# Patient Record
Sex: FEMALE | Race: WHITE | NOT HISPANIC OR LATINO | Employment: OTHER | URBAN - METROPOLITAN AREA
[De-identification: names, ages, dates, MRNs, and addresses within clinical notes are randomized per-mention and may not be internally consistent; named-entity substitution may affect disease eponyms.]

---

## 2017-01-06 ENCOUNTER — OFFICE VISIT (OUTPATIENT)
Dept: INTERNAL MEDICINE | Facility: CLINIC | Age: 82
End: 2017-01-06

## 2017-01-06 VITALS
WEIGHT: 158.06 LBS | TEMPERATURE: 98.4 F | BODY MASS INDEX: 28.91 KG/M2 | SYSTOLIC BLOOD PRESSURE: 120 MMHG | DIASTOLIC BLOOD PRESSURE: 80 MMHG | RESPIRATION RATE: 20 BRPM | HEART RATE: 96 BPM

## 2017-01-06 DIAGNOSIS — J43.9 PULMONARY EMPHYSEMA, UNSPECIFIED EMPHYSEMA TYPE (HCC): ICD-10-CM

## 2017-01-06 DIAGNOSIS — N39.0 URINARY TRACT INFECTION, SITE UNSPECIFIED: ICD-10-CM

## 2017-01-06 DIAGNOSIS — R50.9 FEVER, UNSPECIFIED FEVER CAUSE: Primary | ICD-10-CM

## 2017-01-06 DIAGNOSIS — R10.9 FLANK PAIN: ICD-10-CM

## 2017-01-06 LAB
BILIRUB BLD-MCNC: NEGATIVE MG/DL
CLARITY, POC: ABNORMAL
COLOR UR: YELLOW
EXPIRATION DATE: ABNORMAL
EXPIRATION DATE: NORMAL
FLUAV AG NPH QL: NEGATIVE
FLUBV AG NPH QL: NEGATIVE
GLUCOSE UR STRIP-MCNC: NEGATIVE MG/DL
INTERNAL CONTROL: NORMAL
KETONES UR QL: NEGATIVE
LEUKOCYTE EST, POC: ABNORMAL
Lab: ABNORMAL
Lab: NORMAL
NITRITE UR-MCNC: NEGATIVE MG/ML
PH UR: 7 [PH] (ref 5–8)
PROT UR STRIP-MCNC: NEGATIVE MG/DL
RBC # UR STRIP: ABNORMAL /UL
SP GR UR: 1.01 (ref 1–1.03)
UROBILINOGEN UR QL: NORMAL

## 2017-01-06 PROCEDURE — 87086 URINE CULTURE/COLONY COUNT: CPT | Performed by: INTERNAL MEDICINE

## 2017-01-06 PROCEDURE — 81003 URINALYSIS AUTO W/O SCOPE: CPT | Performed by: INTERNAL MEDICINE

## 2017-01-06 PROCEDURE — 87804 INFLUENZA ASSAY W/OPTIC: CPT | Performed by: INTERNAL MEDICINE

## 2017-01-06 PROCEDURE — 99214 OFFICE O/P EST MOD 30 MIN: CPT | Performed by: INTERNAL MEDICINE

## 2017-01-06 RX ORDER — SULFAMETHOXAZOLE AND TRIMETHOPRIM 800; 160 MG/1; MG/1
1 TABLET ORAL 2 TIMES DAILY
Qty: 20 TABLET | Refills: 0 | Status: SHIPPED | OUTPATIENT
Start: 2017-01-06 | End: 2017-01-16

## 2017-01-06 RX ORDER — ALBUTEROL SULFATE 90 UG/1
2 AEROSOL, METERED RESPIRATORY (INHALATION) EVERY 4 HOURS PRN
Qty: 1 INHALER | Refills: 11 | Status: SHIPPED | OUTPATIENT
Start: 2017-01-06 | End: 2017-06-08 | Stop reason: SDUPTHER

## 2017-01-06 NOTE — PROGRESS NOTES
Subjective       Pati Carter is a 86 y.o. female.     Chief Complaint   Patient presents with   • Fever     chills   • Flank Pain   • Shortness of Breath         Fever    This is a new problem. The current episode started today. Her temperature was unmeasured prior to arrival. Associated symptoms include diarrhea (after took Levaquin), headaches (x 3-4 days), muscle aches and wheezing. Pertinent negatives include no abdominal pain, chest pain, congestion, coughing, ear pain, nausea, rash, sore throat, urinary pain or vomiting. She has tried nothing (took 1 Levaquin) for the symptoms.   Risk factors: no contaminated food, no contaminated water, no recent travel and no sick contacts    Flank Pain   This is a new problem. Episode onset: 3-4 days ago. The problem occurs intermittently. The problem is unchanged (left side only). The quality of the pain is described as aching. The pain does not radiate. The pain is mild. Exacerbated by: nothing. Associated symptoms include bowel incontinence (not new), a fever, headaches (x 3-4 days) and weight loss (down 5 pounds). Pertinent negatives include no abdominal pain, bladder incontinence, chest pain, dysuria or pelvic pain. She has tried nothing for the symptoms.   Shortness of Breath   This is a chronic problem. Episode frequency: worse in the morning. The problem has been unchanged. Associated symptoms include a fever, headaches (x 3-4 days) and wheezing. Pertinent negatives include no abdominal pain, chest pain, ear pain, hemoptysis, rash, rhinorrhea, sore throat, sputum production, swollen glands or vomiting. Treatments tried: On Breo Ellipta daily. The treatment provided significant relief. Her past medical history is significant for COPD.      She wants an inhaler.    The following portions of the patient's history were reviewed and updated as appropriate: allergies, current medications, past family history, past medical history, past social history, past surgical  history and problem list.      Review of Systems   Constitutional: Positive for appetite change, fatigue, fever and weight loss (down 5 pounds). Negative for chills.   HENT: Negative for congestion, ear pain, postnasal drip, rhinorrhea, sore throat and voice change.    Eyes: Negative for pain, discharge, redness and itching.   Respiratory: Positive for shortness of breath and wheezing. Negative for cough, hemoptysis and sputum production.    Cardiovascular: Negative for chest pain.   Gastrointestinal: Positive for bowel incontinence (not new) and diarrhea (after took Levaquin). Negative for abdominal pain, nausea and vomiting.   Genitourinary: Positive for flank pain. Negative for bladder incontinence, dysuria, hematuria, pelvic pain and urgency.   Musculoskeletal: Positive for arthralgias and myalgias.   Skin: Negative for rash.   Neurological: Positive for headaches (x 3-4 days).   Hematological: Negative for adenopathy.         Blood pressure 120/80, pulse 96, temperature 98.4 °F (36.9 °C), temperature source Temporal Artery , resp. rate 20, weight 158 lb 1 oz (71.7 kg).      Objective     Physical Exam   Constitutional:   Obese.   HENT:   Head: Normocephalic and atraumatic.   Right Ear: Tympanic membrane, external ear and ear canal normal.   Left Ear: Tympanic membrane, external ear and ear canal normal.   Mouth/Throat: Oropharynx is clear and moist and mucous membranes are normal. No oral lesions.   Tonsils normal.  No sinus tenderness to palpation.   Eyes: Conjunctivae are normal.   Neck: Normal range of motion. Neck supple.   Cardiovascular: Normal rate and regular rhythm.    No murmur heard.  Pulmonary/Chest: Effort normal. No respiratory distress. She has no decreased breath sounds. She has wheezes (scattered bilaterally). She has no rhonchi. She has no rales.   Lymphadenopathy:     She has no cervical adenopathy.   Skin: No rash noted.   Psychiatric: She has a normal mood and affect.   Nursing note and  vitals reviewed.      Results for orders placed or performed in visit on 01/06/17   POC Urinalysis Dipstick, Automated   Result Value Ref Range    Color Yellow Yellow, Straw, Dark Yellow, Selena    Clarity, UA Cloudy (A) Clear    Glucose, UA Negative Negative, 1000 mg/dL (3+) mg/dL    Bilirubin Negative Negative    Ketones, UA Negative Negative    Specific Gravity  1.010 1.005 - 1.030    Blood, UA 50 Evens/ul (A) Negative    pH, Urine 7.0 5.0 - 8.0    Protein, POC Negative Negative mg/dL    Urobilinogen, UA Normal Normal    Leukocytes 500 Nikolas/ul (A) Negative    Nitrite, UA Negative Negative    Lot Number [id number]     Expiration Date 9-17    POC Influenza A / B   Result Value Ref Range    Rapid Influenza A Ag NEGATIVE     Rapid Influenza B Ag NEGATIVE     Internal Control Passed Passed    Lot Number [id number]     Expiration Date 5-5-19          Assessment/Plan   Pati was seen today for fever, flank pain and shortness of breath.    Diagnoses and all orders for this visit:    Fever, unspecified fever cause  -     POC Urinalysis Dipstick, Automated  -     POC Influenza A / B    Flank pain    Pulmonary emphysema, unspecified emphysema type  -     albuterol (PROVENTIL HFA;VENTOLIN HFA) 108 (90 BASE) MCG/ACT inhaler; Inhale 2 puffs Every 4 (Four) Hours As Needed for wheezing or shortness of air.    Urinary tract infection, site unspecified  -     Urine Culture  -     sulfamethoxazole-trimethoprim (BACTRIM DS) 800-160 MG per tablet; Take 1 tablet by mouth 2 (Two) Times a Day for 10 days.      Return if symptoms worsen or fail to improve.

## 2017-01-06 NOTE — MR AVS SNAPSHOT
Pati Carter   1/6/2017 10:15 AM   Office Visit    Provider:  Latoya Bolaños MD   Department:  [facility]   Dept Phone:  [phone]                Your Full Care Plan              Today's Medication Changes          These changes are accurate as of: 1/6/17 11:19 AM.  If you have any questions, ask your nurse or doctor.               New Medication(s)Ordered:     albuterol 108 (90 BASE) MCG/ACT inhaler   Commonly known as:  PROVENTIL HFA;VENTOLIN HFA   Inhale 2 puffs Every 4 (Four) Hours As Needed for wheezing or shortness of air.   Started by:  Latoya Bolaños MD       sulfamethoxazole-trimethoprim 800-160 MG per tablet   Commonly known as:  BACTRIM DS   Take 1 tablet by mouth 2 (Two) Times a Day for 10 days.   Started by:  Latoya Bolaños MD            Where to Get Your Medications      These medications were sent to [address] [place] [address] [phone] [facility] [phone] [place] [address]     Phone:  [phone]     albuterol 108 (90 BASE) MCG/ACT inhaler    sulfamethoxazole-trimethoprim 800-160 MG per tablet                  Your Updated Medication List          This list is accurate as of: 1/6/17 11:19 AM.  Always use your most recent med list.                albuterol 108 (90 BASE) MCG/ACT inhaler   Commonly known as:  PROVENTIL HFA;VENTOLIN HFA   Inhale 2 puffs Every 4 (Four) Hours As Needed for wheezing or shortness of air.       BREO ELLIPTA 200-25 MCG/INH aerosol powder    Generic drug:  Fluticasone Furoate-Vilanterol       CENTRUM SILVER ADULT 50+ PO       COLACE 100 MG capsule   Generic drug:  docusate sodium       folic acid 1 MG tablet   Commonly known as:  FOLVITE       methotrexate 2.5 MG tablet       simvastatin 20 MG tablet   Commonly known as:  ZOCOR   TAKE 1 TABLET EVERY DAY AT BEDTIME       sulfamethoxazole-trimethoprim 800-160 MG per tablet   Commonly known as:   BACTRIM DS   Take 1 tablet by mouth 2 (Two) Times a Day for 10 days.       triamterene-hydrochlorothiazide 37.5-25 MG per capsule   Commonly known as:  DYAZIDE   TAKE 1 CAPSULE EVERY DAY       Vitamin D3 3000 UNITS tablet               We Performed the Following     POC Influenza A / B     POC Urinalysis Dipstick, Automated     Urine Culture       You Were Diagnosed With        Codes Comments    Fever, unspecified fever cause    -  Primary ICD-10-CM: R50.9  ICD-9-CM: 780.60     Flank pain     ICD-10-CM: R10.9  ICD-9-CM: 789.09     Pulmonary emphysema, unspecified emphysema type     ICD-10-CM: J43.9  ICD-9-CM: 492.8     Urinary tract infection, site unspecified     ICD-10-CM: N39.0       Instructions     None    Patient Instructions History      [email or web address] Signup     [facility] [employer] allows you to send messages to your doctor, view your test results, renew your prescriptions, schedule appointments, and more. To sign up, go to SaveUp and click on the Sign Up Now link in the New User? box. Enter your [employer]de Activation Code exactly as it appears below along with the last four digits of your Social Security Number and your Date of Birth () to complete the sign-up process. If you do not sign up before the expiration date, you must request a new code.    Glyde Activation Code: [email or web address]  Expires: 2017 11:19 AM    If you have questions, you can email [email or web address] or call [phone] to talk to our Glyde staff. Remember, Glyde is NOT to be used for urgent needs. For medical emergencies, dial 911.               Other Info from Your Visit           Your Appointments     2017 11:00 AM EDT   Follow Up with Latoya Bolaños MD   [facility] (--)    [address] [place] [id number] [phone]           Arrive 15 minutes prior to appointment.              Allergies     Ciprofloxacin  Other  (See Comments)    Other reaction(s): shaking  HCI TABS/ SHAKING    Levofloxacin Intolerance Diarrhea      Reason for Visit     Fever chills    Flank Pain     Shortness of Breath           Vital Signs     Blood Pressure Pulse Temperature Respirations Weight Body Mass Index    120/80 (BP Location: Right arm) 96 98.4 °F (36.9 °C) (Temporal Artery ) 20 158 lb 1 oz (71.7 kg) 28.91 kg/m2    Smoking Status                   Former Smoker           Problems and Diagnoses Noted     Chronic airway obstruction    Fever    -  Primary    Flank pain        Urinary tract infection          Results     POC Urinalysis Dipstick, Automated      Component Value Standard Range & Units    Color Yellow Yellow, Straw, Dark Yellow, Selena    Clarity, UA Cloudy Clear    Glucose, UA Negative Negative, 1000 mg/dL (3+) mg/dL    Bilirubin Negative Negative    Ketones, UA Negative Negative    Specific Gravity  1.010 1.005 - 1.030    Blood, UA 50 Evens/ul Negative    pH, Urine 7.0 5.0 - 8.0    Protein, POC Negative Negative mg/dL    Urobilinogen, UA Normal Normal    Leukocytes 500 Nikolas/ul Negative    Nitrite, UA Negative Negative    Lot Number [id number]     Expiration Date 9-17

## 2017-01-08 LAB — BACTERIA SPEC AEROBE CULT: NORMAL

## 2017-02-03 ENCOUNTER — TRANSCRIBE ORDERS (OUTPATIENT)
Dept: LAB | Facility: HOSPITAL | Age: 82
End: 2017-02-03

## 2017-02-03 ENCOUNTER — APPOINTMENT (OUTPATIENT)
Dept: LAB | Facility: HOSPITAL | Age: 82
End: 2017-02-03

## 2017-02-03 DIAGNOSIS — Z79.899 POLYPHARMACY: ICD-10-CM

## 2017-02-03 DIAGNOSIS — IMO0001 REASON FOR CONSULTATION: Primary | ICD-10-CM

## 2017-02-03 DIAGNOSIS — M05.89 OTHER RHEUMATOID ARTHRITIS WITH RHEUMATOID FACTOR OF MULTIPLE SITES (HCC): ICD-10-CM

## 2017-02-03 LAB
ALBUMIN SERPL-MCNC: 4.1 G/DL (ref 3.2–4.8)
ALBUMIN/GLOB SERPL: 1.1 G/DL (ref 1.5–2.5)
ALP SERPL-CCNC: 67 U/L (ref 25–100)
ALT SERPL W P-5'-P-CCNC: 21 U/L (ref 7–40)
ANION GAP SERPL CALCULATED.3IONS-SCNC: 6 MMOL/L (ref 3–11)
AST SERPL-CCNC: 33 U/L (ref 0–33)
BASOPHILS # BLD AUTO: 0.04 10*3/MM3 (ref 0–0.2)
BASOPHILS NFR BLD AUTO: 0.5 % (ref 0–1)
BILIRUB SERPL-MCNC: 0.3 MG/DL (ref 0.3–1.2)
BUN BLD-MCNC: 33 MG/DL (ref 9–23)
BUN/CREAT SERPL: 27.5 (ref 7–25)
CALCIUM SPEC-SCNC: 10.2 MG/DL (ref 8.7–10.4)
CHLORIDE SERPL-SCNC: 103 MMOL/L (ref 99–109)
CO2 SERPL-SCNC: 35 MMOL/L (ref 20–31)
CREAT BLD-MCNC: 1.2 MG/DL (ref 0.6–1.3)
CRP SERPL-MCNC: 0.3 MG/DL (ref 0–10)
DEPRECATED RDW RBC AUTO: 52.8 FL (ref 37–54)
EOSINOPHIL # BLD AUTO: 0.24 10*3/MM3 (ref 0.1–0.3)
EOSINOPHIL NFR BLD AUTO: 3 % (ref 0–3)
ERYTHROCYTE [DISTWIDTH] IN BLOOD BY AUTOMATED COUNT: 14.3 % (ref 11.3–14.5)
GFR SERPL CREATININE-BSD FRML MDRD: 43 ML/MIN/1.73
GLOBULIN UR ELPH-MCNC: 3.8 GM/DL
GLUCOSE BLD-MCNC: 108 MG/DL (ref 70–100)
HCT VFR BLD AUTO: 35.8 % (ref 34.5–44)
HGB BLD-MCNC: 11.4 G/DL (ref 11.5–15.5)
IMM GRANULOCYTES # BLD: 0.02 10*3/MM3 (ref 0–0.03)
IMM GRANULOCYTES NFR BLD: 0.3 % (ref 0–0.6)
LYMPHOCYTES # BLD AUTO: 2.18 10*3/MM3 (ref 0.6–4.8)
LYMPHOCYTES NFR BLD AUTO: 27.3 % (ref 24–44)
MCH RBC QN AUTO: 32.9 PG (ref 27–31)
MCHC RBC AUTO-ENTMCNC: 31.8 G/DL (ref 32–36)
MCV RBC AUTO: 103.2 FL (ref 80–99)
MONOCYTES # BLD AUTO: 0.55 10*3/MM3 (ref 0–1)
MONOCYTES NFR BLD AUTO: 6.9 % (ref 0–12)
NEUTROPHILS # BLD AUTO: 4.96 10*3/MM3 (ref 1.5–8.3)
NEUTROPHILS NFR BLD AUTO: 62 % (ref 41–71)
PLATELET # BLD AUTO: 253 10*3/MM3 (ref 150–450)
PMV BLD AUTO: 10 FL (ref 6–12)
POTASSIUM BLD-SCNC: 4.1 MMOL/L (ref 3.5–5.5)
PROT SERPL-MCNC: 7.9 G/DL (ref 5.7–8.2)
RBC # BLD AUTO: 3.47 10*6/MM3 (ref 3.89–5.14)
SODIUM BLD-SCNC: 144 MMOL/L (ref 132–146)
WBC NRBC COR # BLD: 7.99 10*3/MM3 (ref 3.5–10.8)

## 2017-02-03 PROCEDURE — 86140 C-REACTIVE PROTEIN: CPT | Performed by: INTERNAL MEDICINE

## 2017-02-03 PROCEDURE — 85025 COMPLETE CBC W/AUTO DIFF WBC: CPT | Performed by: INTERNAL MEDICINE

## 2017-02-03 PROCEDURE — 36415 COLL VENOUS BLD VENIPUNCTURE: CPT

## 2017-02-03 PROCEDURE — 80053 COMPREHEN METABOLIC PANEL: CPT | Performed by: INTERNAL MEDICINE

## 2017-02-24 ENCOUNTER — OFFICE VISIT (OUTPATIENT)
Dept: INTERNAL MEDICINE | Facility: CLINIC | Age: 82
End: 2017-02-24

## 2017-02-24 VITALS
HEART RATE: 100 BPM | WEIGHT: 154.5 LBS | BODY MASS INDEX: 28.26 KG/M2 | DIASTOLIC BLOOD PRESSURE: 84 MMHG | RESPIRATION RATE: 20 BRPM | TEMPERATURE: 97.5 F | SYSTOLIC BLOOD PRESSURE: 140 MMHG

## 2017-02-24 DIAGNOSIS — N39.0 URINARY TRACT INFECTION, SITE UNSPECIFIED: Primary | ICD-10-CM

## 2017-02-24 DIAGNOSIS — B37.2 CANDIDAL DERMATITIS: ICD-10-CM

## 2017-02-24 LAB
BILIRUB BLD-MCNC: NEGATIVE MG/DL
CLARITY, POC: ABNORMAL
COLOR UR: YELLOW
EXPIRATION DATE: ABNORMAL
GLUCOSE UR STRIP-MCNC: NEGATIVE MG/DL
KETONES UR QL: NEGATIVE
LEUKOCYTE EST, POC: ABNORMAL
Lab: ABNORMAL
NITRITE UR-MCNC: NEGATIVE MG/ML
PH UR: 7 [PH] (ref 5–8)
PROT UR STRIP-MCNC: NEGATIVE MG/DL
RBC # UR STRIP: NEGATIVE /UL
SP GR UR: 1 (ref 1–1.03)
UROBILINOGEN UR QL: NORMAL

## 2017-02-24 PROCEDURE — 87186 SC STD MICRODIL/AGAR DIL: CPT | Performed by: INTERNAL MEDICINE

## 2017-02-24 PROCEDURE — 81003 URINALYSIS AUTO W/O SCOPE: CPT | Performed by: INTERNAL MEDICINE

## 2017-02-24 PROCEDURE — 99214 OFFICE O/P EST MOD 30 MIN: CPT | Performed by: INTERNAL MEDICINE

## 2017-02-24 PROCEDURE — 87086 URINE CULTURE/COLONY COUNT: CPT | Performed by: INTERNAL MEDICINE

## 2017-02-24 PROCEDURE — 87077 CULTURE AEROBIC IDENTIFY: CPT | Performed by: INTERNAL MEDICINE

## 2017-02-24 RX ORDER — NITROFURANTOIN 25; 75 MG/1; MG/1
100 CAPSULE ORAL 2 TIMES DAILY
Qty: 14 CAPSULE | Refills: 0 | Status: SHIPPED | OUTPATIENT
Start: 2017-02-24 | End: 2017-03-03

## 2017-02-24 RX ORDER — NYSTATIN 100000 U/G
OINTMENT TOPICAL 3 TIMES DAILY
Qty: 45 G | Refills: 0 | Status: SHIPPED | OUTPATIENT
Start: 2017-02-24 | End: 2017-03-03

## 2017-02-24 RX ORDER — MELOXICAM 15 MG/1
TABLET ORAL AS NEEDED
COMMUNITY
Start: 2017-01-18 | End: 2017-10-17

## 2017-02-24 NOTE — PROGRESS NOTES
Subjective       Pati Carter is a 86 y.o. female.     Chief Complaint   Patient presents with   • Urinary Tract Infection   • Headache         Urinary Tract Infection    This is a new problem. Episode onset: 1 week ago. The problem occurs every urination. The problem has been gradually worsening. Quality: None. There has been no fever. She is not sexually active. There is no history of pyelonephritis. Associated symptoms include chills, flank pain (left), frequency and urgency. Pertinent negatives include no discharge, hematuria, nausea or vomiting. She has tried nothing for the symptoms. Her past medical history is significant for recurrent UTIs. There is no history of kidney stones.      The patient states she used Vagisil on her genital rash.  It made it worse.    The following portions of the patient's history were reviewed and updated as appropriate: allergies, current medications, past family history, past medical history, past social history, past surgical history and problem list.      Review of Systems   Constitutional: Positive for appetite change (decreased) and chills. Negative for fever.   Respiratory: Negative for shortness of breath and wheezing.    Cardiovascular: Negative for chest pain.   Gastrointestinal: Negative for abdominal pain, diarrhea, nausea and vomiting.   Genitourinary: Positive for flank pain (left), frequency and urgency. Negative for dysuria, hematuria, pelvic pain, vaginal bleeding and vaginal discharge.        Nocturia.   Skin: Positive for rash (genital area).   Neurological: Positive for headaches.   Hematological: Negative for adenopathy.         Blood pressure 140/84, pulse 100, temperature 97.5 °F (36.4 °C), temperature source Temporal Artery , resp. rate 20, weight 154 lb 8 oz (70.1 kg).      Objective     Physical Exam   Constitutional:   Overweight.   Cardiovascular: Normal rate, regular rhythm and normal heart sounds.    No murmur heard.  Pulmonary/Chest: Effort  normal and breath sounds normal.   Abdominal: Soft. Bowel sounds are normal. She exhibits no distension and no mass. There is no hepatosplenomegaly. There is no tenderness. There is no CVA tenderness.   Skin: Rash (erythematous macular rash in genital area.) noted.   Psychiatric: She has a normal mood and affect.   Nursing note and vitals reviewed.      Results for orders placed or performed in visit on 02/24/17   POC Urinalysis Dipstick, Automated   Result Value Ref Range    Color Yellow Yellow, Straw, Dark Yellow, Selena    Clarity, UA Cloudy (A) Clear    Glucose, UA Negative Negative, 1000 mg/dL (3+) mg/dL    Bilirubin Negative Negative    Ketones, UA Negative Negative    Specific Gravity  1.005 1.005 - 1.030    Blood, UA Negative Negative    pH, Urine 7.0 5.0 - 8.0    Protein, POC Negative Negative mg/dL    Urobilinogen, UA Normal Normal    Leukocytes 500 Nikolas/ul (A) Negative    Nitrite, UA Negative Negative    Lot Number [id number]     Expiration Date 9-17        Assessment/Plan   Pati was seen today for urinary tract infection and headache.    Diagnoses and all orders for this visit:    Urinary tract infection, site unspecified  -     POC Urinalysis Dipstick, Automated  -     Urine Culture  -     nitrofurantoin, macrocrystal-monohydrate, (MACROBID) 100 MG capsule; Take 1 capsule by mouth 2 (Two) Times a Day for 7 days.    Candidal dermatitis  -     nystatin (MYCOSTATIN) 416918 UNIT/GM ointment; Apply  topically 3 (Three) Times a Day for 7 days.        Plenty of fluids.     Return for Next scheduled follow up.

## 2017-02-26 LAB — BACTERIA SPEC AEROBE CULT: ABNORMAL

## 2017-03-07 ENCOUNTER — TELEPHONE (OUTPATIENT)
Dept: INTERNAL MEDICINE | Facility: CLINIC | Age: 82
End: 2017-03-07

## 2017-03-07 RX ORDER — FLUCONAZOLE 150 MG/1
TABLET ORAL
Qty: 1 TABLET | Refills: 1 | Status: SHIPPED | OUTPATIENT
Start: 2017-03-07 | End: 2017-03-10

## 2017-03-07 NOTE — TELEPHONE ENCOUNTER
Is she saying that the UTI is better, but the yeast infection is not? We could try fluconazole 150mg PO x 1.

## 2017-03-07 NOTE — TELEPHONE ENCOUNTER
Spoke with Danette and she states she is still having discomfort from a yeast infection   Advised her if no better after this rx of fluconazole to have her follow up with Dr Bolaños.   Verb understanding given.   Rx sent to pharmacy.

## 2017-03-07 NOTE — TELEPHONE ENCOUNTER
----- Message from Kate Gallo sent at 3/7/2017 11:26 AM EST -----  DAUGHTER-VIJI [phone]    PT WAS SEEN ON 2/24 FOR UTI AND ALSO WAS GIVEN YEAST INFECTION TOPICAL CREAM.  ALSO DID MONISTAT 1.  NOT SURE UTI IS BETTER BUT DAUGHTER KNOWS YEAST INFECTION IS NO BETTER.      [facility]

## 2017-03-09 ENCOUNTER — TELEPHONE (OUTPATIENT)
Dept: INTERNAL MEDICINE | Facility: CLINIC | Age: 82
End: 2017-03-09

## 2017-03-09 NOTE — TELEPHONE ENCOUNTER
----- Message from Chiquita Farley sent at 3/9/2017 12:35 PM EST -----  Danette Suresh, pts daughter called and stated pt still has a UTI and yeast infection and has finished out both of the rxs that was called in for this. She wanted to know what she should do for the pt? She also stated pt has lost some controlled over her bladder. She can be reached at [phone]      Pharmacy- [facility]

## 2017-03-09 NOTE — TELEPHONE ENCOUNTER
Danette states the burning and urination has come back .  She wanted to schedule a follow up for UTI .  Appt scheduled for tomorrow.

## 2017-03-10 ENCOUNTER — OFFICE VISIT (OUTPATIENT)
Dept: INTERNAL MEDICINE | Facility: CLINIC | Age: 82
End: 2017-03-10

## 2017-03-10 VITALS
TEMPERATURE: 98.2 F | RESPIRATION RATE: 20 BRPM | WEIGHT: 154.25 LBS | BODY MASS INDEX: 28.21 KG/M2 | SYSTOLIC BLOOD PRESSURE: 124 MMHG | HEART RATE: 76 BPM | DIASTOLIC BLOOD PRESSURE: 84 MMHG

## 2017-03-10 DIAGNOSIS — N39.0 URINARY TRACT INFECTION, SITE UNSPECIFIED: ICD-10-CM

## 2017-03-10 DIAGNOSIS — M79.604 PAIN OF RIGHT LOWER EXTREMITY: ICD-10-CM

## 2017-03-10 DIAGNOSIS — N39.0 URINARY TRACT INFECTION, SITE UNSPECIFIED: Primary | ICD-10-CM

## 2017-03-10 LAB
BILIRUB BLD-MCNC: NEGATIVE MG/DL
CLARITY, POC: ABNORMAL
COLOR UR: YELLOW
EXPIRATION DATE: ABNORMAL
GLUCOSE UR STRIP-MCNC: NEGATIVE MG/DL
KETONES UR QL: NEGATIVE
LEUKOCYTE EST, POC: ABNORMAL
Lab: ABNORMAL
NITRITE UR-MCNC: NEGATIVE MG/ML
PH UR: 7 [PH] (ref 5–8)
PROT UR STRIP-MCNC: NEGATIVE MG/DL
RBC # UR STRIP: NEGATIVE /UL
SP GR UR: 1.01 (ref 1–1.03)
UROBILINOGEN UR QL: NORMAL

## 2017-03-10 PROCEDURE — 99214 OFFICE O/P EST MOD 30 MIN: CPT | Performed by: INTERNAL MEDICINE

## 2017-03-10 PROCEDURE — 87086 URINE CULTURE/COLONY COUNT: CPT | Performed by: INTERNAL MEDICINE

## 2017-03-10 PROCEDURE — 81003 URINALYSIS AUTO W/O SCOPE: CPT | Performed by: INTERNAL MEDICINE

## 2017-03-10 RX ORDER — NITROFURANTOIN 25; 75 MG/1; MG/1
100 CAPSULE ORAL 2 TIMES DAILY
Qty: 20 CAPSULE | Refills: 0 | Status: SHIPPED | OUTPATIENT
Start: 2017-03-10 | End: 2017-06-08

## 2017-03-10 RX ORDER — FLUCONAZOLE 150 MG/1
150 TABLET ORAL ONCE
Qty: 2 TABLET | Refills: 1 | Status: SHIPPED | OUTPATIENT
Start: 2017-03-10 | End: 2017-03-10

## 2017-03-10 RX ORDER — NITROFURANTOIN MACROCRYSTALS 100 MG/1
100 CAPSULE ORAL 2 TIMES DAILY
Qty: 20 CAPSULE | Refills: 0 | Status: SHIPPED | OUTPATIENT
Start: 2017-03-10 | End: 2017-03-10

## 2017-03-10 NOTE — PROGRESS NOTES
Subjective       Pati Carter is a 86 y.o. female.     Chief Complaint   Patient presents with   • Urinary Tract Infection   • Urinary Incontinence   • Leg Pain     c/o hot feeling        History obtained from the patient.      Urinary Tract Infection    This is a recurrent problem. The current episode started yesterday. The problem occurs intermittently. The problem has been gradually improving. The patient is experiencing no pain. There has been no fever. She is not sexually active. There is no history of pyelonephritis. Associated symptoms include chills, frequency and urgency. Pertinent negatives include no discharge, flank pain, hematuria, nausea or vomiting. Treatments tried: Pyridium. The treatment provided mild relief. Her past medical history is significant for recurrent UTIs.   Leg Pain    Incident onset:  3 months ago. The injury mechanism was a fall. The pain is present in the right leg. The quality of the pain is described as burning. The pain is mild. The pain has been intermittent since onset. Pertinent negatives include no inability to bear weight, loss of motion, loss of sensation, muscle weakness, numbness or tingling. She has tried ice for the symptoms. The treatment provided moderate relief.        The following portions of the patient's history were reviewed and updated as appropriate: allergies, current medications, past family history, past medical history, past social history, past surgical history and problem list.      Review of Systems   Constitutional: Positive for chills. Negative for fever.   Gastrointestinal: Negative for abdominal pain, diarrhea, nausea and vomiting.   Genitourinary: Positive for frequency and urgency. Negative for dysuria, flank pain, hematuria, pelvic pain, vaginal bleeding and vaginal discharge.        Complains of incontinence     Musculoskeletal: Positive for back pain.   Skin: Negative for rash.   Neurological: Negative for tingling and numbness.    Hematological: Negative for adenopathy.         Blood pressure 124/84, pulse 76, temperature 98.2 °F (36.8 °C), temperature source Temporal Artery , resp. rate 20, weight 154 lb 4 oz (70 kg).      Objective     Physical Exam   Constitutional: She appears well-developed and well-nourished.   Cardiovascular: Normal rate, regular rhythm and normal heart sounds.    No murmur heard.  Pulmonary/Chest: Effort normal and breath sounds normal.   Abdominal: Soft. She exhibits no distension and no mass. There is no hepatosplenomegaly. There is no tenderness. There is no CVA tenderness.   Musculoskeletal:   There is a RLE muscle knot, tender to palpation, on the medial anterior shin.  No erythema, edema, or warmth.   Skin: No rash noted.   Nursing note and vitals reviewed.      Results for orders placed or performed in visit on 03/10/17   POC Urinalysis Dipstick, Automated   Result Value Ref Range    Color Yellow Yellow, Straw, Dark Yellow, Selena    Clarity, UA Cloudy (A) Clear    Glucose, UA Negative Negative, 1000 mg/dL (3+) mg/dL    Bilirubin Negative Negative    Ketones, UA Negative Negative    Specific Gravity  1.010 1.005 - 1.030    Blood, UA Negative Negative    pH, Urine 7.0 5.0 - 8.0    Protein, POC Negative Negative mg/dL    Urobilinogen, UA Normal Normal    Leukocytes 25 Nikolas/ul (A) Negative    Nitrite, UA Negative Negative    Lot Number [id number]     Expiration Date 2-28-18          Assessment/Plan   Pati was seen today for urinary tract infection, urinary incontinence and leg pain.    Diagnoses and all orders for this visit:    Urinary tract infection, site unspecified  -     POC Urinalysis Dipstick, Automated  -     Urine Culture  -     fluconazole (DIFLUCAN) 150 MG tablet; Take 1 tablet by mouth 1 (One) Time for 1 dose. May repeat after 4 days  -     Discontinue: nitrofurantoin (MACRODANTIN) 100 MG capsule; Take 1 capsule by mouth 2 (Two) Times a Day for 10 days.    Pain of right lower extremity          Recommend continue Pyridium and plenty of fluids for the UTI.    Recommend heat or ice, and elevation, for the leg pain.  In addition, may take Meloxicam as needed.    Return if symptoms worsen or fail to improve.

## 2017-03-10 NOTE — PATIENT INSTRUCTIONS
Recommend continue Pyridium and plenty of fluids for the UTI.    Recommend heat or ice, and elevation, for the leg pain.  In addition, may take Meloxicam as needed.

## 2017-03-12 LAB — BACTERIA SPEC AEROBE CULT: NORMAL

## 2017-04-11 ENCOUNTER — OFFICE VISIT (OUTPATIENT)
Dept: INTERNAL MEDICINE | Facility: CLINIC | Age: 82
End: 2017-04-11

## 2017-04-11 VITALS
BODY MASS INDEX: 28.24 KG/M2 | WEIGHT: 154.38 LBS | OXYGEN SATURATION: 99 % | DIASTOLIC BLOOD PRESSURE: 72 MMHG | TEMPERATURE: 97.6 F | SYSTOLIC BLOOD PRESSURE: 124 MMHG | RESPIRATION RATE: 20 BRPM | HEART RATE: 86 BPM

## 2017-04-11 DIAGNOSIS — I10 ESSENTIAL HYPERTENSION: ICD-10-CM

## 2017-04-11 DIAGNOSIS — J43.9 PULMONARY EMPHYSEMA, UNSPECIFIED EMPHYSEMA TYPE (HCC): ICD-10-CM

## 2017-04-11 DIAGNOSIS — K63.5 BENIGN COLONIC POLYP: ICD-10-CM

## 2017-04-11 DIAGNOSIS — E78.5 HYPERLIPIDEMIA, UNSPECIFIED HYPERLIPIDEMIA TYPE: Primary | ICD-10-CM

## 2017-04-11 DIAGNOSIS — M06.9 RHEUMATOID ARTHRITIS, INVOLVING UNSPECIFIED SITE, UNSPECIFIED RHEUMATOID FACTOR PRESENCE: ICD-10-CM

## 2017-04-11 DIAGNOSIS — K21.9 GASTROESOPHAGEAL REFLUX DISEASE WITHOUT ESOPHAGITIS: ICD-10-CM

## 2017-04-11 LAB
ALBUMIN SERPL-MCNC: 4.3 G/DL (ref 3.2–4.8)
ALBUMIN/GLOB SERPL: 1.3 G/DL (ref 1.5–2.5)
ALP SERPL-CCNC: 55 U/L (ref 25–100)
ALT SERPL W P-5'-P-CCNC: 15 U/L (ref 7–40)
ANION GAP SERPL CALCULATED.3IONS-SCNC: 4 MMOL/L (ref 3–11)
ARTICHOKE IGE QN: 104 MG/DL (ref 0–130)
AST SERPL-CCNC: 28 U/L (ref 0–33)
BASOPHILS # BLD AUTO: 0.03 10*3/MM3 (ref 0–0.2)
BASOPHILS NFR BLD AUTO: 0.5 % (ref 0–1)
BILIRUB BLD-MCNC: NEGATIVE MG/DL
BILIRUB SERPL-MCNC: 0.6 MG/DL (ref 0.3–1.2)
BUN BLD-MCNC: 31 MG/DL (ref 9–23)
BUN/CREAT SERPL: 28.2 (ref 7–25)
CALCIUM SPEC-SCNC: 10.2 MG/DL (ref 8.7–10.4)
CHLORIDE SERPL-SCNC: 104 MMOL/L (ref 99–109)
CHOLEST SERPL-MCNC: 174 MG/DL (ref 0–200)
CLARITY, POC: ABNORMAL
CO2 SERPL-SCNC: 34 MMOL/L (ref 20–31)
COLOR UR: YELLOW
CREAT BLD-MCNC: 1.1 MG/DL (ref 0.6–1.3)
DEPRECATED RDW RBC AUTO: 57.9 FL (ref 37–54)
EOSINOPHIL # BLD AUTO: 0.14 10*3/MM3 (ref 0.1–0.3)
EOSINOPHIL NFR BLD AUTO: 2.1 % (ref 0–3)
ERYTHROCYTE [DISTWIDTH] IN BLOOD BY AUTOMATED COUNT: 15.4 % (ref 11.3–14.5)
EXPIRATION DATE: ABNORMAL
GFR SERPL CREATININE-BSD FRML MDRD: 47 ML/MIN/1.73
GLOBULIN UR ELPH-MCNC: 3.4 GM/DL
GLUCOSE BLD-MCNC: 88 MG/DL (ref 70–100)
GLUCOSE UR STRIP-MCNC: NEGATIVE MG/DL
HCT VFR BLD AUTO: 36.8 % (ref 34.5–44)
HDLC SERPL-MCNC: 57 MG/DL (ref 40–60)
HGB BLD-MCNC: 11.7 G/DL (ref 11.5–15.5)
IMM GRANULOCYTES # BLD: 0.02 10*3/MM3 (ref 0–0.03)
IMM GRANULOCYTES NFR BLD: 0.3 % (ref 0–0.6)
KETONES UR QL: NEGATIVE
LEUKOCYTE EST, POC: ABNORMAL
LYMPHOCYTES # BLD AUTO: 1.73 10*3/MM3 (ref 0.6–4.8)
LYMPHOCYTES NFR BLD AUTO: 26.5 % (ref 24–44)
Lab: ABNORMAL
MCH RBC QN AUTO: 33.3 PG (ref 27–31)
MCHC RBC AUTO-ENTMCNC: 31.8 G/DL (ref 32–36)
MCV RBC AUTO: 104.8 FL (ref 80–99)
MONOCYTES # BLD AUTO: 0.58 10*3/MM3 (ref 0–1)
MONOCYTES NFR BLD AUTO: 8.9 % (ref 0–12)
NEUTROPHILS # BLD AUTO: 4.04 10*3/MM3 (ref 1.5–8.3)
NEUTROPHILS NFR BLD AUTO: 61.7 % (ref 41–71)
NITRITE UR-MCNC: NEGATIVE MG/ML
PH UR: 7 [PH] (ref 5–8)
PLATELET # BLD AUTO: 265 10*3/MM3 (ref 150–450)
PMV BLD AUTO: 10.2 FL (ref 6–12)
POTASSIUM BLD-SCNC: 3.8 MMOL/L (ref 3.5–5.5)
PROT SERPL-MCNC: 7.7 G/DL (ref 5.7–8.2)
PROT UR STRIP-MCNC: NEGATIVE MG/DL
RBC # BLD AUTO: 3.51 10*6/MM3 (ref 3.89–5.14)
RBC # UR STRIP: ABNORMAL /UL
SODIUM BLD-SCNC: 142 MMOL/L (ref 132–146)
SP GR UR: 1 (ref 1–1.03)
TRIGL SERPL-MCNC: 80 MG/DL (ref 0–150)
TSH SERPL DL<=0.05 MIU/L-ACNC: 2.65 MIU/ML (ref 0.35–5.35)
UROBILINOGEN UR QL: NORMAL
WBC NRBC COR # BLD: 6.54 10*3/MM3 (ref 3.5–10.8)

## 2017-04-11 PROCEDURE — 80053 COMPREHEN METABOLIC PANEL: CPT | Performed by: INTERNAL MEDICINE

## 2017-04-11 PROCEDURE — 36415 COLL VENOUS BLD VENIPUNCTURE: CPT | Performed by: INTERNAL MEDICINE

## 2017-04-11 PROCEDURE — 85025 COMPLETE CBC W/AUTO DIFF WBC: CPT | Performed by: INTERNAL MEDICINE

## 2017-04-11 PROCEDURE — 84443 ASSAY THYROID STIM HORMONE: CPT | Performed by: INTERNAL MEDICINE

## 2017-04-11 PROCEDURE — 80061 LIPID PANEL: CPT | Performed by: INTERNAL MEDICINE

## 2017-04-11 PROCEDURE — 81003 URINALYSIS AUTO W/O SCOPE: CPT | Performed by: INTERNAL MEDICINE

## 2017-04-11 PROCEDURE — 99214 OFFICE O/P EST MOD 30 MIN: CPT | Performed by: INTERNAL MEDICINE

## 2017-04-11 NOTE — PROGRESS NOTES
Subjective       Pati Carter is a 86 y.o. female.     Chief Complaint   Patient presents with   • Hyperlipidemia     follow up        History obtained from the patient.      History of Present Illness     HPI: Here for a 6 month follow up.      Sees Dr. Kaplan for RA, stable on MTX and Folic Acid.     She sees Pulmonary for COPD, stable on Breo-Ellipta.    Primary Care Cardiac Diagnostic Constellation: The patient is here today for a follow-up visit.      Her Hypertension is stable.   The patient is adherent with her medication regimen. She denies medication side effects.   Medication(s): Triamterene HCTZ.   Her Hyperlipidemia has been stable. Her LDL goal is 130 mg/dL and last LDL was 100 mg/dL.   Medication(s): Simvastatin.      Interval Events:  None.      Symptoms: Has bilateral leg pain (PAD), which is stable. Denies chest pain, denies dyspnea, denies lower extremity edema, denies exercise intolerance, denies fatigue, denies visual impairment, denies muscle pain, and denies muscle weakness.   Associated symptoms:  No recent weight changes,  no palpitations, no syncope, no headache, no orthopnea, no PND, no polydipsia, no polyuria, no focal neurologic deficits, and no memory loss.     Lifestyle and Disease Management: Diet: She consumes a diverse and healthy diet. Weight Issues: She does not have any weight concerns. Exercise: She does not exercise regularly. Smoking: She does not use tobacco.      Colonic Polyp (Brief): The patient is being seen for a routine clinic follow-up of Colon Polyp(s).   Current diagnosis was determined by Colonoscopy and last 1999 normal per patient.   Symptoms: no hematochezia, no melena, no diarrhea, no constipation, no decreased stool caliber, no change in bowel habits and no abdominal pain. Associated symptoms: no rectal prolapse.   The patient is not currently being treated for this problem.      Gastroesophageal Reflux Disease (Brief): The patient is being seen for a routine  clinic follow-up of Gastroesophageal Reflux Disease.   Symptoms: no heartburn, no abdominal pain, no acid regurgitation, no nausea, no vomiting, no sore throat, no dysphagia, no odynophagia, no hematemesis and no melena.   Associated symptoms: no anorexia, no early satiety, no bloating, no belching, no weight loss, no hoarseness, no cough and no wheezing.   The patient is not currently being treated for this problem.           Current Outpatient Prescriptions on File Prior to Visit   Medication Sig Dispense Refill   • albuterol (PROVENTIL HFA;VENTOLIN HFA) 108 (90 BASE) MCG/ACT inhaler Inhale 2 puffs Every 4 (Four) Hours As Needed for wheezing or shortness of air. 1 inhaler 11   • Cholecalciferol (VITAMIN D3) 3000 UNITS tablet Take 1 tablet by mouth Daily.     • docusate sodium (COLACE) 100 MG capsule Take  by mouth.     • Fluticasone Furoate-Vilanterol (BREO ELLIPTA) 200-25 MCG/INH aerosol powder  Inhale.     • folic acid (FOLVITE) 1 MG tablet Take  by mouth Daily.     • meloxicam (MOBIC) 15 MG tablet As Needed.     • methotrexate 2.5 MG tablet Take  by mouth.     • Multiple Vitamins-Minerals (CENTRUM SILVER ADULT 50+ PO) Take  by mouth Daily.     • nitrofurantoin, macrocrystal-monohydrate, (MACROBID) 100 MG capsule Take 1 capsule by mouth 2 (Two) Times a Day. 20 capsule 0   • simvastatin (ZOCOR) 20 MG tablet TAKE 1 TABLET EVERY DAY AT BEDTIME 90 tablet 1   • triamterene-hydrochlorothiazide (DYAZIDE) 37.5-25 MG per capsule TAKE 1 CAPSULE EVERY DAY 90 capsule 3     No current facility-administered medications on file prior to visit.          The following portions of the patient's history were reviewed and updated as appropriate: allergies, current medications, past family history, past medical history, past social history, past surgical history and problem list.    Review of Systems   Constitutional: Negative for fatigue and unexpected weight change.   Eyes: Negative for visual disturbance.   Respiratory: Negative  for cough, shortness of breath and wheezing.    Cardiovascular: Negative for chest pain, palpitations and leg swelling.        Has stable COLIN and claudication, but no orthopnea.   Gastrointestinal: Negative for abdominal pain, blood in stool, constipation, diarrhea, nausea and vomiting.        Denies heartburn, melena, dysphagia, odynophagia, belching, and bloating.   Endocrine: Negative for polydipsia and polyuria.   Musculoskeletal: Positive for arthralgias and back pain. Negative for joint swelling and myalgias.   Neurological: Negative for dizziness, syncope, light-headedness and headaches.        No memory issues.   Psychiatric/Behavioral: Negative for decreased concentration.         Objective       Blood pressure 124/72, pulse 86, temperature 97.6 °F (36.4 °C), temperature source Temporal Artery , resp. rate 20, weight 154 lb 6 oz (70 kg), SpO2 99 %.      Physical Exam   Constitutional: She appears well-developed and well-nourished.   Neck: Normal range of motion. Neck supple. Carotid bruit is not present. No thyromegaly present.   Cardiovascular: Normal rate, regular rhythm, normal heart sounds and intact distal pulses.  Exam reveals no gallop and no friction rub.    No murmur heard.  No peripheral edema.   Pulmonary/Chest: Effort normal and breath sounds normal.   Abdominal: Soft. Bowel sounds are normal. She exhibits no distension, no abdominal bruit and no mass. There is no hepatosplenomegaly. There is no tenderness.   Psychiatric: She has a normal mood and affect.   Nursing note and vitals reviewed.      POC Urinalysis Dipstick, Automated   Result Value Ref Range    Color Yellow Yellow, Straw, Dark Yellow, Selena    Clarity, UA Cloudy (A) Clear    Glucose, UA Negative Negative, 1000 mg/dL (3+) mg/dL    Bilirubin Negative Negative    Ketones, UA Negative Negative    Specific Gravity  1.005 1.005 - 1.030    Blood, UA 50 Evens/ul (A) Negative    pH, Urine 7.0 5.0 - 8.0    Protein, POC Negative Negative mg/dL     Urobilinogen, UA Normal Normal    Leukocytes 500 Nikolas/ul (A) Negative    Nitrite, UA Negative Negative    Lot Number [id number]     Expiration Date 2-28-18          Assessment / Plan:    Pati was seen today for hyperlipidemia.    Diagnoses and all orders for this visit:    Hyperlipidemia, unspecified hyperlipidemia type  -     Comprehensive Metabolic Panel  -     Lipid Panel  -     TSH    Essential hypertension  -     CBC & Differential  -     POC Urinalysis Dipstick, Automated    Benign colonic polyp    Gastroesophageal reflux disease without esophagitis    Rheumatoid arthritis, involving unspecified site, unspecified rheumatoid factor presence    Pulmonary emphysema, unspecified emphysema type      The patient was instructed in the side effects of the medication.  Risks of the potential for tolerance, dependence, and addiction were discussed.  The patient was instructed to take the lowest dosage of the medication, at the lowest frequency, and for the shortest period of time possible.  The patient was instructed not to receive controlled substances or narcotics from other doctors, and not to giveaway or sell the medication.    The patient was instructed to abstain from illicit drug use.      Narcotics/controlled substance agreement, Jovanni report, and Urine Drug Screen were updated today if needed.    The patient declined scheduling a Medicare Wellness Exam today.    The patient declines scheduling a Colonoscopy and Mammogram today.    The patient declines a Zostavax and Tdap.    Return in about 6 months (around 10/11/2017) for Recheck-Hyperlipidemia fasting.

## 2017-04-13 ENCOUNTER — APPOINTMENT (OUTPATIENT)
Dept: LAB | Facility: HOSPITAL | Age: 82
End: 2017-04-13

## 2017-04-13 ENCOUNTER — TRANSCRIBE ORDERS (OUTPATIENT)
Dept: LAB | Facility: HOSPITAL | Age: 82
End: 2017-04-13

## 2017-04-13 DIAGNOSIS — M05.89 OTHER RHEUMATOID ARTHRITIS WITH RHEUMATOID FACTOR OF MULTIPLE SITES (HCC): Primary | ICD-10-CM

## 2017-04-13 DIAGNOSIS — IMO0001 REASON FOR CONSULTATION: ICD-10-CM

## 2017-04-13 DIAGNOSIS — Z79.899 DRUG THERAPY: ICD-10-CM

## 2017-04-13 LAB
ALBUMIN SERPL-MCNC: 4.2 G/DL (ref 3.2–4.8)
ALBUMIN/GLOB SERPL: 1.2 G/DL (ref 1.5–2.5)
ALP SERPL-CCNC: 58 U/L (ref 25–100)
ALT SERPL W P-5'-P-CCNC: 16 U/L (ref 7–40)
ANION GAP SERPL CALCULATED.3IONS-SCNC: 1 MMOL/L (ref 3–11)
AST SERPL-CCNC: 29 U/L (ref 0–33)
BASOPHILS # BLD AUTO: 0.01 10*3/MM3 (ref 0–0.2)
BASOPHILS NFR BLD AUTO: 0.2 % (ref 0–1)
BILIRUB SERPL-MCNC: 0.3 MG/DL (ref 0.3–1.2)
BUN BLD-MCNC: 31 MG/DL (ref 9–23)
BUN/CREAT SERPL: 31 (ref 7–25)
CALCIUM SPEC-SCNC: 9.8 MG/DL (ref 8.7–10.4)
CHLORIDE SERPL-SCNC: 105 MMOL/L (ref 99–109)
CO2 SERPL-SCNC: 37 MMOL/L (ref 20–31)
CREAT BLD-MCNC: 1 MG/DL (ref 0.6–1.3)
CRP SERPL-MCNC: 0.1 MG/DL (ref 0–1)
DEPRECATED RDW RBC AUTO: 59.7 FL (ref 37–54)
EOSINOPHIL # BLD AUTO: 0.18 10*3/MM3 (ref 0.1–0.3)
EOSINOPHIL NFR BLD AUTO: 3 % (ref 0–3)
ERYTHROCYTE [DISTWIDTH] IN BLOOD BY AUTOMATED COUNT: 15.7 % (ref 11.3–14.5)
GFR SERPL CREATININE-BSD FRML MDRD: 53 ML/MIN/1.73
GLOBULIN UR ELPH-MCNC: 3.4 GM/DL
GLUCOSE BLD-MCNC: 85 MG/DL (ref 70–100)
HCT VFR BLD AUTO: 37.3 % (ref 34.5–44)
HGB BLD-MCNC: 11.8 G/DL (ref 11.5–15.5)
IMM GRANULOCYTES # BLD: 0.02 10*3/MM3 (ref 0–0.03)
IMM GRANULOCYTES NFR BLD: 0.3 % (ref 0–0.6)
LYMPHOCYTES # BLD AUTO: 1.75 10*3/MM3 (ref 0.6–4.8)
LYMPHOCYTES NFR BLD AUTO: 28.9 % (ref 24–44)
MCH RBC QN AUTO: 33.4 PG (ref 27–31)
MCHC RBC AUTO-ENTMCNC: 31.6 G/DL (ref 32–36)
MCV RBC AUTO: 105.7 FL (ref 80–99)
MONOCYTES # BLD AUTO: 0.78 10*3/MM3 (ref 0–1)
MONOCYTES NFR BLD AUTO: 12.9 % (ref 0–12)
NEUTROPHILS # BLD AUTO: 3.32 10*3/MM3 (ref 1.5–8.3)
NEUTROPHILS NFR BLD AUTO: 54.7 % (ref 41–71)
PLATELET # BLD AUTO: 242 10*3/MM3 (ref 150–450)
PMV BLD AUTO: 10.1 FL (ref 6–12)
POTASSIUM BLD-SCNC: 3.7 MMOL/L (ref 3.5–5.5)
PROT SERPL-MCNC: 7.6 G/DL (ref 5.7–8.2)
RBC # BLD AUTO: 3.53 10*6/MM3 (ref 3.89–5.14)
SODIUM BLD-SCNC: 143 MMOL/L (ref 132–146)
WBC NRBC COR # BLD: 6.06 10*3/MM3 (ref 3.5–10.8)

## 2017-04-13 PROCEDURE — 85025 COMPLETE CBC W/AUTO DIFF WBC: CPT | Performed by: INTERNAL MEDICINE

## 2017-04-13 PROCEDURE — 36415 COLL VENOUS BLD VENIPUNCTURE: CPT | Performed by: INTERNAL MEDICINE

## 2017-04-13 PROCEDURE — 86140 C-REACTIVE PROTEIN: CPT | Performed by: INTERNAL MEDICINE

## 2017-04-13 PROCEDURE — 80053 COMPREHEN METABOLIC PANEL: CPT | Performed by: INTERNAL MEDICINE

## 2017-05-04 RX ORDER — TRIAMTERENE AND HYDROCHLOROTHIAZIDE 37.5; 25 MG/1; MG/1
CAPSULE ORAL
Qty: 90 CAPSULE | Refills: 3 | Status: SHIPPED | OUTPATIENT
Start: 2017-05-04 | End: 2018-02-15 | Stop reason: SDUPTHER

## 2017-06-08 ENCOUNTER — OFFICE VISIT (OUTPATIENT)
Dept: INTERNAL MEDICINE | Facility: CLINIC | Age: 82
End: 2017-06-08

## 2017-06-08 VITALS
TEMPERATURE: 98.4 F | RESPIRATION RATE: 21 BRPM | WEIGHT: 154 LBS | HEART RATE: 104 BPM | BODY MASS INDEX: 28.17 KG/M2 | SYSTOLIC BLOOD PRESSURE: 154 MMHG | DIASTOLIC BLOOD PRESSURE: 70 MMHG

## 2017-06-08 DIAGNOSIS — N39.0 URINARY TRACT INFECTION, SITE UNSPECIFIED: ICD-10-CM

## 2017-06-08 DIAGNOSIS — J43.9 PULMONARY EMPHYSEMA, UNSPECIFIED EMPHYSEMA TYPE (HCC): ICD-10-CM

## 2017-06-08 DIAGNOSIS — R11.0 NAUSEA: Primary | ICD-10-CM

## 2017-06-08 DIAGNOSIS — K52.9 GASTROENTERITIS: ICD-10-CM

## 2017-06-08 LAB
BILIRUB BLD-MCNC: NEGATIVE MG/DL
CLARITY, POC: ABNORMAL
COLOR UR: YELLOW
EXPIRATION DATE: ABNORMAL
GLUCOSE UR STRIP-MCNC: NEGATIVE MG/DL
KETONES UR QL: NEGATIVE
LEUKOCYTE EST, POC: ABNORMAL
Lab: ABNORMAL
NITRITE UR-MCNC: POSITIVE MG/ML
PH UR: 7 [PH] (ref 5–8)
PROT UR STRIP-MCNC: NEGATIVE MG/DL
RBC # UR STRIP: ABNORMAL /UL
SP GR UR: 1.01 (ref 1–1.03)
UROBILINOGEN UR QL: NORMAL

## 2017-06-08 PROCEDURE — 81003 URINALYSIS AUTO W/O SCOPE: CPT | Performed by: INTERNAL MEDICINE

## 2017-06-08 PROCEDURE — 87086 URINE CULTURE/COLONY COUNT: CPT | Performed by: INTERNAL MEDICINE

## 2017-06-08 PROCEDURE — 99214 OFFICE O/P EST MOD 30 MIN: CPT | Performed by: INTERNAL MEDICINE

## 2017-06-08 PROCEDURE — 87077 CULTURE AEROBIC IDENTIFY: CPT | Performed by: INTERNAL MEDICINE

## 2017-06-08 PROCEDURE — 87186 SC STD MICRODIL/AGAR DIL: CPT | Performed by: INTERNAL MEDICINE

## 2017-06-08 RX ORDER — CEFDINIR 300 MG/1
300 CAPSULE ORAL 2 TIMES DAILY
Qty: 20 CAPSULE | Refills: 0 | Status: SHIPPED | OUTPATIENT
Start: 2017-06-08 | End: 2017-06-18

## 2017-06-08 RX ORDER — ALBUTEROL SULFATE 90 UG/1
2 AEROSOL, METERED RESPIRATORY (INHALATION) EVERY 4 HOURS PRN
Qty: 1 INHALER | Refills: 11 | Status: SHIPPED | OUTPATIENT
Start: 2017-06-08 | End: 2019-10-23 | Stop reason: SDUPTHER

## 2017-06-08 NOTE — PROGRESS NOTES
Subjective       Pati Carter is a 86 y.o. female.     Chief Complaint   Patient presents with   • Vomiting       History obtained from the patient's daughter and the patient.      Vomiting    This is a new problem. The current episode started yesterday. The problem occurs 2 to 4 times per day. The problem has been gradually improving. The emesis has an appearance of stomach contents. There has been no fever. Associated symptoms include chills, diarrhea, dizziness and headaches. Pertinent negatives include no abdominal pain, arthralgias, chest pain, coughing, fever, myalgias, URI or weight loss. Risk factors: None. Treatments tried: immodium. The treatment provided moderate relief.        The following portions of the patient's history were reviewed and updated as appropriate: allergies, current medications, past family history, past medical history, past social history, past surgical history and problem list.      Review of Systems   Constitutional: Positive for chills. Negative for fever and weight loss.   HENT: Negative for congestion, ear pain, rhinorrhea and sore throat.    Respiratory: Negative for cough and shortness of breath.    Cardiovascular: Negative for chest pain.   Gastrointestinal: Positive for blood in stool, diarrhea, nausea (improviing) and vomiting. Negative for abdominal pain and constipation.        Had heartburn, belching, and bloating yesterday.  No melena, dysphagia, or odynophagia.   Genitourinary: Negative for dysuria, frequency, hematuria and urgency.   Musculoskeletal: Negative for arthralgias, joint swelling and myalgias.   Skin: Negative for rash.   Neurological: Positive for dizziness, light-headedness and headaches.   Hematological: Negative for adenopathy.         Blood pressure 154/70, pulse 104, temperature 98.4 °F (36.9 °C), temperature source Temporal Artery , resp. rate 21, weight 154 lb (69.9 kg).      Objective     Physical Exam   Constitutional: She appears  well-developed and well-nourished.   HENT:   Mouth/Throat: Oropharynx is clear and moist.   Cardiovascular: Normal rate, regular rhythm and normal heart sounds.    No murmur heard.  Pulmonary/Chest: Effort normal and breath sounds normal.   Abdominal: Soft. Bowel sounds are normal. She exhibits no distension and no mass. There is no hepatosplenomegaly. There is tenderness (mild LLQ). There is no rebound, no guarding and no CVA tenderness.   Lymphadenopathy:     She has no cervical adenopathy.   Skin: No rash noted.   Psychiatric: She has a normal mood and affect.   Nursing note and vitals reviewed.      Results for orders placed or performed in visit on 06/08/17   POC Urinalysis Dipstick, Automated   Result Value Ref Range    Color Yellow Yellow, Straw, Dark Yellow, Selena    Clarity, UA Hazy (A) Clear    Glucose, UA Negative Negative, 1000 mg/dL (3+) mg/dL    Bilirubin Negative Negative    Ketones, UA Negative Negative    Specific Gravity  1.015 1.005 - 1.030    Blood, UA 50 Evens/ul (A) Negative    pH, Urine 7.0 5.0 - 8.0    Protein, POC Negative Negative mg/dL    Urobilinogen, UA Normal Normal    Leukocytes 500 Nikolas/ul (A) Negative    Nitrite, UA Positive (A) Negative    Lot Number [id number]     Expiration Date 2-28-18          Assessment/Plan   Pati was seen today for vomiting.    Diagnoses and all orders for this visit:    Nausea  -     POC Urinalysis Dipstick, Automated    Gastroenteritis    Urinary tract infection, site unspecified  -     Urine Culture  -     cefdinir (OMNICEF) 300 MG capsule; Take 1 capsule by mouth 2 (Two) Times a Day for 10 days.    Pulmonary emphysema, unspecified emphysema type  -     albuterol (PROVENTIL HFA;VENTOLIN HFA) 108 (90 BASE) MCG/ACT inhaler; Inhale 2 puffs Every 4 (Four) Hours As Needed for Wheezing or Shortness of Air.      Return if symptoms worsen or fail to improve.

## 2017-06-10 LAB — BACTERIA SPEC AEROBE CULT: ABNORMAL

## 2017-06-21 ENCOUNTER — TELEPHONE (OUTPATIENT)
Dept: INTERNAL MEDICINE | Facility: CLINIC | Age: 82
End: 2017-06-21

## 2017-06-21 NOTE — TELEPHONE ENCOUNTER
----- Message from Chiquita Farley sent at 6/21/2017  9:59 AM EDT -----  pts daughter Danette Suresh called and stated that pt is still having diarrhea and wanted to know what to do? She wanted to know if she should give pt a probiotic? She can be reached at [phone]      Pharmacy- [address]

## 2017-06-21 NOTE — TELEPHONE ENCOUNTER
S/W PT DGT.VIJI, GAVE RECOMMENDATIONS AND INST PER DR ABURTO. BELOW.  VERB GOOD UNDERSTANDING AND AGREEMENT.  STATES THEY WILL TRY RECOMMENDATIONS AND THAT THEY WILL CALL IF WORSENS OR DOESN'T IMPROVE.  VERB APPREC.

## 2017-06-21 NOTE — TELEPHONE ENCOUNTER
You should take an over the counter probiotic supplement.  Please get a probiotic that has BILLIONS of cultures (not millions).   She can use OTC Imodium.    If not resolving, signs of dehydration, abdominal pain, fevers, pain with urination or worsens--> needs to be seen.   Gianfranco Orona MD  1:20 PM  06/21/17

## 2017-07-03 ENCOUNTER — TRANSCRIBE ORDERS (OUTPATIENT)
Dept: LAB | Facility: HOSPITAL | Age: 82
End: 2017-07-03

## 2017-07-03 ENCOUNTER — LAB (OUTPATIENT)
Dept: LAB | Facility: HOSPITAL | Age: 82
End: 2017-07-03

## 2017-07-03 DIAGNOSIS — Z79.899 POLYPHARMACY: ICD-10-CM

## 2017-07-03 DIAGNOSIS — IMO0001 REASON FOR CONSULTATION: ICD-10-CM

## 2017-07-03 DIAGNOSIS — M05.89 OTHER RHEUMATOID ARTHRITIS WITH RHEUMATOID FACTOR OF MULTIPLE SITES (HCC): ICD-10-CM

## 2017-07-03 DIAGNOSIS — IMO0001 REASON FOR CONSULTATION: Primary | ICD-10-CM

## 2017-07-03 LAB
ALBUMIN SERPL-MCNC: 4.2 G/DL (ref 3.2–4.8)
ALBUMIN/GLOB SERPL: 1.4 G/DL (ref 1.5–2.5)
ALP SERPL-CCNC: 63 U/L (ref 25–100)
ALT SERPL W P-5'-P-CCNC: 16 U/L (ref 7–40)
ANION GAP SERPL CALCULATED.3IONS-SCNC: 9 MMOL/L (ref 3–11)
AST SERPL-CCNC: 30 U/L (ref 0–33)
BASOPHILS # BLD AUTO: 0.05 10*3/MM3 (ref 0–0.2)
BASOPHILS NFR BLD AUTO: 0.7 % (ref 0–1)
BILIRUB SERPL-MCNC: 0.4 MG/DL (ref 0.3–1.2)
BUN BLD-MCNC: 28 MG/DL (ref 9–23)
BUN/CREAT SERPL: 25.5 (ref 7–25)
CALCIUM SPEC-SCNC: 10.4 MG/DL (ref 8.7–10.4)
CHLORIDE SERPL-SCNC: 101 MMOL/L (ref 99–109)
CO2 SERPL-SCNC: 31 MMOL/L (ref 20–31)
CREAT BLD-MCNC: 1.1 MG/DL (ref 0.6–1.3)
CRP SERPL-MCNC: 0.15 MG/DL (ref 0–1)
DEPRECATED RDW RBC AUTO: 52.9 FL (ref 37–54)
EOSINOPHIL # BLD AUTO: 0.11 10*3/MM3 (ref 0–0.3)
EOSINOPHIL NFR BLD AUTO: 1.6 % (ref 0–3)
ERYTHROCYTE [DISTWIDTH] IN BLOOD BY AUTOMATED COUNT: 14.1 % (ref 11.3–14.5)
GFR SERPL CREATININE-BSD FRML MDRD: 47 ML/MIN/1.73
GLOBULIN UR ELPH-MCNC: 3.1 GM/DL
GLUCOSE BLD-MCNC: 89 MG/DL (ref 70–100)
HCT VFR BLD AUTO: 36.3 % (ref 34.5–44)
HGB BLD-MCNC: 11.4 G/DL (ref 11.5–15.5)
IMM GRANULOCYTES # BLD: 0.03 10*3/MM3 (ref 0–0.03)
IMM GRANULOCYTES NFR BLD: 0.4 % (ref 0–0.6)
LYMPHOCYTES # BLD AUTO: 1.85 10*3/MM3 (ref 0.6–4.8)
LYMPHOCYTES NFR BLD AUTO: 26.5 % (ref 24–44)
MCH RBC QN AUTO: 32.9 PG (ref 27–31)
MCHC RBC AUTO-ENTMCNC: 31.4 G/DL (ref 32–36)
MCV RBC AUTO: 104.9 FL (ref 80–99)
MONOCYTES # BLD AUTO: 0.49 10*3/MM3 (ref 0–1)
MONOCYTES NFR BLD AUTO: 7 % (ref 0–12)
NEUTROPHILS # BLD AUTO: 4.45 10*3/MM3 (ref 1.5–8.3)
NEUTROPHILS NFR BLD AUTO: 63.8 % (ref 41–71)
PLATELET # BLD AUTO: 338 10*3/MM3 (ref 150–450)
PMV BLD AUTO: 9.9 FL (ref 6–12)
POTASSIUM BLD-SCNC: 3.9 MMOL/L (ref 3.5–5.5)
PROT SERPL-MCNC: 7.3 G/DL (ref 5.7–8.2)
RBC # BLD AUTO: 3.46 10*6/MM3 (ref 3.89–5.14)
SODIUM BLD-SCNC: 141 MMOL/L (ref 132–146)
WBC NRBC COR # BLD: 6.98 10*3/MM3 (ref 3.5–10.8)

## 2017-07-03 PROCEDURE — 80053 COMPREHEN METABOLIC PANEL: CPT | Performed by: INTERNAL MEDICINE

## 2017-07-03 PROCEDURE — 86140 C-REACTIVE PROTEIN: CPT | Performed by: INTERNAL MEDICINE

## 2017-07-03 PROCEDURE — 36415 COLL VENOUS BLD VENIPUNCTURE: CPT

## 2017-07-03 PROCEDURE — 85025 COMPLETE CBC W/AUTO DIFF WBC: CPT | Performed by: INTERNAL MEDICINE

## 2017-07-17 ENCOUNTER — OFFICE VISIT (OUTPATIENT)
Dept: INTERNAL MEDICINE | Facility: CLINIC | Age: 82
End: 2017-07-17

## 2017-07-17 VITALS
WEIGHT: 154 LBS | SYSTOLIC BLOOD PRESSURE: 134 MMHG | BODY MASS INDEX: 28.17 KG/M2 | DIASTOLIC BLOOD PRESSURE: 80 MMHG | RESPIRATION RATE: 20 BRPM | HEART RATE: 84 BPM | TEMPERATURE: 98.6 F

## 2017-07-17 DIAGNOSIS — R82.998 LEUKOCYTES IN URINE: ICD-10-CM

## 2017-07-17 DIAGNOSIS — N76.0 ACUTE VAGINITIS: Primary | ICD-10-CM

## 2017-07-17 LAB
BILIRUB BLD-MCNC: NEGATIVE MG/DL
CLARITY, POC: CLEAR
COLOR UR: YELLOW
EXPIRATION DATE: ABNORMAL
GLUCOSE UR STRIP-MCNC: NEGATIVE MG/DL
KETONES UR QL: NEGATIVE
LEUKOCYTE EST, POC: ABNORMAL
Lab: ABNORMAL
NITRITE UR-MCNC: NEGATIVE MG/ML
PH UR: 5 [PH] (ref 5–8)
PROT UR STRIP-MCNC: NEGATIVE MG/DL
RBC # UR STRIP: NEGATIVE /UL
SP GR UR: 1.01 (ref 1–1.03)
UROBILINOGEN UR QL: NORMAL

## 2017-07-17 PROCEDURE — 87186 SC STD MICRODIL/AGAR DIL: CPT | Performed by: INTERNAL MEDICINE

## 2017-07-17 PROCEDURE — 87086 URINE CULTURE/COLONY COUNT: CPT | Performed by: INTERNAL MEDICINE

## 2017-07-17 PROCEDURE — 81003 URINALYSIS AUTO W/O SCOPE: CPT | Performed by: INTERNAL MEDICINE

## 2017-07-17 PROCEDURE — 99214 OFFICE O/P EST MOD 30 MIN: CPT | Performed by: INTERNAL MEDICINE

## 2017-07-17 PROCEDURE — 87077 CULTURE AEROBIC IDENTIFY: CPT | Performed by: INTERNAL MEDICINE

## 2017-07-17 RX ORDER — FLUCONAZOLE 150 MG/1
150 TABLET ORAL ONCE
Qty: 1 TABLET | Refills: 0 | Status: SHIPPED | OUTPATIENT
Start: 2017-07-17 | End: 2017-07-17

## 2017-07-17 RX ORDER — NYSTATIN 100000 U/G
OINTMENT TOPICAL 3 TIMES DAILY
Qty: 100 G | Refills: 0 | Status: SHIPPED | OUTPATIENT
Start: 2017-07-17 | End: 2017-07-27

## 2017-07-17 NOTE — PROGRESS NOTES
Subjective       Pati Carter is a 86 y.o. female.     Chief Complaint   Patient presents with   • Vaginitis       History obtained from the patient.      Vaginitis   This is a new problem. Associated symptoms include arthralgias, headaches, a rash (vaginal area, painful and itchy) and urinary symptoms. Pertinent negatives include no abdominal pain, chest pain, chills, coughing, fever, joint swelling, myalgias, nausea, swollen glands or vomiting. Treatments tried: Vagisil. The treatment provided mild relief.        The following portions of the patient's history were reviewed and updated as appropriate: allergies, current medications, past family history, past medical history, past social history, past surgical history and problem list.      Review of Systems   Constitutional: Negative for chills and fever.   Respiratory: Negative for cough and shortness of breath.    Cardiovascular: Negative for chest pain.   Gastrointestinal: Negative for abdominal pain, diarrhea, nausea and vomiting.   Genitourinary: Positive for frequency (not new). Negative for dysuria, flank pain, hematuria, pelvic pain, urgency, vaginal bleeding and vaginal discharge.   Musculoskeletal: Positive for arthralgias. Negative for joint swelling and myalgias.   Skin: Positive for rash (vaginal area, painful and itchy).   Neurological: Positive for headaches.   Hematological: Negative for adenopathy.         Blood pressure 134/80, pulse 84, temperature 98.6 °F (37 °C), temperature source Temporal Artery , resp. rate 20, weight 154 lb (69.9 kg).      Objective     Physical Exam   Constitutional:   Overweight.   Cardiovascular: Normal rate, regular rhythm and normal heart sounds.    No murmur heard.  Pulmonary/Chest: Effort normal and breath sounds normal.   Abdominal: Soft. Bowel sounds are normal. She exhibits no distension and no mass. There is no hepatosplenomegaly. There is no tenderness. There is no CVA tenderness.   Genitourinary:    Genitourinary Comments: There is an erythematous, macular, confluent rash on the bilateral vulva and inner thighs.  There is mild edema, but no warmth.   Neurological: She is alert.   Skin: No rash noted.   Psychiatric: She has a normal mood and affect.   Nursing note and vitals reviewed.    Results for orders placed or performed in visit on 07/17/17   POC Urinalysis Dipstick, Automated   Result Value Ref Range    Color Yellow Yellow, Straw, Dark Yellow, Selena    Clarity, UA Clear Clear    Glucose, UA Negative Negative, 1000 mg/dL (3+) mg/dL    Bilirubin Negative Negative    Ketones, UA Negative Negative    Specific Gravity  1.015 1.005 - 1.030    Blood, UA Negative Negative    pH, Urine 5.0 5.0 - 8.0    Protein, POC Negative Negative mg/dL    Urobilinogen, UA Normal Normal    Leukocytes 500 Nikolas/ul (A) Negative    Nitrite, UA Negative Negative    Lot Number [id number]     Expiration Date 04/30/2018          Assessment/Plan   Pati was seen today for vaginitis.    Diagnoses and all orders for this visit:    Acute vaginitis  -     POC Urinalysis Dipstick, Automated  -     fluconazole (DIFLUCAN) 150 MG tablet; Take 1 tablet by mouth 1 (One) Time for 1 dose.  -     nystatin (MYCOSTATIN) 991286 UNIT/GM ointment; Apply  topically 3 (Three) Times a Day for 10 days.    Leukocytes in urine  -     Urine Culture       Return for Next scheduled follow up.

## 2017-07-18 ENCOUNTER — TELEPHONE (OUTPATIENT)
Dept: INTERNAL MEDICINE | Facility: CLINIC | Age: 82
End: 2017-07-18

## 2017-07-18 RX ORDER — NITROFURANTOIN 25; 75 MG/1; MG/1
100 CAPSULE ORAL 2 TIMES DAILY
Qty: 20 CAPSULE | Refills: 0 | Status: SHIPPED | OUTPATIENT
Start: 2017-07-18 | End: 2017-07-28

## 2017-07-18 NOTE — TELEPHONE ENCOUNTER
I sent in Abx to patient's local pharmacy at [facility]. I notified patient and she verbalized understanding and appreciation.

## 2017-07-18 NOTE — TELEPHONE ENCOUNTER
----- Message from Latoya Bolaños MD sent at 7/18/2017 11:48 AM EDT -----  Call patient please.  Her urine culture is growing a bacteria, identification and sensitivity pending.  I would like to start her on an antibiotic.  Please call in Nitrofurantoin 100 mg, 1 po BID x 10 days.

## 2017-07-19 LAB — BACTERIA SPEC AEROBE CULT: ABNORMAL

## 2017-09-05 ENCOUNTER — APPOINTMENT (OUTPATIENT)
Dept: LAB | Facility: HOSPITAL | Age: 82
End: 2017-09-05

## 2017-09-05 ENCOUNTER — TRANSCRIBE ORDERS (OUTPATIENT)
Dept: LAB | Facility: HOSPITAL | Age: 82
End: 2017-09-05

## 2017-09-05 DIAGNOSIS — M05.89 OTHER RHEUMATOID ARTHRITIS WITH RHEUMATOID FACTOR OF MULTIPLE SITES (HCC): Primary | ICD-10-CM

## 2017-09-05 DIAGNOSIS — Z79.899 POLYPHARMACY: ICD-10-CM

## 2017-09-05 LAB
ALBUMIN SERPL-MCNC: 4.1 G/DL (ref 3.2–4.8)
ALBUMIN/GLOB SERPL: 1.3 G/DL (ref 1.5–2.5)
ALP SERPL-CCNC: 65 U/L (ref 25–100)
ALT SERPL W P-5'-P-CCNC: 14 U/L (ref 7–40)
ANION GAP SERPL CALCULATED.3IONS-SCNC: 2 MMOL/L (ref 3–11)
AST SERPL-CCNC: 23 U/L (ref 0–33)
BASOPHILS # BLD AUTO: 0.04 10*3/MM3 (ref 0–0.2)
BASOPHILS NFR BLD AUTO: 0.5 % (ref 0–1)
BILIRUB SERPL-MCNC: 0.3 MG/DL (ref 0.3–1.2)
BUN BLD-MCNC: 29 MG/DL (ref 9–23)
BUN/CREAT SERPL: 26.4 (ref 7–25)
CALCIUM SPEC-SCNC: 9.7 MG/DL (ref 8.7–10.4)
CHLORIDE SERPL-SCNC: 107 MMOL/L (ref 99–109)
CO2 SERPL-SCNC: 31 MMOL/L (ref 20–31)
CREAT BLD-MCNC: 1.1 MG/DL (ref 0.6–1.3)
CRP SERPL-MCNC: 0.25 MG/DL (ref 0–1)
DEPRECATED RDW RBC AUTO: 53.9 FL (ref 37–54)
EOSINOPHIL # BLD AUTO: 0.14 10*3/MM3 (ref 0–0.3)
EOSINOPHIL NFR BLD AUTO: 1.9 % (ref 0–3)
ERYTHROCYTE [DISTWIDTH] IN BLOOD BY AUTOMATED COUNT: 14.6 % (ref 11.3–14.5)
GFR SERPL CREATININE-BSD FRML MDRD: 47 ML/MIN/1.73
GLOBULIN UR ELPH-MCNC: 3.1 GM/DL
GLUCOSE BLD-MCNC: 110 MG/DL (ref 70–100)
HCT VFR BLD AUTO: 35.3 % (ref 34.5–44)
HGB BLD-MCNC: 11.2 G/DL (ref 11.5–15.5)
IMM GRANULOCYTES # BLD: 0.01 10*3/MM3 (ref 0–0.03)
IMM GRANULOCYTES NFR BLD: 0.1 % (ref 0–0.6)
LYMPHOCYTES # BLD AUTO: 1.46 10*3/MM3 (ref 0.6–4.8)
LYMPHOCYTES NFR BLD AUTO: 19.4 % (ref 24–44)
MCH RBC QN AUTO: 32.7 PG (ref 27–31)
MCHC RBC AUTO-ENTMCNC: 31.7 G/DL (ref 32–36)
MCV RBC AUTO: 102.9 FL (ref 80–99)
MONOCYTES # BLD AUTO: 0.65 10*3/MM3 (ref 0–1)
MONOCYTES NFR BLD AUTO: 8.7 % (ref 0–12)
NEUTROPHILS # BLD AUTO: 5.21 10*3/MM3 (ref 1.5–8.3)
NEUTROPHILS NFR BLD AUTO: 69.4 % (ref 41–71)
PLATELET # BLD AUTO: 273 10*3/MM3 (ref 150–450)
PMV BLD AUTO: 9.8 FL (ref 6–12)
POTASSIUM BLD-SCNC: 3.5 MMOL/L (ref 3.5–5.5)
PROT SERPL-MCNC: 7.2 G/DL (ref 5.7–8.2)
RBC # BLD AUTO: 3.43 10*6/MM3 (ref 3.89–5.14)
SODIUM BLD-SCNC: 140 MMOL/L (ref 132–146)
WBC NRBC COR # BLD: 7.51 10*3/MM3 (ref 3.5–10.8)

## 2017-09-05 PROCEDURE — 85025 COMPLETE CBC W/AUTO DIFF WBC: CPT | Performed by: INTERNAL MEDICINE

## 2017-09-05 PROCEDURE — 80053 COMPREHEN METABOLIC PANEL: CPT | Performed by: INTERNAL MEDICINE

## 2017-09-05 PROCEDURE — 36415 COLL VENOUS BLD VENIPUNCTURE: CPT | Performed by: INTERNAL MEDICINE

## 2017-09-05 PROCEDURE — 86140 C-REACTIVE PROTEIN: CPT | Performed by: INTERNAL MEDICINE

## 2017-10-02 ENCOUNTER — TELEPHONE (OUTPATIENT)
Dept: INTERNAL MEDICINE | Facility: CLINIC | Age: 82
End: 2017-10-02

## 2017-10-02 ENCOUNTER — LAB (OUTPATIENT)
Dept: INTERNAL MEDICINE | Facility: CLINIC | Age: 82
End: 2017-10-02

## 2017-10-02 DIAGNOSIS — N39.0 URINARY TRACT INFECTION WITHOUT HEMATURIA, SITE UNSPECIFIED: Primary | ICD-10-CM

## 2017-10-02 DIAGNOSIS — N39.0 URINARY TRACT INFECTION WITHOUT HEMATURIA, SITE UNSPECIFIED: ICD-10-CM

## 2017-10-02 LAB
BILIRUB BLD-MCNC: NEGATIVE MG/DL
CLARITY, POC: ABNORMAL
COLOR UR: YELLOW
EXPIRATION DATE: ABNORMAL
GLUCOSE UR STRIP-MCNC: NEGATIVE MG/DL
KETONES UR QL: NEGATIVE
LEUKOCYTE EST, POC: ABNORMAL
Lab: ABNORMAL
NITRITE UR-MCNC: NEGATIVE MG/ML
PH UR: 6 [PH] (ref 5–8)
PROT UR STRIP-MCNC: NEGATIVE MG/DL
RBC # UR STRIP: ABNORMAL /UL
SP GR UR: 1.01 (ref 1–1.03)
UROBILINOGEN UR QL: NORMAL

## 2017-10-02 PROCEDURE — 87086 URINE CULTURE/COLONY COUNT: CPT | Performed by: INTERNAL MEDICINE

## 2017-10-02 PROCEDURE — 87077 CULTURE AEROBIC IDENTIFY: CPT | Performed by: INTERNAL MEDICINE

## 2017-10-02 PROCEDURE — 87186 SC STD MICRODIL/AGAR DIL: CPT | Performed by: INTERNAL MEDICINE

## 2017-10-02 PROCEDURE — 81003 URINALYSIS AUTO W/O SCOPE: CPT | Performed by: INTERNAL MEDICINE

## 2017-10-02 RX ORDER — NITROFURANTOIN 25; 75 MG/1; MG/1
100 CAPSULE ORAL 2 TIMES DAILY
Qty: 14 CAPSULE | Refills: 0 | Status: SHIPPED | OUTPATIENT
Start: 2017-10-02 | End: 2017-10-10 | Stop reason: SDUPTHER

## 2017-10-02 NOTE — TELEPHONE ENCOUNTER
----- Message from Chiquita Melendez sent at 10/2/2017  8:43 AM EDT -----  PTS DAUGHTER VIJI GALLOWAY  CALLED AND STATED SHE BELIEVES PT MIGHT HAVE A UTI. SHE WANTED TO KNOW IF SHE COULD  A URINE KIT TO HAVE PT LEAVE A SAMPLE THAT SHE WILL RETURN? SHE CAN BE REACHED [phone]

## 2017-10-02 NOTE — TELEPHONE ENCOUNTER
Patients daughter brought in a urine specimen and it did have show patient has a UTI ( 500 leukocytes and 50 blood) it has been sent for a culture .  Patients daughter said they use the Pharmacy at [facility].

## 2017-10-03 ENCOUNTER — FLU SHOT (OUTPATIENT)
Dept: INTERNAL MEDICINE | Facility: CLINIC | Age: 82
End: 2017-10-03

## 2017-10-03 PROCEDURE — G0008 ADMIN INFLUENZA VIRUS VAC: HCPCS | Performed by: INTERNAL MEDICINE

## 2017-10-03 PROCEDURE — 90662 IIV NO PRSV INCREASED AG IM: CPT | Performed by: INTERNAL MEDICINE

## 2017-10-04 LAB
BACTERIA SPEC AEROBE CULT: ABNORMAL
BACTERIA SPEC AEROBE CULT: ABNORMAL

## 2017-10-10 ENCOUNTER — TELEPHONE (OUTPATIENT)
Dept: INTERNAL MEDICINE | Facility: CLINIC | Age: 82
End: 2017-10-10

## 2017-10-10 DIAGNOSIS — N39.0 URINARY TRACT INFECTION WITHOUT HEMATURIA, SITE UNSPECIFIED: ICD-10-CM

## 2017-10-10 RX ORDER — NITROFURANTOIN 25; 75 MG/1; MG/1
100 CAPSULE ORAL 2 TIMES DAILY
Qty: 14 CAPSULE | Refills: 0 | Status: SHIPPED | OUTPATIENT
Start: 2017-10-10 | End: 2017-10-17

## 2017-10-10 NOTE — TELEPHONE ENCOUNTER
----- Message from Nelli Carroll sent at 10/10/2017  1:56 PM EDT -----  Contact: SELF  BERT NARANJO SAID SHE GOT A RX FOR A BLADDER INFECTION. SHE HAS FINISHED THE MEDICATION BUT SAYS SHE STILL HAS THE BLADDER INFECTION. SHE SAYS SHE STILL HAS THE CHILLS AND FREQUENT TRIPS TO THE BATHROOM. SHE WANTS TO KNOW IF SHE CAN GET MORE MEDICATION CALLED IN FOR THIS. SHE HAS AN APPT COMING UP ON 10/17/17. SHE USES THE [facility]. SHE CAN BE REACHED [phone]

## 2017-10-17 ENCOUNTER — OFFICE VISIT (OUTPATIENT)
Dept: INTERNAL MEDICINE | Facility: CLINIC | Age: 82
End: 2017-10-17

## 2017-10-17 VITALS
BODY MASS INDEX: 28.6 KG/M2 | SYSTOLIC BLOOD PRESSURE: 140 MMHG | WEIGHT: 156.38 LBS | DIASTOLIC BLOOD PRESSURE: 88 MMHG | TEMPERATURE: 97.1 F | RESPIRATION RATE: 20 BRPM | HEART RATE: 84 BPM

## 2017-10-17 DIAGNOSIS — E78.5 HYPERLIPIDEMIA, UNSPECIFIED HYPERLIPIDEMIA TYPE: Primary | ICD-10-CM

## 2017-10-17 DIAGNOSIS — M06.9 RHEUMATOID ARTHRITIS, INVOLVING UNSPECIFIED SITE, UNSPECIFIED RHEUMATOID FACTOR PRESENCE: ICD-10-CM

## 2017-10-17 DIAGNOSIS — J43.9 PULMONARY EMPHYSEMA, UNSPECIFIED EMPHYSEMA TYPE (HCC): ICD-10-CM

## 2017-10-17 DIAGNOSIS — K63.5 BENIGN COLONIC POLYP: ICD-10-CM

## 2017-10-17 DIAGNOSIS — I10 ESSENTIAL HYPERTENSION: ICD-10-CM

## 2017-10-17 DIAGNOSIS — K21.9 GASTROESOPHAGEAL REFLUX DISEASE WITHOUT ESOPHAGITIS: ICD-10-CM

## 2017-10-17 LAB
ARTICHOKE IGE QN: 121 MG/DL (ref 0–130)
CHOLEST SERPL-MCNC: 176 MG/DL (ref 0–200)
HDLC SERPL-MCNC: 58 MG/DL (ref 40–60)
TRIGL SERPL-MCNC: 88 MG/DL (ref 0–150)
TSH SERPL DL<=0.05 MIU/L-ACNC: 2.44 MIU/ML (ref 0.35–5.35)

## 2017-10-17 PROCEDURE — 80061 LIPID PANEL: CPT | Performed by: INTERNAL MEDICINE

## 2017-10-17 PROCEDURE — 84443 ASSAY THYROID STIM HORMONE: CPT | Performed by: INTERNAL MEDICINE

## 2017-10-17 PROCEDURE — 99214 OFFICE O/P EST MOD 30 MIN: CPT | Performed by: INTERNAL MEDICINE

## 2017-10-17 PROCEDURE — 36415 COLL VENOUS BLD VENIPUNCTURE: CPT | Performed by: INTERNAL MEDICINE

## 2017-10-17 RX ORDER — NYSTATIN 100000 U/G
1 OINTMENT TOPICAL DAILY
COMMUNITY
Start: 2017-10-10 | End: 2020-04-08 | Stop reason: HOSPADM

## 2017-10-17 NOTE — PROGRESS NOTES
Subjective       Pati Carter is a 87 y.o. female.     Chief Complaint   Patient presents with   • Hyperlipidemia     6 month follow up  fasting    • Urinary Frequency     incontinence       History obtained from the patient.      HPI: Here for a 6 month follow up.       Sees Dr. Kaplan for RA, stable on MTX and Folic Acid. She is having back pain, radiating to her legs.  She had an appointment for a MRI, but canceled it because her kids did not want her to have another operation.      She has seen Pulmonary for COPD, stable on Breo-Ellipta, but no recent appointment.  .     Primary Care Cardiac Diagnostic Constellation: The patient is here today for a follow-up visit.       Her Hypertension is stable.   Medication(s): Triamterene HCTZ.   Her Hyperlipidemia has been stable. Her LDL goal is 130 mg/dL and last LDL was 104 mg/dL.   Medication(s): Simvastatin.   The patient is adherent with her medication regimen. She denies medication side effects.       Interval Events:  She had a CMP, CBC, and CRP done on 9/15/17 per Dr. Kaplan.  She does not check her blood pressure at home.      Symptoms: Has bilateral leg pain (PAD), which is stable and some  lower extremity edema.  Reports worsening visual due to macular degeneration.  Denies chest pain, denies dyspnea, denies exercise intolerance, denies fatigue, denies muscle pain, and denies muscle weakness.   Associated symptoms:  2 pound  weight loss,  no palpitations, no syncope, no headache, no orthopnea, no PND, no polydipsia, no polyuria, no focal neurologic deficits, and no memory loss.      Lifestyle and Disease Management: Diet: She consumes a diverse and healthy diet. Weight Issues: She does not have any weight concerns. Exercise: She does not exercise regularly. Smoking: She does not use tobacco.       Colonic Polyp (Brief): The patient is being seen for a routine clinic follow-up of Colon Polyp(s).   Current diagnosis was determined by Colonoscopy and last 1999  normal per patient.   Symptoms: no hematochezia, no melena, no diarrhea, no constipation, no decreased stool caliber, no change in bowel habits and no abdominal pain. Associated symptoms: no rectal prolapse.   The patient is not currently being treated for this problem.       Gastroesophageal Reflux Disease (Brief): The patient is being seen for a routine clinic follow-up of Gastroesophageal Reflux Disease.   Symptoms: no heartburn, no abdominal pain, no acid regurgitation, no nausea, no vomiting, no sore throat, no dysphagia, no odynophagia, no hematemesis and no melena.   Associated symptoms: no anorexia, no early satiety, no bloating, no belching, no weight loss, no hoarseness, no cough and no wheezing.   Medication:  The patient is not currently being treated for this problem.             Urinary Tract Infection    Chronicity: follow up, diagnosed 10/2/17. The problem has been resolved. The patient is experiencing no pain. Associated symptoms include frequency. Pertinent negatives include no chills, discharge, flank pain, hematuria, nausea, urgency or vomiting. Treatments tried: On Macrobid. The treatment provided moderate relief. Her past medical history is significant for recurrent UTIs.          Current Outpatient Prescriptions on File Prior to Visit   Medication Sig Dispense Refill   • albuterol (PROVENTIL HFA;VENTOLIN HFA) 108 (90 BASE) MCG/ACT inhaler Inhale 2 puffs Every 4 (Four) Hours As Needed for Wheezing or Shortness of Air. 1 inhaler 11   • Cholecalciferol (VITAMIN D3) 3000 UNITS tablet Take 1 tablet by mouth Daily.     • docusate sodium (COLACE) 100 MG capsule Take  by mouth.     • Fluticasone Furoate-Vilanterol (BREO ELLIPTA) 200-25 MCG/INH aerosol powder  Inhale.     • Multiple Vitamins-Minerals (CENTRUM SILVER ADULT 50+ PO) Take  by mouth Daily.     • nitrofurantoin, macrocrystal-monohydrate, (MACROBID) 100 MG capsule Take 1 capsule by mouth 2 (Two) Times a Day for 7 days. 14 capsule 0   •  simvastatin (ZOCOR) 20 MG tablet TAKE 1 TABLET EVERY DAY AT BEDTIME 90 tablet 1   • triamterene-hydrochlorothiazide (DYAZIDE) 37.5-25 MG per capsule TAKE 1 CAPSULE EVERY DAY 90 capsule 3   • methotrexate 2.5 MG tablet Take  by mouth 1 (One) Time Per Week.     • [DISCONTINUED] folic acid (FOLVITE) 1 MG tablet Take  by mouth Daily.     • [DISCONTINUED] meloxicam (MOBIC) 15 MG tablet As Needed.       No current facility-administered medications on file prior to visit.          The following portions of the patient's history were reviewed and updated as appropriate: allergies, current medications, past family history, past medical history, past social history, past surgical history and problem list.    Review of Systems   Constitutional: Negative for chills, fatigue, fever and unexpected weight change.   HENT: Negative for sore throat and voice change.    Eyes: Negative for visual disturbance.   Respiratory: Negative for cough, shortness of breath and wheezing.    Cardiovascular: Negative for palpitations and leg swelling.        No COLIN, orthopnea, or claudication.   Gastrointestinal: Negative for abdominal pain, blood in stool, constipation, diarrhea, nausea and vomiting.        Denies melena,     Endocrine: Negative for polydipsia and polyuria.   Genitourinary: Positive for frequency. Negative for dysuria, flank pain, hematuria, urgency and vaginal discharge.   Musculoskeletal: Positive for back pain and joint swelling (wrist). Negative for arthralgias and myalgias.   Neurological: Negative for dizziness, syncope, light-headedness and headaches.        No memory issues.   Psychiatric/Behavioral: Negative for decreased concentration.         Objective       Blood pressure 140/88, pulse 84, temperature 97.1 °F (36.2 °C), temperature source Temporal Artery , resp. rate 20, weight 156 lb 6 oz (70.9 kg).      Physical Exam   Constitutional:   Overweight.   Neck: Normal range of motion. Neck supple. Carotid bruit is not  present. No thyromegaly present.   Cardiovascular: Normal rate, regular rhythm, normal heart sounds and intact distal pulses.  Exam reveals no gallop and no friction rub.    No murmur heard.  No peripheral edema.   Pulmonary/Chest: Effort normal and breath sounds normal.   Abdominal: Soft. Bowel sounds are normal. She exhibits no distension, no abdominal bruit and no mass. There is no hepatosplenomegaly. There is no tenderness. There is no CVA tenderness.   Psychiatric: She has a normal mood and affect.   Nursing note and vitals reviewed.      Assessment / Plan:    Pati was seen today for hyperlipidemia and urinary frequency.    Diagnoses and all orders for this visit:    Hyperlipidemia, unspecified hyperlipidemia type  -     Lipid Panel  -     TSH    Essential hypertension    Gastroesophageal reflux disease without esophagitis    Benign colonic polyp    Pulmonary emphysema, unspecified emphysema type    Rheumatoid arthritis, involving unspecified site, unspecified rheumatoid factor presence      The patient declined scheduling a Medicare Wellness Exam today.      Declines Colonoscopy, Mammogram, Tdap, and Zostavax.      Return in about 6 months (around 4/17/2018) for Recheck- HTN fasting.

## 2017-10-26 ENCOUNTER — TRANSCRIBE ORDERS (OUTPATIENT)
Dept: LAB | Facility: HOSPITAL | Age: 82
End: 2017-10-26

## 2017-10-26 ENCOUNTER — APPOINTMENT (OUTPATIENT)
Dept: LAB | Facility: HOSPITAL | Age: 82
End: 2017-10-26

## 2017-10-26 DIAGNOSIS — Z79.1 ENCOUNTER FOR LONG-TERM (CURRENT) USE OF NON-STEROIDAL ANTI-INFLAMMATORIES: Primary | ICD-10-CM

## 2017-10-26 DIAGNOSIS — Z79.899 NEED FOR PROPHYLACTIC CHEMOTHERAPY: ICD-10-CM

## 2017-10-26 LAB
ALBUMIN SERPL-MCNC: 4.2 G/DL (ref 3.2–4.8)
ALBUMIN/GLOB SERPL: 1.4 G/DL (ref 1.5–2.5)
ALP SERPL-CCNC: 69 U/L (ref 25–100)
ALT SERPL W P-5'-P-CCNC: 20 U/L (ref 7–40)
ANION GAP SERPL CALCULATED.3IONS-SCNC: 13 MMOL/L (ref 3–11)
AST SERPL-CCNC: 31 U/L (ref 0–33)
BASOPHILS # BLD AUTO: 0.03 10*3/MM3 (ref 0–0.2)
BASOPHILS NFR BLD AUTO: 0.5 % (ref 0–1)
BILIRUB SERPL-MCNC: 0.4 MG/DL (ref 0.3–1.2)
BUN BLD-MCNC: 28 MG/DL (ref 9–23)
BUN/CREAT SERPL: 25.5 (ref 7–25)
CALCIUM SPEC-SCNC: 9.5 MG/DL (ref 8.7–10.4)
CHLORIDE SERPL-SCNC: 98 MMOL/L (ref 99–109)
CO2 SERPL-SCNC: 31 MMOL/L (ref 20–31)
CREAT BLD-MCNC: 1.1 MG/DL (ref 0.6–1.3)
CRP SERPL-MCNC: 0.05 MG/DL (ref 0–1)
DEPRECATED RDW RBC AUTO: 52.8 FL (ref 37–54)
EOSINOPHIL # BLD AUTO: 0.17 10*3/MM3 (ref 0–0.3)
EOSINOPHIL NFR BLD AUTO: 2.9 % (ref 0–3)
ERYTHROCYTE [DISTWIDTH] IN BLOOD BY AUTOMATED COUNT: 14.2 % (ref 11.3–14.5)
GFR SERPL CREATININE-BSD FRML MDRD: 47 ML/MIN/1.73
GLOBULIN UR ELPH-MCNC: 2.9 GM/DL
GLUCOSE BLD-MCNC: 98 MG/DL (ref 70–100)
HCT VFR BLD AUTO: 36.8 % (ref 34.5–44)
HGB BLD-MCNC: 11.7 G/DL (ref 11.5–15.5)
IMM GRANULOCYTES # BLD: 0.01 10*3/MM3 (ref 0–0.03)
IMM GRANULOCYTES NFR BLD: 0.2 % (ref 0–0.6)
LYMPHOCYTES # BLD AUTO: 1.49 10*3/MM3 (ref 0.6–4.8)
LYMPHOCYTES NFR BLD AUTO: 25.5 % (ref 24–44)
MCH RBC QN AUTO: 32.8 PG (ref 27–31)
MCHC RBC AUTO-ENTMCNC: 31.8 G/DL (ref 32–36)
MCV RBC AUTO: 103.1 FL (ref 80–99)
MONOCYTES # BLD AUTO: 0.59 10*3/MM3 (ref 0–1)
MONOCYTES NFR BLD AUTO: 10.1 % (ref 0–12)
NEUTROPHILS # BLD AUTO: 3.56 10*3/MM3 (ref 1.5–8.3)
NEUTROPHILS NFR BLD AUTO: 60.8 % (ref 41–71)
PLATELET # BLD AUTO: 260 10*3/MM3 (ref 150–450)
PMV BLD AUTO: 9.9 FL (ref 6–12)
POTASSIUM BLD-SCNC: 3.6 MMOL/L (ref 3.5–5.5)
PROT SERPL-MCNC: 7.1 G/DL (ref 5.7–8.2)
RBC # BLD AUTO: 3.57 10*6/MM3 (ref 3.89–5.14)
SODIUM BLD-SCNC: 142 MMOL/L (ref 132–146)
WBC NRBC COR # BLD: 5.85 10*3/MM3 (ref 3.5–10.8)

## 2017-10-26 PROCEDURE — 86140 C-REACTIVE PROTEIN: CPT | Performed by: INTERNAL MEDICINE

## 2017-10-26 PROCEDURE — 85025 COMPLETE CBC W/AUTO DIFF WBC: CPT | Performed by: INTERNAL MEDICINE

## 2017-10-26 PROCEDURE — 80053 COMPREHEN METABOLIC PANEL: CPT | Performed by: INTERNAL MEDICINE

## 2017-10-26 PROCEDURE — 36415 COLL VENOUS BLD VENIPUNCTURE: CPT | Performed by: INTERNAL MEDICINE

## 2017-12-31 PROCEDURE — 87086 URINE CULTURE/COLONY COUNT: CPT | Performed by: NURSE PRACTITIONER

## 2018-01-02 ENCOUNTER — TELEPHONE (OUTPATIENT)
Dept: URGENT CARE | Facility: CLINIC | Age: 83
End: 2018-01-02

## 2018-01-03 ENCOUNTER — TELEPHONE (OUTPATIENT)
Dept: URGENT CARE | Facility: CLINIC | Age: 83
End: 2018-01-03

## 2018-01-05 ENCOUNTER — TELEPHONE (OUTPATIENT)
Dept: URGENT CARE | Facility: CLINIC | Age: 83
End: 2018-01-05

## 2018-01-05 NOTE — TELEPHONE ENCOUNTER
Spoke with pt's daughter and gave her UC results, she reports that pt is feeling better.  Discussed completing her antibiotics, she verbalized understanding.

## 2018-02-07 ENCOUNTER — TRANSCRIBE ORDERS (OUTPATIENT)
Dept: LAB | Facility: HOSPITAL | Age: 83
End: 2018-02-07

## 2018-02-07 ENCOUNTER — LAB (OUTPATIENT)
Dept: LAB | Facility: HOSPITAL | Age: 83
End: 2018-02-07

## 2018-02-07 DIAGNOSIS — Z79.899 NEED FOR PROPHYLACTIC CHEMOTHERAPY: ICD-10-CM

## 2018-02-07 DIAGNOSIS — M05.89 OTHER RHEUMATOID ARTHRITIS WITH RHEUMATOID FACTOR OF MULTIPLE SITES (CODE): ICD-10-CM

## 2018-02-07 DIAGNOSIS — M05.89 OTHER RHEUMATOID ARTHRITIS WITH RHEUMATOID FACTOR OF MULTIPLE SITES (CODE): Primary | ICD-10-CM

## 2018-02-07 LAB
ALBUMIN SERPL-MCNC: 4.1 G/DL (ref 3.2–4.8)
ALBUMIN/GLOB SERPL: 1.4 G/DL (ref 1.5–2.5)
ALP SERPL-CCNC: 72 U/L (ref 25–100)
ALT SERPL W P-5'-P-CCNC: 16 U/L (ref 7–40)
ANION GAP SERPL CALCULATED.3IONS-SCNC: 8 MMOL/L (ref 3–11)
AST SERPL-CCNC: 20 U/L (ref 0–33)
BASOPHILS # BLD AUTO: 0.03 10*3/MM3 (ref 0–0.2)
BASOPHILS NFR BLD AUTO: 0.4 % (ref 0–1)
BILIRUB SERPL-MCNC: 0.2 MG/DL (ref 0.3–1.2)
BUN BLD-MCNC: 31 MG/DL (ref 9–23)
BUN/CREAT SERPL: 28.2 (ref 7–25)
CALCIUM SPEC-SCNC: 9.2 MG/DL (ref 8.7–10.4)
CHLORIDE SERPL-SCNC: 102 MMOL/L (ref 99–109)
CO2 SERPL-SCNC: 31 MMOL/L (ref 20–31)
CREAT BLD-MCNC: 1.1 MG/DL (ref 0.6–1.3)
CRP SERPL-MCNC: 0.48 MG/DL (ref 0–1)
DEPRECATED RDW RBC AUTO: 54.6 FL (ref 37–54)
EOSINOPHIL # BLD AUTO: 0.16 10*3/MM3 (ref 0–0.3)
EOSINOPHIL NFR BLD AUTO: 2.3 % (ref 0–3)
ERYTHROCYTE [DISTWIDTH] IN BLOOD BY AUTOMATED COUNT: 14.7 % (ref 11.3–14.5)
GFR SERPL CREATININE-BSD FRML MDRD: 47 ML/MIN/1.73
GLOBULIN UR ELPH-MCNC: 2.9 GM/DL
GLUCOSE BLD-MCNC: 128 MG/DL (ref 70–100)
HCT VFR BLD AUTO: 36.3 % (ref 34.5–44)
HGB BLD-MCNC: 11.5 G/DL (ref 11.5–15.5)
IMM GRANULOCYTES # BLD: 0.01 10*3/MM3 (ref 0–0.03)
IMM GRANULOCYTES NFR BLD: 0.1 % (ref 0–0.6)
LYMPHOCYTES # BLD AUTO: 1.85 10*3/MM3 (ref 0.6–4.8)
LYMPHOCYTES NFR BLD AUTO: 27 % (ref 24–44)
MCH RBC QN AUTO: 33 PG (ref 27–31)
MCHC RBC AUTO-ENTMCNC: 31.7 G/DL (ref 32–36)
MCV RBC AUTO: 104.3 FL (ref 80–99)
MONOCYTES # BLD AUTO: 0.56 10*3/MM3 (ref 0–1)
MONOCYTES NFR BLD AUTO: 8.2 % (ref 0–12)
NEUTROPHILS # BLD AUTO: 4.25 10*3/MM3 (ref 1.5–8.3)
NEUTROPHILS NFR BLD AUTO: 62 % (ref 41–71)
PLATELET # BLD AUTO: 294 10*3/MM3 (ref 150–450)
PMV BLD AUTO: 10.5 FL (ref 6–12)
POTASSIUM BLD-SCNC: 3.5 MMOL/L (ref 3.5–5.5)
PROT SERPL-MCNC: 7 G/DL (ref 5.7–8.2)
RBC # BLD AUTO: 3.48 10*6/MM3 (ref 3.89–5.14)
SODIUM BLD-SCNC: 141 MMOL/L (ref 132–146)
WBC NRBC COR # BLD: 6.86 10*3/MM3 (ref 3.5–10.8)

## 2018-02-07 PROCEDURE — 36415 COLL VENOUS BLD VENIPUNCTURE: CPT | Performed by: INTERNAL MEDICINE

## 2018-02-07 PROCEDURE — 85025 COMPLETE CBC W/AUTO DIFF WBC: CPT

## 2018-02-07 PROCEDURE — 86140 C-REACTIVE PROTEIN: CPT | Performed by: INTERNAL MEDICINE

## 2018-02-07 PROCEDURE — 80053 COMPREHEN METABOLIC PANEL: CPT

## 2018-02-16 RX ORDER — TRIAMTERENE AND HYDROCHLOROTHIAZIDE 37.5; 25 MG/1; MG/1
CAPSULE ORAL
Qty: 90 CAPSULE | Refills: 3 | Status: SHIPPED | OUTPATIENT
Start: 2018-02-16 | End: 2019-02-20 | Stop reason: SDUPTHER

## 2018-03-07 ENCOUNTER — LAB (OUTPATIENT)
Dept: INTERNAL MEDICINE | Facility: CLINIC | Age: 83
End: 2018-03-07

## 2018-03-07 ENCOUNTER — TELEPHONE (OUTPATIENT)
Dept: INTERNAL MEDICINE | Facility: CLINIC | Age: 83
End: 2018-03-07

## 2018-03-07 DIAGNOSIS — R30.0 BURNING WITH URINATION: Primary | ICD-10-CM

## 2018-03-07 DIAGNOSIS — N39.0 URINARY TRACT INFECTION WITH HEMATURIA, SITE UNSPECIFIED: Primary | ICD-10-CM

## 2018-03-07 DIAGNOSIS — R31.9 URINARY TRACT INFECTION WITH HEMATURIA, SITE UNSPECIFIED: Primary | ICD-10-CM

## 2018-03-07 LAB
BILIRUB BLD-MCNC: NEGATIVE MG/DL
CLARITY, POC: ABNORMAL
COLOR UR: YELLOW
EXPIRATION DATE: ABNORMAL
GLUCOSE UR STRIP-MCNC: NEGATIVE MG/DL
KETONES UR QL: NEGATIVE
LEUKOCYTE EST, POC: ABNORMAL
Lab: ABNORMAL
NITRITE UR-MCNC: POSITIVE MG/ML
PH UR: 8 [PH] (ref 5–8)
PROT UR STRIP-MCNC: NEGATIVE MG/DL
RBC # UR STRIP: ABNORMAL /UL
SP GR UR: 1.01 (ref 1–1.03)
UROBILINOGEN UR QL: NORMAL

## 2018-03-07 PROCEDURE — 87086 URINE CULTURE/COLONY COUNT: CPT | Performed by: NURSE PRACTITIONER

## 2018-03-07 PROCEDURE — 87186 SC STD MICRODIL/AGAR DIL: CPT | Performed by: NURSE PRACTITIONER

## 2018-03-07 PROCEDURE — 87077 CULTURE AEROBIC IDENTIFY: CPT | Performed by: NURSE PRACTITIONER

## 2018-03-07 PROCEDURE — 81003 URINALYSIS AUTO W/O SCOPE: CPT | Performed by: NURSE PRACTITIONER

## 2018-03-07 RX ORDER — NITROFURANTOIN 25; 75 MG/1; MG/1
100 CAPSULE ORAL EVERY 12 HOURS SCHEDULED
Qty: 14 CAPSULE | Refills: 0 | Status: SHIPPED | OUTPATIENT
Start: 2018-03-07 | End: 2018-03-15

## 2018-03-07 NOTE — TELEPHONE ENCOUNTER
Spoke with ronnie- daughter    She is going to collect the specimen at home and bring in sample.  She states she has a sterile cup at home.

## 2018-03-07 NOTE — TELEPHONE ENCOUNTER
----- Message from Christi Guzman sent at 3/7/2018 10:45 AM EST -----  PATIENTS DAUGHTER, VIJI, CALLED AND STATED HER MOM HAS A BLADDER INFECTION AND SHE GETS THEM FREQUENTLY. SHE IS WANTING A RX CALLED IN.     PH[facility]ACY: [facility] [address] [place] [address] [address] [place] [address] [phone] [facility] [phone] FX    Please call patient back at: [phone]    Thank you.

## 2018-03-09 LAB
BACTERIA SPEC AEROBE CULT: ABNORMAL
BACTERIA SPEC AEROBE CULT: ABNORMAL

## 2018-03-09 RX ORDER — SULFAMETHOXAZOLE AND TRIMETHOPRIM 800; 160 MG/1; MG/1
1 TABLET ORAL 2 TIMES DAILY
Qty: 6 TABLET | Refills: 0 | Status: SHIPPED | OUTPATIENT
Start: 2018-03-09 | End: 2018-03-15

## 2018-03-15 ENCOUNTER — OFFICE VISIT (OUTPATIENT)
Dept: INTERNAL MEDICINE | Facility: CLINIC | Age: 83
End: 2018-03-15

## 2018-03-15 VITALS
BODY MASS INDEX: 25.96 KG/M2 | SYSTOLIC BLOOD PRESSURE: 162 MMHG | RESPIRATION RATE: 20 BRPM | WEIGHT: 156 LBS | DIASTOLIC BLOOD PRESSURE: 84 MMHG | TEMPERATURE: 97.7 F | HEART RATE: 84 BPM

## 2018-03-15 DIAGNOSIS — N39.0 RECURRENT UTI: ICD-10-CM

## 2018-03-15 DIAGNOSIS — N39.0 URINARY TRACT INFECTION WITHOUT HEMATURIA, SITE UNSPECIFIED: Primary | ICD-10-CM

## 2018-03-15 LAB
BILIRUB BLD-MCNC: NEGATIVE MG/DL
CLARITY, POC: ABNORMAL
COLOR UR: YELLOW
EXPIRATION DATE: ABNORMAL
GLUCOSE UR STRIP-MCNC: NEGATIVE MG/DL
KETONES UR QL: NEGATIVE
LEUKOCYTE EST, POC: ABNORMAL
Lab: ABNORMAL
NITRITE UR-MCNC: NEGATIVE MG/ML
PH UR: 6.5 [PH] (ref 5–8)
PROT UR STRIP-MCNC: NEGATIVE MG/DL
RBC # UR STRIP: ABNORMAL /UL
SP GR UR: 1.01 (ref 1–1.03)
UROBILINOGEN UR QL: NORMAL

## 2018-03-15 PROCEDURE — 87086 URINE CULTURE/COLONY COUNT: CPT | Performed by: INTERNAL MEDICINE

## 2018-03-15 PROCEDURE — 81003 URINALYSIS AUTO W/O SCOPE: CPT | Performed by: INTERNAL MEDICINE

## 2018-03-15 PROCEDURE — 99214 OFFICE O/P EST MOD 30 MIN: CPT | Performed by: INTERNAL MEDICINE

## 2018-03-15 RX ORDER — FOLIC ACID 1 MG/1
1 TABLET ORAL DAILY
COMMUNITY
Start: 2018-03-01 | End: 2020-05-06

## 2018-03-15 RX ORDER — CEFDINIR 300 MG/1
300 CAPSULE ORAL 2 TIMES DAILY
Qty: 14 CAPSULE | Refills: 0 | Status: SHIPPED | OUTPATIENT
Start: 2018-03-15 | End: 2018-03-22

## 2018-03-15 NOTE — PROGRESS NOTES
Subjective       Pati Carter is a 87 y.o. female.     Chief Complaint   Patient presents with   • PAIN IN LEFT FLANK     STATES SHE FEELS SHE HAS ANOTHER KIDNEY INFECTION       History obtained from the patient's daughter and the patient.    The patient left a urinalysis on 3/7/18.  She was not seen.  She was put on Macrobid.  That was changed to Bactrim DS x 3 days after the culture results came back.      Urinary Tract Infection    This is a new problem. Episode onset: 3/7/18. The problem has been unchanged. There has been no fever. She is not sexually active. There is no history of pyelonephritis. Associated symptoms include chills, flank pain (leftshe states this started when she was put on the Bactrim) and urgency. Pertinent negatives include no discharge, frequency, hematuria, nausea or vomiting. She has tried antibiotics for the symptoms. The treatment provided mild relief. Her past medical history is significant for recurrent UTIs. There is no history of kidney stones.        The following portions of the patient's history were reviewed and updated as appropriate: allergies, current medications, past family history, past medical history, past social history, past surgical history and problem list.      Review of Systems   Constitutional: Positive for chills. Negative for fever.   Respiratory: Positive for shortness of breath (mild). Negative for wheezing.    Cardiovascular: Negative for chest pain.   Gastrointestinal: Negative for nausea and vomiting.   Genitourinary: Positive for flank pain (leftshe states this started when she was put on the Bactrim), pelvic pain (left) and urgency. Negative for dysuria, frequency, hematuria, vaginal bleeding and vaginal discharge.        Complains of nocturia (every 2 hours).   Neurological: Positive for headaches.           Objective     Blood pressure 162/84, pulse 84, temperature 97.7 °F (36.5 °C), temperature source Temporal Artery , resp. rate 20, weight 70.8 kg  (156 lb).    Physical Exam   Constitutional: She appears well-developed and well-nourished.   Cardiovascular: Normal rate, regular rhythm and normal heart sounds.    No murmur heard.  Pulmonary/Chest: Effort normal and breath sounds normal.   Abdominal: Soft. Bowel sounds are normal. She exhibits no mass. There is no hepatosplenomegaly. There is no tenderness. There is no CVA tenderness.   Neurological: She is alert.   Skin: No rash noted.   Psychiatric: She has a normal mood and affect.   Nursing note and vitals reviewed.    Results for orders placed or performed in visit on 03/15/18   POC Urinalysis Dipstick, Automated   Result Value Ref Range    Color Yellow Yellow, Straw, Dark Yellow, Selena    Clarity, UA Hazy (A) Clear    Glucose, UA Negative Negative, 1000 mg/dL (3+) mg/dL    Bilirubin Negative Negative    Ketones, UA Negative Negative    Specific Gravity  1.010 1.005 - 1.030    Blood, UA 50 Evens/ul (A) Negative    pH, Urine 6.5 5.0 - 8.0    Protein, POC Negative Negative mg/dL    Urobilinogen, UA Normal Normal    Leukocytes 75 Nikolas/ul (A) Negative    Nitrite, UA Negative Negative    Lot Number [id number]     Expiration Date 11-30-18          Assessment/Plan   Pati was seen today for pain in left flank.    Diagnoses and all orders for this visit:    Urinary tract infection without hematuria, site unspecified  -     Urine Culture - Urine, Urine, Clean Catch  -     POC Urinalysis Dipstick, Automated  -     cefdinir (OMNICEF) 300 MG capsule; Take 1 capsule by mouth 2 (Two) Times a Day for 7 days.    Recurrent UTI  -     Ambulatory Referral to Urology        Return if symptoms worsen or fail to improve.

## 2018-03-17 LAB
BACTERIA SPEC AEROBE CULT: NORMAL
BACTERIA SPEC AEROBE CULT: NORMAL

## 2018-03-26 ENCOUNTER — TELEPHONE (OUTPATIENT)
Dept: INTERNAL MEDICINE | Facility: CLINIC | Age: 83
End: 2018-03-26

## 2018-03-26 NOTE — TELEPHONE ENCOUNTER
Spoke with Danette   She went [place] [facility] [place].   Danette thinks they gave her tamiflu.   She just got a call now that she was positive

## 2018-03-26 NOTE — TELEPHONE ENCOUNTER
----- Message from Nelli Carroll sent at 3/26/2018  5:26 PM EDT -----  Contact: OLLIE - DAUGHTER  OLLIE NARANJO CALLING FOR HER MOTHER BERT NARANJO WHO HAS CHILLS AND MUSCLE ACHES, OLLIE IS JUST GETTING OVER THE FLU AND BELIEVES HER MOTHER MAY HAVE IT NOW. SHE WANTS TO KNOW IF YOU WOULD BE ABLE TO FIT HER IN. OLLIE CAN BE REACHED [phone]

## 2018-03-27 ENCOUNTER — TELEPHONE (OUTPATIENT)
Dept: INTERNAL MEDICINE | Facility: CLINIC | Age: 83
End: 2018-03-27

## 2018-03-27 NOTE — TELEPHONE ENCOUNTER
----- Message from Nette Troy V sent at 3/27/2018  9:50 AM EDT -----  PT DAUGHTER, VIJI GALLOWAY, CALLED AND ASKING FOR HOME HEALTH FOR MOM AS SHE HAS THE FLU AND SHE CAN NOT COME IN AND HELP AS HER  WAS JUST ADMITTED TO [facility] AND SHE DOESN'T WANT TO RISK BRINGING IT TO HIM.    SHE CAN BE REACHED [phone]

## 2018-03-27 NOTE — TELEPHONE ENCOUNTER
Danette states  Her mom was diagnosed with the flu yesterday and her Dad recently was admitted to the hospital for 2 days  .  They are trying to find someone to come in and help her mom twice a day and wondered if home health had this service.    Explained usually Medicare does not pay for this type of service but gave her the phone number for [facility] and [facility] that she could call .   She states she will give them a call.

## 2018-04-09 ENCOUNTER — TELEPHONE (OUTPATIENT)
Dept: INTERNAL MEDICINE | Facility: CLINIC | Age: 83
End: 2018-04-09

## 2018-04-09 NOTE — TELEPHONE ENCOUNTER
"----- Message from Britni Funk sent at 4/3/2018  9:24 AM EDT -----  Concerning urology referral, after scheduling appt, pts daughter said \"PT DOES NOT WANT APPT AT THIS TIME DUE TO NOT FEELING WELL. THEY WILL CALL BACK WHEN THEY ARE READY TO SCHEDULE\"  Is this referral okay to cancel? Or do you want me to wait for pt to call back?   "

## 2018-04-13 ENCOUNTER — TELEPHONE (OUTPATIENT)
Dept: INTERNAL MEDICINE | Facility: CLINIC | Age: 83
End: 2018-04-13

## 2018-04-13 NOTE — TELEPHONE ENCOUNTER
----- Message from Nette Troy V sent at 4/13/2018  2:57 PM EDT -----  DAUGHTER, VIJI GALLOWAY, CALLED AND IS ASKING THAT YOU CALL HER SO SHE CAN DISCUSS SOMETHING WITH YOU REGARDING MOM.    SHE CAN BE REACHED [phone]

## 2018-04-13 NOTE — TELEPHONE ENCOUNTER
Spoke with Danette     She states her mom tripped and hit her hand this week on a door frame and was c/o of hand pain.    She was seen at [facility] - Dr AMY Walker     He did blood work and told her they were elevated and to follow up with her RA or PCP -   Could be indication of Gout     I have requested the lab results as she has an appt with Dr Bolaños on 4/17/2018

## 2018-04-17 ENCOUNTER — TELEPHONE (OUTPATIENT)
Dept: INTERNAL MEDICINE | Facility: CLINIC | Age: 83
End: 2018-04-17

## 2018-04-17 ENCOUNTER — OFFICE VISIT (OUTPATIENT)
Dept: INTERNAL MEDICINE | Facility: CLINIC | Age: 83
End: 2018-04-17

## 2018-04-17 VITALS
HEART RATE: 72 BPM | TEMPERATURE: 97 F | WEIGHT: 155 LBS | RESPIRATION RATE: 20 BRPM | DIASTOLIC BLOOD PRESSURE: 82 MMHG | BODY MASS INDEX: 25.79 KG/M2 | SYSTOLIC BLOOD PRESSURE: 136 MMHG

## 2018-04-17 DIAGNOSIS — E78.5 HYPERLIPIDEMIA, UNSPECIFIED HYPERLIPIDEMIA TYPE: ICD-10-CM

## 2018-04-17 DIAGNOSIS — K63.5 BENIGN COLONIC POLYP: ICD-10-CM

## 2018-04-17 DIAGNOSIS — J43.9 PULMONARY EMPHYSEMA, UNSPECIFIED EMPHYSEMA TYPE (HCC): ICD-10-CM

## 2018-04-17 DIAGNOSIS — I10 ESSENTIAL HYPERTENSION: Primary | ICD-10-CM

## 2018-04-17 DIAGNOSIS — K21.9 GASTROESOPHAGEAL REFLUX DISEASE WITHOUT ESOPHAGITIS: ICD-10-CM

## 2018-04-17 LAB
ALBUMIN SERPL-MCNC: 4.2 G/DL (ref 3.2–4.8)
ALBUMIN/GLOB SERPL: 1.4 G/DL (ref 1.5–2.5)
ALP SERPL-CCNC: 80 U/L (ref 25–100)
ALT SERPL W P-5'-P-CCNC: 27 U/L (ref 7–40)
ANION GAP SERPL CALCULATED.3IONS-SCNC: 6 MMOL/L (ref 3–11)
ARTICHOKE IGE QN: 117 MG/DL (ref 0–130)
AST SERPL-CCNC: 32 U/L (ref 0–33)
BILIRUB SERPL-MCNC: 0.6 MG/DL (ref 0.3–1.2)
BUN BLD-MCNC: 22 MG/DL (ref 9–23)
BUN/CREAT SERPL: 20 (ref 7–25)
CALCIUM SPEC-SCNC: 9.4 MG/DL (ref 8.7–10.4)
CHLORIDE SERPL-SCNC: 101 MMOL/L (ref 99–109)
CHOLEST SERPL-MCNC: 178 MG/DL (ref 0–200)
CO2 SERPL-SCNC: 35 MMOL/L (ref 20–31)
CREAT BLD-MCNC: 1.1 MG/DL (ref 0.6–1.3)
GFR SERPL CREATININE-BSD FRML MDRD: 47 ML/MIN/1.73
GLOBULIN UR ELPH-MCNC: 3.1 GM/DL
GLUCOSE BLD-MCNC: 85 MG/DL (ref 70–100)
HDLC SERPL-MCNC: 49 MG/DL (ref 40–60)
POTASSIUM BLD-SCNC: 3.6 MMOL/L (ref 3.5–5.5)
PROT SERPL-MCNC: 7.3 G/DL (ref 5.7–8.2)
SODIUM BLD-SCNC: 142 MMOL/L (ref 132–146)
TRIGL SERPL-MCNC: 123 MG/DL (ref 0–150)

## 2018-04-17 PROCEDURE — 99214 OFFICE O/P EST MOD 30 MIN: CPT | Performed by: INTERNAL MEDICINE

## 2018-04-17 PROCEDURE — 80053 COMPREHEN METABOLIC PANEL: CPT | Performed by: INTERNAL MEDICINE

## 2018-04-17 PROCEDURE — 80061 LIPID PANEL: CPT | Performed by: INTERNAL MEDICINE

## 2018-04-17 PROCEDURE — 36415 COLL VENOUS BLD VENIPUNCTURE: CPT | Performed by: INTERNAL MEDICINE

## 2018-04-17 NOTE — TELEPHONE ENCOUNTER
----- Message from Christi Guzman sent at 4/17/2018 11:57 AM EDT -----  PATIENT'S DAUGHTER, VIJI, CALLED AND STATED THAT WE SHOULD HAVE LABS FROM [facility], Dr. NG, AND SHE WANTS TO REVIEW THOSE WITH YOU. PATIENT'S DAUGHTER HAS CONCERNS ABOUT MOM HAVING GOUT.     PLEASE CALL VIJI [phone]    THANK YOU

## 2018-04-17 NOTE — PATIENT INSTRUCTIONS
The patient declined scheduling a Medicare Wellness Exam today.     Declines Colonoscopy, Mammogram, Tdap, and Zostavax.

## 2018-04-17 NOTE — PROGRESS NOTES
"Subjective       Pati Carter is a 87 y.o. female.     Chief Complaint   Patient presents with   • Hypertension     6 month follow up  fasting        History obtained from the patient.      History of Present Illness     HPI: Here for a 6 month follow up.       Sees Dr. Kaplan for RA, stable on MTX and Folic Acid.       She has seen Pulmonary for COPD, stable on Breo-Ellipta, but no recent appointment.      Primary Care Cardiac Diagnostic Constellation: The patient is here today for a follow-up visit.       Her Hypertension is stable.   Medication(s): Triamterene HCTZ.   Her Hyperlipidemia has been stable. Her LDL goal is 130 mg/dL and last LDL was 121 mg/dL.   Medication(s): Simvastatin.   The patient is adherent with her medication regimen. She denies medication side effects.       Interval Events:   CBC done 2/7/18 . She does not check her blood pressure at home.      Symptoms: Has bilateral leg pain (PAD), which is stable,  and some lower extremity edema.  Has stable vision issues due to macular degeneration.  Denies chest pain,  dyspnea,  fatigue,  muscle pain, and muscle weakness.   Associated symptoms:  2 pound weight loss, and mild memory / concentration issues,  but no palpitations, no syncope, no headache, no orthopnea, no PND, no polydipsia, no polyuria, and no focal neurologic deficits.      Lifestyle and Disease Management: Diet: She consumes a diverse and healthy diet. Weight Issues: She does not have any weight concerns. Exercise: She does not exercise regularly. She \"walks some\".  Smoking: She does not use tobacco.       Colonic Polyp Follow-up: The patient is being seen for a routine clinic follow-up of Colon Polyp(s).   Current diagnosis was determined by Colonoscopy and last 1999 normal per patient.   Symptoms: no hematochezia, no melena, no diarrhea, no constipation, no decreased stool caliber, no change in bowel habits and no abdominal pain. Associated symptoms: no rectal prolapse. "   Medication:  None.      Gastroesophageal Reflux Disease Follow-up: The patient is being seen for a routine clinic follow-up of Gastroesophageal Reflux Disease.   Symptoms: no heartburn, no abdominal pain, no acid regurgitation, no nausea, no vomiting, no sore throat, no dysphagia, no odynophagia, no hematemesis and no melena.   Associated symptoms: no anorexia, no early satiety, no bloating, no belching, no weight loss, no hoarseness, no cough and no wheezing.   Medication:  None.           Current Outpatient Prescriptions on File Prior to Visit   Medication Sig Dispense Refill   • albuterol (PROVENTIL HFA;VENTOLIN HFA) 108 (90 BASE) MCG/ACT inhaler Inhale 2 puffs Every 4 (Four) Hours As Needed for Wheezing or Shortness of Air. 1 inhaler 11   • docusate sodium (COLACE) 100 MG capsule Take  by mouth.     • Fluticasone Furoate-Vilanterol (BREO ELLIPTA) 200-25 MCG/INH aerosol powder  Inhale.     • folic acid (FOLVITE) 1 MG tablet Take 1 tablet by mouth Daily.     • methotrexate 2.5 MG tablet Take 15 mg by mouth 1 (One) Time Per Week.     • Multiple Vitamins-Minerals (CENTRUM SILVER ADULT 50+ PO) Take  by mouth Daily.     • nystatin (MYCOSTATIN) 027943 UNIT/GM ointment Daily.     • simvastatin (ZOCOR) 20 MG tablet TAKE 1 TABLET EVERY DAY AT BEDTIME 90 tablet 1   • triamterene-hydrochlorothiazide (DYAZIDE) 37.5-25 MG per capsule TAKE 1 CAPSULE EVERY DAY 90 capsule 3   • VOLTAREN 1 % gel gel Daily.     • Cholecalciferol (VITAMIN D3) 3000 UNITS tablet Take 1 tablet by mouth Daily.       No current facility-administered medications on file prior to visit.        Current outpatient and discharge medications have been reconciled for the patient.  Latoya Bolaños MD        The following portions of the patient's history were reviewed and updated as appropriate: allergies, current medications, past family history, past medical history, past social history, past surgical history and problem list.    Review of Systems    Constitutional: Negative for fatigue and unexpected weight change.   Eyes: Positive for visual disturbance.   Respiratory: Negative for cough, shortness of breath and wheezing.    Cardiovascular: Positive for leg swelling. Negative for chest pain and palpitations.        No COLIN or orthopnea.  Claudication stable.   Gastrointestinal: Negative for abdominal pain, blood in stool, constipation, diarrhea, nausea and vomiting.        Denies melena.   Endocrine: Negative for polydipsia and polyuria.   Musculoskeletal: Positive for arthralgias. Negative for myalgias.   Neurological: Positive for dizziness (occasional). Negative for syncope, light-headedness and headaches.        Stable memory issues.   Psychiatric/Behavioral: Positive for decreased concentration.         Objective       Blood pressure 136/82, pulse 72, temperature 97 °F (36.1 °C), temperature source Temporal Artery , resp. rate 20, weight 70.3 kg (155 lb).      Physical Exam   Constitutional: She appears well-developed and well-nourished.   Neck: Normal range of motion. Neck supple. Carotid bruit is not present. No thyromegaly present.   Cardiovascular: Normal rate, regular rhythm, normal heart sounds and intact distal pulses.  Exam reveals no gallop and no friction rub.    No murmur heard.  No peripheral edema.   Pulmonary/Chest: Effort normal and breath sounds normal.   Abdominal: Soft. Bowel sounds are normal. She exhibits no distension, no abdominal bruit and no mass. There is no hepatosplenomegaly. There is no tenderness.   Psychiatric: She has a normal mood and affect.   Nursing note and vitals reviewed.      Assessment / Plan:      Pati was seen today for hypertension.    Diagnoses and all orders for this visit:    Essential hypertension    Hyperlipidemia, unspecified hyperlipidemia type  -     Lipid Panel  -     Comprehensive Metabolic Panel    Pulmonary emphysema, unspecified emphysema type    Benign colonic polyp    Gastroesophageal reflux  disease without esophagitis      The patient declined scheduling a Medicare Wellness Exam today.     Declines Colonoscopy, Mammogram, Tdap, and Zostavax.        Return in about 6 months (around 10/17/2018) for Recheck-HTN, fasting.

## 2018-04-18 NOTE — TELEPHONE ENCOUNTER
Danette states her mom states her Lt wrist is still sore and hurts to put weight on it , she has pain during the night and take aleve and will help the discomfort.   Notified Danette of the lab results.   When she went to Dr Kenyon her hand was warm to touch and that's what made him think she had gout.  She just has pain in the hand now.     Dr Kenyon told her to follow up if no improvement .

## 2018-04-18 NOTE — TELEPHONE ENCOUNTER
Danette notified  Also recommended a wrist splint to limit moblility.   She states she has one and will have her wear it.  She will call back if her pain persists   Verb understanding given

## 2018-04-18 NOTE — TELEPHONE ENCOUNTER
Sometimes hairline fractures take a few weeks to show up on an x-ray.  If her pain persists, would recommend re-xray.

## 2018-04-18 NOTE — TELEPHONE ENCOUNTER
Labs are scanned in chart  From [facility]  For date of 4/4/2018      Danette is wondering if the labs show she had gout. She states her mom hit her hand on a door frame and they went to see Meetyl annie Hubbard  and thought It could be gout   She had xray of her left hand and it was negative

## 2018-05-02 ENCOUNTER — OFFICE VISIT (OUTPATIENT)
Dept: INTERNAL MEDICINE | Facility: CLINIC | Age: 83
End: 2018-05-02

## 2018-05-02 VITALS
TEMPERATURE: 98.4 F | BODY MASS INDEX: 25.63 KG/M2 | SYSTOLIC BLOOD PRESSURE: 140 MMHG | DIASTOLIC BLOOD PRESSURE: 70 MMHG | RESPIRATION RATE: 24 BRPM | WEIGHT: 154 LBS | HEART RATE: 100 BPM

## 2018-05-02 DIAGNOSIS — M54.2 NECK PAIN: Primary | ICD-10-CM

## 2018-05-02 PROCEDURE — 99214 OFFICE O/P EST MOD 30 MIN: CPT | Performed by: INTERNAL MEDICINE

## 2018-05-02 RX ORDER — MELOXICAM 15 MG/1
7.5 TABLET ORAL DAILY PRN
Start: 2018-05-02 | End: 2020-04-02

## 2018-05-02 RX ORDER — HYDROCODONE BITARTRATE AND ACETAMINOPHEN 5; 325 MG/1; MG/1
1 TABLET ORAL NIGHTLY PRN
Qty: 3 TABLET | Refills: 0 | Status: SHIPPED | OUTPATIENT
Start: 2018-05-02 | End: 2018-10-17

## 2018-05-02 RX ORDER — CYCLOBENZAPRINE HCL 5 MG
5 TABLET ORAL 3 TIMES DAILY PRN
Qty: 30 TABLET | Refills: 0 | Status: SHIPPED | OUTPATIENT
Start: 2018-05-02 | End: 2020-01-20

## 2018-05-02 NOTE — PATIENT INSTRUCTIONS
Start Meloxicam daily x 10-14 days.  Recommend heat.  No NSAIDS while taking Meloxicam.  Can add Tylenol, except with the Norco.    Do not take the Norco and Flexeril at the same time.

## 2018-05-02 NOTE — PROGRESS NOTES
Subjective       Pati Carter is a 87 y.o. female.     Chief Complaint   Patient presents with   • Neck Pain     stiffness x 2 day         History obtained from the patient.      Neck Pain    This is a new problem. Episode onset: 2-3 days ago. The problem occurs constantly. The problem has been gradually worsening. The pain is associated with nothing. The pain is present in the occipital region, left side and right side. Quality: sharp. The pain is severe. The symptoms are aggravated by twisting and position. The pain is same all the time. Stiffness is present all day. Associated symptoms include headaches and a visual change (stable, has macular degeneration). Pertinent negatives include no chest pain, fever, numbness, pain with swallowing, photophobia, tingling or trouble swallowing. She has tried NSAIDs for the symptoms. The treatment provided mild relief.        The following portions of the patient's history were reviewed and updated as appropriate: allergies, current medications, past family history, past medical history, past social history, past surgical history and problem list.      Review of Systems   Constitutional: Negative for fever.   HENT: Positive for ear pain (bilateral) and voice change (chronic hoarseness). Negative for congestion, postnasal drip, rhinorrhea, sinus pain, sinus pressure and trouble swallowing.    Eyes: Positive for visual disturbance. Negative for photophobia, pain, discharge, redness and itching.   Respiratory: Negative for cough, shortness of breath and wheezing.    Cardiovascular: Negative for chest pain.   Gastrointestinal: Negative for abdominal pain, diarrhea, nausea and vomiting.   Musculoskeletal: Positive for neck pain and neck stiffness.        Pain in posterior left shoulder area.     Skin: Negative for rash.   Neurological: Positive for dizziness and headaches. Negative for tingling and numbness.        Off balance a little.   Hematological: Negative for  adenopathy.           Objective     Blood pressure 140/70, pulse 100, temperature 98.4 °F (36.9 °C), temperature source Temporal Artery , resp. rate 24, weight 69.9 kg (154 lb).    Physical Exam   Constitutional: She appears well-developed and well-nourished.   HENT:   Head: Normocephalic and atraumatic.   Right Ear: Tympanic membrane, external ear and ear canal normal.   Left Ear: Tympanic membrane, external ear and ear canal normal.   Mouth/Throat: Oropharynx is clear and moist and mucous membranes are normal. No oral lesions.   Tonsils normal.  No sinus tenderness to palpation.   Eyes: Conjunctivae are normal.   Neck: Normal range of motion.   No cervical spine tenderness to palpation.  There is tightness and tenderness to palpation over the bilateral paracervical spinal muscles.  There is decreased ROM of the neck.  There is pain with flexion, extension, and lateral bending of the neck.   Cardiovascular: Normal rate and regular rhythm.    No murmur heard.  Pulmonary/Chest: Effort normal and breath sounds normal.   Lymphadenopathy:     She has no cervical adenopathy.   Neurological: She is alert. She has normal strength and normal reflexes.   Upper extremity only examined.   Skin: No rash noted.   Psychiatric: She has a normal mood and affect.   Nursing note and vitals reviewed.        Assessment/Plan   Pati was seen today for neck pain.    Diagnoses and all orders for this visit:    Neck pain  -     HYDROcodone-acetaminophen (NORCO) 5-325 MG per tablet; Take 1 tablet by mouth At Night As Needed for Moderate Pain .  -     meloxicam (MOBIC) 15 MG tablet; Take 0.5 tablets by mouth Daily As Needed for Moderate Pain .  -     cyclobenzaprine (FLEXERIL) 5 MG tablet; Take 1 tablet by mouth 3 (Three) Times a Day As Needed for Muscle Spasms.      Patient Instructions:      Start Meloxicam daily x 10-14 days.  Recommend heat.  No NSAIDS while taking Meloxicam.  Can add Tylenol, except with the Norco.    Do not take the  Norco and Flexeril at the same time.          Return if symptoms worsen or fail to improve.

## 2018-06-05 ENCOUNTER — TELEPHONE (OUTPATIENT)
Dept: INTERNAL MEDICINE | Facility: CLINIC | Age: 83
End: 2018-06-05

## 2018-06-05 NOTE — TELEPHONE ENCOUNTER
----- Message from Britni Funk sent at 6/5/2018  8:08 AM EDT -----  Concerning urology referral, pt called back and said she did not want this referral at all, is this okay to cancel?

## 2018-07-03 ENCOUNTER — TELEPHONE (OUTPATIENT)
Dept: INTERNAL MEDICINE | Facility: CLINIC | Age: 83
End: 2018-07-03

## 2018-07-03 DIAGNOSIS — R30.0 DYSURIA: Primary | ICD-10-CM

## 2018-07-03 RX ORDER — NITROFURANTOIN 25; 75 MG/1; MG/1
100 CAPSULE ORAL EVERY 12 HOURS SCHEDULED
Qty: 14 CAPSULE | Refills: 0 | Status: SHIPPED | OUTPATIENT
Start: 2018-07-03 | End: 2018-10-15

## 2018-07-03 NOTE — TELEPHONE ENCOUNTER
----- Message from Nelli Carroll sent at 7/3/2018  2:55 PM EDT -----  Contact: VIJI - MOM  VIJI GALLOWAY CALLING FOR HER MOTHER BRET DELACRUZ, SHE BELIEVES SHE HAS A UTI, VIJI WANTS TO KNOW IF SHE CAN GET AN ORDER FOR A UA. SHE SAID SHE HAS STERILE CUPS FOR UA'S AT HOME AS SHE GETS FREQUENT UTI'S. VIJI WOULD LIKE HER TO GET SOMETHING PRESCRIBED FOR THIS TODAY SINCE THE OFFICE WILL BE CLOSED TOMORROW. VIJI CAN BE REACHED [phone]

## 2018-07-06 ENCOUNTER — OFFICE VISIT (OUTPATIENT)
Dept: INTERNAL MEDICINE | Facility: CLINIC | Age: 83
End: 2018-07-06

## 2018-07-06 VITALS
SYSTOLIC BLOOD PRESSURE: 130 MMHG | DIASTOLIC BLOOD PRESSURE: 86 MMHG | WEIGHT: 152.2 LBS | HEIGHT: 65 IN | OXYGEN SATURATION: 98 % | BODY MASS INDEX: 25.36 KG/M2 | HEART RATE: 135 BPM

## 2018-07-06 DIAGNOSIS — N39.0 URINARY TRACT INFECTION WITHOUT HEMATURIA, SITE UNSPECIFIED: ICD-10-CM

## 2018-07-06 DIAGNOSIS — R82.998 LEUKOCYTES IN URINE: ICD-10-CM

## 2018-07-06 DIAGNOSIS — R30.0 DYSURIA: Primary | ICD-10-CM

## 2018-07-06 PROCEDURE — 87086 URINE CULTURE/COLONY COUNT: CPT | Performed by: INTERNAL MEDICINE

## 2018-07-06 PROCEDURE — 81003 URINALYSIS AUTO W/O SCOPE: CPT | Performed by: INTERNAL MEDICINE

## 2018-07-06 PROCEDURE — 99214 OFFICE O/P EST MOD 30 MIN: CPT | Performed by: INTERNAL MEDICINE

## 2018-07-06 NOTE — PROGRESS NOTES
"Subjective       Pati Carter is a 87 y.o. female.     Chief Complaint   Patient presents with   • Difficulty Urinating     5 days        History obtained from the patient.      Urinary Tract Infection    This is a new problem. Episode onset: 4 days ago. The patient is experiencing no pain. There has been no fever. She is not sexually active. There is no history of pyelonephritis. Associated symptoms include flank pain, frequency and urgency. Pertinent negatives include no chills, discharge, hematuria, nausea or vomiting. She has tried antibiotics (called in over the phone) for the symptoms. The treatment provided no relief. Her past medical history is significant for recurrent UTIs. There is no history of kidney stones.        The following portions of the patient's history were reviewed and updated as appropriate: allergies, current medications, past family history, past medical history, past social history, past surgical history and problem list.      Review of Systems   Constitutional: Negative for chills and fever.   Respiratory: Negative for shortness of breath.    Cardiovascular: Negative for chest pain.   Gastrointestinal: Negative for abdominal pain, diarrhea, nausea and vomiting.   Genitourinary: Positive for flank pain, frequency and urgency. Negative for dysuria, hematuria, pelvic pain, vaginal bleeding and vaginal discharge.   Musculoskeletal: Positive for back pain (chronic).   Skin: Negative for rash.   Hematological: Negative for adenopathy.           Objective     Blood pressure 130/86, pulse (!) 135, height 165.1 cm (65\"), weight 69 kg (152 lb 3.2 oz), SpO2 98 %.    Physical Exam   Constitutional: She appears well-developed and well-nourished.   Cardiovascular: Normal rate, regular rhythm and normal heart sounds.    No murmur heard.  Pulmonary/Chest: Effort normal and breath sounds normal.   Abdominal: Soft. Bowel sounds are normal. She exhibits no distension and no mass. There is no " hepatosplenomegaly. There is no tenderness. There is no CVA tenderness.   Neurological: She is alert.   Psychiatric: She has a normal mood and affect.   Nursing note and vitals reviewed.     Results for orders placed or performed in visit on 07/06/18   Urine Culture - Urine, Urine, Clean Catch   Result Value Ref Range    Urine Culture No growth    POCT urinalysis dipstick, automated   Result Value Ref Range    Color Yellow Yellow, Straw, Dark Yellow, Selena    Clarity, UA Hazy (A) Clear    Specific Gravity  1.005 1.005 - 1.030    pH, Urine 7.0 5.0 - 8.0    Leukocytes 500 Nikolas/ul (A) Negative    Nitrite, UA Negative Negative    Protein, POC Negative Negative mg/dL    Glucose, UA Negative Negative, 1000 mg/dL (3+) mg/dL    Ketones, UA Negative Negative    Urobilinogen, UA Normal Normal    Bilirubin Negative Negative    Blood, UA Negative Negative    Lot Number [id number]     Expiration Date 2-28-19          Assessment/Plan   Pati was seen today for difficulty urinating.    Diagnoses and all orders for this visit:    Dysuria  -     POCT urinalysis dipstick, automated    Leukocytes in urine  -     Urine Culture - Urine, Urine, Clean Catch    Urinary tract infection without hematuria, site unspecified      Continue Nitrofurantoin and plenty of fluids.      Return if symptoms worsen or fail to improve.

## 2018-07-08 LAB — BACTERIA SPEC AEROBE CULT: NORMAL

## 2018-07-19 ENCOUNTER — TRANSCRIBE ORDERS (OUTPATIENT)
Dept: LAB | Facility: HOSPITAL | Age: 83
End: 2018-07-19

## 2018-07-19 ENCOUNTER — LAB (OUTPATIENT)
Dept: LAB | Facility: HOSPITAL | Age: 83
End: 2018-07-19

## 2018-07-19 DIAGNOSIS — Z79.899 POLYPHARMACY: ICD-10-CM

## 2018-07-19 DIAGNOSIS — M05.89 OTHER RHEUMATOID ARTHRITIS WITH RHEUMATOID FACTOR OF MULTIPLE SITES (CODE): ICD-10-CM

## 2018-07-19 DIAGNOSIS — M05.89 OTHER RHEUMATOID ARTHRITIS WITH RHEUMATOID FACTOR OF MULTIPLE SITES (CODE): Primary | ICD-10-CM

## 2018-07-19 LAB
ALBUMIN SERPL-MCNC: 4.14 G/DL (ref 3.2–4.8)
ALBUMIN/GLOB SERPL: 1.4 G/DL (ref 1.5–2.5)
ALP SERPL-CCNC: 65 U/L (ref 25–100)
ALT SERPL W P-5'-P-CCNC: 36 U/L (ref 7–40)
ANION GAP SERPL CALCULATED.3IONS-SCNC: 8 MMOL/L (ref 3–11)
AST SERPL-CCNC: 49 U/L (ref 0–33)
BASOPHILS # BLD AUTO: 0.02 10*3/MM3 (ref 0–0.2)
BASOPHILS NFR BLD AUTO: 0.3 % (ref 0–1)
BILIRUB SERPL-MCNC: 0.9 MG/DL (ref 0.3–1.2)
BUN BLD-MCNC: 26 MG/DL (ref 9–23)
BUN/CREAT SERPL: 24.5 (ref 7–25)
CALCIUM SPEC-SCNC: 9.3 MG/DL (ref 8.7–10.4)
CHLORIDE SERPL-SCNC: 101 MMOL/L (ref 99–109)
CO2 SERPL-SCNC: 33 MMOL/L (ref 20–31)
CREAT BLD-MCNC: 1.06 MG/DL (ref 0.6–1.3)
CRP SERPL-MCNC: 1.22 MG/DL (ref 0–1)
DEPRECATED RDW RBC AUTO: 57.5 FL (ref 37–54)
EOSINOPHIL # BLD AUTO: 0.06 10*3/MM3 (ref 0–0.3)
EOSINOPHIL NFR BLD AUTO: 0.9 % (ref 0–3)
ERYTHROCYTE [DISTWIDTH] IN BLOOD BY AUTOMATED COUNT: 15.4 % (ref 11.3–14.5)
GFR SERPL CREATININE-BSD FRML MDRD: 49 ML/MIN/1.73
GLOBULIN UR ELPH-MCNC: 2.9 GM/DL
GLUCOSE BLD-MCNC: 88 MG/DL (ref 70–100)
HCT VFR BLD AUTO: 35.6 % (ref 34.5–44)
HGB BLD-MCNC: 11.4 G/DL (ref 11.5–15.5)
IMM GRANULOCYTES # BLD: 0 10*3/MM3 (ref 0–0.03)
IMM GRANULOCYTES NFR BLD: 0 % (ref 0–0.6)
LYMPHOCYTES # BLD AUTO: 1.44 10*3/MM3 (ref 0.6–4.8)
LYMPHOCYTES NFR BLD AUTO: 22.1 % (ref 24–44)
MCH RBC QN AUTO: 33.1 PG (ref 27–31)
MCHC RBC AUTO-ENTMCNC: 32 G/DL (ref 32–36)
MCV RBC AUTO: 103.5 FL (ref 80–99)
MONOCYTES # BLD AUTO: 0.21 10*3/MM3 (ref 0–1)
MONOCYTES NFR BLD AUTO: 3.2 % (ref 0–12)
NEUTROPHILS # BLD AUTO: 4.79 10*3/MM3 (ref 1.5–8.3)
NEUTROPHILS NFR BLD AUTO: 73.5 % (ref 41–71)
PLATELET # BLD AUTO: 269 10*3/MM3 (ref 150–450)
PMV BLD AUTO: 10.6 FL (ref 6–12)
POTASSIUM BLD-SCNC: 3.4 MMOL/L (ref 3.5–5.5)
PROT SERPL-MCNC: 7 G/DL (ref 5.7–8.2)
RBC # BLD AUTO: 3.44 10*6/MM3 (ref 3.89–5.14)
SODIUM BLD-SCNC: 142 MMOL/L (ref 132–146)
WBC NRBC COR # BLD: 6.52 10*3/MM3 (ref 3.5–10.8)

## 2018-07-19 PROCEDURE — 80053 COMPREHEN METABOLIC PANEL: CPT

## 2018-07-19 PROCEDURE — 85025 COMPLETE CBC W/AUTO DIFF WBC: CPT

## 2018-07-19 PROCEDURE — 86140 C-REACTIVE PROTEIN: CPT | Performed by: INTERNAL MEDICINE

## 2018-07-19 PROCEDURE — 36415 COLL VENOUS BLD VENIPUNCTURE: CPT | Performed by: INTERNAL MEDICINE

## 2018-09-13 ENCOUNTER — TRANSCRIBE ORDERS (OUTPATIENT)
Dept: LAB | Facility: HOSPITAL | Age: 83
End: 2018-09-13

## 2018-09-13 ENCOUNTER — LAB (OUTPATIENT)
Dept: LAB | Facility: HOSPITAL | Age: 83
End: 2018-09-13

## 2018-09-13 DIAGNOSIS — Z79.899 NEED FOR PROPHYLACTIC CHEMOTHERAPY: ICD-10-CM

## 2018-09-13 DIAGNOSIS — M05.89 OTHER RHEUMATOID ARTHRITIS WITH RHEUMATOID FACTOR OF MULTIPLE SITES (CODE): Primary | ICD-10-CM

## 2018-09-13 DIAGNOSIS — M05.89 OTHER RHEUMATOID ARTHRITIS WITH RHEUMATOID FACTOR OF MULTIPLE SITES (CODE): ICD-10-CM

## 2018-09-13 LAB
ALBUMIN SERPL-MCNC: 4.2 G/DL (ref 3.2–4.8)
ALBUMIN/GLOB SERPL: 1.7 G/DL (ref 1.5–2.5)
ALP SERPL-CCNC: 58 U/L (ref 25–100)
ALT SERPL W P-5'-P-CCNC: 20 U/L (ref 7–40)
ANION GAP SERPL CALCULATED.3IONS-SCNC: 6 MMOL/L (ref 3–11)
AST SERPL-CCNC: 27 U/L (ref 0–33)
BASOPHILS # BLD AUTO: 0.04 10*3/MM3 (ref 0–0.2)
BASOPHILS NFR BLD AUTO: 0.6 % (ref 0–1)
BILIRUB SERPL-MCNC: 0.5 MG/DL (ref 0.3–1.2)
BUN BLD-MCNC: 31 MG/DL (ref 9–23)
BUN/CREAT SERPL: 28.7 (ref 7–25)
CALCIUM SPEC-SCNC: 9.5 MG/DL (ref 8.7–10.4)
CHLORIDE SERPL-SCNC: 101 MMOL/L (ref 99–109)
CO2 SERPL-SCNC: 32 MMOL/L (ref 20–31)
CREAT BLD-MCNC: 1.08 MG/DL (ref 0.6–1.3)
CRP SERPL-MCNC: 0.06 MG/DL (ref 0–1)
DEPRECATED RDW RBC AUTO: 54.2 FL (ref 37–54)
EOSINOPHIL # BLD AUTO: 0.12 10*3/MM3 (ref 0–0.3)
EOSINOPHIL NFR BLD AUTO: 1.9 % (ref 0–3)
ERYTHROCYTE [DISTWIDTH] IN BLOOD BY AUTOMATED COUNT: 14.4 % (ref 11.3–14.5)
ERYTHROCYTE [SEDIMENTATION RATE] IN BLOOD: 22 MM/HR (ref 0–30)
GFR SERPL CREATININE-BSD FRML MDRD: 48 ML/MIN/1.73
GLOBULIN UR ELPH-MCNC: 2.5 GM/DL
GLUCOSE BLD-MCNC: 104 MG/DL (ref 70–100)
HCT VFR BLD AUTO: 36.8 % (ref 34.5–44)
HGB BLD-MCNC: 11.5 G/DL (ref 11.5–15.5)
IMM GRANULOCYTES # BLD: 0.01 10*3/MM3 (ref 0–0.03)
IMM GRANULOCYTES NFR BLD: 0.2 % (ref 0–0.6)
LYMPHOCYTES # BLD AUTO: 1.58 10*3/MM3 (ref 0.6–4.8)
LYMPHOCYTES NFR BLD AUTO: 25.4 % (ref 24–44)
MCH RBC QN AUTO: 33 PG (ref 27–31)
MCHC RBC AUTO-ENTMCNC: 31.3 G/DL (ref 32–36)
MCV RBC AUTO: 105.4 FL (ref 80–99)
MONOCYTES # BLD AUTO: 0.61 10*3/MM3 (ref 0–1)
MONOCYTES NFR BLD AUTO: 9.8 % (ref 0–12)
NEUTROPHILS # BLD AUTO: 3.86 10*3/MM3 (ref 1.5–8.3)
NEUTROPHILS NFR BLD AUTO: 62.1 % (ref 41–71)
PLATELET # BLD AUTO: 277 10*3/MM3 (ref 150–450)
PMV BLD AUTO: 10.3 FL (ref 6–12)
POTASSIUM BLD-SCNC: 3.5 MMOL/L (ref 3.5–5.5)
PROT SERPL-MCNC: 6.7 G/DL (ref 5.7–8.2)
RBC # BLD AUTO: 3.49 10*6/MM3 (ref 3.89–5.14)
SODIUM BLD-SCNC: 139 MMOL/L (ref 132–146)
WBC NRBC COR # BLD: 6.22 10*3/MM3 (ref 3.5–10.8)

## 2018-09-13 PROCEDURE — 36415 COLL VENOUS BLD VENIPUNCTURE: CPT | Performed by: INTERNAL MEDICINE

## 2018-09-13 PROCEDURE — 80053 COMPREHEN METABOLIC PANEL: CPT

## 2018-09-13 PROCEDURE — 85025 COMPLETE CBC W/AUTO DIFF WBC: CPT

## 2018-09-13 PROCEDURE — 85652 RBC SED RATE AUTOMATED: CPT

## 2018-09-13 PROCEDURE — 86140 C-REACTIVE PROTEIN: CPT | Performed by: INTERNAL MEDICINE

## 2018-10-11 ENCOUNTER — TELEPHONE (OUTPATIENT)
Dept: INTERNAL MEDICINE | Facility: CLINIC | Age: 83
End: 2018-10-11

## 2018-10-11 ENCOUNTER — LAB (OUTPATIENT)
Dept: INTERNAL MEDICINE | Facility: CLINIC | Age: 83
End: 2018-10-11

## 2018-10-11 DIAGNOSIS — R35.0 URINARY FREQUENCY: Primary | ICD-10-CM

## 2018-10-11 DIAGNOSIS — N39.0 URINARY TRACT INFECTION WITHOUT HEMATURIA, SITE UNSPECIFIED: ICD-10-CM

## 2018-10-11 DIAGNOSIS — R35.0 URINARY FREQUENCY: ICD-10-CM

## 2018-10-11 LAB
BILIRUB BLD-MCNC: NEGATIVE MG/DL
CLARITY, POC: CLEAR
COLOR UR: YELLOW
EXPIRATION DATE: ABNORMAL
GLUCOSE UR STRIP-MCNC: NEGATIVE MG/DL
KETONES UR QL: NEGATIVE
LEUKOCYTE EST, POC: ABNORMAL
Lab: ABNORMAL
NITRITE UR-MCNC: NEGATIVE MG/ML
PH UR: 5 [PH] (ref 5–8)
PROT UR STRIP-MCNC: NEGATIVE MG/DL
RBC # UR STRIP: ABNORMAL /UL
SP GR UR: 1 (ref 1–1.03)
UROBILINOGEN UR QL: NORMAL

## 2018-10-11 PROCEDURE — 87077 CULTURE AEROBIC IDENTIFY: CPT | Performed by: INTERNAL MEDICINE

## 2018-10-11 PROCEDURE — 87186 SC STD MICRODIL/AGAR DIL: CPT | Performed by: INTERNAL MEDICINE

## 2018-10-11 PROCEDURE — 81003 URINALYSIS AUTO W/O SCOPE: CPT | Performed by: INTERNAL MEDICINE

## 2018-10-11 PROCEDURE — 87086 URINE CULTURE/COLONY COUNT: CPT | Performed by: INTERNAL MEDICINE

## 2018-10-11 RX ORDER — SULFAMETHOXAZOLE AND TRIMETHOPRIM 800; 160 MG/1; MG/1
1 TABLET ORAL 2 TIMES DAILY
Qty: 20 TABLET | Refills: 0 | Status: SHIPPED | OUTPATIENT
Start: 2018-10-11 | End: 2018-10-15 | Stop reason: ALTCHOICE

## 2018-10-11 NOTE — TELEPHONE ENCOUNTER
Call please.  Any other symptoms?  If significant symptoms, she needs to be seen.    I have entered an order for a u/a and culture, but if u/a looks bad she will need to be seen.    Please make sure the sample will be in an appropriate specimen cup.

## 2018-10-11 NOTE — TELEPHONE ENCOUNTER
Spoke with daughter in lab.  Symptoms are urinary frequency and urgency only x 1 day.  Urinalysis positive for 500 leuks and 50 blood.  Culture sent.  Bactrim e-rx'd.

## 2018-10-11 NOTE — TELEPHONE ENCOUNTER
Trying to get urine specimen pt unable to do more than dribble can you just call in medicine for uti?

## 2018-10-11 NOTE — TELEPHONE ENCOUNTER
----- Message from Kate Head sent at 10/11/2018  8:41 AM EDT -----  DAUGHTER-VIJI GALLOWAYPKVGIWW-[phone]    PT IS PEEING ALL THE TIME AND DAUGHTER IS THERE GETTING A SAMPLE TO BRING IN.  SHE ASKED THAT WE BE PREPARED WITH AN ORDER AND BE READY TO WRITE RX AFTER TEST IS RUN    JEANINE COX

## 2018-10-13 LAB — BACTERIA SPEC AEROBE CULT: ABNORMAL

## 2018-10-15 ENCOUNTER — TELEPHONE (OUTPATIENT)
Dept: INTERNAL MEDICINE | Facility: CLINIC | Age: 83
End: 2018-10-15

## 2018-10-15 DIAGNOSIS — N39.0 URINARY TRACT INFECTION WITHOUT HEMATURIA, SITE UNSPECIFIED: Primary | ICD-10-CM

## 2018-10-15 RX ORDER — NITROFURANTOIN 25; 75 MG/1; MG/1
100 CAPSULE ORAL 2 TIMES DAILY
Qty: 14 CAPSULE | Refills: 0 | Status: SHIPPED | OUTPATIENT
Start: 2018-10-15 | End: 2018-10-22

## 2018-10-15 NOTE — TELEPHONE ENCOUNTER
----- Message from Julissa Gamble sent at 10/15/2018  8:33 AM EDT -----  Pharmacy called and states patient complained that the Bactrim is making her nauseous and requested the pharmacy call us to have it switched to something else.  Call [email or web address] [email or web address] phone [phone].

## 2018-10-15 NOTE — TELEPHONE ENCOUNTER
----- Message from Milly Washington sent at 10/15/2018  8:44 AM EDT -----  PATIENTS DAUGHTER, VIJI CALLED ASKING FOR A RETURN CALL. VIJI STATES THAT THE MEDICINE PRESCRIBED TO PATIENT FOR UTI IS CAUSING HER TO HAVE AN UPSET STOMACH.     VIJI CAN BE REACHED AT: [phone]    THANK YOU.

## 2018-10-15 NOTE — TELEPHONE ENCOUNTER
Danette states the Bactrim caused stomach cramps   .   Allergy list updated.    Danette states she picked up the new Rx Dr Bolaoñs sent in to replace the Bactrim

## 2018-10-17 ENCOUNTER — OFFICE VISIT (OUTPATIENT)
Dept: INTERNAL MEDICINE | Facility: CLINIC | Age: 83
End: 2018-10-17

## 2018-10-17 VITALS
BODY MASS INDEX: 24.65 KG/M2 | SYSTOLIC BLOOD PRESSURE: 144 MMHG | TEMPERATURE: 97.1 F | DIASTOLIC BLOOD PRESSURE: 80 MMHG | RESPIRATION RATE: 18 BRPM | HEART RATE: 88 BPM | WEIGHT: 148.13 LBS

## 2018-10-17 DIAGNOSIS — Z23 NEED FOR INFLUENZA VACCINATION: Primary | ICD-10-CM

## 2018-10-17 DIAGNOSIS — E78.49 OTHER HYPERLIPIDEMIA: ICD-10-CM

## 2018-10-17 DIAGNOSIS — I10 ESSENTIAL HYPERTENSION: ICD-10-CM

## 2018-10-17 LAB
ARTICHOKE IGE QN: 126 MG/DL (ref 0–130)
CHOLEST SERPL-MCNC: 181 MG/DL (ref 0–200)
HDLC SERPL-MCNC: 52 MG/DL (ref 40–60)
TRIGL SERPL-MCNC: 79 MG/DL (ref 0–150)
TSH SERPL DL<=0.05 MIU/L-ACNC: 2.19 MIU/ML (ref 0.35–5.35)

## 2018-10-17 PROCEDURE — G0008 ADMIN INFLUENZA VIRUS VAC: HCPCS | Performed by: INTERNAL MEDICINE

## 2018-10-17 PROCEDURE — 36415 COLL VENOUS BLD VENIPUNCTURE: CPT | Performed by: INTERNAL MEDICINE

## 2018-10-17 PROCEDURE — 84443 ASSAY THYROID STIM HORMONE: CPT | Performed by: INTERNAL MEDICINE

## 2018-10-17 PROCEDURE — 80061 LIPID PANEL: CPT | Performed by: INTERNAL MEDICINE

## 2018-10-17 PROCEDURE — 90662 IIV NO PRSV INCREASED AG IM: CPT | Performed by: INTERNAL MEDICINE

## 2018-10-17 PROCEDURE — 99214 OFFICE O/P EST MOD 30 MIN: CPT | Performed by: INTERNAL MEDICINE

## 2018-10-17 NOTE — PROGRESS NOTES
Subjective       Pati Carter is a 88 y.o. female.     Chief Complaint   Patient presents with   • Hypertension     follow up  fasting        History obtained from the patient.      History of Present Illness     HPI: Here for a 6 month follow up.       The patient has been seeing Dr. Kaplan for RA, stable on MTX and Folic Acid.  She is set up to see Dr. Mathis in January 2019, since Dr. Kaplan is retiring.      She has seen Pulmonary for COPD, stable on Breo-Ellipta, but no recent appointment.     The patient's  daughter dropped a urine sample off on 10/11/18.  Culture was consistent with a UTI.  She was initially put on Bactrim DS, but developed abdominal pain and nausea.  She was switched to Macrobid, and states her symptoms are improving.     Primary [facility]: The patient is here today for a follow-up visit.        Her Hypertension is stable.   Medication(s): Triamterene HCTZ.   Her Hyperlipidemia has been stable. Her LDL goal is 130 mg/dL and last LDL was 117 mg/dL.   Medication(s): Simvastatin.   The patient is adherent with her medication regimen. She denies medication side effects.       Interval Events:  The patient had a normal CMP, CBC, ESR, and CRP on 9/13/18.   She does not check her blood pressure at home.      Symptoms: Has bilateral leg pain (PAD), which is stable, and stable lower extremity edema.  Denies chest pain, COLIN, resting dyspnea, palpitations, syncope,   orthopnea, and PND.   Associated symptoms:  7 pound unintentional weight loss (has had recent nausea, but also states her appetitie is decreased).  Has stable vision issues due to macular degeneration and mild memory / concentration issues.  No headache, polydipsia, polyuria,  fatigue,  muscle pain, muscle weakness, or no focal neurologic deficits.      Lifestyle and Disease Management: Diet: She consumes a diverse and healthy diet. Weight Issues: She does not have any weight concerns. Exercise: She does not  "exercise regularly. She \"walks some\".  Smoking: She does not use tobacco.       Colonic Polyp Follow-up: The patient is being seen for a routine clinic follow-up of Colon Polyp(s), which is stable.   Interval Events:  Current diagnosis was determined by Colonoscopy and last 1999 normal per patient.   Symptoms: no hematochezia, no melena, no diarrhea, no constipation, no decreased stool caliber, no change in bowel habits and no abdominal pain. Associated symptoms: no rectal prolapse.   Medication:  None.      Gastroesophageal Reflux Disease Follow-up: The patient is being seen for a routine clinic follow-up of Gastroesophageal Reflux Disease, which is stable.   Symptoms: no heartburn, no abdominal pain, no acid regurgitation, no nausea, no vomiting, no sore throat, no dysphagia, no odynophagia, no hematemesis and no melena.   Associated symptoms: no anorexia, no early satiety, no bloating, no belching, no weight loss, no hoarseness, no cough, and no wheezing.   Medication:  None.           Current Outpatient Prescriptions on File Prior to Visit   Medication Sig Dispense Refill   • albuterol (PROVENTIL HFA;VENTOLIN HFA) 108 (90 BASE) MCG/ACT inhaler Inhale 2 puffs Every 4 (Four) Hours As Needed for Wheezing or Shortness of Air. 1 inhaler 11   • docusate sodium (COLACE) 100 MG capsule Take  by mouth.     • Fluticasone Furoate-Vilanterol (BREO ELLIPTA) 200-25 MCG/INH aerosol powder  Inhale.     • folic acid (FOLVITE) 1 MG tablet Take 1 tablet by mouth Daily.     • meloxicam (MOBIC) 15 MG tablet Take 0.5 tablets by mouth Daily As Needed for Moderate Pain .     • methotrexate 2.5 MG tablet Take 15 mg by mouth 1 (One) Time Per Week.     • Multiple Vitamins-Minerals (CENTRUM SILVER ADULT 50+ PO) Take  by mouth Daily.     • nitrofurantoin, macrocrystal-monohydrate, (MACROBID) 100 MG capsule Take 1 capsule by mouth 2 (Two) Times a Day for 7 days. 14 capsule 0   • nystatin (MYCOSTATIN) 171496 UNIT/GM ointment Daily.     • " simvastatin (ZOCOR) 20 MG tablet TAKE 1 TABLET EVERY DAY AT BEDTIME 90 tablet 1   • triamterene-hydrochlorothiazide (DYAZIDE) 37.5-25 MG per capsule TAKE 1 CAPSULE EVERY DAY 90 capsule 3   • cyclobenzaprine (FLEXERIL) 5 MG tablet Take 1 tablet by mouth 3 (Three) Times a Day As Needed for Muscle Spasms. 30 tablet 0   • VOLTAREN 1 % gel gel Daily.     • [DISCONTINUED] Cholecalciferol (VITAMIN D3) 3000 UNITS tablet Take 1 tablet by mouth Daily.     • [DISCONTINUED] HYDROcodone-acetaminophen (NORCO) 5-325 MG per tablet Take 1 tablet by mouth At Night As Needed for Moderate Pain . (Patient taking differently: Take 1 tablet by mouth At Night As Needed for Moderate Pain . Take 1/2 tablet three times weekly) 3 tablet 0     No current facility-administered medications on file prior to visit.        Current outpatient and discharge medications have been reconciled for the patient.  Reviewed by: Latoya Bolaños MD        The following portions of the patient's history were reviewed and updated as appropriate: allergies, current medications, past family history, past medical history, past social history, past surgical history and problem list.    Review of Systems   Constitutional: Positive for appetite change. Negative for chills, fatigue, fever and unexpected weight change.   Eyes: Positive for visual disturbance.   Respiratory: Negative for cough, shortness of breath and wheezing.    Cardiovascular: Positive for leg swelling. Negative for chest pain and palpitations.        No COLIN, orthopnea, or claudication.   Gastrointestinal: Negative for abdominal pain, blood in stool, constipation, diarrhea, nausea and vomiting.        Denies melena.   Endocrine: Negative for polydipsia and polyuria.   Genitourinary: Negative for dysuria, hematuria, pelvic pain, urgency, vaginal bleeding and vaginal discharge.        Still getting up every couple of hours over night to urinate, but no daytime frequency.   Musculoskeletal: Positive for  arthralgias and back pain. Negative for myalgias.   Neurological: Negative for dizziness, syncope, light-headedness and headaches.        Stable mild memory issues.   Psychiatric/Behavioral: Positive for decreased concentration.         Objective       Blood pressure 144/80, pulse 88, temperature 97.1 °F (36.2 °C), temperature source Temporal Artery , resp. rate 18, weight 67.2 kg (148 lb 2 oz).      Physical Exam   Constitutional: She appears well-developed and well-nourished.   Neck: Normal range of motion. Neck supple. Carotid bruit is not present. No thyromegaly present.   Cardiovascular: Normal rate, regular rhythm, normal heart sounds and intact distal pulses.  Exam reveals no gallop and no friction rub.    No murmur heard.  No peripheral edema.   Pulmonary/Chest: Effort normal and breath sounds normal.   Abdominal: Soft. Bowel sounds are normal. She exhibits no distension, no abdominal bruit and no mass. There is no hepatosplenomegaly. There is no tenderness.   Psychiatric: She has a normal mood and affect.   Nursing note and vitals reviewed.      Assessment / Plan:  Pati was seen today for hypertension.    Diagnoses and all orders for this visit:    Need for influenza vaccination  -     Fluzone High Dose =>65Years    Essential hypertension    Other hyperlipidemia  -     Lipid Panel  -     TSH         Recommend smaller more frequent meals, and add Ensure supplement.    The patient declines Colonoscopy, Mammogram, Tdap, and Shingrix.    The patient declined scheduling a Medicare Wellness Exam today      Return in about 6 weeks (around 11/28/2018) for Recheck Hyperlipidemia, fasting.

## 2018-12-05 ENCOUNTER — TRANSCRIBE ORDERS (OUTPATIENT)
Dept: LAB | Facility: HOSPITAL | Age: 83
End: 2018-12-05

## 2018-12-05 ENCOUNTER — APPOINTMENT (OUTPATIENT)
Dept: LAB | Facility: HOSPITAL | Age: 83
End: 2018-12-05

## 2018-12-05 DIAGNOSIS — M05.89 OTHER RHEUMATOID ARTHRITIS WITH RHEUMATOID FACTOR OF MULTIPLE SITES (HCC): Primary | ICD-10-CM

## 2018-12-05 DIAGNOSIS — Z79.899 POLYPHARMACY: ICD-10-CM

## 2018-12-05 LAB
ALBUMIN SERPL-MCNC: 4.12 G/DL (ref 3.2–4.8)
ALBUMIN/GLOB SERPL: 1.6 G/DL (ref 1.5–2.5)
ALP SERPL-CCNC: 62 U/L (ref 25–100)
ALT SERPL W P-5'-P-CCNC: 15 U/L (ref 7–40)
ANION GAP SERPL CALCULATED.3IONS-SCNC: 9 MMOL/L (ref 3–11)
AST SERPL-CCNC: 26 U/L (ref 0–33)
BASOPHILS # BLD AUTO: 0.02 10*3/MM3 (ref 0–0.2)
BASOPHILS NFR BLD AUTO: 0.3 % (ref 0–1)
BILIRUB SERPL-MCNC: 0.4 MG/DL (ref 0.3–1.2)
BUN BLD-MCNC: 27 MG/DL (ref 9–23)
BUN/CREAT SERPL: 22 (ref 7–25)
CALCIUM SPEC-SCNC: 9.9 MG/DL (ref 8.7–10.4)
CHLORIDE SERPL-SCNC: 101 MMOL/L (ref 99–109)
CO2 SERPL-SCNC: 30 MMOL/L (ref 20–31)
CREAT BLD-MCNC: 1.23 MG/DL (ref 0.6–1.3)
CRP SERPL-MCNC: 0.91 MG/DL (ref 0–1)
DEPRECATED RDW RBC AUTO: 53.3 FL (ref 37–54)
EOSINOPHIL # BLD AUTO: 0.05 10*3/MM3 (ref 0–0.3)
EOSINOPHIL NFR BLD AUTO: 0.7 % (ref 0–3)
ERYTHROCYTE [DISTWIDTH] IN BLOOD BY AUTOMATED COUNT: 14.8 % (ref 11.3–14.5)
ERYTHROCYTE [SEDIMENTATION RATE] IN BLOOD: 45 MM/HR (ref 0–30)
GFR SERPL CREATININE-BSD FRML MDRD: 41 ML/MIN/1.73
GLOBULIN UR ELPH-MCNC: 2.6 GM/DL
GLUCOSE BLD-MCNC: 131 MG/DL (ref 70–100)
HCT VFR BLD AUTO: 35 % (ref 34.5–44)
HGB BLD-MCNC: 11.3 G/DL (ref 11.5–15.5)
IMM GRANULOCYTES # BLD: 0.02 10*3/MM3 (ref 0–0.03)
IMM GRANULOCYTES NFR BLD: 0.3 % (ref 0–0.6)
LYMPHOCYTES # BLD AUTO: 1.27 10*3/MM3 (ref 0.6–4.8)
LYMPHOCYTES NFR BLD AUTO: 19 % (ref 24–44)
MCH RBC QN AUTO: 32.7 PG (ref 27–31)
MCHC RBC AUTO-ENTMCNC: 32.3 G/DL (ref 32–36)
MCV RBC AUTO: 101.2 FL (ref 80–99)
MONOCYTES # BLD AUTO: 0.48 10*3/MM3 (ref 0–1)
MONOCYTES NFR BLD AUTO: 7.2 % (ref 0–12)
NEUTROPHILS # BLD AUTO: 4.86 10*3/MM3 (ref 1.5–8.3)
NEUTROPHILS NFR BLD AUTO: 72.5 % (ref 41–71)
PLATELET # BLD AUTO: 303 10*3/MM3 (ref 150–450)
PMV BLD AUTO: 9.7 FL (ref 6–12)
POTASSIUM BLD-SCNC: 3.6 MMOL/L (ref 3.5–5.5)
PROT SERPL-MCNC: 6.7 G/DL (ref 5.7–8.2)
RBC # BLD AUTO: 3.46 10*6/MM3 (ref 3.89–5.14)
SODIUM BLD-SCNC: 140 MMOL/L (ref 132–146)
WBC NRBC COR # BLD: 6.7 10*3/MM3 (ref 3.5–10.8)

## 2018-12-05 PROCEDURE — 86140 C-REACTIVE PROTEIN: CPT | Performed by: INTERNAL MEDICINE

## 2018-12-05 PROCEDURE — 80053 COMPREHEN METABOLIC PANEL: CPT | Performed by: INTERNAL MEDICINE

## 2018-12-05 PROCEDURE — 85025 COMPLETE CBC W/AUTO DIFF WBC: CPT | Performed by: INTERNAL MEDICINE

## 2018-12-05 PROCEDURE — 36415 COLL VENOUS BLD VENIPUNCTURE: CPT | Performed by: INTERNAL MEDICINE

## 2018-12-12 ENCOUNTER — OFFICE VISIT (OUTPATIENT)
Dept: INTERNAL MEDICINE | Facility: CLINIC | Age: 83
End: 2018-12-12

## 2018-12-12 VITALS
HEART RATE: 72 BPM | BODY MASS INDEX: 24.63 KG/M2 | SYSTOLIC BLOOD PRESSURE: 126 MMHG | RESPIRATION RATE: 20 BRPM | DIASTOLIC BLOOD PRESSURE: 84 MMHG | TEMPERATURE: 97.7 F | WEIGHT: 148 LBS

## 2018-12-12 DIAGNOSIS — B02.9 HERPES ZOSTER WITHOUT COMPLICATION: ICD-10-CM

## 2018-12-12 DIAGNOSIS — R35.0 URINARY FREQUENCY: Primary | ICD-10-CM

## 2018-12-12 DIAGNOSIS — R82.998 LEUKOCYTES IN URINE: ICD-10-CM

## 2018-12-12 LAB
BILIRUB BLD-MCNC: NEGATIVE MG/DL
CLARITY, POC: ABNORMAL
COLOR UR: YELLOW
EXPIRATION DATE: ABNORMAL
GLUCOSE UR STRIP-MCNC: NEGATIVE MG/DL
KETONES UR QL: NEGATIVE
LEUKOCYTE EST, POC: ABNORMAL
Lab: ABNORMAL
NITRITE UR-MCNC: NEGATIVE MG/ML
PH UR: 6 [PH] (ref 5–8)
PROT UR STRIP-MCNC: NEGATIVE MG/DL
RBC # UR STRIP: NEGATIVE /UL
SP GR UR: 1 (ref 1–1.03)
UROBILINOGEN UR QL: NORMAL

## 2018-12-12 PROCEDURE — 99214 OFFICE O/P EST MOD 30 MIN: CPT | Performed by: INTERNAL MEDICINE

## 2018-12-12 PROCEDURE — 81003 URINALYSIS AUTO W/O SCOPE: CPT | Performed by: INTERNAL MEDICINE

## 2018-12-12 PROCEDURE — 87086 URINE CULTURE/COLONY COUNT: CPT | Performed by: INTERNAL MEDICINE

## 2018-12-12 RX ORDER — VALACYCLOVIR HYDROCHLORIDE 1 G/1
1000 TABLET, FILM COATED ORAL 3 TIMES DAILY
Qty: 21 TABLET | Refills: 0 | Status: SHIPPED | OUTPATIENT
Start: 2018-12-12 | End: 2018-12-19

## 2018-12-12 NOTE — PROGRESS NOTES
Subjective       Pati Carter is a 88 y.o. female.     Chief Complaint   Patient presents with   • Urinary Frequency     worse at night    • Flank Pain     Lt side    • Rash     Lt flank area        History obtained from the patient.      Urinary Frequency    This is a new problem. Episode onset: 1 week ago. Episode frequency: every 2 hours at night, but no daytime frequency. The problem has been unchanged. There has been no fever. She is not sexually active. There is no history of pyelonephritis. Associated symptoms include chills, flank pain (left, mild to severe) and frequency. Pertinent negatives include no discharge, hematuria, nausea, urgency or vomiting. She has tried NSAIDs for the symptoms. The treatment provided moderate relief. Her past medical history is significant for recurrent UTIs. There is no history of kidney stones.   Rash   This is a new problem. Episode onset: 2-3 days ago. The problem has been gradually worsening since onset. Location: left flank and LLQ abdomen. The rash is characterized by blistering, redness and itchiness. She was exposed to nothing. Pertinent negatives include no congestion, cough, diarrhea, fatigue, fever, rhinorrhea, shortness of breath, sore throat or vomiting. Past treatments include nothing. There is no history of allergies or eczema.        The following portions of the patient's history were reviewed and updated as appropriate: allergies, current medications, past family history, past medical history, past social history, past surgical history and problem list.      Review of Systems   Constitutional: Positive for chills. Negative for fatigue and fever.   HENT: Negative for congestion, rhinorrhea and sore throat.    Respiratory: Negative for cough, shortness of breath and wheezing.    Cardiovascular: Negative for chest pain.   Gastrointestinal: Negative for abdominal pain, diarrhea, nausea and vomiting.   Genitourinary: Positive for flank pain (left, mild to  severe) and frequency. Negative for dysuria, hematuria, urgency and vaginal discharge.   Skin: Positive for rash.   Neurological: Positive for headaches.   Hematological: Negative for adenopathy.           Objective     Blood pressure 126/84, pulse 72, temperature 97.7 °F (36.5 °C), temperature source Temporal, resp. rate 20, weight 67.1 kg (148 lb).    Physical Exam   Constitutional: She appears well-developed and well-nourished.   Cardiovascular: Normal rate, regular rhythm and normal heart sounds.   No murmur heard.  Pulmonary/Chest: Effort normal and breath sounds normal.   Abdominal: Soft. Bowel sounds are normal. She exhibits no distension and no mass. There is no hepatosplenomegaly. There is no tenderness. There is no CVA tenderness.   Skin: Rash (erythematous, papular/vesicular rash over left lower quadrant abdomen and left flank) noted.   Psychiatric: She has a normal mood and affect.   Nursing note and vitals reviewed.      Results for orders placed or performed in visit on 12/12/18   POC Urinalysis Dipstick, Automated   Result Value Ref Range    Color Yellow Yellow, Straw, Dark Yellow, Selena    Clarity, UA Hazy (A) Clear    Specific Gravity  1.005 1.005 - 1.030    pH, Urine 6.0 5.0 - 8.0    Leukocytes 500 Nikolas/ul (A) Negative    Nitrite, UA Negative Negative    Protein, POC Negative Negative mg/dL    Glucose, UA Negative Negative, 1000 mg/dL (3+) mg/dL    Ketones, UA Negative Negative    Urobilinogen, UA Normal Normal    Bilirubin Negative Negative    Blood, UA Negative Negative    Lot Number [id number]     Expiration Date 9-30-19        Assessment/Plan   Pati was seen today for urinary frequency, flank pain and rash.    Diagnoses and all orders for this visit:    Urinary frequency  -     POC Urinalysis Dipstick, Automated    Leukocytes in urine  -     Urine Culture - Urine, Urine, Clean Catch    Herpes zoster without complication  -     valACYclovir (VALTREX) 1000 MG tablet; Take 1 tablet by mouth 3  (Three) Times a Day for 7 days.          Patient has been erroneously marked as having Ischemic Vascular Disease (IVD). Based on the available clinical information, she does not have IVD and should be excluded from any quality measure requirements related to that condition for the remainder of the reporting period.        Return if symptoms worsen or fail to improve.

## 2018-12-14 LAB — BACTERIA SPEC AEROBE CULT: NORMAL

## 2019-02-08 ENCOUNTER — TELEPHONE (OUTPATIENT)
Dept: INTERNAL MEDICINE | Facility: CLINIC | Age: 84
End: 2019-02-08

## 2019-02-08 ENCOUNTER — OFFICE VISIT (OUTPATIENT)
Dept: INTERNAL MEDICINE | Facility: CLINIC | Age: 84
End: 2019-02-08

## 2019-02-08 VITALS
RESPIRATION RATE: 20 BRPM | BODY MASS INDEX: 24.79 KG/M2 | SYSTOLIC BLOOD PRESSURE: 140 MMHG | TEMPERATURE: 98.1 F | HEART RATE: 96 BPM | WEIGHT: 149 LBS | DIASTOLIC BLOOD PRESSURE: 90 MMHG

## 2019-02-08 DIAGNOSIS — R82.998 LEUKOCYTES IN URINE: ICD-10-CM

## 2019-02-08 DIAGNOSIS — K21.9 GASTROESOPHAGEAL REFLUX DISEASE, ESOPHAGITIS PRESENCE NOT SPECIFIED: ICD-10-CM

## 2019-02-08 DIAGNOSIS — R10.12 LEFT UPPER QUADRANT PAIN: Primary | ICD-10-CM

## 2019-02-08 DIAGNOSIS — R07.81 RIB PAIN ON LEFT SIDE: ICD-10-CM

## 2019-02-08 PROCEDURE — 99214 OFFICE O/P EST MOD 30 MIN: CPT | Performed by: INTERNAL MEDICINE

## 2019-02-08 PROCEDURE — 81003 URINALYSIS AUTO W/O SCOPE: CPT | Performed by: INTERNAL MEDICINE

## 2019-02-08 RX ORDER — FAMOTIDINE 20 MG/1
20 TABLET, FILM COATED ORAL 2 TIMES DAILY
Qty: 60 TABLET | Refills: 2 | Status: SHIPPED | OUTPATIENT
Start: 2019-02-08 | End: 2020-04-02

## 2019-02-08 NOTE — PROGRESS NOTES
Subjective       Pati Carter is a 88 y.o. female.     Chief Complaint   Patient presents with   • Abdominal Pain     Lt side        History obtained from the patient.      Abdominal Pain   This is a chronic problem. The current episode started more than 1 year ago. The problem occurs intermittently. Progression since onset: worse this past week. The pain is located in the LUQ. The pain is moderate. The quality of the pain is aching and dull. The abdominal pain does not radiate. Associated symptoms include arthralgias (chronic), belching and headaches. Pertinent negatives include no constipation, diarrhea, dysuria, fever, frequency, hematochezia, hematuria, melena, myalgias, nausea, vomiting or weight loss. The pain is aggravated by palpation, eating, belching and deep breathing. The pain is relieved by nothing. She has tried nothing (However, she is on Meloxicam daily for her arthritis.) for the symptoms. Her past medical history is significant for abdominal surgery (MIGUELITO/BSO, Appendectomy, and Cholecystectomy) and GERD (no meds). There is no history of pancreatitis or PUD. Had Shingles in same area 12/12/18, treated with Valtrex        The following portions of the patient's history were reviewed and updated as appropriate: allergies, current medications, past family history, past medical history, past social history, past surgical history and problem list.      Review of Systems   Constitutional: Positive for chills. Negative for appetite change, fever, unexpected weight change and weight loss.   HENT: Negative for sore throat, trouble swallowing and voice change.    Respiratory: Positive for cough (occasional). Negative for shortness of breath and wheezing.    Cardiovascular: Negative for chest pain.   Gastrointestinal: Positive for abdominal distention (bloating) and abdominal pain. Negative for blood in stool, constipation, diarrhea, hematochezia, melena, nausea and vomiting.        Denies heartburn.      Genitourinary: Negative for dysuria, frequency, hematuria, pelvic pain, urgency, vaginal bleeding and vaginal discharge.   Musculoskeletal: Positive for arthralgias (chronic). Negative for joint swelling and myalgias.   Skin: Positive for rash (on arms).   Neurological: Positive for headaches.   Hematological: Negative for adenopathy.           Objective     Blood pressure 140/90, pulse 96, temperature 98.1 °F (36.7 °C), temperature source Temporal, resp. rate 20, weight 67.6 kg (149 lb).    Physical Exam   Constitutional: She appears well-developed and well-nourished.   Cardiovascular: Normal rate, regular rhythm and normal heart sounds.   No murmur heard.  Pulmonary/Chest: Effort normal and breath sounds normal. She exhibits tenderness (left lower anterior rib area). She exhibits no mass and no edema (no warmth, erythema, or bruising).   Abdominal: Soft. Bowel sounds are normal. She exhibits no distension and no mass. There is no hepatosplenomegaly. There is no tenderness. There is no CVA tenderness.   Lymphadenopathy:        Right: No inguinal adenopathy present.        Left: No inguinal adenopathy present.   Neurological: She is alert.   Skin: No rash noted.   Psychiatric: She has a normal mood and affect.   Nursing note and vitals reviewed.      Results for orders placed or performed in visit on 02/08/19   POC Urinalysis Dipstick, Automated   Result Value Ref Range    Color Yellow Yellow, Straw, Dark Yellow, Selena    Clarity, UA Hazy (A) Clear    Specific Gravity  1.010 1.005 - 1.030    pH, Urine 7.0 5.0 - 8.0    Leukocytes 500 Nikolas/ul (A) Negative    Nitrite, UA Negative Negative    Protein, POC Negative Negative mg/dL    Glucose, UA Negative Negative, 1000 mg/dL (3+) mg/dL    Ketones, UA Negative Negative    Urobilinogen, UA Normal Normal    Bilirubin Negative Negative    Blood, UA Negative Negative    Lot Number [id number]     Expiration Date 9-30-19        Assessment/Plan   Pati was seen today for  abdominal pain.    Diagnoses and all orders for this visit:    Left upper quadrant pain  -     POC Urinalysis Dipstick, Automated  -     Urine Culture - Urine, Urine, Clean Catch; Future    Leukocytes in urine  -     Cancel: Urine Culture - Urine, Urine, Clean Catch- was not sent  -     Urine Culture - Urine, Urine, Clean Catch; Future- patient will be called to come back in    Gastroesophageal reflux disease, esophagitis presence not specified  -     famotidine (PEPCID) 20 MG tablet; Take 1 tablet by mouth 2 (Two) Times a Day.    Rib pain on left side  -     XR Ribs Left With PA Chest; Future    The patient agrees to come back for her rib x-ray, next week if possible. Continue Meloxicam.        Return for Next scheduled follow up.

## 2019-02-08 NOTE — TELEPHONE ENCOUNTER
2-8-19  S/w patient informed her that we needed her to come back to the office to leave another urine specimen.  I ask if she would come when coming back to do her x-ray she stated yes that would be fine she will come back and leave a urine specimen then .

## 2019-02-12 ENCOUNTER — HOSPITAL ENCOUNTER (OUTPATIENT)
Dept: GENERAL RADIOLOGY | Facility: HOSPITAL | Age: 84
Discharge: HOME | End: 2019-02-12
Admitting: INTERNAL MEDICINE

## 2019-02-12 ENCOUNTER — LAB (OUTPATIENT)
Dept: INTERNAL MEDICINE | Facility: CLINIC | Age: 84
End: 2019-02-12

## 2019-02-12 DIAGNOSIS — R82.998 LEUKOCYTES IN URINE: ICD-10-CM

## 2019-02-12 DIAGNOSIS — R10.12 LEFT UPPER QUADRANT PAIN: ICD-10-CM

## 2019-02-12 DIAGNOSIS — R07.81 RIB PAIN ON LEFT SIDE: ICD-10-CM

## 2019-02-12 PROCEDURE — 87086 URINE CULTURE/COLONY COUNT: CPT | Performed by: INTERNAL MEDICINE

## 2019-02-12 PROCEDURE — 71101 X-RAY EXAM UNILAT RIBS/CHEST: CPT

## 2019-02-14 LAB — BACTERIA SPEC AEROBE CULT: NORMAL

## 2019-02-15 ENCOUNTER — TELEPHONE (OUTPATIENT)
Dept: INTERNAL MEDICINE | Facility: CLINIC | Age: 84
End: 2019-02-15

## 2019-02-15 NOTE — TELEPHONE ENCOUNTER
----- Message from Kathryn Reyes sent at 2/15/2019  2:41 PM EST -----  PATIENT STATES SHE WOULD LIKE HER LAB AND XRAY RESULTS. SHE CAN BE REACHED [phone].

## 2019-02-21 RX ORDER — TRIAMTERENE AND HYDROCHLOROTHIAZIDE 37.5; 25 MG/1; MG/1
CAPSULE ORAL
Qty: 90 CAPSULE | Refills: 3 | Status: SHIPPED | OUTPATIENT
Start: 2019-02-21 | End: 2019-12-12 | Stop reason: SDUPTHER

## 2019-03-20 ENCOUNTER — TRANSCRIBE ORDERS (OUTPATIENT)
Dept: LAB | Facility: HOSPITAL | Age: 84
End: 2019-03-20

## 2019-03-20 ENCOUNTER — LAB (OUTPATIENT)
Dept: LAB | Facility: HOSPITAL | Age: 84
End: 2019-03-20

## 2019-03-20 DIAGNOSIS — Z79.899 DRUG THERAPY: ICD-10-CM

## 2019-03-20 DIAGNOSIS — M05.89 OTHER RHEUMATOID ARTHRITIS WITH RHEUMATOID FACTOR OF MULTIPLE SITES (HCC): Primary | ICD-10-CM

## 2019-03-20 DIAGNOSIS — M05.89 OTHER RHEUMATOID ARTHRITIS WITH RHEUMATOID FACTOR OF MULTIPLE SITES (HCC): ICD-10-CM

## 2019-03-20 LAB
ALBUMIN SERPL-MCNC: 4.33 G/DL (ref 3.2–4.8)
ALBUMIN/GLOB SERPL: 1.7 G/DL (ref 1.5–2.5)
ALP SERPL-CCNC: 72 U/L (ref 25–100)
ALT SERPL W P-5'-P-CCNC: 20 U/L (ref 7–40)
ANION GAP SERPL CALCULATED.3IONS-SCNC: 11 MMOL/L (ref 3–11)
AST SERPL-CCNC: 26 U/L (ref 0–33)
BASOPHILS # BLD AUTO: 0.03 10*3/MM3 (ref 0–0.2)
BASOPHILS NFR BLD AUTO: 0.4 % (ref 0–1)
BILIRUB SERPL-MCNC: 0.3 MG/DL (ref 0.3–1.2)
BUN BLD-MCNC: 28 MG/DL (ref 9–23)
BUN/CREAT SERPL: 27.5 (ref 7–25)
CALCIUM SPEC-SCNC: 9.6 MG/DL (ref 8.7–10.4)
CHLORIDE SERPL-SCNC: 99 MMOL/L (ref 99–109)
CO2 SERPL-SCNC: 30 MMOL/L (ref 20–31)
CREAT BLD-MCNC: 1.02 MG/DL (ref 0.6–1.3)
CRP SERPL-MCNC: 0.19 MG/DL (ref 0–1)
DEPRECATED RDW RBC AUTO: 55.4 FL (ref 37–54)
EOSINOPHIL # BLD AUTO: 0.11 10*3/MM3 (ref 0–0.3)
EOSINOPHIL NFR BLD AUTO: 1.6 % (ref 0–3)
ERYTHROCYTE [DISTWIDTH] IN BLOOD BY AUTOMATED COUNT: 14.4 % (ref 11.3–14.5)
ERYTHROCYTE [SEDIMENTATION RATE] IN BLOOD: 27 MM/HR (ref 0–30)
GFR SERPL CREATININE-BSD FRML MDRD: 51 ML/MIN/1.73
GLOBULIN UR ELPH-MCNC: 2.6 GM/DL
GLUCOSE BLD-MCNC: 97 MG/DL (ref 70–100)
HCT VFR BLD AUTO: 36.7 % (ref 34.5–44)
HGB BLD-MCNC: 11.7 G/DL (ref 11.5–15.5)
IMM GRANULOCYTES # BLD AUTO: 0.02 10*3/MM3 (ref 0–0.05)
IMM GRANULOCYTES NFR BLD AUTO: 0.3 % (ref 0–0.6)
LYMPHOCYTES # BLD AUTO: 1.94 10*3/MM3 (ref 0.6–4.8)
LYMPHOCYTES NFR BLD AUTO: 28 % (ref 24–44)
MCH RBC QN AUTO: 34.2 PG (ref 27–31)
MCHC RBC AUTO-ENTMCNC: 31.9 G/DL (ref 32–36)
MCV RBC AUTO: 107.3 FL (ref 80–99)
MONOCYTES # BLD AUTO: 0.61 10*3/MM3 (ref 0–1)
MONOCYTES NFR BLD AUTO: 8.8 % (ref 0–12)
NEUTROPHILS # BLD AUTO: 4.25 10*3/MM3 (ref 1.5–8.3)
NEUTROPHILS NFR BLD AUTO: 61.2 % (ref 41–71)
PLATELET # BLD AUTO: 300 10*3/MM3 (ref 150–450)
PMV BLD AUTO: 10.3 FL (ref 6–12)
POTASSIUM BLD-SCNC: 3.8 MMOL/L (ref 3.5–5.5)
PROT SERPL-MCNC: 6.9 G/DL (ref 5.7–8.2)
RBC # BLD AUTO: 3.42 10*6/MM3 (ref 3.89–5.14)
SODIUM BLD-SCNC: 140 MMOL/L (ref 132–146)
WBC NRBC COR # BLD: 6.94 10*3/MM3 (ref 3.5–10.8)

## 2019-03-20 PROCEDURE — 86140 C-REACTIVE PROTEIN: CPT | Performed by: INTERNAL MEDICINE

## 2019-03-20 PROCEDURE — 80053 COMPREHEN METABOLIC PANEL: CPT

## 2019-03-20 PROCEDURE — 36415 COLL VENOUS BLD VENIPUNCTURE: CPT | Performed by: INTERNAL MEDICINE

## 2019-03-20 PROCEDURE — 85025 COMPLETE CBC W/AUTO DIFF WBC: CPT

## 2019-04-19 ENCOUNTER — OFFICE VISIT (OUTPATIENT)
Dept: INTERNAL MEDICINE | Facility: CLINIC | Age: 84
End: 2019-04-19

## 2019-04-19 VITALS
HEART RATE: 78 BPM | TEMPERATURE: 97 F | BODY MASS INDEX: 24.69 KG/M2 | DIASTOLIC BLOOD PRESSURE: 82 MMHG | SYSTOLIC BLOOD PRESSURE: 130 MMHG | RESPIRATION RATE: 18 BRPM | WEIGHT: 148.38 LBS

## 2019-04-19 DIAGNOSIS — I10 ESSENTIAL HYPERTENSION: Primary | ICD-10-CM

## 2019-04-19 DIAGNOSIS — E78.49 OTHER HYPERLIPIDEMIA: ICD-10-CM

## 2019-04-19 DIAGNOSIS — M06.9 RHEUMATOID ARTHRITIS, INVOLVING UNSPECIFIED SITE, UNSPECIFIED RHEUMATOID FACTOR PRESENCE: ICD-10-CM

## 2019-04-19 DIAGNOSIS — J43.9 PULMONARY EMPHYSEMA, UNSPECIFIED EMPHYSEMA TYPE (HCC): ICD-10-CM

## 2019-04-19 DIAGNOSIS — K21.9 GASTROESOPHAGEAL REFLUX DISEASE, ESOPHAGITIS PRESENCE NOT SPECIFIED: ICD-10-CM

## 2019-04-19 DIAGNOSIS — K63.5 BENIGN COLONIC POLYP: ICD-10-CM

## 2019-04-19 LAB
CHOLEST SERPL-MCNC: 179 MG/DL (ref 0–200)
HDLC SERPL-MCNC: 59 MG/DL (ref 40–60)
LDLC SERPL CALC-MCNC: 102 MG/DL (ref 0–100)
LDLC/HDLC SERPL: 1.74 {RATIO}
TRIGL SERPL-MCNC: 88 MG/DL (ref 0–150)
TSH SERPL DL<=0.05 MIU/L-ACNC: 3.9 MIU/ML (ref 0.27–4.2)
VLDLC SERPL-MCNC: 17.6 MG/DL (ref 5–40)

## 2019-04-19 PROCEDURE — 99214 OFFICE O/P EST MOD 30 MIN: CPT | Performed by: INTERNAL MEDICINE

## 2019-04-19 PROCEDURE — 36415 COLL VENOUS BLD VENIPUNCTURE: CPT | Performed by: INTERNAL MEDICINE

## 2019-04-19 PROCEDURE — 84443 ASSAY THYROID STIM HORMONE: CPT | Performed by: INTERNAL MEDICINE

## 2019-04-19 PROCEDURE — 80061 LIPID PANEL: CPT | Performed by: INTERNAL MEDICINE

## 2019-04-19 NOTE — PATIENT INSTRUCTIONS
I recommend Shingrix  (new Shingles vaccine), Td/Tdap vaccine (Tetanus booster), and Hepatitis A vaccination at the pharmacy.

## 2019-04-19 NOTE — PROGRESS NOTES
Subjective       Pati Carter is a 88 y.o. female.     Chief Complaint   Patient presents with   • Hypertension     6 month follow up   fasting        History obtained from the patient.      History of Present Illness     HPI: Here for a 6 month follow up.       The patient is now seeing Dr. Marsha Mathis for RA, stable on MTX and Folic Acid.        She has seen Pulmonary for COPD, stable on Breo-Ellipta, but no recent appointment.         Primary Care Cardiac [facility]: The patient is here today for a follow-up visit.        Her Hypertension is stable.   Medication(s): Triamterene HCTZ.   Her Hyperlipidemia has been stable.   Her LDL goal is 130 mg/dL and last LDL was 126 mg/dL.   Medication(s): Simvastatin.   The patient is adherent with her medication regimen. She denies medication side effects.       Interval Events:  The patient had a normal CMP, CBC, ESR, and CRP on 3/20/19.   She does not check her blood pressure at home.      Symptoms: Has bilateral leg pain (PAD), which is stable, and stable lower extremity edema.  Denies chest pain, COLIN, resting dyspnea, palpitations, syncope, orthopnea, PND lightheadedness, and dizziness.   Associated symptoms: No significant weight change since last visit.  Stable arthralgias.  Has stable vision issues due to macular degeneration and mild memory / concentration issues.  No headache, polydipsia, polyuria, fatigue, myalgias, or focal neurologic deficits.      Lifestyle and Disease Management: Diet: She consumes a diverse and healthy diet. Weight Issues: She does not have any weight concerns. Exercise: She does not exercise regularly.   Smoking: She does not use tobacco.       Colonic Polyp Follow-up: The patient is being seen for a routine clinic follow-up of Colon Polyp(s), which is stable.   Interval Events:  Current diagnosis was determined by Colonoscopy and last 1999 normal per patient.   Symptoms: no abdominal pain, diarrhea, constipation,  hematochezia, melena, decreased stool caliber, or change in bowel habits.   Associated symptoms: no rectal prolapse.   Medication:  None.      Gastroesophageal Reflux Disease Follow-up: The patient is being seen for a routine clinic follow-up of Gastroesophageal Reflux Disease, which is stable.   Symptoms: no abdominal pain, heartburn, acid regurgitation, nausea,  vomiting,  dysphagia, odynophagia, hematemesis, hematochezia, melena early satiety, belching, or bloating.   Associated symptoms: no chronic sore throat, hoarseness, cough, orwheezing.   Medication:  None.        Current Outpatient Medications on File Prior to Visit   Medication Sig Dispense Refill   • albuterol (PROVENTIL HFA;VENTOLIN HFA) 108 (90 BASE) MCG/ACT inhaler Inhale 2 puffs Every 4 (Four) Hours As Needed for Wheezing or Shortness of Air. 1 inhaler 11   • cyclobenzaprine (FLEXERIL) 5 MG tablet Take 1 tablet by mouth 3 (Three) Times a Day As Needed for Muscle Spasms. 30 tablet 0   • docusate sodium (COLACE) 100 MG capsule Take  by mouth.     • famotidine (PEPCID) 20 MG tablet Take 1 tablet by mouth 2 (Two) Times a Day. 60 tablet 2   • Fluticasone Furoate-Vilanterol (BREO ELLIPTA) 200-25 MCG/INH aerosol powder  Inhale.     • folic acid (FOLVITE) 1 MG tablet Take 1 tablet by mouth Daily.     • meloxicam (MOBIC) 15 MG tablet Take 0.5 tablets by mouth Daily As Needed for Moderate Pain .     • methotrexate 2.5 MG tablet Take 15 mg by mouth 1 (One) Time Per Week.     • Multiple Vitamins-Minerals (CENTRUM SILVER ADULT 50+ PO) Take  by mouth Daily.     • nystatin (MYCOSTATIN) 047370 UNIT/GM ointment Daily.     • simvastatin (ZOCOR) 20 MG tablet TAKE 1 TABLET EVERY DAY AT BEDTIME 90 tablet 1   • triamterene-hydrochlorothiazide (DYAZIDE) 37.5-25 MG per capsule TAKE 1 CAPSULE EVERY DAY 90 capsule 3   • VOLTAREN 1 % gel gel Daily.       No current facility-administered medications on file prior to visit.        Current outpatient and discharge medications  have been reconciled for the patient.  Reviewed by: Latoya Bolaños MD        The following portions of the patient's history were reviewed and updated as appropriate: allergies, current medications, past family history, past medical history, past social history, past surgical history and problem list.    Review of Systems   Constitutional: Negative for fatigue and unexpected weight change.   Eyes: Positive for visual disturbance.   Respiratory: Negative for cough, shortness of breath and wheezing.    Cardiovascular: Positive for leg swelling. Negative for chest pain and palpitations.        No CLOIN, orthopnea, or claudication.   Gastrointestinal: Negative for abdominal pain, blood in stool, constipation, diarrhea, nausea and vomiting.        Denies melena.   Endocrine: Negative for polydipsia and polyuria.   Musculoskeletal: Positive for arthralgias. Negative for myalgias.   Neurological: Negative for dizziness, syncope, light-headedness and headaches.        No memory issues.   Psychiatric/Behavioral: Negative for decreased concentration.         Objective       Blood pressure 130/82, pulse 78, temperature 97 °F (36.1 °C), resp. rate 18, weight 67.3 kg (148 lb 6 oz).      Physical Exam   Constitutional: She appears well-developed and well-nourished.   Neck: Normal range of motion. Neck supple. Carotid bruit is not present. No thyromegaly present.   Cardiovascular: Normal rate, regular rhythm, normal heart sounds and intact distal pulses. Exam reveals no gallop and no friction rub.   No murmur heard.  There is 1+-2+ bilateral lower extremity edema.   Pulmonary/Chest: Effort normal and breath sounds normal.   Abdominal: Soft. Bowel sounds are normal. She exhibits no distension, no abdominal bruit and no mass. There is no hepatosplenomegaly. There is no tenderness.   Psychiatric: She has a normal mood and affect.   Nursing note and vitals reviewed.      Assessment / Plan:  Pati was seen today for  hypertension.    Diagnoses and all orders for this visit:    Essential hypertension  -     Lipid Panel  -     TSH   Continue Triamterene/HCTZ.    Other hyperlipidemia  -     Lipid Panel  -     TSH   Continue Simvastatin.    Pulmonary emphysema, unspecified emphysema type (CMS/HCC)   Continue  Breo-Ellipta   Follow-up with Pulmonary prn.    Gastroesophageal reflux disease, esophagitis presence not specified   Stable on no medication    Benign colonic polyp   Declines Colonoscopy    Rheumatoid arthritis, involving unspecified site, unspecified rheumatoid factor presence (CMS/HCC)  -     TSH   Continue current medication.   Follow up with Rheumatology.     Recommend Shingrix  (new Shingles vaccine), Td/Tdap vaccine (Tetanus booster), and Hepatitis A vaccination at the pharmacy.    Declines  Mammogram.    Return in about 6 months (around 10/19/2019) for Recheck HTN, fasting.

## 2019-04-26 ENCOUNTER — TELEPHONE (OUTPATIENT)
Dept: INTERNAL MEDICINE | Facility: CLINIC | Age: 84
End: 2019-04-26

## 2019-04-26 ENCOUNTER — LAB (OUTPATIENT)
Dept: INTERNAL MEDICINE | Facility: CLINIC | Age: 84
End: 2019-04-26

## 2019-04-26 DIAGNOSIS — R35.0 URINARY FREQUENCY: ICD-10-CM

## 2019-04-26 DIAGNOSIS — R35.0 URINARY FREQUENCY: Primary | ICD-10-CM

## 2019-04-26 LAB
BILIRUB BLD-MCNC: NEGATIVE MG/DL
CLARITY, POC: ABNORMAL
COLOR UR: YELLOW
EXPIRATION DATE: ABNORMAL
GLUCOSE UR STRIP-MCNC: NEGATIVE MG/DL
KETONES UR QL: NEGATIVE
LEUKOCYTE EST, POC: NEGATIVE
Lab: ABNORMAL
NITRITE UR-MCNC: NEGATIVE MG/ML
PH UR: 7 [PH] (ref 5–8)
PROT UR STRIP-MCNC: NEGATIVE MG/DL
RBC # UR STRIP: NEGATIVE /UL
SP GR UR: 1.01 (ref 1–1.03)
UROBILINOGEN UR QL: NORMAL

## 2019-04-26 PROCEDURE — 87086 URINE CULTURE/COLONY COUNT: CPT | Performed by: INTERNAL MEDICINE

## 2019-04-26 PROCEDURE — 81003 URINALYSIS AUTO W/O SCOPE: CPT | Performed by: INTERNAL MEDICINE

## 2019-04-26 RX ORDER — NITROFURANTOIN 25; 75 MG/1; MG/1
100 CAPSULE ORAL 2 TIMES DAILY
Qty: 14 CAPSULE | Refills: 0 | Status: SHIPPED | OUTPATIENT
Start: 2019-04-26 | End: 2019-05-03

## 2019-04-26 NOTE — TELEPHONE ENCOUNTER
Patient came in for the urine dip results were negative .  I sent urine out for culture . S/w you regarding this and s/w with son-in-law you stated sending in an antibiotic for patient for urine frequency and sending urine out for culture when results come back if culture negative will stop the antibiotics .  He gave verbal understanding.

## 2019-04-26 NOTE — TELEPHONE ENCOUNTER
----- Message from Kathryn Reyes sent at 4/26/2019  8:07 AM EDT -----  Patient's daughter Danette states patient has a uti and has been up urinating all night. She states her  will be dropping off a urine specimen today and can you call in a script to [facility]. She can be reached at [phone]

## 2019-04-27 LAB — BACTERIA SPEC AEROBE CULT: NORMAL

## 2019-05-14 ENCOUNTER — TELEPHONE (OUTPATIENT)
Dept: INTERNAL MEDICINE | Facility: CLINIC | Age: 84
End: 2019-05-14

## 2019-05-14 DIAGNOSIS — N39.0 URINARY TRACT INFECTION WITHOUT HEMATURIA, SITE UNSPECIFIED: Primary | ICD-10-CM

## 2019-05-14 NOTE — TELEPHONE ENCOUNTER
Noted.  She does not need an appointment, but would recommend she provide a sample here, instead of doing it at home and bringing up by, to reduce the chance of contamination again.    Order entered for urinalysis and urine culture.

## 2019-05-14 NOTE — TELEPHONE ENCOUNTER
Spoke with patient. She states she relies on her daughters to bring her and both of them are working today. I advised an option would be for them to bring her anytime between 8-4 M-F and she wouldn't need an appt, just to leave the sample for the lab. She states she will talk to them and see what they can do. She thanked our office and we ended the call.

## 2019-05-14 NOTE — TELEPHONE ENCOUNTER
----- Message from Nelli Carroll sent at 5/14/2019  8:28 AM EDT -----  Contact: SELF  [place] calling, she said she was in 2 weeks ago for a bladder infection. She finished her medication but the infection did not clear up and wants to know if she can get more antibiotics called in. She uses the [facility] on [address]. Pati can be reached at [phone]

## 2019-05-14 NOTE — TELEPHONE ENCOUNTER
Previous urine culture was contaminated.  Is it possible for her to come by and leave a specimen today, so we know what bacteria we are treating?

## 2019-05-17 ENCOUNTER — LAB (OUTPATIENT)
Dept: INTERNAL MEDICINE | Facility: CLINIC | Age: 84
End: 2019-05-17

## 2019-05-17 DIAGNOSIS — N39.0 URINARY TRACT INFECTION WITHOUT HEMATURIA, SITE UNSPECIFIED: ICD-10-CM

## 2019-05-17 LAB
BILIRUB BLD-MCNC: NEGATIVE MG/DL
CLARITY, POC: ABNORMAL
COLOR UR: YELLOW
EXPIRATION DATE: ABNORMAL
GLUCOSE UR STRIP-MCNC: NEGATIVE MG/DL
KETONES UR QL: NEGATIVE
LEUKOCYTE EST, POC: ABNORMAL
Lab: ABNORMAL
NITRITE UR-MCNC: NEGATIVE MG/ML
PH UR: 6 [PH] (ref 5–8)
PROT UR STRIP-MCNC: NEGATIVE MG/DL
RBC # UR STRIP: NEGATIVE /UL
SP GR UR: 1.01 (ref 1–1.03)
UROBILINOGEN UR QL: NORMAL

## 2019-05-17 PROCEDURE — 87186 SC STD MICRODIL/AGAR DIL: CPT | Performed by: INTERNAL MEDICINE

## 2019-05-17 PROCEDURE — 87088 URINE BACTERIA CULTURE: CPT | Performed by: INTERNAL MEDICINE

## 2019-05-17 PROCEDURE — 81003 URINALYSIS AUTO W/O SCOPE: CPT | Performed by: INTERNAL MEDICINE

## 2019-05-17 PROCEDURE — 87086 URINE CULTURE/COLONY COUNT: CPT | Performed by: INTERNAL MEDICINE

## 2019-05-17 RX ORDER — CEFDINIR 300 MG/1
300 CAPSULE ORAL 2 TIMES DAILY
Qty: 20 CAPSULE | Refills: 0 | Status: SHIPPED | OUTPATIENT
Start: 2019-05-17 | End: 2019-07-11

## 2019-05-19 LAB — BACTERIA SPEC AEROBE CULT: ABNORMAL

## 2019-06-17 ENCOUNTER — TRANSCRIBE ORDERS (OUTPATIENT)
Dept: LAB | Facility: HOSPITAL | Age: 84
End: 2019-06-17

## 2019-06-17 ENCOUNTER — LAB (OUTPATIENT)
Dept: LAB | Facility: HOSPITAL | Age: 84
End: 2019-06-17

## 2019-06-17 DIAGNOSIS — M54.30 SCIATICA, UNSPECIFIED LATERALITY: ICD-10-CM

## 2019-06-17 DIAGNOSIS — M05.89 OTHER RHEUMATOID ARTHRITIS WITH RHEUMATOID FACTOR OF MULTIPLE SITES (HCC): Primary | ICD-10-CM

## 2019-06-17 DIAGNOSIS — M05.89 OTHER RHEUMATOID ARTHRITIS WITH RHEUMATOID FACTOR OF MULTIPLE SITES (HCC): ICD-10-CM

## 2019-06-17 DIAGNOSIS — Z79.899 POLYPHARMACY: ICD-10-CM

## 2019-06-17 LAB
ALBUMIN SERPL-MCNC: 4.2 G/DL (ref 3.5–5.2)
ALBUMIN/GLOB SERPL: 1.2 G/DL
ALP SERPL-CCNC: 58 U/L (ref 39–117)
ALT SERPL W P-5'-P-CCNC: 12 U/L (ref 1–33)
ANION GAP SERPL CALCULATED.3IONS-SCNC: 11.9 MMOL/L
AST SERPL-CCNC: 20 U/L (ref 1–32)
BASOPHILS # BLD AUTO: 0.05 10*3/MM3 (ref 0–0.2)
BASOPHILS NFR BLD AUTO: 0.6 % (ref 0–1.5)
BILIRUB SERPL-MCNC: 0.4 MG/DL (ref 0.2–1.2)
BUN BLD-MCNC: 25 MG/DL (ref 8–23)
BUN/CREAT SERPL: 23.1 (ref 7–25)
CALCIUM SPEC-SCNC: 9.9 MG/DL (ref 8.6–10.5)
CHLORIDE SERPL-SCNC: 98 MMOL/L (ref 98–107)
CO2 SERPL-SCNC: 30.1 MMOL/L (ref 22–29)
CREAT BLD-MCNC: 1.08 MG/DL (ref 0.57–1)
CRP SERPL-MCNC: 1.06 MG/DL (ref 0–0.5)
DEPRECATED RDW RBC AUTO: 53.1 FL (ref 37–54)
EOSINOPHIL # BLD AUTO: 0.13 10*3/MM3 (ref 0–0.4)
EOSINOPHIL NFR BLD AUTO: 1.6 % (ref 0.3–6.2)
ERYTHROCYTE [DISTWIDTH] IN BLOOD BY AUTOMATED COUNT: 13.7 % (ref 12.3–15.4)
GFR SERPL CREATININE-BSD FRML MDRD: 48 ML/MIN/1.73
GLOBULIN UR ELPH-MCNC: 3.5 GM/DL
GLUCOSE BLD-MCNC: 90 MG/DL (ref 65–99)
HCT VFR BLD AUTO: 36.2 % (ref 34–46.6)
HGB BLD-MCNC: 11.4 G/DL (ref 12–15.9)
IMM GRANULOCYTES # BLD AUTO: 0.02 10*3/MM3 (ref 0–0.05)
IMM GRANULOCYTES NFR BLD AUTO: 0.2 % (ref 0–0.5)
LYMPHOCYTES # BLD AUTO: 1.57 10*3/MM3 (ref 0.7–3.1)
LYMPHOCYTES NFR BLD AUTO: 19 % (ref 19.6–45.3)
MCH RBC QN AUTO: 33.8 PG (ref 26.6–33)
MCHC RBC AUTO-ENTMCNC: 31.5 G/DL (ref 31.5–35.7)
MCV RBC AUTO: 107.4 FL (ref 79–97)
MONOCYTES # BLD AUTO: 0.53 10*3/MM3 (ref 0.1–0.9)
MONOCYTES NFR BLD AUTO: 6.4 % (ref 5–12)
NEUTROPHILS # BLD AUTO: 5.97 10*3/MM3 (ref 1.7–7)
NEUTROPHILS NFR BLD AUTO: 72.2 % (ref 42.7–76)
NRBC BLD AUTO-RTO: 0 /100 WBC (ref 0–0.2)
PLATELET # BLD AUTO: 302 10*3/MM3 (ref 140–450)
PMV BLD AUTO: 10.2 FL (ref 6–12)
POTASSIUM BLD-SCNC: 3.7 MMOL/L (ref 3.5–5.2)
PROT SERPL-MCNC: 7.7 G/DL (ref 6–8.5)
RBC # BLD AUTO: 3.37 10*6/MM3 (ref 3.77–5.28)
SODIUM BLD-SCNC: 140 MMOL/L (ref 136–145)
WBC NRBC COR # BLD: 8.27 10*3/MM3 (ref 3.4–10.8)

## 2019-06-17 PROCEDURE — 85025 COMPLETE CBC W/AUTO DIFF WBC: CPT

## 2019-06-17 PROCEDURE — 80053 COMPREHEN METABOLIC PANEL: CPT

## 2019-06-17 PROCEDURE — 36415 COLL VENOUS BLD VENIPUNCTURE: CPT | Performed by: INTERNAL MEDICINE

## 2019-06-17 PROCEDURE — 85652 RBC SED RATE AUTOMATED: CPT

## 2019-06-17 PROCEDURE — 86140 C-REACTIVE PROTEIN: CPT | Performed by: INTERNAL MEDICINE

## 2019-06-18 LAB — ERYTHROCYTE [SEDIMENTATION RATE] IN BLOOD: 42 MM/HR (ref 0–30)

## 2019-07-11 ENCOUNTER — OFFICE VISIT (OUTPATIENT)
Dept: INTERNAL MEDICINE | Facility: CLINIC | Age: 84
End: 2019-07-11

## 2019-07-11 VITALS
BODY MASS INDEX: 24.5 KG/M2 | RESPIRATION RATE: 20 BRPM | TEMPERATURE: 98.5 F | SYSTOLIC BLOOD PRESSURE: 140 MMHG | HEART RATE: 76 BPM | DIASTOLIC BLOOD PRESSURE: 80 MMHG | WEIGHT: 147.25 LBS

## 2019-07-11 DIAGNOSIS — R82.998 LEUKOCYTES IN URINE: ICD-10-CM

## 2019-07-11 DIAGNOSIS — R35.0 URINARY FREQUENCY: Primary | ICD-10-CM

## 2019-07-11 LAB
BILIRUB BLD-MCNC: NEGATIVE MG/DL
CLARITY, POC: ABNORMAL
COLOR UR: YELLOW
EXPIRATION DATE: ABNORMAL
GLUCOSE UR STRIP-MCNC: NEGATIVE MG/DL
KETONES UR QL: NEGATIVE
LEUKOCYTE EST, POC: ABNORMAL
Lab: ABNORMAL
NITRITE UR-MCNC: NEGATIVE MG/ML
PH UR: 5 [PH] (ref 5–8)
PROT UR STRIP-MCNC: NEGATIVE MG/DL
RBC # UR STRIP: NEGATIVE /UL
SP GR UR: 1.01 (ref 1–1.03)
UROBILINOGEN UR QL: NORMAL

## 2019-07-11 PROCEDURE — 87086 URINE CULTURE/COLONY COUNT: CPT | Performed by: NURSE PRACTITIONER

## 2019-07-11 PROCEDURE — 81003 URINALYSIS AUTO W/O SCOPE: CPT | Performed by: NURSE PRACTITIONER

## 2019-07-11 PROCEDURE — 99213 OFFICE O/P EST LOW 20 MIN: CPT | Performed by: NURSE PRACTITIONER

## 2019-07-11 RX ORDER — NITROFURANTOIN 25; 75 MG/1; MG/1
100 CAPSULE ORAL 2 TIMES DAILY
Qty: 14 CAPSULE | Refills: 0 | OUTPATIENT
Start: 2019-07-11 | End: 2019-09-10

## 2019-07-11 NOTE — PROGRESS NOTES
Subjective:    Pati Carter is a 88 y.o. female.     Chief Complaint   Patient presents with   • Difficulty Urinating       History of Present Illness   Patient complains of new problem: lower abdominal pain that began last night with associated urinary frequency. She reports she has frequent UTIs. No fever, nausea, chills, back/flank pain or vomiting. She brings empty prescription bottle from 10/18/2018 for Macrobid and states this medicine works well for her and this is what she would like to have for treatment. She states recurrent UTI worsened by not drinking adequate water. Patient is immunosuppressed with methotrexate for RA.     Current Outpatient Medications:   •  albuterol (PROVENTIL HFA;VENTOLIN HFA) 108 (90 BASE) MCG/ACT inhaler, Inhale 2 puffs Every 4 (Four) Hours As Needed for Wheezing or Shortness of Air., Disp: 1 inhaler, Rfl: 11  •  cyclobenzaprine (FLEXERIL) 5 MG tablet, Take 1 tablet by mouth 3 (Three) Times a Day As Needed for Muscle Spasms., Disp: 30 tablet, Rfl: 0  •  docusate sodium (COLACE) 100 MG capsule, Take  by mouth., Disp: , Rfl:   •  famotidine (PEPCID) 20 MG tablet, Take 1 tablet by mouth 2 (Two) Times a Day., Disp: 60 tablet, Rfl: 2  •  Fluticasone Furoate-Vilanterol (BREO ELLIPTA) 200-25 MCG/INH aerosol powder , Inhale., Disp: , Rfl:   •  folic acid (FOLVITE) 1 MG tablet, Take 1 tablet by mouth Daily., Disp: , Rfl:   •  meloxicam (MOBIC) 15 MG tablet, Take 0.5 tablets by mouth Daily As Needed for Moderate Pain ., Disp: , Rfl:   •  methotrexate 2.5 MG tablet, Take 15 mg by mouth 1 (One) Time Per Week., Disp: , Rfl:   •  Multiple Vitamins-Minerals (CENTRUM SILVER ADULT 50+ PO), Take  by mouth Daily., Disp: , Rfl:   •  simvastatin (ZOCOR) 20 MG tablet, TAKE 1 TABLET EVERY DAY AT BEDTIME, Disp: 90 tablet, Rfl: 1  •  triamterene-hydrochlorothiazide (DYAZIDE) 37.5-25 MG per capsule, TAKE 1 CAPSULE EVERY DAY, Disp: 90 capsule, Rfl: 3  •  VOLTAREN 1 % gel gel, Daily., Disp: , Rfl:   •   nitrofurantoin, macrocrystal-monohydrate, (MACROBID) 100 MG capsule, Take 1 capsule by mouth 2 (Two) Times a Day., Disp: 14 capsule, Rfl: 0  •  nystatin (MYCOSTATIN) 202351 UNIT/GM ointment, Daily., Disp: , Rfl:      The following portions of the patient's history were reviewed and updated as appropriate: allergies, current medications, past family history, past medical history, past social history, past surgical history and problem list.    Review of Systems   Constitutional: Negative for chills and fever.   Respiratory: Negative for cough and shortness of breath.    Cardiovascular: Negative for chest pain.   Gastrointestinal: Positive for abdominal pain. Negative for blood in stool, constipation, diarrhea, nausea and vomiting.        Denies melena.   Genitourinary: Positive for frequency. Negative for dysuria, flank pain, hematuria, pelvic pain, urgency, vaginal bleeding and vaginal discharge.        Denies vaginal itching.   Skin: Negative for rash.   Hematological: Negative for adenopathy.       Objective:    /80 (BP Location: Right arm, Patient Position: Sitting)   Pulse 76   Temp 98.5 °F (36.9 °C) (Temporal)   Resp 20   Wt 66.8 kg (147 lb 4 oz)   BMI 24.50 kg/m²     Physical Exam   Constitutional: She is oriented to person, place, and time. She appears well-developed and well-nourished. She is cooperative.  Non-toxic appearance. She does not have a sickly appearance. She does not appear ill. No distress.   HENT:   Head: Normocephalic and atraumatic.   Right Ear: External ear normal.   Left Ear: External ear normal.   Nose: Nose normal.   Mouth/Throat: Uvula is midline and oropharynx is clear and moist.   Eyes: Conjunctivae and lids are normal.   Cardiovascular: Normal rate, regular rhythm and normal heart sounds.   No murmur heard.  Pulmonary/Chest: Effort normal and breath sounds normal.   Abdominal: Soft. Bowel sounds are normal. She exhibits no distension and no mass. There is no  hepatosplenomegaly. There is no tenderness. There is no rigidity, no rebound, no guarding and no CVA tenderness.   Lymphadenopathy:        Right: No inguinal adenopathy present.        Left: No inguinal adenopathy present.   Neurological: She is alert and oriented to person, place, and time.   Skin: Skin is warm, dry and intact. No rash noted.   Psychiatric: She has a normal mood and affect.   Nursing note and vitals reviewed.      Assessment/Plan:    Pati was seen today for difficulty urinating.    Diagnoses and all orders for this visit:    Urinary frequency  -     POCT urinalysis dipstick, automated    Leukocytes in urine  -     Urine Culture - Urine, Urine, Clean Catch  -     nitrofurantoin, macrocrystal-monohydrate, (MACROBID) 100 MG capsule; Take 1 capsule by mouth 2 (Two) Times a Day.    Discussed urine test results and pending culture.    Return if symptoms worsen or fail to improve.

## 2019-07-12 LAB — BACTERIA SPEC AEROBE CULT: NO GROWTH

## 2019-09-10 PROCEDURE — 87086 URINE CULTURE/COLONY COUNT: CPT | Performed by: FAMILY MEDICINE

## 2019-09-13 ENCOUNTER — TELEPHONE (OUTPATIENT)
Dept: URGENT CARE | Facility: CLINIC | Age: 84
End: 2019-09-13

## 2019-09-19 ENCOUNTER — LAB (OUTPATIENT)
Dept: LAB | Facility: HOSPITAL | Age: 84
End: 2019-09-19

## 2019-09-19 ENCOUNTER — TRANSCRIBE ORDERS (OUTPATIENT)
Dept: LAB | Facility: HOSPITAL | Age: 84
End: 2019-09-19

## 2019-09-19 DIAGNOSIS — M05.89 OTHER RHEUMATOID ARTHRITIS WITH RHEUMATOID FACTOR OF MULTIPLE SITES (HCC): Primary | ICD-10-CM

## 2019-09-19 DIAGNOSIS — Z79.899 NEED FOR PROPHYLACTIC CHEMOTHERAPY: ICD-10-CM

## 2019-09-19 DIAGNOSIS — M05.89 OTHER RHEUMATOID ARTHRITIS WITH RHEUMATOID FACTOR OF MULTIPLE SITES (HCC): ICD-10-CM

## 2019-09-19 PROCEDURE — 36415 COLL VENOUS BLD VENIPUNCTURE: CPT | Performed by: INTERNAL MEDICINE

## 2019-09-19 PROCEDURE — 80053 COMPREHEN METABOLIC PANEL: CPT

## 2019-09-19 PROCEDURE — 85652 RBC SED RATE AUTOMATED: CPT

## 2019-09-19 PROCEDURE — 85025 COMPLETE CBC W/AUTO DIFF WBC: CPT

## 2019-09-19 PROCEDURE — 86140 C-REACTIVE PROTEIN: CPT | Performed by: INTERNAL MEDICINE

## 2019-09-20 LAB
ALBUMIN SERPL-MCNC: 4.5 G/DL (ref 3.5–5.2)
ALBUMIN/GLOB SERPL: 1.6 G/DL
ALP SERPL-CCNC: 61 U/L (ref 39–117)
ALT SERPL W P-5'-P-CCNC: 8 U/L (ref 1–33)
ANION GAP SERPL CALCULATED.3IONS-SCNC: 12.8 MMOL/L (ref 5–15)
AST SERPL-CCNC: 19 U/L (ref 1–32)
BASOPHILS # BLD AUTO: 0.05 10*3/MM3 (ref 0–0.2)
BASOPHILS NFR BLD AUTO: 0.7 % (ref 0–1.5)
BILIRUB SERPL-MCNC: 0.2 MG/DL (ref 0.2–1.2)
BUN BLD-MCNC: 23 MG/DL (ref 8–23)
BUN/CREAT SERPL: 21.7 (ref 7–25)
CALCIUM SPEC-SCNC: 9.6 MG/DL (ref 8.6–10.5)
CHLORIDE SERPL-SCNC: 98 MMOL/L (ref 98–107)
CO2 SERPL-SCNC: 31.2 MMOL/L (ref 22–29)
CREAT BLD-MCNC: 1.06 MG/DL (ref 0.57–1)
CRP SERPL-MCNC: 0.48 MG/DL (ref 0–0.5)
DEPRECATED RDW RBC AUTO: 47.1 FL (ref 37–54)
EOSINOPHIL # BLD AUTO: 0.14 10*3/MM3 (ref 0–0.4)
EOSINOPHIL NFR BLD AUTO: 1.9 % (ref 0.3–6.2)
ERYTHROCYTE [DISTWIDTH] IN BLOOD BY AUTOMATED COUNT: 13.1 % (ref 12.3–15.4)
ERYTHROCYTE [SEDIMENTATION RATE] IN BLOOD: 37 MM/HR (ref 0–30)
GFR SERPL CREATININE-BSD FRML MDRD: 49 ML/MIN/1.73
GLOBULIN UR ELPH-MCNC: 2.9 GM/DL
GLUCOSE BLD-MCNC: 100 MG/DL (ref 65–99)
HCT VFR BLD AUTO: 35.3 % (ref 34–46.6)
HGB BLD-MCNC: 11.3 G/DL (ref 12–15.9)
IMM GRANULOCYTES # BLD AUTO: 0.02 10*3/MM3 (ref 0–0.05)
IMM GRANULOCYTES NFR BLD AUTO: 0.3 % (ref 0–0.5)
LYMPHOCYTES # BLD AUTO: 1.51 10*3/MM3 (ref 0.7–3.1)
LYMPHOCYTES NFR BLD AUTO: 20.9 % (ref 19.6–45.3)
MCH RBC QN AUTO: 32.7 PG (ref 26.6–33)
MCHC RBC AUTO-ENTMCNC: 32 G/DL (ref 31.5–35.7)
MCV RBC AUTO: 102 FL (ref 79–97)
MONOCYTES # BLD AUTO: 0.65 10*3/MM3 (ref 0.1–0.9)
MONOCYTES NFR BLD AUTO: 9 % (ref 5–12)
NEUTROPHILS # BLD AUTO: 4.85 10*3/MM3 (ref 1.7–7)
NEUTROPHILS NFR BLD AUTO: 67.2 % (ref 42.7–76)
NRBC BLD AUTO-RTO: 0 /100 WBC (ref 0–0.2)
PLATELET # BLD AUTO: 300 10*3/MM3 (ref 140–450)
PMV BLD AUTO: 10 FL (ref 6–12)
POTASSIUM BLD-SCNC: 3.8 MMOL/L (ref 3.5–5.2)
PROT SERPL-MCNC: 7.4 G/DL (ref 6–8.5)
RBC # BLD AUTO: 3.46 10*6/MM3 (ref 3.77–5.28)
SODIUM BLD-SCNC: 142 MMOL/L (ref 136–145)
WBC NRBC COR # BLD: 7.22 10*3/MM3 (ref 3.4–10.8)

## 2019-10-23 ENCOUNTER — OFFICE VISIT (OUTPATIENT)
Dept: INTERNAL MEDICINE | Facility: CLINIC | Age: 84
End: 2019-10-23

## 2019-10-23 VITALS
SYSTOLIC BLOOD PRESSURE: 146 MMHG | DIASTOLIC BLOOD PRESSURE: 86 MMHG | TEMPERATURE: 97.8 F | WEIGHT: 149.25 LBS | RESPIRATION RATE: 18 BRPM | HEART RATE: 88 BPM | BODY MASS INDEX: 24.84 KG/M2

## 2019-10-23 DIAGNOSIS — K63.5 BENIGN COLONIC POLYP: ICD-10-CM

## 2019-10-23 DIAGNOSIS — Z23 NEED FOR IMMUNIZATION AGAINST INFLUENZA: ICD-10-CM

## 2019-10-23 DIAGNOSIS — M06.9 RHEUMATOID ARTHRITIS, INVOLVING UNSPECIFIED SITE, UNSPECIFIED RHEUMATOID FACTOR PRESENCE: ICD-10-CM

## 2019-10-23 DIAGNOSIS — K21.9 GASTROESOPHAGEAL REFLUX DISEASE, ESOPHAGITIS PRESENCE NOT SPECIFIED: ICD-10-CM

## 2019-10-23 DIAGNOSIS — E78.49 OTHER HYPERLIPIDEMIA: ICD-10-CM

## 2019-10-23 DIAGNOSIS — I10 ESSENTIAL HYPERTENSION: Primary | ICD-10-CM

## 2019-10-23 DIAGNOSIS — J43.9 PULMONARY EMPHYSEMA, UNSPECIFIED EMPHYSEMA TYPE (HCC): ICD-10-CM

## 2019-10-23 LAB
CHOLEST SERPL-MCNC: 182 MG/DL (ref 0–200)
HDLC SERPL-MCNC: 57 MG/DL (ref 40–60)
LDLC SERPL CALC-MCNC: 108 MG/DL (ref 0–100)
LDLC/HDLC SERPL: 1.9 {RATIO}
TRIGL SERPL-MCNC: 84 MG/DL (ref 0–150)
TSH SERPL DL<=0.05 MIU/L-ACNC: 2.62 UIU/ML (ref 0.27–4.2)
VLDLC SERPL-MCNC: 16.8 MG/DL (ref 5–40)

## 2019-10-23 PROCEDURE — G0008 ADMIN INFLUENZA VIRUS VAC: HCPCS | Performed by: INTERNAL MEDICINE

## 2019-10-23 PROCEDURE — 84443 ASSAY THYROID STIM HORMONE: CPT | Performed by: INTERNAL MEDICINE

## 2019-10-23 PROCEDURE — 80061 LIPID PANEL: CPT | Performed by: INTERNAL MEDICINE

## 2019-10-23 PROCEDURE — 36415 COLL VENOUS BLD VENIPUNCTURE: CPT | Performed by: INTERNAL MEDICINE

## 2019-10-23 PROCEDURE — 90653 IIV ADJUVANT VACCINE IM: CPT | Performed by: INTERNAL MEDICINE

## 2019-10-23 PROCEDURE — 99214 OFFICE O/P EST MOD 30 MIN: CPT | Performed by: INTERNAL MEDICINE

## 2019-10-23 RX ORDER — ALBUTEROL SULFATE 90 UG/1
2 AEROSOL, METERED RESPIRATORY (INHALATION) EVERY 4 HOURS PRN
Qty: 3 INHALER | Refills: 4 | Status: SHIPPED | OUTPATIENT
Start: 2019-10-23

## 2019-10-23 NOTE — PATIENT INSTRUCTIONS
I recommend Shingrix (new Shingles vaccine), Td/Tdap vaccine (Tetanus booster), and Hepatitis A vaccination at the pharmacy.      Heart-Healthy Eating Plan  Many factors influence your heart (coronary) health, including eating and exercise habits. Coronary risk increases with abnormal blood fat (lipid) levels. Heart-healthy meal planning includes limiting unhealthy fats, increasing healthy fats, and making other diet and lifestyle changes.  What is my plan?  Your health care provider may recommend that you:  · Limit your fat intake to _________% or less of your total calories each day.  · Limit your saturated fat intake to _________% or less of your total calories each day.  · Limit the amount of cholesterol in your diet to less than _________ mg per day.  What are tips for following this plan?  Cooking  Cook foods using methods other than frying. Baking, boiling, grilling, and broiling are all good options. Other ways to reduce fat include:  · Removing the skin from poultry.  · Removing all visible fats from meats.  · Steaming vegetables in water or broth.  Meal planning    · At meals, imagine dividing your plate into fourths:  ? Fill one-half of your plate with vegetables and green salads.  ? Fill one-fourth of your plate with whole grains.  ? Fill one-fourth of your plate with lean protein foods.  · Eat 4-5 servings of vegetables per day. One serving equals 1 cup raw or cooked vegetable, or 2 cups raw leafy greens.  · Eat 4-5 servings of fruit per day. One serving equals 1 medium whole fruit, ¼ cup dried fruit, ½ cup fresh, frozen, or canned fruit, or ½ cup 100% fruit juice.  · Eat more foods that contain soluble fiber. Examples include apples, broccoli, carrots, beans, peas, and barley. Aim to get 25-30 g of fiber per day.  · Increase your consumption of legumes, nuts, and seeds to 4-5 servings per week. One serving of dried beans or legumes equals ½ cup cooked, 1 serving of nuts is ¼ cup, and 1 serving of seeds  equals 1 tablespoon.  Fats  · Choose healthy fats more often. Choose monounsaturated and polyunsaturated fats, such as olive and canola oils, flaxseeds, walnuts, almonds, and seeds.  · Eat more omega-3 fats. Choose salmon, mackerel, sardines, tuna, flaxseed oil, and ground flaxseeds. Aim to eat fish at least 2 times each week.  · Check food labels carefully to identify foods with trans fats or high amounts of saturated fat.  · Limit saturated fats. These are found in animal products, such as meats, butter, and cream. Plant sources of saturated fats include palm oil, palm kernel oil, and coconut oil.  · Avoid foods with partially hydrogenated oils in them. These contain trans fats. Examples are stick margarine, some tub margarines, cookies, crackers, and other baked goods.  · Avoid fried foods.  General information  · Eat more home-cooked food and less restaurant, buffet, and fast food.  · Limit or avoid alcohol.  · Limit foods that are high in starch and sugar.  · Lose weight if you are overweight. Losing just 5-10% of your body weight can help your overall health and prevent diseases such as diabetes and heart disease.  · Monitor your salt (sodium) intake, especially if you have high blood pressure. Talk with your health care provider about your sodium intake.  · Try to incorporate more vegetarian meals weekly.  What foods can I eat?  Fruits  All fresh, canned (in natural juice), or frozen fruits.  Vegetables  Fresh or frozen vegetables (raw, steamed, roasted, or grilled). Green salads.  Grains  Most grains. Choose whole wheat and whole grains most of the time. Rice and pasta, including brown rice and pastas made with whole wheat.  Meats and other proteins  Lean, well-trimmed beef, veal, pork, and lamb. Chicken and turkey without skin. All fish and shellfish. Wild duck, rabbit, pheasant, and venison. Egg whites or low-cholesterol egg substitutes. Dried beans, peas, lentils, and tofu. Seeds and most  nuts.  Dairy  Low-fat or nonfat cheeses, including ricotta and mozzarella. Skim or 1% milk (liquid, powdered, or evaporated). Buttermilk made with low-fat milk. Nonfat or low-fat yogurt.  Fats and oils  Non-hydrogenated (trans-free) margarines. Vegetable oils, including soybean, sesame, sunflower, olive, peanut, safflower, corn, canola, and cottonseed. Salad dressings or mayonnaise made with a vegetable oil.  Beverages  Water (mineral or sparkling). Coffee and tea. Diet carbonated beverages.  Sweets and desserts  Sherbet, gelatin, and fruit ice. Small amounts of dark chocolate.  Limit all sweets and desserts.  Seasonings and condiments  All seasonings and condiments.  The items listed above may not be a complete list of foods and beverages you can eat. Contact a dietitian for more options.  What foods are not recommended?  Fruits  Canned fruit in heavy syrup. Fruit in cream or butter sauce. Fried fruit. Limit coconut.  Vegetables  Vegetables cooked in cheese, cream, or butter sauce. Fried vegetables.  Grains  Breads made with saturated or trans fats, oils, or whole milk. Croissants. Sweet rolls. Donuts. High-fat crackers, such as cheese crackers.  Meats and other proteins  Fatty meats, such as hot dogs, ribs, sausage, schafer, rib-eye roast or steak. High-fat deli meats, such as salami and bologna. Caviar. Domestic duck and goose. Organ meats, such as liver.  Dairy  Cream, sour cream, cream cheese, and creamed cottage cheese. Whole milk cheeses. Whole or 2% milk (liquid, evaporated, or condensed). Whole buttermilk. Cream sauce or high-fat cheese sauce. Whole-milk yogurt.  Fats and oils  Meat fat, or shortening. Cocoa butter, hydrogenated oils, palm oil, coconut oil, palm kernel oil. Solid fats and shortenings, including schafer fat, salt pork, lard, and butter. Nondairy cream substitutes. Salad dressings with cheese or sour cream.  Beverages  Regular sodas and any drinks with added sugar.  Sweets and desserts  Frosting.  Pudding. Cookies. Cakes. Pies. Milk chocolate or white chocolate. Buttered syrups. Full-fat ice cream or ice cream drinks.  The items listed above may not be a complete list of foods and beverages to avoid. Contact a dietitian for more information.  Summary  · Heart-healthy meal planning includes limiting unhealthy fats, increasing healthy fats, and making other diet and lifestyle changes.  · Lose weight if you are overweight. Losing just 5-10% of your body weight can help your overall health and prevent diseases such as diabetes and heart disease.  · Focus on eating a balance of foods, including fruits and vegetables, low-fat or nonfat dairy, lean protein, nuts and legumes, whole grains, and heart-healthy oils and fats.  This information is not intended to replace advice given to you by your health care provider. Make sure you discuss any questions you have with your health care provider.  Document Released: 09/26/2009 Document Revised: 01/25/2019 Document Reviewed: 01/25/2019  [employer] Interactive Patient Education © 2019 [employer]      Exercising to Stay Healthy  To become healthy and stay healthy, it is recommended that you do moderate-intensity and vigorous-intensity exercise. You can tell that you are exercising at a moderate intensity if your heart starts beating faster and you start breathing faster but can still hold a conversation. You can tell that you are exercising at a vigorous intensity if you are breathing much harder and faster and cannot hold a conversation while exercising.  Exercising regularly is important. It has many health benefits, such as:  · Improving overall fitness, flexibility, and endurance.  · Increasing bone density.  · Helping with weight control.  · Decreasing body fat.  · Increasing muscle strength.  · Reducing stress and tension.  · Improving overall health.  How often should I exercise?  Choose an activity that you enjoy, and set realistic goals. Your health care provider  can help you make an activity plan that works for you.  Exercise regularly as told by your health care provider. This may include:  · Doing strength training two times a week, such as:  ? Lifting weights.  ? Using resistance bands.  ? Push-ups.  ? Sit-ups.  ? Yoga.  · Doing a certain intensity of exercise for a given amount of time. Choose from these options:  ? A total of 150 minutes of moderate-intensity exercise every week.  ? A total of 75 minutes of vigorous-intensity exercise every week.  ? A mix of moderate-intensity and vigorous-intensity exercise every week.  Children, pregnant women, people who have not exercised regularly, people who are overweight, and older adults may need to talk with a health care provider about what activities are safe to do. If you have a medical condition, be sure to talk with your health care provider before you start a new exercise program.  What are some exercise ideas?  Moderate-intensity exercise ideas include:  · Walking 1 mile (1.6 km) in about 15 minutes.  · Biking.  · Hiking.  · Golfing.  · Dancing.  · Water aerobics.  Vigorous-intensity exercise ideas include:  · Walking 4.5 miles (7.2 km) or more in about 1 hour.  · Jogging or running 5 miles (8 km) in about 1 hour.  · Biking 10 miles (16.1 km) or more in about 1 hour.  · Lap swimming.  · Roller-skating or in-line skating.  · Cross-country skiing.  · Vigorous competitive sports, such as football, basketball, and soccer.  · Jumping rope.  · Aerobic dancing.  What are some everyday activities that can help me to get exercise?  · Yard work, such as:  ? Pushing a .  ? Raking and bagging leaves.  · Washing your car.  · Pushing a stroller.  · Shoveling snow.  · Gardening.  · Washing windows or floors.  How can I be more active in my day-to-day activities?  · Use stairs instead of an elevator.  · Take a walk during your lunch break.  · If you drive, park your car farther away from your work or school.  · If you take  public transportation, get off one stop early and walk the rest of the way.  · Stand up or walk around during all of your indoor phone calls.  · Get up, stretch, and walk around every 30 minutes throughout the day.  · Enjoy exercise with a friend. Support to continue exercising will help you keep a regular routine of activity.  What guidelines can I follow while exercising?  · Before you start a new exercise program, talk with your health care provider.  · Do not exercise so much that you hurt yourself, feel dizzy, or get very short of breath.  · Wear comfortable clothes and wear shoes with good support.  · Drink plenty of water while you exercise to prevent dehydration or heat stroke.  · Work out until your breathing and your heartbeat get faster.  Where to find more information  · [place] Department of Health and Human Services: www.hhs.gov  · Centers for Disease Control and Prevention (CDC): www.cdc.gov  Summary  · Exercising regularly is important. It will improve your overall fitness, flexibility, and endurance.  · Regular exercise also will improve your overall health. It can help you control your weight, reduce stress, and improve your bone density.  · Do not exercise so much that you hurt yourself, feel dizzy, or get very short of breath.  · Before you start a new exercise program, talk with your health care provider.  This information is not intended to replace advice given to you by your health care provider. Make sure you discuss any questions you have with your health care provider.  Document Released: 01/20/2012 Document Revised: 11/08/2018 Document Reviewed: 11/08/2018  [employer] Interactive Patient Education © 2019 [employer]

## 2019-10-23 NOTE — PROGRESS NOTES
"Subjective       Pati Carter is a 89 y.o. female.     Chief Complaint   Patient presents with   • Hypertension     6 month follow up  non fasting    • Hyperlipidemia       History obtained from the patient.      History of Present Illness     HPI: Here for a 6 month follow up.       The patient is now seeing Dr. Marsha Mathis for RA, stable on MTX and Folic Acid.  She also on Meloxicam and uses Voltaren gel as needed.  She has a follow-up appointment on 12/19.      She has seen Pulmonary for COPD, overall stable on Breo-Ellipta, but no recent appointment.  She has had some recent COLIN and wheezing with the weather change, but no cough or hemoptysis.        Primary Care Cardiac Diagnostic [facility]: The patient is here today for a follow-up visit.  She is nonfasting ( 2 biscuits, one piece of bread, and black tea).        Her Hypertension has been stable.   Medication(s): Triamterene HCTZ.   Her Hyperlipidemia has been stable.   Her LDL goal is  <130 mg/dL and last LDL was 102 mg/dL.   Medication(s): None.  Self discontinued Simvastatin.  The patient is not adherent with her medication regimen. She denies medication side effects.       Interval Events: The patient states she stopped her Simvastatin, she is not sure when, because it \"did not make me feel good\".  The patient had labs on 9/19/2019.  CMP was normal with the exception of a mildly elevated, stable Creatinine (1.06).  CBC was significant for stable anemia (Hemoglobin 11.3 / Hematocrit 35.3).  ESR slightly elevated (37).  CRP was normal.  She does not check her blood pressure at home.      Symptoms: Has bilateral leg pain (PAD), which is stable, and stable lower extremity edema.  Has had some recent COLIN with weather changes.  Denies chest pain, resting dyspnea, palpitations, syncope, orthopnea, PND,  lightheadedness, and dizziness.   Associated Symptoms: No significant weight change since last visit.  Stable arthralgias.  Has stable vision issues " due to macular degeneration and mild memory / concentration issues.  No headache, polydipsia, polyuria, fatigue, myalgias, or focal neurologic deficits.      Lifestyle and Disease Management: Diet: She consumes a diverse and healthy diet. Weight Issues: She does not have any weight concerns. Exercise: She does not exercise regularly.   Smoking: She does not use tobacco.      Gastroesophageal Reflux Disease Follow-up: The patient is being seen for a routine clinic follow-up of Gastroesophageal Reflux Disease, which is stable.   Interval Events: None.  Symptoms: no abdominal pain, heartburn, acid regurgitation, nausea, vomiting, dysphagia, odynophagia, hematemesis, hematochezia, melena,  early satiety, belching, or bloating.   Associated Symptoms: no chronic sore throat, hoarseness, cough, orwheezing.   Medication:  Pepcid    Colonic Polyp Follow-up: The patient is being seen for a routine clinic follow-up of Colon Polyp(s), which is stable.   Interval Events:  Current diagnosis was determined by Colonoscopy and last 1999,  normal per patient.  She has declined further colonoscopy  Symptoms: no abdominal pain, diarrhea, constipation, hematochezia, melena, decreased stool caliber, or change in bowel habits.   Associated Symptoms: no rectal prolapse.   Medication:  None.          Current Outpatient Medications on File Prior to Visit   Medication Sig Dispense Refill   • cyclobenzaprine (FLEXERIL) 5 MG tablet Take 1 tablet by mouth 3 (Three) Times a Day As Needed for Muscle Spasms. 30 tablet 0   • docusate sodium (COLACE) 100 MG capsule Take  by mouth.     • famotidine (PEPCID) 20 MG tablet Take 1 tablet by mouth 2 (Two) Times a Day. 60 tablet 2   • Fluticasone Furoate-Vilanterol (BREO ELLIPTA) 200-25 MCG/INH aerosol powder  Inhale.     • folic acid (FOLVITE) 1 MG tablet Take 1 tablet by mouth Daily.     • meloxicam (MOBIC) 15 MG tablet Take 0.5 tablets by mouth Daily As Needed for Moderate Pain .     • methotrexate 2.5  MG tablet Take 15 mg by mouth 1 (One) Time Per Week.     • Multiple Vitamins-Minerals (CENTRUM SILVER ADULT 50+ PO) Take  by mouth Daily.     • nystatin (MYCOSTATIN) 765496 UNIT/GM ointment Daily.     • simvastatin (ZOCOR) 20 MG tablet TAKE 1 TABLET EVERY DAY AT BEDTIME 90 tablet 1   • triamterene-hydrochlorothiazide (DYAZIDE) 37.5-25 MG per capsule TAKE 1 CAPSULE EVERY DAY 90 capsule 3   • VOLTAREN 1 % gel gel Daily.     • nitrofurantoin, macrocrystal-monohydrate, (MACROBID) 100 MG capsule Take 1 capsule by mouth 2 (Two) Times a Day. 10 capsule 0     No current facility-administered medications on file prior to visit.        Current outpatient and discharge medications have been reconciled for the patient.  Reviewed by: Latoya Bolaños MD        The following portions of the patient's history were reviewed and updated as appropriate: allergies, current medications, past family history, past medical history, past social history, past surgical history and problem list.    Review of Systems   Constitutional: Negative for fatigue and unexpected weight change.   Eyes: Positive for visual disturbance.   Respiratory: Positive for wheezing. Negative for cough and shortness of breath.    Cardiovascular: Positive for leg swelling. Negative for chest pain and palpitations.        Recent COLIN, but no orthopnea or claudication.   Gastrointestinal: Negative for abdominal pain, blood in stool, constipation, diarrhea, nausea and vomiting.        Denies melena.   Endocrine: Negative for polydipsia and polyuria.   Musculoskeletal: Positive for arthralgias. Negative for myalgias.   Neurological: Negative for dizziness, syncope, light-headedness and headaches.        Stable memory issues.   Psychiatric/Behavioral: Positive for decreased concentration.         Objective       Blood pressure 146/86, pulse 88, temperature 97.8 °F (36.6 °C), temperature source Temporal, resp. rate 18, weight 67.7 kg (149 lb 4 oz).      Physical Exam    Constitutional: She appears well-developed and well-nourished.   Neck: Normal range of motion. Neck supple. Carotid bruit is not present. No thyromegaly present.   Cardiovascular: Normal rate, regular rhythm, normal heart sounds and intact distal pulses. Exam reveals no gallop and no friction rub.   No murmur heard.  No peripheral edema.   Pulmonary/Chest: Effort normal and breath sounds normal.   Abdominal: Soft. Bowel sounds are normal. She exhibits no distension, no abdominal bruit and no mass. There is no hepatosplenomegaly. There is no tenderness.   Psychiatric: She has a normal mood and affect.   Nursing note and vitals reviewed.      Assessment / Plan:  Pati was seen today for hypertension and hyperlipidemia.    Diagnoses and all orders for this visit:    Essential hypertension  -     Lipid Panel  -     TSH   Continue current medication(s) as noted in the history of present illness.    Other hyperlipidemia  -     Lipid Panel  -     TSH   Inform the patient we may need to restart Simvastatin pending today's lab results.    Pulmonary emphysema, unspecified emphysema type (CMS/HCC)  -     albuterol sulfate  (90 Base) MCG/ACT inhaler; Inhale 2 puffs Every 4 (Four) Hours As Needed for Wheezing or Shortness of Air.   Continue current medication(s) as noted in the history of present illness.    Rheumatoid arthritis, involving unspecified site, unspecified rheumatoid factor presence (CMS/HCC)   Continue current medication(s) as noted in the history of present illness.   Follow-up per Rheumatology.    Gastroesophageal reflux disease, esophagitis presence not specified   Continue current medication(s) as noted in the history of present illness.    Benign colonic polyp   Refuses colonoscopy.    Need for immunization against influenza  -     Fluad Tri 65yr+      Recommended Shingrix (new Shingles vaccine), Td/Tdap vaccine (Tetanus booster), and Hepatitis A vaccination at the pharmacy.  Patient  declines.    The patient declined scheduling a Medicare Wellness Exam today.          Return in about 6 months (around 4/23/2020) for Recheck HTN, fasting.

## 2019-11-14 ENCOUNTER — LAB (OUTPATIENT)
Dept: LAB | Facility: HOSPITAL | Age: 84
End: 2019-11-14

## 2019-11-14 ENCOUNTER — TRANSCRIBE ORDERS (OUTPATIENT)
Dept: LAB | Facility: HOSPITAL | Age: 84
End: 2019-11-14

## 2019-11-14 DIAGNOSIS — Z79.899 ENCOUNTER FOR LONG-TERM (CURRENT) USE OF OTHER MEDICATIONS: ICD-10-CM

## 2019-11-14 DIAGNOSIS — M05.89 OTHER RHEUMATOID ARTHRITIS WITH RHEUMATOID FACTOR OF MULTIPLE SITES (HCC): ICD-10-CM

## 2019-11-14 DIAGNOSIS — M05.89 OTHER RHEUMATOID ARTHRITIS WITH RHEUMATOID FACTOR OF MULTIPLE SITES (HCC): Primary | ICD-10-CM

## 2019-11-14 PROCEDURE — 36415 COLL VENOUS BLD VENIPUNCTURE: CPT | Performed by: INTERNAL MEDICINE

## 2019-11-14 PROCEDURE — 85025 COMPLETE CBC W/AUTO DIFF WBC: CPT

## 2019-11-14 PROCEDURE — 85652 RBC SED RATE AUTOMATED: CPT

## 2019-11-14 PROCEDURE — 80053 COMPREHEN METABOLIC PANEL: CPT

## 2019-11-14 PROCEDURE — 86140 C-REACTIVE PROTEIN: CPT | Performed by: INTERNAL MEDICINE

## 2019-11-15 LAB
ALBUMIN SERPL-MCNC: 4.1 G/DL (ref 3.5–5.2)
ALBUMIN/GLOB SERPL: 1.1 G/DL
ALP SERPL-CCNC: 61 U/L (ref 39–117)
ALT SERPL W P-5'-P-CCNC: 12 U/L (ref 1–33)
ANION GAP SERPL CALCULATED.3IONS-SCNC: 12.5 MMOL/L (ref 5–15)
AST SERPL-CCNC: 23 U/L (ref 1–32)
BASOPHILS # BLD AUTO: 0.04 10*3/MM3 (ref 0–0.2)
BASOPHILS NFR BLD AUTO: 0.6 % (ref 0–1.5)
BILIRUB SERPL-MCNC: 0.3 MG/DL (ref 0.2–1.2)
BUN BLD-MCNC: 27 MG/DL (ref 8–23)
BUN/CREAT SERPL: 26 (ref 7–25)
CALCIUM SPEC-SCNC: 9.6 MG/DL (ref 8.6–10.5)
CHLORIDE SERPL-SCNC: 102 MMOL/L (ref 98–107)
CO2 SERPL-SCNC: 31.5 MMOL/L (ref 22–29)
CREAT BLD-MCNC: 1.04 MG/DL (ref 0.57–1)
CRP SERPL-MCNC: 0.09 MG/DL (ref 0–0.5)
DEPRECATED RDW RBC AUTO: 48.3 FL (ref 37–54)
EOSINOPHIL # BLD AUTO: 0.15 10*3/MM3 (ref 0–0.4)
EOSINOPHIL NFR BLD AUTO: 2.4 % (ref 0.3–6.2)
ERYTHROCYTE [DISTWIDTH] IN BLOOD BY AUTOMATED COUNT: 13.2 % (ref 12.3–15.4)
ERYTHROCYTE [SEDIMENTATION RATE] IN BLOOD: 22 MM/HR (ref 0–30)
GFR SERPL CREATININE-BSD FRML MDRD: 50 ML/MIN/1.73
GLOBULIN UR ELPH-MCNC: 3.6 GM/DL
GLUCOSE BLD-MCNC: 100 MG/DL (ref 65–99)
HCT VFR BLD AUTO: 34.7 % (ref 34–46.6)
HGB BLD-MCNC: 11.4 G/DL (ref 12–15.9)
IMM GRANULOCYTES # BLD AUTO: 0.02 10*3/MM3 (ref 0–0.05)
IMM GRANULOCYTES NFR BLD AUTO: 0.3 % (ref 0–0.5)
LYMPHOCYTES # BLD AUTO: 1.68 10*3/MM3 (ref 0.7–3.1)
LYMPHOCYTES NFR BLD AUTO: 27.2 % (ref 19.6–45.3)
MCH RBC QN AUTO: 33.8 PG (ref 26.6–33)
MCHC RBC AUTO-ENTMCNC: 32.9 G/DL (ref 31.5–35.7)
MCV RBC AUTO: 103 FL (ref 79–97)
MONOCYTES # BLD AUTO: 0.61 10*3/MM3 (ref 0.1–0.9)
MONOCYTES NFR BLD AUTO: 9.9 % (ref 5–12)
NEUTROPHILS # BLD AUTO: 3.67 10*3/MM3 (ref 1.7–7)
NEUTROPHILS NFR BLD AUTO: 59.6 % (ref 42.7–76)
NRBC BLD AUTO-RTO: 0 /100 WBC (ref 0–0.2)
PLATELET # BLD AUTO: 270 10*3/MM3 (ref 140–450)
PMV BLD AUTO: 10.3 FL (ref 6–12)
POTASSIUM BLD-SCNC: 4.2 MMOL/L (ref 3.5–5.2)
PROT SERPL-MCNC: 7.7 G/DL (ref 6–8.5)
RBC # BLD AUTO: 3.37 10*6/MM3 (ref 3.77–5.28)
SODIUM BLD-SCNC: 146 MMOL/L (ref 136–145)
WBC NRBC COR # BLD: 6.17 10*3/MM3 (ref 3.4–10.8)

## 2019-12-05 PROCEDURE — 87186 SC STD MICRODIL/AGAR DIL: CPT | Performed by: FAMILY MEDICINE

## 2019-12-05 PROCEDURE — 87077 CULTURE AEROBIC IDENTIFY: CPT | Performed by: FAMILY MEDICINE

## 2019-12-05 PROCEDURE — 87086 URINE CULTURE/COLONY COUNT: CPT | Performed by: FAMILY MEDICINE

## 2019-12-08 ENCOUNTER — HOSPITAL ENCOUNTER (EMERGENCY)
Facility: HOSPITAL | Age: 84
Discharge: HOME | End: 2019-12-08
Attending: EMERGENCY MEDICINE | Admitting: EMERGENCY MEDICINE

## 2019-12-08 ENCOUNTER — TELEPHONE (OUTPATIENT)
Dept: URGENT CARE | Facility: CLINIC | Age: 84
End: 2019-12-08

## 2019-12-08 VITALS
HEIGHT: 64 IN | HEART RATE: 100 BPM | SYSTOLIC BLOOD PRESSURE: 147 MMHG | TEMPERATURE: 98.5 F | BODY MASS INDEX: 26.46 KG/M2 | DIASTOLIC BLOOD PRESSURE: 81 MMHG | OXYGEN SATURATION: 97 % | RESPIRATION RATE: 16 BRPM | WEIGHT: 155 LBS

## 2019-12-08 DIAGNOSIS — N30.00 ACUTE CYSTITIS WITHOUT HEMATURIA: ICD-10-CM

## 2019-12-08 DIAGNOSIS — N30.00 ACUTE CYSTITIS WITHOUT HEMATURIA: Primary | ICD-10-CM

## 2019-12-08 DIAGNOSIS — N39.0 ACUTE UTI: Primary | ICD-10-CM

## 2019-12-08 LAB
ALBUMIN SERPL-MCNC: 4 G/DL (ref 3.5–5.2)
ALBUMIN/GLOB SERPL: 1.2 G/DL
ALP SERPL-CCNC: 60 U/L (ref 39–117)
ALT SERPL W P-5'-P-CCNC: 19 U/L (ref 1–33)
ANION GAP SERPL CALCULATED.3IONS-SCNC: 11 MMOL/L (ref 5–15)
AST SERPL-CCNC: 34 U/L (ref 1–32)
BASOPHILS # BLD AUTO: 0.03 10*3/MM3 (ref 0–0.2)
BASOPHILS NFR BLD AUTO: 0.4 % (ref 0–1.5)
BILIRUB SERPL-MCNC: 0.4 MG/DL (ref 0.2–1.2)
BUN BLD-MCNC: 26 MG/DL (ref 8–23)
BUN/CREAT SERPL: 24.3 (ref 7–25)
CALCIUM SPEC-SCNC: 9.7 MG/DL (ref 8.6–10.5)
CHLORIDE SERPL-SCNC: 97 MMOL/L (ref 98–107)
CO2 SERPL-SCNC: 29 MMOL/L (ref 22–29)
CREAT BLD-MCNC: 1.07 MG/DL (ref 0.57–1)
DEPRECATED RDW RBC AUTO: 54.6 FL (ref 37–54)
EOSINOPHIL # BLD AUTO: 0.04 10*3/MM3 (ref 0–0.4)
EOSINOPHIL NFR BLD AUTO: 0.6 % (ref 0.3–6.2)
ERYTHROCYTE [DISTWIDTH] IN BLOOD BY AUTOMATED COUNT: 14.2 % (ref 12.3–15.4)
GFR SERPL CREATININE-BSD FRML MDRD: 48 ML/MIN/1.73
GLOBULIN UR ELPH-MCNC: 3.3 GM/DL
GLUCOSE BLD-MCNC: 96 MG/DL (ref 65–99)
HCT VFR BLD AUTO: 32.9 % (ref 34–46.6)
HGB BLD-MCNC: 10.7 G/DL (ref 12–15.9)
IMM GRANULOCYTES # BLD AUTO: 0.03 10*3/MM3 (ref 0–0.05)
IMM GRANULOCYTES NFR BLD AUTO: 0.4 % (ref 0–0.5)
LYMPHOCYTES # BLD AUTO: 0.88 10*3/MM3 (ref 0.7–3.1)
LYMPHOCYTES NFR BLD AUTO: 12.3 % (ref 19.6–45.3)
MCH RBC QN AUTO: 34.5 PG (ref 26.6–33)
MCHC RBC AUTO-ENTMCNC: 32.5 G/DL (ref 31.5–35.7)
MCV RBC AUTO: 106.1 FL (ref 79–97)
MONOCYTES # BLD AUTO: 0.28 10*3/MM3 (ref 0.1–0.9)
MONOCYTES NFR BLD AUTO: 3.9 % (ref 5–12)
NEUTROPHILS # BLD AUTO: 5.88 10*3/MM3 (ref 1.7–7)
NEUTROPHILS NFR BLD AUTO: 82.4 % (ref 42.7–76)
NRBC BLD AUTO-RTO: 0 /100 WBC (ref 0–0.2)
PLATELET # BLD AUTO: 261 10*3/MM3 (ref 140–450)
PMV BLD AUTO: 9.7 FL (ref 6–12)
POTASSIUM BLD-SCNC: 3.8 MMOL/L (ref 3.5–5.2)
PROT SERPL-MCNC: 7.3 G/DL (ref 6–8.5)
RBC # BLD AUTO: 3.1 10*6/MM3 (ref 3.77–5.28)
SODIUM BLD-SCNC: 137 MMOL/L (ref 136–145)
WBC NRBC COR # BLD: 7.14 10*3/MM3 (ref 3.4–10.8)

## 2019-12-08 PROCEDURE — 96365 THER/PROPH/DIAG IV INF INIT: CPT

## 2019-12-08 PROCEDURE — 85025 COMPLETE CBC W/AUTO DIFF WBC: CPT | Performed by: PHYSICIAN ASSISTANT

## 2019-12-08 PROCEDURE — 99284 EMERGENCY DEPT VISIT MOD MDM: CPT

## 2019-12-08 PROCEDURE — 80053 COMPREHEN METABOLIC PANEL: CPT | Performed by: PHYSICIAN ASSISTANT

## 2019-12-08 PROCEDURE — 25010000002 CEFTRIAXONE: Performed by: PHYSICIAN ASSISTANT

## 2019-12-08 PROCEDURE — 99283 EMERGENCY DEPT VISIT LOW MDM: CPT

## 2019-12-08 RX ORDER — CEFDINIR 300 MG/1
300 CAPSULE ORAL 2 TIMES DAILY
Qty: 20 CAPSULE | Refills: 0 | Status: SHIPPED | OUTPATIENT
Start: 2019-12-08 | End: 2019-12-08 | Stop reason: SDUPTHER

## 2019-12-08 RX ORDER — CEFDINIR 300 MG/1
300 CAPSULE ORAL 2 TIMES DAILY
Qty: 20 CAPSULE | Refills: 0 | Status: SHIPPED | OUTPATIENT
Start: 2019-12-08 | End: 2019-12-18

## 2019-12-08 RX ADMIN — CEFTRIAXONE 1 G: 1 INJECTION, POWDER, FOR SOLUTION INTRAMUSCULAR; INTRAVENOUS at 13:20

## 2019-12-08 NOTE — TELEPHONE ENCOUNTER
"Spoke with pt about her urine culture. Her meds are being changed to omnicef. Pt stated she isn't feeling nay better from frequency. She was advised to FU with her PCP tomorrow. Pt stated she cant make it there tomorrow because she has 2 other Dr\"s appt and her dtrs need to take off work to take her. She VU and will start her new abx and will go to the ER if her symptoms worsen. Reviewed by SERA Doe PA-C.  "

## 2019-12-08 NOTE — ED PROVIDER NOTES
Subjective   Pati Carter is a 89 year old female presenting to the ED today due to an UTI treatment issue. Her daughter reports 3 days ago she presented to the [facility] and was diagnosed with a UTI and prescribed an antibiotic. Today she was called by the [facility] and her daughter states she was told she was resistant to the antibiotic prescribed and she was allergic to the other recommended medications so they recommended she come to the ED. She complains of increased urinary frequency during the night, however she denies vomiting, fever, or dysuria. Currently she states she feels no worse than she did 3 days ago. She also complaints of left sided tooth pain but her daughter states she has an appointment with her dentist this week. Additionally she states she has an appointment with her optometrist and arthritis doctor this week as well. There are no other acute complaints at this time.           History provided by:  Patient and relative  Other   Quality:  Medication problem  Severity:  Moderate  Onset quality:  Sudden  Timing:  Constant  Progression:  Unchanged  Chronicity:  New  Associated symptoms: no fever and no vomiting        Review of Systems   Constitutional: Negative for fever.   HENT:        Positive for left sided tooth pain.   Gastrointestinal: Negative for vomiting.   Genitourinary: Positive for frequency (increase). Negative for dysuria.   All other systems reviewed and are negative.      Past Medical History:   Diagnosis Date   • Anemia     Description: A.  Dx 2006- borderline intermittent.   • Back pain    • Benign colonic polyp     Description: A.  Dx 1999.   • Compression fracture of lumbar vertebra (CMS/[facility])    • Constipation    • COPD (chronic obstructive pulmonary disease) (CMS/[facility])     Description: A.  Rule out chronic persistent asthma, COPD, or obliterative bronchiolitis.- Butch   • Degeneration of intervertebral disc of lumbar region     Description: A.  Diagnosed in April 2013 with  advanced multilevel with severe spinal stenosis, followed by Dr. Pillai for pain management.   • Gastroesophageal reflux disease    • Hearing loss    • History of colonoscopy 01/01/1999    NORMAL PER PATIENT    • History of mammogram 01/01/2011    NORMAL PER PT    • History of Papanicolaou smear of cervix 01/01/2010    NORMAL PER PT    • History of varicella    • Hyperlipidemia     Description: A.  Dx 2006.   • Hypertension     Description: A. Dx 2001.   • Macular degeneration    • Ovarian mass     Dx 8/15- benign left cystic adnexal mass   • Rheumatoid arthritis (CMS/HCC)     Description: A.  Diagnosed in 2000 and and followed by Dr. Constantino (now Dr. Kaplan). B.  On methotrexate therapy since 2000. C.  Off low-dose prednisone therapy.       Allergies   Allergen Reactions   • Bactrim [Sulfamethoxazole-Trimethoprim] Other (See Comments)     Stomach cramps    • Ciprofloxacin Other (See Comments)     Other reaction(s): shaking  HCI TABS/ SHAKING   • Levofloxacin Diarrhea       Past Surgical History:   Procedure Laterality Date   • APPENDECTOMY     • CARPAL TUNNEL RELEASE Left 01/01/2003    HISTORY OF NEUROPLASTY DECOMPRESSION MEDIAN NERVE AT CARPAL TUNNEL LEFT   • CATARACT EXTRACTION Bilateral 01/01/2009   • CHOLECYSTECTOMY  01/01/1962   • KNEE ARTHROSCOPY Left 01/01/2001    MENISCAL REPAIR   • KYPHOPLASTY  06/18/2015    T11 AND L1 (JOSE A)   • PELVIC LAPAROSCOPY  01/01/1996    REMOVAL OF BENIGN UTERINE AND RIGHT OVARIAN TUMORS   • SALPINGO OOPHORECTOMY Left 08/26/2015    REMOVAL OF LEFT OVARY AND TUBE (benign cystic mass)       Family History   Problem Relation Age of Onset   • Hypertension Mother    • Diabetes Mother        Social History     Socioeconomic History   • Marital status:      Spouse name: Not on file   • Number of children: Not on file   • Years of education: Not on file   • Highest education level: Not on file   Tobacco Use   • Smoking status: Former Smoker   • Smokeless tobacco: Never Used    Substance and Sexual Activity   • Alcohol use: No   • Drug use: No   • Sexual activity: Defer         Objective   Physical Exam   Constitutional: She is oriented to person, place, and time. She appears well-developed and well-nourished. No distress.   HENT:   Head: Normocephalic and atraumatic.   Eyes: Conjunctivae are normal. No scleral icterus.   Neck: Normal range of motion. Neck supple.   Cardiovascular: Normal rate, regular rhythm and normal heart sounds.   No murmur heard.  Pulmonary/Chest: Effort normal and breath sounds normal. No respiratory distress.   Abdominal: Soft. There is no tenderness.   Musculoskeletal: Normal range of motion.   Neurological: She is alert and oriented to person, place, and time.   Skin: Skin is warm and dry.   Psychiatric: She has a normal mood and affect. Her behavior is normal.   Nursing note and vitals reviewed.      Procedures         ED Course  ED Course as of Dec 09 1320   Sun Dec 08, 2019   1422 Discussed the findings with the patient.  Reviewed the urine culture and third-generation cephalosporin should be sensitive.  We will send her home with a prescription for Omnicef.  Patient is not vomiting and she would like to be discharged home.    [ARIANE]      ED Course User Index  [ARIANE] Leonardo Orozco PA     No results found for this or any previous visit (from the past 24 hour(s)).  Note: In addition to lab results from this visit, the labs listed above may include labs taken at another facility or during a different encounter within the last 24 hours. Please correlate lab times with ED admission and discharge times for further clarification of the services performed during this visit.    No orders to display     Vitals:    12/08/19 1330 12/08/19 1400 12/08/19 1401 12/08/19 1430   BP: 158/88 147/79  147/81   BP Location:       Patient Position:       Pulse:       Resp:       Temp:       TempSrc:       SpO2: 97%  97% 97%   Weight:       Height:         Medications    cefTRIAXone (ROCEPHIN) 1 g/100 mL 0.9% NS (MBP) (0 g Intravenous Stopped 12/8/19 1359)     ECG/EMG Results (last 24 hours)     ** No results found for the last 24 hours. **        No orders to display                       MDM  Number of Diagnoses or Management Options  Acute UTI: new and requires workup     Amount and/or Complexity of Data Reviewed  Clinical lab tests: reviewed and ordered  Tests in the medicine section of CPT®: ordered and reviewed  Discuss the patient with other providers: yes    Patient Progress  Patient progress: stable      Final diagnoses:   Acute UTI       Documentation assistance provided by meche Meyers.  Information recorded by the meche was done at my direction and has been verified and validated by me.     Monica Meyers  12/08/19 1337       Leonardo Orozco PA  12/09/19 1328

## 2019-12-12 RX ORDER — TRIAMTERENE AND HYDROCHLOROTHIAZIDE 37.5; 25 MG/1; MG/1
CAPSULE ORAL
Qty: 90 CAPSULE | Refills: 0 | Status: SHIPPED | OUTPATIENT
Start: 2019-12-12 | End: 2020-01-28 | Stop reason: HOSPADM

## 2020-01-20 ENCOUNTER — OFFICE VISIT (OUTPATIENT)
Dept: INTERNAL MEDICINE | Facility: CLINIC | Age: 85
End: 2020-01-20

## 2020-01-20 VITALS
RESPIRATION RATE: 20 BRPM | HEART RATE: 78 BPM | WEIGHT: 148 LBS | BODY MASS INDEX: 24.63 KG/M2 | SYSTOLIC BLOOD PRESSURE: 140 MMHG | DIASTOLIC BLOOD PRESSURE: 80 MMHG | TEMPERATURE: 97.1 F

## 2020-01-20 DIAGNOSIS — J20.9 ACUTE BRONCHITIS, UNSPECIFIED ORGANISM: Primary | ICD-10-CM

## 2020-01-20 DIAGNOSIS — N39.0 ACUTE UTI: ICD-10-CM

## 2020-01-20 PROBLEM — Z79.631 LONG TERM METHOTREXATE USER: Status: ACTIVE | Noted: 2020-01-20

## 2020-01-20 PROBLEM — Z87.39 H/O CALCIUM PYROPHOSPHATE DEPOSITION DISEASE (CPPD): Status: ACTIVE | Noted: 2020-01-20

## 2020-01-20 LAB
BILIRUB BLD-MCNC: NEGATIVE MG/DL
CLARITY, POC: CLEAR
COLOR UR: YELLOW
EXPIRATION DATE: ABNORMAL
GLUCOSE UR STRIP-MCNC: NEGATIVE MG/DL
KETONES UR QL: ABNORMAL
LEUKOCYTE EST, POC: NEGATIVE
Lab: ABNORMAL
NITRITE UR-MCNC: NEGATIVE MG/ML
PH UR: 6.5 [PH] (ref 5–8)
PROT UR STRIP-MCNC: NEGATIVE MG/DL
RBC # UR STRIP: NEGATIVE /UL
SP GR UR: 1.01 (ref 1–1.03)
UROBILINOGEN UR QL: NORMAL

## 2020-01-20 PROCEDURE — 87086 URINE CULTURE/COLONY COUNT: CPT | Performed by: INTERNAL MEDICINE

## 2020-01-20 PROCEDURE — 81003 URINALYSIS AUTO W/O SCOPE: CPT | Performed by: INTERNAL MEDICINE

## 2020-01-20 PROCEDURE — 99213 OFFICE O/P EST LOW 20 MIN: CPT | Performed by: INTERNAL MEDICINE

## 2020-01-20 NOTE — PROGRESS NOTES
Subjective       Pati Carter is a 89 y.o. female.     Chief Complaint   Patient presents with   • Bronchitis     follow up from [facility] 1/10/2020   • Urinary Tract Infection     follow up from [facility]  12/5/2019       History obtained from the patient and chart review.    The patient is here for [facility]/Ed  follow-ups.  Records have been received and reviewed.      The patient was seen at [facility] on 12/5/2019.  She was diagnosed with a UTI, and put on Macrobid.  Urine culture grew greater than 100,000 Providencia rettgeri, which was resistant to Macrobid.  She was sent to the [facility] ED on 12/8/2019.  CMP was normal with the exception of a mildly elevated Creatinine (1.07).  CBC was significant for stable Anemia (hemoglobin 10.7/hematocrit 32.9).  White blood cell count was 7.14.  She was given IV Ceftriaxone and sent home on a Cefdinir 10-day course.  She states her symptoms have resolved.    The patient was seen at [facility] on 1/10/2020, and was diagnosed with Acute Bronchitis.  No testing or x-rays were done.  She was discharged on Cefdinir and Tessalon Perle.  She states her symptoms are slowly resolving.      Bronchitis   Chronicity: Follow up from [facility] on 1/10/20. Episode onset: 1/5/20. The problem has been gradually improving. Associated symptoms include arthralgias, chills, congestion, coughing (dry, productive of white/green sputum), fatigue, headaches, myalgias, a rash (chronic on back from MTX) and a sore throat. Pertinent negatives include no abdominal pain, chest pain, fever, nausea, swollen glands or vomiting. Nothing aggravates the symptoms. Treatments tried: Cefdinir. Off tessalon. The treatment provided significant relief.   Urinary Tract Infection    Chronicity: follow up from [facility] on 12/5/19 and [facility] ED 12/8/20. The problem has been resolved. The patient is experiencing no pain. There has been no fever. Associated symptoms include chills. Pertinent negatives include no discharge, flank pain,  frequency, hematuria, nausea, urgency or vomiting. Treatments tried: s/p Macrobid x  Her past medical history is significant for recurrent UTIs.        Current Outpatient Medications on File Prior to Visit   Medication Sig Dispense Refill   • albuterol sulfate  (90 Base) MCG/ACT inhaler Inhale 2 puffs Every 4 (Four) Hours As Needed for Wheezing or Shortness of Air. 3 inhaler 4   • cefdinir (OMNICEF) 300 MG capsule Take 1 capsule by mouth 2 (Two) Times a Day for 10 days. 20 capsule 0   • docusate sodium (COLACE) 100 MG capsule Take  by mouth.     • famotidine (PEPCID) 20 MG tablet Take 1 tablet by mouth 2 (Two) Times a Day. 60 tablet 2   • Fluticasone Furoate-Vilanterol (BREO ELLIPTA) 200-25 MCG/INH aerosol powder  Inhale.     • folic acid (FOLVITE) 1 MG tablet Take 1 tablet by mouth Daily.     • methotrexate 2.5 MG tablet Take 15 mg by mouth 1 (One) Time Per Week.     • Multiple Vitamins-Minerals (CENTRUM SILVER ADULT 50+ PO) Take  by mouth Daily.     • triamterene-hydrochlorothiazide (DYAZIDE) 37.5-25 MG per capsule TAKE 1 CAPSULE EVERY DAY 90 capsule 0   • VOLTAREN 1 % gel gel Daily.     • benzonatate (TESSALON) 100 MG capsule Take 1 capsule by mouth 3 (Three) Times a Day As Needed for Cough for up to 10 days. 20 capsule 0   • meloxicam (MOBIC) 15 MG tablet Take 0.5 tablets by mouth Daily As Needed for Moderate Pain .     • nystatin (MYCOSTATIN) 690017 UNIT/GM ointment Daily.     • simvastatin (ZOCOR) 20 MG tablet TAKE 1 TABLET EVERY DAY AT BEDTIME 90 tablet 1   • [DISCONTINUED] cyclobenzaprine (FLEXERIL) 5 MG tablet Take 1 tablet by mouth 3 (Three) Times a Day As Needed for Muscle Spasms. 30 tablet 0     No current facility-administered medications on file prior to visit.        Current outpatient and discharge medications have been reconciled for the patient.  Reviewed by: Latoya Bolaños MD        The following portions of the patient's history were reviewed and updated as appropriate: allergies, current  medications, past family history, past medical history, past social history, past surgical history and problem list.    Review of Systems   Constitutional: Positive for chills and fatigue. Negative for fever.   HENT: Positive for congestion, ear pain (intermittent), postnasal drip, rhinorrhea (white), sinus pressure, sinus pain, sneezing and sore throat. Negative for ear discharge and voice change.    Eyes: Negative for pain, discharge, redness and itching.   Respiratory: Positive for cough (dry, productive of white/green sputum) and shortness of breath (mild). Negative for wheezing.    Cardiovascular: Negative for chest pain.   Gastrointestinal: Negative for abdominal pain, nausea and vomiting.   Genitourinary: Negative for dysuria, flank pain, frequency, hematuria, pelvic pain, urgency and vaginal discharge.   Musculoskeletal: Positive for arthralgias and myalgias.   Skin: Positive for rash (chronic on back from MTX).   Neurological: Positive for headaches.   Hematological: Negative for adenopathy.         Objective       Blood pressure 140/80, pulse 78, temperature 97.1 °F (36.2 °C), temperature source Temporal, resp. rate 20, weight 67.1 kg (148 lb).      Physical Exam   Constitutional: She appears well-developed and well-nourished.   HENT:   Head: Normocephalic and atraumatic.   Right Ear: Tympanic membrane, external ear and ear canal normal.   Left Ear: Tympanic membrane, external ear and ear canal normal.   Mouth/Throat: Oropharynx is clear and moist and mucous membranes are normal. No oral lesions.   Tonsils normal.  No sinus tenderness to palpation.   Eyes: Conjunctivae are normal.   Neck: Normal range of motion. Neck supple.   Cardiovascular: Normal rate and regular rhythm.   No murmur heard.  Pulmonary/Chest: Effort normal and breath sounds normal.   Abdominal: Soft. Bowel sounds are normal. She exhibits no distension and no mass. There is no hepatosplenomegaly. There is no tenderness. There is no CVA  tenderness.   Lymphadenopathy:     She has no cervical adenopathy.   Neurological: She is alert.   Skin: No rash noted.   Psychiatric: She has a normal mood and affect.   Nursing note and vitals reviewed.    Results for orders placed or performed in visit on 01/20/20   POC Urinalysis Dipstick, Automated   Result Value Ref Range    Color Yellow Yellow, Straw, Dark Yellow, Selena    Clarity, UA Clear Clear    Specific Gravity  1.010 1.005 - 1.030    pH, Urine 6.5 5.0 - 8.0    Leukocytes Negative Negative    Nitrite, UA Negative Negative    Protein, POC Negative Negative mg/dL    Glucose, UA Negative Negative, 1000 mg/dL (3+) mg/dL    Ketones, UA 15 mg/dL (A) Negative    Urobilinogen, UA Normal Normal    Bilirubin Negative Negative    Blood, UA Negative Negative    Lot Number [id number]     Expiration Date 9-30-20        Assessment / Plan:  Pati was seen today for bronchitis and urinary tract infection.    Diagnoses and all orders for this visit:    Acute bronchitis, unspecified organism   The patient agrees to complete current course of Cefdinir    Acute UTI  -     POC Urinalysis Dipstick, Automated  -     Urine Culture - Urine, Urine, Clean Catch         Return for Next scheduled follow up.

## 2020-01-21 LAB — BACTERIA SPEC AEROBE CULT: NO GROWTH

## 2020-01-25 ENCOUNTER — APPOINTMENT (OUTPATIENT)
Dept: CT IMAGING | Facility: HOSPITAL | Age: 85
End: 2020-01-25

## 2020-01-25 ENCOUNTER — APPOINTMENT (OUTPATIENT)
Dept: GENERAL RADIOLOGY | Facility: HOSPITAL | Age: 85
End: 2020-01-25

## 2020-01-25 ENCOUNTER — ANESTHESIA (OUTPATIENT)
Dept: PERIOP | Facility: HOSPITAL | Age: 85
End: 2020-01-25

## 2020-01-25 ENCOUNTER — ANESTHESIA EVENT (OUTPATIENT)
Dept: PERIOP | Facility: HOSPITAL | Age: 85
End: 2020-01-25

## 2020-01-25 ENCOUNTER — HOSPITAL ENCOUNTER (INPATIENT)
Facility: HOSPITAL | Age: 85
LOS: 3 days | Discharge: NURSING, REHAB OR LONG-TERM CARE | End: 2020-01-28
Attending: EMERGENCY MEDICINE | Admitting: INTERNAL MEDICINE

## 2020-01-25 DIAGNOSIS — Z74.09 IMPAIRED FUNCTIONAL MOBILITY, BALANCE, GAIT, AND ENDURANCE: ICD-10-CM

## 2020-01-25 DIAGNOSIS — M25.551 RIGHT HIP PAIN: ICD-10-CM
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
## 2020-01-25 DIAGNOSIS — S72.001A CLOSED FRACTURE OF NECK OF RIGHT FEMUR, INITIAL ENCOUNTER (HCC): Primary | ICD-10-CM

## 2020-01-25 DIAGNOSIS — Z74.09 IMPAIRED MOBILITY AND ADLS: ICD-10-CM

## 2020-01-25 DIAGNOSIS — Z78.9 IMPAIRED MOBILITY AND ADLS: ICD-10-CM

## 2020-01-25 PROBLEM — S72.91XA FEMUR FRACTURE, RIGHT: Status: ACTIVE | Noted: 2020-01-25

## 2020-01-25 LAB
ALBUMIN SERPL-MCNC: 4.2 G/DL (ref 3.5–5.2)
ALBUMIN/GLOB SERPL: 1.4 G/DL
ALP SERPL-CCNC: 65 U/L (ref 39–117)
ALT SERPL W P-5'-P-CCNC: 19 U/L (ref 1–33)
ANION GAP SERPL CALCULATED.3IONS-SCNC: 12 MMOL/L (ref 5–15)
AST SERPL-CCNC: 34 U/L (ref 1–32)
BASOPHILS # BLD AUTO: 0.03 10*3/MM3 (ref 0–0.2)
BASOPHILS NFR BLD AUTO: 0.3 % (ref 0–1.5)
BILIRUB SERPL-MCNC: 0.4 MG/DL (ref 0.2–1.2)
BUN BLD-MCNC: 25 MG/DL (ref 8–23)
BUN/CREAT SERPL: 22.3 (ref 7–25)
CALCIUM SPEC-SCNC: 9.3 MG/DL (ref 8.6–10.5)
CHLORIDE SERPL-SCNC: 100 MMOL/L (ref 98–107)
CO2 SERPL-SCNC: 31 MMOL/L (ref 22–29)
CREAT BLD-MCNC: 1.12 MG/DL (ref 0.57–1)
DEPRECATED RDW RBC AUTO: 51.8 FL (ref 37–54)
EOSINOPHIL # BLD AUTO: 0.07 10*3/MM3 (ref 0–0.4)
EOSINOPHIL NFR BLD AUTO: 0.7 % (ref 0.3–6.2)
ERYTHROCYTE [DISTWIDTH] IN BLOOD BY AUTOMATED COUNT: 13.7 % (ref 12.3–15.4)
GFR SERPL CREATININE-BSD FRML MDRD: 46 ML/MIN/1.73
GLOBULIN UR ELPH-MCNC: 3.1 GM/DL
GLUCOSE BLD-MCNC: 109 MG/DL (ref 65–99)
HCT VFR BLD AUTO: 33.6 % (ref 34–46.6)
HGB BLD-MCNC: 11.1 G/DL (ref 12–15.9)
HOLD SPECIMEN: NORMAL
HOLD SPECIMEN: NORMAL
IMM GRANULOCYTES # BLD AUTO: 0.06 10*3/MM3 (ref 0–0.05)
IMM GRANULOCYTES NFR BLD AUTO: 0.6 % (ref 0–0.5)
LYMPHOCYTES # BLD AUTO: 0.97 10*3/MM3 (ref 0.7–3.1)
LYMPHOCYTES NFR BLD AUTO: 10.1 % (ref 19.6–45.3)
MCH RBC QN AUTO: 34.8 PG (ref 26.6–33)
MCHC RBC AUTO-ENTMCNC: 33 G/DL (ref 31.5–35.7)
MCV RBC AUTO: 105.3 FL (ref 79–97)
MONOCYTES # BLD AUTO: 0.48 10*3/MM3 (ref 0.1–0.9)
MONOCYTES NFR BLD AUTO: 5 % (ref 5–12)
NEUTROPHILS # BLD AUTO: 7.97 10*3/MM3 (ref 1.7–7)
NEUTROPHILS NFR BLD AUTO: 83.3 % (ref 42.7–76)
NRBC BLD AUTO-RTO: 0 /100 WBC (ref 0–0.2)
PLATELET # BLD AUTO: 236 10*3/MM3 (ref 140–450)
PMV BLD AUTO: 9.8 FL (ref 6–12)
POTASSIUM BLD-SCNC: 3.5 MMOL/L (ref 3.5–5.2)
PROT SERPL-MCNC: 7.3 G/DL (ref 6–8.5)
RBC # BLD AUTO: 3.19 10*6/MM3 (ref 3.77–5.28)
SODIUM BLD-SCNC: 143 MMOL/L (ref 136–145)
WBC NRBC COR # BLD: 9.58 10*3/MM3 (ref 3.4–10.8)
WHOLE BLOOD HOLD SPECIMEN: NORMAL
WHOLE BLOOD HOLD SPECIMEN: NORMAL

## 2020-01-25 PROCEDURE — 25010000002 DEXAMETHASONE PER 1 MG: Performed by: ANESTHESIOLOGY

## 2020-01-25 PROCEDURE — 0QH634Z INSERTION OF INTERNAL FIXATION DEVICE INTO RIGHT UPPER FEMUR, PERCUTANEOUS APPROACH: ICD-10-PCS | Performed by: ORTHOPAEDIC SURGERY

## 2020-01-25 PROCEDURE — 73560 X-RAY EXAM OF KNEE 1 OR 2: CPT

## 2020-01-25 PROCEDURE — 73502 X-RAY EXAM HIP UNI 2-3 VIEWS: CPT

## 2020-01-25 PROCEDURE — C1769 GUIDE WIRE: HCPCS | Performed by: ORTHOPAEDIC SURGERY

## 2020-01-25 PROCEDURE — 99222 1ST HOSP IP/OBS MODERATE 55: CPT | Performed by: INTERNAL MEDICINE

## 2020-01-25 PROCEDURE — 76000 FLUOROSCOPY <1 HR PHYS/QHP: CPT

## 2020-01-25 PROCEDURE — 25010000002 SUCCINYLCHOLINE PER 20 MG: Performed by: ANESTHESIOLOGY

## 2020-01-25 PROCEDURE — C1713 ANCHOR/SCREW BN/BN,TIS/BN: HCPCS | Performed by: ORTHOPAEDIC SURGERY

## 2020-01-25 PROCEDURE — 25010000003 LIDOCAINE 1 % SOLUTION: Performed by: ANESTHESIOLOGY

## 2020-01-25 PROCEDURE — 99284 EMERGENCY DEPT VISIT MOD MDM: CPT

## 2020-01-25 PROCEDURE — 25010000003 CEFAZOLIN IN DEXTROSE 2-4 GM/100ML-% SOLUTION: Performed by: ANESTHESIOLOGY

## 2020-01-25 PROCEDURE — 72192 CT PELVIS W/O DYE: CPT

## 2020-01-25 PROCEDURE — 25010000002 FENTANYL CITRATE (PF) 100 MCG/2ML SOLUTION: Performed by: ANESTHESIOLOGY

## 2020-01-25 PROCEDURE — 25010000002 PHENYLEPHRINE PER 1 ML: Performed by: ANESTHESIOLOGY

## 2020-01-25 PROCEDURE — 25010000002 PROPOFOL 10 MG/ML EMULSION: Performed by: ANESTHESIOLOGY

## 2020-01-25 PROCEDURE — 85025 COMPLETE CBC W/AUTO DIFF WBC: CPT | Performed by: EMERGENCY MEDICINE

## 2020-01-25 PROCEDURE — 99222 1ST HOSP IP/OBS MODERATE 55: CPT | Performed by: ORTHOPAEDIC SURGERY

## 2020-01-25 PROCEDURE — 27235 TREAT THIGH FRACTURE: CPT | Performed by: ORTHOPAEDIC SURGERY

## 2020-01-25 PROCEDURE — 80053 COMPREHEN METABOLIC PANEL: CPT | Performed by: EMERGENCY MEDICINE

## 2020-01-25 DEVICE — SCRW CANN THRD 7.3X16X390MM: Type: IMPLANTABLE DEVICE | Status: FUNCTIONAL

## 2020-01-25 DEVICE — IMPLANTABLE DEVICE: Type: IMPLANTABLE DEVICE | Status: FUNCTIONAL

## 2020-01-25 RX ORDER — FENTANYL CITRATE 50 UG/ML
INJECTION, SOLUTION INTRAMUSCULAR; INTRAVENOUS AS NEEDED
Status: DISCONTINUED | OUTPATIENT
Start: 2020-01-25 | End: 2020-01-25 | Stop reason: SURG

## 2020-01-25 RX ORDER — MAGNESIUM HYDROXIDE 1200 MG/15ML
LIQUID ORAL AS NEEDED
Status: DISCONTINUED | OUTPATIENT
Start: 2020-01-25 | End: 2020-01-25 | Stop reason: HOSPADM

## 2020-01-25 RX ORDER — LIDOCAINE HYDROCHLORIDE 10 MG/ML
INJECTION, SOLUTION INFILTRATION; PERINEURAL AS NEEDED
Status: DISCONTINUED | OUTPATIENT
Start: 2020-01-25 | End: 2020-01-25 | Stop reason: SURG

## 2020-01-25 RX ORDER — ATRACURIUM BESYLATE 10 MG/ML
INJECTION, SOLUTION INTRAVENOUS AS NEEDED
Status: DISCONTINUED | OUTPATIENT
Start: 2020-01-25 | End: 2020-01-25 | Stop reason: SURG

## 2020-01-25 RX ORDER — CEFAZOLIN SODIUM 2 G/100ML
2 INJECTION, SOLUTION INTRAVENOUS EVERY 8 HOURS
Status: COMPLETED | OUTPATIENT
Start: 2020-01-26 | End: 2020-01-26

## 2020-01-25 RX ORDER — SUCCINYLCHOLINE CHLORIDE 20 MG/ML
INJECTION INTRAMUSCULAR; INTRAVENOUS AS NEEDED
Status: DISCONTINUED | OUTPATIENT
Start: 2020-01-25 | End: 2020-01-25 | Stop reason: SURG

## 2020-01-25 RX ORDER — ONDANSETRON 2 MG/ML
4 INJECTION INTRAMUSCULAR; INTRAVENOUS ONCE AS NEEDED
Status: DISCONTINUED | OUTPATIENT
Start: 2020-01-25 | End: 2020-01-25 | Stop reason: HOSPADM

## 2020-01-25 RX ORDER — FENTANYL CITRATE 50 UG/ML
25 INJECTION, SOLUTION INTRAMUSCULAR; INTRAVENOUS ONCE
Status: DISCONTINUED | OUTPATIENT
Start: 2020-01-25 | End: 2020-01-28 | Stop reason: HOSPADM

## 2020-01-25 RX ORDER — DEXAMETHASONE SODIUM PHOSPHATE 4 MG/ML
INJECTION, SOLUTION INTRA-ARTICULAR; INTRALESIONAL; INTRAMUSCULAR; INTRAVENOUS; SOFT TISSUE AS NEEDED
Status: DISCONTINUED | OUTPATIENT
Start: 2020-01-25 | End: 2020-01-25 | Stop reason: SURG

## 2020-01-25 RX ORDER — OXYCODONE HYDROCHLORIDE 5 MG/1
5 TABLET ORAL EVERY 4 HOURS PRN
Status: DISCONTINUED | OUTPATIENT
Start: 2020-01-25 | End: 2020-01-28 | Stop reason: HOSPADM

## 2020-01-25 RX ORDER — CEFAZOLIN SODIUM 2 G/100ML
INJECTION, SOLUTION INTRAVENOUS AS NEEDED
Status: DISCONTINUED | OUTPATIENT
Start: 2020-01-25 | End: 2020-01-25 | Stop reason: SURG

## 2020-01-25 RX ORDER — SODIUM CHLORIDE 0.9 % (FLUSH) 0.9 %
10 SYRINGE (ML) INJECTION EVERY 12 HOURS SCHEDULED
Status: DISCONTINUED | OUTPATIENT
Start: 2020-01-25 | End: 2020-01-25 | Stop reason: HOSPADM

## 2020-01-25 RX ORDER — ASPIRIN 81 MG/1
81 TABLET ORAL EVERY 12 HOURS SCHEDULED
Status: DISCONTINUED | OUTPATIENT
Start: 2020-01-26 | End: 2020-01-28 | Stop reason: HOSPADM

## 2020-01-25 RX ORDER — ACETAMINOPHEN 325 MG/1
650 TABLET ORAL EVERY 4 HOURS PRN
Status: DISCONTINUED | OUTPATIENT
Start: 2020-01-25 | End: 2020-01-28 | Stop reason: HOSPADM

## 2020-01-25 RX ORDER — LIDOCAINE HYDROCHLORIDE 10 MG/ML
0.5 INJECTION, SOLUTION EPIDURAL; INFILTRATION; INTRACAUDAL; PERINEURAL ONCE AS NEEDED
Status: DISCONTINUED | OUTPATIENT
Start: 2020-01-25 | End: 2020-01-25 | Stop reason: HOSPADM

## 2020-01-25 RX ORDER — SODIUM CHLORIDE 0.9 % (FLUSH) 0.9 %
3 SYRINGE (ML) INJECTION EVERY 12 HOURS SCHEDULED
Status: DISCONTINUED | OUTPATIENT
Start: 2020-01-26 | End: 2020-01-28 | Stop reason: HOSPADM

## 2020-01-25 RX ORDER — SODIUM CHLORIDE, SODIUM LACTATE, POTASSIUM CHLORIDE, CALCIUM CHLORIDE 600; 310; 30; 20 MG/100ML; MG/100ML; MG/100ML; MG/100ML
9 INJECTION, SOLUTION INTRAVENOUS CONTINUOUS
Status: DISCONTINUED | OUTPATIENT
Start: 2020-01-25 | End: 2020-01-28 | Stop reason: HOSPADM

## 2020-01-25 RX ORDER — FAMOTIDINE 10 MG/ML
20 INJECTION, SOLUTION INTRAVENOUS ONCE
Status: COMPLETED | OUTPATIENT
Start: 2020-01-25 | End: 2020-01-25

## 2020-01-25 RX ORDER — MORPHINE SULFATE 2 MG/ML
2 INJECTION, SOLUTION INTRAMUSCULAR; INTRAVENOUS
Status: DISCONTINUED | OUTPATIENT
Start: 2020-01-25 | End: 2020-01-28 | Stop reason: HOSPADM

## 2020-01-25 RX ORDER — FENTANYL CITRATE 50 UG/ML
50 INJECTION, SOLUTION INTRAMUSCULAR; INTRAVENOUS
Status: DISCONTINUED | OUTPATIENT
Start: 2020-01-25 | End: 2020-01-25 | Stop reason: HOSPADM

## 2020-01-25 RX ORDER — FAMOTIDINE 20 MG/1
20 TABLET, FILM COATED ORAL ONCE
Status: DISCONTINUED | OUTPATIENT
Start: 2020-01-25 | End: 2020-01-25 | Stop reason: HOSPADM

## 2020-01-25 RX ORDER — FOLIC ACID 1 MG/1
1000 TABLET ORAL DAILY
Status: DISCONTINUED | OUTPATIENT
Start: 2020-01-26 | End: 2020-01-28 | Stop reason: HOSPADM

## 2020-01-25 RX ORDER — HYDROMORPHONE HYDROCHLORIDE 1 MG/ML
0.5 INJECTION, SOLUTION INTRAMUSCULAR; INTRAVENOUS; SUBCUTANEOUS
Status: DISCONTINUED | OUTPATIENT
Start: 2020-01-25 | End: 2020-01-25 | Stop reason: HOSPADM

## 2020-01-25 RX ORDER — PROPOFOL 10 MG/ML
VIAL (ML) INTRAVENOUS AS NEEDED
Status: DISCONTINUED | OUTPATIENT
Start: 2020-01-25 | End: 2020-01-25 | Stop reason: SURG

## 2020-01-25 RX ORDER — BUDESONIDE AND FORMOTEROL FUMARATE DIHYDRATE 80; 4.5 UG/1; UG/1
2 AEROSOL RESPIRATORY (INHALATION)
Status: DISCONTINUED | OUTPATIENT
Start: 2020-01-25 | End: 2020-01-28 | Stop reason: HOSPADM

## 2020-01-25 RX ORDER — SODIUM CHLORIDE 0.9 % (FLUSH) 0.9 %
10 SYRINGE (ML) INJECTION AS NEEDED
Status: DISCONTINUED | OUTPATIENT
Start: 2020-01-25 | End: 2020-01-28 | Stop reason: HOSPADM

## 2020-01-25 RX ORDER — SODIUM CHLORIDE 0.9 % (FLUSH) 0.9 %
10 SYRINGE (ML) INJECTION AS NEEDED
Status: DISCONTINUED | OUTPATIENT
Start: 2020-01-25 | End: 2020-01-25 | Stop reason: HOSPADM

## 2020-01-25 RX ORDER — DOCUSATE SODIUM 100 MG/1
100 CAPSULE, LIQUID FILLED ORAL 2 TIMES DAILY
Status: DISCONTINUED | OUTPATIENT
Start: 2020-01-25 | End: 2020-01-28 | Stop reason: HOSPADM

## 2020-01-25 RX ORDER — FAMOTIDINE 20 MG/1
20 TABLET, FILM COATED ORAL 2 TIMES DAILY
Status: DISCONTINUED | OUTPATIENT
Start: 2020-01-25 | End: 2020-01-28 | Stop reason: HOSPADM

## 2020-01-25 RX ORDER — OXYCODONE HYDROCHLORIDE 5 MG/1
10 TABLET ORAL EVERY 4 HOURS PRN
Status: DISCONTINUED | OUTPATIENT
Start: 2020-01-25 | End: 2020-01-28 | Stop reason: HOSPADM

## 2020-01-25 RX ADMIN — CEFAZOLIN SODIUM 2 G: 2 INJECTION, SOLUTION INTRAVENOUS at 22:02

## 2020-01-25 RX ADMIN — FENTANYL CITRATE 100 MCG: 50 INJECTION, SOLUTION INTRAMUSCULAR; INTRAVENOUS at 22:10

## 2020-01-25 RX ADMIN — FAMOTIDINE 20 MG: 10 INJECTION, SOLUTION INTRAVENOUS at 22:01

## 2020-01-25 RX ADMIN — SUCCINYLCHOLINE CHLORIDE 120 MG: 20 INJECTION, SOLUTION INTRAMUSCULAR; INTRAVENOUS at 22:10

## 2020-01-25 RX ADMIN — SODIUM CHLORIDE, POTASSIUM CHLORIDE, SODIUM LACTATE AND CALCIUM CHLORIDE: 600; 310; 30; 20 INJECTION, SOLUTION INTRAVENOUS at 22:02

## 2020-01-25 RX ADMIN — SODIUM CHLORIDE 500 ML: 9 INJECTION, SOLUTION INTRAVENOUS at 20:56

## 2020-01-25 RX ADMIN — DEXAMETHASONE SODIUM PHOSPHATE 4 MG: 4 INJECTION, SOLUTION INTRAMUSCULAR; INTRAVENOUS at 22:10

## 2020-01-25 RX ADMIN — PROPOFOL 150 MG: 10 INJECTION, EMULSION INTRAVENOUS at 22:10

## 2020-01-25 RX ADMIN — LIDOCAINE HYDROCHLORIDE 30 MG: 10 INJECTION, SOLUTION INFILTRATION; PERINEURAL at 22:10

## 2020-01-25 RX ADMIN — ATRACURIUM BESYLATE 5 MG: 10 INJECTION, SOLUTION INTRAVENOUS at 22:10

## 2020-01-25 RX ADMIN — PHENYLEPHRINE HYDROCHLORIDE 100 MCG: 10 INJECTION INTRAVENOUS at 22:33

## 2020-01-25 RX ADMIN — SODIUM CHLORIDE, POTASSIUM CHLORIDE, SODIUM LACTATE AND CALCIUM CHLORIDE 9 ML/HR: 600; 310; 30; 20 INJECTION, SOLUTION INTRAVENOUS at 22:00

## 2020-01-26 LAB
ANION GAP SERPL CALCULATED.3IONS-SCNC: 11 MMOL/L (ref 5–15)
BUN BLD-MCNC: 25 MG/DL (ref 8–23)
BUN/CREAT SERPL: 23.4 (ref 7–25)
CALCIUM SPEC-SCNC: 8.6 MG/DL (ref 8.6–10.5)
CHLORIDE SERPL-SCNC: 101 MMOL/L (ref 98–107)
CO2 SERPL-SCNC: 27 MMOL/L (ref 22–29)
CREAT BLD-MCNC: 1.07 MG/DL (ref 0.57–1)
GFR SERPL CREATININE-BSD FRML MDRD: 48 ML/MIN/1.73
GLUCOSE BLD-MCNC: 140 MG/DL (ref 65–99)
HCT VFR BLD AUTO: 31.3 % (ref 34–46.6)
HGB BLD-MCNC: 10.2 G/DL (ref 12–15.9)
POTASSIUM BLD-SCNC: 3.6 MMOL/L (ref 3.5–5.2)
SODIUM BLD-SCNC: 139 MMOL/L (ref 136–145)

## 2020-01-26 PROCEDURE — 94640 AIRWAY INHALATION TREATMENT: CPT

## 2020-01-26 PROCEDURE — 97161 PT EVAL LOW COMPLEX 20 MIN: CPT

## 2020-01-26 PROCEDURE — 99024 POSTOP FOLLOW-UP VISIT: CPT | Performed by: ORTHOPAEDIC SURGERY

## 2020-01-26 PROCEDURE — 93010 ELECTROCARDIOGRAM REPORT: CPT | Performed by: INTERNAL MEDICINE

## 2020-01-26 PROCEDURE — 94799 UNLISTED PULMONARY SVC/PX: CPT

## 2020-01-26 PROCEDURE — 97165 OT EVAL LOW COMPLEX 30 MIN: CPT

## 2020-01-26 PROCEDURE — 80048 BASIC METABOLIC PNL TOTAL CA: CPT | Performed by: ORTHOPAEDIC SURGERY

## 2020-01-26 PROCEDURE — 99232 SBSQ HOSP IP/OBS MODERATE 35: CPT | Performed by: INTERNAL MEDICINE

## 2020-01-26 PROCEDURE — 93005 ELECTROCARDIOGRAM TRACING: CPT | Performed by: INTERNAL MEDICINE

## 2020-01-26 PROCEDURE — 97116 GAIT TRAINING THERAPY: CPT

## 2020-01-26 PROCEDURE — 85014 HEMATOCRIT: CPT | Performed by: ORTHOPAEDIC SURGERY

## 2020-01-26 PROCEDURE — 85018 HEMOGLOBIN: CPT | Performed by: ORTHOPAEDIC SURGERY

## 2020-01-26 PROCEDURE — 97535 SELF CARE MNGMENT TRAINING: CPT

## 2020-01-26 PROCEDURE — 25010000003 CEFAZOLIN IN DEXTROSE 2-4 GM/100ML-% SOLUTION: Performed by: ORTHOPAEDIC SURGERY

## 2020-01-26 RX ADMIN — FAMOTIDINE 20 MG: 20 TABLET ORAL at 08:29

## 2020-01-26 RX ADMIN — SODIUM CHLORIDE, PRESERVATIVE FREE 3 ML: 5 INJECTION INTRAVENOUS at 08:29

## 2020-01-26 RX ADMIN — ACETAMINOPHEN 650 MG: 325 TABLET ORAL at 00:05

## 2020-01-26 RX ADMIN — ACETAMINOPHEN 650 MG: 325 TABLET ORAL at 20:34

## 2020-01-26 RX ADMIN — BUDESONIDE AND FORMOTEROL FUMARATE DIHYDRATE 2 PUFF: 80; 4.5 AEROSOL RESPIRATORY (INHALATION) at 20:21

## 2020-01-26 RX ADMIN — SODIUM CHLORIDE, PRESERVATIVE FREE 3 ML: 5 INJECTION INTRAVENOUS at 20:35

## 2020-01-26 RX ADMIN — CEFAZOLIN SODIUM 2 G: 2 INJECTION, SOLUTION INTRAVENOUS at 14:53

## 2020-01-26 RX ADMIN — DOCUSATE SODIUM 100 MG: 100 CAPSULE, LIQUID FILLED ORAL at 20:34

## 2020-01-26 RX ADMIN — ASPIRIN 81 MG: 81 TABLET, COATED ORAL at 20:34

## 2020-01-26 RX ADMIN — CEFAZOLIN SODIUM 2 G: 2 INJECTION, SOLUTION INTRAVENOUS at 05:06

## 2020-01-26 RX ADMIN — BUDESONIDE AND FORMOTEROL FUMARATE DIHYDRATE 2 PUFF: 80; 4.5 AEROSOL RESPIRATORY (INHALATION) at 09:09

## 2020-01-26 RX ADMIN — FAMOTIDINE 20 MG: 20 TABLET ORAL at 20:34

## 2020-01-26 RX ADMIN — ASPIRIN 81 MG: 81 TABLET, COATED ORAL at 08:29

## 2020-01-26 RX ADMIN — ACETAMINOPHEN 650 MG: 325 TABLET ORAL at 05:41

## 2020-01-26 RX ADMIN — FOLIC ACID 1000 MCG: 1 TABLET ORAL at 08:29

## 2020-01-26 NOTE — ANESTHESIA PROCEDURE NOTES
Airway  Urgency: elective    Date/Time: 1/25/2020 10:10 PM  Airway not difficult    General Information and Staff    Patient location during procedure: OR  Anesthesiologist: Luis Daniel Camargo MD    Indications and Patient Condition  Indications for airway management: airway protection    Preoxygenated: yes  MILS not maintained throughout  Mask difficulty assessment: 1 - vent by mask    Final Airway Details  Final airway type: endotracheal airway      Successful airway: ETT  Cuffed: yes   Successful intubation technique: direct laryngoscopy  Facilitating devices/methods: cricoid pressure  Endotracheal tube insertion site: oral  Blade: Maura  Blade size: 3  ETT size (mm): 7.0  Cormack-Lehane Classification: grade IIa - partial view of glottis  Placement verified by: chest auscultation and capnometry   Measured from: lips  ETT/EBT  to lips (cm): 20  Number of attempts at approach: 1    Additional Comments  Negative epigastric sounds, Breath sound equal bilaterally with symmetric chest rise and fall

## 2020-01-26 NOTE — ANESTHESIA PREPROCEDURE EVALUATION
Anesthesia Evaluation     Patient summary reviewed and Nursing notes reviewed                Airway   Mallampati: II  TM distance: >3 FB  Neck ROM: full  No difficulty expected  Dental - normal exam     Pulmonary - normal exam   (+) a smoker Former, COPD,   Cardiovascular - normal exam    (+) hypertension, hyperlipidemia,       Neuro/Psych- negative ROS  GI/Hepatic/Renal/Endo    (+)  GERD,  renal disease CRI,     Musculoskeletal     Abdominal  - normal exam    Bowel sounds: normal.   Substance History - negative use     OB/GYN negative ob/gyn ROS         Other   arthritis,        Other Comment: Anemia HCT 33                  Anesthesia Plan    ASA 3     general     intravenous induction     Anesthetic plan, all risks, benefits, and alternatives have been provided, discussed and informed consent has been obtained with: patient.    Plan discussed with CRNA.

## 2020-01-26 NOTE — ANESTHESIA POSTPROCEDURE EVALUATION
Patient: Pati Carter    Procedure Summary     Date:  01/25/20 Room / Location:   [facility] /  [facility]    Anesthesia Start:  2202 Anesthesia Stop:  2315    Procedure:  HIP CANNULATED SCREW PLACEMENT (Right Hip) Diagnosis:      Surgeon:  Karlos Blount MD Provider:  Luis Daniel Camargo MD    Anesthesia Type:  general ASA Status:  3          Anesthesia Type: general    Vitals  Vitals Value Taken Time   BP     Temp     Pulse     Resp     SpO2 100 % 1/25/2020 11:14 PM   Vitals shown include unvalidated device data.        Post Anesthesia Care and Evaluation    Patient location during evaluation: PACU  Patient participation: complete - patient participated  Level of consciousness: awake and alert  Pain score: 0  Pain management: adequate  Airway patency: patent  Anesthetic complications: No anesthetic complications  PONV Status: none  Cardiovascular status: hemodynamically stable and acceptable  Respiratory status: nonlabored ventilation, acceptable and nasal cannula  Hydration status: acceptable

## 2020-01-27 LAB
ANION GAP SERPL CALCULATED.3IONS-SCNC: 10 MMOL/L (ref 5–15)
BUN BLD-MCNC: 24 MG/DL (ref 8–23)
BUN/CREAT SERPL: 20.7 (ref 7–25)
CALCIUM SPEC-SCNC: 8.6 MG/DL (ref 8.6–10.5)
CHLORIDE SERPL-SCNC: 103 MMOL/L (ref 98–107)
CO2 SERPL-SCNC: 29 MMOL/L (ref 22–29)
CREAT BLD-MCNC: 1.16 MG/DL (ref 0.57–1)
DEPRECATED RDW RBC AUTO: 53.2 FL (ref 37–54)
ERYTHROCYTE [DISTWIDTH] IN BLOOD BY AUTOMATED COUNT: 13.8 % (ref 12.3–15.4)
GFR SERPL CREATININE-BSD FRML MDRD: 44 ML/MIN/1.73
GLUCOSE BLD-MCNC: 99 MG/DL (ref 65–99)
HCT VFR BLD AUTO: 29.2 % (ref 34–46.6)
HGB BLD-MCNC: 9.4 G/DL (ref 12–15.9)
MCH RBC QN AUTO: 34.9 PG (ref 26.6–33)
MCHC RBC AUTO-ENTMCNC: 32.2 G/DL (ref 31.5–35.7)
MCV RBC AUTO: 108.6 FL (ref 79–97)
PLATELET # BLD AUTO: 197 10*3/MM3 (ref 140–450)
PMV BLD AUTO: 10.3 FL (ref 6–12)
POTASSIUM BLD-SCNC: 3.2 MMOL/L (ref 3.5–5.2)
RBC # BLD AUTO: 2.69 10*6/MM3 (ref 3.77–5.28)
SODIUM BLD-SCNC: 142 MMOL/L (ref 136–145)
WBC NRBC COR # BLD: 8.27 10*3/MM3 (ref 3.4–10.8)

## 2020-01-27 PROCEDURE — 97116 GAIT TRAINING THERAPY: CPT | Performed by: PHYSICAL THERAPIST

## 2020-01-27 PROCEDURE — 85027 COMPLETE CBC AUTOMATED: CPT | Performed by: INTERNAL MEDICINE

## 2020-01-27 PROCEDURE — 94799 UNLISTED PULMONARY SVC/PX: CPT

## 2020-01-27 PROCEDURE — 97110 THERAPEUTIC EXERCISES: CPT | Performed by: PHYSICAL THERAPIST

## 2020-01-27 PROCEDURE — 99232 SBSQ HOSP IP/OBS MODERATE 35: CPT | Performed by: INTERNAL MEDICINE

## 2020-01-27 PROCEDURE — 80048 BASIC METABOLIC PNL TOTAL CA: CPT | Performed by: INTERNAL MEDICINE

## 2020-01-27 PROCEDURE — 99024 POSTOP FOLLOW-UP VISIT: CPT | Performed by: ORTHOPAEDIC SURGERY

## 2020-01-27 RX ADMIN — ASPIRIN 81 MG: 81 TABLET, COATED ORAL at 21:31

## 2020-01-27 RX ADMIN — DOCUSATE SODIUM 100 MG: 100 CAPSULE, LIQUID FILLED ORAL at 21:31

## 2020-01-27 RX ADMIN — ACETAMINOPHEN 650 MG: 325 TABLET ORAL at 02:52

## 2020-01-27 RX ADMIN — POLYETHYLENE GLYCOL 3350 17 G: 17 POWDER, FOR SOLUTION ORAL at 08:32

## 2020-01-27 RX ADMIN — DOCUSATE SODIUM 100 MG: 100 CAPSULE, LIQUID FILLED ORAL at 08:32

## 2020-01-27 RX ADMIN — ASPIRIN 81 MG: 81 TABLET, COATED ORAL at 08:32

## 2020-01-27 RX ADMIN — SODIUM CHLORIDE, PRESERVATIVE FREE 3 ML: 5 INJECTION INTRAVENOUS at 21:31

## 2020-01-27 RX ADMIN — FAMOTIDINE 20 MG: 20 TABLET ORAL at 08:32

## 2020-01-27 RX ADMIN — FAMOTIDINE 20 MG: 20 TABLET ORAL at 21:31

## 2020-01-27 RX ADMIN — BUDESONIDE AND FORMOTEROL FUMARATE DIHYDRATE 2 PUFF: 80; 4.5 AEROSOL RESPIRATORY (INHALATION) at 07:15

## 2020-01-27 RX ADMIN — BUDESONIDE AND FORMOTEROL FUMARATE DIHYDRATE 2 PUFF: 80; 4.5 AEROSOL RESPIRATORY (INHALATION) at 20:33

## 2020-01-27 RX ADMIN — FOLIC ACID 1000 MCG: 1 TABLET ORAL at 08:32

## 2020-01-28 VITALS
SYSTOLIC BLOOD PRESSURE: 142 MMHG | BODY MASS INDEX: 24.65 KG/M2 | TEMPERATURE: 97.8 F | HEIGHT: 65 IN | WEIGHT: 147.93 LBS | OXYGEN SATURATION: 92 % | DIASTOLIC BLOOD PRESSURE: 84 MMHG | HEART RATE: 92 BPM | RESPIRATION RATE: 16 BRPM

## 2020-01-28 PROCEDURE — 99239 HOSP IP/OBS DSCHRG MGMT >30: CPT | Performed by: INTERNAL MEDICINE

## 2020-01-28 PROCEDURE — 99024 POSTOP FOLLOW-UP VISIT: CPT | Performed by: ORTHOPAEDIC SURGERY

## 2020-01-28 PROCEDURE — 94799 UNLISTED PULMONARY SVC/PX: CPT

## 2020-01-28 RX ORDER — ASPIRIN 81 MG/1
81 TABLET ORAL EVERY 12 HOURS SCHEDULED
Qty: 56 TABLET | Refills: 0 | Status: SHIPPED | OUTPATIENT
Start: 2020-01-28 | End: 2020-02-25

## 2020-01-28 RX ADMIN — POLYETHYLENE GLYCOL 3350 17 G: 17 POWDER, FOR SOLUTION ORAL at 08:32

## 2020-01-28 RX ADMIN — FAMOTIDINE 20 MG: 20 TABLET ORAL at 08:32

## 2020-01-28 RX ADMIN — FOLIC ACID 1000 MCG: 1 TABLET ORAL at 08:32

## 2020-01-28 RX ADMIN — DOCUSATE SODIUM 100 MG: 100 CAPSULE, LIQUID FILLED ORAL at 08:32

## 2020-01-28 RX ADMIN — ASPIRIN 81 MG: 81 TABLET, COATED ORAL at 08:32

## 2020-01-28 RX ADMIN — SODIUM CHLORIDE, PRESERVATIVE FREE 3 ML: 5 INJECTION INTRAVENOUS at 08:37

## 2020-01-28 RX ADMIN — BUDESONIDE AND FORMOTEROL FUMARATE DIHYDRATE 2 PUFF: 80; 4.5 AEROSOL RESPIRATORY (INHALATION) at 08:48

## 2020-01-29 ENCOUNTER — EPISODE CHANGES (OUTPATIENT)
Dept: CASE MANAGEMENT | Facility: OTHER | Age: 85
End: 2020-01-29

## 2020-02-05 ENCOUNTER — TELEPHONE (OUTPATIENT)
Dept: ORTHOPEDIC SURGERY | Facility: CLINIC | Age: 85
End: 2020-02-05

## 2020-02-05 NOTE — TELEPHONE ENCOUNTER
THIS PATIENT RESCHEDULED THE 2 WEEK POST OP THAT FOR THE 2/11/2020 DUE TO NOT HAVING TRANSPORTATION AT THE FACILITY THAT SHE IS IN. IT IS RESCHEDULED FOR 2/18/2020 FOR 1:00P. JUST WANTED TO LET YOU KNOW. THANKS.

## 2020-02-18 ENCOUNTER — OFFICE VISIT (OUTPATIENT)
Dept: ORTHOPEDIC SURGERY | Facility: CLINIC | Age: 85
End: 2020-02-18

## 2020-02-18 DIAGNOSIS — Z98.890 POST-OPERATIVE STATE: Primary | ICD-10-CM

## 2020-02-18 PROCEDURE — 99024 POSTOP FOLLOW-UP VISIT: CPT | Performed by: PHYSICIAN ASSISTANT

## 2020-02-18 RX ORDER — ACETAMINOPHEN 325 MG/1
650 TABLET ORAL AS NEEDED
COMMUNITY
End: 2020-04-08 | Stop reason: HOSPADM

## 2020-02-18 NOTE — PROGRESS NOTES
[facility] [place] [facility]    Subjective     Patient: Pati Carter  : 1930    Primary Care Provider: Latoya Bolaños MD    Requesting Provider: As above    Post-op of the Right Hip (3 weeks post Hip Cannulated Screw Placement Right - 2020)      History    History of Present Illness: Patient presents today 3 weeks status post percutaneous pinning for hip fracture on 2020 with Dr. Blount.  Patient is at the [facility] and is toe-touch weightbearing as instructed.  She reports improving pain.  She reports pain is 6/10 with groin and anterior thigh pain.  She is having significant swelling in her lower legs and is wearing SAMANTHA hose.    Current Outpatient Medications on File Prior to Visit   Medication Sig Dispense Refill   • acetaminophen (TYLENOL) 325 MG tablet Take 650 mg by mouth As Needed for Mild Pain .     • albuterol sulfate  (90 Base) MCG/ACT inhaler Inhale 2 puffs Every 4 (Four) Hours As Needed for Wheezing or Shortness of Air. 3 inhaler 4   • aspirin 81 MG EC tablet Take 1 tablet by mouth Every 12 (Twelve) Hours for 28 days. 56 tablet 0   • docusate sodium (COLACE) 100 MG capsule Take  by mouth.     • famotidine (PEPCID) 20 MG tablet Take 1 tablet by mouth 2 (Two) Times a Day. 60 tablet 2   • Fluticasone Furoate-Vilanterol (BREO ELLIPTA) 200-25 MCG/INH aerosol powder  Inhale.     • folic acid (FOLVITE) 1 MG tablet Take 1 tablet by mouth Daily.     • meloxicam (MOBIC) 15 MG tablet Take 0.5 tablets by mouth Daily As Needed for Moderate Pain .     • methotrexate 2.5 MG tablet Take 15 mg by mouth 1 (One) Time Per Week.     • Multiple Vitamins-Minerals (CENTRUM SILVER ADULT 50+ PO) Take  by mouth Daily.     • nystatin (MYCOSTATIN) 308019 UNIT/GM ointment Daily.     • simvastatin (ZOCOR) 20 MG tablet TAKE 1 TABLET EVERY DAY AT BEDTIME 90 tablet 1   • VOLTAREN 1 % gel gel Daily.       No current facility-administered medications on file prior to visit.        Allergies   Allergen Reactions   • Bactrim [Sulfamethoxazole-Trimethoprim] Other (See Comments)     Stomach cramps    • Ciprofloxacin Other (See Comments)     Other reaction(s): shaking  HCI TABS/ SHAKING   • Levofloxacin Diarrhea      Past Medical History:   Diagnosis Date   • Anemia     Description: A.  Dx 2006- borderline intermittent.   • Back pain    • Benign colonic polyp     Description: A.  Dx 1999.   • Benign colonic polyp 9/28/2016    Description: A.  Dx 1999.   • Compression fracture of lumbar vertebra (CMS/[facility])    • Constipation    • COPD (chronic obstructive pulmonary disease) (CMS/[facility])     Description: A.  Rule out chronic persistent asthma, COPD, or obliterative bronchiolitis.- Rae   • Degeneration of intervertebral disc of lumbar region     Description: A.  Diagnosed in April 2013 with advanced multilevel with severe spinal stenosis, followed by Dr. Pillai for pain management.   • Gastroesophageal reflux disease    • Hearing loss    • History of colonoscopy 01/01/1999    NORMAL PER PATIENT    • History of mammogram 01/01/2011    NORMAL PER PT    • History of Papanicolaou smear of cervix 01/01/2010    NORMAL PER PT    • History of varicella    • Hyperlipidemia     Description: A.  Dx 2006.   • Hypertension     Description: A. Dx 2001.   • Macular degeneration    • Ovarian mass     Dx 8/15- benign left cystic adnexal mass   • Rheumatoid arthritis (CMS/[facility])     Description: A.  Diagnosed in 2000 and and followed by Dr. Constantino (now Dr. Kaplan). B.  On methotrexate therapy since 2000. C.  Off low-dose prednisone therapy.     Past Surgical History:   Procedure Laterality Date   • APPENDECTOMY     • CARPAL TUNNEL RELEASE Left 01/01/2003    HISTORY OF NEUROPLASTY DECOMPRESSION MEDIAN NERVE AT CARPAL TUNNEL LEFT   • CATARACT EXTRACTION Bilateral 01/01/2009   • CHOLECYSTECTOMY  01/01/1962   • HIP CANNULATED SCREW PLACEMENT Right 1/25/2020    Procedure: HIP CANNULATED SCREW PLACEMENT RIGHT;  Surgeon:  Karlos Blount MD;  Location: [facility];  Service: Orthopedics   • KNEE ARTHROSCOPY Left 01/01/2001    MENISCAL REPAIR   • KYPHOPLASTY  06/18/2015    T11 AND L1 (JOSE A)   • PELVIC LAPAROSCOPY  01/01/1996    REMOVAL OF BENIGN UTERINE AND RIGHT OVARIAN TUMORS   • SALPINGO OOPHORECTOMY Left 08/26/2015    REMOVAL OF LEFT OVARY AND TUBE (benign cystic mass)     Family History   Problem Relation Age of Onset   • Hypertension Mother    • Diabetes Mother       Social History     Socioeconomic History   • Marital status:      Spouse name: Not on file   • Number of children: Not on file   • Years of education: Not on file   • Highest education level: Not on file   Tobacco Use   • Smoking status: Former Smoker   • Smokeless tobacco: Never Used   Substance and Sexual Activity   • Alcohol use: No   • Drug use: No   • Sexual activity: Defer        Review of Systems    The following portions of the patient's history were reviewed and updated as appropriate: allergies, current medications, past family history, past medical history, past social history, past surgical history and problem list.      Objective      Physical Exam  There were no vitals taken for this visit.    There is no height or weight on file to calculate BMI.    Ortho Exam  Right hip exam:  Incision is healing well  Mild pain with hip motion  Intact hip flexors, adductor's, abductor's  Neurovascular intact distally  Patient in wheelchair    Medical Decision Making    Data Review:   ordered and reviewed x-rays today    Assessment:  1. Post-operative state        Plan:  Doing well status post percutaneous pinning for right hip fracture with Dr. Blount on 1/25/2020.  X-rays today show interval percutaneous screw fixation of the femoral neck fracture.  Alignment remains unchanged compared to fluoroscopic images.  No evidence of avascular necrosis.  Baseline hip arthritis remains unchanged.  Sutures were removed today Steri-Strips applied over the  incision.  Plan is the patient continue toe-touch weightbearing for 3 weeks.  I explained that the swelling in the legs is normal and she should continue with the SAMANTHA hose.  They asked about diuretics and explained that diuretics will not improve the swelling.  She will continue elevating as much as possible as well.  She will return to see Dr. Blount in 3 weeks with repeat x-rays or sooner if needed.    History, diagnosis and treatment plan discussed with Dr. Blount.          Lubna Raymond PA-C  02/19/20  10:01 AM

## 2020-03-10 ENCOUNTER — OFFICE VISIT (OUTPATIENT)
Dept: ORTHOPEDIC SURGERY | Facility: CLINIC | Age: 85
End: 2020-03-10

## 2020-03-10 DIAGNOSIS — Z98.890 POST-OPERATIVE STATE: Primary | ICD-10-CM

## 2020-03-10 PROCEDURE — 99024 POSTOP FOLLOW-UP VISIT: CPT | Performed by: PHYSICIAN ASSISTANT

## 2020-03-10 NOTE — PROGRESS NOTES
[facility] [place] [facility] Note    Subjective     Patient: Pati Carter  : 1930    Primary Care Provider: Latoya Bolaños MD    Requesting Provider: As above    Post-op Follow-up (3 week follow up - 6 weeks post Hip Cannulated Screw Placement Right - 2020)      History    History of Present Illness: Patient returns today 6 weeks s/p Right percutaeous hip pinning with Dr. Blount 2020.  She has only been toe touch WB for transfers.  Otherwise, she is staying in a wheelchair.  She reports no pain in the hip.  She is doing PT at the [facility].    Current Outpatient Medications on File Prior to Visit   Medication Sig Dispense Refill   • acetaminophen (TYLENOL) 325 MG tablet Take 650 mg by mouth As Needed for Mild Pain .     • albuterol sulfate  (90 Base) MCG/ACT inhaler Inhale 2 puffs Every 4 (Four) Hours As Needed for Wheezing or Shortness of Air. 3 inhaler 4   • docusate sodium (COLACE) 100 MG capsule Take  by mouth.     • famotidine (PEPCID) 20 MG tablet Take 1 tablet by mouth 2 (Two) Times a Day. 60 tablet 2   • Fluticasone Furoate-Vilanterol (BREO ELLIPTA) 200-25 MCG/INH aerosol powder  Inhale.     • folic acid (FOLVITE) 1 MG tablet Take 1 tablet by mouth Daily.     • meloxicam (MOBIC) 15 MG tablet Take 0.5 tablets by mouth Daily As Needed for Moderate Pain .     • methotrexate 2.5 MG tablet Take 15 mg by mouth 1 (One) Time Per Week.     • Multiple Vitamins-Minerals (CENTRUM SILVER ADULT 50+ PO) Take  by mouth Daily.     • nystatin (MYCOSTATIN) 864360 UNIT/GM ointment Daily.     • simvastatin (ZOCOR) 20 MG tablet TAKE 1 TABLET EVERY DAY AT BEDTIME 90 tablet 1   • VOLTAREN 1 % gel gel Daily.       No current facility-administered medications on file prior to visit.       Allergies   Allergen Reactions   • Bactrim [Sulfamethoxazole-Trimethoprim] Other (See Comments)     Stomach cramps    • Ciprofloxacin Other (See Comments)     Other reaction(s): shaking  HCI TABS/ SHAKING   •  Levofloxacin Diarrhea      Past Medical History:   Diagnosis Date   • Anemia     Description: A.  Dx 2006- borderline intermittent.   • Back pain    • Benign colonic polyp     Description: A.  Dx 1999.   • Benign colonic polyp 9/28/2016    Description: A.  Dx 1999.   • Compression fracture of lumbar vertebra (CMS/[facility])    • Constipation    • COPD (chronic obstructive pulmonary disease) (CMS/[facility])     Description: A.  Rule out chronic persistent asthma, COPD, or obliterative bronchiolitis.- Rae   • Degeneration of intervertebral disc of lumbar region     Description: A.  Diagnosed in April 2013 with advanced multilevel with severe spinal stenosis, followed by Dr. Pillai for pain management.   • Gastroesophageal reflux disease    • Hearing loss    • History of colonoscopy 01/01/1999    NORMAL PER PATIENT    • History of mammogram 01/01/2011    NORMAL PER PT    • History of Papanicolaou smear of cervix 01/01/2010    NORMAL PER PT    • History of varicella    • Hyperlipidemia     Description: A.  Dx 2006.   • Hypertension     Description: A. Dx 2001.   • Macular degeneration    • Ovarian mass     Dx 8/15- benign left cystic adnexal mass   • Rheumatoid arthritis (CMS/[facility])     Description: A.  Diagnosed in 2000 and and followed by Dr. Constantino (now Dr. Kaplan). B.  On methotrexate therapy since 2000. C.  Off low-dose prednisone therapy.     Past Surgical History:   Procedure Laterality Date   • APPENDECTOMY     • CARPAL TUNNEL RELEASE Left 01/01/2003    HISTORY OF NEUROPLASTY DECOMPRESSION MEDIAN NERVE AT CARPAL TUNNEL LEFT   • CATARACT EXTRACTION Bilateral 01/01/2009   • CHOLECYSTECTOMY  01/01/1962   • HIP CANNULATED SCREW PLACEMENT Right 1/25/2020    Procedure: HIP CANNULATED SCREW PLACEMENT RIGHT;  Surgeon: Karlos Blount MD;  Location: [facility];  Service: Orthopedics   • KNEE ARTHROSCOPY Left 01/01/2001    MENISCAL REPAIR   • KYPHOPLASTY  06/18/2015    T11 AND L1 (JOSE A)   • PELVIC LAPAROSCOPY  01/01/1996     REMOVAL OF BENIGN UTERINE AND RIGHT OVARIAN TUMORS   • SALPINGO OOPHORECTOMY Left 08/26/2015    REMOVAL OF LEFT OVARY AND TUBE (benign cystic mass)     Family History   Problem Relation Age of Onset   • Hypertension Mother    • Diabetes Mother       Social History     Socioeconomic History   • Marital status:      Spouse name: Not on file   • Number of children: Not on file   • Years of education: Not on file   • Highest education level: Not on file   Tobacco Use   • Smoking status: Former Smoker   • Smokeless tobacco: Never Used   Substance and Sexual Activity   • Alcohol use: No   • Drug use: No   • Sexual activity: Defer        Review of Systems    The following portions of the patient's history were reviewed and updated as appropriate: allergies, current medications, past family history, past medical history, past social history, past surgical history and problem list.      Objective      Physical Exam  There were no vitals taken for this visit.    There is no height or weight on file to calculate BMI.    Ortho Exam  Right hip exam:  Incision well healed  No pain with hip motion  Hip flexors, adductors, abductors intact  NVI  In wheelchair    Medical Decision Making    Data Review:   ordered and reviewed x-rays today    Assessment:  1. Post-operative state        Plan:  Doing well 6 weeks s/p right perc hip pinning 1/25/2020.  Xrays today show hardware in place without evidence hardware complication.  No change in fracture alignment compared to prior radiographs.  Interval controlled collapse remains unchanged compared to prior radiographs.  No evidence of avascular necrosis.  Plan today is that the patient can begin progressive WB on the RLE with a walker.  She will begin PT working on strength WB, as well as gait training.  She will return to see Dr. Blount in 6 weeks or sooner if needed.    History, diagnosis and treatment plan discussed with Dr. Blount.          Lubna Raymond,  JIM  03/11/20  13:12

## 2020-03-23 ENCOUNTER — TELEPHONE (OUTPATIENT)
Dept: INTERNAL MEDICINE | Facility: CLINIC | Age: 85
End: 2020-03-23

## 2020-03-23 NOTE — TELEPHONE ENCOUNTER
Tried faxing med list to fax number given by Lilian.  Left message for her to call back with correct fax # or the phone # to [facility] that Pati is going to   Verb understanding given

## 2020-03-23 NOTE — TELEPHONE ENCOUNTER
If the patient is doing okay after discharge, does not need to follow-up, as we are in the middle of a pandemic.  However, if she is having problems or issues, I would be glad to see her.

## 2020-03-23 NOTE — TELEPHONE ENCOUNTER
Patients daughter Lilian is calling stating that the patient had a fall January 24th and broke her hip. Patient has been living in a rehab facility to treat her injury but is being released to [facility]. Patients daughter is calling requesting a medication list sent over to [facility] for them to have one file. Please Advise.    Fax number for [facility]  [phone]

## 2020-03-24 NOTE — TELEPHONE ENCOUNTER
Spoke with Lilian   She gave the phone # for [facility] [address]   Fax #   [phone]  Phone # [phone]  Med list faxed

## 2020-03-26 ENCOUNTER — TELEPHONE (OUTPATIENT)
Dept: INTERNAL MEDICINE | Facility: CLINIC | Age: 85
End: 2020-03-26

## 2020-03-26 NOTE — TELEPHONE ENCOUNTER
S/W Padmini at [facility], confirmed that Dr Bolaños is pt's PCP.  Verb understanding and apprec.

## 2020-03-26 NOTE — TELEPHONE ENCOUNTER
GUMARO FROM [facility] IS CALLING TO CONFIRM THAT DR. DEXTER IS HER PRIMARY     PLEASE ADVISE AND GIVE CALL [phone]

## 2020-03-28 ENCOUNTER — HOSPITAL ENCOUNTER (EMERGENCY)
Facility: HOSPITAL | Age: 85
Discharge: HOME | End: 2020-03-28
Attending: EMERGENCY MEDICINE | Admitting: EMERGENCY MEDICINE

## 2020-03-28 ENCOUNTER — APPOINTMENT (OUTPATIENT)
Dept: GENERAL RADIOLOGY | Facility: HOSPITAL | Age: 85
End: 2020-03-28

## 2020-03-28 VITALS
RESPIRATION RATE: 16 BRPM | BODY MASS INDEX: 24.99 KG/M2 | DIASTOLIC BLOOD PRESSURE: 84 MMHG | OXYGEN SATURATION: 98 % | WEIGHT: 150 LBS | HEART RATE: 108 BPM | TEMPERATURE: 98.1 F | SYSTOLIC BLOOD PRESSURE: 158 MMHG | HEIGHT: 65 IN
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
## 2020-03-28 DIAGNOSIS — S79.911A HIP INJURY, RIGHT, INITIAL ENCOUNTER: ICD-10-CM

## 2020-03-28 DIAGNOSIS — S80.02XA CONTUSION OF LEFT KNEE, INITIAL ENCOUNTER: ICD-10-CM

## 2020-03-28 DIAGNOSIS — S90.32XA CONTUSION OF LEFT FOOT, INITIAL ENCOUNTER: ICD-10-CM

## 2020-03-28 LAB
ALBUMIN SERPL-MCNC: 3.8 G/DL (ref 3.5–5.2)
ALBUMIN/GLOB SERPL: 1.3 G/DL
ALP SERPL-CCNC: 69 U/L (ref 39–117)
ALT SERPL W P-5'-P-CCNC: 8 U/L (ref 1–33)
ANION GAP SERPL CALCULATED.3IONS-SCNC: 10 MMOL/L (ref 5–15)
AST SERPL-CCNC: 19 U/L (ref 1–32)
BASOPHILS # BLD AUTO: 0.04 10*3/MM3 (ref 0–0.2)
BASOPHILS NFR BLD AUTO: 0.4 % (ref 0–1.5)
BILIRUB SERPL-MCNC: 0.5 MG/DL (ref 0.2–1.2)
BILIRUB UR QL STRIP: NEGATIVE
BUN BLD-MCNC: 16 MG/DL (ref 8–23)
BUN/CREAT SERPL: 16 (ref 7–25)
CALCIUM SPEC-SCNC: 9 MG/DL (ref 8.6–10.5)
CHLORIDE SERPL-SCNC: 103 MMOL/L (ref 98–107)
CLARITY UR: CLEAR
CO2 SERPL-SCNC: 29 MMOL/L (ref 22–29)
COLOR UR: YELLOW
CREAT BLD-MCNC: 1 MG/DL (ref 0.57–1)
DEPRECATED RDW RBC AUTO: 64.7 FL (ref 37–54)
EOSINOPHIL # BLD AUTO: 0.1 10*3/MM3 (ref 0–0.4)
EOSINOPHIL NFR BLD AUTO: 1.1 % (ref 0.3–6.2)
ERYTHROCYTE [DISTWIDTH] IN BLOOD BY AUTOMATED COUNT: 16 % (ref 12.3–15.4)
GFR SERPL CREATININE-BSD FRML MDRD: 52 ML/MIN/1.73
GLOBULIN UR ELPH-MCNC: 2.9 GM/DL
GLUCOSE BLD-MCNC: 96 MG/DL (ref 65–99)
GLUCOSE UR STRIP-MCNC: NEGATIVE MG/DL
HCT VFR BLD AUTO: 31.6 % (ref 34–46.6)
HGB BLD-MCNC: 10 G/DL (ref 12–15.9)
HGB UR QL STRIP.AUTO: NEGATIVE
IMM GRANULOCYTES # BLD AUTO: 0.04 10*3/MM3 (ref 0–0.05)
IMM GRANULOCYTES NFR BLD AUTO: 0.4 % (ref 0–0.5)
KETONES UR QL STRIP: NEGATIVE
LEUKOCYTE ESTERASE UR QL STRIP.AUTO: NEGATIVE
LYMPHOCYTES # BLD AUTO: 0.86 10*3/MM3 (ref 0.7–3.1)
LYMPHOCYTES NFR BLD AUTO: 9.2 % (ref 19.6–45.3)
MCH RBC QN AUTO: 34.5 PG (ref 26.6–33)
MCHC RBC AUTO-ENTMCNC: 31.6 G/DL (ref 31.5–35.7)
MCV RBC AUTO: 109 FL (ref 79–97)
MONOCYTES # BLD AUTO: 0.74 10*3/MM3 (ref 0.1–0.9)
MONOCYTES NFR BLD AUTO: 7.9 % (ref 5–12)
NEUTROPHILS # BLD AUTO: 7.55 10*3/MM3 (ref 1.7–7)
NEUTROPHILS NFR BLD AUTO: 81 % (ref 42.7–76)
NITRITE UR QL STRIP: NEGATIVE
NRBC BLD AUTO-RTO: 0 /100 WBC (ref 0–0.2)
PH UR STRIP.AUTO: 8 [PH] (ref 5–8)
PLATELET # BLD AUTO: 264 10*3/MM3 (ref 140–450)
PMV BLD AUTO: 9.8 FL (ref 6–12)
POTASSIUM BLD-SCNC: 3.7 MMOL/L (ref 3.5–5.2)
PROT SERPL-MCNC: 6.7 G/DL (ref 6–8.5)
PROT UR QL STRIP: NEGATIVE
RBC # BLD AUTO: 2.9 10*6/MM3 (ref 3.77–5.28)
SODIUM BLD-SCNC: 142 MMOL/L (ref 136–145)
SP GR UR STRIP: 1.01 (ref 1–1.03)
UROBILINOGEN UR QL STRIP: NORMAL
WBC NRBC COR # BLD: 9.33 10*3/MM3 (ref 3.4–10.8)

## 2020-03-28 PROCEDURE — P9612 CATHETERIZE FOR URINE SPEC: HCPCS

## 2020-03-28 PROCEDURE — 73560 X-RAY EXAM OF KNEE 1 OR 2: CPT

## 2020-03-28 PROCEDURE — 99284 EMERGENCY DEPT VISIT MOD MDM: CPT

## 2020-03-28 PROCEDURE — 80053 COMPREHEN METABOLIC PANEL: CPT | Performed by: EMERGENCY MEDICINE

## 2020-03-28 PROCEDURE — 85025 COMPLETE CBC W/AUTO DIFF WBC: CPT | Performed by: EMERGENCY MEDICINE

## 2020-03-28 PROCEDURE — 81003 URINALYSIS AUTO W/O SCOPE: CPT | Performed by: EMERGENCY MEDICINE

## 2020-03-28 PROCEDURE — 73630 X-RAY EXAM OF FOOT: CPT

## 2020-03-28 PROCEDURE — 73521 X-RAY EXAM HIPS BI 2 VIEWS: CPT

## 2020-03-28 PROCEDURE — 93005 ELECTROCARDIOGRAM TRACING: CPT | Performed by: EMERGENCY MEDICINE

## 2020-03-30 ENCOUNTER — EPISODE CHANGES (OUTPATIENT)
Dept: CASE MANAGEMENT | Facility: OTHER | Age: 85
End: 2020-03-30

## 2020-03-31 ENCOUNTER — EPISODE CHANGES (OUTPATIENT)
Dept: CASE MANAGEMENT | Facility: OTHER | Age: 85
End: 2020-03-31

## 2020-03-31 ENCOUNTER — PATIENT OUTREACH (OUTPATIENT)
Dept: CASE MANAGEMENT | Facility: OTHER | Age: 85
End: 2020-03-31

## 2020-03-31 NOTE — OUTREACH NOTE
Care Coordination Note    Called [facility] re PT/OT, spoke w/ John, was directed to [facility] b/c PT/OT order was transferred.    Sarahy Michel RN  Ambulatory     3/31/2020, 16:13

## 2020-03-31 NOTE — OUTREACH NOTE
"Care Coordination Note    Called [facility] [phone], spoke with Padmini, requested prioritization of PT/OT order sent last week.  Padmini stated the order was still at \"pending\" b/c [facility] wasn't aware patient was released from ED on 3/28.  Padmini stated order will be prioritized now.    Sarahy Michel RN  Ambulatory     3/31/2020, 16:14      "

## 2020-03-31 NOTE — OUTREACH NOTE
Care Plan Note      Responses   Suggested Appointments  Other (See Comment) [[facility] PT OT to start this week]   Annual Wellness Visit:   Patient Refuses at This Time   Care Gap Comments  None   Specific Disease Process Teaching  -- [Fall prevention]   Other Patient Education/Resources   24/7 [facility] Nurse Call Line, Advanced Care Planning, Home Healthcare, Personal Caregiver   24/7 Nurse Call Line Education Method  Verbal   ACP Education Method  Send Materials   Home Healthcare Education Method  Verbal   Personal Caregiver Education Method  Verbal   Does patient have depression diagnosis?  No   Advanced Directives:  Send Materials   Ed Visits past 12 months:  3 or more   Hospitalizations past 12 months  None        The main concerns and/or symptoms the patient's daughter would like to address are: Complaint/confusion re patient treatment in ED on 3/28/20, future fall prevention, pending PT/OT order    RN-ACM follow-up contact completed - see assessment & care plan flow sheets for additional details.   Talked with patient's daughter Danette meño BERNAL.  Discussed 3/28/20 ED visit regarding DX Fall & related injuries.  Danette states patient moved to [facility] on 3/26/20 but family was not allowed to help her settle in. Fall occurred 3/28 while pt was attempting to draw her own bath. States pt is now being frequently reminded to be compliant with ED MD recommendations to use her call button for help & to not attempt to stand w/o assist. Danette states pt verbs understanding re this & denies falls since 3/28.  Danette states fall-related injury symptoms appear stable, though left ankle & knee do appear swollen, like ankle was sprained. States pt is wearing SAMANTHA hose and they plan to carefully apply cold applications for brief periods to reduce edema & pain.  RN-ACM reiterated ED MD advice to monitor for symptoms that are worsening/failure to improve and possible need for repeat xray. Danette verb understanding  "re this.  She confirms patient LOC is generally the same as prior to the fall, but ED visit was very stressful for pt and she was \"combative\" while there.    States that, due to fall, family now trying to hire a  to visit patient BID, for help with AM/PM ADLs/ transfers.  Confirms [facility] assists with medications. RN-ACM advised Danette to call PCP office re tele/e visit options, should f/up be needed.  She states the family may elect to utilize the [facility] in-house physician in future. States family drives patient to appts, but says [facility] may provide transport in future.     Education/instruction provided by Care Coordinator:    Reviewed (see assessment & care plan flow sheets for additional details): RN-ACM role & contact info given; tele/e- appt options, Medicare AWV; ACP status (states family can't find pt's living will, so Danette requests info be sent),  24-hr nurse triage #, MyChart (declined), safety/fall precautions - Danette verb understanding.  No health maintenance gaps.   RN-ACM gave Danette the  patient relations ph # in response to concerns voiced re patient's care in ED.     After the call RN-ACM activated a pending PT/OT order w/ [facility] (see care coordination note).  Patient lives in assisted living, has adequate support system & is working well towards Care Plan goals.      Follow Up Outreach Due: as needed    Sarahy Michel RN  Ambulatory     3/31/2020, 16:13    "

## 2020-03-31 NOTE — OUTREACH NOTE
Patient Outreach Note    Called patient's daughter Danette back to inform her PT/OT order activated & that [facility] will call pt or Danette to schedule initial assessment. Danette was given [facility] ph# & contact name.  She verb appreciation    Sarahy Michel RN  Ambulatory     3/31/2020, 16:17

## 2020-03-31 NOTE — OUTREACH NOTE
Care Coordination Assessment    Documented/Reviewed By:  Sarahy Michel RN Date/time:  3/31/2020  4:06 PM   Assessment completed with:  children  Enrolled in care management program:  No  Living arrangement:  alone (Comment: [facility])  Support system:  family, therapist, home care staff (Comment: assisted living staff)  Type of residence:  assisted living  Home care services:  Yes (Comment: [facility] arranging PT/OT)  Equipment used at home:  cane, walker  Communication device:  No  Bed or wheelchair confined:  Yes (Comment: Primarily uses WC, but has walker too)  Inadequate nutrition:  No  Medication adherence problem:  No (Comment: [facility] admins medications)  Experiencing side effects from current medications:  No  History of fall(s) in last 6 months:  Yes  Difficulty keeping appointments:  Yes (Comment: Daughters drive patient, but Danette states [facility] will begin to provide transport)  Family aware of the patient's advance care planning wishes:  No (Comment: Requests ACP info)  Chronic pain:  Yes  Location of chronic pain:  RA effects joints  Chronic pain timing:  intermittent  Limitation of routine activities due to chronic pain:  moderate

## 2020-04-02 ENCOUNTER — EPISODE CHANGES (OUTPATIENT)
Dept: CASE MANAGEMENT | Facility: OTHER | Age: 85
End: 2020-04-02

## 2020-04-02 ENCOUNTER — APPOINTMENT (OUTPATIENT)
Dept: CT IMAGING | Facility: HOSPITAL | Age: 85
End: 2020-04-02

## 2020-04-02 ENCOUNTER — APPOINTMENT (OUTPATIENT)
Dept: GENERAL RADIOLOGY | Facility: HOSPITAL | Age: 85
End: 2020-04-02

## 2020-04-02 ENCOUNTER — HOSPITAL ENCOUNTER (INPATIENT)
Facility: HOSPITAL | Age: 85
LOS: 6 days | Discharge: HOME HEALTH | End: 2020-04-08
Attending: EMERGENCY MEDICINE | Admitting: INTERNAL MEDICINE

## 2020-04-02 ENCOUNTER — TELEPHONE (OUTPATIENT)
Dept: INTERNAL MEDICINE | Facility: CLINIC | Age: 85
End: 2020-04-02

## 2020-04-02 DIAGNOSIS — Z79.631 LONG TERM METHOTREXATE USER: ICD-10-CM

## 2020-04-02 DIAGNOSIS — R29.6 FALLS FREQUENTLY: ICD-10-CM

## 2020-04-02 DIAGNOSIS — S80.12XA CONTUSION OF LEFT LOWER EXTREMITY, INITIAL ENCOUNTER: ICD-10-CM

## 2020-04-02 DIAGNOSIS — R42 DIZZINESS: ICD-10-CM

## 2020-04-02 DIAGNOSIS — D84.9 IMMUNOSUPPRESSED STATUS (HCC): ICD-10-CM

## 2020-04-02 DIAGNOSIS — D72.829 LEUKOCYTOSIS, UNSPECIFIED TYPE: ICD-10-CM

## 2020-04-02 DIAGNOSIS — I95.9 HYPOTENSION, UNSPECIFIED HYPOTENSION TYPE: ICD-10-CM

## 2020-04-02 DIAGNOSIS — Z78.9 IMPAIRED MOBILITY AND ADLS: ICD-10-CM

## 2020-04-02 DIAGNOSIS — D64.9 ANEMIA, UNSPECIFIED TYPE: ICD-10-CM

## 2020-04-02 DIAGNOSIS — Z74.09 IMPAIRED MOBILITY AND ADLS: ICD-10-CM

## 2020-04-02 DIAGNOSIS — A41.9 SEVERE SEPSIS (HCC): Primary | ICD-10-CM

## 2020-04-02 DIAGNOSIS — R65.20 SEVERE SEPSIS (HCC): Primary | ICD-10-CM

## 2020-04-02 DIAGNOSIS — S30.0XXA TRAUMATIC HEMATOMA OF BUTTOCK, INITIAL ENCOUNTER: ICD-10-CM

## 2020-04-02 DIAGNOSIS — R55 SYNCOPE, UNSPECIFIED SYNCOPE TYPE: ICD-10-CM

## 2020-04-02 DIAGNOSIS — J18.9 PNEUMONIA OF BOTH LUNGS DUE TO INFECTIOUS ORGANISM, UNSPECIFIED PART OF LUNG: ICD-10-CM

## 2020-04-02 DIAGNOSIS — J96.01 ACUTE RESPIRATORY FAILURE WITH HYPOXIA (HCC): ICD-10-CM
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
## 2020-04-02 PROBLEM — Z20.822 SUSPECTED COVID-19 VIRUS INFECTION: Status: ACTIVE | Noted: 2020-04-02
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
## 2020-04-02 LAB
ABO GROUP BLD: NORMAL
ALBUMIN SERPL-MCNC: 3.4 G/DL (ref 3.5–5.2)
ALBUMIN/GLOB SERPL: 1.4 G/DL
ALP SERPL-CCNC: 53 U/L (ref 39–117)
ALT SERPL W P-5'-P-CCNC: 10 U/L (ref 1–33)
ANION GAP SERPL CALCULATED.3IONS-SCNC: 10 MMOL/L (ref 5–15)
AST SERPL-CCNC: 21 U/L (ref 1–32)
B PARAPERT DNA SPEC QL NAA+PROBE: NOT DETECTED
B PERT DNA SPEC QL NAA+PROBE: NOT DETECTED
BASE EXCESS BLDA CALC-SCNC: 0 MMOL/L (ref -5–5)
BASOPHILS # BLD AUTO: 0.05 10*3/MM3 (ref 0–0.2)
BASOPHILS NFR BLD AUTO: 0.3 % (ref 0–1.5)
BILIRUB SERPL-MCNC: 0.4 MG/DL (ref 0.2–1.2)
BILIRUB UR QL STRIP: NEGATIVE
BLD GP AB SCN SERPL QL: NEGATIVE
BUN BLD-MCNC: 19 MG/DL (ref 8–23)
BUN/CREAT SERPL: 20.9 (ref 7–25)
C PNEUM DNA NPH QL NAA+NON-PROBE: NOT DETECTED
CA-I BLDA-SCNC: 1.21 MMOL/L (ref 1.2–1.32)
CALCIUM SPEC-SCNC: 8.3 MG/DL (ref 8.6–10.5)
CHLORIDE SERPL-SCNC: 105 MMOL/L (ref 98–107)
CLARITY UR: CLEAR
CO2 BLDA-SCNC: 28 MMOL/L (ref 24–29)
CO2 SERPL-SCNC: 27 MMOL/L (ref 22–29)
COLOR UR: YELLOW
CREAT BLD-MCNC: 0.91 MG/DL (ref 0.57–1)
D-LACTATE SERPL-SCNC: 1.9 MMOL/L (ref 0.5–2)
DEPRECATED RDW RBC AUTO: 60.8 FL (ref 37–54)
DEPRECATED RDW RBC AUTO: 71.8 FL (ref 37–54)
DEVELOPER EXPIRATION DATE: NORMAL
DEVELOPER LOT NUMBER: 0
EOSINOPHIL # BLD AUTO: 0.05 10*3/MM3 (ref 0–0.4)
EOSINOPHIL NFR BLD AUTO: 0.3 % (ref 0.3–6.2)
ERYTHROCYTE [DISTWIDTH] IN BLOOD BY AUTOMATED COUNT: 15.2 % (ref 12.3–15.4)
ERYTHROCYTE [DISTWIDTH] IN BLOOD BY AUTOMATED COUNT: 19.1 % (ref 12.3–15.4)
EXPIRATION DATE: NORMAL
FECAL OCCULT BLOOD SCREEN, POC: NEGATIVE
FLUAV H1 2009 PAND RNA NPH QL NAA+PROBE: NOT DETECTED
FLUAV H1 HA GENE NPH QL NAA+PROBE: NOT DETECTED
FLUAV H3 RNA NPH QL NAA+PROBE: NOT DETECTED
FLUAV SUBTYP SPEC NAA+PROBE: NOT DETECTED
FLUBV RNA ISLT QL NAA+PROBE: NOT DETECTED
FOLATE SERPL-MCNC: >20 NG/ML (ref 4.78–24.2)
GFR SERPL CREATININE-BSD FRML MDRD: 58 ML/MIN/1.73
GLOBULIN UR ELPH-MCNC: 2.4 GM/DL
GLUCOSE BLD-MCNC: 171 MG/DL (ref 65–99)
GLUCOSE UR STRIP-MCNC: NEGATIVE MG/DL
HADV DNA SPEC NAA+PROBE: NOT DETECTED
HCO3 BLDA-SCNC: 26.3 MMOL/L (ref 22–26)
HCOV 229E RNA SPEC QL NAA+PROBE: NOT DETECTED
HCOV HKU1 RNA SPEC QL NAA+PROBE: NOT DETECTED
HCOV NL63 RNA SPEC QL NAA+PROBE: NOT DETECTED
HCOV OC43 RNA SPEC QL NAA+PROBE: NOT DETECTED
HCT VFR BLD AUTO: 22.3 % (ref 34–46.6)
HCT VFR BLD AUTO: 22.3 % (ref 34–46.6)
HCT VFR BLD AUTO: 23.5 % (ref 34–46.6)
HCT VFR BLDA CALC: 24 % (ref 38–51)
HGB BLD-MCNC: 7 G/DL (ref 12–15.9)
HGB BLD-MCNC: 7 G/DL (ref 12–15.9)
HGB BLD-MCNC: 7.2 G/DL (ref 12–15.9)
HGB BLDA-MCNC: 8.2 G/DL (ref 12–17)
HGB UR QL STRIP.AUTO: NEGATIVE
HMPV RNA NPH QL NAA+NON-PROBE: NOT DETECTED
HPIV1 RNA SPEC QL NAA+PROBE: NOT DETECTED
HPIV2 RNA SPEC QL NAA+PROBE: NOT DETECTED
HPIV3 RNA NPH QL NAA+PROBE: NOT DETECTED
HPIV4 P GENE NPH QL NAA+PROBE: NOT DETECTED
IMM GRANULOCYTES # BLD AUTO: 0.14 10*3/MM3 (ref 0–0.05)
IMM GRANULOCYTES NFR BLD AUTO: 0.9 % (ref 0–0.5)
INR PPP: 1.17 (ref 0.85–1.16)
KETONES UR QL STRIP: NEGATIVE
LEUKOCYTE ESTERASE UR QL STRIP.AUTO: NEGATIVE
LYMPHOCYTES # BLD AUTO: 1.04 10*3/MM3 (ref 0.7–3.1)
LYMPHOCYTES NFR BLD AUTO: 6.7 % (ref 19.6–45.3)
Lab: NORMAL
M PNEUMO IGG SER IA-ACNC: NOT DETECTED
MAGNESIUM SERPL-MCNC: 1.7 MG/DL (ref 1.6–2.4)
MCH RBC QN AUTO: 32.9 PG (ref 26.6–33)
MCH RBC QN AUTO: 33.6 PG (ref 26.6–33)
MCHC RBC AUTO-ENTMCNC: 30.6 G/DL (ref 31.5–35.7)
MCHC RBC AUTO-ENTMCNC: 31.4 G/DL (ref 31.5–35.7)
MCV RBC AUTO: 104.7 FL (ref 79–97)
MCV RBC AUTO: 109.8 FL (ref 79–97)
MONOCYTES # BLD AUTO: 0.98 10*3/MM3 (ref 0.1–0.9)
MONOCYTES NFR BLD AUTO: 6.3 % (ref 5–12)
MRSA DNA SPEC QL NAA+PROBE: NEGATIVE
NEGATIVE CONTROL: NEGATIVE
NEUTROPHILS # BLD AUTO: 13.37 10*3/MM3 (ref 1.7–7)
NEUTROPHILS NFR BLD AUTO: 85.5 % (ref 42.7–76)
NITRITE UR QL STRIP: NEGATIVE
NRBC BLD AUTO-RTO: 0 /100 WBC (ref 0–0.2)
NT-PROBNP SERPL-MCNC: 1458 PG/ML (ref 5–1800)
PCO2 BLDA: 50.4 MM HG (ref 35–45)
PH BLDA: 7.33 PH UNITS (ref 7.35–7.6)
PH UR STRIP.AUTO: <=5 [PH] (ref 5–8)
PLATELET # BLD AUTO: 259 10*3/MM3 (ref 140–450)
PLATELET # BLD AUTO: 313 10*3/MM3 (ref 140–450)
PMV BLD AUTO: 10.1 FL (ref 6–12)
PMV BLD AUTO: 9.9 FL (ref 6–12)
PO2 BLDA: 18 MMHG (ref 80–105)
POSITIVE CONTROL: POSITIVE
POTASSIUM BLD-SCNC: 4.1 MMOL/L (ref 3.5–5.2)
POTASSIUM BLDA-SCNC: 4.2 MMOL/L (ref 3.5–4.9)
PROCALCITONIN SERPL-MCNC: 0.11 NG/ML (ref 0.1–0.25)
PROT SERPL-MCNC: 5.8 G/DL (ref 6–8.5)
PROT UR QL STRIP: ABNORMAL
PROTHROMBIN TIME: 14.6 SECONDS (ref 11.5–14)
RBC # BLD AUTO: 2.13 10*6/MM3 (ref 3.77–5.28)
RBC # BLD AUTO: 2.14 10*6/MM3 (ref 3.77–5.28)
RH BLD: NEGATIVE
RHINOVIRUS RNA SPEC NAA+PROBE: NOT DETECTED
RSV RNA NPH QL NAA+NON-PROBE: NOT DETECTED
S PYO AG THROAT QL: NEGATIVE
SAO2 % BLDA: 24 % (ref 95–98)
SODIUM BLD-SCNC: 142 MMOL/L (ref 136–145)
SODIUM BLDA-SCNC: 142 MMOL/L (ref 138–146)
SP GR UR STRIP: 1.01 (ref 1–1.03)
T&S EXPIRATION DATE: NORMAL
T4 FREE SERPL-MCNC: 1.19 NG/DL (ref 0.93–1.7)
TROPONIN T SERPL-MCNC: <0.01 NG/ML (ref 0–0.03)
TSH SERPL DL<=0.05 MIU/L-ACNC: 1.81 UIU/ML (ref 0.27–4.2)
UROBILINOGEN UR QL STRIP: ABNORMAL
VIT B12 BLD-MCNC: 335 PG/ML (ref 211–946)
WBC NRBC COR # BLD: 12.78 10*3/MM3 (ref 3.4–10.8)
WBC NRBC COR # BLD: 15.63 10*3/MM3 (ref 3.4–10.8)

## 2020-04-02 PROCEDURE — 86850 RBC ANTIBODY SCREEN: CPT | Performed by: EMERGENCY MEDICINE

## 2020-04-02 PROCEDURE — 81003 URINALYSIS AUTO W/O SCOPE: CPT | Performed by: EMERGENCY MEDICINE

## 2020-04-02 PROCEDURE — P9016 RBC LEUKOCYTES REDUCED: HCPCS

## 2020-04-02 PROCEDURE — 82270 OCCULT BLOOD FECES: CPT | Performed by: EMERGENCY MEDICINE

## 2020-04-02 PROCEDURE — 84443 ASSAY THYROID STIM HORMONE: CPT | Performed by: EMERGENCY MEDICINE

## 2020-04-02 PROCEDURE — 25010000002 ONDANSETRON PER 1 MG: Performed by: INTERNAL MEDICINE

## 2020-04-02 PROCEDURE — 63710000001 MELATONIN 5 MG TABLET: Performed by: INTERNAL MEDICINE

## 2020-04-02 PROCEDURE — 84132 ASSAY OF SERUM POTASSIUM: CPT

## 2020-04-02 PROCEDURE — 80053 COMPREHEN METABOLIC PANEL: CPT | Performed by: EMERGENCY MEDICINE

## 2020-04-02 PROCEDURE — 70450 CT HEAD/BRAIN W/O DYE: CPT

## 2020-04-02 PROCEDURE — U0001 2019-NCOV DIAGNOSTIC P: HCPCS | Performed by: INTERNAL MEDICINE

## 2020-04-02 PROCEDURE — 86900 BLOOD TYPING SEROLOGIC ABO: CPT

## 2020-04-02 PROCEDURE — 82803 BLOOD GASES ANY COMBINATION: CPT

## 2020-04-02 PROCEDURE — 83735 ASSAY OF MAGNESIUM: CPT | Performed by: EMERGENCY MEDICINE

## 2020-04-02 PROCEDURE — 87081 CULTURE SCREEN ONLY: CPT | Performed by: INTERNAL MEDICINE

## 2020-04-02 PROCEDURE — 75635 CT ANGIO ABDOMINAL ARTERIES: CPT

## 2020-04-02 PROCEDURE — 85027 COMPLETE CBC AUTOMATED: CPT | Performed by: INTERNAL MEDICINE

## 2020-04-02 PROCEDURE — A9270 NON-COVERED ITEM OR SERVICE: HCPCS | Performed by: INTERNAL MEDICINE

## 2020-04-02 PROCEDURE — 99223 1ST HOSP IP/OBS HIGH 75: CPT | Performed by: INTERNAL MEDICINE

## 2020-04-02 PROCEDURE — 86923 COMPATIBILITY TEST ELECTRIC: CPT

## 2020-04-02 PROCEDURE — 85014 HEMATOCRIT: CPT | Performed by: INTERNAL MEDICINE

## 2020-04-02 PROCEDURE — 36430 TRANSFUSION BLD/BLD COMPNT: CPT

## 2020-04-02 PROCEDURE — 85014 HEMATOCRIT: CPT

## 2020-04-02 PROCEDURE — 84439 ASSAY OF FREE THYROXINE: CPT | Performed by: EMERGENCY MEDICINE

## 2020-04-02 PROCEDURE — 82607 VITAMIN B-12: CPT | Performed by: INTERNAL MEDICINE

## 2020-04-02 PROCEDURE — 63710000001 FAMOTIDINE 20 MG TABLET: Performed by: INTERNAL MEDICINE

## 2020-04-02 PROCEDURE — 84145 PROCALCITONIN (PCT): CPT | Performed by: EMERGENCY MEDICINE

## 2020-04-02 PROCEDURE — 86900 BLOOD TYPING SEROLOGIC ABO: CPT | Performed by: EMERGENCY MEDICINE

## 2020-04-02 PROCEDURE — 73552 X-RAY EXAM OF FEMUR 2/>: CPT

## 2020-04-02 PROCEDURE — 63710000001 BUDESONIDE-FORMOTEROL 160-4.5 MCG/ACT AEROSOL 6 G INHALER: Performed by: INTERNAL MEDICINE

## 2020-04-02 PROCEDURE — 87641 MR-STAPH DNA AMP PROBE: CPT | Performed by: INTERNAL MEDICINE

## 2020-04-02 PROCEDURE — 0100U HC BIOFIRE FILMARRAY RESP PANEL 2: CPT | Performed by: INTERNAL MEDICINE

## 2020-04-02 PROCEDURE — 93010 ELECTROCARDIOGRAM REPORT: CPT | Performed by: INTERNAL MEDICINE

## 2020-04-02 PROCEDURE — 99285 EMERGENCY DEPT VISIT HI MDM: CPT

## 2020-04-02 PROCEDURE — 86901 BLOOD TYPING SEROLOGIC RH(D): CPT

## 2020-04-02 PROCEDURE — 84295 ASSAY OF SERUM SODIUM: CPT

## 2020-04-02 PROCEDURE — 82947 ASSAY GLUCOSE BLOOD QUANT: CPT

## 2020-04-02 PROCEDURE — 71275 CT ANGIOGRAPHY CHEST: CPT

## 2020-04-02 PROCEDURE — 86901 BLOOD TYPING SEROLOGIC RH(D): CPT | Performed by: EMERGENCY MEDICINE

## 2020-04-02 PROCEDURE — 71045 X-RAY EXAM CHEST 1 VIEW: CPT

## 2020-04-02 PROCEDURE — 25010000002 PIPERACILLIN SOD-TAZOBACTAM PER 1 G: Performed by: EMERGENCY MEDICINE

## 2020-04-02 PROCEDURE — 85025 COMPLETE CBC W/AUTO DIFF WBC: CPT | Performed by: EMERGENCY MEDICINE

## 2020-04-02 PROCEDURE — 82330 ASSAY OF CALCIUM: CPT

## 2020-04-02 PROCEDURE — 85610 PROTHROMBIN TIME: CPT | Performed by: EMERGENCY MEDICINE

## 2020-04-02 PROCEDURE — 93005 ELECTROCARDIOGRAM TRACING: CPT | Performed by: INTERNAL MEDICINE

## 2020-04-02 PROCEDURE — 82746 ASSAY OF FOLIC ACID SERUM: CPT | Performed by: INTERNAL MEDICINE

## 2020-04-02 PROCEDURE — 87040 BLOOD CULTURE FOR BACTERIA: CPT | Performed by: EMERGENCY MEDICINE

## 2020-04-02 PROCEDURE — 94799 UNLISTED PULMONARY SVC/PX: CPT

## 2020-04-02 PROCEDURE — 63710000001 ATORVASTATIN 10 MG TABLET: Performed by: INTERNAL MEDICINE

## 2020-04-02 PROCEDURE — 84484 ASSAY OF TROPONIN QUANT: CPT | Performed by: EMERGENCY MEDICINE

## 2020-04-02 PROCEDURE — 93005 ELECTROCARDIOGRAM TRACING: CPT | Performed by: EMERGENCY MEDICINE

## 2020-04-02 PROCEDURE — 72192 CT PELVIS W/O DYE: CPT

## 2020-04-02 PROCEDURE — 83880 ASSAY OF NATRIURETIC PEPTIDE: CPT | Performed by: EMERGENCY MEDICINE

## 2020-04-02 PROCEDURE — 85018 HEMOGLOBIN: CPT | Performed by: INTERNAL MEDICINE

## 2020-04-02 PROCEDURE — 94640 AIRWAY INHALATION TREATMENT: CPT

## 2020-04-02 PROCEDURE — 63710000001 HYDROXYCHLOROQUINE 200 MG TABLET: Performed by: INTERNAL MEDICINE

## 2020-04-02 PROCEDURE — 63710000001 ACETAMINOPHEN 325 MG TABLET: Performed by: INTERNAL MEDICINE

## 2020-04-02 PROCEDURE — 83605 ASSAY OF LACTIC ACID: CPT | Performed by: EMERGENCY MEDICINE

## 2020-04-02 PROCEDURE — 25010000002 VANCOMYCIN 10 G RECONSTITUTED SOLUTION: Performed by: EMERGENCY MEDICINE

## 2020-04-02 PROCEDURE — 25010000002 PIPERACILLIN SOD-TAZOBACTAM PER 1 G: Performed by: INTERNAL MEDICINE

## 2020-04-02 PROCEDURE — 86920 COMPATIBILITY TEST SPIN: CPT

## 2020-04-02 PROCEDURE — 87880 STREP A ASSAY W/OPTIC: CPT | Performed by: INTERNAL MEDICINE

## 2020-04-02 PROCEDURE — 0 IOPAMIDOL PER 1 ML: Performed by: EMERGENCY MEDICINE

## 2020-04-02 RX ORDER — BUDESONIDE AND FORMOTEROL FUMARATE DIHYDRATE 160; 4.5 UG/1; UG/1
2 AEROSOL RESPIRATORY (INHALATION)
Status: DISCONTINUED | OUTPATIENT
Start: 2020-04-02 | End: 2020-04-08 | Stop reason: HOSPADM

## 2020-04-02 RX ORDER — SODIUM CHLORIDE 0.9 % (FLUSH) 0.9 %
10 SYRINGE (ML) INJECTION AS NEEDED
Status: DISCONTINUED | OUTPATIENT
Start: 2020-04-02 | End: 2020-04-08 | Stop reason: HOSPADM

## 2020-04-02 RX ORDER — TRAMADOL HYDROCHLORIDE 50 MG/1
50 TABLET ORAL EVERY 6 HOURS PRN
COMMUNITY
End: 2020-04-08 | Stop reason: HOSPADM

## 2020-04-02 RX ORDER — ACETAMINOPHEN 160 MG/5ML
650 SOLUTION ORAL EVERY 4 HOURS PRN
Status: DISCONTINUED | OUTPATIENT
Start: 2020-04-02 | End: 2020-04-08 | Stop reason: HOSPADM

## 2020-04-02 RX ORDER — ACETAMINOPHEN 325 MG/1
650 TABLET ORAL ONCE
Status: COMPLETED | OUTPATIENT
Start: 2020-04-02 | End: 2020-04-02

## 2020-04-02 RX ORDER — ATORVASTATIN CALCIUM 10 MG/1
10 TABLET, FILM COATED ORAL DAILY
Status: DISCONTINUED | OUTPATIENT
Start: 2020-04-02 | End: 2020-04-08 | Stop reason: HOSPADM

## 2020-04-02 RX ORDER — HYDROXYCHLOROQUINE SULFATE 200 MG/1
200 TABLET, FILM COATED ORAL EVERY 12 HOURS SCHEDULED
Status: DISCONTINUED | OUTPATIENT
Start: 2020-04-03 | End: 2020-04-03

## 2020-04-02 RX ORDER — ONDANSETRON 2 MG/ML
4 INJECTION INTRAMUSCULAR; INTRAVENOUS ONCE
Status: COMPLETED | OUTPATIENT
Start: 2020-04-02 | End: 2020-04-02

## 2020-04-02 RX ORDER — SIMVASTATIN 20 MG
20 TABLET ORAL NIGHTLY
COMMUNITY
End: 2020-05-06

## 2020-04-02 RX ORDER — MELOXICAM 7.5 MG/1
7.5 TABLET ORAL DAILY
COMMUNITY
End: 2020-04-08 | Stop reason: HOSPADM

## 2020-04-02 RX ORDER — DOCUSATE SODIUM 100 MG/1
100 CAPSULE, LIQUID FILLED ORAL DAILY
Status: DISCONTINUED | OUTPATIENT
Start: 2020-04-02 | End: 2020-04-08 | Stop reason: HOSPADM

## 2020-04-02 RX ORDER — HYDROXYCHLOROQUINE SULFATE 200 MG/1
400 TABLET, FILM COATED ORAL EVERY 12 HOURS SCHEDULED
Status: COMPLETED | OUTPATIENT
Start: 2020-04-02 | End: 2020-04-03

## 2020-04-02 RX ORDER — ASPIRIN 81 MG/1
81 TABLET ORAL DAILY
COMMUNITY
End: 2020-05-06

## 2020-04-02 RX ORDER — ACETAMINOPHEN 325 MG/1
650 TABLET ORAL EVERY 4 HOURS PRN
Status: DISCONTINUED | OUTPATIENT
Start: 2020-04-02 | End: 2020-04-08 | Stop reason: HOSPADM

## 2020-04-02 RX ORDER — NYSTATIN 100000 U/G
1 OINTMENT TOPICAL DAILY
Status: DISCONTINUED | OUTPATIENT
Start: 2020-04-02 | End: 2020-04-08 | Stop reason: HOSPADM

## 2020-04-02 RX ORDER — ONDANSETRON 2 MG/ML
4 INJECTION INTRAMUSCULAR; INTRAVENOUS EVERY 6 HOURS PRN
Status: DISCONTINUED | OUTPATIENT
Start: 2020-04-02 | End: 2020-04-08 | Stop reason: HOSPADM

## 2020-04-02 RX ORDER — FAMOTIDINE 20 MG/1
20 TABLET, FILM COATED ORAL 2 TIMES DAILY PRN
Status: DISCONTINUED | OUTPATIENT
Start: 2020-04-02 | End: 2020-04-04

## 2020-04-02 RX ORDER — ACETAMINOPHEN 650 MG/1
650 SUPPOSITORY RECTAL EVERY 4 HOURS PRN
Status: DISCONTINUED | OUTPATIENT
Start: 2020-04-02 | End: 2020-04-08 | Stop reason: HOSPADM

## 2020-04-02 RX ORDER — TRAMADOL HYDROCHLORIDE 50 MG/1
50 TABLET ORAL EVERY 8 HOURS PRN
Status: CANCELLED | OUTPATIENT
Start: 2020-04-02

## 2020-04-02 RX ORDER — ONDANSETRON 4 MG/1
4 TABLET, FILM COATED ORAL EVERY 6 HOURS PRN
Status: DISCONTINUED | OUTPATIENT
Start: 2020-04-02 | End: 2020-04-08 | Stop reason: HOSPADM

## 2020-04-02 RX ORDER — MULTIPLE VITAMINS W/ MINERALS TAB 9MG-400MCG
1 TAB ORAL DAILY
Status: DISCONTINUED | OUTPATIENT
Start: 2020-04-02 | End: 2020-04-08 | Stop reason: HOSPADM

## 2020-04-02 RX ORDER — FAMOTIDINE 20 MG/1
20 TABLET, FILM COATED ORAL 2 TIMES DAILY
COMMUNITY
End: 2020-05-06

## 2020-04-02 RX ORDER — FAMOTIDINE 20 MG/1
20 TABLET, FILM COATED ORAL 2 TIMES DAILY
Status: DISCONTINUED | OUTPATIENT
Start: 2020-04-02 | End: 2020-04-04

## 2020-04-02 RX ORDER — SODIUM CHLORIDE 0.9 % (FLUSH) 0.9 %
10 SYRINGE (ML) INJECTION EVERY 12 HOURS SCHEDULED
Status: DISCONTINUED | OUTPATIENT
Start: 2020-04-02 | End: 2020-04-08 | Stop reason: HOSPADM

## 2020-04-02 RX ORDER — DOCUSATE SODIUM 100 MG/1
100 CAPSULE, LIQUID FILLED ORAL 2 TIMES DAILY PRN
Status: DISCONTINUED | OUTPATIENT
Start: 2020-04-02 | End: 2020-04-08 | Stop reason: HOSPADM

## 2020-04-02 RX ORDER — ZINC SULFATE 50(220)MG
220 CAPSULE ORAL DAILY
Status: DISCONTINUED | OUTPATIENT
Start: 2020-04-03 | End: 2020-04-04

## 2020-04-02 RX ORDER — FOLIC ACID 1 MG/1
1000 TABLET ORAL DAILY
Status: DISCONTINUED | OUTPATIENT
Start: 2020-04-02 | End: 2020-04-08 | Stop reason: HOSPADM

## 2020-04-02 RX ORDER — ALBUTEROL SULFATE 90 UG/1
2 AEROSOL, METERED RESPIRATORY (INHALATION) EVERY 4 HOURS PRN
Status: DISCONTINUED | OUTPATIENT
Start: 2020-04-02 | End: 2020-04-04

## 2020-04-02 RX ORDER — CHOLECALCIFEROL (VITAMIN D3) 125 MCG
5 CAPSULE ORAL NIGHTLY PRN
Status: DISCONTINUED | OUTPATIENT
Start: 2020-04-02 | End: 2020-04-08 | Stop reason: HOSPADM

## 2020-04-02 RX ORDER — SODIUM CHLORIDE 9 MG/ML
125 INJECTION, SOLUTION INTRAVENOUS CONTINUOUS
Status: DISCONTINUED | OUTPATIENT
Start: 2020-04-02 | End: 2020-04-02

## 2020-04-02 RX ADMIN — SODIUM CHLORIDE 1000 ML: 9 INJECTION, SOLUTION INTRAVENOUS at 09:44

## 2020-04-02 RX ADMIN — BUDESONIDE AND FORMOTEROL FUMARATE DIHYDRATE 2 PUFF: 160; 4.5 AEROSOL RESPIRATORY (INHALATION) at 19:25

## 2020-04-02 RX ADMIN — HYDROXYCHLOROQUINE SULFATE 400 MG: 200 TABLET, FILM COATED ORAL at 17:05

## 2020-04-02 RX ADMIN — VANCOMYCIN HYDROCHLORIDE 1250 MG: 10 INJECTION, POWDER, LYOPHILIZED, FOR SOLUTION INTRAVENOUS at 12:42

## 2020-04-02 RX ADMIN — SODIUM CHLORIDE 250 ML: 9 INJECTION, SOLUTION INTRAVENOUS at 15:45

## 2020-04-02 RX ADMIN — SODIUM CHLORIDE, PRESERVATIVE FREE 10 ML: 5 INJECTION INTRAVENOUS at 21:49

## 2020-04-02 RX ADMIN — FAMOTIDINE 20 MG: 20 TABLET ORAL at 21:48

## 2020-04-02 RX ADMIN — SODIUM CHLORIDE 1100 ML: 9 INJECTION, SOLUTION INTRAVENOUS at 11:43

## 2020-04-02 RX ADMIN — TAZOBACTAM SODIUM AND PIPERACILLIN SODIUM 3.38 G: 375; 3 INJECTION, SOLUTION INTRAVENOUS at 11:43

## 2020-04-02 RX ADMIN — ACETAMINOPHEN 650 MG: 325 TABLET, FILM COATED ORAL at 16:48

## 2020-04-02 RX ADMIN — ATORVASTATIN CALCIUM 10 MG: 10 TABLET, FILM COATED ORAL at 21:48

## 2020-04-02 RX ADMIN — MELATONIN TAB 5 MG 5 MG: 5 TAB at 21:48

## 2020-04-02 RX ADMIN — ONDANSETRON 4 MG: 2 INJECTION INTRAMUSCULAR; INTRAVENOUS at 13:49

## 2020-04-02 RX ADMIN — SODIUM CHLORIDE 125 ML/HR: 9 INJECTION, SOLUTION INTRAVENOUS at 15:52

## 2020-04-02 RX ADMIN — PIPERACILLIN AND TAZOBACTAM 3.38 G: 3; .375 INJECTION, POWDER, FOR SOLUTION INTRAVENOUS at 21:48

## 2020-04-02 RX ADMIN — IOPAMIDOL 157 ML: 755 INJECTION, SOLUTION INTRAVENOUS at 11:00

## 2020-04-02 NOTE — H&P
[facility] Medicine Services  HISTORY AND PHYSICAL    Patient Name: Pati Carter  : 1930  MRN: [id number]  Primary Care Physician: Latoya Bolaños MD  Date of admission: 2020      Subjective    Subjective     Chief Complaint:  Fall at skilled facility    HPI:  Pati Carter is a 89 y.o. female with past medical history as per below with multiple recent falls and recent hip fracture who presents the hospital from her skilled nursing facility after fall that occurred this morning.  Patient is complaining of moderate left hip and left knee pain that is worsened with movement and improved at rest.  She denies any head pain but reportedly did hit her head during her fall.  She reported headache earlier but is denying this during my conversation.  She was found in the emergency department to be tachycardic, hypotensive, hypoxic at 76% requiring supplemental oxygen with tachypnea, leukocytosis.  She had CT scans to further evaluate and notably was found to have multifocal pneumonia on CT scan of the chest as well as left hip and left knee hematoma.  CT scan of the head was negative for bleeding.  Radiologist feels CT scan of the chest is intermediate for novel coronavirus.  Patient is getting admitted for further medical management and work-up.    Review of Systems   Constitutional: Positive for fatigue. Negative for chills and fever.   HENT: Negative for sinus pressure and sinus pain.    Eyes: Negative.    Respiratory: Positive for shortness of breath. Negative for cough.    Cardiovascular: Negative for chest pain and palpitations.   Gastrointestinal: Negative for abdominal pain, diarrhea, nausea and vomiting.   Endocrine: Negative.    Genitourinary: Negative for dysuria and hematuria.   Musculoskeletal: Negative for neck pain and neck stiffness.        Left hip and knee pain   Skin: Negative for rash and wound.   Allergic/Immunologic: Negative.    Neurological: Positive for  light-headedness. Negative for headaches.   Hematological: Negative for adenopathy. Does not bruise/bleed easily.   Psychiatric/Behavioral: Negative for confusion. The patient is not nervous/anxious.           Personal History     Past Medical History:   Diagnosis Date   • Anemia     Description: A.  Dx 2006- borderline intermittent.   • Back pain    • Benign colonic polyp     Description: A.  Dx 1999.   • Benign colonic polyp 9/28/2016    Description: A.  Dx 1999.   • Compression fracture of lumbar vertebra (CMS/HCC)    • Constipation    • COPD (chronic obstructive pulmonary disease) (CMS/HCC)     Description: A.  Rule out chronic persistent asthma, COPD, or obliterative bronchiolitis.- Rae   • Degeneration of intervertebral disc of lumbar region     Description: A.  Diagnosed in April 2013 with advanced multilevel with severe spinal stenosis, followed by Dr. Pillai for pain management.   • Gastroesophageal reflux disease    • Hearing loss    • History of colonoscopy 01/01/1999    NORMAL PER PATIENT    • History of mammogram 01/01/2011    NORMAL PER PT    • History of Papanicolaou smear of cervix 01/01/2010    NORMAL PER PT    • History of varicella    • Hyperlipidemia     Description: A.  Dx 2006.   • Hypertension     Description: A. Dx 2001.   • Macular degeneration    • Ovarian mass     Dx 8/15- benign left cystic adnexal mass   • Rheumatoid arthritis (CMS/[facility])     Description: A.  Diagnosed in 2000 and and followed by Dr. Constantino (now Dr. Kaplan). B.  On methotrexate therapy since 2000. C.  Off low-dose prednisone therapy.       Past Surgical History:   Procedure Laterality Date   • APPENDECTOMY     • CARPAL TUNNEL RELEASE Left 01/01/2003    HISTORY OF NEUROPLASTY DECOMPRESSION MEDIAN NERVE AT CARPAL TUNNEL LEFT   • CATARACT EXTRACTION Bilateral 01/01/2009   • CHOLECYSTECTOMY  01/01/1962   • HIP CANNULATED SCREW PLACEMENT Right 1/25/2020    Procedure: HIP CANNULATED SCREW PLACEMENT RIGHT;  Surgeon:  Karlos Blount MD;  Location: [facility];  Service: Orthopedics   • KNEE ARTHROSCOPY Left 01/01/2001    MENISCAL REPAIR   • KYPHOPLASTY  06/18/2015    T11 AND L1 (JOSE A)   • PELVIC LAPAROSCOPY  01/01/1996    REMOVAL OF BENIGN UTERINE AND RIGHT OVARIAN TUMORS   • SALPINGO OOPHORECTOMY Left 08/26/2015    REMOVAL OF LEFT OVARY AND TUBE (benign cystic mass)       Family History: family history includes Diabetes in her mother; Hypertension in her mother.      Social History:  reports that she has quit smoking. She has never used smokeless tobacco. She reports that she does not drink alcohol or use drugs.  Social History     Social History Narrative    Patient moved to [facility] assisted living 3/26/20. On 3/28/20 patient visited ED for fall-related injuries.  In response, family looking into hiring a , to visit patient BID for ADLs/ transfers. Family drives patient to appts, but says [facility] may provide in future.       Medications:  Available home medication information reviewed.      Allergies   Allergen Reactions   • Bactrim [Sulfamethoxazole-Trimethoprim] Other (See Comments)     Stomach cramps    • Ciprofloxacin Other (See Comments)     Other reaction(s): shaking  HCI TABS/ SHAKING   • Levofloxacin Diarrhea       Objective   Objective     Vital Signs:   Temp:  [96.7 °F (35.9 °C)-97.8 °F (36.6 °C)] 97.8 °F (36.6 °C)  Heart Rate:  [] 102  Resp:  [14-27] 18  BP: ()/(44-95) 104/55        Physical Exam   Constitutional: Awake, alert  Eyes: PERRLA, sclerae anicteric, no conjunctival injection  HENT: NCAT, mucous membranes moist  Neck: Supple, no thyromegaly, no lymphadenopathy, trachea midline  Respiratory: Diffuse rhonchi bilaterally, borderline tachypnea on my examination, currently on supplemental oxygen   Cardiovascular: Regular, borderline tachycardia, palpable radial pulses bilaterally   Gastrointestinal: Positive bowel sounds, soft, nontender,  nondistended  Musculoskeletal: Large left hip posterior hematoma, left knee hematoma/contusion with bruising, trace bilateral lower extremity edema, elderly and debilitated in appearance, BMI is 25  Psychiatric: Appropriate affect, cooperative  Neurologic: No slurred speech or facial droop, oriented, follows commands  Skin: Left hip and left knee bruising as per above, no rashes or jaundice      Results Reviewed:  I have personally reviewed current lab and radiology data.    Results from last 7 days   Lab Units 04/02/20  1007 04/02/20  0941   WBC 10*3/mm3  --  15.63*   HEMOGLOBIN g/dL  --  7.2*   HEMOGLOBIN, POC g/dL 8.2*  --    HEMATOCRIT %  --  23.5*   HEMATOCRIT POC % 24*  --    PLATELETS 10*3/mm3  --  313   INR   --  1.17*     Results from last 7 days   Lab Units 04/02/20  0941   SODIUM mmol/L 142   POTASSIUM mmol/L 4.1   CHLORIDE mmol/L 105   CO2 mmol/L 27.0   BUN mg/dL 19   CREATININE mg/dL 0.91   GLUCOSE mg/dL 171*   CALCIUM mg/dL 8.3*   ALT (SGPT) U/L 10   AST (SGOT) U/L 21   TROPONIN T ng/mL <0.010   PROBNP pg/mL 1,458.0   LACTATE mmol/L 1.9   PROCALCITONIN ng/mL 0.11     Estimated Creatinine Clearance: 45 mL/min (by C-G formula based on SCr of 0.91 mg/dL).  Brief Urine Lab Results  (Last result in the past 365 days)      Color   Clarity   Blood   Leuk Est   Nitrite   Protein   CREAT   Urine HCG        04/02/20 0950 Yellow Clear Negative Negative Negative Trace             Imaging Results (Last 24 Hours)     Procedure Component Value Units Date/Time    XR Chest 1 View [[id number]] Collected:  04/02/20 1104     Updated:  04/02/20 1219    Narrative:       EXAMINATION: XR CHEST 1 VW-04/02/2020:      INDICATION: Fall.      COMPARISON: Chest x-ray 01/10/2020.     FINDINGS: Cardiac size similar to prior with mild increased central  vascular congestion versus peribronchial inflammatory findings with  progressive opacifications at the right lung base and left lung apex,  and suprahilar region concerning for airspace  disease. No pneumothorax  or pleural effusion. Degenerative changes of the spine.       Impression:       Increased opacifications left lung apex and right lung base  from 01/10/2020 comparison examination consistent with airspace disease  such as bronchopneumonia without pleural effusion.     D:  04/02/2020  E:  04/02/2020     This report was finalized on 4/2/2020 12:16 PM by Dr. Teja Scott.       XR Femur 2 View Left [[id number]] Collected:  04/02/20 1103     Updated:  04/02/20 1219    Narrative:       EXAMINATION: XR FEMUR 2 VW, LEFT-04/02/2020:      INDICATION: Fall.      COMPARISON: 03/28/2020.     FINDINGS: AP and lateral views of the left femur reveal advanced  degenerative changes of the hip joint and greater trochanteric region  without acute fracture as there is no acute cortical irregularity or  cortical disruption. Distal femur unremarkable for acute fracture with  vascular calcifications in the soft tissues.           Impression:       Degenerative changes.  The left hip remains well located  without acute fracture of the left hip or left femur.     D:  04/02/2020  E:  04/02/2020     This report was finalized on 4/2/2020 12:16 PM by Dr. Teja Scott.       CT Pelvis Without Contrast [[id number]] Collected:  04/02/20 1114     Updated:  04/02/20 1219    Narrative:       EXAMINATION: CT PELVIS WO CONTRAST-, CT ANGIOGRAM CHEST-, CT ANGIO  ABDOMINAL AORTA AND BILATERAL ILIOFEMORAL RUNOFF-04/02/2020:      INDICATION: Left hip pain.      TECHNIQUE: CT angiogram of the chest, abdomen and aortobiiliac system  with iliofemoral runoff of the bilateral lower extremities. 2-D and 3-D  reconstructions performed. CT bony pelvis performed in addition.     The radiation dose reduction device was turned on for each scan per the  ALARA (As Low as Reasonably Achievable) protocol.     COMPARISON: Chest x-ray earlier same day.     FINDINGS:      CT ANGIOGRAM CHEST, ABDOMEN AND PELVIS WITH RUNOFF:     Nonvascular evaluation  with thyroid homogeneous in attenuation. No bulky  mediastinal adenopathy. Central airways are patent. Esophagus in normal  course with small hiatal hernia. Extended lung windows demonstrate  patchy groundglass attenuation in the left lung apex are fairly well  demarcated areas of groundglass attenuation and acute airspace disease  with atypical etiology considered given right middle lobe and lingular  segment involvement as well as minimal opacifications in similar  orientation of groundglass attenuation in the middle segment right lower  lobe. No pleural effusion or pneumothorax. Degenerative changes of the  spine without displaced rib fracture. No chest wall hematoma. Liver,  pancreas, spleen, adrenals and kidneys unremarkable for acute findings  with parapelvic cysts noted on the left kidney and left renal cortical  cyst without hydronephrosis or hydroureter. No bulky retroperitoneal  adenopathy. GI tract evaluation without mechanical obstructive process  or focal thickening. Sigmoid diverticulosis without evidence for acute  diverticulitis. No free fluid or intra-abdominal free air. Pelvic  viscera are grossly unremarkable without bulky pelvic adenopathy or free  fluid. Soft tissue body wall findings demonstrates a large moderately  hyperattenuated blood products in attenuation focus left hip soft  tissues adjacent to the gluteus musculature measuring up to 12 x 5 cm  consistent with hematoma and adjacent edema.     VASCULAR: Cardiac size within normal limits. Patent central pulmonary  arterial vasculature. Atherosclerotic nonaneurysmal thoracic aorta with  mild atherosclerotic involvement and mild luminal stenosis from  calcified and noncalcified hard and soft plaque formation descending  thoracic aorta. Atherosclerotic patent nonaneurysmal abdominal aorta  with celiac and SMA origins patent as well as HERNAN patent. Renal arteries  patent with at least moderate-to-severe right and moderate left  atherosclerotic  involvement of calcific burden at the ostia. Common and  external iliacs demonstrate mild atherosclerotic involvement with  calcific burden without focal severe stenosis or occlusion. Common  femoral, superficial femoral and popliteal arteries demonstrate up to  moderate atherosclerotic involvement without hemodynamically significant  stenosis, left slightly greater than right. Trifurcations bilaterally  demonstrate advanced atherosclerotic involvement with limited  opacification of the bilateral runoff vessels including limited  opacifications of the anterior and perineal arteries right along with  progressive loss of opacification right posterior tibial artery. Left  trifurcation has some involvement of atherosclerotic calcific burden  with limited opacification in the peroneal and anterior tibial arteries  with limited but present opacification of the left posterior tibial  artery to the level of the ankle with plantar branches severely  diminished, however, patency is observed minimally bilaterally.     CT BONY PELVIS: SI joints symmetric and without widening. No displaced  pelvic ring fracture. Right hip hardware remains in place without acute  fracture. Left hip without acute fracture demonstrating degenerative  changes of the greater trochanteric region and DJD of the left hip joint  without acute cortical irregularity evident.       Impression:       1. Bilateral pulmonary opacifications of groundglass attenuation, fairly  well demarcated areas of geographic involvement left lung apex, mid  lungs and within the right lower lobe medial segment concerning for  atypical etiology such as viral etiology of airspace disease with at  least intermediate correlation to COVID-19.     2. Large left hip body wall hematoma with surrounding soft tissue edema,  however, no acute fracture of the left hip or pelvis is identified.     3. Advanced atherosclerotic involvement of peripheral vascular disease  extending into the  bilateral lower extremities, right slightly greater  than left, however despite advanced involvement, there is at least trace  flow into the posterior tibial arteries, left greater than right, with  severely diminutive caliber of the right posterior tibial artery from  atherosclerotic involvement and calcifications progressed in the distal  superficial femoral, popliteal and trifurcation regions of the bilateral  lower extremities.     3.  No acute vascular findings within the chest, abdomen or pelvis  identified with atherosclerotic nonaneurysmal thoracoabdominal aorta.     D:  04/02/2020  E:  04/02/2020     This report was finalized on 4/2/2020 12:16 PM by Dr. Teja Scott.       CT Angiogram Chest [[id number]] Collected:  04/02/20 1114     Updated:  04/02/20 1219    Narrative:       EXAMINATION: CT PELVIS WO CONTRAST-, CT ANGIOGRAM CHEST-, CT ANGIO  ABDOMINAL AORTA AND BILATERAL ILIOFEMORAL RUNOFF-04/02/2020:      INDICATION: Left hip pain.      TECHNIQUE: CT angiogram of the chest, abdomen and aortobiiliac system  with iliofemoral runoff of the bilateral lower extremities. 2-D and 3-D  reconstructions performed. CT bony pelvis performed in addition.     The radiation dose reduction device was turned on for each scan per the  ALARA (As Low as Reasonably Achievable) protocol.     COMPARISON: Chest x-ray earlier same day.     FINDINGS:      CT ANGIOGRAM CHEST, ABDOMEN AND PELVIS WITH RUNOFF:     Nonvascular evaluation with thyroid homogeneous in attenuation. No bulky  mediastinal adenopathy. Central airways are patent. Esophagus in normal  course with small hiatal hernia. Extended lung windows demonstrate  patchy groundglass attenuation in the left lung apex are fairly well  demarcated areas of groundglass attenuation and acute airspace disease  with atypical etiology considered given right middle lobe and lingular  segment involvement as well as minimal opacifications in similar  orientation of groundglass  attenuation in the middle segment right lower  lobe. No pleural effusion or pneumothorax. Degenerative changes of the  spine without displaced rib fracture. No chest wall hematoma. Liver,  pancreas, spleen, adrenals and kidneys unremarkable for acute findings  with parapelvic cysts noted on the left kidney and left renal cortical  cyst without hydronephrosis or hydroureter. No bulky retroperitoneal  adenopathy. GI tract evaluation without mechanical obstructive process  or focal thickening. Sigmoid diverticulosis without evidence for acute  diverticulitis. No free fluid or intra-abdominal free air. Pelvic  viscera are grossly unremarkable without bulky pelvic adenopathy or free  fluid. Soft tissue body wall findings demonstrates a large moderately  hyperattenuated blood products in attenuation focus left hip soft  tissues adjacent to the gluteus musculature measuring up to 12 x 5 cm  consistent with hematoma and adjacent edema.     VASCULAR: Cardiac size within normal limits. Patent central pulmonary  arterial vasculature. Atherosclerotic nonaneurysmal thoracic aorta with  mild atherosclerotic involvement and mild luminal stenosis from  calcified and noncalcified hard and soft plaque formation descending  thoracic aorta. Atherosclerotic patent nonaneurysmal abdominal aorta  with celiac and SMA origins patent as well as HERNAN patent. Renal arteries  patent with at least moderate-to-severe right and moderate left  atherosclerotic involvement of calcific burden at the ostia. Common and  external iliacs demonstrate mild atherosclerotic involvement with  calcific burden without focal severe stenosis or occlusion. Common  femoral, superficial femoral and popliteal arteries demonstrate up to  moderate atherosclerotic involvement without hemodynamically significant  stenosis, left slightly greater than right. Trifurcations bilaterally  demonstrate advanced atherosclerotic involvement with limited  opacification of the  bilateral runoff vessels including limited  opacifications of the anterior and perineal arteries right along with  progressive loss of opacification right posterior tibial artery. Left  trifurcation has some involvement of atherosclerotic calcific burden  with limited opacification in the peroneal and anterior tibial arteries  with limited but present opacification of the left posterior tibial  artery to the level of the ankle with plantar branches severely  diminished, however, patency is observed minimally bilaterally.     CT BONY PELVIS: SI joints symmetric and without widening. No displaced  pelvic ring fracture. Right hip hardware remains in place without acute  fracture. Left hip without acute fracture demonstrating degenerative  changes of the greater trochanteric region and DJD of the left hip joint  without acute cortical irregularity evident.       Impression:       1. Bilateral pulmonary opacifications of groundglass attenuation, fairly  well demarcated areas of geographic involvement left lung apex, mid  lungs and within the right lower lobe medial segment concerning for  atypical etiology such as viral etiology of airspace disease with at  least intermediate correlation to COVID-19.     2. Large left hip body wall hematoma with surrounding soft tissue edema,  however, no acute fracture of the left hip or pelvis is identified.     3. Advanced atherosclerotic involvement of peripheral vascular disease  extending into the bilateral lower extremities, right slightly greater  than left, however despite advanced involvement, there is at least trace  flow into the posterior tibial arteries, left greater than right, with  severely diminutive caliber of the right posterior tibial artery from  atherosclerotic involvement and calcifications progressed in the distal  superficial femoral, popliteal and trifurcation regions of the bilateral  lower extremities.     3.  No acute vascular findings within the chest,  abdomen or pelvis  identified with atherosclerotic nonaneurysmal thoracoabdominal aorta.     D:  04/02/2020  E:  04/02/2020     This report was finalized on 4/2/2020 12:16 PM by Dr. Teja Scott.       CT Head Without Contrast [[id number]] Collected:  04/02/20 1111     Updated:  04/02/20 1219    Narrative:       EXAMINATION: CT HEAD WO CONTRAST-04/02/2020:      INDICATION: Fall, hit head.      TECHNIQUE: CT head without intravenous contrast.     The radiation dose reduction device was turned on for each scan per the  ALARA (As Low as Reasonably Achievable) protocol.     COMPARISON: NONE.     FINDINGS: The midline structures are symmetric without evidence of mass,  mass effect or midline shift. Ventricles and sulci demonstrate mild  prominence of the sulci and generalized atrophy without focal atrophic  findings. Moderate to severe low-attenuation regions in the  periventricular and deep white matter suggesting moderate-to-severe  advanced chronic small vessel ischemic disease. No intra-axial  hemorrhage or extra-axial fluid collection. Globes and orbits are  unremarkable. The visualized paranasal sinuses and mastoid air cells are  grossly clear and well pneumatized. Calvarium intact.       Impression:       No acute intracranial abnormality specifically, no acute  intracranial hemorrhage or extra-axial fluid collection with calvarium  intact. Moderate-to-severely advanced chronic small vessel ischemic  disease findings in the periventricular and deep white matter.     D:  04/02/2020  E:  04/02/2020     This report was finalized on 4/2/2020 12:16 PM by Dr. Teja Scott.       CT Angio Abdominal Aorta Bilateral Iliofem Runoff [[id number]] Collected:  04/02/20 1114     Updated:  04/02/20 1219    Narrative:       EXAMINATION: CT PELVIS WO CONTRAST-, CT ANGIOGRAM CHEST-, CT ANGIO  ABDOMINAL AORTA AND BILATERAL ILIOFEMORAL RUNOFF-04/02/2020:      INDICATION: Left hip pain.      TECHNIQUE: CT angiogram of the chest, abdomen  and aortobiiliac system  with iliofemoral runoff of the bilateral lower extremities. 2-D and 3-D  reconstructions performed. CT bony pelvis performed in addition.     The radiation dose reduction device was turned on for each scan per the  ALARA (As Low as Reasonably Achievable) protocol.     COMPARISON: Chest x-ray earlier same day.     FINDINGS:      CT ANGIOGRAM CHEST, ABDOMEN AND PELVIS WITH RUNOFF:     Nonvascular evaluation with thyroid homogeneous in attenuation. No bulky  mediastinal adenopathy. Central airways are patent. Esophagus in normal  course with small hiatal hernia. Extended lung windows demonstrate  patchy groundglass attenuation in the left lung apex are fairly well  demarcated areas of groundglass attenuation and acute airspace disease  with atypical etiology considered given right middle lobe and lingular  segment involvement as well as minimal opacifications in similar  orientation of groundglass attenuation in the middle segment right lower  lobe. No pleural effusion or pneumothorax. Degenerative changes of the  spine without displaced rib fracture. No chest wall hematoma. Liver,  pancreas, spleen, adrenals and kidneys unremarkable for acute findings  with parapelvic cysts noted on the left kidney and left renal cortical  cyst without hydronephrosis or hydroureter. No bulky retroperitoneal  adenopathy. GI tract evaluation without mechanical obstructive process  or focal thickening. Sigmoid diverticulosis without evidence for acute  diverticulitis. No free fluid or intra-abdominal free air. Pelvic  viscera are grossly unremarkable without bulky pelvic adenopathy or free  fluid. Soft tissue body wall findings demonstrates a large moderately  hyperattenuated blood products in attenuation focus left hip soft  tissues adjacent to the gluteus musculature measuring up to 12 x 5 cm  consistent with hematoma and adjacent edema.     VASCULAR: Cardiac size within normal limits. Patent central  pulmonary  arterial vasculature. Atherosclerotic nonaneurysmal thoracic aorta with  mild atherosclerotic involvement and mild luminal stenosis from  calcified and noncalcified hard and soft plaque formation descending  thoracic aorta. Atherosclerotic patent nonaneurysmal abdominal aorta  with celiac and SMA origins patent as well as HERNAN patent. Renal arteries  patent with at least moderate-to-severe right and moderate left  atherosclerotic involvement of calcific burden at the ostia. Common and  external iliacs demonstrate mild atherosclerotic involvement with  calcific burden without focal severe stenosis or occlusion. Common  femoral, superficial femoral and popliteal arteries demonstrate up to  moderate atherosclerotic involvement without hemodynamically significant  stenosis, left slightly greater than right. Trifurcations bilaterally  demonstrate advanced atherosclerotic involvement with limited  opacification of the bilateral runoff vessels including limited  opacifications of the anterior and perineal arteries right along with  progressive loss of opacification right posterior tibial artery. Left  trifurcation has some involvement of atherosclerotic calcific burden  with limited opacification in the peroneal and anterior tibial arteries  with limited but present opacification of the left posterior tibial  artery to the level of the ankle with plantar branches severely  diminished, however, patency is observed minimally bilaterally.     CT BONY PELVIS: SI joints symmetric and without widening. No displaced  pelvic ring fracture. Right hip hardware remains in place without acute  fracture. Left hip without acute fracture demonstrating degenerative  changes of the greater trochanteric region and DJD of the left hip joint  without acute cortical irregularity evident.       Impression:       1. Bilateral pulmonary opacifications of groundglass attenuation, fairly  well demarcated areas of geographic involvement  left lung apex, mid  lungs and within the right lower lobe medial segment concerning for  atypical etiology such as viral etiology of airspace disease with at  least intermediate correlation to COVID-19.     2. Large left hip body wall hematoma with surrounding soft tissue edema,  however, no acute fracture of the left hip or pelvis is identified.     3. Advanced atherosclerotic involvement of peripheral vascular disease  extending into the bilateral lower extremities, right slightly greater  than left, however despite advanced involvement, there is at least trace  flow into the posterior tibial arteries, left greater than right, with  severely diminutive caliber of the right posterior tibial artery from  atherosclerotic involvement and calcifications progressed in the distal  superficial femoral, popliteal and trifurcation regions of the bilateral  lower extremities.     3.  No acute vascular findings within the chest, abdomen or pelvis  identified with atherosclerotic nonaneurysmal thoracoabdominal aorta.     D:  04/02/2020  E:  04/02/2020     This report was finalized on 4/2/2020 12:16 PM by Dr. Teja Scott.                Assessment/Plan   Assessment & Plan     Active Hospital Problems    Diagnosis POA   • **Multifocal pneumonia [J18.9] Yes   • Falls frequently [R29.6] Not Applicable   • Fall at home, initial encounter [W19.XXXA, Y92.009] Not Applicable   • Sepsis (CMS/HCC) [A41.9] Yes   • Immunosuppressed status on methotrexate [D89.9] Unknown   • Acute respiratory failure with hypoxia (CMS/HCC) [J96.01] Yes   • Anemia [D64.9] Yes     Description: A.  Dx 2006- borderline intermittent.     • COPD (chronic obstructive pulmonary disease) (CMS/HCC) [J44.9] Yes     Description: A.  Rule out chronic persistent asthma, COPD, or obliterative bronchiolitis.Benitez Rae     • Hyperlipidemia [E78.5] Yes     Description: A.  Dx 2006.     • Hypertension [I10] Yes     Description: A. Dx 2001.     • Rheumatoid arthritis  (CMS/[facility]) [M06.9] Yes     Description: A.  Diagnosed in 2000 and and followed by Dr. Constantino (now Dr. Kaplan)- now Dr. Del HO  On methotrexate therapy since 2000.  C.  Off low-dose prednisone therapy.         89-year-old female presents from her nursing home with fall and head and knee and left hip contusion with left hip and left knee hematomas and acute blood loss anemia.  She was found to have leukocytosis, hypotension, tachycardia, tachypnea, and hypoxia consistent with sepsis and CT scan showed multifocal pneumonia felt to be intermediate risk of novel coronavirus per radiologist.    Sepsis due to multifocal pneumonia in immunosuppressed patient:  Fluid resuscitated emergency room with 2 L bolus  Tachycardia and hypotension are improving.  Broad-spectrum antibiotics initially received vancomycin and Zosyn.  Continue Zosyn for now.  MRSA PCR study  Viral PCR studies  Approved for state testing novel coronavirus test, [id number]  Careful monitoring as immunosuppressed status on methotrexate    Acute hypoxic respiratory failure due to sepsis and pneumonia:  Plan for supplemental oxygen  Respiratory treatments  Pulse oximetry  Wean oxygen as tolerated    Acute blood loss anemia due to left hip hematoma and left knee hematoma:  Supportive care and symptomatic management for hematoma.  Hold aspirin and NSAIDs to minimize bleeding.  Transfuse 1 unit due to symptomatic anemia  Hemoglobin previously 10 approximately 4-5 days ago before fall and hematoma now 7.2.  Denies other source of bleeding, denies blood in stools  Monitor for further bleeding and trend blood counts.  Check B12 and folate with macrocytosis.    COPD:  Continue inhalers, inhaled corticosteroid, long-acting beta agonist  No clear wheezes on examination hold off on systemic steroids unless worsens or more significant wheeze particularly while testing novel coronavirus.    Fall at home, multiple recent falls, debility:  PT OT, currently in  skilled facility.  Fall precautions.    History of essential hypertension now with hypotension:   Monitor, avoid any blood pressure medications for now.    Hyperlipidemia: Stable.  Statin.    DVT prophylaxis: Lovenox and heparin contraindicated due to bleeding, SCDs contraindicated due to peripheral vascular disease    CODE STATUS:    Code Status and Medical Interventions:   Ordered at: 04/02/20 165     Limited Support to NOT Include:    Intubation    NIPPV (Non-Invasive Positive Pressure Support)    Cardioversion/Defibrillation     Code Status:    No CPR     Medical Interventions (Level of Support Prior to Arrest):    Limited       Admission Status:  I believe this patient meets INPATIENT status due to sepsis and pneumonia sepsis and pneumonia.  I feel patient’s risk for adverse outcomes and need for care warrant INPATIENT evaluation and I predict the patient’s care encounter to likely last beyond 2 midnights.    Addendum:  Further discussed code status with Ms. Carter, she now states she wishes to be DNR DNI after thinking further about her situation.  She states she is okay with dying if it is her time and does not want to be intubated if needed.        Electronically signed by Matty Morris MD, 04/02/20, 12:58 PM.

## 2020-04-02 NOTE — PROGRESS NOTES
Discharge Planning Assessment  [facility]     Patient Name: Pati Carter  MRN: [id number]  Today's Date: 4/2/2020    Admit Date: 4/2/2020    Discharge Needs Assessment     Row Name 04/02/20 1214       Living Environment    Lives With facility resident    Name(s) of Who Lives With Patient Resides in personal care @ [facility]/[place]    Current Living Arrangements independent/assisted living facility    Duration at Residence 3/23/20    Primary Care Provided by self;other (see comments)    Provides Primary Care For no one, unable/limited ability to care for self    Caregiving Concerns Facility resident    Family Caregiver if Needed child(julia), adult    Family Caregiver Names Daughters involved in care:  Danette Suresh @ [phone] and Lilian Iniguez @ [phone]    Quality of Family Relationships helpful;involved;supportive    Able to Return to Prior Arrangements yes    Living Arrangement Comments Resides in personal care at [facility]       Resource/Environmental Concerns    Resource/Environmental Concerns none       Transition Planning    Patient/Family Anticipates Transition to home with help/services        Discharge Plan     Row Name 04/02/20 6150       Plan    Plan Personal Care    Provided Post Acute Provider List? N/A    Plan Comments Planning hospital admission today, patient resides in personal care @ [facility] in [place], (#[phone]), updated facility regarding today's admission.  Spoke with [facility], (#[phone]), report following for home PT/OT-will resume care with orders at discharge.    Final Discharge Disposition Code 06 - home with home health care        Destination      Coordination has not been started for this encounter.      Durable Medical Equipment      Coordination has not been started for this encounter.      Dialysis/Infusion      Coordination has not been started for this encounter.      Home Medical Care      Coordination has not been started for  this encounter.      Therapy      Coordination has not been started for this encounter.      Community Resources      Coordination has not been started for this encounter.          Demographic Summary     Row Name 04/02/20 1214       General Information    Arrived From  emergency department;home    Referral Source  emergency department    Reason for Consult  discharge planning    Preferred Language  English     Used During This Interaction  no        Functional Status    No documentation.       Psychosocial    No documentation.       Abuse/Neglect    No documentation.       Legal    No documentation.       Substance Abuse    No documentation.       Patient Forms    No documentation.           MICHELLE Bartlett

## 2020-04-02 NOTE — TELEPHONE ENCOUNTER
Holli with [facility] called to get verbal orders to continue physical therapy with home health.

## 2020-04-02 NOTE — ED PROVIDER NOTES
Subjective   89-year-old female presents for evaluation following a fall this morning.  Of note, the patient had a recent hip fracture on her right side.  She resides in a nursing home.  Over the past few weeks she has had frequent falls.  This morning, she was reportedly standing in her bathroom when she got dizzy and fell to the ground.  She is complaining of mild headache from where she struck her head as well as pain to her left hip and knee.  No neck pain.  The patient was noted to be hypotensive with a systolic blood pressure in the 70s on EMS arrival.      History provided by:  Patient and EMS personnel  Fall   Mechanism of injury: fall    Injury location:  Head/neck and leg  Leg injury location:  L knee  Incident location:  Home  Time since incident: This morning.  Arrived directly from scene: yes    Fall:     Fall occurred:  Standing    Impact surface:  Hard floor    Point of impact:  Head  Suspicion of alcohol use: no    Suspicion of drug use: no    Prior to arrival data:     Patient ambulatory at scene: no      Blood loss:  None    Responsiveness at scene:  Alert    Orientation at scene:  Person, place, situation and time    Loss of consciousness: no      Amnesic to event: no        Review of Systems   Musculoskeletal:        +Left knee pain  +Left hip pain   Neurological: Positive for dizziness, weakness and light-headedness.   All other systems reviewed and are negative.      Past Medical History:   Diagnosis Date   • Anemia     Description: AJoshua  Dx 2006- borderline intermittent.   • Back pain    • Benign colonic polyp     Description: YUVAL Agrawal 1999.   • Benign colonic polyp 9/28/2016    Description: YUVAL Agrawal 1999.   • Compression fracture of lumbar vertebra (CMS/HCC)    • Constipation    • COPD (chronic obstructive pulmonary disease) (CMS/HCC)     Description: A.  Rule out chronic persistent asthma, COPD, or obliterative bronchiolitis.- Butch   • Degeneration of intervertebral disc of lumbar region      Description: A.  Diagnosed in April 2013 with advanced multilevel with severe spinal stenosis, followed by Dr. Pillai for pain management.   • Gastroesophageal reflux disease    • Hearing loss    • History of colonoscopy 01/01/1999    NORMAL PER PATIENT    • History of mammogram 01/01/2011    NORMAL PER PT    • History of Papanicolaou smear of cervix 01/01/2010    NORMAL PER PT    • History of varicella    • Hyperlipidemia     Description: A.  Dx 2006.   • Hypertension     Description: A. Dx 2001.   • Macular degeneration    • Ovarian mass     Dx 8/15- benign left cystic adnexal mass   • Rheumatoid arthritis (CMS/HCC)     Description: A.  Diagnosed in 2000 and and followed by Dr. Constantino (now Dr. Kaplan). B.  On methotrexate therapy since 2000. C.  Off low-dose prednisone therapy.       Allergies   Allergen Reactions   • Bactrim [Sulfamethoxazole-Trimethoprim] Other (See Comments)     Stomach cramps    • Ciprofloxacin Other (See Comments)     Other reaction(s): shaking  HCI TABS/ SHAKING   • Levofloxacin Diarrhea       Past Surgical History:   Procedure Laterality Date   • APPENDECTOMY     • CARPAL TUNNEL RELEASE Left 01/01/2003    HISTORY OF NEUROPLASTY DECOMPRESSION MEDIAN NERVE AT CARPAL TUNNEL LEFT   • CATARACT EXTRACTION Bilateral 01/01/2009   • CHOLECYSTECTOMY  01/01/1962   • HIP CANNULATED SCREW PLACEMENT Right 1/25/2020    Procedure: HIP CANNULATED SCREW PLACEMENT RIGHT;  Surgeon: Karlos Blount MD;  Location: [facility];  Service: Orthopedics   • KNEE ARTHROSCOPY Left 01/01/2001    MENISCAL REPAIR   • KYPHOPLASTY  06/18/2015    T11 AND L1 (JOSE A)   • PELVIC LAPAROSCOPY  01/01/1996    REMOVAL OF BENIGN UTERINE AND RIGHT OVARIAN TUMORS   • SALPINGO OOPHORECTOMY Left 08/26/2015    REMOVAL OF LEFT OVARY AND TUBE (benign cystic mass)       Family History   Problem Relation Age of Onset   • Hypertension Mother    • Diabetes Mother        Social History     Socioeconomic History   • Marital status:       Spouse name: Not on file   • Number of children: Not on file   • Years of education: Not on file   • Highest education level: Not on file   Tobacco Use   • Smoking status: Former Smoker   • Smokeless tobacco: Never Used   Substance and Sexual Activity   • Alcohol use: No   • Drug use: No   • Sexual activity: Defer   Social History Narrative    Patient moved to [facility] assisted living 3/26/20. On 3/28/20 patient visited ED for fall-related injuries.  In response, family looking into hiring a , to visit patient BID for ADLs/ transfers. Family drives patient to app, but says Jm Encinas may provide in future.         Objective   Physical Exam   Constitutional: She is oriented to person, place, and time. She appears well-developed and well-nourished. No distress.   Nontoxic-appearing elderly female   HENT:   Head: Normocephalic and atraumatic.   Mouth/Throat: Oropharynx is clear and moist.   Eyes: Pupils are equal, round, and reactive to light. EOM are normal.   Neck:   No midline cervical spine tenderness to palpation, no step-off or deformity present   Cardiovascular: Regular rhythm and normal heart sounds. Exam reveals no gallop and no friction rub.   No murmur heard.  Tachycardic   Pulmonary/Chest: Effort normal and breath sounds normal. No respiratory distress. She has no wheezes. She has no rales.   Abdominal: Soft. Bowel sounds are normal. She exhibits no distension and no mass. There is no tenderness. There is no rebound and no guarding.   Genitourinary:   Genitourinary Comments: No gross blood noted on digital rectal exam   Musculoskeletal: Normal range of motion.   Range of motion of both hips within normal limits and painless   Neurological: She is alert and oriented to person, place, and time.   Clear and fluent speech, no dysmetria, 5 out of 5 strength in all fours, neurovascularly intact distally in all fours with distal pulses normal sensation, no droop, no drift   Skin:  She is not diaphoretic.   Extensive bruising noted to anterior aspect of left knee and upper/lower leg    Large palpable hematoma noted to left buttock   Psychiatric: She has a normal mood and affect. Judgment and thought content normal.   Nursing note and vitals reviewed.      Critical Care  Performed by: Jarrell Ramires MD  Authorized by: Jarrell Ramires MD     Critical care provider statement:     Critical care time (minutes):  35    Critical care time was exclusive of:  Separately billable procedures and treating other patients    Critical care was necessary to treat or prevent imminent or life-threatening deterioration of the following conditions:  Sepsis    Critical care was time spent personally by me on the following activities:  Blood draw for specimens, discussions with consultants, evaluation of patient's response to treatment, examination of patient, obtaining history from patient or surrogate, ordering and performing treatments and interventions, ordering and review of laboratory studies, ordering and review of radiographic studies, review of old charts, re-evaluation of patient's condition, development of treatment plan with patient or surrogate and pulse oximetry             ED Course  ED Course as of Apr 02 1402   Thu Apr 02, 2020   0943 89-year-old female presents for evaluation following a fall at her nursing home this morning.  Of note, the patient suffered a recent hip fracture and has had frequent falls over the past few weeks.  This morning, she got dizzy and fell to the ground.  She was noted to be hypotensive by EMS and was brought to our facility for further evaluation and treatment.  On arrival to the ED, the patient is nontoxic-appearing.  She has no focal neurological deficits but extensive bruising noted to the anterior aspect of her left leg.  NEXUS negative.  Patient tachycardic and hypotensive.  IV fluids administered.    [DD]   9644 EKG revealed normal sinus rhythm with a  heart rate of 100, no ST segments suggestive of or concerning for ischemia.  Normal NC and QT intervals,    [DD]   0944 No EKG evidence of Brugada syndrome, and no EKG evidence of hypertrophic cardiomyopathy.    [DD]   0944 Just after my initial evaluation, was called to the patient's bedside by nursing after the patient had a second syncopal episode at which time her heart rate and blood pressure both dropped transiently.    [DD]   1011 Dr. Ramires is re-evaluating the patient.    [KR]   1014 Labs remarkable for hemoglobin of 7.2.  No gross blood noted on digital rectal exam.  Type and screen obtained.  The patient is responding well to IV fluids and her blood pressure has normalized.    [DD]   1051 Fecal occult blood test negative.    [DD]   1121 Plain films of femur negative for fracture.  Chest x-ray suggestive of pneumonia.  HCAP coverage initiated.  Blood cultures obtained.    [DD]   1132 Advanced imaging revealed left gluteal hematoma and is consistent with pneumonia but the patient has no obvious fracture or evidence of extravasation.  We will see admission to the hospital for further evaluation and treatment at this time.    [DD]   1132 Dr. Ramires has paged admissions.    [KR]   1142 Dr. Ramires is on the phone with Dr. Mendoza.    [KR]   1402 COVID 19 testing deferred to inpatient team per Dr. Mendoza's request.    [DD]      ED Course User Index  [DD] Jarrell Ramires MD  [KR] Leroy Mckay     Recent Results (from the past 24 hour(s))   Comprehensive Metabolic Panel    Collection Time: 04/02/20  9:41 AM   Result Value Ref Range    Glucose 171 (H) 65 - 99 mg/dL    BUN 19 8 - 23 mg/dL    Creatinine 0.91 0.57 - 1.00 mg/dL    Sodium 142 136 - 145 mmol/L    Potassium 4.1 3.5 - 5.2 mmol/L    Chloride 105 98 - 107 mmol/L    CO2 27.0 22.0 - 29.0 mmol/L    Calcium 8.3 (L) 8.6 - 10.5 mg/dL    Total Protein 5.8 (L) 6.0 - 8.5 g/dL    Albumin 3.40 (L) 3.50 - 5.20 g/dL    ALT (SGPT) 10 1 - 33 U/L    AST (SGOT) 21 1 - 32  U/L    Alkaline Phosphatase 53 39 - 117 U/L    Total Bilirubin 0.4 0.2 - 1.2 mg/dL    eGFR Non African Amer 58 (L) >60 mL/min/1.73    Globulin 2.4 gm/dL    A/G Ratio 1.4 g/dL    BUN/Creatinine Ratio 20.9 7.0 - 25.0    Anion Gap 10.0 5.0 - 15.0 mmol/L   Protime-INR    Collection Time: 04/02/20  9:41 AM   Result Value Ref Range    Protime 14.6 (H) 11.5 - 14.0 Seconds    INR 1.17 (H) 0.85 - 1.16   Troponin    Collection Time: 04/02/20  9:41 AM   Result Value Ref Range    Troponin T <0.010 0.000 - 0.030 ng/mL   T4, Free    Collection Time: 04/02/20  9:41 AM   Result Value Ref Range    Free T4 1.19 0.93 - 1.70 ng/dL   TSH    Collection Time: 04/02/20  9:41 AM   Result Value Ref Range    TSH 1.810 0.270 - 4.200 uIU/mL   Magnesium    Collection Time: 04/02/20  9:41 AM   Result Value Ref Range    Magnesium 1.7 1.6 - 2.4 mg/dL   CBC Auto Differential    Collection Time: 04/02/20  9:41 AM   Result Value Ref Range    WBC 15.63 (H) 3.40 - 10.80 10*3/mm3    RBC 2.14 (L) 3.77 - 5.28 10*6/mm3    Hemoglobin 7.2 (L) 12.0 - 15.9 g/dL    Hematocrit 23.5 (L) 34.0 - 46.6 %    .8 (H) 79.0 - 97.0 fL    MCH 33.6 (H) 26.6 - 33.0 pg    MCHC 30.6 (L) 31.5 - 35.7 g/dL    RDW 15.2 12.3 - 15.4 %    RDW-SD 60.8 (H) 37.0 - 54.0 fl    MPV 9.9 6.0 - 12.0 fL    Platelets 313 140 - 450 10*3/mm3    Neutrophil % 85.5 (H) 42.7 - 76.0 %    Lymphocyte % 6.7 (L) 19.6 - 45.3 %    Monocyte % 6.3 5.0 - 12.0 %    Eosinophil % 0.3 0.3 - 6.2 %    Basophil % 0.3 0.0 - 1.5 %    Immature Grans % 0.9 (H) 0.0 - 0.5 %    Neutrophils, Absolute 13.37 (H) 1.70 - 7.00 10*3/mm3    Lymphocytes, Absolute 1.04 0.70 - 3.10 10*3/mm3    Monocytes, Absolute 0.98 (H) 0.10 - 0.90 10*3/mm3    Eosinophils, Absolute 0.05 0.00 - 0.40 10*3/mm3    Basophils, Absolute 0.05 0.00 - 0.20 10*3/mm3    Immature Grans, Absolute 0.14 (H) 0.00 - 0.05 10*3/mm3    nRBC 0.0 0.0 - 0.2 /100 WBC   Lactic Acid, Plasma    Collection Time: 04/02/20  9:41 AM   Result Value Ref Range    Lactate 1.9 0.5  - 2.0 mmol/L   Procalcitonin    Collection Time: 04/02/20  9:41 AM   Result Value Ref Range    Procalcitonin 0.11 0.10 - 0.25 ng/mL   BNP    Collection Time: 04/02/20  9:41 AM   Result Value Ref Range    proBNP 1,458.0 5.0-1,800.0 pg/mL   Urinalysis With Microscopic If Indicated (No Culture) - Urine, Catheter    Collection Time: 04/02/20  9:50 AM   Result Value Ref Range    Color, UA Yellow Yellow, Straw    Appearance, UA Clear Clear    pH, UA <=5.0 5.0 - 8.0    Specific Gravity, UA 1.015 1.001 - 1.030    Glucose, UA Negative Negative    Ketones, UA Negative Negative    Bilirubin, UA Negative Negative    Blood, UA Negative Negative    Protein, UA Trace (A) Negative    Leuk Esterase, UA Negative Negative    Nitrite, UA Negative Negative    Urobilinogen, UA 0.2 E.U./dL 0.2 - 1.0 E.U./dL   Type & Screen    Collection Time: 04/02/20 10:00 AM   Result Value Ref Range    ABO Type O     RH type Negative     Antibody Screen Negative     T&S Expiration Date 4/5/2020 11:59:59 PM    POC Surgery Labs    Collection Time: 04/02/20 10:07 AM   Result Value Ref Range    Ionized Calcium 1.21 1.20 - 1.32 mmol/L    POC Potassium 4.2 3.5 - 4.9 mmol/L    Sodium 142 138 - 146 mmol/L    Total CO2 28 24 - 29 mmol/L    Hemoglobin 8.2 (L) 12.0 - 17.0 g/dL    Hematocrit 24 (L) 38 - 51 %    pCO2, Arterial 50.4 (H) 35 - 45 mm Hg    pO2, Arterial 18 (L) 80 - 105 mmHg    Base Excess 0.0000 -5 - 5 mmol/L    O2 Saturation, Arterial 24 (L) 95 - 98 %    pH, Arterial 7.33 (L) 7.35 - 7.6 pH units    HCO3, Arterial 26.3 (H) 22 - 26 mmol/L   POC Occult Blood Stool    Collection Time: 04/02/20 10:40 AM   Result Value Ref Range    Fecal Occult Blood Negative Negative    Lot Number [id number]     Expiration Date 8/22     DEVELOPER LOT NUMBER 0     DEVELOPER EXPIRATION DATE 6/22     Positive Control Positive Positive    Negative Control Negative Negative     Note: In addition to lab results from this visit, the labs listed above may include labs taken at another  facility or during a different encounter within the last 24 hours. Please correlate lab times with ED admission and discharge times for further clarification of the services performed during this visit.    CT Pelvis Without Contrast   Preliminary Result   1. Bilateral pulmonary opacifications of groundglass attenuation fairly   well demarcated areas of geographic involvement left lung apex midlungs   and within the right lower lobe medial segment concerning for atypical   etiology such as viral etiology of airspace disease with at least   intermediate correlation to covid-19.    2. Large left hip body wall hematoma with surrounding soft tissue edema   however no acute fracture of the left hip or pelvis is identified.   3. Advanced atherosclerotic involvement of peripheral vascular disease   extending into the bilateral lower extremities right slightly greater   than left however despite advanced involvement there is at least trace   flow into the posterior tibial arteries left greater than right with   severely diminutive caliber of the right posterior tibial artery from   atherosclerotic involvement and calcifications progressed in the distal   superficial femoral popliteal and trifurcation regions of the bilateral   lower extremities.   3. No acute vascular findings within the chest abdomen or pelvis   identified with atherosclerotic nonaneurysmal thoracoabdominal aorta.              CT Head Without Contrast   Preliminary Result   No acute intracranial abnormality specifically, no acute   intracranial hemorrhage or extra-axial fluid collection with calvarium   intact. Moderate-to-severely advanced chronic small vessel ischemic   disease findings in the periventricular and deep white matter.       D:  04/02/2020   E:  04/02/2020              CT Angiogram Chest   Preliminary Result   1. Bilateral pulmonary opacifications of groundglass attenuation fairly   well demarcated areas of geographic involvement left lung  apex midlungs   and within the right lower lobe medial segment concerning for atypical   etiology such as viral etiology of airspace disease with at least   intermediate correlation to covid-19.    2. Large left hip body wall hematoma with surrounding soft tissue edema   however no acute fracture of the left hip or pelvis is identified.   3. Advanced atherosclerotic involvement of peripheral vascular disease   extending into the bilateral lower extremities right slightly greater   than left however despite advanced involvement there is at least trace   flow into the posterior tibial arteries left greater than right with   severely diminutive caliber of the right posterior tibial artery from   atherosclerotic involvement and calcifications progressed in the distal   superficial femoral popliteal and trifurcation regions of the bilateral   lower extremities.   3. No acute vascular findings within the chest abdomen or pelvis   identified with atherosclerotic nonaneurysmal thoracoabdominal aorta.              CT Angio Abdominal Aorta Bilateral Iliofem Runoff   Preliminary Result   1. Bilateral pulmonary opacifications of groundglass attenuation fairly   well demarcated areas of geographic involvement left lung apex midlungs   and within the right lower lobe medial segment concerning for atypical   etiology such as viral etiology of airspace disease with at least   intermediate correlation to covid-19.    2. Large left hip body wall hematoma with surrounding soft tissue edema   however no acute fracture of the left hip or pelvis is identified.   3. Advanced atherosclerotic involvement of peripheral vascular disease   extending into the bilateral lower extremities right slightly greater   than left however despite advanced involvement there is at least trace   flow into the posterior tibial arteries left greater than right with   severely diminutive caliber of the right posterior tibial artery from   atherosclerotic  "involvement and calcifications progressed in the distal   superficial femoral popliteal and trifurcation regions of the bilateral   lower extremities.   3. No acute vascular findings within the chest abdomen or pelvis   identified with atherosclerotic nonaneurysmal thoracoabdominal aorta.              XR Chest 1 View   Preliminary Result   Increased opacifications left lung apex and right lung base   from 01/10/2020 comparison examination consistent with airspace disease   such as bronchopneumonia without pleural effusion.       D:  04/02/2020   E:  04/02/2020                  XR Femur 2 View Left   Preliminary Result   Degenerative changes.  The left hip remains well located   without acute fracture of the left hip or left femur.       D:  04/02/2020   E:  04/02/2020                Vitals:    04/02/20 0926 04/02/20 1015 04/02/20 1117 04/02/20 1130   BP: 93/53 121/95 139/74 123/82   BP Location: Left arm      Patient Position: Sitting      Pulse: 112  103 110   Resp: 27  16 14   Temp: 96.7 °F (35.9 °C)      TempSrc: Oral      SpO2: 96% 100% (!) 76% 100%   Weight: 68 kg (150 lb)      Height: 165.1 cm (65\")        Medications   sodium chloride 0.9 % flush 10 mL (has no administration in time range)   sodium chloride 0.9 % bolus 1,100 mL (has no administration in time range)   vancomycin 1250 mg/250 mL 0.9% NS IVPB ([facility]) (has no administration in time range)   piperacillin-tazobactam (ZOSYN) 3.375 g in iso-osmotic dextrose 50 ml (premix) (has no administration in time range)   sodium chloride 0.9 % bolus 1,000 mL (1,000 mL Intravenous New Bag 4/2/20 0944)   iopamidol (ISOVUE-370) 76 % injection 150 mL (157 mL Intravenous Given 4/2/20 1100)     ECG/EMG Results (last 24 hours)     ** No results found for the last 24 hours. **        ECG 12 Lead                                                Recent Results (from the past 24 hour(s))   Comprehensive Metabolic Panel    Collection Time: 04/02/20  9:41 AM   Result Value Ref " Range    Glucose 171 (H) 65 - 99 mg/dL    BUN 19 8 - 23 mg/dL    Creatinine 0.91 0.57 - 1.00 mg/dL    Sodium 142 136 - 145 mmol/L    Potassium 4.1 3.5 - 5.2 mmol/L    Chloride 105 98 - 107 mmol/L    CO2 27.0 22.0 - 29.0 mmol/L    Calcium 8.3 (L) 8.6 - 10.5 mg/dL    Total Protein 5.8 (L) 6.0 - 8.5 g/dL    Albumin 3.40 (L) 3.50 - 5.20 g/dL    ALT (SGPT) 10 1 - 33 U/L    AST (SGOT) 21 1 - 32 U/L    Alkaline Phosphatase 53 39 - 117 U/L    Total Bilirubin 0.4 0.2 - 1.2 mg/dL    eGFR Non African Amer 58 (L) >60 mL/min/1.73    Globulin 2.4 gm/dL    A/G Ratio 1.4 g/dL    BUN/Creatinine Ratio 20.9 7.0 - 25.0    Anion Gap 10.0 5.0 - 15.0 mmol/L   Protime-INR    Collection Time: 04/02/20  9:41 AM   Result Value Ref Range    Protime 14.6 (H) 11.5 - 14.0 Seconds    INR 1.17 (H) 0.85 - 1.16   Troponin    Collection Time: 04/02/20  9:41 AM   Result Value Ref Range    Troponin T <0.010 0.000 - 0.030 ng/mL   T4, Free    Collection Time: 04/02/20  9:41 AM   Result Value Ref Range    Free T4 1.19 0.93 - 1.70 ng/dL   TSH    Collection Time: 04/02/20  9:41 AM   Result Value Ref Range    TSH 1.810 0.270 - 4.200 uIU/mL   Magnesium    Collection Time: 04/02/20  9:41 AM   Result Value Ref Range    Magnesium 1.7 1.6 - 2.4 mg/dL   CBC Auto Differential    Collection Time: 04/02/20  9:41 AM   Result Value Ref Range    WBC 15.63 (H) 3.40 - 10.80 10*3/mm3    RBC 2.14 (L) 3.77 - 5.28 10*6/mm3    Hemoglobin 7.2 (L) 12.0 - 15.9 g/dL    Hematocrit 23.5 (L) 34.0 - 46.6 %    .8 (H) 79.0 - 97.0 fL    MCH 33.6 (H) 26.6 - 33.0 pg    MCHC 30.6 (L) 31.5 - 35.7 g/dL    RDW 15.2 12.3 - 15.4 %    RDW-SD 60.8 (H) 37.0 - 54.0 fl    MPV 9.9 6.0 - 12.0 fL    Platelets 313 140 - 450 10*3/mm3    Neutrophil % 85.5 (H) 42.7 - 76.0 %    Lymphocyte % 6.7 (L) 19.6 - 45.3 %    Monocyte % 6.3 5.0 - 12.0 %    Eosinophil % 0.3 0.3 - 6.2 %    Basophil % 0.3 0.0 - 1.5 %    Immature Grans % 0.9 (H) 0.0 - 0.5 %    Neutrophils, Absolute 13.37 (H) 1.70 - 7.00 10*3/mm3     Lymphocytes, Absolute 1.04 0.70 - 3.10 10*3/mm3    Monocytes, Absolute 0.98 (H) 0.10 - 0.90 10*3/mm3    Eosinophils, Absolute 0.05 0.00 - 0.40 10*3/mm3    Basophils, Absolute 0.05 0.00 - 0.20 10*3/mm3    Immature Grans, Absolute 0.14 (H) 0.00 - 0.05 10*3/mm3    nRBC 0.0 0.0 - 0.2 /100 WBC   Lactic Acid, Plasma    Collection Time: 04/02/20  9:41 AM   Result Value Ref Range    Lactate 1.9 0.5 - 2.0 mmol/L   Procalcitonin    Collection Time: 04/02/20  9:41 AM   Result Value Ref Range    Procalcitonin 0.11 0.10 - 0.25 ng/mL   BNP    Collection Time: 04/02/20  9:41 AM   Result Value Ref Range    proBNP 1,458.0 5.0-1,800.0 pg/mL   Urinalysis With Microscopic If Indicated (No Culture) - Urine, Catheter    Collection Time: 04/02/20  9:50 AM   Result Value Ref Range    Color, UA Yellow Yellow, Straw    Appearance, UA Clear Clear    pH, UA <=5.0 5.0 - 8.0    Specific Gravity, UA 1.015 1.001 - 1.030    Glucose, UA Negative Negative    Ketones, UA Negative Negative    Bilirubin, UA Negative Negative    Blood, UA Negative Negative    Protein, UA Trace (A) Negative    Leuk Esterase, UA Negative Negative    Nitrite, UA Negative Negative    Urobilinogen, UA 0.2 E.U./dL 0.2 - 1.0 E.U./dL   Type & Screen    Collection Time: 04/02/20 10:00 AM   Result Value Ref Range    ABO Type O     RH type Negative     Antibody Screen Negative     T&S Expiration Date 4/5/2020 11:59:59 PM    POC Surgery Labs    Collection Time: 04/02/20 10:07 AM   Result Value Ref Range    Ionized Calcium 1.21 1.20 - 1.32 mmol/L    POC Potassium 4.2 3.5 - 4.9 mmol/L    Sodium 142 138 - 146 mmol/L    Total CO2 28 24 - 29 mmol/L    Hemoglobin 8.2 (L) 12.0 - 17.0 g/dL    Hematocrit 24 (L) 38 - 51 %    pCO2, Arterial 50.4 (H) 35 - 45 mm Hg    pO2, Arterial 18 (L) 80 - 105 mmHg    Base Excess 0.0000 -5 - 5 mmol/L    O2 Saturation, Arterial 24 (L) 95 - 98 %    pH, Arterial 7.33 (L) 7.35 - 7.6 pH units    HCO3, Arterial 26.3 (H) 22 - 26 mmol/L   POC Occult Blood Stool     Collection Time: 04/02/20 10:40 AM   Result Value Ref Range    Fecal Occult Blood Negative Negative    Lot Number [id number]     Expiration Date 8/22     DEVELOPER LOT NUMBER 0     DEVELOPER EXPIRATION DATE 6/22     Positive Control Positive Positive    Negative Control Negative Negative   Rapid Strep A Screen - Swab, Throat    Collection Time: 04/02/20  1:22 PM   Result Value Ref Range    Strep A Ag Negative Negative     Note: In addition to lab results from this visit, the labs listed above may include labs taken at another facility or during a different encounter within the last 24 hours. Please correlate lab times with ED admission and discharge times for further clarification of the services performed during this visit.    CT Pelvis Without Contrast   Final Result   1. Bilateral pulmonary opacifications of groundglass attenuation, fairly   well demarcated areas of geographic involvement left lung apex, mid   lungs and within the right lower lobe medial segment concerning for   atypical etiology such as viral etiology of airspace disease with at   least intermediate correlation to COVID-19.       2. Large left hip body wall hematoma with surrounding soft tissue edema,   however, no acute fracture of the left hip or pelvis is identified.       3. Advanced atherosclerotic involvement of peripheral vascular disease   extending into the bilateral lower extremities, right slightly greater   than left, however despite advanced involvement, there is at least trace   flow into the posterior tibial arteries, left greater than right, with   severely diminutive caliber of the right posterior tibial artery from   atherosclerotic involvement and calcifications progressed in the distal   superficial femoral, popliteal and trifurcation regions of the bilateral   lower extremities.       3.  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        D:  04/02/2020   E:  04/02/2020       This report was finalized on 4/2/2020 12:16 PM by Dr. Teja Scott.          CT Head Without Contrast   Final Result   No acute intracranial abnormality specifically, no acute   intracranial hemorrhage or extra-axial fluid collection with calvarium   intact. Moderate-to-severely advanced chronic small vessel ischemic   disease findings in the periventricular and deep white matter.       D:  04/02/2020   E:  04/02/2020       This report was finalized on 4/2/2020 12:16 PM by Dr. Teja Scott.          CT Angiogram Chest   Final Result   1. Bilateral pulmonary opacifications of groundglass attenuation, fairly   well demarcated areas of geographic involvement left lung apex, mid   lungs and within the right lower lobe medial segment concerning for   atypical etiology such as viral etiology of airspace disease with at   least intermediate correlation to COVID-19.       2. Large left hip body wall hematoma with surrounding soft tissue edema,   however, no acute fracture of the left hip or pelvis is identified.       3. Advanced atherosclerotic involvement of peripheral vascular disease   extending into the bilateral lower extremities, right slightly greater   than left, however despite advanced involvement, there is at least trace   flow into the posterior tibial arteries, left greater than right, with   severely diminutive caliber of the right posterior tibial artery from   atherosclerotic involvement and calcifications progressed in the distal   superficial femoral, popliteal and trifurcation regions of the bilateral   lower extremities.       3.  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       D:  04/02/2020   E:  04/02/2020       This report was finalized on 4/2/2020 12:16 PM by Dr. Teja Scott.          CT Angio Abdominal Aorta Bilateral Iliofem Runoff   Final Result   1. Bilateral  pulmonary opacifications of groundglass attenuation, fairly   well demarcated areas of geographic involvement left lung apex, mid   lungs and within the right lower lobe medial segment concerning for   atypical etiology such as viral etiology of airspace disease with at   least intermediate correlation to COVID-19.       2. Large left hip body wall hematoma with surrounding soft tissue edema,   however, no acute fracture of the left hip or pelvis is identified.       3. Advanced atherosclerotic involvement of peripheral vascular disease   extending into the bilateral lower extremities, right slightly greater   than left, however despite advanced involvement, there is at least trace   flow into the posterior tibial arteries, left greater than right, with   severely diminutive caliber of the right posterior tibial artery from   atherosclerotic involvement and calcifications progressed in the distal   superficial femoral, popliteal and trifurcation regions of the bilateral   lower extremities.       3.  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       D:  04/02/2020   E:  04/02/2020       This report was finalized on 4/2/2020 12:16 PM by Dr. Teja Scott.          XR Chest 1 View   Final Result   Increased opacifications left lung apex and right lung base   from 01/10/2020 comparison examination consistent with airspace disease   such as bronchopneumonia without pleural effusion.       D:  04/02/2020   E:  04/02/2020       This report was finalized on 4/2/2020 12:16 PM by Dr. Teja Scott.          XR Femur 2 View Left   Final Result   Degenerative changes.  The left hip remains well located   without acute fracture of the left hip or left femur.       D:  04/02/2020   E:  04/02/2020       This report was finalized on 4/2/2020 12:16 PM by Dr. Teja Scott.            Vitals:    04/02/20 1130 04/02/20 1200 04/02/20 1230 04/02/20  1330   BP: 123/82 104/75 106/59 109/66   BP Location:       Patient Position:       Pulse: 110 105 103 107   Resp: 14 14 14    Temp:       TempSrc:       SpO2: 100% 100% 96% 100%   Weight:       Height:         Medications   sodium chloride 0.9 % flush 10 mL (has no administration in time range)   sodium chloride 0.9 % bolus 1,000 mL (0 mL Intravenous Stopped 4/2/20 1235)   iopamidol (ISOVUE-370) 76 % injection 150 mL (157 mL Intravenous Given 4/2/20 1100)   sodium chloride 0.9 % bolus 1,100 mL (0 mL Intravenous Stopped 4/2/20 1300)   vancomycin 1250 mg/250 mL 0.9% NS IVPB ([facility]) (1,250 mg Intravenous New Bag 4/2/20 1242)   piperacillin-tazobactam (ZOSYN) 3.375 g in iso-osmotic dextrose 50 ml (premix) (0 g Intravenous Stopped 4/2/20 1220)   ondansetron (ZOFRAN) injection 4 mg (4 mg Intravenous Given 4/2/20 1349)     ECG/EMG Results (last 24 hours)     Procedure Component Value Units Date/Time    ECG 12 Lead [[id number]] Collected:  04/02/20 0941     Updated:  04/02/20 1012    ECG 12 Lead [[id number]] Collected:  04/02/20 1314     Updated:  04/02/20 1314        ECG 12 Lead         ECG 12 Lead                 MDM      Final diagnoses:   Severe sepsis (CMS/HCC)   Pneumonia of both lungs due to infectious organism, unspecified part of lung   Leukocytosis, unspecified type   Anemia, unspecified type   Hypotension, unspecified hypotension type   Dizziness   Syncope, unspecified syncope type   Traumatic hematoma of buttock, initial encounter   Contusion of left lower extremity, initial encounter       Documentation assistance provided by meche Mckay.  Information recorded by the scribjuan was done at my direction and has been verified and validated by me.     Leroy Mckay  04/02/20 0928       Leroy Mckay  04/02/20 0934       Leroy Mckay  04/02/20 1140       Jarrell Ramires MD  04/02/20 [id number]

## 2020-04-02 NOTE — PLAN OF CARE
Patient has been hypotensive and tachycardic since arriving from ED. 250 ml NS bolus given upon arrival. She has remained around 100% on 2L NC. Denies cough or SOA. She has remained afebrile. 1 unit PRBC ordered and patient was pre-medicated with Tylenol. Plaquenil therapy initiated per order. Will continue to monitor.

## 2020-04-03 LAB
ABO GROUP BLD: NORMAL
ANION GAP SERPL CALCULATED.3IONS-SCNC: 10 MMOL/L (ref 5–15)
BH BB BLOOD EXPIRATION DATE: NORMAL
BH BB BLOOD TYPE BARCODE: 9500
BH BB DISPENSE STATUS: NORMAL
BH BB PRODUCT CODE: NORMAL
BH BB UNIT NUMBER: NORMAL
BUN BLD-MCNC: 23 MG/DL (ref 8–23)
BUN/CREAT SERPL: 22.3 (ref 7–25)
CALCIUM SPEC-SCNC: 7.8 MG/DL (ref 8.6–10.5)
CHLORIDE SERPL-SCNC: 107 MMOL/L (ref 98–107)
CO2 SERPL-SCNC: 24 MMOL/L (ref 22–29)
CREAT BLD-MCNC: 1.03 MG/DL (ref 0.57–1)
CROSSMATCH INTERPRETATION: NORMAL
DEPRECATED RDW RBC AUTO: 77.3 FL (ref 37–54)
ERYTHROCYTE [DISTWIDTH] IN BLOOD BY AUTOMATED COUNT: 20.5 % (ref 12.3–15.4)
GFR SERPL CREATININE-BSD FRML MDRD: 50 ML/MIN/1.73
GLUCOSE BLD-MCNC: 91 MG/DL (ref 65–99)
HCT VFR BLD AUTO: 24.5 % (ref 34–46.6)
HGB BLD-MCNC: 7.7 G/DL (ref 12–15.9)
MCH RBC QN AUTO: 32.9 PG (ref 26.6–33)
MCHC RBC AUTO-ENTMCNC: 31.4 G/DL (ref 31.5–35.7)
MCV RBC AUTO: 104.7 FL (ref 79–97)
PLATELET # BLD AUTO: 216 10*3/MM3 (ref 140–450)
PMV BLD AUTO: 10.2 FL (ref 6–12)
POTASSIUM BLD-SCNC: 4.2 MMOL/L (ref 3.5–5.2)
PROCALCITONIN SERPL-MCNC: 0.19 NG/ML (ref 0.1–0.25)
RBC # BLD AUTO: 2.34 10*6/MM3 (ref 3.77–5.28)
REF LAB TEST METHOD: NORMAL
RH BLD: NEGATIVE
SARS-COV-2 RNA RESP QL NAA+PROBE: NOT DETECTED
SODIUM BLD-SCNC: 141 MMOL/L (ref 136–145)
UNIT  ABO: NORMAL
UNIT  RH: NORMAL
WBC NRBC COR # BLD: 10.5 10*3/MM3 (ref 3.4–10.8)

## 2020-04-03 PROCEDURE — 63710000001 DOCUSATE SODIUM 100 MG CAPSULE: Performed by: INTERNAL MEDICINE

## 2020-04-03 PROCEDURE — A9270 NON-COVERED ITEM OR SERVICE: HCPCS | Performed by: INTERNAL MEDICINE

## 2020-04-03 PROCEDURE — 25010000002 CYANOCOBALAMIN PER 1000 MCG: Performed by: INTERNAL MEDICINE

## 2020-04-03 PROCEDURE — 25010000002 PIPERACILLIN SOD-TAZOBACTAM PER 1 G: Performed by: INTERNAL MEDICINE

## 2020-04-03 PROCEDURE — 85027 COMPLETE CBC AUTOMATED: CPT | Performed by: INTERNAL MEDICINE

## 2020-04-03 PROCEDURE — 36430 TRANSFUSION BLD/BLD COMPNT: CPT

## 2020-04-03 PROCEDURE — 84145 PROCALCITONIN (PCT): CPT

## 2020-04-03 PROCEDURE — 63710000001 FOLIC ACID 1 MG TABLET: Performed by: INTERNAL MEDICINE

## 2020-04-03 PROCEDURE — P9016 RBC LEUKOCYTES REDUCED: HCPCS

## 2020-04-03 PROCEDURE — 63710000001 FAMOTIDINE 20 MG TABLET: Performed by: INTERNAL MEDICINE

## 2020-04-03 PROCEDURE — 99233 SBSQ HOSP IP/OBS HIGH 50: CPT | Performed by: INTERNAL MEDICINE

## 2020-04-03 PROCEDURE — 80048 BASIC METABOLIC PNL TOTAL CA: CPT | Performed by: INTERNAL MEDICINE

## 2020-04-03 PROCEDURE — 63710000001 HYDROXYCHLOROQUINE 200 MG TABLET: Performed by: INTERNAL MEDICINE

## 2020-04-03 PROCEDURE — 63710000001 MULTIVITAMIN WITH MINERALS TABLET: Performed by: INTERNAL MEDICINE

## 2020-04-03 PROCEDURE — 63710000001 ZINC SULFATE 220 (50 ZN) MG CAPSULE: Performed by: INTERNAL MEDICINE

## 2020-04-03 PROCEDURE — 94799 UNLISTED PULMONARY SVC/PX: CPT

## 2020-04-03 PROCEDURE — 86900 BLOOD TYPING SEROLOGIC ABO: CPT

## 2020-04-03 RX ORDER — LANOLIN ALCOHOL/MO/W.PET/CERES
1000 CREAM (GRAM) TOPICAL DAILY
Status: DISCONTINUED | OUTPATIENT
Start: 2020-04-03 | End: 2020-04-08 | Stop reason: HOSPADM

## 2020-04-03 RX ORDER — SACCHAROMYCES BOULARDII 250 MG
250 CAPSULE ORAL DAILY
Status: DISCONTINUED | OUTPATIENT
Start: 2020-04-03 | End: 2020-04-08 | Stop reason: HOSPADM

## 2020-04-03 RX ORDER — LIDOCAINE 50 MG/G
2 PATCH TOPICAL
Status: DISCONTINUED | OUTPATIENT
Start: 2020-04-03 | End: 2020-04-08 | Stop reason: HOSPADM

## 2020-04-03 RX ORDER — CYANOCOBALAMIN 1000 UG/ML
1000 INJECTION, SOLUTION INTRAMUSCULAR; SUBCUTANEOUS ONCE
Status: COMPLETED | OUTPATIENT
Start: 2020-04-03 | End: 2020-04-03

## 2020-04-03 RX ADMIN — BUDESONIDE AND FORMOTEROL FUMARATE DIHYDRATE 2 PUFF: 160; 4.5 AEROSOL RESPIRATORY (INHALATION) at 07:48

## 2020-04-03 RX ADMIN — ATORVASTATIN CALCIUM 10 MG: 10 TABLET, FILM COATED ORAL at 19:55

## 2020-04-03 RX ADMIN — Medication 220 MG: at 08:17

## 2020-04-03 RX ADMIN — HYDROXYCHLOROQUINE SULFATE 400 MG: 200 TABLET, FILM COATED ORAL at 08:17

## 2020-04-03 RX ADMIN — PIPERACILLIN AND TAZOBACTAM 3.38 G: 3; .375 INJECTION, POWDER, FOR SOLUTION INTRAVENOUS at 12:55

## 2020-04-03 RX ADMIN — FAMOTIDINE 20 MG: 20 TABLET ORAL at 19:55

## 2020-04-03 RX ADMIN — PIPERACILLIN AND TAZOBACTAM 3.38 G: 3; .375 INJECTION, POWDER, FOR SOLUTION INTRAVENOUS at 05:49

## 2020-04-03 RX ADMIN — LIDOCAINE 2 PATCH: 50 PATCH CUTANEOUS at 12:56

## 2020-04-03 RX ADMIN — FAMOTIDINE 20 MG: 20 TABLET ORAL at 08:16

## 2020-04-03 RX ADMIN — PIPERACILLIN AND TAZOBACTAM 3.38 G: 3; .375 INJECTION, POWDER, FOR SOLUTION INTRAVENOUS at 19:57

## 2020-04-03 RX ADMIN — SODIUM CHLORIDE, PRESERVATIVE FREE 10 ML: 5 INJECTION INTRAVENOUS at 19:58

## 2020-04-03 RX ADMIN — FOLIC ACID 1000 MCG: 1 TABLET ORAL at 08:17

## 2020-04-03 RX ADMIN — CYANOCOBALAMIN 1000 MCG: 1000 INJECTION, SOLUTION INTRAMUSCULAR; SUBCUTANEOUS at 09:46

## 2020-04-03 RX ADMIN — DOCUSATE SODIUM 100 MG: 100 CAPSULE, LIQUID FILLED ORAL at 08:17

## 2020-04-03 RX ADMIN — MULTIPLE VITAMINS W/ MINERALS TAB 1 TABLET: TAB at 08:17

## 2020-04-03 RX ADMIN — Medication 250 MG: at 12:55

## 2020-04-03 NOTE — TELEPHONE ENCOUNTER
The patient is currently admitted at [facility].  Would wait on PT orders for now.

## 2020-04-03 NOTE — PLAN OF CARE
VSS, BP's improved, SR on monitor, remains on RA. Patient does have c/o pain in left knee, Lidocaine patch applied. She has not voided spontaneously, I/O cathed at 1530 with 300 ml urine out. Previously she was cathed at 0700 on night shift with 500 ml out. Left leg/hip hematoma present. Covid negative. Will continue to monitor.

## 2020-04-03 NOTE — CONSULTS
Infectious Disease Consult  Pati Carter  7/26/1930  [id number]    Date of Consult: 4/3/2020  Admission Date: 4/2/2020    Requesting Provider: Matty Morris,*  Evaluating Physician: Scott Mathis MD    Chief Complaint: fall    Reason for Consultation: pneumonia    History of present illness:   Patient is a 89 y.o.  Yr old female origninally from the [place] but has lived in the [place] for many years. She has a  history of DJD, COPD, GERD, RA on methotrexate. Recent hip fracture. She resides in SNF. Presented to [facility] ED on 3/28 with fall, discharged back to SNF. Presented again to [facility] ED on 4/2 with another fall, complaining of knee and hip pain. In ED was hypoxemic, tachycardic, hypotensive, and by report was quite ill-appearing.  Initially the plan was to admitted to ICU however she has a firm DNR and thus the decision was made to admit to the floor. CT scan with multifocal pneumonia, intermediate risk for COVID-19, so isolated and testing sent to state lab. Started on zosyn and plaquenil and zinc. Also got vancomycin in ED. No hip or leg fracture. WBC 15.7, neutrophilic predominance, on admission. Negative procal.     This visit was conducted via in-hospital telemedicine, video conferencing software.  Patient agreed to telemedicine visit.  Patient was in her room and I was standing outside the room.  Nurse wearing proper PPE was in the room operating ant iPad.     4/3/20: currently comfortable on room air. Pulse 82. BP improved after transfusion and fluids. Afebrile since admission.  She says her respiratory status is back to baseline.  She denies having any cough or shortness of breath, chest pain at this time.  She denies having any sick contacts.  She says she has not left her skilled nursing facility recently opened to go to the emergency department a few days before.  No recent visitors.  No fevers or chills prior to admission. Her only complaint at this time is ongoing hip and knee pain. No  drug or tobacco use.    Review of Systems  Constitutional-- No Fever, chills or sweats.  Appetite ok  Heent-- No new vision, hearing or throat complaints.  No epistaxis or oral sores.  Denies odynophagia or dysphagia.  No headache, photophobia or neck stiffness.  CV-- No chest pain, palpitation or syncope  Resp-- No SOB/cough/Hemoptysis  GI- No nausea, vomiting, or diarrhea.   -- No dysuria, hematuria, or flank pain.  Denies hesitancy, urgency or flank pain.  Heme- No active bruising or bleeding  MS-- left hip and knee pain.   Neuro-- No acute focal weakness or numbness in the arms or legs.  Skin-- No rashes, lesions, ulcers. No skin breakdown      Past Medical History:   Diagnosis Date   • Anemia     Description: A.  Dx 2006- borderline intermittent.   • Back pain    • Benign colonic polyp     Description: A.  Dx 1999.   • Benign colonic polyp 9/28/2016    Description: A.  Dx 1999.   • Compression fracture of lumbar vertebra (CMS/[facility])    • Constipation    • COPD (chronic obstructive pulmonary disease) (CMS/[facility])     Description: A.  Rule out chronic persistent asthma, COPD, or obliterative bronchiolitis.- Rae   • Degeneration of intervertebral disc of lumbar region     Description: A.  Diagnosed in April 2013 with advanced multilevel with severe spinal stenosis, followed by Dr. Pillai for pain management.   • Gastroesophageal reflux disease    • Hearing loss    • History of colonoscopy 01/01/1999    NORMAL PER PATIENT    • History of mammogram 01/01/2011    NORMAL PER PT    • History of Papanicolaou smear of cervix 01/01/2010    NORMAL PER PT    • History of varicella    • Hyperlipidemia     Description: A.  Dx 2006.   • Hypertension     Description: A. Dx 2001.   • Macular degeneration    • Ovarian mass     Dx 8/15- benign left cystic adnexal mass   • Rheumatoid arthritis (CMS/[facility])     Description: A.  Diagnosed in 2000 and and followed by Dr. Constantino (now Dr. Kaplan). B.  On methotrexate therapy since 2000.  C.  Off low-dose prednisone therapy.       Past Surgical History:   Procedure Laterality Date   • APPENDECTOMY     • CARPAL TUNNEL RELEASE Left 01/01/2003    HISTORY OF NEUROPLASTY DECOMPRESSION MEDIAN NERVE AT CARPAL TUNNEL LEFT   • CATARACT EXTRACTION Bilateral 01/01/2009   • CHOLECYSTECTOMY  01/01/1962   • HIP CANNULATED SCREW PLACEMENT Right 1/25/2020    Procedure: HIP CANNULATED SCREW PLACEMENT RIGHT;  Surgeon: Karlos Blount MD;  Location: [facility];  Service: Orthopedics   • KNEE ARTHROSCOPY Left 01/01/2001    MENISCAL REPAIR   • KYPHOPLASTY  06/18/2015    T11 AND L1 (JOSE A)   • PELVIC LAPAROSCOPY  01/01/1996    REMOVAL OF BENIGN UTERINE AND RIGHT OVARIAN TUMORS   • SALPINGO OOPHORECTOMY Left 08/26/2015    REMOVAL OF LEFT OVARY AND TUBE (benign cystic mass)       Social History     Socioeconomic History   • Marital status:      Spouse name: Not on file   • Number of children: Not on file   • Years of education: Not on file   • Highest education level: Not on file   Tobacco Use   • Smoking status: Former Smoker   • Smokeless tobacco: Never Used   Substance and Sexual Activity   • Alcohol use: No   • Drug use: No   • Sexual activity: Defer   Social History Narrative    Patient moved to [facility] assisted living 3/26/20. On 3/28/20 patient visited ED for fall-related injuries.  In response, family looking into hiring a , to visit patient BID for ADLs/ transfers. Family drives patient to app, but says Jm Encinas may provide in future.       family history includes Diabetes in her mother; Hypertension in her mother.    Allergies   Allergen Reactions   • Bactrim [Sulfamethoxazole-Trimethoprim] Other (See Comments)     Stomach cramps    • Ciprofloxacin Other (See Comments)     Other reaction(s): shaking  HCI TABS/ SHAKING   • Levofloxacin Diarrhea       Antibiotics:  Anti-Infectives (From admission, onward)    Ordered     Dose/Rate Route Frequency Start Stop    04/02/20  1606  hydroxychloroquine (PLAQUENIL) tablet 200 mg     Ordering Provider:  Matty Morris MD    200 mg Oral Every 12 Hours Scheduled 04/03/20 2100 04/07/20 2059 04/02/20 1528  piperacillin-tazobactam (ZOSYN) 3.375 g/100 mL 0.9% NS IVPB (mbp)  Review   Ordering Provider:  Matty Morris MD    3.375 g  over 4 Hours Intravenous Every 8 Hours 04/02/20 2000 04/10/20 1959 04/02/20 1606  hydroxychloroquine (PLAQUENIL) tablet 400 mg     Ordering Provider:  Matty Morris MD    400 mg Oral Every 12 Hours Scheduled 04/02/20 1800 04/03/20 0817    04/02/20 1121  vancomycin 1250 mg/250 mL 0.9% NS IVPB ([facility])     Ordering Provider:  Jarrell Ramires MD    20 mg/kg × 68 kg Intravenous Once 04/02/20 1200 04/02/20 1441    04/02/20 1121  piperacillin-tazobactam (ZOSYN) 3.375 g in iso-osmotic dextrose 50 ml (premix)     Ordering Provider:  Jarrell Ramires MD    3.375 g Intravenous Once 04/02/20 1200 04/02/20 1220          Other Medications:  Current Facility-Administered Medications   Medication Dose Route Frequency Provider Last Rate Last Dose   • acetaminophen (TYLENOL) tablet 650 mg  650 mg Oral Q4H PRN Matty Morris MD        Or   • acetaminophen (TYLENOL) 160 MG/5ML solution 650 mg  650 mg Oral Q4H PRN Matty Morris MD        Or   • acetaminophen (TYLENOL) suppository 650 mg  650 mg Rectal Q4H PRN Matty Morris MD       • albuterol sulfate HFA (PROVENTIL HFA;VENTOLIN HFA;PROAIR HFA) inhaler 2 puff  2 puff Inhalation Q4H PRN Matty Morris MD       • atorvastatin (LIPITOR) tablet 10 mg  10 mg Oral Daily Matty Morris MD   10 mg at 04/02/20 2148   • budesonide-formoterol (SYMBICORT) 160-4.5 MCG/ACT inhaler 2 puff  2 puff Inhalation BID - RT Matty Morris MD   2 puff at 04/03/20 0748   • docusate sodium (COLACE) capsule 100 mg  100 mg Oral Daily Matty Morris MD   100 mg at 04/03/20 0817   • docusate sodium  "(COLACE) capsule 100 mg  100 mg Oral BID PRN Matty Morris MD       • famotidine (PEPCID) tablet 20 mg  20 mg Oral BID Matty Morris MD   20 mg at 04/03/20 0816   • famotidine (PEPCID) tablet 20 mg  20 mg Oral BID PRN Matty Morris MD       • folic acid (FOLVITE) tablet 1,000 mcg  1,000 mcg Oral Daily Matty Morris MD   1,000 mcg at 04/03/20 0817   • hydroxychloroquine (PLAQUENIL) tablet 200 mg  200 mg Oral Q12H Matty Morris MD       • melatonin tablet 5 mg  5 mg Oral Nightly PRN Matty Morris MD   5 mg at 04/02/20 2148   • multivitamin with minerals 1 tablet  1 tablet Oral Daily Matty Morris MD   1 tablet at 04/03/20 0817   • nystatin (MYCOSTATIN) ointment 1 application  1 application Topical Daily Matty Morris MD       • ondansetron (ZOFRAN) tablet 4 mg  4 mg Oral Q6H PRN Matty Morris MD        Or   • ondansetron (ZOFRAN) injection 4 mg  4 mg Intravenous Q6H PRN Matty Morris MD       • Pharmacy Consult   Does not apply Continuous PRN Matty Morris MD       • piperacillin-tazobactam (ZOSYN) 3.375 g/100 mL 0.9% NS IVPB (mbp)  3.375 g Intravenous Q8H Matty Morris MD   Stopped at 04/03/20 0951   • sodium chloride 0.9 % flush 10 mL  10 mL Intravenous PRN Jarrell Ramires MD       • sodium chloride 0.9 % flush 10 mL  10 mL Intravenous Q12H Matty Morris MD   10 mL at 04/02/20 2149   • sodium chloride 0.9 % flush 10 mL  10 mL Intravenous PRN Matty Morris MD       • vitamin B-12 (CYANOCOBALAMIN) tablet 1,000 mcg  1,000 mcg Oral Daily Matty Morris MD       • zinc sulfate (ZINCATE) capsule 220 mg  220 mg Oral Daily Matty Morris MD   220 mg at 04/03/20 0817       Physical Exam:   Vital Signs   /48   Pulse 96   Temp 98 °F (36.7 °C) (Oral)   Resp 18   Ht 165.1 cm (65\")   Wt 68 kg (150 lb)   SpO2 96%   BMI 24.96 kg/m² "     GENERAL: Awake and alert, in no acute distress.   EYES: PERRL. EOMI.  HEENT: Normocephalic, atraumatic. Mucous membranes moist. No lesions on lips  HEART: RRR on monitor  LUNGS: no respiratory distress.  No cough appreciated  ABDOMEN: Soft, nontender.  : + purewick catheter.  MSK: left hip and knee bruises  SKIN: no rash appreciated  NEURO: Oriented to PPT.    PSYCHIATRIC: Normal insight and judgement. Cooperative.    Laboratory Data    Results from last 7 days   Lab Units 04/03/20  0504 04/02/20  2219 04/02/20  1007 04/02/20  0941   WBC 10*3/mm3 10.50 12.78*  --  15.63*   HEMOGLOBIN g/dL 7.7* 7.0*  7.0*  --  7.2*   HEMOGLOBIN, POC g/dL  --   --  8.2*  --    HEMATOCRIT % 24.5* 22.3*  22.3*  --  23.5*   HEMATOCRIT POC %  --   --  24*  --    PLATELETS 10*3/mm3 216 259  --  313     Results from last 7 days   Lab Units 04/03/20  0504   SODIUM mmol/L 141   POTASSIUM mmol/L 4.2   CHLORIDE mmol/L 107   CO2 mmol/L 24.0   BUN mg/dL 23   CREATININE mg/dL 1.03*   GLUCOSE mg/dL 91   CALCIUM mg/dL 7.8*     Estimated Creatinine Clearance: 39.7 mL/min (A) (by C-G formula based on SCr of 1.03 mg/dL (H)).  Results from last 7 days   Lab Units 04/02/20  0941   ALK PHOS U/L 53   BILIRUBIN mg/dL 0.4   ALT (SGPT) U/L 10   AST (SGOT) U/L 21               Microbiology:  MRSA screening negaitve  Rapid strep negative  RVP negative    Blood Culture   Date Value Ref Range Status   04/02/2020 No growth at 24 hours  Preliminary   04/02/2020 No growth at 24 hours  Preliminary        Throat Culture, Beta Strep   Date Value Ref Range Status   04/02/2020 No Beta Hemolytic Streptococcus Isolated  Preliminary        Radiology:  Xr Femur 2 View Left    Result Date: 4/2/2020  Degenerative changes.  The left hip remains well located without acute fracture of the left hip or left femur.  D:  04/02/2020 E:  04/02/2020  This report was finalized on 4/2/2020 12:16 PM by Dr. Teja Scott.      Xr Knee 1 Or 2 View Left    Result Date:  3/28/2020  Extensive subcutaneous edema anterior to the patella and proximal tibia. No other evidence of potential acute trauma. Relatively advanced knee joint DJD.    This report was finalized on 3/28/2020 2:56 PM by Dr. Isael Lewis MD.      Xr Foot 3+ View Left    Result Date: 3/28/2020  Advanced midfoot DJD. No visible fracture. Consider follow-up imaging if the patient's symptoms persist or worsen.    This report was finalized on 3/28/2020 9:31 AM by Dr. Isael Lewis MD.      Ct Head Without Contrast    Result Date: 4/2/2020  No acute intracranial abnormality specifically, no acute intracranial hemorrhage or extra-axial fluid collection with calvarium intact. Moderate-to-severely advanced chronic small vessel ischemic disease findings in the periventricular and deep white matter.  D:  04/02/2020 E:  04/02/2020  This report was finalized on 4/2/2020 12:16 PM by Dr. Teja Scott.      Ct Pelvis Without Contrast    Result Date: 4/2/2020  1. Bilateral pulmonary opacifications of groundglass attenuation, fairly well demarcated areas of geographic involvement left lung apex, mid lungs and within the right lower lobe medial segment concerning for atypical etiology such as viral etiology of airspace disease with at least intermediate correlation to COVID-19.  2. Large left hip body wall hematoma with surrounding soft tissue edema, however, no acute fracture of the left hip or pelvis is identified.  3. Advanced atherosclerotic involvement of peripheral vascular disease extending into the bilateral lower extremities, right slightly greater than left, however despite advanced involvement, there is at least trace flow into the posterior tibial arteries, left greater than right, with severely diminutive caliber of the right posterior tibial artery from atherosclerotic involvement and calcifications progressed in the distal superficial femoral, popliteal and trifurcation regions of the bilateral lower extremities.  3.  No acute  vascular findings within the chest, abdomen or pelvis identified with atherosclerotic nonaneurysmal thoracoabdominal aorta.  D:  04/02/2020 E:  04/02/2020  This report was finalized on 4/2/2020 12:16 PM by Dr. Teja Scott.      Ct Angio Abdominal Aorta Bilateral Iliofem Runoff    Result Date: 4/2/2020  1. Bilateral pulmonary opacifications of groundglass attenuation, fairly well demarcated areas of geographic involvement left lung apex, mid lungs and within the right lower lobe medial segment concerning for atypical etiology such as viral etiology of airspace disease with at least intermediate correlation to COVID-19.  2. Large left hip body wall hematoma with surrounding soft tissue edema, however, no acute fracture of the left hip or pelvis is identified.  3. Advanced atherosclerotic involvement of peripheral vascular disease extending into the bilateral lower extremities, right slightly greater than left, however despite advanced involvement, there is at least trace flow into the posterior tibial arteries, left greater than right, with severely diminutive caliber of the right posterior tibial artery from atherosclerotic involvement and calcifications progressed in the distal superficial femoral, popliteal and trifurcation regions of the bilateral lower extremities.  3.  No acute vascular findings within the chest, abdomen or pelvis identified with atherosclerotic nonaneurysmal thoracoabdominal aorta.  D:  04/02/2020 E:  04/02/2020  This report was finalized on 4/2/2020 12:16 PM by Dr. Teja Scott.      Xr Chest 1 View    Result Date: 4/2/2020  Increased opacifications left lung apex and right lung base from 01/10/2020 comparison examination consistent with airspace disease such as bronchopneumonia without pleural effusion.  D:  04/02/2020 E:  04/02/2020  This report was finalized on 4/2/2020 12:16 PM by Dr. Teja Scott.      Ct Angiogram Chest    Result Date: 4/2/2020  1. Bilateral pulmonary opacifications of  groundglass attenuation, fairly well demarcated areas of geographic involvement left lung apex, mid lungs and within the right lower lobe medial segment concerning for atypical etiology such as viral etiology of airspace disease with at least intermediate correlation to COVID-19.  2. Large left hip body wall hematoma with surrounding soft tissue edema, however, no acute fracture of the left hip or pelvis is identified.  3. Advanced atherosclerotic involvement of peripheral vascular disease extending into the bilateral lower extremities, right slightly greater than left, however despite advanced involvement, there is at least trace flow into the posterior tibial arteries, left greater than right, with severely diminutive caliber of the right posterior tibial artery from atherosclerotic involvement and calcifications progressed in the distal superficial femoral, popliteal and trifurcation regions of the bilateral lower extremities.  3.  No acute vascular findings within the chest, abdomen or pelvis identified with atherosclerotic nonaneurysmal thoracoabdominal aorta.  D:  04/02/2020 E:  04/02/2020  This report was finalized on 4/2/2020 12:16 PM by Dr. Teja Scott.      Xr Hips Bilateral With Or Without Pelvis 2 View    Result Date: 3/28/2020  Generalized osteopenia. Previous right hip pinning. No evidence of acute right or left hip fracture is seen..    This report was finalized on 3/28/2020 9:33 AM by Dr. Isael Lewis MD.       I personally reviewed the above CT chest    Admission EKG with QTc 486      Impression:   1. Fall at SNF, left hip hematoma: XR negative for fracture  2. Hypotension, tachycardia: improved with volume repletion. More likely dehydration rather than sepsis. Afebrile. Negative procalcitonin. WBC rapidly improved  3. Multifocal pneumonia: noted on admission chest CT. Improved. Respiratory symptoms back to baseline. MRSA screening. Urine strep and legionella antigens negative. RVP negative. No  productive cough for culture  4. Multiple recent falls  5. Neutrophilic leukocytosis: rapidly improved  6. COPD: back to baseline  7. RA on methotrexate  8. Immunocompromised host  9. GERD  10. DJD    I discussed her complex case with Dr. Morris. She was quite ill on admission but fortunately has rapidly improved. Respiratory symptoms are back to baseline. Given her rapid improvement my suspicion for COVID-19 is low although certainly reasonable to rule her out, due to findings on chest CT. Her major symptoms at this point R hip and knee pain related to fall.    PLAN:   - follow CBC, CMP    - f/u pending COVID-19 PCR sent to state lab. Hopefully back later today  - continue empiric plaquenil for now, although need to be careful with borderline QTc  - if COVID-19 PCR is negative can move to droplet isolation alone and discontinue plaquenil    - agree with stopping vancomycin  - continue IV zosyn for at least one more day.   - If she continues to improve tomorrow it would be reasonable to de-escalate to PO Augmentin 875-125 mg to complete a 7 day total course of CAP coverage   - probiotic    Complex MDM. Immunocompromised host. Call if any acute new concerns over the weekend.     Scott Mathis MD  4/3/2020

## 2020-04-03 NOTE — PROGRESS NOTES
[facility] Medicine Services  PROGRESS NOTE    Patient Name: Pati Carter  : 1930  MRN: [id number]    Date of Admission: 2020  Primary Care Physician: Latoya Bolaños MD    Subjective   Subjective     CC:  Follow-up fall at skilled facility and pneumonia    HPI:  States she feels better today.  Feels stronger and more alert.  Denies any fevers or chills.  Notes occasional cough.  Hopeful she does not have the novel coronavirus.  Again reiterated her desire to be DNR/DNI should something happen to her.  Very complementary of her two nurses.  Denies any new complaints.    Review of Systems  No current fevers or chills  No current dysuria or hematuria  No current nausea, vomiting, or diarrhea  No current chest pain or palpitations      Objective   Objective     Vital Signs:   Temp:  [96.5 °F (35.8 °C)-98 °F (36.7 °C)] 98 °F (36.7 °C)  Heart Rate:  [] 97  Resp:  [14-27] 18  BP: ()/(35-95) 113/63        Physical Exam:  Constitutional:Awake, alert  HENT: NCAT, mucous membranes moist, neck supple  Respiratory: Occasional rhonchi bilaterally, respiratory effort normal, nonlabored breathing   Cardiovascular: RRR, normal radial pulses  Gastrointestinal: Positive bowel sounds, soft, nontender, nondistended  Musculoskeletal: Large left hip posterior hematoma, left knee hematoma/contusion with bruising, trace bilateral lower extremity edema, elderly and debilitated in appearance, BMI is 25  Psychiatric: Appropriate affect, cooperative  Neurologic: No slurred speech or facial droop, oriented, follows commands  Skin:  Stable left hip and left knee bruising as per above, no rashes or jaundice      Results Reviewed:  Results from last 7 days   Lab Units 20  0504 20  2219 20  1007 20  0941   WBC 10*3/mm3 10.50 12.78*  --  15.63*   HEMOGLOBIN g/dL 7.7* 7.0*  7.0*  --  7.2*   HEMOGLOBIN, POC g/dL  --   --  8.2*  --    HEMATOCRIT % 24.5* 22.3*  22.3*  --   23.5*   HEMATOCRIT POC %  --   --  24*  --    PLATELETS 10*3/mm3 216 259  --  313   INR   --   --   --  1.17*   PROCALCITONIN ng/mL 0.19  --   --  0.11     Results from last 7 days   Lab Units 04/03/20  0504 04/02/20  0941 03/28/20  0821   SODIUM mmol/L 141 142 142   POTASSIUM mmol/L 4.2 4.1 3.7   CHLORIDE mmol/L 107 105 103   CO2 mmol/L 24.0 27.0 29.0   BUN mg/dL 23 19 16   CREATININE mg/dL 1.03* 0.91 1.00   GLUCOSE mg/dL 91 171* 96   CALCIUM mg/dL 7.8* 8.3* 9.0   ALT (SGPT) U/L  --  10 8   AST (SGOT) U/L  --  21 19   TROPONIN T ng/mL  --  <0.010  --    PROBNP pg/mL  --  1,458.0  --      Estimated Creatinine Clearance: 39.7 mL/min (A) (by C-G formula based on SCr of 1.03 mg/dL (H)).    Microbiology Results Abnormal     Procedure Component Value - Date/Time    MRSA Screen, PCR (Inpatient) - Swab, Nares [[id number]]  (Normal) Collected:  04/02/20 1555    Lab Status:  Final result Specimen:  Swab from Nares Updated:  04/02/20 1739     MRSA PCR Negative    Narrative:       MRSA Negative    Respiratory Panel, PCR - Swab, Nasopharynx [[id number]]  (Normal) Collected:  04/02/20 1322    Lab Status:  Final result Specimen:  Swab from Nasopharynx Updated:  04/02/20 1441     ADENOVIRUS, PCR Not Detected     Coronavirus 229E Not Detected     Coronavirus HKU1 Not Detected     Coronavirus NL63 Not Detected     Coronavirus OC43 Not Detected     Human Metapneumovirus Not Detected     Human Rhinovirus/Enterovirus Not Detected     Influenza B PCR Not Detected     Parainfluenza Virus 1 Not Detected     Parainfluenza Virus 2 Not Detected     Parainfluenza Virus 3 Not Detected     Parainfluenza Virus 4 Not Detected     Bordetella pertussis pcr Not Detected     Influenza A H1 2009 PCR Not Detected     Chlamydophila pneumoniae PCR Not Detected     Mycoplasma pneumo by PCR Not Detected     Influenza A PCR Not Detected     Influenza A H3 Not Detected     Influenza A H1 Not Detected     RSV, PCR Not Detected     Bordetella parapertussis PCR  Not Detected    Narrative:       The coronavirus on the RVP is NOT COVID-19 and is NOT indicative of infection with COVID-19.     Rapid Strep A Screen - Swab, Throat [[id number]]  (Normal) Collected:  04/02/20 1322    Lab Status:  Final result Specimen:  Swab from Throat Updated:  04/02/20 1340     Strep A Ag Negative    Narrative:       Test performed by Direct Antigen Testing.          Imaging Results (Last 24 Hours)     Procedure Component Value Units Date/Time    XR Chest 1 View [[id number]] Collected:  04/02/20 1104     Updated:  04/02/20 1219    Narrative:       EXAMINATION: XR CHEST 1 VW-04/02/2020:      INDICATION: Fall.      COMPARISON: Chest x-ray 01/10/2020.     FINDINGS: Cardiac size similar to prior with mild increased central  vascular congestion versus peribronchial inflammatory findings with  progressive opacifications at the right lung base and left lung apex,  and suprahilar region concerning for airspace disease. No pneumothorax  or pleural effusion. Degenerative changes of the spine.       Impression:       Increased opacifications left lung apex and right lung base  from 01/10/2020 comparison examination consistent with airspace disease  such as bronchopneumonia without pleural effusion.     D:  04/02/2020  E:  04/02/2020     This report was finalized on 4/2/2020 12:16 PM by Dr. Teja Scott.       XR Femur 2 View Left [[id number]] Collected:  04/02/20 1103     Updated:  04/02/20 1219    Narrative:       EXAMINATION: XR FEMUR 2 VW, LEFT-04/02/2020:      INDICATION: Fall.      COMPARISON: 03/28/2020.     FINDINGS: AP and lateral views of the left femur reveal advanced  degenerative changes of the hip joint and greater trochanteric region  without acute fracture as there is no acute cortical irregularity or  cortical disruption. Distal femur unremarkable for acute fracture with  vascular calcifications in the soft tissues.           Impression:       Degenerative changes.  The left hip remains well  located  without acute fracture of the left hip or left femur.     D:  04/02/2020  E:  04/02/2020     This report was finalized on 4/2/2020 12:16 PM by Dr. Teja Scott.       CT Pelvis Without Contrast [[id number]] Collected:  04/02/20 1114     Updated:  04/02/20 1219    Narrative:       EXAMINATION: CT PELVIS WO CONTRAST-, CT ANGIOGRAM CHEST-, CT ANGIO  ABDOMINAL AORTA AND BILATERAL ILIOFEMORAL RUNOFF-04/02/2020:      INDICATION: Left hip pain.      TECHNIQUE: CT angiogram of the chest, abdomen and aortobiiliac system  with iliofemoral runoff of the bilateral lower extremities. 2-D and 3-D  reconstructions performed. CT bony pelvis performed in addition.     The radiation dose reduction device was turned on for each scan per the  ALARA (As Low as Reasonably Achievable) protocol.     COMPARISON: Chest x-ray earlier same day.     FINDINGS:      CT ANGIOGRAM CHEST, ABDOMEN AND PELVIS WITH RUNOFF:     Nonvascular evaluation with thyroid homogeneous in attenuation. No bulky  mediastinal adenopathy. Central airways are patent. Esophagus in normal  course with small hiatal hernia. Extended lung windows demonstrate  patchy groundglass attenuation in the left lung apex are fairly well  demarcated areas of groundglass attenuation and acute airspace disease  with atypical etiology considered given right middle lobe and lingular  segment involvement as well as minimal opacifications in similar  orientation of groundglass attenuation in the middle segment right lower  lobe. No pleural effusion or pneumothorax. Degenerative changes of the  spine without displaced rib fracture. No chest wall hematoma. Liver,  pancreas, spleen, adrenals and kidneys unremarkable for acute findings  with parapelvic cysts noted on the left kidney and left renal cortical  cyst without hydronephrosis or hydroureter. No bulky retroperitoneal  adenopathy. GI tract evaluation without mechanical obstructive process  or focal thickening. Sigmoid  diverticulosis without evidence for acute  diverticulitis. No free fluid or intra-abdominal free air. Pelvic  viscera are grossly unremarkable without bulky pelvic adenopathy or free  fluid. Soft tissue body wall findings demonstrates a large moderately  hyperattenuated blood products in attenuation focus left hip soft  tissues adjacent to the gluteus musculature measuring up to 12 x 5 cm  consistent with hematoma and adjacent edema.     VASCULAR: Cardiac size within normal limits. Patent central pulmonary  arterial vasculature. Atherosclerotic nonaneurysmal thoracic aorta with  mild atherosclerotic involvement and mild luminal stenosis from  calcified and noncalcified hard and soft plaque formation descending  thoracic aorta. Atherosclerotic patent nonaneurysmal abdominal aorta  with celiac and SMA origins patent as well as HERNAN patent. Renal arteries  patent with at least moderate-to-severe right and moderate left  atherosclerotic involvement of calcific burden at the ostia. Common and  external iliacs demonstrate mild atherosclerotic involvement with  calcific burden without focal severe stenosis or occlusion. Common  femoral, superficial femoral and popliteal arteries demonstrate up to  moderate atherosclerotic involvement without hemodynamically significant  stenosis, left slightly greater than right. Trifurcations bilaterally  demonstrate advanced atherosclerotic involvement with limited  opacification of the bilateral runoff vessels including limited  opacifications of the anterior and perineal arteries right along with  progressive loss of opacification right posterior tibial artery. Left  trifurcation has some involvement of atherosclerotic calcific burden  with limited opacification in the peroneal and anterior tibial arteries  with limited but present opacification of the left posterior tibial  artery to the level of the ankle with plantar branches severely  diminished, however, patency is observed  minimally bilaterally.     CT BONY PELVIS: SI joints symmetric and without widening. No displaced  pelvic ring fracture. Right hip hardware remains in place without acute  fracture. Left hip without acute fracture demonstrating degenerative  changes of the greater trochanteric region and DJD of the left hip joint  without acute cortical irregularity evident.       Impression:       1. Bilateral pulmonary opacifications of groundglass attenuation, fairly  well demarcated areas of geographic involvement left lung apex, mid  lungs and within the right lower lobe medial segment concerning for  atypical etiology such as viral etiology of airspace disease with at  least intermediate correlation to COVID-19.     2. Large left hip body wall hematoma with surrounding soft tissue edema,  however, no acute fracture of the left hip or pelvis is identified.     3. Advanced atherosclerotic involvement of peripheral vascular disease  extending into the bilateral lower extremities, right slightly greater  than left, however despite advanced involvement, there is at least trace  flow into the posterior tibial arteries, left greater than right, with  severely diminutive caliber of the right posterior tibial artery from  atherosclerotic involvement and calcifications progressed in the distal  superficial femoral, popliteal and trifurcation regions of the bilateral  lower extremities.     3.  No acute vascular findings within the chest, abdomen or pelvis  identified with atherosclerotic nonaneurysmal thoracoabdominal aorta.     D:  04/02/2020  E:  04/02/2020     This report was finalized on 4/2/2020 12:16 PM by Dr. Teja Scott.       CT Angiogram Chest [[id number]] Collected:  04/02/20 1114     Updated:  04/02/20 1219    Narrative:       EXAMINATION: CT PELVIS WO CONTRAST-, CT ANGIOGRAM CHEST-, CT ANGIO  ABDOMINAL AORTA AND BILATERAL ILIOFEMORAL RUNOFF-04/02/2020:      INDICATION: Left hip pain.      TECHNIQUE: CT angiogram of the  chest, abdomen and aortobiiliac system  with iliofemoral runoff of the bilateral lower extremities. 2-D and 3-D  reconstructions performed. CT bony pelvis performed in addition.     The radiation dose reduction device was turned on for each scan per the  ALARA (As Low as Reasonably Achievable) protocol.     COMPARISON: Chest x-ray earlier same day.     FINDINGS:      CT ANGIOGRAM CHEST, ABDOMEN AND PELVIS WITH RUNOFF:     Nonvascular evaluation with thyroid homogeneous in attenuation. No bulky  mediastinal adenopathy. Central airways are patent. Esophagus in normal  course with small hiatal hernia. Extended lung windows demonstrate  patchy groundglass attenuation in the left lung apex are fairly well  demarcated areas of groundglass attenuation and acute airspace disease  with atypical etiology considered given right middle lobe and lingular  segment involvement as well as minimal opacifications in similar  orientation of groundglass attenuation in the middle segment right lower  lobe. No pleural effusion or pneumothorax. Degenerative changes of the  spine without displaced rib fracture. No chest wall hematoma. Liver,  pancreas, spleen, adrenals and kidneys unremarkable for acute findings  with parapelvic cysts noted on the left kidney and left renal cortical  cyst without hydronephrosis or hydroureter. No bulky retroperitoneal  adenopathy. GI tract evaluation without mechanical obstructive process  or focal thickening. Sigmoid diverticulosis without evidence for acute  diverticulitis. No free fluid or intra-abdominal free air. Pelvic  viscera are grossly unremarkable without bulky pelvic adenopathy or free  fluid. Soft tissue body wall findings demonstrates a large moderately  hyperattenuated blood products in attenuation focus left hip soft  tissues adjacent to the gluteus musculature measuring up to 12 x 5 cm  consistent with hematoma and adjacent edema.     VASCULAR: Cardiac size within normal limits. Patent  central pulmonary  arterial vasculature. Atherosclerotic nonaneurysmal thoracic aorta with  mild atherosclerotic involvement and mild luminal stenosis from  calcified and noncalcified hard and soft plaque formation descending  thoracic aorta. Atherosclerotic patent nonaneurysmal abdominal aorta  with celiac and SMA origins patent as well as HERNAN patent. Renal arteries  patent with at least moderate-to-severe right and moderate left  atherosclerotic involvement of calcific burden at the ostia. Common and  external iliacs demonstrate mild atherosclerotic involvement with  calcific burden without focal severe stenosis or occlusion. Common  femoral, superficial femoral and popliteal arteries demonstrate up to  moderate atherosclerotic involvement without hemodynamically significant  stenosis, left slightly greater than right. Trifurcations bilaterally  demonstrate advanced atherosclerotic involvement with limited  opacification of the bilateral runoff vessels including limited  opacifications of the anterior and perineal arteries right along with  progressive loss of opacification right posterior tibial artery. Left  trifurcation has some involvement of atherosclerotic calcific burden  with limited opacification in the peroneal and anterior tibial arteries  with limited but present opacification of the left posterior tibial  artery to the level of the ankle with plantar branches severely  diminished, however, patency is observed minimally bilaterally.     CT BONY PELVIS: SI joints symmetric and without widening. No displaced  pelvic ring fracture. Right hip hardware remains in place without acute  fracture. Left hip without acute fracture demonstrating degenerative  changes of the greater trochanteric region and DJD of the left hip joint  without acute cortical irregularity evident.       Impression:       1. Bilateral pulmonary opacifications of groundglass attenuation, fairly  well demarcated areas of geographic  involvement left lung apex, mid  lungs and within the right lower lobe medial segment concerning for  atypical etiology such as viral etiology of airspace disease with at  least intermediate correlation to COVID-19.     2. Large left hip body wall hematoma with surrounding soft tissue edema,  however, no acute fracture of the left hip or pelvis is identified.     3. Advanced atherosclerotic involvement of peripheral vascular disease  extending into the bilateral lower extremities, right slightly greater  than left, however despite advanced involvement, there is at least trace  flow into the posterior tibial arteries, left greater than right, with  severely diminutive caliber of the right posterior tibial artery from  atherosclerotic involvement and calcifications progressed in the distal  superficial femoral, popliteal and trifurcation regions of the bilateral  lower extremities.     3.  No acute vascular findings within the chest, abdomen or pelvis  identified with atherosclerotic nonaneurysmal thoracoabdominal aorta.     D:  04/02/2020  E:  04/02/2020     This report was finalized on 4/2/2020 12:16 PM by Dr. Teja Scott.       CT Head Without Contrast [[id number]] Collected:  04/02/20 1111     Updated:  04/02/20 1219    Narrative:       EXAMINATION: CT HEAD WO CONTRAST-04/02/2020:      INDICATION: Fall, hit head.      TECHNIQUE: CT head without intravenous contrast.     The radiation dose reduction device was turned on for each scan per the  ALARA (As Low as Reasonably Achievable) protocol.     COMPARISON: NONE.     FINDINGS: The midline structures are symmetric without evidence of mass,  mass effect or midline shift. Ventricles and sulci demonstrate mild  prominence of the sulci and generalized atrophy without focal atrophic  findings. Moderate to severe low-attenuation regions in the  periventricular and deep white matter suggesting moderate-to-severe  advanced chronic small vessel ischemic disease. No  intra-axial  hemorrhage or extra-axial fluid collection. Globes and orbits are  unremarkable. The visualized paranasal sinuses and mastoid air cells are  grossly clear and well pneumatized. Calvarium intact.       Impression:       No acute intracranial abnormality specifically, no acute  intracranial hemorrhage or extra-axial fluid collection with calvarium  intact. Moderate-to-severely advanced chronic small vessel ischemic  disease findings in the periventricular and deep white matter.     D:  04/02/2020  E:  04/02/2020     This report was finalized on 4/2/2020 12:16 PM by Dr. Teja Scott.       CT Angio Abdominal Aorta Bilateral Iliofem Runoff [[id number]] Collected:  04/02/20 1114     Updated:  04/02/20 1219    Narrative:       EXAMINATION: CT PELVIS WO CONTRAST-, CT ANGIOGRAM CHEST-, CT ANGIO  ABDOMINAL AORTA AND BILATERAL ILIOFEMORAL RUNOFF-04/02/2020:      INDICATION: Left hip pain.      TECHNIQUE: CT angiogram of the chest, abdomen and aortobiiliac system  with iliofemoral runoff of the bilateral lower extremities. 2-D and 3-D  reconstructions performed. CT bony pelvis performed in addition.     The radiation dose reduction device was turned on for each scan per the  ALARA (As Low as Reasonably Achievable) protocol.     COMPARISON: Chest x-ray earlier same day.     FINDINGS:      CT ANGIOGRAM CHEST, ABDOMEN AND PELVIS WITH RUNOFF:     Nonvascular evaluation with thyroid homogeneous in attenuation. No bulky  mediastinal adenopathy. Central airways are patent. Esophagus in normal  course with small hiatal hernia. Extended lung windows demonstrate  patchy groundglass attenuation in the left lung apex are fairly well  demarcated areas of groundglass attenuation and acute airspace disease  with atypical etiology considered given right middle lobe and lingular  segment involvement as well as minimal opacifications in similar  orientation of groundglass attenuation in the middle segment right lower  lobe. No  pleural effusion or pneumothorax. Degenerative changes of the  spine without displaced rib fracture. No chest wall hematoma. Liver,  pancreas, spleen, adrenals and kidneys unremarkable for acute findings  with parapelvic cysts noted on the left kidney and left renal cortical  cyst without hydronephrosis or hydroureter. No bulky retroperitoneal  adenopathy. GI tract evaluation without mechanical obstructive process  or focal thickening. Sigmoid diverticulosis without evidence for acute  diverticulitis. No free fluid or intra-abdominal free air. Pelvic  viscera are grossly unremarkable without bulky pelvic adenopathy or free  fluid. Soft tissue body wall findings demonstrates a large moderately  hyperattenuated blood products in attenuation focus left hip soft  tissues adjacent to the gluteus musculature measuring up to 12 x 5 cm  consistent with hematoma and adjacent edema.     VASCULAR: Cardiac size within normal limits. Patent central pulmonary  arterial vasculature. Atherosclerotic nonaneurysmal thoracic aorta with  mild atherosclerotic involvement and mild luminal stenosis from  calcified and noncalcified hard and soft plaque formation descending  thoracic aorta. Atherosclerotic patent nonaneurysmal abdominal aorta  with celiac and SMA origins patent as well as HERNAN patent. Renal arteries  patent with at least moderate-to-severe right and moderate left  atherosclerotic involvement of calcific burden at the ostia. Common and  external iliacs demonstrate mild atherosclerotic involvement with  calcific burden without focal severe stenosis or occlusion. Common  femoral, superficial femoral and popliteal arteries demonstrate up to  moderate atherosclerotic involvement without hemodynamically significant  stenosis, left slightly greater than right. Trifurcations bilaterally  demonstrate advanced atherosclerotic involvement with limited  opacification of the bilateral runoff vessels including limited  opacifications of  the anterior and perineal arteries right along with  progressive loss of opacification right posterior tibial artery. Left  trifurcation has some involvement of atherosclerotic calcific burden  with limited opacification in the peroneal and anterior tibial arteries  with limited but present opacification of the left posterior tibial  artery to the level of the ankle with plantar branches severely  diminished, however, patency is observed minimally bilaterally.     CT BONY PELVIS: SI joints symmetric and without widening. No displaced  pelvic ring fracture. Right hip hardware remains in place without acute  fracture. Left hip without acute fracture demonstrating degenerative  changes of the greater trochanteric region and DJD of the left hip joint  without acute cortical irregularity evident.       Impression:       1. Bilateral pulmonary opacifications of groundglass attenuation, fairly  well demarcated areas of geographic involvement left lung apex, mid  lungs and within the right lower lobe medial segment concerning for  atypical etiology such as viral etiology of airspace disease with at  least intermediate correlation to COVID-19.     2. Large left hip body wall hematoma with surrounding soft tissue edema,  however, no acute fracture of the left hip or pelvis is identified.     3. Advanced atherosclerotic involvement of peripheral vascular disease  extending into the bilateral lower extremities, right slightly greater  than left, however despite advanced involvement, there is at least trace  flow into the posterior tibial arteries, left greater than right, with  severely diminutive caliber of the right posterior tibial artery from  atherosclerotic involvement and calcifications progressed in the distal  superficial femoral, popliteal and trifurcation regions of the bilateral  lower extremities.     3.  No acute vascular findings within the chest, abdomen or pelvis  identified with atherosclerotic nonaneurysmal  thoracoabdominal aorta.     D:  04/02/2020  E:  04/02/2020     This report was finalized on 4/2/2020 12:16 PM by Dr. Teja Scott.                  I have reviewed the medications:  Scheduled Meds:  atorvastatin 10 mg Oral Daily   budesonide-formoterol 2 puff Inhalation BID - RT   docusate sodium 100 mg Oral Daily   famotidine 20 mg Oral BID   folic acid 1,000 mcg Oral Daily   hydroxychloroquine 200 mg Oral Q12H   multivitamin with minerals 1 tablet Oral Daily   nystatin 1 application Topical Daily   piperacillin-tazobactam 3.375 g Intravenous Q8H   sodium chloride 10 mL Intravenous Q12H   zinc sulfate 220 mg Oral Daily     Continuous Infusions:  Pharmacy Consult      PRN Meds:.•  acetaminophen **OR** acetaminophen **OR** acetaminophen  •  albuterol sulfate HFA  •  docusate sodium  •  famotidine  •  melatonin  •  ondansetron **OR** ondansetron  •  Pharmacy Consult  •  [COMPLETED] Insert peripheral IV **AND** sodium chloride  •  sodium chloride    Assessment/Plan   Assessment & Plan     Active Hospital Problems    Diagnosis  POA   • **Multifocal pneumonia [J18.9]  Yes   • Falls frequently [R29.6]  Not Applicable   • Fall at home, initial encounter [W19.XXXA, Y92.009]  Not Applicable   • Sepsis (CMS/HCC) [A41.9]  Yes   • Immunosuppressed status on methotrexate [D89.9]  Yes   • Acute respiratory failure with hypoxia (CMS/HCC) [J96.01]  Yes   • Suspected Covid-19 Virus Infection [R68.89]  Yes   • Anemia [D64.9]  Yes   • COPD (chronic obstructive pulmonary disease) (CMS/HCC) [J44.9]  Yes   • Hyperlipidemia [E78.5]  Yes   • Hypertension [I10]  Yes   • Rheumatoid arthritis (CMS/HCC) [M06.9]  Yes      Resolved Hospital Problems   No resolved problems to display.        Brief Hospital Course to date:  Pati Carter is a 89 y.o. female from her nursing home with fall and head and knee and left hip contusion with left hip and left knee hematomas and acute blood loss anemia.  She was found to have leukocytosis, hypotension,  tachycardia, tachypnea, and hypoxia consistent with sepsis and CT scan showed multifocal pneumonia felt to be intermediate risk of novel coronavirus per radiologist.  Patient had persistent bleeding into her knee and hip hematoma and required blood transfusion on 4/2 and second unit on 4/3.  She had persistent issues with hypotension that was improved with intermittent boluses and her blood transfusions.    Discussion/plan: Minimize IV fluid as much as possible.  Awaiting novel coronavirus test.  Continue Plaquenil and zinc, risks versus benefits and all known side effects of Plaquenil discussed and patient agrees with empiric treatment for now.  Vancomycin discontinued as MRSA negative.  Continue Zosyn for broad-spectrum antibiotic coverage.  Plan to discuss further with infectious disease later today.  Continue to avoid aspirin and other NSAIDs.  Transfuse further as needed.  Initiate B12 supplementation, folate acid level is good.  Repeat laboratory studies tomorrow.  Patient is very high risk.  CODE STATUS has been adjusted since admission as per her current wishes which are DNI DNR.  Addendum: I have called and updated her daughter Danette today over the phone regarding patient's improvement but guarded prognosis.     Sepsis due to multifocal pneumonia in immunosuppressed patient:  Fluid resuscitated emergency room with 2 L bolus  Tachycardia and hypotension are improving.  Broad-spectrum antibiotics initially received vancomycin and Zosyn.  Continue Zosyn for now.  MRSA PCR study returned negative, vancomycin discontinued  Viral respiratory PCR studies negative  Approved for state testing novel coronavirus test, [id number]  Careful monitoring as immunosuppressed status on methotrexate     Acute hypoxic respiratory failure due to sepsis and pneumonia:  Plan for supplemental oxygen  Respiratory treatments  Pulse oximetry  Wean oxygen as tolerated     Acute blood loss anemia due to left hip hematoma and left knee  hematoma:  Supportive care and symptomatic management for hematoma.  Hold aspirin and NSAIDs to minimize bleeding.  Transfuse 2 units due to symptomatic anemia  Hemoglobin previously 10 approximately 4-5 days ago before fall and hematoma upon admission 7.2.  Denies other source of bleeding, denies blood in stools  Monitor for further bleeding and trend blood counts.  B12 borderline low, replaced one IM dose and then orally     COPD:  Continue inhalers, inhaled corticosteroid, long-acting beta agonist  No clear wheezes on examination hold off on systemic steroids unless worsens or more significant wheeze particularly while testing novel coronavirus.     Fall at home, multiple recent falls, debility:  PT OT while hospitalized, currently has been in skilled facility.  Fall precautions.     History of essential hypertension now with hypotension:   Monitor, avoid any blood pressure medications for now.     Hyperlipidemia: Stable.  Statin.     DVT prophylaxis: Lovenox and heparin contraindicated due to bleeding, SCDs contraindicated due to peripheral vascular disease    Discharge planning: Pending further improvement, and novel coronavirus test    CODE STATUS:   Code Status and Medical Interventions:   Ordered at: 04/02/20 8269     Limited Support to NOT Include:    Intubation    NIPPV (Non-Invasive Positive Pressure Support)    Cardioversion/Defibrillation     Code Status:    No CPR     Medical Interventions (Level of Support Prior to Arrest):    Limited         Electronically signed by Matty Morris MD, 04/03/20, 08:33.

## 2020-04-03 NOTE — PLAN OF CARE
Problem: Patient Care Overview  Goal: Plan of Care Review  Outcome: Ongoing (interventions implemented as appropriate)  Flowsheets  Taken 4/3/2020 0631  Progress: declining  Outcome Summary: 2 units PRBCs infused, IV ABX continued, NSR, 1LNC. Firm large hematoma on L hip and buttocks. Will continue to monitor.  Taken 4/2/2020 2002  Plan of Care Reviewed With: patient

## 2020-04-03 NOTE — PROGRESS NOTES
Continued Stay Note  [facility]     Patient Name: Pati Carter  MRN: [id number]  Today's Date: 4/3/2020    Admit Date: 4/2/2020    Discharge Plan     Row Name 04/03/20 1339       Plan    Plan  Personal Care Bed at [facility]    Patient/Family in Agreement with Plan  yes    Plan Comments  Discussed patient in am rounds.  Patient remains in isolation.  Covid PENDING.  Patient's plan is back to personal care bed at [facility] with [facility] for PT/OT.  Spoke with Romelia at [facility] and they are not able to accept patient over the weekend.  CM will continue to follow.  Emeli Hidalgo RN [phone]     Final Discharge Disposition Code  06 - home with home health care        Discharge Codes    No documentation.       Expected Discharge Date and Time     Expected Discharge Date Expected Discharge Time    Apr 7, 2020             Emeli Hidalgo RN

## 2020-04-04 PROBLEM — E53.8 B12 DEFICIENCY: Status: ACTIVE | Noted: 2020-04-04

## 2020-04-04 PROBLEM — D62 ACUTE BLOOD LOSS ANEMIA: Status: ACTIVE | Noted: 2020-04-04

## 2020-04-04 PROBLEM — R33.8 ACUTE URINARY RETENTION: Status: ACTIVE | Noted: 2020-04-04

## 2020-04-04 PROBLEM — S70.02XA TRAUMATIC HEMATOMA OF LEFT HIP: Status: ACTIVE | Noted: 2020-04-04

## 2020-04-04 PROBLEM — Z20.822 SUSPECTED COVID-19 VIRUS INFECTION: Status: RESOLVED | Noted: 2020-04-02 | Resolved: 2020-04-04

## 2020-04-04 PROBLEM — F05 DELIRIUM DUE TO ANOTHER MEDICAL CONDITION: Status: ACTIVE | Noted: 2020-04-04

## 2020-04-04 LAB
ANION GAP SERPL CALCULATED.3IONS-SCNC: 11 MMOL/L (ref 5–15)
BACTERIA SPEC AEROBE CULT: NORMAL
BACTERIA UR QL AUTO: ABNORMAL /HPF
BH BB BLOOD EXPIRATION DATE: NORMAL
BH BB BLOOD TYPE BARCODE: 9500
BH BB DISPENSE STATUS: NORMAL
BH BB PRODUCT CODE: NORMAL
BH BB UNIT NUMBER: NORMAL
BILIRUB UR QL STRIP: NEGATIVE
BUN BLD-MCNC: 20 MG/DL (ref 8–23)
BUN/CREAT SERPL: 19.2 (ref 7–25)
CALCIUM SPEC-SCNC: 8.2 MG/DL (ref 8.6–10.5)
CHLORIDE SERPL-SCNC: 105 MMOL/L (ref 98–107)
CLARITY UR: CLEAR
CO2 SERPL-SCNC: 24 MMOL/L (ref 22–29)
COLOR UR: YELLOW
CREAT BLD-MCNC: 1.04 MG/DL (ref 0.57–1)
CROSSMATCH INTERPRETATION: NORMAL
DEPRECATED RDW RBC AUTO: 72.9 FL (ref 37–54)
ERYTHROCYTE [DISTWIDTH] IN BLOOD BY AUTOMATED COUNT: 19.6 % (ref 12.3–15.4)
GFR SERPL CREATININE-BSD FRML MDRD: 50 ML/MIN/1.73
GLUCOSE BLD-MCNC: 105 MG/DL (ref 65–99)
GLUCOSE UR STRIP-MCNC: NEGATIVE MG/DL
HCT VFR BLD AUTO: 22.3 % (ref 34–46.6)
HGB BLD-MCNC: 7 G/DL (ref 12–15.9)
HGB UR QL STRIP.AUTO: NEGATIVE
HYALINE CASTS UR QL AUTO: ABNORMAL /LPF
KETONES UR QL STRIP: NEGATIVE
LEUKOCYTE ESTERASE UR QL STRIP.AUTO: ABNORMAL
MCH RBC QN AUTO: 32.1 PG (ref 26.6–33)
MCHC RBC AUTO-ENTMCNC: 31.4 G/DL (ref 31.5–35.7)
MCV RBC AUTO: 102.3 FL (ref 79–97)
NITRITE UR QL STRIP: NEGATIVE
PH UR STRIP.AUTO: <=5 [PH] (ref 5–8)
PLATELET # BLD AUTO: 244 10*3/MM3 (ref 140–450)
PMV BLD AUTO: 10 FL (ref 6–12)
POTASSIUM BLD-SCNC: 4.1 MMOL/L (ref 3.5–5.2)
PROT UR QL STRIP: ABNORMAL
RBC # BLD AUTO: 2.18 10*6/MM3 (ref 3.77–5.28)
RBC # UR: ABNORMAL /HPF
REF LAB TEST METHOD: ABNORMAL
SODIUM BLD-SCNC: 140 MMOL/L (ref 136–145)
SP GR UR STRIP: 1.02 (ref 1–1.03)
SQUAMOUS #/AREA URNS HPF: ABNORMAL /HPF
UNIT  ABO: NORMAL
UNIT  RH: NORMAL
UROBILINOGEN UR QL STRIP: ABNORMAL
WBC NRBC COR # BLD: 10.88 10*3/MM3 (ref 3.4–10.8)
WBC UR QL AUTO: ABNORMAL /HPF

## 2020-04-04 PROCEDURE — 25010000002 PIPERACILLIN-TAZOBACTAM: Performed by: INTERNAL MEDICINE

## 2020-04-04 PROCEDURE — 97162 PT EVAL MOD COMPLEX 30 MIN: CPT

## 2020-04-04 PROCEDURE — 94799 UNLISTED PULMONARY SVC/PX: CPT

## 2020-04-04 PROCEDURE — 99233 SBSQ HOSP IP/OBS HIGH 50: CPT | Performed by: INTERNAL MEDICINE

## 2020-04-04 PROCEDURE — 80048 BASIC METABOLIC PNL TOTAL CA: CPT | Performed by: INTERNAL MEDICINE

## 2020-04-04 PROCEDURE — 86900 BLOOD TYPING SEROLOGIC ABO: CPT

## 2020-04-04 PROCEDURE — P9016 RBC LEUKOCYTES REDUCED: HCPCS

## 2020-04-04 PROCEDURE — 85027 COMPLETE CBC AUTOMATED: CPT | Performed by: INTERNAL MEDICINE

## 2020-04-04 PROCEDURE — 36430 TRANSFUSION BLD/BLD COMPNT: CPT

## 2020-04-04 PROCEDURE — 25010000002 PIPERACILLIN SOD-TAZOBACTAM PER 1 G: Performed by: INTERNAL MEDICINE

## 2020-04-04 PROCEDURE — 81001 URINALYSIS AUTO W/SCOPE: CPT | Performed by: PHYSICIAN ASSISTANT

## 2020-04-04 RX ORDER — OLANZAPINE 5 MG/1
2.5 TABLET ORAL EVERY 8 HOURS PRN
Status: DISCONTINUED | OUTPATIENT
Start: 2020-04-04 | End: 2020-04-05

## 2020-04-04 RX ORDER — CHOLECALCIFEROL (VITAMIN D3) 125 MCG
2.5 CAPSULE ORAL NIGHTLY
Status: DISCONTINUED | OUTPATIENT
Start: 2020-04-04 | End: 2020-04-05

## 2020-04-04 RX ORDER — ACETAMINOPHEN 325 MG/1
650 TABLET ORAL EVERY 8 HOURS
Status: DISCONTINUED | OUTPATIENT
Start: 2020-04-04 | End: 2020-04-05

## 2020-04-04 RX ORDER — FAMOTIDINE 20 MG/1
20 TABLET, FILM COATED ORAL DAILY
Status: DISCONTINUED | OUTPATIENT
Start: 2020-04-05 | End: 2020-04-08 | Stop reason: HOSPADM

## 2020-04-04 RX ORDER — OLANZAPINE 10 MG/1
2.5 INJECTION, POWDER, LYOPHILIZED, FOR SOLUTION INTRAMUSCULAR EVERY 8 HOURS PRN
Status: DISCONTINUED | OUTPATIENT
Start: 2020-04-04 | End: 2020-04-08 | Stop reason: HOSPADM

## 2020-04-04 RX ORDER — OLANZAPINE 10 MG/1
2.5 INJECTION, POWDER, LYOPHILIZED, FOR SOLUTION INTRAMUSCULAR ONCE
Status: DISCONTINUED | OUTPATIENT
Start: 2020-04-04 | End: 2020-04-04

## 2020-04-04 RX ORDER — OLANZAPINE 10 MG/1
2.5 INJECTION, POWDER, LYOPHILIZED, FOR SOLUTION INTRAMUSCULAR ONCE
Status: DISCONTINUED | OUTPATIENT
Start: 2020-04-05 | End: 2020-04-08 | Stop reason: HOSPADM

## 2020-04-04 RX ORDER — TAMSULOSIN HYDROCHLORIDE 0.4 MG/1
0.4 CAPSULE ORAL DAILY
Status: DISCONTINUED | OUTPATIENT
Start: 2020-04-04 | End: 2020-04-08 | Stop reason: HOSPADM

## 2020-04-04 RX ORDER — ALBUTEROL SULFATE 2.5 MG/3ML
2.5 SOLUTION RESPIRATORY (INHALATION) EVERY 4 HOURS PRN
Status: DISCONTINUED | OUTPATIENT
Start: 2020-04-04 | End: 2020-04-08 | Stop reason: HOSPADM

## 2020-04-04 RX ORDER — OLANZAPINE 5 MG/1
2.5 TABLET ORAL ONCE
Status: DISCONTINUED | OUTPATIENT
Start: 2020-04-04 | End: 2020-04-04

## 2020-04-04 RX ORDER — OLANZAPINE 10 MG/1
2.5 INJECTION, POWDER, LYOPHILIZED, FOR SOLUTION INTRAMUSCULAR ONCE
Status: COMPLETED | OUTPATIENT
Start: 2020-04-04 | End: 2020-04-04

## 2020-04-04 RX ADMIN — OLANZAPINE 2.5 MG: 10 INJECTION, POWDER, FOR SOLUTION INTRAMUSCULAR at 15:43

## 2020-04-04 RX ADMIN — FAMOTIDINE 20 MG: 20 TABLET ORAL at 09:33

## 2020-04-04 RX ADMIN — PIPERACILLIN AND TAZOBACTAM 3.38 G: 3; .375 INJECTION, POWDER, FOR SOLUTION INTRAVENOUS at 12:53

## 2020-04-04 RX ADMIN — BUDESONIDE AND FORMOTEROL FUMARATE DIHYDRATE 2 PUFF: 160; 4.5 AEROSOL RESPIRATORY (INHALATION) at 19:52

## 2020-04-04 RX ADMIN — LIDOCAINE 2 PATCH: 50 PATCH CUTANEOUS at 09:33

## 2020-04-04 RX ADMIN — TAMSULOSIN HYDROCHLORIDE 0.4 MG: 0.4 CAPSULE ORAL at 12:40

## 2020-04-04 RX ADMIN — PIPERACILLIN AND TAZOBACTAM 3.38 G: 3; .375 INJECTION, POWDER, FOR SOLUTION INTRAVENOUS at 03:03

## 2020-04-04 RX ADMIN — MULTIPLE VITAMINS W/ MINERALS TAB 1 TABLET: TAB at 09:33

## 2020-04-04 RX ADMIN — DOCUSATE SODIUM 100 MG: 100 CAPSULE, LIQUID FILLED ORAL at 09:33

## 2020-04-04 RX ADMIN — PIPERACILLIN AND TAZOBACTAM 3.38 G: 3; .375 INJECTION, POWDER, FOR SOLUTION INTRAVENOUS at 21:55

## 2020-04-04 RX ADMIN — ATORVASTATIN CALCIUM 10 MG: 10 TABLET, FILM COATED ORAL at 09:35

## 2020-04-04 RX ADMIN — ACETAMINOPHEN 650 MG: 325 TABLET, FILM COATED ORAL at 17:10

## 2020-04-04 RX ADMIN — FOLIC ACID 1000 MCG: 1 TABLET ORAL at 09:33

## 2020-04-04 RX ADMIN — SODIUM CHLORIDE, PRESERVATIVE FREE 10 ML: 5 INJECTION INTRAVENOUS at 20:42

## 2020-04-04 RX ADMIN — Medication 250 MG: at 09:33

## 2020-04-04 RX ADMIN — SODIUM CHLORIDE, PRESERVATIVE FREE 10 ML: 5 INJECTION INTRAVENOUS at 09:35

## 2020-04-04 RX ADMIN — ACETAMINOPHEN 650 MG: 325 TABLET, FILM COATED ORAL at 23:36

## 2020-04-04 RX ADMIN — MELATONIN TAB 5 MG 2.5 MG: 5 TAB at 20:38

## 2020-04-04 RX ADMIN — ACETAMINOPHEN 650 MG: 325 TABLET, FILM COATED ORAL at 06:08

## 2020-04-04 RX ADMIN — BUDESONIDE AND FORMOTEROL FUMARATE DIHYDRATE 2 PUFF: 160; 4.5 AEROSOL RESPIRATORY (INHALATION) at 09:50

## 2020-04-04 RX ADMIN — CYANOCOBALAMIN TAB 1000 MCG 1000 MCG: 1000 TAB at 09:33

## 2020-04-04 RX ADMIN — OLANZAPINE 2.5 MG: 5 TABLET, FILM COATED ORAL at 22:05

## 2020-04-04 RX ADMIN — NYSTATIN 1 APPLICATION: 100000 OINTMENT TOPICAL at 09:36

## 2020-04-04 NOTE — PLAN OF CARE
Problem: Patient Care Overview  Goal: Plan of Care Review  Flowsheets (Taken 4/4/2020 1020)  Progress: no change  Plan of Care Reviewed With: patient  Outcome Summary: PT evaluation completed. Pt presents with decreased endurance, strength, and balance. Pain primarily limiting this session. Pt completed transfer from chair to bed req modAx1 with UE support and VC/TCs throughout. Pt able to answer orientation questions correctly but speaking nonsensical at times. Recommend SNF upon DC.

## 2020-04-04 NOTE — PLAN OF CARE
Problem: Patient Care Overview  Goal: Plan of Care Review  Outcome: Ongoing (interventions implemented as appropriate)  Note:   Patient oriented X 4 at beginning of shift. Patient would wake up throughout the night expressing intermittent confusion about her location and why she wasn't home. Patient was cooperative throughout the night and ambulated to the bedside commode several  times throughout the night. Patient remained sinus tachycardia and stable throughout the night. Will continue to monitor and provide care.

## 2020-04-04 NOTE — THERAPY EVALUATION
Patient Name: Pati Carter  : 1930    MRN: [id number]                              Today's Date: 2020       Admit Date: 2020    Visit Dx:     ICD-10-CM ICD-9-CM   1. Severe sepsis (CMS/HCC) A41.9 038.9    R65.20 995.92   2. Pneumonia of both lungs due to infectious organism, unspecified part of lung J18.9 483.8   3. Leukocytosis, unspecified type D72.829 288.60   4. Anemia, unspecified type D64.9 285.9   5. Hypotension, unspecified hypotension type I95.9 458.9   6. Dizziness R42 780.4   7. Syncope, unspecified syncope type R55 780.2   8. Traumatic hematoma of buttock, initial encounter S30.0XXA 922.32   9. Contusion of left lower extremity, initial encounter S80.12XA 924.5     Patient Active Problem List   Diagnosis   • Macular degeneration   • Rheumatoid arthritis (CMS/[facility])   • Hearing loss   • Hyperlipidemia   • Gastroesophageal reflux disease   • Hypertension   • Constipation   • Anemia   • Compression fracture of lumbar vertebra (CMS/HCC)   • Degeneration of intervertebral disc of lumbar region   • COPD (chronic obstructive pulmonary disease) (CMS/[facility])   • H/O calcium pyrophosphate deposition disease (CPPD)   • Long term methotrexate user   • Femur fracture, right (CMS/HCC)   • Falls frequently   • Fall at home, initial encounter   • Sepsis (CMS/[facility])   • Multifocal pneumonia   • Immunosuppressed status on methotrexate   • Acute respiratory failure with hypoxia (CMS/[facility])   • borderline B12 deficiency   • Delirium due to another medical condition   • Acute urinary retention   • Acute blood loss anemia, due to bleeding into hematomas from fall   • Traumatic hematoma of left hip     Past Medical History:   Diagnosis Date   • Anemia     Description: A.  Dx - borderline intermittent.   • Back pain    • Benign colonic polyp     Description: A.  Dx .   • Benign colonic polyp 2016    Description: A.  Dx .   • Compression fracture of lumbar vertebra (CMS/HCC)    • Constipation    •  COPD (chronic obstructive pulmonary disease) (CMS/HCC)     Description: A.  Rule out chronic persistent asthma, COPD, or obliterative bronchiolitis.- Rae   • Degeneration of intervertebral disc of lumbar region     Description: A.  Diagnosed in April 2013 with advanced multilevel with severe spinal stenosis, followed by Dr. Pillai for pain management.   • Gastroesophageal reflux disease    • Hearing loss    • History of colonoscopy 01/01/1999    NORMAL PER PATIENT    • History of mammogram 01/01/2011    NORMAL PER PT    • History of Papanicolaou smear of cervix 01/01/2010    NORMAL PER PT    • History of varicella    • Hyperlipidemia     Description: A.  Dx 2006.   • Hypertension     Description: A. Dx 2001.   • Macular degeneration    • Ovarian mass     Dx 8/15- benign left cystic adnexal mass   • Rheumatoid arthritis (CMS/[facility])     Description: A.  Diagnosed in 2000 and and followed by Dr. Constantino (now Dr. Kaplan). B.  On methotrexate therapy since 2000. C.  Off low-dose prednisone therapy.     Past Surgical History:   Procedure Laterality Date   • APPENDECTOMY     • CARPAL TUNNEL RELEASE Left 01/01/2003    HISTORY OF NEUROPLASTY DECOMPRESSION MEDIAN NERVE AT CARPAL TUNNEL LEFT   • CATARACT EXTRACTION Bilateral 01/01/2009   • CHOLECYSTECTOMY  01/01/1962   • HIP CANNULATED SCREW PLACEMENT Right 1/25/2020    Procedure: HIP CANNULATED SCREW PLACEMENT RIGHT;  Surgeon: Karlos Blount MD;  [facility];  Service: Orthopedics   • KNEE ARTHROSCOPY Left 01/01/2001    MENISCAL REPAIR   • KYPHOPLASTY  06/18/2015    T11 AND L1 (JOSE A)   • PELVIC LAPAROSCOPY  01/01/1996    REMOVAL OF BENIGN UTERINE AND RIGHT OVARIAN TUMORS   • SALPINGO OOPHORECTOMY Left 08/26/2015    REMOVAL OF LEFT OVARY AND TUBE (benign cystic mass)     General Information     Row Name 04/04/20 1014          PT Evaluation Time/Intention    Document Type  evaluation  -ARIANE     Mode of Treatment  physical therapy  -ARIANE     Row Name 04/04/20 1014           General Information    Patient Profile Reviewed?  yes  -ARIANE     Prior Level of Function  independent:;bed mobility;all household mobility;ADL's Per pt report, unsure if valid. Pt said she used cane.   -ARIANE     Existing Precautions/Restrictions  fall;other (see comments) Recent R hip fx, WBAT. Back and L LE pain from recent fall.  Poor eyesight.   -ARIANE     Barriers to Rehab  previous functional deficit  -ARIANE     Row Name 04/04/20 1014          Relationship/Environment    Lives With  facility resident  -ARIANE     Row Name 04/04/20 1014          Resource/Environmental Concerns    Current Living Arrangements  independent/assisted living facility  -ARIANE     Row Name 04/04/20 1014          Home Main Entrance    Number of Stairs, Main Entrance  none  -ARIANE     Row Name 04/04/20 1014          Stairs Within Home, Primary    Number of Stairs, Within Home, Primary  none  -ARIANE     Row Name 04/04/20 1014          Cognitive Assessment/Intervention- PT/OT    Orientation Status (Cognition)  oriented x 4  -ARIANE     Cognitive Assessment/Intervention Comment  Pt able to answer orientation questions without cues, however, situational confusion present throughout session.   -ARIANE     Row Name 04/04/20 1014          Safety Issues, Functional Mobility    Safety Issues Affecting Function (Mobility)  safety precautions follow-through/compliance;awareness of need for assistance;sequencing abilities;safety precaution awareness;insight into deficits/self awareness  -ARIANE     Impairments Affecting Function (Mobility)  balance;cognition;coordination;endurance/activity tolerance;strength;pain;shortness of breath  -ARIANE       User Key  (r) = Recorded By, (t) = Taken By, (c) = Cosigned By    Initials Name Provider Type    Sophia Boss, PT Physical Therapist        Mobility     Row Name 04/04/20 1016          Bed Mobility Assessment/Treatment    Bed Mobility Assessment/Treatment  sit-supine;scooting/bridging  -ARIANE     Scooting/Bridging [place] (Bed  Mobility)  moderate assist (50% patient effort);verbal cues  -ARIANE     Sit-Supine [place] (Bed Mobility)  moderate assist (50% patient effort);1 person assist;verbal cues  -ARIANE     Assistive Device (Bed Mobility)  bed rails;head of bed elevated  -ARIANE     Comment (Bed Mobility)  Pt req assistance at LEs when completing sit to supine, VC for sequencing throughout.   -ARIANE     Row Name 04/04/20 1016          Transfer Assessment/Treatment    Comment (Transfers)  Pt complaining of back and L LE pain, requested to transfer from chair to bed. VC/TC throughout. Pt states her vision is not good.   -ARIANE     Row Name 04/04/20 1016          Bed-Chair Transfer    Bed-Chair [place] (Transfers)  moderate assist (50% patient effort);verbal cues;1 person assist;nonverbal cues (demo/gesture) Chair to bed.   -ARIANE     Assistive Device (Bed-Chair Transfers)  other (see comments) UE support  -ARIANE     Row Name 04/04/20 1016          Sit-Stand Transfer    Sit-Stand [place] (Transfers)  minimum assist (75% patient effort);1 person assist;verbal cues  -ARIANE     Row Name 04/04/20 1016          Gait/Stairs Assessment/Training    [place] Level (Gait)  unable to assess  -ARIANE     Comment (Gait/Stairs)  Due to pt pain unable to assess further ambulation. SPT from chair to bed only.   -ARIANE       User Key  (r) = Recorded By, (t) = Taken By, (c) = Cosigned By    Initials Name Provider Type    Sophia Boss, PT Physical Therapist        Obj/Interventions     Row Name 04/04/20 1019          General ROM    GENERAL ROM COMMENTS  LEs WFL  -ARIANE     Row Name 04/04/20 1019          MMT (Manual Muscle Testing)    General MMT Comments  LEs grossly 3+/5.   -ARIANE     Row Name 04/04/20 1019          Static Sitting Balance    Level of [place] (Unsupported Sitting, Static Balance)  contact guard assist  -ARIANE     Sitting Position (Unsupported Sitting, Static Balance)  sitting on edge of bed  -ARIANE     Time Able to Maintain Position (Unsupported Sitting,  Static Balance)  1 to 2 minutes  -ARIANE     Row Name 04/04/20 1019          Static Standing Balance    Level of [place] (Supported Standing, Static Balance)  minimal assist, 75% patient effort  -ARIANE     Time Able to Maintain Position (Supported Standing, Static Balance)  15 to 30 seconds  -ARIANE     Row Name 04/04/20 1019          Sensory Assessment/Intervention    Sensory General Assessment  no sensation deficits identified  -ARIANE       User Key  (r) = Recorded By, (t) = Taken By, (c) = Cosigned By    Initials Name Provider Type    Sophia Boss, PT Physical Therapist        Goals/Plan     Row Name 04/04/20 1023          Bed Mobility Goal 1 (PT)    Activity/Assistive Device (Bed Mobility Goal 1, PT)  sit to supine/supine to sit  -ARIANE     [place] Level/Cues Needed (Bed Mobility Goal 1, PT)  conditional independence  -ARIANE     Time Frame (Bed Mobility Goal 1, PT)  2 weeks  -ARIANE     Progress/Outcomes (Bed Mobility Goal 1, PT)  goal ongoing  -[facility] Name 04/04/20 1023          Transfer Goal 1 (PT)    Activity/Assistive Device (Transfer Goal 1, PT)  sit-to-stand/stand-to-sit  -ARIANE     [place] Level/Cues Needed (Transfer Goal 1, PT)  conditional independence  -ARIANE     Time Frame (Transfer Goal 1, PT)  2 weeks  -ARIANE     Progress/Outcome (Transfer Goal 1, PT)  goal ongoing  -[facility] Name 04/04/20 1023          Gait Training Goal 1 (PT)    Activity/Assistive Device (Gait Training Goal 1, PT)  gait (walking locomotion);walker, rolling  -ARIANE     [place] Level (Gait Training Goal 1, PT)  minimum assist (75% or more patient effort)  -ARIANE     Distance (Gait Goal 1, PT)  75  -ARIANE     Time Frame (Gait Training Goal 1, PT)  2 weeks  -ARIANE     Progress/Outcome (Gait Training Goal 1, PT)  goal ongoing  -ARIANE       User Key  (r) = Recorded By, (t) = Taken By, (c) = Cosigned By    Initials Name Provider Type    Sophia Boss, PT Physical Therapist        Clinical Impression     Row Name 04/04/20 1020          Pain  Assessment    Additional Documentation  Pain Scale: Numbers Pre/Post-Treatment (Group)  -ARIANE     Row Name 04/04/20 1020          Pain Scale: Numbers Pre/Post-Treatment    Pain Scale: Numbers, Pretreatment  8/10  -ARIANE     Pain Scale: Numbers, Post-Treatment  8/10  -ARIANE     Pain Location - Side  Left  -ARIANE     Pain Location - Orientation  lower  -ARIANE     Pain Location  extremity And back  -ARIANE     Pre/Post Treatment Pain Comment  Nsg aware of pt pain.   -ARIANE     Pain Intervention(s)  Repositioned;Ambulation/increased activity  -ARIANE     Row Name 04/04/20 1020          Plan of Care Review    Plan of Care Reviewed With  patient  -ARIANE     Progress  no change  -ARIANE     Outcome Summary  PT evaluation completed. Pt presents with decreased endurance, strength, and balance. Pain primarily limiting this session. Pt completed transfer from chair to bed req modAx1 with UE support and VC/TCs throughout. Pt able to answer orientation questions correctly but speaking nonsensical at times. Recommend SNF upon DC.   -     Row Name 04/04/20 1020          Physical Therapy Clinical Impression    Patient/Family Goals Statement (PT Clinical Impression)  not stated  -ARIANE     Criteria for Skilled Interventions Met (PT Clinical Impression)  treatment indicated  -ARIANE     Rehab Potential (PT Clinical Summary)  fair, will monitor progress closely  -ARIANE     Row Name 04/04/20 1020          Vital Signs    Pre Systolic BP Rehab  141  -ARIANE     Pre Treatment Diastolic BP  64  -ARIANE     Pretreatment Heart Rate (beats/min)  86  -ARIANE     Pre SpO2 (%)  100  -ARIANE     O2 Delivery Pre Treatment  room air  -ARIANE     O2 Delivery Intra Treatment  room air  -ARIANE     Post SpO2 (%)  98  -ARIANE     O2 Delivery Post Treatment  room air  -ARIANE     Pre Patient Position  Sitting  -ARIANE     Intra Patient Position  Standing  -ARIANE     Post Patient Position  Supine  -ARIANE     Row Name 04/04/20 1020          Positioning and Restraints    Pre-Treatment Position  sitting in chair/recliner  -ARIANE     Post  Treatment Position  bed  -ARIANE     In Bed  notified nsg;supine;call light within reach;encouraged to call for assist;exit alarm on;side rails up x3;heels elevated Pt refused waffle boots but allowed pillow under calves.   -ARIANE       User Key  (r) = Recorded By, (t) = Taken By, (c) = Cosigned By    Initials Name Provider Type    Sophia Boss, SANAZ Physical Therapist        Outcome Measures     Row Name 04/04/20 1024          How much help from another person do you currently need...    Turning from your back to your side while in flat bed without using bedrails?  3  -ARIANE     Moving from lying on back to sitting on the side of a flat bed without bedrails?  2  -ARIANE     Moving to and from a bed to a chair (including a wheelchair)?  2  -ARIANE     Standing up from a chair using your arms (e.g., wheelchair, bedside chair)?  3  -ARIANE     Climbing 3-5 steps with a railing?  1  -ARIANE     To walk in hospital room?  2  -ARIANE     AM-PAC 6 Clicks Score (PT)  13  -ARIANE     Row Name 04/04/20 1024          Functional Assessment    Outcome Measure Options  AM-PAC 6 Clicks Basic Mobility (PT)  -ARIANE       User Key  (r) = Recorded By, (t) = Taken By, (c) = Cosigned By    Initials Name Provider Type    Sophia Boss, SANAZ Physical Therapist          PT Recommendation and Plan  Planned Therapy Interventions (PT Eval): balance training, bed mobility training, gait training, home exercise program, transfer training, strengthening, patient/family education, postural re-education  Outcome Summary/Treatment Plan (PT)  Anticipated Discharge Disposition (PT): skilled nursing facility  Plan of Care Reviewed With: patient  Progress: no change  Outcome Summary: PT evaluation completed. Pt presents with decreased endurance, strength, and balance. Pain primarily limiting this session. Pt completed transfer from chair to bed req modAx1 with UE support and VC/TCs throughout. Pt able to answer orientation questions correctly but speaking nonsensical at times.  Recommend SNF upon DC.      Time Calculation:   PT Charges     Row Name 04/04/20 1026             Time Calculation    Start Time  1002  -ARIANE      PT Received On  04/04/20  -ARIANE      PT Goal Re-Cert Due Date  04/14/20  -ARIANE        User Key  (r) = Recorded By, (t) = Taken By, (c) = Cosigned By    Initials Name Provider Type    Sophia Boss, PT Physical Therapist        Therapy Charges for Today     Code Description Service Date Service Provider Modifiers Qty    69776699272 HC PT EVAL MOD COMPLEXITY 4 4/4/2020 Sophia Adame, PT GP 1          PT G-Codes  Outcome Measure Options: AM-PAC 6 Clicks Basic Mobility (PT)  AM-PAC 6 Clicks Score (PT): 13    Sophia Adame, SANAZ  4/4/2020

## 2020-04-04 NOTE — PLAN OF CARE
Problem: Patient Care Overview  Goal: Plan of Care Review  Outcome: Ongoing (interventions implemented as appropriate)  Flowsheets (Taken 4/4/2020 1922)  Progress: no change  Plan of Care Reviewed With: daughter; patient  Outcome Summary: Pt hallucinated throughout day, daughter called for reorientation, pt given IM zyprexa. Currently infusing 1 unit PRBC.

## 2020-04-04 NOTE — PROGRESS NOTES
Infectious Disease Consult  Pati Carter  7/26/1930  [id number]    Date of Consult: 4/3/2020  Admission Date: 4/2/2020    Requesting Provider: Matty Morris,*  Evaluating Physician: Ferny Alonzo MD    Chief Complaint: fall    Reason for Consultation: pneumonia    History of present illness:   Patient is a 89 y.o.  Yr old female origninally from the [place] but has lived in the [place] for many years. She has a  history of DJD, COPD, GERD, RA on methotrexate. Recent hip fracture. She resides in SNF. Presented to [facility] ED on 3/28 with fall, discharged back to SNF. Presented again to [facility] ED on 4/2 with another fall, complaining of knee and hip pain. In ED was hypoxemic, tachycardic, hypotensive, and by report was quite ill-appearing.  Initially the plan was to admitted to ICU however she has a firm DNR and thus the decision was made to admit to the floor. CT scan with multifocal pneumonia, intermediate risk for COVID-19, so isolated and testing sent to state lab. Started on zosyn and plaquenil and zinc. Also got vancomycin in ED. No hip or leg fracture. WBC 15.7, neutrophilic predominance, on admission. Negative procal.     This visit was conducted via in-hospital telemedicine, video conferencing software.  Patient agreed to telemedicine visit.  Patient was in her room and I was standing outside the room.  Nurse wearing proper PPE was in the room operating ant iPad.     4/3/20: currently comfortable on room air. Pulse 82. BP improved after transfusion and fluids. Afebrile since admission.  She says her respiratory status is back to baseline.  She denies having any cough or shortness of breath, chest pain at this time.  She denies having any sick contacts.  She says she has not left her skilled nursing facility recently opened to go to the emergency department a few days before.  No recent visitors.  No fevers or chills prior to admission. Her only complaint at this time is ongoing hip and knee pain.  No drug or tobacco use.    4/4/20  Ruled out for COVID; transferred to ; feels achy, has leg/hip pain; currently using bedside commode;    Review of Systems  No fevers, dysuria, n/v/d      Past Medical History:   Diagnosis Date   • Anemia     Description: A.  Dx 2006- borderline intermittent.   • Back pain    • Benign colonic polyp     Description: A.  Dx 1999.   • Benign colonic polyp 9/28/2016    Description: A.  Dx 1999.   • Compression fracture of lumbar vertebra (CMS/HCC)    • Constipation    • COPD (chronic obstructive pulmonary disease) (CMS/HCC)     Description: A.  Rule out chronic persistent asthma, COPD, or obliterative bronchiolitis.- Rae   • Degeneration of intervertebral disc of lumbar region     Description: A.  Diagnosed in April 2013 with advanced multilevel with severe spinal stenosis, followed by Dr. Pillai for pain management.   • Gastroesophageal reflux disease    • Hearing loss    • History of colonoscopy 01/01/1999    NORMAL PER PATIENT    • History of mammogram 01/01/2011    NORMAL PER PT    • History of Papanicolaou smear of cervix 01/01/2010    NORMAL PER PT    • History of varicella    • Hyperlipidemia     Description: A.  Dx 2006.   • Hypertension     Description: A. Dx 2001.   • Macular degeneration    • Ovarian mass     Dx 8/15- benign left cystic adnexal mass   • Rheumatoid arthritis (CMS/HCC)     Description: A.  Diagnosed in 2000 and and followed by Dr. Constantino (now Dr. Kaplan). B.  On methotrexate therapy since 2000. C.  Off low-dose prednisone therapy.       Past Surgical History:   Procedure Laterality Date   • APPENDECTOMY     • CARPAL TUNNEL RELEASE Left 01/01/2003    HISTORY OF NEUROPLASTY DECOMPRESSION MEDIAN NERVE AT CARPAL TUNNEL LEFT   • CATARACT EXTRACTION Bilateral 01/01/2009   • CHOLECYSTECTOMY  01/01/1962   • HIP CANNULATED SCREW PLACEMENT Right 1/25/2020    Procedure: HIP CANNULATED SCREW PLACEMENT RIGHT;  Surgeon: Karlos Blount MD;  Location: [facility] OR;   Service: Orthopedics   • KNEE ARTHROSCOPY Left 01/01/2001    MENISCAL REPAIR   • KYPHOPLASTY  06/18/2015    T11 AND L1 (JOSE A)   • PELVIC LAPAROSCOPY  01/01/1996    REMOVAL OF BENIGN UTERINE AND RIGHT OVARIAN TUMORS   • SALPINGO OOPHORECTOMY Left 08/26/2015    REMOVAL OF LEFT OVARY AND TUBE (benign cystic mass)       Social History     Socioeconomic History   • Marital status:      Spouse name: Not on file   • Number of children: Not on file   • Years of education: Not on file   • Highest education level: Not on file   Tobacco Use   • Smoking status: Former Smoker   • Smokeless tobacco: Never Used   Substance and Sexual Activity   • Alcohol use: No   • Drug use: No   • Sexual activity: Defer   Social History Narrative    Patient moved to [facility] assisted living 3/26/20. On 3/28/20 patient visited ED for fall-related injuries.  In response, family looking into hiring a , to visit patient BID for ADLs/ transfers. [facility] drives [facility] appts, [facility]       family history includes Diabetes in her mother; Hypertension in her mother.    Allergies   Allergen Reactions   • Bactrim [Sulfamethoxazole-Trimethoprim] Other (See Comments)     Stomach cramps    • Ciprofloxacin Other (See Comments)     Other reaction(s): shaking  HCI TABS/ SHAKING   • Levofloxacin Diarrhea       Antibiotics:  Anti-Infectives (From admission, onward)    Ordered     Dose/Rate Route Frequency Start Stop    04/02/20 1528  piperacillin-tazobactam (ZOSYN) 3.375 g/100 mL 0.9% NS IVPB (mbp)  Review   Ordering Provider:  Matty Morris MD    3.375 g  over 4 Hours Intravenous Every 8 Hours 04/02/20 2000 04/10/20 1959    04/02/20 1606  hydroxychloroquine (PLAQUENIL) tablet 400 mg     Ordering Provider:  Matty Morris MD    400 mg Oral Every 12 Hours Scheduled 04/02/20 1800 04/03/20 0817    04/02/20 1121  vancomycin 1250 mg/250 mL 0.9% NS IVPB ([facility])     Ordering  Provider:  Jarrell Ramires MD    20 mg/kg × 68 kg Intravenous Once 04/02/20 1200 04/02/20 1441    04/02/20 1121  piperacillin-tazobactam (ZOSYN) 3.375 g in iso-osmotic dextrose 50 ml (premix)     Ordering Provider:  Jarrell Ramires MD    3.375 g Intravenous Once 04/02/20 1200 04/02/20 1220          Other Medications:  Current Facility-Administered Medications   Medication Dose Route Frequency Provider Last Rate Last Dose   • acetaminophen (TYLENOL) tablet 650 mg  650 mg Oral Q4H PRN Matty Morris MD   650 mg at 04/04/20 0608    Or   • acetaminophen (TYLENOL) 160 MG/5ML solution 650 mg  650 mg Oral Q4H PRN Matty Morris MD        Or   • acetaminophen (TYLENOL) suppository 650 mg  650 mg Rectal Q4H PRN Matty Morris MD       • acetaminophen (TYLENOL) tablet 650 mg  650 mg Oral Q8H Matty Morris MD       • albuterol sulfate HFA (PROVENTIL HFA;VENTOLIN HFA;PROAIR HFA) inhaler 2 puff  2 puff Inhalation Q4H PRN Matty Morris MD       • atorvastatin (LIPITOR) tablet 10 mg  10 mg Oral Daily Matty Morris MD   10 mg at 04/04/20 0935   • budesonide-formoterol (SYMBICORT) 160-4.5 MCG/ACT inhaler 2 puff  2 puff Inhalation BID - RT Matty Morris MD   2 puff at 04/04/20 0950   • docusate sodium (COLACE) capsule 100 mg  100 mg Oral Daily Matty Morris MD   100 mg at 04/04/20 0933   • docusate sodium (COLACE) capsule 100 mg  100 mg Oral BID PRN Matty Morris MD       • famotidine (PEPCID) tablet 20 mg  20 mg Oral BID Matty Morris MD   20 mg at 04/04/20 0933   • famotidine (PEPCID) tablet 20 mg  20 mg Oral BID PRN Matty Morris MD       • folic acid (FOLVITE) tablet 1,000 mcg  1,000 mcg Oral Daily Matty Morris MD   1,000 mcg at 04/04/20 0933   • lidocaine (LIDODERM) 5 % 2 patch  2 patch Transdermal Q24H Matty Morris MD   2 patch at 04/04/20 0933   • melatonin tablet 2.5 mg  2.5  "mg Oral Nightly Matty Morris MD       • melatonin tablet 5 mg  5 mg Oral Nightly PRN Matty Morris MD   5 mg at 04/02/20 2148   • multivitamin with minerals 1 tablet  1 tablet Oral Daily Matty Morris MD   1 tablet at 04/04/20 0933   • nystatin (MYCOSTATIN) ointment 1 application  1 application Topical Daily Matty Morris MD   1 application at 04/04/20 0936   • ondansetron (ZOFRAN) tablet 4 mg  4 mg Oral Q6H PRN Matty Morris MD        Or   • ondansetron (ZOFRAN) injection 4 mg  4 mg Intravenous Q6H PRN Matty Morris MD       • Pharmacy Consult   Does not apply Continuous PRN Matty Morris MD       • piperacillin-tazobactam (ZOSYN) 3.375 g/100 mL 0.9% NS IVPB (mbp)  3.375 g Intravenous Q8H Matty Morris MD 0 mL/hr at 04/03/20 0951 3.375 g at 04/04/20 0303   • saccharomyces boulardii (FLORASTOR) capsule 250 mg  250 mg Oral Daily Scott Mathis MD   250 mg at 04/04/20 0933   • sodium chloride 0.9 % flush 10 mL  10 mL Intravenous PRN Jarrell Ramires MD       • sodium chloride 0.9 % flush 10 mL  10 mL Intravenous Q12H Matty Morris MD   10 mL at 04/04/20 0935   • sodium chloride 0.9 % flush 10 mL  10 mL Intravenous PRN Matty Morris MD       • vitamin B-12 (CYANOCOBALAMIN) tablet 1,000 mcg  1,000 mcg Oral Daily Matty Morris MD   1,000 mcg at 04/04/20 0933       Physical Exam:   Vital Signs   /64 (BP Location: Left arm, Patient Position: Lying)   Pulse 98   Temp 98.2 °F (36.8 °C) (Oral)   Resp 16   Ht 165.1 cm (65\")   Wt 68 kg (150 lb)   SpO2 99%   BMI 24.96 kg/m²     GENERAL: Awake and alert, in no acute distress.   EYES: PERRL. EOMI.  HEENT: Normocephalic, atraumatic. No ext oral lesions  .      NEURO: no focal deficit      Laboratory Data    Results from last 7 days   Lab Units 04/04/20  0700 04/03/20  0504 04/02/20  2219   WBC 10*3/mm3 10.88* 10.50 12.78*   HEMOGLOBIN g/dL " 7.0* 7.7* 7.0*  7.0*   HEMATOCRIT % 22.3* 24.5* 22.3*  22.3*   PLATELETS 10*3/mm3 244 216 259     Results from last 7 days   Lab Units 04/04/20  0700   SODIUM mmol/L 140   POTASSIUM mmol/L 4.1   CHLORIDE mmol/L 105   CO2 mmol/L 24.0   BUN mg/dL 20   CREATININE mg/dL 1.04*   GLUCOSE mg/dL 105*   CALCIUM mg/dL 8.2*     Estimated Creatinine Clearance: 39.4 mL/min (A) (by C-G formula based on SCr of 1.04 mg/dL (H)).  Results from last 7 days   Lab Units 04/02/20  0941   ALK PHOS U/L 53   BILIRUBIN mg/dL 0.4   ALT (SGPT) U/L 10   AST (SGOT) U/L 21               Microbiology:  MRSA screening negaitve  Rapid strep negative  RVP negative    Blood Culture   Date Value Ref Range Status   04/02/2020 No growth at 24 hours  Preliminary   04/02/2020 No growth at 24 hours  Preliminary        Throat Culture, Beta Strep   Date Value Ref Range Status   04/02/2020 No Beta Hemolytic Streptococcus Isolated  Preliminary        Radiology:  Xr Femur 2 View Left    Result Date: 4/2/2020  Degenerative changes.  The left hip remains well located without acute fracture of the left hip or left femur.  D:  04/02/2020 E:  04/02/2020  This report was finalized on 4/2/2020 12:16 PM by Dr. Teja Scott.      Xr Knee 1 Or 2 View Left    Result Date: 3/28/2020  Extensive subcutaneous edema anterior to the patella and proximal tibia. No other evidence of potential acute trauma. Relatively advanced knee joint DJD.    This report was finalized on 3/28/2020 2:56 PM by Dr. Isael Lewis MD.      Xr Foot 3+ View Left    Result Date: 3/28/2020  Advanced midfoot DJD. No visible fracture. Consider follow-up imaging if the patient's symptoms persist or worsen.    This report was finalized on 3/28/2020 9:31 AM by Dr. Isael Lewis MD.      Ct Head Without Contrast    Result Date: 4/2/2020  No acute intracranial abnormality specifically, no acute intracranial hemorrhage or extra-axial fluid collection with calvarium intact. Moderate-to-severely advanced chronic small  vessel ischemic disease findings in the periventricular and deep white matter.  D:  04/02/2020 E:  04/02/2020  This report was finalized on 4/2/2020 12:16 PM by Dr. Teja Scott.      Ct Pelvis Without Contrast    Result Date: 4/2/2020  1. Bilateral pulmonary opacifications of groundglass attenuation, fairly well demarcated areas of geographic involvement left lung apex, mid lungs and within the right lower lobe medial segment concerning for atypical etiology such as viral etiology of airspace disease with at least intermediate correlation to COVID-19.  2. Large left hip body wall hematoma with surrounding soft tissue edema, however, no acute fracture of the left hip or pelvis is identified.  3. Advanced atherosclerotic involvement of peripheral vascular disease extending into the bilateral lower extremities, right slightly greater than left, however despite advanced involvement, there is at least trace flow into the posterior tibial arteries, left greater than right, with severely diminutive caliber of the right posterior tibial artery from atherosclerotic involvement and calcifications progressed in the distal superficial femoral, popliteal and trifurcation regions of the bilateral lower extremities.  3.  No acute vascular findings within the chest, abdomen or pelvis identified with atherosclerotic nonaneurysmal thoracoabdominal aorta.  D:  04/02/2020 E:  04/02/2020  This report was finalized on 4/2/2020 12:16 PM by Dr. Teja Scott.      Ct Angio Abdominal Aorta Bilateral Iliofem Runoff    Result Date: 4/2/2020  1. Bilateral pulmonary opacifications of groundglass attenuation, fairly well demarcated areas of geographic involvement left lung apex, mid lungs and within the right lower lobe medial segment concerning for atypical etiology such as viral etiology of airspace disease with at least intermediate correlation to COVID-19.  2. Large left hip body wall hematoma with surrounding soft tissue edema, however, no  acute fracture of the left hip or pelvis is identified.  3. Advanced atherosclerotic involvement of peripheral vascular disease extending into the bilateral lower extremities, right slightly greater than left, however despite advanced involvement, there is at least trace flow into the posterior tibial arteries, left greater than right, with severely diminutive caliber of the right posterior tibial artery from atherosclerotic involvement and calcifications progressed in the distal superficial femoral, popliteal and trifurcation regions of the bilateral lower extremities.  3.  No acute vascular findings within the chest, abdomen or pelvis identified with atherosclerotic nonaneurysmal thoracoabdominal aorta.  D:  04/02/2020 E:  04/02/2020  This report was finalized on 4/2/2020 12:16 PM by Dr. Teja Scott.      Xr Chest 1 View    Result Date: 4/2/2020  Increased opacifications left lung apex and right lung base from 01/10/2020 comparison examination consistent with airspace disease such as bronchopneumonia without pleural effusion.  D:  04/02/2020 E:  04/02/2020  This report was finalized on 4/2/2020 12:16 PM by Dr. Teja Scott.      Ct Angiogram Chest    Result Date: 4/2/2020  1. Bilateral pulmonary opacifications of groundglass attenuation, fairly well demarcated areas of geographic involvement left lung apex, mid lungs and within the right lower lobe medial segment concerning for atypical etiology such as viral etiology of airspace disease with at least intermediate correlation to COVID-19.  2. Large left hip body wall hematoma with surrounding soft tissue edema, however, no acute fracture of the left hip or pelvis is identified.  3. Advanced atherosclerotic involvement of peripheral vascular disease extending into the bilateral lower extremities, right slightly greater than left, however despite advanced involvement, there is at least trace flow into the posterior tibial arteries, left greater than right, with  severely diminutive caliber of the right posterior tibial artery from atherosclerotic involvement and calcifications progressed in the distal superficial femoral, popliteal and trifurcation regions of the bilateral lower extremities.  3.  No acute vascular findings within the chest, abdomen or pelvis identified with atherosclerotic nonaneurysmal thoracoabdominal aorta.  D:  04/02/2020 E:  04/02/2020  This report was finalized on 4/2/2020 12:16 PM by Dr. Teja Scott.      Xr Hips Bilateral With Or Without Pelvis 2 View    Result Date: 3/28/2020  Generalized osteopenia. Previous right hip pinning. No evidence of acute right or left hip fracture is seen..    This report was finalized on 3/28/2020 9:33 AM by Dr. Isael Lewis MD.       I personally reviewed the above CT chest    Admission EKG with QTc 486      Impression:   1. Fall at [facility], left hip hematoma: XR negative for fracture  2. Hypotension, tachycardia: improved with volume repletion. More likely dehydration rather than sepsis. Afebrile. Negative procalcitonin. WBC rapidly improved  3. Multifocal pneumonia: noted on admission chest CT. Improved. Respiratory symptoms back to baseline. MRSA screening. Urine strep and legionella antigens negative. RVP negative. No productive cough for culture  4. Multiple recent falls  5. Neutrophilic leukocytosis: rapidly improved  6. COPD: back to baseline  7. RA on methotrexate  8. Immunocompromised host  9. GERD  10. DJD      PLAN:   - follow CBC, CMP    - f/u pending COVID-19 PCR -ve  -d/c plaquenil        - continue IV zosyn for at least one more day; can change to augmentin x 7 days at anytime        Ferny Alonzo MD  4/4/2020

## 2020-04-04 NOTE — PROGRESS NOTES
[facility] Medicine Services  PROGRESS NOTE    Patient Name: Pati Carter  : 1930  MRN: [id number]    Date of Admission: 2020  Primary Care Physician: Latoya Bolaños MD    Subjective   Subjective     CC:  Follow-up fall at skilled facility and pneumonia    HPI:  Patient had more difficulty sleeping last night.  She says she is a little achy all over.  She notes some pain in her left leg and hip.  I have requested nurse give her Tylenol this morning to help.  She had intermittent confusion this morning and was intermittently confused regarding her location and time but she is oriented to person.  She was asking about her daughter and I told her that her daughter is getting updated daily.      Review of Systems  No current fevers or chills  No current dysuria or hematuria  No current nausea, vomiting, or diarrhea  No current chest pain or palpitations      Objective   Objective     Vital Signs:   Temp:  [97.6 °F (36.4 °C)-98.3 °F (36.8 °C)] 97.7 °F (36.5 °C)  Heart Rate:  [] 106  Resp:  [16-18] 18  BP: ()/(45-67) 149/67        Physical Exam:  Constitutional:Awake, alert  HENT: NCAT, mucous membranes moist, neck supple  Respiratory: More clear at the apex, occasional rhonchi bilaterally, respiratory effort normal, nonlabored breathing   Cardiovascular: RRR, normal radial pulses  Gastrointestinal: Positive bowel sounds, soft, nontender, nondistended  Musculoskeletal: Large left hip posterior hematoma, left knee hematoma/contusion with bruising relatively stable, trace bilateral lower extremity edema, elderly and debilitated in appearance, BMI is 25  Psychiatric: Calm, cooperative  Neurologic:  A little more confused, oriented to self and intermittently place, no slurred speech or facial droop, follows commands  Skin:  Stable left hip and left knee bruising as per above, no rashes or jaundice      Results Reviewed:  Results from last 7 days   Lab Units  04/03/20  0504 04/02/20  2219 04/02/20  1007 04/02/20  0941   WBC 10*3/mm3 10.50 12.78*  --  15.63*   HEMOGLOBIN g/dL 7.7* 7.0*  7.0*  --  7.2*   HEMOGLOBIN, POC g/dL  --   --  8.2*  --    HEMATOCRIT % 24.5* 22.3*  22.3*  --  23.5*   HEMATOCRIT POC %  --   --  24*  --    PLATELETS 10*3/mm3 216 259  --  313   INR   --   --   --  1.17*   PROCALCITONIN ng/mL 0.19  --   --  0.11     Results from last 7 days   Lab Units 04/03/20  0504 04/02/20  0941 03/28/20  0821   SODIUM mmol/L 141 142 142   POTASSIUM mmol/L 4.2 4.1 3.7   CHLORIDE mmol/L 107 105 103   CO2 mmol/L 24.0 27.0 29.0   BUN mg/dL 23 19 16   CREATININE mg/dL 1.03* 0.91 1.00   GLUCOSE mg/dL 91 171* 96   CALCIUM mg/dL 7.8* 8.3* 9.0   ALT (SGPT) U/L  --  10 8   AST (SGOT) U/L  --  21 19   TROPONIN T ng/mL  --  <0.010  --    PROBNP pg/mL  --  1,458.0  --      Estimated Creatinine Clearance: 39.7 mL/min (A) (by C-G formula based on SCr of 1.03 mg/dL (H)).    Microbiology Results Abnormal     Procedure Component Value - Date/Time    CORONAVIRUS-19(COVID-19),RT-PCR,[facility], NP Swab in Transport Media - Swab, Nasopharynx [[id number]] Collected:  04/02/20 1554    Lab Status:  Final result Specimen:  Swab from Nasopharynx Updated:  04/03/20 1709     Reference Lab Report --     Comment: See scanned report          COVID19 Not Detected    Beta Strep Culture, Throat - Swab, Throat [[id number]]  (Normal) Collected:  04/02/20 1322    Lab Status:  Preliminary result Specimen:  Swab from Throat Updated:  04/03/20 1031     Throat Culture, Beta Strep No Beta Hemolytic Streptococcus Isolated    Narrative:       Group A Strep incidence is low in adults. Positive culture for Beta hemolytic Streptococcus species can reflect colonization and not true infection. Please correlate clinically.    Blood Culture - Blood, Arm, Right [[id number]] Collected:  04/02/20 1006    Lab Status:  Preliminary result Specimen:  Blood from Arm, Right Updated:  04/03/20 1030     Blood Culture No growth  at 24 hours    Blood Culture - Blood, Arm, Left [[id number]] Collected:  04/02/20 1006    Lab Status:  Preliminary result Specimen:  Blood from Arm, Left Updated:  04/03/20 1030     Blood Culture No growth at 24 hours    MRSA Screen, PCR (Inpatient) - Swab, Nares [[id number]]  (Normal) Collected:  04/02/20 1555    Lab Status:  Final result Specimen:  Swab from Nares Updated:  04/02/20 1739     MRSA PCR Negative    Narrative:       MRSA Negative    Respiratory Panel, PCR - Swab, Nasopharynx [[id number]]  (Normal) Collected:  04/02/20 1322    Lab Status:  Final result Specimen:  Swab from Nasopharynx Updated:  04/02/20 1441     ADENOVIRUS, PCR Not Detected     Coronavirus 229E Not Detected     Coronavirus HKU1 Not Detected     Coronavirus NL63 Not Detected     Coronavirus OC43 Not Detected     Human Metapneumovirus Not Detected     Human Rhinovirus/Enterovirus Not Detected     Influenza B PCR Not Detected     Parainfluenza Virus 1 Not Detected     Parainfluenza Virus 2 Not Detected     Parainfluenza Virus 3 Not Detected     Parainfluenza Virus 4 Not Detected     Bordetella pertussis pcr Not Detected     Influenza A H1 2009 PCR Not Detected     Chlamydophila pneumoniae PCR Not Detected     Mycoplasma pneumo by PCR Not Detected     Influenza A PCR Not Detected     Influenza A H3 Not Detected     Influenza A H1 Not Detected     RSV, PCR Not Detected     Bordetella parapertussis PCR Not Detected    Narrative:       The coronavirus on the RVP is NOT COVID-19 and is NOT indicative of infection with COVID-19.     Rapid Strep A Screen - Swab, Throat [[id number]]  (Normal) Collected:  04/02/20 1322    Lab Status:  Final result Specimen:  Swab from Throat Updated:  04/02/20 1340     Strep A Ag Negative    Narrative:       Test performed by [facility].          Imaging Results (Last 24 Hours)     ** No results found for the last 24 hours. **               I have reviewed the medications:  Scheduled Meds:    acetaminophen  650 mg Oral Q8H   atorvastatin 10 mg Oral Daily   budesonide-formoterol 2 puff Inhalation BID - RT   docusate sodium 100 mg Oral Daily   famotidine 20 mg Oral BID   folic acid 1,000 mcg Oral Daily   lidocaine 2 patch Transdermal Q24H   melatonin 2.5 mg Oral Nightly   multivitamin with minerals 1 tablet Oral Daily   nystatin 1 application Topical Daily   piperacillin-tazobactam 3.375 g Intravenous Q8H   saccharomyces boulardii 250 mg Oral Daily   sodium chloride 10 mL Intravenous Q12H   vitamin B-12 1,000 mcg Oral Daily     Continuous Infusions:    Pharmacy Consult      PRN Meds:.•  acetaminophen **OR** acetaminophen **OR** acetaminophen  •  albuterol sulfate HFA  •  docusate sodium  •  famotidine  •  melatonin  •  ondansetron **OR** ondansetron  •  Pharmacy Consult  •  [COMPLETED] Insert peripheral IV **AND** sodium chloride  •  sodium chloride    Assessment/Plan   Assessment & Plan     Active Hospital Problems    Diagnosis  POA   • **Multifocal pneumonia [J18.9]  Yes   • borderline B12 deficiency [E53.8]  Yes   • Delirium due to another medical condition [F05]  Clinically Undetermined   • Acute urinary retention [R33.8]  Yes   • Acute blood loss anemia, due to bleeding into hematomas from fall [D62]  Yes   • Traumatic hematoma of left hip [S70.02XA]  Yes   • Falls frequently [R29.6]  Not Applicable   • Fall at home, initial encounter [W19.XXXA, Y92.009]  Not Applicable   • Sepsis (CMS/HCC) [A41.9]  Yes   • Immunosuppressed status on methotrexate [D89.9]  Yes   • Acute respiratory failure with hypoxia (CMS/HCC) [J96.01]  Yes   • Anemia [D64.9]  Yes   • COPD (chronic obstructive pulmonary disease) (CMS/HCC) [J44.9]  Yes   • Hyperlipidemia [E78.5]  Yes   • Hypertension [I10]  Yes   • Rheumatoid arthritis (CMS/HCC) [M06.9]  Yes      Resolved Hospital Problems    Diagnosis Date Resolved POA   • Suspected Covid-19 Virus Infection [R68.89] 04/04/2020 Yes        Brief Hospital Course to date:  Pati Carter is a 89 y.o.  female from her nursing home with fall and head and knee and left hip contusion with left hip and left knee hematomas and acute blood loss anemia.  She was found to have leukocytosis, hypotension, tachycardia, tachypnea, and hypoxia consistent with sepsis and CT scan showed multifocal pneumonia felt to be intermediate risk of novel coronavirus per radiologist.  Patient had persistent bleeding into her knee and hip hematoma and required blood transfusion on 4/2 and second unit on 4/3.  She had persistent issues with hypotension that was improved with intermittent boluses and her blood transfusions.    Discussion/plan: Novel coronavirus test now negative.  Discontinue airborne precautions.  Discontinue Plaquenil and zinc.  Continue Zosyn for now.  Discussed with infectious disease at length and they are considering de-escalation of antibiotics further pending clinical course.  Plan to follow-up on further recommendations today.  Straight catheterizations as needed for urinary retention.  B12 supplementation initiated, continue with borderline low B12 level.  Continue off aspirin and NSAIDs.  Schedule Tylenol for pain control.  Scheduled melatonin for insomnia and delirium.  Plan to try and avoid mood stabilizers if needed.  No benzodiazepine medications.  Monitor blood counts and transfuse further if needed.  Prognosis remains guarded.  Plan to update her daughter later today.  Repeat laboratory studies tomorrow.     Sepsis due to multifocal pneumonia in immunosuppressed patient:  Initially fluid resuscitated emergency room with 2 L bolus  Tachycardia and hypotension are improving.  Required intermittent IV fluid and boluses with careful attention to volume status  Broad-spectrum antibiotics initially received vancomycin and Zosyn.  Continue Zosyn for now.  MRSA PCR study returned negative, vancomycin discontinued  Viral respiratory PCR studies negative  Approved for state testing novel coronavirus test, [id number]  no virus detected  Careful monitoring as immunosuppressed status on methotrexate     Acute hypoxic respiratory failure due to sepsis and pneumonia:  Weaning off supplemental oxygen  Respiratory treatments  Pulse oximetry     Acute blood loss anemia due to left hip hematoma and left knee hematoma:  Supportive care and symptomatic management for hematoma.  Hold aspirin and NSAIDs to minimize bleeding.  Transfused 2 units due to symptomatic anemia  Hemoglobin previously 10 approximately 4-5 days prior to admission before fall and hematoma upon arrival was 7.2.  Denies other source of bleeding, denies blood in stools  Monitor for further bleeding and trend blood counts.  B12 borderline low, replaced one IM dose and then orally     COPD:  Continue inhalers, inhaled corticosteroid, long-acting beta agonist  No clear wheezes on examination hold off on systemic steroids unless worsens or more significant wheeze particularly while testing novel coronavirus.     Fall at home, multiple recent falls, debility:  PT OT while hospitalized, currently has been in skilled facility.  Fall precautions.     History of essential hypertension now with hypotension:   Monitor, avoid any blood pressure medications for now.     Hyperlipidemia: Stable.  Statin.     DVT prophylaxis: Lovenox and heparin contraindicated due to bleeding, SCDs contraindicated due to peripheral vascular disease noted on imaging    Discharge planning: Needs further improvement likely will need rehabilitation    CODE STATUS:   Code Status and Medical Interventions:   Ordered at: 04/02/20 1269     Limited Support to NOT Include:    Intubation    NIPPV (Non-Invasive Positive Pressure Support)    Cardioversion/Defibrillation     Code Status:    No CPR     Medical Interventions (Level of Support Prior to Arrest):    Limited         Electronically signed by Matty Morris MD, 04/04/20, 06:23.

## 2020-04-05 LAB
ANION GAP SERPL CALCULATED.3IONS-SCNC: 13 MMOL/L (ref 5–15)
BH BB BLOOD EXPIRATION DATE: NORMAL
BH BB BLOOD TYPE BARCODE: 9500
BH BB DISPENSE STATUS: NORMAL
BH BB PRODUCT CODE: NORMAL
BH BB UNIT NUMBER: NORMAL
BUN BLD-MCNC: 15 MG/DL (ref 8–23)
BUN/CREAT SERPL: 16.7 (ref 7–25)
CALCIUM SPEC-SCNC: 8.9 MG/DL (ref 8.6–10.5)
CHLORIDE SERPL-SCNC: 104 MMOL/L (ref 98–107)
CO2 SERPL-SCNC: 25 MMOL/L (ref 22–29)
CREAT BLD-MCNC: 0.9 MG/DL (ref 0.57–1)
CROSSMATCH INTERPRETATION: NORMAL
DEPRECATED RDW RBC AUTO: 76.8 FL (ref 37–54)
ERYTHROCYTE [DISTWIDTH] IN BLOOD BY AUTOMATED COUNT: 22.3 % (ref 12.3–15.4)
GFR SERPL CREATININE-BSD FRML MDRD: 59 ML/MIN/1.73
GLUCOSE BLD-MCNC: 107 MG/DL (ref 65–99)
HCT VFR BLD AUTO: 26.8 % (ref 34–46.6)
HCT VFR BLD AUTO: 27.2 % (ref 34–46.6)
HGB BLD-MCNC: 8.6 G/DL (ref 12–15.9)
HGB BLD-MCNC: 8.6 G/DL (ref 12–15.9)
MCH RBC QN AUTO: 30.9 PG (ref 26.6–33)
MCHC RBC AUTO-ENTMCNC: 32.1 G/DL (ref 31.5–35.7)
MCV RBC AUTO: 96.4 FL (ref 79–97)
PLATELET # BLD AUTO: 312 10*3/MM3 (ref 140–450)
PMV BLD AUTO: 9.7 FL (ref 6–12)
POTASSIUM BLD-SCNC: 3.6 MMOL/L (ref 3.5–5.2)
RBC # BLD AUTO: 2.78 10*6/MM3 (ref 3.77–5.28)
SODIUM BLD-SCNC: 142 MMOL/L (ref 136–145)
UNIT  ABO: NORMAL
UNIT  RH: NORMAL
WBC NRBC COR # BLD: 10.02 10*3/MM3 (ref 3.4–10.8)

## 2020-04-05 PROCEDURE — 85027 COMPLETE CBC AUTOMATED: CPT | Performed by: INTERNAL MEDICINE

## 2020-04-05 PROCEDURE — 97166 OT EVAL MOD COMPLEX 45 MIN: CPT

## 2020-04-05 PROCEDURE — 25010000002 PIPERACILLIN SOD-TAZOBACTAM PER 1 G: Performed by: INTERNAL MEDICINE

## 2020-04-05 PROCEDURE — 85018 HEMOGLOBIN: CPT | Performed by: INTERNAL MEDICINE

## 2020-04-05 PROCEDURE — 94799 UNLISTED PULMONARY SVC/PX: CPT

## 2020-04-05 PROCEDURE — 80048 BASIC METABOLIC PNL TOTAL CA: CPT | Performed by: INTERNAL MEDICINE

## 2020-04-05 PROCEDURE — 99233 SBSQ HOSP IP/OBS HIGH 50: CPT | Performed by: INTERNAL MEDICINE

## 2020-04-05 PROCEDURE — 85014 HEMATOCRIT: CPT | Performed by: INTERNAL MEDICINE

## 2020-04-05 RX ORDER — ACETAMINOPHEN 500 MG
1000 TABLET ORAL EVERY 8 HOURS
Status: DISCONTINUED | OUTPATIENT
Start: 2020-04-05 | End: 2020-04-08 | Stop reason: HOSPADM

## 2020-04-05 RX ORDER — QUETIAPINE FUMARATE 25 MG/1
12.5 TABLET, FILM COATED ORAL EVERY 8 HOURS PRN
Status: DISCONTINUED | OUTPATIENT
Start: 2020-04-05 | End: 2020-04-05

## 2020-04-05 RX ORDER — QUETIAPINE FUMARATE 25 MG/1
25 TABLET, FILM COATED ORAL EVERY 6 HOURS PRN
Status: DISCONTINUED | OUTPATIENT
Start: 2020-04-05 | End: 2020-04-08 | Stop reason: HOSPADM

## 2020-04-05 RX ORDER — HALOPERIDOL 5 MG/ML
1 INJECTION INTRAMUSCULAR ONCE
Status: DISCONTINUED | OUTPATIENT
Start: 2020-04-05 | End: 2020-04-08 | Stop reason: HOSPADM

## 2020-04-05 RX ORDER — CHOLECALCIFEROL (VITAMIN D3) 125 MCG
5 CAPSULE ORAL NIGHTLY
Status: DISCONTINUED | OUTPATIENT
Start: 2020-04-05 | End: 2020-04-08 | Stop reason: HOSPADM

## 2020-04-05 RX ORDER — QUETIAPINE FUMARATE 25 MG/1
12.5 TABLET, FILM COATED ORAL ONCE
Status: COMPLETED | OUTPATIENT
Start: 2020-04-05 | End: 2020-04-05

## 2020-04-05 RX ADMIN — QUETIAPINE FUMARATE 12.5 MG: 25 TABLET ORAL at 09:18

## 2020-04-05 RX ADMIN — BUDESONIDE AND FORMOTEROL FUMARATE DIHYDRATE 2 PUFF: 160; 4.5 AEROSOL RESPIRATORY (INHALATION) at 19:40

## 2020-04-05 RX ADMIN — MULTIPLE VITAMINS W/ MINERALS TAB 1 TABLET: TAB at 07:48

## 2020-04-05 RX ADMIN — LIDOCAINE 2 PATCH: 50 PATCH CUTANEOUS at 07:50

## 2020-04-05 RX ADMIN — QUETIAPINE FUMARATE 12.5 MG: 25 TABLET ORAL at 00:28

## 2020-04-05 RX ADMIN — FOLIC ACID 1000 MCG: 1 TABLET ORAL at 07:49

## 2020-04-05 RX ADMIN — FAMOTIDINE 20 MG: 20 TABLET ORAL at 07:49

## 2020-04-05 RX ADMIN — PIPERACILLIN AND TAZOBACTAM 3.38 G: 3; .375 INJECTION, POWDER, FOR SOLUTION INTRAVENOUS at 04:31

## 2020-04-05 RX ADMIN — MELATONIN TAB 5 MG 5 MG: 5 TAB at 20:44

## 2020-04-05 RX ADMIN — PIPERACILLIN AND TAZOBACTAM 3.38 G: 3; .375 INJECTION, POWDER, FOR SOLUTION INTRAVENOUS at 11:24

## 2020-04-05 RX ADMIN — ACETAMINOPHEN 650 MG: 325 TABLET, FILM COATED ORAL at 07:48

## 2020-04-05 RX ADMIN — PIPERACILLIN AND TAZOBACTAM 3.38 G: 3; .375 INJECTION, POWDER, FOR SOLUTION INTRAVENOUS at 20:44

## 2020-04-05 RX ADMIN — SODIUM CHLORIDE, PRESERVATIVE FREE 10 ML: 5 INJECTION INTRAVENOUS at 20:44

## 2020-04-05 RX ADMIN — Medication 250 MG: at 07:49

## 2020-04-05 RX ADMIN — DOCUSATE SODIUM 100 MG: 100 CAPSULE, LIQUID FILLED ORAL at 07:49

## 2020-04-05 RX ADMIN — CYANOCOBALAMIN TAB 1000 MCG 1000 MCG: 1000 TAB at 07:49

## 2020-04-05 RX ADMIN — ACETAMINOPHEN 1000 MG: 500 TABLET, FILM COATED ORAL at 16:08

## 2020-04-05 RX ADMIN — ATORVASTATIN CALCIUM 10 MG: 10 TABLET, FILM COATED ORAL at 07:49

## 2020-04-05 RX ADMIN — NYSTATIN 1 APPLICATION: 100000 OINTMENT TOPICAL at 07:50

## 2020-04-05 RX ADMIN — QUETIAPINE FUMARATE 25 MG: 25 TABLET ORAL at 16:43

## 2020-04-05 RX ADMIN — TAMSULOSIN HYDROCHLORIDE 0.4 MG: 0.4 CAPSULE ORAL at 07:48

## 2020-04-05 NOTE — PROGRESS NOTES
Infectious Disease progress note  7/26/1930  [id number]    Date of Consult: 4/3/2020  Admission Date: 4/2/2020    Evaluating Physician: JANIYA Bennett     Chief Complaint: fall    History of present illness:   Patient is a 89 y.o.  Yr old female origninally from the [place] but has lived in the [place] for many years. She has a  history of DJD, COPD, GERD, RA on methotrexate. Recent hip fracture. She resides in SNF. Presented to [facility] ED on 3/28 with fall, discharged back to SNF. Presented again to [facility] ED on 4/2 with another fall, complaining of knee and hip pain. In ED was hypoxemic, tachycardic, hypotensive, and by report was quite ill-appearing.  Initially the plan was to admitted to ICU however she has a firm DNR and thus the decision was made to admit to the floor. CT scan with multifocal pneumonia, intermediate risk for COVID-19, so isolated and testing sent to state lab. Started on zosyn and plaquenil and zinc. Also got vancomycin in ED. No hip or leg fracture. WBC 15.7, neutrophilic predominance, on admission. Negative procal.     This visit was conducted via in-hospital telemedicine, video conferencing software.  Patient agreed to telemedicine visit.  Patient was in her room and I was standing outside the room.  Nurse wearing proper PPE was in the room operating ant iPad.     4/3/20: currently comfortable on room air. Pulse 82. BP improved after transfusion and fluids. Afebrile since admission.  She says her respiratory status is back to baseline.  She denies having any cough or shortness of breath, chest pain at this time.  She denies having any sick contacts.  She says she has not left her skilled nursing facility recently opened to go to the emergency department a few days before.  No recent visitors.  No fevers or chills prior to admission. Her only complaint at this time is ongoing hip and knee pain. No drug or tobacco use.    4/4/20  Ruled out for COVID; transferred to ; feels achy, has  "leg/hip pain; currently using bedside commode;    4/5/2020: Patient seen in [place] [id number].  Patient has been afebrile and tachycardic overnight.  Maintaining oxygen saturations on room air.  Patient has continued \"achy\" feeling.  Also has continued leg/hip pain.  Patient remains very confused and per nursing staff has been having hallucinations overnight.    Review of Systems  No fevers, dysuria, n/v/d      Past Medical History:   Diagnosis Date   • Anemia     Description: A.  Dx 2006- borderline intermittent.   • Back pain    • Benign colonic polyp     Description: AJoshua  Dx 1999.   • Benign colonic polyp 9/28/2016    Description: AJoshua  Dx 1999.   • Compression fracture of lumbar vertebra (CMS/HCC)    • Constipation    • COPD (chronic obstructive pulmonary disease) (CMS/HCC)     Description: A.  Rule out chronic persistent asthma, COPD, or obliterative bronchiolitis.- Rae   • Degeneration of intervertebral disc of lumbar region     Description: A.  Diagnosed in April 2013 with advanced multilevel with severe spinal stenosis, followed by Dr. Pillai for pain management.   • Gastroesophageal reflux disease    • Hearing loss    • History of colonoscopy 01/01/1999    NORMAL PER PATIENT    • History of mammogram 01/01/2011    NORMAL PER PT    • History of Papanicolaou smear of cervix 01/01/2010    NORMAL PER PT    • History of varicella    • Hyperlipidemia     Description: A.  Dx 2006.   • Hypertension     Description: A. Dx 2001.   • Macular degeneration    • Ovarian mass     Dx 8/15- benign left cystic adnexal mass   • Rheumatoid arthritis (CMS/HCC)     Description: A.  Diagnosed in 2000 and and followed by Dr. Constantino (now Dr. Kaplan). B.  On methotrexate therapy since 2000. C.  Off low-dose prednisone therapy.       Past Surgical History:   Procedure Laterality Date   • APPENDECTOMY     • CARPAL TUNNEL RELEASE Left 01/01/2003    HISTORY OF NEUROPLASTY DECOMPRESSION MEDIAN NERVE AT CARPAL TUNNEL LEFT   • CATARACT " EXTRACTION Bilateral 01/01/2009   • CHOLECYSTECTOMY  01/01/1962   • HIP CANNULATED SCREW PLACEMENT Right 1/25/2020    Procedure: HIP CANNULATED SCREW PLACEMENT RIGHT;  Surgeon: Karlos Blount MD;  Location: [facility];  Service: Orthopedics   • KNEE ARTHROSCOPY Left 01/01/2001    MENISCAL REPAIR   • KYPHOPLASTY  06/18/2015    T11 AND L1 (JOSE A)   • PELVIC LAPAROSCOPY  01/01/1996    REMOVAL OF BENIGN UTERINE AND RIGHT OVARIAN TUMORS   • SALPINGO OOPHORECTOMY Left 08/26/2015    REMOVAL OF LEFT OVARY AND TUBE (benign cystic mass)       Social History     Socioeconomic History   • Marital status:      Spouse name: Not on file   • Number of children: Not on file   • Years of education: Not on file   • Highest education level: Not on file   Tobacco Use   • Smoking status: Former Smoker   • Smokeless tobacco: Never Used   Substance and Sexual Activity   • Alcohol use: No   • Drug use: No   • Sexual activity: Defer   Social History Narrative    Patient moved to [facility] assisted living 3/26/20. On 3/28/20 patient visited ED for fall-related injuries.  In response, family looking into hiring a , to visit patient BID for ADLs/ transfers. Family drives patient to [facility], but says [facility] may provide in future.       family history includes Diabetes in her mother; Hypertension in her mother.    Allergies   Allergen Reactions   • Bactrim [Sulfamethoxazole-Trimethoprim] Other (See Comments)     Stomach cramps    • Ciprofloxacin Other (See Comments)     Other reaction(s): shaking  HCI TABS/ SHAKING   • Levofloxacin Diarrhea       Antibiotics:  Anti-Infectives (From admission, onward)    Ordered     Dose/Rate Route Frequency Start Stop    04/02/20 1528  piperacillin-tazobactam (ZOSYN) 3.375 g/100 mL 0.9% NS IVPB (mbp)  Review   Ordering Provider:  Matty Morris MD    3.375 g  over 4 Hours Intravenous Every 8 Hours 04/02/20 2000 04/10/20 1959    04/02/20 1606  hydroxychloroquine  (PLAQUENIL) tablet 400 mg     Ordering Provider:  Matty Morris MD    400 mg Oral Every 12 Hours Scheduled 04/02/20 1800 04/03/20 0817    04/02/20 1121  vancomycin 1250 mg/250 mL 0.9% NS IVPB ([facility])     Ordering Provider:  Jarrell Ramires MD    20 mg/kg × 68 kg Intravenous Once 04/02/20 1200 04/02/20 1441    04/02/20 1121  piperacillin-tazobactam (ZOSYN) 3.375 g in iso-osmotic dextrose 50 ml (premix)     Ordering Provider:  Jarrell Ramires MD    3.375 g Intravenous Once 04/02/20 1200 04/02/20 1220          Other Medications:  Current Facility-Administered Medications   Medication Dose Route Frequency Provider Last Rate Last Dose   • acetaminophen (TYLENOL) tablet 650 mg  650 mg Oral Q4H PRN Matty Morris MD   650 mg at 04/04/20 0608    Or   • acetaminophen (TYLENOL) 160 MG/5ML solution 650 mg  650 mg Oral Q4H PRN Matty Morris MD        Or   • acetaminophen (TYLENOL) suppository 650 mg  650 mg Rectal Q4H PRN Matty Morris MD       • acetaminophen (TYLENOL) tablet 1,000 mg  1,000 mg Oral Q8H Matty Morris MD       • albuterol (PROVENTIL) nebulizer solution 0.083% 2.5 mg/3mL  2.5 mg Nebulization Q4H PRN Zain Walker, [facility]       • atorvastatin (LIPITOR) tablet 10 mg  10 mg Oral Daily Matty Morris MD   10 mg at 04/05/20 0749   • budesonide-formoterol (SYMBICORT) 160-4.5 MCG/ACT inhaler 2 puff  2 puff Inhalation BID - RT Matty Morris MD   2 puff at 04/04/20 1952   • docusate sodium (COLACE) capsule 100 mg  100 mg Oral Daily Matty Morris MD   100 mg at 04/05/20 0749   • docusate sodium (COLACE) capsule 100 mg  100 mg Oral BID PRN Matty Morris MD       • famotidine (PEPCID) tablet 20 mg  20 mg Oral Daily Zain Walker [facility]   20 mg at 04/05/20 0749   • folic acid (FOLVITE) tablet 1,000 mcg  1,000 mcg Oral Daily Matty Morris MD   1,000 mcg at 04/05/20 0749   • haloperidol lactate (HALDOL) injection 1 mg   1 mg Intramuscular Once Nella Riley, APRN       • lidocaine (LIDODERM) 5 % 2 patch  2 patch Transdermal Q24H Matty Morris MD   2 patch at 04/05/20 0750   • melatonin tablet 5 mg  5 mg Oral Nightly PRN Matty Morris MD   5 mg at 04/02/20 2148   • melatonin tablet 5 mg  5 mg Oral Nightly Matty Morris MD       • multivitamin with minerals 1 tablet  1 tablet Oral Daily Matty Morris MD   1 tablet at 04/05/20 0748   • nystatin (MYCOSTATIN) ointment 1 application  1 application Topical Daily Matty Morris MD   1 application at 04/05/20 0750   • OLANZapine (zyPREXA) injection 2.5 mg  2.5 mg Intramuscular Q8H PRN Matty Morris MD       • OLANZapine (zyPREXA) injection 2.5 mg  2.5 mg Intramuscular Once Patsy Steel MD       • ondansetron (ZOFRAN) tablet 4 mg  4 mg Oral Q6H PRN Matty Morris MD        Or   • ondansetron (ZOFRAN) injection 4 mg  4 mg Intravenous Q6H PRN Matty Morris MD       • piperacillin-tazobactam (ZOSYN) 3.375 g/100 mL 0.9% NS IVPB (mbp)  3.375 g Intravenous Q8H Matty Morris MD 0 mL/hr at 04/03/20 0951 3.375 g at 04/05/20 0431   • QUEtiapine (SEROquel) tablet 12.5 mg  12.5 mg Oral Q8H PRN Matty Morris MD   12.5 mg at 04/05/20 0918   • saccharomyces boulardii (FLORASTOR) capsule 250 mg  250 mg Oral Daily Scott Mathis MD   250 mg at 04/05/20 0749   • sodium chloride 0.9 % flush 10 mL  10 mL Intravenous PRN Jarrell Ramires MD       • sodium chloride 0.9 % flush 10 mL  10 mL Intravenous Q12H Matty Morris MD   10 mL at 04/04/20 2042   • sodium chloride 0.9 % flush 10 mL  10 mL Intravenous PRN Matty Morris MD       • tamsulosin (FLOMAX) 24 hr capsule 0.4 mg  0.4 mg Oral Daily Matty Morris MD   0.4 mg at 04/05/20 0748   • vitamin B-12 (CYANOCOBALAMIN) tablet 1,000 mcg  1,000 mcg Oral Daily Matty Morris MD   1,000 mcg at 04/05/20  "0749       Physical Exam:   Vital Signs   /76 (BP Location: Right arm, Patient Position: Lying)   Pulse 116   Temp 97.4 °F (36.3 °C) (Oral)   Resp 18   Ht 165.1 cm (65\")   Wt 68 kg (150 lb)   SpO2 98%   BMI 24.96 kg/m²     GENERAL: Awake and alert, in no acute distress.   EYES: PERRL.  Nonicteric  HEENT: Normocephalic, atraumatic. No ext oral lesions  Respiratory: Bilateral rhonchi noted, nonlabored breathing  Cardiovascular: RRR with no murmurs, rubs or gallops noted  Gastrointestinal: Positive bowel sounds x4 quads, soft, nontender nondistended  Musculoskeletal: Left hip posterior hematoma, left knee contusion noted bilateral lower extremity edema approximately 1+  Skin: Hematoma and contusion as listed above, no rashes or lesions or jaundice noted  Neurological: Oriented to person only  Psychiatric: Mildly cooperative with PE    Laboratory Data    Results from last 7 days   Lab Units 04/05/20  0541 04/05/20  0042 04/04/20  0700 04/03/20  0504   WBC 10*3/mm3 10.02  --  10.88* 10.50   HEMOGLOBIN g/dL 8.6* 8.6* 7.0* 7.7*   HEMATOCRIT % 26.8* 27.2* 22.3* 24.5*   PLATELETS 10*3/mm3 312  --  244 216     Results from last 7 days   Lab Units 04/05/20  0541   SODIUM mmol/L 142   POTASSIUM mmol/L 3.6   CHLORIDE mmol/L 104   CO2 mmol/L 25.0   BUN mg/dL 15   CREATININE mg/dL 0.90   GLUCOSE mg/dL 107*   CALCIUM mg/dL 8.9     Estimated Creatinine Clearance: 45.5 mL/min (by C-G formula based on SCr of 0.9 mg/dL).  Results from last 7 days   Lab Units 04/02/20  0941   ALK PHOS U/L 53   BILIRUBIN mg/dL 0.4   ALT (SGPT) U/L 10   AST (SGOT) U/L 21               Microbiology:  Microbiology Results (last 10 days)     Procedure Component Value - Date/Time    MRSA Screen, PCR (Inpatient) - Swab, Nares [[id number]]  (Normal) Collected:  04/02/20 7662    Lab Status:  Final result Specimen:  Swab from Nares Updated:  04/02/20 2566     MRSA PCR Negative    Narrative:       MRSA Negative    CORONAVIRUS-19(COVID-19),RT-PCR,STATE " LAB, NP Swab in Transport Media - Swab, Nasopharynx [[id number]] Collected:  04/02/20 1554    Lab Status:  Final result Specimen:  Swab from Nasopharynx Updated:  04/03/20 1709     Reference Lab Report --     Comment: See scanned report          COVID19 Not Detected    Rapid Strep A Screen - Swab, Throat [[id number]]  (Normal) Collected:  04/02/20 1322    Lab Status:  Final result Specimen:  Swab from Throat Updated:  04/02/20 1340     Strep A Ag Negative    Narrative:       Test performed by Direct Antigen Testing.    Respiratory Panel, PCR - Swab, Nasopharynx [[id number]]  (Normal) Collected:  04/02/20 1322    Lab Status:  Final result Specimen:  Swab from Nasopharynx Updated:  04/02/20 1441     ADENOVIRUS, PCR Not Detected     Coronavirus 229E Not Detected     Coronavirus HKU1 Not Detected     Coronavirus NL63 Not Detected     Coronavirus OC43 Not Detected     Human Metapneumovirus Not Detected     Human Rhinovirus/Enterovirus Not Detected     Influenza B PCR Not Detected     Parainfluenza Virus 1 Not Detected     Parainfluenza Virus 2 Not Detected     Parainfluenza Virus 3 Not Detected     Parainfluenza Virus 4 Not Detected     Bordetella pertussis pcr Not Detected     Influenza A H1 2009 PCR Not Detected     Chlamydophila pneumoniae PCR Not Detected     Mycoplasma pneumo by PCR Not Detected     Influenza A PCR Not Detected     Influenza A H3 Not Detected     Influenza A H1 Not Detected     RSV, PCR Not Detected     Bordetella parapertussis PCR Not Detected    Narrative:       The coronavirus on the RVP is NOT COVID-19 and is NOT indicative of infection with COVID-19.     Beta Strep Culture, Throat - Swab, Throat [[id number]]  (Normal) Collected:  04/02/20 1322    Lab Status:  Final result Specimen:  Swab from Throat Updated:  04/04/20 1218     Throat Culture, Beta Strep No Beta Hemolytic Streptococcus Isolated    Narrative:       Group A Strep incidence is low in adults. Positive culture for Beta hemolytic  Streptococcus species can reflect colonization and not true infection. Please correlate clinically.    Blood Culture - Blood, Arm, Right [[id number]] Collected:  04/02/20 1006    Lab Status:  Preliminary result Specimen:  Blood from Arm, Right Updated:  04/04/20 1030     Blood Culture No growth at 2 days    Blood Culture - Blood, Arm, Left [[id number]] Collected:  04/02/20 1006    Lab Status:  Preliminary result Specimen:  Blood from Arm, Left Updated:  04/04/20 1030     Blood Culture No growth at 2 days          Radiology:  Xr Femur 2 View Left    Result Date: 4/2/2020  Degenerative changes.  The left hip remains well located without acute fracture of the left hip or left femur.  D:  04/02/2020 E:  04/02/2020  This report was finalized on 4/2/2020 12:16 PM by Dr. Teja Scott.      Ct Head Without Contrast    Result Date: 4/2/2020  No acute intracranial abnormality specifically, no acute intracranial hemorrhage or extra-axial fluid collection with calvarium intact. Moderate-to-severely advanced chronic small vessel ischemic disease findings in the periventricular and deep white matter.  D:  04/02/2020 E:  04/02/2020  This report was finalized on 4/2/2020 12:16 PM by Dr. Teja Scott.      Ct Pelvis Without Contrast    Result Date: 4/2/2020  1. Bilateral pulmonary opacifications of groundglass attenuation, fairly well demarcated areas of geographic involvement left lung apex, mid lungs and within the right lower lobe medial segment concerning for atypical etiology such as viral etiology of airspace disease with at least intermediate correlation to COVID-19.  2. Large left hip body wall hematoma with surrounding soft tissue edema, however, no acute fracture of the left hip or pelvis is identified.  3. Advanced atherosclerotic involvement of peripheral vascular disease extending into the bilateral lower extremities, right slightly greater than left, however despite advanced involvement, there is at least trace flow into  the posterior tibial arteries, left greater than right, with severely diminutive caliber of the right posterior tibial artery from atherosclerotic involvement and calcifications progressed in the distal superficial femoral, popliteal and trifurcation regions of the bilateral lower extremities.  3.  No acute vascular findings within the chest, abdomen or pelvis identified with atherosclerotic nonaneurysmal thoracoabdominal aorta.  D:  04/02/2020 E:  04/02/2020  This report was finalized on 4/2/2020 12:16 PM by Dr. Teja Scott.      Ct Angio Abdominal Aorta Bilateral Iliofem Runoff    Result Date: 4/2/2020  1. Bilateral pulmonary opacifications of groundglass attenuation, fairly well demarcated areas of geographic involvement left lung apex, mid lungs and within the right lower lobe medial segment concerning for atypical etiology such as viral etiology of airspace disease with at least intermediate correlation to COVID-19.  2. Large left hip body wall hematoma with surrounding soft tissue edema, however, no acute fracture of the left hip or pelvis is identified.  3. Advanced atherosclerotic involvement of peripheral vascular disease extending into the bilateral lower extremities, right slightly greater than left, however despite advanced involvement, there is at least trace flow into the posterior tibial arteries, left greater than right, with severely diminutive caliber of the right posterior tibial artery from atherosclerotic involvement and calcifications progressed in the distal superficial femoral, popliteal and trifurcation regions of the bilateral lower extremities.  3.  No acute vascular findings within the chest, abdomen or pelvis identified with atherosclerotic nonaneurysmal thoracoabdominal aorta.  D:  04/02/2020 E:  04/02/2020  This report was finalized on 4/2/2020 12:16 PM by Dr. Teja Scott.      Xr Chest 1 View    Result Date: 4/2/2020  Increased opacifications left lung apex and right lung base from  01/10/2020 comparison examination consistent with airspace disease such as bronchopneumonia without pleural effusion.  D:  04/02/2020 E:  04/02/2020  This report was finalized on 4/2/2020 12:16 PM by Dr. Teja Scott.      Ct Angiogram Chest    Result Date: 4/2/2020  1. Bilateral pulmonary opacifications of groundglass attenuation, fairly well demarcated areas of geographic involvement left lung apex, mid lungs and within the right lower lobe medial segment concerning for atypical etiology such as viral etiology of airspace disease with at least intermediate correlation to COVID-19.  2. Large left hip body wall hematoma with surrounding soft tissue edema, however, no acute fracture of the left hip or pelvis is identified.  3. Advanced atherosclerotic involvement of peripheral vascular disease extending into the bilateral lower extremities, right slightly greater than left, however despite advanced involvement, there is at least trace flow into the posterior tibial arteries, left greater than right, with severely diminutive caliber of the right posterior tibial artery from atherosclerotic involvement and calcifications progressed in the distal superficial femoral, popliteal and trifurcation regions of the bilateral lower extremities.  3.  No acute vascular findings within the chest, abdomen or pelvis identified with atherosclerotic nonaneurysmal thoracoabdominal aorta.  D:  04/02/2020 E:  04/02/2020  This report was finalized on 4/2/2020 12:16 PM by Dr. Teja Scott.       I personally reviewed the above CT chest    Admission EKG with QTc 486      Impression:   1. Fall at SNF, left hip hematoma: XR negative for fracture  2. Hypotension, tachycardia: improved with volume repletion. More likely dehydration rather than sepsis. Afebrile. Negative procalcitonin. WBC rapidly improved  3. Multifocal pneumonia: noted on admission chest CT. Improved. Respiratory symptoms back to baseline. MRSA screening. Urine strep and  legionella antigens negative. RVP negative. No productive cough for culture  4. Multiple recent falls  5. Neutrophilic leukocytosis: rapidly improved  6. COPD: back to baseline  7. RA on methotrexate  8. Immunocompromised host  9. GERD  10. DJD      PLAN:   - follow CBC, CMP    - COVID-19 PCR -ve  -d/c plaquenil  -- Continue IV Zosyn today and with possible change to Augmentin p.o. x7 days.    I have seen and examined patient     Given worsening mental status cont iv abx  Consider palliation upon worsening    Maikel Albrecht, APRN  4/5/2020

## 2020-04-05 NOTE — THERAPY EVALUATION
Acute Care - Occupational Therapy Initial Evaluation  [facility]     Patient Name: Pati Carter  : 1930  MRN: [id number]  Today's Date: 2020             Admit Date: 2020       ICD-10-CM ICD-9-CM   1. Severe sepsis (CMS/HCC) A41.9 038.9    R65.20 995.92   2. Pneumonia of both lungs due to infectious organism, unspecified part of lung J18.9 483.8   3. Leukocytosis, unspecified type D72.829 288.60   4. Anemia, unspecified type D64.9 285.9   5. Hypotension, unspecified hypotension type I95.9 458.9   6. Dizziness R42 780.4   7. Syncope, unspecified syncope type R55 780.2   8. Traumatic hematoma of buttock, initial encounter S30.0XXA 922.32   9. Contusion of left lower extremity, initial encounter S80.12XA 924.5   10. Impaired mobility and ADLs Z74.09 799.89     Patient Active Problem List   Diagnosis   • Macular degeneration   • Rheumatoid arthritis (CMS/HCC)   • Hearing loss   • Hyperlipidemia   • Gastroesophageal reflux disease   • Hypertension   • Constipation   • Anemia   • Compression fracture of lumbar vertebra (CMS/HCC)   • Degeneration of intervertebral disc of lumbar region   • COPD (chronic obstructive pulmonary disease) (CMS/HCC)   • H/O calcium pyrophosphate deposition disease (CPPD)   • Long term methotrexate user   • Femur fracture, right (CMS/HCC)   • Falls frequently   • Fall at home, initial encounter   • Sepsis (CMS/[facility])   • Multifocal pneumonia   • Immunosuppressed status on methotrexate   • Acute respiratory failure with hypoxia (CMS/[facility])   • borderline B12 deficiency   • Delirium due to another medical condition   • Acute urinary retention   • Acute blood loss anemia, due to bleeding into hematomas from fall   • Traumatic hematoma of left hip     Past Medical History:   Diagnosis Date   • Anemia     Description: AJoshua Agrawal - borderline intermittent.   • Back pain    • Benign colonic polyp     Description: YUVAL Agrawal .   • Benign colonic polyp 2016    Description: YUVAL Agrawal  1999.   • Compression fracture of lumbar vertebra (CMS/HCC)    • Constipation    • COPD (chronic obstructive pulmonary disease) (CMS/HCC)     Description: A.  Rule out chronic persistent asthma, COPD, or obliterative bronchiolitis.- Rae   • Degeneration of intervertebral disc of lumbar region     Description: A.  Diagnosed in April 2013 with advanced multilevel with severe spinal stenosis, followed by Dr. Pillai for pain management.   • Gastroesophageal reflux disease    • Hearing loss    • History of colonoscopy 01/01/1999    NORMAL PER PATIENT    • History of mammogram 01/01/2011    NORMAL PER PT    • History of Papanicolaou smear of cervix 01/01/2010    NORMAL PER PT    • History of varicella    • Hyperlipidemia     Description: A.  Dx 2006.   • Hypertension     Description: A. Dx 2001.   • Macular degeneration    • Ovarian mass     Dx 8/15- benign left cystic adnexal mass   • Rheumatoid arthritis (CMS/[facility])     Description: A.  Diagnosed in 2000 and and followed by Dr. Constantino (now Dr. Kaplan). B.  On methotrexate therapy since 2000. C.  Off low-dose prednisone therapy.     Past Surgical History:   Procedure Laterality Date   • APPENDECTOMY     • CARPAL TUNNEL RELEASE Left 01/01/2003    HISTORY OF NEUROPLASTY DECOMPRESSION MEDIAN NERVE AT CARPAL TUNNEL LEFT   • CATARACT EXTRACTION Bilateral 01/01/2009   • CHOLECYSTECTOMY  01/01/1962   • HIP CANNULATED SCREW PLACEMENT Right 1/25/2020    Procedure: HIP CANNULATED SCREW PLACEMENT RIGHT;  Surgeon: Karlos Blount MD;  Location: [facility];  Service: Orthopedics   • KNEE ARTHROSCOPY Left 01/01/2001    MENISCAL REPAIR   • KYPHOPLASTY  06/18/2015    T11 AND L1 (JOSE A)   • PELVIC LAPAROSCOPY  01/01/1996    REMOVAL OF BENIGN UTERINE AND RIGHT OVARIAN TUMORS   • SALPINGO OOPHORECTOMY Left 08/26/2015    REMOVAL OF LEFT OVARY AND TUBE (benign cystic mass)          OT ASSESSMENT FLOWSHEET (last 12 hours)      Occupational Therapy Evaluation     Row Name 04/05/20  4686                   OT Evaluation Time/Intention    Document Type  evaluation  -AN        Mode of Treatment  occupational therapy  -AN        Patient Effort  fair  -AN        Comment  Pt having visual hallucinations, difficult to stay on task, difficulty following commands  -AN           General Information    Patient Profile Reviewed?  yes  -AN        Patient Observations  alert  -AN        General Observations of Patient  pt supine in bed rolling up bed sheet in hands and pointing to objects in the air  -AN        Prior Level of Function  -- pt unable to provide information, lives in [place]  -AN        Existing Precautions/Restrictions  (S) fall;other (see comments);hip;right recent R hip sx, fell on L, bruising, pain  -AN        Risks Reviewed  patient:;LOB;dizziness;increased discomfort;change in vital signs  -AN           Relationship/Environment    Primary Source of Support/Comfort  child(julia)  -AN        Lives With  facility resident  -AN           Resource/Environmental Concerns    Current Living Arrangements  independent/assisted living facility [facility]  -AN           Cognitive Assessment/Interventions    Additional Documentation  Cognitive Assessment/Intervention (Group)  -AN           Cognitive Assessment/Intervention- PT/OT    Affect/Mental Status (Cognitive)  confused;anxious  -AN        Behavioral Issues (Cognitive)  difficulty managing stress  -AN        Orientation Status (Cognition)  oriented to;person  -AN        Follows Commands (Cognition)  follows one step commands;0-24% accuracy  -AN        Cognitive Assessment/Intervention Comment  Pt having hallucinations; pt only able to participate in putting lotion on hands, would not wash face and would not txfr to bed pan for urination, continued to change mind  -AN           Bed Mobility Assessment/Treatment    Bed Mobility Assessment/Treatment  rolling right  -AN        Rolling Right [place] (Bed Mobility)  moderate assist (50% patient  effort)  -AN           Transfer Assessment/Treatment    Comment (Transfers)  Pt assist x 2, RN requested pt be in bed for eval at this time for safety  -AN           ADL Assessment/Intervention    BADL Assessment/Intervention  grooming;lower body dressing  -AN           Lower Body Dressing Assessment/Training    Comment (Lower Body Dressing)  expected max assist due to cognitive deficits  -AN           Grooming Assessment/Training    [place] Level (Grooming)  wash face, hands;dependent (less than 25% patient effort)  -AN        Comment (Grooming)  pt agreed to have lotion put in hand, pt applied to hands and rubbed into hands and wrist with supv  -AN           BADL Safety/Performance    Impairments, BADL Safety/Performance  cognition;balance;strength  -AN           General ROM    GENERAL ROM COMMENTS  not able to formally assess , however, pt demonstrated functional reach in bed  -AN           MMT (Manual Muscle Testing)    General MMT Comments  unable to assess  -AN           Sensory Assessment/Intervention    Additional Documentation  Vision Assessment/Intervention (Group)  -AN           Vision Assessment/Intervention    Visual Impairment/Limitations  unable/difficult to assess  -AN           Positioning and Restraints    Pre-Treatment Position  in bed  -AN        Post Treatment Position  bed  -AN        In Bed  supine;call light within reach;encouraged to call for assist;exit alarm on;notified nsg  -AN           Pain Scale: Numbers Pre/Post-Treatment    Pain Scale: Numbers, Pretreatment  0/10 - no pain  -AN        Pain Scale: Numbers, Post-Treatment  0/10 - no pain  -AN           Wound 04/05/20 0610 Bilateral perirectal MASD (Moisture associated skin damage)    Wound - Properties Group Date first assessed: 04/05/20  -TH Time first assessed: 0610  -TH Side: Bilateral  -TH Location: perirectal  -TH Primary Wound Type: MASD  -TH       Plan of Care Review    Plan of Care Reviewed With  patient  -AN            Clinical Impression (OT)    OT Diagnosis  impaired ADL I  -AN        Patient/Family Goals Statement (OT Eval)  agreeable to OT  -AN        Criteria for Skilled Therapeutic Interventions Met (OT Eval)  yes;treatment indicated  -AN        Rehab Potential (OT Eval)  good, to achieve stated therapy goals  -AN        Therapy Frequency (OT Eval)  daily  -AN        Anticipated Discharge Disposition (OT)  skilled nursing facility  -AN           Vital Signs    Pre Systolic BP Rehab  148  -AN        Pre Treatment Diastolic BP  76  -AN           OT Goals    Transfer Goal Selection (OT)  transfer, OT goal 1  -AN        Toileting Goal Selection (OT)  toileting, OT goal 1  -AN        Additional Documentation  Grooming Goal Selection (OT) (Row)  -AN           Transfer Goal 1 (OT)    Activity/Assistive Device (Transfer Goal 1, OT)  sit-to-stand/stand-to-sit  -AN        [place] Level/Cues Needed (Transfer Goal 1, OT)  moderate assist (50-74% patient effort)  -AN        Time Frame (Transfer Goal 1, OT)  10 days  -AN        Progress/Outcome (Transfer Goal 1, OT)  goal ongoing  -AN           Toileting Goal 1 (OT)    Activity/Device (Toileting Goal 1, OT)  perform perineal hygiene  -AN        [place] Level/Cues Needed (Toileting Goal 1, OT)  moderate assist (50-74% patient effort)  -AN        Time Frame (Toileting Goal 1, OT)  10 days  -AN        Progress/Outcome (Toileting Goal 1, OT)  goal ongoing  -AN           OT Cognitive Goals    Directions Goal Selection (OT)  direction following, OT goal 1  -AN        Additional Documentation  Directions Goal Selection (OT) (Row)  -AN           Direction Following Goal 1 (OT)    Activity (Direction Following Goal 1, OT)  follow one step directions  -AN        [place]/Cues/Accuracy (Direction Following Goal 1, OT)  with minimum;verbal cues/redirection;with 75% accuracy  -AN        Time Frame (Direction Following Goal 1, OT)  10 days  -AN        Progress/Outcome (Direction Following  Goal 1, OT)  goal ongoing  -AN          User Key  (r) = Recorded By, (t) = Taken By, (c) = Cosigned By    Initials Name Effective Dates    Kelsi Clement OT 06/22/15 -      Danielle Carlisle RN 06/16/16 -                OT Recommendation and Plan  Outcome Summary/Treatment Plan (OT)  Anticipated Discharge Disposition (OT): skilled nursing facility  Therapy Frequency (OT Eval): daily  Plan of Care Review  Plan of Care Reviewed With: patient  Plan of Care Reviewed With: patient  Outcome Summary: Pt is confused, suffering from hallucinations and having difficulty following directions for simple tasks. Pt dependent to wash face/comb hair, but able to rub lotion into her hand with minimal verbal cues. Pt unable to perform txfrs this date due to cognition and safety. Continue OT and recommend SNF at this time.    Outcome Measures     Row Name 04/05/20 1405             How much help from another is currently needed...    Putting on and taking off regular lower body clothing?  1  -AN      Bathing (including washing, rinsing, and drying)  1  -AN      Toileting (which includes using toilet bed pan or urinal)  1  -AN      Putting on and taking off regular upper body clothing  1  -AN      Taking care of personal grooming (such as brushing teeth)  1  -AN      Eating meals  1  -AN      AM-PAC 6 Clicks Score (OT)  6  -AN         Functional Assessment    Outcome Measure Options  AM-PAC 6 Clicks Daily Activity (OT)  -AN        User Key  (r) = Recorded By, (t) = Taken By, (c) = Cosigned By    Initials Name Provider Type    AN Kelsi Desai, OT Occupational Therapist          Time Calculation:   Time Calculation- OT     Row Name 04/05/20 4433             Time Calculation- OT    OT Start Time  1405  -AN      Total Timed Code Minutes- OT  0 minute(s)  -AN      OT Received On  04/05/20  -AN      OT Goal Re-Cert Due Date  04/15/20  -AN        User Key  (r) = Recorded By, (t) = Taken By, (c) = Cosigned By    Initials Name Provider  Type    AN Kelsi Desai OT Occupational Therapist        Therapy Charges for Today     Code Description Service Date Service Provider Modifiers Qty    20501075284 HC OT EVAL MOD COMPLEXITY 4 4/5/2020 Kelsi Desai OT GO 1               Kelsi Desai OT  4/5/2020

## 2020-04-05 NOTE — PROGRESS NOTES
[facility] Medicine Services  PROGRESS NOTE    Patient Name: Pati Carter  : 1930  MRN: [id number]    Date of Admission: 2020  Primary Care Physician: Latoya Bolaños MD    Subjective   Subjective     CC:  Follow-up fall at skilled facility and pneumonia    HPI:  Nursing report that patient rested much better with Seroquel last night.  They thought this may be working better than the Zyprexa.  Patient still having a little bit of confusion this morning but is more calm.  She still has not been sleeping well per nursing report.  She is eating small amounts.  Patient only notes hip and knee pain.  She has no other new complaints but is a poor historian.    Review of Systems  No current fevers or chills  No current dysuria or hematuria  No current nausea, vomiting, or diarrhea  No current chest pain or palpitations      Objective   Objective     Vital Signs:   Temp:  [97.1 °F (36.2 °C)-98.9 °F (37.2 °C)] 97.4 °F (36.3 °C)  Heart Rate:  [] 116  Resp:  [18-20] 18  BP: (128-157)/(65-98) 148/76        Physical Exam:  Constitutional:Awake, alert  HENT: NCAT, mucous membranes moist, neck supple  Respiratory: More clear at the apex, occasional rhonchi bilaterally, respiratory effort normal, nonlabored breathing   Cardiovascular: RRR, normal radial pulses  Gastrointestinal: Positive bowel sounds, soft, nontender, nondistended  Musculoskeletal: Large left hip posterior hematoma, left knee hematoma/contusion with bruising relatively stable, trace bilateral lower extremity edema, elderly and debilitated in appearance, BMI is 25  Psychiatric: Calm, cooperative  Neurologic:  A little more confused, oriented to self and place, no slurred speech or facial droop, follows commands  Skin:  Stable left hip and left knee bruising as per above, no rashes or jaundice      Results Reviewed:  Results from last 7 days   Lab Units 20  0541 20  0042 20  0700 20  7358   04/02/20  0941   WBC 10*3/mm3 10.02  --  10.88* 10.50   < > 15.63*   HEMOGLOBIN g/dL 8.6* 8.6* 7.0* 7.7*   < > 7.2*   HEMOGLOBIN, POC   --   --   --   --    < >  --    HEMATOCRIT % 26.8* 27.2* 22.3* 24.5*   < > 23.5*   HEMATOCRIT POC   --   --   --   --    < >  --    PLATELETS 10*3/mm3 312  --  244 216   < > 313   INR   --   --   --   --   --  1.17*   PROCALCITONIN ng/mL  --   --   --  0.19  --  0.11    < > = values in this interval not displayed.     Results from last 7 days   Lab Units 04/05/20  0541 04/04/20  0700 04/03/20  0504 04/02/20  0941   SODIUM mmol/L 142 140 141 142   POTASSIUM mmol/L 3.6 4.1 4.2 4.1   CHLORIDE mmol/L 104 105 107 105   CO2 mmol/L 25.0 24.0 24.0 27.0   BUN mg/dL 15 20 23 19   CREATININE mg/dL 0.90 1.04* 1.03* 0.91   GLUCOSE mg/dL 107* 105* 91 171*   CALCIUM mg/dL 8.9 8.2* 7.8* 8.3*   ALT (SGPT) U/L  --   --   --  10   AST (SGOT) U/L  --   --   --  21   TROPONIN T ng/mL  --   --   --  <0.010   PROBNP pg/mL  --   --   --  1,458.0     Estimated Creatinine Clearance: 45.5 mL/min (by C-G formula based on SCr of 0.9 mg/dL).    Microbiology Results Abnormal     Procedure Component Value - Date/Time    Beta Strep Culture, Throat - Swab, Throat [[id number]]  (Normal) Collected:  04/02/20 1322    Lab Status:  Final result Specimen:  Swab from Throat Updated:  04/04/20 1218     Throat Culture, Beta Strep No Beta Hemolytic Streptococcus Isolated    Narrative:       Group A Strep incidence is low in adults. Positive culture for Beta hemolytic Streptococcus species can reflect colonization and not true infection. Please correlate clinically.    Blood Culture - Blood, Arm, Right [[id number]] Collected:  04/02/20 1006    Lab Status:  Preliminary result Specimen:  Blood from Arm, Right Updated:  04/04/20 1030     Blood Culture No growth at 2 days    Blood Culture - Blood, Arm, Left [[id number]] Collected:  04/02/20 1006    Lab Status:  Preliminary result Specimen:  Blood from Arm, Left Updated:  04/04/20  1030     Blood Culture No growth at 2 days    CORONAVIRUS-19(COVID-19),RT-PCR,[facility], NP Swab in Transport Media - Swab, Nasopharynx [[id number]] Collected:  04/02/20 1554    Lab Status:  Final result Specimen:  Swab from Nasopharynx Updated:  04/03/20 1709     Reference Lab Report --     Comment: See scanned report          COVID19 Not Detected    MRSA Screen, PCR (Inpatient) - Swab, Nares [[id number]]  (Normal) Collected:  04/02/20 1555    Lab Status:  Final result Specimen:  Swab from Nares Updated:  04/02/20 1739     MRSA PCR Negative    Narrative:       MRSA Negative    Respiratory Panel, PCR - Swab, Nasopharynx [[id number]]  (Normal) Collected:  04/02/20 1322    Lab Status:  Final result Specimen:  Swab from Nasopharynx Updated:  04/02/20 1441     ADENOVIRUS, PCR Not Detected     Coronavirus 229E Not Detected     Coronavirus HKU1 Not Detected     Coronavirus NL63 Not Detected     Coronavirus OC43 Not Detected     Human Metapneumovirus Not Detected     Human Rhinovirus/Enterovirus Not Detected     Influenza B PCR Not Detected     Parainfluenza Virus 1 Not Detected     Parainfluenza Virus 2 Not Detected     Parainfluenza Virus 3 Not Detected     Parainfluenza Virus 4 Not Detected     Bordetella pertussis pcr Not Detected     Influenza A H1 2009 PCR Not Detected     Chlamydophila pneumoniae PCR Not Detected     Mycoplasma pneumo by PCR Not Detected     Influenza A PCR Not Detected     Influenza A H3 Not Detected     Influenza A H1 Not Detected     RSV, PCR Not Detected     Bordetella parapertussis PCR Not Detected    Narrative:       The coronavirus on the RVP is NOT COVID-19 and is NOT indicative of infection with COVID-19.     Rapid Strep A Screen - Swab, Throat [[id number]]  (Normal) Collected:  04/02/20 1322    Lab Status:  Final result Specimen:  Swab from Throat Updated:  04/02/20 1340     Strep A Ag Negative    Narrative:       Test performed by Direct Antigen Testing.          Imaging Results (Last 24  Hours)     ** No results found for the last 24 hours. **               I have reviewed the medications:  Scheduled Meds:    acetaminophen 650 mg Oral Q8H   atorvastatin 10 mg Oral Daily   budesonide-formoterol 2 puff Inhalation BID - RT   docusate sodium 100 mg Oral Daily   famotidine 20 mg Oral Daily   folic acid 1,000 mcg Oral Daily   haloperidol lactate 1 mg Intramuscular Once   lidocaine 2 patch Transdermal Q24H   melatonin 2.5 mg Oral Nightly   multivitamin with minerals 1 tablet Oral Daily   nystatin 1 application Topical Daily   OLANZapine 2.5 mg Intramuscular Once   piperacillin-tazobactam 3.375 g Intravenous Q8H   saccharomyces boulardii 250 mg Oral Daily   sodium chloride 10 mL Intravenous Q12H   tamsulosin 0.4 mg Oral Daily   vitamin B-12 1,000 mcg Oral Daily     Continuous Infusions:     PRN Meds:.•  acetaminophen **OR** acetaminophen **OR** acetaminophen  •  Albuterol Sulfate NEB Orderable  •  docusate sodium  •  melatonin  •  OLANZapine **OR** OLANZapine  •  ondansetron **OR** ondansetron  •  [COMPLETED] Insert peripheral IV **AND** sodium chloride  •  sodium chloride    Assessment/Plan   Assessment & Plan     Active Hospital Problems    Diagnosis  POA   • **Multifocal pneumonia [J18.9]  Yes   • borderline B12 deficiency [E53.8]  Yes   • Delirium due to another medical condition [F05]  Clinically Undetermined   • Acute urinary retention [R33.8]  Yes   • Acute blood loss anemia, due to bleeding into hematomas from fall [D62]  Yes   • Traumatic hematoma of left hip [S70.02XA]  Yes   • Falls frequently [R29.6]  Not Applicable   • Fall at home, initial encounter [W19.XXXA, Y92.009]  Not Applicable   • Sepsis (CMS/HCC) [A41.9]  Yes   • Immunosuppressed status on methotrexate [D89.9]  Yes   • Acute respiratory failure with hypoxia (CMS/HCC) [J96.01]  Yes   • Anemia [D64.9]  Yes   • COPD (chronic obstructive pulmonary disease) (CMS/HCC) [J44.9]  Yes   • Hyperlipidemia [E78.5]  Yes   • Hypertension [I10]  Yes    • Rheumatoid arthritis (CMS/HCC) [M06.9]  Yes      Resolved Hospital Problems    Diagnosis Date Resolved POA   • Suspected Covid-19 Virus Infection [R68.89] 04/04/2020 Yes        Brief Hospital Course to date:  Pati Carter is a 89 y.o. female from her nursing home with fall and head and knee and left hip contusion with left hip and left knee hematomas and acute blood loss anemia.  She was found to have leukocytosis, hypotension, tachycardia, tachypnea, and hypoxia consistent with sepsis and CT scan showed multifocal pneumonia felt to be intermediate risk of novel coronavirus per radiologist.  Patient had persistent bleeding into her knee and hip hematoma and required blood transfusion on 4/2 and second unit on 4/3 and a third unit on 4/4.  She had persistent issues with hypotension that was improved with intermittent boluses and her blood transfusions.    Discussion/plan: Novel coronavirus test with no virus detected.  Continuing Zosyn and likely will switch to Augmentin today after discussing with infectious disease.  Increase melatonin dose to promote sleep.  Change PRN Zyprexa to as needed Seroquel as she seemed to respond better to this.  Avoid benzodiazepine medications.  Monitor for further urine retention.  Flomax started to help with this.  Increase Tylenol dose for pain control.  Anemia now seems to be stabilized after multiple blood transfusions.  Avoid aspirin and NSAIDs with hematoma.  May be able to restart aspirin in 1 to 2 days.  Hematoma seems relatively stable.  Prognosis remains guarded.  Daughter updated today and counseled regarding guarded prognosis.  Repeat laboratory studies tomorrow.     Sepsis due to multifocal pneumonia in immunosuppressed patient:  Initially fluid resuscitated emergency room with 2 L bolus  Tachycardia and hypotension are improving.    Required intermittent IV fluid and boluses with careful attention to volume status  Broad-spectrum antibiotics initially received  vancomycin and Zosyn.  De-escalate it to Zosyn monotherapy.  MRSA PCR study returned negative, vancomycin discontinued  Viral respiratory PCR studies negative  Approved for state testing novel coronavirus test, [id number] no virus detected  Careful monitoring as immunosuppressed status on methotrexate     Acute hypoxic respiratory failure due to sepsis and pneumonia:  Weaning off supplemental oxygen  Respiratory treatments  Pulse oximetry     Acute blood loss anemia due to left hip hematoma and left knee hematoma:  Supportive care and symptomatic management for hematoma.  Hold aspirin and NSAIDs to minimize bleeding.  Transfused 2 units due to symptomatic anemia  Hemoglobin previously 10 approximately 4-5 days prior to admission before fall and hematoma upon arrival was 7.2.  Denies other source of bleeding, denies blood in stools  Monitor for further bleeding and trend blood counts.  B12 borderline low, replaced one IM dose and then orally     COPD:  Continue inhalers, inhaled corticosteroid, long-acting beta agonist  No clear wheezes on examination hold off on systemic steroids unless worsens or more significant wheeze particularly while testing novel coronavirus.     Fall at home, multiple recent falls, debility:  PT OT while hospitalized, currently has been in skilled facility.  Fall precautions.     History of essential hypertension now with hypotension:   Monitor, avoid any blood pressure medications for now.     Hyperlipidemia: Stable.  Statin.     DVT prophylaxis: Lovenox and heparin contraindicated due to bleeding, SCDs contraindicated due to peripheral vascular disease noted on imaging    Discharge planning: Needs further improvement likely will need rehabilitation    CODE STATUS:   Code Status and Medical Interventions:   Ordered at: 04/02/20 8253     Limited Support to NOT Include:    Intubation    NIPPV (Non-Invasive Positive Pressure Support)    Cardioversion/Defibrillation     Code Status:    No CPR      Medical Interventions (Level of Support Prior to Arrest):    Limited         Electronically signed by Matty Morris MD, 04/05/20, 08:47.

## 2020-04-05 NOTE — PLAN OF CARE
Problem: Patient Care Overview  Goal: Plan of Care Review  Flowsheets (Taken 4/5/2020 4179)  Progress: no change  Plan of Care Reviewed With: patient  Outcome Summary: Patient confused throughout the night. Melatonin given as oreded. prn zyprexa PO given at 2200 for agitation. at 0000 patient noted to be screaming when entering room patient stated she was floating in water on cardboard. And she did not know how to get down without stepping on that little girl. attempted to reorient without success patient then became more aggitated and started talking about the alligator in her room. Dr. Steel called. Seroquel 12.5 mg PO ordered and given. patient continued to hallucinated. patient convenced she was either in a basement or in her neighbors house depending on when staff talked to her. repeated reorientation attempted. patient consisntly getting out of bed to urinate every 15-20 minuted. bladder scan showed 0mL in bladder. urination pattern continued and RN in and out catheterized with 350mL clear yellow urine removed. Aggitation increased after in and out catheter aty 0200. patient c/o right hip pain. patient transfered to chair to assist with pain releif. APRN called about continued aggitation and hallucination. Haldol IM ordered per Dr. Christianson. Patient noted to calm after moving to chair. IM Haldol not given at this time. PRBC transfusion completed this shift. Patient calm this am sitting in chair however patient is continuing to hallucinate. Asking if the man in the corner can help her get home. No man noted in room at time of statement. Continues to be tachycardic on the monitor with rate in low 100's during rest and 120-130's with activity. remains or room air.

## 2020-04-05 NOTE — PLAN OF CARE
Problem: Patient Care Overview  Goal: Plan of Care Review  Outcome: Ongoing (interventions implemented as appropriate)  Flowsheets (Taken 4/5/2020 1405)  Outcome Summary: Pt is confused, suffering from hallucinations and having difficulty following directions for simple tasks. Pt dependent to wash face/comb hair, but able to rub lotion into her hand with minimal verbal cues. Pt unable to perform txfrs this date due to cognition and safety. Continue OT and recommend SNF at this time.

## 2020-04-05 NOTE — PLAN OF CARE
Pt very restless today. Visual hallucinations, picking at things in air, see's people in room, talking to people who are not present. Oriented to self only. Up to chair, transferred back to bed for safety. Incontinent of urine today. Skin precautions in place, waffle mattress, boots, turned h8doeic, zguard to buttocks. Pulled out one IV today, second wrapped for protection. Seroquel orders placed today and increased this afternoon, not much response to either dose. ST's in 120's, RA. H&H stable this AM. Bruising/hematoma to L side remains unchanged. Talked with daughters several times today. Orders to transfer off 6B, awaiting bed assignment. Bed alarm and fall precautions in place. Will monitor.

## 2020-04-06 LAB
ANION GAP SERPL CALCULATED.3IONS-SCNC: 9 MMOL/L (ref 5–15)
BUN BLD-MCNC: 14 MG/DL (ref 8–23)
BUN/CREAT SERPL: 14.9 (ref 7–25)
CALCIUM SPEC-SCNC: 8.6 MG/DL (ref 8.6–10.5)
CHLORIDE SERPL-SCNC: 107 MMOL/L (ref 98–107)
CO2 SERPL-SCNC: 26 MMOL/L (ref 22–29)
CREAT BLD-MCNC: 0.94 MG/DL (ref 0.57–1)
DEPRECATED RDW RBC AUTO: 75.8 FL (ref 37–54)
ERYTHROCYTE [DISTWIDTH] IN BLOOD BY AUTOMATED COUNT: 21.4 % (ref 12.3–15.4)
GFR SERPL CREATININE-BSD FRML MDRD: 56 ML/MIN/1.73
GLUCOSE BLD-MCNC: 83 MG/DL (ref 65–99)
HCT VFR BLD AUTO: 23.5 % (ref 34–46.6)
HGB BLD-MCNC: 7.4 G/DL (ref 12–15.9)
MCH RBC QN AUTO: 30.8 PG (ref 26.6–33)
MCHC RBC AUTO-ENTMCNC: 31.5 G/DL (ref 31.5–35.7)
MCV RBC AUTO: 97.9 FL (ref 79–97)
PLATELET # BLD AUTO: 310 10*3/MM3 (ref 140–450)
PMV BLD AUTO: 9.5 FL (ref 6–12)
POTASSIUM BLD-SCNC: 3.8 MMOL/L (ref 3.5–5.2)
RBC # BLD AUTO: 2.4 10*6/MM3 (ref 3.77–5.28)
SODIUM BLD-SCNC: 142 MMOL/L (ref 136–145)
WBC NRBC COR # BLD: 8.34 10*3/MM3 (ref 3.4–10.8)

## 2020-04-06 PROCEDURE — 97530 THERAPEUTIC ACTIVITIES: CPT

## 2020-04-06 PROCEDURE — 85027 COMPLETE CBC AUTOMATED: CPT | Performed by: INTERNAL MEDICINE

## 2020-04-06 PROCEDURE — 80048 BASIC METABOLIC PNL TOTAL CA: CPT | Performed by: INTERNAL MEDICINE

## 2020-04-06 PROCEDURE — 25010000002 PIPERACILLIN SOD-TAZOBACTAM PER 1 G

## 2020-04-06 PROCEDURE — 97535 SELF CARE MNGMENT TRAINING: CPT

## 2020-04-06 PROCEDURE — 94799 UNLISTED PULMONARY SVC/PX: CPT

## 2020-04-06 PROCEDURE — 99233 SBSQ HOSP IP/OBS HIGH 50: CPT | Performed by: INTERNAL MEDICINE

## 2020-04-06 RX ORDER — AMOXICILLIN AND CLAVULANATE POTASSIUM 500; 125 MG/1; MG/1
1 TABLET, FILM COATED ORAL 2 TIMES DAILY
Status: DISCONTINUED | OUTPATIENT
Start: 2020-04-06 | End: 2020-04-08 | Stop reason: HOSPADM

## 2020-04-06 RX ADMIN — ACETAMINOPHEN 1000 MG: 500 TABLET, FILM COATED ORAL at 00:50

## 2020-04-06 RX ADMIN — SODIUM CHLORIDE, PRESERVATIVE FREE 10 ML: 5 INJECTION INTRAVENOUS at 09:06

## 2020-04-06 RX ADMIN — BUDESONIDE AND FORMOTEROL FUMARATE DIHYDRATE 2 PUFF: 160; 4.5 AEROSOL RESPIRATORY (INHALATION) at 08:25

## 2020-04-06 RX ADMIN — FOLIC ACID 1000 MCG: 1 TABLET ORAL at 09:06

## 2020-04-06 RX ADMIN — TAZOBACTAM SODIUM AND PIPERACILLIN SODIUM 3.38 G: 375; 3 INJECTION, SOLUTION INTRAVENOUS at 06:26

## 2020-04-06 RX ADMIN — ACETAMINOPHEN 1000 MG: 500 TABLET, FILM COATED ORAL at 09:07

## 2020-04-06 RX ADMIN — MELATONIN TAB 5 MG 5 MG: 5 TAB at 20:47

## 2020-04-06 RX ADMIN — LIDOCAINE 2 PATCH: 50 PATCH CUTANEOUS at 09:07

## 2020-04-06 RX ADMIN — AMOXICILLIN AND CLAVULANATE POTASSIUM 500 MG: 500; 125 TABLET, FILM COATED ORAL at 11:58

## 2020-04-06 RX ADMIN — CYANOCOBALAMIN TAB 1000 MCG 1000 MCG: 1000 TAB at 09:07

## 2020-04-06 RX ADMIN — Medication 250 MG: at 09:06

## 2020-04-06 RX ADMIN — ACETAMINOPHEN 1000 MG: 500 TABLET, FILM COATED ORAL at 15:23

## 2020-04-06 RX ADMIN — AMOXICILLIN AND CLAVULANATE POTASSIUM 500 MG: 500; 125 TABLET, FILM COATED ORAL at 20:47

## 2020-04-06 RX ADMIN — ATORVASTATIN CALCIUM 10 MG: 10 TABLET, FILM COATED ORAL at 09:06

## 2020-04-06 RX ADMIN — QUETIAPINE FUMARATE 25 MG: 25 TABLET ORAL at 00:49

## 2020-04-06 RX ADMIN — TAMSULOSIN HYDROCHLORIDE 0.4 MG: 0.4 CAPSULE ORAL at 09:06

## 2020-04-06 RX ADMIN — MULTIPLE VITAMINS W/ MINERALS TAB 1 TABLET: TAB at 09:06

## 2020-04-06 RX ADMIN — DOCUSATE SODIUM 100 MG: 100 CAPSULE, LIQUID FILLED ORAL at 09:06

## 2020-04-06 RX ADMIN — MELATONIN TAB 5 MG 5 MG: 5 TAB at 00:49

## 2020-04-06 RX ADMIN — FAMOTIDINE 20 MG: 20 TABLET ORAL at 09:06

## 2020-04-06 RX ADMIN — NYSTATIN 1 APPLICATION: 100000 OINTMENT TOPICAL at 09:06

## 2020-04-06 NOTE — PLAN OF CARE
Patient restless and agitated early in the shift with confusion noted.  Patient has rested comfortably for the remainder of the shift, PRN medications controlling agitation.  Vital signs have been stable with no acute changes noted; will continue to monitor.

## 2020-04-06 NOTE — PLAN OF CARE
Problem: Patient Care Overview  Goal: Plan of Care Review  Flowsheets  Taken 4/6/2020 1142  Progress: improving  Plan of Care Reviewed With: patient  Taken 4/6/2020 1139  Outcome Summary: Pt with improved mental status, following commands. Pt transitions to eob with min to mod assist, bed to chair with r wx and min assist. Pt requires brief for mobility due to urinary incontinence

## 2020-04-06 NOTE — THERAPY TREATMENT NOTE
Acute Care - Occupational Therapy Treatment Note  [facility]     Patient Name: Pati Carter  : 1930  MRN: [id number]  Today's Date: 2020             Admit Date: 2020       ICD-10-CM ICD-9-CM   1. Severe sepsis (CMS/HCC) A41.9 038.9    R65.20 995.92   2. Pneumonia of both lungs due to infectious organism, unspecified part of lung J18.9 483.8   3. Leukocytosis, unspecified type D72.829 288.60   4. Anemia, unspecified type D64.9 285.9   5. Hypotension, unspecified hypotension type I95.9 458.9   6. Dizziness R42 780.4   7. Syncope, unspecified syncope type R55 780.2   8. Traumatic hematoma of buttock, initial encounter S30.0XXA 922.32   9. Contusion of left lower extremity, initial encounter S80.12XA 924.5   10. Impaired mobility and ADLs Z74.09 799.89     Patient Active Problem List   Diagnosis   • Macular degeneration   • Rheumatoid arthritis (CMS/HCC)   • Hearing loss   • Hyperlipidemia   • Gastroesophageal reflux disease   • Hypertension   • Constipation   • Anemia   • Compression fracture of lumbar vertebra (CMS/HCC)   • Degeneration of intervertebral disc of lumbar region   • COPD (chronic obstructive pulmonary disease) (CMS/HCC)   • H/O calcium pyrophosphate deposition disease (CPPD)   • Long term methotrexate user   • Femur fracture, right (CMS/HCC)   • Falls frequently   • Fall at home, initial encounter   • Sepsis (CMS/[facility])   • Multifocal pneumonia   • Immunosuppressed status on methotrexate   • Acute respiratory failure with hypoxia (CMS/[facility])   • borderline B12 deficiency   • Delirium due to another medical condition   • Acute urinary retention   • Acute blood loss anemia, due to bleeding into hematomas from fall   • Traumatic hematoma of left hip     Past Medical History:   Diagnosis Date   • Anemia     Description: YUVAL Agrawal - borderline intermittent.   • Back pain    • Benign colonic polyp     Description: YUVAL Agrawal .   • Benign colonic polyp 2016    Description: YUVAL Agrawal .    • Compression fracture of lumbar vertebra (CMS/HCC)    • Constipation    • COPD (chronic obstructive pulmonary disease) (CMS/HCC)     Description: A.  Rule out chronic persistent asthma, COPD, or obliterative bronchiolitis.- Rae   • Degeneration of intervertebral disc of lumbar region     Description: A.  Diagnosed in April 2013 with advanced multilevel with severe spinal stenosis, followed by Dr. Pillai for pain management.   • Gastroesophageal reflux disease    • Hearing loss    • History of colonoscopy 01/01/1999    NORMAL PER PATIENT    • History of mammogram 01/01/2011    NORMAL PER PT    • History of Papanicolaou smear of cervix 01/01/2010    NORMAL PER PT    • History of varicella    • Hyperlipidemia     Description: A.  Dx 2006.   • Hypertension     Description: A. Dx 2001.   • Macular degeneration    • Ovarian mass     Dx 8/15- benign left cystic adnexal mass   • Rheumatoid arthritis (CMS/HCC)     Description: A.  Diagnosed in 2000 and and followed by Dr. Constantino (now Dr. Kaplan). B.  On methotrexate therapy since 2000. C.  Off low-dose prednisone therapy.     Past Surgical History:   Procedure Laterality Date   • APPENDECTOMY     • CARPAL TUNNEL RELEASE Left 01/01/2003    HISTORY OF NEUROPLASTY DECOMPRESSION MEDIAN NERVE AT CARPAL TUNNEL LEFT   • CATARACT EXTRACTION Bilateral 01/01/2009   • CHOLECYSTECTOMY  01/01/1962   • HIP CANNULATED SCREW PLACEMENT Right 1/25/2020    Procedure: HIP CANNULATED SCREW PLACEMENT RIGHT;  Surgeon: Karlos Blount MD;  Location: [facility];  Service: Orthopedics   • KNEE ARTHROSCOPY Left 01/01/2001    MENISCAL REPAIR   • KYPHOPLASTY  06/18/2015    T11 AND L1 (JOSE A)   • PELVIC LAPAROSCOPY  01/01/1996    REMOVAL OF BENIGN UTERINE AND RIGHT OVARIAN TUMORS   • SALPINGO OOPHORECTOMY Left 08/26/2015    REMOVAL OF LEFT OVARY AND TUBE (benign cystic mass)       Therapy Treatment    Rehabilitation Treatment Summary     Row Name 04/06/20 1512             Treatment  Time/Intention    Discipline  occupational therapist  -SG      Document Type  therapy note (daily note)  -SG      Mode of Treatment  occupational therapy  -SG      Therapy Frequency (PT Clinical Impression)  daily  -SG      Patient Effort  good  -SG      Existing Precautions/Restrictions  fall;other (see comments) recent R hip surgery  -SG      Recorded by [SG] Gabby Powers OTR/L 04/06/20 1606      Row Name 04/06/20 1512             Vital Signs    Pre Systolic BP Rehab  -- VSS, RN cleared for tx  -SG      O2 Delivery Pre Treatment  room air  -SG      Recorded by [SG] Gabby Powers OTR/L 04/06/20 1606      Row Name 04/06/20 1512             Cognitive Assessment/Intervention- PT/OT    Orientation Status (Cognition)  oriented x 3  -SG      Follows Commands (Cognition)  follows one step commands;75-90% accuracy  -SG      Recorded by [SG] Gabby Powers OTR/L 04/06/20 1606      Row Name 04/06/20 1512             ADL Assessment/Intervention    61523 - OT Self Care/Mgmt Minutes  18  -SG      BADL Assessment/Intervention  grooming  -SG      Additional Documentation  Comment, IADL Assessment/Training (Row)  -SG      Recorded by [SG] Gabby Powers OTR/L 04/06/20 1606      Row Name 04/06/20 1512             Grooming Assessment/Training    [place] Level (Grooming)  hair care, combing/brushing;oral care regimen;wash face, hands;set up;supervision  -SG      Grooming Position  unsupported sitting  -SG      Comment (Grooming)  Sitting edge of chair, pt completed grooming tasks w/ setup/spv and vc's for attn to ask, required extra time to complete  -SG      Recorded by [SG] Gabby Powers OTR/L 04/06/20 1606      Row Name 04/06/20 1512             BADL Safety/Performance    Impairments, BADL Safety/Performance  cognition;balance;strength;endurance/activity tolerance  -SG      Skilled BADL Treatment/Intervention  BADL process/adaptation training  -SG      Progress in BADL Status  improvement noted  -SG      Recorded by [SG]  Gabby Powers OTR/L 04/06/20 1606      Row Name 04/06/20 1512             Motor Skills Assessment/Interventions    Additional Documentation  Balance (Group)  -SG      Recorded by [SG] Gabby Powers OTR/L 04/06/20 1606      Row Name 04/06/20 1512             Balance    Balance  static sitting balance;dynamic sitting balance  -SG      Recorded by [SG] Gabby Powers OTR/L 04/06/20 1606      Row Name 04/06/20 1512             Static Sitting Balance    Level of [place] (Unsupported Sitting, Static Balance)  supervision  -SG      Sitting Position (Unsupported Sitting, Static Balance)  sitting in chair  -SG      Time Able to Maintain Position (Unsupported Sitting, Static Balance)  more than 5 minutes  -SG      Recorded by [SG] Gabby Powers OTR/L 04/06/20 1606      Row Name 04/06/20 1512             Dynamic Sitting Balance    Level of [place], Reaches Outside Midline (Sitting, Dynamic Balance)  standby assist  -SG      Sitting Position, Reaches Outside Midline (Sitting, Dynamic Balance)  sitting in chair  -SG      Recorded by [SG] Gabby Powers OTR/L 04/06/20 1606      Row Name 04/06/20 1512             Positioning and Restraints    Pre-Treatment Position  sitting in chair/recliner  -SG      Post Treatment Position  chair  -SG      In Chair  notified nsg;reclined;sitting;call light within reach;encouraged to call for assist;exit alarm on  -SG      Recorded by [SG] Gabby Powers OTR/L 04/06/20 1606      Row Name 04/06/20 1512             Pain Assessment    Additional Documentation  Pain Scale: Numbers Pre/Post-Treatment (Group)  -SG      Recorded by [SG] Gabby Powers OTR/L 04/06/20 1606      Row Name 04/06/20 1512             Pain Scale: Numbers Pre/Post-Treatment    Pain Scale: Numbers, Pretreatment  0/10 - no pain  -SG      Pain Scale: Numbers, Post-Treatment  0/10 - no pain  -SG      Recorded by [SG] Gabby Powers OTR/L 04/06/20 1606      Row Name                Row Name 04/06/20 1512             Plan  of Care Review    Plan of Care Reviewed With  patient  -SG      Progress  improving  -SG      Recorded by [SG] Gabby Powers OTR/L 04/06/20 1606      Row Name 04/06/20 1512             Outcome Summary/Treatment Plan (OT)    Anticipated Discharge Disposition (OT)  skilled nursing facility  -SG      Recorded by [SG] Gabby Powers OTR/L 04/06/20 1606        User Key  (r) = Recorded By, (t) = Taken By, (c) = Cosigned By    Initials Name Effective Dates Discipline    SG Gabby Powers OTR/L 01/20/20 -  OT            OT Recommendation and Plan  Outcome Summary/Treatment Plan (OT)  Anticipated Discharge Disposition (OT): skilled nursing facility  Plan of Care Review  Plan of Care Reviewed With: patient  Plan of Care Reviewed With: patient  Outcome Summary: Pt performed grooming sitting edge of chair. She requierd setup and spv assist to complete, as well as verbal cueing for attn to task. Pt oriented x3 on this date, demo'ing improvements in congitive status as she was able to follow commands and eager to complete ADLs. Pt will benefit from continuing with IP OT to promote further ind w/ ADL/IADLs..  Outcome Measures     Row Name 04/06/20 1512          How much help from another is currently needed...    Putting on and taking off regular lower body clothing?  2  -SG     Bathing (including washing, rinsing, and drying)  2  -SG     Toileting (which includes using toilet bed pan or urinal)  2  -SG     Putting on and taking off regular upper body clothing  2  -SG     Taking care of personal grooming (such as brushing teeth)  3  -SG     Eating meals  3  -SG     AM-PAC 6 Clicks Score (OT)  14  -SG        Functional Assessment    Outcome Measure Options  AM-PAC 6 Clicks Daily Activity (OT)  -SG       User Key  (r) = Recorded By, (t) = Taken By, (c) = Cosigned By    Initials Name Provider Type    Gabby Paiz OTR/L Occupational Therapist           Time Calculation:   Time Calculation- OT     Row Name 04/06/20 1512              Time Calculation- OT    OT Start Time  1512  -      OT Received On  04/06/20  -      OT Goal Re-Cert Due Date  04/15/20  -         Timed Charges    77861 - OT Self Care/Mgmt Minutes  18  -        User Key  (r) = Recorded By, (t) = Taken By, (c) = Cosigned By    Initials Name Provider Type     Gabby Powers, OTR/L Occupational Therapist        Therapy Charges for Today     Code Description Service Date Service Provider Modifiers Qty    [id number]  OT SELF CARE/MGMT/TRAIN EA 15 MIN 4/6/2020 Gabby Powers, OTR/L GO 1               Gabby Powers OTR/EDUIN  4/6/2020

## 2020-04-06 NOTE — PROGRESS NOTES
Continued Stay Note  [facility]     Patient Name: Pati Carter  MRN: [id number]  Today's Date: 4/6/2020    Admit Date: 4/2/2020    Discharge Plan     Row Name 04/06/20 1524       Plan    Plan  TBD    Patient/Family in Agreement with Plan  yes    Plan Comments  Spoke with patient briefly, she was eating lunch but denied dc needs.  Later I spoke with 2 of patient's daughters Lilian and Danette for nearly 1 hour.  Danette wants patient to go home and she would stay with her mom and care for her until she has to go back to work.  Lilian stated she and her brother want patient to be in a facility.  Lilian works and can not take care of her mom.  Per Lilian the three of them have POA, will need to contact [facility] for a copy.  Lengthy discussion regarding patient's right to choose if deemed mentally compentent as well as further discharge options.  Will discuss dp with patient and follow up with family tomorrow.      Final Discharge Disposition Code  30 - still a patient        Discharge Codes    No documentation.       Expected Discharge Date and Time     Expected Discharge Date Expected Discharge Time    Apr 7, 2020             Ashley Mullen RN

## 2020-04-06 NOTE — PLAN OF CARE
Pt complained of left knee and hip pain, rectal pain intermittently today. Lidocaine patches applied and Tylenol given, pt reported relief from those. Pt was oriented x4, calm, cooperative, and eager to learn today. She enjoyed working with therapy and sitting in the chair. Will continue to monitor.

## 2020-04-06 NOTE — PLAN OF CARE
Problem: Patient Care Overview  Goal: Plan of Care Review  Flowsheets (Taken 4/6/2020 1512)  Outcome Summary: Pt performed grooming sitting edge of chair. She requierd setup and spv assist to complete, as well as verbal cueing for attn to task. Pt oriented x3 on this date, demo'ing improvements in congitive status as she was able to follow commands and eager to complete ADLs. Pt will benefit from continuing with IP OT to promote further ind w/ ADL/IADLs.

## 2020-04-06 NOTE — PROGRESS NOTES
"Infectious Disease progress note  7/26/1930  [id number]    Date of Consult: 4/3/2020  Admission Date: 4/2/2020    Evaluating Physician: Scott Mathis MD     Chief Complaint: fall    History of present illness:   Patient is a 89 y.o.  Yr old female origninally from the [place] but has lived in the [place] for many years. She has a  history of DJD, COPD, GERD, RA on methotrexate. Recent hip fracture. She resides in SNF. Presented to [facility] ED on 3/28 with fall, discharged back to SNF. Presented again to [facility] ED on 4/2 with another fall, complaining of knee and hip pain. In ED was hypoxemic, tachycardic, hypotensive, and by report was quite ill-appearing.  Initially the plan was to admitted to ICU however she has a firm DNR and thus the decision was made to admit to the floor. CT scan with multifocal pneumonia, intermediate risk for COVID-19, so isolated and testing sent to state lab. Started on zosyn and plaquenil and zinc. Also got vancomycin in ED. No hip or leg fracture. WBC 15.7, neutrophilic predominance, on admission. Negative procal.     4/3/20: currently comfortable on room air. Pulse 82. BP improved after transfusion and fluids. Afebrile since admission.  She says her respiratory status is back to baseline.  She denies having any cough or shortness of breath, chest pain at this time.  She denies having any sick contacts.  She says she has not left her skilled nursing facility recently opened to go to the emergency department a few days before.  No recent visitors.  No fevers or chills prior to admission. Her only complaint at this time is ongoing hip and knee pain. No drug or tobacco use.    4/4/20  Ruled out for COVID; transferred to ; feels achy, has leg/hip pain; currently using bedside commode;    4/5/2020: Patient seen in [place] [id number].  Patient has been afebrile and tachycardic overnight.  Maintaining oxygen saturations on room air.  Patient has continued \"achy\" feeling.  Also has continued leg/hip pain. "  Patient remains very confused and per nursing staff has been having hallucinations overnight.    4/6/20: moved out of airborne isolation after COVID negative. Feeling better. More alert. Oriented to person, place, and date. Intermittent respiratory symptoms but currently asymptomatic on room air. Appetite ok. Baseline tremor.    Review of Systems  No fevers, dysuria, n/v/d. Otherwise negative.       Allergies   Allergen Reactions   • Bactrim [Sulfamethoxazole-Trimethoprim] Other (See Comments)     Stomach cramps    • Ciprofloxacin Other (See Comments)     Other reaction(s): shaking  HCI TABS/ SHAKING   • Levofloxacin Diarrhea       Antibiotics:  Anti-Infectives (From admission, onward)    Ordered     Dose/Rate Route Frequency Start Stop    04/06/20 0602  piperacillin-tazobactam (ZOSYN) 3.375 g in iso-osmotic dextrose 50 ml (premix)  Review   Ordering Provider:  Galo Avila IV, RPH    3.375 g  over 4 Hours Intravenous Every 8 Hours Scheduled 04/06/20 0600 04/10/20 2159    04/02/20 1606  hydroxychloroquine (PLAQUENIL) tablet 400 mg     Ordering Provider:  Matty Morris MD    400 mg Oral Every 12 Hours Scheduled 04/02/20 1800 04/03/20 0817    04/02/20 1121  vancomycin 1250 mg/250 mL 0.9% NS IVPB ([facility])     Ordering Provider:  Jarrell Ramires MD    20 mg/kg × 68 kg Intravenous Once 04/02/20 1200 04/02/20 1441    04/02/20 1121  piperacillin-tazobactam (ZOSYN) 3.375 g in iso-osmotic dextrose 50 ml (premix)     Ordering Provider:  Jarrell Ramires MD    3.375 g Intravenous Once 04/02/20 1200 04/02/20 1220          Other Medications:  Current Facility-Administered Medications   Medication Dose Route Frequency Provider Last Rate Last Dose   • acetaminophen (TYLENOL) tablet 650 mg  650 mg Oral Q4H PRN Matty Morris MD   650 mg at 04/04/20 0608    Or   • acetaminophen (TYLENOL) 160 MG/5ML solution 650 mg  650 mg Oral Q4H PRN Matty Morris MD        Or   • acetaminophen (TYLENOL)  suppository 650 mg  650 mg Rectal Q4H PRN Matty Morris MD       • acetaminophen (TYLENOL) tablet 1,000 mg  1,000 mg Oral Q8H Matty Morris MD   1,000 mg at 04/06/20 0907   • albuterol (PROVENTIL) nebulizer solution 0.083% 2.5 mg/3mL  2.5 mg Nebulization Q4H PRN Zain Walker, [facility]       • atorvastatin (LIPITOR) tablet 10 mg  10 mg Oral Daily Matty Morris MD   10 mg at 04/06/20 0906   • budesonide-formoterol (SYMBICORT) 160-4.5 MCG/ACT inhaler 2 puff  2 puff Inhalation BID - RT Matty Morris MD   2 puff at 04/06/20 0825   • docusate sodium (COLACE) capsule 100 mg  100 mg Oral Daily Matty Morris MD   100 mg at 04/06/20 0906   • docusate sodium (COLACE) capsule 100 mg  100 mg Oral BID PRN Matty Morris MD       • famotidine (PEPCID) tablet 20 mg  20 mg Oral Daily Zain Walker, [facility]   20 mg at 04/06/20 0906   • folic acid (FOLVITE) tablet 1,000 mcg  1,000 mcg Oral Daily Matty Morris MD   1,000 mcg at 04/06/20 0906   • haloperidol lactate (HALDOL) injection 1 mg  1 mg Intramuscular Once Nella Riley, APRN       • lidocaine (LIDODERM) 5 % 2 patch  2 patch Transdermal Q24H Matty Morris MD   2 patch at 04/06/20 0907   • melatonin tablet 5 mg  5 mg Oral Nightly PRN Matty Morris MD   5 mg at 04/06/20 0049   • melatonin tablet 5 mg  5 mg Oral Nightly Matty Morris MD   5 mg at 04/05/20 2044   • multivitamin with minerals 1 tablet  1 tablet Oral Daily Matty Morris MD   1 tablet at 04/06/20 0906   • nystatin (MYCOSTATIN) ointment 1 application  1 application Topical Daily Matty Morris MD   1 application at 04/06/20 0906   • OLANZapine (zyPREXA) injection 2.5 mg  2.5 mg Intramuscular Q8H PRN Matty Morris MD       • OLANZapine (zyPREXA) injection 2.5 mg  2.5 mg Intramuscular Once Patsy Steel MD       • ondansetron (ZOFRAN) tablet 4 mg  4 mg Oral Q6H PRN Matty Morris  "MD Karlos        Or   • ondansetron (ZOFRAN) injection 4 mg  4 mg Intravenous Q6H PRN Matty Morris MD       • piperacillin-tazobactam (ZOSYN) 3.375 g in iso-osmotic dextrose 50 ml (premix)  3.375 g Intravenous Q8H AvilaGalo IV, RPH   3.375 g at 04/06/20 0626   • QUEtiapine (SEROquel) tablet 25 mg  25 mg Oral Q6H PRN Matty Morris MD   25 mg at 04/06/20 0049   • saccharomyces boulardii (FLORASTOR) capsule 250 mg  250 mg Oral Daily Scott Mathis MD   250 mg at 04/06/20 0906   • sodium chloride 0.9 % flush 10 mL  10 mL Intravenous PRN Jarrell Ramires MD       • sodium chloride 0.9 % flush 10 mL  10 mL Intravenous Q12H Matty Morris MD   10 mL at 04/06/20 0906   • sodium chloride 0.9 % flush 10 mL  10 mL Intravenous PRN Matty Morris MD       • tamsulosin (FLOMAX) 24 hr capsule 0.4 mg  0.4 mg Oral Daily Matty Morris MD   0.4 mg at 04/06/20 0906   • vitamin B-12 (CYANOCOBALAMIN) tablet 1,000 mcg  1,000 mcg Oral Daily Matty Morris MD   1,000 mcg at 04/06/20 0907       Physical Exam:   Vital Signs   /80 (BP Location: Left arm, Patient Position: Lying)   Pulse 101   Temp 98.1 °F (36.7 °C) (Oral)   Resp 16   Ht 165.1 cm (65\")   Wt 68 kg (150 lb)   SpO2 97%   BMI 24.96 kg/m²     GENERAL: Awake and alert, in no acute distress.   EYES: PERRL.  Nonicteric  HEENT: Normocephalic, atraumatic. No ext oral lesions  Respiratory: diminished at bases, nonlabored breathing. No cough  Cardiovascular: RRR with no murmurs   Gastrointestinal: Positive bowel sounds, soft, nontender nondistended  Musculoskeletal: Left hip posterior hematoma, left knee contusion noted bilateral lower extremity edema approximately 1+  Skin: Hematoma and contusion as listed above, no rashes or lesions or jaundice noted  Neurological: AOx3, moves all extremities   Psychiatric: cooperative and appropriate    Laboratory Data    Results from last 7 days   Lab " Units 04/06/20  0610 04/05/20  0541 04/05/20  0042 04/04/20  0700   WBC 10*3/mm3 8.34 10.02  --  10.88*   HEMOGLOBIN g/dL 7.4* 8.6* 8.6* 7.0*   HEMATOCRIT % 23.5* 26.8* 27.2* 22.3*   PLATELETS 10*3/mm3 310 312  --  244     Results from last 7 days   Lab Units 04/06/20  0610   SODIUM mmol/L 142   POTASSIUM mmol/L 3.8   CHLORIDE mmol/L 107   CO2 mmol/L 26.0   BUN mg/dL 14   CREATININE mg/dL 0.94   GLUCOSE mg/dL 83   CALCIUM mg/dL 8.6     Estimated Creatinine Clearance: 43.6 mL/min (by C-G formula based on SCr of 0.94 mg/dL).  Results from last 7 days   Lab Units 04/02/20  0941   ALK PHOS U/L 53   BILIRUBIN mg/dL 0.4   ALT (SGPT) U/L 10   AST (SGOT) U/L 21               Microbiology:  Microbiology Results (last 10 days)     Procedure Component Value - Date/Time    MRSA Screen, PCR (Inpatient) - Swab, Nares [[id number]]  (Normal) Collected:  04/02/20 1555    Lab Status:  Final result Specimen:  Swab from Nares Updated:  04/02/20 1739     MRSA PCR Negative    Narrative:       MRSA Negative    CORONAVIRUS-19(COVID-19),RT-PCR,[facility], NP Swab in Transport Media - Swab, Nasopharynx [[id number]] Collected:  04/02/20 1554    Lab Status:  Final result Specimen:  Swab from Nasopharynx Updated:  04/03/20 1709     Reference Lab Report --     Comment: See scanned report          COVID19 Not Detected    Rapid Strep A Screen - Swab, Throat [[id number]]  (Normal) Collected:  04/02/20 1322    Lab Status:  Final result Specimen:  Swab from Throat Updated:  04/02/20 1340     Strep A Ag Negative    Narrative:       Test performed by Direct Antigen Testing.    Respiratory Panel, PCR - Swab, Nasopharynx [[id number]]  (Normal) Collected:  04/02/20 1322    Lab Status:  Final result Specimen:  Swab from Nasopharynx Updated:  04/02/20 1441     ADENOVIRUS, PCR Not Detected     Coronavirus 229E Not Detected     Coronavirus HKU1 Not Detected     Coronavirus NL63 Not Detected     Coronavirus OC43 Not Detected     Human Metapneumovirus Not Detected      Human Rhinovirus/Enterovirus Not Detected     Influenza B PCR Not Detected     Parainfluenza Virus 1 Not Detected     Parainfluenza Virus 2 Not Detected     Parainfluenza Virus 3 Not Detected     Parainfluenza Virus 4 Not Detected     Bordetella pertussis pcr Not Detected     Influenza A H1 2009 PCR Not Detected     Chlamydophila pneumoniae PCR Not Detected     Mycoplasma pneumo by PCR Not Detected     Influenza A PCR Not Detected     Influenza A H3 Not Detected     Influenza A H1 Not Detected     RSV, PCR Not Detected     Bordetella parapertussis PCR Not Detected    Narrative:       The coronavirus on the RVP is NOT COVID-19 and is NOT indicative of infection with COVID-19.     Beta Strep Culture, Throat - Swab, Throat [[id number]]  (Normal) Collected:  04/02/20 1322    Lab Status:  Final result Specimen:  Swab from Throat Updated:  04/04/20 1218     Throat Culture, Beta Strep No Beta Hemolytic Streptococcus Isolated    Narrative:       Group A Strep incidence is low in adults. Positive culture for Beta hemolytic Streptococcus species can reflect colonization and not true infection. Please correlate clinically.    Blood Culture - Blood, Arm, Right [[id number]] Collected:  04/02/20 1006    Lab Status:  Preliminary result Specimen:  Blood from Arm, Right Updated:  04/05/20 1030     Blood Culture No growth at 3 days    Blood Culture - Blood, Arm, Left [[id number]] Collected:  04/02/20 1006    Lab Status:  Preliminary result Specimen:  Blood from Arm, Left Updated:  04/05/20 1030     Blood Culture No growth at 3 days          Radiology:  Xr Femur 2 View Left    Result Date: 4/2/2020  Degenerative changes.  The left hip remains well located without acute fracture of the left hip or left femur.  D:  04/02/2020 E:  04/02/2020  This report was finalized on 4/2/2020 12:16 PM by Dr. Teja Scott.      Ct Head Without Contrast    Result Date: 4/2/2020  No acute intracranial abnormality specifically, no acute intracranial  hemorrhage or extra-axial fluid collection with calvarium intact. Moderate-to-severely advanced chronic small vessel ischemic disease findings in the periventricular and deep white matter.  D:  04/02/2020 E:  04/02/2020  This report was finalized on 4/2/2020 12:16 PM by Dr. Teja Scott.      Ct Pelvis Without Contrast    Result Date: 4/2/2020  1. Bilateral pulmonary opacifications of groundglass attenuation, fairly well demarcated areas of geographic involvement left lung apex, mid lungs and within the right lower lobe medial segment concerning for atypical etiology such as viral etiology of airspace disease with at least intermediate correlation to COVID-19.  2. Large left hip body wall hematoma with surrounding soft tissue edema, however, no acute fracture of the left hip or pelvis is identified.  3. Advanced atherosclerotic involvement of peripheral vascular disease extending into the bilateral lower extremities, right slightly greater than left, however despite advanced involvement, there is at least trace flow into the posterior tibial arteries, left greater than right, with severely diminutive caliber of the right posterior tibial artery from atherosclerotic involvement and calcifications progressed in the distal superficial femoral, popliteal and trifurcation regions of the bilateral lower extremities.  3.  No acute vascular findings within the chest, abdomen or pelvis identified with atherosclerotic nonaneurysmal thoracoabdominal aorta.  D:  04/02/2020 E:  04/02/2020  This report was finalized on 4/2/2020 12:16 PM by Dr. Teja Scott.      Ct Angio Abdominal Aorta Bilateral Iliofem Runoff    Result Date: 4/2/2020  1. Bilateral pulmonary opacifications of groundglass attenuation, fairly well demarcated areas of geographic involvement left lung apex, mid lungs and within the right lower lobe medial segment concerning for atypical etiology such as viral etiology of airspace disease with at least intermediate  correlation to COVID-19.  2. Large left hip body wall hematoma with surrounding soft tissue edema, however, no acute fracture of the left hip or pelvis is identified.  3. Advanced atherosclerotic involvement of peripheral vascular disease extending into the bilateral lower extremities, right slightly greater than left, however despite advanced involvement, there is at least trace flow into the posterior tibial arteries, left greater than right, with severely diminutive caliber of the right posterior tibial artery from atherosclerotic involvement and calcifications progressed in the distal superficial femoral, popliteal and trifurcation regions of the bilateral lower extremities.  3.  No acute vascular findings within the chest, abdomen or pelvis identified with atherosclerotic nonaneurysmal thoracoabdominal aorta.  D:  04/02/2020 E:  04/02/2020  This report was finalized on 4/2/2020 12:16 PM by Dr. Teja Scott.      Xr Chest 1 View    Result Date: 4/2/2020  Increased opacifications left lung apex and right lung base from 01/10/2020 comparison examination consistent with airspace disease such as bronchopneumonia without pleural effusion.  D:  04/02/2020 E:  04/02/2020  This report was finalized on 4/2/2020 12:16 PM by Dr. Teja Scott.      Ct Angiogram Chest    Result Date: 4/2/2020  1. Bilateral pulmonary opacifications of groundglass attenuation, fairly well demarcated areas of geographic involvement left lung apex, mid lungs and within the right lower lobe medial segment concerning for atypical etiology such as viral etiology of airspace disease with at least intermediate correlation to COVID-19.  2. Large left hip body wall hematoma with surrounding soft tissue edema, however, no acute fracture of the left hip or pelvis is identified.  3. Advanced atherosclerotic involvement of peripheral vascular disease extending into the bilateral lower extremities, right slightly greater than left, however despite advanced  involvement, there is at least trace flow into the posterior tibial arteries, left greater than right, with severely diminutive caliber of the right posterior tibial artery from atherosclerotic involvement and calcifications progressed in the distal superficial femoral, popliteal and trifurcation regions of the bilateral lower extremities.  3.  No acute vascular findings within the chest, abdomen or pelvis identified with atherosclerotic nonaneurysmal thoracoabdominal aorta.  D:  04/02/2020 E:  04/02/2020  This report was finalized on 4/2/2020 12:16 PM by Dr. Teja Scott.       I personally reviewed the above CT chest    Admission EKG with QTc 486      Impression:   1. Fall at SNF, left hip hematoma: XR negative for fracture  2. Hypotension, tachycardia: improved with volume repletion. More likely dehydration rather than sepsis. Afebrile. Negative procalcitonin. WBC rapidly improved  3. Multifocal pneumonia, CAP: noted on admission chest CT. Improved. Respiratory symptoms back to baseline. MRSA screening. Urine strep and legionella antigens negative. RVP and state COVID-19 negative. No productive cough for culture  4. Multiple recent falls  5. Neutrophilic leukocytosis: rapidly improved  6. COPD: back to baseline  7. RA on methotrexate  8. Immunocompromised host  9. GERD  10. DJD  11. CKD stage 3 Estimated Creatinine Clearance: 43.6 mL/min (by C-G formula based on SCr of 0.94 mg/dL).      PLAN:   - follow CBC, CMP     - d/c zosyn  - start PO augmentin 500-125 mg BID to complete 7 day course through 4/8    I will follow. Call with questions.     Scott Mathis MD  4/6/2020

## 2020-04-06 NOTE — THERAPY TREATMENT NOTE
Patient Name: Pati Carter  : 1930    MRN: [id number]                              Today's Date: 2020       Admit Date: 2020    Visit Dx:     ICD-10-CM ICD-9-CM   1. Severe sepsis (CMS/[facility]) A41.9 038.9    R65.20 995.92   2. Pneumonia of both lungs due to infectious organism, unspecified part of lung J18.9 483.8   3. Leukocytosis, unspecified type D72.829 288.60   4. Anemia, unspecified type D64.9 285.9   5. Hypotension, unspecified hypotension type I95.9 458.9   6. Dizziness R42 780.4   7. Syncope, unspecified syncope type R55 780.2   8. Traumatic hematoma of buttock, initial encounter S30.0XXA 922.32   9. Contusion of left lower extremity, initial encounter S80.12XA 924.5   10. Impaired mobility and ADLs Z74.09 799.89     Patient Active Problem List   Diagnosis   • Macular degeneration   • Rheumatoid arthritis (CMS/[facility])   • Hearing loss   • Hyperlipidemia   • Gastroesophageal reflux disease   • Hypertension   • Constipation   • Anemia   • Compression fracture of lumbar vertebra (CMS/HCC)   • Degeneration of intervertebral disc of lumbar region   • COPD (chronic obstructive pulmonary disease) (CMS/[facility])   • H/O calcium pyrophosphate deposition disease (CPPD)   • Long term methotrexate user   • Femur fracture, right (CMS/[facility])   • Falls frequently   • Fall at home, initial encounter   • Sepsis (CMS/[facility])   • Multifocal pneumonia   • Immunosuppressed status on methotrexate   • Acute respiratory failure with hypoxia (CMS/[facility])   • borderline B12 deficiency   • Delirium due to another medical condition   • Acute urinary retention   • Acute blood loss anemia, due to bleeding into hematomas from fall   • Traumatic hematoma of left hip     Past Medical History:   Diagnosis Date   • Anemia     Description: YUVAL Agrawal - borderline intermittent.   • Back pain    • Benign colonic polyp     Description: YUVAL Agrawal .   • Benign colonic polyp 2016    Description: YUVAL Agrawal .   • Compression fracture of  lumbar vertebra (CMS/HCC)    • Constipation    • COPD (chronic obstructive pulmonary disease) (CMS/HCC)     Description: A.  Rule out chronic persistent asthma, COPD, or obliterative bronchiolitis.- Butch   • Degeneration of intervertebral disc of lumbar region     Description: A.  Diagnosed in April 2013 with advanced multilevel with severe spinal stenosis, followed by Dr. Pillai for pain management.   • Gastroesophageal reflux disease    • Hearing loss    • History of colonoscopy 01/01/1999    NORMAL PER PATIENT    • History of mammogram 01/01/2011    NORMAL PER PT    • History of Papanicolaou smear of cervix 01/01/2010    NORMAL PER PT    • History of varicella    • Hyperlipidemia     Description: A.  Dx 2006.   • Hypertension     Description: A. Dx 2001.   • Macular degeneration    • Ovarian mass     Dx 8/15- benign left cystic adnexal mass   • Rheumatoid arthritis (CMS/[facility])     Description: A.  Diagnosed in 2000 and and followed by Dr. Constantino (now Dr. Kaplan). B.  On methotrexate therapy since 2000. C.  Off low-dose prednisone therapy.     Past Surgical History:   Procedure Laterality Date   • APPENDECTOMY     • CARPAL TUNNEL RELEASE Left 01/01/2003    HISTORY OF NEUROPLASTY DECOMPRESSION MEDIAN NERVE AT CARPAL TUNNEL LEFT   • CATARACT EXTRACTION Bilateral 01/01/2009   • CHOLECYSTECTOMY  01/01/1962   • HIP CANNULATED SCREW PLACEMENT Right 1/25/2020    Procedure: HIP CANNULATED SCREW PLACEMENT RIGHT;  Surgeon: Karlos Blount MD;  Location: [facility];  Service: Orthopedics   • KNEE ARTHROSCOPY Left 01/01/2001    MENISCAL REPAIR   • KYPHOPLASTY  06/18/2015    T11 AND L1 (JOSE A)   • PELVIC LAPAROSCOPY  01/01/1996    REMOVAL OF BENIGN UTERINE AND RIGHT OVARIAN TUMORS   • SALPINGO OOPHORECTOMY Left 08/26/2015    REMOVAL OF LEFT OVARY AND TUBE (benign cystic mass)     General Information     Row Name 04/06/20 1133          PT Evaluation Time/Intention    Document Type  therapy note (daily note)  -JIMMY      Mode of Treatment  physical therapy  -KM     Row Name 04/06/20 1133          General Information    Patient Profile Reviewed?  yes  -KM     Existing Precautions/Restrictions  fall;other (see comments) recent R hip surgery,   -KM     Barriers to Rehab  previous functional deficit  -KM     Row Name 04/06/20 1133          Cognitive Assessment/Intervention- PT/OT    Orientation Status (Cognition)  oriented x 3  -KM     Row Name 04/06/20 1133          Safety Issues, Functional Mobility    Safety Issues Affecting Function (Mobility)  safety precaution awareness;safety precautions follow-through/compliance;other (see comments) urinary incontinence, needs brief for mobility  -KM       User Key  (r) = Recorded By, (t) = Taken By, (c) = Cosigned By    Initials Name Provider Type    KM Destiny Patricio, PT Physical Therapist        Mobility     Row Name 04/06/20 1135          Bed Mobility Assessment/Treatment    Bed Mobility Assessment/Treatment  supine-sit  -KM     Rolling Right [place] (Bed Mobility)  minimum assist (75% patient effort)  -KM     Scooting/Bridging [place] (Bed Mobility)  moderate assist (50% patient effort)  -KM     Supine-Sit [place] (Bed Mobility)  moderate assist (50% patient effort)  -KM     Assistive Device (Bed Mobility)  bed rails;head of bed elevated  -KM     Row Name 04/06/20 1135          Bed-Chair Transfer    Bed-Chair [place] (Transfers)  minimum assist (75% patient effort);2 person assist  -KM     Assistive Device (Bed-Chair Transfers)  walker, front-wheeled  -KM     Row Name 04/06/20 1135          Sit-Stand Transfer    Sit-Stand [place] (Transfers)  minimum assist (75% patient effort);1 person assist;verbal cues  -KM     Assistive Device (Sit-Stand Transfers)  walker, front-wheeled  -KM     Row Name 04/06/20 1135          Gait/Stairs Assessment/Training    [place] Level (Gait)  minimum assist (75% patient effort)  -KM     Assistive Device (Gait)  walker,  front-wheeled  -KM     Distance in Feet (Gait)  3  -KM     Pattern (Gait)  step-to  -KM     Deviations/Abnormal Patterns (Gait)  antalgic;gait speed decreased  -KM     Bilateral Gait Deviations  forward flexed posture  -KM     Comment (Gait/Stairs)  bed to chair ambul with r wx, limited by pain   -KM       User Key  (r) = Recorded By, (t) = Taken By, (c) = Cosigned By    Initials Name Provider Type    Destiny Rowell, PT Physical Therapist        Obj/Interventions     Row Name 04/06/20 1138          Therapeutic Exercise    Lower Extremity (Therapeutic Exercise)  LAQ (long arc quad), bilateral;marching while seated;gastroc stretch, bilateral  -KM     Lower Extremity Range of Motion (Therapeutic Exercise)  ankle dorsiflexion/plantar flexion, bilateral  -KM     Exercise Type (Therapeutic Exercise)  AAROM (active assistive range of motion)  -KM     Position (Therapeutic Exercise)  seated  -KM     Sets/Reps (Therapeutic Exercise)  10x  -KM     Row Name 04/06/20 1138          Static Sitting Balance    Level of [place] (Unsupported Sitting, Static Balance)  supervision  -KM     Sitting Position (Unsupported Sitting, Static Balance)  sitting on edge of bed  -KM     Time Able to Maintain Position (Unsupported Sitting, Static Balance)  more than 5 minutes  -KM     Row Name 04/06/20 1138          Static Standing Balance    Level of [place] (Supported Standing, Static Balance)  contact guard assist  -KM     Assistive Device Utilized (Supported Standing, Static Balance)  walker, rolling  -KM     Row Name 04/06/20 1138          Dynamic Standing Balance    Level of [place], Reaches Outside Midline (Standing, Dynamic Balance)  minimal assist, 75% patient effort  -KM     Assistive Device Utilized (Supported Standing, Dynamic Balance)  walker, rolling  -KM       User Key  (r) = Recorded By, (t) = Taken By, (c) = Cosigned By    Initials Name Provider Type    Destiny Rowell, PT Physical Therapist         Goals/Plan    No documentation.       Clinical Impression     Row Name 04/06/20 1139          Pain Scale: Numbers Pre/Post-Treatment    Pain Scale: Numbers, Pretreatment  3/10  -KM     Pain Scale: Numbers, Post-Treatment  3/10  -KM     Pain Location - Side  Left  -KM     Pain Location - Orientation  lower  -KM     Pain Location  extremity  -KM     Row Name 04/06/20 1139          Plan of Care Review    Plan of Care Reviewed With  patient  -KM     Progress  improving  -KM     Outcome Summary  Pt with improved mental status, following commands. Pt transitions to eob with min to mod assist, bed to chair with r wx and min assist. Pt requires brief for mobility due to urinary incontinence  -KM     Row Name 04/06/20 1139          Positioning and Restraints    Pre-Treatment Position  in bed  -KM     Post Treatment Position  chair  -KM     In Chair  reclined;call light within reach;encouraged to call for assist;exit alarm on  -KM       User Key  (r) = Recorded By, (t) = Taken By, (c) = Cosigned By    Initials Name Provider Type    Destiny Rowell, PT Physical Therapist        Outcome Measures     Row Name 04/06/20 1141          How much help from another person do you currently need...    Turning from your back to your side while in flat bed without using bedrails?  3  -KM     Moving from lying on back to sitting on the side of a flat bed without bedrails?  2  -KM     Moving to and from a bed to a chair (including a wheelchair)?  3  -KM     Standing up from a chair using your arms (e.g., wheelchair, bedside chair)?  3  -KM     Climbing 3-5 steps with a railing?  2  -KM     To walk in hospital room?  2  -KM     AM-PAC 6 Clicks Score (PT)  15  -KM     Row Name 04/06/20 1141          Functional Assessment    Outcome Measure Options  AM-PAC 6 Clicks Basic Mobility (PT)  -KM       User Key  (r) = Recorded By, (t) = Taken By, (c) = Cosigned By    Initials Name Provider Type    Destiny Rowell, PT Physical  Therapist          PT Recommendation and Plan     Outcome Summary/Treatment Plan (PT)  Anticipated Discharge Disposition (PT): skilled nursing facility  Plan of Care Reviewed With: patient  Progress: improving  Outcome Summary: Pt with improved mental status, following commands. Pt transitions to eob with min to mod assist, bed to chair with r wx and min assist. Pt requires brief for mobility due to urinary incontinence     Time Calculation:   PT Charges     Row Name 04/06/20 1142             Time Calculation    Start Time  0955  -KM      PT Received On  04/06/20  -KM      PT Goal Re-Cert Due Date  04/14/20  -KM         Time Calculation- PT    Total Timed Code Minutes- PT  26 minute(s)  -KM         Timed Charges    98865 - PT Therapeutic Activity Minutes  26  -KM        User Key  (r) = Recorded By, (t) = Taken By, (c) = Cosigned By    Initials Name Provider Type    Destiny Rowell, PT Physical Therapist        Therapy Charges for Today     Code Description Service Date Service Provider Modifiers Qty    79079791109 HC PT THERAPEUTIC ACT EA 15 MIN 4/6/2020 Destiny Patricio, PT GP 2    65061287122 HC PT THER SUPP EA 15 MIN 4/6/2020 Destiny Patricio, PT GP 2          PT G-Codes  Outcome Measure Options: AM-PAC 6 Clicks Basic Mobility (PT)  AM-PAC 6 Clicks Score (PT): 15  AM-PAC 6 Clicks Score (OT): 6    Destiny Patricio PT  4/6/2020

## 2020-04-06 NOTE — PROGRESS NOTES
[facility] Medicine Services  PROGRESS NOTE    Patient Name: Pati Carter  : 1930  MRN: [id number]    Date of Admission: 2020  Primary Care Physician: Latoya Bolaños MD    Subjective   Subjective     CC:  Follow-up for pneumonia     HPI:  No acute events overnight. Dyspnea improved, has been having a little cough, denies fever/chills.    Review of Systems  CV- No chest pain, palpitations  GI- No N/V/D, abd pain     Objective   Objective     Vital Signs:   Temp:  [97.5 °F (36.4 °C)-98.1 °F (36.7 °C)] 98.1 °F (36.7 °C)  Heart Rate:  [] 101  Resp:  [16-18] 16  BP: (143-149)/(69-82) 144/80        Physical Exam:  Constitutional: No acute distress, awake, alert  HENT: NCAT, mucous membranes moist  Respiratory: minimal end-expiratory wheezing bilaterally, respiratory effort normal on room air  Cardiovascular: RRR, no murmurs, no lower extremity edema   Gastrointestinal: Positive bowel sounds, soft, nontender, nondistended  Psychiatric: Appropriate affect, cooperative  Neurologic: Oriented x 3, Cranial Nerves grossly intact to confrontation, speech clear  Skin: has large hematoma on left hip and inferior to left knee     Results Reviewed:  Results from last 7 days   Lab Units 20  0610 20  0541 20  0042 20  0700 20  0504  20  0941   WBC 10*3/mm3 8.34 10.02  --  10.88* 10.50   < > 15.63*   HEMOGLOBIN g/dL 7.4* 8.6* 8.6* 7.0* 7.7*   < > 7.2*   HEMOGLOBIN, POC   --   --   --   --   --    < >  --    HEMATOCRIT % 23.5* 26.8* 27.2* 22.3* 24.5*   < > 23.5*   HEMATOCRIT POC   --   --   --   --   --    < >  --    PLATELETS 10*3/mm3 310 312  --  244 216   < > 313   INR   --   --   --   --   --   --  1.17*   PROCALCITONIN ng/mL  --   --   --   --  0.19  --  0.11    < > = values in this interval not displayed.     Results from last 7 days   Lab Units 20  0610 20  0541 20  0700  20  0941   SODIUM mmol/L 142 142 140   < > 142    POTASSIUM mmol/L 3.8 3.6 4.1   < > 4.1   CHLORIDE mmol/L 107 104 105   < > 105   CO2 mmol/L 26.0 25.0 24.0   < > 27.0   BUN mg/dL 14 15 20   < > 19   CREATININE mg/dL 0.94 0.90 1.04*   < > 0.91   GLUCOSE mg/dL 83 107* 105*   < > 171*   CALCIUM mg/dL 8.6 8.9 8.2*   < > 8.3*   ALT (SGPT) U/L  --   --   --   --  10   AST (SGOT) U/L  --   --   --   --  21   TROPONIN T ng/mL  --   --   --   --  <0.010   PROBNP pg/mL  --   --   --   --  1,458.0    < > = values in this interval not displayed.     Estimated Creatinine Clearance: 43.6 mL/min (by C-G formula based on SCr of 0.94 mg/dL).    Microbiology Results Abnormal     Procedure Component Value - Date/Time    Blood Culture - Blood, Arm, Right [[id number]] Collected:  04/02/20 1006    Lab Status:  Preliminary result Specimen:  Blood from Arm, Right Updated:  04/06/20 1030     Blood Culture No growth at 4 days    Blood Culture - Blood, Arm, Left [[id number]] Collected:  04/02/20 1006    Lab Status:  Preliminary result Specimen:  Blood from Arm, Left Updated:  04/06/20 1030     Blood Culture No growth at 4 days    Beta Strep Culture, Throat - Swab, Throat [[id number]]  (Normal) Collected:  04/02/20 1322    Lab Status:  Final result Specimen:  Swab from Throat Updated:  04/04/20 1218     Throat Culture, Beta Strep No Beta Hemolytic Streptococcus Isolated    Narrative:       Group A Strep incidence is low in adults. Positive culture for Beta hemolytic Streptococcus species can reflect colonization and not true infection. Please correlate clinically.    CORONAVIRUS-19(COVID-19),RT-PCR,[facility], NP Swab in Transport Media - Swab, Nasopharynx [[id number]] Collected:  04/02/20 3113    Lab Status:  Final result Specimen:  Swab from Nasopharynx Updated:  04/03/20 1709     Reference Lab Report --     Comment: See scanned report          COVID19 Not Detected    MRSA Screen, PCR (Inpatient) - Swab, Nares [[id number]]  (Normal) Collected:  04/02/20 1556    Lab Status:  Final result  Specimen:  Swab from Nares Updated:  04/02/20 1739     MRSA PCR Negative    Narrative:       MRSA Negative    Respiratory Panel, PCR - Swab, Nasopharynx [[id number]]  (Normal) Collected:  04/02/20 1322    Lab Status:  Final result Specimen:  Swab from Nasopharynx Updated:  04/02/20 1441     ADENOVIRUS, PCR Not Detected     Coronavirus 229E Not Detected     Coronavirus HKU1 Not Detected     Coronavirus NL63 Not Detected     Coronavirus OC43 Not Detected     Human Metapneumovirus Not Detected     Human Rhinovirus/Enterovirus Not Detected     Influenza B PCR Not Detected     Parainfluenza Virus 1 Not Detected     Parainfluenza Virus 2 Not Detected     Parainfluenza Virus 3 Not Detected     Parainfluenza Virus 4 Not Detected     Bordetella pertussis pcr Not Detected     Influenza A H1 2009 PCR Not Detected     Chlamydophila pneumoniae PCR Not Detected     Mycoplasma pneumo by PCR Not Detected     Influenza A PCR Not Detected     Influenza A H3 Not Detected     Influenza A H1 Not Detected     RSV, PCR Not Detected     Bordetella parapertussis PCR Not Detected    Narrative:       The coronavirus on the RVP is NOT COVID-19 and is NOT indicative of infection with COVID-19.     Rapid Strep A Screen - Swab, Throat [[id number]]  (Normal) Collected:  04/02/20 1322    Lab Status:  Final result Specimen:  Swab from Throat Updated:  04/02/20 1340     Strep A Ag Negative    Narrative:       Test performed by Direct Antigen Testing.          Imaging Results (Last 24 Hours)     ** No results found for the last 24 hours. **               I have reviewed the medications:  Scheduled Meds:  acetaminophen 1,000 mg Oral Q8H   amoxicillin-clavulanate 1 tablet Oral BID   atorvastatin 10 mg Oral Daily   budesonide-formoterol 2 puff Inhalation BID - RT   docusate sodium 100 mg Oral Daily   famotidine 20 mg Oral Daily   folic acid 1,000 mcg Oral Daily   haloperidol lactate 1 mg Intramuscular Once   lidocaine 2 patch Transdermal Q24H   melatonin 5  mg Oral Nightly   multivitamin with minerals 1 tablet Oral Daily   nystatin 1 application Topical Daily   OLANZapine 2.5 mg Intramuscular Once   saccharomyces boulardii 250 mg Oral Daily   sodium chloride 10 mL Intravenous Q12H   tamsulosin 0.4 mg Oral Daily   vitamin B-12 1,000 mcg Oral Daily     Continuous Infusions:   PRN Meds:.•  acetaminophen **OR** acetaminophen **OR** acetaminophen  •  Albuterol Sulfate NEB Orderable  •  docusate sodium  •  melatonin  •  OLANZapine **OR** [DISCONTINUED] OLANZapine  •  ondansetron **OR** ondansetron  •  QUEtiapine  •  [COMPLETED] Insert peripheral IV **AND** sodium chloride  •  sodium chloride    Assessment/Plan   Assessment & Plan     Active Hospital Problems    Diagnosis  POA   • **Multifocal pneumonia [J18.9]  Yes   • borderline B12 deficiency [E53.8]  Yes   • Delirium due to another medical condition [F05]  Clinically Undetermined   • Acute urinary retention [R33.8]  Yes   • Acute blood loss anemia, due to bleeding into hematomas from fall [D62]  Yes   • Traumatic hematoma of left hip [S70.02XA]  Yes   • Falls frequently [R29.6]  Not Applicable   • Fall at home, initial encounter [W19.XXXA, Y92.009]  Not Applicable   • Sepsis (CMS/HCC) [A41.9]  Yes   • Immunosuppressed status on methotrexate [D89.9]  Yes   • Acute respiratory failure with hypoxia (CMS/HCC) [J96.01]  Yes   • Anemia [D64.9]  Yes   • COPD (chronic obstructive pulmonary disease) (CMS/HCC) [J44.9]  Yes   • Hyperlipidemia [E78.5]  Yes   • Hypertension [I10]  Yes   • Rheumatoid arthritis (CMS/HCC) [M06.9]  Yes      Resolved Hospital Problems    Diagnosis Date Resolved POA   • Suspected Covid-19 Virus Infection [R68.89] 04/04/2020 Yes        Brief Hospital Course to date:  Pati Carter is a 89 y.o. female With a past medical history of hypertension, hyperlipidemia, COPD, macular degeneration, hearing loss, GERD, and RA on methotrexate, lives in a skilled nursing facility who was admitted on April 2 for  multifocal pneumonia.    All problems new to me today    Sepsis secondary to multifocal pneumonia  Acute respiratory failure with hypoxia secondary to pneumonia  COPD  -CTA chest from April 2 reviewed, bilateral groundglass attenuation, intermediate correlation with COVID-19 infection  -Respiratory PCR panel was done and negative, COVID-19 not detected, MRSA screen PCR negative, rapid a strep negative, beta strep culture negative, blood cultures no growth at 3 days  -Infectious disease following-d/c zosyn (start date April 2) and start augmentin x 7 days     Acute blood loss anemia secondary to left hip hematoma and left knee hematoma  -H&H decreased today, CTM CBC qAM    Recurrent falls at home  -Will need to go back to skilled nursing facility upon discharge  -PT eval ordered     Hyperlipidemia-continue atorvastatin    DVT Prophylaxis:  SCDs    Disposition: I expect the patient to be discharged pending placement.    CODE STATUS:   Code Status and Medical Interventions:   Ordered at: 04/02/20 1657     Limited Support to NOT Include:    Intubation    NIPPV (Non-Invasive Positive Pressure Support)    Cardioversion/Defibrillation     Code Status:    No CPR     Medical Interventions (Level of Support Prior to Arrest):    Limited         Electronically signed by Mimi Pope MD, 04/06/20, 12:33.

## 2020-04-07 ENCOUNTER — TELEPHONE (OUTPATIENT)
Dept: INTERNAL MEDICINE | Facility: CLINIC | Age: 85
End: 2020-04-07

## 2020-04-07 LAB
BACTERIA SPEC AEROBE CULT: NORMAL
BACTERIA SPEC AEROBE CULT: NORMAL
BASOPHILS # BLD AUTO: 0.09 10*3/MM3 (ref 0–0.2)
BASOPHILS NFR BLD AUTO: 0.8 % (ref 0–1.5)
DEPRECATED RDW RBC AUTO: 74.8 FL (ref 37–54)
EOSINOPHIL # BLD AUTO: 0.74 10*3/MM3 (ref 0–0.4)
EOSINOPHIL NFR BLD AUTO: 6.8 % (ref 0.3–6.2)
ERYTHROCYTE [DISTWIDTH] IN BLOOD BY AUTOMATED COUNT: 20.8 % (ref 12.3–15.4)
HCT VFR BLD AUTO: 30.7 % (ref 34–46.6)
HGB BLD-MCNC: 9.5 G/DL (ref 12–15.9)
HYPOCHROMIA BLD QL: NORMAL
IMM GRANULOCYTES # BLD AUTO: 0.05 10*3/MM3 (ref 0–0.05)
IMM GRANULOCYTES NFR BLD AUTO: 0.5 % (ref 0–0.5)
LYMPHOCYTES # BLD AUTO: 1.44 10*3/MM3 (ref 0.7–3.1)
LYMPHOCYTES NFR BLD AUTO: 13.2 % (ref 19.6–45.3)
MACROCYTES BLD QL SMEAR: NORMAL
MCH RBC QN AUTO: 31 PG (ref 26.6–33)
MCHC RBC AUTO-ENTMCNC: 30.9 G/DL (ref 31.5–35.7)
MCV RBC AUTO: 100.3 FL (ref 79–97)
MONOCYTES # BLD AUTO: 1.4 10*3/MM3 (ref 0.1–0.9)
MONOCYTES NFR BLD AUTO: 12.8 % (ref 5–12)
NEUTROPHILS # BLD AUTO: 7.2 10*3/MM3 (ref 1.7–7)
NEUTROPHILS NFR BLD AUTO: 65.9 % (ref 42.7–76)
NRBC BLD AUTO-RTO: 0 /100 WBC (ref 0–0.2)
PLAT MORPH BLD: NORMAL
PLATELET # BLD AUTO: 399 10*3/MM3 (ref 140–450)
PMV BLD AUTO: 9.2 FL (ref 6–12)
RBC # BLD AUTO: 3.06 10*6/MM3 (ref 3.77–5.28)
WBC MORPH BLD: NORMAL
WBC NRBC COR # BLD: 10.92 10*3/MM3 (ref 3.4–10.8)

## 2020-04-07 PROCEDURE — 85007 BL SMEAR W/DIFF WBC COUNT: CPT | Performed by: INTERNAL MEDICINE

## 2020-04-07 PROCEDURE — 94799 UNLISTED PULMONARY SVC/PX: CPT

## 2020-04-07 PROCEDURE — 97535 SELF CARE MNGMENT TRAINING: CPT

## 2020-04-07 PROCEDURE — 99232 SBSQ HOSP IP/OBS MODERATE 35: CPT | Performed by: INTERNAL MEDICINE

## 2020-04-07 PROCEDURE — 97116 GAIT TRAINING THERAPY: CPT | Performed by: PHYSICAL THERAPIST

## 2020-04-07 PROCEDURE — 97110 THERAPEUTIC EXERCISES: CPT | Performed by: PHYSICAL THERAPIST

## 2020-04-07 PROCEDURE — 85025 COMPLETE CBC W/AUTO DIFF WBC: CPT | Performed by: INTERNAL MEDICINE

## 2020-04-07 RX ORDER — DOXYCYCLINE 100 MG/1
100 CAPSULE ORAL EVERY 12 HOURS SCHEDULED
Status: DISCONTINUED | OUTPATIENT
Start: 2020-04-07 | End: 2020-04-07

## 2020-04-07 RX ADMIN — NYSTATIN 1 APPLICATION: 100000 OINTMENT TOPICAL at 08:16

## 2020-04-07 RX ADMIN — ACETAMINOPHEN 1000 MG: 500 TABLET, FILM COATED ORAL at 08:17

## 2020-04-07 RX ADMIN — SODIUM CHLORIDE, PRESERVATIVE FREE 10 ML: 5 INJECTION INTRAVENOUS at 08:18

## 2020-04-07 RX ADMIN — BUDESONIDE AND FORMOTEROL FUMARATE DIHYDRATE 2 PUFF: 160; 4.5 AEROSOL RESPIRATORY (INHALATION) at 20:02

## 2020-04-07 RX ADMIN — ATORVASTATIN CALCIUM 10 MG: 10 TABLET, FILM COATED ORAL at 08:18

## 2020-04-07 RX ADMIN — FAMOTIDINE 20 MG: 20 TABLET ORAL at 08:17

## 2020-04-07 RX ADMIN — FOLIC ACID 1000 MCG: 1 TABLET ORAL at 08:17

## 2020-04-07 RX ADMIN — DOCUSATE SODIUM 100 MG: 100 CAPSULE, LIQUID FILLED ORAL at 08:17

## 2020-04-07 RX ADMIN — AMOXICILLIN AND CLAVULANATE POTASSIUM 500 MG: 500; 125 TABLET, FILM COATED ORAL at 08:17

## 2020-04-07 RX ADMIN — MULTIPLE VITAMINS W/ MINERALS TAB 1 TABLET: TAB at 08:17

## 2020-04-07 RX ADMIN — CYANOCOBALAMIN TAB 1000 MCG 1000 MCG: 1000 TAB at 08:17

## 2020-04-07 RX ADMIN — BUDESONIDE AND FORMOTEROL FUMARATE DIHYDRATE 2 PUFF: 160; 4.5 AEROSOL RESPIRATORY (INHALATION) at 08:59

## 2020-04-07 RX ADMIN — TAMSULOSIN HYDROCHLORIDE 0.4 MG: 0.4 CAPSULE ORAL at 08:18

## 2020-04-07 RX ADMIN — Medication 250 MG: at 08:17

## 2020-04-07 RX ADMIN — AMOXICILLIN AND CLAVULANATE POTASSIUM 500 MG: 500; 125 TABLET, FILM COATED ORAL at 20:38

## 2020-04-07 NOTE — THERAPY TREATMENT NOTE
Acute Care - Occupational Therapy Treatment Note  [facility]     Patient Name: Pati Carter  : 1930  MRN: [id number]  Today's Date: 2020             Admit Date: 2020       ICD-10-CM ICD-9-CM   1. Severe sepsis (CMS/HCC) A41.9 038.9    R65.20 995.92   2. Pneumonia of both lungs due to infectious organism, unspecified part of lung J18.9 483.8   3. Leukocytosis, unspecified type D72.829 288.60   4. Anemia, unspecified type D64.9 285.9   5. Hypotension, unspecified hypotension type I95.9 458.9   6. Dizziness R42 780.4   7. Syncope, unspecified syncope type R55 780.2   8. Traumatic hematoma of buttock, initial encounter S30.0XXA 922.32   9. Contusion of left lower extremity, initial encounter S80.12XA 924.5   10. Impaired mobility and ADLs Z74.09 799.89   11. Falls frequently R29.6 V15.88     Patient Active Problem List   Diagnosis   • Macular degeneration   • Rheumatoid arthritis (CMS/HCC)   • Hearing loss   • Hyperlipidemia   • Gastroesophageal reflux disease   • Hypertension   • Constipation   • Anemia   • Compression fracture of lumbar vertebra (CMS/HCC)   • Degeneration of intervertebral disc of lumbar region   • COPD (chronic obstructive pulmonary disease) (CMS/HCC)   • H/O calcium pyrophosphate deposition disease (CPPD)   • Long term methotrexate user   • Femur fracture, right (CMS/HCC)   • Falls frequently   • Fall at home, initial encounter   • Sepsis (CMS/HCC)   • Multifocal pneumonia   • Immunosuppressed status on methotrexate   • Acute respiratory failure with hypoxia (CMS/HCC)   • borderline B12 deficiency   • Delirium due to another medical condition   • Acute urinary retention   • Acute blood loss anemia, due to bleeding into hematomas from fall   • Traumatic hematoma of left hip     Past Medical History:   Diagnosis Date   • Anemia     Description: A.  Dx - borderline intermittent.   • Back pain    • Benign colonic polyp     Description: A.  Dx .   • Benign colonic polyp  9/28/2016    Description: A.  Dx 1999.   • Compression fracture of lumbar vertebra (CMS/[facility])    • Constipation    • COPD (chronic obstructive pulmonary disease) (CMS/[facility])     Description: A.  Rule out chronic persistent asthma, COPD, or obliterative bronchiolitis.- Rae   • Degeneration of intervertebral disc of lumbar region     Description: A.  Diagnosed in April 2013 with advanced multilevel with severe spinal stenosis, followed by Dr. Pillai for pain management.   • Gastroesophageal reflux disease    • Hearing loss    • History of colonoscopy 01/01/1999    NORMAL PER PATIENT    • History of mammogram 01/01/2011    NORMAL PER PT    • History of Papanicolaou smear of cervix 01/01/2010    NORMAL PER PT    • History of varicella    • Hyperlipidemia     Description: A.  Dx 2006.   • Hypertension     Description: A. Dx 2001.   • Macular degeneration    • Ovarian mass     Dx 8/15- benign left cystic adnexal mass   • Rheumatoid arthritis (CMS/[facility])     Description: A.  Diagnosed in 2000 and and followed by Dr. Constantino (now Dr. Kaplan). B.  On methotrexate therapy since 2000. C.  Off low-dose prednisone therapy.     Past Surgical History:   Procedure Laterality Date   • APPENDECTOMY     • CARPAL TUNNEL RELEASE Left 01/01/2003    HISTORY OF NEUROPLASTY DECOMPRESSION MEDIAN NERVE AT CARPAL TUNNEL LEFT   • CATARACT EXTRACTION Bilateral 01/01/2009   • CHOLECYSTECTOMY  01/01/1962   • HIP CANNULATED SCREW PLACEMENT Right 1/25/2020    Procedure: HIP CANNULATED SCREW PLACEMENT RIGHT;  Surgeon: Karlos Blount MD;  Location: [facility];  Service: Orthopedics   • KNEE ARTHROSCOPY Left 01/01/2001    MENISCAL REPAIR   • KYPHOPLASTY  06/18/2015    T11 AND L1 (JOSE A)   • PELVIC LAPAROSCOPY  01/01/1996    REMOVAL OF BENIGN UTERINE AND RIGHT OVARIAN TUMORS   • SALPINGO OOPHORECTOMY Left 08/26/2015    REMOVAL OF LEFT OVARY AND TUBE (benign cystic mass)       Therapy Treatment    Rehabilitation Treatment Summary     Row Name  04/07/20 1453             Treatment Time/Intention    Discipline  occupational therapist  -TB      Document Type  therapy note (daily note)  -TB      Subjective Information  complains of incontinence  -TB      Mode of Treatment  individual therapy  -TB      Patient Effort  good  -TB      Comment  Perseverating on incontinence; brief donned for therapy session  -TB      Existing Precautions/Restrictions  fall;other (see comments) Recent R hip surgery  -TB      Treatment Considerations/Comments  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  -TB      Recorded by [TB] Anh Brennan OT 04/07/20 1527      Row Name 04/07/20 1453             Vital Signs    Pre Systolic BP Rehab  -- RN cleared OT  -TB      Pre SpO2 (%)  96  -TB      O2 Delivery Pre Treatment  room air  -TB      Post SpO2 (%)  97  -TB      O2 Delivery Post Treatment  room air  -TB      Pre Patient Position  Supine  -TB      Intra Patient Position  Standing  -TB      Post Patient Position  Sitting  -TB      Recorded by [TB] Anh Brennan OT 04/07/20 1527      Row Name 04/07/20 1453             Cognitive Assessment/Intervention- PT/OT    Affect/Mental Status (Cognitive)  confused  -TB      Orientation Status (Cognition)  oriented x 3;verbal cues/prompts needed for orientation  -TB      Follows Commands (Cognition)  follows one step commands;75-90% accuracy;verbal cues/prompting required  -TB      Cognitive Assessment/Intervention Comment  alert, attentive, following commands  -TB      Recorded by [TB] Anh Brennan OT 04/07/20 1527      Row Name 04/07/20 1453             Safety Issues, Functional Mobility    Safety Issues Affecting Function (Mobility)  awareness of need for assistance;insight into deficits/self awareness;judgment;problem solving;safety precaution awareness;safety precautions follow-through/compliance  -TB      Impairments Affecting Function (Mobility)  cognition;balance;endurance/activity  tolerance;pain;strength;range of motion (ROM)  -TB      Recorded by [TB] Anh Brennan, OT 04/07/20 1527      Row Name 04/07/20 1453             Bed Mobility Assessment/Treatment    Bed Mobility Assessment/Treatment  rolling left;rolling right;supine-sit;scooting/bridging  -TB      Rolling Left [place] (Bed Mobility)  moderate assist (50% patient effort);verbal cues  -TB      Rolling Right [place] (Bed Mobility)  minimum assist (75% patient effort);verbal cues  -TB      Scooting/Bridging [place] (Bed Mobility)  moderate assist (50% patient effort);verbal cues  -TB      Supine-Sit [place] (Bed Mobility)  moderate assist (50% patient effort);verbal cues  -TB      Assistive Device (Bed Mobility)  bed rails  -TB      Comment (Bed Mobility)  rolling for hygiene and to don brief. Cues and assist for sequencing to sit EOB  -TB      Recorded by [TB] Anh Brennan, OT 04/07/20 1527      Row Name 04/07/20 1453             Functional Mobility    Functional Mobility- Ind. Level  minimum assist (75% patient effort);verbal cues required  -TB      Functional Mobility- Device  rolling walker  -TB      Functional Mobility-Distance (Feet)  3  -TB      Functional Mobility- Safety Issues  step length decreased;weight-shifting ability decreased;balance decreased during turns  -TB      Functional Mobility- Comment  EOB to [facility]  -TB      Recorded by [TB] Anh Brennan, OT 04/07/20 1527      Row Name 04/07/20 1453             Transfer Assessment/Treatment    Transfer Assessment/Treatment  sit-stand transfer;bed-chair transfer  -TB      Comment (Transfers)  cues for hand placement, sequencing and safety  -TB      Recorded by [TB] Ahn Brennan, OT 04/07/20 1527      Row Name 04/07/20 1453             Bed-Chair Transfer    Bed-Chair [place] (Transfers)  minimum assist (75% patient effort);verbal cues  -TB      Assistive Device (Bed-Chair Transfers)  walker, front-wheeled  -TB       Recorded by [TB] Anh Brennan, OT 04/07/20 1527      Row Name 04/07/20 1453             Sit-Stand Transfer    Sit-Stand [place] (Transfers)  minimum assist (75% patient effort);verbal cues  -TB      Assistive Device (Sit-Stand Transfers)  walker, front-wheeled  -TB      Recorded by [TB] Anh Brennan, OT 04/07/20 1527      Row Name 04/07/20 1453             ADL Assessment/Intervention    78181 - OT Self Care/Mgmt Minutes  23  -TB      BADL Assessment/Intervention  upper body dressing;lower body dressing;grooming;toileting;feeding  -TB      Recorded by [TB] Anh Brennan, OT 04/07/20 1527      Row Name 04/07/20 1453             Upper Body Dressing Assessment/Training    Upper Body Dressing [place] Level  doff;don;pajama/robe;minimum assist (75% patient effort);verbal cues  -TB      Upper Body Dressing Position  supine  -TB      Comment (Upper Body Dressing)  assist to doff soiled gown and don clean; cues to attend to task/orient to gown and assist for lines  -TB      Recorded by [TB] Anh Brennan, OT 04/07/20 1527      Row Name 04/07/20 1453             Lower Body Dressing Assessment/Training    Lower Body Dressing [place] Level  don;socks;dependent (less than 25% patient effort)  -TB      Recorded by [TB] Anh Brennan, OT 04/07/20 1527      Row Name 04/07/20 1453             Grooming Assessment/Training    [place] Level (Grooming)  wash face, hands;set up;minimum assist (75% patient effort);hair care, combing/brushing  -TB      Grooming Position  supported sitting  -TB      Recorded by [TB] Anh Brennan, OT 04/07/20 1527      Row Name 04/07/20 1453             Self-Feeding Assessment/Training    Comment (Feeding)  Pt requesting s/th to drink - no pitcher or cups in room; notified RN in the event pt is fluid restricted  -TB      Recorded by [TB] Anh Brennan, OT 04/07/20 1527      Row Name 04/07/20 1453             Toileting  Assessment/Training    [place] Level (Toileting)  dependent (less than 25% patient effort)  -TB      Comment (Toileting)  purwick/brief  -TB      Recorded by [TB] Anh Brennan, OT 04/07/20 1527      Row Name 04/07/20 1453             BADL Safety/Performance    Impairments, BADL Safety/Performance  cognition;balance;endurance/activity tolerance;pain;range of motion;strength  -TB      Cognitive Impairments, BADL Safety/Performance  attention;awareness, need for assistance;insight into deficits/self awareness;judgment;problem solving/reasoning;safety precaution awareness;safety precaution follow-through  -TB      Recorded by [TB] Anh Brennan, OT 04/07/20 1527      Row Name 04/07/20 1453             Motor Skills Assessment/Interventions    Additional Documentation  Balance (Group)  -TB      Recorded by [TB] Anh Brennan, OT 04/07/20 1527      Row Name 04/07/20 1453             Balance    Balance  dynamic sitting balance;dynamic standing balance  -TB      Recorded by [TB] Anh Brennan, OT 04/07/20 1527      Row Name 04/07/20 1453             Static Sitting Balance    Level of [place] (Unsupported Sitting, Static Balance)  supervision  -TB      Recorded by [TB] Anh Brennan, OT 04/07/20 1527      Row Name 04/07/20 1453             Dynamic Sitting Balance    Level of [place], Reaches Outside Midline (Sitting, Dynamic Balance)  standby assist  -TB      Sitting Position, Reaches Outside Midline (Sitting, Dynamic Balance)  sitting in chair;sitting on edge of bed  -TB      Comment, Reaches Outside Midline (Sitting, Dynamic Balance)  ADL tasks  -TB      Recorded by [TB] Anh Brennan, OT 04/07/20 1527      Row Name 04/07/20 1453             Static Standing Balance    Level of [place] (Supported Standing, Static Balance)  contact guard assist  -TB      Recorded by [TB] Anh Brennan, OT 04/07/20 1527      Row Name 04/07/20 1453              Dynamic Standing Balance    Level of [place], Reaches Outside Midline (Standing, Dynamic Balance)  minimal assist, 75% patient effort  -TB      Time Able to Maintain Position, Reaches Outside Midline (Standing, Dynamic Balance)  15 to 30 seconds  -TB      Assistive Device Utilized (Supported Standing, Dynamic Balance)  walker, rolling  -TB      Comment, Reaches Outside Midline (Standing, Dynamic Balance)  Pt's anxiety re: incontinence limits participation in mobility; OT assisted pt to don brief  -TB      Recorded by [TB] Anh Brennan OT 04/07/20 1527      Row Name 04/07/20 1453             Positioning and Restraints    Pre-Treatment Position  in bed  -TB      Post Treatment Position  chair  -TB      In Chair  notified nsg;reclined;call light within reach;encouraged to call for assist;exit alarm on;legs elevated  -TB      Recorded by [TB] Anh Brennan, OT 04/07/20 1527      Row Name 04/07/20 1453             Pain Scale: Numbers Pre/Post-Treatment    Pain Scale: Numbers, Pretreatment  0/10 - no pain  -TB      Pain Scale: Numbers, Post-Treatment  2/10  -TB      Pain Location - Side  Left  -TB      Pain Location - Orientation  lower  -TB      Pain Location  extremity  -TB      Pre/Post Treatment Pain Comment  Mild increase in pain with activity/mobility  -TB      Pain Intervention(s)  Ambulation/increased activity;Repositioned  -TB      Recorded by [TB] Anh Brennan, OT 04/07/20 1527      Row Name                Wound 04/05/20 0610 Bilateral perirectal MASD (Moisture associated skin damage)    Wound - Properties Group Date first assessed: 04/05/20 [TH] Time first assessed: 0610 [TH] Side: Bilateral [TH] Location: perirectal [TH] Primary Wound Type: MASD [TH] Recorded by:  [TH] Danielle Carlisle RN 04/05/20 0610    Row Name 04/07/20 1453             Coping    Observed Emotional State  accepting;cooperative  -TB      Verbalized Emotional State  acceptance  -TB      Recorded by [TB]  Anh Brennan, OT 04/07/20 1527      Row Name 04/07/20 1453             Plan of Care Review    Plan of Care Reviewed With  patient  -TB      Recorded by [TB] Anh Brennan, OT 04/07/20 1527      Row Name 04/07/20 1453             Outcome Summary/Treatment Plan (OT)    Daily Summary of Progress (OT)  progress toward functional goals as expected  -TB      Barriers to Overall Progress (OT)  cognition/confusion  -TB      Plan for Continued Treatment (OT)  per POC  -TB      Anticipated Equipment Needs at Discharge (OT)  -- Defer to SNF  -TB      Anticipated Discharge Disposition (OT)  skilled nursing facility  -TB      Recorded by [TB] Anh Brennan, OT 04/07/20 1527        User Key  (r) = Recorded By, (t) = Taken By, (c) = Cosigned By    Initials Name Effective Dates Discipline    TB Anh Brennan, OT 06/08/18 -  OT     Danielle Carlisle RN 06/16/16 -  Nurse        Wound 04/05/20 0610 Bilateral perirectal MASD (Moisture associated skin damage) (Active)   Dressing Appearance open to air 4/7/2020  8:00 AM   Base pink;moist 4/6/2020  8:00 PM           OT Recommendation and Plan  Outcome Summary/Treatment Plan (OT)  Daily Summary of Progress (OT): progress toward functional goals as expected  Barriers to Overall Progress (OT): cognition/confusion  Plan for Continued Treatment (OT): per POC  Anticipated Equipment Needs at Discharge (OT): (Defer to SNF)  Anticipated Discharge Disposition (OT): skilled nursing facility  Daily Summary of Progress (OT): progress toward functional goals as expected  Plan of Care Review  Plan of Care Reviewed With: patient  Plan of Care Reviewed With: patient  Outcome Summary: Pt is alert and cooperative with therapy. Mod A bed mobility. Min A transfer with RW. Dependent for LB dressing/toileting tasks. Min A UB dressing. Pt confusion and anxiety about incontinence (brief donned) limits participation. OT will continue to follow IP. Recommend SNF at d/c for  best outcome.  Outcome Measures     Row Name 04/07/20 1453 04/06/20 1512 04/05/20 1405       How much help from another is currently needed...    Putting on and taking off regular lower body clothing?  2  -TB  2  -SG  1  -AN    Bathing (including washing, rinsing, and drying)  2  -TB  2  -SG  1  -AN    Toileting (which includes using toilet bed pan or urinal)  1  -TB  2  -SG  1  -AN    Putting on and taking off regular upper body clothing  3  -TB  2  -SG  1  -AN    Taking care of personal grooming (such as brushing teeth)  3  -TB  3  -SG  1  -AN    Eating meals  3  -TB  3  -SG  1  -AN    AM-PAC 6 Clicks Score (OT)  14  -TB  14  -SG  6  -AN       Functional Assessment    Outcome Measure Options  AM-PAC 6 Clicks Daily Activity (OT)  -TB  AM-PAC 6 Clicks Daily Activity (OT)  -SG  AM-PAC 6 Clicks Daily Activity (OT)  -AN      User Key  (r) = Recorded By, (t) = Taken By, (c) = Cosigned By    Initials Name Provider Type    TB Anh Brennan OT Occupational Therapist    Kelsi Clement, OT Occupational Therapist    Gabby Paiz, OTR/L Occupational Therapist           Time Calculation:   Time Calculation- OT     Row Name 04/07/20 1532 04/07/20 1453          Time Calculation- OT    OT Start Time  1453  -TB  --     OT Received On  04/07/20  -TB  --     OT Goal Re-Cert Due Date  04/15/20  -TB  --        Timed Charges    30958 - OT Self Care/Mgmt Minutes  --  23  -TB       User Key  (r) = Recorded By, (t) = Taken By, (c) = Cosigned By    Initials Name Provider Type    Anh Andrea OT Occupational Therapist        Therapy Charges for Today     Code Description Service Date Service Provider Modifiers Qty    [id number]  OT SELF CARE/MGMT/TRAIN EA 15 MIN 4/7/2020 Anh Brennan OT GO 2               Anh Brennan OT  4/7/2020

## 2020-04-07 NOTE — PLAN OF CARE
Pt frustrated this morning and wants to go home. Calm, cooperative, and oriented today. States her pain is better today. Still having blurry vision and tremors that are making it difficult to feed herself. Will continue to monitor.

## 2020-04-07 NOTE — PROGRESS NOTES
[facility] Medicine Services  PROGRESS NOTE    Patient Name: Pati Carter  : 1930  MRN: [id number]    Date of Admission: 2020  Primary Care Physician: Latoya Bolaños MD    Subjective   Subjective     CC:  Follow-up for pneumonia    HPI:  She says she's not feeling good today, breathing is fine, but she wants to get cleaned up and is tired of sitting in bed all day. Denies fever or chills.    Review of Systems  CV- No chest pain, palpitations  GI- No N/V/D, abd pain     Objective   Objective     Vital Signs:   Temp:  [98.5 °F (36.9 °C)-98.6 °F (37 °C)] 98.5 °F (36.9 °C)  Heart Rate:  [] 97  Resp:  [16-18] 16  BP: (107-137)/(64-84) 137/84        Physical Exam:  Constitutional: No acute distress, awake, alert  HENT: NCAT, mucous membranes moist  Respiratory: Clear to auscultation bilaterally, respiratory effort normal on room air   Cardiovascular: RRR, no murmurs  Gastrointestinal: Positive bowel sounds, soft, nontender, nondistended  Psychiatric: Appropriate affect, cooperative  Neurologic: Oriented x 3, Cranial Nerves grossly intact to confrontation, speech clear  Skin: No rashes on exposed skin     Results Reviewed:  Results from last 7 days   Lab Units 20  0829 20  0610 20  0541  20  0504  20  0941   WBC 10*3/mm3 10.92* 8.34 10.02   < > 10.50   < > 15.63*   HEMOGLOBIN g/dL 9.5* 7.4* 8.6*   < > 7.7*   < > 7.2*   HEMOGLOBIN, POC   --   --   --   --   --    < >  --    HEMATOCRIT % 30.7* 23.5* 26.8*   < > 24.5*   < > 23.5*   HEMATOCRIT POC   --   --   --   --   --    < >  --    PLATELETS 10*3/mm3 399 310 312   < > 216   < > 313   INR   --   --   --   --   --   --  1.17*   PROCALCITONIN ng/mL  --   --   --   --  0.19  --  0.11    < > = values in this interval not displayed.     Results from last 7 days   Lab Units 20  0610 20  0541 20  0700  20  0941   SODIUM mmol/L 142 142 140   < > 142   POTASSIUM mmol/L 3.8 3.6 4.1    < > 4.1   CHLORIDE mmol/L 107 104 105   < > 105   CO2 mmol/L 26.0 25.0 24.0   < > 27.0   BUN mg/dL 14 15 20   < > 19   CREATININE mg/dL 0.94 0.90 1.04*   < > 0.91   GLUCOSE mg/dL 83 107* 105*   < > 171*   CALCIUM mg/dL 8.6 8.9 8.2*   < > 8.3*   ALT (SGPT) U/L  --   --   --   --  10   AST (SGOT) U/L  --   --   --   --  21   TROPONIN T ng/mL  --   --   --   --  <0.010   PROBNP pg/mL  --   --   --   --  1,458.0    < > = values in this interval not displayed.     Estimated Creatinine Clearance: 43.6 mL/min (by C-G formula based on SCr of 0.94 mg/dL).    Microbiology Results Abnormal     Procedure Component Value - Date/Time    Blood Culture - Blood, Arm, Right [[id number]] Collected:  04/02/20 1006    Lab Status:  Preliminary result Specimen:  Blood from Arm, Right Updated:  04/06/20 1030     Blood Culture No growth at 4 days    Blood Culture - Blood, Arm, Left [[id number]] Collected:  04/02/20 1006    Lab Status:  Preliminary result Specimen:  Blood from Arm, Left Updated:  04/06/20 1030     Blood Culture No growth at 4 days    Beta Strep Culture, Throat - Swab, Throat [[id number]]  (Normal) Collected:  04/02/20 1322    Lab Status:  Final result Specimen:  Swab from Throat Updated:  04/04/20 1218     Throat Culture, Beta Strep No Beta Hemolytic Streptococcus Isolated    Narrative:       Group A Strep incidence is low in adults. Positive culture for Beta hemolytic Streptococcus species can reflect colonization and not true infection. Please correlate clinically.    CORONAVIRUS-19(COVID-19),RT-PCR,[facility], NP Swab in Transport Media - Swab, Nasopharynx [[id number]] Collected:  04/02/20 1550    Lab Status:  Final result Specimen:  Swab from Nasopharynx Updated:  04/03/20 1709     Reference Lab Report --     Comment: See scanned report          COVID19 Not Detected    MRSA Screen, PCR (Inpatient) - Swab, Nares [[id number]]  (Normal) Collected:  04/02/20 1555    Lab Status:  Final result Specimen:  Swab from Nares Updated:   04/02/20 1739     MRSA PCR Negative    Narrative:       MRSA Negative    Respiratory Panel, PCR - Swab, Nasopharynx [[id number]]  (Normal) Collected:  04/02/20 1322    Lab Status:  Final result Specimen:  Swab from Nasopharynx Updated:  04/02/20 1441     ADENOVIRUS, PCR Not Detected     Coronavirus 229E Not Detected     Coronavirus HKU1 Not Detected     Coronavirus NL63 Not Detected     Coronavirus OC43 Not Detected     Human Metapneumovirus Not Detected     Human Rhinovirus/Enterovirus Not Detected     Influenza B PCR Not Detected     Parainfluenza Virus 1 Not Detected     Parainfluenza Virus 2 Not Detected     Parainfluenza Virus 3 Not Detected     Parainfluenza Virus 4 Not Detected     Bordetella pertussis pcr Not Detected     Influenza A H1 2009 PCR Not Detected     Chlamydophila pneumoniae PCR Not Detected     Mycoplasma pneumo by PCR Not Detected     Influenza A PCR Not Detected     Influenza A H3 Not Detected     Influenza A H1 Not Detected     RSV, PCR Not Detected     Bordetella parapertussis PCR Not Detected    Narrative:       The coronavirus on the RVP is NOT COVID-19 and is NOT indicative of infection with COVID-19.     Rapid Strep A Screen - Swab, Throat [[id number]]  (Normal) Collected:  04/02/20 1322    Lab Status:  Final result Specimen:  Swab from Throat Updated:  04/02/20 1340     Strep A Ag Negative    Narrative:       Test performed by Direct Antigen Testing.          Imaging Results (Last 24 Hours)     ** No results found for the last 24 hours. **               I have reviewed the medications:  Scheduled Meds:  acetaminophen 1,000 mg Oral Q8H   amoxicillin-clavulanate 1 tablet Oral BID   atorvastatin 10 mg Oral Daily   budesonide-formoterol 2 puff Inhalation BID - RT   docusate sodium 100 mg Oral Daily   famotidine 20 mg Oral Daily   folic acid 1,000 mcg Oral Daily   haloperidol lactate 1 mg Intramuscular Once   lidocaine 2 patch Transdermal Q24H   melatonin 5 mg Oral Nightly   multivitamin with  minerals 1 tablet Oral Daily   nystatin 1 application Topical Daily   OLANZapine 2.5 mg Intramuscular Once   saccharomyces boulardii 250 mg Oral Daily   sodium chloride 10 mL Intravenous Q12H   tamsulosin 0.4 mg Oral Daily   vitamin B-12 1,000 mcg Oral Daily     Continuous Infusions:   PRN Meds:.•  acetaminophen **OR** acetaminophen **OR** acetaminophen  •  Albuterol Sulfate NEB Orderable  •  docusate sodium  •  melatonin  •  OLANZapine **OR** [DISCONTINUED] OLANZapine  •  ondansetron **OR** ondansetron  •  QUEtiapine  •  [COMPLETED] Insert peripheral IV **AND** sodium chloride  •  sodium chloride    Assessment/Plan   Assessment & Plan     Active Hospital Problems    Diagnosis  POA   • **Multifocal pneumonia [J18.9]  Yes   • borderline B12 deficiency [E53.8]  Yes   • Delirium due to another medical condition [F05]  Clinically Undetermined   • Acute urinary retention [R33.8]  Yes   • Acute blood loss anemia, due to bleeding into hematomas from fall [D62]  Yes   • Traumatic hematoma of left hip [S70.02XA]  Yes   • Falls frequently [R29.6]  Not Applicable   • Fall at home, initial encounter [W19.XXXA, Y92.009]  Not Applicable   • Sepsis (CMS/HCC) [A41.9]  Yes   • Immunosuppressed status on methotrexate [D89.9]  Yes   • Acute respiratory failure with hypoxia (CMS/HCC) [J96.01]  Yes   • Anemia [D64.9]  Yes   • COPD (chronic obstructive pulmonary disease) (CMS/HCC) [J44.9]  Yes   • Hyperlipidemia [E78.5]  Yes   • Hypertension [I10]  Yes   • Rheumatoid arthritis (CMS/HCC) [M06.9]  Yes      Resolved Hospital Problems    Diagnosis Date Resolved POA   • Suspected Covid-19 Virus Infection [R68.89] 04/04/2020 Yes        Brief Hospital Course to date:  Pati Carter is a 89 y.o. female With a past medical history of hypertension, hyperlipidemia, COPD, macular degeneration, hearing loss, GERD, and RA on methotrexate, lives in a skilled nursing facility who was admitted on April 2 for multifocal pneumonia.     Sepsis secondary  to multifocal pneumonia  Acute respiratory failure with hypoxia secondary to pneumonia  COPD  -CTA chest from April 2 reviewed, bilateral groundglass attenuation, intermediate correlation with COVID-19 infection  -Respiratory PCR panel was done and negative, COVID-19 not detected, MRSA screen PCR negative, rapid a strep negative, beta strep culture negative, blood cultures no growth at 4 days   -Infectious disease following-d/c'd zosyn yesterday, continue augmentin (start date 4/6) x 7 days      Acute blood loss anemia secondary to left hip hematoma and left knee hematoma  -H&H stable this morning   -CTM CBC qAM     Recurrent falls at home  -Will need to go back to skilled nursing facility upon discharge  -PT eval ordered      Hyperlipidemia-continue atorvastatin     Discharge planning: Mrs. Carter prefers to go home with her daughter, Danette. I spoke with Danette and let her know. Danette spoke with her siblings and they agreed with this plan.     DVT Prophylaxis:  SCDs     Disposition: I expect the patient to be discharged home tomorrow.    CODE STATUS:   Code Status and Medical Interventions:   Ordered at: 04/02/20 1657     Limited Support to NOT Include:    Intubation    NIPPV (Non-Invasive Positive Pressure Support)    Cardioversion/Defibrillation     Code Status:    No CPR     Medical Interventions (Level of Support Prior to Arrest):    Limited         Electronically signed by Mimi Pope MD, 04/07/20, 10:11.

## 2020-04-07 NOTE — TELEPHONE ENCOUNTER
S/W pt dgt., Danette, states she is terribly sorry but unable to talk right now.  States she is actively moving her mother out of a place right now and will call back tomorrow to discuss her needs.

## 2020-04-07 NOTE — PROGRESS NOTES
Continued Stay Note  [facility]     Patient Name: Pati Carter  MRN: [id number]  Today's Date: 4/7/2020    Admit Date: 4/2/2020    Discharge Plan     Row Name 04/07/20 1551       Plan    Plan  Back to [facility] vs home with HH    Plan Comments  Spoke with patient she would like to go home with her daughter Danette.  Patient agreeable for HH services.  Spoke with Danette, orders placed for DME.  Physical therapist updated me just now letting me know that the patient has requested to return to [facility] for therapy.  Will follow up on am and adjust dp as needed.     Final Discharge Disposition Code  30 - still a patient        Discharge Codes    No documentation.       Expected Discharge Date and Time     Expected Discharge Date Expected Discharge Time    Apr 10, 2020             Ashley Mullen RN

## 2020-04-07 NOTE — PLAN OF CARE
Problem: Patient Care Overview  Goal: Plan of Care Review  Flowsheets (Taken 4/7/2020 4128)  Outcome Summary: Pt is alert and cooperative with therapy. Mod A bed mobility. Min A transfer with RW. Dependent for LB dressing/toileting tasks. Min A UB dressing. Pt confusion and anxiety about incontinence (brief donned) limits participation. OT will continue to follow IP. Recommend SNF at d/c for best outcome.

## 2020-04-07 NOTE — THERAPY TREATMENT NOTE
Patient Name: Pati Carter  : 1930    MRN: [id number]                              Today's Date: 2020       Admit Date: 2020    Visit Dx:     ICD-10-CM ICD-9-CM   1. Severe sepsis (CMS/HCC) A41.9 038.9    R65.20 995.92   2. Pneumonia of both lungs due to infectious organism, unspecified part of lung J18.9 483.8   3. Leukocytosis, unspecified type D72.829 288.60   4. Anemia, unspecified type D64.9 285.9   5. Hypotension, unspecified hypotension type I95.9 458.9   6. Dizziness R42 780.4   7. Syncope, unspecified syncope type R55 780.2   8. Traumatic hematoma of buttock, initial encounter S30.0XXA 922.32   9. Contusion of left lower extremity, initial encounter S80.12XA 924.5   10. Impaired mobility and ADLs Z74.09 799.89   11. Falls frequently R29.6 V15.88     Patient Active Problem List   Diagnosis   • Macular degeneration   • Rheumatoid arthritis (CMS/HCC)   • Hearing loss   • Hyperlipidemia   • Gastroesophageal reflux disease   • Hypertension   • Constipation   • Anemia   • Compression fracture of lumbar vertebra (CMS/HCC)   • Degeneration of intervertebral disc of lumbar region   • COPD (chronic obstructive pulmonary disease) (CMS/HCC)   • H/O calcium pyrophosphate deposition disease (CPPD)   • Long term methotrexate user   • Femur fracture, right (CMS/HCC)   • Falls frequently   • Fall at home, initial encounter   • Sepsis (CMS/HCC)   • Multifocal pneumonia   • Immunosuppressed status on methotrexate   • Acute respiratory failure with hypoxia (CMS/HCC)   • borderline B12 deficiency   • Delirium due to another medical condition   • Acute urinary retention   • Acute blood loss anemia, due to bleeding into hematomas from fall   • Traumatic hematoma of left hip     Past Medical History:   Diagnosis Date   • Anemia     Description: A.  Dx - borderline intermittent.   • Back pain    • Benign colonic polyp     Description: AJoshua Agrawal .   • Benign colonic polyp 2016    Description: A.  Nghia  1999.   • Compression fracture of lumbar vertebra (CMS/HCC)    • Constipation    • COPD (chronic obstructive pulmonary disease) (CMS/HCC)     Description: A.  Rule out chronic persistent asthma, COPD, or obliterative bronchiolitis.- Rae   • Degeneration of intervertebral disc of lumbar region     Description: A.  Diagnosed in April 2013 with advanced multilevel with severe spinal stenosis, followed by Dr. Pillai for pain management.   • Gastroesophageal reflux disease    • Hearing loss    • History of colonoscopy 01/01/1999    NORMAL PER PATIENT    • History of mammogram 01/01/2011    NORMAL PER PT    • History of Papanicolaou smear of cervix 01/01/2010    NORMAL PER PT    • History of varicella    • Hyperlipidemia     Description: A.  Dx 2006.   • Hypertension     Description: A. Dx 2001.   • Macular degeneration    • Ovarian mass     Dx 8/15- benign left cystic adnexal mass   • Rheumatoid arthritis (CMS/[facility])     Description: A.  Diagnosed in 2000 and and followed by Dr. Constantino (now Dr. Kaplan). B.  On methotrexate therapy since 2000. C.  Off low-dose prednisone therapy.     Past Surgical History:   Procedure Laterality Date   • APPENDECTOMY     • CARPAL TUNNEL RELEASE Left 01/01/2003    HISTORY OF NEUROPLASTY DECOMPRESSION MEDIAN NERVE AT CARPAL TUNNEL LEFT   • CATARACT EXTRACTION Bilateral 01/01/2009   • CHOLECYSTECTOMY  01/01/1962   • HIP CANNULATED SCREW PLACEMENT Right 1/25/2020    Procedure: HIP CANNULATED SCREW PLACEMENT RIGHT;  Surgeon: Karlos Blount MD;  [facility];  Service: Orthopedics   • KNEE ARTHROSCOPY Left 01/01/2001    MENISCAL REPAIR   • KYPHOPLASTY  06/18/2015    T11 AND L1 (JOSE A)   • PELVIC LAPAROSCOPY  01/01/1996    REMOVAL OF BENIGN UTERINE AND RIGHT OVARIAN TUMORS   • SALPINGO OOPHORECTOMY Left 08/26/2015    REMOVAL OF LEFT OVARY AND TUBE (benign cystic mass)     General Information     Row Name 04/07/20 1511          PT Evaluation Time/Intention    Document Type   therapy note (daily note)  -LM     Mode of Treatment  individual therapy;physical therapy  -LM     Row Name 04/07/20 1511          General Information    Patient Profile Reviewed?  yes  -LM     Existing Precautions/Restrictions  fall;other (see comments) Brief on with OOB activity  -LM     Row Name 04/07/20 1511          Cognitive Assessment/Intervention- PT/OT    Orientation Status (Cognition)  oriented x 3  -LM     Row Name 04/07/20 1511          Safety Issues, Functional Mobility    Safety Issues Affecting Function (Mobility)  insight into deficits/self awareness;safety precaution awareness;safety precautions follow-through/compliance  -LM     Impairments Affecting Function (Mobility)  balance;endurance/activity tolerance;pain;strength;range of motion (ROM)  -LM     Comment, Safety Issues/Impairments (Mobility)  Reporting pain in the L hip  -LM       User Key  (r) = Recorded By, (t) = Taken By, (c) = Cosigned By    Initials Name Provider Type    LM Maria Luisa Aparicio, PT Physical Therapist        Mobility     Row Name 04/07/20 1511          Bed Mobility Assessment/Treatment    Comment (Bed Mobility)  Pt up in chair at initial and end of tx.  -LM     Row Name 04/07/20 1511          Transfer Assessment/Treatment    Comment (Transfers)  Vc's for hand placement.  -LM     Row Name 04/07/20 1511          Sit-Stand Transfer    Sit-Stand [place] (Transfers)  minimum assist (75% patient effort);1 person assist;verbal cues  -LM     Assistive Device (Sit-Stand Transfers)  walker, front-wheeled  -LM     Row Name 04/07/20 1511          Gait/Stairs Assessment/Training    Gait/Stairs Assessment/Training  gait/ambulation independence;gait/ambulation assistive device  -     [place] Level (Gait)  minimum assist (75% patient effort);1 person assist;verbal cues  -LM     Assistive Device (Gait)  walker, front-wheeled  -LM     Distance in Feet (Gait)  60 feet  -LM     Deviations/Abnormal Patterns (Gait)  antalgic;pancho  decreased;stride length decreased  -LM     Bilateral Gait Deviations  forward flexed posture;heel strike decreased  -LM     Comment (Gait/Stairs)  Pt ambulates with an antalgic gait due to L hip pain - although pt reports pain improved with gait.  Vc's for upright posture and to stay inside walker.  -LM       User Key  (r) = Recorded By, (t) = Taken By, (c) = Cosigned By    Initials Name Provider Type    Maria Luisa Martínez PT Physical Therapist        Obj/Interventions     Row Name 04/07/20 1511          Therapeutic Exercise    Lower Extremity (Therapeutic Exercise)  LAQ (long arc quad), bilateral  -LM     Lower Extremity Range of Motion (Therapeutic Exercise)  hip abduction/adduction, bilateral;ankle dorsiflexion/plantar flexion, bilateral;other (see comments) Ankle Circles - CW, CCW  -LM     Exercise Type (Therapeutic Exercise)  AROM (active range of motion)  -LM     Position (Therapeutic Exercise)  seated;other (see comments) Reclined in chair  -LM     Sets/Reps (Therapeutic Exercise)  x10 each  -LM     Expected Outcome (Therapeutic Exercise)  improve functional stability;improve performance, gait skills;improve performance, transfer skills  -LM     Comment (Therapeutic Exercise)  Attempted seated marches, but pt reports it causes too much pain in her L hip and low back so exercise ceased.  -LM       User Key  (r) = Recorded By, (t) = Taken By, (c) = Cosigned By    Initials Name Provider Type    Maria Luisa Martínez PT Physical Therapist        Goals/Plan    No documentation.       Clinical Impression     Row Name 04/07/20 1511          Pain Assessment    Additional Documentation  Pain Scale: Numbers Pre/Post-Treatment (Group)  -LM     [facility] Name 04/07/20 1511          Pain Scale: Numbers Pre/Post-Treatment    Pain Scale: Numbers, Pretreatment  8/10  -LM     Pain Scale: Numbers, Post-Treatment  6/10  -LM     Pain Location - Side  Left  -LM     Pain Location - Orientation  generalized  -LM     Pain Location  hip  -LM      Pain Intervention(s)  Repositioned;Ambulation/increased activity  -LM     Row Name 04/07/20 1511          Plan of Care Review    Plan of Care Reviewed With  patient  -LM     Progress  improving  -LM     Row Name 04/07/20 1511          Positioning and Restraints    Pre-Treatment Position  sitting in chair/recliner  -LM     Post Treatment Position  chair  -LM     In Chair  reclined;call light within reach;encouraged to call for assist;exit alarm on;waffle cushion;notified nsg  -LM       User Key  (r) = Recorded By, (t) = Taken By, (c) = Cosigned By    Initials Name Provider Type    Maria Luisa Martínez PT Physical Therapist        Outcome Measures     Row Name 04/07/20 1511          How much help from another person do you currently need...    Turning from your back to your side while in flat bed without using bedrails?  3  -LM     Moving from lying on back to sitting on the side of a flat bed without bedrails?  2  -LM     Moving to and from a bed to a chair (including a wheelchair)?  3  -LM     Standing up from a chair using your arms (e.g., wheelchair, bedside chair)?  3  -LM     Climbing 3-5 steps with a railing?  2  -LM     To walk in hospital room?  3  -LM     AM-PAC 6 Clicks Score (PT)  16  -LM     Row Name 04/07/20 1511          Functional Assessment    Outcome Measure Options  AM-PAC 6 Clicks Basic Mobility (PT)  -       User Key  (r) = Recorded By, (t) = Taken By, (c) = Cosigned By    Initials Name Provider Type    Maria Luisa Martínez PT Physical Therapist          PT Recommendation and Plan     Plan of Care Reviewed With: patient  Progress: improving  Outcome Summary: Pt progressing well towards skilled PT goals and gave good effort throughout tx.  Pt stood with MinAx1 and ambulated 60 feet using rw with MinAx1.  Pt ambulates with an antalgic gait due to pain in L hip.  Pt completed BLE ther ex, but only able to complete certain exercises due to pain.  Continue with skilled PT to improve mobility and safety.   Long discussion had with both pt and daughter (Danette - on the phone) re: discharge planning.  Case mgmt notified.  Recommend further therapy at SNF at d/c.     Time Calculation:   PT Charges     Row Name 04/07/20 1511             Time Calculation    Start Time  1511  -LM      PT Received On  04/07/20  -LM      PT Goal Re-Cert Due Date  04/14/20  -LM         Timed Charges    18044 - PT Therapeutic Exercise Minutes  10  -LM      91378 - Gait Training Minutes   22  -LM        User Key  (r) = Recorded By, (t) = Taken By, (c) = Cosigned By    Initials Name Provider Type    LM Maria Luisa Aparicio, PT Physical Therapist        Therapy Charges for Today     Code Description Service Date Service Provider Modifiers Qty    71311402922 HC GAIT TRAINING EA 15 MIN 4/7/2020 Maria Luisa Aparicio, PT GP 1    14326689120 HC PT THER PROC EA 15 MIN 4/7/2020 Maria Luisa Aparicio, PT GP 1          PT G-Codes  Outcome Measure Options: AM-PAC 6 Clicks Basic Mobility (PT)  AM-PAC 6 Clicks Score (PT): 16  AM-PAC 6 Clicks Score (OT): 14    Maria Luisa Aparicio PT  4/7/2020

## 2020-04-07 NOTE — PLAN OF CARE
Problem: Patient Care Overview  Goal: Plan of Care Review  Outcome: Ongoing (interventions implemented as appropriate)  Flowsheets (Taken 4/7/2020 1511)  Outcome Summary: Pt progressing well towards skilled PT goals and gave good effort throughout tx.  Pt stood with MinAx1 and ambulated 60 feet using rw with MinAx1.  Pt ambulates with an antalgic gait due to pain in L hip.  Pt completed BLE ther ex, but only able to complete certain exercises due to pain.  Continue with skilled PT to improve mobility and safety.  Long discussion had with both pt and daughter (Danette - on the phone) re: discharge planning.  Case mgmt notified.  Recommend further therapy at SNF at d/c.

## 2020-04-07 NOTE — PLAN OF CARE
Problem: Patient Care Overview  Goal: Plan of Care Review  Outcome: Ongoing (interventions implemented as appropriate)  Goal: Individualization and Mutuality  Outcome: Ongoing (interventions implemented as appropriate)  Goal: Discharge Needs Assessment  Outcome: Ongoing (interventions implemented as appropriate)  Goal: Interprofessional Rounds/Family Conf  Outcome: Ongoing (interventions implemented as appropriate)     Problem: Pain, Chronic (Adult)  Goal: Identify Related Risk Factors and Signs and Symptoms  Outcome: Ongoing (interventions implemented as appropriate)  Goal: Acceptable Pain/Comfort Level and Functional Ability  Outcome: Ongoing (interventions implemented as appropriate)     Problem: Skin Injury Risk (Adult)  Goal: Identify Related Risk Factors and Signs and Symptoms  Outcome: Ongoing (interventions implemented as appropriate)  Goal: Skin Health and Integrity  Outcome: Ongoing (interventions implemented as appropriate)     Problem: Pneumonia (Adult)  Goal: Signs and Symptoms of Listed Potential Problems Will be Absent, Minimized or Managed (Pneumonia)  Outcome: Ongoing (interventions implemented as appropriate)     Problem: Confusion, Acute (Adult)  Goal: Identify Related Risk Factors and Signs and Symptoms  Outcome: Ongoing (interventions implemented as appropriate)  Goal: Cognitive/Functional Impairments Minimized  Outcome: Ongoing (interventions implemented as appropriate)  Goal: Safety  Outcome: Ongoing (interventions implemented as appropriate)

## 2020-04-07 NOTE — PROGRESS NOTES
"Infectious Disease progress note  7/26/1930  [id number]    Date of Consult: 4/3/2020  Admission Date: 4/2/2020    Evaluating Physician: Scott Mathis MD     Chief Complaint: fall    History of present illness:   Patient is a 89 y.o.  Yr old female origninally from the [place] but has lived in the [place] for many years. She has a  history of DJD, COPD, GERD, RA on methotrexate. Recent hip fracture. She resides in SNF. Presented to [facility] ED on 3/28 with fall, discharged back to SNF. Presented again to [facility] ED on 4/2 with another fall, complaining of knee and hip pain. In ED was hypoxemic, tachycardic, hypotensive, and by report was quite ill-appearing.  Initially the plan was to admitted to ICU however she has a firm DNR and thus the decision was made to admit to the floor. CT scan with multifocal pneumonia, intermediate risk for COVID-19, so isolated and testing sent to state lab. Started on zosyn and plaquenil and zinc. Also got vancomycin in ED. No hip or leg fracture. WBC 15.7, neutrophilic predominance, on admission. Negative procal.     4/3/20: currently comfortable on room air. Pulse 82. BP improved after transfusion and fluids. Afebrile since admission.  She says her respiratory status is back to baseline.  She denies having any cough or shortness of breath, chest pain at this time.  She denies having any sick contacts.  She says she has not left her skilled nursing facility recently opened to go to the emergency department a few days before.  No recent visitors.  No fevers or chills prior to admission. Her only complaint at this time is ongoing hip and knee pain. No drug or tobacco use.    4/4/20  Ruled out for COVID; transferred to ; feels achy, has leg/hip pain; currently using bedside commode;    4/5/2020: Patient seen in [place] [id number].  Patient has been afebrile and tachycardic overnight.  Maintaining oxygen saturations on room air.  Patient has continued \"achy\" feeling.  Also has continued leg/hip pain. "  Patient remains very confused and per nursing staff has been having hallucinations overnight.    4/6/20: moved out of airborne isolation after COVID negative. Feeling better. More alert. Oriented to person, place, and date. Intermittent respiratory symptoms but currently asymptomatic on room air. Appetite ok. Baseline tremor.    4/7/20: resting blood on the floor.  No acute new complaints.  She had a bath this morning.  Knee pain and hip pain and swelling are all improving.  Tolerating Augmentin.  Respirations symptoms, cough or shortness of breath. Discharge home planned tomorrow.     Review of Systems  No fevers, dysuria, n/v/d. Otherwise negative other than as above       Allergies   Allergen Reactions   • Bactrim [Sulfamethoxazole-Trimethoprim] Other (See Comments)     Stomach cramps    • Ciprofloxacin Other (See Comments)     Other reaction(s): shaking  HCI TABS/ SHAKING   • Levofloxacin Diarrhea       Antibiotics:  Anti-Infectives (From admission, onward)    Ordered     Dose/Rate Route Frequency Start Stop    04/06/20 0958  amoxicillin-clavulanate (AUGMENTIN) 500-125 MG per tablet 500 mg  Review   Ordering Provider:  Scott Mathis MD    1 tablet Oral 2 Times Daily 04/06/20 1045 04/09/20 0859    04/02/20 1606  hydroxychloroquine (PLAQUENIL) tablet 400 mg     Ordering Provider:  Matty Morris MD    400 mg Oral Every 12 Hours Scheduled 04/02/20 1800 04/03/20 0817    04/02/20 1121  vancomycin 1250 mg/250 mL 0.9% NS IVPB ([facility])     Ordering Provider:  Jarrell Ramires MD    20 mg/kg × 68 kg Intravenous Once 04/02/20 1200 04/02/20 1441    04/02/20 1121  piperacillin-tazobactam (ZOSYN) 3.375 g in iso-osmotic dextrose 50 ml (premix)     Ordering Provider:  Jarrell Ramires MD    3.375 g Intravenous Once 04/02/20 1200 04/02/20 1220          Other Medications:  Current Facility-Administered Medications   Medication Dose Route Frequency Provider Last Rate Last Dose   • acetaminophen (TYLENOL)  tablet 650 mg  650 mg Oral Q4H PRN Matty Morris MD   650 mg at 04/04/20 0608    Or   • acetaminophen (TYLENOL) 160 MG/5ML solution 650 mg  650 mg Oral Q4H PRN Matty Morris MD        Or   • acetaminophen (TYLENOL) suppository 650 mg  650 mg Rectal Q4H PRN Matty Morris MD       • acetaminophen (TYLENOL) tablet 1,000 mg  1,000 mg Oral Q8H Matty Morris MD   1,000 mg at 04/07/20 0817   • albuterol (PROVENTIL) nebulizer solution 0.083% 2.5 mg/3mL  2.5 mg Nebulization Q4H PRN Zain Walker, [facility]       • amoxicillin-clavulanate (AUGMENTIN) 500-125 MG per tablet 500 mg  1 tablet Oral BID Scott Mathis MD   500 mg at 04/07/20 0817   • atorvastatin (LIPITOR) tablet 10 mg  10 mg Oral Daily Matty Morris MD   10 mg at 04/07/20 0818   • budesonide-formoterol (SYMBICORT) 160-4.5 MCG/ACT inhaler 2 puff  2 puff Inhalation BID - RT Matty Morris MD   2 puff at 04/07/20 0859   • docusate sodium (COLACE) capsule 100 mg  100 mg Oral Daily Matty Morris MD   100 mg at 04/07/20 0817   • docusate sodium (COLACE) capsule 100 mg  100 mg Oral BID PRN Matty Morris MD       • famotidine (PEPCID) tablet 20 mg  20 mg Oral Daily Zain Walker, [facility]   20 mg at 04/07/20 0817   • folic acid (FOLVITE) tablet 1,000 mcg  1,000 mcg Oral Daily Matty Morris MD   1,000 mcg at 04/07/20 0817   • haloperidol lactate (HALDOL) injection 1 mg  1 mg Intramuscular Once Nella Riley APRN       • lidocaine (LIDODERM) 5 % 2 patch  2 patch Transdermal Q24H Matty Morris MD   Stopped at 04/07/20 0817   • melatonin tablet 5 mg  5 mg Oral Nightly PRN Matty Morris MD   5 mg at 04/06/20 0049   • melatonin tablet 5 mg  5 mg Oral Nightly Matty Morris MD   5 mg at 04/06/20 2047   • multivitamin with minerals 1 tablet  1 tablet Oral Daily Matty Morris MD   1 tablet at 04/07/20 0817   • nystatin (MYCOSTATIN) ointment 1  "application  1 application Topical Daily Matty Morris MD   1 application at 04/07/20 0816   • OLANZapine (zyPREXA) injection 2.5 mg  2.5 mg Intramuscular Q8H PRN Matty Morris MD       • OLANZapine (zyPREXA) injection 2.5 mg  2.5 mg Intramuscular Once Patsy Steel MD       • ondansetron (ZOFRAN) tablet 4 mg  4 mg Oral Q6H PRN Matty Morris MD        Or   • ondansetron (ZOFRAN) injection 4 mg  4 mg Intravenous Q6H PRN Matty Morris MD       • QUEtiapine (SEROquel) tablet 25 mg  25 mg Oral Q6H PRN Matty Morris MD   25 mg at 04/06/20 0049   • saccharomyces boulardii (FLORASTOR) capsule 250 mg  250 mg Oral Daily Scott Mathis MD   250 mg at 04/07/20 0817   • sodium chloride 0.9 % flush 10 mL  10 mL Intravenous PRN Jarrell Ramires MD       • sodium chloride 0.9 % flush 10 mL  10 mL Intravenous Q12H Matty Morris MD   10 mL at 04/07/20 0818   • sodium chloride 0.9 % flush 10 mL  10 mL Intravenous PRN Matty Morris MD       • tamsulosin (FLOMAX) 24 hr capsule 0.4 mg  0.4 mg Oral Daily Matty Morris MD   0.4 mg at 04/07/20 0818   • vitamin B-12 (CYANOCOBALAMIN) tablet 1,000 mcg  1,000 mcg Oral Daily Matty Morris MD   1,000 mcg at 04/07/20 0817       Physical Exam:   Vital Signs   /84 (BP Location: Right arm, Patient Position: Lying)   Pulse 97   Temp 98.5 °F (36.9 °C) (Oral)   Resp 16   Ht 165.1 cm (65\")   Wt 68 kg (150 lb)   SpO2 98%   BMI 24.96 kg/m²     GENERAL: Awake and alert, in no acute distress.   EYES: PERRL.  Nonicteric  HEENT: Normocephalic, atraumatic. No ext oral lesions  Respiratory: clear bilaterally. nonlabored breathing. No cough  Cardiovascular: RRR with no murmurs   Gastrointestinal: Positive bowel sounds, soft, nontender nondistended  Musculoskeletal: Left hip posterior hematoma, left knee contusion improving with less swelling.   Skin: Hematoma and contusion as listed " above, no rashes or lesions or jaundice noted  Neurological: AOx3, moves all extremities   Psychiatric: cooperative and appropriate    Laboratory Data    Results from last 7 days   Lab Units 04/07/20  0829 04/06/20  0610 04/05/20  0541   WBC 10*3/mm3 10.92* 8.34 10.02   HEMOGLOBIN g/dL 9.5* 7.4* 8.6*   HEMATOCRIT % 30.7* 23.5* 26.8*   PLATELETS 10*3/mm3 399 310 312     Results from last 7 days   Lab Units 04/06/20  0610   SODIUM mmol/L 142   POTASSIUM mmol/L 3.8   CHLORIDE mmol/L 107   CO2 mmol/L 26.0   BUN mg/dL 14   CREATININE mg/dL 0.94   GLUCOSE mg/dL 83   CALCIUM mg/dL 8.6     Estimated Creatinine Clearance: 43.6 mL/min (by C-G formula based on SCr of 0.94 mg/dL).  Results from last 7 days   Lab Units 04/02/20  0941   ALK PHOS U/L 53   BILIRUBIN mg/dL 0.4   ALT (SGPT) U/L 10   AST (SGOT) U/L 21               Microbiology:  Microbiology Results (last 10 days)     Procedure Component Value - Date/Time    MRSA Screen, PCR (Inpatient) - Swab, Nares [[id number]]  (Normal) Collected:  04/02/20 1555    Lab Status:  Final result Specimen:  Swab from Nares Updated:  04/02/20 1739     MRSA PCR Negative    Narrative:       MRSA Negative    CORONAVIRUS-19(COVID-19),RT-PCR,[facility], NP Swab in Transport Media - Swab, Nasopharynx [[id number]] Collected:  04/02/20 1554    Lab Status:  Final result Specimen:  Swab from Nasopharynx Updated:  04/03/20 1709     Reference Lab Report --     Comment: See scanned report          COVID19 Not Detected    Rapid Strep A Screen - Swab, Throat [[id number]]  (Normal) Collected:  04/02/20 1322    Lab Status:  Final result Specimen:  Swab from Throat Updated:  04/02/20 1340     Strep A Ag Negative    Narrative:       Test performed by Direct Ant[facility] Testing.    Respiratory Panel, PCR - Swab, Nasopharynx [[id number]]  (Normal) Collected:  04/02/20 1322    Lab Status:  Final result Specimen:  Swab from Nasopharynx Updated:  04/02/20 1441     ADENOVIRUS, PCR Not Detected     Coronavirus 229E Not  Detected     Coronavirus HKU1 Not Detected     Coronavirus NL63 Not Detected     Coronavirus OC43 Not Detected     Human Metapneumovirus Not Detected     Human Rhinovirus/Enterovirus Not Detected     Influenza B PCR Not Detected     Parainfluenza Virus 1 Not Detected     Parainfluenza Virus 2 Not Detected     Parainfluenza Virus 3 Not Detected     Parainfluenza Virus 4 Not Detected     Bordetella pertussis pcr Not Detected     Influenza A H1 2009 PCR Not Detected     Chlamydophila pneumoniae PCR Not Detected     Mycoplasma pneumo by PCR Not Detected     Influenza A PCR Not Detected     Influenza A H3 Not Detected     Influenza A H1 Not Detected     RSV, PCR Not Detected     Bordetella parapertussis PCR Not Detected    Narrative:       The coronavirus on the RVP is NOT COVID-19 and is NOT indicative of infection with COVID-19.     Beta Strep Culture, Throat - Swab, Throat [[id number]]  (Normal) Collected:  04/02/20 1322    Lab Status:  Final result Specimen:  Swab from Throat Updated:  04/04/20 1218     Throat Culture, Beta Strep No Beta Hemolytic Streptococcus Isolated    Narrative:       Group A Strep incidence is low in adults. Positive culture for Beta hemolytic Streptococcus species can reflect colonization and not true infection. Please correlate clinically.    Blood Culture - Blood, Arm, Right [[id number]] Collected:  04/02/20 1006    Lab Status:  Final result Specimen:  Blood from Arm, Right Updated:  04/07/20 1031     Blood Culture No growth at 5 days    Blood Culture - Blood, Arm, Left [[id number]] Collected:  04/02/20 1006    Lab Status:  Final result Specimen:  Blood from Arm, Left Updated:  04/07/20 1031     Blood Culture No growth at 5 days          Radiology:  Xr Femur 2 View Left    Result Date: 4/2/2020  Degenerative changes.  The left hip remains well located without acute fracture of the left hip or left femur.  D:  04/02/2020 E:  04/02/2020  This report was finalized on 4/2/2020 12:16 PM by   Teja Scott.      Ct Head Without Contrast    Result Date: 4/2/2020  No acute intracranial abnormality specifically, no acute intracranial hemorrhage or extra-axial fluid collection with calvarium intact. Moderate-to-severely advanced chronic small vessel ischemic disease findings in the periventricular and deep white matter.  D:  04/02/2020 E:  04/02/2020  This report was finalized on 4/2/2020 12:16 PM by Dr. Teja Scott.      Ct Pelvis Without Contrast    Result Date: 4/2/2020  1. Bilateral pulmonary opacifications of groundglass attenuation, fairly well demarcated areas of geographic involvement left lung apex, mid lungs and within the right lower lobe medial segment concerning for atypical etiology such as viral etiology of airspace disease with at least intermediate correlation to COVID-19.  2. Large left hip body wall hematoma with surrounding soft tissue edema, however, no acute fracture of the left hip or pelvis is identified.  3. Advanced atherosclerotic involvement of peripheral vascular disease extending into the bilateral lower extremities, right slightly greater than left, however despite advanced involvement, there is at least trace flow into the posterior tibial arteries, left greater than right, with severely diminutive caliber of the right posterior tibial artery from atherosclerotic involvement and calcifications progressed in the distal superficial femoral, popliteal and trifurcation regions of the bilateral lower extremities.  3.  No acute vascular findings within the chest, abdomen or pelvis identified with atherosclerotic nonaneurysmal thoracoabdominal aorta.  D:  04/02/2020 E:  04/02/2020  This report was finalized on 4/2/2020 12:16 PM by Dr. Teja Scott.      Ct Angio Abdominal Aorta Bilateral Iliofem Runoff    Result Date: 4/2/2020  1. Bilateral pulmonary opacifications of groundglass attenuation, fairly well demarcated areas of geographic involvement left lung apex, mid lungs and within  the right lower lobe medial segment concerning for atypical etiology such as viral etiology of airspace disease with at least intermediate correlation to COVID-19.  2. Large left hip body wall hematoma with surrounding soft tissue edema, however, no acute fracture of the left hip or pelvis is identified.  3. Advanced atherosclerotic involvement of peripheral vascular disease extending into the bilateral lower extremities, right slightly greater than left, however despite advanced involvement, there is at least trace flow into the posterior tibial arteries, left greater than right, with severely diminutive caliber of the right posterior tibial artery from atherosclerotic involvement and calcifications progressed in the distal superficial femoral, popliteal and trifurcation regions of the bilateral lower extremities.  3.  No acute vascular findings within the chest, abdomen or pelvis identified with atherosclerotic nonaneurysmal thoracoabdominal aorta.  D:  04/02/2020 E:  04/02/2020  This report was finalized on 4/2/2020 12:16 PM by Dr. Teja Scott.      Xr Chest 1 View    Result Date: 4/2/2020  Increased opacifications left lung apex and right lung base from 01/10/2020 comparison examination consistent with airspace disease such as bronchopneumonia without pleural effusion.  D:  04/02/2020 E:  04/02/2020  This report was finalized on 4/2/2020 12:16 PM by Dr. Teja Scott.      Ct Angiogram Chest    Result Date: 4/2/2020  1. Bilateral pulmonary opacifications of groundglass attenuation, fairly well demarcated areas of geographic involvement left lung apex, mid lungs and within the right lower lobe medial segment concerning for atypical etiology such as viral etiology of airspace disease with at least intermediate correlation to COVID-19.  2. Large left hip body wall hematoma with surrounding soft tissue edema, however, no acute fracture of the left hip or pelvis is identified.  3. Advanced atherosclerotic involvement  of peripheral vascular disease extending into the bilateral lower extremities, right slightly greater than left, however despite advanced involvement, there is at least trace flow into the posterior tibial arteries, left greater than right, with severely diminutive caliber of the right posterior tibial artery from atherosclerotic involvement and calcifications progressed in the distal superficial femoral, popliteal and trifurcation regions of the bilateral lower extremities.  3.  No acute vascular findings within the chest, abdomen or pelvis identified with atherosclerotic nonaneurysmal thoracoabdominal aorta.  D:  04/02/2020 E:  04/02/2020  This report was finalized on 4/2/2020 12:16 PM by Dr. Teja Scott.       I personally reviewed the above CT chest    Admission EKG with QTc 486      Impression:   1. Fall at SNF, left hip hematoma: XR negative for fracture  2. Hypotension, tachycardia: improved with volume repletion. More likely dehydration rather than sepsis. Afebrile. Negative procalcitonin. WBC rapidly improved  3. Multifocal pneumonia, CAP: noted on admission chest CT. Improved. Respiratory symptoms back to baseline. MRSA screening. Urine strep and legionella antigens negative. RVP and state COVID-19 negative. No productive cough for culture. Tolerating Augmentin.   4. Multiple recent falls  5. Neutrophilic leukocytosis: rapidly improved  6. COPD: back to baseline  7. RA on methotrexate  8. Immunocompromised host  9. GERD  10. DJD  11. CKD stage 3 Estimated Creatinine Clearance: 43.6 mL/min (by C-G formula based on SCr of 0.94 mg/dL).      PLAN:   - PO augmentin 500-125 mg BID to complete 7 day course through 4/8  - ok to discharge when cleared by other teams.     I will follow. Call with questions.     Scott Mathis MD  4/7/2020

## 2020-04-07 NOTE — TELEPHONE ENCOUNTER
Pt's daughter asking for information on home health care aide, for mother when she get out of the hospital. Please advise    Daughter can be reached at [phone]

## 2020-04-08 ENCOUNTER — READMISSION MANAGEMENT (OUTPATIENT)
Dept: CALL CENTER | Facility: HOSPITAL | Age: 85
End: 2020-04-08

## 2020-04-08 VITALS
SYSTOLIC BLOOD PRESSURE: 157 MMHG | WEIGHT: 150 LBS | DIASTOLIC BLOOD PRESSURE: 78 MMHG | HEART RATE: 102 BPM | HEIGHT: 65 IN | OXYGEN SATURATION: 97 % | TEMPERATURE: 98.6 F | BODY MASS INDEX: 24.99 KG/M2 | RESPIRATION RATE: 18 BRPM

## 2020-04-08 PROBLEM — J96.01 ACUTE RESPIRATORY FAILURE WITH HYPOXIA (HCC): Status: RESOLVED | Noted: 2020-04-02 | Resolved: 2020-04-08
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
## 2020-04-08 PROBLEM — A41.9 SEPSIS (HCC): Status: RESOLVED | Noted: 2020-04-02 | Resolved: 2020-04-08

## 2020-04-08 PROBLEM — F05 DELIRIUM DUE TO ANOTHER MEDICAL CONDITION: Status: RESOLVED | Noted: 2020-04-04 | Resolved: 2020-04-08

## 2020-04-08 PROBLEM — D62 ACUTE BLOOD LOSS ANEMIA: Status: RESOLVED | Noted: 2020-04-04 | Resolved: 2020-04-08

## 2020-04-08 PROBLEM — R33.8 ACUTE URINARY RETENTION: Status: RESOLVED | Noted: 2020-04-04 | Resolved: 2020-04-08

## 2020-04-08 LAB
BASOPHILS # BLD AUTO: 0.09 10*3/MM3 (ref 0–0.2)
BASOPHILS NFR BLD AUTO: 0.9 % (ref 0–1.5)
BILIRUB UR QL STRIP: NEGATIVE
CLARITY UR: CLEAR
COLOR UR: YELLOW
DEPRECATED RDW RBC AUTO: 72.5 FL (ref 37–54)
EOSINOPHIL # BLD AUTO: 0.45 10*3/MM3 (ref 0–0.4)
EOSINOPHIL NFR BLD AUTO: 4.5 % (ref 0.3–6.2)
ERYTHROCYTE [DISTWIDTH] IN BLOOD BY AUTOMATED COUNT: 19.9 % (ref 12.3–15.4)
GLUCOSE UR STRIP-MCNC: NEGATIVE MG/DL
HCT VFR BLD AUTO: 28.6 % (ref 34–46.6)
HGB BLD-MCNC: 8.8 G/DL (ref 12–15.9)
HGB UR QL STRIP.AUTO: NEGATIVE
IMM GRANULOCYTES # BLD AUTO: 0.08 10*3/MM3 (ref 0–0.05)
IMM GRANULOCYTES NFR BLD AUTO: 0.8 % (ref 0–0.5)
KETONES UR QL STRIP: NEGATIVE
LEUKOCYTE ESTERASE UR QL STRIP.AUTO: NEGATIVE
LYMPHOCYTES # BLD AUTO: 1.44 10*3/MM3 (ref 0.7–3.1)
LYMPHOCYTES NFR BLD AUTO: 14.4 % (ref 19.6–45.3)
MCH RBC QN AUTO: 30.9 PG (ref 26.6–33)
MCHC RBC AUTO-ENTMCNC: 30.8 G/DL (ref 31.5–35.7)
MCV RBC AUTO: 100.4 FL (ref 79–97)
MONOCYTES # BLD AUTO: 1.32 10*3/MM3 (ref 0.1–0.9)
MONOCYTES NFR BLD AUTO: 13.2 % (ref 5–12)
NEUTROPHILS # BLD AUTO: 6.62 10*3/MM3 (ref 1.7–7)
NEUTROPHILS NFR BLD AUTO: 66.2 % (ref 42.7–76)
NITRITE UR QL STRIP: NEGATIVE
NRBC BLD AUTO-RTO: 0 /100 WBC (ref 0–0.2)
PH UR STRIP.AUTO: 7.5 [PH] (ref 5–8)
PLATELET # BLD AUTO: 390 10*3/MM3 (ref 140–450)
PMV BLD AUTO: 9.3 FL (ref 6–12)
PROT UR QL STRIP: ABNORMAL
RBC # BLD AUTO: 2.85 10*6/MM3 (ref 3.77–5.28)
SP GR UR STRIP: 1.02 (ref 1–1.03)
UROBILINOGEN UR QL STRIP: ABNORMAL
WBC NRBC COR # BLD: 10 10*3/MM3 (ref 3.4–10.8)

## 2020-04-08 PROCEDURE — 97535 SELF CARE MNGMENT TRAINING: CPT

## 2020-04-08 PROCEDURE — 94799 UNLISTED PULMONARY SVC/PX: CPT

## 2020-04-08 PROCEDURE — 85025 COMPLETE CBC W/AUTO DIFF WBC: CPT | Performed by: INTERNAL MEDICINE

## 2020-04-08 PROCEDURE — 81003 URINALYSIS AUTO W/O SCOPE: CPT | Performed by: PHYSICIAN ASSISTANT

## 2020-04-08 PROCEDURE — 99239 HOSP IP/OBS DSCHRG MGMT >30: CPT | Performed by: PHYSICIAN ASSISTANT

## 2020-04-08 RX ORDER — ACETAMINOPHEN 500 MG
1000 TABLET ORAL EVERY 8 HOURS
Start: 2020-04-08 | End: 2022-06-08

## 2020-04-08 RX ORDER — AMOXICILLIN AND CLAVULANATE POTASSIUM 500; 125 MG/1; MG/1
1 TABLET, FILM COATED ORAL 2 TIMES DAILY
Qty: 1 TABLET | Refills: 0 | Status: SHIPPED | OUTPATIENT
Start: 2020-04-08 | End: 2020-04-09

## 2020-04-08 RX ORDER — TAMSULOSIN HYDROCHLORIDE 0.4 MG/1
0.4 CAPSULE ORAL DAILY
Qty: 30 CAPSULE | Refills: 0 | Status: SHIPPED | OUTPATIENT
Start: 2020-04-09 | End: 2020-05-09

## 2020-04-08 RX ORDER — LIDOCAINE 50 MG/G
2 PATCH TOPICAL
Qty: 60 PATCH | Refills: 0 | Status: SHIPPED | OUTPATIENT
Start: 2020-04-09 | End: 2020-05-09

## 2020-04-08 RX ORDER — NYSTATIN 100000 U/G
1 OINTMENT TOPICAL DAILY
Qty: 15 G | Refills: 0 | Status: SHIPPED | OUTPATIENT
Start: 2020-04-09 | End: 2020-05-17

## 2020-04-08 RX ORDER — CHOLECALCIFEROL (VITAMIN D3) 125 MCG
5 CAPSULE ORAL NIGHTLY PRN
Qty: 30 EACH | Refills: 0 | Status: SHIPPED | OUTPATIENT
Start: 2020-04-08 | End: 2020-05-08

## 2020-04-08 RX ORDER — SACCHAROMYCES BOULARDII 250 MG
250 CAPSULE ORAL DAILY
Qty: 30 CAPSULE | Refills: 0 | Status: SHIPPED | OUTPATIENT
Start: 2020-04-09 | End: 2020-05-09

## 2020-04-08 RX ADMIN — MULTIPLE VITAMINS W/ MINERALS TAB 1 TABLET: TAB at 08:24

## 2020-04-08 RX ADMIN — Medication 250 MG: at 08:24

## 2020-04-08 RX ADMIN — ATORVASTATIN CALCIUM 10 MG: 10 TABLET, FILM COATED ORAL at 08:24

## 2020-04-08 RX ADMIN — BUDESONIDE AND FORMOTEROL FUMARATE DIHYDRATE 2 PUFF: 160; 4.5 AEROSOL RESPIRATORY (INHALATION) at 08:23

## 2020-04-08 RX ADMIN — AMOXICILLIN AND CLAVULANATE POTASSIUM 500 MG: 500; 125 TABLET, FILM COATED ORAL at 08:24

## 2020-04-08 RX ADMIN — CYANOCOBALAMIN TAB 1000 MCG 1000 MCG: 1000 TAB at 08:24

## 2020-04-08 RX ADMIN — NYSTATIN 1 APPLICATION: 100000 OINTMENT TOPICAL at 08:24

## 2020-04-08 RX ADMIN — DOCUSATE SODIUM 100 MG: 100 CAPSULE, LIQUID FILLED ORAL at 08:24

## 2020-04-08 RX ADMIN — TAMSULOSIN HYDROCHLORIDE 0.4 MG: 0.4 CAPSULE ORAL at 08:24

## 2020-04-08 RX ADMIN — FOLIC ACID 1000 MCG: 1 TABLET ORAL at 08:24

## 2020-04-08 RX ADMIN — FAMOTIDINE 20 MG: 20 TABLET ORAL at 08:24

## 2020-04-08 RX ADMIN — SODIUM CHLORIDE, PRESERVATIVE FREE 10 ML: 5 INJECTION INTRAVENOUS at 08:24

## 2020-04-08 NOTE — DISCHARGE PLACEMENT REQUEST
"Pati Carter (89 y.o. Female)     Date of Birth Social Security Number Address Home Phone MRN    07/26/1930  [facility] [id number] [phone] [id number]    [facility] Marital Status          None        Admission Date Admission Type Admitting Provider Attending Provider Department, Room/Bed    4/2/20 Emergency Logan Sawyer DO Shields, Daniel Alan, DO [facility] 5G, S556/1    Discharge Date Discharge Disposition Discharge Destination         Home or Self Care              Attending Provider:  Logan Sawyer DO    Allergies:  Bactrim [Sulfamethoxazole-trimethoprim], Ciprofloxacin, Levofloxacin    Isolation:  None   Infection:  None   Code Status:  No CPR    Ht:  165.1 cm (65\")   Wt:  68 kg (150 lb)    Admission Cmt:  None   Principal Problem:  Multifocal pneumonia [J18.9]                 Active Insurance as of 4/2/2020     Primary Coverage     Payor Plan Insurance Group Employer/Plan Group    MEDICARE MEDICARE A & B      Payor Plan Address Payor Plan Phone Number Payor Plan Fax Number Effective Dates    [address] [phone]  7/1/1995 - None Entered    [facility] [id number]       Subscriber Name Subscriber Birth Date Member ID       PATI CARTER 7/26/1930 [id number]           Secondary Coverage     Payor Plan Insurance Group Employer/Plan Group    [employer] SUP AAR HEALTH CARE OPTIONS      Payor Plan Address Payor Plan Phone Number Payor Plan Fax Number Effective Dates    [facility] [phone]  1/1/2016 - None Entered    [address]       [facility] [id number]       Subscriber Name Subscriber Birth Date Member ID       PATI CARTER 7/26/1930 [id number]                 Emergency Contacts      (Rel.) Home Phone Work Phone Mobile Phone    Danette Suresh (Daughter) -- -- [phone]    ANGELIQUEOLLIE JHA (Daughter) -- -- [phone]                 Discharge Summary      Tessy Barrow PA-C at 04/08/20 [facility]              [facility] " [facility]  DISCHARGE SUMMARY    Patient Name: Pati Carter  : 1930  MRN: [id number]    Date of Admission: 2020  9:21 AM  Date of Discharge:  2020  Primary Care Physician: Latoya Bolaños MD    Consults     Date and Time Order Name Status Description    4/3/2020 0030 Inpatient Infectious Diseases Consult Completed           Hospital Course     Presenting Problem:   Sepsis (CMS/HCC) [A41.9]    Active Hospital Problems    Diagnosis  POA   • **Multifocal pneumonia [J18.9]  Yes   • borderline B12 deficiency [E53.8]  Yes   • Traumatic hematoma of left hip [S70.02XA]  Yes   • Falls frequently [R29.6]  Not Applicable   • Immunosuppressed status on methotrexate [D89.9]  Yes   • Anemia [D64.9]  Yes   • COPD (chronic obstructive pulmonary disease) (CMS/HCC) [J44.9]  Yes   • Hyperlipidemia [E78.5]  Yes   • Hypertension [I10]  Yes   • Rheumatoid arthritis (CMS/[facility]) [M06.9]  Yes      Resolved Hospital Problems    Diagnosis Date Resolved POA   • Delirium due to another medical condition [F05] 2020 Yes   • Acute urinary retention [R33.8] 2020 Yes   • Acute blood loss anemia, due to bleeding into hematomas from fall [D62] 2020 Yes   • Fall at home, initial encounter [W19.XXXA, Y92.009] 2020 Not Applicable   • Sepsis (CMS/HCC) [A41.9] 2020 Yes   • Acute respiratory failure with hypoxia (CMS/[facility]) [J96.01] 2020 Yes   • Suspected Covid-19 Virus Infection [R68.89] 2020 Yes          Hospital Course:  Pati Carter is a 89 y.o. female of anemia, hypertension, hyperlipidemia, rheumatoid arthritis (on methotrexate), GERD, COPD, constipation, lumbar compression fracture, colonic polyp who was admitted from her skilled nursing facility on 2020 due to fall.  The patient had moderate left hip and left knee pain with hematoma.  On presentation, she was hypoxic at 76% requiring supplemental oxygen, tachypnea, leukocytosis.  CT angio scan of chest  aorta showed bilateral pulmonary opacifications of groudglass attenuation, fairly well demarcated areas of involvement left lung apex, mid lungs and within right lower lobe.  There was some concern for COVID-19 and she was tested, the test results was negative.  She also had a respiratory viral panel which was negative.  She was initially started on Zosyn and vancomycin and resuscitated with IV fluids.  MRSA PCR returned negative, vancomycin was discontinued.  She was initially treated with Plaquenil for presumed COVID-19, after her test result was negative, Plaquenil was discontinued.  Her urine strep and Legionella antigens were negative.  Her oxygen requirement decreased and she was transitioned to room air.  She was advised to continue inhalers, inhaled corticosteroid, long-acting beta agonist for her COPD.  She was followed by infectious disease doctors.  She was transitioned to oral Augmentin for a 7-day course.    She exhibited anemia from acute blood loss from her hematomas of the left hip and left knee.  She received 2 units of blood for symptomatic anemia.  Her hemoglobin and hematocrit responded appropriately.  Her B12 was borderline low and this was replaced intramuscularly and then transition to oral supplementation.    Her CT scan on admission of the abdomen revealed large left hip body wall hematoma with surrounding soft tissue edema, no fracture.  Bilateral lower extremities revealed advanced atherosclerotic involvement of peripheral vascular disease, right greater than left.  There was at least trace flow into the posterior tibial arteries, left greater than right, with severely diminutive caliber of the right posterior tibial artery from atherosclerotic involvement and calcifications progressed in the distal superficial femoral, popliteal and trifurcation regions of the bilateral lower extremities.    On the day of discharge the plan was for her to be going home with her daughter Danette who will  assist in her care.  She will also be going home with home health.  Her hemoglobin and hematocrit on the day of discharge were 8.8 and 28.6.  She is complaining of urinary frequency and urinalysis was done which was normal.    Discharge Follow Up Recommendations for outpatient labs/diagnostics:  Follow-up with primary care doctor within 1 week.    Day of Discharge     HPI:   Ms. Carter reports she is feeling better today.  She reports overall weakness that has improved since admission.  She denies any current cough, shortness of breath, fever, chills, abdominal pain, nausea, vomiting.      Review of Systems  Gen- No fevers, chills  CV- No chest pain, palpitations  Resp- No cough, dyspnea  GI- No N/V/D, abd pain    Vital Signs:   Temp:  [98.4 °F (36.9 °C)-98.6 °F (37 °C)] 98.6 °F (37 °C)  Heart Rate:  [] 102  Resp:  [18-20] 18  BP: (149-157)/(78-79) 157/78     Physical Exam:  Constitutional: No acute distress, awake, alert  HENT: NCAT, mucous membranes moist  Respiratory: Clear to auscultation bilaterally, respiratory effort normal   Cardiovascular: RRR, no murmurs, rubs, or gallops, palpable pedal pulses bilaterally  Gastrointestinal: Positive bowel sounds, soft, nontender, nondistended  Musculoskeletal: No bilateral ankle edema  Psychiatric: Appropriate affect, cooperative  Neurologic: Oriented x 3,  Cranial Nerves grossly intact to confrontation, speech clear  Skin: No rashes      Pertinent  and/or Most Recent Results     Results from last 7 days   Lab Units 04/08/20  0455 04/07/20  0829 04/06/20  0610 04/05/20  0541 04/05/20  0042 04/04/20  0700 04/03/20  0504 04/02/20  2219  04/02/20  0941   WBC 10*3/mm3 10.00 10.92* 8.34 10.02  --  10.88* 10.50 12.78*  --  15.63*   HEMOGLOBIN g/dL 8.8* 9.5* 7.4* 8.6* 8.6* 7.0* 7.7* 7.0*  7.0*  --  7.2*   HEMOGLOBIN, POC   --   --   --   --   --   --   --   --    < >  --    HEMATOCRIT % 28.6* 30.7* 23.5* 26.8* 27.2* 22.3* 24.5* 22.3*  22.3*  --  23.5*   HEMATOCRIT POC    --   --   --   --   --   --   --   --    < >  --    PLATELETS 10*3/mm3 390 399 310 312  --  244 216 259  --  313   SODIUM mmol/L  --   --  142 142  --  140 141  --   --  142   POTASSIUM mmol/L  --   --  3.8 3.6  --  4.1 4.2  --   --  4.1   CHLORIDE mmol/L  --   --  107 104  --  105 107  --   --  105   CO2 mmol/L  --   --  26.0 25.0  --  24.0 24.0  --   --  27.0   BUN mg/dL  --   --  14 15  --  20 23  --   --  19   CREATININE mg/dL  --   --  0.94 0.90  --  1.04* 1.03*  --   --  0.91   GLUCOSE mg/dL  --   --  83 107*  --  105* 91  --   --  171*   CALCIUM mg/dL  --   --  8.6 8.9  --  8.2* 7.8*  --   --  8.3*    < > = values in this interval not displayed.     Results from last 7 days   Lab Units 04/02/20  0941   BILIRUBIN mg/dL 0.4   ALK PHOS U/L 53   ALT (SGPT) U/L 10   AST (SGOT) U/L 21   PROTIME Seconds 14.6*   INR  1.17*           Invalid input(s): TG, LDLCALC, LDLREALC  Results from last 7 days   Lab Units 04/03/20  0504 04/02/20  0941   TSH uIU/mL  --  1.810   PROBNP pg/mL  --  1,458.0   TROPONIN T ng/mL  --  <0.010   PROCALCITONIN ng/mL 0.19 0.11   LACTATE mmol/L  --  1.9       Brief Urine Lab Results  (Last result in the past 365 days)      Color   Clarity   Blood   Leuk Est   Nitrite   Protein   CREAT   Urine HCG        04/08/20 1343 Yellow Clear Negative Negative Negative Trace               Microbiology Results Abnormal     Procedure Component Value - Date/Time    Blood Culture - Blood, Arm, Right [[id number]] Collected:  04/02/20 1006    Lab Status:  Final result Specimen:  Blood from Arm, Right Updated:  04/07/20 1031     Blood Culture No growth at 5 days    Blood Culture - Blood, Arm, Left [[id number]] Collected:  04/02/20 1006    Lab Status:  Final result Specimen:  Blood from Arm, Left Updated:  04/07/20 1031     Blood Culture No growth at 5 days    Beta Strep Culture, Throat - Swab, Throat [[id number]]  (Normal) Collected:  04/02/20 1322    Lab Status:  Final result Specimen:  Swab from Throat Updated:   04/04/20 1218     Throat Culture, Beta Strep No Beta Hemolytic Streptococcus Isolated    Narrative:       Group A Strep incidence is low in adults. Positive culture for Beta hemolytic Streptococcus species can reflect colonization and not true infection. Please correlate clinically.    CORONAVIRUS-19(COVID-19),RT-PCR,[facility], NP Swab in Transport Media - Swab, Nasopharynx [[id number]] Collected:  04/02/20 1554    Lab Status:  Final result Specimen:  Swab from Nasopharynx Updated:  04/03/20 1709     Reference Lab Report --     Comment: See scanned report          COVID19 Not Detected    MRSA Screen, PCR (Inpatient) - Swab, Nares [[id number]]  (Normal) Collected:  04/02/20 1555    Lab Status:  Final result Specimen:  Swab from Nares Updated:  04/02/20 1739     MRSA PCR Negative    Narrative:       MRSA Negative    Respiratory Panel, PCR - Swab, Nasopharynx [[id number]]  (Normal) Collected:  04/02/20 1322    Lab Status:  Final result Specimen:  Swab from Nasopharynx Updated:  04/02/20 1441     ADENOVIRUS, PCR Not Detected     Coronavirus 229E Not Detected     Coronavirus HKU1 Not Detected     Coronavirus NL63 Not Detected     Coronavirus OC43 Not Detected     Human Metapneumovirus Not Detected     Human Rhinovirus/Enterovirus Not Detected     Influenza B PCR Not Detected     Parainfluenza Virus 1 Not Detected     Parainfluenza Virus 2 Not Detected     Parainfluenza Virus 3 Not Detected     Parainfluenza Virus 4 Not Detected     Bordetella pertussis pcr Not Detected     Influenza A H1 2009 PCR Not Detected     Chlamydophila pneumoniae PCR Not Detected     Mycoplasma pneumo by PCR Not Detected     Influenza A PCR Not Detected     Influenza A H3 Not Detected     Influenza A H1 Not Detected     RSV, PCR Not Detected     Bordetella parapertussis PCR Not Detected    Narrative:       The coronavirus on the RVP is NOT COVID-19 and is NOT indicative of infection with COVID-19.     Rapid Strep A Screen - Swab, Throat [[id number]]   (Normal) Collected:  04/02/20 1322    Lab Status:  Final result Specimen:  Swab from Throat Updated:  04/02/20 1340     Strep A Ag Negative    Narrative:       Test performed by Direct Antigen Testing.          Imaging Results (All)     Procedure Component Value Units Date/Time    XR Chest 1 View [[id number]] Collected:  04/02/20 1104     Updated:  04/02/20 1219    Narrative:       EXAMINATION: XR CHEST 1 VW-04/02/2020:      INDICATION: Fall.      COMPARISON: Chest x-ray 01/10/2020.     FINDINGS: Cardiac size similar to prior with mild increased central  vascular congestion versus peribronchial inflammatory findings with  progressive opacifications at the right lung base and left lung apex,  and suprahilar region concerning for airspace disease. No pneumothorax  or pleural effusion. Degenerative changes of the spine.       Impression:       Increased opacifications left lung apex and right lung base  from 01/10/2020 comparison examination consistent with airspace disease  such as bronchopneumonia without pleural effusion.     D:  04/02/2020  E:  04/02/2020     This report was finalized on 4/2/2020 12:16 PM by Dr. Teja [place].       XR Femur 2 View Left [[id number]] Collected:  04/02/20 1103     Updated:  04/02/20 1219    Narrative:       EXAMINATION: XR FEMUR 2 VW, LEFT-04/02/2020:      INDICATION: Fall.      COMPARISON: 03/28/2020.     FINDINGS: AP and lateral views of the left femur reveal advanced  degenerative changes of the hip joint and greater trochanteric region  without acute fracture as there is no acute cortical irregularity or  cortical disruption. Distal femur unremarkable for acute fracture with  vascular calcifications in the soft tissues.           Impression:       Degenerative changes.  The left hip remains well located  without acute fracture of the left hip or left femur.     D:  04/02/2020  E:  04/02/2020     This report was finalized on 4/2/2020 12:16 PM by Dr. Teja Scott.       CT Pelvis  Without Contrast [[id number]] Collected:  04/02/20 1114     Updated:  04/02/20 1219    Narrative:       EXAMINATION: CT PELVIS WO CONTRAST-, CT ANGIOGRAM CHEST-, CT ANGIO  ABDOMINAL AORTA AND BILATERAL ILIOFEMORAL RUNOFF-04/02/2020:      INDICATION: Left hip pain.      TECHNIQUE: CT angiogram of the chest, abdomen and aortobiiliac system  with iliofemoral runoff of the bilateral lower extremities. 2-D and 3-D  reconstructions performed. CT bony pelvis performed in addition.     The radiation dose reduction device was turned on for each scan per the  ALARA (As Low as Reasonably Achievable) protocol.     COMPARISON: Chest x-ray earlier same day.     FINDINGS:      CT ANGIOGRAM CHEST, ABDOMEN AND PELVIS WITH RUNOFF:     Nonvascular evaluation with thyroid homogeneous in attenuation. No bulky  mediastinal adenopathy. Central airways are patent. Esophagus in normal  course with small hiatal hernia. Extended lung windows demonstrate  patchy groundglass attenuation in the left lung apex are fairly well  demarcated areas of groundglass attenuation and acute airspace disease  with atypical etiology considered given right middle lobe and lingular  segment involvement as well as minimal opacifications in similar  orientation of groundglass attenuation in the middle segment right lower  lobe. No pleural effusion or pneumothorax. Degenerative changes of the  spine without displaced rib fracture. No chest wall hematoma. Liver,  pancreas, spleen, adrenals and kidneys unremarkable for acute findings  with parapelvic cysts noted on the left kidney and left renal cortical  cyst without hydronephrosis or hydroureter. No bulky retroperitoneal  adenopathy. GI tract evaluation without mechanical obstructive process  or focal thickening. Sigmoid diverticulosis without evidence for acute  diverticulitis. No free fluid or intra-abdominal free air. Pelvic  viscera are grossly unremarkable without bulky pelvic adenopathy or free  fluid. Soft  tissue body wall findings demonstrates a large moderately  hyperattenuated blood products in attenuation focus left hip soft  tissues adjacent to the gluteus musculature measuring up to 12 x 5 cm  consistent with hematoma and adjacent edema.     VASCULAR: Cardiac size within normal limits. Patent central pulmonary  arterial vasculature. Atherosclerotic nonaneurysmal thoracic aorta with  mild atherosclerotic involvement and mild luminal stenosis from  calcified and noncalcified hard and soft plaque formation descending  thoracic aorta. Atherosclerotic patent nonaneurysmal abdominal aorta  with celiac and SMA origins patent as well as HERNAN patent. Renal arteries  patent with at least moderate-to-severe right and moderate left  atherosclerotic involvement of calcific burden at the ostia. Common and  external iliacs demonstrate mild atherosclerotic involvement with  calcific burden without focal severe stenosis or occlusion. Common  femoral, superficial femoral and popliteal arteries demonstrate up to  moderate atherosclerotic involvement without hemodynamically significant  stenosis, left slightly greater than right. Trifurcations bilaterally  demonstrate advanced atherosclerotic involvement with limited  opacification of the bilateral runoff vessels including limited  opacifications of the anterior and perineal arteries right along with  progressive loss of opacification right posterior tibial artery. Left  trifurcation has some involvement of atherosclerotic calcific burden  with limited opacification in the peroneal and anterior tibial arteries  with limited but present opacification of the left posterior tibial  artery to the level of the ankle with plantar branches severely  diminished, however, patency is observed minimally bilaterally.     CT BONY PELVIS: SI joints symmetric and without widening. No displaced  pelvic ring fracture. Right hip hardware remains in place without acute  fracture. Left hip without  acute fracture demonstrating degenerative  changes of the greater trochanteric region and DJD of the left hip joint  without acute cortical irregularity evident.       Impression:       1. Bilateral pulmonary opacifications of groundglass attenuation, fairly  well demarcated areas of geographic involvement left lung apex, mid  lungs and within the right lower lobe medial segment concerning for  atypical etiology such as viral etiology of airspace disease with at  least intermediate correlation to COVID-19.     2. Large left hip body wall hematoma with surrounding soft tissue edema,  however, no acute fracture of the left hip or pelvis is identified.     3. Advanced atherosclerotic involvement of peripheral vascular disease  extending into the bilateral lower extremities, right slightly greater  than left, however despite advanced involvement, there is at least trace  flow into the posterior tibial arteries, left greater than right, with  severely diminutive caliber of the right posterior tibial artery from  atherosclerotic involvement and calcifications progressed in the distal  superficial femoral, popliteal and trifurcation regions of the bilateral  lower extremities.     3.  No acute vascular findings within the chest, abdomen or pelvis  identified with atherosclerotic nonaneurysmal thoracoabdominal aorta.     D:  04/02/2020  E:  04/02/2020     This report was finalized on 4/2/2020 12:16 PM by Dr. Teja [place].       CT Angiogram Chest [[id number]] Collected:  04/02/20 1114     Updated:  04/02/20 1219    Narrative:       EXAMINATION: CT PELVIS WO CONTRAST-, CT ANGIOGRAM CHEST-, CT ANGIO  ABDOMINAL AORTA AND BILATERAL ILIOFEMORAL RUNOFF-04/02/2020:      INDICATION: Left hip pain.      TECHNIQUE: CT angiogram of the chest, abdomen and aortobiiliac system  with iliofemoral runoff of the bilateral lower extremities. 2-D and 3-D  reconstructions performed. CT bony pelvis performed in addition.     The radiation dose  reduction device was turned on for each scan per the  ALARA (As Low as Reasonably Achievable) protocol.     COMPARISON: Chest x-ray earlier same day.     FINDINGS:      CT ANGIOGRAM CHEST, ABDOMEN AND PELVIS WITH RUNOFF:     Nonvascular evaluation with thyroid homogeneous in attenuation. No bulky  mediastinal adenopathy. Central airways are patent. Esophagus in normal  course with small hiatal hernia. Extended lung windows demonstrate  patchy groundglass attenuation in the left lung apex are fairly well  demarcated areas of groundglass attenuation and acute airspace disease  with atypical etiology considered given right middle lobe and lingular  segment involvement as well as minimal opacifications in similar  orientation of groundglass attenuation in the middle segment right lower  lobe. No pleural effusion or pneumothorax. Degenerative changes of the  spine without displaced rib fracture. No chest wall hematoma. Liver,  pancreas, spleen, adrenals and kidneys unremarkable for acute findings  with parapelvic cysts noted on the left kidney and left renal cortical  cyst without hydronephrosis or hydroureter. No bulky retroperitoneal  adenopathy. GI tract evaluation without mechanical obstructive process  or focal thickening. Sigmoid diverticulosis without evidence for acute  diverticulitis. No free fluid or intra-abdominal free air. Pelvic  viscera are grossly unremarkable without bulky pelvic adenopathy or free  fluid. Soft tissue body wall findings demonstrates a large moderately  hyperattenuated blood products in attenuation focus left hip soft  tissues adjacent to the gluteus musculature measuring up to 12 x 5 cm  consistent with hematoma and adjacent edema.     VASCULAR: Cardiac size within normal limits. Patent central pulmonary  arterial vasculature. Atherosclerotic nonaneurysmal thoracic aorta with  mild atherosclerotic involvement and mild luminal stenosis from  calcified and noncalcified hard and soft plaque  formation descending  thoracic aorta. Atherosclerotic patent nonaneurysmal abdominal aorta  with celiac and SMA origins patent as well as HERNAN patent. Renal arteries  patent with at least moderate-to-severe right and moderate left  atherosclerotic involvement of calcific burden at the ostia. Common and  external iliacs demonstrate mild atherosclerotic involvement with  calcific burden without focal severe stenosis or occlusion. Common  femoral, superficial femoral and popliteal arteries demonstrate up to  moderate atherosclerotic involvement without hemodynamically significant  stenosis, left slightly greater than right. Trifurcations bilaterally  demonstrate advanced atherosclerotic involvement with limited  opacification of the bilateral runoff vessels including limited  opacifications of the anterior and perineal arteries right along with  progressive loss of opacification right posterior tibial artery. Left  trifurcation has some involvement of atherosclerotic calcific burden  with limited opacification in the peroneal and anterior tibial arteries  with limited but present opacification of the left posterior tibial  artery to the level of the ankle with plantar branches severely  diminished, however, patency is observed minimally bilaterally.     CT BONY PELVIS: SI joints symmetric and without widening. No displaced  pelvic ring fracture. Right hip hardware remains in place without acute  fracture. Left hip without acute fracture demonstrating degenerative  changes of the greater trochanteric region and DJD of the left hip joint  without acute cortical irregularity evident.       Impression:       1. Bilateral pulmonary opacifications of groundglass attenuation, fairly  well demarcated areas of geographic involvement left lung apex, mid  lungs and within the right lower lobe medial segment concerning for  atypical etiology such as viral etiology of airspace disease with at  least intermediate correlation to  COVID-19.     2. Large left hip body wall hematoma with surrounding soft tissue edema,  however, no acute fracture of the left hip or pelvis is identified.     3. Advanced atherosclerotic involvement of peripheral vascular disease  extending into the bilateral lower extremities, right slightly greater  than left, however despite advanced involvement, there is at least trace  flow into the posterior tibial arteries, left greater than right, with  severely diminutive caliber of the right posterior tibial artery from  atherosclerotic involvement and calcifications progressed in the distal  superficial femoral, popliteal and trifurcation regions of the bilateral  lower extremities.     3.  No acute vascular findings within the chest, abdomen or pelvis  identified with atherosclerotic nonaneurysmal thoracoabdominal aorta.     D:  04/02/2020  E:  04/02/2020     This report was finalized on 4/2/2020 12:16 PM by Dr. Teja Scott.       CT Head Without Contrast [[id number]] Collected:  04/02/20 1111     Updated:  04/02/20 1219    Narrative:       EXAMINATION: CT HEAD WO CONTRAST-04/02/2020:      INDICATION: Fall, hit head.      TECHNIQUE: CT head without intravenous contrast.     The radiation dose reduction device was turned on for each scan per the  ALARA (As Low as Reasonably Achievable) protocol.     COMPARISON: NONE.     FINDINGS: The midline structures are symmetric without evidence of mass,  mass effect or midline shift. Ventricles and sulci demonstrate mild  prominence of the sulci and generalized atrophy without focal atrophic  findings. Moderate to severe low-attenuation regions in the  periventricular and deep white matter suggesting moderate-to-severe  advanced chronic small vessel ischemic disease. No intra-axial  hemorrhage or extra-axial fluid collection. Globes and orbits are  unremarkable. The visualized paranasal sinuses and mastoid air cells are  grossly clear and well pneumatized. Calvarium intact.        Impression:       No acute intracranial abnormality specifically, no acute  intracranial hemorrhage or extra-axial fluid collection with calvarium  intact. Moderate-to-severely advanced chronic small vessel ischemic  disease findings in the periventricular and deep white matter.     D:  04/02/2020  E:  04/02/2020     This report was finalized on 4/2/2020 12:16 PM by Dr. Teja Scott.       CT Angio Abdominal Aorta Bilateral Iliofem Runoff [[id number]] Collected:  04/02/20 1114     Updated:  04/02/20 1219    Narrative:       EXAMINATION: CT PELVIS WO CONTRAST-, CT ANGIOGRAM CHEST-, CT ANGIO  ABDOMINAL AORTA AND BILATERAL ILIOFEMORAL RUNOFF-04/02/2020:      INDICATION: Left hip pain.      TECHNIQUE: CT angiogram of the chest, abdomen and aortobiiliac system  with iliofemoral runoff of the bilateral lower extremities. 2-D and 3-D  reconstructions performed. CT bony pelvis performed in addition.     The radiation dose reduction device was turned on for each scan per the  ALARA (As Low as Reasonably Achievable) protocol.     COMPARISON: Chest x-ray earlier same day.     FINDINGS:      CT ANGIOGRAM CHEST, ABDOMEN AND PELVIS WITH RUNOFF:     Nonvascular evaluation with thyroid homogeneous in attenuation. No bulky  mediastinal adenopathy. Central airways are patent. Esophagus in normal  course with small hiatal hernia. Extended lung windows demonstrate  patchy groundglass attenuation in the left lung apex are fairly well  demarcated areas of groundglass attenuation and acute airspace disease  with atypical etiology considered given right middle lobe and lingular  segment involvement as well as minimal opacifications in similar  orientation of groundglass attenuation in the middle segment right lower  lobe. No pleural effusion or pneumothorax. Degenerative changes of the  spine without displaced rib fracture. No chest wall hematoma. Liver,  pancreas, spleen, adrenals and kidneys unremarkable for acute findings  with  parapelvic cysts noted on the left kidney and left renal cortical  cyst without hydronephrosis or hydroureter. No bulky retroperitoneal  adenopathy. GI tract evaluation without mechanical obstructive process  or focal thickening. Sigmoid diverticulosis without evidence for acute  diverticulitis. No free fluid or intra-abdominal free air. Pelvic  viscera are grossly unremarkable without bulky pelvic adenopathy or free  fluid. Soft tissue body wall findings demonstrates a large moderately  hyperattenuated blood products in attenuation focus left hip soft  tissues adjacent to the gluteus musculature measuring up to 12 x 5 cm  consistent with hematoma and adjacent edema.     VASCULAR: Cardiac size within normal limits. Patent central pulmonary  arterial vasculature. Atherosclerotic nonaneurysmal thoracic aorta with  mild atherosclerotic involvement and mild luminal stenosis from  calcified and noncalcified hard and soft plaque formation descending  thoracic aorta. Atherosclerotic patent nonaneurysmal abdominal aorta  with celiac and SMA origins patent as well as HERNAN patent. Renal arteries  patent with at least moderate-to-severe right and moderate left  atherosclerotic involvement of calcific burden at the ostia. Common and  external iliacs demonstrate mild atherosclerotic involvement with  calcific burden without focal severe stenosis or occlusion. Common  femoral, superficial femoral and popliteal arteries demonstrate up to  moderate atherosclerotic involvement without hemodynamically significant  stenosis, left slightly greater than right. Trifurcations bilaterally  demonstrate advanced atherosclerotic involvement with limited  opacification of the bilateral runoff vessels including limited  opacifications of the anterior and perineal arteries right along with  progressive loss of opacification right posterior tibial artery. Left  trifurcation has some involvement of atherosclerotic calcific burden  with limited  opacification in the peroneal and anterior tibial arteries  with limited but present opacification of the left posterior tibial  artery to the level of the ankle with plantar branches severely  diminished, however, patency is observed minimally bilaterally.     CT BONY PELVIS: SI joints symmetric and without widening. No displaced  pelvic ring fracture. Right hip hardware remains in place without acute  fracture. Left hip without acute fracture demonstrating degenerative  changes of the greater trochanteric region and DJD of the left hip joint  without acute cortical irregularity evident.       Impression:       1. Bilateral pulmonary opacifications of groundglass attenuation, fairly  well demarcated areas of geographic involvement left lung apex, mid  lungs and within the right lower lobe medial segment concerning for  atypical etiology such as viral etiology of airspace disease with at  least intermediate correlation to COVID-19.     2. Large left hip body wall hematoma with surrounding soft tissue edema,  however, no acute fracture of the left hip or pelvis is identified.     3. Advanced atherosclerotic involvement of peripheral vascular disease  extending into the bilateral lower extremities, right slightly greater  than left, however despite advanced involvement, there is at least trace  flow into the posterior tibial arteries, left greater than right, with  severely diminutive caliber of the right posterior tibial artery from  atherosclerotic involvement and calcifications progressed in the distal  superficial femoral, popliteal and trifurcation regions of the bilateral  lower extremities.     3.  No acute vascular findings within the chest, abdomen or pelvis  identified with atherosclerotic nonaneurysmal thoracoabdominal aorta.     D:  04/02/2020  E:  04/02/2020     This report was finalized on 4/2/2020 12:16 PM by Dr. Teja Scott.             Results for orders placed in visit on 12/20/16   SCANNED VASCULAR  STUDIES       Results for orders placed in visit on 12/20/16   SCANNED VASCULAR STUDIES            Plan for Follow-up of Pending Labs/Results:     Discharge Details        Discharge Medications      New Medications      Instructions Start Date   amoxicillin-clavulanate 500-125 MG per tablet  Commonly known as:  AUGMENTIN   500 mg, Oral, 2 Times Daily      cyanocobalamin 1000 MCG tablet  Commonly known as:  VITAMIN B-12   1,000 mcg, Oral, Daily   Start Date:  April 9, 2020     lidocaine 5 %  Commonly known as:  LIDODERM   2 patches, Transdermal, Every 24 Hours Scheduled, Remove & Discard patch within 12 hours or as directed by MD   Start Date:  April 9, 2020     melatonin 5 MG tablet tablet   5 mg, Oral, Nightly PRN      polyethylene glycol pack packet  Commonly known as:  MIRALAX   17 g, Oral, Daily PRN      saccharomyces boulardii 250 MG capsule  Commonly known as:  FLORASTOR   250 mg, Oral, Daily   Start Date:  April 9, 2020     tamsulosin 0.4 MG capsule 24 hr capsule  Commonly known as:  FLOMAX   0.4 mg, Oral, Daily, For urinary retention   Start Date:  April 9, 2020        Changes to Medications      Instructions Start Date   acetaminophen 500 MG tablet  Commonly known as:  TYLENOL  What changed:    · medication strength  · how much to take  · when to take this  · reasons to take this   1,000 mg, Oral, Every 8 Hours      nystatin [id number] UNIT/GM ointment  Commonly known as:  MYCOSTATIN  What changed:  additional instructions   1 application, Topical, Daily, Apply to groin   Start Date:  April 9, 2020        Continue These Medications      Instructions Start Date   albuterol sulfate  (90 Base) MCG/ACT inhaler  Commonly known as:  PROVENTIL HFA;VENTOLIN HFA;PROAIR HFA   2 puffs, Inhalation, Every 4 Hours PRN      aspirin 81 MG EC tablet   81 mg, Oral, Daily      Breo Ellipta 100-25 MCG/INH inhaler  Generic drug:  Fluticasone Furoate-Vilanterol   2 puffs, Inhalation, Daily - RT      CENTRUM SILVER ADULT 50+  PO   1 tablet, Oral, Daily      Colace 100 MG capsule  Generic drug:  docusate sodium   100 mg, Oral, Daily      famotidine 20 MG tablet  Commonly known as:  PEPCID   20 mg, Oral, 2 Times Daily      folic acid 1 MG tablet  Commonly known as:  FOLVITE   1 tablet, Oral, Daily      simvastatin 20 MG tablet  Commonly known as:  ZOCOR   20 mg, Oral, Nightly         Stop These Medications    diclofenac 1 % gel gel  Commonly known as:  VOLTAREN     meloxicam 7.5 MG tablet  Commonly known as:  MOBIC     methotrexate 2.5 MG tablet     traMADol 50 MG tablet  Commonly known as:  ULTRAM            Allergies   Allergen Reactions   • Bactrim [Sulfamethoxazole-Trimethoprim] Other (See Comments)     Stomach cramps    • Ciprofloxacin Other (See Comments)     Other reaction(s): shaking  HCI TABS/ SHAKING   • Levofloxacin Diarrhea         Discharge Disposition:  Home or Self Care    Diet:  Hospital:  Diet Order   Procedures   • Diet Regular       Activity:  Activity Instructions     Activity as Tolerated      Up WIth Assist            Restrictions or Other Recommendations:  none        CODE STATUS:    Code Status and Medical Interventions:   Ordered at: 04/02/20 1517     Limited Support to NOT Include:    Intubation    NIPPV (Non-Invasive Positive Pressure Support)    Cardioversion/Defibrillation     Code Status:    No CPR     Medical Interventions (Level of Support Prior to Arrest):    Limited       Future Appointments   Date Time Provider Department Center   4/23/2020  1:50 PM Karlos Blount MD [facility] None   5/6/2020  1:30 PM Milly Bolaños MD [facility] None       Additional Instructions for the Follow-ups that You Need to Schedule     Ambulatory Referral to Home Health   As directed      Face to Face Visit Date:  4/8/2020    Follow-up provider for Plan of Care?:  I treated the patient in an acute care facility and will not continue treatment after discharge.    Follow-up provider:  MILLY BOLAÑOS [[id number]]     Reason/Clinical Findings:  Post hospital stay for pneumonia, acute respiratory failure with hypoxia, frequent falls    Describe mobility limitations that make leaving home difficult:  weakness, incontinence, rhematoid arthritis, traumatic hematoma of the left hip    Nursing/Therapeutic Services Requested:  Skilled Nursing (bathing assist) Physical Therapy Occupational Therapy    Skilled nursing orders:  Medication education COPD management Cardiopulmonary assessments    PT orders:  Therapeutic exercise Transfer training Strengthening Home safety assessment    Occupational orders:  Activities of daily living Strengthening Cognition Home safety assessment         Discharge Follow-up with PCP   As directed       Currently Documented PCP:    Latoya Bolaños MD    PCP Phone Number:    [phone]     Follow Up Details:  Follow up with PCP to assure improvement of all symptoms in 1 month.  Recommend follow up with doctor who prescribes Methotrexate.  Due to recent infection, this admission, we have held your methotrexate.  you should discuss when to restart this.               Time Spent on Discharge:  60 minutes    Electronically signed by Tessy Barrow PA-C, 04/08/20, 11:29 AM.        Electronically signed by Tessy Barrow PA-C at 04/08/20 6133

## 2020-04-08 NOTE — TELEPHONE ENCOUNTER
Selena states the hospital called and is discharging Pati today .    They scheduled her for a telephone visits 1 month from discharge  5/6/2020.  The nurse from [facility] did not think Dr Bolaños could do a video visit.

## 2020-04-08 NOTE — PLAN OF CARE
Problem: Patient Care Overview  Goal: Plan of Care Review  Flowsheets (Taken 4/8/2020 2455)  Outcome Summary: Pt alert and cooperative with therapy. Improved to Min A for supine to sit EOB. Pt actively engages in morning ADL tasks. Min UB dressing. Set-up simple grooming/self-feeding. Remains dependent for toileting needs. Up to chair with assist x1. OT will continue to follow IP. Recommend SNF at d/c for best outcome.

## 2020-04-08 NOTE — THERAPY TREATMENT NOTE
Acute Care - Occupational Therapy Treatment Note  [facility]     Patient Name: Pati Carter  : 1930  MRN: [id number]  Today's Date: 2020             Admit Date: 2020       ICD-10-CM ICD-9-CM   1. Severe sepsis (CMS/HCC) A41.9 038.9    R65.20 995.92   2. Pneumonia of both lungs due to infectious organism, unspecified part of lung J18.9 483.8   3. Leukocytosis, unspecified type D72.829 288.60   4. Anemia, unspecified type D64.9 285.9   5. Hypotension, unspecified hypotension type I95.9 458.9   6. Dizziness R42 780.4   7. Syncope, unspecified syncope type R55 780.2   8. Traumatic hematoma of buttock, initial encounter S30.0XXA 922.32   9. Contusion of left lower extremity, initial encounter S80.12XA 924.5   10. Impaired mobility and ADLs Z74.09 799.89   11. Falls frequently R29.6 V15.88     Patient Active Problem List   Diagnosis   • Macular degeneration   • Rheumatoid arthritis (CMS/HCC)   • Hearing loss   • Hyperlipidemia   • Gastroesophageal reflux disease   • Hypertension   • Constipation   • Anemia   • Compression fracture of lumbar vertebra (CMS/HCC)   • Degeneration of intervertebral disc of lumbar region   • COPD (chronic obstructive pulmonary disease) (CMS/HCC)   • H/O calcium pyrophosphate deposition disease (CPPD)   • Long term methotrexate user   • Femur fracture, right (CMS/HCC)   • Falls frequently   • Fall at home, initial encounter   • Sepsis (CMS/HCC)   • Multifocal pneumonia   • Immunosuppressed status on methotrexate   • Acute respiratory failure with hypoxia (CMS/HCC)   • borderline B12 deficiency   • Delirium due to another medical condition   • Acute urinary retention   • Acute blood loss anemia, due to bleeding into hematomas from fall   • Traumatic hematoma of left hip     Past Medical History:   Diagnosis Date   • Anemia     Description: A.  Dx - borderline intermittent.   • Back pain    • Benign colonic polyp     Description: A.  Dx .   • Benign colonic polyp  9/28/2016    Description: A.  Dx 1999.   • Compression fracture of lumbar vertebra (CMS/[facility])    • Constipation    • COPD (chronic obstructive pulmonary disease) (CMS/[facility])     Description: A.  Rule out chronic persistent asthma, COPD, or obliterative bronchiolitis.- Rae   • Degeneration of intervertebral disc of lumbar region     Description: A.  Diagnosed in April 2013 with advanced multilevel with severe spinal stenosis, followed by Dr. Pillai for pain management.   • Gastroesophageal reflux disease    • Hearing loss    • History of colonoscopy 01/01/1999    NORMAL PER PATIENT    • History of mammogram 01/01/2011    NORMAL PER PT    • History of Papanicolaou smear of cervix 01/01/2010    NORMAL PER PT    • History of varicella    • Hyperlipidemia     Description: A.  Dx 2006.   • Hypertension     Description: A. Dx 2001.   • Macular degeneration    • Ovarian mass     Dx 8/15- benign left cystic adnexal mass   • Rheumatoid arthritis (CMS/[facility])     Description: A.  Diagnosed in 2000 and and followed by Dr. Constantino (now Dr. Kaplan). B.  On methotrexate therapy since 2000. C.  Off low-dose prednisone therapy.     Past Surgical History:   Procedure Laterality Date   • APPENDECTOMY     • CARPAL TUNNEL RELEASE Left 01/01/2003    HISTORY OF NEUROPLASTY DECOMPRESSION MEDIAN NERVE AT CARPAL TUNNEL LEFT   • CATARACT EXTRACTION Bilateral 01/01/2009   • CHOLECYSTECTOMY  01/01/1962   • HIP CANNULATED SCREW PLACEMENT Right 1/25/2020    Procedure: HIP CANNULATED SCREW PLACEMENT RIGHT;  Surgeon: Karlos Blount MD;  Location: [facility];  Service: Orthopedics   • KNEE ARTHROSCOPY Left 01/01/2001    MENISCAL REPAIR   • KYPHOPLASTY  06/18/2015    T11 AND L1 (JOSE A)   • PELVIC LAPAROSCOPY  01/01/1996    REMOVAL OF BENIGN UTERINE AND RIGHT OVARIAN TUMORS   • SALPINGO OOPHORECTOMY Left 08/26/2015    REMOVAL OF LEFT OVARY AND TUBE (benign cystic mass)       Therapy Treatment    Rehabilitation Treatment Summary     Row Name  04/08/20 0755             Treatment Time/Intention    Discipline  occupational therapist  -TB      Document Type  therapy note (daily note)  -TB      Subjective Information  complains of;weakness;pain incontinence  -TB      Mode of Treatment  individual therapy  -TB      Patient Effort  good  -TB      Existing Precautions/Restrictions  fall  -TB      Treatment Considerations/Comments  Don brief for mobility  -TB      Recorded by [TB] Anh Brennan OT 04/08/20 0910      Row Name 04/08/20 0755             Vital Signs    Pre Systolic BP Rehab  -- RN cleared OT  -TB      O2 Delivery Post Treatment  room air  -TB      Pre Patient Position  Supine  -TB      Intra Patient Position  Standing  -TB      Post Patient Position  Sitting  -TB      Recorded by [TB] Anh Brennan, OT 04/08/20 0910      Row Name 04/08/20 0755             Cognitive Assessment/Intervention- PT/OT    Affect/Mental Status (Cognitive)  confused  -TB      Behavioral Issues (Cognitive)  difficulty managing stress  -TB      Orientation Status (Cognition)  oriented x 3  -TB      Follows Commands (Cognition)  follows one step commands;75-90% accuracy  -TB      Cognitive Assessment/Intervention Comment  alert, cooperative; follows commands  -TB      Recorded by [TB] Anh Brennan, OT 04/08/20 0910      Row Name 04/08/20 0755             Safety Issues, Functional Mobility    Safety Issues Affecting Function (Mobility)  insight into deficits/self awareness;safety precaution awareness;safety precautions follow-through/compliance  -TB      Impairments Affecting Function (Mobility)  balance;endurance/activity tolerance;pain;strength;range of motion (ROM)  -TB      Recorded by [TB] Anh Brennan OT 04/08/20 0910      Row Name 04/08/20 0755             Bed Mobility Assessment/Treatment    Rolling Left [place] (Bed Mobility)  moderate assist (50% patient effort);verbal cues  -TB      Rolling Right [place] (Bed Mobility)   minimum assist (75% patient effort);verbal cues  -TB      Scooting/Bridging [place] (Bed Mobility)  minimum assist (75% patient effort);verbal cues  -TB      Supine-Sit [place] (Bed Mobility)  minimum assist (75% patient effort);verbal cues  -TB      Assistive Device (Bed Mobility)  bed rails  -TB      Comment (Bed Mobility)  Rolling for hygiene/bathing following incontinent episode; cues and assist to come to sit EOB  -TB      Recorded by [TB] Anh Brennan OT 04/08/20 0910      Row Name 04/08/20 0755             Functional Mobility    Functional Mobility- Ind. Level  minimum assist (75% patient effort);verbal cues required  -TB      Functional Mobility- Device  rolling walker  -TB      Functional Mobility-Distance (Feet)  3  -TB      Functional Mobility- Safety Issues  step length decreased;weight-shifting ability decreased;balance decreased during turns  -TB      Functional Mobility- Comment  EOB to [facility]; defer distance to PT  -TB      Recorded by [TB] Anh Brennan OT 04/08/20 0910      Row Name 04/08/20 0755             Bed-Chair Transfer    Bed-Chair [place] (Transfers)  minimum assist (75% patient effort);verbal cues  -TB      Assistive Device (Bed-Chair Transfers)  walker, front-wheeled  -TB      Recorded by [TB] Anh Brennan OT 04/08/20 0910      Row Name 04/08/20 0755             Sit-Stand Transfer    Sit-Stand [place] (Transfers)  contact guard;verbal cues  -TB      Assistive Device (Sit-Stand Transfers)  walker, front-wheeled  -TB      Recorded by [TB] Anh Brennan OT 04/08/20 0910      Row Name 04/08/20 0755             ADL Assessment/Intervention    33396 - OT Self Care/Mgmt Minutes  25  -TB      BADL Assessment/Intervention  upper body dressing;lower body dressing;grooming;feeding;toileting  -TB      Recorded by [TB] Anh Brennan OT 04/08/20 0910      Row Name 04/08/20 0755             Upper Body Dressing Assessment/Training     "Upper Body Dressing [place] Level  doff;don;front opening garment;minimum assist (75% patient effort);verbal cues  -TB      Upper Body Dressing Position  edge of bed sitting  -TB      Comment (Upper Body Dressing)  assist to doff soiled gown and don clean; cues attend to task and  to orient to gown   -TB      Recorded by [TB] Anh Brennan, OT 04/08/20 0910      Row Name 04/08/20 0755             Lower Body Dressing Assessment/Training    Lower Body Dressing [place] Level  don;socks;dependent (less than 25% patient effort)  -TB      Recorded by [TB] Anh Brennan, OT 04/08/20 0910      Row Name 04/08/20 0755             Grooming Assessment/Training    [place] Level (Grooming)  set up;wash face, hands;minimum assist (75% patient effort);hair care, combing/brushing  -TB      Grooming Position  edge of bed sitting  -TB      Recorded by [TB] Anh Brennan, OT 04/08/20 0910      Row Name 04/08/20 0755             Self-Feeding Assessment/Training    [place] Level (Feeding)  set up;prepare tray/open items;supervision;scoop food and bring to mouth;liquids to mouth  -TB      Self-Feeding Assess/Train, Spillage Amount  minimal  -TB      Comment (Feeding)  Pt reports diplopia; \"I see two forks and then I spill my food\"; improved with closing 1 eye; RN notified  -TB      Recorded by [TB] Anh Brennan, OT 04/08/20 0910      Row Name 04/08/20 0755             Toileting Assessment/Training    [place] Level (Toileting)  dependent (less than 25% patient effort)  -TB      Comment (Toileting)  incontinent; purwick, brief  -TB      Recorded by [TB] Anh Brennan, OT 04/08/20 0910      Row Name 04/08/20 0755             BADL Safety/Performance    Impairments, BADL Safety/Performance  cognition;balance;endurance/activity tolerance;pain;range of motion;strength  -TB      Cognitive Impairments, BADL Safety/Performance  attention;insight into deficits/self " awareness;safety precaution awareness;safety precaution follow-through  -TB      Skilled BADL Treatment/Intervention  BADL process/adaptation training  -TB      Recorded by [TB] Anh Brennan, OT 04/08/20 0910      Row Name 04/08/20 0755             Motor Skills Assessment/Interventions    Additional Documentation  Balance (Group)  -TB      Recorded by [TB] Anh Brennan, OT 04/08/20 0910      Row Name 04/08/20 0755             Dynamic Sitting Balance    Level of [place], Reaches Outside Midline (Sitting, Dynamic Balance)  standby assist  -TB      Sitting Position, Reaches Outside Midline (Sitting, Dynamic Balance)  sitting in chair;sitting on edge of bed  -TB      Comment, Reaches Outside Midline (Sitting, Dynamic Balance)  ADL tasks  -TB      Recorded by [TB] Anh Brennan, OT 04/08/20 0910      Row Name 04/08/20 0755             Dynamic Standing Balance    Level of [place], Reaches Outside Midline (Standing, Dynamic Balance)  minimal assist, 75% patient effort  -TB      Assistive Device Utilized (Supported Standing, Dynamic Balance)  walker, rolling  -TB      Comment, Reaches Outside Midline (Standing, Dynamic Balance)  transfer training; STS reps x3 to support LB dressing/toileting ADL performance  -TB      Recorded by [TB] Anh Brennan, OT 04/08/20 0910      Row Name 04/08/20 0752             Positioning and Restraints    Pre-Treatment Position  in bed  -TB      Post Treatment Position  chair  -TB      In Chair  reclined;call light within reach;encouraged to call for assist;exit alarm on;with nsg RN present as OT exiting  -TB      Recorded by [TB] Anh Brennan, OT 04/08/20 0910      Row Name 04/08/20 0758             Pain Assessment    Additional Documentation  Pain Scale: Word Pre/Post-Treatment (Group)  -TB      Recorded by [TB] Anh Brennan OT 04/08/20 0910      Row Name 04/08/20 0755             Pain Scale: Numbers Pre/Post-Treatment     "Pain Location - Orientation  generalized  -TB      Pre/Post Treatment Pain Comment  \"I always have pain\"  -TB      Pain Intervention(s)  Ambulation/increased activity;Repositioned  -TB      Recorded by [TB] Anh Brennan, OT 04/08/20 0910      Row Name 04/08/20 0755             Pain Scale: Word Pre/Post-Treatment    Pain: Word Scale, Pretreatment  4 - moderate pain  -TB      Pain: Word Scale, Post-Treatment  4 - moderate pain  -TB      Pre/Post Treatment Pain Comment  Pt tolerates OOB activity/positioning  -TB      Recorded by [TB] Anh Brennan, OT 04/08/20 0910      Row Name 04/08/20 0755             Vision Assessment/Intervention    Visual Impairment/Limitations  diplopia  -TB      Recorded by [TB] Anh Brennan, OT 04/08/20 0910      Row Name                Wound 04/05/20 0610 Bilateral perirectal MASD (Moisture associated skin damage)    Wound - Properties Group Date first assessed: 04/05/20 [TH] Time first assessed: 0610 [TH] Side: Bilateral [TH] Location: perirectal [TH] Primary Wound Type: MASD [TH] Recorded by:  [TH] Danielle Carlisle RN 04/05/20 0610    Row Name 04/08/20 0755             Coping    Observed Emotional State  accepting;cooperative  -TB      Verbalized Emotional State  acceptance  -TB      Recorded by [TB] Anh Brennan, OT 04/08/20 0910      Row Name 04/08/20 0755             Plan of Care Review    Plan of Care Reviewed With  patient  -TB      Recorded by [TB] Anh Brennan, OT 04/08/20 0910      Row Name 04/08/20 0755             Outcome Summary/Treatment Plan (OT)    Daily Summary of Progress (OT)  progress toward functional goals as expected  -TB      Barriers to Overall Progress (OT)  cognition  -TB      Plan for Continued Treatment (OT)  per POC  -TB      Anticipated Discharge Disposition (OT)  skilled nursing facility  -TB      Recorded by [TB] Anh Brennan, OT 04/08/20 0910        User Key  (r) = Recorded By, (t) = Taken By, (c) " = Cosigned By    Initials Name Effective Dates Discipline    TB SadafrosendaAnhne, OT 06/08/18 -  OT    TH Danielle Carlisle RN 06/16/16 -  Nurse        Wound 04/05/20 0610 Bilateral perirectal MASD (Moisture associated skin damage) (Active)   Dressing Appearance open to air 4/7/2020  8:38 PM   Base pink;moist 4/7/2020  8:38 PM           OT Recommendation and Plan  Outcome Summary/Treatment Plan (OT)  Daily Summary of Progress (OT): progress toward functional goals as expected  Barriers to Overall Progress (OT): cognition  Plan for Continued Treatment (OT): per POC  Anticipated Equipment Needs at Discharge (OT): (Defer to SNF)  Anticipated Discharge Disposition (OT): skilled nursing facility  Daily Summary of Progress (OT): progress toward functional goals as expected  Plan of Care Review  Plan of Care Reviewed With: patient  Plan of Care Reviewed With: patient  Outcome Summary: Pt alert and cooperative with therapy. Improved to Min A for supine to sit EOB. Pt actively engages in morning ADL tasks. Min UB dressing. Set-up simple grooming/self-feeding. Remains dependent for toileting needs. Up to chair with assist x1. OT will continue to follow IP. Recommend SNF at d/c for best outcome.  Outcome Measures     Row Name 04/08/20 0755 04/07/20 1453 04/06/20 1512       How much help from another is currently needed...    Putting on and taking off regular lower body clothing?  2  -TB  2  -TB  2  -SG    Bathing (including washing, rinsing, and drying)  2  -TB  2  -TB  2  -SG    Toileting (which includes using toilet bed pan or urinal)  1  -TB  1  -TB  2  -SG    Putting on and taking off regular upper body clothing  3  -TB  3  -TB  2  -SG    Taking care of personal grooming (such as brushing teeth)  3  -TB  3  -TB  3  -SG    Eating meals  3  -TB  3  -TB  3  -SG    AM-PAC 6 Clicks Score (OT)  14  -TB  14  -TB  14  -SG       Functional Assessment    Outcome Measure Options  AM-PAC 6 Clicks Daily Activity (OT)  -TB   AM-PAC 6 Clicks Daily Activity (OT)  -TB  AM-PAC 6 Clicks Daily Activity (OT)  -SG    Row Name 04/05/20 1405             How much help from another is currently needed...    Putting on and taking off regular lower body clothing?  1  -AN      Bathing (including washing, rinsing, and drying)  1  -AN      Toileting (which includes using toilet bed pan or urinal)  1  -AN      Putting on and taking off regular upper body clothing  1  -AN      Taking care of personal grooming (such as brushing teeth)  1  -AN      Eating meals  1  -AN      AM-PAC 6 Clicks Score (OT)  6  -AN         Functional Assessment    Outcome Measure Options  AM-PAC 6 Clicks Daily Activity (OT)  -AN        User Key  (r) = Recorded By, (t) = Taken By, (c) = Cosigned By    Initials Name Provider Type    Anh Andrea OT Occupational Therapist    Kelsi Clement, OT Occupational Therapist    Gabby Paiz, OTR/L Occupational Therapist           Time Calculation:   Time Calculation- OT     Row Name 04/08/20 0913 04/08/20 0755          Time Calculation- OT    OT Start Time  0755  -TB  --     OT Received On  04/08/20  -TB  --     OT Goal Re-Cert Due Date  04/15/20  -TB  --        Timed Charges    53817 - OT Self Care/Mgmt Minutes  --  25  -TB       User Key  (r) = Recorded By, (t) = Taken By, (c) = Cosigned By    Initials Name Provider Type    Anh Andrea, OT Occupational Therapist        Therapy Charges for Today     Code Description Service Date Service Provider Modifiers Qty    70100235373 HC OT SELF CARE/MGMT/TRAIN EA 15 MIN 4/7/2020 Anh Brennan OT GO 2    15315171550 HC OT SELF CARE/MGMT/TRAIN EA 15 MIN 4/8/2020 Anh Brennan, OT GO 2               Anh Brennan OT  4/8/2020

## 2020-04-08 NOTE — DISCHARGE SUMMARY
[facility] Medicine Services  DISCHARGE SUMMARY    Patient Name: Pati Carter  : 1930  MRN: [id number]    Date of Admission: 2020  9:21 AM  Date of Discharge:  2020  Primary Care Physician: Latoya Bolaños MD    Consults     Date and Time Order Name Status Description    4/3/2020 0030 Inpatient Infectious Diseases Consult Completed           Hospital Course     Presenting Problem:   Sepsis (CMS/HCC) [A41.9]    Active Hospital Problems    Diagnosis  POA   • **Multifocal pneumonia [J18.9]  Yes   • borderline B12 deficiency [E53.8]  Yes   • Traumatic hematoma of left hip [S70.02XA]  Yes   • Falls frequently [R29.6]  Not Applicable   • Immunosuppressed status on methotrexate [D89.9]  Yes   • Anemia [D64.9]  Yes   • COPD (chronic obstructive pulmonary disease) (CMS/HCC) [J44.9]  Yes   • Hyperlipidemia [E78.5]  Yes   • Hypertension [I10]  Yes   • Rheumatoid arthritis (CMS/HCC) [M06.9]  Yes      Resolved Hospital Problems    Diagnosis Date Resolved POA   • Delirium due to another medical condition [F05] 2020 Yes   • Acute urinary retention [R33.8] 2020 Yes   • Acute blood loss anemia, due to bleeding into hematomas from fall [D62] 2020 Yes   • Fall at home, initial encounter [W19.XXXA, Y92.009] 2020 Not Applicable   • Sepsis (CMS/HCC) [A41.9] 2020 Yes   • Acute respiratory failure with hypoxia (CMS/[facility]) [J96.01] 2020 Yes   • Suspected Covid-19 Virus Infection [R68.89] 2020 Yes          Hospital Course:  Pati Caretr is a 89 y.o. female of anemia, hypertension, hyperlipidemia, rheumatoid arthritis (on methotrexate), GERD, COPD, constipation, lumbar compression fracture, colonic polyp who was admitted from her skilled nursing facility on 2020 due to fall.  The patient had moderate left hip and left knee pain with hematoma.  On presentation, she was hypoxic at 76% requiring supplemental oxygen, tachypnea, leukocytosis.  CT angio  scan of chest aorta showed bilateral pulmonary opacifications of groudglass attenuation, fairly well demarcated areas of involvement left lung apex, mid lungs and within right lower lobe.  There was some concern for COVID-19 and she was tested, the test results was negative.  She also had a respiratory viral panel which was negative.  She was initially started on Zosyn and vancomycin and resuscitated with IV fluids.  MRSA PCR returned negative, vancomycin was discontinued.  She was initially treated with Plaquenil for presumed COVID-19, after her test result was negative, Plaquenil was discontinued.  Her urine strep and Legionella antigens were negative.  Her oxygen requirement decreased and she was transitioned to room air.  She was advised to continue inhalers, inhaled corticosteroid, long-acting beta agonist for her COPD.  She was followed by infectious disease doctors.  She was transitioned to oral Augmentin for a 7-day course.    She exhibited anemia from acute blood loss from her hematomas of the left hip and left knee.  She received 2 units of blood for symptomatic anemia.  Her hemoglobin and hematocrit responded appropriately.  Her B12 was borderline low and this was replaced intramuscularly and then transition to oral supplementation.    Her CT scan on admission of the abdomen revealed large left hip body wall hematoma with surrounding soft tissue edema, no fracture.  Bilateral lower extremities revealed advanced atherosclerotic involvement of peripheral vascular disease, right greater than left.  There was at least trace flow into the posterior tibial arteries, left greater than right, with severely diminutive caliber of the right posterior tibial artery from atherosclerotic involvement and calcifications progressed in the distal superficial femoral, popliteal and trifurcation regions of the bilateral lower extremities.    On the day of discharge the plan was for her to be going home with her daughter  Danette who will assist in her care.  She will also be going home with home health.  Her hemoglobin and hematocrit on the day of discharge were 8.8 and 28.6.  She is complaining of urinary frequency and urinalysis was done which was normal.    Discharge Follow Up Recommendations for outpatient labs/diagnostics:  Follow-up with primary care doctor within 1 week.    Day of Discharge     HPI:   Ms. Carter reports she is feeling better today.  She reports overall weakness that has improved since admission.  She denies any current cough, shortness of breath, fever, chills, abdominal pain, nausea, vomiting.      Review of Systems  Gen- No fevers, chills  CV- No chest pain, palpitations  Resp- No cough, dyspnea  GI- No N/V/D, abd pain    Vital Signs:   Temp:  [98.4 °F (36.9 °C)-98.6 °F (37 °C)] 98.6 °F (37 °C)  Heart Rate:  [] 102  Resp:  [18-20] 18  BP: (149-157)/(78-79) 157/78     Physical Exam:  Constitutional: No acute distress, awake, alert  HENT: NCAT, mucous membranes moist  Respiratory: Clear to auscultation bilaterally, respiratory effort normal   Cardiovascular: RRR, no murmurs, rubs, or gallops, palpable pedal pulses bilaterally  Gastrointestinal: Positive bowel sounds, soft, nontender, nondistended  Musculoskeletal: No bilateral ankle edema  Psychiatric: Appropriate affect, cooperative  Neurologic: Oriented x 3,  Cranial Nerves grossly intact to confrontation, speech clear  Skin: No rashes      Pertinent  and/or Most Recent Results     Results from last 7 days   Lab Units 04/08/20  0455 04/07/20  0829 04/06/20  0610 04/05/20  0541 04/05/20  0042 04/04/20  0700 04/03/20  0504 04/02/20  2219  04/02/20  0941   WBC 10*3/mm3 10.00 10.92* 8.34 10.02  --  10.88* 10.50 12.78*  --  15.63*   HEMOGLOBIN g/dL 8.8* 9.5* 7.4* 8.6* 8.6* 7.0* 7.7* 7.0*  7.0*  --  7.2*   HEMOGLOBIN, POC   --   --   --   --   --   --   --   --    < >  --    HEMATOCRIT % 28.6* 30.7* 23.5* 26.8* 27.2* 22.3* 24.5* 22.3*  22.3*  --  23.5*    HEMATOCRIT POC   --   --   --   --   --   --   --   --    < >  --    PLATELETS 10*3/mm3 390 399 310 312  --  244 216 259  --  313   SODIUM mmol/L  --   --  142 142  --  140 141  --   --  142   POTASSIUM mmol/L  --   --  3.8 3.6  --  4.1 4.2  --   --  4.1   CHLORIDE mmol/L  --   --  107 104  --  105 107  --   --  105   CO2 mmol/L  --   --  26.0 25.0  --  24.0 24.0  --   --  27.0   BUN mg/dL  --   --  14 15  --  20 23  --   --  19   CREATININE mg/dL  --   --  0.94 0.90  --  1.04* 1.03*  --   --  0.91   GLUCOSE mg/dL  --   --  83 107*  --  105* 91  --   --  171*   CALCIUM mg/dL  --   --  8.6 8.9  --  8.2* 7.8*  --   --  8.3*    < > = values in this interval not displayed.     Results from last 7 days   Lab Units 04/02/20  0941   BILIRUBIN mg/dL 0.4   ALK PHOS U/L 53   ALT (SGPT) U/L 10   AST (SGOT) U/L 21   PROTIME Seconds 14.6*   INR  1.17*           Invalid input(s): TG, LDLCALC, LDLREALC  Results from last 7 days   Lab Units 04/03/20  0504 04/02/20  0941   TSH uIU/mL  --  1.810   PROBNP pg/mL  --  1,458.0   TROPONIN T ng/mL  --  <0.010   PROCALCITONIN ng/mL 0.19 0.11   LACTATE mmol/L  --  1.9       Brief Urine Lab Results  (Last result in the past 365 days)      Color   Clarity   Blood   Leuk Est   Nitrite   Protein   CREAT   Urine HCG        04/08/20 1343 Yellow Clear Negative Negative Negative Trace               Microbiology Results Abnormal     Procedure Component Value - Date/Time    Blood Culture - Blood, Arm, Right [[id number]] Collected:  04/02/20 1006    Lab Status:  Final result Specimen:  Blood from Arm, Right Updated:  04/07/20 1031     Blood Culture No growth at 5 days    Blood Culture - Blood, Arm, Left [[id number]] Collected:  04/02/20 1006    Lab Status:  Final result Specimen:  Blood from Arm, Left Updated:  04/07/20 1031     Blood Culture No growth at 5 days    Beta Strep Culture, Throat - Swab, Throat [[id number]]  (Normal) Collected:  04/02/20 1322    Lab Status:  Final result Specimen:  Swab from  Throat Updated:  04/04/20 1218     Throat Culture, Beta Strep No Beta Hemolytic Streptococcus Isolated    Narrative:       Group A Strep incidence is low in adults. Positive culture for Beta hemolytic Streptococcus species can reflect colonization and not true infection. Please correlate clinically.    CORONAVIRUS-19(COVID-19),RT-PCR,[facility], NP Swab in Transport Media - Swab, Nasopharynx [[id number]] Collected:  04/02/20 1554    Lab Status:  Final result Specimen:  Swab from Nasopharynx Updated:  04/03/20 1709     Reference Lab Report --     Comment: See scanned report          COVID19 Not Detected    MRSA Screen, PCR (Inpatient) - Swab, Nares [[id number]]  (Normal) Collected:  04/02/20 1555    Lab Status:  Final result Specimen:  Swab from Nares Updated:  04/02/20 1739     MRSA PCR Negative    Narrative:       MRSA Negative    Respiratory Panel, PCR - Swab, Nasopharynx [[id number]]  (Normal) Collected:  04/02/20 1322    Lab Status:  Final result Specimen:  Swab from Nasopharynx Updated:  04/02/20 1441     ADENOVIRUS, PCR Not Detected     Coronavirus 229E Not Detected     Coronavirus HKU1 Not Detected     Coronavirus NL63 Not Detected     Coronavirus OC43 Not Detected     Human Metapneumovirus Not Detected     Human Rhinovirus/Enterovirus Not Detected     Influenza B PCR Not Detected     Parainfluenza Virus 1 Not Detected     Parainfluenza Virus 2 Not Detected     Parainfluenza Virus 3 Not Detected     Parainfluenza Virus 4 Not Detected     Bordetella pertussis pcr Not Detected     Influenza A H1 2009 PCR Not Detected     Chlamydophila pneumoniae PCR Not Detected     Mycoplasma pneumo by PCR Not Detected     Influenza A PCR Not Detected     Influenza A H3 Not Detected     Influenza A H1 Not Detected     RSV, PCR Not Detected     Bordetella parapertussis PCR Not Detected    Narrative:       The coronavirus on the RVP is NOT COVID-19 and is NOT indicative of infection with COVID-19.     Rapid Strep A Screen - Swab,  Throat [[id number]]  (Normal) Collected:  04/02/20 1322    Lab Status:  Final result Specimen:  Swab from Throat Updated:  04/02/20 1340     Strep A Ag Negative    Narrative:       Test performed by Direct Antigen Testing.          Imaging Results (All)     Procedure Component Value Units Date/Time    XR Chest 1 View [[id number]] Collected:  04/02/20 1104     Updated:  04/02/20 1219    Narrative:       EXAMINATION: XR CHEST 1 VW-04/02/2020:      INDICATION: Fall.      COMPARISON: Chest x-ray 01/10/2020.     FINDINGS: Cardiac size similar to prior with mild increased central  vascular congestion versus peribronchial inflammatory findings with  progressive opacifications at the right lung base and left lung apex,  and suprahilar region concerning for airspace disease. No pneumothorax  or pleural effusion. Degenerative changes of the spine.       Impression:       Increased opacifications left lung apex and right lung base  from 01/10/2020 comparison examination consistent with airspace disease  such as bronchopneumonia without pleural effusion.     D:  04/02/2020  E:  04/02/2020     This report was finalized on 4/2/2020 12:16 PM by Dr. Teja Scott.       XR Femur 2 View Left [[id number]] Collected:  04/02/20 1103     Updated:  04/02/20 1219    Narrative:       EXAMINATION: XR FEMUR 2 VW, LEFT-04/02/2020:      INDICATION: Fall.      COMPARISON: 03/28/2020.     FINDINGS: AP and lateral views of the left femur reveal advanced  degenerative changes of the hip joint and greater trochanteric region  without acute fracture as there is no acute cortical irregularity or  cortical disruption. Distal femur unremarkable for acute fracture with  vascular calcifications in the soft tissues.           Impression:       Degenerative changes.  The left hip remains well located  without acute fracture of the left hip or left femur.     D:  04/02/2020  E:  04/02/2020     This report was finalized on 4/2/2020 12:16 PM by Dr. Lezama  Key.       CT Pelvis Without Contrast [[id number]] Collected:  04/02/20 1114     Updated:  04/02/20 1219    Narrative:       EXAMINATION: CT PELVIS WO CONTRAST-, CT ANGIOGRAM CHEST-, CT ANGIO  ABDOMINAL AORTA AND BILATERAL ILIOFEMORAL RUNOFF-04/02/2020:      INDICATION: Left hip pain.      TECHNIQUE: CT angiogram of the chest, abdomen and aortobiiliac system  with iliofemoral runoff of the bilateral lower extremities. 2-D and 3-D  reconstructions performed. CT bony pelvis performed in addition.     The radiation dose reduction device was turned on for each scan per the  ALARA (As Low as Reasonably Achievable) protocol.     COMPARISON: Chest x-ray earlier same day.     FINDINGS:      CT ANGIOGRAM CHEST, ABDOMEN AND PELVIS WITH RUNOFF:     Nonvascular evaluation with thyroid homogeneous in attenuation. No bulky  mediastinal adenopathy. Central airways are patent. Esophagus in normal  course with small hiatal hernia. Extended lung windows demonstrate  patchy groundglass attenuation in the left lung apex are fairly well  demarcated areas of groundglass attenuation and acute airspace disease  with atypical etiology considered given right middle lobe and lingular  segment involvement as well as minimal opacifications in similar  orientation of groundglass attenuation in the middle segment right lower  lobe. No pleural effusion or pneumothorax. Degenerative changes of the  spine without displaced rib fracture. No chest wall hematoma. Liver,  pancreas, spleen, adrenals and kidneys unremarkable for acute findings  with parapelvic cysts noted on the left kidney and left renal cortical  cyst without hydronephrosis or hydroureter. No bulky retroperitoneal  adenopathy. GI tract evaluation without mechanical obstructive process  or focal thickening. Sigmoid diverticulosis without evidence for acute  diverticulitis. No free fluid or intra-abdominal free air. Pelvic  viscera are grossly unremarkable without bulky pelvic adenopathy  or free  fluid. Soft tissue body wall findings demonstrates a large moderately  hyperattenuated blood products in attenuation focus left hip soft  tissues adjacent to the gluteus musculature measuring up to 12 x 5 cm  consistent with hematoma and adjacent edema.     VASCULAR: Cardiac size within normal limits. Patent central pulmonary  arterial vasculature. Atherosclerotic nonaneurysmal thoracic aorta with  mild atherosclerotic involvement and mild luminal stenosis from  calcified and noncalcified hard and soft plaque formation descending  thoracic aorta. Atherosclerotic patent nonaneurysmal abdominal aorta  with celiac and SMA origins patent as well as HERNAN patent. Renal arteries  patent with at least moderate-to-severe right and moderate left  atherosclerotic involvement of calcific burden at the ostia. Common and  external iliacs demonstrate mild atherosclerotic involvement with  calcific burden without focal severe stenosis or occlusion. Common  femoral, superficial femoral and popliteal arteries demonstrate up to  moderate atherosclerotic involvement without hemodynamically significant  stenosis, left slightly greater than right. Trifurcations bilaterally  demonstrate advanced atherosclerotic involvement with limited  opacification of the bilateral runoff vessels including limited  opacifications of the anterior and perineal arteries right along with  progressive loss of opacification right posterior tibial artery. Left  trifurcation has some involvement of atherosclerotic calcific burden  with limited opacification in the peroneal and anterior tibial arteries  with limited but present opacification of the left posterior tibial  artery to the level of the ankle with plantar branches severely  diminished, however, patency is observed minimally bilaterally.     CT BONY PELVIS: SI joints symmetric and without widening. No displaced  pelvic ring fracture. Right hip hardware remains in place without acute  fracture.  Left hip without acute fracture demonstrating degenerative  changes of the greater trochanteric region and DJD of the left hip joint  without acute cortical irregularity evident.       Impression:       1. Bilateral pulmonary opacifications of groundglass attenuation, fairly  well demarcated areas of geographic involvement left lung apex, mid  lungs and within the right lower lobe medial segment concerning for  atypical etiology such as viral etiology of airspace disease with at  least intermediate correlation to COVID-19.     2. Large left hip body wall hematoma with surrounding soft tissue edema,  however, no acute fracture of the left hip or pelvis is identified.     3. Advanced atherosclerotic involvement of peripheral vascular disease  extending into the bilateral lower extremities, right slightly greater  than left, however despite advanced involvement, there is at least trace  flow into the posterior tibial arteries, left greater than right, with  severely diminutive caliber of the right posterior tibial artery from  atherosclerotic involvement and calcifications progressed in the distal  superficial femoral, popliteal and trifurcation regions of the bilateral  lower extremities.     3.  No acute vascular findings within the chest, abdomen or pelvis  identified with atherosclerotic nonaneurysmal thoracoabdominal aorta.     D:  04/02/2020  E:  04/02/2020     This report was finalized on 4/2/2020 12:16 PM by Dr. Teja Scott.       CT Angiogram Chest [[id number]] Collected:  04/02/20 1114     Updated:  04/02/20 1219    Narrative:       EXAMINATION: CT PELVIS WO CONTRAST-, CT ANGIOGRAM CHEST-, CT ANGIO  ABDOMINAL AORTA AND BILATERAL ILIOFEMORAL RUNOFF-04/02/2020:      INDICATION: Left hip pain.      TECHNIQUE: CT angiogram of the chest, abdomen and aortobiiliac system  with iliofemoral runoff of the bilateral lower extremities. 2-D and 3-D  reconstructions performed. CT bony pelvis performed in addition.     The  radiation dose reduction device was turned on for each scan per the  ALARA (As Low as Reasonably Achievable) protocol.     COMPARISON: Chest x-ray earlier same day.     FINDINGS:      CT ANGIOGRAM CHEST, ABDOMEN AND PELVIS WITH RUNOFF:     Nonvascular evaluation with thyroid homogeneous in attenuation. No bulky  mediastinal adenopathy. Central airways are patent. Esophagus in normal  course with small hiatal hernia. Extended lung windows demonstrate  patchy groundglass attenuation in the left lung apex are fairly well  demarcated areas of groundglass attenuation and acute airspace disease  with atypical etiology considered given right middle lobe and lingular  segment involvement as well as minimal opacifications in similar  orientation of groundglass attenuation in the middle segment right lower  lobe. No pleural effusion or pneumothorax. Degenerative changes of the  spine without displaced rib fracture. No chest wall hematoma. Liver,  pancreas, spleen, adrenals and kidneys unremarkable for acute findings  with parapelvic cysts noted on the left kidney and left renal cortical  cyst without hydronephrosis or hydroureter. No bulky retroperitoneal  adenopathy. GI tract evaluation without mechanical obstructive process  or focal thickening. Sigmoid diverticulosis without evidence for acute  diverticulitis. No free fluid or intra-abdominal free air. Pelvic  viscera are grossly unremarkable without bulky pelvic adenopathy or free  fluid. Soft tissue body wall findings demonstrates a large moderately  hyperattenuated blood products in attenuation focus left hip soft  tissues adjacent to the gluteus musculature measuring up to 12 x 5 cm  consistent with hematoma and adjacent edema.     VASCULAR: Cardiac size within normal limits. Patent central pulmonary  arterial vasculature. Atherosclerotic nonaneurysmal thoracic aorta with  mild atherosclerotic involvement and mild luminal stenosis from  calcified and noncalcified hard  and soft plaque formation descending  thoracic aorta. Atherosclerotic patent nonaneurysmal abdominal aorta  with celiac and SMA origins patent as well as HERNAN patent. Renal arteries  patent with at least moderate-to-severe right and moderate left  atherosclerotic involvement of calcific burden at the ostia. Common and  external iliacs demonstrate mild atherosclerotic involvement with  calcific burden without focal severe stenosis or occlusion. Common  femoral, superficial femoral and popliteal arteries demonstrate up to  moderate atherosclerotic involvement without hemodynamically significant  stenosis, left slightly greater than right. Trifurcations bilaterally  demonstrate advanced atherosclerotic involvement with limited  opacification of the bilateral runoff vessels including limited  opacifications of the anterior and perineal arteries right along with  progressive loss of opacification right posterior tibial artery. Left  trifurcation has some involvement of atherosclerotic calcific burden  with limited opacification in the peroneal and anterior tibial arteries  with limited but present opacification of the left posterior tibial  artery to the level of the ankle with plantar branches severely  diminished, however, patency is observed minimally bilaterally.     CT BONY PELVIS: SI joints symmetric and without widening. No displaced  pelvic ring fracture. Right hip hardware remains in place without acute  fracture. Left hip without acute fracture demonstrating degenerative  changes of the greater trochanteric region and DJD of the left hip joint  without acute cortical irregularity evident.       Impression:       1. Bilateral pulmonary opacifications of groundglass attenuation, fairly  well demarcated areas of geographic involvement left lung apex, mid  lungs and within the right lower lobe medial segment concerning for  atypical etiology such as viral etiology of airspace disease with at  least intermediate  correlation to COVID-19.     2. Large left hip body wall hematoma with surrounding soft tissue edema,  however, no acute fracture of the left hip or pelvis is identified.     3. Advanced atherosclerotic involvement of peripheral vascular disease  extending into the bilateral lower extremities, right slightly greater  than left, however despite advanced involvement, there is at least trace  flow into the posterior tibial arteries, left greater than right, with  severely diminutive caliber of the right posterior tibial artery from  atherosclerotic involvement and calcifications progressed in the distal  superficial femoral, popliteal and trifurcation regions of the bilateral  lower extremities.     3.  No acute vascular findings within the chest, abdomen or pelvis  identified with atherosclerotic nonaneurysmal thoracoabdominal aorta.     D:  04/02/2020  E:  04/02/2020     This report was finalized on 4/2/2020 12:16 PM by Dr. Teja Scott.       CT Head Without Contrast [[id number]] Collected:  04/02/20 1111     Updated:  04/02/20 1219    Narrative:       EXAMINATION: CT HEAD WO CONTRAST-04/02/2020:      INDICATION: Fall, hit head.      TECHNIQUE: CT head without intravenous contrast.     The radiation dose reduction device was turned on for each scan per the  ALARA (As Low as Reasonably Achievable) protocol.     COMPARISON: NONE.     FINDINGS: The midline structures are symmetric without evidence of mass,  mass effect or midline shift. Ventricles and sulci demonstrate mild  prominence of the sulci and generalized atrophy without focal atrophic  findings. Moderate to severe low-attenuation regions in the  periventricular and deep white matter suggesting moderate-to-severe  advanced chronic small vessel ischemic disease. No intra-axial  hemorrhage or extra-axial fluid collection. Globes and orbits are  unremarkable. The visualized paranasal sinuses and mastoid air cells are  grossly clear and well pneumatized. Calvarium  intact.       Impression:       No acute intracranial abnormality specifically, no acute  intracranial hemorrhage or extra-axial fluid collection with calvarium  intact. Moderate-to-severely advanced chronic small vessel ischemic  disease findings in the periventricular and deep white matter.     D:  04/02/2020  E:  04/02/2020     This report was finalized on 4/2/2020 12:16 PM by Dr. Teja Scott.       CT Angio Abdominal Aorta Bilateral Iliofem Runoff [[id number]] Collected:  04/02/20 1114     Updated:  04/02/20 1219    Narrative:       EXAMINATION: CT PELVIS WO CONTRAST-, CT ANGIOGRAM CHEST-, CT ANGIO  ABDOMINAL AORTA AND BILATERAL ILIOFEMORAL RUNOFF-04/02/2020:      INDICATION: Left hip pain.      TECHNIQUE: CT angiogram of the chest, abdomen and aortobiiliac system  with iliofemoral runoff of the bilateral lower extremities. 2-D and 3-D  reconstructions performed. CT bony pelvis performed in addition.     The radiation dose reduction device was turned on for each scan per the  ALARA (As Low as Reasonably Achievable) protocol.     COMPARISON: Chest x-ray earlier same day.     FINDINGS:      CT ANGIOGRAM CHEST, ABDOMEN AND PELVIS WITH RUNOFF:     Nonvascular evaluation with thyroid homogeneous in attenuation. No bulky  mediastinal adenopathy. Central airways are patent. Esophagus in normal  course with small hiatal hernia. Extended lung windows demonstrate  patchy groundglass attenuation in the left lung apex are fairly well  demarcated areas of groundglass attenuation and acute airspace disease  with atypical etiology considered given right middle lobe and lingular  segment involvement as well as minimal opacifications in similar  orientation of groundglass attenuation in the middle segment right lower  lobe. No pleural effusion or pneumothorax. Degenerative changes of the  spine without displaced rib fracture. No chest wall hematoma. Liver,  pancreas, spleen, adrenals and kidneys unremarkable for acute  findings  with parapelvic cysts noted on the left kidney and left renal cortical  cyst without hydronephrosis or hydroureter. No bulky retroperitoneal  adenopathy. GI tract evaluation without mechanical obstructive process  or focal thickening. Sigmoid diverticulosis without evidence for acute  diverticulitis. No free fluid or intra-abdominal free air. Pelvic  viscera are grossly unremarkable without bulky pelvic adenopathy or free  fluid. Soft tissue body wall findings demonstrates a large moderately  hyperattenuated blood products in attenuation focus left hip soft  tissues adjacent to the gluteus musculature measuring up to 12 x 5 cm  consistent with hematoma and adjacent edema.     VASCULAR: Cardiac size within normal limits. Patent central pulmonary  arterial vasculature. Atherosclerotic nonaneurysmal thoracic aorta with  mild atherosclerotic involvement and mild luminal stenosis from  calcified and noncalcified hard and soft plaque formation descending  thoracic aorta. Atherosclerotic patent nonaneurysmal abdominal aorta  with celiac and SMA origins patent as well as HERNAN patent. Renal arteries  patent with at least moderate-to-severe right and moderate left  atherosclerotic involvement of calcific burden at the ostia. Common and  external iliacs demonstrate mild atherosclerotic involvement with  calcific burden without focal severe stenosis or occlusion. Common  femoral, superficial femoral and popliteal arteries demonstrate up to  moderate atherosclerotic involvement without hemodynamically significant  stenosis, left slightly greater than right. Trifurcations bilaterally  demonstrate advanced atherosclerotic involvement with limited  opacification of the bilateral runoff vessels including limited  opacifications of the anterior and perineal arteries right along with  progressive loss of opacification right posterior tibial artery. Left  trifurcation has some involvement of atherosclerotic calcific  burden  with limited opacification in the peroneal and anterior tibial arteries  with limited but present opacification of the left posterior tibial  artery to the level of the ankle with plantar branches severely  diminished, however, patency is observed minimally bilaterally.     CT BONY PELVIS: SI joints symmetric and without widening. No displaced  pelvic ring fracture. Right hip hardware remains in place without acute  fracture. Left hip without acute fracture demonstrating degenerative  changes of the greater trochanteric region and DJD of the left hip joint  without acute cortical irregularity evident.       Impression:       1. Bilateral pulmonary opacifications of groundglass attenuation, fairly  well demarcated areas of geographic involvement left lung apex, mid  lungs and within the right lower lobe medial segment concerning for  atypical etiology such as viral etiology of airspace disease with at  least intermediate correlation to COVID-19.     2. Large left hip body wall hematoma with surrounding soft tissue edema,  however, no acute fracture of the left hip or pelvis is identified.     3. Advanced atherosclerotic involvement of peripheral vascular disease  extending into the bilateral lower extremities, right slightly greater  than left, however despite advanced involvement, there is at least trace  flow into the posterior tibial arteries, left greater than right, with  severely diminutive caliber of the right posterior tibial artery from  atherosclerotic involvement and calcifications progressed in the distal  superficial femoral, popliteal and trifurcation regions of the bilateral  lower extremities.     3.  No acute vascular findings within the chest, abdomen or pelvis  identified with atherosclerotic nonaneurysmal thoracoabdominal aorta.     D:  04/02/2020  E:  04/02/2020     This report was finalized on 4/2/2020 12:16 PM by Dr. Teja Scott.             Results for orders placed in visit on  12/20/16   SCANNED VASCULAR STUDIES       Results for orders placed in visit on 12/20/16   SCANNED VASCULAR STUDIES            Plan for Follow-up of Pending Labs/Results:     Discharge Details        Discharge Medications      New Medications      Instructions Start Date   amoxicillin-clavulanate 500-125 MG per tablet  Commonly known as:  AUGMENTIN   500 mg, Oral, 2 Times Daily      cyanocobalamin 1000 MCG tablet  Commonly known as:  VITAMIN B-12   1,000 mcg, Oral, Daily   Start Date:  April 9, 2020     lidocaine 5 %  Commonly known as:  LIDODERM   2 patches, Transdermal, Every 24 Hours Scheduled, Remove & Discard patch within 12 hours or as directed by MD   Start Date:  April 9, 2020     melatonin 5 MG tablet tablet   5 mg, Oral, Nightly PRN      polyethylene glycol pack packet  Commonly known as:  MIRALAX   17 g, Oral, Daily PRN      saccharomyces boulardii 250 MG capsule  Commonly known as:  FLORASTOR   250 mg, Oral, Daily   Start Date:  April 9, 2020     tamsulosin 0.4 MG capsule 24 hr capsule  Commonly known as:  FLOMAX   0.4 mg, Oral, Daily, For urinary retention   Start Date:  April 9, 2020        Changes to Medications      Instructions Start Date   acetaminophen 500 MG tablet  Commonly known as:  TYLENOL  What changed:    · medication strength  · how much to take  · when to take this  · reasons to take this   1,000 mg, Oral, Every 8 Hours      nystatin 240284 UNIT/GM ointment  Commonly known as:  MYCOSTATIN  What changed:  additional instructions   1 application, Topical, Daily, Apply to groin   Start Date:  April 9, 2020        Continue These Medications      Instructions Start Date   albuterol sulfate  (90 Base) MCG/ACT inhaler  Commonly known as:  PROVENTIL HFA;VENTOLIN HFA;PROAIR HFA   2 puffs, Inhalation, Every 4 Hours PRN      aspirin 81 MG EC tablet   81 mg, Oral, Daily      Breo Ellipta 100-25 MCG/INH inhaler  Generic drug:  Fluticasone Furoate-Vilanterol   2 puffs, Inhalation, Daily - RT       CENTRUM SILVER ADULT 50+ PO   1 tablet, Oral, Daily      Colace 100 MG capsule  Generic drug:  docusate sodium   100 mg, Oral, Daily      famotidine 20 MG tablet  Commonly known as:  PEPCID   20 mg, Oral, 2 Times Daily      folic acid 1 MG tablet  Commonly known as:  FOLVITE   1 tablet, Oral, Daily      simvastatin 20 MG tablet  Commonly known as:  ZOCOR   20 mg, Oral, Nightly         Stop These Medications    diclofenac 1 % gel gel  Commonly known as:  VOLTAREN     meloxicam 7.5 MG tablet  Commonly known as:  MOBIC     methotrexate 2.5 MG tablet     traMADol 50 MG tablet  Commonly known as:  ULTRAM            Allergies   Allergen Reactions   • Bactrim [Sulfamethoxazole-Trimethoprim] Other (See Comments)     Stomach cramps    • Ciprofloxacin Other (See Comments)     Other reaction(s): shaking  HCI TABS/ SHAKING   • Levofloxacin Diarrhea         Discharge Disposition:  Home or Self Care    Diet:  Hospital:  Diet Order   Procedures   • Diet Regular       Activity:  Activity Instructions     Activity as Tolerated      Up WIth Assist            Restrictions or Other Recommendations:  none        CODE STATUS:    Code Status and Medical Interventions:   Ordered at: 04/02/20 1657     Limited Support to NOT Include:    Intubation    NIPPV (Non-Invasive Positive Pressure Support)    Cardioversion/Defibrillation     Code Status:    No CPR     Medical Interventions (Level of Support Prior to Arrest):    Limited       Future Appointments   Date Time Provider Department Center   4/23/2020  1:50 PM Karlos Blount MD [facility] None   5/6/2020  1:30 PM Latoya Bolaños MD [facility] None       Additional Instructions for the Follow-ups that You Need to Schedule     Ambulatory Referral to Home Health   As directed      Face to Face Visit Date:  4/8/2020    Follow-up provider for Plan of Care?:  I treated the patient in an acute care facility and will not continue treatment after discharge.    Follow-up provider:  GUERITA  MILLY SINGH [[id number]]    Reason/Clinical Findings:  Post hospital stay for pneumonia, acute respiratory failure with hypoxia, frequent falls    Describe mobility limitations that make leaving home difficult:  weakness, incontinence, rhematoid arthritis, traumatic hematoma of the left hip    Nursing/Therapeutic Services Requested:  Skilled Nursing (bathing assist) Physical Therapy Occupational Therapy    Skilled nursing orders:  Medication education COPD management Cardiopulmonary assessments    PT orders:  Therapeutic exercise Transfer training Strengthening Home safety assessment    Occupational orders:  Activities of daily living Strengthening Cognition Home safety assessment         Discharge Follow-up with PCP   As directed       Currently Documented PCP:    Milly Bolaños MD    PCP Phone Number:    [phone]     Follow Up Details:  Follow up with PCP to assure improvement of all symptoms in 1 month.  Recommend follow up with doctor who prescribes Methotrexate.  Due to recent infection, this admission, we have held your methotrexate.  you should discuss when to restart this.               Time Spent on Discharge:  60 minutes    Electronically signed by Tessy Barrow PA-C, 04/08/20, 11:29 AM.

## 2020-04-08 NOTE — TELEPHONE ENCOUNTER
We can do a video visit for a TCM.  However, she should be scheduled within 2 weeks of hospital discharge.  Can we reschedule this?

## 2020-04-08 NOTE — DISCHARGE PLACEMENT REQUEST
"Pati Carter (89 y.o. Female)     Date of Birth Social Security Number Address Home Phone MRN    07/26/1930  [facility] [address] [phone] [id number]    Jainism Marital Status          None        Admission Date Admission Type Admitting Provider Attending Provider Department, Room/Bed    4/2/20 Emergency Logan Sawyer DO Shields, Daniel Alan, DO [facility] 5G, S556/1    Discharge Date Discharge Disposition Discharge Destination                       Attending Provider:  Logan Sawyer DO    Allergies:  Bactrim [Sulfamethoxazole-trimethoprim], Ciprofloxacin, Levofloxacin    Isolation:  None   Infection:  None   Code Status:  No CPR    Ht:  165.1 cm (65\")   Wt:  68 kg (150 lb)    Admission Cmt:  None   Principal Problem:  Multifocal pneumonia [J18.9]                 Active Insurance as of 4/2/2020     Primary Coverage     Payor Plan Insurance Group Employer/Plan Group    MEDICARE MEDICARE A & B      Payor Plan Address Payor Plan Phone Number Payor Plan Fax Number Effective Dates    [address] [phone]  7/1/1995 - None Entered    [facility] [id number]       Subscriber Name Subscriber Birth Date Member ID       PATI CARTER 7/26/1930 [id number]           Secondary Coverage     Payor Plan Insurance Group Employer/Plan Group    AARP MC SUP AARP HEALTH CARE OPTIONS      Payor Plan Address Payor Plan Phone Number Payor Plan Fax Number Effective Dates    [facility] [phone]  1/1/2016 - None Entered    [address]       [facility] [address]       Subscriber Name Subscriber Birth Date Member ID       PATI CARTER 7/26/1930 [id number]                 Emergency Contacts      (Rel.) Home Phone Work Phone Mobile Phone    Danette Suresh (Daughter) -- -- [phone]    OLLIE CARTER (Daughter) -- -- [phone]        [facility] 5G  [address] [facility] [address]  Phone:  [phone]  Fax:  "  Date: 2020      Ambulatory Referral to Home Health     Patient:  Pati Carter MRN:  [id number]   [address] [place] [address] :  1930  SSN:    Phone: [phone] Sex:  F      INSURANCE PAYOR PLAN GROUP # SUBSCRIBER ID   Primary:  Secondary:    MEDICARE  [employer] MC SUP [id number]  [id number]      [id number]  [id number]      Referring Provider Information:  JOVANNY MORALES Phone: [phone] Fax:       Referral Information:   # Visits:  1 Referral Type: Home Health [42]   Urgency:  Routine Referral Reason: Specialty Services Required   Start Date: 2020 End Date:  To be determined by Insurer   Diagnosis: Severe sepsis (CMS/HCC) (A41.9,R65.20 [ICD-10-CM] 038.9,995.92 [ICD-9-CM])  Syncope, unspecified syncope type (R55 [ICD-10-CM] 780.2 [ICD-9-CM])  Traumatic hematoma of buttock, initial encounter (S30.0XXA [ICD-10-CM] 922.32 [ICD-9-CM])  Impaired mobility and ADLs (Z74.09 [ICD-10-CM] 799.89 [ICD-9-CM])  Falls frequently (R29.6 [ICD-10-CM] V15.88 [ICD-9-CM])  Immunosuppressed status (CMS/HCC) (D89.9 [ICD-10-CM] 279.9 [ICD-9-CM])  Acute respiratory failure with hypoxia (CMS/HCC) (J96.01 [ICD-10-CM] 518.81 [ICD-9-CM])  Long term methotrexate user (Z79.899 [ICD-10-CM] V58.69 [ICD-9-CM])      Refer to Dept:   Refer to Provider:   Refer to Facility:       Face to Face Visit Date: 2020  Follow-up provider for Plan of Care? I treated the patient in an acute care facility and will not continue treatment after discharge.  Follow-up provider: MILLY DEXTER [[id number]]  Reason/Clinical Findings: Post hospital stay for pneumonia, acute respiratory failure with hypoxia, frequent falls  Describe mobility limitations that make leaving home difficult: weakness, incontinence, rhematoid arthritis, traumatic hematoma of the left hip  Nursing/Therapeutic Services Requested: Skilled Nursing (bathing assist)  Nursing/Therapeutic Services Requested: Physical Therapy  Nursing/Therapeutic Services  Requested: Occupational Therapy  Skilled nursing orders: Medication education  Skilled nursing orders: COPD management  Skilled nursing orders: Cardiopulmonary assessments  PT orders: Therapeutic exercise  PT orders: Transfer training  PT orders: Strengthening  PT orders: Home safety assessment  Occupational orders: Activities of daily living  Occupational orders: Strengthening  Occupational orders: Cognition  Occupational orders: Home safety assessment     This document serves as a request of services and does not constitute Insurance authorization or approval of services.  To determine eligibility, please contact the members Insurance carrier to verify and review coverage.     If you have medical questions regarding this request for services. Please contact [address] at [phone] during normal business hours.       Verbal Order Mode: Verbal with readback   Authorizing Provider: Logan Sawyer DO  Authorizing Provider's NPI: [id number]     Order Entered By: Ashley Mullen RN 2020 10:52 AM                    History & Physical      Matty Morris MD at 20 1258              [facility] Medicine Services  HISTORY AND PHYSICAL    Patient Name: Pati Carter  : 1930  MRN: [id number]  Primary Care Physician: Latoya Bolaños MD  Date of admission: 2020      Subjective    Subjective     Chief Complaint:  Fall at skilled facility    HPI:  Pati Carter is a 89 y.o. female with past medical history as per below with multiple recent falls and recent hip fracture who presents the hospital from her skilled nursing facility after fall that occurred this morning.  Patient is complaining of moderate left hip and left knee pain that is worsened with movement and improved at rest.  She denies any head pain but reportedly did hit her head during her fall.  She reported headache earlier but is denying this during my conversation.  She was found in  the emergency department to be tachycardic, hypotensive, hypoxic at 76% requiring supplemental oxygen with tachypnea, leukocytosis.  She had CT scans to further evaluate and notably was found to have multifocal pneumonia on CT scan of the chest as well as left hip and left knee hematoma.  CT scan of the head was negative for bleeding.  Radiologist feels CT scan of the chest is intermediate for novel coronavirus.  Patient is getting admitted for further medical management and work-up.    Review of Systems   Constitutional: Positive for fatigue. Negative for chills and fever.   HENT: Negative for sinus pressure and sinus pain.    Eyes: Negative.    Respiratory: Positive for shortness of breath. Negative for cough.    Cardiovascular: Negative for chest pain and palpitations.   Gastrointestinal: Negative for abdominal pain, diarrhea, nausea and vomiting.   Endocrine: Negative.    Genitourinary: Negative for dysuria and hematuria.   Musculoskeletal: Negative for neck pain and neck stiffness.        Left hip and knee pain   Skin: Negative for rash and wound.   Allergic/Immunologic: Negative.    Neurological: Positive for light-headedness. Negative for headaches.   Hematological: Negative for adenopathy. Does not bruise/bleed easily.   Psychiatric/Behavioral: Negative for confusion. The patient is not nervous/anxious.           Personal History     Past Medical History:   Diagnosis Date   • Anemia     Description: A.  Dx 2006- borderline intermittent.   • Back pain    • Benign colonic polyp     Description: A.  Dx 1999.   • Benign colonic polyp 9/28/2016    Description: AJoshua  Dx 1999.   • Compression fracture of lumbar vertebra (CMS/HCC)    • Constipation    • COPD (chronic obstructive pulmonary disease) (CMS/HCC)     Description: A.  Rule out chronic persistent asthma, COPD, or obliterative bronchiolitis.- Butch   • Degeneration of intervertebral disc of lumbar region     Description: A.  Diagnosed in April 2013 with  advanced multilevel with severe spinal stenosis, followed by Dr. Pillai for pain management.   • Gastroesophageal reflux disease    • Hearing loss    • History of colonoscopy 01/01/1999    NORMAL PER PATIENT    • History of mammogram 01/01/2011    NORMAL PER PT    • History of Papanicolaou smear of cervix 01/01/2010    NORMAL PER PT    • History of varicella    • Hyperlipidemia     Description: A.  Dx 2006.   • Hypertension     Description: A. Dx 2001.   • Macular degeneration    • Ovarian mass     Dx 8/15- benign left cystic adnexal mass   • Rheumatoid arthritis (CMS/[facility])     Description: A.  Diagnosed in 2000 and and followed by Dr. Constantino (now Dr. Kaplan). B.  On methotrexate therapy since 2000. C.  Off low-dose prednisone therapy.       Past Surgical History:   Procedure Laterality Date   • APPENDECTOMY     • CARPAL TUNNEL RELEASE Left 01/01/2003    HISTORY OF NEUROPLASTY DECOMPRESSION MEDIAN NERVE AT CARPAL TUNNEL LEFT   • CATARACT EXTRACTION Bilateral 01/01/2009   • CHOLECYSTECTOMY  01/01/1962   • HIP CANNULATED SCREW PLACEMENT Right 1/25/2020    Procedure: HIP CANNULATED SCREW PLACEMENT RIGHT;  Surgeon: Karlos Blount MD;  Location: [facility];  Service: Orthopedics   • KNEE ARTHROSCOPY Left 01/01/2001    MENISCAL REPAIR   • KYPHOPLASTY  06/18/2015    T11 AND L1 (JOSE A)   • PELVIC LAPAROSCOPY  01/01/1996    REMOVAL OF BENIGN UTERINE AND RIGHT OVARIAN TUMORS   • SALPINGO OOPHORECTOMY Left 08/26/2015    REMOVAL OF LEFT OVARY AND TUBE (benign cystic mass)       Family History: family history includes Diabetes in her mother; Hypertension in her mother.      Social History:  reports that she has quit smoking. She has never used smokeless tobacco. She reports that she does not drink alcohol or use drugs.  Social History     Social History Narrative    Patient moved to [facility] 3/26/20. On 3/28/20 patient visited ED for fall-related injuries.  In response, family looking into hiring a  , to visit patient BID for ADLs/ transfers. Family drives patient to appts, but says Jm Encinas may provide in future.       Medications:  Available home medication information reviewed.      Allergies   Allergen Reactions   • Bactrim [Sulfamethoxazole-Trimethoprim] Other (See Comments)     Stomach cramps    • Ciprofloxacin Other (See Comments)     Other reaction(s): shaking  HCI TABS/ SHAKING   • Levofloxacin Diarrhea       Objective   Objective     Vital Signs:   Temp:  [96.7 °F (35.9 °C)-97.8 °F (36.6 °C)] 97.8 °F (36.6 °C)  Heart Rate:  [] 102  Resp:  [14-27] 18  BP: ()/(44-95) 104/55        Physical Exam   Constitutional: Awake, alert  Eyes: PERRLA, sclerae anicteric, no conjunctival injection  HENT: NCAT, mucous membranes moist  Neck: Supple, no thyromegaly, no lymphadenopathy, trachea midline  Respiratory: Diffuse rhonchi bilaterally, borderline tachypnea on my examination, currently on supplemental oxygen   Cardiovascular: Regular, borderline tachycardia, palpable radial pulses bilaterally   Gastrointestinal: Positive bowel sounds, soft, nontender, nondistended  Musculoskeletal: Large left hip posterior hematoma, left knee hematoma/contusion with bruising, trace bilateral lower extremity edema, elderly and debilitated in appearance, BMI is 25  Psychiatric: Appropriate affect, cooperative  Neurologic: No slurred speech or facial droop, oriented, follows commands  Skin: Left hip and left knee bruising as per above, no rashes or jaundice      Results Reviewed:  I have personally reviewed current lab and radiology data.    Results from last 7 days   Lab Units 04/02/20  1007 04/02/20  0941   WBC 10*3/mm3  --  15.63*   HEMOGLOBIN g/dL  --  7.2*   HEMOGLOBIN, POC g/dL 8.2*  --    HEMATOCRIT %  --  23.5*   HEMATOCRIT POC % 24*  --    PLATELETS 10*3/mm3  --  313   INR   --  1.17*     Results from last 7 days   Lab Units 04/02/20  0941   SODIUM mmol/L 142   POTASSIUM mmol/L 4.1    CHLORIDE mmol/L 105   CO2 mmol/L 27.0   BUN mg/dL 19   CREATININE mg/dL 0.91   GLUCOSE mg/dL 171*   CALCIUM mg/dL 8.3*   ALT (SGPT) U/L 10   AST (SGOT) U/L 21   TROPONIN T ng/mL <0.010   PROBNP pg/mL 1,458.0   LACTATE mmol/L 1.9   PROCALCITONIN ng/mL 0.11     Estimated Creatinine Clearance: 45 mL/min (by C-G formula based on SCr of 0.91 mg/dL).  Brief Urine Lab Results  (Last result in the past 365 days)      Color   Clarity   Blood   Leuk Est   Nitrite   Protein   CREAT   Urine HCG        04/02/20 0950 Yellow Clear Negative Negative Negative Trace             Imaging Results (Last 24 Hours)     Procedure Component Value Units Date/Time    XR Chest 1 View [[id number]] Collected:  04/02/20 1104     Updated:  04/02/20 1219    Narrative:       EXAMINATION: XR CHEST 1 VW-04/02/2020:      INDICATION: Fall.      COMPARISON: Chest x-ray 01/10/2020.     FINDINGS: Cardiac size similar to prior with mild increased central  vascular congestion versus peribronchial inflammatory findings with  progressive opacifications at the right lung base and left lung apex,  and suprahilar region concerning for airspace disease. No pneumothorax  or pleural effusion. Degenerative changes of the spine.       Impression:       Increased opacifications left lung apex and right lung base  from 01/10/2020 comparison examination consistent with airspace disease  such as bronchopneumonia without pleural effusion.     D:  04/02/2020  E:  04/02/2020     This report was finalized on 4/2/2020 12:16 PM by Dr. Teja Scott.       XR Femur 2 View Left [[id number]] Collected:  04/02/20 1103     Updated:  04/02/20 1219    Narrative:       EXAMINATION: XR FEMUR 2 VW, LEFT-04/02/2020:      INDICATION: Fall.      COMPARISON: 03/28/2020.     FINDINGS: AP and lateral views of the left femur reveal advanced  degenerative changes of the hip joint and greater trochanteric region  without acute fracture as there is no acute cortical irregularity or  cortical  disruption. Distal femur unremarkable for acute fracture with  vascular calcifications in the soft tissues.           Impression:       Degenerative changes.  The left hip remains well located  without acute fracture of the left hip or left femur.     D:  04/02/2020  E:  04/02/2020     This report was finalized on 4/2/2020 12:16 PM by Dr. Teja Scott.       CT Pelvis Without Contrast [[id number]] Collected:  04/02/20 1114     Updated:  04/02/20 1219    Narrative:       EXAMINATION: CT PELVIS WO CONTRAST-, CT ANGIOGRAM CHEST-, CT ANGIO  ABDOMINAL AORTA AND BILATERAL ILIOFEMORAL RUNOFF-04/02/2020:      INDICATION: Left hip pain.      TECHNIQUE: CT angiogram of the chest, abdomen and aortobiiliac system  with iliofemoral runoff of the bilateral lower extremities. 2-D and 3-D  reconstructions performed. CT bony pelvis performed in addition.     The radiation dose reduction device was turned on for each scan per the  ALARA (As Low as Reasonably Achievable) protocol.     COMPARISON: Chest x-ray earlier same day.     FINDINGS:      CT ANGIOGRAM CHEST, ABDOMEN AND PELVIS WITH RUNOFF:     Nonvascular evaluation with thyroid homogeneous in attenuation. No bulky  mediastinal adenopathy. Central airways are patent. Esophagus in normal  course with small hiatal hernia. Extended lung windows demonstrate  patchy groundglass attenuation in the left lung apex are fairly well  demarcated areas of groundglass attenuation and acute airspace disease  with atypical etiology considered given right middle lobe and lingular  segment involvement as well as minimal opacifications in similar  orientation of groundglass attenuation in the middle segment right lower  lobe. No pleural effusion or pneumothorax. Degenerative changes of the  spine without displaced rib fracture. No chest wall hematoma. Liver,  pancreas, spleen, adrenals and kidneys unremarkable for acute findings  with parapelvic cysts noted on the left kidney and left renal  cortical  cyst without hydronephrosis or hydroureter. No bulky retroperitoneal  adenopathy. GI tract evaluation without mechanical obstructive process  or focal thickening. Sigmoid diverticulosis without evidence for acute  diverticulitis. No free fluid or intra-abdominal free air. Pelvic  viscera are grossly unremarkable without bulky pelvic adenopathy or free  fluid. Soft tissue body wall findings demonstrates a large moderately  hyperattenuated blood products in attenuation focus left hip soft  tissues adjacent to the gluteus musculature measuring up to 12 x 5 cm  consistent with hematoma and adjacent edema.     VASCULAR: Cardiac size within normal limits. Patent central pulmonary  arterial vasculature. Atherosclerotic nonaneurysmal thoracic aorta with  mild atherosclerotic involvement and mild luminal stenosis from  calcified and noncalcified hard and soft plaque formation descending  thoracic aorta. Atherosclerotic patent nonaneurysmal abdominal aorta  with celiac and SMA origins patent as well as HERNAN patent. Renal arteries  patent with at least moderate-to-severe right and moderate left  atherosclerotic involvement of calcific burden at the ostia. Common and  external iliacs demonstrate mild atherosclerotic involvement with  calcific burden without focal severe stenosis or occlusion. Common  femoral, superficial femoral and popliteal arteries demonstrate up to  moderate atherosclerotic involvement without hemodynamically significant  stenosis, left slightly greater than right. Trifurcations bilaterally  demonstrate advanced atherosclerotic involvement with limited  opacification of the bilateral runoff vessels including limited  opacifications of the anterior and perineal arteries right along with  progressive loss of opacification right posterior tibial artery. Left  trifurcation has some involvement of atherosclerotic calcific burden  with limited opacification in the peroneal and anterior tibial  arteries  with limited but present opacification of the left posterior tibial  artery to the level of the ankle with plantar branches severely  diminished, however, patency is observed minimally bilaterally.     CT BONY PELVIS: SI joints symmetric and without widening. No displaced  pelvic ring fracture. Right hip hardware remains in place without acute  fracture. Left hip without acute fracture demonstrating degenerative  changes of the greater trochanteric region and DJD of the left hip joint  without acute cortical irregularity evident.       Impression:       1. Bilateral pulmonary opacifications of groundglass attenuation, fairly  well demarcated areas of geographic involvement left lung apex, mid  lungs and within the right lower lobe medial segment concerning for  atypical etiology such as viral etiology of airspace disease with at  least intermediate correlation to COVID-19.     2. Large left hip body wall hematoma with surrounding soft tissue edema,  however, no acute fracture of the left hip or pelvis is identified.     3. Advanced atherosclerotic involvement of peripheral vascular disease  extending into the bilateral lower extremities, right slightly greater  than left, however despite advanced involvement, there is at least trace  flow into the posterior tibial arteries, left greater than right, with  severely diminutive caliber of the right posterior tibial artery from  atherosclerotic involvement and calcifications progressed in the distal  superficial femoral, popliteal and trifurcation regions of the bilateral  lower extremities.     3.  No acute vascular findings within the chest, abdomen or pelvis  identified with atherosclerotic nonaneurysmal thoracoabdominal aorta.     D:  04/02/2020  E:  04/02/2020     This report was finalized on 4/2/2020 12:16 PM by Dr. Teja Scott.       CT Angiogram Chest [[id number]] Collected:  04/02/20 1114     Updated:  04/02/20 1219    Narrative:       EXAMINATION: CT  PELVIS WO CONTRAST-, CT ANGIOGRAM CHEST-, CT ANGIO  ABDOMINAL AORTA AND BILATERAL ILIOFEMORAL RUNOFF-04/02/2020:      INDICATION: Left hip pain.      TECHNIQUE: CT angiogram of the chest, abdomen and aortobiiliac system  with iliofemoral runoff of the bilateral lower extremities. 2-D and 3-D  reconstructions performed. CT bony pelvis performed in addition.     The radiation dose reduction device was turned on for each scan per the  ALARA (As Low as Reasonably Achievable) protocol.     COMPARISON: Chest x-ray earlier same day.     FINDINGS:      CT ANGIOGRAM CHEST, ABDOMEN AND PELVIS WITH RUNOFF:     Nonvascular evaluation with thyroid homogeneous in attenuation. No bulky  mediastinal adenopathy. Central airways are patent. Esophagus in normal  course with small hiatal hernia. Extended lung windows demonstrate  patchy groundglass attenuation in the left lung apex are fairly well  demarcated areas of groundglass attenuation and acute airspace disease  with atypical etiology considered given right middle lobe and lingular  segment involvement as well as minimal opacifications in similar  orientation of groundglass attenuation in the middle segment right lower  lobe. No pleural effusion or pneumothorax. Degenerative changes of the  spine without displaced rib fracture. No chest wall hematoma. Liver,  pancreas, spleen, adrenals and kidneys unremarkable for acute findings  with parapelvic cysts noted on the left kidney and left renal cortical  cyst without hydronephrosis or hydroureter. No bulky retroperitoneal  adenopathy. GI tract evaluation without mechanical obstructive process  or focal thickening. Sigmoid diverticulosis without evidence for acute  diverticulitis. No free fluid or intra-abdominal free air. Pelvic  viscera are grossly unremarkable without bulky pelvic adenopathy or free  fluid. Soft tissue body wall findings demonstrates a large moderately  hyperattenuated blood products in attenuation focus left hip  soft  tissues adjacent to the gluteus musculature measuring up to 12 x 5 cm  consistent with hematoma and adjacent edema.     VASCULAR: Cardiac size within normal limits. Patent central pulmonary  arterial vasculature. Atherosclerotic nonaneurysmal thoracic aorta with  mild atherosclerotic involvement and mild luminal stenosis from  calcified and noncalcified hard and soft plaque formation descending  thoracic aorta. Atherosclerotic patent nonaneurysmal abdominal aorta  with celiac and SMA origins patent as well as HERNAN patent. Renal arteries  patent with at least moderate-to-severe right and moderate left  atherosclerotic involvement of calcific burden at the ostia. Common and  external iliacs demonstrate mild atherosclerotic involvement with  calcific burden without focal severe stenosis or occlusion. Common  femoral, superficial femoral and popliteal arteries demonstrate up to  moderate atherosclerotic involvement without hemodynamically significant  stenosis, left slightly greater than right. Trifurcations bilaterally  demonstrate advanced atherosclerotic involvement with limited  opacification of the bilateral runoff vessels including limited  opacifications of the anterior and perineal arteries right along with  progressive loss of opacification right posterior tibial artery. Left  trifurcation has some involvement of atherosclerotic calcific burden  with limited opacification in the peroneal and anterior tibial arteries  with limited but present opacification of the left posterior tibial  artery to the level of the ankle with plantar branches severely  diminished, however, patency is observed minimally bilaterally.     CT BONY PELVIS: SI joints symmetric and without widening. No displaced  pelvic ring fracture. Right hip hardware remains in place without acute  fracture. Left hip without acute fracture demonstrating degenerative  changes of the greater trochanteric region and DJD of the left hip joint  without  acute cortical irregularity evident.       Impression:       1. Bilateral pulmonary opacifications of groundglass attenuation, fairly  well demarcated areas of geographic involvement left lung apex, mid  lungs and within the right lower lobe medial segment concerning for  atypical etiology such as viral etiology of airspace disease with at  least intermediate correlation to COVID-19.     2. Large left hip body wall hematoma with surrounding soft tissue edema,  however, no acute fracture of the left hip or pelvis is identified.     3. Advanced atherosclerotic involvement of peripheral vascular disease  extending into the bilateral lower extremities, right slightly greater  than left, however despite advanced involvement, there is at least trace  flow into the posterior tibial arteries, left greater than right, with  severely diminutive caliber of the right posterior tibial artery from  atherosclerotic involvement and calcifications progressed in the distal  superficial femoral, popliteal and trifurcation regions of the bilateral  lower extremities.     3.  No acute vascular findings within the chest, abdomen or pelvis  identified with atherosclerotic nonaneurysmal thoracoabdominal aorta.     D:  04/02/2020  E:  04/02/2020     This report was finalized on 4/2/2020 12:16 PM by Dr. Teja Scott.       CT Head Without Contrast [[id number]] Collected:  04/02/20 1111     Updated:  04/02/20 1219    Narrative:       EXAMINATION: CT HEAD WO CONTRAST-04/02/2020:      INDICATION: Fall, hit head.      TECHNIQUE: CT head without intravenous contrast.     The radiation dose reduction device was turned on for each scan per the  ALARA (As Low as Reasonably Achievable) protocol.     COMPARISON: NONE.     FINDINGS: The midline structures are symmetric without evidence of mass,  mass effect or midline shift. Ventricles and sulci demonstrate mild  prominence of the sulci and generalized atrophy without focal atrophic  findings. Moderate  to severe low-attenuation regions in the  periventricular and deep white matter suggesting moderate-to-severe  advanced chronic small vessel ischemic disease. No intra-axial  hemorrhage or extra-axial fluid collection. Globes and orbits are  unremarkable. The visualized paranasal sinuses and mastoid air cells are  grossly clear and well pneumatized. Calvarium intact.       Impression:       No acute intracranial abnormality specifically, no acute  intracranial hemorrhage or extra-axial fluid collection with calvarium  intact. Moderate-to-severely advanced chronic small vessel ischemic  disease findings in the periventricular and deep white matter.     D:  04/02/2020  E:  04/02/2020     This report was finalized on 4/2/2020 12:16 PM by Dr. Teja Scott.       CT Angio Abdominal Aorta Bilateral Iliofem Runoff [[id number]] Collected:  04/02/20 1114     Updated:  04/02/20 1219    Narrative:       EXAMINATION: CT PELVIS WO CONTRAST-, CT ANGIOGRAM CHEST-, CT ANGIO  ABDOMINAL AORTA AND BILATERAL ILIOFEMORAL RUNOFF-04/02/2020:      INDICATION: Left hip pain.      TECHNIQUE: CT angiogram of the chest, abdomen and aortobiiliac system  with iliofemoral runoff of the bilateral lower extremities. 2-D and 3-D  reconstructions performed. CT bony pelvis performed in addition.     The radiation dose reduction device was turned on for each scan per the  ALARA (As Low as Reasonably Achievable) protocol.     COMPARISON: Chest x-ray earlier same day.     FINDINGS:      CT ANGIOGRAM CHEST, ABDOMEN AND PELVIS WITH RUNOFF:     Nonvascular evaluation with thyroid homogeneous in attenuation. No bulky  mediastinal adenopathy. Central airways are patent. Esophagus in normal  course with small hiatal hernia. Extended lung windows demonstrate  patchy groundglass attenuation in the left lung apex are fairly well  demarcated areas of groundglass attenuation and acute airspace disease  with atypical etiology considered given right middle lobe and  lingular  segment involvement as well as minimal opacifications in similar  orientation of groundglass attenuation in the middle segment right lower  lobe. No pleural effusion or pneumothorax. Degenerative changes of the  spine without displaced rib fracture. No chest wall hematoma. Liver,  pancreas, spleen, adrenals and kidneys unremarkable for acute findings  with parapelvic cysts noted on the left kidney and left renal cortical  cyst without hydronephrosis or hydroureter. No bulky retroperitoneal  adenopathy. GI tract evaluation without mechanical obstructive process  or focal thickening. Sigmoid diverticulosis without evidence for acute  diverticulitis. No free fluid or intra-abdominal free air. Pelvic  viscera are grossly unremarkable without bulky pelvic adenopathy or free  fluid. Soft tissue body wall findings demonstrates a large moderately  hyperattenuated blood products in attenuation focus left hip soft  tissues adjacent to the gluteus musculature measuring up to 12 x 5 cm  consistent with hematoma and adjacent edema.     VASCULAR: Cardiac size within normal limits. Patent central pulmonary  arterial vasculature. Atherosclerotic nonaneurysmal thoracic aorta with  mild atherosclerotic involvement and mild luminal stenosis from  calcified and noncalcified hard and soft plaque formation descending  thoracic aorta. Atherosclerotic patent nonaneurysmal abdominal aorta  with celiac and SMA origins patent as well as HERNAN patent. Renal arteries  patent with at least moderate-to-severe right and moderate left  atherosclerotic involvement of calcific burden at the ostia. Common and  external iliacs demonstrate mild atherosclerotic involvement with  calcific burden without focal severe stenosis or occlusion. Common  femoral, superficial femoral and popliteal arteries demonstrate up to  moderate atherosclerotic involvement without hemodynamically significant  stenosis, left slightly greater than right. Trifurcations  bilaterally  demonstrate advanced atherosclerotic involvement with limited  opacification of the bilateral runoff vessels including limited  opacifications of the anterior and perineal arteries right along with  progressive loss of opacification right posterior tibial artery. Left  trifurcation has some involvement of atherosclerotic calcific burden  with limited opacification in the peroneal and anterior tibial arteries  with limited but present opacification of the left posterior tibial  artery to the level of the ankle with plantar branches severely  diminished, however, patency is observed minimally bilaterally.     CT BONY PELVIS: SI joints symmetric and without widening. No displaced  pelvic ring fracture. Right hip hardware remains in place without acute  fracture. Left hip without acute fracture demonstrating degenerative  changes of the greater trochanteric region and DJD of the left hip joint  without acute cortical irregularity evident.       Impression:       1. Bilateral pulmonary opacifications of groundglass attenuation, fairly  well demarcated areas of geographic involvement left lung apex, mid  lungs and within the right lower lobe medial segment concerning for  atypical etiology such as viral etiology of airspace disease with at  least intermediate correlation to COVID-19.     2. Large left hip body wall hematoma with surrounding soft tissue edema,  however, no acute fracture of the left hip or pelvis is identified.     3. Advanced atherosclerotic involvement of peripheral vascular disease  extending into the bilateral lower extremities, right slightly greater  than left, however despite advanced involvement, there is at least trace  flow into the posterior tibial arteries, left greater than right, with  severely diminutive caliber of the right posterior tibial artery from  atherosclerotic involvement and calcifications progressed in the distal  superficial femoral, popliteal and trifurcation  regions of the bilateral  lower extremities.     3.  No acute vascular findings within the chest, abdomen or pelvis  identified with atherosclerotic nonaneurysmal thoracoabdominal aorta.     D:  04/02/2020  E:  04/02/2020     This report was finalized on 4/2/2020 12:16 PM by Dr. Teja Scott.                Assessment/Plan   Assessment & Plan     Active Hospital Problems    Diagnosis POA   • **Multifocal pneumonia [J18.9] Yes   • Falls frequently [R29.6] Not Applicable   • Fall at home, initial encounter [W19.XXXA, Y92.009] Not Applicable   • Sepsis (CMS/HCC) [A41.9] Yes   • Immunosuppressed status on methotrexate [D89.9] Unknown   • Acute respiratory failure with hypoxia (CMS/HCC) [J96.01] Yes   • Anemia [D64.9] Yes     Description: A.  Dx 2006- borderline intermittent.     • COPD (chronic obstructive pulmonary disease) (CMS/HCC) [J44.9] Yes     Description: A.  Rule out chronic persistent asthma, COPD, or obliterative bronchiolitis.- Butch     • Hyperlipidemia [E78.5] Yes     Description: A.  Dx 2006.     • Hypertension [I10] Yes     Description: A. Dx 2001.     • Rheumatoid arthritis (CMS/HCC) [M06.9] Yes     Description: A.  Diagnosed in 2000 and and followed by Dr. Constantino (now Dr. Kaplan)- now Dr. Del HO  On methotrexate therapy since 2000.  C.  Off low-dose prednisone therapy.         89-year-old female presents from her nursing home with fall and head and knee and left hip contusion with left hip and left knee hematomas and acute blood loss anemia.  She was found to have leukocytosis, hypotension, tachycardia, tachypnea, and hypoxia consistent with sepsis and CT scan showed multifocal pneumonia felt to be intermediate risk of novel coronavirus per radiologist.    Sepsis due to multifocal pneumonia in immunosuppressed patient:  Fluid resuscitated emergency room with 2 L bolus  Tachycardia and hypotension are improving.  Broad-spectrum antibiotics initially received vancomycin and Zosyn.  Continue  Zosyn for now.  MRSA PCR study  Viral PCR studies  Approved for state testing novel coronavirus test, [id number]  Careful monitoring as immunosuppressed status on methotrexate    Acute hypoxic respiratory failure due to sepsis and pneumonia:  Plan for supplemental oxygen  Respiratory treatments  Pulse oximetry  Wean oxygen as tolerated    Acute blood loss anemia due to left hip hematoma and left knee hematoma:  Supportive care and symptomatic management for hematoma.  Hold aspirin and NSAIDs to minimize bleeding.  Transfuse 1 unit due to symptomatic anemia  Hemoglobin previously 10 approximately 4-5 days ago before fall and hematoma now 7.2.  Denies other source of bleeding, denies blood in stools  Monitor for further bleeding and trend blood counts.  Check B12 and folate with macrocytosis.    COPD:  Continue inhalers, inhaled corticosteroid, long-acting beta agonist  No clear wheezes on examination hold off on systemic steroids unless worsens or more significant wheeze particularly while testing novel coronavirus.    Fall at home, multiple recent falls, debility:  PT OT, currently in skilled facility.  Fall precautions.    History of essential hypertension now with hypotension:   Monitor, avoid any blood pressure medications for now.    Hyperlipidemia: Stable.  Statin.    DVT prophylaxis: Lovenox and heparin contraindicated due to bleeding, SCDs contraindicated due to peripheral vascular disease    CODE STATUS:    Code Status and Medical Interventions:   Ordered at: 04/02/20 9753     Limited Support to NOT Include:    Intubation    NIPPV (Non-Invasive Positive Pressure Support)    Cardioversion/Defibrillation     Code Status:    No CPR     Medical Interventions (Level of Support Prior to Arrest):    Limited       Admission Status:  I believe this patient meets INPATIENT status due to sepsis and pneumonia sepsis and pneumonia.  I feel patient’s risk for adverse outcomes and need for care warrant INPATIENT  evaluation and I predict the patient’s care encounter to likely last beyond 2 midnights.    Addendum:  Further discussed code status with Ms. Carter, she now states she wishes to be DNR DNI after thinking further about her situation.  She states she is okay with dying if it is her time and does not want to be intubated if needed.        Electronically signed by Matty Morris MD, 04/02/20, 12:58 PM.      Electronically signed by Matty Morris MD at 04/02/20 7694          Physician Progress Notes (most recent note)      Scott Mathis MD at 04/07/20 1240          Infectious Disease progress note  7/26/1930  [id number]    Date of Consult: 4/3/2020  Admission Date: 4/2/2020    Evaluating Physician: Scott Mathis MD     Chief Complaint: fall    History of present illness:   Patient is a 89 y.o.  Yr old female origninally from the [place] but has lived in the  for many years. She has a  history of DJD, COPD, GERD, RA on methotrexate. Recent hip fracture. She resides in SNF. Presented to [facility] ED on 3/28 with fall, discharged back to SNF. Presented again to [facility] ED on 4/2 with another fall, complaining of knee and hip pain. In ED was hypoxemic, tachycardic, hypotensive, and by report was quite ill-appearing.  Initially the plan was to admitted to ICU however she has a firm DNR and thus the decision was made to admit to the floor. CT scan with multifocal pneumonia, intermediate risk for COVID-19, so isolated and testing sent to state lab. Started on zosyn and plaquenil and zinc. Also got vancomycin in ED. No hip or leg fracture. WBC 15.7, neutrophilic predominance, on admission. Negative procal.     4/3/20: currently comfortable on room air. Pulse 82. BP improved after transfusion and fluids. Afebrile since admission.  She says her respiratory status is back to baseline.  She denies having any cough or shortness of breath, chest pain at this time.  She denies having any sick contacts.   "She says she has not left her skilled nursing facility recently opened to go to the emergency department a few days before.  No recent visitors.  No fevers or chills prior to admission. Her only complaint at this time is ongoing hip and knee pain. No drug or tobacco use.    4/4/20  Ruled out for COVID; transferred to ; feels achy, has leg/hip pain; currently using bedside commode;    4/5/2020: Patient seen in [place] [id number].  Patient has been afebrile and tachycardic overnight.  Maintaining oxygen saturations on room air.  Patient has continued \"achy\" feeling.  Also has continued leg/hip pain.  Patient remains very confused and per nursing staff has been having hallucinations overnight.    4/6/20: moved out of airborne isolation after COVID negative. Feeling better. More alert. Oriented to person, place, and date. Intermittent respiratory symptoms but currently asymptomatic on room air. Appetite ok. Baseline tremor.    4/7/20: resting blood on the floor.  No acute new complaints.  She had a bath this morning.  Knee pain and hip pain and swelling are all improving.  Tolerating Augmentin.  Respirations symptoms, cough or shortness of breath. Discharge home planned tomorrow.     Review of Systems  No fevers, dysuria, n/v/d. Otherwise negative other than as above       Allergies   Allergen Reactions   • Bactrim [Sulfamethoxazole-Trimethoprim] Other (See Comments)     Stomach cramps    • Ciprofloxacin Other (See Comments)     Other reaction(s): shaking  HCI TABS/ SHAKING   • Levofloxacin Diarrhea       Antibiotics:  Anti-Infectives (From admission, onward)    Ordered     Dose/Rate Route Frequency Start Stop    04/06/20 0958  amoxicillin-clavulanate (AUGMENTIN) 500-125 MG per tablet 500 mg  Review   Ordering Provider:  Scott Mathis MD    1 tablet Oral 2 Times Daily 04/06/20 1045 04/09/20 0859    04/02/20 1606  hydroxychloroquine (PLAQUENIL) tablet 400 mg     Ordering Provider:  Matty Morris MD    400 mg " Oral Every 12 Hours Scheduled 04/02/20 1800 04/03/20 0817    04/02/20 1121  vancomycin 1250 mg/250 mL 0.9% NS IVPB ([facility])     Ordering Provider:  Jarrell Ramires MD    20 mg/kg × 68 kg Intravenous Once 04/02/20 1200 04/02/20 1441    04/02/20 1121  piperacillin-tazobactam (ZOSYN) 3.375 g in iso-osmotic dextrose 50 ml (premix)     Ordering Provider:  Jarrell Ramires MD    3.375 g Intravenous Once 04/02/20 1200 04/02/20 1220          Other Medications:  Current Facility-Administered Medications   Medication Dose Route Frequency Provider Last Rate Last Dose   • acetaminophen (TYLENOL) tablet 650 mg  650 mg Oral Q4H PRN Matty Morris MD   650 mg at 04/04/20 0608    Or   • acetaminophen (TYLENOL) 160 MG/5ML solution 650 mg  650 mg Oral Q4H PRN Matty Morris MD        Or   • acetaminophen (TYLENOL) suppository 650 mg  650 mg Rectal Q4H PRN Matty Morris MD       • acetaminophen (TYLENOL) tablet 1,000 mg  1,000 mg Oral Q8H Matty Morris MD   1,000 mg at 04/07/20 0817   • albuterol (PROVENTIL) nebulizer solution 0.083% 2.5 mg/3mL  2.5 mg Nebulization Q4H PRN Zain Walker, [facility]       • amoxicillin-clavulanate (AUGMENTIN) 500-125 MG per tablet 500 mg  1 tablet Oral BID Scott Mathis MD   500 mg at 04/07/20 0817   • atorvastatin (LIPITOR) tablet 10 mg  10 mg Oral Daily Matty Morris MD   10 mg at 04/07/20 0818   • budesonide-formoterol (SYMBICORT) 160-4.5 MCG/ACT inhaler 2 puff  2 puff Inhalation BID - RT Matty Morris MD   2 puff at 04/07/20 0859   • docusate sodium (COLACE) capsule 100 mg  100 mg Oral Daily Matty Morris MD   100 mg at 04/07/20 0817   • docusate sodium (COLACE) capsule 100 mg  100 mg Oral BID PRN Matty Morris MD       • famotidine (PEPCID) tablet 20 mg  20 mg Oral Daily Zain Walker [facility]   20 mg at 04/07/20 0817   • folic acid (FOLVITE) tablet 1,000 mcg  1,000 mcg Oral Daily Matty Morris MD    1,000 mcg at 04/07/20 0817   • haloperidol lactate (HALDOL) injection 1 mg  1 mg Intramuscular Once Nella Riley APRN       • lidocaine (LIDODERM) 5 % 2 patch  2 patch Transdermal Q24H Matty Morris MD   Stopped at 04/07/20 0817   • melatonin tablet 5 mg  5 mg Oral Nightly PRN Matty Morris MD   5 mg at 04/06/20 0049   • melatonin tablet 5 mg  5 mg Oral Nightly Matty Morris MD   5 mg at 04/06/20 2047   • multivitamin with minerals 1 tablet  1 tablet Oral Daily Matty Morris MD   1 tablet at 04/07/20 0817   • nystatin (MYCOSTATIN) ointment 1 application  1 application Topical Daily Matty Morris MD   1 application at 04/07/20 0816   • OLANZapine (zyPREXA) injection 2.5 mg  2.5 mg Intramuscular Q8H PRN Matty Morris MD       • OLANZapine (zyPREXA) injection 2.5 mg  2.5 mg Intramuscular Once Patsy Steel MD       • ondansetron (ZOFRAN) tablet 4 mg  4 mg Oral Q6H PRN Matty Morris MD        Or   • ondansetron (ZOFRAN) injection 4 mg  4 mg Intravenous Q6H PRN Matty Morris MD       • QUEtiapine (SEROquel) tablet 25 mg  25 mg Oral Q6H PRN Matty Morris MD   25 mg at 04/06/20 0049   • saccharomyces boulardii (FLORASTOR) capsule 250 mg  250 mg Oral Daily Scott Mathis MD   250 mg at 04/07/20 0817   • sodium chloride 0.9 % flush 10 mL  10 mL Intravenous PRN Jarrell Ramires MD       • sodium chloride 0.9 % flush 10 mL  10 mL Intravenous Q12H Matty Morris MD   10 mL at 04/07/20 0818   • sodium chloride 0.9 % flush 10 mL  10 mL Intravenous PRN Matty Morris MD       • tamsulosin (FLOMAX) 24 hr capsule 0.4 mg  0.4 mg Oral Daily Matty Morris MD   0.4 mg at 04/07/20 0818   • vitamin B-12 (CYANOCOBALAMIN) tablet 1,000 mcg  1,000 mcg Oral Daily Matty Morris MD   1,000 mcg at 04/07/20 0817       Physical Exam:   Vital Signs   /84 (BP Location: Right arm, Patient  "Position: Lying)   Pulse 97   Temp 98.5 °F (36.9 °C) (Oral)   Resp 16   Ht 165.1 cm (65\")   Wt 68 kg (150 lb)   SpO2 98%   BMI 24.96 kg/m²      GENERAL: Awake and alert, in no acute distress.   EYES: PERRL.  Nonicteric  HEENT: Normocephalic, atraumatic. No ext oral lesions  Respiratory: clear bilaterally. nonlabored breathing. No cough  Cardiovascular: RRR with no murmurs   Gastrointestinal: Positive bowel sounds, soft, nontender nondistended  Musculoskeletal: Left hip posterior hematoma, left knee contusion improving with less swelling.   Skin: Hematoma and contusion as listed above, no rashes or lesions or jaundice noted  Neurological: AOx3, moves all extremities   Psychiatric: cooperative and appropriate    Laboratory Data    Results from last 7 days   Lab Units 04/07/20  0829 04/06/20  0610 04/05/20  0541   WBC 10*3/mm3 10.92* 8.34 10.02   HEMOGLOBIN g/dL 9.5* 7.4* 8.6*   HEMATOCRIT % 30.7* 23.5* 26.8*   PLATELETS 10*3/mm3 399 310 312     Results from last 7 days   Lab Units 04/06/20  0610   SODIUM mmol/L 142   POTASSIUM mmol/L 3.8   CHLORIDE mmol/L 107   CO2 mmol/L 26.0   BUN mg/dL 14   CREATININE mg/dL 0.94   GLUCOSE mg/dL 83   CALCIUM mg/dL 8.6     Estimated Creatinine Clearance: 43.6 mL/min (by C-G formula based on SCr of 0.94 mg/dL).  Results from last 7 days   Lab Units 04/02/20  0941   ALK PHOS U/L 53   BILIRUBIN mg/dL 0.4   ALT (SGPT) U/L 10   AST (SGOT) U/L 21               Microbiology:  Microbiology Results (last 10 days)     Procedure Component Value - Date/Time    MRSA Screen, PCR (Inpatient) - Swab, Nares [[id number]]  (Normal) Collected:  04/02/20 9561    Lab Status:  Final result Specimen:  Swab from Nares Updated:  04/02/20 9928     MRSA PCR Negative    Narrative:       MRSA Negative    CORONAVIRUS-19(COVID-19),RT-PCR,[facility], NP Swab in Transport Media - Swab, Nasopharynx [[id number]] Collected:  04/02/20 1550    Lab Status:  Final result Specimen:  Swab from Nasopharynx Updated:  " 04/03/20 1709     Reference Lab Report --     Comment: See scanned report          COVID19 Not Detected    Rapid Strep A Screen - Swab, Throat [[id number]]  (Normal) Collected:  04/02/20 1322    Lab Status:  Final result Specimen:  Swab from Throat Updated:  04/02/20 1340     Strep A Ag Negative    Narrative:       Test performed by Direct Antigen Testing.    Respiratory Panel, PCR - Swab, Nasopharynx [[id number]]  (Normal) Collected:  04/02/20 1322    Lab Status:  Final result Specimen:  Swab from Nasopharynx Updated:  04/02/20 1441     ADENOVIRUS, PCR Not Detected     Coronavirus 229E Not Detected     Coronavirus HKU1 Not Detected     Coronavirus NL63 Not Detected     Coronavirus OC43 Not Detected     Human Metapneumovirus Not Detected     Human Rhinovirus/Enterovirus Not Detected     Influenza B PCR Not Detected     Parainfluenza Virus 1 Not Detected     Parainfluenza Virus 2 Not Detected     Parainfluenza Virus 3 Not Detected     Parainfluenza Virus 4 Not Detected     Bordetella pertussis pcr Not Detected     Influenza A H1 2009 PCR Not Detected     Chlamydophila pneumoniae PCR Not Detected     Mycoplasma pneumo by PCR Not Detected     Influenza A PCR Not Detected     Influenza A H3 Not Detected     Influenza A H1 Not Detected     RSV, PCR Not Detected     Bordetella parapertussis PCR Not Detected    Narrative:       The coronavirus on the RVP is NOT COVID-19 and is NOT indicative of infection with COVID-19.     Beta Strep Culture, Throat - Swab, Throat [[id number]]  (Normal) Collected:  04/02/20 1322    Lab Status:  Final result Specimen:  Swab from Throat Updated:  04/04/20 1218     Throat Culture, Beta Strep No Beta Hemolytic Streptococcus Isolated    Narrative:       Group A Strep incidence is low in adults. Positive culture for Beta hemolytic Streptococcus species can reflect colonization and not true infection. Please correlate clinically.    Blood Culture - Blood, Arm, Right [[id number]] Collected:  04/02/20  1006    Lab Status:  Final result Specimen:  Blood from Arm, Right Updated:  04/07/20 1031     Blood Culture No growth at 5 days    Blood Culture - Blood, Arm, Left [[id number]] Collected:  04/02/20 1006    Lab Status:  Final result Specimen:  Blood from Arm, Left Updated:  04/07/20 1031     Blood Culture No growth at 5 days          Radiology:  Xr Femur 2 View Left    Result Date: 4/2/2020  Degenerative changes.  The left hip remains well located without acute fracture of the left hip or left femur.  D:  04/02/2020 E:  04/02/2020  This report was finalized on 4/2/2020 12:16 PM by Dr. Teja Scott.      Ct Head Without Contrast    Result Date: 4/2/2020  No acute intracranial abnormality specifically, no acute intracranial hemorrhage or extra-axial fluid collection with calvarium intact. Moderate-to-severely advanced chronic small vessel ischemic disease findings in the periventricular and deep white matter.  D:  04/02/2020 E:  04/02/2020  This report was finalized on 4/2/2020 12:16 PM by Dr. Teja Scott.      Ct Pelvis Without Contrast    Result Date: 4/2/2020  1. Bilateral pulmonary opacifications of groundglass attenuation, fairly well demarcated areas of geographic involvement left lung apex, mid lungs and within the right lower lobe medial segment concerning for atypical etiology such as viral etiology of airspace disease with at least intermediate correlation to COVID-19.  2. Large left hip body wall hematoma with surrounding soft tissue edema, however, no acute fracture of the left hip or pelvis is identified.  3. Advanced atherosclerotic involvement of peripheral vascular disease extending into the bilateral lower extremities, right slightly greater than left, however despite advanced involvement, there is at least trace flow into the posterior tibial arteries, left greater than right, with severely diminutive caliber of the right posterior tibial artery from atherosclerotic involvement and calcifications  progressed in the distal superficial femoral, popliteal and trifurcation regions of the bilateral lower extremities.  3.  No acute vascular findings within the chest, abdomen or pelvis identified with atherosclerotic nonaneurysmal thoracoabdominal aorta.  D:  04/02/2020 E:  04/02/2020  This report was finalized on 4/2/2020 12:16 PM by Dr. Teja Scott.      Ct Angio Abdominal Aorta Bilateral Iliofem Runoff    Result Date: 4/2/2020  1. Bilateral pulmonary opacifications of groundglass attenuation, fairly well demarcated areas of geographic involvement left lung apex, mid lungs and within the right lower lobe medial segment concerning for atypical etiology such as viral etiology of airspace disease with at least intermediate correlation to COVID-19.  2. Large left hip body wall hematoma with surrounding soft tissue edema, however, no acute fracture of the left hip or pelvis is identified.  3. Advanced atherosclerotic involvement of peripheral vascular disease extending into the bilateral lower extremities, right slightly greater than left, however despite advanced involvement, there is at least trace flow into the posterior tibial arteries, left greater than right, with severely diminutive caliber of the right posterior tibial artery from atherosclerotic involvement and calcifications progressed in the distal superficial femoral, popliteal and trifurcation regions of the bilateral lower extremities.  3.  No acute vascular findings within the chest, abdomen or pelvis identified with atherosclerotic nonaneurysmal thoracoabdominal aorta.  D:  04/02/2020 E:  04/02/2020  This report was finalized on 4/2/2020 12:16 PM by Dr. Teja Scott.      Xr Chest 1 View    Result Date: 4/2/2020  Increased opacifications left lung apex and right lung base from 01/10/2020 comparison examination consistent with airspace disease such as bronchopneumonia without pleural effusion.  D:  04/02/2020 E:  04/02/2020  This report was finalized on  4/2/2020 12:16 PM by Dr. Teja Scott.      Ct Angiogram Chest    Result Date: 4/2/2020  1. Bilateral pulmonary opacifications of groundglass attenuation, fairly well demarcated areas of geographic involvement left lung apex, mid lungs and within the right lower lobe medial segment concerning for atypical etiology such as viral etiology of airspace disease with at least intermediate correlation to COVID-19.  2. Large left hip body wall hematoma with surrounding soft tissue edema, however, no acute fracture of the left hip or pelvis is identified.  3. Advanced atherosclerotic involvement of peripheral vascular disease extending into the bilateral lower extremities, right slightly greater than left, however despite advanced involvement, there is at least trace flow into the posterior tibial arteries, left greater than right, with severely diminutive caliber of the right posterior tibial artery from atherosclerotic involvement and calcifications progressed in the distal superficial femoral, popliteal and trifurcation regions of the bilateral lower extremities.  3.  No acute vascular findings within the chest, abdomen or pelvis identified with atherosclerotic nonaneurysmal thoracoabdominal aorta.  D:  04/02/2020 E:  04/02/2020  This report was finalized on 4/2/2020 12:16 PM by Dr. Teja Scott.       I personally reviewed the above CT chest    Admission EKG with QTc 486      Impression:   1. Fall at SNF, left hip hematoma: XR negative for fracture  2. Hypotension, tachycardia: improved with volume repletion. More likely dehydration rather than sepsis. Afebrile. Negative procalcitonin. WBC rapidly improved  3. Multifocal pneumonia, CAP: noted on admission chest CT. Improved. Respiratory symptoms back to baseline. MRSA screening. Urine strep and legionella antigens negative. RVP and state COVID-19 negative. No productive cough for culture. Tolerating Augmentin.   4. Multiple recent falls  5. Neutrophilic leukocytosis:  rapidly improved  6. COPD: back to baseline  7. RA on methotrexate  8. Immunocompromised host  9. GERD  10. DJD  11. CKD stage 3 Estimated Creatinine Clearance: 43.6 mL/min (by C-G formula based on SCr of 0.94 mg/dL).      PLAN:   - PO augmentin 500-125 mg BID to complete 7 day course through 4/8  - ok to discharge when cleared by other teams.     I will follow. Call with questions.     Scott Mathis MD  4/7/2020      Electronically signed by Scott Mathis MD at 04/07/20 1244          Consult Notes (most recent note)      Scott Mathis MD at 04/03/20 1042      Consult Orders    1. Inpatient Infectious Diseases Consult [[id number]] ordered by Matty Morris MD at 04/02/20 1545                Infectious Disease Consult  Pati Carter  7/26/1930  [id number]    Date of Consult: 4/3/2020  Admission Date: 4/2/2020    Requesting Provider: Matty Morris,*  Evaluating Physician: Scott Mathis MD    Chief Complaint: fall    Reason for Consultation: pneumonia    History of present illness:   Patient is a 89 y.o.  Yr old female origninally from the [place] but has lived in the  for many years. She has a  history of DJD, COPD, GERD, RA on methotrexate. Recent hip fracture. She resides in SNF. Presented to [facility] ED on 3/28 with fall, discharged back to SNF. Presented again to [facility] ED on 4/2 with another fall, complaining of knee and hip pain. In ED was hypoxemic, tachycardic, hypotensive, and by report was quite ill-appearing.  Initially the plan was to admitted to ICU however she has a firm DNR and thus the decision was made to admit to the floor. CT scan with multifocal pneumonia, intermediate risk for COVID-19, so isolated and testing sent to state lab. Started on zosyn and plaquenil and zinc. Also got vancomycin in ED. No hip or leg fracture. WBC 15.7, neutrophilic predominance, on admission. Negative procal.     This visit was conducted via in-hospital telemedicine, video  conferencing software.  Patient agreed to telemedicine visit.  Patient was in her room and I was standing outside the room.  Nurse wearing proper PPE was in the room operating ant iPad.     4/3/20: currently comfortable on room air. Pulse 82. BP improved after transfusion and fluids. Afebrile since admission.  She says her respiratory status is back to baseline.  She denies having any cough or shortness of breath, chest pain at this time.  She denies having any sick contacts.  She says she has not left her skilled nursing facility recently opened to go to the emergency department a few days before.  No recent visitors.  No fevers or chills prior to admission. Her only complaint at this time is ongoing hip and knee pain. No drug or tobacco use.    Review of Systems  Constitutional-- No Fever, chills or sweats.  Appetite ok  Heent-- No new vision, hearing or throat complaints.  No epistaxis or oral sores.  Denies odynophagia or dysphagia.  No headache, photophobia or neck stiffness.  CV-- No chest pain, palpitation or syncope  Resp-- No SOB/cough/Hemoptysis  GI- No nausea, vomiting, or diarrhea.   -- No dysuria, hematuria, or flank pain.  Denies hesitancy, urgency or flank pain.  Heme- No active bruising or bleeding  MS-- left hip and knee pain.   Neuro-- No acute focal weakness or numbness in the arms or legs.  Skin-- No rashes, lesions, ulcers. No skin breakdown      Past Medical History:   Diagnosis Date   • Anemia     Description: AJoshua Agrawal 2006- borderline intermittent.   • Back pain    • Benign colonic polyp     Description: YUVAL Agrawal 1999.   • Benign colonic polyp 9/28/2016    Description: YUVAL Agrawal 1999.   • Compression fracture of lumbar vertebra (CMS/[facility])    • Constipation    • COPD (chronic obstructive pulmonary disease) (CMS/HCC)     Description: A.  Rule out chronic persistent asthma, COPD, or obliterative bronchiolitis.- Rae   • Degeneration of intervertebral disc of lumbar region     Description: YUVAL   Diagnosed in April 2013 with advanced multilevel with severe spinal stenosis, followed by Dr. Pillai for pain management.   • Gastroesophageal reflux disease    • Hearing loss    • History of colonoscopy 01/01/1999    NORMAL PER PATIENT    • History of mammogram 01/01/2011    NORMAL PER PT    • History of Papanicolaou smear of cervix 01/01/2010    NORMAL PER PT    • History of varicella    • Hyperlipidemia     Description: A.  Dx 2006.   • Hypertension     Description: A. Dx 2001.   • Macular degeneration    • Ovarian mass     Dx 8/15- benign left cystic adnexal mass   • Rheumatoid arthritis (CMS/HCC)     Description: A.  Diagnosed in 2000 and and followed by Dr. Constantino (now Dr. Kaplan). B.  On methotrexate therapy since 2000. C.  Off low-dose prednisone therapy.       Past Surgical History:   Procedure Laterality Date   • APPENDECTOMY     • CARPAL TUNNEL RELEASE Left 01/01/2003    HISTORY OF NEUROPLASTY DECOMPRESSION MEDIAN NERVE AT CARPAL TUNNEL LEFT   • CATARACT EXTRACTION Bilateral 01/01/2009   • CHOLECYSTECTOMY  01/01/1962   • HIP CANNULATED SCREW PLACEMENT Right 1/25/2020    Procedure: HIP CANNULATED SCREW PLACEMENT RIGHT;  Surgeon: Karlos Blount MD;  Location: [facility];  Service: Orthopedics   • KNEE ARTHROSCOPY Left 01/01/2001    MENISCAL REPAIR   • KYPHOPLASTY  06/18/2015    T11 AND L1 (JOSE A)   • PELVIC LAPAROSCOPY  01/01/1996    REMOVAL OF BENIGN UTERINE AND RIGHT OVARIAN TUMORS   • SALPINGO OOPHORECTOMY Left 08/26/2015    REMOVAL OF LEFT OVARY AND TUBE (benign cystic mass)       Social History     Socioeconomic History   • Marital status:      Spouse name: Not on file   • Number of children: Not on file   • Years of education: Not on file   • Highest education level: Not on file   Tobacco Use   • Smoking status: Former Smoker   • Smokeless tobacco: Never Used   Substance and Sexual Activity   • Alcohol use: No   • Drug use: No   • Sexual activity: Defer   Social History Narrative     Patient moved to [facility] assisted living 3/26/20. On 3/28/20 patient visited ED for fall-related injuries.  In response, family looking into hiring a , to visit patient BID for ADLs/ transfers. Family drives patient to appts, but says Jm Encinas may provide in future.       family history includes Diabetes in her mother; Hypertension in her mother.    Allergies   Allergen Reactions   • Bactrim [Sulfamethoxazole-Trimethoprim] Other (See Comments)     Stomach cramps    • Ciprofloxacin Other (See Comments)     Other reaction(s): shaking  HCI TABS/ SHAKING   • Levofloxacin Diarrhea       Antibiotics:  Anti-Infectives (From admission, onward)    Ordered     Dose/Rate Route Frequency Start Stop    04/02/20 1606  hydroxychloroquine (PLAQUENIL) tablet 200 mg     Ordering Provider:  Matty Morris MD    200 mg Oral Every 12 Hours Scheduled 04/03/20 2100 04/07/20 2059    04/02/20 1528  piperacillin-tazobactam (ZOSYN) 3.375 g/100 mL 0.9% NS IVPB (mbp)  Review   Ordering Provider:  Matty Morris MD    3.375 g  over 4 Hours Intravenous Every 8 Hours 04/02/20 2000 04/10/20 1959    04/02/20 1606  hydroxychloroquine (PLAQUENIL) tablet 400 mg     Ordering Provider:  Matty Morris MD    400 mg Oral Every 12 Hours Scheduled 04/02/20 1800 04/03/20 0817    04/02/20 1121  vancomycin 1250 mg/250 mL 0.9% NS IVPB ([facility])     Ordering Provider:  Jarrell Ramires MD    20 mg/kg × 68 kg Intravenous Once 04/02/20 1200 04/02/20 1441    04/02/20 1121  piperacillin-tazobactam (ZOSYN) 3.375 g in iso-osmotic dextrose 50 ml (premix)     Ordering Provider:  Jarrell Ramires MD    3.375 g Intravenous Once 04/02/20 1200 04/02/20 1220          Other Medications:  Current Facility-Administered Medications   Medication Dose Route Frequency Provider Last Rate Last Dose   • acetaminophen (TYLENOL) tablet 650 mg  650 mg Oral Q4H PRN Matty Morris MD        Or   • acetaminophen  (TYLENOL) 160 MG/5ML solution 650 mg  650 mg Oral Q4H PRN Matty Morris MD        Or   • acetaminophen (TYLENOL) suppository 650 mg  650 mg Rectal Q4H PRN Matty Morris MD       • albuterol sulfate HFA (PROVENTIL HFA;VENTOLIN HFA;PROAIR HFA) inhaler 2 puff  2 puff Inhalation Q4H PRN Matty Morris MD       • atorvastatin (LIPITOR) tablet 10 mg  10 mg Oral Daily Matty Morris MD   10 mg at 04/02/20 2148   • budesonide-formoterol (SYMBICORT) 160-4.5 MCG/ACT inhaler 2 puff  2 puff Inhalation BID - RT Matty Morris MD   2 puff at 04/03/20 0748   • docusate sodium (COLACE) capsule 100 mg  100 mg Oral Daily Matty Morris MD   100 mg at 04/03/20 0817   • docusate sodium (COLACE) capsule 100 mg  100 mg Oral BID PRN Matty Morris MD       • famotidine (PEPCID) tablet 20 mg  20 mg Oral BID Matty Morris MD   20 mg at 04/03/20 0816   • famotidine (PEPCID) tablet 20 mg  20 mg Oral BID PRN Matty Morris MD       • folic acid (FOLVITE) tablet 1,000 mcg  1,000 mcg Oral Daily Matty Morris MD   1,000 mcg at 04/03/20 0817   • hydroxychloroquine (PLAQUENIL) tablet 200 mg  200 mg Oral Q12H Matty Morris MD       • melatonin tablet 5 mg  5 mg Oral Nightly PRN Matty Morris MD   5 mg at 04/02/20 2148   • multivitamin with minerals 1 tablet  1 tablet Oral Daily Matty Morris MD   1 tablet at 04/03/20 0817   • nystatin (MYCOSTATIN) ointment 1 application  1 application Topical Daily Matty Morris MD       • ondansetron (ZOFRAN) tablet 4 mg  4 mg Oral Q6H PRN Matty Morris MD        Or   • ondansetron (ZOFRAN) injection 4 mg  4 mg Intravenous Q6H PRN Matty Morris MD       • Pharmacy Consult   Does not apply Continuous PRN Matty Morris MD       • piperacillin-tazobactam (ZOSYN) 3.375 g/100 mL 0.9% NS IVPB (mbp)  3.375 g Intravenous Q8H Matty Morris  "MD Karlos   Stopped at 04/03/20 0951   • sodium chloride 0.9 % flush 10 mL  10 mL Intravenous PRN Jarrell Ramires MD       • sodium chloride 0.9 % flush 10 mL  10 mL Intravenous Q12H Matty Morris MD   10 mL at 04/02/20 2149   • sodium chloride 0.9 % flush 10 mL  10 mL Intravenous PRN Matty Morris MD       • vitamin B-12 (CYANOCOBALAMIN) tablet 1,000 mcg  1,000 mcg Oral Daily Matty Morris MD       • zinc sulfate (ZINCATE) capsule 220 mg  220 mg Oral Daily Matty Morris MD   220 mg at 04/03/20 0817       Physical Exam:   Vital Signs   /48   Pulse 96   Temp 98 °F (36.7 °C) (Oral)   Resp 18   Ht 165.1 cm (65\")   Wt 68 kg (150 lb)   SpO2 96%   BMI 24.96 kg/m²      GENERAL: Awake and alert, in no acute distress.   EYES: PERRL. EOMI.  HEENT: Normocephalic, atraumatic. Mucous membranes moist. No lesions on lips  HEART: RRR on monitor  LUNGS: no respiratory distress.  No cough appreciated  ABDOMEN: Soft, nontender.  : + purewick catheter.  MSK: left hip and knee bruises  SKIN: no rash appreciated  NEURO: Oriented to PPT.    PSYCHIATRIC: Normal insight and judgement. Cooperative.    Laboratory Data    Results from last 7 days   Lab Units 04/03/20  0504 04/02/20  2219 04/02/20  1007 04/02/20  0941   WBC 10*3/mm3 10.50 12.78*  --  15.63*   HEMOGLOBIN g/dL 7.7* 7.0*  7.0*  --  7.2*   HEMOGLOBIN, POC g/dL  --   --  8.2*  --    HEMATOCRIT % 24.5* 22.3*  22.3*  --  23.5*   HEMATOCRIT POC %  --   --  24*  --    PLATELETS 10*3/mm3 216 259  --  313     Results from last 7 days   Lab Units 04/03/20  0504   SODIUM mmol/L 141   POTASSIUM mmol/L 4.2   CHLORIDE mmol/L 107   CO2 mmol/L 24.0   BUN mg/dL 23   CREATININE mg/dL 1.03*   GLUCOSE mg/dL 91   CALCIUM mg/dL 7.8*     Estimated Creatinine Clearance: 39.7 mL/min (A) (by C-G formula based on SCr of 1.03 mg/dL (H)).  Results from last 7 days   Lab Units 04/02/20  0941   ALK PHOS U/L 53   BILIRUBIN mg/dL 0.4   ALT " (SGPT) U/L 10   AST (SGOT) U/L 21               Microbiology:  MRSA screening negaitve  Rapid strep negative  RVP negative    Blood Culture   Date Value Ref Range Status   04/02/2020 No growth at 24 hours  Preliminary   04/02/2020 No growth at 24 hours  Preliminary        Throat Culture, Beta Strep   Date Value Ref Range Status   04/02/2020 No Beta Hemolytic Streptococcus Isolated  Preliminary        Radiology:  Xr Femur 2 View Left    Result Date: 4/2/2020  Degenerative changes.  The left hip remains well located without acute fracture of the left hip or left femur.  D:  04/02/2020 E:  04/02/2020  This report was finalized on 4/2/2020 12:16 PM by Dr. Teja Scott.      Xr Knee 1 Or 2 View Left    Result Date: 3/28/2020  Extensive subcutaneous edema anterior to the patella and proximal tibia. No other evidence of potential acute trauma. Relatively advanced knee joint DJD.    This report was finalized on 3/28/2020 2:56 PM by Dr. Isael Lewis MD.      Xr Foot 3+ View Left    Result Date: 3/28/2020  Advanced midfoot DJD. No visible fracture. Consider follow-up imaging if the patient's symptoms persist or worsen.    This report was finalized on 3/28/2020 9:31 AM by Dr. Isael Lewis MD.      Ct Head Without Contrast    Result Date: 4/2/2020  No acute intracranial abnormality specifically, no acute intracranial hemorrhage or extra-axial fluid collection with calvarium intact. Moderate-to-severely advanced chronic small vessel ischemic disease findings in the periventricular and deep white matter.  D:  04/02/2020 E:  04/02/2020  This report was finalized on 4/2/2020 12:16 PM by Dr. Teja Scott.      Ct Pelvis Without Contrast    Result Date: 4/2/2020  1. Bilateral pulmonary opacifications of groundglass attenuation, fairly well demarcated areas of geographic involvement left lung apex, mid lungs and within the right lower lobe medial segment concerning for atypical etiology such as viral etiology of airspace disease with at  least intermediate correlation to COVID-19.  2. Large left hip body wall hematoma with surrounding soft tissue edema, however, no acute fracture of the left hip or pelvis is identified.  3. Advanced atherosclerotic involvement of peripheral vascular disease extending into the bilateral lower extremities, right slightly greater than left, however despite advanced involvement, there is at least trace flow into the posterior tibial arteries, left greater than right, with severely diminutive caliber of the right posterior tibial artery from atherosclerotic involvement and calcifications progressed in the distal superficial femoral, popliteal and trifurcation regions of the bilateral lower extremities.  3.  No acute vascular findings within the chest, abdomen or pelvis identified with atherosclerotic nonaneurysmal thoracoabdominal aorta.  D:  04/02/2020 E:  04/02/2020  This report was finalized on 4/2/2020 12:16 PM by Dr. Teja Scott.      Ct Angio Abdominal Aorta Bilateral Iliofem Runoff    Result Date: 4/2/2020  1. Bilateral pulmonary opacifications of groundglass attenuation, fairly well demarcated areas of geographic involvement left lung apex, mid lungs and within the right lower lobe medial segment concerning for atypical etiology such as viral etiology of airspace disease with at least intermediate correlation to COVID-19.  2. Large left hip body wall hematoma with surrounding soft tissue edema, however, no acute fracture of the left hip or pelvis is identified.  3. Advanced atherosclerotic involvement of peripheral vascular disease extending into the bilateral lower extremities, right slightly greater than left, however despite advanced involvement, there is at least trace flow into the posterior tibial arteries, left greater than right, with severely diminutive caliber of the right posterior tibial artery from atherosclerotic involvement and calcifications progressed in the distal superficial femoral, popliteal  and trifurcation regions of the bilateral lower extremities.  3.  No acute vascular findings within the chest, abdomen or pelvis identified with atherosclerotic nonaneurysmal thoracoabdominal aorta.  D:  04/02/2020 E:  04/02/2020  This report was finalized on 4/2/2020 12:16 PM by Dr. Teja Scott.      Xr Chest 1 View    Result Date: 4/2/2020  Increased opacifications left lung apex and right lung base from 01/10/2020 comparison examination consistent with airspace disease such as bronchopneumonia without pleural effusion.  D:  04/02/2020 E:  04/02/2020  This report was finalized on 4/2/2020 12:16 PM by Dr. Teja Scott.      Ct Angiogram Chest    Result Date: 4/2/2020  1. Bilateral pulmonary opacifications of groundglass attenuation, fairly well demarcated areas of geographic involvement left lung apex, mid lungs and within the right lower lobe medial segment concerning for atypical etiology such as viral etiology of airspace disease with at least intermediate correlation to COVID-19.  2. Large left hip body wall hematoma with surrounding soft tissue edema, however, no acute fracture of the left hip or pelvis is identified.  3. Advanced atherosclerotic involvement of peripheral vascular disease extending into the bilateral lower extremities, right slightly greater than left, however despite advanced involvement, there is at least trace flow into the posterior tibial arteries, left greater than right, with severely diminutive caliber of the right posterior tibial artery from atherosclerotic involvement and calcifications progressed in the distal superficial femoral, popliteal and trifurcation regions of the bilateral lower extremities.  3.  No acute vascular findings within the chest, abdomen or pelvis identified with atherosclerotic nonaneurysmal thoracoabdominal aorta.  D:  04/02/2020 E:  04/02/2020  This report was finalized on 4/2/2020 12:16 PM by Dr. Teja Scott.      Xr Hips Bilateral With Or Without Pelvis 2  View    Result Date: 3/28/2020  Generalized osteopenia. Previous right hip pinning. No evidence of acute right or left hip fracture is seen..    This report was finalized on 3/28/2020 9:33 AM by Dr. Isael Lewis MD.       I personally reviewed the above CT chest    Admission EKG with QTc 486      Impression:   12. Fall at [facility], left hip hematoma: XR negative for fracture  13. Hypotension, tachycardia: improved with volume repletion. More likely dehydration rather than sepsis. Afebrile. Negative procalcitonin. WBC rapidly improved  14. Multifocal pneumonia: noted on admission chest CT. Improved. Respiratory symptoms back to baseline. MRSA screening. Urine strep and legionella antigens negative. RVP negative. No productive cough for culture  15. Multiple recent falls  16. Neutrophilic leukocytosis: rapidly improved  17. COPD: back to baseline  18. RA on methotrexate  19. Immunocompromised host  20. GERD  21. DJD    I discussed her complex case with Dr. Morris. She was quite ill on admission but fortunately has rapidly improved. Respiratory symptoms are back to baseline. Given her rapid improvement my suspicion for COVID-19 is low although certainly reasonable to rule her out, due to findings on chest CT. Her major symptoms at this point R hip and knee pain related to fall.    PLAN:   - follow CBC, CMP    - f/u pending COVID-19 PCR sent to state lab. Hopefully back later today  - continue empiric plaquenil for now, although need to be careful with borderline QTc  - if COVID-19 PCR is negative can move to droplet isolation alone and discontinue plaquenil    - agree with stopping vancomycin  - continue IV zosyn for at least one more day.   - If she continues to improve tomorrow it would be reasonable to de-escalate to PO Augmentin 875-125 mg to complete a 7 day total course of CAP coverage   - probiotic    Complex MDM. Immunocompromised host. Call if any acute new concerns over the weekend.     Scott Mathis,  MD  4/3/2020      Electronically signed by Scott Mathis MD at 20 1226          Physical Therapy Notes (most recent note)      Maria Luisa Aparicio, PT at 20 1511  Version 1 of 1         Patient Name: Pati Carter  : 1930    MRN: [id number]                              Today's Date: 2020       Admit Date: 2020    Visit Dx:     ICD-10-CM ICD-9-CM   1. Severe sepsis (CMS/[facility]) A41.9 038.9    R65.20 995.92   2. Pneumonia of both lungs due to infectious organism, unspecified part of lung J18.9 483.8   3. Leukocytosis, unspecified type D72.829 288.60   4. Anemia, unspecified type D64.9 285.9   5. Hypotension, unspecified hypotension type I95.9 458.9   6. Dizziness R42 780.4   7. Syncope, unspecified syncope type R55 780.2   8. Traumatic hematoma of buttock, initial encounter S30.0XXA 922.32   9. Contusion of left lower extremity, initial encounter S80.12XA 924.5   10. Impaired mobility and ADLs Z74.09 799.89   11. Falls frequently R29.6 V15.88     Patient Active Problem List   Diagnosis   • Macular degeneration   • Rheumatoid arthritis (CMS/[facility])   • Hearing loss   • Hyperlipidemia   • Gastroesophageal reflux disease   • Hypertension   • Constipation   • Anemia   • Compression fracture of lumbar vertebra (CMS/[facility])   • Degeneration of intervertebral disc of lumbar region   • COPD (chronic obstructive pulmonary disease) (CMS/[facility])   • H/O calcium pyrophosphate deposition disease (CPPD)   • Long term methotrexate user   • Femur fracture, right (CMS/[facility])   • Falls frequently   • Fall at home, initial encounter   • Sepsis (CMS/HCC)   • Multifocal pneumonia   • Immunosuppressed status on methotrexate   • Acute respiratory failure with hypoxia (CMS/[facility])   • borderline B12 deficiency   • Delirium due to another medical condition   • Acute urinary retention   • Acute blood loss anemia, due to bleeding into hematomas from fall   • Traumatic hematoma of left hip     Past Medical History:   Diagnosis  Date   • Anemia     Description: A.  Dx 2006- borderline intermittent.   • Back pain    • Benign colonic polyp     Description: A.  Dx 1999.   • Benign colonic polyp 9/28/2016    Description: A.  Dx 1999.   • Compression fracture of lumbar vertebra (CMS/HCC)    • Constipation    • COPD (chronic obstructive pulmonary disease) (CMS/HCC)     Description: A.  Rule out chronic persistent asthma, COPD, or obliterative bronchiolitis.- Rae   • Degeneration of intervertebral disc of lumbar region     Description: A.  Diagnosed in April 2013 with advanced multilevel with severe spinal stenosis, followed by Dr. Pillai for pain management.   • Gastroesophageal reflux disease    • Hearing loss    • History of colonoscopy 01/01/1999    NORMAL PER PATIENT    • History of mammogram 01/01/2011    NORMAL PER PT    • History of Papanicolaou smear of cervix 01/01/2010    NORMAL PER PT    • History of varicella    • Hyperlipidemia     Description: A.  Dx 2006.   • Hypertension     Description: A. Dx 2001.   • Macular degeneration    • Ovarian mass     Dx 8/15- benign left cystic adnexal mass   • Rheumatoid arthritis (CMS/HCC)     Description: A.  Diagnosed in 2000 and and followed by Dr. Constantino (now Dr. Kaplan). B.  On methotrexate therapy since 2000. C.  Off low-dose prednisone therapy.     Past Surgical History:   Procedure Laterality Date   • APPENDECTOMY     • CARPAL TUNNEL RELEASE Left 01/01/2003    HISTORY OF NEUROPLASTY DECOMPRESSION MEDIAN NERVE AT CARPAL TUNNEL LEFT   • CATARACT EXTRACTION Bilateral 01/01/2009   • CHOLECYSTECTOMY  01/01/1962   • HIP CANNULATED SCREW PLACEMENT Right 1/25/2020    Procedure: HIP CANNULATED SCREW PLACEMENT RIGHT;  Surgeon: Karlos Blount MD;  Location: [facility];  Service: Orthopedics   • KNEE ARTHROSCOPY Left 01/01/2001    MENISCAL REPAIR   • KYPHOPLASTY  06/18/2015    T11 AND L1 (JOSE A)   • PELVIC LAPAROSCOPY  01/01/1996    REMOVAL OF BENIGN UTERINE AND RIGHT OVARIAN TUMORS   •  SALPINGO OOPHORECTOMY Left 08/26/2015    REMOVAL OF LEFT OVARY AND TUBE (benign cystic mass)     General Information     Row Name 04/07/20 1511          PT Evaluation Time/Intention    Document Type  therapy note (daily note)  -LM     Mode of Treatment  individual therapy;physical therapy  -LM     Row Name 04/07/20 1511          General Information    Patient Profile Reviewed?  yes  -LM     Existing Precautions/Restrictions  fall;other (see comments) Brief on with OOB activity  -LM     Row Name 04/07/20 1511          Cognitive Assessment/Intervention- PT/OT    Orientation Status (Cognition)  oriented x 3  -LM     Row Name 04/07/20 1511          Safety Issues, Functional Mobility    Safety Issues Affecting Function (Mobility)  insight into deficits/self awareness;safety precaution awareness;safety precautions follow-through/compliance  -LM     Impairments Affecting Function (Mobility)  balance;endurance/activity tolerance;pain;strength;range of motion (ROM)  -LM     Comment, Safety Issues/Impairments (Mobility)  Reporting pain in the L hip  -LM       User Key  (r) = Recorded By, (t) = Taken By, (c) = Cosigned By    Initials Name Provider Type    LM Maria Luisa Aparicio, PT Physical Therapist        Mobility     Row Name 04/07/20 1511          Bed Mobility Assessment/Treatment    Comment (Bed Mobility)  Pt up in chair at initial and end of tx.  -LM     Row Name 04/07/20 1511          Transfer Assessment/Treatment    Comment (Transfers)  Vc's for hand placement.  -LM     Row Name 04/07/20 1511          Sit-Stand Transfer    Sit-Stand [place] (Transfers)  minimum assist (75% patient effort);1 person assist;verbal cues  -     Assistive Device (Sit-Stand Transfers)  walker, front-wheeled  -LM     Row Name 04/07/20 1511          Gait/Stairs Assessment/Training    Gait/Stairs Assessment/Training  gait/ambulation independence;gait/ambulation assistive device  -     [place] Level (Gait)  minimum assist (75% patient  effort);1 person assist;verbal cues  -LM     Assistive Device (Gait)  walker, front-wheeled  -LM     Distance in Feet (Gait)  60 feet  -LM     Deviations/Abnormal Patterns (Gait)  antalgic;pancho decreased;stride length decreased  -LM     Bilateral Gait Deviations  forward flexed posture;heel strike decreased  -LM     Comment (Gait/Stairs)  Pt ambulates with an antalgic gait due to L hip pain - although pt reports pain improved with gait.  Vc's for upright posture and to stay inside walker.  -LM       User Key  (r) = Recorded By, (t) = Taken By, (c) = Cosigned By    Initials Name Provider Type    Maria Luisa Martínez PT Physical Therapist        Obj/Interventions     Row Name 04/07/20 1511          Therapeutic Exercise    Lower Extremity (Therapeutic Exercise)  LAQ (long arc quad), bilateral  -LM     Lower Extremity Range of Motion (Therapeutic Exercise)  hip abduction/adduction, bilateral;ankle dorsiflexion/plantar flexion, bilateral;other (see comments) Ankle Circles - CW, CCW  -LM     Exercise Type (Therapeutic Exercise)  AROM (active range of motion)  -LM     Position (Therapeutic Exercise)  seated;other (see comments) Reclined in chair  -LM     Sets/Reps (Therapeutic Exercise)  x10 each  -LM     Expected Outcome (Therapeutic Exercise)  improve functional stability;improve performance, gait skills;improve performance, transfer skills  -LM     Comment (Therapeutic Exercise)  Attempted seated marches, but pt reports it causes too much pain in her L hip and low back so exercise ceased.  -LM       User Key  (r) = Recorded By, (t) = Taken By, (c) = Cosigned By    Initials Name Provider Type    Maria Luisa Martínez PT Physical Therapist        Goals/Plan    No documentation.       Clinical Impression     Row Name 04/07/20 1511          Pain Assessment    Additional Documentation  Pain Scale: Numbers Pre/Post-Treatment (Group)  -LM     Row Name 04/07/20 1511          Pain Scale: Numbers Pre/Post-Treatment    Pain Scale:  Numbers, Pretreatment  8/10  -LM     Pain Scale: Numbers, Post-Treatment  6/10  -LM     Pain Location - Side  Left  -LM     Pain Location - Orientation  generalized  -LM     Pain Location  hip  -LM     Pain Intervention(s)  Repositioned;Ambulation/increased activity  -LM     Row Name 04/07/20 1511          Plan of Care Review    Plan of Care Reviewed With  patient  -LM     Progress  improving  -LM     Row Name 04/07/20 1511          Positioning and Restraints    Pre-Treatment Position  sitting in chair/recliner  -LM     Post Treatment Position  chair  -LM     In Chair  reclined;call light within reach;encouraged to call for assist;exit alarm on;waffle cushion;notified nsg  -LM       User Key  (r) = Recorded By, (t) = Taken By, (c) = Cosigned By    Initials Name Provider Type    Maria Luisa Martínez PT Physical Therapist        Outcome Measures     Row Name 04/07/20 1511          How much help from another person do you currently need...    Turning from your back to your side while in flat bed without using bedrails?  3  -LM     Moving from lying on back to sitting on the side of a flat bed without bedrails?  2  -LM     Moving to and from a bed to a chair (including a wheelchair)?  3  -LM     Standing up from a chair using your arms (e.g., wheelchair, bedside chair)?  3  -LM     Climbing 3-5 steps with a railing?  2  -LM     To walk in hospital room?  3  -LM     AM-PAC 6 Clicks Score (PT)  16  -LM     Row Name 04/07/20 1511          Functional Assessment    Outcome Measure Options  AM-PAC 6 Clicks Basic Mobility (PT)  -LM       User Key  (r) = Recorded By, (t) = Taken By, (c) = Cosigned By    Initials Name Provider Type    Maria Luisa Martínez PT Physical Therapist          PT Recommendation and Plan     Plan of Care Reviewed With: patient  Progress: improving  Outcome Summary: Pt progressing well towards skilled PT goals and gave good effort throughout tx.  Pt stood with MinAx1 and ambulated 60 feet using rw with MinAx1.   Pt ambulates with an antalgic gait due to pain in L hip.  Pt completed BLE ther ex, but only able to complete certain exercises due to pain.  Continue with skilled PT to improve mobility and safety.  Long discussion had with both pt and daughter (Danette - on the phone) re: discharge planning.  Case mgmt notified.  Recommend further therapy at SNF at d/c.     Time Calculation:   PT Charges     Row Name 20 1511             Time Calculation    Start Time  1511  -LM      PT Received On  20  -LM      PT Goal Re-Cert Due Date  20  -LM         Timed Charges    18703 - PT Therapeutic Exercise Minutes  10  -LM      27447 - Gait Training Minutes   22  -LM        User Key  (r) = Recorded By, (t) = Taken By, (c) = Cosigned By    Initials Name Provider Type     Maria Luisa Aparicio, PT Physical Therapist        Therapy Charges for Today     Code Description Service Date Service Provider Modifiers Qty    67067408086 HC GAIT TRAINING EA 15 MIN 2020 Maria Luisa Aparicio, PT GP 1    33150060950 HC PT THER PROC EA 15 MIN 2020 Maria Luisa Aparicio, PT GP 1          PT G-Codes  Outcome Measure Options: AM-PAC 6 Clicks Basic Mobility (PT)  AM-PAC 6 Clicks Score (PT): 16  AM-PAC 6 Clicks Score (OT): 14    Maria Luisa Aparicio PT  2020         Electronically signed by Maria Luisa Aparicio PT at 20 1607          Occupational Therapy Notes (most recent note)      Anh Brennan, OT at 20 0913          Acute Care - Occupational Therapy Treatment Note  [facility]     Patient Name: Pati Carter  : 1930  MRN: [id number]  Today's Date: 2020             Admit Date: 2020       ICD-10-CM ICD-9-CM   1. Severe sepsis (CMS/HCC) A41.9 038.9    R65.20 995.92   2. Pneumonia of both lungs due to infectious organism, unspecified part of lung J18.9 483.8   3. Leukocytosis, unspecified type D72.829 288.60   4. Anemia, unspecified type D64.9 285.9   5. Hypotension, unspecified hypotension type I95.9 458.9   6. Dizziness  R42 780.4   7. Syncope, unspecified syncope type R55 780.2   8. Traumatic hematoma of buttock, initial encounter S30.0XXA 922.32   9. Contusion of left lower extremity, initial encounter S80.12XA 924.5   10. Impaired mobility and ADLs Z74.09 799.89   11. Falls frequently R29.6 V15.88     Patient Active Problem List   Diagnosis   • Macular degeneration   • Rheumatoid arthritis (CMS/[facility])   • Hearing loss   • Hyperlipidemia   • Gastroesophageal reflux disease   • Hypertension   • Constipation   • Anemia   • Compression fracture of lumbar vertebra (CMS/HCC)   • Degeneration of intervertebral disc of lumbar region   • COPD (chronic obstructive pulmonary disease) (CMS/[facility])   • H/O calcium pyrophosphate deposition disease (CPPD)   • Long term methotrexate user   • Femur fracture, right (CMS/[facility])   • Falls frequently   • Fall at home, initial encounter   • Sepsis (CMS/[facility])   • Multifocal pneumonia   • Immunosuppressed status on methotrexate   • Acute respiratory failure with hypoxia (CMS/[facility])   • borderline B12 deficiency   • Delirium due to another medical condition   • Acute urinary retention   • Acute blood loss anemia, due to bleeding into hematomas from fall   • Traumatic hematoma of left hip     Past Medical History:   Diagnosis Date   • Anemia     Description: A.  Dx 2006- borderline intermittent.   • Back pain    • Benign colonic polyp     Description: A.  Dx 1999.   • Benign colonic polyp 9/28/2016    Description: A.  Dx 1999.   • Compression fracture of lumbar vertebra (CMS/[facility])    • Constipation    • COPD (chronic obstructive pulmonary disease) (CMS/[facility])     Description: A.  Rule out chronic persistent asthma, COPD, or obliterative bronchiolitis.- Butch   • Degeneration of intervertebral disc of lumbar region     Description: A.  Diagnosed in April 2013 with advanced multilevel with severe spinal stenosis, followed by Dr. Pillai for pain management.   • Gastroesophageal reflux disease    • Hearing loss    •  History of colonoscopy 01/01/1999    NORMAL PER PATIENT    • History of mammogram 01/01/2011    NORMAL PER PT    • History of Papanicolaou smear of cervix 01/01/2010    NORMAL PER PT    • History of varicella    • Hyperlipidemia     Description: A.  Dx 2006.   • Hypertension     Description: A. Dx 2001.   • Macular degeneration    • Ovarian mass     Dx 8/15- benign left cystic adnexal mass   • Rheumatoid arthritis (CMS/HCC)     Description: A.  Diagnosed in 2000 and and followed by Dr. Constantino (now Dr. Kaplan). B.  On methotrexate therapy since 2000. C.  Off low-dose prednisone therapy.     Past Surgical History:   Procedure Laterality Date   • APPENDECTOMY     • CARPAL TUNNEL RELEASE Left 01/01/2003    HISTORY OF NEUROPLASTY DECOMPRESSION MEDIAN NERVE AT CARPAL TUNNEL LEFT   • CATARACT EXTRACTION Bilateral 01/01/2009   • CHOLECYSTECTOMY  01/01/1962   • HIP CANNULATED SCREW PLACEMENT Right 1/25/2020    Procedure: HIP CANNULATED SCREW PLACEMENT RIGHT;  Surgeon: Karlos Blount MD;  Location: [facility];  Service: Orthopedics   • KNEE ARTHROSCOPY Left 01/01/2001    MENISCAL REPAIR   • KYPHOPLASTY  06/18/2015    T11 AND L1 (JOSE A)   • PELVIC LAPAROSCOPY  01/01/1996    REMOVAL OF BENIGN UTERINE AND RIGHT OVARIAN TUMORS   • SALPINGO OOPHORECTOMY Left 08/26/2015    REMOVAL OF LEFT OVARY AND TUBE (benign cystic mass)       Therapy Treatment    Rehabilitation Treatment Summary     Row Name 04/08/20 0755             Treatment Time/Intention    Discipline  occupational therapist  -TB      Document Type  therapy note (daily note)  -TB      Subjective Information  complains of;weakness;pain incontinence  -TB      Mode of Treatment  individual therapy  -TB      Patient Effort  good  -TB      Existing Precautions/Restrictions  fall  -TB      Treatment Considerations/Comments  Don brief for mobility  -TB      Recorded by [TB] Anh Brennan OT 04/08/20 0910      Row Name 04/08/20 0755             Vital Signs    Pre  Systolic BP Rehab  -- RN cleared OT  -TB      O2 Delivery Post Treatment  room air  -TB      Pre Patient Position  Supine  -TB      Intra Patient Position  Standing  -TB      Post Patient Position  Sitting  -TB      Recorded by [TB] Anh Brennan, OT 04/08/20 0910      Row Name 04/08/20 0755             Cognitive Assessment/Intervention- PT/OT    Affect/Mental Status (Cognitive)  confused  -TB      Behavioral Issues (Cognitive)  difficulty managing stress  -TB      Orientation Status (Cognition)  oriented x 3  -TB      Follows Commands (Cognition)  follows one step commands;75-90% accuracy  -TB      Cognitive Assessment/Intervention Comment  alert, cooperative; follows commands  -TB      Recorded by [TB] Anh Brennan, OT 04/08/20 0910      Row Name 04/08/20 0755             Safety Issues, Functional Mobility    Safety Issues Affecting Function (Mobility)  insight into deficits/self awareness;safety precaution awareness;safety precautions follow-through/compliance  -TB      Impairments Affecting Function (Mobility)  balance;endurance/activity tolerance;pain;strength;range of motion (ROM)  -TB      Recorded by [TB] Anh Brennan, OT 04/08/20 0910      Row Name 04/08/20 0755             Bed Mobility Assessment/Treatment    Rolling Left [place] (Bed Mobility)  moderate assist (50% patient effort);verbal cues  -TB      Rolling Right [place] (Bed Mobility)  minimum assist (75% patient effort);verbal cues  -TB      Scooting/Bridging [place] (Bed Mobility)  minimum assist (75% patient effort);verbal cues  -TB      Supine-Sit [place] (Bed Mobility)  minimum assist (75% patient effort);verbal cues  -TB      Assistive Device (Bed Mobility)  bed rails  -TB      Comment (Bed Mobility)  Rolling for hygiene/bathing following incontinent episode; cues and assist to come to sit EOB  -TB      Recorded by [TB] Anh Brennan, OT 04/08/20 0910      Row Name 04/08/20 075              Functional Mobility    Functional Mobility- Ind. Level  minimum assist (75% patient effort);verbal cues required  -TB      Functional Mobility- Device  rolling walker  -TB      Functional Mobility-Distance (Feet)  3  -TB      Functional Mobility- Safety Issues  step length decreased;weight-shifting ability decreased;balance decreased during turns  -TB      Functional Mobility- Comment  EOB to [facility]; defer distance to PT  -TB      Recorded by [TB] Anh Brennan, OT 04/08/20 0910      Row Name 04/08/20 0755             Bed-Chair Transfer    Bed-Chair [place] (Transfers)  minimum assist (75% patient effort);verbal cues  -TB      Assistive Device (Bed-Chair Transfers)  walker, front-wheeled  -TB      Recorded by [TB] Anh Brennan, OT 04/08/20 0910      Row Name 04/08/20 0755             Sit-Stand Transfer    Sit-Stand [place] (Transfers)  contact guard;verbal cues  -TB      Assistive Device (Sit-Stand Transfers)  walker, front-wheeled  -TB      Recorded by [TB] Anh Brennan, OT 04/08/20 0910      Row Name 04/08/20 0755             ADL Assessment/Intervention    19100 - OT Self Care/Mgmt Minutes  25  -TB      BADL Assessment/Intervention  upper body dressing;lower body dressing;grooming;feeding;toileting  -TB      Recorded by [TB] Anh Brennan, OT 04/08/20 0910      Row Name 04/08/20 0755             Upper Body Dressing Assessment/Training    Upper Body Dressing [place] Level  doff;don;front opening garment;minimum assist (75% patient effort);verbal cues  -TB      Upper Body Dressing Position  edge of bed sitting  -TB      Comment (Upper Body Dressing)  assist to doff soiled gown and don clean; cues attend to task and  to orient to gown   -TB      Recorded by [TB] Anh Brennan, OT 04/08/20 0910      Row Name 04/08/20 0755             Lower Body Dressing Assessment/Training    Lower Body Dressing [place] Level  don;socks;dependent (less than 25% patient  "effort)  -TB      Recorded by [TB] Anh Brennan, OT 04/08/20 0910      Row Name 04/08/20 0755             Grooming Assessment/Training    [place] Level (Grooming)  set up;wash face, hands;minimum assist (75% patient effort);hair care, combing/brushing  -TB      Grooming Position  edge of bed sitting  -TB      Recorded by [TB] Anh Brennan, OT 04/08/20 0910      Row Name 04/08/20 0755             Self-Feeding Assessment/Training    [place] Level (Feeding)  set up;prepare tray/open items;supervision;scoop food and bring to mouth;liquids to mouth  -TB      Self-Feeding Assess/Train, Spillage Amount  minimal  -TB      Comment (Feeding)  Pt reports diplopia; \"I see two forks and then I spill my food\"; improved with closing 1 eye; RN notified  -TB      Recorded by [TB] Anh Brennan, OT 04/08/20 0910      Row Name 04/08/20 0755             Toileting Assessment/Training    [place] Level (Toileting)  dependent (less than 25% patient effort)  -TB      Comment (Toileting)  incontinent; purwick, brief  -TB      Recorded by [TB] Anh Brennan, OT 04/08/20 0910      Row Name 04/08/20 0755             BADL Safety/Performance    Impairments, BADL Safety/Performance  cognition;balance;endurance/activity tolerance;pain;range of motion;strength  -TB      Cognitive Impairments, BADL Safety/Performance  attention;insight into deficits/self awareness;safety precaution awareness;safety precaution follow-through  -TB      Skilled BADL Treatment/Intervention  BADL process/adaptation training  -TB      Recorded by [TB] Anh Brennan, OT 04/08/20 0910      Row Name 04/08/20 0755             Motor Skills Assessment/Interventions    Additional Documentation  Balance (Group)  -TB      Recorded by [TB] Anh Brennan, OT 04/08/20 0910      Row Name 04/08/20 0755             Dynamic Sitting Balance    Level of [place], Reaches Outside Midline (Sitting, Dynamic Balance)  " "standby assist  -TB      Sitting Position, Reaches Outside Midline (Sitting, Dynamic Balance)  sitting in chair;sitting on edge of bed  -TB      Comment, Reaches Outside Midline (Sitting, Dynamic Balance)  ADL tasks  -TB      Recorded by [TB] Anh Brennan, OT 04/08/20 0910      Row Name 04/08/20 0755             Dynamic Standing Balance    Level of [place], Reaches Outside Midline (Standing, Dynamic Balance)  minimal assist, 75% patient effort  -TB      Assistive Device Utilized (Supported Standing, Dynamic Balance)  walker, rolling  -TB      Comment, Reaches Outside Midline (Standing, Dynamic Balance)  transfer training; STS reps x3 to support LB dressing/toileting ADL performance  -TB      Recorded by [TB] Anh Brennan, OT 04/08/20 0910      Row Name 04/08/20 075             Positioning and Restraints    Pre-Treatment Position  in bed  -TB      Post Treatment Position  chair  -TB      In Chair  reclined;call light within reach;encouraged to call for assist;exit alarm on;with nsg RN present as OT exiting  -TB      Recorded by [TB] Anh Brennan, OT 04/08/20 0910      Row Name 04/08/20 0758             Pain Assessment    Additional Documentation  Pain Scale: Word Pre/Post-Treatment (Group)  -TB      Recorded by [TB] Anh Brennan, OT 04/08/20 0910      Row Name 04/08/20 0752             Pain Scale: Numbers Pre/Post-Treatment    Pain Location - Orientation  generalized  -TB      Pre/Post Treatment Pain Comment  \"I always have pain\"  -TB      Pain Intervention(s)  Ambulation/increased activity;Repositioned  -TB      Recorded by [TB] Anh Brennan, OT 04/08/20 0910      Row Name 04/08/20 0756             Pain Scale: Word Pre/Post-Treatment    Pain: Word Scale, Pretreatment  4 - moderate pain  -TB      Pain: Word Scale, Post-Treatment  4 - moderate pain  -TB      Pre/Post Treatment Pain Comment  Pt tolerates OOB activity/positioning  -TB      Recorded by [TB] Anu, " Anh Tiwari, OT 04/08/20 0910      Row Name 04/08/20 0755             Vision Assessment/Intervention    Visual Impairment/Limitations  diplopia  -TB      Recorded by [TB] Anh Brennan, OT 04/08/20 0910      Row Name                Wound 04/05/20 0610 Bilateral perirectal MASD (Moisture associated skin damage)    Wound - Properties Group Date first assessed: 04/05/20 [TH] Time first assessed: 0610 [TH] Side: Bilateral [TH] Location: perirectal [TH] Primary Wound Type: MASD [TH] Recorded by:  [TH] Danielle Carlisle RN 04/05/20 0610    Row Name 04/08/20 0755             Coping    Observed Emotional State  accepting;cooperative  -TB      Verbalized Emotional State  acceptance  -TB      Recorded by [TB] Anh Brennan, OT 04/08/20 0910      Row Name 04/08/20 0755             Plan of Care Review    Plan of Care Reviewed With  patient  -TB      Recorded by [TB] Anh Brennan, OT 04/08/20 0910      Row Name 04/08/20 0755             Outcome Summary/Treatment Plan (OT)    Daily Summary of Progress (OT)  progress toward functional goals as expected  -TB      Barriers to Overall Progress (OT)  cognition  -TB      Plan for Continued Treatment (OT)  per POC  -TB      Anticipated Discharge Disposition (OT)  skilled nursing facility  -TB      Recorded by [TB] Anh Brennan, OT 04/08/20 0910        User Key  (r) = Recorded By, (t) = Taken By, (c) = Cosigned By    Initials Name Effective Dates Discipline     Anh Brennan, OT 06/08/18 -  OT     Danielle Carlisle RN 06/16/16 -  Nurse        Wound 04/05/20 0610 Bilateral perirectal MASD (Moisture associated skin damage) (Active)   Dressing Appearance open to air 4/7/2020  8:38 PM   Base pink;moist 4/7/2020  8:38 PM           OT Recommendation and Plan  Outcome Summary/Treatment Plan (OT)  Daily Summary of Progress (OT): progress toward functional goals as expected  Barriers to Overall Progress (OT): cognition  Plan for Continued  Treatment (OT): per POC  Anticipated Equipment Needs at Discharge (OT): (Defer to SNF)  Anticipated Discharge Disposition (OT): skilled nursing facility  Daily Summary of Progress (OT): progress toward functional goals as expected  Plan of Care Review  Plan of Care Reviewed With: patient  Plan of Care Reviewed With: patient  Outcome Summary: Pt alert and cooperative with therapy. Improved to Min A for supine to sit EOB. Pt actively engages in morning ADL tasks. Min UB dressing. Set-up simple grooming/self-feeding. Remains dependent for toileting needs. Up to chair with assist x1. OT will continue to follow IP. Recommend SNF at d/c for best outcome.  Outcome Measures     Row Name 04/08/20 0755 04/07/20 1453 04/06/20 1512       How much help from another is currently needed...    Putting on and taking off regular lower body clothing?  2  -TB  2  -TB  2  -SG    Bathing (including washing, rinsing, and drying)  2  -TB  2  -TB  2  -SG    Toileting (which includes using toilet bed pan or urinal)  1  -TB  1  -TB  2  -SG    Putting on and taking off regular upper body clothing  3  -TB  3  -TB  2  -SG    Taking care of personal grooming (such as brushing teeth)  3  -TB  3  -TB  3  -SG    Eating meals  3  -TB  3  -TB  3  -SG    AM-PAC 6 Clicks Score (OT)  14  -TB  14  -TB  14  -SG       Functional Assessment    Outcome Measure Options  AM-PAC 6 Clicks Daily Activity (OT)  -TB  AM-PAC 6 Clicks Daily Activity (OT)  -TB  AM-PAC 6 Clicks Daily Activity (OT)  -SG    Row Name 04/05/20 1405             How much help from another is currently needed...    Putting on and taking off regular lower body clothing?  1  -AN      Bathing (including washing, rinsing, and drying)  1  -AN      Toileting (which includes using toilet bed pan or urinal)  1  -AN      Putting on and taking off regular upper body clothing  1  -AN      Taking care of personal grooming (such as brushing teeth)  1  -AN      Eating meals  1  -AN      AM-PAC 6 Clicks  Score (OT)  6  -AN         Functional Assessment    Outcome Measure Options  AM-PAC 6 Clicks Daily Activity (OT)  -AN        User Key  (r) = Recorded By, (t) = Taken By, (c) = Cosigned By    Initials Name Provider Type    TB Anh Brennan OT Occupational Therapist    Kelsi Clement, OT Occupational Therapist    Gabby Paiz, OTR/L Occupational Therapist           Time Calculation:   Time Calculation- OT     Row Name 04/08/20 0913 04/08/20 0755          Time Calculation- OT    OT Start Time  0755  -TB  --     OT Received On  04/08/20  -TB  --     OT Goal Re-Cert Due Date  04/15/20  -TB  --        Timed Charges    31741 - OT Self Care/Mgmt Minutes  --  25  -TB       User Key  (r) = Recorded By, (t) = Taken By, (c) = Cosigned By    Initials Name Provider Type    TB Anh Brennan, OT Occupational Therapist        Therapy Charges for Today     Code Description Service Date Service Provider Modifiers Qty    46464179446 HC OT SELF CARE/MGMT/TRAIN EA 15 MIN 4/7/2020 Anh Brennan OT GO 2    81271275854 HC OT SELF CARE/MGMT/TRAIN EA 15 MIN 4/8/2020 Anh Brennan OT GO 2               Anh Brennan OT  4/8/2020    Electronically signed by Anh Brennan OT at 04/08/20 0914

## 2020-04-08 NOTE — OUTREACH NOTE
Prep Survey      Responses   [facility] patient discharged from?  [place]   Is LACE score < 7 ?  No   Eligibility  [facility]   Date of Admission  04/02/20   Date of Discharge  04/08/20   Discharge Disposition  Home or Self Care   Discharge diagnosis  Multifocal pneumonia,    Traumatic hematoma of left hip    COVID-19 Test Status  Negative   Does the patient have one of the following disease processes/diagnoses(primary or secondary)?  COPD/Pneumonia   Does the patient have Home health ordered?  Yes   What is the Home health agency?   [facility]   Is there a DME ordered?  Yes   What DME was ordered?  [employer] - commode chair and walker   Prep survey completed?  Yes          Candelaria Eduardo RN

## 2020-04-08 NOTE — PROGRESS NOTES
Case Management Discharge Note      Final Note: Spoke with patient, she would like to go home with her daughter. Spoke with patient's daugher Danette who is agreeable to care for patient at home.  DME arranged with [facility]  skilled nursing PT/OT arranged with [facility].  Will fax dc info to [facility] and [facility] when available.  Danette to transport.     Provided Post Acute Provider List?: N/A    Destination      No service has been selected for the patient.      Durable Medical Equipment - Selection Complete      Service Provider Request Status Selected Services Address Phone Number Fax Number    [facility] Selected Durable Medical Equipment [address], [facility] [id number] [phone]      Dialysis/Infusion      No service has been selected for the patient.      Home Medical Care - Selection Complete      Service Provider Request Status Selected Services Address Phone Number Fax Number    [facility] Selected Home Health Services [address] [place] [address] [phone]      Therapy      No service has been selected for the patient.      Community Resources      No service has been selected for the patient.             Final Discharge Disposition Code: 06 - home with home health care

## 2020-04-08 NOTE — TELEPHONE ENCOUNTER
Spoke with Danette . She is taking [place] to her house when she is discharged today. She will probably have to live with her for 4 weeks before she can go back to the assisted living   She was concerned if she needs a doctor are their doctors that come to her home.  Notified her to the Video Visits that Dr Bolaños can do now .  Sent her a email to sign up for [facility].    She will get that set up   Once she is home can she do a hospital follow up thru video visit ?

## 2020-04-09 ENCOUNTER — NURSE TRIAGE (OUTPATIENT)
Dept: CALL CENTER | Facility: HOSPITAL | Age: 85
End: 2020-04-09

## 2020-04-09 ENCOUNTER — TRANSITIONAL CARE MANAGEMENT TELEPHONE ENCOUNTER (OUTPATIENT)
Dept: CALL CENTER | Facility: HOSPITAL | Age: 85
End: 2020-04-09

## 2020-04-09 NOTE — TELEPHONE ENCOUNTER
"Called  [facility] and spoke with a nurse, Leonardo, who will be calling the provider, and getting the correct amount of meds. Recall daughter and informed her of  such    Reason for Disposition  • Caller has URGENT medication question about med that PCP prescribed and triager unable to answer question    Additional Information  • Negative: Drug overdose and nurse unable to answer question  • Negative: Caller requesting information not related to medicine  • Negative: Caller requesting a prescription for Strep throat and has a positive culture result  • Negative: Rash while taking a medication or within 3 days of stopping it  • Negative: Immunization reaction suspected  • Negative: [1] Asthma and [2] having symptoms of asthma (cough, wheezing, etc)  • Negative: MORE THAN A DOUBLE DOSE of a prescription or over-the-counter (OTC) drug  • Negative: [1] DOUBLE DOSE (an extra dose or lesser amount) of over-the-counter (OTC) drug AND [2] any symptoms (e.g., dizziness, nausea, pain, sleepiness)  • Negative: [1] DOUBLE DOSE (an extra dose or lesser amount) of prescription drug AND [2] any symptoms (e.g., dizziness, nausea, pain, sleepiness)  • Negative: Took another person's prescription drug  • Negative: [1] DOUBLE DOSE (an extra dose or lesser amount) of prescription drug AND [2] NO symptoms (Exception: a double dose of antibiotics)  • Negative: Diabetes drug error or overdose (e.g., insulin or extra dose)  • Negative: [1] Request for URGENT new prescription or refill of \"essential\" medication (i.e., likelihood of harm to patient if not taken) AND [2] triager unable to fill per unit policy  • Negative: [1] Prescription not at pharmacy AND [2] was prescribed today by PCP  • Negative: Pharmacy calling with prescription questions and triager unable to answer question    Answer Assessment - Initial Assessment Questions  1. SYMPTOMS: \"Do you have any symptoms?\"      Mother has pnuemonia,   2. SEVERITY: If symptoms are present, ask \"Are " "they mild, moderate or severe?\"   moderate to severe    Protocols used: MEDICATION QUESTION CALL-ADULT-      "

## 2020-04-09 NOTE — OUTREACH NOTE
Call Center TCM Note      Responses   [facility] patient discharged from?  [place]   COVID-19 Test Status  Negative   Does the patient have one of the following disease processes/diagnoses(primary or secondary)?  COPD/Pneumonia   Was the primary reason for admission:  Pneumonia   TCM attempt successful?  Yes   Call start time  1357   Call end time  1402   Discharge diagnosis  Multifocal pneumonia,    Traumatic hematoma of left hip    Is patient permission given to speak with other caregiver?  Yes   List who call center can speak with  spoke with daughter Lilian   Meds reviewed with patient/caregiver?  Yes   Does the patient have all medications ordered at discharge?  Yes   Prescription comments  There was mix up with Augmentin rx but per daughter has been resolved.    Is the patient taking all medications as directed (includes completed medication regime)?  Yes   Does the patient have a primary care provider?   Yes   Does the patient have an appointment with their PCP or pulmonologist within 7 days of discharge?  No   Comments regarding PCP  Latoya Bolaños MD   What is preventing the patient from scheduling follow up appointments within 7 days of discharge?  Waiting on return call [PCP office will be setting up TCM Video visit per daughter]   Nursing Interventions  Educated patient on importance of making appointment   Has the patient kept scheduled appointments due by today?  Yes   What is the Home health agency?   [facility]   Has home health visited the patient within 72 hours of discharge?  Yes   What DME was ordered?  [employer] - commode chair and walker   Has all DME been delivered?  Yes   Did the patient receive a copy of their discharge instructions?  Yes   Nursing interventions  Reviewed instructions with patient   What is the patient's perception of their health status since discharge?  Improving   Nursing Interventions  Nurse provided patient education   Is the patient/caregiver able to teach back  the hierarchy of who to call/visit for symptoms/problems? PCP, Specialist, Home health nurse, Urgent Care, ED, 911  Yes   Is the patient/caregiver able to teach back signs and symptoms of worsening condition:  Fever/chills, Shortness of breath, Chest pain   Is the patient/caregiver able to teach back importance of completing antibiotic course of treatment?  Yes   TCM call completed?  Yes   Wrap up additional comments  Daughter states pt doing well, slept very well last night, eating and drinking. Happy to be home. No questions or concerns at this time.           Yoly Arcos MA    4/9/2020, 14:06

## 2020-04-10 ENCOUNTER — EPISODE CHANGES (OUTPATIENT)
Dept: CASE MANAGEMENT | Facility: OTHER | Age: 85
End: 2020-04-10

## 2020-04-10 ENCOUNTER — TELEPHONE (OUTPATIENT)
Dept: INTERNAL MEDICINE | Facility: CLINIC | Age: 85
End: 2020-04-10

## 2020-04-10 ENCOUNTER — READMISSION MANAGEMENT (OUTPATIENT)
Dept: CALL CENTER | Facility: HOSPITAL | Age: 85
End: 2020-04-10

## 2020-04-10 DIAGNOSIS — M54.2 NECK PAIN: ICD-10-CM

## 2020-04-10 DIAGNOSIS — M06.9 RHEUMATOID ARTHRITIS INVOLVING MULTIPLE SITES, UNSPECIFIED RHEUMATOID FACTOR PRESENCE: Primary | ICD-10-CM

## 2020-04-10 NOTE — TELEPHONE ENCOUNTER
Patients daughter is calling stating that she went to the hospital for a fall and was taken off some medications and she wanted to know if she can start the medications back. The medication is mobic/ meloxicam. Please advise and call back    Danette is on  verbal     [phone]

## 2020-04-10 NOTE — OUTREACH NOTE
COPD/PN Week 2 Survey      Responses   [facility] patient discharged from?  [place]   COVID-19 Test Status  Negative   Does the patient have one of the following disease processes/diagnoses(primary or secondary)?  COPD/Pneumonia   Was the primary reason for admission:  Pneumonia   Week 2 attempt successful?  Yes   Call start time  1730   Call end time  1733   Discharge diagnosis  Multifocal pneumonia,    Traumatic hematoma of left hip    Is patient permission given to speak with other caregiver?  Yes   Person spoke with today (if not patient) and relationship  Danette/ daughter   Meds reviewed with patient/caregiver?  Yes   Is the patient having any side effects they believe may be caused by any medication additions or changes?  No   Does the patient have all medications ordered at discharge?  Yes   Is the patient taking all medications as directed (includes completed medication regime)?  Yes   Does the patient have a primary care provider?   Yes   Does the patient have an appointment with their PCP or pulmonologist within 7 days of discharge?  Greater than 7 days   Comments regarding PCP  Latoya Bolaños MD has appt with Dr Casey on 4/22/2020   What is preventing the patient from scheduling follow up appointments within 7 days of discharge?  Earlier appointment not available   Nursing Interventions  Verified appointment date/time/provider   Has the patient kept scheduled appointments due by today?  Yes   What is the Home health agency?   [facility]   Has home health visited the patient within 72 hours of discharge?  Yes   What DME was ordered?  [employer] - commode chair and walker   Has all DME been delivered?  Yes   Psychosocial issues?  No   Comments  Patient is doing very well. No fever, SOB or cough.    Did the patient receive a copy of their discharge instructions?  Yes   Nursing interventions  Reviewed instructions with patient   What is the patient's perception of their health status since discharge?   Improving   Is the patient/caregiver able to teach back the hierarchy of who to call/visit for symptoms/problems? PCP, Specialist, Home health nurse, Urgent Care, ED, 911  Yes   Is the patient/caregiver able to teach back signs and symptoms of worsening condition:  Fever/chills, Shortness of breath, Chest pain   Is the patient/caregiver able to teach back importance of completing antibiotic course of treatment?  Yes   Week 2 call completed?  Yes          Gee Castillo RN

## 2020-04-10 NOTE — TELEPHONE ENCOUNTER
Okay to re-start Meloxicam.  If that does not control the pain, can add Tramadol and Voltaren gel.   Please update medication list.    The Methotrexate does suppress the immune system, and since the patient was recently hospitalized with sepsis, she should get approval from the rheumatologist to restart this medication.  If the rheumatologist needs a current medication list, okay to send.

## 2020-04-10 NOTE — TELEPHONE ENCOUNTER
Danette Suresh [phone]  Spoke to pt's daughter, advised of clinical message. Daughter is in agreement with plan. Danette states since the pt is home with her, she needs a new Rx sent the [facility] on [address] for the Meloxicam 7.5 mg, Diclofenac 1% gel, Tamadol 50 mg tablet.     [facility]: [facility], pt scheduled for a video visit on 04/22/2020 for TCM F/U    Prescriptions updated in chart, please confirm refill request?

## 2020-04-10 NOTE — TELEPHONE ENCOUNTER
Danette Kerwin [phone]  Spoke to pt's daughter confirmed they advised both of those medication suppressed pt's immune system. Danette confirmed that she contacted the pt's rheumatologist and they confirmed those 2 medication do not suppress pt's immune system. Rheumatologist advised contact PCP, since they don't have a completed list of pt's current prescription. Please confirm?

## 2020-04-10 NOTE — TELEPHONE ENCOUNTER
Per ED discharge summary:4/3/2020  Inpatient Infectious Diseases Consult   Presenting Problem:   Sepsis (CMS/[facility]) [A41.9]    Stop These Medications   diclofenac 1 % gel gel,meloxicam 7.5 MG tablet,  methotrexate 2.5 MG tablet,traMADol 50 MG tablet    Pt's TCM Video visit F/U on 05/06/2020. Please advise?

## 2020-04-11 ENCOUNTER — READMISSION MANAGEMENT (OUTPATIENT)
Dept: CALL CENTER | Facility: HOSPITAL | Age: 85
End: 2020-04-11

## 2020-04-11 RX ORDER — MELOXICAM 7.5 MG/1
7.5 TABLET ORAL DAILY
Qty: 30 TABLET | Refills: 2 | Status: SHIPPED | OUTPATIENT
Start: 2020-04-11 | End: 2020-04-14 | Stop reason: SDUPTHER

## 2020-04-11 RX ORDER — TRAMADOL HYDROCHLORIDE 50 MG/1
50 TABLET ORAL EVERY 6 HOURS PRN
Qty: 120 TABLET | Refills: 2 | Status: SHIPPED | OUTPATIENT
Start: 2020-04-11 | End: 2020-05-17

## 2020-04-11 NOTE — OUTREACH NOTE
COPD/PN Week 1 Survey      Responses   [facility] patient discharged from?  [place]   COVID-19 Test Status  Negative   Does the patient have one of the following disease processes/diagnoses(primary or secondary)?  COPD/Pneumonia   Is there a successful TCM telephone encounter documented?  No   Was the primary reason for admission:  Pneumonia   Week 1 attempt successful?  Yes   Call start time  1435   Call end time  1442   Is patient permission given to speak with other caregiver?  Yes   Person spoke with today (if not patient) and relationship  Danette/ daughter   Meds reviewed with patient/caregiver?  Yes   Is the patient having any side effects they believe may be caused by any medication additions or changes?  No   Does the patient have all medications ordered at discharge?  Yes   Prescription comments  Medications were restarted per PCP that    Is the patient taking all medications as directed (includes completed medication regime)?  Yes   Medication comments  Daughter reports pt needs a refill for inhaler.  Not completely out but will be soon.  She was advised to communicat via [employer] with provider.   Does the patient have a primary care provider?   Yes   What is the Home health agency?   [facility]   What DME was ordered?  [employer] - commode chair and walker   Psychosocial issues?  No   Comments  Daughter states when pt wakes up(sleeps well flat on her back)shoulder blades hurts and chest hurts, but patient improves after the first 2 hours of being upright.   Did the patient receive a copy of their discharge instructions?  Yes   What is the patient's perception of their health status since discharge?  Improving   Are the patient's immunizations up to date?   Yes   Is the patient/caregiver able to teach back the hierarchy of who to call/visit for symptoms/problems? PCP, Specialist, Home health nurse, Urgent Care, ED, 911  Yes   Is the patient/caregiver able to teach back importance of completing  antibiotic course of treatment?  Yes   Week 1 call completed?  Yes   Wrap up additional comments  Daughter provides care for patient.  She states she is doing very well, better than expected.  She is very appreciative of our f/u calls and care provided during this time.           Elin Venegas RN

## 2020-04-13 ENCOUNTER — TELEPHONE (OUTPATIENT)
Dept: INTERNAL MEDICINE | Facility: CLINIC | Age: 85
End: 2020-04-13

## 2020-04-13 NOTE — TELEPHONE ENCOUNTER
Danette [phone]  Spoke to pt's daughter confirmed we are out of the Breo Ellipta samples. Offered to use GoodRx.com to get a coupon, but even with the coupon the inhaler would still be $300 OOP. Pt's daughter is requesting if there is an alternative that the pt can try in the mean time until we get samples of Breo in the office.   Please advise?

## 2020-04-13 NOTE — TELEPHONE ENCOUNTER
Please have one of the in office providers check the sample closet to see if there is a suitable alternative.

## 2020-04-13 NOTE — TELEPHONE ENCOUNTER
PTS DAUGHTER CALLED REQUESTING SAMPLES OF THE BREO ELLIPTA INHALER; SHE SAID (DAUGHTER) SHE CAN COME BY AND PICK THOSE UP IF POSSIBLE    VIJI: [phone]    PT IS TOTALLY OUT AND WOULD LIKE TO PICK THESE UP THIS AFTERNOON

## 2020-04-13 NOTE — TELEPHONE ENCOUNTER
Unfortunately we do not have a similar alternative in the sample closet, and would like her to continue on similar ICS + LABA inhaler. Please print patient formulary, I will review and I will send in a cheaper alternative for her to use in the meantime.

## 2020-04-14 ENCOUNTER — PRIOR AUTHORIZATION (OUTPATIENT)
Dept: INTERNAL MEDICINE | Facility: CLINIC | Age: 85
End: 2020-04-14

## 2020-04-14 ENCOUNTER — READMISSION MANAGEMENT (OUTPATIENT)
Dept: CALL CENTER | Facility: HOSPITAL | Age: 85
End: 2020-04-14

## 2020-04-14 ENCOUNTER — TELEPHONE (OUTPATIENT)
Dept: INTERNAL MEDICINE | Facility: CLINIC | Age: 85
End: 2020-04-14

## 2020-04-14 DIAGNOSIS — M06.9 RHEUMATOID ARTHRITIS INVOLVING MULTIPLE SITES, UNSPECIFIED RHEUMATOID FACTOR PRESENCE: ICD-10-CM

## 2020-04-14 RX ORDER — MELOXICAM 7.5 MG/1
7.5 TABLET ORAL DAILY
Qty: 30 TABLET | Refills: 2 | Status: SHIPPED | OUTPATIENT
Start: 2020-04-14 | End: 2020-06-04 | Stop reason: SDUPTHER

## 2020-04-14 NOTE — TELEPHONE ENCOUNTER
[facility] [phone]  Spoke to pharmacist he confirmed pt has :  Medicare part D [employer] Plan  BIN: [id number]  PCN: [id number]  Group:  ID: [id number]  Good verbal understanding. Will contact insurance to confirm coverage.

## 2020-04-14 NOTE — TELEPHONE ENCOUNTER
Patients daughter Danette called and asked about the inhaler. I let her know that insurance denied it and we are working on a PA. Andie said she will call her back at [phone].

## 2020-04-14 NOTE — OUTREACH NOTE
COPD/PN Week 1 Survey      Responses   [facility] patient discharged from?  [place]   COVID-19 Test Status  Negative   Does the patient have one of the following disease processes/diagnoses(primary or secondary)?  COPD/Pneumonia   Is there a successful TCM telephone encounter documented?  No   Was the primary reason for admission:  Pneumonia   Week 1 attempt successful?  No   Unsuccessful attempts  Attempt 3          Yoly Haider RN

## 2020-04-14 NOTE — TELEPHONE ENCOUNTER
[employer] Tier Exception submitted vis covermymeds.com  Pati Carter (Key: [id number])   Fluticasone Furoate-Vilanterol (Breo Ellipta)  Sig: Inhale 2 puffs Daily.Next Steps    The plan will fax you a determination, typically within 1 to 5 business days.

## 2020-04-14 NOTE — TELEPHONE ENCOUNTER
Patients daughter Danette called and asked about the inhaler. I let her know that insurance denied it and we are working on a PA. Andie said she will call her back at [phone].

## 2020-04-14 NOTE — TELEPHONE ENCOUNTER
Pts daughter called and requested a refill for pts meloxicam (MOBIC) 15 MG tablet. Pt has half a tablet left.    Also, pts daughter is wanting to know the status of the inhaler. Please advise [phone]    Please send to [facility] on [address]

## 2020-04-14 NOTE — TELEPHONE ENCOUNTER
[facility] [phone]  Attempted to contact the Pharmacy to get pt's prescription drug plan information since we don't have that scanned into pt's chart. Pharmacy opens at 9:00 am, will return call then to confirm.

## 2020-04-14 NOTE — TELEPHONE ENCOUNTER
Saniya notified   She will check with her daughter to see if they have the incentive spiromotry and have the daughter send a picture thru my chart.    Shelli and Joie are working on a medication teir exception for the inhaler -see other message

## 2020-04-14 NOTE — TELEPHONE ENCOUNTER
Per pt's [employer] Medicare Drug Plan:    https://drug-list-search.apps.cf.[employer]/medicare/check-coverage    Breo Ellipta 100 mcg-25- Covered tier 3 qualifies for a tier exception, to lower cost coverage as a tier 1or 2.    Alternatives covered are:  fluticasone 55 mcg-salmeterol 14 mcg/actuation breath activated powder    fluticasone 113 mcg-salmeterol 14 mcg/actuation breath activated powder    fluticasone 232 mcg-salmeterol 14 mcg/actuation breath activated powder    A Tier exception has been enetered via Covermymeds.com to see if [employer] can lower the copayment cost for the pt. Outcome: The plan will fax you a determination, typically within 1 to 5 business days. See PA encounter.    Please advise?

## 2020-04-14 NOTE — TELEPHONE ENCOUNTER
Call please.    Inhaler is a good idea for the shortness of breath.  May also add Mucinex.  Does she have an incentive spirometer at home by any chance.    Can send a picture of her injury attached to a [employer] e-mail.

## 2020-04-15 NOTE — TELEPHONE ENCOUNTER
Please call daughter- I have sent in Advair as alternative. Please let me know if this is still too expensive and we will continue to work with patient on this.

## 2020-04-15 NOTE — TELEPHONE ENCOUNTER
[employer] [phone]  [place]  Spoke to Pharmacy representative to confirm status of expedited tier exception. Denied: reason brand name drug no longer qualifies due to the new medicare guidelines as of 01/01/2020. Medication can be submitted for an appeal, which can take up to 10 business days. Appeal should be submitted via phone @ [phone], select option 3. Good verbal understanding.     Danette [phone]  Spoke to pt's daughter advised I would contact insurance to confirm determination for tier exception. If medication denied will send information to PA to advise of next step.   I will return phone call around 1:00pm to confirm. The only inhaler the pt has is the albuterol. Pt's daughter is concerned since she was just recently discharged for pneumonia and needs her breo inhaler since she is completely out.   Please advise?

## 2020-04-15 NOTE — TELEPHONE ENCOUNTER
Danette [phone]  Spoke to pt's daughter, advised of clinical message. Daughter is in agreement with plan. Good verbal understanding. Closing call.     Kasi ASHRAF

## 2020-04-16 ENCOUNTER — TELEPHONE (OUTPATIENT)
Dept: ORTHOPEDIC SURGERY | Facility: CLINIC | Age: 85
End: 2020-04-16

## 2020-04-16 NOTE — TELEPHONE ENCOUNTER
I CALLED PATIENT TO MOVE APPT ON 4/23 TO EARLIER IN DAY, DAUGHTER(VIJI) INFORMED ME THAT PATIENT HAS FALLEN TWICE SINCE SURGERY AND NOW HAS 2 LARGE HEMATOMA'S AND HAS MOVED BACK INTO HER OWN HOME.  DAUGHTER STATES IT IS DIFFICULT FOR HER TO GET HER MOTHER IN THE VEHICLE TO COME TO THE APPOINTMENT AND WANTS TO KNOW IF CAN RESCHEDULE LATER OR HAVE A VIDEO/PHONE VISIT INSTEAD.  DAUGHTER CAN BE REACHED AT [phone].

## 2020-04-16 NOTE — TELEPHONE ENCOUNTER
Called pt's daughter Danette to let her know that [facility] stated if there were not any concerns regarding that hip we could push out that appt to mid/late May. Danette states she would like to do that so appt made for May 26th.    Raul

## 2020-04-17 ENCOUNTER — READMISSION MANAGEMENT (OUTPATIENT)
Dept: CALL CENTER | Facility: HOSPITAL | Age: 85
End: 2020-04-17

## 2020-04-17 NOTE — OUTREACH NOTE
COPD/PN Week 2 Survey      Responses   [facility] patient discharged from?  [place]   COVID-19 Test Status  Negative   Does the patient have one of the following disease processes/diagnoses(primary or secondary)?  COPD/Pneumonia   Was the primary reason for admission:  Pneumonia   Week 2 attempt successful?  Yes   Call start time  1602   Call end time  1611   Discharge diagnosis  Multifocal pneumonia,    Traumatic hematoma of left hip    Person spoke with today (if not patient) and relationship  Danette/ daughter   Meds reviewed with patient/caregiver?  Yes   Is the patient having any side effects they believe may be caused by any medication additions or changes?  No   Does the patient have all medications ordered at discharge?  Yes   Is the patient taking all medications as directed (includes completed medication regime)?  Yes   Does the patient have a primary care provider?   Yes   Does the patient have an appointment with their PCP or pulmonologist within 7 days of discharge?  Greater than 7 days   Comments regarding PCP  Latoya Bolaños MD has appt with Dr Casey on 4/22/2020   What is preventing the patient from scheduling follow up appointments within 7 days of discharge?  Earlier appointment not available   Nursing Interventions  Verified appointment date/time/provider   Has the patient kept scheduled appointments due by today?  Yes   What is the Home health agency?   [facility]   Has home health visited the patient within 72 hours of discharge?  Yes   What DME was ordered?  [employer] - commode chair and walker   Has all DME been delivered?  Yes   Psychosocial issues?  No   Did the patient receive a copy of their discharge instructions?  Yes   Nursing interventions  Reviewed instructions with patient   What is the patient's perception of their health status since discharge?  Improving   Nursing Interventions  Nurse provided patient education   Are the patient's immunizations up to date?   Yes   Is the  patient/caregiver able to teach back the hierarchy of who to call/visit for symptoms/problems? PCP, Specialist, Home health nurse, Urgent Care, ED, 911  Yes   Is the patient/caregiver able to teach back signs and symptoms of worsening condition:  Fever/chills, Shortness of breath, Chest pain   Is the patient/caregiver able to teach back importance of completing antibiotic course of treatment?  Yes   Week 2 call completed?  Yes          Abraham Zhang RN

## 2020-04-20 ENCOUNTER — READMISSION MANAGEMENT (OUTPATIENT)
Dept: CALL CENTER | Facility: HOSPITAL | Age: 85
End: 2020-04-20

## 2020-04-20 PROCEDURE — 87086 URINE CULTURE/COLONY COUNT: CPT | Performed by: INTERNAL MEDICINE

## 2020-04-20 PROCEDURE — 87077 CULTURE AEROBIC IDENTIFY: CPT | Performed by: INTERNAL MEDICINE

## 2020-04-20 PROCEDURE — 87186 SC STD MICRODIL/AGAR DIL: CPT | Performed by: INTERNAL MEDICINE

## 2020-04-20 RX ORDER — CEFDINIR 300 MG/1
300 CAPSULE ORAL 2 TIMES DAILY
Qty: 14 CAPSULE | Refills: 0 | Status: SHIPPED | OUTPATIENT
Start: 2020-04-20 | End: 2020-04-27 | Stop reason: ALTCHOICE

## 2020-04-20 NOTE — OUTREACH NOTE
COPD/PN Week 2 Survey      Responses   [facility] patient discharged from?  [place]   COVID-19 Test Status  Negative   Does the patient have one of the following disease processes/diagnoses(primary or secondary)?  COPD/Pneumonia   Was the primary reason for admission:  Pneumonia   Week 2 attempt successful?  Yes   Call start time  1035   Call end time  1047   Discharge diagnosis  Multifocal pneumonia,    Traumatic hematoma of left hip    Person spoke with today (if not patient) and relationship  Danette/ daughter   Is the patient taking all medications as directed (includes completed medication regime)?  No   What is preventing the patient from taking all medications as directed?  Side effects   Nursing Interventions  Nurse provided patient education   Medication comments  Dtr reports that they stopped the Flomax bc the pt was having urinary frequency during night and they read about the med on the internet so they decided to DC the flomax to let the pt get more rest during night. Advised them to speak with PCP before DC med. Suggested that they try giving the med earlier in day to see if that would help the sleeping issue.    Has the patient kept scheduled appointments due by today?  N/A   What is the Home health agency?   [facility]   Has home health visited the patient within 72 hours of discharge?  Yes   What DME was ordered?  [employer] - commode chair and walker   Psychosocial issues?  No   Comments  Appetite is increasing slowly. Pt is monitoring RA st been 97%, denies SOA per dtr.    What is the patient's perception of their health status since discharge?  Improving   Is the patient/caregiver able to teach back the hierarchy of who to call/visit for symptoms/problems? PCP, Specialist, Home health nurse, Urgent Care, ED, 911  Yes   Is the patient/caregiver able to teach back signs and symptoms of worsening condition:  Fever/chills, Shortness of breath, Chest pain   Is the patient/caregiver able to  teach back importance of completing antibiotic course of treatment?  Yes   Week 2 call completed?  Yes          Jessica Mccullough RN

## 2020-04-22 ENCOUNTER — TELEMEDICINE (OUTPATIENT)
Dept: INTERNAL MEDICINE | Facility: CLINIC | Age: 85
End: 2020-04-22

## 2020-04-22 ENCOUNTER — TELEPHONE (OUTPATIENT)
Dept: INTERNAL MEDICINE | Facility: CLINIC | Age: 85
End: 2020-04-22

## 2020-04-22 VITALS — DIASTOLIC BLOOD PRESSURE: 70 MMHG | SYSTOLIC BLOOD PRESSURE: 150 MMHG

## 2020-04-22 DIAGNOSIS — R26.81 GAIT INSTABILITY: ICD-10-CM

## 2020-04-22 DIAGNOSIS — T14.8XXA HEMATOMA: ICD-10-CM

## 2020-04-22 DIAGNOSIS — Z86.19 HISTORY OF SEPSIS: ICD-10-CM

## 2020-04-22 DIAGNOSIS — J18.9 PNEUMONIA OF BOTH LUNGS DUE TO INFECTIOUS ORGANISM, UNSPECIFIED PART OF LUNG: Primary | ICD-10-CM

## 2020-04-22 DIAGNOSIS — D50.0 IRON DEFICIENCY ANEMIA DUE TO CHRONIC BLOOD LOSS: ICD-10-CM

## 2020-04-22 PROCEDURE — 99495 TRANSJ CARE MGMT MOD F2F 14D: CPT | Performed by: INTERNAL MEDICINE

## 2020-04-22 NOTE — PROGRESS NOTES
Transitional Care Follow Up Visit    You have chosen to receive care through a telehealth visit.  Do you consent to use a video/audio connection for your medical care today? Yes  Connected to the patient via [email or web address].    Subjective     Pati Carter is a 89 y.o. female who presents for a transitional care management visit. Daughter also present during the visit.    Within 48 business hours after discharge our office contacted her via telephone to coordinate her care and needs.      I reviewed and discussed the details of that call along with the discharge summary, hospital problems, inpatient lab results, inpatient diagnostic studies, and consultation reports with Pati.     Current outpatient and discharge medications have been reconciled for the patient.  Reviewed by: Latoya Bolaños MD      Date of TCM Phone Call 4/8/2020   [facility]   Date of Admission 4/2/2020   Date of Discharge 4/8/2020   Discharge Disposition Home or Self Care     Risk for Readmission (LACE) Score: 15 (4/8/2020  6:00 AM)      History of Present Illness   Course During Hospital Stay:  The patient is here for a hospital follow up.  She was admitted to [facility] on 4/2/2020, after a fall at a Skilled Nursing Facility,  and discharged on 4/8/2020.  Records have been received and reviewed. Admitting diagnosis was Multifocal Pneumonia and Sepsis, COVID 19 negative. She was admitted and started on IV Zosyn and IV Vancomycin, as well as IVF's.  Vancomycin was discontinued when her MRSA swab came back negative.  She was treated with Placquenil, which was discontinued when her COVID swab came back negative.  IV Zithromycin was transitioned to oral Augmentin, which was her discharge antibiotic.  She is on Advair.      She also had a large traumatic left hip hematoma.  Due to this, she had significant blood loss and anemia, requiring 3 blood transfusions of 1 unit each time.  She was also given a B12  injection.    Admitting labs were significant for an elevated WBC count (15.63).  Hemoglobin and Hematocrit were low (7.2/23.5).  CMP was significant for an elevated non-fasting glucose (171) and a low Calcium (8.3).  B12, Folate, T4, TSH, Mg, and Lactic Acid were normal.  Troponin was negative. BNP was normal. FOB and u/a were negative. Respiratory Panel was negative.  Blood cultures were negative x 2 x 5 days.  Throat culture was negative. Urine Strep and Legionella were negative.  Discharge labs were significant for a low Hemoglobin and Hematocrit (8.8/28.6), with a normal WBC count (10.0).  On 4/6/2020, BMP was normal.    Imaging was significant for a CXR which showed increased opacity left lung apex and right lung base, without pleural effusion.  CT Angiogram Chest showed bilateral groundglass opacifications, no PE.  CT head showed advanced small vessel disease, no acute intracranial disease.  CT pelvis was negative for a fracture.  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  X-ray of the left femur showed degenerative changes, but no fracture.    Since discharge the patient has had slow improvement.  She has some chest tightness and shortness of breath in the morning.  Albuterol MDI helps , and the symptoms seem to improve throughout the day.  She denies fever, but is still somewhat weak.  Blood pressure at home has been 120-130 / 70-76.  Home Health is following her.    She is complaining of pain in the left hip where she has the large hematoma ([email or web address] e-mail picture reviewed).  Ice helps the pain more than heat.  Her daughter thinks the area has gotten larger, but the color has improved.    The patient has RA, but has been off her Methotrexate since discharge.  She is taking Tylenol, Meloxicam,  and Tramadol.    The patient's daughter dropped off a u/aon 4/20/2020, which was consistent with a UTI.  Cefdinir  was prescribed, and the patient started it today.  Culture grew a bacteria sensitive to the Cefdinir.  She is complaining of urinary frequency and nocturia.  She has been off Flomax, and just re-started that today as well.     Primary Care Provider: Mami.        The following portions of the patient's history were reviewed and updated as appropriate: allergies, current medications, past family history, past medical history, past social history, past surgical history and problem list.    Review of Systems   Constitutional: Negative for appetite change, chills, fatigue, fever and unexpected weight change.   HENT: Positive for congestion. Negative for postnasal drip and rhinorrhea.    Eyes: Negative for visual disturbance.   Respiratory: Positive for chest tightness and shortness of breath. Negative for cough and wheezing.    Cardiovascular: Negative for chest pain, palpitations and leg swelling.        No COLIN, orthopnea, or claudication.   Gastrointestinal: Negative for abdominal pain, blood in stool, constipation, diarrhea, nausea and vomiting.        Denies melena.   Endocrine: Negative for polydipsia and polyuria.   Genitourinary: Positive for frequency. Negative for dysuria, hematuria and urgency.   Musculoskeletal: Positive for arthralgias. Negative for myalgias.   Neurological: Positive for weakness. Negative for dizziness, syncope, light-headedness and headaches.        No memory issues.   Psychiatric/Behavioral: Negative for decreased concentration.       Objective   Physical Exam   Constitutional: She appears well-developed and well-nourished.   Pulmonary/Chest: Effort normal. No respiratory distress.   Neurological: She is alert.   Skin:   Per pictures, large hematoma left hip, bruising fading, white area persists.   Psychiatric: She has a normal mood and affect.   Vitals reviewed.    Advance Care Planning   ACP discussion was held with the patient during this visit. Patient has an advance directive (not in  EMR), copy requested.  The patient's daughter states it does need to be updated.  ACP information pamphlet mailed to the patient.  ACP information provided on the AVS.    Assessment/Plan   Pati was seen today for pneumonia, blood infection and anemia.    Diagnoses and all orders for this visit:    Pneumonia of both lungs due to infectious organism, unspecified part of lung   Continue current medication(s) as noted in the history of present illness.    History of sepsis   Monitor.    Iron deficiency anemia due to chronic blood loss  -     Comprehensive Metabolic Panel; Future  -     CBC & Differential; Future   Home Health to draw.    Hematoma   Continue ice, Tylenol, and NSAIDS.    Gait instability   Continue Home Health PT/OT.        I certify that the patient had a face-to-face encounter with me today, 4/22/2020.  The encounter meets Medicare face-to face encounter requirements.  The reason for the visit was to assess and evaluate the patient, who is deconditioned due to hospitalization and requires skilled services and has a history of falls, problems with poor balance, or gait instability.  she was evaluated for the need for skilled services including skilled nursing care, physical therapy and occupational therapy.  I certify that these services are medically necessary for the patient.  The patient is homebound, due to the fact that she requires the aid of supportive devices (wheelchair or walker), needs the assistnace of another person and has a condition that makes leaving the home medically contraindicated.  I will be treating the patient on an ongoing basis.      Transitional Care Management Certification  I certify that the following are true:  1. Communication was made within 2 business days of discharge.  2. Complexity of Medical Decision Making is moderate.  3. Face to face visit occurred within 14 days.    *Note: 44871 is for high complexity patients with a face to face visit within 7 days of  discharge.  99400 is for high complexity patients with a face to face on days 8-14 post discharge or medium complexity with face to face visit within 14 days post discharge.      Return in about 3 months (around 7/22/2020) for Recheck HTN in office, fasting.

## 2020-04-22 NOTE — PATIENT INSTRUCTIONS
Continue current medication.      Advance Care Planning and Advance Directives     You make decisions on a daily basis - decisions about where you want to live, your career, your home, your life. Perhaps one of the most important decisions you face is your choice for future medical care. Take time to talk with your family and your healthcare team and start planning today.  Advance Care Planning is a process that can help you:  · Understand possible future healthcare decisions in light of your own experiences  · Reflect on those decision in light of your goals and values  · Discuss your decisions with those closest to you and the healthcare professionals that care for you  · Make a plan by creating a document that reflects your wishes    Surrogate Decision Maker  In the event of a medical emergency, which has left you unable to communicate or to make your own decisions, you would need someone to make decisions for you.  It is important to discuss your preferences for medical treatment with this person while you are in good health.     Qualities of a surrogate decision maker:  • Willing to take on this role and responsibility  • Knows what you want for future medical care  • Willing to follow your wishes even if they don't agree with them  • Able to make difficult medical decisions under stressful circumstances    Advance Directives  These are legal documents you can create that will guide your healthcare team and decision maker(s) when needed. These documents can be stored in the electronic medical record.    · Living Will - a legal document to guide your care if you have a terminal condition or a serious illness and are unable to communicate. States vary by statute in document names/types, but most forms may include one or more of the following:        -  Directions regarding life-prolonging treatments        -  Directions regarding artificially provided nutrition/hydration        -  Choosing a healthcare decision  maker        -  Direction regarding organ/tissue donation    · Durable Power of  for Healthcare - this document names an -in-fact to make medical decisions for you, but it may also allow this person to make personal and financial decisions for you. Please seek the advice of an  if you need this type of document.    **Advance Directives are not required and no one may discriminate against you if you do not sign one.    Medical Orders  Many states allow specific forms/orders signed by your physician to record your wishes for medical treatment in your current state of health. This form, signed in personal communication with your physician, addresses resuscitation and other medical interventions that you may or may not want.      For more information or to schedule a time with a [facility] Advance Care Planning Facilitator contact: [email or web address] or call [phone] and someone will contact you directly.

## 2020-04-22 NOTE — TELEPHONE ENCOUNTER
Spoke with Rakel at [facility] and she took verbal order to draw CBC and CMP while at pt's house today. We thanked each other and ended the call.     Pamphlet placed in the mail to patient. Nothing further needed at this time.

## 2020-04-22 NOTE — TELEPHONE ENCOUNTER
Please call [facility] ([phone]).  They are going to the patient's home at 1:30 today.  I have entered orders for labs.  Can you see if they will draw them?    Also, please send the patient an Advanced Care pamphlet in the mail.

## 2020-04-24 ENCOUNTER — TELEPHONE (OUTPATIENT)
Dept: ORTHOPEDIC SURGERY | Facility: CLINIC | Age: 85
End: 2020-04-24

## 2020-04-27 ENCOUNTER — READMISSION MANAGEMENT (OUTPATIENT)
Dept: CALL CENTER | Facility: HOSPITAL | Age: 85
End: 2020-04-27

## 2020-04-27 ENCOUNTER — TELEPHONE (OUTPATIENT)
Dept: INTERNAL MEDICINE | Facility: CLINIC | Age: 85
End: 2020-04-27

## 2020-04-27 DIAGNOSIS — N39.0 ACUTE UTI: Primary | ICD-10-CM

## 2020-04-27 RX ORDER — NITROFURANTOIN 25; 75 MG/1; MG/1
100 CAPSULE ORAL 2 TIMES DAILY
Qty: 14 CAPSULE | Refills: 0 | Status: SHIPPED | OUTPATIENT
Start: 2020-04-27 | End: 2020-05-04

## 2020-04-27 NOTE — TELEPHONE ENCOUNTER
Patient's daughter Danette requested a call back. Patient was prescribed cefdinir (OMNICEF) 300 MG capsule on 4/20/20 and tamsulosin (FLOMAX) 0.4 MG capsule 24 hr capsule on 4/9/20. Patient is still urinating frequently, at least once an hour, and this is interfering with her sleep. Danette stated the patient was prescribed nitrofurantoin, macrocrystal-monohydrate, (MACROBID) 100 MG capsule in the past and would like to know if Dr. Bolaños would prescribe this again.    Please call and advise. Danette's call back [phone]    [address] [phone] [facility] [phone] FX

## 2020-04-27 NOTE — TELEPHONE ENCOUNTER
I have sent a prescription for Nitrofurantoin to the pharmacy.  Please make sure she discontinues the Cefdinir.

## 2020-04-27 NOTE — TELEPHONE ENCOUNTER
Danette [phone]. Advised of clinical plan. Verified pharmacy as [facility]. Good verbal understanding.

## 2020-04-27 NOTE — OUTREACH NOTE
COPD/PN Week 3 Survey      Responses   [facility] patient discharged from?  [place]   COVID-19 Test Status  Negative   Does the patient have one of the following disease processes/diagnoses(primary or secondary)?  COPD/Pneumonia   Was the primary reason for admission:  Pneumonia   Week 3 attempt successful?  Yes   Call start time  0807   Call end time  0817   Is patient permission given to speak with other caregiver?  Yes   Person spoke with today (if not patient) and relationship  Danette   Meds reviewed with patient/caregiver?  Yes   Is the patient having any side effects they believe may be caused by any medication additions or changes?  No   Does the patient have all medications ordered at discharge?  Yes   Is the patient taking all medications as directed (includes completed medication regime)?  Yes   Medication comments  Flomax has been dicontinued.   Does the patient have a primary care provider?   Yes   Does the patient have an appointment with their PCP or pulmonologist within 7 days of discharge?  Greater than 7 days   What is preventing the patient from scheduling follow up appointments within 7 days of discharge?  Earlier appointment not available   Nursing Interventions  Verified appointment date/time/provider   Has the patient kept scheduled appointments due by today?  Yes   What is the Home health agency?   [facility]   Has home health visited the patient within 72 hours of discharge?  Yes   Has all DME been delivered?  Yes   Psychosocial issues?  No   Did the patient receive a copy of their discharge instructions?  Yes   Nursing interventions  Reviewed instructions with patient   What is the patient's perception of their health status since discharge?  Improving   Nursing Interventions  Nurse provided patient education   Are the patient's immunizations up to date?   Yes   Is the patient/caregiver able to teach back the hierarchy of who to call/visit for symptoms/problems? PCP, Specialist,  Home health nurse, Urgent Care, ED, 911  Yes   Is the patient/caregiver able to teach back signs and symptoms of worsening condition:  Fever/chills, Shortness of breath, Chest pain   Is the patient/caregiver able to teach back importance of completing antibiotic course of treatment?  Yes   Week 3 call completed?  Yes   Wrap up additional comments  Daughter states is improving. States hematoma on hip dissipating with lessening pain. States pulse ox running 94-95%. States appetite good-walking well with walker. Daughter continues to care for her at home.          Shiloh Gilbert RN

## 2020-05-04 ENCOUNTER — READMISSION MANAGEMENT (OUTPATIENT)
Dept: CALL CENTER | Facility: HOSPITAL | Age: 85
End: 2020-05-04

## 2020-05-04 NOTE — OUTREACH NOTE
COPD/PN Week 4 Survey      Responses   [facility] patient discharged from?  [place]   COVID-19 Test Status  Negative   Does the patient have one of the following disease processes/diagnoses(primary or secondary)?  COPD/Pneumonia   Was the primary reason for admission:  Pneumonia   Week 4 attempt successful?  Yes   Call start time  1519   Call end time  1525   Discharge diagnosis  Multifocal pneumonia,    Traumatic hematoma of left hip    Is patient permission given to speak with other caregiver?  Yes   List who call center can speak with  daughters Danette and Lilian   Person spoke with today (if not patient) and relationship  Danette, daughter   Is the patient taking all medications as directed (includes completed medication regime)?  Yes   Medication comments  Daughter reports patient continues the second round of antibiotics for UTI.    Has the patient kept scheduled appointments due by today?  Yes   Is the patient still receiving Home Health Services?  Yes   Pulse Ox monitoring  Intermittent   Pulse Ox device source  Patient   Psychosocial issues?  No   What is the patient's perception of their health status since discharge?  Improving [Daughter reports that patient continues to have urinary frequency and incontinence. ]   Nursing Interventions  Nurse provided patient education, Advised patient to call provider   Is the patient/caregiver able to teach back the hierarchy of who to call/visit for symptoms/problems? PCP, Specialist, Home health nurse, Urgent Care, ED, 911  Yes   Additional teach back comments  Daughter states that she will get back in touch with PCP regarding patients continued urinary symptoms.    Is the patient/caregiver able to teach back importance of completing antibiotic course of treatment?  Yes   Week 4 call completed?  Yes   Would the patient like one additional call?  No   Graduated  Yes   Did the patient feel the follow up calls were helpful during their recovery period?  Yes   Wrap up  additional comments  Daughter denies any further questions or needs from follow up nurse today.           Yoly Haider RN

## 2020-05-05 ENCOUNTER — TELEPHONE (OUTPATIENT)
Dept: INTERNAL MEDICINE | Facility: CLINIC | Age: 85
End: 2020-05-05

## 2020-05-05 ENCOUNTER — EPISODE CHANGES (OUTPATIENT)
Dept: CASE MANAGEMENT | Facility: OTHER | Age: 85
End: 2020-05-05

## 2020-05-05 DIAGNOSIS — N39.0 RECURRENT UTI: ICD-10-CM

## 2020-05-05 DIAGNOSIS — R35.0 URINARY FREQUENCY: Primary | ICD-10-CM

## 2020-05-05 NOTE — TELEPHONE ENCOUNTER
[facility] [phone]. Office number given [phone]    Danette- daughter [phone]. Advised of clinical message. Good verbal understanding. She also asked if there are any medication on her list that can be taken off, to reduce the number she has to take.

## 2020-05-05 NOTE — TELEPHONE ENCOUNTER
BIBI WITH [facility] CALLING TO FOLLOW UP.     SHE STATED THAT THE PATIENT HAS NOT IMPROVED SINCE TAKING THE ANTIBIOTIC. TODAY IS THE LAST DAY OF THE ANTIBIOTIC. SHE STATED THAT THE PATIENT IS STILL HAVING TO GET UP EVERY HOUR TO URINATE. SHE STATED THE PATIENT WOULD LIKE TO SEE A UROLOGIST.     DR. HARSHAL JOSÉ AT    PHONE NUMBER: [phone]  FAX: [phone]    [facility] CALL BACK NUMBER IS:  [phone]

## 2020-05-06 RX ORDER — LANOLIN ALCOHOL/MO/W.PET/CERES
1000 CREAM (GRAM) TOPICAL DAILY
Qty: 30 TABLET | Refills: 10 | Status: CANCELLED | OUTPATIENT
Start: 2020-05-06

## 2020-05-06 RX ORDER — DOCUSATE SODIUM 100 MG/1
CAPSULE, LIQUID FILLED ORAL
Qty: 30 CAPSULE | Refills: 10 | Status: SHIPPED | OUTPATIENT
Start: 2020-05-06 | End: 2020-06-04 | Stop reason: SDUPTHER

## 2020-05-06 RX ORDER — FAMOTIDINE 20 MG/1
TABLET, FILM COATED ORAL
Qty: 60 TABLET | Refills: 10 | Status: SHIPPED | OUTPATIENT
Start: 2020-05-06 | End: 2020-06-04 | Stop reason: SDUPTHER

## 2020-05-06 RX ORDER — FAMOTIDINE 20 MG/1
20 TABLET, FILM COATED ORAL 2 TIMES DAILY
OUTPATIENT
Start: 2020-05-06

## 2020-05-06 RX ORDER — ASPIRIN 81 MG/1
TABLET ORAL
Qty: 30 TABLET | Refills: 10 | Status: SHIPPED | OUTPATIENT
Start: 2020-05-06 | End: 2020-06-04 | Stop reason: SDUPTHER

## 2020-05-06 RX ORDER — ASPIRIN 81 MG/1
81 TABLET ORAL DAILY
OUTPATIENT
Start: 2020-05-06

## 2020-05-06 RX ORDER — FOLIC ACID 1 MG/1
TABLET ORAL
Qty: 30 TABLET | Refills: 10 | Status: SHIPPED | OUTPATIENT
Start: 2020-05-06

## 2020-05-06 RX ORDER — LANOLIN ALCOHOL/MO/W.PET/CERES
CREAM (GRAM) TOPICAL
Qty: 30 TABLET | Refills: 10 | Status: SHIPPED | OUTPATIENT
Start: 2020-05-06

## 2020-05-06 RX ORDER — SIMVASTATIN 20 MG
TABLET ORAL
Qty: 30 TABLET | Refills: 10 | Status: SHIPPED | OUTPATIENT
Start: 2020-05-06 | End: 2020-05-17

## 2020-05-06 RX ORDER — MULTIVIT-MIN/FA/LYCOPEN/LUTEIN .4-300-25
TABLET ORAL DAILY
OUTPATIENT
Start: 2020-05-06

## 2020-05-06 NOTE — TELEPHONE ENCOUNTER
[facility] is calling requesting r  refills for the patient for    Simvastatin 20  Cyanocobalamin 1000   Folic Acid 1 Mg  Aspirin 81 mg  Famotidine 20mg  Centrum Silver Adult 50    Please advise confirmed pharmacy

## 2020-05-17 ENCOUNTER — TELEPHONE (OUTPATIENT)
Dept: INTERNAL MEDICINE | Facility: CLINIC | Age: 85
End: 2020-05-17

## 2020-05-17 ENCOUNTER — HOSPITAL ENCOUNTER (EMERGENCY)
Facility: HOSPITAL | Age: 85
Discharge: HOME | End: 2020-05-17
Attending: EMERGENCY MEDICINE | Admitting: EMERGENCY MEDICINE

## 2020-05-17 VITALS
RESPIRATION RATE: 20 BRPM | HEIGHT: 64 IN | WEIGHT: 150 LBS | SYSTOLIC BLOOD PRESSURE: 177 MMHG | BODY MASS INDEX: 25.61 KG/M2 | TEMPERATURE: 98 F | DIASTOLIC BLOOD PRESSURE: 101 MMHG | HEART RATE: 107 BPM | OXYGEN SATURATION: 95 %

## 2020-05-17 DIAGNOSIS — I10 HYPERTENSION, UNSPECIFIED TYPE: Primary | ICD-10-CM

## 2020-05-17 DIAGNOSIS — N30.00 ACUTE CYSTITIS WITHOUT HEMATURIA: ICD-10-CM

## 2020-05-17 LAB
ANION GAP SERPL CALCULATED.3IONS-SCNC: 14 MMOL/L (ref 5–15)
BACTERIA UR QL AUTO: ABNORMAL /HPF
BASOPHILS # BLD AUTO: 0.05 10*3/MM3 (ref 0–0.2)
BASOPHILS NFR BLD AUTO: 0.5 % (ref 0–1.5)
BILIRUB UR QL STRIP: NEGATIVE
BUN BLD-MCNC: 18 MG/DL (ref 8–23)
BUN/CREAT SERPL: 18 (ref 7–25)
CALCIUM SPEC-SCNC: 9.6 MG/DL (ref 8.6–10.5)
CHLORIDE SERPL-SCNC: 98 MMOL/L (ref 98–107)
CLARITY UR: ABNORMAL
CO2 SERPL-SCNC: 29 MMOL/L (ref 22–29)
COLOR UR: YELLOW
CREAT BLD-MCNC: 1 MG/DL (ref 0.57–1)
DEPRECATED RDW RBC AUTO: 53.7 FL (ref 37–54)
EOSINOPHIL # BLD AUTO: 0.22 10*3/MM3 (ref 0–0.4)
EOSINOPHIL NFR BLD AUTO: 2.1 % (ref 0.3–6.2)
ERYTHROCYTE [DISTWIDTH] IN BLOOD BY AUTOMATED COUNT: 14.4 % (ref 12.3–15.4)
GFR SERPL CREATININE-BSD FRML MDRD: 52 ML/MIN/1.73
GLUCOSE BLD-MCNC: 93 MG/DL (ref 65–99)
GLUCOSE UR STRIP-MCNC: NEGATIVE MG/DL
HCT VFR BLD AUTO: 37.5 % (ref 34–46.6)
HGB BLD-MCNC: 11.6 G/DL (ref 12–15.9)
HGB UR QL STRIP.AUTO: ABNORMAL
HYALINE CASTS UR QL AUTO: ABNORMAL /LPF
IMM GRANULOCYTES # BLD AUTO: 0.04 10*3/MM3 (ref 0–0.05)
IMM GRANULOCYTES NFR BLD AUTO: 0.4 % (ref 0–0.5)
KETONES UR QL STRIP: ABNORMAL
LEUKOCYTE ESTERASE UR QL STRIP.AUTO: ABNORMAL
LYMPHOCYTES # BLD AUTO: 1.13 10*3/MM3 (ref 0.7–3.1)
LYMPHOCYTES NFR BLD AUTO: 10.6 % (ref 19.6–45.3)
MCH RBC QN AUTO: 31.2 PG (ref 26.6–33)
MCHC RBC AUTO-ENTMCNC: 30.9 G/DL (ref 31.5–35.7)
MCV RBC AUTO: 100.8 FL (ref 79–97)
MONOCYTES # BLD AUTO: 0.82 10*3/MM3 (ref 0.1–0.9)
MONOCYTES NFR BLD AUTO: 7.7 % (ref 5–12)
NEUTROPHILS # BLD AUTO: 8.45 10*3/MM3 (ref 1.7–7)
NEUTROPHILS NFR BLD AUTO: 78.7 % (ref 42.7–76)
NITRITE UR QL STRIP: POSITIVE
NRBC BLD AUTO-RTO: 0 /100 WBC (ref 0–0.2)
PH UR STRIP.AUTO: 6.5 [PH] (ref 5–8)
PLATELET # BLD AUTO: 281 10*3/MM3 (ref 140–450)
PMV BLD AUTO: 9.5 FL (ref 6–12)
POTASSIUM BLD-SCNC: 3.7 MMOL/L (ref 3.5–5.2)
PROT UR QL STRIP: ABNORMAL
RBC # BLD AUTO: 3.72 10*6/MM3 (ref 3.77–5.28)
RBC # UR: ABNORMAL /HPF
REF LAB TEST METHOD: ABNORMAL
SODIUM BLD-SCNC: 141 MMOL/L (ref 136–145)
SP GR UR STRIP: 1.02 (ref 1–1.03)
SQUAMOUS #/AREA URNS HPF: ABNORMAL /HPF
TROPONIN T SERPL-MCNC: <0.01 NG/ML (ref 0–0.03)
UROBILINOGEN UR QL STRIP: ABNORMAL
WBC NRBC COR # BLD: 10.71 10*3/MM3 (ref 3.4–10.8)
WBC UR QL AUTO: ABNORMAL /HPF

## 2020-05-17 PROCEDURE — 81001 URINALYSIS AUTO W/SCOPE: CPT | Performed by: EMERGENCY MEDICINE

## 2020-05-17 PROCEDURE — P9612 CATHETERIZE FOR URINE SPEC: HCPCS

## 2020-05-17 PROCEDURE — 85025 COMPLETE CBC W/AUTO DIFF WBC: CPT | Performed by: EMERGENCY MEDICINE

## 2020-05-17 PROCEDURE — 99284 EMERGENCY DEPT VISIT MOD MDM: CPT

## 2020-05-17 PROCEDURE — 84484 ASSAY OF TROPONIN QUANT: CPT | Performed by: EMERGENCY MEDICINE

## 2020-05-17 PROCEDURE — 80048 BASIC METABOLIC PNL TOTAL CA: CPT | Performed by: EMERGENCY MEDICINE

## 2020-05-17 PROCEDURE — 93005 ELECTROCARDIOGRAM TRACING: CPT | Performed by: EMERGENCY MEDICINE

## 2020-05-17 RX ORDER — DILTIAZEM HYDROCHLORIDE 120 MG/1
120 CAPSULE, COATED, EXTENDED RELEASE ORAL
Status: DISCONTINUED | OUTPATIENT
Start: 2020-05-17 | End: 2020-05-17 | Stop reason: HOSPADM

## 2020-05-17 RX ORDER — DILTIAZEM HYDROCHLORIDE 120 MG/1
120 CAPSULE, COATED, EXTENDED RELEASE ORAL DAILY
Qty: 30 CAPSULE | Refills: 0 | Status: SHIPPED | OUTPATIENT
Start: 2020-05-17 | End: 2020-06-17 | Stop reason: SDUPTHER

## 2020-05-17 RX ORDER — METHENAMINE HIPPURATE 1000 MG/1
1 TABLET ORAL 2 TIMES DAILY WITH MEALS
Qty: 14 TABLET | Refills: 0 | Status: SHIPPED | OUTPATIENT
Start: 2020-05-17 | End: 2020-05-24

## 2020-05-17 RX ADMIN — DILTIAZEM HYDROCHLORIDE 120 MG: 120 CAPSULE, COATED, EXTENDED RELEASE ORAL at 12:42

## 2020-05-17 NOTE — ED PROVIDER NOTES
Subjective   This is a very pleasant 89-year-old female originally from [place] who comes the emergency room today for high blood pressure.  She is not a great historian but she tells me she has had issues with high blood pressure and her blood pressure was elevated today and her daughter called the on-call doctor who advised him to come to the ER.  I looked at that note there was some mention that the patient was on Dyazide for hypertension that the patient cannot recall this and despite my extensive review of the medical record I cannot find evidence that she was on chronic Dyazide therapy.  She cannot remember taking anything for high blood pressure but again she is not a great historian.    She is living with her daughter currently.  At her baseline she is frail and has difficulty moving room to room in the house with a walker.  She is had a rough year and has had a hip fracture recently and has had recurrent urinary tract infections and nocturia and urinary incontinence.  She has been seen by urology recently she tells me she is not on any medications currently for her urinary issues.  She believes she has learned the Keagle exercises in the past but does not use them regularly.    She has had no headache or visual field changes.  She is had no chest pain or shortness of breath.  She has generalized weakness she has been chronic and ongoing and progressive.  She is had no recent falls.  She is not in any pain currently.  She is had no peripheral edema.  Bowel movements have been fairly normal.  She is not had a recent change in medications she can think of.        All other systems reviewed and are negative except as noted above.          Review of Systems   All other systems reviewed and are negative.      Past Medical History:   Diagnosis Date   • Anemia     Description: A.  Dx 2006- borderline intermittent.   • Back pain    • Benign colonic polyp     Description: A.  Dx 1999.   • Benign colonic polyp  9/28/2016    Description: A.  Dx 1999.   • Compression fracture of lumbar vertebra (CMS/[facility])    • Constipation    • COPD (chronic obstructive pulmonary disease) (CMS/[facility])     Description: A.  Rule out chronic persistent asthma, COPD, or obliterative bronchiolitis.- Rae   • Degeneration of intervertebral disc of lumbar region     Description: A.  Diagnosed in April 2013 with advanced multilevel with severe spinal stenosis, followed by Dr. Pillai for pain management.   • Gastroesophageal reflux disease    • Hearing loss    • History of colonoscopy 01/01/1999    NORMAL PER PATIENT    • History of mammogram 01/01/2011    NORMAL PER PT    • History of Papanicolaou smear of cervix 01/01/2010    NORMAL PER PT    • History of varicella    • Hyperlipidemia     Description: A.  Dx 2006.   • Hypertension     Description: A. Dx 2001.   • Macular degeneration    • Ovarian mass     Dx 8/15- benign left cystic adnexal mass   • Rheumatoid arthritis (CMS/[facility])     Description: A.  Diagnosed in 2000 and and followed by Dr. Constantino (now Dr. Kaplan). B.  On methotrexate therapy since 2000. C.  Off low-dose prednisone therapy.       Allergies   Allergen Reactions   • Bactrim [Sulfamethoxazole-Trimethoprim] Other (See Comments)     Stomach cramps    • Ciprofloxacin Other (See Comments)     Other reaction(s): shaking  HCI TABS/ SHAKING   • Levofloxacin Diarrhea       Past Surgical History:   Procedure Laterality Date   • APPENDECTOMY     • CARPAL TUNNEL RELEASE Left 01/01/2003    HISTORY OF NEUROPLASTY DECOMPRESSION MEDIAN NERVE AT CARPAL TUNNEL LEFT   • CATARACT EXTRACTION Bilateral 01/01/2009   • CHOLECYSTECTOMY  01/01/1962   • HIP CANNULATED SCREW PLACEMENT Right 1/25/2020    Procedure: HIP CANNULATED SCREW PLACEMENT RIGHT;  Surgeon: Karlos Blount MD;  Location: [facility];  Service: Orthopedics   • KNEE ARTHROSCOPY Left 01/01/2001    MENISCAL REPAIR   • KYPHOPLASTY  06/18/2015    T11 AND L1 (JOSE A)   • PELVIC LAPAROSCOPY   01/01/1996    REMOVAL OF BENIGN UTERINE AND RIGHT OVARIAN TUMORS   • SALPINGO OOPHORECTOMY Left 08/26/2015    REMOVAL OF LEFT OVARY AND TUBE (benign cystic mass)       Family History   Problem Relation Age of Onset   • Hypertension Mother    • Diabetes Mother        Social History     Socioeconomic History   • Marital status:      Spouse name: Not on file   • Number of children: Not on file   • Years of education: Not on file   • Highest education level: Not on file   Tobacco Use   • Smoking status: Former Smoker   • Smokeless tobacco: Never Used   Substance and Sexual Activity   • Alcohol use: No   • Drug use: No   • Sexual activity: Defer   Social History Narrative    Patient moved to [facility] assisted living 3/26/20. On 3/28/20 patient visited ED for fall-related injuries.  In response, family looking into hiring a , to visit patient BID for ADLs/ transfers. Family drives patient to appts, but says Jm Encinas may provide in future.           Objective   Physical Exam   Constitutional: She is oriented to person, place, and time.   An elderly female in no acute distress she is alert but not a great historian though we do have a good discussion.   HENT:   Head: Normocephalic and atraumatic.   Right Ear: External ear normal.   Left Ear: External ear normal.   Nose: Nose normal.   Mouth/Throat: Oropharynx is clear and moist.   Eyes: Pupils are equal, round, and reactive to light. Conjunctivae and EOM are normal.   Neck: Normal range of motion. Neck supple. No tracheal deviation present. No thyromegaly present.   Cardiovascular: Normal rate, regular rhythm, normal heart sounds and intact distal pulses.   No murmur heard.  Pulmonary/Chest: Effort normal and breath sounds normal. No respiratory distress.   Abdominal: Soft. Bowel sounds are normal. She exhibits no distension. There is no tenderness.   Musculoskeletal: Normal range of motion.   Mild edema both ankles which is chronic    Neurological: She is alert and oriented to person, place, and time.   Face is symmetric, voice is strong, tongue is midline, vision, hearing, and speech are all preserved she has moderate generalized weakness.  She has a mild tremor.   Skin: Skin is warm and dry. Capillary refill takes less than 2 seconds.   Psychiatric: She has a normal mood and affect. Her behavior is normal. Judgment and thought content normal.   Nursing note and vitals reviewed.      Procedures           ED Course      Recent Results (from the past 24 hour(s))   Basic Metabolic Panel    Collection Time: 05/17/20 11:28 AM   Result Value Ref Range    Glucose 93 65 - 99 mg/dL    BUN 18 8 - 23 mg/dL    Creatinine 1.00 0.57 - 1.00 mg/dL    Sodium 141 136 - 145 mmol/L    Potassium 3.7 3.5 - 5.2 mmol/L    Chloride 98 98 - 107 mmol/L    CO2 29.0 22.0 - 29.0 mmol/L    Calcium 9.6 8.6 - 10.5 mg/dL    eGFR Non African Amer 52 (L) >60 mL/min/1.73    BUN/Creatinine Ratio 18.0 7.0 - 25.0    Anion Gap 14.0 5.0 - 15.0 mmol/L   CBC Auto Differential    Collection Time: 05/17/20 11:28 AM   Result Value Ref Range    WBC 10.71 3.40 - 10.80 10*3/mm3    RBC 3.72 (L) 3.77 - 5.28 10*6/mm3    Hemoglobin 11.6 (L) 12.0 - 15.9 g/dL    Hematocrit 37.5 34.0 - 46.6 %    .8 (H) 79.0 - 97.0 fL    MCH 31.2 26.6 - 33.0 pg    MCHC 30.9 (L) 31.5 - 35.7 g/dL    RDW 14.4 12.3 - 15.4 %    RDW-SD 53.7 37.0 - 54.0 fl    MPV 9.5 6.0 - 12.0 fL    Platelets 281 140 - 450 10*3/mm3    Neutrophil % 78.7 (H) 42.7 - 76.0 %    Lymphocyte % 10.6 (L) 19.6 - 45.3 %    Monocyte % 7.7 5.0 - 12.0 %    Eosinophil % 2.1 0.3 - 6.2 %    Basophil % 0.5 0.0 - 1.5 %    Immature Grans % 0.4 0.0 - 0.5 %    Neutrophils, Absolute 8.45 (H) 1.70 - 7.00 10*3/mm3    Lymphocytes, Absolute 1.13 0.70 - 3.10 10*3/mm3    Monocytes, Absolute 0.82 0.10 - 0.90 10*3/mm3    Eosinophils, Absolute 0.22 0.00 - 0.40 10*3/mm3    Basophils, Absolute 0.05 0.00 - 0.20 10*3/mm3    Immature Grans, Absolute 0.04 0.00 - 0.05  10*3/mm3    nRBC 0.0 0.0 - 0.2 /100 WBC   Troponin    Collection Time: 05/17/20 11:28 AM   Result Value Ref Range    Troponin T <0.010 0.000 - 0.030 ng/mL   Urinalysis With Microscopic If Indicated (No Culture) - Urine, Catheter    Collection Time: 05/17/20 12:08 PM   Result Value Ref Range    Color, UA Yellow Yellow, Straw    Appearance, UA Cloudy (A) Clear    pH, UA 6.5 5.0 - 8.0    Specific Gravity, UA 1.017 1.001 - 1.030    Glucose, UA Negative Negative    Ketones, UA Trace (A) Negative    Bilirubin, UA Negative Negative    Blood, UA Trace (A) Negative    Protein, UA 30 mg/dL (1+) (A) Negative    Leuk Esterase, UA Moderate (2+) (A) Negative    Nitrite, UA Positive (A) Negative    Urobilinogen, UA 1.0 E.U./dL 0.2 - 1.0 E.U./dL   Urinalysis, Microscopic Only - Urine, Catheter    Collection Time: 05/17/20 12:08 PM   Result Value Ref Range    RBC, UA 0-2 None Seen, 0-2 /HPF    WBC, UA 31-50 (A) None Seen, 0-2 /HPF    Bacteria, UA 4+ (A) None Seen, Trace /HPF    Squamous Epithelial Cells, UA None Seen None Seen, 0-2 /HPF    Hyaline Casts, UA 0-6 0 - 6 /LPF    Methodology Automated Microscopy      Note: In addition to lab results from this visit, the labs listed above may include labs taken at another facility or during a different encounter within the last 24 hours. Please correlate lab times with ED admission and discharge times for further clarification of the services performed during this visit.    No orders to display     Vitals:    05/17/20 1242 05/17/20 1300 05/17/20 1330 05/17/20 1400   BP: 167/88 168/91 163/94 163/84   BP Location:       Patient Position:       Pulse: 96 95 98    Resp:       Temp:       TempSrc:       SpO2:  94% 94% 93%   Weight:       Height:         Medications   dilTIAZem CD (CARDIZEM CD) 24 hr capsule 120 mg (120 mg Oral Given 5/17/20 1242)     ECG/EMG Results (last 24 hours)     Procedure Component Value Units Date/Time    ECG 12 Lead [[id number]] Collected:  05/17/20 1143     Updated:   05/17/20 1148    Narrative:       Test Reason : htn  Blood Pressure : **/** mmHG  Vent. Rate : 099 BPM     Atrial Rate : 099 BPM     P-R Int : 138 ms          QRS Dur : 074 ms      QT Int : 338 ms       P-R-T Axes : 050 010 061 degrees     QTc Int : 433 ms    Normal sinus rhythm  When compared with ECG of 02-APR-2020 13:14,  Nonspecific T wave abnormality, improved in Lateral leads  QT has shortened  Confirmed by CODY DE PAZ MD ([id number]) on 5/17/2020 11:48:10 AM    Referred By: MD QUINTANA           Confirmed By:CODY DE PAZ MD        ECG 12 Lead   Final Result   Test Reason : htn   Blood Pressure : **/** mmHG   Vent. Rate : 099 BPM     Atrial Rate : 099 BPM      P-R Int : 138 ms          QRS Dur : 074 ms       QT Int : 338 ms       P-R-T Axes : 050 010 061 degrees      QTc Int : 433 ms      Normal sinus rhythm   When compared with ECG of 02-APR-2020 13:14,   Nonspecific T wave abnormality, improved in Lateral leads   QT has shortened   Confirmed by CODY DE PAZ MD ([id number]) on 5/17/2020 11:48:10 AM      Referred By: MD QUINTANA           Confirmed By:CODY ED PAZ MD                                             MDM  Number of Diagnoses or Management Options  Acute cystitis without hematuria:   Hypertension, unspecified type:   Diagnosis management comments:       I reviewed all available studies at the bedside with the patient.  Her labs are fairly bland though she does continue to have leukocyte esterase and white blood cells and bacteria in her urine.  This is been a chronic ongoing issue and nothing with this bothering the patient the most currently.    Discussed this in detail with her and I have reinforced the fact she needs to do the Kegel exercises 3 sets of 10 every day.  Talked her about program block bladder emptying.  Thuy try her on methenamine to see if that offers any relief.  Give her a week's trial if she has improvement of her to have her follow-up with her PCP regarding continued treatment or her  urologist.    In regards to her blood pressure it does not sound like she is on Dyazide which would certainly con placate her urinary issues.  She responded well here to Cardizem.  I think is not unreasonable to continue her on once daily Cardizem for blood pressure as I think an ACE inhibitor can affect bladder contractility and will think she is a great candidate given her age for a beta-blocker and certainly we want to avoid the use of diuretics.  We discussed that she may need a change in blood pressure medicine in order to keep good control of her blood pressure and heart rate with the least number side effects and she understands this.  Should follow-up with her PCP regarding this as well.    She will return to the ER if worse in any way.    All are agreeable with plan       Amount and/or Complexity of Data Reviewed  Clinical lab tests: reviewed  Tests in the medicine section of CPT®: reviewed  Decide to obtain previous medical records or to obtain history from someone other than the patient: yes        Final diagnoses:   Hypertension, unspecified type   Acute cystitis without hematuria            Zain Siddiqui MD  05/17/20 0231

## 2020-05-17 NOTE — TELEPHONE ENCOUNTER
"On call, pt's daughter calls at 10:30.  Mother has been home x 6 weeks after repeated hospitalizations/rehab s/p hip fx c/b PNA etc.  This AM woke up w/ \"the shakes\" but has not felt warm and no cough, V/D etc. Not having CP, SOB.  Checked BP, 170/90's and over the course of the morning 220/110's.  Wondering what to do as she would like to avoid hospital if possible.  Notes pt taken off BP med at some point during one of her hospitalizations 2/2  Dizziness and BP has been 140-150's/90's.    Per chart review, pt was on Dyazide 37.5-25mg daily.    Advised daughter that although I could consider resuming previous BP med over the  Phone, unfortunately not safe to do that given current degree of HTN urgency range BP (227/112 while on phone w/ me).     Needs ER to ensure no other complications (like impending MI, CVA etc) and to safely reduce BP into acceptable range (possibly needing IV meds).    Daughter agrees to take pt to ER.    Becca Landers MD  5/17/2020      "

## 2020-05-18 ENCOUNTER — PATIENT OUTREACH (OUTPATIENT)
Dept: CASE MANAGEMENT | Facility: OTHER | Age: 85
End: 2020-05-18

## 2020-05-18 ENCOUNTER — EPISODE CHANGES (OUTPATIENT)
Dept: CASE MANAGEMENT | Facility: OTHER | Age: 85
End: 2020-05-18

## 2020-05-18 NOTE — OUTREACH NOTE
Patient Outreach Note    Called patient in follow up to ED visit 5-17-20 with HTN; spoke with daughter, Danette Suresh.  Reported patient is doing better; BP this AM was 155/80; daughter monitors BP several times/daily. Stated patient is living at home now, and daughter is staying with her; is getting [facility], PT and OT.  Reported patient is taking her 2 new meds given from the ED, one for BP and one for UTI.  Daughter said she assists patient with medications daily; also assists patient with ADL's.  Said patient can get up and down out of chair independently and walk with her Walker.  Daughter drives patient where needed, but said patient has been in the home except for the ED visit, due to the corona virus Pandemic. Noted AWV is up to date; Advanced Directives are completed (not on file), and [facility] is active.  Discussed with daughter importance of close PCP follow up after the ED visit and new medications; advised her to log the BP readings daily to report to PCP at follow up.  Daughter said she would send a message to PCP to update her on ED visit and medications via [facility] and get her recommendation re: follow up.  No other questions or concerns voiced at this time.        Althea Santos RN  Ambulatory     5/18/2020, 14:12

## 2020-05-19 ENCOUNTER — TELEPHONE (OUTPATIENT)
Dept: INTERNAL MEDICINE | Facility: CLINIC | Age: 85
End: 2020-05-19

## 2020-05-19 NOTE — TELEPHONE ENCOUNTER
PATIENT'S DAUGHTER VIJI STATES HER   MOM'S HAS RHEUMATOID ARTHRITIS  AND SHE HAS BEEN OFF OF THE METHOTREXATE.  SHE IS WANTING TO KNOW IF SHE CAN GO BACK ON THIS MEDICATION TO HELP WITH THE PAIN   PLEASE ADVISE  VIJI CALL BACK [phone]     PLEASE SEND IN THE MEDICATION TO   [facility]

## 2020-05-19 NOTE — TELEPHONE ENCOUNTER
That would be fine from my point of view, but they should actually also check with her Rheumatologist.

## 2020-05-20 DIAGNOSIS — J43.9 PULMONARY EMPHYSEMA, UNSPECIFIED EMPHYSEMA TYPE (HCC): Primary | ICD-10-CM

## 2020-05-22 ENCOUNTER — TELEPHONE (OUTPATIENT)
Dept: INTERNAL MEDICINE | Facility: CLINIC | Age: 85
End: 2020-05-22

## 2020-05-22 NOTE — TELEPHONE ENCOUNTER
She is not allergic to these 2 medications.   Dr Bolaños reviewed and states that they can dispense medication together .  Yu notified  Verb understanding given

## 2020-05-22 NOTE — TELEPHONE ENCOUNTER
BIBI NURSE WITH [facility]  NEEDS CLARIFICATION ON   SIMVASTATIN 20 MG     dilTIAZem CD (CARDIZEM CD) 120 MG 24 hr capsule    IT SHOWS A DRUG INTERACTION FOR THESE 2 MEDICATIONS FOR THE PATIENT     PLEASE ADVISE AND CALL BACK BIBI    [phone]

## 2020-05-24 ENCOUNTER — EPISODE CHANGES (OUTPATIENT)
Dept: CASE MANAGEMENT | Facility: OTHER | Age: 85
End: 2020-05-24

## 2020-05-26 ENCOUNTER — OFFICE VISIT (OUTPATIENT)
Dept: ORTHOPEDIC SURGERY | Facility: CLINIC | Age: 85
End: 2020-05-26

## 2020-05-26 VITALS — WEIGHT: 150 LBS | BODY MASS INDEX: 25.61 KG/M2 | HEIGHT: 64 IN | OXYGEN SATURATION: 95 % | HEART RATE: 103 BPM

## 2020-05-26 DIAGNOSIS — E78.49 OTHER HYPERLIPIDEMIA: Primary | ICD-10-CM

## 2020-05-26 DIAGNOSIS — Z09 SURGERY FOLLOW-UP: Primary | ICD-10-CM

## 2020-05-26 PROCEDURE — 99212 OFFICE O/P EST SF 10 MIN: CPT | Performed by: ORTHOPAEDIC SURGERY

## 2020-05-26 RX ORDER — SIMVASTATIN 20 MG
10 TABLET ORAL NIGHTLY
Start: 2020-05-26 | End: 2020-09-28 | Stop reason: HOSPADM

## 2020-05-26 NOTE — PROGRESS NOTES
Orthopaedic Clinic Note:  Hip Post Op    Chief Complaint   Patient presents with   • Post-op     4 months status post 2 months follow up  Hip Cannulated Screw Placement Right - 1/25/2020        HPI    Ms. Carter is 4  month(s) s/p replacement for right femoral neck fracture.  She rates her pain 0/10 on the pain scale.  She is ambulating with assistance of a cane/walker.  She denies any pain in the groin.  She has completed postop rehab program.  Denies fevers chills or constitutional symptoms.  Overall she is happy with her outcome for her right hip surgery.  Past Medical History:   Diagnosis Date   • Anemia     Description: A.  Dx 2006- borderline intermittent.   • Back pain    • Benign colonic polyp     Description: A.  Dx 1999.   • Benign colonic polyp 9/28/2016    Description: A.  Dx 1999.   • Compression fracture of lumbar vertebra (CMS/[facility])    • Constipation    • COPD (chronic obstructive pulmonary disease) (CMS/[facility])     Description: A.  Rule out chronic persistent asthma, COPD, or obliterative bronchiolitis.- Butch   • Degeneration of intervertebral disc of lumbar region     Description: A.  Diagnosed in April 2013 with advanced multilevel with severe spinal stenosis, followed by Dr. Pillai for pain management.   • Gastroesophageal reflux disease    • Hearing loss    • History of colonoscopy 01/01/1999    NORMAL PER PATIENT    • History of mammogram 01/01/2011    NORMAL PER PT    • History of Papanicolaou smear of cervix 01/01/2010    NORMAL PER PT    • History of varicella    • Hyperlipidemia     Description: A.  Dx 2006.   • Hypertension     Description: A. Dx 2001.   • Macular degeneration    • Ovarian mass     Dx 8/15- benign left cystic adnexal mass   • Rheumatoid arthritis (CMS/[facility])     Description: A.  Diagnosed in 2000 and and followed by Dr. Constantino (now Dr. Kaplan). B.  On methotrexate therapy since 2000. C.  Off low-dose prednisone therapy.      Past Surgical History:   Procedure Laterality  Date   • APPENDECTOMY     • CARPAL TUNNEL RELEASE Left 01/01/2003    HISTORY OF NEUROPLASTY DECOMPRESSION MEDIAN NERVE AT CARPAL TUNNEL LEFT   • CATARACT EXTRACTION Bilateral 01/01/2009   • CHOLECYSTECTOMY  01/01/1962   • HIP CANNULATED SCREW PLACEMENT Right 1/25/2020    Procedure: HIP CANNULATED SCREW PLACEMENT RIGHT;  Surgeon: Karlos Blount MD;  Location: [facility];  Service: Orthopedics   • KNEE ARTHROSCOPY Left 01/01/2001    MENISCAL REPAIR   • KYPHOPLASTY  06/18/2015    T11 AND L1 (JOSE A)   • PELVIC LAPAROSCOPY  01/01/1996    REMOVAL OF BENIGN UTERINE AND RIGHT OVARIAN TUMORS   • SALPINGO OOPHORECTOMY Left 08/26/2015    REMOVAL OF LEFT OVARY AND TUBE (benign cystic mass)      Family History   Problem Relation Age of Onset   • Hypertension Mother    • Diabetes Mother      Social History     Socioeconomic History   • Marital status:      Spouse name: Not on file   • Number of children: Not on file   • Years of education: Not on file   • Highest education level: Not on file   Tobacco Use   • Smoking status: Former Smoker   • Smokeless tobacco: Never Used   Substance and Sexual Activity   • Alcohol use: No   • Drug use: No   • Sexual activity: Defer   Social History Narrative    Patient moved to [facility] assisted living 3/26/20. On 3/28/20 patient visited ED for fall-related injuries.  In response, family looking into hiring a , to visit patient BID for ADLs/ transfers. Family drives patient to appts, but says Jm Encinas may provide in future.      Current Outpatient Medications on File Prior to Visit   Medication Sig Dispense Refill   • acetaminophen (TYLENOL) 500 MG tablet Take 2 tablets by mouth Every 8 (Eight) Hours.     • albuterol sulfate  (90 Base) MCG/ACT inhaler Inhale 2 puffs Every 4 (Four) Hours As Needed for Wheezing or Shortness of Air. 3 inhaler 4   • aspirin 81 MG EC tablet TAKE ONE TABLET BY MOUTH DAILY 30 tablet 10   • diclofenac (VOLTAREN) 1 % gel  "gel Apply 2 g topically to the appropriate area as directed 3 (Three) Times a Day. 1 tube 2   • dilTIAZem CD (CARDIZEM CD) 120 MG 24 hr capsule Take 1 capsule by mouth Daily. 30 capsule 0   • docusate sodium (COLACE) 100 MG capsule TAKE 1 CAPSULE BY MOUTH EVERY DAY 30 capsule 10   • famotidine (PEPCID) 20 MG tablet TAKE ONE TABLET BY MOUTH TWICE A DAY 60 tablet 10   • folic acid (FOLVITE) 1 MG tablet TAKE ONE TABLET BY MOUTH DAILY 30 tablet 10   • meloxicam (MOBIC) 7.5 MG tablet Take 1 tablet by mouth Daily. 30 tablet 2   • METHOTREXATE SODIUM PO Take 4 tablets by mouth.     • Multiple Vitamins-Minerals (CENTRUM SILVER ADULT 50+ PO) Take 1 tablet by mouth Daily.     • polyethylene glycol (MIRALAX) pack packet Take 17 g by mouth Daily As Needed (constipation).     • tiotropium bromide-olodaterol (Stiolto Respimat) 2.5-2.5 MCG/ACT aerosol solution inhaler Inhale 2 puffs Daily. 2 inhaler 0   • vitamin B-12 (CYANOCOBALAMIN) 1000 MCG tablet TAKE ONE TABLET BY MOUTH DAILY 30 tablet 10   • [DISCONTINUED] ASPIRIN 81 PO Take 1 tablet by mouth.     • [DISCONTINUED] Cyanocobalamin (VITAMIN B-12 PO) one per day       No current facility-administered medications on file prior to visit.       Allergies   Allergen Reactions   • Bactrim [Sulfamethoxazole-Trimethoprim] Other (See Comments)     Stomach cramps    • Ciprofloxacin Other (See Comments)     Other reaction(s): shaking  HCI TABS/ SHAKING   • Levofloxacin Diarrhea        Review of Systems   Constitutional: Negative.    HENT: Negative.    Eyes: Negative.    Respiratory: Negative.    Cardiovascular: Negative.    Gastrointestinal: Negative.    Endocrine: Negative.    Genitourinary: Negative.    Musculoskeletal: Positive for arthralgias.   Skin: Negative.    Allergic/Immunologic: Negative.    Neurological: Negative.    Hematological: Negative.    Psychiatric/Behavioral: Negative.         Physical Exam  Pulse 103, height 162.6 cm (64.02\"), weight 68 kg (150 lb), SpO2 95 " %.    Body mass index is 25.73 kg/m².    GENERAL APPEARANCE: awake, alert, oriented, in no acute distress and well developed, well nourished  LUNGS:  breathing nonlabored  EXTREMITIES: no clubbing, cyanosis  PERIPHERAL PULSES: palpable dorsalis pedis and posterior tibial pulses bilaterally.    GAIT:  Normal            Hip Exam:  Right    RANGE OF MOTION:  EXTENSION/FLEXION:  normal (0-110 degrees)  IR:  15  ER:  30  PAIN WITH HIP MOTION:  no  PAIN WITH LOGROLL:  no     STRENGTH:  ABDUCTOR:  5/5  ADDUCTOR:  5/5  HIP FLEXION:  5/5    GREATER TROCHANTER BURSAL PAIN:  no    SENSATION TO LIGHT TOUCH:  DEEP PERONEAL/SUPERFICIAL PERONEAL/SURAL/SAPHENOUS/TIBIAL:   intact    EDEMA:  no  ERYTHEMA:  no  WOUNDS/INCISIONS:   Well-healed lateral surgical incision with no evidence erythema or drainage  _______________________________________________________________  _______________________________________________________________    RADIOGRAPHIC FINDINGS:   Indication: Status post percutaneous screw fixation right femoral neck fracture    Comparison: Todays xrays were compared to previous xrays from 3/28/2020    AP pelvis, 2 views right hip: Right: Radiographs demonstrate interval consolidation and healing of the femoral neck fracture with slight controlled collapse of the cannulated screws.  No evidence of hardware complication.  No evidence of avascular necrosis.        Assessment/Plan:   Diagnosis Plan   1. Surgery follow-up  XR Hip With or Without Pelvis 2 - 3 View Right     Patient's fracture is clinically healed.  I recommended resuming activity as tolerated.  Patient will follow-up as needed.    Karlos Blount MD  05/26/20  16:26

## 2020-06-03 ENCOUNTER — TELEPHONE (OUTPATIENT)
Dept: INTERNAL MEDICINE | Facility: CLINIC | Age: 85
End: 2020-06-03

## 2020-06-03 NOTE — TELEPHONE ENCOUNTER
LIGIA FROM [facility] CALLED AFTER VISIT  TO PATIENT REQUESTING AN AS NEEDED REFERRAL TO FOLLOWUP WITH PATIENT ON CONSTIPATION ISSUES.    CONTACT NUMBER    [phone]

## 2020-06-04 ENCOUNTER — TELEPHONE (OUTPATIENT)
Dept: INTERNAL MEDICINE | Facility: CLINIC | Age: 85
End: 2020-06-04

## 2020-06-04 DIAGNOSIS — M06.9 RHEUMATOID ARTHRITIS INVOLVING MULTIPLE SITES, UNSPECIFIED RHEUMATOID FACTOR PRESENCE: ICD-10-CM

## 2020-06-04 RX ORDER — ASPIRIN 81 MG/1
81 TABLET ORAL DAILY
Qty: 30 TABLET | Refills: 10 | Status: SHIPPED | OUTPATIENT
Start: 2020-06-04 | End: 2020-06-04 | Stop reason: SDUPTHER

## 2020-06-04 RX ORDER — FAMOTIDINE 20 MG/1
20 TABLET, FILM COATED ORAL 2 TIMES DAILY
Qty: 60 TABLET | Refills: 10 | Status: SHIPPED | OUTPATIENT
Start: 2020-06-04 | End: 2020-06-04 | Stop reason: SDUPTHER

## 2020-06-04 RX ORDER — ASPIRIN 81 MG/1
81 TABLET ORAL DAILY
Qty: 30 TABLET | Refills: 10 | Status: SHIPPED | OUTPATIENT
Start: 2020-06-04

## 2020-06-04 RX ORDER — FAMOTIDINE 20 MG/1
20 TABLET, FILM COATED ORAL 2 TIMES DAILY
Qty: 60 TABLET | Refills: 10 | Status: SHIPPED | OUTPATIENT
Start: 2020-06-04 | End: 2022-06-08 | Stop reason: DRUGHIGH

## 2020-06-04 NOTE — TELEPHONE ENCOUNTER
PHARMACY CALLED REQUESTING A REFILL FOR:    aspirin 81 MG EC tablet    AND    docusate sodium (COLACE) 100 MG capsule    AND     famotidine (PEPCID) 20 MG tablet     [facility] - [address] - [phone] [facility] [phone]

## 2020-06-05 ENCOUNTER — LAB (OUTPATIENT)
Dept: INTERNAL MEDICINE | Facility: CLINIC | Age: 85
End: 2020-06-05

## 2020-06-05 DIAGNOSIS — R30.0 DYSURIA: Primary | ICD-10-CM

## 2020-06-05 DIAGNOSIS — R82.998 LEUKOCYTES IN URINE: ICD-10-CM

## 2020-06-05 DIAGNOSIS — R31.9 HEMATURIA, UNSPECIFIED TYPE: ICD-10-CM

## 2020-06-05 LAB
BILIRUB BLD-MCNC: NEGATIVE MG/DL
CLARITY, POC: ABNORMAL
COLOR UR: YELLOW
EXPIRATION DATE: ABNORMAL
GLUCOSE UR STRIP-MCNC: NEGATIVE MG/DL
KETONES UR QL: NEGATIVE
LEUKOCYTE EST, POC: ABNORMAL
Lab: ABNORMAL
NITRITE UR-MCNC: NEGATIVE MG/ML
PH UR: 5 [PH] (ref 5–8)
PROT UR STRIP-MCNC: NEGATIVE MG/DL
RBC # UR STRIP: ABNORMAL /UL
SP GR UR: 1.02 (ref 1–1.03)
UROBILINOGEN UR QL: NORMAL

## 2020-06-05 PROCEDURE — 81015 MICROSCOPIC EXAM OF URINE: CPT | Performed by: INTERNAL MEDICINE

## 2020-06-05 PROCEDURE — 87077 CULTURE AEROBIC IDENTIFY: CPT | Performed by: INTERNAL MEDICINE

## 2020-06-05 PROCEDURE — 81003 URINALYSIS AUTO W/O SCOPE: CPT | Performed by: INTERNAL MEDICINE

## 2020-06-05 PROCEDURE — 87186 SC STD MICRODIL/AGAR DIL: CPT | Performed by: INTERNAL MEDICINE

## 2020-06-05 PROCEDURE — 87086 URINE CULTURE/COLONY COUNT: CPT | Performed by: INTERNAL MEDICINE

## 2020-06-05 RX ORDER — DOCUSATE SODIUM 100 MG/1
100 CAPSULE, LIQUID FILLED ORAL DAILY
Qty: 30 CAPSULE | Refills: 10 | Status: SHIPPED | OUTPATIENT
Start: 2020-06-05 | End: 2022-06-08

## 2020-06-05 RX ORDER — MELOXICAM 7.5 MG/1
7.5 TABLET ORAL DAILY
Qty: 30 TABLET | Refills: 5 | Status: SHIPPED | OUTPATIENT
Start: 2020-06-05 | End: 2020-06-17 | Stop reason: SDUPTHER

## 2020-06-06 LAB
BACTERIA SPEC AEROBE CULT: ABNORMAL
BACTERIA UR QL AUTO: ABNORMAL /HPF
HYALINE CASTS UR QL AUTO: ABNORMAL /LPF
RBC # UR: ABNORMAL /HPF
REF LAB TEST METHOD: ABNORMAL
SQUAMOUS #/AREA URNS HPF: ABNORMAL /HPF
WBC UR QL AUTO: ABNORMAL /HPF

## 2020-06-08 ENCOUNTER — TELEPHONE (OUTPATIENT)
Dept: INTERNAL MEDICINE | Facility: CLINIC | Age: 85
End: 2020-06-08

## 2020-06-08 DIAGNOSIS — N39.0 ACUTE UTI: Primary | ICD-10-CM

## 2020-06-08 RX ORDER — NITROFURANTOIN 25; 75 MG/1; MG/1
100 CAPSULE ORAL 2 TIMES DAILY
Qty: 14 CAPSULE | Refills: 0 | Status: SHIPPED | OUTPATIENT
Start: 2020-06-08 | End: 2020-06-15

## 2020-06-08 NOTE — TELEPHONE ENCOUNTER
PT'S DAUGHTER CALLED TO GET THE RESULT OF THE PT'S LABS.    VIJI'S CONTACT [phone]     PLEASE ADVISE

## 2020-06-17 ENCOUNTER — TELEPHONE (OUTPATIENT)
Dept: INTERNAL MEDICINE | Facility: CLINIC | Age: 85
End: 2020-06-17

## 2020-06-17 DIAGNOSIS — I10 ESSENTIAL HYPERTENSION: Primary | ICD-10-CM

## 2020-06-17 DIAGNOSIS — M06.9 RHEUMATOID ARTHRITIS INVOLVING MULTIPLE SITES, UNSPECIFIED RHEUMATOID FACTOR PRESENCE: ICD-10-CM

## 2020-06-17 RX ORDER — DILTIAZEM HYDROCHLORIDE 120 MG/1
120 CAPSULE, COATED, EXTENDED RELEASE ORAL DAILY
Qty: 30 CAPSULE | Refills: 5 | Status: SHIPPED | OUTPATIENT
Start: 2020-06-17 | End: 2020-07-15 | Stop reason: SDUPTHER

## 2020-06-17 RX ORDER — MELOXICAM 7.5 MG/1
7.5 TABLET ORAL DAILY
Qty: 30 TABLET | Refills: 5 | Status: SHIPPED | OUTPATIENT
Start: 2020-06-17 | End: 2020-09-28 | Stop reason: HOSPADM

## 2020-06-17 RX ORDER — DILTIAZEM HYDROCHLORIDE 120 MG/1
120 CAPSULE, COATED, EXTENDED RELEASE ORAL DAILY
Qty: 30 CAPSULE | Refills: 5 | Status: SHIPPED | OUTPATIENT
Start: 2020-06-17 | End: 2020-06-17 | Stop reason: SDUPTHER

## 2020-06-17 NOTE — TELEPHONE ENCOUNTER
Danette states she is up 6-8 times a night and she is wondering if there is anything she could take that would help her sleep. She states she is worried that she is going to urinate in the depends and that is why she gets up to go to the Bathroom and then she only voids a dribble.   She is not showing any confusion .     Danette is considering hypnosis and wondered also what Dr Bolaños thinks of this .

## 2020-06-17 NOTE — TELEPHONE ENCOUNTER
Dr Bolaños.  She is due for a follow up    Do you want her to do video  Visit or schedule appointment in clinic

## 2020-06-17 NOTE — TELEPHONE ENCOUNTER
She can try the hypnosis, it may work.  Can also consider pelvic floor therapy.  The only other option is to add a medication that will decrease her urination at night, but that may not help if it is a psychological issue.

## 2020-06-17 NOTE — TELEPHONE ENCOUNTER
PT'S DAUGHTER CALLED TO REQUEST A REFILL FOR dilTIAZem CD (CARDIZEM CD) 120 MG 24 hr capsule AND meloxicam (MOBIC) 7.5 MG tablet. PT IS OUT OF MEDICATION.    VIJI'S CONTACT [phone]     PHARMACY VERIFIED JEANINE DIETR[address] [place] [address] [phone]  - [phone] FX

## 2020-06-17 NOTE — TELEPHONE ENCOUNTER
She had a telemedicine visit on 4/22/2020.  Needs a 3-month follow-up appointment from that date.  Can be in office or video, patient preference.

## 2020-06-18 NOTE — TELEPHONE ENCOUNTER
Danette notified   She is going to try the hypnosis first and see if that will help the situation

## 2020-06-25 ENCOUNTER — LAB REQUISITION (OUTPATIENT)
Dept: LAB | Facility: HOSPITAL | Age: 85
End: 2020-06-25

## 2020-06-25 ENCOUNTER — TELEPHONE (OUTPATIENT)
Dept: INTERNAL MEDICINE | Facility: CLINIC | Age: 85
End: 2020-06-25

## 2020-06-25 DIAGNOSIS — Z00.00 ROUTINE GENERAL MEDICAL EXAMINATION AT A HEALTH CARE FACILITY: ICD-10-CM

## 2020-06-25 LAB
ALBUMIN SERPL-MCNC: 4.1 G/DL (ref 3.5–5.2)
ALBUMIN/GLOB SERPL: 1.4 G/DL
ALP SERPL-CCNC: 97 U/L (ref 39–117)
ALT SERPL W P-5'-P-CCNC: 11 U/L (ref 1–33)
ANION GAP SERPL CALCULATED.3IONS-SCNC: 14 MMOL/L (ref 5–15)
AST SERPL-CCNC: 22 U/L (ref 1–32)
BASOPHILS # BLD AUTO: 0.05 10*3/MM3 (ref 0–0.2)
BASOPHILS NFR BLD AUTO: 0.7 % (ref 0–1.5)
BILIRUB SERPL-MCNC: 0.2 MG/DL (ref 0.2–1.2)
BUN BLD-MCNC: 27 MG/DL (ref 8–23)
BUN/CREAT SERPL: 26.5 (ref 7–25)
CALCIUM SPEC-SCNC: 9 MG/DL (ref 8.6–10.5)
CHLORIDE SERPL-SCNC: 103 MMOL/L (ref 98–107)
CO2 SERPL-SCNC: 26 MMOL/L (ref 22–29)
CREAT BLD-MCNC: 1.02 MG/DL (ref 0.57–1)
CRP SERPL-MCNC: 0.34 MG/DL (ref 0–0.5)
DEPRECATED RDW RBC AUTO: 53.9 FL (ref 37–54)
EOSINOPHIL # BLD AUTO: 0.24 10*3/MM3 (ref 0–0.4)
EOSINOPHIL NFR BLD AUTO: 3.1 % (ref 0.3–6.2)
ERYTHROCYTE [DISTWIDTH] IN BLOOD BY AUTOMATED COUNT: 15 % (ref 12.3–15.4)
ERYTHROCYTE [SEDIMENTATION RATE] IN BLOOD: 31 MM/HR (ref 0–30)
GFR SERPL CREATININE-BSD FRML MDRD: 51 ML/MIN/1.73
GLOBULIN UR ELPH-MCNC: 3 GM/DL
GLUCOSE BLD-MCNC: 90 MG/DL (ref 65–99)
HCT VFR BLD AUTO: 35 % (ref 34–46.6)
HGB BLD-MCNC: 11.2 G/DL (ref 12–15.9)
IMM GRANULOCYTES # BLD AUTO: 0.01 10*3/MM3 (ref 0–0.05)
IMM GRANULOCYTES NFR BLD AUTO: 0.1 % (ref 0–0.5)
LYMPHOCYTES # BLD AUTO: 1.66 10*3/MM3 (ref 0.7–3.1)
LYMPHOCYTES NFR BLD AUTO: 21.8 % (ref 19.6–45.3)
MCH RBC QN AUTO: 31.4 PG (ref 26.6–33)
MCHC RBC AUTO-ENTMCNC: 32 G/DL (ref 31.5–35.7)
MCV RBC AUTO: 98 FL (ref 79–97)
MONOCYTES # BLD AUTO: 0.49 10*3/MM3 (ref 0.1–0.9)
MONOCYTES NFR BLD AUTO: 6.4 % (ref 5–12)
NEUTROPHILS # BLD AUTO: 5.18 10*3/MM3 (ref 1.7–7)
NEUTROPHILS NFR BLD AUTO: 67.9 % (ref 42.7–76)
NRBC BLD AUTO-RTO: 0 /100 WBC (ref 0–0.2)
PLATELET # BLD AUTO: 294 10*3/MM3 (ref 140–450)
PMV BLD AUTO: 10.3 FL (ref 6–12)
POTASSIUM BLD-SCNC: 4.1 MMOL/L (ref 3.5–5.2)
PROT SERPL-MCNC: 7.1 G/DL (ref 6–8.5)
RBC # BLD AUTO: 3.57 10*6/MM3 (ref 3.77–5.28)
SODIUM BLD-SCNC: 143 MMOL/L (ref 136–145)
WBC NRBC COR # BLD: 7.63 10*3/MM3 (ref 3.4–10.8)

## 2020-06-25 PROCEDURE — 85025 COMPLETE CBC W/AUTO DIFF WBC: CPT

## 2020-06-25 PROCEDURE — 83519 RIA NONANTIBODY: CPT

## 2020-06-25 PROCEDURE — 86140 C-REACTIVE PROTEIN: CPT

## 2020-06-25 PROCEDURE — 80053 COMPREHEN METABOLIC PANEL: CPT

## 2020-06-25 PROCEDURE — 85652 RBC SED RATE AUTOMATED: CPT

## 2020-06-25 NOTE — TELEPHONE ENCOUNTER
PATIENTS HOME HEALTH NURSE IS ASKING THAT YOU SEND THE LABS THAT SHE IS DOING NOW UNDER THE PCP TO THE SPECIALISTS    [facility] FAX: [phone]    [facility], DR DARREN ALVARES. FAX: [phone]    SHE IS DRAWING LABS NOW AND WILL DROP OFF AT [facility] OR [facility] AND THEN BE SENT TO YOU IN A COUPLE OF DAYS.

## 2020-06-29 LAB — ACHR AB SER-SCNC: 0.06 NMOL/L (ref 0–0.24)

## 2020-07-03 LAB — ACHR MOD AB/ACHR TOTAL SFR SER: <12 % (ref 0–20)

## 2020-07-06 DIAGNOSIS — J43.9 PULMONARY EMPHYSEMA, UNSPECIFIED EMPHYSEMA TYPE (HCC): ICD-10-CM

## 2020-07-06 NOTE — TELEPHONE ENCOUNTER
Call patient's daughter please.  I have looked over at the [facility] forms she dropped off.  They do ask for a date of examination, I have not seen her since October 2019.  The form also asks for vitals including height and weight and blood pressure.  Therefore, I do recommend an appointment.  Please schedule.

## 2020-07-07 PROBLEM — H53.16 CHARLES BONNET SYNDROME: Status: ACTIVE | Noted: 2020-07-07

## 2020-07-08 ENCOUNTER — OFFICE VISIT (OUTPATIENT)
Dept: INTERNAL MEDICINE | Facility: CLINIC | Age: 85
End: 2020-07-08

## 2020-07-08 ENCOUNTER — TELEPHONE (OUTPATIENT)
Dept: INTERNAL MEDICINE | Facility: CLINIC | Age: 85
End: 2020-07-08

## 2020-07-08 ENCOUNTER — OUTSIDE FACILITY SERVICE (OUTPATIENT)
Dept: INTERNAL MEDICINE | Facility: CLINIC | Age: 85
End: 2020-07-08

## 2020-07-08 VITALS
BODY MASS INDEX: 23.82 KG/M2 | HEART RATE: 80 BPM | HEIGHT: 64 IN | WEIGHT: 139.5 LBS | DIASTOLIC BLOOD PRESSURE: 76 MMHG | TEMPERATURE: 96.9 F | RESPIRATION RATE: 20 BRPM | SYSTOLIC BLOOD PRESSURE: 140 MMHG

## 2020-07-08 DIAGNOSIS — H35.30 MACULAR DEGENERATION OF BOTH EYES, UNSPECIFIED TYPE: ICD-10-CM

## 2020-07-08 DIAGNOSIS — R29.6 FALLS FREQUENTLY: ICD-10-CM

## 2020-07-08 DIAGNOSIS — M06.9 RHEUMATOID ARTHRITIS INVOLVING MULTIPLE SITES, UNSPECIFIED RHEUMATOID FACTOR PRESENCE: ICD-10-CM

## 2020-07-08 DIAGNOSIS — K21.9 GASTROESOPHAGEAL REFLUX DISEASE, ESOPHAGITIS PRESENCE NOT SPECIFIED: ICD-10-CM

## 2020-07-08 DIAGNOSIS — E78.49 OTHER HYPERLIPIDEMIA: Primary | ICD-10-CM

## 2020-07-08 DIAGNOSIS — H53.16 CHARLES BONNET SYNDROME: ICD-10-CM

## 2020-07-08 DIAGNOSIS — I10 ESSENTIAL HYPERTENSION: ICD-10-CM

## 2020-07-08 DIAGNOSIS — J43.9 PULMONARY EMPHYSEMA, UNSPECIFIED EMPHYSEMA TYPE (HCC): ICD-10-CM

## 2020-07-08 PROBLEM — E53.8 B12 DEFICIENCY: Status: RESOLVED | Noted: 2020-04-04 | Resolved: 2020-07-08

## 2020-07-08 PROBLEM — Z79.631 LONG TERM METHOTREXATE USER: Status: RESOLVED | Noted: 2020-01-20 | Resolved: 2020-07-08

## 2020-07-08 PROBLEM — J18.9 MULTIFOCAL PNEUMONIA: Status: RESOLVED | Noted: 2020-04-02 | Resolved: 2020-07-08

## 2020-07-08 PROBLEM — S70.02XA TRAUMATIC HEMATOMA OF LEFT HIP: Status: RESOLVED | Noted: 2020-04-04 | Resolved: 2020-07-08

## 2020-07-08 PROBLEM — S72.91XA FEMUR FRACTURE, RIGHT: Status: RESOLVED | Noted: 2020-01-25 | Resolved: 2020-07-08

## 2020-07-08 PROCEDURE — 99214 OFFICE O/P EST MOD 30 MIN: CPT | Performed by: INTERNAL MEDICINE

## 2020-07-08 PROCEDURE — G0179 MD RECERTIFICATION HHA PT: HCPCS | Performed by: INTERNAL MEDICINE

## 2020-07-08 NOTE — PROGRESS NOTES
Subjective       Pati Carter is a 89 y.o. female.     Chief Complaint   Patient presents with   • Hyperlipidemia     follow up   • Hypertension   • COPD   • Rheumatoid Arthritis     needs face to Face Encounter for [facility] forms       History obtained from the patient.      History of Present Illness       The patient sees Dr. Marsha Mathis for RA, last visit 6/25/2020, and she was re-started on MTX and Folic Acid.  She also on Meloxicam and uses Voltaren gel as needed.     She has seen Pulmonary for COPD, overall stable on Breo-Ellipta, but no recent appointment.    She denies chest pain or tightness, cough, wheezing, and hemoptysis.    The patient had a UTI in 6/5/2020, treated with Macrobid.  She is still having nocturia, but no other urinary symptoms.  She has a Urology appointment tomorrow.    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        Primary Care Cardiac Diagnostic Constellation: The patient is here today for a 6 month follow-up visit.  She is non-fasting.    Her Hypertension has been stable.   Medication(s): Diltiazem  Her Hyperlipidemia has been stable.   Her LDL goal is  <130 mg/dL and last LDL was 108 mg/dL.   Medication(s):  Simvastatin.  The patient is not adherent with her medication regimen. She denies medication side effects.       Interval Events:   The patient had labs on/25/20.  CMP was normal with the exception of a mildly elevated, stable Creatinine (1.02).  CBC was significant for stable anemia (Hemoglobin 11.2 / Hematocrit 35.0).  Her blood pressure at home has been 139-145 / 70-85..      Symptoms: Has bilateral leg pain (PAD), which is stable, and stable lower extremity edema. COLIN has resolved.  Denies chest pain, resting dyspnea, palpitations, syncope, orthopnea, PND, lightheadedness, and dizziness.   Associated Symptoms: Weight decreased 10 pounds since 10/23/2019.  Improved arthralgias.  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  She saw the retina specialist yesterday for double vision and the accelerated hallucinations, but medication was not recommended.  She has stable mild memory / concentration issues.  No headache, polydipsia, polyuria, fatigue, myalgias, or focal neurologic deficits.      Lifestyle and Disease Management: Diet: She consumes a diverse and healthy diet. Weight Issues: She does not have any weight concerns. Exercise: She does not exercise regularly.   Tobacco Use: Former smoker.      Gastroesophageal Reflux Disease Follow-up: The patient is being seen for a routine clinic follow-up of Gastroesophageal Reflux Disease, which is stable.   Interval Events: None.  Symptoms: no abdominal pain, heartburn, acid regurgitation, nausea, vomiting, dysphagia, odynophagia, hematemesis, hematochezia, melena,  early satiety, belching, or bloating.   Associated Symptoms: She has chronic hoarseness.  No chronic sore throat,  cough, orwheezing.   Medication:  Pepcid     Colonic Polyp Follow-up: The patient is being seen for a routine clinic follow-up of Colon Polyp(s), which is stable.   Interval Events:  Current diagnosis was determined by Colonoscopy and last 1999,  normal per patient.  She has declined further colonoscopy  Symptoms: no abdominal pain, diarrhea, constipation, hematochezia, melena, decreased stool caliber, or change in bowel habits.   Associated Symptoms: no rectal prolapse.   Medication:  None.          Current Outpatient Medications on File Prior to Visit   Medication Sig Dispense Refill   • acetaminophen (TYLENOL) 500 MG tablet Take 2 tablets by mouth Every 8 (Eight) Hours.     • aspirin 81 MG EC tablet Take 1 tablet by mouth Daily. 30 tablet 10   • Cholecalciferol (PA VITAMIN D-3 PO) Take 2,000 Units by mouth Daily.     • diclofenac (VOLTAREN) 1 % gel gel Apply 2 g topically to the appropriate area as directed 3 (Three) Times a Day. 1  tube 2   • dilTIAZem CD (CARDIZEM CD) 120 MG 24 hr capsule Take 1 capsule by mouth Daily. 30 capsule 5   • docusate sodium (COLACE) 100 MG capsule Take 1 capsule by mouth Daily. 30 capsule 10   • famotidine (PEPCID) 20 MG tablet Take 1 tablet by mouth 2 (Two) Times a Day. 60 tablet 10   • folic acid (FOLVITE) 1 MG tablet TAKE ONE TABLET BY MOUTH DAILY 30 tablet 10   • meloxicam (MOBIC) 7.5 MG tablet Take 1 tablet by mouth Daily. 30 tablet 5   • Misc Natural Products (PUMPKIN SEED OIL PO) Take 1,000 mg by mouth Daily.     • Multiple Vitamins-Minerals (CENTRUM SILVER ADULT 50+ PO) Take 1 tablet by mouth Daily.     • polyethylene glycol (MIRALAX) pack packet Take 17 g by mouth Daily As Needed (constipation).     • simvastatin (Zocor) 20 MG tablet Take 0.5 tablets by mouth Every Night.     • tiotropium bromide-olodaterol (Stiolto Respimat) 2.5-2.5 MCG/ACT aerosol solution inhaler Inhale 2 puffs Daily. 4 inhaler 0   • vitamin B-12 (CYANOCOBALAMIN) 1000 MCG tablet TAKE ONE TABLET BY MOUTH DAILY 30 tablet 10   • albuterol sulfate  (90 Base) MCG/ACT inhaler Inhale 2 puffs Every 4 (Four) Hours As Needed for Wheezing or Shortness of Air. 3 inhaler 4   • methotrexate 2.5 MG tablet 4 tablets 1 (One) Time Per Week.     • [DISCONTINUED] METHOTREXATE SODIUM PO Take 4 tablets by mouth.       No current facility-administered medications on file prior to visit.        Current outpatient and discharge medications have been reconciled for the patient.  Reviewed by: Latoya Bolaños MD        The following portions of the patient's history were reviewed and updated as appropriate: allergies, current medications, past family history, past medical history, past social history, past surgical history and problem list.    Review of Systems   Constitutional: Positive for unexpected weight change. Negative for fatigue.   HENT: Negative for sore throat and trouble swallowing.    Eyes: Positive for visual disturbance.   Respiratory:  "Negative for cough, shortness of breath and wheezing.    Cardiovascular: Positive for leg swelling. Negative for chest pain and palpitations.        No COLIN, orthopnea, or claudication.   Gastrointestinal: Positive for constipation (chronic). Negative for abdominal pain, blood in stool, diarrhea, nausea and vomiting.        Denies melena.   Endocrine: Negative for polydipsia and polyuria.   Genitourinary: Negative for dysuria, frequency, hematuria and urgency.        Chronic nocturia.   Musculoskeletal: Positive for arthralgias and gait problem (frequent falls). Negative for myalgias.   Neurological: Negative for dizziness, syncope, light-headedness and headaches.        Stable memory issues.   Psychiatric/Behavioral: Positive for decreased concentration.         Objective       Blood pressure 140/76, pulse 80, temperature 96.9 °F (36.1 °C), temperature source Temporal, resp. rate 20, height 162.6 cm (64\"), weight 63.3 kg (139 lb 8 oz).      Physical Exam   Constitutional:   Frail.   Eyes: Pupils are equal, round, and reactive to light.   Neck: Normal range of motion. Neck supple. Carotid bruit is not present. No thyromegaly present.   Cardiovascular: Normal rate, regular rhythm, normal heart sounds and intact distal pulses. Exam reveals no gallop and no friction rub.   No murmur heard.  There is 2+ bilateral lower extremity edema.   Pulmonary/Chest: Effort normal and breath sounds normal.   Abdominal: Soft. Bowel sounds are normal. She exhibits no distension, no abdominal bruit and no mass. There is no hepatosplenomegaly. There is no tenderness.   Musculoskeletal: She exhibits deformity (hands).   Neurological: She is alert.   Unsteady on her feet.  Took a long time to move from wheelchair to exam table.  Muscle strength is 4/4 in the upper extremities bilaterally and 3/4 in the lower extremities bilaterally.   strength is decreased bilaterally.   Psychiatric: She has a normal mood and affect.   Nursing note and " vitals reviewed.       Vision Screening   Edited by: Andie Talley MA    Right eye Left eye Both eyes   Without correction 20/50 0/0 20/50        I certify that the patient had a face-to-face encounter with me today, 7/8/2020.  The encounter meets Medicare face-to face encounter requirements.  The reason for the visit was to assess and evaluate the patient, who is deconditioned due to hospitalization and requires skilled services and has a history of falls, problems with poor balance, or gait instability.  she was evaluated for the need for skilled services including skilled nursing care, physical therapy and occupational therapy.  I certify that these services are medically necessary for the patient.  The patient is homebound, due to the fact that she requires the aid of supportive devices (wheelchair or walker), needs the assistnace of another person and has a condition that makes leaving the home medically contraindicated (at risk for COVID-19 complications).  I will be treating the patient on an ongoing basis.        Assessment / Plan:  Pati was seen today for hyperlipidemia, hypertension, copd and rheumatoid arthritis.    Diagnoses and all orders for this visit:    Other hyperlipidemia   Continue current medication(s) as noted in the history of present illness.    Essential hypertension   Continue current medication(s) as noted in the history of present illness.    Pulmonary emphysema, unspecified emphysema type (CMS/HCC)   Continue current medication(s) as noted in the history of present illness.    Rheumatoid arthritis involving multiple sites, unspecified rheumatoid factor presence (CMS/HCC)   Continue current medication(s) as noted in the history of present illness.   Follow-up perRheumatology.    Gastroesophageal reflux disease, esophagitis presence not specified   Continue current medication(s) as noted in the history of present illness.    Falls frequently   Monitor.    Macular degeneration of  both eyes, unspecified type   Follow-up per Ophthalmology.    Ferny Bonnet syndrome   Follow-up per Ophthalmology.      The patient declined scheduling a Medicare Wellness Exam today.      Return in about 6 months (around 1/8/2021) for Recheck HTN , fasting.

## 2020-07-08 NOTE — TELEPHONE ENCOUNTER
Please inform the patient's daughter that her [facility] form is done.  Please make sure a copy is made before giving it to her.

## 2020-07-15 ENCOUNTER — TELEPHONE (OUTPATIENT)
Dept: INTERNAL MEDICINE | Facility: CLINIC | Age: 85
End: 2020-07-15

## 2020-07-15 DIAGNOSIS — I10 ESSENTIAL HYPERTENSION: ICD-10-CM

## 2020-07-15 NOTE — TELEPHONE ENCOUNTER
PHARMACY WANTS TO MAKE SURE YOU CHANGED THE DOSING ON THE SIMVASTATIN    [facility] PH: [phone]

## 2020-07-16 RX ORDER — DILTIAZEM HYDROCHLORIDE 120 MG/1
120 CAPSULE, COATED, EXTENDED RELEASE ORAL DAILY
Qty: 21 CAPSULE | Refills: 0 | Status: SHIPPED | OUTPATIENT
Start: 2020-07-16 | End: 2022-08-17 | Stop reason: HOSPADM

## 2020-07-16 NOTE — TELEPHONE ENCOUNTER
Spoke to pharmacy they just needed to know the dose and instructions patient is suppose to be on. I gave her the dose and instructions from the May rx.

## 2020-07-20 ENCOUNTER — LAB (OUTPATIENT)
Dept: INTERNAL MEDICINE | Facility: CLINIC | Age: 85
End: 2020-07-20

## 2020-07-20 ENCOUNTER — TELEPHONE (OUTPATIENT)
Dept: GYNECOLOGIC ONCOLOGY | Facility: CLINIC | Age: 85
End: 2020-07-20

## 2020-07-20 DIAGNOSIS — N39.0 ACUTE UTI: ICD-10-CM

## 2020-07-20 DIAGNOSIS — N39.0 ACUTE UTI: Primary | ICD-10-CM

## 2020-07-20 LAB
BILIRUB BLD-MCNC: NEGATIVE MG/DL
CLARITY, POC: CLEAR
COLOR UR: YELLOW
EXPIRATION DATE: ABNORMAL
GLUCOSE UR STRIP-MCNC: NEGATIVE MG/DL
KETONES UR QL: NEGATIVE
LEUKOCYTE EST, POC: ABNORMAL
Lab: ABNORMAL
NITRITE UR-MCNC: NEGATIVE MG/ML
PH UR: 7 [PH] (ref 5–8)
PROT UR STRIP-MCNC: NEGATIVE MG/DL
RBC # UR STRIP: ABNORMAL /UL
SP GR UR: 1 (ref 1–1.03)
UROBILINOGEN UR QL: NORMAL

## 2020-07-20 PROCEDURE — 87186 SC STD MICRODIL/AGAR DIL: CPT | Performed by: INTERNAL MEDICINE

## 2020-07-20 PROCEDURE — 87077 CULTURE AEROBIC IDENTIFY: CPT | Performed by: INTERNAL MEDICINE

## 2020-07-20 PROCEDURE — 81003 URINALYSIS AUTO W/O SCOPE: CPT | Performed by: INTERNAL MEDICINE

## 2020-07-20 PROCEDURE — 87086 URINE CULTURE/COLONY COUNT: CPT | Performed by: INTERNAL MEDICINE

## 2020-07-20 NOTE — TELEPHONE ENCOUNTER
Danette, patient's daughter, calling.    She believes Dr. Mello saw this patient about 5 years ago.  She had a twisted ovary that Dr. Mello operated on.    She is having pain on left side between groin and her leg and would like to get an appointment with Dr. Mello.    She is also going to call Dr. Quevedo to get a referral sent.    [phone]

## 2020-07-21 ENCOUNTER — TELEPHONE (OUTPATIENT)
Dept: INTERNAL MEDICINE | Facility: CLINIC | Age: 85
End: 2020-07-21

## 2020-07-21 NOTE — TELEPHONE ENCOUNTER
MOUNA WITH [facility] [phone]    PATIENTS CREATININE IS VERY HIGH, HAS DOUBLE VISION, DR ANGELIQUE PELAYO WANTS PATIENT TO HAVE AN MRI,     PATIENT DAUGHTER WANT[facility] TO CHECK WITH DR DEXTER AND DISCUSS FINDINGS TO DETERMINE BEST NEXT STEPS.

## 2020-07-21 NOTE — TELEPHONE ENCOUNTER
Call please.  What type of MRI does she need?  Last labs here showed a very mildly elevated creatinine, fairly normal for age.

## 2020-07-22 RX ORDER — NITROFURANTOIN 25; 75 MG/1; MG/1
100 CAPSULE ORAL 2 TIMES DAILY
Qty: 14 CAPSULE | Refills: 0 | Status: SHIPPED | OUTPATIENT
Start: 2020-07-22 | End: 2020-07-29

## 2020-07-23 LAB
BACTERIA SPEC AEROBE CULT: ABNORMAL
BACTERIA SPEC AEROBE CULT: ABNORMAL

## 2020-07-23 NOTE — TELEPHONE ENCOUNTER
Spoke with Yulisa . She is requesting a MRI of Brain without contrast due to her double vision .  Notified her that when I spoke to Danette her daughter yesterday she said that she had double vision that last for 10 days but it has gone away for the lat 2 week.  Yulisa said she would speak with the doctor at [facility] and she if he still wants to do the MRI .  She will call back

## 2020-07-28 NOTE — TELEPHONE ENCOUNTER
Spoke with Zenia .She states the doctor is going to hold off ordering the MRI and see how she is doing at her next appointment .  Danette notified of information on her voicemail.   And to call back if further questions

## 2020-08-03 ENCOUNTER — TELEPHONE (OUTPATIENT)
Dept: INTERNAL MEDICINE | Facility: CLINIC | Age: 85
End: 2020-08-03

## 2020-08-03 NOTE — TELEPHONE ENCOUNTER
Patients daughter dropped off paperwork for Dr. Bolaños to fill out. Patient is transitioning to an assisted care facility. Paper work has been placed in Dr. Mendez box at the front office.

## 2020-08-10 NOTE — TELEPHONE ENCOUNTER
Spoke with the patient's daughter regarding questions related to the form.  This form is now done, but the patient's daughter states there is an additional form that she will be dropping off.

## 2020-08-29 NOTE — TELEPHONE ENCOUNTER
PATIENTS DAUGHTER VIJI CALLED REGARDING HEMATOMA ON THE LEFT SIDE OF HER HIP AND ONE ALSO ON HER KNEE. SHE SAID IT IS NOT IMPROVING AS QUICKLY AND HER PCP SUGGEST THAT SHE LET DR SCHMITZ KNOW. VIJI STATES IT IS TIGHT LIKE A BALLOON, HAS CHANGED FROM A PLUM COLOR TO A FLUSH COLOR, AND IS COMPARABLE TO THE SIZE OF A 3RD OF A BASKETBALL. PLEASE ADVISE AS TO WHAT THE PATIENT NEEDS TO DO. VIJI CAN BE REACHED AT [phone].  
She is having a lot of pain in the left hip which she fell on and the hematoma almaguer not seem to be getting any better. I offered a Video visit and they said that would be great. I scheduled for a video visit on Tuesday at 1:10.  Rose  
- - -

## 2020-09-16 ENCOUNTER — APPOINTMENT (OUTPATIENT)
Dept: GENERAL RADIOLOGY | Facility: HOSPITAL | Age: 85
End: 2020-09-16

## 2020-09-16 ENCOUNTER — APPOINTMENT (OUTPATIENT)
Dept: CT IMAGING | Facility: HOSPITAL | Age: 85
End: 2020-09-16

## 2020-09-16 ENCOUNTER — HOSPITAL ENCOUNTER (INPATIENT)
Facility: HOSPITAL | Age: 85
LOS: 12 days | Discharge: NURSING, REHAB OR LONG-TERM CARE | End: 2020-09-28
Attending: EMERGENCY MEDICINE | Admitting: HOSPITALIST

## 2020-09-16 DIAGNOSIS — A41.9 SEPSIS, DUE TO UNSPECIFIED ORGANISM, UNSPECIFIED WHETHER ACUTE ORGAN DYSFUNCTION PRESENT (HCC): ICD-10-CM

## 2020-09-16 DIAGNOSIS — D72.825 BANDEMIA: Primary | ICD-10-CM

## 2020-09-16 PROBLEM — A49.9 UTI (URINARY TRACT INFECTION), BACTERIAL: Status: ACTIVE | Noted: 2020-09-16

## 2020-09-16 PROBLEM — N39.0 UTI (URINARY TRACT INFECTION), BACTERIAL: Status: ACTIVE | Noted: 2020-09-16

## 2020-09-16 PROBLEM — N17.9 AKI (ACUTE KIDNEY INJURY): Status: ACTIVE | Noted: 2020-09-16

## 2020-09-16 PROBLEM — A04.9 BACTERIAL COLITIS: Status: ACTIVE | Noted: 2020-09-16

## 2020-09-16 LAB
ALBUMIN SERPL-MCNC: 3.6 G/DL (ref 3.5–5.2)
ALBUMIN/GLOB SERPL: 1.4 G/DL
ALP SERPL-CCNC: 111 U/L (ref 39–117)
ALT SERPL W P-5'-P-CCNC: 53 U/L (ref 1–33)
ANION GAP SERPL CALCULATED.3IONS-SCNC: 13 MMOL/L (ref 5–15)
AST SERPL-CCNC: 63 U/L (ref 1–32)
B PARAPERT DNA SPEC QL NAA+PROBE: NOT DETECTED
B PERT DNA SPEC QL NAA+PROBE: NOT DETECTED
BACTERIA UR QL AUTO: ABNORMAL /HPF
BASOPHILS # BLD MANUAL: 0 10*3/MM3 (ref 0–0.2)
BASOPHILS NFR BLD AUTO: 0 % (ref 0–1.5)
BILIRUB SERPL-MCNC: 1 MG/DL (ref 0–1.2)
BILIRUB UR QL STRIP: ABNORMAL
BUN SERPL-MCNC: 60 MG/DL (ref 8–23)
BUN/CREAT SERPL: 33.7 (ref 7–25)
C PNEUM DNA NPH QL NAA+NON-PROBE: NOT DETECTED
CALCIUM SPEC-SCNC: 9.1 MG/DL (ref 8.2–9.6)
CHLORIDE SERPL-SCNC: 100 MMOL/L (ref 98–107)
CLARITY UR: ABNORMAL
CO2 SERPL-SCNC: 26 MMOL/L (ref 22–29)
COLOR UR: ABNORMAL
CREAT SERPL-MCNC: 1.78 MG/DL (ref 0.57–1)
D-LACTATE SERPL-SCNC: 1.4 MMOL/L (ref 0.5–2)
D-LACTATE SERPL-SCNC: 2.6 MMOL/L (ref 0.5–2)
DEPRECATED RDW RBC AUTO: 58.6 FL (ref 37–54)
EOSINOPHIL # BLD MANUAL: 0 10*3/MM3 (ref 0–0.4)
EOSINOPHIL NFR BLD MANUAL: 0 % (ref 0.3–6.2)
ERYTHROCYTE [DISTWIDTH] IN BLOOD BY AUTOMATED COUNT: 15.1 % (ref 12.3–15.4)
FLUAV H1 2009 PAND RNA NPH QL NAA+PROBE: NOT DETECTED
FLUAV H1 HA GENE NPH QL NAA+PROBE: NOT DETECTED
FLUAV H3 RNA NPH QL NAA+PROBE: NOT DETECTED
FLUAV SUBTYP SPEC NAA+PROBE: NOT DETECTED
FLUBV RNA ISLT QL NAA+PROBE: NOT DETECTED
GFR SERPL CREATININE-BSD FRML MDRD: 27 ML/MIN/1.73
GLOBULIN UR ELPH-MCNC: 2.6 GM/DL
GLUCOSE SERPL-MCNC: 120 MG/DL (ref 65–99)
GLUCOSE UR STRIP-MCNC: NEGATIVE MG/DL
HADV DNA SPEC NAA+PROBE: NOT DETECTED
HCOV 229E RNA SPEC QL NAA+PROBE: NOT DETECTED
HCOV HKU1 RNA SPEC QL NAA+PROBE: NOT DETECTED
HCOV NL63 RNA SPEC QL NAA+PROBE: NOT DETECTED
HCOV OC43 RNA SPEC QL NAA+PROBE: NOT DETECTED
HCT VFR BLD AUTO: 32.5 % (ref 34–46.6)
HGB BLD-MCNC: 10.7 G/DL (ref 12–15.9)
HGB UR QL STRIP.AUTO: NEGATIVE
HMPV RNA NPH QL NAA+NON-PROBE: NOT DETECTED
HOLD SPECIMEN: NORMAL
HOLD SPECIMEN: NORMAL
HPIV1 RNA SPEC QL NAA+PROBE: NOT DETECTED
HPIV2 RNA SPEC QL NAA+PROBE: NOT DETECTED
HPIV3 RNA NPH QL NAA+PROBE: NOT DETECTED
HPIV4 P GENE NPH QL NAA+PROBE: NOT DETECTED
HYALINE CASTS UR QL AUTO: ABNORMAL /LPF
KETONES UR QL STRIP: ABNORMAL
LACTATE HOLD SPECIMEN: NORMAL
LEUKOCYTE ESTERASE UR QL STRIP.AUTO: ABNORMAL
LYMPHOCYTES # BLD MANUAL: 1.3 10*3/MM3 (ref 0.7–3.1)
LYMPHOCYTES NFR BLD MANUAL: 5 % (ref 19.6–45.3)
LYMPHOCYTES NFR BLD MANUAL: 6 % (ref 5–12)
M PNEUMO IGG SER IA-ACNC: NOT DETECTED
MAGNESIUM SERPL-MCNC: 2.6 MG/DL (ref 1.6–2.4)
MCH RBC QN AUTO: 35.1 PG (ref 26.6–33)
MCHC RBC AUTO-ENTMCNC: 32.9 G/DL (ref 31.5–35.7)
MCV RBC AUTO: 106.6 FL (ref 79–97)
MONOCYTES # BLD AUTO: 1.56 10*3/MM3 (ref 0.1–0.9)
NEUTROPHILS # BLD AUTO: 23.15 10*3/MM3 (ref 1.7–7)
NEUTROPHILS NFR BLD MANUAL: 81 % (ref 42.7–76)
NEUTS BAND NFR BLD MANUAL: 8 % (ref 0–5)
NITRITE UR QL STRIP: NEGATIVE
PH UR STRIP.AUTO: <=5 [PH] (ref 5–8)
PLAT MORPH BLD: NORMAL
PLATELET # BLD AUTO: 299 10*3/MM3 (ref 140–450)
PMV BLD AUTO: 10 FL (ref 6–12)
POTASSIUM SERPL-SCNC: 4.3 MMOL/L (ref 3.5–5.2)
PROCALCITONIN SERPL-MCNC: 1.56 NG/ML (ref 0–0.25)
PROT SERPL-MCNC: 6.2 G/DL (ref 6–8.5)
PROT UR QL STRIP: ABNORMAL
RBC # BLD AUTO: 3.05 10*6/MM3 (ref 3.77–5.28)
RBC # UR: ABNORMAL /HPF
RBC MORPH BLD: NORMAL
REF LAB TEST METHOD: ABNORMAL
RHINOVIRUS RNA SPEC NAA+PROBE: NOT DETECTED
RSV RNA NPH QL NAA+NON-PROBE: NOT DETECTED
SARS-COV-2 RNA NPH QL NAA+NON-PROBE: NOT DETECTED
SODIUM SERPL-SCNC: 139 MMOL/L (ref 136–145)
SP GR UR STRIP: 1.03 (ref 1–1.03)
SQUAMOUS #/AREA URNS HPF: ABNORMAL /HPF
TROPONIN T SERPL-MCNC: <0.01 NG/ML (ref 0–0.03)
UROBILINOGEN UR QL STRIP: ABNORMAL
WBC # BLD AUTO: 26.01 10*3/MM3 (ref 3.4–10.8)
WBC MORPH BLD: NORMAL
WBC UR QL AUTO: ABNORMAL /HPF
WHOLE BLOOD HOLD SPECIMEN: NORMAL
WHOLE BLOOD HOLD SPECIMEN: NORMAL

## 2020-09-16 PROCEDURE — 74176 CT ABD & PELVIS W/O CONTRAST: CPT

## 2020-09-16 PROCEDURE — P9612 CATHETERIZE FOR URINE SPEC: HCPCS

## 2020-09-16 PROCEDURE — 71250 CT THORAX DX C-: CPT

## 2020-09-16 PROCEDURE — 0202U NFCT DS 22 TRGT SARS-COV-2: CPT | Performed by: HOSPITALIST

## 2020-09-16 PROCEDURE — 99223 1ST HOSP IP/OBS HIGH 75: CPT | Performed by: HOSPITALIST

## 2020-09-16 PROCEDURE — 85007 BL SMEAR W/DIFF WBC COUNT: CPT | Performed by: EMERGENCY MEDICINE

## 2020-09-16 PROCEDURE — 85025 COMPLETE CBC W/AUTO DIFF WBC: CPT | Performed by: EMERGENCY MEDICINE

## 2020-09-16 PROCEDURE — 83735 ASSAY OF MAGNESIUM: CPT | Performed by: EMERGENCY MEDICINE

## 2020-09-16 PROCEDURE — 25010000002 VANCOMYCIN 10 G RECONSTITUTED SOLUTION: Performed by: PHYSICIAN ASSISTANT

## 2020-09-16 PROCEDURE — 25010000002 PIPERACILLIN SOD-TAZOBACTAM PER 1 G: Performed by: HOSPITALIST

## 2020-09-16 PROCEDURE — 84145 PROCALCITONIN (PCT): CPT | Performed by: PHYSICIAN ASSISTANT

## 2020-09-16 PROCEDURE — 25010000002 HEPARIN (PORCINE) PER 1000 UNITS: Performed by: HOSPITALIST

## 2020-09-16 PROCEDURE — 93005 ELECTROCARDIOGRAM TRACING: CPT | Performed by: EMERGENCY MEDICINE

## 2020-09-16 PROCEDURE — 25010000002 ONDANSETRON PER 1 MG: Performed by: HOSPITALIST

## 2020-09-16 PROCEDURE — 71045 X-RAY EXAM CHEST 1 VIEW: CPT

## 2020-09-16 PROCEDURE — 84484 ASSAY OF TROPONIN QUANT: CPT | Performed by: EMERGENCY MEDICINE

## 2020-09-16 PROCEDURE — 87040 BLOOD CULTURE FOR BACTERIA: CPT | Performed by: PHYSICIAN ASSISTANT

## 2020-09-16 PROCEDURE — 99285 EMERGENCY DEPT VISIT HI MDM: CPT

## 2020-09-16 PROCEDURE — 83605 ASSAY OF LACTIC ACID: CPT | Performed by: PHYSICIAN ASSISTANT

## 2020-09-16 PROCEDURE — 81001 URINALYSIS AUTO W/SCOPE: CPT | Performed by: EMERGENCY MEDICINE

## 2020-09-16 PROCEDURE — 80053 COMPREHEN METABOLIC PANEL: CPT | Performed by: EMERGENCY MEDICINE

## 2020-09-16 PROCEDURE — 25010000002 PIPERACILLIN SOD-TAZOBACTAM PER 1 G: Performed by: PHYSICIAN ASSISTANT

## 2020-09-16 RX ORDER — L.ACID,PARA/B.BIFIDUM/S.THERM 8B CELL
1 CAPSULE ORAL DAILY
Status: DISCONTINUED | OUTPATIENT
Start: 2020-09-16 | End: 2020-09-28 | Stop reason: HOSPADM

## 2020-09-16 RX ORDER — DILTIAZEM HYDROCHLORIDE 120 MG/1
120 CAPSULE, COATED, EXTENDED RELEASE ORAL DAILY
Status: DISCONTINUED | OUTPATIENT
Start: 2020-09-17 | End: 2020-09-28 | Stop reason: HOSPADM

## 2020-09-16 RX ORDER — HEPARIN SODIUM 5000 [USP'U]/ML
5000 INJECTION, SOLUTION INTRAVENOUS; SUBCUTANEOUS EVERY 8 HOURS SCHEDULED
Status: DISCONTINUED | OUTPATIENT
Start: 2020-09-16 | End: 2020-09-28 | Stop reason: HOSPADM

## 2020-09-16 RX ORDER — FOLIC ACID 1 MG/1
1000 TABLET ORAL DAILY
Status: DISCONTINUED | OUTPATIENT
Start: 2020-09-17 | End: 2020-09-22

## 2020-09-16 RX ORDER — ASPIRIN 81 MG/1
81 TABLET ORAL DAILY
Status: DISCONTINUED | OUTPATIENT
Start: 2020-09-17 | End: 2020-09-28 | Stop reason: HOSPADM

## 2020-09-16 RX ORDER — LANOLIN ALCOHOL/MO/W.PET/CERES
1000 CREAM (GRAM) TOPICAL DAILY
Status: DISCONTINUED | OUTPATIENT
Start: 2020-09-17 | End: 2020-09-28 | Stop reason: HOSPADM

## 2020-09-16 RX ORDER — FAMOTIDINE 20 MG/1
20 TABLET, FILM COATED ORAL 2 TIMES DAILY
Status: DISCONTINUED | OUTPATIENT
Start: 2020-09-16 | End: 2020-09-28 | Stop reason: HOSPADM

## 2020-09-16 RX ORDER — SODIUM CHLORIDE 0.9 % (FLUSH) 0.9 %
10 SYRINGE (ML) INJECTION EVERY 12 HOURS SCHEDULED
Status: DISCONTINUED | OUTPATIENT
Start: 2020-09-16 | End: 2020-09-28 | Stop reason: HOSPADM

## 2020-09-16 RX ORDER — SODIUM CHLORIDE, SODIUM LACTATE, POTASSIUM CHLORIDE, CALCIUM CHLORIDE 600; 310; 30; 20 MG/100ML; MG/100ML; MG/100ML; MG/100ML
100 INJECTION, SOLUTION INTRAVENOUS CONTINUOUS
Status: DISCONTINUED | OUTPATIENT
Start: 2020-09-16 | End: 2020-09-19

## 2020-09-16 RX ORDER — ONDANSETRON 4 MG/1
4 TABLET, FILM COATED ORAL EVERY 6 HOURS PRN
Status: DISCONTINUED | OUTPATIENT
Start: 2020-09-16 | End: 2020-09-28 | Stop reason: HOSPADM

## 2020-09-16 RX ORDER — ONDANSETRON 2 MG/ML
4 INJECTION INTRAMUSCULAR; INTRAVENOUS EVERY 6 HOURS PRN
Status: DISCONTINUED | OUTPATIENT
Start: 2020-09-16 | End: 2020-09-28 | Stop reason: HOSPADM

## 2020-09-16 RX ORDER — SODIUM CHLORIDE 0.9 % (FLUSH) 0.9 %
10 SYRINGE (ML) INJECTION AS NEEDED
Status: DISCONTINUED | OUTPATIENT
Start: 2020-09-16 | End: 2020-09-28 | Stop reason: HOSPADM

## 2020-09-16 RX ORDER — CHOLECALCIFEROL (VITAMIN D3) 125 MCG
5 CAPSULE ORAL NIGHTLY
COMMUNITY
End: 2020-09-28 | Stop reason: HOSPADM

## 2020-09-16 RX ORDER — ALBUTEROL SULFATE 90 UG/1
2 AEROSOL, METERED RESPIRATORY (INHALATION) EVERY 4 HOURS PRN
Status: DISCONTINUED | OUTPATIENT
Start: 2020-09-16 | End: 2020-09-19 | Stop reason: CLARIF

## 2020-09-16 RX ADMIN — TAZOBACTAM SODIUM AND PIPERACILLIN SODIUM 3.38 G: 375; 3 INJECTION, SOLUTION INTRAVENOUS at 22:48

## 2020-09-16 RX ADMIN — SODIUM CHLORIDE 2000 ML: 9 INJECTION, SOLUTION INTRAVENOUS at 21:41

## 2020-09-16 RX ADMIN — ONDANSETRON 4 MG: 2 INJECTION INTRAMUSCULAR; INTRAVENOUS at 18:06

## 2020-09-16 RX ADMIN — VANCOMYCIN HYDROCHLORIDE 1250 MG: 10 INJECTION, POWDER, LYOPHILIZED, FOR SOLUTION INTRAVENOUS at 14:50

## 2020-09-16 RX ADMIN — HEPARIN SODIUM 5000 UNITS: 5000 INJECTION, SOLUTION INTRAVENOUS; SUBCUTANEOUS at 22:47

## 2020-09-16 RX ADMIN — SODIUM CHLORIDE, PRESERVATIVE FREE 10 ML: 5 INJECTION INTRAVENOUS at 22:48

## 2020-09-16 RX ADMIN — Medication 1 CAPSULE: at 22:47

## 2020-09-16 RX ADMIN — FAMOTIDINE 20 MG: 20 TABLET, FILM COATED ORAL at 22:47

## 2020-09-16 RX ADMIN — SODIUM CHLORIDE 1000 ML: 9 INJECTION, SOLUTION INTRAVENOUS at 13:50

## 2020-09-16 RX ADMIN — VANCOMYCIN HYDROCHLORIDE 125 MG: KIT at 22:46

## 2020-09-16 RX ADMIN — TAZOBACTAM SODIUM AND PIPERACILLIN SODIUM 3.38 G: 375; 3 INJECTION, SOLUTION INTRAVENOUS at 14:26

## 2020-09-17 LAB
ANION GAP SERPL CALCULATED.3IONS-SCNC: 11 MMOL/L (ref 5–15)
BACTERIA UR QL AUTO: ABNORMAL /HPF
BILIRUB UR QL STRIP: ABNORMAL
BUN SERPL-MCNC: 48 MG/DL (ref 8–23)
BUN/CREAT SERPL: 36.4 (ref 7–25)
CALCIUM SPEC-SCNC: 8 MG/DL (ref 8.2–9.6)
CHLORIDE SERPL-SCNC: 106 MMOL/L (ref 98–107)
CLARITY UR: CLEAR
CO2 SERPL-SCNC: 22 MMOL/L (ref 22–29)
COLOR UR: ABNORMAL
CREAT SERPL-MCNC: 1.32 MG/DL (ref 0.57–1)
DEPRECATED RDW RBC AUTO: 61.7 FL (ref 37–54)
ERYTHROCYTE [DISTWIDTH] IN BLOOD BY AUTOMATED COUNT: 15.5 % (ref 12.3–15.4)
GFR SERPL CREATININE-BSD FRML MDRD: 38 ML/MIN/1.73
GLUCOSE SERPL-MCNC: 80 MG/DL (ref 65–99)
GLUCOSE UR STRIP-MCNC: NEGATIVE MG/DL
HCT VFR BLD AUTO: 29.9 % (ref 34–46.6)
HGB BLD-MCNC: 9.4 G/DL (ref 12–15.9)
HGB UR QL STRIP.AUTO: ABNORMAL
HYALINE CASTS UR QL AUTO: ABNORMAL /LPF
KETONES UR QL STRIP: ABNORMAL
LEUKOCYTE ESTERASE UR QL STRIP.AUTO: ABNORMAL
MCH RBC QN AUTO: 34.1 PG (ref 26.6–33)
MCHC RBC AUTO-ENTMCNC: 31.4 G/DL (ref 31.5–35.7)
MCV RBC AUTO: 108.3 FL (ref 79–97)
NITRITE UR QL STRIP: POSITIVE
PH UR STRIP.AUTO: <=5 [PH] (ref 5–8)
PLATELET # BLD AUTO: 243 10*3/MM3 (ref 140–450)
PMV BLD AUTO: 10.6 FL (ref 6–12)
POTASSIUM SERPL-SCNC: 3.4 MMOL/L (ref 3.5–5.2)
PROT UR QL STRIP: ABNORMAL
RBC # BLD AUTO: 2.76 10*6/MM3 (ref 3.77–5.28)
RBC # UR: ABNORMAL /HPF
REF LAB TEST METHOD: ABNORMAL
SODIUM SERPL-SCNC: 139 MMOL/L (ref 136–145)
SP GR UR STRIP: 1.04 (ref 1–1.03)
SQUAMOUS #/AREA URNS HPF: ABNORMAL /HPF
UROBILINOGEN UR QL STRIP: ABNORMAL
VANCOMYCIN SERPL-MCNC: 11.6 MCG/ML (ref 5–40)
WBC # BLD AUTO: 18.84 10*3/MM3 (ref 3.4–10.8)
WBC UR QL AUTO: ABNORMAL /HPF

## 2020-09-17 PROCEDURE — 97110 THERAPEUTIC EXERCISES: CPT

## 2020-09-17 PROCEDURE — 80048 BASIC METABOLIC PNL TOTAL CA: CPT | Performed by: HOSPITALIST

## 2020-09-17 PROCEDURE — 85027 COMPLETE CBC AUTOMATED: CPT | Performed by: HOSPITALIST

## 2020-09-17 PROCEDURE — 25010000002 VANCOMYCIN PER 500 MG

## 2020-09-17 PROCEDURE — 97167 OT EVAL HIGH COMPLEX 60 MIN: CPT

## 2020-09-17 PROCEDURE — 81001 URINALYSIS AUTO W/SCOPE: CPT | Performed by: HOSPITALIST

## 2020-09-17 PROCEDURE — 97530 THERAPEUTIC ACTIVITIES: CPT

## 2020-09-17 PROCEDURE — 99233 SBSQ HOSP IP/OBS HIGH 50: CPT | Performed by: INTERNAL MEDICINE

## 2020-09-17 PROCEDURE — 97162 PT EVAL MOD COMPLEX 30 MIN: CPT

## 2020-09-17 PROCEDURE — 25010000002 PIPERACILLIN SOD-TAZOBACTAM PER 1 G: Performed by: HOSPITALIST

## 2020-09-17 PROCEDURE — 25010000002 HEPARIN (PORCINE) PER 1000 UNITS: Performed by: HOSPITALIST

## 2020-09-17 PROCEDURE — 80202 ASSAY OF VANCOMYCIN: CPT | Performed by: HOSPITALIST

## 2020-09-17 PROCEDURE — 97535 SELF CARE MNGMENT TRAINING: CPT

## 2020-09-17 RX ORDER — QUETIAPINE FUMARATE 25 MG/1
12.5 TABLET, FILM COATED ORAL ONCE AS NEEDED
Status: COMPLETED | OUTPATIENT
Start: 2020-09-17 | End: 2020-09-17

## 2020-09-17 RX ORDER — CHOLECALCIFEROL (VITAMIN D3) 125 MCG
5 CAPSULE ORAL NIGHTLY PRN
Status: DISCONTINUED | OUTPATIENT
Start: 2020-09-17 | End: 2020-09-28 | Stop reason: HOSPADM

## 2020-09-17 RX ORDER — ACETAMINOPHEN 325 MG/1
650 TABLET ORAL EVERY 6 HOURS PRN
Status: DISCONTINUED | OUTPATIENT
Start: 2020-09-17 | End: 2020-09-28 | Stop reason: HOSPADM

## 2020-09-17 RX ADMIN — VANCOMYCIN HYDROCHLORIDE 125 MG: KIT at 17:04

## 2020-09-17 RX ADMIN — CYANOCOBALAMIN TAB 1000 MCG 1000 MCG: 1000 TAB at 09:32

## 2020-09-17 RX ADMIN — SODIUM CHLORIDE, POTASSIUM CHLORIDE, SODIUM LACTATE AND CALCIUM CHLORIDE 100 ML/HR: 600; 310; 30; 20 INJECTION, SOLUTION INTRAVENOUS at 02:06

## 2020-09-17 RX ADMIN — MELATONIN TAB 5 MG 5 MG: 5 TAB at 23:24

## 2020-09-17 RX ADMIN — SODIUM CHLORIDE, POTASSIUM CHLORIDE, SODIUM LACTATE AND CALCIUM CHLORIDE 100 ML/HR: 600; 310; 30; 20 INJECTION, SOLUTION INTRAVENOUS at 21:15

## 2020-09-17 RX ADMIN — VANCOMYCIN HYDROCHLORIDE 125 MG: KIT at 05:43

## 2020-09-17 RX ADMIN — VANCOMYCIN HYDROCHLORIDE 125 MG: KIT at 23:24

## 2020-09-17 RX ADMIN — FOLIC ACID 1000 MCG: 1 TABLET ORAL at 09:32

## 2020-09-17 RX ADMIN — FAMOTIDINE 20 MG: 20 TABLET, FILM COATED ORAL at 21:15

## 2020-09-17 RX ADMIN — TAZOBACTAM SODIUM AND PIPERACILLIN SODIUM 3.38 G: 375; 3 INJECTION, SOLUTION INTRAVENOUS at 21:14

## 2020-09-17 RX ADMIN — ASPIRIN 81 MG: 81 TABLET, COATED ORAL at 09:32

## 2020-09-17 RX ADMIN — VANCOMYCIN HYDROCHLORIDE 125 MG: KIT at 13:27

## 2020-09-17 RX ADMIN — QUETIAPINE FUMARATE 12.5 MG: 25 TABLET ORAL at 23:24

## 2020-09-17 RX ADMIN — FAMOTIDINE 20 MG: 20 TABLET, FILM COATED ORAL at 09:32

## 2020-09-17 RX ADMIN — VANCOMYCIN HYDROCHLORIDE 750 MG: 750 INJECTION, SOLUTION INTRAVENOUS at 13:26

## 2020-09-17 RX ADMIN — HEPARIN SODIUM 5000 UNITS: 5000 INJECTION, SOLUTION INTRAVENOUS; SUBCUTANEOUS at 13:30

## 2020-09-17 RX ADMIN — ACETAMINOPHEN 650 MG: 325 TABLET, FILM COATED ORAL at 07:26

## 2020-09-17 RX ADMIN — DILTIAZEM HYDROCHLORIDE 120 MG: 120 CAPSULE, COATED, EXTENDED RELEASE ORAL at 09:32

## 2020-09-17 RX ADMIN — HEPARIN SODIUM 5000 UNITS: 5000 INJECTION, SOLUTION INTRAVENOUS; SUBCUTANEOUS at 05:44

## 2020-09-17 RX ADMIN — TAZOBACTAM SODIUM AND PIPERACILLIN SODIUM 3.38 G: 375; 3 INJECTION, SOLUTION INTRAVENOUS at 09:31

## 2020-09-17 RX ADMIN — HEPARIN SODIUM 5000 UNITS: 5000 INJECTION, SOLUTION INTRAVENOUS; SUBCUTANEOUS at 21:14

## 2020-09-18 LAB
ANION GAP SERPL CALCULATED.3IONS-SCNC: 11 MMOL/L (ref 5–15)
BASOPHILS # BLD MANUAL: 0 10*3/MM3 (ref 0–0.2)
BASOPHILS NFR BLD AUTO: 0 % (ref 0–1.5)
BUN SERPL-MCNC: 36 MG/DL (ref 8–23)
BUN/CREAT SERPL: 34.3 (ref 7–25)
CALCIUM SPEC-SCNC: 8.6 MG/DL (ref 8.2–9.6)
CHLORIDE SERPL-SCNC: 106 MMOL/L (ref 98–107)
CO2 SERPL-SCNC: 22 MMOL/L (ref 22–29)
CREAT SERPL-MCNC: 1.05 MG/DL (ref 0.57–1)
DEPRECATED RDW RBC AUTO: 59.5 FL (ref 37–54)
DOHLE BODIES: PRESENT
EOSINOPHIL # BLD MANUAL: 0.16 10*3/MM3 (ref 0–0.4)
EOSINOPHIL NFR BLD MANUAL: 1 % (ref 0.3–6.2)
ERYTHROCYTE [DISTWIDTH] IN BLOOD BY AUTOMATED COUNT: 14.7 % (ref 12.3–15.4)
GFR SERPL CREATININE-BSD FRML MDRD: 49 ML/MIN/1.73
GLUCOSE SERPL-MCNC: 90 MG/DL (ref 65–99)
HCT VFR BLD AUTO: 31.7 % (ref 34–46.6)
HGB BLD-MCNC: 10 G/DL (ref 12–15.9)
LYMPHOCYTES # BLD MANUAL: 0.78 10*3/MM3 (ref 0.7–3.1)
LYMPHOCYTES NFR BLD MANUAL: 1 % (ref 5–12)
LYMPHOCYTES NFR BLD MANUAL: 5 % (ref 19.6–45.3)
MACROCYTES BLD QL SMEAR: ABNORMAL
MCH RBC QN AUTO: 34.7 PG (ref 26.6–33)
MCHC RBC AUTO-ENTMCNC: 31.5 G/DL (ref 31.5–35.7)
MCV RBC AUTO: 110.1 FL (ref 79–97)
MONOCYTES # BLD AUTO: 0.16 10*3/MM3 (ref 0.1–0.9)
NEUTROPHILS # BLD AUTO: 14.35 10*3/MM3 (ref 1.7–7)
NEUTROPHILS NFR BLD MANUAL: 82 % (ref 42.7–76)
NEUTS BAND NFR BLD MANUAL: 10 % (ref 0–5)
NEUTS VAC BLD QL SMEAR: ABNORMAL
PLAT MORPH BLD: NORMAL
PLATELET # BLD AUTO: 263 10*3/MM3 (ref 140–450)
PMV BLD AUTO: 10 FL (ref 6–12)
POTASSIUM SERPL-SCNC: 3.3 MMOL/L (ref 3.5–5.2)
RBC # BLD AUTO: 2.88 10*6/MM3 (ref 3.77–5.28)
SODIUM SERPL-SCNC: 139 MMOL/L (ref 136–145)
VARIANT LYMPHS NFR BLD MANUAL: 1 % (ref 0–5)
WBC # BLD AUTO: 15.6 10*3/MM3 (ref 3.4–10.8)

## 2020-09-18 PROCEDURE — 25010000002 HEPARIN (PORCINE) PER 1000 UNITS: Performed by: HOSPITALIST

## 2020-09-18 PROCEDURE — 99232 SBSQ HOSP IP/OBS MODERATE 35: CPT | Performed by: INTERNAL MEDICINE

## 2020-09-18 PROCEDURE — 25010000002 PIPERACILLIN SOD-TAZOBACTAM PER 1 G: Performed by: HOSPITALIST

## 2020-09-18 PROCEDURE — 85007 BL SMEAR W/DIFF WBC COUNT: CPT | Performed by: INTERNAL MEDICINE

## 2020-09-18 PROCEDURE — 25010000002 LORAZEPAM PER 2 MG: Performed by: INTERNAL MEDICINE

## 2020-09-18 PROCEDURE — 85025 COMPLETE CBC W/AUTO DIFF WBC: CPT | Performed by: INTERNAL MEDICINE

## 2020-09-18 PROCEDURE — 80048 BASIC METABOLIC PNL TOTAL CA: CPT | Performed by: INTERNAL MEDICINE

## 2020-09-18 RX ORDER — POLYETHYLENE GLYCOL 3350 17 G/17G
17 POWDER, FOR SOLUTION ORAL DAILY
Status: DISCONTINUED | OUTPATIENT
Start: 2020-09-18 | End: 2020-09-28 | Stop reason: HOSPADM

## 2020-09-18 RX ORDER — DOCUSATE SODIUM 100 MG/1
100 CAPSULE, LIQUID FILLED ORAL 2 TIMES DAILY
Status: DISCONTINUED | OUTPATIENT
Start: 2020-09-18 | End: 2020-09-28 | Stop reason: HOSPADM

## 2020-09-18 RX ORDER — QUETIAPINE FUMARATE 25 MG/1
25 TABLET, FILM COATED ORAL NIGHTLY
Status: DISCONTINUED | OUTPATIENT
Start: 2020-09-18 | End: 2020-09-28 | Stop reason: HOSPADM

## 2020-09-18 RX ORDER — LORAZEPAM 2 MG/ML
0.25 INJECTION INTRAMUSCULAR ONCE
Status: COMPLETED | OUTPATIENT
Start: 2020-09-18 | End: 2020-09-18

## 2020-09-18 RX ADMIN — CYANOCOBALAMIN TAB 1000 MCG 1000 MCG: 1000 TAB at 08:42

## 2020-09-18 RX ADMIN — ASPIRIN 81 MG: 81 TABLET, COATED ORAL at 08:42

## 2020-09-18 RX ADMIN — DOCUSATE SODIUM 100 MG: 100 CAPSULE, LIQUID FILLED ORAL at 12:04

## 2020-09-18 RX ADMIN — POLYETHYLENE GLYCOL 3350 17 G: 17 POWDER, FOR SOLUTION ORAL at 12:04

## 2020-09-18 RX ADMIN — DILTIAZEM HYDROCHLORIDE 120 MG: 120 CAPSULE, COATED, EXTENDED RELEASE ORAL at 08:42

## 2020-09-18 RX ADMIN — ACETAMINOPHEN 650 MG: 325 TABLET, FILM COATED ORAL at 12:04

## 2020-09-18 RX ADMIN — LORAZEPAM 0.25 MG: 2 INJECTION INTRAMUSCULAR; INTRAVENOUS at 15:03

## 2020-09-18 RX ADMIN — TAZOBACTAM SODIUM AND PIPERACILLIN SODIUM 3.38 G: 375; 3 INJECTION, SOLUTION INTRAVENOUS at 08:42

## 2020-09-18 RX ADMIN — TAZOBACTAM SODIUM AND PIPERACILLIN SODIUM 3.38 G: 375; 3 INJECTION, SOLUTION INTRAVENOUS at 15:50

## 2020-09-18 RX ADMIN — FAMOTIDINE 20 MG: 20 TABLET, FILM COATED ORAL at 08:42

## 2020-09-18 RX ADMIN — HEPARIN SODIUM 5000 UNITS: 5000 INJECTION, SOLUTION INTRAVENOUS; SUBCUTANEOUS at 15:02

## 2020-09-18 RX ADMIN — Medication 1 CAPSULE: at 08:42

## 2020-09-18 RX ADMIN — FOLIC ACID 1000 MCG: 1 TABLET ORAL at 08:42

## 2020-09-19 ENCOUNTER — APPOINTMENT (OUTPATIENT)
Dept: GENERAL RADIOLOGY | Facility: HOSPITAL | Age: 85
End: 2020-09-19

## 2020-09-19 LAB
ANION GAP SERPL CALCULATED.3IONS-SCNC: 11 MMOL/L (ref 5–15)
BASOPHILS # BLD AUTO: 0.06 10*3/MM3 (ref 0–0.2)
BASOPHILS NFR BLD AUTO: 0.5 % (ref 0–1.5)
BUN SERPL-MCNC: 27 MG/DL (ref 8–23)
BUN/CREAT SERPL: 29 (ref 7–25)
CALCIUM SPEC-SCNC: 8.5 MG/DL (ref 8.2–9.6)
CHLORIDE SERPL-SCNC: 105 MMOL/L (ref 98–107)
CO2 SERPL-SCNC: 22 MMOL/L (ref 22–29)
CREAT SERPL-MCNC: 0.93 MG/DL (ref 0.57–1)
DEPRECATED RDW RBC AUTO: 61.7 FL (ref 37–54)
EOSINOPHIL # BLD AUTO: 0.23 10*3/MM3 (ref 0–0.4)
EOSINOPHIL NFR BLD AUTO: 1.9 % (ref 0.3–6.2)
ERYTHROCYTE [DISTWIDTH] IN BLOOD BY AUTOMATED COUNT: 14.9 % (ref 12.3–15.4)
GFR SERPL CREATININE-BSD FRML MDRD: 57 ML/MIN/1.73
GLUCOSE SERPL-MCNC: 76 MG/DL (ref 65–99)
HCT VFR BLD AUTO: 32.8 % (ref 34–46.6)
HGB BLD-MCNC: 10 G/DL (ref 12–15.9)
IMM GRANULOCYTES # BLD AUTO: 0.09 10*3/MM3 (ref 0–0.05)
IMM GRANULOCYTES NFR BLD AUTO: 0.8 % (ref 0–0.5)
LYMPHOCYTES # BLD AUTO: 0.92 10*3/MM3 (ref 0.7–3.1)
LYMPHOCYTES NFR BLD AUTO: 7.8 % (ref 19.6–45.3)
MCH RBC QN AUTO: 34.2 PG (ref 26.6–33)
MCHC RBC AUTO-ENTMCNC: 30.5 G/DL (ref 31.5–35.7)
MCV RBC AUTO: 112.3 FL (ref 79–97)
MONOCYTES # BLD AUTO: 1.27 10*3/MM3 (ref 0.1–0.9)
MONOCYTES NFR BLD AUTO: 10.7 % (ref 5–12)
NEUTROPHILS NFR BLD AUTO: 78.3 % (ref 42.7–76)
NEUTROPHILS NFR BLD AUTO: 9.26 10*3/MM3 (ref 1.7–7)
NRBC BLD AUTO-RTO: 0 /100 WBC (ref 0–0.2)
PLATELET # BLD AUTO: 275 10*3/MM3 (ref 140–450)
PMV BLD AUTO: 9.8 FL (ref 6–12)
POTASSIUM SERPL-SCNC: 3.3 MMOL/L (ref 3.5–5.2)
RBC # BLD AUTO: 2.92 10*6/MM3 (ref 3.77–5.28)
SODIUM SERPL-SCNC: 138 MMOL/L (ref 136–145)
WBC # BLD AUTO: 11.83 10*3/MM3 (ref 3.4–10.8)

## 2020-09-19 PROCEDURE — 25010000002 METHYLPREDNISOLONE PER 40 MG: Performed by: INTERNAL MEDICINE

## 2020-09-19 PROCEDURE — 94640 AIRWAY INHALATION TREATMENT: CPT

## 2020-09-19 PROCEDURE — 85025 COMPLETE CBC W/AUTO DIFF WBC: CPT | Performed by: INTERNAL MEDICINE

## 2020-09-19 PROCEDURE — 99232 SBSQ HOSP IP/OBS MODERATE 35: CPT | Performed by: INTERNAL MEDICINE

## 2020-09-19 PROCEDURE — 71045 X-RAY EXAM CHEST 1 VIEW: CPT

## 2020-09-19 PROCEDURE — 25010000002 PIPERACILLIN SOD-TAZOBACTAM PER 1 G: Performed by: HOSPITALIST

## 2020-09-19 PROCEDURE — 25010000002 LORAZEPAM PER 2 MG: Performed by: NURSE PRACTITIONER

## 2020-09-19 PROCEDURE — 80048 BASIC METABOLIC PNL TOTAL CA: CPT | Performed by: INTERNAL MEDICINE

## 2020-09-19 PROCEDURE — 25010000002 HEPARIN (PORCINE) PER 1000 UNITS: Performed by: HOSPITALIST

## 2020-09-19 RX ORDER — LORAZEPAM 2 MG/ML
0.25 INJECTION INTRAMUSCULAR ONCE
Status: COMPLETED | OUTPATIENT
Start: 2020-09-19 | End: 2020-09-19

## 2020-09-19 RX ORDER — ALBUTEROL SULFATE 2.5 MG/3ML
2.5 SOLUTION RESPIRATORY (INHALATION) EVERY 4 HOURS PRN
Status: DISCONTINUED | OUTPATIENT
Start: 2020-09-19 | End: 2020-09-28 | Stop reason: HOSPADM

## 2020-09-19 RX ORDER — POTASSIUM CHLORIDE 7.45 MG/ML
10 INJECTION INTRAVENOUS
Status: DISCONTINUED | OUTPATIENT
Start: 2020-09-19 | End: 2020-09-28 | Stop reason: HOSPADM

## 2020-09-19 RX ORDER — METHYLPREDNISOLONE SODIUM SUCCINATE 40 MG/ML
40 INJECTION, POWDER, LYOPHILIZED, FOR SOLUTION INTRAMUSCULAR; INTRAVENOUS ONCE
Status: COMPLETED | OUTPATIENT
Start: 2020-09-19 | End: 2020-09-19

## 2020-09-19 RX ORDER — POTASSIUM CHLORIDE 1.5 G/1.77G
40 POWDER, FOR SOLUTION ORAL AS NEEDED
Status: DISCONTINUED | OUTPATIENT
Start: 2020-09-19 | End: 2020-09-28 | Stop reason: HOSPADM

## 2020-09-19 RX ORDER — POTASSIUM CHLORIDE 750 MG/1
40 CAPSULE, EXTENDED RELEASE ORAL AS NEEDED
Status: DISCONTINUED | OUTPATIENT
Start: 2020-09-19 | End: 2020-09-28 | Stop reason: HOSPADM

## 2020-09-19 RX ADMIN — POLYETHYLENE GLYCOL 3350 17 G: 17 POWDER, FOR SOLUTION ORAL at 08:24

## 2020-09-19 RX ADMIN — TAZOBACTAM SODIUM AND PIPERACILLIN SODIUM 3.38 G: 375; 3 INJECTION, SOLUTION INTRAVENOUS at 01:07

## 2020-09-19 RX ADMIN — ASPIRIN 81 MG: 81 TABLET, COATED ORAL at 08:24

## 2020-09-19 RX ADMIN — ALBUTEROL SULFATE 2.5 MG: 2.5 SOLUTION RESPIRATORY (INHALATION) at 12:12

## 2020-09-19 RX ADMIN — ALBUTEROL SULFATE 2.5 MG: 2.5 SOLUTION RESPIRATORY (INHALATION) at 05:19

## 2020-09-19 RX ADMIN — TAZOBACTAM SODIUM AND PIPERACILLIN SODIUM 3.38 G: 375; 3 INJECTION, SOLUTION INTRAVENOUS at 15:48

## 2020-09-19 RX ADMIN — CYANOCOBALAMIN TAB 1000 MCG 1000 MCG: 1000 TAB at 08:24

## 2020-09-19 RX ADMIN — DILTIAZEM HYDROCHLORIDE 120 MG: 120 CAPSULE, COATED, EXTENDED RELEASE ORAL at 08:24

## 2020-09-19 RX ADMIN — HEPARIN SODIUM 5000 UNITS: 5000 INJECTION, SOLUTION INTRAVENOUS; SUBCUTANEOUS at 05:01

## 2020-09-19 RX ADMIN — DOCUSATE SODIUM 100 MG: 100 CAPSULE, LIQUID FILLED ORAL at 08:24

## 2020-09-19 RX ADMIN — POTASSIUM CHLORIDE 40 MEQ: 1.5 FOR SOLUTION ORAL at 13:59

## 2020-09-19 RX ADMIN — Medication 1 CAPSULE: at 08:24

## 2020-09-19 RX ADMIN — METHYLPREDNISOLONE SODIUM SUCCINATE 40 MG: 40 INJECTION, POWDER, FOR SOLUTION INTRAMUSCULAR; INTRAVENOUS at 06:46

## 2020-09-19 RX ADMIN — HEPARIN SODIUM 5000 UNITS: 5000 INJECTION, SOLUTION INTRAVENOUS; SUBCUTANEOUS at 13:59

## 2020-09-19 RX ADMIN — LORAZEPAM 0.25 MG: 2 INJECTION INTRAMUSCULAR; INTRAVENOUS at 21:28

## 2020-09-19 RX ADMIN — TAZOBACTAM SODIUM AND PIPERACILLIN SODIUM 3.38 G: 375; 3 INJECTION, SOLUTION INTRAVENOUS at 08:25

## 2020-09-19 RX ADMIN — POTASSIUM CHLORIDE 40 MEQ: 1.5 FOR SOLUTION ORAL at 17:41

## 2020-09-19 RX ADMIN — SODIUM CHLORIDE, PRESERVATIVE FREE 10 ML: 5 INJECTION INTRAVENOUS at 08:26

## 2020-09-19 RX ADMIN — SODIUM CHLORIDE, POTASSIUM CHLORIDE, SODIUM LACTATE AND CALCIUM CHLORIDE 100 ML/HR: 600; 310; 30; 20 INJECTION, SOLUTION INTRAVENOUS at 05:01

## 2020-09-19 RX ADMIN — FOLIC ACID 1000 MCG: 1 TABLET ORAL at 08:24

## 2020-09-19 RX ADMIN — FAMOTIDINE 20 MG: 20 TABLET, FILM COATED ORAL at 08:24

## 2020-09-20 LAB
ANION GAP SERPL CALCULATED.3IONS-SCNC: 11 MMOL/L (ref 5–15)
BASOPHILS # BLD AUTO: 0.05 10*3/MM3 (ref 0–0.2)
BASOPHILS NFR BLD AUTO: 0.4 % (ref 0–1.5)
BUN SERPL-MCNC: 26 MG/DL (ref 8–23)
BUN/CREAT SERPL: 29.5 (ref 7–25)
CALCIUM SPEC-SCNC: 8.8 MG/DL (ref 8.2–9.6)
CHLORIDE SERPL-SCNC: 107 MMOL/L (ref 98–107)
CO2 SERPL-SCNC: 25 MMOL/L (ref 22–29)
CREAT SERPL-MCNC: 0.88 MG/DL (ref 0.57–1)
DEPRECATED RDW RBC AUTO: 58 FL (ref 37–54)
EOSINOPHIL # BLD AUTO: 0.01 10*3/MM3 (ref 0–0.4)
EOSINOPHIL NFR BLD AUTO: 0.1 % (ref 0.3–6.2)
ERYTHROCYTE [DISTWIDTH] IN BLOOD BY AUTOMATED COUNT: 14.9 % (ref 12.3–15.4)
FOLATE SERPL-MCNC: >20 NG/ML (ref 4.78–24.2)
GFR SERPL CREATININE-BSD FRML MDRD: 60 ML/MIN/1.73
GLUCOSE SERPL-MCNC: 106 MG/DL (ref 65–99)
HCT VFR BLD AUTO: 34 % (ref 34–46.6)
HGB BLD-MCNC: 10.9 G/DL (ref 12–15.9)
IMM GRANULOCYTES # BLD AUTO: 0.37 10*3/MM3 (ref 0–0.05)
IMM GRANULOCYTES NFR BLD AUTO: 3 % (ref 0–0.5)
LYMPHOCYTES # BLD AUTO: 0.82 10*3/MM3 (ref 0.7–3.1)
LYMPHOCYTES NFR BLD AUTO: 6.6 % (ref 19.6–45.3)
MCH RBC QN AUTO: 34.3 PG (ref 26.6–33)
MCHC RBC AUTO-ENTMCNC: 32.1 G/DL (ref 31.5–35.7)
MCV RBC AUTO: 106.9 FL (ref 79–97)
MONOCYTES # BLD AUTO: 1.83 10*3/MM3 (ref 0.1–0.9)
MONOCYTES NFR BLD AUTO: 14.8 % (ref 5–12)
NEUTROPHILS NFR BLD AUTO: 75.1 % (ref 42.7–76)
NEUTROPHILS NFR BLD AUTO: 9.27 10*3/MM3 (ref 1.7–7)
NRBC BLD AUTO-RTO: 0 /100 WBC (ref 0–0.2)
PLATELET # BLD AUTO: 400 10*3/MM3 (ref 140–450)
PMV BLD AUTO: 9.7 FL (ref 6–12)
POTASSIUM SERPL-SCNC: 3.7 MMOL/L (ref 3.5–5.2)
RBC # BLD AUTO: 3.18 10*6/MM3 (ref 3.77–5.28)
SODIUM SERPL-SCNC: 143 MMOL/L (ref 136–145)
VIT B12 BLD-MCNC: >2000 PG/ML (ref 211–946)
WBC # BLD AUTO: 12.35 10*3/MM3 (ref 3.4–10.8)

## 2020-09-20 PROCEDURE — 82746 ASSAY OF FOLIC ACID SERUM: CPT | Performed by: INTERNAL MEDICINE

## 2020-09-20 PROCEDURE — 85025 COMPLETE CBC W/AUTO DIFF WBC: CPT | Performed by: INTERNAL MEDICINE

## 2020-09-20 PROCEDURE — 94799 UNLISTED PULMONARY SVC/PX: CPT

## 2020-09-20 PROCEDURE — 99232 SBSQ HOSP IP/OBS MODERATE 35: CPT | Performed by: INTERNAL MEDICINE

## 2020-09-20 PROCEDURE — 82607 VITAMIN B-12: CPT | Performed by: INTERNAL MEDICINE

## 2020-09-20 PROCEDURE — 25010000002 LORAZEPAM PER 2 MG: Performed by: INTERNAL MEDICINE

## 2020-09-20 PROCEDURE — 25010000002 PIPERACILLIN SOD-TAZOBACTAM PER 1 G: Performed by: HOSPITALIST

## 2020-09-20 PROCEDURE — 25010000002 HEPARIN (PORCINE) PER 1000 UNITS: Performed by: HOSPITALIST

## 2020-09-20 PROCEDURE — 80048 BASIC METABOLIC PNL TOTAL CA: CPT | Performed by: INTERNAL MEDICINE

## 2020-09-20 RX ORDER — BISACODYL 10 MG
10 SUPPOSITORY, RECTAL RECTAL ONCE
Status: COMPLETED | OUTPATIENT
Start: 2020-09-20 | End: 2020-09-20

## 2020-09-20 RX ORDER — LORAZEPAM 2 MG/ML
0.25 INJECTION INTRAMUSCULAR EVERY 4 HOURS PRN
Status: DISPENSED | OUTPATIENT
Start: 2020-09-20 | End: 2020-09-27

## 2020-09-20 RX ADMIN — POLYETHYLENE GLYCOL 3350 17 G: 17 POWDER, FOR SOLUTION ORAL at 08:39

## 2020-09-20 RX ADMIN — TAZOBACTAM SODIUM AND PIPERACILLIN SODIUM 3.38 G: 375; 3 INJECTION, SOLUTION INTRAVENOUS at 16:04

## 2020-09-20 RX ADMIN — SODIUM CHLORIDE, PRESERVATIVE FREE 10 ML: 5 INJECTION INTRAVENOUS at 08:40

## 2020-09-20 RX ADMIN — TAZOBACTAM SODIUM AND PIPERACILLIN SODIUM 3.38 G: 375; 3 INJECTION, SOLUTION INTRAVENOUS at 00:56

## 2020-09-20 RX ADMIN — FOLIC ACID 1000 MCG: 1 TABLET ORAL at 08:39

## 2020-09-20 RX ADMIN — CYANOCOBALAMIN TAB 1000 MCG 1000 MCG: 1000 TAB at 08:39

## 2020-09-20 RX ADMIN — DILTIAZEM HYDROCHLORIDE 120 MG: 120 CAPSULE, COATED, EXTENDED RELEASE ORAL at 08:39

## 2020-09-20 RX ADMIN — FAMOTIDINE 20 MG: 20 TABLET, FILM COATED ORAL at 08:38

## 2020-09-20 RX ADMIN — ALBUTEROL SULFATE 2.5 MG: 2.5 SOLUTION RESPIRATORY (INHALATION) at 06:00

## 2020-09-20 RX ADMIN — ACETAMINOPHEN 650 MG: 325 TABLET, FILM COATED ORAL at 16:04

## 2020-09-20 RX ADMIN — TAZOBACTAM SODIUM AND PIPERACILLIN SODIUM 3.38 G: 375; 3 INJECTION, SOLUTION INTRAVENOUS at 08:38

## 2020-09-20 RX ADMIN — BISACODYL 10 MG: 10 SUPPOSITORY RECTAL at 16:04

## 2020-09-20 RX ADMIN — Medication 1 CAPSULE: at 08:39

## 2020-09-20 RX ADMIN — HEPARIN SODIUM 5000 UNITS: 5000 INJECTION, SOLUTION INTRAVENOUS; SUBCUTANEOUS at 16:03

## 2020-09-20 RX ADMIN — DOCUSATE SODIUM 100 MG: 100 CAPSULE, LIQUID FILLED ORAL at 08:39

## 2020-09-20 RX ADMIN — ASPIRIN 81 MG: 81 TABLET, COATED ORAL at 08:38

## 2020-09-20 RX ADMIN — LORAZEPAM 0.25 MG: 2 INJECTION INTRAMUSCULAR; INTRAVENOUS at 16:03

## 2020-09-21 ENCOUNTER — APPOINTMENT (OUTPATIENT)
Dept: GENERAL RADIOLOGY | Facility: HOSPITAL | Age: 85
End: 2020-09-21

## 2020-09-21 LAB
ANION GAP SERPL CALCULATED.3IONS-SCNC: 10 MMOL/L (ref 5–15)
BACTERIA SPEC AEROBE CULT: NORMAL
BACTERIA SPEC AEROBE CULT: NORMAL
BASOPHILS # BLD MANUAL: 0 10*3/MM3 (ref 0–0.2)
BASOPHILS NFR BLD AUTO: 0 % (ref 0–1.5)
BUN SERPL-MCNC: 25 MG/DL (ref 8–23)
BUN/CREAT SERPL: 25 (ref 7–25)
CALCIUM SPEC-SCNC: 8.4 MG/DL (ref 8.2–9.6)
CHLORIDE SERPL-SCNC: 107 MMOL/L (ref 98–107)
CO2 SERPL-SCNC: 26 MMOL/L (ref 22–29)
CREAT SERPL-MCNC: 1 MG/DL (ref 0.57–1)
DEPRECATED RDW RBC AUTO: 59.6 FL (ref 37–54)
EOSINOPHIL # BLD MANUAL: 0.1 10*3/MM3 (ref 0–0.4)
EOSINOPHIL NFR BLD MANUAL: 1 % (ref 0.3–6.2)
ERYTHROCYTE [DISTWIDTH] IN BLOOD BY AUTOMATED COUNT: 15.3 % (ref 12.3–15.4)
GFR SERPL CREATININE-BSD FRML MDRD: 52 ML/MIN/1.73
GLUCOSE SERPL-MCNC: 98 MG/DL (ref 65–99)
HCT VFR BLD AUTO: 33.4 % (ref 34–46.6)
HGB BLD-MCNC: 10.6 G/DL (ref 12–15.9)
LYMPHOCYTES # BLD MANUAL: 0.99 10*3/MM3 (ref 0.7–3.1)
LYMPHOCYTES NFR BLD MANUAL: 10 % (ref 19.6–45.3)
LYMPHOCYTES NFR BLD MANUAL: 22 % (ref 5–12)
MCH RBC QN AUTO: 34 PG (ref 26.6–33)
MCHC RBC AUTO-ENTMCNC: 31.7 G/DL (ref 31.5–35.7)
MCV RBC AUTO: 107.1 FL (ref 79–97)
MONOCYTES # BLD AUTO: 2.17 10*3/MM3 (ref 0.1–0.9)
NEUTROPHILS # BLD AUTO: 6.61 10*3/MM3 (ref 1.7–7)
NEUTROPHILS NFR BLD MANUAL: 67 % (ref 42.7–76)
PLAT MORPH BLD: NORMAL
PLATELET # BLD AUTO: 391 10*3/MM3 (ref 140–450)
PMV BLD AUTO: 9.7 FL (ref 6–12)
POTASSIUM SERPL-SCNC: 3.5 MMOL/L (ref 3.5–5.2)
RBC # BLD AUTO: 3.12 10*6/MM3 (ref 3.77–5.28)
RBC MORPH BLD: NORMAL
SODIUM SERPL-SCNC: 143 MMOL/L (ref 136–145)
WBC # BLD AUTO: 9.87 10*3/MM3 (ref 3.4–10.8)
WBC MORPH BLD: NORMAL

## 2020-09-21 PROCEDURE — 97530 THERAPEUTIC ACTIVITIES: CPT

## 2020-09-21 PROCEDURE — 74018 RADEX ABDOMEN 1 VIEW: CPT

## 2020-09-21 PROCEDURE — 25010000002 HEPARIN (PORCINE) PER 1000 UNITS: Performed by: HOSPITALIST

## 2020-09-21 PROCEDURE — 80048 BASIC METABOLIC PNL TOTAL CA: CPT | Performed by: INTERNAL MEDICINE

## 2020-09-21 PROCEDURE — 85007 BL SMEAR W/DIFF WBC COUNT: CPT | Performed by: INTERNAL MEDICINE

## 2020-09-21 PROCEDURE — 85025 COMPLETE CBC W/AUTO DIFF WBC: CPT | Performed by: INTERNAL MEDICINE

## 2020-09-21 PROCEDURE — 99232 SBSQ HOSP IP/OBS MODERATE 35: CPT | Performed by: INTERNAL MEDICINE

## 2020-09-21 RX ORDER — METRONIDAZOLE 500 MG/1
500 TABLET ORAL EVERY 8 HOURS SCHEDULED
Status: DISCONTINUED | OUTPATIENT
Start: 2020-09-21 | End: 2020-09-22

## 2020-09-21 RX ORDER — BISACODYL 10 MG
10 SUPPOSITORY, RECTAL RECTAL ONCE
Status: COMPLETED | OUTPATIENT
Start: 2020-09-21 | End: 2020-09-21

## 2020-09-21 RX ORDER — CEFDINIR 300 MG/1
300 CAPSULE ORAL EVERY 12 HOURS SCHEDULED
Status: DISCONTINUED | OUTPATIENT
Start: 2020-09-21 | End: 2020-09-22

## 2020-09-21 RX ORDER — SENNA PLUS 8.6 MG/1
1 TABLET ORAL 2 TIMES DAILY
Status: DISCONTINUED | OUTPATIENT
Start: 2020-09-21 | End: 2020-09-22

## 2020-09-21 RX ADMIN — BISACODYL 10 MG: 10 SUPPOSITORY RECTAL at 12:41

## 2020-09-21 RX ADMIN — CEFDINIR 300 MG: 300 CAPSULE ORAL at 08:31

## 2020-09-21 RX ADMIN — QUETIAPINE FUMARATE 25 MG: 25 TABLET ORAL at 20:47

## 2020-09-21 RX ADMIN — HEPARIN SODIUM 5000 UNITS: 5000 INJECTION, SOLUTION INTRAVENOUS; SUBCUTANEOUS at 05:04

## 2020-09-21 RX ADMIN — FAMOTIDINE 20 MG: 20 TABLET, FILM COATED ORAL at 08:32

## 2020-09-21 RX ADMIN — ACETAMINOPHEN 650 MG: 325 TABLET, FILM COATED ORAL at 12:41

## 2020-09-21 RX ADMIN — HEPARIN SODIUM 5000 UNITS: 5000 INJECTION, SOLUTION INTRAVENOUS; SUBCUTANEOUS at 20:47

## 2020-09-21 RX ADMIN — SENNOSIDES 1 TABLET: 8.6 TABLET, FILM COATED ORAL at 12:41

## 2020-09-21 RX ADMIN — Medication 1 CAPSULE: at 08:31

## 2020-09-21 RX ADMIN — SODIUM CHLORIDE, PRESERVATIVE FREE 10 ML: 5 INJECTION INTRAVENOUS at 08:32

## 2020-09-21 RX ADMIN — FAMOTIDINE 20 MG: 20 TABLET, FILM COATED ORAL at 20:47

## 2020-09-21 RX ADMIN — ASPIRIN 81 MG: 81 TABLET, COATED ORAL at 08:32

## 2020-09-21 RX ADMIN — FOLIC ACID 1000 MCG: 1 TABLET ORAL at 08:31

## 2020-09-21 RX ADMIN — DOCUSATE SODIUM 100 MG: 100 CAPSULE, LIQUID FILLED ORAL at 08:31

## 2020-09-21 RX ADMIN — HEPARIN SODIUM 5000 UNITS: 5000 INJECTION, SOLUTION INTRAVENOUS; SUBCUTANEOUS at 14:57

## 2020-09-21 RX ADMIN — METRONIDAZOLE 500 MG: 500 TABLET ORAL at 20:47

## 2020-09-21 RX ADMIN — METRONIDAZOLE 500 MG: 500 TABLET ORAL at 08:31

## 2020-09-21 RX ADMIN — DILTIAZEM HYDROCHLORIDE 120 MG: 120 CAPSULE, COATED, EXTENDED RELEASE ORAL at 08:31

## 2020-09-21 RX ADMIN — POLYETHYLENE GLYCOL 3350 17 G: 17 POWDER, FOR SOLUTION ORAL at 08:32

## 2020-09-21 RX ADMIN — DOCUSATE SODIUM 100 MG: 100 CAPSULE, LIQUID FILLED ORAL at 20:47

## 2020-09-21 RX ADMIN — METRONIDAZOLE 500 MG: 500 TABLET ORAL at 14:57

## 2020-09-21 RX ADMIN — SENNOSIDES 1 TABLET: 8.6 TABLET, FILM COATED ORAL at 20:48

## 2020-09-21 RX ADMIN — CYANOCOBALAMIN TAB 1000 MCG 1000 MCG: 1000 TAB at 08:31

## 2020-09-22 ENCOUNTER — APPOINTMENT (OUTPATIENT)
Dept: GENERAL RADIOLOGY | Facility: HOSPITAL | Age: 85
End: 2020-09-22

## 2020-09-22 ENCOUNTER — APPOINTMENT (OUTPATIENT)
Dept: CT IMAGING | Facility: HOSPITAL | Age: 85
End: 2020-09-22

## 2020-09-22 LAB
ALBUMIN SERPL-MCNC: 2.7 G/DL (ref 3.5–5.2)
ALBUMIN/GLOB SERPL: 1.1 G/DL
ALP SERPL-CCNC: 70 U/L (ref 39–117)
ALT SERPL W P-5'-P-CCNC: 14 U/L (ref 1–33)
ANION GAP SERPL CALCULATED.3IONS-SCNC: 11 MMOL/L (ref 5–15)
AST SERPL-CCNC: 14 U/L (ref 1–32)
BASOPHILS # BLD MANUAL: 0 10*3/MM3 (ref 0–0.2)
BASOPHILS NFR BLD AUTO: 0 % (ref 0–1.5)
BILIRUB SERPL-MCNC: 0.3 MG/DL (ref 0–1.2)
BUN SERPL-MCNC: 23 MG/DL (ref 8–23)
BUN/CREAT SERPL: 25.3 (ref 7–25)
C DIFF TOX GENS STL QL NAA+PROBE: NOT DETECTED
CALCIUM SPEC-SCNC: 8.2 MG/DL (ref 8.2–9.6)
CHLORIDE SERPL-SCNC: 106 MMOL/L (ref 98–107)
CO2 SERPL-SCNC: 24 MMOL/L (ref 22–29)
CREAT SERPL-MCNC: 0.91 MG/DL (ref 0.57–1)
D-LACTATE SERPL-SCNC: 0.9 MMOL/L (ref 0.5–2)
DEPRECATED RDW RBC AUTO: 59.3 FL (ref 37–54)
EOSINOPHIL # BLD MANUAL: 0.24 10*3/MM3 (ref 0–0.4)
EOSINOPHIL NFR BLD MANUAL: 2 % (ref 0.3–6.2)
ERYTHROCYTE [DISTWIDTH] IN BLOOD BY AUTOMATED COUNT: 15.3 % (ref 12.3–15.4)
GFR SERPL CREATININE-BSD FRML MDRD: 58 ML/MIN/1.73
GLOBULIN UR ELPH-MCNC: 2.5 GM/DL
GLUCOSE BLDC GLUCOMTR-MCNC: 91 MG/DL (ref 70–130)
GLUCOSE SERPL-MCNC: 99 MG/DL (ref 65–99)
HCT VFR BLD AUTO: 29.3 % (ref 34–46.6)
HGB BLD-MCNC: 9.3 G/DL (ref 12–15.9)
LYMPHOCYTES # BLD MANUAL: 0.97 10*3/MM3 (ref 0.7–3.1)
LYMPHOCYTES NFR BLD MANUAL: 5 % (ref 5–12)
LYMPHOCYTES NFR BLD MANUAL: 8 % (ref 19.6–45.3)
MAGNESIUM SERPL-MCNC: 1.3 MG/DL (ref 1.6–2.4)
MCH RBC QN AUTO: 33.8 PG (ref 26.6–33)
MCHC RBC AUTO-ENTMCNC: 31.7 G/DL (ref 31.5–35.7)
MCV RBC AUTO: 106.5 FL (ref 79–97)
METAMYELOCYTES NFR BLD MANUAL: 1 % (ref 0–0)
MONOCYTES # BLD AUTO: 0.6 10*3/MM3 (ref 0.1–0.9)
NEUTROPHILS # BLD AUTO: 10.15 10*3/MM3 (ref 1.7–7)
NEUTROPHILS NFR BLD MANUAL: 64 % (ref 42.7–76)
NEUTS BAND NFR BLD MANUAL: 20 % (ref 0–5)
PLAT MORPH BLD: NORMAL
PLATELET # BLD AUTO: 353 10*3/MM3 (ref 140–450)
PMV BLD AUTO: 9.7 FL (ref 6–12)
POTASSIUM SERPL-SCNC: 3 MMOL/L (ref 3.5–5.2)
POTASSIUM SERPL-SCNC: 4.3 MMOL/L (ref 3.5–5.2)
PROT SERPL-MCNC: 5.2 G/DL (ref 6–8.5)
RBC # BLD AUTO: 2.75 10*6/MM3 (ref 3.77–5.28)
RBC MORPH BLD: NORMAL
SODIUM SERPL-SCNC: 141 MMOL/L (ref 136–145)
TOXIC GRANULATION: ABNORMAL
TROPONIN T SERPL-MCNC: <0.01 NG/ML (ref 0–0.03)
TSH SERPL DL<=0.05 MIU/L-ACNC: 3.67 UIU/ML (ref 0.27–4.2)
WBC # BLD AUTO: 12.08 10*3/MM3 (ref 3.4–10.8)

## 2020-09-22 PROCEDURE — 93010 ELECTROCARDIOGRAM REPORT: CPT | Performed by: INTERNAL MEDICINE

## 2020-09-22 PROCEDURE — 25010000002 HEPARIN (PORCINE) PER 1000 UNITS: Performed by: HOSPITALIST

## 2020-09-22 PROCEDURE — 80053 COMPREHEN METABOLIC PANEL: CPT | Performed by: INTERNAL MEDICINE

## 2020-09-22 PROCEDURE — 97535 SELF CARE MNGMENT TRAINING: CPT

## 2020-09-22 PROCEDURE — 94799 UNLISTED PULMONARY SVC/PX: CPT

## 2020-09-22 PROCEDURE — 87493 C DIFF AMPLIFIED PROBE: CPT | Performed by: PHYSICIAN ASSISTANT

## 2020-09-22 PROCEDURE — 84443 ASSAY THYROID STIM HORMONE: CPT | Performed by: PHYSICIAN ASSISTANT

## 2020-09-22 PROCEDURE — 83735 ASSAY OF MAGNESIUM: CPT | Performed by: PHYSICIAN ASSISTANT

## 2020-09-22 PROCEDURE — 71045 X-RAY EXAM CHEST 1 VIEW: CPT

## 2020-09-22 PROCEDURE — 84132 ASSAY OF SERUM POTASSIUM: CPT | Performed by: INTERNAL MEDICINE

## 2020-09-22 PROCEDURE — 74177 CT ABD & PELVIS W/CONTRAST: CPT

## 2020-09-22 PROCEDURE — 93005 ELECTROCARDIOGRAM TRACING: CPT | Performed by: INTERNAL MEDICINE

## 2020-09-22 PROCEDURE — 85007 BL SMEAR W/DIFF WBC COUNT: CPT | Performed by: INTERNAL MEDICINE

## 2020-09-22 PROCEDURE — 82962 GLUCOSE BLOOD TEST: CPT

## 2020-09-22 PROCEDURE — 25010000002 MAGNESIUM SULFATE 2 GM/50ML SOLUTION: Performed by: INTERNAL MEDICINE

## 2020-09-22 PROCEDURE — 25010000002 IOPAMIDOL 61 % SOLUTION: Performed by: INTERNAL MEDICINE

## 2020-09-22 PROCEDURE — 25010000002 CEFTRIAXONE PER 250 MG: Performed by: INTERNAL MEDICINE

## 2020-09-22 PROCEDURE — 84484 ASSAY OF TROPONIN QUANT: CPT | Performed by: INTERNAL MEDICINE

## 2020-09-22 PROCEDURE — 85025 COMPLETE CBC W/AUTO DIFF WBC: CPT | Performed by: INTERNAL MEDICINE

## 2020-09-22 PROCEDURE — 99233 SBSQ HOSP IP/OBS HIGH 50: CPT | Performed by: INTERNAL MEDICINE

## 2020-09-22 PROCEDURE — 99222 1ST HOSP IP/OBS MODERATE 55: CPT | Performed by: PHYSICIAN ASSISTANT

## 2020-09-22 PROCEDURE — 83605 ASSAY OF LACTIC ACID: CPT | Performed by: INTERNAL MEDICINE

## 2020-09-22 RX ORDER — NITROGLYCERIN 0.4 MG/1
TABLET SUBLINGUAL
Status: COMPLETED
Start: 2020-09-22 | End: 2020-09-22

## 2020-09-22 RX ORDER — MAGNESIUM SULFATE HEPTAHYDRATE 40 MG/ML
2 INJECTION, SOLUTION INTRAVENOUS AS NEEDED
Status: DISCONTINUED | OUTPATIENT
Start: 2020-09-22 | End: 2020-09-28 | Stop reason: HOSPADM

## 2020-09-22 RX ORDER — ASPIRIN 81 MG/1
324 TABLET, CHEWABLE ORAL ONCE
Status: DISCONTINUED | OUTPATIENT
Start: 2020-09-22 | End: 2020-09-22

## 2020-09-22 RX ORDER — PYRIDOSTIGMINE BROMIDE 60 MG/1
30 TABLET ORAL EVERY 8 HOURS SCHEDULED
Status: DISCONTINUED | OUTPATIENT
Start: 2020-09-22 | End: 2020-09-25

## 2020-09-22 RX ORDER — ASPIRIN 81 MG/1
162 TABLET, CHEWABLE ORAL ONCE
Status: COMPLETED | OUTPATIENT
Start: 2020-09-22 | End: 2020-09-22

## 2020-09-22 RX ORDER — MAGNESIUM SULFATE HEPTAHYDRATE 40 MG/ML
4 INJECTION, SOLUTION INTRAVENOUS AS NEEDED
Status: DISCONTINUED | OUTPATIENT
Start: 2020-09-22 | End: 2020-09-28 | Stop reason: HOSPADM

## 2020-09-22 RX ORDER — SODIUM CHLORIDE 9 MG/ML
50 INJECTION, SOLUTION INTRAVENOUS CONTINUOUS
Status: DISCONTINUED | OUTPATIENT
Start: 2020-09-22 | End: 2020-09-28

## 2020-09-22 RX ORDER — MORPHINE SULFATE 2 MG/ML
1 INJECTION, SOLUTION INTRAMUSCULAR; INTRAVENOUS ONCE
Status: DISCONTINUED | OUTPATIENT
Start: 2020-09-22 | End: 2020-09-28 | Stop reason: HOSPADM

## 2020-09-22 RX ORDER — FOLIC ACID 1 MG/1
1 TABLET ORAL DAILY
Status: DISCONTINUED | OUTPATIENT
Start: 2020-09-23 | End: 2020-09-28 | Stop reason: HOSPADM

## 2020-09-22 RX ADMIN — ALBUTEROL SULFATE 2.5 MG: 2.5 SOLUTION RESPIRATORY (INHALATION) at 22:28

## 2020-09-22 RX ADMIN — SODIUM CHLORIDE 50 ML/HR: 9 INJECTION, SOLUTION INTRAVENOUS at 10:23

## 2020-09-22 RX ADMIN — DOCUSATE SODIUM 100 MG: 100 CAPSULE, LIQUID FILLED ORAL at 09:03

## 2020-09-22 RX ADMIN — FAMOTIDINE 20 MG: 20 TABLET, FILM COATED ORAL at 21:33

## 2020-09-22 RX ADMIN — DILTIAZEM HYDROCHLORIDE 120 MG: 120 CAPSULE, COATED, EXTENDED RELEASE ORAL at 09:02

## 2020-09-22 RX ADMIN — MAGNESIUM SULFATE 2 G: 2 INJECTION INTRAVENOUS at 18:48

## 2020-09-22 RX ADMIN — HEPARIN SODIUM 5000 UNITS: 5000 INJECTION, SOLUTION INTRAVENOUS; SUBCUTANEOUS at 13:19

## 2020-09-22 RX ADMIN — METRONIDAZOLE 500 MG: 500 TABLET ORAL at 13:19

## 2020-09-22 RX ADMIN — Medication 1 CAPSULE: at 09:02

## 2020-09-22 RX ADMIN — POTASSIUM CHLORIDE 40 MEQ: 1.5 FOR SOLUTION ORAL at 16:33

## 2020-09-22 RX ADMIN — ALBUTEROL SULFATE 2.5 MG: 2.5 SOLUTION RESPIRATORY (INHALATION) at 09:24

## 2020-09-22 RX ADMIN — LIDOCAINE HYDROCHLORIDE: 20 SOLUTION ORAL; TOPICAL at 05:54

## 2020-09-22 RX ADMIN — IOPAMIDOL 85 ML: 612 INJECTION, SOLUTION INTRAVENOUS at 04:45

## 2020-09-22 RX ADMIN — SODIUM CHLORIDE, PRESERVATIVE FREE 10 ML: 5 INJECTION INTRAVENOUS at 09:02

## 2020-09-22 RX ADMIN — CYANOCOBALAMIN TAB 1000 MCG 1000 MCG: 1000 TAB at 09:02

## 2020-09-22 RX ADMIN — VANCOMYCIN HYDROCHLORIDE 125 MG: KIT at 18:48

## 2020-09-22 RX ADMIN — PYRIDOSTIGMINE BROMIDE 30 MG: 60 TABLET ORAL at 21:33

## 2020-09-22 RX ADMIN — ASPIRIN 81 MG: 81 TABLET, COATED ORAL at 09:02

## 2020-09-22 RX ADMIN — HEPARIN SODIUM 5000 UNITS: 5000 INJECTION, SOLUTION INTRAVENOUS; SUBCUTANEOUS at 05:49

## 2020-09-22 RX ADMIN — CEFTRIAXONE SODIUM 2 G: 2 INJECTION, POWDER, FOR SOLUTION INTRAMUSCULAR; INTRAVENOUS at 17:27

## 2020-09-22 RX ADMIN — CEFDINIR 300 MG: 300 CAPSULE ORAL at 09:01

## 2020-09-22 RX ADMIN — SENNOSIDES 1 TABLET: 8.6 TABLET, FILM COATED ORAL at 09:03

## 2020-09-22 RX ADMIN — POTASSIUM CHLORIDE 20 MEQ: 10 CAPSULE, COATED, EXTENDED RELEASE ORAL at 13:19

## 2020-09-22 RX ADMIN — QUETIAPINE FUMARATE 25 MG: 25 TABLET ORAL at 21:33

## 2020-09-22 RX ADMIN — FOLIC ACID 1000 MCG: 1 TABLET ORAL at 09:03

## 2020-09-22 RX ADMIN — DOCUSATE SODIUM 100 MG: 100 CAPSULE, LIQUID FILLED ORAL at 21:33

## 2020-09-22 RX ADMIN — MAGNESIUM SULFATE 2 G: 2 INJECTION INTRAVENOUS at 16:18

## 2020-09-22 RX ADMIN — NITROGLYCERIN: 0.4 TABLET SUBLINGUAL at 03:15

## 2020-09-22 RX ADMIN — ASPIRIN 81 MG CHEWABLE TABLET 162 MG: 81 TABLET CHEWABLE at 03:30

## 2020-09-22 RX ADMIN — MAGNESIUM SULFATE 2 G: 2 INJECTION INTRAVENOUS at 21:33

## 2020-09-22 RX ADMIN — POLYETHYLENE GLYCOL 3350 17 G: 17 POWDER, FOR SOLUTION ORAL at 09:02

## 2020-09-22 RX ADMIN — PYRIDOSTIGMINE BROMIDE 30 MG: 60 TABLET ORAL at 13:19

## 2020-09-22 RX ADMIN — METRONIDAZOLE 500 MG: 500 INJECTION, SOLUTION INTRAVENOUS at 18:48

## 2020-09-22 RX ADMIN — HEPARIN SODIUM 5000 UNITS: 5000 INJECTION, SOLUTION INTRAVENOUS; SUBCUTANEOUS at 21:33

## 2020-09-22 RX ADMIN — POTASSIUM CHLORIDE 40 MEQ: 1.5 FOR SOLUTION ORAL at 09:02

## 2020-09-22 RX ADMIN — METRONIDAZOLE 500 MG: 500 TABLET ORAL at 05:49

## 2020-09-22 RX ADMIN — FAMOTIDINE 20 MG: 20 TABLET, FILM COATED ORAL at 09:02

## 2020-09-22 RX ADMIN — CEFDINIR 300 MG: 300 CAPSULE ORAL at 02:17

## 2020-09-23 ENCOUNTER — APPOINTMENT (OUTPATIENT)
Dept: GENERAL RADIOLOGY | Facility: HOSPITAL | Age: 85
End: 2020-09-23

## 2020-09-23 LAB
ANION GAP SERPL CALCULATED.3IONS-SCNC: 8 MMOL/L (ref 5–15)
BASOPHILS # BLD MANUAL: 0.09 10*3/MM3 (ref 0–0.2)
BASOPHILS NFR BLD AUTO: 1 % (ref 0–1.5)
BUN SERPL-MCNC: 17 MG/DL (ref 8–23)
BUN/CREAT SERPL: 21.5 (ref 7–25)
CALCIUM SPEC-SCNC: 7.7 MG/DL (ref 8.2–9.6)
CHLORIDE SERPL-SCNC: 109 MMOL/L (ref 98–107)
CO2 SERPL-SCNC: 23 MMOL/L (ref 22–29)
CREAT SERPL-MCNC: 0.79 MG/DL (ref 0.57–1)
DEPRECATED RDW RBC AUTO: 62.6 FL (ref 37–54)
EOSINOPHIL # BLD MANUAL: 0.09 10*3/MM3 (ref 0–0.4)
EOSINOPHIL NFR BLD MANUAL: 1 % (ref 0.3–6.2)
ERYTHROCYTE [DISTWIDTH] IN BLOOD BY AUTOMATED COUNT: 15.5 % (ref 12.3–15.4)
GFR SERPL CREATININE-BSD FRML MDRD: 68 ML/MIN/1.73
GLUCOSE SERPL-MCNC: 92 MG/DL (ref 65–99)
HCT VFR BLD AUTO: 30.8 % (ref 34–46.6)
HGB BLD-MCNC: 9.5 G/DL (ref 12–15.9)
LYMPHOCYTES # BLD MANUAL: 0.85 10*3/MM3 (ref 0.7–3.1)
LYMPHOCYTES NFR BLD MANUAL: 6 % (ref 5–12)
LYMPHOCYTES NFR BLD MANUAL: 9 % (ref 19.6–45.3)
MACROCYTES BLD QL SMEAR: ABNORMAL
MAGNESIUM SERPL-MCNC: 2.6 MG/DL (ref 1.6–2.4)
MCH RBC QN AUTO: 34.1 PG (ref 26.6–33)
MCHC RBC AUTO-ENTMCNC: 30.8 G/DL (ref 31.5–35.7)
MCV RBC AUTO: 110.4 FL (ref 79–97)
METAMYELOCYTES NFR BLD MANUAL: 2 % (ref 0–0)
MONOCYTES # BLD AUTO: 0.57 10*3/MM3 (ref 0.1–0.9)
NEUTROPHILS # BLD AUTO: 7.54 10*3/MM3 (ref 1.7–7)
NEUTROPHILS NFR BLD MANUAL: 63 % (ref 42.7–76)
NEUTS BAND NFR BLD MANUAL: 17 % (ref 0–5)
PLAT MORPH BLD: NORMAL
PLATELET # BLD AUTO: 367 10*3/MM3 (ref 140–450)
PMV BLD AUTO: 9.3 FL (ref 6–12)
POLYCHROMASIA BLD QL SMEAR: ABNORMAL
POTASSIUM SERPL-SCNC: 3.8 MMOL/L (ref 3.5–5.2)
RBC # BLD AUTO: 2.79 10*6/MM3 (ref 3.77–5.28)
SODIUM SERPL-SCNC: 140 MMOL/L (ref 136–145)
VARIANT LYMPHS NFR BLD MANUAL: 1 % (ref 0–5)
WBC # BLD AUTO: 9.43 10*3/MM3 (ref 3.4–10.8)
WBC MORPH BLD: NORMAL

## 2020-09-23 PROCEDURE — 25010000002 CEFTRIAXONE PER 250 MG: Performed by: INTERNAL MEDICINE

## 2020-09-23 PROCEDURE — 99232 SBSQ HOSP IP/OBS MODERATE 35: CPT | Performed by: INTERNAL MEDICINE

## 2020-09-23 PROCEDURE — 94799 UNLISTED PULMONARY SVC/PX: CPT

## 2020-09-23 PROCEDURE — 80048 BASIC METABOLIC PNL TOTAL CA: CPT | Performed by: INTERNAL MEDICINE

## 2020-09-23 PROCEDURE — 97116 GAIT TRAINING THERAPY: CPT

## 2020-09-23 PROCEDURE — 74022 RADEX COMPL AQT ABD SERIES: CPT

## 2020-09-23 PROCEDURE — 85007 BL SMEAR W/DIFF WBC COUNT: CPT | Performed by: INTERNAL MEDICINE

## 2020-09-23 PROCEDURE — 25010000002 ONDANSETRON PER 1 MG: Performed by: HOSPITALIST

## 2020-09-23 PROCEDURE — 25010000002 HEPARIN (PORCINE) PER 1000 UNITS: Performed by: HOSPITALIST

## 2020-09-23 PROCEDURE — 83735 ASSAY OF MAGNESIUM: CPT | Performed by: INTERNAL MEDICINE

## 2020-09-23 PROCEDURE — 97530 THERAPEUTIC ACTIVITIES: CPT

## 2020-09-23 PROCEDURE — 99232 SBSQ HOSP IP/OBS MODERATE 35: CPT | Performed by: PHYSICIAN ASSISTANT

## 2020-09-23 PROCEDURE — 85025 COMPLETE CBC W/AUTO DIFF WBC: CPT | Performed by: INTERNAL MEDICINE

## 2020-09-23 RX ADMIN — ASPIRIN 81 MG: 81 TABLET, COATED ORAL at 08:39

## 2020-09-23 RX ADMIN — FLUTICASONE FUROATE AND VILANTEROL TRIFENATATE 1 PUFF: 100; 25 POWDER RESPIRATORY (INHALATION) at 10:27

## 2020-09-23 RX ADMIN — METRONIDAZOLE 500 MG: 500 INJECTION, SOLUTION INTRAVENOUS at 08:48

## 2020-09-23 RX ADMIN — HEPARIN SODIUM 5000 UNITS: 5000 INJECTION, SOLUTION INTRAVENOUS; SUBCUTANEOUS at 14:42

## 2020-09-23 RX ADMIN — FOLIC ACID 1 MG: 1 TABLET ORAL at 08:39

## 2020-09-23 RX ADMIN — METRONIDAZOLE 500 MG: 500 INJECTION, SOLUTION INTRAVENOUS at 18:31

## 2020-09-23 RX ADMIN — HEPARIN SODIUM 5000 UNITS: 5000 INJECTION, SOLUTION INTRAVENOUS; SUBCUTANEOUS at 05:35

## 2020-09-23 RX ADMIN — SODIUM CHLORIDE 50 ML/HR: 9 INJECTION, SOLUTION INTRAVENOUS at 21:46

## 2020-09-23 RX ADMIN — Medication 1 CAPSULE: at 08:39

## 2020-09-23 RX ADMIN — CYANOCOBALAMIN TAB 1000 MCG 1000 MCG: 1000 TAB at 08:40

## 2020-09-23 RX ADMIN — DOCUSATE SODIUM 100 MG: 100 CAPSULE, LIQUID FILLED ORAL at 08:39

## 2020-09-23 RX ADMIN — ONDANSETRON 4 MG: 2 INJECTION INTRAMUSCULAR; INTRAVENOUS at 09:40

## 2020-09-23 RX ADMIN — HEPARIN SODIUM 5000 UNITS: 5000 INJECTION, SOLUTION INTRAVENOUS; SUBCUTANEOUS at 20:28

## 2020-09-23 RX ADMIN — METRONIDAZOLE 500 MG: 500 INJECTION, SOLUTION INTRAVENOUS at 02:28

## 2020-09-23 RX ADMIN — ALBUTEROL SULFATE 2.5 MG: 2.5 SOLUTION RESPIRATORY (INHALATION) at 10:26

## 2020-09-23 RX ADMIN — PYRIDOSTIGMINE BROMIDE 30 MG: 60 TABLET ORAL at 21:42

## 2020-09-23 RX ADMIN — SODIUM CHLORIDE, PRESERVATIVE FREE 10 ML: 5 INJECTION INTRAVENOUS at 08:40

## 2020-09-23 RX ADMIN — PYRIDOSTIGMINE BROMIDE 30 MG: 60 TABLET ORAL at 05:35

## 2020-09-23 RX ADMIN — POLYETHYLENE GLYCOL 3350 17 G: 17 POWDER, FOR SOLUTION ORAL at 08:39

## 2020-09-23 RX ADMIN — QUETIAPINE FUMARATE 25 MG: 25 TABLET ORAL at 20:28

## 2020-09-23 RX ADMIN — DILTIAZEM HYDROCHLORIDE 120 MG: 120 CAPSULE, COATED, EXTENDED RELEASE ORAL at 08:40

## 2020-09-23 RX ADMIN — FAMOTIDINE 20 MG: 20 TABLET, FILM COATED ORAL at 20:28

## 2020-09-23 RX ADMIN — CEFTRIAXONE SODIUM 2 G: 2 INJECTION, POWDER, FOR SOLUTION INTRAMUSCULAR; INTRAVENOUS at 17:32

## 2020-09-23 RX ADMIN — FAMOTIDINE 20 MG: 20 TABLET, FILM COATED ORAL at 08:39

## 2020-09-23 RX ADMIN — PYRIDOSTIGMINE BROMIDE 30 MG: 60 TABLET ORAL at 14:42

## 2020-09-24 PROCEDURE — 99232 SBSQ HOSP IP/OBS MODERATE 35: CPT | Performed by: INTERNAL MEDICINE

## 2020-09-24 PROCEDURE — 94799 UNLISTED PULMONARY SVC/PX: CPT

## 2020-09-24 PROCEDURE — 99232 SBSQ HOSP IP/OBS MODERATE 35: CPT | Performed by: NURSE PRACTITIONER

## 2020-09-24 PROCEDURE — 97110 THERAPEUTIC EXERCISES: CPT

## 2020-09-24 PROCEDURE — 25010000002 HEPARIN (PORCINE) PER 1000 UNITS: Performed by: HOSPITALIST

## 2020-09-24 PROCEDURE — 97530 THERAPEUTIC ACTIVITIES: CPT

## 2020-09-24 PROCEDURE — 25010000002 CEFTRIAXONE PER 250 MG: Performed by: INTERNAL MEDICINE

## 2020-09-24 PROCEDURE — 97535 SELF CARE MNGMENT TRAINING: CPT

## 2020-09-24 PROCEDURE — 97116 GAIT TRAINING THERAPY: CPT

## 2020-09-24 RX ADMIN — CYANOCOBALAMIN TAB 1000 MCG 1000 MCG: 1000 TAB at 09:55

## 2020-09-24 RX ADMIN — Medication 1 CAPSULE: at 09:55

## 2020-09-24 RX ADMIN — FAMOTIDINE 20 MG: 20 TABLET, FILM COATED ORAL at 21:05

## 2020-09-24 RX ADMIN — PYRIDOSTIGMINE BROMIDE 30 MG: 60 TABLET ORAL at 21:05

## 2020-09-24 RX ADMIN — PYRIDOSTIGMINE BROMIDE 30 MG: 60 TABLET ORAL at 05:21

## 2020-09-24 RX ADMIN — METRONIDAZOLE 500 MG: 500 INJECTION, SOLUTION INTRAVENOUS at 09:54

## 2020-09-24 RX ADMIN — PYRIDOSTIGMINE BROMIDE 30 MG: 60 TABLET ORAL at 15:56

## 2020-09-24 RX ADMIN — ASPIRIN 81 MG: 81 TABLET, COATED ORAL at 09:55

## 2020-09-24 RX ADMIN — POLYETHYLENE GLYCOL 3350 17 G: 17 POWDER, FOR SOLUTION ORAL at 09:55

## 2020-09-24 RX ADMIN — CEFTRIAXONE SODIUM 2 G: 2 INJECTION, POWDER, FOR SOLUTION INTRAMUSCULAR; INTRAVENOUS at 17:37

## 2020-09-24 RX ADMIN — FOLIC ACID 1 MG: 1 TABLET ORAL at 10:10

## 2020-09-24 RX ADMIN — HEPARIN SODIUM 5000 UNITS: 5000 INJECTION, SOLUTION INTRAVENOUS; SUBCUTANEOUS at 05:21

## 2020-09-24 RX ADMIN — FAMOTIDINE 20 MG: 20 TABLET, FILM COATED ORAL at 09:55

## 2020-09-24 RX ADMIN — DILTIAZEM HYDROCHLORIDE 120 MG: 120 CAPSULE, COATED, EXTENDED RELEASE ORAL at 09:55

## 2020-09-24 RX ADMIN — FLUTICASONE FUROATE AND VILANTEROL TRIFENATATE 1 PUFF: 100; 25 POWDER RESPIRATORY (INHALATION) at 07:40

## 2020-09-24 RX ADMIN — METRONIDAZOLE 500 MG: 500 INJECTION, SOLUTION INTRAVENOUS at 17:07

## 2020-09-24 RX ADMIN — HEPARIN SODIUM 5000 UNITS: 5000 INJECTION, SOLUTION INTRAVENOUS; SUBCUTANEOUS at 15:56

## 2020-09-24 RX ADMIN — QUETIAPINE FUMARATE 25 MG: 25 TABLET ORAL at 21:05

## 2020-09-24 RX ADMIN — SODIUM CHLORIDE 50 ML/HR: 9 INJECTION, SOLUTION INTRAVENOUS at 17:15

## 2020-09-24 RX ADMIN — DOCUSATE SODIUM 100 MG: 100 CAPSULE, LIQUID FILLED ORAL at 09:55

## 2020-09-24 RX ADMIN — HEPARIN SODIUM 5000 UNITS: 5000 INJECTION, SOLUTION INTRAVENOUS; SUBCUTANEOUS at 21:05

## 2020-09-24 RX ADMIN — SODIUM CHLORIDE, PRESERVATIVE FREE 10 ML: 5 INJECTION INTRAVENOUS at 09:55

## 2020-09-24 RX ADMIN — METRONIDAZOLE 500 MG: 500 INJECTION, SOLUTION INTRAVENOUS at 02:22

## 2020-09-25 ENCOUNTER — APPOINTMENT (OUTPATIENT)
Dept: GENERAL RADIOLOGY | Facility: HOSPITAL | Age: 85
End: 2020-09-25

## 2020-09-25 PROCEDURE — 74018 RADEX ABDOMEN 1 VIEW: CPT

## 2020-09-25 PROCEDURE — 99232 SBSQ HOSP IP/OBS MODERATE 35: CPT | Performed by: INTERNAL MEDICINE

## 2020-09-25 PROCEDURE — 94799 UNLISTED PULMONARY SVC/PX: CPT

## 2020-09-25 PROCEDURE — 25010000002 HEPARIN (PORCINE) PER 1000 UNITS: Performed by: HOSPITALIST

## 2020-09-25 RX ORDER — METRONIDAZOLE 500 MG/1
500 TABLET ORAL EVERY 8 HOURS SCHEDULED
Status: DISCONTINUED | OUTPATIENT
Start: 2020-09-25 | End: 2020-09-28 | Stop reason: HOSPADM

## 2020-09-25 RX ORDER — CEFDINIR 300 MG/1
300 CAPSULE ORAL EVERY 12 HOURS SCHEDULED
Status: DISCONTINUED | OUTPATIENT
Start: 2020-09-25 | End: 2020-09-28 | Stop reason: HOSPADM

## 2020-09-25 RX ADMIN — CEFDINIR 300 MG: 300 CAPSULE ORAL at 13:44

## 2020-09-25 RX ADMIN — ASPIRIN 81 MG: 81 TABLET, COATED ORAL at 08:40

## 2020-09-25 RX ADMIN — SODIUM CHLORIDE, PRESERVATIVE FREE 10 ML: 5 INJECTION INTRAVENOUS at 08:43

## 2020-09-25 RX ADMIN — CEFDINIR 300 MG: 300 CAPSULE ORAL at 20:57

## 2020-09-25 RX ADMIN — DILTIAZEM HYDROCHLORIDE 120 MG: 120 CAPSULE, COATED, EXTENDED RELEASE ORAL at 08:41

## 2020-09-25 RX ADMIN — Medication 1 CAPSULE: at 08:40

## 2020-09-25 RX ADMIN — HEPARIN SODIUM 5000 UNITS: 5000 INJECTION, SOLUTION INTRAVENOUS; SUBCUTANEOUS at 06:06

## 2020-09-25 RX ADMIN — QUETIAPINE FUMARATE 25 MG: 25 TABLET ORAL at 20:57

## 2020-09-25 RX ADMIN — HEPARIN SODIUM 5000 UNITS: 5000 INJECTION, SOLUTION INTRAVENOUS; SUBCUTANEOUS at 13:44

## 2020-09-25 RX ADMIN — DOCUSATE SODIUM 100 MG: 100 CAPSULE, LIQUID FILLED ORAL at 20:57

## 2020-09-25 RX ADMIN — METRONIDAZOLE 500 MG: 500 INJECTION, SOLUTION INTRAVENOUS at 03:14

## 2020-09-25 RX ADMIN — PYRIDOSTIGMINE BROMIDE 30 MG: 60 TABLET ORAL at 13:43

## 2020-09-25 RX ADMIN — SODIUM CHLORIDE 50 ML/HR: 9 INJECTION, SOLUTION INTRAVENOUS at 17:25

## 2020-09-25 RX ADMIN — FAMOTIDINE 20 MG: 20 TABLET, FILM COATED ORAL at 20:57

## 2020-09-25 RX ADMIN — HEPARIN SODIUM 5000 UNITS: 5000 INJECTION, SOLUTION INTRAVENOUS; SUBCUTANEOUS at 20:56

## 2020-09-25 RX ADMIN — METRONIDAZOLE 500 MG: 500 TABLET ORAL at 13:43

## 2020-09-25 RX ADMIN — FAMOTIDINE 20 MG: 20 TABLET, FILM COATED ORAL at 08:40

## 2020-09-25 RX ADMIN — METRONIDAZOLE 500 MG: 500 TABLET ORAL at 20:57

## 2020-09-25 RX ADMIN — FLUTICASONE FUROATE AND VILANTEROL TRIFENATATE 1 PUFF: 100; 25 POWDER RESPIRATORY (INHALATION) at 07:11

## 2020-09-25 RX ADMIN — FOLIC ACID 1 MG: 1 TABLET ORAL at 08:41

## 2020-09-25 RX ADMIN — PYRIDOSTIGMINE BROMIDE 30 MG: 60 TABLET ORAL at 06:06

## 2020-09-25 RX ADMIN — CYANOCOBALAMIN TAB 1000 MCG 1000 MCG: 1000 TAB at 08:41

## 2020-09-26 LAB
ANION GAP SERPL CALCULATED.3IONS-SCNC: 9 MMOL/L (ref 5–15)
BASOPHILS # BLD AUTO: 0.04 10*3/MM3 (ref 0–0.2)
BASOPHILS NFR BLD AUTO: 0.4 % (ref 0–1.5)
BUN SERPL-MCNC: 6 MG/DL (ref 8–23)
BUN/CREAT SERPL: 9 (ref 7–25)
CALCIUM SPEC-SCNC: 7.4 MG/DL (ref 8.2–9.6)
CHLORIDE SERPL-SCNC: 107 MMOL/L (ref 98–107)
CO2 SERPL-SCNC: 22 MMOL/L (ref 22–29)
CREAT SERPL-MCNC: 0.67 MG/DL (ref 0.57–1)
DEPRECATED RDW RBC AUTO: 57.7 FL (ref 37–54)
EOSINOPHIL # BLD AUTO: 0.2 10*3/MM3 (ref 0–0.4)
EOSINOPHIL NFR BLD AUTO: 2.2 % (ref 0.3–6.2)
ERYTHROCYTE [DISTWIDTH] IN BLOOD BY AUTOMATED COUNT: 14.6 % (ref 12.3–15.4)
GFR SERPL CREATININE-BSD FRML MDRD: 83 ML/MIN/1.73
GLUCOSE SERPL-MCNC: 122 MG/DL (ref 65–99)
HCT VFR BLD AUTO: 32.2 % (ref 34–46.6)
HGB BLD-MCNC: 10 G/DL (ref 12–15.9)
IMM GRANULOCYTES # BLD AUTO: 0.18 10*3/MM3 (ref 0–0.05)
IMM GRANULOCYTES NFR BLD AUTO: 2 % (ref 0–0.5)
LYMPHOCYTES # BLD AUTO: 0.74 10*3/MM3 (ref 0.7–3.1)
LYMPHOCYTES NFR BLD AUTO: 8 % (ref 19.6–45.3)
MAGNESIUM SERPL-MCNC: 1.5 MG/DL (ref 1.6–2.4)
MCH RBC QN AUTO: 33.4 PG (ref 26.6–33)
MCHC RBC AUTO-ENTMCNC: 31.1 G/DL (ref 31.5–35.7)
MCV RBC AUTO: 107.7 FL (ref 79–97)
MONOCYTES # BLD AUTO: 0.9 10*3/MM3 (ref 0.1–0.9)
MONOCYTES NFR BLD AUTO: 9.8 % (ref 5–12)
NEUTROPHILS NFR BLD AUTO: 7.14 10*3/MM3 (ref 1.7–7)
NEUTROPHILS NFR BLD AUTO: 77.6 % (ref 42.7–76)
NRBC BLD AUTO-RTO: 0 /100 WBC (ref 0–0.2)
PHOSPHATE SERPL-MCNC: 1.7 MG/DL (ref 2.5–4.5)
PLATELET # BLD AUTO: 402 10*3/MM3 (ref 140–450)
PMV BLD AUTO: 9.5 FL (ref 6–12)
POTASSIUM SERPL-SCNC: 3 MMOL/L (ref 3.5–5.2)
RBC # BLD AUTO: 2.99 10*6/MM3 (ref 3.77–5.28)
SODIUM SERPL-SCNC: 138 MMOL/L (ref 136–145)
WBC # BLD AUTO: 9.2 10*3/MM3 (ref 3.4–10.8)

## 2020-09-26 PROCEDURE — 84100 ASSAY OF PHOSPHORUS: CPT | Performed by: INTERNAL MEDICINE

## 2020-09-26 PROCEDURE — 85025 COMPLETE CBC W/AUTO DIFF WBC: CPT | Performed by: INTERNAL MEDICINE

## 2020-09-26 PROCEDURE — 99232 SBSQ HOSP IP/OBS MODERATE 35: CPT | Performed by: INTERNAL MEDICINE

## 2020-09-26 PROCEDURE — 80048 BASIC METABOLIC PNL TOTAL CA: CPT | Performed by: INTERNAL MEDICINE

## 2020-09-26 PROCEDURE — 25010000002 MAGNESIUM SULFATE 2 GM/50ML SOLUTION: Performed by: INTERNAL MEDICINE

## 2020-09-26 PROCEDURE — 84132 ASSAY OF SERUM POTASSIUM: CPT | Performed by: INTERNAL MEDICINE

## 2020-09-26 PROCEDURE — 25010000002 HEPARIN (PORCINE) PER 1000 UNITS: Performed by: HOSPITALIST

## 2020-09-26 PROCEDURE — 83735 ASSAY OF MAGNESIUM: CPT | Performed by: INTERNAL MEDICINE

## 2020-09-26 PROCEDURE — 94799 UNLISTED PULMONARY SVC/PX: CPT

## 2020-09-26 RX ADMIN — MAGNESIUM SULFATE 2 G: 2 INJECTION INTRAVENOUS at 22:56

## 2020-09-26 RX ADMIN — ALBUTEROL SULFATE 2.5 MG: 2.5 SOLUTION RESPIRATORY (INHALATION) at 17:16

## 2020-09-26 RX ADMIN — MELATONIN TAB 5 MG 5 MG: 5 TAB at 20:14

## 2020-09-26 RX ADMIN — MAGNESIUM SULFATE 2 G: 2 INJECTION INTRAVENOUS at 18:25

## 2020-09-26 RX ADMIN — FAMOTIDINE 20 MG: 20 TABLET, FILM COATED ORAL at 20:14

## 2020-09-26 RX ADMIN — FAMOTIDINE 20 MG: 20 TABLET, FILM COATED ORAL at 08:21

## 2020-09-26 RX ADMIN — METRONIDAZOLE 500 MG: 500 TABLET ORAL at 22:55

## 2020-09-26 RX ADMIN — SODIUM CHLORIDE 50 ML/HR: 9 INJECTION, SOLUTION INTRAVENOUS at 13:05

## 2020-09-26 RX ADMIN — HEPARIN SODIUM 5000 UNITS: 5000 INJECTION, SOLUTION INTRAVENOUS; SUBCUTANEOUS at 22:55

## 2020-09-26 RX ADMIN — POTASSIUM CHLORIDE 40 MEQ: 10 CAPSULE, COATED, EXTENDED RELEASE ORAL at 12:41

## 2020-09-26 RX ADMIN — ASPIRIN 81 MG: 81 TABLET, COATED ORAL at 08:21

## 2020-09-26 RX ADMIN — FLUTICASONE FUROATE AND VILANTEROL TRIFENATATE 1 PUFF: 100; 25 POWDER RESPIRATORY (INHALATION) at 07:38

## 2020-09-26 RX ADMIN — MAGNESIUM SULFATE 2 G: 2 INJECTION INTRAVENOUS at 14:48

## 2020-09-26 RX ADMIN — DOCUSATE SODIUM 100 MG: 100 CAPSULE, LIQUID FILLED ORAL at 08:21

## 2020-09-26 RX ADMIN — CEFDINIR 300 MG: 300 CAPSULE ORAL at 08:21

## 2020-09-26 RX ADMIN — POTASSIUM CHLORIDE 40 MEQ: 10 CAPSULE, COATED, EXTENDED RELEASE ORAL at 16:39

## 2020-09-26 RX ADMIN — QUETIAPINE FUMARATE 25 MG: 25 TABLET ORAL at 20:15

## 2020-09-26 RX ADMIN — DOCUSATE SODIUM 100 MG: 100 CAPSULE, LIQUID FILLED ORAL at 20:14

## 2020-09-26 RX ADMIN — POTASSIUM CHLORIDE 40 MEQ: 10 CAPSULE, COATED, EXTENDED RELEASE ORAL at 20:16

## 2020-09-26 RX ADMIN — SODIUM CHLORIDE, PRESERVATIVE FREE 10 ML: 5 INJECTION INTRAVENOUS at 20:16

## 2020-09-26 RX ADMIN — CEFDINIR 300 MG: 300 CAPSULE ORAL at 20:14

## 2020-09-26 RX ADMIN — HEPARIN SODIUM 5000 UNITS: 5000 INJECTION, SOLUTION INTRAVENOUS; SUBCUTANEOUS at 14:48

## 2020-09-26 RX ADMIN — DILTIAZEM HYDROCHLORIDE 120 MG: 120 CAPSULE, COATED, EXTENDED RELEASE ORAL at 08:21

## 2020-09-26 RX ADMIN — POTASSIUM PHOSPHATE, MONOBASIC AND POTASSIUM PHOSPHATE, DIBASIC 30 MMOL: 224; 236 INJECTION, SOLUTION, CONCENTRATE INTRAVENOUS at 13:05

## 2020-09-26 RX ADMIN — HEPARIN SODIUM 5000 UNITS: 5000 INJECTION, SOLUTION INTRAVENOUS; SUBCUTANEOUS at 05:36

## 2020-09-26 RX ADMIN — FOLIC ACID 1 MG: 1 TABLET ORAL at 08:21

## 2020-09-26 RX ADMIN — CYANOCOBALAMIN TAB 1000 MCG 1000 MCG: 1000 TAB at 08:21

## 2020-09-26 RX ADMIN — SODIUM CHLORIDE, PRESERVATIVE FREE 10 ML: 5 INJECTION INTRAVENOUS at 08:22

## 2020-09-26 RX ADMIN — Medication 1 CAPSULE: at 08:21

## 2020-09-26 RX ADMIN — METRONIDAZOLE 500 MG: 500 TABLET ORAL at 05:36

## 2020-09-26 RX ADMIN — METRONIDAZOLE 500 MG: 500 TABLET ORAL at 14:48

## 2020-09-27 LAB
MAGNESIUM SERPL-MCNC: 2.2 MG/DL (ref 1.6–2.4)
PHOSPHATE SERPL-MCNC: 2.1 MG/DL (ref 2.5–4.5)
POTASSIUM SERPL-SCNC: 4.6 MMOL/L (ref 3.5–5.2)

## 2020-09-27 PROCEDURE — 84100 ASSAY OF PHOSPHORUS: CPT | Performed by: INTERNAL MEDICINE

## 2020-09-27 PROCEDURE — 97110 THERAPEUTIC EXERCISES: CPT

## 2020-09-27 PROCEDURE — 97116 GAIT TRAINING THERAPY: CPT

## 2020-09-27 PROCEDURE — 25010000002 HEPARIN (PORCINE) PER 1000 UNITS: Performed by: HOSPITALIST

## 2020-09-27 PROCEDURE — 94799 UNLISTED PULMONARY SVC/PX: CPT

## 2020-09-27 PROCEDURE — 99232 SBSQ HOSP IP/OBS MODERATE 35: CPT | Performed by: INTERNAL MEDICINE

## 2020-09-27 RX ADMIN — QUETIAPINE FUMARATE 25 MG: 25 TABLET ORAL at 19:52

## 2020-09-27 RX ADMIN — FOLIC ACID 1 MG: 1 TABLET ORAL at 08:59

## 2020-09-27 RX ADMIN — CEFDINIR 300 MG: 300 CAPSULE ORAL at 19:56

## 2020-09-27 RX ADMIN — FLUTICASONE FUROATE AND VILANTEROL TRIFENATATE 1 PUFF: 100; 25 POWDER RESPIRATORY (INHALATION) at 08:35

## 2020-09-27 RX ADMIN — SODIUM CHLORIDE 50 ML/HR: 9 INJECTION, SOLUTION INTRAVENOUS at 11:19

## 2020-09-27 RX ADMIN — FAMOTIDINE 20 MG: 20 TABLET, FILM COATED ORAL at 08:58

## 2020-09-27 RX ADMIN — HEPARIN SODIUM 5000 UNITS: 5000 INJECTION, SOLUTION INTRAVENOUS; SUBCUTANEOUS at 13:27

## 2020-09-27 RX ADMIN — METRONIDAZOLE 500 MG: 500 TABLET ORAL at 22:15

## 2020-09-27 RX ADMIN — SODIUM PHOSPHATE, MONOBASIC, MONOHYDRATE 15 MMOL: 276; 142 INJECTION, SOLUTION INTRAVENOUS at 14:16

## 2020-09-27 RX ADMIN — METRONIDAZOLE 500 MG: 500 TABLET ORAL at 13:27

## 2020-09-27 RX ADMIN — METRONIDAZOLE 500 MG: 500 TABLET ORAL at 06:29

## 2020-09-27 RX ADMIN — HEPARIN SODIUM 5000 UNITS: 5000 INJECTION, SOLUTION INTRAVENOUS; SUBCUTANEOUS at 22:15

## 2020-09-27 RX ADMIN — DOCUSATE SODIUM 100 MG: 100 CAPSULE, LIQUID FILLED ORAL at 19:52

## 2020-09-27 RX ADMIN — CEFDINIR 300 MG: 300 CAPSULE ORAL at 08:58

## 2020-09-27 RX ADMIN — FAMOTIDINE 20 MG: 20 TABLET, FILM COATED ORAL at 19:51

## 2020-09-27 RX ADMIN — ASPIRIN 81 MG: 81 TABLET, COATED ORAL at 08:58

## 2020-09-27 RX ADMIN — SODIUM CHLORIDE, PRESERVATIVE FREE 10 ML: 5 INJECTION INTRAVENOUS at 19:57

## 2020-09-27 RX ADMIN — DILTIAZEM HYDROCHLORIDE 120 MG: 120 CAPSULE, COATED, EXTENDED RELEASE ORAL at 08:58

## 2020-09-27 RX ADMIN — CYANOCOBALAMIN TAB 1000 MCG 1000 MCG: 1000 TAB at 08:58

## 2020-09-27 RX ADMIN — DOCUSATE SODIUM 100 MG: 100 CAPSULE, LIQUID FILLED ORAL at 08:58

## 2020-09-27 RX ADMIN — HEPARIN SODIUM 5000 UNITS: 5000 INJECTION, SOLUTION INTRAVENOUS; SUBCUTANEOUS at 06:29

## 2020-09-27 RX ADMIN — Medication 1 CAPSULE: at 08:58

## 2020-09-27 RX ADMIN — MELATONIN TAB 5 MG 5 MG: 5 TAB at 19:51

## 2020-09-28 VITALS
SYSTOLIC BLOOD PRESSURE: 141 MMHG | WEIGHT: 143.6 LBS | RESPIRATION RATE: 16 BRPM | BODY MASS INDEX: 24.52 KG/M2 | HEIGHT: 64 IN | OXYGEN SATURATION: 98 % | HEART RATE: 99 BPM | TEMPERATURE: 98 F | DIASTOLIC BLOOD PRESSURE: 77 MMHG

## 2020-09-28 LAB
PHOSPHATE SERPL-MCNC: 2.7 MG/DL (ref 2.5–4.5)
SARS-COV-2 RDRP RESP QL NAA+PROBE: NOT DETECTED

## 2020-09-28 PROCEDURE — 25010000002 INFLUENZA VAC SPLIT QUAD 0.5 ML SUSPENSION PREFILLED SYRINGE: Performed by: HOSPITALIST

## 2020-09-28 PROCEDURE — 97535 SELF CARE MNGMENT TRAINING: CPT

## 2020-09-28 PROCEDURE — 84100 ASSAY OF PHOSPHORUS: CPT | Performed by: INTERNAL MEDICINE

## 2020-09-28 PROCEDURE — G0008 ADMIN INFLUENZA VIRUS VAC: HCPCS | Performed by: HOSPITALIST

## 2020-09-28 PROCEDURE — 97530 THERAPEUTIC ACTIVITIES: CPT

## 2020-09-28 PROCEDURE — 99239 HOSP IP/OBS DSCHRG MGMT >30: CPT | Performed by: NURSE PRACTITIONER

## 2020-09-28 PROCEDURE — 94799 UNLISTED PULMONARY SVC/PX: CPT

## 2020-09-28 PROCEDURE — 25010000002 HEPARIN (PORCINE) PER 1000 UNITS: Performed by: HOSPITALIST

## 2020-09-28 PROCEDURE — 87635 SARS-COV-2 COVID-19 AMP PRB: CPT | Performed by: NURSE PRACTITIONER

## 2020-09-28 PROCEDURE — 90686 IIV4 VACC NO PRSV 0.5 ML IM: CPT | Performed by: HOSPITALIST

## 2020-09-28 RX ORDER — L.ACID,PARA/B.BIFIDUM/S.THERM 8B CELL
1 CAPSULE ORAL DAILY
Start: 2020-09-29 | End: 2022-06-08

## 2020-09-28 RX ORDER — METRONIDAZOLE 500 MG/1
500 TABLET ORAL EVERY 8 HOURS SCHEDULED
Qty: 4 TABLET | Refills: 0
Start: 2020-09-28 | End: 2020-09-30

## 2020-09-28 RX ORDER — CEFDINIR 300 MG/1
300 CAPSULE ORAL EVERY 12 HOURS SCHEDULED
Qty: 1 CAPSULE | Refills: 0
Start: 2020-09-28 | End: 2020-09-29

## 2020-09-28 RX ORDER — ONDANSETRON 4 MG/1
4 TABLET, FILM COATED ORAL EVERY 6 HOURS PRN
Start: 2020-09-28 | End: 2022-06-08

## 2020-09-28 RX ORDER — CHOLECALCIFEROL (VITAMIN D3) 125 MCG
5 CAPSULE ORAL NIGHTLY PRN
Start: 2020-09-28

## 2020-09-28 RX ORDER — POLYETHYLENE GLYCOL 3350 17 G/17G
17 POWDER, FOR SOLUTION ORAL DAILY
Start: 2020-09-29 | End: 2021-11-30 | Stop reason: SDUPTHER

## 2020-09-28 RX ORDER — QUETIAPINE FUMARATE 25 MG/1
25 TABLET, FILM COATED ORAL NIGHTLY
Start: 2020-09-28 | End: 2021-06-16

## 2020-09-28 RX ADMIN — DILTIAZEM HYDROCHLORIDE 120 MG: 120 CAPSULE, COATED, EXTENDED RELEASE ORAL at 09:09

## 2020-09-28 RX ADMIN — CEFDINIR 300 MG: 300 CAPSULE ORAL at 09:10

## 2020-09-28 RX ADMIN — HEPARIN SODIUM 5000 UNITS: 5000 INJECTION, SOLUTION INTRAVENOUS; SUBCUTANEOUS at 15:21

## 2020-09-28 RX ADMIN — Medication 1 CAPSULE: at 09:09

## 2020-09-28 RX ADMIN — FLUTICASONE FUROATE AND VILANTEROL TRIFENATATE 1 PUFF: 100; 25 POWDER RESPIRATORY (INHALATION) at 09:51

## 2020-09-28 RX ADMIN — SODIUM CHLORIDE, PRESERVATIVE FREE 10 ML: 5 INJECTION INTRAVENOUS at 09:10

## 2020-09-28 RX ADMIN — FAMOTIDINE 20 MG: 20 TABLET, FILM COATED ORAL at 09:09

## 2020-09-28 RX ADMIN — POLYETHYLENE GLYCOL 3350 17 G: 17 POWDER, FOR SOLUTION ORAL at 09:09

## 2020-09-28 RX ADMIN — METRONIDAZOLE 500 MG: 500 TABLET ORAL at 15:21

## 2020-09-28 RX ADMIN — CYANOCOBALAMIN TAB 1000 MCG 1000 MCG: 1000 TAB at 09:10

## 2020-09-28 RX ADMIN — DOCUSATE SODIUM 100 MG: 100 CAPSULE, LIQUID FILLED ORAL at 09:10

## 2020-09-28 RX ADMIN — ASPIRIN 81 MG: 81 TABLET, COATED ORAL at 09:10

## 2020-09-28 RX ADMIN — INFLUENZA VIRUS VACCINE 0.5 ML: 15; 15; 15; 15 SUSPENSION INTRAMUSCULAR at 15:21

## 2020-09-28 RX ADMIN — FOLIC ACID 1 MG: 1 TABLET ORAL at 09:09

## 2020-09-29 ENCOUNTER — EPISODE CHANGES (OUTPATIENT)
Dept: CASE MANAGEMENT | Facility: OTHER | Age: 85
End: 2020-09-29

## 2020-10-06 ENCOUNTER — TELEPHONE (OUTPATIENT)
Dept: INTERNAL MEDICINE | Facility: CLINIC | Age: 85
End: 2020-10-06

## 2020-10-06 NOTE — TELEPHONE ENCOUNTER
BRADEN FROM [facility] CALLED STATING:  SHE IS SUSPECTING PT MAY HAVE COVID    PT DAUGHTER TESTED POSITIVE    PT HAS A TEMPERATURE     O2 IS AT 92%    HEART RATE WAS- 110    BP /68    CALL BACK NUMBER: [phone]  PTS DAUGHTER NUMBER: [phone]

## 2020-10-07 NOTE — TELEPHONE ENCOUNTER
Tried calling  Susan - daughter at [phone]  [facility] to return call.   Showed Dr Bolaños this chart message and she wants her taken to [facility] .  Due to her fever and low O2 sats

## 2020-10-08 NOTE — TELEPHONE ENCOUNTER
Susan and Pati were tested for covid yesterday done at [facility] in [place] They are supposed to find out tomorrow the results.    She states today her temp is 98.5 and her oxygen is 94%  Usually it is 95%  She states her sister Danette and her decided to wait for the Covid test results and if it is Negative they are going to have her tested again tomorrow.  She said if her symptoms worsens she will take her to the ER .  Verbal understanding given

## 2020-10-09 ENCOUNTER — EPISODE CHANGES (OUTPATIENT)
Dept: CASE MANAGEMENT | Facility: OTHER | Age: 85
End: 2020-10-09

## 2020-10-09 ENCOUNTER — TELEPHONE (OUTPATIENT)
Dept: INTERNAL MEDICINE | Facility: CLINIC | Age: 85
End: 2020-10-09

## 2020-10-09 ENCOUNTER — HOSPITAL ENCOUNTER (INPATIENT)
Facility: HOSPITAL | Age: 85
LOS: 6 days | Discharge: NURSING, REHAB OR LONG-TERM CARE | End: 2020-10-15
Attending: EMERGENCY MEDICINE | Admitting: INTERNAL MEDICINE

## 2020-10-09 ENCOUNTER — APPOINTMENT (OUTPATIENT)
Dept: GENERAL RADIOLOGY | Facility: HOSPITAL | Age: 85
End: 2020-10-09

## 2020-10-09 DIAGNOSIS — U07.1 COVID-19: Primary | ICD-10-CM

## 2020-10-09 LAB
ABO GROUP BLD: NORMAL
ALBUMIN SERPL-MCNC: 2.9 G/DL (ref 3.5–5.2)
ALBUMIN/GLOB SERPL: 0.9 G/DL
ALP SERPL-CCNC: 64 U/L (ref 39–117)
ALT SERPL W P-5'-P-CCNC: 10 U/L (ref 1–33)
ANION GAP SERPL CALCULATED.3IONS-SCNC: 8 MMOL/L (ref 5–15)
AST SERPL-CCNC: 27 U/L (ref 1–32)
BACTERIA UR QL AUTO: ABNORMAL /HPF
BASOPHILS # BLD AUTO: 0.04 10*3/MM3 (ref 0–0.2)
BASOPHILS NFR BLD AUTO: 0.7 % (ref 0–1.5)
BILIRUB SERPL-MCNC: 0.2 MG/DL (ref 0–1.2)
BILIRUB UR QL STRIP: NEGATIVE
BLD GP AB SCN SERPL QL: NEGATIVE
BUN SERPL-MCNC: 15 MG/DL (ref 8–23)
BUN/CREAT SERPL: 16 (ref 7–25)
CALCIUM SPEC-SCNC: 8.4 MG/DL (ref 8.2–9.6)
CHLORIDE SERPL-SCNC: 100 MMOL/L (ref 98–107)
CLARITY UR: CLEAR
CO2 SERPL-SCNC: 29 MMOL/L (ref 22–29)
COLOR UR: ABNORMAL
CREAT SERPL-MCNC: 0.94 MG/DL (ref 0.57–1)
CRP SERPL-MCNC: 2.48 MG/DL (ref 0–0.5)
D-LACTATE SERPL-SCNC: 1.7 MMOL/L (ref 0.5–2)
DEPRECATED RDW RBC AUTO: 61.9 FL (ref 37–54)
EOSINOPHIL # BLD AUTO: 0.04 10*3/MM3 (ref 0–0.4)
EOSINOPHIL NFR BLD AUTO: 0.7 % (ref 0.3–6.2)
ERYTHROCYTE [DISTWIDTH] IN BLOOD BY AUTOMATED COUNT: 15.6 % (ref 12.3–15.4)
FERRITIN SERPL-MCNC: 586.1 NG/ML (ref 13–150)
GFR SERPL CREATININE-BSD FRML MDRD: 56 ML/MIN/1.73
GLOBULIN UR ELPH-MCNC: 3.4 GM/DL
GLUCOSE SERPL-MCNC: 98 MG/DL (ref 65–99)
GLUCOSE UR STRIP-MCNC: NEGATIVE MG/DL
HCT VFR BLD AUTO: 28.7 % (ref 34–46.6)
HGB BLD-MCNC: 9.2 G/DL (ref 12–15.9)
HGB UR QL STRIP.AUTO: NEGATIVE
HOLD SPECIMEN: NORMAL
HOLD SPECIMEN: NORMAL
HYALINE CASTS UR QL AUTO: ABNORMAL /LPF
IMM GRANULOCYTES # BLD AUTO: 0.03 10*3/MM3 (ref 0–0.05)
IMM GRANULOCYTES NFR BLD AUTO: 0.6 % (ref 0–0.5)
KETONES UR QL STRIP: ABNORMAL
LDH SERPL-CCNC: 194 U/L (ref 135–214)
LEUKOCYTE ESTERASE UR QL STRIP.AUTO: ABNORMAL
LYMPHOCYTES # BLD AUTO: 1.53 10*3/MM3 (ref 0.7–3.1)
LYMPHOCYTES NFR BLD AUTO: 28.4 % (ref 19.6–45.3)
MCH RBC QN AUTO: 34.3 PG (ref 26.6–33)
MCHC RBC AUTO-ENTMCNC: 32.1 G/DL (ref 31.5–35.7)
MCV RBC AUTO: 107.1 FL (ref 79–97)
MONOCYTES # BLD AUTO: 0.57 10*3/MM3 (ref 0.1–0.9)
MONOCYTES NFR BLD AUTO: 10.6 % (ref 5–12)
NEUTROPHILS NFR BLD AUTO: 3.18 10*3/MM3 (ref 1.7–7)
NEUTROPHILS NFR BLD AUTO: 59 % (ref 42.7–76)
NITRITE UR QL STRIP: NEGATIVE
NRBC BLD AUTO-RTO: 0 /100 WBC (ref 0–0.2)
NT-PROBNP SERPL-MCNC: 967.3 PG/ML (ref 0–1800)
PH UR STRIP.AUTO: 7 [PH] (ref 5–8)
PLATELET # BLD AUTO: 412 10*3/MM3 (ref 140–450)
PMV BLD AUTO: 9.1 FL (ref 6–12)
POTASSIUM SERPL-SCNC: 3.8 MMOL/L (ref 3.5–5.2)
PROCALCITONIN SERPL-MCNC: 0.19 NG/ML (ref 0–0.25)
PROT SERPL-MCNC: 6.3 G/DL (ref 6–8.5)
PROT UR QL STRIP: ABNORMAL
RBC # BLD AUTO: 2.68 10*6/MM3 (ref 3.77–5.28)
RBC # UR: ABNORMAL /HPF
REF LAB TEST METHOD: ABNORMAL
RH BLD: NEGATIVE
SODIUM SERPL-SCNC: 137 MMOL/L (ref 136–145)
SP GR UR STRIP: 1.02 (ref 1–1.03)
SQUAMOUS #/AREA URNS HPF: ABNORMAL /HPF
T&S EXPIRATION DATE: NORMAL
TROPONIN T SERPL-MCNC: <0.01 NG/ML (ref 0–0.03)
UROBILINOGEN UR QL STRIP: ABNORMAL
WBC # BLD AUTO: 5.39 10*3/MM3 (ref 3.4–10.8)
WBC UR QL AUTO: ABNORMAL /HPF
WHOLE BLOOD HOLD SPECIMEN: NORMAL
WHOLE BLOOD HOLD SPECIMEN: NORMAL
YEAST URNS QL MICRO: ABNORMAL /HPF

## 2020-10-09 PROCEDURE — 86901 BLOOD TYPING SEROLOGIC RH(D): CPT | Performed by: INTERNAL MEDICINE

## 2020-10-09 PROCEDURE — 83615 LACTATE (LD) (LDH) ENZYME: CPT | Performed by: INTERNAL MEDICINE

## 2020-10-09 PROCEDURE — 94640 AIRWAY INHALATION TREATMENT: CPT

## 2020-10-09 PROCEDURE — 25010000002 ENOXAPARIN PER 10 MG: Performed by: INTERNAL MEDICINE

## 2020-10-09 PROCEDURE — 85025 COMPLETE CBC W/AUTO DIFF WBC: CPT | Performed by: EMERGENCY MEDICINE

## 2020-10-09 PROCEDURE — 81001 URINALYSIS AUTO W/SCOPE: CPT | Performed by: EMERGENCY MEDICINE

## 2020-10-09 PROCEDURE — 99285 EMERGENCY DEPT VISIT HI MDM: CPT

## 2020-10-09 PROCEDURE — 86140 C-REACTIVE PROTEIN: CPT | Performed by: EMERGENCY MEDICINE

## 2020-10-09 PROCEDURE — 82728 ASSAY OF FERRITIN: CPT | Performed by: INTERNAL MEDICINE

## 2020-10-09 PROCEDURE — 71045 X-RAY EXAM CHEST 1 VIEW: CPT

## 2020-10-09 PROCEDURE — 84484 ASSAY OF TROPONIN QUANT: CPT | Performed by: EMERGENCY MEDICINE

## 2020-10-09 PROCEDURE — 93005 ELECTROCARDIOGRAM TRACING: CPT | Performed by: EMERGENCY MEDICINE

## 2020-10-09 PROCEDURE — 87086 URINE CULTURE/COLONY COUNT: CPT | Performed by: EMERGENCY MEDICINE

## 2020-10-09 PROCEDURE — 87040 BLOOD CULTURE FOR BACTERIA: CPT | Performed by: EMERGENCY MEDICINE

## 2020-10-09 PROCEDURE — 80053 COMPREHEN METABOLIC PANEL: CPT | Performed by: EMERGENCY MEDICINE

## 2020-10-09 PROCEDURE — 99223 1ST HOSP IP/OBS HIGH 75: CPT | Performed by: INTERNAL MEDICINE

## 2020-10-09 PROCEDURE — 86900 BLOOD TYPING SEROLOGIC ABO: CPT | Performed by: INTERNAL MEDICINE

## 2020-10-09 PROCEDURE — 84145 PROCALCITONIN (PCT): CPT | Performed by: EMERGENCY MEDICINE

## 2020-10-09 PROCEDURE — 83605 ASSAY OF LACTIC ACID: CPT | Performed by: EMERGENCY MEDICINE

## 2020-10-09 PROCEDURE — 86850 RBC ANTIBODY SCREEN: CPT | Performed by: INTERNAL MEDICINE

## 2020-10-09 PROCEDURE — 83880 ASSAY OF NATRIURETIC PEPTIDE: CPT | Performed by: EMERGENCY MEDICINE

## 2020-10-09 RX ORDER — ONDANSETRON 4 MG/1
4 TABLET, FILM COATED ORAL EVERY 6 HOURS PRN
Status: DISCONTINUED | OUTPATIENT
Start: 2020-10-09 | End: 2020-10-15 | Stop reason: HOSPADM

## 2020-10-09 RX ORDER — DILTIAZEM HYDROCHLORIDE 120 MG/1
120 CAPSULE, COATED, EXTENDED RELEASE ORAL DAILY
Status: DISCONTINUED | OUTPATIENT
Start: 2020-10-09 | End: 2020-10-15 | Stop reason: HOSPADM

## 2020-10-09 RX ORDER — FOLIC ACID 1 MG/1
1000 TABLET ORAL DAILY
Status: DISCONTINUED | OUTPATIENT
Start: 2020-10-09 | End: 2020-10-15 | Stop reason: HOSPADM

## 2020-10-09 RX ORDER — SODIUM CHLORIDE 0.9 % (FLUSH) 0.9 %
10 SYRINGE (ML) INJECTION AS NEEDED
Status: DISCONTINUED | OUTPATIENT
Start: 2020-10-09 | End: 2020-10-15 | Stop reason: HOSPADM

## 2020-10-09 RX ORDER — FAMOTIDINE 20 MG/1
20 TABLET, FILM COATED ORAL 2 TIMES DAILY
Status: DISCONTINUED | OUTPATIENT
Start: 2020-10-09 | End: 2020-10-12

## 2020-10-09 RX ORDER — MAGNESIUM SULFATE HEPTAHYDRATE 40 MG/ML
2 INJECTION, SOLUTION INTRAVENOUS AS NEEDED
Status: DISCONTINUED | OUTPATIENT
Start: 2020-10-09 | End: 2020-10-15 | Stop reason: HOSPADM

## 2020-10-09 RX ORDER — POLYETHYLENE GLYCOL 3350 17 G/17G
17 POWDER, FOR SOLUTION ORAL DAILY
Status: DISCONTINUED | OUTPATIENT
Start: 2020-10-09 | End: 2020-10-15 | Stop reason: HOSPADM

## 2020-10-09 RX ORDER — QUETIAPINE FUMARATE 25 MG/1
25 TABLET, FILM COATED ORAL NIGHTLY
Status: DISCONTINUED | OUTPATIENT
Start: 2020-10-09 | End: 2020-10-15 | Stop reason: HOSPADM

## 2020-10-09 RX ORDER — ASPIRIN 81 MG/1
81 TABLET ORAL DAILY
Status: DISCONTINUED | OUTPATIENT
Start: 2020-10-09 | End: 2020-10-15 | Stop reason: HOSPADM

## 2020-10-09 RX ORDER — DOCUSATE SODIUM 100 MG/1
100 CAPSULE, LIQUID FILLED ORAL DAILY
Status: DISCONTINUED | OUTPATIENT
Start: 2020-10-09 | End: 2020-10-15 | Stop reason: HOSPADM

## 2020-10-09 RX ORDER — ONDANSETRON 2 MG/ML
4 INJECTION INTRAMUSCULAR; INTRAVENOUS EVERY 6 HOURS PRN
Status: DISCONTINUED | OUTPATIENT
Start: 2020-10-09 | End: 2020-10-15 | Stop reason: HOSPADM

## 2020-10-09 RX ORDER — AMOXICILLIN 250 MG
2 CAPSULE ORAL 2 TIMES DAILY PRN
Status: DISCONTINUED | OUTPATIENT
Start: 2020-10-09 | End: 2020-10-15 | Stop reason: HOSPADM

## 2020-10-09 RX ORDER — ALBUTEROL SULFATE 90 UG/1
6 AEROSOL, METERED RESPIRATORY (INHALATION) EVERY 4 HOURS PRN
Status: DISCONTINUED | OUTPATIENT
Start: 2020-10-09 | End: 2020-10-15 | Stop reason: HOSPADM

## 2020-10-09 RX ORDER — POTASSIUM CHLORIDE 7.45 MG/ML
10 INJECTION INTRAVENOUS
Status: DISCONTINUED | OUTPATIENT
Start: 2020-10-09 | End: 2020-10-15 | Stop reason: HOSPADM

## 2020-10-09 RX ORDER — MULTIVIT AND MINERALS-FERROUS GLUCONATE 9 MG IRON/15 ML ORAL LIQUID 9 MG/15 ML
15 LIQUID (ML) ORAL DAILY
Status: DISCONTINUED | OUTPATIENT
Start: 2020-10-09 | End: 2020-10-15 | Stop reason: HOSPADM

## 2020-10-09 RX ORDER — MAGNESIUM SULFATE HEPTAHYDRATE 40 MG/ML
4 INJECTION, SOLUTION INTRAVENOUS AS NEEDED
Status: DISCONTINUED | OUTPATIENT
Start: 2020-10-09 | End: 2020-10-15 | Stop reason: HOSPADM

## 2020-10-09 RX ORDER — CHOLECALCIFEROL (VITAMIN D3) 125 MCG
5 CAPSULE ORAL NIGHTLY PRN
Status: DISCONTINUED | OUTPATIENT
Start: 2020-10-09 | End: 2020-10-15 | Stop reason: HOSPADM

## 2020-10-09 RX ORDER — BUDESONIDE AND FORMOTEROL FUMARATE DIHYDRATE 160; 4.5 UG/1; UG/1
2 AEROSOL RESPIRATORY (INHALATION)
Status: DISCONTINUED | OUTPATIENT
Start: 2020-10-09 | End: 2020-10-15 | Stop reason: HOSPADM

## 2020-10-09 RX ORDER — POTASSIUM CHLORIDE 750 MG/1
40 CAPSULE, EXTENDED RELEASE ORAL AS NEEDED
Status: DISCONTINUED | OUTPATIENT
Start: 2020-10-09 | End: 2020-10-15 | Stop reason: HOSPADM

## 2020-10-09 RX ORDER — ACETAMINOPHEN 500 MG
1000 TABLET ORAL EVERY 8 HOURS SCHEDULED
Status: DISCONTINUED | OUTPATIENT
Start: 2020-10-09 | End: 2020-10-15 | Stop reason: HOSPADM

## 2020-10-09 RX ORDER — L.ACID,PARA/B.BIFIDUM/S.THERM 8B CELL
1 CAPSULE ORAL DAILY
Status: DISCONTINUED | OUTPATIENT
Start: 2020-10-09 | End: 2020-10-15 | Stop reason: HOSPADM

## 2020-10-09 RX ORDER — SODIUM CHLORIDE 0.9 % (FLUSH) 0.9 %
10 SYRINGE (ML) INJECTION EVERY 12 HOURS SCHEDULED
Status: DISCONTINUED | OUTPATIENT
Start: 2020-10-09 | End: 2020-10-15 | Stop reason: HOSPADM

## 2020-10-09 RX ORDER — POTASSIUM CHLORIDE 1.5 G/1.77G
40 POWDER, FOR SOLUTION ORAL AS NEEDED
Status: DISCONTINUED | OUTPATIENT
Start: 2020-10-09 | End: 2020-10-15 | Stop reason: HOSPADM

## 2020-10-09 RX ADMIN — POLYETHYLENE GLYCOL 3350 17 G: 17 POWDER, FOR SOLUTION ORAL at 17:21

## 2020-10-09 RX ADMIN — ACETAMINOPHEN 1000 MG: 500 TABLET, FILM COATED ORAL at 21:41

## 2020-10-09 RX ADMIN — Medication 1 CAPSULE: at 17:22

## 2020-10-09 RX ADMIN — QUETIAPINE FUMARATE 25 MG: 25 TABLET ORAL at 21:41

## 2020-10-09 RX ADMIN — FAMOTIDINE 20 MG: 20 TABLET, FILM COATED ORAL at 21:41

## 2020-10-09 RX ADMIN — DILTIAZEM HYDROCHLORIDE 120 MG: 120 CAPSULE, COATED, EXTENDED RELEASE ORAL at 17:22

## 2020-10-09 RX ADMIN — BUDESONIDE AND FORMOTEROL FUMARATE DIHYDRATE 2 PUFF: 160; 4.5 AEROSOL RESPIRATORY (INHALATION) at 20:51

## 2020-10-09 RX ADMIN — ACETAMINOPHEN 1000 MG: 500 TABLET, FILM COATED ORAL at 17:22

## 2020-10-09 RX ADMIN — DOCUSATE SODIUM 100 MG: 100 CAPSULE ORAL at 17:22

## 2020-10-09 RX ADMIN — ENOXAPARIN SODIUM 40 MG: 40 INJECTION SUBCUTANEOUS at 17:21

## 2020-10-09 RX ADMIN — ASPIRIN 81 MG: 81 TABLET, COATED ORAL at 17:22

## 2020-10-09 RX ADMIN — FOLIC ACID 1000 MCG: 1 TABLET ORAL at 17:22

## 2020-10-09 NOTE — TELEPHONE ENCOUNTER
PATIENT HAS BEEN DIAGNOSED WITH COVID. VIJI (HER DAUGHTER) IS CALLING WITH CONCERNS ABOUT HER MOTHER'S LIVING SITUATION. SHE STATES THAT HER MOTHER HAS BEEN IN AND OUT OF FACILITIES. RECENTLY, THE PATIENT LIVED WITH HER OTHER DAUGHTER. HOWEVER, DUE TO COVID AND INABILITY TO ATTEND WORK THIS SITUATION IS NOT IDEAL. AT THIS TIME THE PATIENT IS AT THE ER WITHIN [facility] DUE TO FALLING THIS MORNING. VIJI IS REQUESTING, IF AT ALL POSSIBLE, FOR THE HOSPITAL TO KEEP HER MOTHER DUE TO THIS RISK. VIJI IS CONCERNED WITH MAKING A GAME PLAN FOR WHEN HER MOTHER IS RELEASED FROM THE HOSPITAL BECAUSE NO FACILITY WILL ACCEPT HER MOTHER WITH COVID. PLEASE CONTACT AND ADVISE ASA.     CONTACT:  [phone]

## 2020-10-09 NOTE — PROGRESS NOTES
Discharge Planning Assessment  [facility]     Patient Name: Pati Carter  MRN: [id number]  Today's Date: 10/9/2020    Admit Date: 10/9/2020    Discharge Needs Assessment     Row Name 10/09/20 1219       Living Environment    Lives With  alone    Current Living Arrangements  home/apartment/condo    Potentially Unsafe Housing Conditions  unable to assess    Primary Care Provided by  self    Provides Primary Care For  no one    Family Caregiver if Needed  child(julia), adult    Family Caregiver Names  daughter, Danette Suresh, [phone]    Quality of Family Relationships  helpful;involved;supportive    Able to Return to Prior Arrangements  yes       Resource/Environmental Concerns    Resource/Environmental Concerns  none    Transportation Concerns  car, none       Transition Planning    Patient/Family Anticipates Transition to  home    Patient/Family Anticipated Services at Transition  none    Transportation Anticipated  family or friend will provide       Discharge Needs Assessment    Readmission Within the Last 30 Days  current reason for admission unrelated to previous admission    Anticipated Changes Related to Illness  none        Discharge Plan     Row Name 10/09/20 1222       Plan    Plan  initial    Plan Comments  Pt is living with a daughter currently who is unable to care for her. CM spoke with daughterDanette who states they are at a dead end on placement d/t COVID outbreak at [facility] and within their family. She has had rehab recently at [facility]. CM to follow and find acceptable dc plan. PCP is Latoya Bolaños        Continued Care and Services - Admitted Since 10/9/2020    Coordination has not been started for this encounter.     Selected Continued Care - Prior Encounters Includes selections from prior encounters from 7/11/2020 to 10/9/2020    Discharged on 9/28/2020 Admission date: 9/16/2020 - Discharge disposition: Skilled Nursing Facility (DC - External)    Destination     Service Provider  Selected Services Address Phone Fax    [facility]  [facility] [address], [place] [address] [phone]                      Demographic Summary    No documentation.       Functional Status    No documentation.       Psychosocial    No documentation.       Abuse/Neglect    No documentation.       Legal    No documentation.       Substance Abuse    No documentation.       Patient Forms    No documentation.           Destinee Thomas RN

## 2020-10-09 NOTE — H&P
[facility] Medicine Services  HISTORY AND PHYSICAL    Patient Name: Pati Carter  : 1930  MRN: [id number]  Primary Care Physician: Latoya Bolaños MD  Date of admission: 10/9/2020      Subjective   Subjective     Chief Complaint: weakness, myalgia    HPI:  Pati Carter is a 89 y.o. female brought in by her daughter for worsening weakness/myalgias. She left our facility about 1.5 weeks ago after being tx for colitis. Went to [place] but was quickly taken out by family once COVID outbreak started there but it is likely that [facility] is where she contracted COVID. She was diagnosed w/ COVID 19 on Wednesday and since that time has had increased weakness, myalgias, and rhinorrhea and was brought to ED due to that. Nothing has made her symptoms better or worse. Patient denies SOA worse than baseline, n/v.    Review of Systems   Gen- No fevers, chills  CV- No chest pain, palpitations  GI- No N/V/D, abd pain    All other systems reviewed and are negative.     Personal History     Past Medical History:   Diagnosis Date   • Anemia     Description: A.  Dx - borderline intermittent.   • Back pain    • Benign colonic polyp 2016    Description: A.  Dx .   • Ferny Bonnet syndrome 2020   • Compression fracture of lumbar vertebra (CMS/HCC)    • Constipation    • COPD (chronic obstructive pulmonary disease) (CMS/HCC)     Description: A.  Rule out chronic persistent asthma, COPD, or obliterative bronchiolitis.- Butch   • Degeneration of intervertebral disc of lumbar region     Description: A.  Diagnosed in 2013 with advanced multilevel with severe spinal stenosis, followed by Dr. Pillai for pain management.   • Gastroesophageal reflux disease    • Hearing loss    • History of colonoscopy 1999    NORMAL PER PATIENT    • History of mammogram 2011    NORMAL PER PT    • History of Papanicolaou smear of cervix 2010    NORMAL PER PT    • History of  varicella    • Hyperlipidemia     Description: A.  Dx 2006.   • Hypertension     Description: A. Dx 2001.   • Macular degeneration    • Ovarian mass     Dx 8/15- benign left cystic adnexal mass   • Rheumatoid arthritis (CMS/[facility])     Description: A.  Diagnosed in 2000 and and followed by Dr. Constantino (now Dr. Kaplan). B.  On methotrexate therapy since 2000. C.  Off low-dose prednisone therapy.       Past Surgical History:   Procedure Laterality Date   • APPENDECTOMY     • CARPAL TUNNEL RELEASE Left 01/01/2003    HISTORY OF NEUROPLASTY DECOMPRESSION MEDIAN NERVE AT CARPAL TUNNEL LEFT   • CATARACT EXTRACTION Bilateral 01/01/2009   • CHOLECYSTECTOMY  01/01/1962   • HIP CANNULATED SCREW PLACEMENT Right 1/25/2020    Procedure: HIP CANNULATED SCREW PLACEMENT RIGHT;  Surgeon: Karlos Blount MD;  Location: [facility];  Service: Orthopedics   • KNEE ARTHROSCOPY Left 01/01/2001    MENISCAL REPAIR   • KYPHOPLASTY  06/18/2015    T11 AND L1 (JOSE A)   • PELVIC LAPAROSCOPY  01/01/1996    REMOVAL OF BENIGN UTERINE AND RIGHT OVARIAN TUMORS   • SALPINGO OOPHORECTOMY Left 08/26/2015    REMOVAL OF LEFT OVARY AND TUBE (benign cystic mass)       Family History: family history includes Diabetes in her mother; Hypertension in her mother. Otherwise pertinent FHx was reviewed and unremarkable.     Social History:  reports that she has quit smoking. She has never used smokeless tobacco. She reports that she does not drink alcohol or use drugs.  Social History     Social History Narrative    Patient moved to [facility] assisted living 3/26/20. On 3/28/20 patient visited ED for fall-related injuries.  In response, family looking into hiring a , to visit patient BID for ADLs/ transfers. Family drives patient to [facility]       Medications:  Available home medication information reviewed.  (Not in a hospital admission)      Allergies   Allergen Reactions   • Bactrim  [Sulfamethoxazole-Trimethoprim] Other (See Comments)     Stomach cramps    • Ciprofloxacin Other (See Comments)     Other reaction(s): shaking  HCI TABS/ SHAKING   • Levofloxacin Diarrhea       Objective   Objective     Vital Signs:   Temp:  [99.1 °F (37.3 °C)] 99.1 °F (37.3 °C)  Heart Rate:  [] 92  Resp:  [25] 25  BP: (135-145)/(74-78) 144/78        Physical Exam   With patient's consent, physical exam was conducted via visual telemedicine encounter with bedside portion accommodated by nursing staff due to patient's current isolation requirements in the interest of PPE conservation.    Constitutional: No acute distress but appears to feel poorly, awake, alert, nontoxic, normal body habitus  HENT: NCAT, MMM, no conjunctival injection  Respiratory: Good effort, nonlabored respirations   Cardiovascular: RRR on tele  Musculoskeletal: No edema, normal muscle tone and mass for age  GI: Soft, no grimace, nondistended  Psychiatric: Appropriate affect, good insight and judgement, cooperative  Neurologic: Oriented x 3, movements symmetric BUE and BLE, speech clear and fluent  Skin: No visible rashes, no jaundice seen on exposed skin      Results Reviewed:  I have personally reviewed current lab and radiology data.    Results from last 7 days   Lab Units 10/09/20  1054   WBC 10*3/mm3 5.39   HEMOGLOBIN g/dL 9.2*   HEMATOCRIT % 28.7*   PLATELETS 10*3/mm3 412     Results from last 7 days   Lab Units 10/09/20  1054   SODIUM mmol/L 137   POTASSIUM mmol/L 3.8   CHLORIDE mmol/L 100   CO2 mmol/L 29.0   BUN mg/dL 15   CREATININE mg/dL 0.94   GLUCOSE mg/dL 98   CALCIUM mg/dL 8.4   ALT (SGPT) U/L 10   AST (SGOT) U/L 27   TROPONIN T ng/mL <0.010   PROBNP pg/mL 967.3   LACTATE mmol/L 1.7   PROCALCITONIN ng/mL 0.19     Estimated Creatinine Clearance: 39.4 mL/min (by C-G formula based on SCr of 0.94 mg/dL).  Brief Urine Lab Results  (Last result in the past 365 days)      Color   Clarity   Blood   Leuk Est   Nitrite   Protein   CREAT    Urine HCG        09/17/20 0454 Dark Yellow Clear Small (1+) Moderate (2+) Positive 30 mg/dL (1+)             CXR personally reviewed showing no acute airspace disease. Agree with interpretation.  Imaging Results (Last 24 Hours)     Procedure Component Value Units Date/Time    XR Chest 1 View [[id number]] Collected: 10/09/20 1133     Updated: 10/09/20 1208    Narrative:      EXAMINATION: XR CHEST 1 VW-      INDICATION: COVID-19 positive, dyspnea and fever.      COMPARISON: 09/22/2020.     FINDINGS: Portable chest reveals cardiac and mediastinal silhouettes  within normal limits. Underlying chronic and emphysematous changes are  seen in the lung fields bilaterally. No focal parenchymal opacification  present. No pleural effusion or pneumothorax. Degenerative change is  seen within the spine.           Impression:      Chronic changes seen diffusely throughout the lung fields.  Degenerative change is seen within the spine with no acute parenchymal  disease.     D:  10/09/2020  E:  10/09/2020                Assessment/Plan   Assessment & Plan     Active Hospital Problems    Diagnosis POA   • **COVID-19 [U07.1] Yes   • Immunosuppressed status on methotrexate [D84.9] Yes   • COPD (chronic obstructive pulmonary disease) (CMS/[facility]) [J44.9] Yes     Description: A.  Rule out chronic persistent asthma, COPD, or obliterative bronchiolitis.- Rae     • Rheumatoid arthritis (CMS/[facility]) [M06.9] Yes     Description: A.  Diagnosed in 2000 and and followed by Dr. Constantino (now Dr. Kaplan)- now Dr. Del HO  On methotrexate therapy since 2000.  C.  Off low-dose prednisone therapy.     • Hypertension [I10] Yes     Description: A. Dx 2001.       89 y/o COVID + female brought in by family due to worsening fatigue, weakness.    COVID 19 infection  --She is currently not hypoxic w/ unremarkable inflammatory markers and thus does not require any specific therapy at this time. However I do think given her age and co-morbidities it is  important to watch her closely for next day or 2 with low threshold for initiation of therapy. I have already d/w daughter and gotten her consent for plasma/remdesivir use if needed.  --Furthermore her disposition is complicated by fact that 1 daughter definitely has COVID and is unable to care for patient and her other daughter is now experiencing COVID symptoms and will likely be positive as well. Patient likely needs rehab based on being transferred to [place] at last stay here. CM will follow along to help with this.  --Tylenol.  --PT/OT.    COPD w/out exacerbation  --Currently not hypoxic and not any more short of breath than baseline. Continue inhalers w/ low threshold for addition of steroids as above.  --ICS, pulmonary toilet.    RA   --Immunocompromised on MTX. Holding that med. Supportive care w/ pain control prn.    HTN  --Home diltiazem.     DVT prophylaxis: Lovenox      CODE STATUS:  DNR/DNI  Code Status and Medical Interventions:   Ordered at: 10/09/20 1200     Limited Support to NOT Include:    Intubation     Code Status:    No CPR     Medical Interventions (Level of Support Prior to Arrest):    Limited       Admission Status:  I believe this patient meets INPATIENT status due to COVID infection.  I feel patient’s risk for adverse outcomes and need for care warrant INPATIENT evaluation and I predict the patient’s care encounter to likely last beyond 2 midnights.    Lubna Madrid II, DO  10/09/20

## 2020-10-09 NOTE — PLAN OF CARE
Goal Outcome Evaluation:        Outcome Summary: Patient admitted to floor this shift. VSS on RA. No complaints of pain or shortness of air. Will cont to monitor.

## 2020-10-09 NOTE — TELEPHONE ENCOUNTER
Call please.  The decision whether to hospitalize her mother is up to the ER physician that is seeing her.  If she does get hospitalized, I do believe [facility] will take COVID patients.

## 2020-10-09 NOTE — ED PROVIDER NOTES
Subjective   Pt present with covid symptoms onset a few days ago.  2-3 day history of myalgias, dyspnea increased from baseline and rhinorrhea.  Tmax 99.2 but she has felt fevered.  No cough, sore throat, vomiting, diarrhea.  She had a headache which has resolved.  Her daughter tested positive for covid a few days ago and this morning pt received a positive result from a test performed previously.    Pt has COPD but is not oxygen dependent.  She also has RA and takes MTX.  She lives at home, not in [facility].        History provided by:  Patient      Review of Systems   Constitutional: Positive for fever.   HENT: Positive for rhinorrhea. Negative for sore throat.    Respiratory: Positive for shortness of breath. Negative for cough.    Gastrointestinal: Negative for diarrhea and vomiting.   Musculoskeletal: Positive for myalgias.   Neurological: Positive for headaches.   All other systems reviewed and are negative.      Past Medical History:   Diagnosis Date   • Anemia     Description: YUVAL Agrawal 2006- borderline intermittent.   • Back pain    • Benign colonic polyp 9/28/2016    Description: YUVAL Agrawal 1999.   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  Rule out chronic persistent asthma, COPD, or obliterative bronchiolitis.- Butch   • Degeneration of intervertebral disc of lumbar region     Description: A.  Diagnosed in April 2013 with advanced multilevel with severe spinal stenosis, followed by Dr. Pillai for pain management.   • Gastroesophageal reflux disease    • Hearing loss    • History of colonoscopy 01/01/1999    NORMAL PER PATIENT    • History of mammogram 01/01/2011    NORMAL PER PT    • History of Papanicolaou smear of cervix 01/01/2010    NORMAL PER PT    • History of varicella    • Hyperlipidemia     Description: YUVAL Agrawal 2006.   • Hypertension     Description: YUVAL Agrawal  2001.   • Macular degeneration    • Ovarian mass     Dx 8/15- benign left cystic adnexal mass   • Rheumatoid arthritis (CMS/HCC)     Description: A.  Diagnosed in 2000 and and followed by Dr. Constantino (now Dr. Kaplan). B.  On methotrexate therapy since 2000. C.  Off low-dose prednisone therapy.       Allergies   Allergen Reactions   • Bactrim [Sulfamethoxazole-Trimethoprim] Other (See Comments)     Stomach cramps    • Ciprofloxacin Other (See Comments)     Other reaction(s): shaking  HCI TABS/ SHAKING   • Levofloxacin Diarrhea       Past Surgical History:   Procedure Laterality Date   • APPENDECTOMY     • CARPAL TUNNEL RELEASE Left 01/01/2003    HISTORY OF NEUROPLASTY DECOMPRESSION MEDIAN NERVE AT CARPAL TUNNEL LEFT   • CATARACT EXTRACTION Bilateral 01/01/2009   • CHOLECYSTECTOMY  01/01/1962   • HIP CANNULATED SCREW PLACEMENT Right 1/25/2020    Procedure: HIP CANNULATED SCREW PLACEMENT RIGHT;  Surgeon: Karlos Blount MD;  Location: [facility];  Service: Orthopedics   • KNEE ARTHROSCOPY Left 01/01/2001    MENISCAL REPAIR   • KYPHOPLASTY  06/18/2015    T11 AND L1 (JOSE A)   • PELVIC LAPAROSCOPY  01/01/1996    REMOVAL OF BENIGN UTERINE AND RIGHT OVARIAN TUMORS   • SALPINGO OOPHORECTOMY Left 08/26/2015    REMOVAL OF LEFT OVARY AND TUBE (benign cystic mass)       Family History   Problem Relation Age of Onset   • Hypertension Mother    • Diabetes Mother        Social History     Socioeconomic History   • Marital status:      Spouse name: Not on file   • Number of children: Not on file   • Years of education: Not on file   • Highest education level: Not on file   Tobacco Use   • Smoking status: Former Smoker   • Smokeless tobacco: Never Used   Substance and Sexual Activity   • Alcohol use: No   • Drug use: No   • Sexual activity: Defer   Social History Narrative    Patient moved to [facility] 3/26/20. On 3/28/20 patient visited ED for fall-related injuries.  In response, family looking into  hiring a , to visit patient BID for ADLs/ transfers. Family drives patient to appts, but says [facility] provide in future.           Objective   Physical Exam  Vitals signs and nursing note reviewed.   Constitutional:       General: She is not in acute distress.     Appearance: Normal appearance. She is not ill-appearing.   HENT:      Head: Normocephalic and atraumatic.      Mouth/Throat:      Mouth: Mucous membranes are moist.   Eyes:      General: No scleral icterus.        Right eye: No discharge.         Left eye: No discharge.      Conjunctiva/sclera: Conjunctivae normal.   Neck:      Musculoskeletal: Normal range of motion and neck supple.   Cardiovascular:      Rate and Rhythm: Regular rhythm. Tachycardia present.      Heart sounds: No murmur.   Pulmonary:      Effort: Pulmonary effort is normal. Tachypnea present. No respiratory distress.      Breath sounds: Normal breath sounds. No wheezing.   Abdominal:      General: Bowel sounds are normal. There is no distension.      Palpations: Abdomen is soft.      Tenderness: There is no abdominal tenderness. There is no guarding or rebound.   Musculoskeletal: Normal range of motion.         General: No swelling.   Skin:     General: Skin is warm and dry.      Findings: No rash.   Neurological:      General: No focal deficit present.      Mental Status: She is alert and oriented to person, place, and time. Mental status is at baseline.   Psychiatric:         Mood and Affect: Mood normal.         Behavior: Behavior normal.         Thought Content: Thought content normal.         Procedures           ED Course         She brought positive covid result with her, it is an NP swab PCR test with positive result, which I reviewed.    EKG ST.  Workup fairly benign but she has underlying lung disease, is immunocompromised, and is 89.                                  MDM  Number of Diagnoses or Management Options     Amount and/or Complexity of Data  Reviewed  Decide to obtain previous medical records or to obtain history from someone other than the patient: yes  Review and summarize past medical records: yes  Discuss the patient with other providers: yes  Independent visualization of images, tracings, or specimens: yes        Final diagnoses:   COVID-19            Blair Ventura MD  10/1934

## 2020-10-09 NOTE — TELEPHONE ENCOUNTER
Spoke with Danette and she said she was admitted to [facility] with a possibility of being admitted to [facility]     She states [place] had an outbreak of Covid

## 2020-10-10 LAB
ANION GAP SERPL CALCULATED.3IONS-SCNC: 7 MMOL/L (ref 5–15)
BACTERIA SPEC AEROBE CULT: NORMAL
BUN SERPL-MCNC: 11 MG/DL (ref 8–23)
BUN/CREAT SERPL: 12.5 (ref 7–25)
CALCIUM SPEC-SCNC: 8.1 MG/DL (ref 8.2–9.6)
CHLORIDE SERPL-SCNC: 105 MMOL/L (ref 98–107)
CO2 SERPL-SCNC: 29 MMOL/L (ref 22–29)
CREAT SERPL-MCNC: 0.88 MG/DL (ref 0.57–1)
D DIMER PPP FEU-MCNC: 3.86 MCGFEU/ML (ref 0–0.56)
DEPRECATED RDW RBC AUTO: 62 FL (ref 37–54)
ERYTHROCYTE [DISTWIDTH] IN BLOOD BY AUTOMATED COUNT: 15.6 % (ref 12.3–15.4)
FERRITIN SERPL-MCNC: 578.5 NG/ML (ref 13–150)
GFR SERPL CREATININE-BSD FRML MDRD: 60 ML/MIN/1.73
GLUCOSE SERPL-MCNC: 101 MG/DL (ref 65–99)
HCT VFR BLD AUTO: 29.3 % (ref 34–46.6)
HGB BLD-MCNC: 9 G/DL (ref 12–15.9)
LDH SERPL-CCNC: 161 U/L (ref 135–214)
MCH RBC QN AUTO: 33.7 PG (ref 26.6–33)
MCHC RBC AUTO-ENTMCNC: 30.7 G/DL (ref 31.5–35.7)
MCV RBC AUTO: 109.7 FL (ref 79–97)
PLATELET # BLD AUTO: 330 10*3/MM3 (ref 140–450)
PMV BLD AUTO: 9.3 FL (ref 6–12)
POTASSIUM SERPL-SCNC: 3.7 MMOL/L (ref 3.5–5.2)
RBC # BLD AUTO: 2.67 10*6/MM3 (ref 3.77–5.28)
SODIUM SERPL-SCNC: 141 MMOL/L (ref 136–145)
WBC # BLD AUTO: 4.49 10*3/MM3 (ref 3.4–10.8)

## 2020-10-10 PROCEDURE — 94799 UNLISTED PULMONARY SVC/PX: CPT

## 2020-10-10 PROCEDURE — 97162 PT EVAL MOD COMPLEX 30 MIN: CPT

## 2020-10-10 PROCEDURE — 25010000002 ENOXAPARIN PER 10 MG: Performed by: INTERNAL MEDICINE

## 2020-10-10 PROCEDURE — 82728 ASSAY OF FERRITIN: CPT | Performed by: INTERNAL MEDICINE

## 2020-10-10 PROCEDURE — 83615 LACTATE (LD) (LDH) ENZYME: CPT | Performed by: INTERNAL MEDICINE

## 2020-10-10 PROCEDURE — XW033E5 INTRODUCTION OF REMDESIVIR ANTI-INFECTIVE INTO PERIPHERAL VEIN, PERCUTANEOUS APPROACH, NEW TECHNOLOGY GROUP 5: ICD-10-PCS | Performed by: INTERNAL MEDICINE

## 2020-10-10 PROCEDURE — XW13325 TRANSFUSION OF CONVALESCENT PLASMA (NONAUTOLOGOUS) INTO PERIPHERAL VEIN, PERCUTANEOUS APPROACH, NEW TECHNOLOGY GROUP 5: ICD-10-PCS | Performed by: INTERNAL MEDICINE

## 2020-10-10 PROCEDURE — 80048 BASIC METABOLIC PNL TOTAL CA: CPT | Performed by: INTERNAL MEDICINE

## 2020-10-10 PROCEDURE — 85379 FIBRIN DEGRADATION QUANT: CPT | Performed by: INTERNAL MEDICINE

## 2020-10-10 PROCEDURE — 99232 SBSQ HOSP IP/OBS MODERATE 35: CPT | Performed by: INTERNAL MEDICINE

## 2020-10-10 PROCEDURE — 63710000001 DEXAMETHASONE PER 0.25 MG: Performed by: INTERNAL MEDICINE

## 2020-10-10 PROCEDURE — 85027 COMPLETE CBC AUTOMATED: CPT | Performed by: INTERNAL MEDICINE

## 2020-10-10 RX ORDER — DEXAMETHASONE 4 MG/1
6 TABLET ORAL DAILY
Status: DISCONTINUED | OUTPATIENT
Start: 2020-10-10 | End: 2020-10-15 | Stop reason: HOSPADM

## 2020-10-10 RX ADMIN — BUDESONIDE AND FORMOTEROL FUMARATE DIHYDRATE 2 PUFF: 160; 4.5 AEROSOL RESPIRATORY (INHALATION) at 20:27

## 2020-10-10 RX ADMIN — ACETAMINOPHEN 1000 MG: 500 TABLET, FILM COATED ORAL at 06:51

## 2020-10-10 RX ADMIN — REMDESIVIR 200 MG: 100 INJECTION, POWDER, LYOPHILIZED, FOR SOLUTION INTRAVENOUS at 20:07

## 2020-10-10 RX ADMIN — ASPIRIN 81 MG: 81 TABLET, COATED ORAL at 09:35

## 2020-10-10 RX ADMIN — FAMOTIDINE 20 MG: 20 TABLET, FILM COATED ORAL at 09:35

## 2020-10-10 RX ADMIN — BUDESONIDE AND FORMOTEROL FUMARATE DIHYDRATE 2 PUFF: 160; 4.5 AEROSOL RESPIRATORY (INHALATION) at 08:35

## 2020-10-10 RX ADMIN — ACETAMINOPHEN 1000 MG: 500 TABLET, FILM COATED ORAL at 20:13

## 2020-10-10 RX ADMIN — DEXAMETHASONE 6 MG: 4 TABLET ORAL at 18:16

## 2020-10-10 RX ADMIN — MULTIVITAMIN 15 ML: LIQUID ORAL at 09:34

## 2020-10-10 RX ADMIN — ACETAMINOPHEN 1000 MG: 500 TABLET, FILM COATED ORAL at 14:35

## 2020-10-10 RX ADMIN — FOLIC ACID 1000 MCG: 1 TABLET ORAL at 09:34

## 2020-10-10 RX ADMIN — SODIUM CHLORIDE, PRESERVATIVE FREE 10 ML: 5 INJECTION INTRAVENOUS at 09:35

## 2020-10-10 RX ADMIN — DILTIAZEM HYDROCHLORIDE 120 MG: 120 CAPSULE, COATED, EXTENDED RELEASE ORAL at 09:34

## 2020-10-10 RX ADMIN — DICLOFENAC 2 G: 10 GEL TOPICAL at 20:14

## 2020-10-10 RX ADMIN — QUETIAPINE FUMARATE 25 MG: 25 TABLET ORAL at 20:13

## 2020-10-10 RX ADMIN — POLYETHYLENE GLYCOL 3350 17 G: 17 POWDER, FOR SOLUTION ORAL at 09:34

## 2020-10-10 RX ADMIN — DICLOFENAC 2 G: 10 GEL TOPICAL at 17:03

## 2020-10-10 RX ADMIN — DICLOFENAC 2 G: 10 GEL TOPICAL at 09:34

## 2020-10-10 RX ADMIN — SODIUM CHLORIDE, PRESERVATIVE FREE 10 ML: 5 INJECTION INTRAVENOUS at 20:07

## 2020-10-10 RX ADMIN — ENOXAPARIN SODIUM 40 MG: 40 INJECTION SUBCUTANEOUS at 09:34

## 2020-10-10 RX ADMIN — DOCUSATE SODIUM 100 MG: 100 CAPSULE ORAL at 09:35

## 2020-10-10 RX ADMIN — Medication 1 CAPSULE: at 09:35

## 2020-10-10 NOTE — PLAN OF CARE
Problem: Adult Inpatient Plan of Care  Goal: Plan of Care Review  Recent Flowsheet Documentation  Taken 10/10/2020 1035 by Nayeli Chapa, PT  Progress: improving  Plan of Care Reviewed With: patient  Outcome Summary: PT eval completed.  Patient presents w/ decreased strength, impaired balance, gait instability, and decreased independence w/ functional mobility.  Pt. performed bed mobility w/ min A, transfers w/ min A, and ambulated 5ft w/ RW and min A.  Gait distance limited by weakness.  Skilled IPPT indicated to improve patient's safety and independence w/ functional mobility.  Recommend SNF rehab at D/C.

## 2020-10-10 NOTE — PLAN OF CARE
Problem: Adult Inpatient Plan of Care  Goal: Plan of Care Review  Outcome: Ongoing, Progressing  Flowsheets (Taken 10/10/2020 0433)  Progress: improving  Plan of Care Reviewed With: patient  Outcome Summary: VSS.  No c/o pain overnight.  No acute events.  Confused.  Room Air.  Will continue to monitor.   Goal Outcome Evaluation:  Plan of Care Reviewed With: patient  Progress: improving  Outcome Summary: VSS.  No c/o pain overnight.  No acute events.  Confused.  Room Air.  Will continue to monitor.

## 2020-10-10 NOTE — CONSULTS
INFECTIOUS DISEASE CONSULT/INITIAL HOSPITAL VISIT    Pati Carter  7/26/1930  [id number]    Date of Consult: 10/10/2020    Admission Date: 10/9/2020      Requesting Provider: No ref. provider found  Evaluating Physician: Danny Moscoso MD    Reason for Consultation: COVID-19 pneumonia    History of present illness:    Patient is a 89 y.o. female with COPD and rheumatoid arthritis on methotrexate therapy who is seen today for evaluation of COVID-19 pneumonia.  She was just discharged from here late last month after a hospital course for acute colitis (C. difficile negative) with a history of recent E. coli/Gilead a UTI.  She was seen by Dr. Mathis and was discharged to [facility].  Her family removed her from [facility] when they realized that there was a COVID-19 outbreak there.  She has recently developed worsening weakness, myalgias, and fatigue.  She underwent COVID-19 testing on Wednesday which was positive.  She was admitted yesterday and initially did not require supplemental oxygen but this evening she has developed hypoxemia with an O2 saturation of 88% on room air and is requiring 2 L of supplemental oxygen.  She has now been placed on Decadron and Remdesivir therapy.  She denies severe dyspnea and cough.  She denies loss of taste or smell.  She complains of headache, weakness, fatigue, and arthralgias.  2 of her daughters apparently have COVID-19 also.    Past Medical History:   Diagnosis Date   • Anemia     Description: A.  Dx 2006- borderline intermittent.   • Back pain    • Benign colonic polyp 9/28/2016    Description: A.  Dx 1999.   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  Rule out chronic persistent asthma, COPD, or obliterative bronchiolitis.- Butch   • Degeneration of intervertebral disc of lumbar region      Description: A.  Diagnosed in April 2013 with advanced multilevel with severe spinal stenosis, followed by Dr. Pillai for pain management.   • Gastroesophageal reflux disease    • Hearing loss    • History of colonoscopy 01/01/1999    NORMAL PER PATIENT    • History of mammogram 01/01/2011    NORMAL PER PT    • History of Papanicolaou smear of cervix 01/01/2010    NORMAL PER PT    • History of varicella    • Hyperlipidemia     Description: A.  Dx 2006.   • Hypertension     Description: A. Dx 2001.   • Macular degeneration    • Ovarian mass     Dx 8/15- benign left cystic adnexal mass   • Rheumatoid arthritis (CMS/HCC)     Description: A.  Diagnosed in 2000 and and followed by Dr. Constantino (now Dr. Kaplan). B.  On methotrexate therapy since 2000. C.  Off low-dose prednisone therapy.       Past Surgical History:   Procedure Laterality Date   • APPENDECTOMY     • CARPAL TUNNEL RELEASE Left 01/01/2003    HISTORY OF NEUROPLASTY DECOMPRESSION MEDIAN NERVE AT CARPAL TUNNEL LEFT   • CATARACT EXTRACTION Bilateral 01/01/2009   • CHOLECYSTECTOMY  01/01/1962   • HIP CANNULATED SCREW PLACEMENT Right 1/25/2020    Procedure: HIP CANNULATED SCREW PLACEMENT RIGHT;  Surgeon: Karlos Blount MD;  Location: [facility];  Service: Orthopedics   • KNEE ARTHROSCOPY Left 01/01/2001    MENISCAL REPAIR   • KYPHOPLASTY  06/18/2015    T11 AND L1 (JOSE A)   • PELVIC LAPAROSCOPY  01/01/1996    REMOVAL OF BENIGN UTERINE AND RIGHT OVARIAN TUMORS   • SALPINGO OOPHORECTOMY Left 08/26/2015    REMOVAL OF LEFT OVARY AND TUBE (benign cystic mass)       Family History   Problem Relation Age of Onset   • Hypertension Mother    • Diabetes Mother        Social History     Socioeconomic History   • Marital status:      Spouse name: Not on file   • Number of children: Not on file   • Years of education: Not on file   • Highest education level: Not on file   Tobacco Use   • Smoking status: Former Smoker   • Smokeless tobacco: Never Used   Substance  and Sexual Activity   • Alcohol use: No   • Drug use: No   • Sexual activity: Defer   Social History Narrative    Patient moved to [facility] assisted living 3/26/20. On 3/28/20 patient visited ED for fall-related injuries.  In response, family looking into hiring a , to visit patient BID for ADLs/ transfers. Family drives patient to appts, but says Jm Encinas may provide in future.       Allergies   Allergen Reactions   • Bactrim [Sulfamethoxazole-Trimethoprim] Other (See Comments)     Stomach cramps    • Ciprofloxacin Other (See Comments)     Other reaction(s): shaking  HCI TABS/ SHAKING   • Levofloxacin Diarrhea         Medication:    Current Facility-Administered Medications:   •  acetaminophen (TYLENOL) tablet 1,000 mg, 1,000 mg, Oral, Q8H, Mercy, Lubna M II, DO, 1,000 mg at 10/10/20 1435  •  albuterol sulfate HFA (PROVENTIL HFA;VENTOLIN HFA;PROAIR HFA) inhaler 6 puff, 6 puff, Inhalation, Q4H PRN, Mercy, Lubna M II, DO  •  aspirin EC tablet 81 mg, 81 mg, Oral, Daily, Mercy, Lubna M II, DO, 81 mg at 10/10/20 0935  •  budesonide-formoterol (SYMBICORT) 160-4.5 MCG/ACT inhaler 2 puff, 2 puff, Inhalation, BID - RT, Mercy, Lubna M II, DO, 2 puff at 10/10/20 0835  •  dexamethasone (DECADRON) tablet 6 mg, 6 mg, Oral, Daily, Mercy, Lubna M II, DO, 6 mg at 10/10/20 1816  •  diclofenac (VOLTAREN) 1 % gel 2 g, 2 g, Topical, TID, Mercy, Lubna M II, DO, 2 g at 10/10/20 1703  •  dilTIAZem CD (CARDIZEM CD) 24 hr capsule 120 mg, 120 mg, Oral, Daily, Mercy, Lubna M II, DO, 120 mg at 10/10/20 0934  •  docusate sodium (COLACE) capsule 100 mg, 100 mg, Oral, Daily, Mercy, Lubna M II, DO, 100 mg at 10/10/20 0935  •  enoxaparin (LOVENOX) syringe 40 mg, 40 mg, Subcutaneous, Daily, Lubna Madrid II, DO, 40 mg at 10/10/20 0934  •  famotidine (PEPCID) tablet 20 mg, 20 mg, Oral, BID, Lubna Madrid M II, DO, 20 mg at 10/10/20 0935  •  folic acid (FOLVITE) tablet 1,000 mcg, 1,000 mcg, Oral,  Daily, Mercy, Lubna M II, DO, 1,000 mcg at 10/10/20 0934  •  INV GS-5734 remdesivir 200 mg in sodium chloride 0.9 % 250 mL IVPB (powder vial), 200 mg, Intravenous, Q24H **FOLLOWED BY** [START ON 10/11/2020] INV GS-5734 remdesivir 100 mg in sodium chloride 0.9 % 250 mL IVPB (powder vial), 100 mg, Intravenous, Q24H, Mercy, Lubna M II, DO  •  lactobacillus acidophilus (RISAQUAD) capsule 1 capsule, 1 capsule, Oral, Daily, Mercy, Lubna M II, DO, 1 capsule at 10/10/20 0935  •  Magnesium Sulfate 2 gram Bolus, followed by 8 gram infusion (total Mg dose 10 grams)- Mg less than or equal to 1mg/dL, 2 g, Intravenous, PRN **OR** Magnesium Sulfate 2 gram / 50mL Infusion (GIVE X 3 BAGS TO EQUAL 6GM TOTAL DOSE) - Mg 1.1 - 1.5 mg/dl, 2 g, Intravenous, PRN **OR** Magnesium Sulfate 4 gram infusion- Mg 1.6-1.9 mg/dL, 4 g, Intravenous, PRN, Mercy, Lubna M II, DO  •  melatonin tablet 5 mg, 5 mg, Oral, Nightly PRN, Mercy, Lubna M II, DO  •  multivitamin and minerals liquid 15 mL, 15 mL, Oral, Daily, Mercy, Lubna M II, DO, 15 mL at 10/10/20 0934  •  ondansetron (ZOFRAN) tablet 4 mg, 4 mg, Oral, Q6H PRN **OR** ondansetron (ZOFRAN) injection 4 mg, 4 mg, Intravenous, Q6H PRN, Mercy, Lubna M II, DO  •  Pharmacy Consult, , Does not apply, Continuous PRN, Mercy, Lubna M II, DO  •  polyethylene glycol (MIRALAX) packet 17 g, 17 g, Oral, Daily, Mercy, Lubna M II, DO, 17 g at 10/10/20 0934  •  potassium chloride (MICRO-K) CR capsule 40 mEq, 40 mEq, Oral, PRN **OR** potassium chloride (KLOR-CON) packet 40 mEq, 40 mEq, Oral, PRN **OR** potassium chloride 10 mEq in 100 mL IVPB, 10 mEq, Intravenous, Q1H PRN, Lubna Madrid II, DO  •  QUEtiapine (SEROquel) tablet 25 mg, 25 mg, Oral, Nightly, Lubna Madrid II, DO, 25 mg at 10/09/20 2141  •  sennosides-docusate (PERICOLACE) 8.6-50 MG per tablet 2 tablet, 2 tablet, Oral, BID PRN, Lubna Madrid II, DO  •  sodium chloride 0.9 % flush 10 mL, 10 mL, Intravenous, PRN, Blair Ventura  MD Jarrell  •  sodium chloride 0.9 % flush 10 mL, 10 mL, Intravenous, Q12H, Lubna Madrid M II, DO, 10 mL at 10/10/20 0935  •  sodium chloride 0.9 % flush 10 mL, 10 mL, Intravenous, PRN, Lubna Madrid M II, DO    Antibiotics:  Anti-Infectives (From admission, onward)    Ordered     Dose/Rate Route Frequency Start Stop    10/10/20 1743  INV GS-5734 remdesivir 100 mg in sodium chloride 0.9 % 250 mL IVPB (powder vial)     Ordering Provider: Lubna Madrid M II, DO    100 mg  over 60 Minutes Intravenous Every 24 Hours 10/11/20 1830 10/15/20 1829    10/10/20 1743  INV GS-5734 remdesivir 200 mg in sodium chloride 0.9 % 250 mL IVPB (powder vial)     Ordering Provider: MercyLubna M II, DO    200 mg  over 60 Minutes Intravenous Every 24 Hours 10/10/20 1830 10/11/20 1829            Review of Systems:  Constitutional--   HEENT--she complains of headache.  She denies earache and sore throat.  She has some rhinorrhea but denies loss of taste and smell  CV-- No chest pain, palpitation or syncope  Resp--no severe dyspnea.  She may have some mild shortness of breath.  She has a minimal cough.  GI-she had some nausea and diarrhea.  She previously had colitis and was hospitalized here.  --she has had recent UTIs.    Heme- No active bruising or bleeding;   MS--she has a history of rheumatoid arthritis..  Neuro-- No acute focal weakness or numbness in the arms or legs.   Skin--No rashes or lesions      Physical Exam:   Vital Signs  Temp (24hrs), Av.4 °F (36.9 °C), Min:97.9 °F (36.6 °C), Max:99 °F (37.2 °C)    Temp  Min: 97.9 °F (36.6 °C)  Max: 99 °F (37.2 °C)  BP  Min: 113/58  Max: 146/77  Pulse  Min: 67  Max: 96  Resp  Min: 16  Max: 20  SpO2  Min: 93 %  Max: 93 %     Her exam was done by direct visualization from outside the room and FaceTime video with the assistance of the nursing staff in the presence of the COVID-19 pandemic with a critical shortage of PPE.    GENERAL: Awake and alert, in no acute distress.   HEENT:  Normocephalic, atraumatic.  PERRL. EOMI. No conjunctival injection. No icterus.  No labial ulcers  NECK: Supple   LYMPH: No visible cervical adenopathy  HEART: RRR;  LUNGS: NO RESPIRATORY DISTRESS ON 2 L OF OXYGEN   ABDOMEN: Nondistended  EXT: 2+ lower extremity edema  :  Without Cisse catheter.  MSK: No focal joint swelling or erythema  SKIN: Warm and dry without cutaneous eruptions on Inspection/palpation.    NEURO: Oriented to PPT.  5/5 strength bilaterally  PSYCHIATRIC: Normal insight and judgement. Cooperative with PE    Laboratory Data    Results from last 7 days   Lab Units 10/10/20  1010 10/09/20  1054   WBC 10*3/mm3 4.49 5.39   HEMOGLOBIN g/dL 9.0* 9.2*   HEMATOCRIT % 29.3* 28.7*   PLATELETS 10*3/mm3 330 412     Results from last 7 days   Lab Units 10/10/20  1010   SODIUM mmol/L 141   POTASSIUM mmol/L 3.7   CHLORIDE mmol/L 105   CO2 mmol/L 29.0   BUN mg/dL 11   CREATININE mg/dL 0.88   GLUCOSE mg/dL 101*   CALCIUM mg/dL 8.1*     Results from last 7 days   Lab Units 10/09/20  1054   ALK PHOS U/L 64   BILIRUBIN mg/dL 0.2   ALT (SGPT) U/L 10   AST (SGOT) U/L 27         Results from last 7 days   Lab Units 10/09/20  1054   CRP mg/dL 2.48*     Results from last 7 days   Lab Units 10/09/20  1054   LACTATE mmol/L 1.7             Estimated Creatinine Clearance: 42.1 mL/min (by C-G formula based on SCr of 0.88 mg/dL).      Microbiology:  Blood Culture   Date Value Ref Range Status   10/09/2020 No growth at 24 hours  Preliminary     No results found for: BCIDPCR, CXREFLEX, CSFCX, CULTURETIS  No results found for: CULTURES, HSVCX, URCX  No results found for: EYECULTURE, GCCX, LABHSV  No results found for: LEGIONELLA, MRSACX, MUMPSCX, MYCOPLASCX  No results found for: NOCARDIACX, STOOLCX  Urine Culture   Date Value Ref Range Status   10/09/2020 >100,000 CFU/mL Mixed Katherine Isolated  Final     No results found for: VIRALCULTU, WOUNDCX        Radiology:  Imaging Results (Last 72 Hours)     Procedure Component Value Units  Date/Time    XR Chest 1 View [[id number]] Collected: 10/09/20 1133     Updated: 10/09/20 1502    Narrative:      EXAMINATION: XR CHEST 1 VW-      INDICATION: COVID-19 positive, dyspnea and fever.      COMPARISON: 2020.     FINDINGS: Portable chest reveals cardiac and mediastinal silhouettes  within normal limits. Underlying chronic and emphysematous changes are  seen in the lung fields bilaterally. No focal parenchymal opacification  present. No pleural effusion or pneumothorax. Degenerative change is  seen within the spine.           Impression:      Chronic changes seen diffusely throughout the lung fields.  Degenerative change is seen within the spine with no acute parenchymal  disease.     D:  10/09/2020  E:  10/09/2020     This report was finalized on 10/9/2020 2:59 PM by Dr. Niurka Zimmerman MD.           I read her radiographic studies    Impression:  1.  COVID-19 pneumonia-with acute hypoxic respiratory failure.  She is now developed hypoxia on room air and is requiring 2 L of oxygen.  She has been started on Decadron and Remdesivir.  I also think it would be reasonable to start her on convalescent plasma.  I discussed convalescent plasma with her including the rationale and potential risks and benefits.  She would like to discuss this issue with her daughter for agreeing to proceed with convalescent plasma.  I have attempted to call her daughter this evening.  I was able to contact her daughter Danette.  We discussed the potential risks and benefits of COVID-19 convalescent plasma therapy and remdesivir.  Her daughter would like us to proceed with these therapies.  2.  Acute hypoxic respiratory failure  3.  COPD  4.  Rheumatoid arthritis- on methotrexate therapy  5.  Immunocompromised host      PLAN/RECOMMENDATIONS:   Thank you for asking us to see Pati Carter, I recommend the followin.  Hold methotrexate  2.  Decadron 6 mg IV/p.o. daily x10 days  3.  Remdesivir IV daily x5 days  4.  DVT  prophylaxis  5.  COVID-19 convalescent plasma      1) The FDA has authorized emergency use of COVID-19 convalescent plasma, which is not an FDA approved biologic product.  2) We discussed that the patient has the option to accept or refuse administration of COVID-19 convalescent plasma.  3) We discussed the risks and benefits of COVID-19 convalescent plasma including risk of transfusion reaction or transfusion related infection.  Patient agreed to proceed.  4) We discussed alternative treatments and the risks and benefits of these alternative treatments.         Danny Moscoso MD  10/10/2020  18:26 EDT

## 2020-10-10 NOTE — PROGRESS NOTES
[facility] Medicine Services  PROGRESS NOTE    Patient Name: Ptai Carter  : 1930  MRN: [id number]    Date of Admission: 10/9/2020  Primary Care Physician: Latoya Bolaños MD    Subjective   Subjective     CC: f/u covid infection    HPI: Patient agitated overnight but overall still doing well. Denies SOA. Still able to get OOB to restroom with help. No concerns from nursing.    Review of Systems  Gen- No fevers, chills  CV- No chest pain, palpitations  Resp- No cough, dyspnea  GI- No N/V/D, abd pain    Objective   Objective     Vital Signs:   Temp:  [97.7 °F (36.5 °C)-98.7 °F (37.1 °C)] 98.7 °F (37.1 °C)  Heart Rate:  [67-92] 67  Resp:  [16-20] 18  BP: (113-146)/(58-78) 146/77        Physical Exam:  With patient's consent, physical exam was conducted via visual telemedicine encounter with bedside portion accommodated by nursing staff due to patient's current isolation requirements in the interest of PPE conservation.    Constitutional: No acute distress, awake, alert, nontoxic, normal body habitus  HENT: NCAT, MMM, no conjunctival injection  Respiratory: Good effort, nonlabored respirations   Cardiovascular: RRR on tele  Musculoskeletal: No edema, normal muscle tone and mass for age  GI: Soft, no grimace, mild distension   Psychiatric: Appropriate affect, good insight and judgement, cooperative  Neurologic: Alert and interactive, movements symmetric BUE and BLE, speech clear and fluent  Skin: No visible rashes, no jaundice seen on exposed skin       Results Reviewed:  Results from last 7 days   Lab Units 10/10/20  1010 10/09/20  1054   WBC 10*3/mm3 4.49 5.39   HEMOGLOBIN g/dL 9.0* 9.2*   HEMATOCRIT % 29.3* 28.7*   PLATELETS 10*3/mm3 330 412   PROCALCITONIN ng/mL  --  0.19     Results from last 7 days   Lab Units 10/09/20  1054   SODIUM mmol/L 137   POTASSIUM mmol/L 3.8   CHLORIDE mmol/L 100   CO2 mmol/L 29.0   BUN mg/dL 15   CREATININE mg/dL 0.94   GLUCOSE mg/dL 98   CALCIUM  mg/dL 8.4   ALT (SGPT) U/L 10   AST (SGOT) U/L 27   TROPONIN T ng/mL <0.010   PROBNP pg/mL 967.3     Estimated Creatinine Clearance: 39.4 mL/min (by C-G formula based on SCr of 0.94 mg/dL).    Microbiology Results Abnormal     Procedure Component Value - Date/Time    Blood Culture - Blood, Arm, Right [[id number]] Collected: 10/09/20 1054    Lab Status: Preliminary result Specimen: Blood from Arm, Right Updated: 10/10/20 1130     Blood Culture No growth at 24 hours    Blood Culture - Blood, Arm, Right [[id number]] Collected: 10/09/20 1045    Lab Status: Preliminary result Specimen: Blood from Arm, Right Updated: 10/10/20 1130     Blood Culture No growth at 24 hours          Imaging Results (Last 24 Hours)     Procedure Component Value Units Date/Time    XR Chest 1 View [[id number]] Collected: 10/09/20 1133     Updated: 10/09/20 1502    Narrative:      EXAMINATION: XR CHEST 1 VW-      INDICATION: COVID-19 positive, dyspnea and fever.      COMPARISON: 09/22/2020.     FINDINGS: Portable chest reveals cardiac and mediastinal silhouettes  within normal limits. Underlying chronic and emphysematous changes are  seen in the lung fields bilaterally. No focal parenchymal opacification  present. No pleural effusion or pneumothorax. Degenerative change is  seen within the spine.           Impression:      Chronic changes seen diffusely throughout the lung fields.  Degenerative change is seen within the spine with no acute parenchymal  disease.     D:  10/09/2020  E:  10/09/2020     This report was finalized on 10/9/2020 2:59 PM by Dr. Niurka Zimmerman MD.                  I have reviewed the medications:  Scheduled Meds:acetaminophen, 1,000 mg, Oral, Q8H  aspirin, 81 mg, Oral, Daily  budesonide-formoterol, 2 puff, Inhalation, BID - RT  diclofenac, 2 g, Topical, TID  dilTIAZem CD, 120 mg, Oral, Daily  docusate sodium, 100 mg, Oral, Daily  enoxaparin, 40 mg, Subcutaneous, Daily  famotidine, 20 mg, Oral, BID  folic acid, 1,000 mcg,  Oral, Daily  lactobacillus acidophilus, 1 capsule, Oral, Daily  multivitamin and minerals, 15 mL, Oral, Daily  polyethylene glycol, 17 g, Oral, Daily  QUEtiapine, 25 mg, Oral, Nightly  sodium chloride, 10 mL, Intravenous, Q12H      Continuous Infusions:   PRN Meds:.albuterol sulfate HFA  •  magnesium sulfate **OR** magnesium sulfate **OR** magnesium sulfate  •  melatonin  •  ondansetron **OR** ondansetron  •  potassium chloride **OR** potassium chloride **OR** potassium chloride  •  sennosides-docusate  •  sodium chloride  •  sodium chloride    Assessment/Plan   Assessment & Plan     Active Hospital Problems    Diagnosis  POA   • **COVID-19 [U07.1]  Yes   • Immunosuppressed status on methotrexate [D84.9]  Yes   • COPD (chronic obstructive pulmonary disease) (CMS/[facility]) [J44.9]  Yes   • Rheumatoid arthritis (CMS/[facility]) [M06.9]  Yes   • Hypertension [I10]  Yes      Resolved Hospital Problems   No resolved problems to display.        Brief Hospital Course to date:  Pati Carter is a 89 y/o COVID + female brought in by family due to worsening fatigue, weakness.      COVID 19 infection  --Remains minimally symptomatic from respiratory standpoint. Her inflammatory markers are largely unremarkable. Still does not require any specific therapy at this time. However I do think given her age and co-morbidities it is important to watch her closely for next day or 2 with low threshold for initiation of therapy. I have already d/w daughter and gotten her consent for plasma/remdesivir use if needed.  --Furthermore her disposition is complicated by fact that 1 daughter definitely has COVID and is unable to care for patient and her other daughter is now experiencing COVID symptoms and will likely be positive as well. Patient likely needs rehab based on being transferred to [facility] at last stay here. CM will follow along to help with this.  --Tylenol prn.  --PT/OT.    AMS  --Mostly disorientation associated with awakening though  cannot r/o mild encephalopathy caused by COVID infection.  --Seroquel qhs.     COPD w/out exacerbation  --Currently not hypoxic and not any more short of breath than baseline. Continue inhalers w/ low threshold for addition of steroids as above.  --ICS, pulmonary toilet.     RA   --Immunocompromised on MTX. Holding that med. Supportive care w/ pain control prn.     HTN  --Home diltiazem.     I have d/w her daughter Danette @ 1140. Updated her that patient is doing well from respiratory standpoint and discussed her mild AMS. All questions answered.    DVT prophylaxis: Lovenox    Disposition: I expect the patient to be discharged TBD to SNF.    CODE STATUS:   Code Status and Medical Interventions:   Ordered at: 10/09/20 1200     Limited Support to NOT Include:    Intubation     Code Status:    No CPR     Medical Interventions (Level of Support Prior to Arrest):    Limited       Lubna Madrid II, DO  10/10/20

## 2020-10-10 NOTE — THERAPY EVALUATION
Patient Name: Pati Carter  : 1930    MRN: [id number]                              Today's Date: 10/10/2020       Admit Date: 10/9/2020    Visit Dx:     ICD-10-CM ICD-9-CM   1. COVID-19  U07.1 079.89     Patient Active Problem List   Diagnosis   • Macular degeneration   • Rheumatoid arthritis (CMS/HCC)   • Hearing loss   • Hyperlipidemia   • Gastroesophageal reflux disease   • Hypertension   • Constipation   • Anemia   • Compression fracture of lumbar vertebra (CMS/HCC)   • Degeneration of intervertebral disc of lumbar region   • COPD (chronic obstructive pulmonary disease) (CMS/HCC)   • H/O calcium pyrophosphate deposition disease (CPPD)   • Falls frequently   • Sepsis (CMS/HCC)   • Immunosuppressed status on methotrexate   • Ferny Bonnet syndrome   • UTI (urinary tract infection), bacterial   • JENNIE (acute kidney injury) (CMS/HCC)   • Bacterial colitis   • COVID-19     Past Medical History:   Diagnosis Date   • Anemia     Description: A.  Dx - borderline intermittent.   • Back pain    • Benign colonic polyp 2016    Description: A.  Dx .   • Ferny Bonnet syndrome 2020   • Compression fracture of lumbar vertebra (CMS/HCC)    • Constipation    • COPD (chronic obstructive pulmonary disease) (CMS/HCC)     Description: A.  Rule out chronic persistent asthma, COPD, or obliterative bronchiolitis.- Butch   • Degeneration of intervertebral disc of lumbar region     Description: A.  Diagnosed in 2013 with advanced multilevel with severe spinal stenosis, followed by Dr. Pillai for pain management.   • Gastroesophageal reflux disease    • Hearing loss    • History of colonoscopy 1999    NORMAL PER PATIENT    • History of mammogram 2011    NORMAL PER PT    • History of Papanicolaou smear of cervix 2010    NORMAL PER PT    • History of varicella    • Hyperlipidemia     Description: A.  Dx .   • Hypertension     Description: A. Dx .   • Macular degeneration    •  Ovarian mass     Dx 8/15- benign left cystic adnexal mass   • Rheumatoid arthritis (CMS/HCC)     Description: A.  Diagnosed in 2000 and and followed by Dr. Constantino (now Dr. Kaplan). B.  On methotrexate therapy since 2000. C.  Off low-dose prednisone therapy.     Past Surgical History:   Procedure Laterality Date   • APPENDECTOMY     • CARPAL TUNNEL RELEASE Left 01/01/2003    HISTORY OF NEUROPLASTY DECOMPRESSION MEDIAN NERVE AT CARPAL TUNNEL LEFT   • CATARACT EXTRACTION Bilateral 01/01/2009   • CHOLECYSTECTOMY  01/01/1962   • HIP CANNULATED SCREW PLACEMENT Right 1/25/2020    Procedure: HIP CANNULATED SCREW PLACEMENT RIGHT;  Surgeon: Karlos Blount MD;  Location: [facility];  Service: Orthopedics   • KNEE ARTHROSCOPY Left 01/01/2001    MENISCAL REPAIR   • KYPHOPLASTY  06/18/2015    T11 AND L1 (JOSE A)   • PELVIC LAPAROSCOPY  01/01/1996    REMOVAL OF BENIGN UTERINE AND RIGHT OVARIAN TUMORS   • SALPINGO OOPHORECTOMY Left 08/26/2015    REMOVAL OF LEFT OVARY AND TUBE (benign cystic mass)     General Information     Row Name 10/10/20 1035          Physical Therapy Time and Intention    Mode of Treatment  physical therapy  -MB     Row Name 10/10/20 1035          General Information    Patient Profile Reviewed  yes  -MB     Prior Level of Function  independent:;all household mobility;gait;transfer;bed mobility;ADL's per patient  -MB     Existing Precautions/Restrictions  fall  -MB     Barriers to Rehab  previous functional deficit;hearing deficit;visual deficit  -MB     Row Name 10/10/20 1035          Living Environment    Lives With  alone Pt. was living in an [place], recently at [place] for rehab (briefly), staying at [facility]'s house PTA.  -MB     Row Name 10/10/20 1035          Home Main Entrance    Number of Stairs, Main Entrance  none per pt.  -MB     Row Name 10/10/20 1035          Stairs Within Home, Primary    Number of Stairs, Within Home, Primary  none  -MB     Row Name 10/10/20 1035          Cognition     Orientation Status (Cognition)  oriented to;person;place;verbal cues/prompts needed for orientation;time  -MB     Row Name 10/10/20 1035          Safety Issues, Functional Mobility    Safety Issues Affecting Function (Mobility)  awareness of need for assistance;insight into deficits/self-awareness;safety precaution awareness;safety precautions follow-through/compliance;sequencing abilities  -MB     Impairments Affecting Function (Mobility)  balance;endurance/activity tolerance;strength;coordination  -MB       User Key  (r) = Recorded By, (t) = Taken By, (c) = Cosigned By    Initials Name Provider Type    Nayeli Fuentes, PT Physical Therapist        Mobility     Row Name 10/10/20 1035          Bed Mobility    Bed Mobility  supine-sit  -MB     Supine-Sit [place] (Bed Mobility)  minimum assist (75% patient effort);verbal cues  -MB     Assistive Device (Bed Mobility)  bed rails;draw sheet;head of bed elevated  -MB     Comment (Bed Mobility)  Pt. required assist to move BLEs towards EOB, raise trunk/shoulders, and scoot hips forward to EOB.  -MB     Row Name 10/10/20 1035          Transfers    Comment (Transfers)  Pt. transferred SPT bed >chair and STS from recliner w/ VCs for safe hand placement, sequencing, and upright posture in standing.  -MB     Row Name 10/10/20 1035          Bed-Chair Transfer    Bed-Chair [place] (Transfers)  minimum assist (75% patient effort);verbal cues  -MB     Assistive Device (Bed-Chair Transfers)  other (see comments) BUE support  -MB     Row Name 10/10/20 1035          Sit-Stand Transfer    Sit-Stand [place] (Transfers)  minimum assist (75% patient effort);verbal cues;nonverbal cues (demo/gesture)  -MB     Assistive Device (Sit-Stand Transfers)  walker, front-wheeled  -MB     Row Name 10/10/20 1035          Gait/Stairs (Locomotion)    [place] Level (Gait)  minimum assist (75% patient effort);verbal cues;nonverbal cues (demo/gesture)  -MB     Assistive Device  (Gait)  walker, front-wheeled  -MB     Distance in Feet (Gait)  5  -MB     Deviations/Abnormal Patterns (Gait)  pancho decreased;gait speed decreased;stride length decreased;base of support, narrow  -MB     Bilateral Gait Deviations  forward flexed posture;heel strike decreased  -MB     Comment (Gait/Stairs)  Pt. ambulated w/ step to gait pattern and slow pace.  VCs/tactile cues for upright posture and increased B step length.  Gait distance limited by weakness/fatigue.  -MB       User Key  (r) = Recorded By, (t) = Taken By, (c) = Cosigned By    Initials Name Provider Type    MB Nayeli Chapa, PT Physical Therapist        Obj/Interventions     Row Name 10/10/20 1035          Range of Motion Comprehensive    Comment, General Range of Motion  BLEs and BUEs WFL for age.  -MB     Row Name 10/10/20 1035          Strength Comprehensive (MMT)    General Manual Muscle Testing (MMT) Assessment  upper extremity strength deficits identified;lower extremity strength deficits identified  -MB     Comment, General Manual Muscle Testing (MMT) Assessment  BLEs grossly 3+/5.  BUEs WFL for purposes of eval.  -MB     Row Name 10/10/20 1035          Motor Skills    Therapeutic Exercise  hip;knee;ankle  -MB     Row Name 10/10/20 1035          Hip (Therapeutic Exercise)    Hip (Therapeutic Exercise)  AAROM (active assistive range of motion);strengthening exercise  -MB     Hip Strengthening (Therapeutic Exercise)  bilateral;marching while seated;10 repetitions  -MB     Row Name 10/10/20 1035          Knee (Therapeutic Exercise)    Knee (Therapeutic Exercise)  AAROM (active assistive range of motion);strengthening exercise  -MB     Knee Strengthening (Therapeutic Exercise)  bilateral;LAQ (long arc quad);10 repetitions  -MB     Row Name 10/10/20 1035          Ankle (Therapeutic Exercise)    Ankle (Therapeutic Exercise)  strengthening exercise  -MB     Ankle Strengthening (Therapeutic Exercise)  bilateral;dorsiflexion;plantarflexion;10  repetitions  -MB     Row Name 10/10/20 1035          Balance    Balance Assessment  sitting static balance;sitting dynamic balance;standing static balance  -MB     Static Sitting Balance  mild impairment;unsupported;sitting, edge of bed posterior LOB  -MB     Dynamic Sitting Balance  mild impairment  -MB     Static Standing Balance  mild impairment  -MB     Balance Interventions  sit to stand;standing;weight shifting activity  -MB     Row Name 10/10/20 1035          Sensory Assessment (Somatosensory)    Bilateral LE Sensory Assessment  light touch awareness;impaired  -MB       User Key  (r) = Recorded By, (t) = Taken By, (c) = Cosigned By    Initials Name Provider Type    Nayeli Fuentes, PT Physical Therapist        Goals/Plan     Row Name 10/10/20 1035          Bed Mobility Goal 1 (PT)    Activity/Assistive Device (Bed Mobility Goal 1, PT)  bed mobility activities, all  -MB     [place] Level/Cues Needed (Bed Mobility Goal 1, PT)  independent  -MB     Time Frame (Bed Mobility Goal 1, PT)  10 days  -MB     Progress/Outcomes (Bed Mobility Goal 1, PT)  goal ongoing  -MB     Row Name 10/10/20 1035          Transfer Goal 1 (PT)    Activity/Assistive Device (Transfer Goal 1, PT)  transfers, all;walker, rolling  -MB     [place] Level/Cues Needed (Transfer Goal 1, PT)  modified independence  -MB     Time Frame (Transfer Goal 1, PT)  10 days  -MB     Progress/Outcome (Transfer Goal 1, PT)  goal ongoing  -MB     Row Name 10/10/20 1035          Gait Training Goal 1 (PT)    Activity/Assistive Device (Gait Training Goal 1, PT)  gait (walking locomotion);walker, rolling  -MB     [place] Level (Gait Training Goal 1, PT)  modified independence  -MB     Distance (Gait Training Goal 1, PT)  150  -MB     Time Frame (Gait Training Goal 1, PT)  10 days  -MB     Progress/Outcome (Gait Training Goal 1, PT)  goal ongoing  -MB     Row Name 10/10/20 1035          Patient Education Goal (PT)    Activity (Patient  Education Goal, PT)  HEP  -MB     [place]/Cues/Accuracy (Memory Goal 2, PT)  demonstrates adequately;verbalizes understanding  -MB     Time Frame (Patient Education Goal, PT)  1 week  -MB     Progress/Outcome (Patient Education Goal, PT)  goal ongoing  -MB       User Key  (r) = Recorded By, (t) = Taken By, (c) = Cosigned By    Initials Name Provider Type    MB Nayeli Chapa, PT Physical Therapist        Clinical Impression     Row Name 10/10/20 1035          Pain    Additional Documentation  Pain Scale: Numbers Pre/Post-Treatment (Group);Pain Scale: FACES Pre/Post-Treatment (Group)  -MB     Row Name 10/10/20 1035          Pain Scale: Numbers Pre/Post-Treatment    Pretreatment Pain Rating  0/10 - no pain  -MB     Posttreatment Pain Rating  0/10 - no pain  -MB     Row Name 10/10/20 1035          Pain Scale: FACES Pre/Post-Treatment    Pain: FACES Scale, Pretreatment  0-->no hurt  -MB     Posttreatment Pain Rating  0-->no hurt  -MB     Row Name 10/10/20 103          Plan of Care Review    Plan of Care Reviewed With  patient  -MB     Progress  improving  -MB     Outcome Summary  PT eval completed.  Patient presents w/ decreased strength, impaired balance, gait instability, and decreased independence w/ functional mobility.  Pt. performed bed mobility w/ min A, transfers w/ min A, and ambulated 5ft w/ RW and min A.  Gait distance limited by weakness.  Skilled IPPT indicated to improve patient's safety and independence w/ functional mobility.  Recommend SNF rehab at D/C.  -MB     Row Name 10/10/20 1036          Therapy Assessment/Plan (PT)    Patient/Family Therapy Goals Statement (PT)  Return to home alone - per patient.  -MB     Rehab Potential (PT)  good, to achieve stated therapy goals  -MB     Criteria for Skilled Interventions Met (PT)  yes;meets criteria;skilled treatment is necessary  -MB     Row Name 10/10/20 1031          Vital Signs    Pre Systolic BP Rehab  146  -MB     Pre Treatment Diastolic BP   77  -MB     O2 Delivery Pre Treatment  room air  -MB     O2 Delivery Intra Treatment  room air  -MB     Post SpO2 (%)  95  -MB     O2 Delivery Post Treatment  room air  -MB     Row Name 10/10/20 1035          Positioning and Restraints    Pre-Treatment Position  in bed  -MB     Post Treatment Position  chair  -MB     In Chair  notified nsg;reclined;call light within reach;encouraged to call for assist;exit alarm on;waffle cushion;legs elevated;heels elevated  -MB       User Key  (r) = Recorded By, (t) = Taken By, (c) = Cosigned By    Initials Name Provider Type    Nayeli Fuentes, PT Physical Therapist        Outcome Measures     Row Name 10/10/20 1035          How much help from another person do you currently need...    Turning from your back to your side while in flat bed without using bedrails?  3  -MB     Moving from lying on back to sitting on the side of a flat bed without bedrails?  3  -MB     Moving to and from a bed to a chair (including a wheelchair)?  3  -MB     Standing up from a chair using your arms (e.g., wheelchair, bedside chair)?  3  -MB     Climbing 3-5 steps with a railing?  2  -MB     To walk in hospital room?  3  -MB     AM-PAC 6 Clicks Score (PT)  17  -MB     Row Name 10/10/20 1035          Functional Assessment    Outcome Measure Options  AM-PAC 6 Clicks Basic Mobility (PT)  -MB       User Key  (r) = Recorded By, (t) = Taken By, (c) = Cosigned By    Initials Name Provider Type    Nayeli Fuentes, PT Physical Therapist        Physical Therapy Education                 Title: PT OT SLP Therapies (Done)     Topic: Physical Therapy (Done)     Point: Mobility training (Done)     Learning Progress Summary           Patient Acceptance, E,D, VU,NR by MB at 10/10/2020 0122    Comment: Provided education re: fall precautions, benefits of mobility, home safety, transfer safety/mechanics, HEP.                   Point: Home exercise program (Done)     Learning Progress Summary            Patient Acceptance, E,D, VU,NR by MB at 10/10/2020 1358    Comment: Provided education re: fall precautions, benefits of mobility, home safety, transfer safety/mechanics, HEP.                   Point: Body mechanics (Done)     Learning Progress Summary           Patient Acceptance, E,D, VU,NR by MB at 10/10/2020 1358    Comment: Provided education re: fall precautions, benefits of mobility, home safety, transfer safety/mechanics, HEP.                   Point: Precautions (Done)     Learning Progress Summary           Patient Acceptance, E,D, VU,NR by MB at 10/10/2020 1358    Comment: Provided education re: fall precautions, benefits of mobility, home safety, transfer safety/mechanics, HEP.                               User Key     Initials Effective Dates Name Provider Type Discipline    MB 03/14/16 -  Nayeli Chapa, PT Physical Therapist PT              PT Recommendation and Plan  Planned Therapy Interventions (PT): balance training, bed mobility training, gait training, home exercise program, patient/family education, strengthening, transfer training  Plan of Care Reviewed With: patient  Progress: improving  Outcome Summary: PT eval completed.  Patient presents w/ decreased strength, impaired balance, gait instability, and decreased independence w/ functional mobility.  Pt. performed bed mobility w/ min A, transfers w/ min A, and ambulated 5ft w/ RW and min A.  Gait distance limited by weakness.  Skilled IPPT indicated to improve patient's safety and independence w/ functional mobility.  Recommend SNF rehab at D/C.     Time Calculation:   PT Charges     Row Name 10/10/20 1400             Time Calculation    Start Time  1035  -MB      PT Received On  10/10/20  -MB      PT Goal Re-Cert Due Date  10/20/20  -MB        User Key  (r) = Recorded By, (t) = Taken By, (c) = Cosigned By    Initials Name Provider Type    Nayeli Fuentes, PT Physical Therapist        Therapy Charges for Today     Code  Description Service Date Service Provider Modifiers Qty    [id number] HC PT EVAL MOD COMPLEXITY 4 10/10/2020 Nayeli Chapa, PT GP 1          PT G-Codes  Outcome Measure Options: AM-PAC 6 Clicks Basic Mobility (PT)  AM-PAC 6 Clicks Score (PT): 17    Nayeli Chapa, PT  10/10/2020

## 2020-10-10 NOTE — PLAN OF CARE
Goal Outcome Evaluation:  Plan of Care Reviewed With: patient  Progress: improving  Outcome Summary: Patient up to chair this shift. VSS.Complained of shortness of breath,  patient now on 2L oxygen. Patient facetimed with ID following shortness of air episode. Will cont to monitor.

## 2020-10-11 PROCEDURE — 63710000001 DEXAMETHASONE PER 0.25 MG: Performed by: INTERNAL MEDICINE

## 2020-10-11 PROCEDURE — 94799 UNLISTED PULMONARY SVC/PX: CPT

## 2020-10-11 PROCEDURE — 97166 OT EVAL MOD COMPLEX 45 MIN: CPT

## 2020-10-11 PROCEDURE — 97535 SELF CARE MNGMENT TRAINING: CPT

## 2020-10-11 PROCEDURE — 25010000002 ENOXAPARIN PER 10 MG: Performed by: INTERNAL MEDICINE

## 2020-10-11 PROCEDURE — 99233 SBSQ HOSP IP/OBS HIGH 50: CPT | Performed by: INTERNAL MEDICINE

## 2020-10-11 RX ADMIN — DEXAMETHASONE 6 MG: 4 TABLET ORAL at 09:28

## 2020-10-11 RX ADMIN — ACETAMINOPHEN 1000 MG: 500 TABLET, FILM COATED ORAL at 05:12

## 2020-10-11 RX ADMIN — DICLOFENAC 2 G: 10 GEL TOPICAL at 21:09

## 2020-10-11 RX ADMIN — BUDESONIDE AND FORMOTEROL FUMARATE DIHYDRATE 2 PUFF: 160; 4.5 AEROSOL RESPIRATORY (INHALATION) at 21:04

## 2020-10-11 RX ADMIN — FOLIC ACID 1000 MCG: 1 TABLET ORAL at 09:30

## 2020-10-11 RX ADMIN — QUETIAPINE FUMARATE 25 MG: 25 TABLET ORAL at 21:08

## 2020-10-11 RX ADMIN — ACETAMINOPHEN 1000 MG: 500 TABLET, FILM COATED ORAL at 21:08

## 2020-10-11 RX ADMIN — FAMOTIDINE 20 MG: 20 TABLET, FILM COATED ORAL at 09:29

## 2020-10-11 RX ADMIN — SODIUM CHLORIDE, PRESERVATIVE FREE 10 ML: 5 INJECTION INTRAVENOUS at 21:09

## 2020-10-11 RX ADMIN — SODIUM CHLORIDE, PRESERVATIVE FREE 10 ML: 5 INJECTION INTRAVENOUS at 09:30

## 2020-10-11 RX ADMIN — FAMOTIDINE 20 MG: 20 TABLET, FILM COATED ORAL at 21:08

## 2020-10-11 RX ADMIN — BUDESONIDE AND FORMOTEROL FUMARATE DIHYDRATE 2 PUFF: 160; 4.5 AEROSOL RESPIRATORY (INHALATION) at 09:55

## 2020-10-11 RX ADMIN — POLYETHYLENE GLYCOL 3350 17 G: 17 POWDER, FOR SOLUTION ORAL at 09:27

## 2020-10-11 RX ADMIN — DOCUSATE SODIUM 100 MG: 100 CAPSULE ORAL at 09:28

## 2020-10-11 RX ADMIN — ASPIRIN 81 MG: 81 TABLET, COATED ORAL at 09:30

## 2020-10-11 RX ADMIN — DICLOFENAC 2 G: 10 GEL TOPICAL at 16:07

## 2020-10-11 RX ADMIN — DILTIAZEM HYDROCHLORIDE 120 MG: 120 CAPSULE, COATED, EXTENDED RELEASE ORAL at 09:33

## 2020-10-11 RX ADMIN — REMDESIVIR 100 MG: 100 INJECTION, POWDER, LYOPHILIZED, FOR SOLUTION INTRAVENOUS at 18:17

## 2020-10-11 RX ADMIN — Medication 1 CAPSULE: at 09:29

## 2020-10-11 RX ADMIN — DICLOFENAC 2 G: 10 GEL TOPICAL at 09:30

## 2020-10-11 RX ADMIN — ENOXAPARIN SODIUM 40 MG: 40 INJECTION SUBCUTANEOUS at 09:27

## 2020-10-11 NOTE — PROGRESS NOTES
[facility] Medicine Services  PROGRESS NOTE    Patient Name: Pati Carter  : 1930  MRN: [id number]    Date of Admission: 10/9/2020  Primary Care Physician: Latoya Bolaños MD    Subjective   Subjective     CC: f/u covid pneumonia    HPI: Up in bed this am. Frustrated with us that she is still in the hospital. Frustrated with our continued blood tests, lovenox shots.    Review of Systems  Gen- No fevers, chills  CV- No chest pain, palpitations  Resp- No cough, dyspnea  GI- No N/V/D, abd pain    Objective   Objective     Vital Signs:   Temp:  [96.5 °F (35.8 °C)-99 °F (37.2 °C)] 96.6 °F (35.9 °C)  Heart Rate:  [76-97] 87  Resp:  [16-20] 16  BP: (119-141)/(70-82) 130/70        Physical Exam:  With patient's consent, physical exam was conducted via visual telemedicine encounter with bedside portion accommodated by nursing staff due to patient's current isolation requirements in the interest of PPE conservation.    Constitutional: No acute distress, awake, alert, nontoxic, normal body habitus  HENT: NCAT, MMM, no conjunctival injection, O2 via NC  Respiratory: Good effort, increased WOB with conversation  Cardiovascular: RRR with no murmur appreciated via use of blue tooth stethoscope, tele with NSR  Musculoskeletal: No edema, normal muscle tone and mass for age  GI: Soft, no grimace, nondistended  Psychiatric: Appropriate affect, good insight and judgement, cooperative  Neurologic: Oriented x 3, movements symmetric BUE and BLE, speech clear and fluent  Skin: No visible rashes, no jaundice seen on exposed skin through video camera      Results Reviewed:  Results from last 7 days   Lab Units 10/10/20  1010 10/09/20  1054   WBC 10*3/mm3 4.49 5.39   HEMOGLOBIN g/dL 9.0* 9.2*   HEMATOCRIT % 29.3* 28.7*   PLATELETS 10*3/mm3 330 412   PROCALCITONIN ng/mL  --  0.19     Results from last 7 days   Lab Units 10/10/20  1010 10/09/20  1054   SODIUM mmol/L 141 137   POTASSIUM mmol/L 3.7 3.8      CHLORIDE mmol/L 105 100   CO2 mmol/L 29.0 29.0   BUN mg/dL 11 15   CREATININE mg/dL 0.88 0.94   GLUCOSE mg/dL 101* 98   CALCIUM mg/dL 8.1* 8.4   ALT (SGPT) U/L  --  10   AST (SGOT) U/L  --  27   TROPONIN T ng/mL  --  <0.010   PROBNP pg/mL  --  967.3     Estimated Creatinine Clearance: 42.1 mL/min (by C-G formula based on SCr of 0.88 mg/dL).    Microbiology Results Abnormal     Procedure Component Value - Date/Time    Blood Culture - Blood, Arm, Right [[id number]] Collected: 10/09/20 1045    Lab Status: Preliminary result Specimen: Blood from Arm, Right Updated: 10/11/20 1130     Blood Culture No growth at 2 days    Blood Culture - Blood, Arm, Right [[id number]] Collected: 10/09/20 1054    Lab Status: Preliminary result Specimen: Blood from Arm, Right Updated: 10/11/20 1130     Blood Culture No growth at 2 days    Urine Culture - Urine, Urine, Clean Catch [[id number]] Collected: 10/09/20 1412    Lab Status: Final result Specimen: Urine, Clean Catch Updated: 10/10/20 1257     Urine Culture >100,000 CFU/mL Mixed Abhi Isolated    Narrative:      Specimen contains mixed organisms of questionable pathogenicity which indicates contamination with commensal abhi.  Further identification is unlikely to provide clinically useful information.  Suggest recollection.               I have reviewed the medications:  Scheduled Meds:acetaminophen, 1,000 mg, Oral, Q8H  aspirin, 81 mg, Oral, Daily  budesonide-formoterol, 2 puff, Inhalation, BID - RT  dexamethasone, 6 mg, Oral, Daily  diclofenac, 2 g, Topical, TID  dilTIAZem CD, 120 mg, Oral, Daily  docusate sodium, 100 mg, Oral, Daily  enoxaparin, 40 mg, Subcutaneous, Daily  famotidine, 20 mg, Oral, BID  folic acid, 1,000 mcg, Oral, Daily  INV [id number] remdesivir in NS IVPB, 100 mg, Intravenous, Q24H  lactobacillus acidophilus, 1 capsule, Oral, Daily  multivitamin and minerals, 15 mL, Oral, Daily  polyethylene glycol, 17 g, Oral, Daily  QUEtiapine, 25 mg, Oral, Nightly  sodium  chloride, 10 mL, Intravenous, Q12H      Continuous Infusions:Pharmacy Consult,       PRN Meds:.albuterol sulfate HFA  •  magnesium sulfate **OR** magnesium sulfate **OR** magnesium sulfate  •  melatonin  •  ondansetron **OR** ondansetron  •  Pharmacy Consult  •  potassium chloride **OR** potassium chloride **OR** potassium chloride  •  sennosides-docusate  •  sodium chloride  •  sodium chloride    Assessment/Plan   Assessment & Plan     Active Hospital Problems    Diagnosis  POA   • **COVID-19 [U07.1]  Yes   • Immunosuppressed status on methotrexate [D84.9]  Yes   • COPD (chronic obstructive pulmonary disease) (CMS/[facility]) [J44.9]  Yes   • Rheumatoid arthritis (CMS/[facility]) [M06.9]  Yes   • Hypertension [I10]  Yes      Resolved Hospital Problems   No resolved problems to display.        Brief Hospital Course to date:  Pati Carter is a 89 y/o COVID + female brought in by family due to worsening fatigue, weakness. She developed hypoxia on 10/10 and was started on decadron/remdesevir. ID was consulted as well for consideration of plasma.      COVID 19 infection  Hypoxia  --Developed hypoxia last night. Has been started on IV remdesivir and decadron.   --I d/w Dr. Moscoso and he has seen patient. Planning to transfuse plasma today.  --Continue symbicort/albuterol.   --Will move to every other day lab draws per her requests unless she were to worsen.  --PT/OT recs SNF at d/c. Will d/w CM.  --COVID result obtained and placed in her chartlet.     AMS  --Better. Mostly disorientation associated with awakening though cannot r/o mild encephalopathy caused by COVID infection.  --Seroquel qhs.      COPD, now with exacerbation due to above  --Added steroids as above. Continue symbicort, albuterol.   --ICS, pulmonary toilet.     RA   --Immunocompromised on MTX. Holding that med. Supportive care w/ pain control prn.     HTN  --Controlled. Continue home diltiazem.      I have d/w her daughter Danette @ 1200. Updated her on initiation  of decadron and remdesivir and 5 day course of that med in the hospital.     Counseling was given to patient and family for the following topics: diagnostic results, prognosis, patient and family education and impressions . Total time of the encounter was 40 minutes and 38 minutes was spend counseling.     DVT prophylaxis: Lovenox     Disposition: I expect the patient to be discharged TBD to SNF.    CODE STATUS:   Code Status and Medical Interventions:   Ordered at: 10/09/20 1200     Limited Support to NOT Include:    Intubation     Code Status:    No CPR     Medical Interventions (Level of Support Prior to Arrest):    Limited       Lubna Madrid II, DO  10/11/20

## 2020-10-11 NOTE — PLAN OF CARE
Problem: Adult Inpatient Plan of Care  Goal: Plan of Care Review  Flowsheets (Taken 10/11/2020 0418)  Outcome Summary: Remdesevir started. Verbal consent not obtained as patient became agitated and wanted to sleep when being asked for permission to give plasma. Patient proceeded to ignore all questions and acted as if she was asleep. Patient alert and oriented X4, VS remained stable, and patient remained on room air. No other changes noted. Will continue with plan of care.   Goal Outcome Evaluation:  Plan of Care Reviewed With: patient  Progress: improving  Outcome Summary: Remdesevir started. Verbal consent not obtained as patient became agitated and wanted to sleep when being asked for permission to give plasma. Patient proceeded to ignore all questions and acted as if she was asleep. Patient alert and oriented X4, VS remained stable, and patient remained on room air. No other changes noted. Will continue with plan of care.

## 2020-10-11 NOTE — PROGRESS NOTES
INFECTIOUS DISEASE Progress Note    Pati Carter  7/26/1930  [id number]      Admission Date: 10/9/2020      Requesting Provider: No ref. provider found  Evaluating Physician: Danny Moscoso MD    Reason for Consultation: COVID-19 pneumonia    History of present illness:    10/10/20:Patient is a 89 y.o. female with COPD and rheumatoid arthritis on methotrexate therapy who is seen today for evaluation of COVID-19 pneumonia.  She was just discharged from here late last month after a hospital course for acute colitis (C. difficile negative) with a history of recent E. coli/[place]gard a UTI.  She was seen by Dr. Mathis and was discharged to [facility].  Her family removed her from [facility] when they realized that there was a COVID-19 outbreak there.  She has recently developed worsening weakness, myalgias, and fatigue.  She underwent COVID-19 testing on Wednesday which was positive.  She was admitted yesterday and initially did not require supplemental oxygen but this evening she has developed hypoxemia with an O2 saturation of 88% on room air and is requiring 2 L of supplemental oxygen.  She has now been placed on Decadron and Remdesivir therapy.  She denies severe dyspnea and cough.  She denies loss of taste or smell.  She complains of headache, weakness, fatigue, and arthralgias.  2 of her daughters apparently have COVID-19 also.  10/11/20: She remained afebrile overnight. She is requiring 2 L of supplemental oxygen.  She slept most of the night and did not arouse to give consent for convalescent plasma.  I did talk to her daughter, Danette, in detail last night about the current therapeutic regimen including the convalescent plasma and she has agreed with this treatment regimen.  Today, the patient herself is agreed to receive the convalescent plasma and will be infused this afternoon.  She denies increased dyspnea.  The nursing staff indicate that she is not desaturating when  moving around.    Past Medical History:   Diagnosis Date   • Anemia     Description: A.  Dx 2006- borderline intermittent.   • Back pain    • Benign colonic polyp 9/28/2016    Description: A.  Dx 1999.   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  Rule out chronic persistent asthma, COPD, or obliterative bronchiolitis.- Rae   • Degeneration of intervertebral disc of lumbar region     Description: A.  Diagnosed in April 2013 with advanced multilevel with severe spinal stenosis, followed by Dr. Pillai for pain management.   • Gastroesophageal reflux disease    • Hearing loss    • History of colonoscopy 01/01/1999    NORMAL PER PATIENT    • History of mammogram 01/01/2011    NORMAL PER PT    • History of Papanicolaou smear of cervix 01/01/2010    NORMAL PER PT    • History of varicella    • Hyperlipidemia     Description: A.  Dx 2006.   • Hypertension     Description: A. Dx 2001.   • Macular degeneration    • Ovarian mass     Dx 8/15- benign left cystic adnexal mass   • Rheumatoid arthritis (CMS/[facility])     Description: A.  Diagnosed in 2000 and and followed by Dr. Constantino (now Dr. Kaplan). B.  On methotrexate therapy since 2000. C.  Off low-dose prednisone therapy.       Past Surgical History:   Procedure Laterality Date   • APPENDECTOMY     • CARPAL TUNNEL RELEASE Left 01/01/2003    HISTORY OF NEUROPLASTY DECOMPRESSION MEDIAN NERVE AT CARPAL TUNNEL LEFT   • CATARACT EXTRACTION Bilateral 01/01/2009   • CHOLECYSTECTOMY  01/01/1962   • HIP CANNULATED SCREW PLACEMENT Right 1/25/2020    Procedure: HIP CANNULATED SCREW PLACEMENT RIGHT;  Surgeon: Karlos Blount MD;  Location: [facility];  Service: Orthopedics   • KNEE ARTHROSCOPY Left 01/01/2001    MENISCAL REPAIR   • KYPHOPLASTY  06/18/2015    T11 AND L1 (JOSE A)   • PELVIC LAPAROSCOPY  01/01/1996    REMOVAL OF BENIGN  UTERINE AND RIGHT OVARIAN TUMORS   • SALPINGO OOPHORECTOMY Left 08/26/2015    REMOVAL OF LEFT OVARY AND TUBE (benign cystic mass)       Family History   Problem Relation Age of Onset   • Hypertension Mother    • Diabetes Mother        Social History     Socioeconomic History   • Marital status:      Spouse name: Not on file   • Number of children: Not on file   • Years of education: Not on file   • Highest education level: Not on file   Tobacco Use   • Smoking status: Former Smoker   • Smokeless tobacco: Never Used   Substance and Sexual Activity   • Alcohol use: No   • Drug use: No   • Sexual activity: Defer   Social History Narrative    Patient moved to [facility] assisted living 3/26/20. On 3/28/20 patient visited ED for fall-related injuries.  In response, family looking into hiring a , to visit patient BID for ADLs/ transfers. Family drives patient to appts, but says Jm Encinas may provide in future.       Allergies   Allergen Reactions   • Bactrim [Sulfamethoxazole-Trimethoprim] Other (See Comments)     Stomach cramps    • Ciprofloxacin Other (See Comments)     Other reaction(s): shaking  HCI TABS/ SHAKING   • Levofloxacin Diarrhea         Medication:    Current Facility-Administered Medications:   •  acetaminophen (TYLENOL) tablet 1,000 mg, 1,000 mg, Oral, Q8H, Mercy, Lubna M II, DO, 1,000 mg at 10/11/20 0512  •  albuterol sulfate HFA (PROVENTIL HFA;VENTOLIN HFA;PROAIR HFA) inhaler 6 puff, 6 puff, Inhalation, Q4H PRN, Mercy, Lubna M II, DO  •  aspirin EC tablet 81 mg, 81 mg, Oral, Daily, Mercy, Lubna M II, DO, 81 mg at 10/10/20 0935  •  budesonide-formoterol (SYMBICORT) 160-4.5 MCG/ACT inhaler 2 puff, 2 puff, Inhalation, BID - RT, Mercy, Lubna M II, DO, 2 puff at 10/10/20 2027  •  dexamethasone (DECADRON) tablet 6 mg, 6 mg, Oral, Daily, Mercy, Lubna M II, DO, 6 mg at 10/10/20 1816  •  diclofenac (VOLTAREN) 1 % gel 2 g, 2 g, Topical, TID, Mercy, Lubna M II, DO, 2 g  at 10/10/20 2014  •  dilTIAZem CD (CARDIZEM CD) 24 hr capsule 120 mg, 120 mg, Oral, Daily, Mercy, Lubna M II, DO, 120 mg at 10/10/20 0934  •  docusate sodium (COLACE) capsule 100 mg, 100 mg, Oral, Daily, Mercy, Lubna M II, DO, 100 mg at 10/10/20 0935  •  enoxaparin (LOVENOX) syringe 40 mg, 40 mg, Subcutaneous, Daily, Mercy, Lubna M II, DO, 40 mg at 10/10/20 0934  •  famotidine (PEPCID) tablet 20 mg, 20 mg, Oral, BID, Mercy, Lubna M II, DO, 20 mg at 10/10/20 0935  •  folic acid (FOLVITE) tablet 1,000 mcg, 1,000 mcg, Oral, Daily, Mercy, Lubna M II, DO, 1,000 mcg at 10/10/20 0934  •  [COMPLETED] INV GS-5734 remdesivir 200 mg in sodium chloride 0.9 % 250 mL IVPB (powder vial), 200 mg, Intravenous, Q24H, 200 mg at 10/10/20 2007 **FOLLOWED BY** INV GS-5734 remdesivir 100 mg in sodium chloride 0.9 % 250 mL IVPB (powder vial), 100 mg, Intravenous, Q24H, Mercy, Lubna M II, DO  •  lactobacillus acidophilus (RISAQUAD) capsule 1 capsule, 1 capsule, Oral, Daily, Mercy, Lubna M II, DO, 1 capsule at 10/10/20 0935  •  Magnesium Sulfate 2 gram Bolus, followed by 8 gram infusion (total Mg dose 10 grams)- Mg less than or equal to 1mg/dL, 2 g, Intravenous, PRN **OR** Magnesium Sulfate 2 gram / 50mL Infusion (GIVE X 3 BAGS TO EQUAL 6GM TOTAL DOSE) - Mg 1.1 - 1.5 mg/dl, 2 g, Intravenous, PRN **OR** Magnesium Sulfate 4 gram infusion- Mg 1.6-1.9 mg/dL, 4 g, Intravenous, PRN, Mercy, Lubna M II, DO  •  melatonin tablet 5 mg, 5 mg, Oral, Nightly PRN, Mercy, Lubna M II, DO  •  multivitamin and minerals liquid 15 mL, 15 mL, Oral, Daily, Lubna Madrid II, DO, 15 mL at 10/10/20 0934  •  ondansetron (ZOFRAN) tablet 4 mg, 4 mg, Oral, Q6H PRN **OR** ondansetron (ZOFRAN) injection 4 mg, 4 mg, Intravenous, Q6H PRN, Lubna Madrid II, DO  •  Pharmacy Consult, , Does not apply, Continuous PRN, Lubna Madrid II, DO  •  polyethylene glycol (MIRALAX) packet 17 g, 17 g, Oral, Daily, Lubna Madrid II, , 17 g at 10/10/20 0934  •   potassium chloride (MICRO-K) CR capsule 40 mEq, 40 mEq, Oral, PRN **OR** potassium chloride (KLOR-CON) packet 40 mEq, 40 mEq, Oral, PRN **OR** potassium chloride 10 mEq in 100 mL IVPB, 10 mEq, Intravenous, Q1H PRN, Mercy, Lubna M II, DO  •  QUEtiapine (SEROquel) tablet 25 mg, 25 mg, Oral, Nightly, Mercy, Lubna M II, DO, 25 mg at 10/10/20 2013  •  sennosides-docusate (PERICOLACE) 8.6-50 MG per tablet 2 tablet, 2 tablet, Oral, BID PRN, Mercy Lubna M II, DO  •  sodium chloride 0.9 % flush 10 mL, 10 mL, Intravenous, PRN, Blair Ventura MD  •  sodium chloride 0.9 % flush 10 mL, 10 mL, Intravenous, Q12H, Mercy, Lubna M II, DO, 10 mL at 10/10/20 2007  •  sodium chloride 0.9 % flush 10 mL, 10 mL, Intravenous, PRN, Mercy, Lubna M II, DO    Antibiotics:  Anti-Infectives (From admission, onward)    Ordered     Dose/Rate Route Frequency Start Stop    10/10/20 1743  INV GS-5734 remdesivir 100 mg in sodium chloride 0.9 % 250 mL IVPB (powder vial)     Ordering Provider: Mercy, Lubna M II, DO    100 mg  over 60 Minutes Intravenous Every 24 Hours 10/11/20 1830 10/15/20 1829    10/10/20 1743  INV [id number] remdesivir 200 mg in sodium chloride 0.9 % 250 mL IVPB (powder vial)     Ordering Provider: Mercy, Lubna M II, DO    200 mg  over 60 Minutes Intravenous Every 24 Hours 10/10/20 1830 10/10/20 2107            Review of Systems:  See ROS    Physical Exam:   Vital Signs  Temp (24hrs), Av.1 °F (36.7 °C), Min:96.5 °F (35.8 °C), Max:99 °F (37.2 °C)    Temp  Min: 96.5 °F (35.8 °C)  Max: 99 °F (37.2 °C)  BP  Min: 119/75  Max: 146/77  Pulse  Min: 67  Max: 96  Resp  Min: 18  Max: 20  SpO2  Min: 92 %  Max: 94 %     Her exam was done by direct visualization from outside the room and a telephone with the assistance of the nursing staff in the presence of the COVID-19 pandemic with a critical shortage of PPE.    GENERAL: Awake and alert, in no acute distress.   HEENT: Normocephalic, atraumatic.  PERRL. EOMI. No  conjunctival injection. No icterus.  No labial ulcers  NECK: Supple   HEART: RRR;  LUNGS: NO RESPIRATORY DISTRESS ON 2 L OF OXYGEN   ABDOMEN: Nondistended  :  Without Cisse catheter.  MSK: No focal joint swelling or erythema  SKIN: Warm and dry without cutaneous eruptions on Inspection/palpation.    NEURO: Oriented to PPT.  5/5 strength bilaterally  PSYCHIATRIC: Normal insight and judgement. Cooperative with PE    Laboratory Data    Results from last 7 days   Lab Units 10/10/20  1010 10/09/20  1054   WBC 10*3/mm3 4.49 5.39   HEMOGLOBIN g/dL 9.0* 9.2*   HEMATOCRIT % 29.3* 28.7*   PLATELETS 10*3/mm3 330 412     Results from last 7 days   Lab Units 10/10/20  1010   SODIUM mmol/L 141   POTASSIUM mmol/L 3.7   CHLORIDE mmol/L 105   CO2 mmol/L 29.0   BUN mg/dL 11   CREATININE mg/dL 0.88   GLUCOSE mg/dL 101*   CALCIUM mg/dL 8.1*     Results from last 7 days   Lab Units 10/09/20  1054   ALK PHOS U/L 64   BILIRUBIN mg/dL 0.2   ALT (SGPT) U/L 10   AST (SGOT) U/L 27         Results from last 7 days   Lab Units 10/09/20  1054   CRP mg/dL 2.48*     Results from last 7 days   Lab Units 10/09/20  1054   LACTATE mmol/L 1.7             Estimated Creatinine Clearance: 42.1 mL/min (by C-G formula based on SCr of 0.88 mg/dL).      Microbiology:  Blood Culture   Date Value Ref Range Status   10/09/2020 No growth at 24 hours  Preliminary     No results found for: BCIDPCR, CXREFLEX, CSFCX, CULTURETIS  No results found for: CULTURES, HSVCX, URCX  No results found for: EYECULTURE, GCCX, LABHSV  No results found for: LEGIONELLA, MRSACX, MUMPSCX, MYCOPLASCX  No results found for: NOCARDIACX, STOOLCX  Urine Culture   Date Value Ref Range Status   10/09/2020 >100,000 CFU/mL Mixed Katherine Isolated  Final     No results found for: VIRALCULTU, WOUNDCX        Radiology:  Imaging Results (Last 72 Hours)     Procedure Component Value Units Date/Time    XR Chest 1 View [[id number]] Collected: 10/09/20 1133     Updated: 10/09/20 1502    Narrative:       EXAMINATION: XR CHEST 1 VW-      INDICATION: COVID-19 positive, dyspnea and fever.      COMPARISON: 2020.     FINDINGS: Portable chest reveals cardiac and mediastinal silhouettes  within normal limits. Underlying chronic and emphysematous changes are  seen in the lung fields bilaterally. No focal parenchymal opacification  present. No pleural effusion or pneumothorax. Degenerative change is  seen within the spine.           Impression:      Chronic changes seen diffusely throughout the lung fields.  Degenerative change is seen within the spine with no acute parenchymal  disease.     D:  10/09/2020  E:  10/09/2020     This report was finalized on 10/9/2020 2:59 PM by Dr. Niurka Zimmerman MD.           I read her radiographic studies    Impression:  1.  COVID-19 pneumonia-with acute hypoxic respiratory failure.  She is now developed hypoxia on room air and is requiring 2 L of oxygen.  She has been started on Decadron and Remdesivir.  She will be given convalescent plasma today.  2.  Acute hypoxic respiratory failure  3.  COPD  4.  Rheumatoid arthritis- on methotrexate therapy  5.  Immunocompromised host      PLAN/RECOMMENDATIONS:   Thank you for asking us to see Pati Carter, I recommend the followin.  Hold methotrexate  2.  Decadron 6 mg IV/p.o. daily x10 days  3.  Remdesivir IV daily x5 days  4.  DVT prophylaxis  5.  COVID-19 convalescent plasma      1) The FDA has authorized emergency use of COVID-19 convalescent plasma, which is not an FDA approved biologic product.  2) We discussed that the patient has the option to accept or refuse administration of COVID-19 convalescent plasma.  3) We discussed the risks and benefits of COVID-19 convalescent plasma including risk of transfusion reaction or transfusion related infection.  Patient agreed to proceed.  4) We discussed alternative treatments and the risks and benefits of these alternative treatments.         Danny Moscoso MD  10/11/2020  08:29  EDT

## 2020-10-11 NOTE — PLAN OF CARE
Problem: Adult Inpatient Plan of Care  Goal: Plan of Care Review  Recent Flowsheet Documentation  Taken 10/11/2020 1450 by Carolin Underwood OT  Plan of Care Reviewed With: patient  Outcome Summary: OT initial eval and expanded chart review completed. Pt presents with multiple comorbidities and decreased independence with ADL's and moblity. Recommend continued skilled OT services and SNF at d/c.

## 2020-10-11 NOTE — THERAPY EVALUATION
Patient Name: Pati Carter  : 1930    MRN: [id number]                              Today's Date: 10/11/2020       Admit Date: 10/9/2020    Visit Dx:     ICD-10-CM ICD-9-CM   1. COVID-19  U07.1 079.89     Patient Active Problem List   Diagnosis   • Macular degeneration   • Rheumatoid arthritis (CMS/HCC)   • Hearing loss   • Hyperlipidemia   • Gastroesophageal reflux disease   • Hypertension   • Constipation   • Anemia   • Compression fracture of lumbar vertebra (CMS/HCC)   • Degeneration of intervertebral disc of lumbar region   • COPD (chronic obstructive pulmonary disease) (CMS/HCC)   • H/O calcium pyrophosphate deposition disease (CPPD)   • Falls frequently   • Sepsis (CMS/HCC)   • Immunosuppressed status on methotrexate   • Ferny Bonnet syndrome   • UTI (urinary tract infection), bacterial   • JENNIE (acute kidney injury) (CMS/HCC)   • Bacterial colitis   • COVID-19     Past Medical History:   Diagnosis Date   • Anemia     Description: A.  Dx - borderline intermittent.   • Back pain    • Benign colonic polyp 2016    Description: A.  Dx .   • Ferny Bonnet syndrome 2020   • Compression fracture of lumbar vertebra (CMS/HCC)    • Constipation    • COPD (chronic obstructive pulmonary disease) (CMS/HCC)     Description: A.  Rule out chronic persistent asthma, COPD, or obliterative bronchiolitis.- Butch   • Degeneration of intervertebral disc of lumbar region     Description: A.  Diagnosed in 2013 with advanced multilevel with severe spinal stenosis, followed by Dr. Pillai for pain management.   • Gastroesophageal reflux disease    • Hearing loss    • History of colonoscopy 1999    NORMAL PER PATIENT    • History of mammogram 2011    NORMAL PER PT    • History of Papanicolaou smear of cervix 2010    NORMAL PER PT    • History of varicella    • Hyperlipidemia     Description: A.  Dx .   • Hypertension     Description: A. Dx .   • Macular degeneration    •  Ovarian mass     Dx 8/15- benign left cystic adnexal mass   • Rheumatoid arthritis (CMS/HCC)     Description: A.  Diagnosed in 2000 and and followed by Dr. Constantino (now Dr. Kaplan). B.  On methotrexate therapy since 2000. C.  Off low-dose prednisone therapy.     Past Surgical History:   Procedure Laterality Date   • APPENDECTOMY     • CARPAL TUNNEL RELEASE Left 01/01/2003    HISTORY OF NEUROPLASTY DECOMPRESSION MEDIAN NERVE AT CARPAL TUNNEL LEFT   • CATARACT EXTRACTION Bilateral 01/01/2009   • CHOLECYSTECTOMY  01/01/1962   • HIP CANNULATED SCREW PLACEMENT Right 1/25/2020    Procedure: HIP CANNULATED SCREW PLACEMENT RIGHT;  Surgeon: Karlos Blount MD;  Location: [facility];  Service: Orthopedics   • KNEE ARTHROSCOPY Left 01/01/2001    MENISCAL REPAIR   • KYPHOPLASTY  06/18/2015    T11 AND L1 (JOSE A)   • PELVIC LAPAROSCOPY  01/01/1996    REMOVAL OF BENIGN UTERINE AND RIGHT OVARIAN TUMORS   • SALPINGO OOPHORECTOMY Left 08/26/2015    REMOVAL OF LEFT OVARY AND TUBE (benign cystic mass)     General Information     Row Name 10/11/20 1421 10/11/20 1408       OT Time and Intention    Document Type  evaluation  -JR  evaluation  -JR    Mode of Treatment  occupational therapy  -JR  --    Row Name 10/11/20 1421          General Information    Patient Profile Reviewed  yes  -JR     Prior Level of Function  independent:;gait;transfer;bed mobility;ADL's Pt previously living in intermediate, briefly in [place] and then staying at her Daughter's house  -JR     Existing Precautions/Restrictions  fall;oxygen therapy device and L/min  -JR     Barriers to Rehab  medically complex;previous functional deficit;visual deficit;hearing deficit 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, 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  -JR     Row Name 10/11/20 1421          Living Environment    Lives With  -- Previously living alone in intermediate  -JR     Row Name 10/11/20 1421          Cognition    Orientation Status (Cognition)  oriented x 3  -JR     Row Name 10/11/20 1421           Safety Issues, Functional Mobility    Safety Issues Affecting Function (Mobility)  awareness of need for assistance;insight into deficits/self-awareness  -     Impairments Affecting Function (Mobility)  balance;cognition;coordination;endurance/activity tolerance;grasp;strength  -JR     Cognitive Impairments, Mobility Safety/Performance  awareness, need for assistance;insight into deficits/self-awareness  -       User Key  (r) = Recorded By, (t) = Taken By, (c) = Cosigned By    Initials Name Provider Type    JR Carolin Underwood OT Occupational Therapist        Mobility/ADL's     Row Name 10/11/20 1444          Bed Mobility    Bed Mobility  supine-sit  -     Supine-Sit [place] (Bed Mobility)  minimum assist (75% patient effort);verbal cues  -     Bed Mobility, Safety Issues  decreased use of arms for pushing/pulling;decreased use of legs for bridging/pushing  -     Assistive Device (Bed Mobility)  head of bed elevated  -     Row Name 10/11/20 1444          Transfers    Transfers  sit-stand transfer;toilet transfer  -     Sit-Stand [place] (Transfers)  minimum assist (75% patient effort);verbal cues  -JR     [place] Level (Toilet Transfer)  moderate assist (50% patient effort);verbal cues Pt required increased assist from lower surface, assisted x 2 from [facility] for hygiene  -     Assistive Device (Toilet Transfer)  commode, bedside without drop arms;walker, front-wheeled  -     Row Name 10/11/20 1444          Sit-Stand Transfer    Assistive Device (Sit-Stand Transfers)  walker, front-wheeled  -     Row Name 10/11/20 1444          Toilet Transfer    Type (Toilet Transfer)  sit-stand;stand-sit  -     Row Name 10/11/20 1444          Functional Mobility    Functional Mobility- Ind. Level  minimum assist (75% patient effort);verbal cues required  -     Functional Mobility- Device  rolling walker  -     Functional Mobility-Distance (Feet)  -- bed to BSC to chair  -     Functional  Mobility- Safety Issues  balance decreased during turns;supplemental O2;weight-shifting ability decreased;loses balance backward  -[facility] Name 10/11/20 1444          Activities of Daily Living    BADL Assessment/Intervention  toileting;lower body dressing  -[facility] Name 10/11/20 1444          Toileting Assessment/Training    [place] Level (Toileting)  adjust/manage clothing;perform perineal hygiene;dependent (less than 25% patient effort);verbal cues  -     Assistive Devices (Toileting)  commode, bedside without drop arms  -     Position (Toileting)  supported standing  -[facility] Name 10/11/20 1444          Lower Body Dressing Assessment/Training    [place] Level (Lower Body Dressing)  don;socks;dependent (less than 25% patient effort)  -     Position (Lower Body Dressing)  supine  -       User Key  (r) = Recorded By, (t) = Taken By, (c) = Cosigned By    Initials Name Provider Type    Carolin Lizarraga OT Occupational Therapist        Obj/Interventions     [facility] Name 10/11/20 1446          Sensory Assessment (Somatosensory)    Sensory Assessment (Somatosensory)  sensation intact  -JR     Row Name 10/11/20 1446          Vision Assessment/Intervention    Visual Impairment/Limitations  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, and has decreased vision in R eye  -[facility] Name 10/11/20 1446          Range of Motion Comprehensive    General Range of Motion  no range of motion deficits identified  -[facility] Name 10/11/20 1446          Strength Comprehensive (MMT)    General Manual Muscle Testing (MMT) Assessment  upper extremity strength deficits identified  -     Comment, General Manual Muscle Testing (MMT) Assessment  B UE functionally 4/5  -       User Key  (r) = Recorded By, (t) = Taken By, (c) = Cosigned By    Initials Name Provider Type    Carolin Lizarraga OT Occupational Therapist        Goals/Plan     [facility] Name 10/11/20 1452          Bed Mobility Goal 1 (OT)     Activity/Assistive Device (Bed Mobility Goal 1, OT)  sit to supine/supine to sit  -JR     [place] Level/Cues Needed (Bed Mobility Goal 1, OT)  supervision required;verbal cues required  -JR     Time Frame (Bed Mobility Goal 1, OT)  long term goal (LTG);2 weeks  -JR     Progress/Outcomes (Bed Mobility Goal 1, OT)  goal ongoing  -[employer]     Row Name 10/11/20 1452          Transfer Goal 1 (OT)    Activity/Assistive Device (Transfer Goal 1, OT)  sit-to-stand/stand-to-sit;walker, rolling  -JR     [place] Level/Cues Needed (Transfer Goal 1, OT)  contact guard assist;verbal cues required  -JR     Time Frame (Transfer Goal 1, OT)  long term goal (LTG);2 weeks  -JR     Progress/Outcome (Transfer Goal 1, OT)  goal ongoing  -[employer]     Row Name 10/11/20 1452          Toileting Goal 1 (OT)    Activity/Device (Toileting Goal 1, OT)  adjust/manage clothing;perform perineal hygiene;commode, bedside without drop arms  -JR     [place] Level/Cues Needed (Toileting Goal 1, OT)  minimum assist (75% or more patient effort);verbal cues required  -JR     Time Frame (Toileting Goal 1, OT)  long term goal (LTG);2 weeks  -JR     Progress/Outcome (Toileting Goal 1, OT)  goal ongoing  -[employer]     Row Name 10/11/20 1452          Strength Goal 1 (OT)    Strength Goal 1 (OT)  Pt to demo B UE AROM 15 reps to support ADL independence.  -JR     Time Frame (Strength Goal 1, OT)  long term goal (LTG);2 weeks  -JR     Progress/Outcome (Strength Goal 1, OT)  goal ongoing  -[employer]     Row Name 10/11/20 1452          Therapy Assessment/Plan (OT)    Planned Therapy Interventions (OT)  activity tolerance training;BADL retraining;patient/caregiver education/training;transfer/mobility retraining;strengthening exercise;ROM/therapeutic exercise;occupation/activity based interventions;functional balance retraining  -JR       User Key  (r) = Recorded By, (t) = Taken By, (c) = Cosigned By    Initials Name Provider Type    JR Carolin Underwood, OT Occupational  Therapist        Clinical Impression     Row Name 10/11/20 1450          Pain Assessment    Additional Documentation  Pain Scale: FACES Pre/Post-Treatment (Group)  -     Row Name 10/11/20 1450          Pain Scale: Numbers Pre/Post-Treatment    Pretreatment Pain Rating  0/10 - no pain  -     Posttreatment Pain Rating  0/10 - no pain  -     Row Name 10/11/20 1450          Plan of Care Review    Plan of Care Reviewed With  patient  -JR     Outcome Summary  OT initial eval and expanded chart review completed. Pt presents with multiple comorbidities and decreased independence with ADL's and moblity. Recommend continued skilled OT services and SNF at d/c.  -     Row Name 10/11/20 1450          Therapy Assessment/Plan (OT)    Rehab Potential (OT)  good, to achieve stated therapy goals  -     Criteria for Skilled Therapeutic Interventions Met (OT)  yes;skilled treatment is necessary  -     Therapy Frequency (OT)  daily  -     Row Name 10/11/20 1450          Therapy Plan Review/Discharge Plan (OT)    Anticipated Discharge Disposition (OT)  skilled nursing facility  -     Row Name 10/11/20 1450          Vital Signs    Pre Systolic BP Rehab  130  -JR     Pre Treatment Diastolic BP  70  -JR     Post Systolic BP Rehab  125  -JR     Post Treatment Diastolic BP  75  -JR     Pretreatment Heart Rate (beats/min)  90  -JR     Posttreatment Heart Rate (beats/min)  105  -JR     Pre SpO2 (%)  93  -JR     O2 Delivery Pre Treatment  supplemental O2  -JR     Post SpO2 (%)  97  -JR     O2 Delivery Post Treatment  supplemental O2  -JR     Pre Patient Position  Supine  -JR     Intra Patient Position  Standing  -JR     Post Patient Position  Sitting  -     Row Name 10/11/20 1450          Positioning and Restraints    Pre-Treatment Position  in bed  -JR     Post Treatment Position  chair  -JR     In Chair  notified nsg;reclined;call light within reach;encouraged to call for assist;exit alarm on  -JR       User Key  (r) =  Recorded By, (t) = Taken By, (c) = Cosigned By    Initials Name Provider Type    Carolin Lizarraga OT Occupational Therapist        Outcome Measures     Row Name 10/11/20 1454          How much help from another is currently needed...    Putting on and taking off regular lower body clothing?  1  -JR     Bathing (including washing, rinsing, and drying)  2  -JR     Toileting (which includes using toilet bed pan or urinal)  1  -JR     Putting on and taking off regular upper body clothing  2  -JR     Taking care of personal grooming (such as brushing teeth)  3  -JR     Eating meals  3  -JR     AM-PAC 6 Clicks Score (OT)  12  -JR     Row Name 10/11/20 1454          Functional Assessment    Outcome Measure Options  AM-PAC 6 Clicks Daily Activity (OT)  -JR       User Key  (r) = Recorded By, (t) = Taken By, (c) = Cosigned By    Initials Name Provider Type    Carolin Lizarraga OT Occupational Therapist        Occupational Therapy Education                 Title: PT OT SLP Therapies (In Progress)     Topic: Occupational Therapy (In Progress)     Point: ADL training (In Progress)     Description:   Instruct learner(s) on proper safety adaptation and remediation techniques during self care or transfers.   Instruct in proper use of assistive devices.              Learning Progress Summary           Patient Acceptance, E, NR by  at 10/11/2020 1120    Comment: Educated pt regarding role of therapy and ongoing treatment plan                   Point: Home exercise program (Not Started)     Description:   Instruct learner(s) on appropriate technique for monitoring, assisting and/or progressing therapeutic exercises/activities.              Learner Progress:  Not documented in this visit.          Point: Precautions (Not Started)     Description:   Instruct learner(s) on prescribed precautions during self-care and functional transfers.              Learner Progress:  Not documented in this visit.          Point: Body  mechanics (Not Started)     Description:   Instruct learner(s) on proper positioning and spine alignment during self-care, functional mobility activities and/or exercises.              Learner Progress:  Not documented in this visit.                      User Key     Initials Effective Dates Name Provider Type [facility] 06/22/15 -  Carolin Underwood OT Occupational Therapist OT              OT Recommendation and Plan  Retired Outcome Summary/Treatment Plan (OT)  Anticipated Discharge Disposition (OT): skilled nursing facility  Planned Therapy Interventions (OT): activity tolerance training, BADL retraining, patient/caregiver education/training, transfer/mobility retraining, strengthening exercise, ROM/therapeutic exercise, occupation/activity based interventions, functional balance retraining  Therapy Frequency (OT): daily  Plan of Care Review  Plan of Care Reviewed With: patient  Outcome Summary: OT initial eval and expanded chart review completed. Pt presents with multiple comorbidities and decreased independence with ADL's and moblity. Recommend continued skilled OT services and SNF at d/c.  Plan of Care Reviewed With: patient  Outcome Summary: OT initial eval and expanded chart review completed. Pt presents with multiple comorbidities and decreased independence with ADL's and moblity. Recommend continued skilled OT services and SNF at d/c.     Time Calculation:   Time Calculation- OT     Row Name 10/11/20 1456             Time Calculation- OT    OT Start Time  1120  -      OT Received On  10/11/20  -      OT Goal Re-Cert Due Date  10/21/20  -         Timed Charges    89860 - OT Self Care/Mgmt Minutes  23  -        User Key  (r) = Recorded By, (t) = Taken By, (c) = Cosigned By    Initials Name Provider Type     Carolin Underwood OT Occupational Therapist        Therapy Charges for Today     Code Description Service Date Service Provider Modifiers Qty    [id number]  OT SELF CARE/MGMT/TRAIN EA  15 MIN 10/11/2020 Kj, Carolin ZAMORA, OT GO 2    [id number] HC OT EVAL MOD COMPLEXITY 4 10/11/2020 Kj, Carolin ZAMORA OT GO 1               Carolin ZAMORA. Kj, OT  10/11/2020

## 2020-10-11 NOTE — PLAN OF CARE
Goal Outcome Evaluation:  Plan of Care Reviewed With: patient  Progress: improving  Outcome Summary: Patient up to chair this shift. No complaints of pain or shortness of air. Patient refused labs today. Signed consent for plasma and that was given today. Second dose of remdesivir given. Will cont to monitor.

## 2020-10-12 LAB
BH BB BLOOD EXPIRATION DATE: NORMAL
BH BB BLOOD TYPE BARCODE: 9500
BH BB DISPENSE STATUS: NORMAL
BH BB PRODUCT CODE: NORMAL
BH BB UNIT NUMBER: NORMAL
UNIT  ABO: NORMAL
UNIT  RH: NORMAL

## 2020-10-12 PROCEDURE — 97530 THERAPEUTIC ACTIVITIES: CPT

## 2020-10-12 PROCEDURE — 94799 UNLISTED PULMONARY SVC/PX: CPT

## 2020-10-12 PROCEDURE — 97110 THERAPEUTIC EXERCISES: CPT

## 2020-10-12 PROCEDURE — 99232 SBSQ HOSP IP/OBS MODERATE 35: CPT | Performed by: INTERNAL MEDICINE

## 2020-10-12 PROCEDURE — 25010000002 ENOXAPARIN PER 10 MG: Performed by: INTERNAL MEDICINE

## 2020-10-12 PROCEDURE — 63710000001 DEXAMETHASONE PER 0.25 MG: Performed by: INTERNAL MEDICINE

## 2020-10-12 RX ORDER — FAMOTIDINE 20 MG/1
20 TABLET, FILM COATED ORAL DAILY
Status: DISCONTINUED | OUTPATIENT
Start: 2020-10-13 | End: 2020-10-15 | Stop reason: HOSPADM

## 2020-10-12 RX ADMIN — FOLIC ACID 1000 MCG: 1 TABLET ORAL at 09:41

## 2020-10-12 RX ADMIN — DICLOFENAC 2 G: 10 GEL TOPICAL at 22:10

## 2020-10-12 RX ADMIN — DICLOFENAC 2 G: 10 GEL TOPICAL at 09:42

## 2020-10-12 RX ADMIN — ACETAMINOPHEN 1000 MG: 500 TABLET, FILM COATED ORAL at 06:03

## 2020-10-12 RX ADMIN — QUETIAPINE FUMARATE 25 MG: 25 TABLET ORAL at 22:10

## 2020-10-12 RX ADMIN — Medication 1 CAPSULE: at 09:41

## 2020-10-12 RX ADMIN — DICLOFENAC 2 G: 10 GEL TOPICAL at 16:07

## 2020-10-12 RX ADMIN — DOCUSATE SODIUM 100 MG: 100 CAPSULE ORAL at 09:41

## 2020-10-12 RX ADMIN — BUDESONIDE AND FORMOTEROL FUMARATE DIHYDRATE 2 PUFF: 160; 4.5 AEROSOL RESPIRATORY (INHALATION) at 08:00

## 2020-10-12 RX ADMIN — ENOXAPARIN SODIUM 40 MG: 40 INJECTION SUBCUTANEOUS at 09:41

## 2020-10-12 RX ADMIN — SODIUM CHLORIDE, PRESERVATIVE FREE 10 ML: 5 INJECTION INTRAVENOUS at 22:10

## 2020-10-12 RX ADMIN — MULTIVITAMIN 15 ML: LIQUID ORAL at 16:06

## 2020-10-12 RX ADMIN — ASPIRIN 81 MG: 81 TABLET, COATED ORAL at 09:42

## 2020-10-12 RX ADMIN — FAMOTIDINE 20 MG: 20 TABLET, FILM COATED ORAL at 09:42

## 2020-10-12 RX ADMIN — POLYETHYLENE GLYCOL 3350 17 G: 17 POWDER, FOR SOLUTION ORAL at 09:42

## 2020-10-12 RX ADMIN — ACETAMINOPHEN 1000 MG: 500 TABLET, FILM COATED ORAL at 16:06

## 2020-10-12 RX ADMIN — DILTIAZEM HYDROCHLORIDE 120 MG: 120 CAPSULE, COATED, EXTENDED RELEASE ORAL at 09:41

## 2020-10-12 RX ADMIN — REMDESIVIR 100 MG: 100 INJECTION, POWDER, LYOPHILIZED, FOR SOLUTION INTRAVENOUS at 18:18

## 2020-10-12 RX ADMIN — ACETAMINOPHEN 1000 MG: 500 TABLET, FILM COATED ORAL at 22:10

## 2020-10-12 RX ADMIN — BUDESONIDE AND FORMOTEROL FUMARATE DIHYDRATE 2 PUFF: 160; 4.5 AEROSOL RESPIRATORY (INHALATION) at 20:56

## 2020-10-12 RX ADMIN — DEXAMETHASONE 6 MG: 4 TABLET ORAL at 09:42

## 2020-10-12 NOTE — THERAPY TREATMENT NOTE
Patient Name: Pati Carter  : 1930    MRN: [id number]                              Today's Date: 10/12/2020       Admit Date: 10/9/2020    Visit Dx:     ICD-10-CM ICD-9-CM   1. COVID-19  U07.1 079.89     Patient Active Problem List   Diagnosis   • Macular degeneration   • Rheumatoid arthritis (CMS/HCC)   • Hearing loss   • Hyperlipidemia   • Gastroesophageal reflux disease   • Hypertension   • Constipation   • Anemia   • Compression fracture of lumbar vertebra (CMS/HCC)   • Degeneration of intervertebral disc of lumbar region   • COPD (chronic obstructive pulmonary disease) (CMS/HCC)   • H/O calcium pyrophosphate deposition disease (CPPD)   • Falls frequently   • Sepsis (CMS/HCC)   • Immunosuppressed status on methotrexate   • Ferny Bonnet syndrome   • UTI (urinary tract infection), bacterial   • JENNIE (acute kidney injury) (CMS/HCC)   • Bacterial colitis   • COVID-19     Past Medical History:   Diagnosis Date   • Anemia     Description: A.  Dx - borderline intermittent.   • Back pain    • Benign colonic polyp 2016    Description: A.  Dx .   • Ferny Bonnet syndrome 2020   • Compression fracture of lumbar vertebra (CMS/HCC)    • Constipation    • COPD (chronic obstructive pulmonary disease) (CMS/HCC)     Description: A.  Rule out chronic persistent asthma, COPD, or obliterative bronchiolitis.- Butch   • Degeneration of intervertebral disc of lumbar region     Description: A.  Diagnosed in 2013 with advanced multilevel with severe spinal stenosis, followed by Dr. Pillai for pain management.   • Gastroesophageal reflux disease    • Hearing loss    • History of colonoscopy 1999    NORMAL PER PATIENT    • History of mammogram 2011    NORMAL PER PT    • History of Papanicolaou smear of cervix 2010    NORMAL PER PT    • History of varicella    • Hyperlipidemia     Description: A.  Dx .   • Hypertension     Description: A. Dx .   • Macular degeneration    •  Ovarian mass     Dx 8/15- benign left cystic adnexal mass   • Rheumatoid arthritis (CMS/HCC)     Description: A.  Diagnosed in 2000 and and followed by Dr. Constantino (now Dr. Kaplan). B.  On methotrexate therapy since 2000. C.  Off low-dose prednisone therapy.     Past Surgical History:   Procedure Laterality Date   • APPENDECTOMY     • CARPAL TUNNEL RELEASE Left 01/01/2003    HISTORY OF NEUROPLASTY DECOMPRESSION MEDIAN NERVE AT CARPAL TUNNEL LEFT   • CATARACT EXTRACTION Bilateral 01/01/2009   • CHOLECYSTECTOMY  01/01/1962   • HIP CANNULATED SCREW PLACEMENT Right 1/25/2020    Procedure: HIP CANNULATED SCREW PLACEMENT RIGHT;  Surgeon: Karlos Blount MD;  Location: [facility];  Service: Orthopedics   • KNEE ARTHROSCOPY Left 01/01/2001    MENISCAL REPAIR   • KYPHOPLASTY  06/18/2015    T11 AND L1 (JOSE A)   • PELVIC LAPAROSCOPY  01/01/1996    REMOVAL OF BENIGN UTERINE AND RIGHT OVARIAN TUMORS   • SALPINGO OOPHORECTOMY Left 08/26/2015    REMOVAL OF LEFT OVARY AND TUBE (benign cystic mass)     General Information     Row Name 10/12/20 1625          Physical Therapy Time and Intention    Document Type  therapy note (daily note)  -VG     Mode of Treatment  individual therapy;physical therapy  -VG     Row Name 10/12/20 1625          General Information    Existing Precautions/Restrictions  fall  -VG     Row Name 10/12/20 1625          Cognition    Orientation Status (Cognition)  oriented x 3  -VG       User Key  (r) = Recorded By, (t) = Taken By, (c) = Cosigned By    Initials Name Provider Type    VG Kathryn Jay PT Physical Therapist        Mobility     Row Name 10/12/20 1626          Bed Mobility    Supine-Sit [place] (Bed Mobility)  moderate assist (50% patient effort);1 person assist;verbal cues  -VG     Assistive Device (Bed Mobility)  bed rails;draw sheet;head of bed elevated  -VG     Comment (Bed Mobility)  Assist at trunk for supine to EOB. Cues for hand placement and sequencing.  -VG     Row Name  10/12/20 1626          Transfers    Comment (Transfers)  Cues for hand placement and sequencing. SPT x2 bed to BSC to recliner. Deferred further gait d/t unsteadiness in standing and incontinent bladder.  -VG     Row Name 10/12/20 1626          Bed-Chair Transfer    Bed-Chair [place] (Transfers)  moderate assist (50% patient effort);1 person assist;verbal cues  -VG     Row Name 10/12/20 1626          Sit-Stand Transfer    Sit-Stand [place] (Transfers)  moderate assist (50% patient effort);1 person assist;verbal cues  -VG       User Key  (r) = Recorded By, (t) = Taken By, (c) = Cosigned By    Initials Name Provider Type    VG Kathryn Jay, PT Physical Therapist        Obj/Interventions     Row Name 10/12/20 1627          Motor Skills    Therapeutic Exercise  hip;knee;ankle  -     Row Name 10/12/20 1627          Hip (Therapeutic Exercise)    Hip (Therapeutic Exercise)  strengthening exercise  -VG     Hip Strengthening (Therapeutic Exercise)  bilateral;aBduction;aDduction;marching while seated;sitting;10 repetitions  -     Row Name 10/12/20 1627          Knee (Therapeutic Exercise)    Knee (Therapeutic Exercise)  strengthening exercise  -VG     Knee Strengthening (Therapeutic Exercise)  bilateral;LAQ (long arc quad);sitting;10 repetitions  -VG     Row Name 10/12/20 1627          Ankle (Therapeutic Exercise)    Ankle (Therapeutic Exercise)  strengthening exercise  -VG     Ankle Strengthening (Therapeutic Exercise)  bilateral;dorsiflexion;plantarflexion;10 repetitions;sitting  -VG       User Key  (r) = Recorded By, (t) = Taken By, (c) = Cosigned By    Initials Name Provider Type    VG Kathryn Jay, PT Physical Therapist        Goals/Plan    No documentation.       Clinical Impression     Row Name 10/12/20 1617          Pain Scale: Numbers Pre/Post-Treatment    Pretreatment Pain Rating  0/10 - no pain  -VG     Posttreatment Pain Rating  0/10 - no pain  -VG     Row Name 10/12/20 1617          Plan of  Care Review    Plan of Care Reviewed With  patient  -VG     Progress  improving  -VG     Outcome Summary  Pt requried MODA for supine to EOB and SPT 2x, bed to BSC to recliner. Unsteady with transfers. Deferred further gait d/t weakness and incontinent bladder. Improved activity tolerance. Recommend IRF for best outcomes.  -VG     Row Name 10/12/20 1617          Vital Signs    Pre Systolic BP Rehab  136  -VG     Pre Treatment Diastolic BP  73  -VG     Post Systolic BP Rehab  141  -VG     Post Treatment Diastolic BP  75  -VG     Pretreatment Heart Rate (beats/min)  88  -VG     Posttreatment Heart Rate (beats/min)  92  -VG     Row Name 10/12/20 1617          Positioning and Restraints    Pre-Treatment Position  in bed  -VG     Post Treatment Position  chair  -VG     In Chair  reclined;call light within reach;encouraged to call for assist;exit alarm on;with family/caregiver;waffle cushion;legs elevated;heels elevated  -VG       User Key  (r) = Recorded By, (t) = Taken By, (c) = Cosigned By    Initials Name Provider Type    VG Kathryn Jay, PT Physical Therapist        Outcome Measures     Row Name 10/12/20 1629          How much help from another person do you currently need...    Turning from your back to your side while in flat bed without using bedrails?  3  -VG     Moving from lying on back to sitting on the side of a flat bed without bedrails?  2  -VG     Moving to and from a bed to a chair (including a wheelchair)?  2  -VG     Standing up from a chair using your arms (e.g., wheelchair, bedside chair)?  2  -VG     Climbing 3-5 steps with a railing?  1  -VG     To walk in hospital room?  2  -VG     AM-PAC 6 Clicks Score (PT)  12  -VG       User Key  (r) = Recorded By, (t) = Taken By, (c) = Cosigned By    Initials Name Provider Type    VG Kathryn Jay, PT Physical Therapist        Physical Therapy Education                 Title: PT OT SLP Therapies (In Progress)     Topic: Physical Therapy (Done)     Point:  Mobility training (Done)     Learning Progress Summary           Patient Acceptance, E, VU by VG at 10/12/2020 1629    Acceptance, E,D, VU,NR by MB at 10/10/2020 1358    Comment: Provided education re: fall precautions, benefits of mobility, home safety, transfer safety/mechanics, HEP.                   Point: Home exercise program (Done)     Learning Progress Summary           Patient Acceptance, E, VU by VG at 10/12/2020 1629    Acceptance, E,D, VU,NR by MB at 10/10/2020 1358    Comment: Provided education re: fall precautions, benefits of mobility, home safety, transfer safety/mechanics, HEP.                   Point: Body mechanics (Done)     Learning Progress Summary           Patient Acceptance, E, VU by VG at 10/12/2020 1629    Acceptance, E,D, VU,NR by MB at 10/10/2020 1358    Comment: Provided education re: fall precautions, benefits of mobility, home safety, transfer safety/mechanics, HEP.                   Point: Precautions (Done)     Learning Progress Summary           Patient Acceptance, E, VU by  at 10/12/2020 1629    Acceptance, E,D, VU,NR by MB at 10/10/2020 1358    Comment: Provided education re: fall precautions, benefits of mobility, home safety, transfer safety/mechanics, HEP.                               User Key     Initials Effective Dates Name Provider Type Discipline    MB 03/14/16 -  Nayeli Chapa, PT Physical Therapist PT     05/29/18 -  Kathryn Jay, PT Physical Therapist PT              PT Recommendation and Plan     Plan of Care Reviewed With: patient  Progress: improving  Outcome Summary: Pt requried MODA for supine to EOB and SPT 2x, bed to BSC to recliner. Unsteady with transfers. Deferred further gait d/t weakness and incontinent bladder. Improved activity tolerance. Recommend IRF for best outcomes.     Time Calculation:   PT Charges     Row Name 10/12/20 7200             Time Calculation    Start Time  1550  -VG      PT Received On  10/12/20  -      PT Goal Re-Cert  Due Date  10/20/20  -VG         Time Calculation- PT    Total Timed Code Minutes- PT  25 minute(s)  -VG         Timed Charges    31961 - PT Therapeutic Exercise Minutes  10  -VG      39109 - PT Therapeutic Activity Minutes  15  -VG        User Key  (r) = Recorded By, (t) = Taken By, (c) = Cosigned By    Initials Name Provider Type    VG Kathryn Jay, PT Physical Therapist        Therapy Charges for Today     Code Description Service Date Service Provider Modifiers Qty    19257335351 HC PT THER PROC EA 15 MIN 10/12/2020 Kathryn Jay, PT GP 1    54223985174 HC PT THERAPEUTIC ACT EA 15 MIN 10/12/2020 Kathryn Jay, PT GP 1          PT G-Codes  Outcome Measure Options: AM-PAC 6 Clicks Daily Activity (OT)  AM-PAC 6 Clicks Score (PT): 12  AM-PAC 6 Clicks Score (OT): 12    Ciera Jay PT  10/12/2020

## 2020-10-12 NOTE — NURSING NOTE
Consulted to assess patients coccyx for a possible stage 1 pressure injury POA:     Patient is COVID (Confirmed) Airborne    Talked with Kota FARRELL about POC.     Stage 1 just needs repositioning and good general skin care.   See skin care order for care needs.     RN is to contact me if further assessment is needed or further wound care recommendations are needed.     Thanks

## 2020-10-12 NOTE — PROGRESS NOTES
Continued Stay Note  [facility]     Patient Name: Pati Carter  MRN: [id number]  Today's Date: 10/12/2020    Admit Date: 10/9/2020    Discharge Plan     Row Name 10/12/20 1546       Plan    Plan  update    Patient/Family in Agreement with Plan  yes    Plan Comments  Spoke with patient daughter via telephone regarding discharge plan who report that she absolutly does not want her mother to go back to [place] under any circumstances but is open to a referral to [facility], [facility] and [facility].  She indicates that the patient tested COVID positive on 10/7.  No other discharge needs verbalized.  Called Analia iraheta with [facility] who can review referral and call back.  Called Serge with [facility] with referral who will review and callback.  [facility].  CM following.  Patient plan is to discharge to SNF when bed offered.    Final Discharge Disposition Code  03 - skilled nursing facility (SNF);62 - inpatient rehab facility        Discharge Codes    No documentation.       Expected Discharge Date and Time     Expected Discharge Date Expected Discharge Time    Oct 16, 2020             Romelia Hart, RN

## 2020-10-12 NOTE — PROGRESS NOTES
INFECTIOUS DISEASE Progress Note    Pati Carter  7/26/1930  [id number]      Admission Date: 10/9/2020      Requesting Provider: No ref. provider found  Evaluating Physician: Danny Moscoso MD    Reason for Consultation: COVID-19 pneumonia    History of present illness:    10/10/20:Patient is a 89 y.o. female with COPD and rheumatoid arthritis on methotrexate therapy who is seen today for evaluation of COVID-19 pneumonia.  She was just discharged from here late last month after a hospital course for acute colitis (C. difficile negative) with a history of recent E. coli/[place] a UTI.  She was seen by Dr. Mathis and was discharged to [facility].  Her family removed her from [facility] when they realized that there was a COVID-19 outbreak there.  She has recently developed worsening weakness, myalgias, and fatigue.  She underwent COVID-19 testing on Wednesday which was positive.  She was admitted yesterday and initially did not require supplemental oxygen but this evening she has developed hypoxemia with an O2 saturation of 88% on room air and is requiring 2 L of supplemental oxygen.  She has now been placed on Decadron and Remdesivir therapy.  She denies severe dyspnea and cough.  She denies loss of taste or smell.  She complains of headache, weakness, fatigue, and arthralgias.  2 of her daughters apparently have COVID-19 also.  10/11/20: She remained afebrile overnight. She is requiring 2 L of supplemental oxygen.  She slept most of the night and did not arouse to give consent for convalescent plasma.  I did talk to her daughter, Danette, in detail last night about the current therapeutic regimen including the convalescent plasma and she has agreed with this treatment regimen.  Today, the patient herself is agreed to receive the convalescent plasma and will be infused this afternoon.  She denies increased dyspnea.  The nursing staff indicate that she is not desaturating when  moving around.  10/12/2020: She is now down to 1 L of oxygen.  She is remained afebrile.  The nursing staff indicates that she has continued to improve.    Past Medical History:   Diagnosis Date   • Anemia     Description: A.  Dx 2006- borderline intermittent.   • Back pain    • Benign colonic polyp 9/28/2016    Description: A.  Dx 1999.   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  Rule out chronic persistent asthma, COPD, or obliterative bronchiolitis.- Rae   • Degeneration of intervertebral disc of lumbar region     Description: A.  Diagnosed in April 2013 with advanced multilevel with severe spinal stenosis, followed by Dr. Pillai for pain management.   • Gastroesophageal reflux disease    • Hearing loss    • History of colonoscopy 01/01/1999    NORMAL PER PATIENT    • History of mammogram 01/01/2011    NORMAL PER PT    • History of Papanicolaou smear of cervix 01/01/2010    NORMAL PER PT    • History of varicella    • Hyperlipidemia     Description: A.  Dx 2006.   • Hypertension     Description: A. Dx 2001.   • Macular degeneration    • Ovarian mass     Dx 8/15- benign left cystic adnexal mass   • Rheumatoid arthritis (CMS/[facility])     Description: A.  Diagnosed in 2000 and and followed by Dr. Constantino (now Dr. Kaplan). B.  On methotrexate therapy since 2000. C.  Off low-dose prednisone therapy.       Past Surgical History:   Procedure Laterality Date   • APPENDECTOMY     • CARPAL TUNNEL RELEASE Left 01/01/2003    HISTORY OF NEUROPLASTY DECOMPRESSION MEDIAN NERVE AT CARPAL TUNNEL LEFT   • CATARACT EXTRACTION Bilateral 01/01/2009   • CHOLECYSTECTOMY  01/01/1962   • HIP CANNULATED SCREW PLACEMENT Right 1/25/2020    Procedure: HIP CANNULATED SCREW PLACEMENT RIGHT;  Surgeon: Karlos Blount MD;  Location: [facility];  Service: Orthopedics   • KNEE ARTHROSCOPY Left  01/01/2001    MENISCAL REPAIR   • KYPHOPLASTY  06/18/2015    T11 AND L1 (JOSE A)   • PELVIC LAPAROSCOPY  01/01/1996    REMOVAL OF BENIGN UTERINE AND RIGHT OVARIAN TUMORS   • SALPINGO OOPHORECTOMY Left 08/26/2015    REMOVAL OF LEFT OVARY AND TUBE (benign cystic mass)       Family History   Problem Relation Age of Onset   • Hypertension Mother    • Diabetes Mother        Social History     Socioeconomic History   • Marital status:      Spouse name: Not on file   • Number of children: Not on file   • Years of education: Not on file   • Highest education level: Not on file   Tobacco Use   • Smoking status: Former Smoker   • Smokeless tobacco: Never Used   Substance and Sexual Activity   • Alcohol use: No   • Drug use: No   • Sexual activity: Defer   Social History Narrative    Patient moved to [facility] assisted living 3/26/20. On 3/28/20 patient visited ED for fall-related injuries.  In response, family looking into hiring a , to visit patient BID for ADLs/ transfers. Family drives patient to appts, but says [facility] may provide in future.       Allergies   Allergen Reactions   • Bactrim [Sulfamethoxazole-Trimethoprim] Other (See Comments)     Stomach cramps    • Ciprofloxacin Other (See Comments)     Other reaction(s): shaking  HCI TABS/ SHAKING   • Levofloxacin Diarrhea         Medication:    Current Facility-Administered Medications:   •  acetaminophen (TYLENOL) tablet 1,000 mg, 1,000 mg, Oral, Q8H, Mercy, Lubna M II, DO, 1,000 mg at 10/12/20 0603  •  albuterol sulfate HFA (PROVENTIL HFA;VENTOLIN HFA;PROAIR HFA) inhaler 6 puff, 6 puff, Inhalation, Q4H PRN, Lubna Madrid M II, DO  •  aspirin EC tablet 81 mg, 81 mg, Oral, Daily, MercyLubna harris M II, DO, 81 mg at 10/12/20 0942  •  budesonide-formoterol (SYMBICORT) 160-4.5 MCG/ACT inhaler 2 puff, 2 puff, Inhalation, BID - RT, Mercy Lubna M II, DO, 2 puff at 10/12/20 0800  •  dexamethasone (DECADRON) tablet 6 mg, 6 mg, Oral,  Daily, Mercy, Lubna M II, DO, 6 mg at 10/12/20 0942  •  diclofenac (VOLTAREN) 1 % gel 2 g, 2 g, Topical, TID, Mercy, Lubna M II, DO, 2 g at 10/12/20 0942  •  dilTIAZem CD (CARDIZEM CD) 24 hr capsule 120 mg, 120 mg, Oral, Daily, Mercy, Lubna M II, DO, 120 mg at 10/12/20 0941  •  docusate sodium (COLACE) capsule 100 mg, 100 mg, Oral, Daily, Mercy, Lubna M II, DO, 100 mg at 10/12/20 0941  •  enoxaparin (LOVENOX) syringe 40 mg, 40 mg, Subcutaneous, Daily, Mercy, Lubna M II, DO, 40 mg at 10/12/20 0941  •  famotidine (PEPCID) tablet 20 mg, 20 mg, Oral, BID, Mercy, Lubna M II, DO, 20 mg at 10/12/20 0942  •  folic acid (FOLVITE) tablet 1,000 mcg, 1,000 mcg, Oral, Daily, Mercy, Lubna M II, DO, 1,000 mcg at 10/12/20 0941  •  [COMPLETED] INV GS-5734 remdesivir 200 mg in sodium chloride 0.9 % 250 mL IVPB (powder vial), 200 mg, Intravenous, Q24H, 200 mg at 10/10/20 2007 **FOLLOWED BY** INV GS-5734 remdesivir 100 mg in sodium chloride 0.9 % 250 mL IVPB (powder vial), 100 mg, Intravenous, Q24H, Mercy, Lubna M II, DO, 100 mg at 10/11/20 1817  •  lactobacillus acidophilus (RISAQUAD) capsule 1 capsule, 1 capsule, Oral, Daily, Mercy, Lubna M II, DO, 1 capsule at 10/12/20 0941  •  Magnesium Sulfate 2 gram Bolus, followed by 8 gram infusion (total Mg dose 10 grams)- Mg less than or equal to 1mg/dL, 2 g, Intravenous, PRN **OR** Magnesium Sulfate 2 gram / 50mL Infusion (GIVE X 3 BAGS TO EQUAL 6GM TOTAL DOSE) - Mg 1.1 - 1.5 mg/dl, 2 g, Intravenous, PRN **OR** Magnesium Sulfate 4 gram infusion- Mg 1.6-1.9 mg/dL, 4 g, Intravenous, PRN, Lubna Madrid II, DO  •  melatonin tablet 5 mg, 5 mg, Oral, Nightly PRN, Lubna Madrid II, DO  •  multivitamin and minerals liquid 15 mL, 15 mL, Oral, Daily, Lubna Madrid II, DO, 15 mL at 10/10/20 0934  •  ondansetron (ZOFRAN) tablet 4 mg, 4 mg, Oral, Q6H PRN **OR** ondansetron (ZOFRAN) injection 4 mg, 4 mg, Intravenous, Q6H PRN, Lubna Madrid II, DO  •  Pharmacy Consult, , Does  not apply, Continuous PRN, Mercy, Lubna M II, DO  •  polyethylene glycol (MIRALAX) packet 17 g, 17 g, Oral, Daily, Mercy, Lubna M II, DO, 17 g at 10/12/20 0942  •  potassium chloride (MICRO-K) CR capsule 40 mEq, 40 mEq, Oral, PRN **OR** potassium chloride (KLOR-CON) packet 40 mEq, 40 mEq, Oral, PRN **OR** potassium chloride 10 mEq in 100 mL IVPB, 10 mEq, Intravenous, Q1H PRN, Mercy, Lubna M II, DO  •  QUEtiapine (SEROquel) tablet 25 mg, 25 mg, Oral, Nightly, Mercy, Lubna M II, DO, 25 mg at 10/11/20 2108  •  sennosides-docusate (PERICOLACE) 8.6-50 MG per tablet 2 tablet, 2 tablet, Oral, BID PRN, Mercy, Lubna M II, DO  •  sodium chloride 0.9 % flush 10 mL, 10 mL, Intravenous, PRN, Blair Ventura MD  •  sodium chloride 0.9 % flush 10 mL, 10 mL, Intravenous, Q12H, Mercy Lubna M II, DO, 10 mL at 10/11/20 2109  •  sodium chloride 0.9 % flush 10 mL, 10 mL, Intravenous, PRN, Mercy Lubna M II, DO    Antibiotics:  Anti-Infectives (From admission, onward)    Ordered     Dose/Rate Route Frequency Start Stop    10/10/20 1743  INV GS-5734 remdesivir 100 mg in sodium chloride 0.9 % 250 mL IVPB (powder vial)     Ordering Provider: Mercy Lubna M II, DO    100 mg  over 60 Minutes Intravenous Every 24 Hours 10/11/20 1830 10/15/20 1829    10/10/20 1743  INV GS-5734 remdesivir 200 mg in sodium chloride 0.9 % 250 mL IVPB (powder vial)     Ordering Provider: Mercy, Lubna M II, DO    200 mg  over 60 Minutes Intravenous Every 24 Hours 10/10/20 1830 10/10/20 2107            Review of Systems:  See ROS    Physical Exam:   Vital Signs  Temp (24hrs), Av.1 °F (36.2 °C), Min:96.4 °F (35.8 °C), Max:98.2 °F (36.8 °C)    Temp  Min: 96.4 °F (35.8 °C)  Max: 98.2 °F (36.8 °C)  BP  Min: 115/64  Max: 139/73  Pulse  Min: 66  Max: 105  Resp  Min: 18  Max: 18  SpO2  Min: 97 %  Max: 99 %     Her exam was done by direct visualization from outside the room and a telephone with the assistance of the nursing staff in the presence  of the COVID-19 pandemic with a critical shortage of PPE.    GENERAL: Awake and alert, in no acute distress.   HEENT: Normocephalic, atraumatic.  PERRL. EOMI. No conjunctival injection. No icterus.  No labial ulcers  NECK: Supple   HEART: RRR;  LUNGS: No respiratory distress  ABDOMEN: Nondistended  :  Without Cisse catheter.  MSK: No focal joint swelling or erythema  SKIN: Warm and dry without cutaneous eruptions on Inspection/palpation.    NEURO: Oriented to PPT.  5/5 strength bilaterally  PSYCHIATRIC: Normal insight and judgement. Cooperative with PE    Laboratory Data    Results from last 7 days   Lab Units 10/10/20  1010 10/09/20  1054   WBC 10*3/mm3 4.49 5.39   HEMOGLOBIN g/dL 9.0* 9.2*   HEMATOCRIT % 29.3* 28.7*   PLATELETS 10*3/mm3 330 412     Results from last 7 days   Lab Units 10/10/20  1010   SODIUM mmol/L 141   POTASSIUM mmol/L 3.7   CHLORIDE mmol/L 105   CO2 mmol/L 29.0   BUN mg/dL 11   CREATININE mg/dL 0.88   GLUCOSE mg/dL 101*   CALCIUM mg/dL 8.1*     Results from last 7 days   Lab Units 10/09/20  1054   ALK PHOS U/L 64   BILIRUBIN mg/dL 0.2   ALT (SGPT) U/L 10   AST (SGOT) U/L 27         Results from last 7 days   Lab Units 10/09/20  1054   CRP mg/dL 2.48*     Results from last 7 days   Lab Units 10/09/20  1054   LACTATE mmol/L 1.7             Estimated Creatinine Clearance: 42.1 mL/min (by C-G formula based on SCr of 0.88 mg/dL).      Microbiology:  Blood Culture   Date Value Ref Range Status   10/09/2020 No growth at 24 hours  Preliminary     No results found for: BCIDPCR, CXREFLEX, CSFCX, CULTURETIS  No results found for: CULTURES, HSVCX, URCX  No results found for: EYECULTURE, GCCX, LABHSV  No results found for: LEGIONELLA, MRSACX, MUMPSCX, MYCOPLASCX  No results found for: NOCARDIACX, STOOLCX  Urine Culture   Date Value Ref Range Status   10/09/2020 >100,000 CFU/mL Mixed Katherine Isolated  Final     No results found for: VIRALCULTU, WOUNDCX        Radiology:  Imaging Results (Last 72 Hours)      Procedure Component Value Units Date/Time    XR Chest 1 View [[id number]] Collected: 10/09/20 1133     Updated: 10/09/20 1502    Narrative:      EXAMINATION: XR CHEST 1 VW-      INDICATION: COVID-19 positive, dyspnea and fever.      COMPARISON: 09/22/2020.     FINDINGS: Portable chest reveals cardiac and mediastinal silhouettes  within normal limits. Underlying chronic and emphysematous changes are  seen in the lung fields bilaterally. No focal parenchymal opacification  present. No pleural effusion or pneumothorax. Degenerative change is  seen within the spine.           Impression:      Chronic changes seen diffusely throughout the lung fields.  Degenerative change is seen within the spine with no acute parenchymal  disease.     D:  10/09/2020  E:  10/09/2020     This report was finalized on 10/9/2020 2:59 PM by Dr. Niurka Zimmerman MD.           I read her radiographic studies    Impression:  1.  COVID-19 pneumonia-with acute hypoxic respiratory failure.  She has been on Decadron and Remdesivir.  She received convalescent plasma on 10/11.  She appears to be improving.  2.  Acute hypoxic respiratory failure  3.  COPD  4.  Rheumatoid arthritis- on methotrexate therapy  5.  Immunocompromised host      PLAN/RECOMMENDATIONS:   1.  Hold methotrexate  2.  Decadron 6 mg IV/p.o. daily x10 days  3.  Remdesivir IV daily x5 days  4.  DVT prophylaxis  5.  COVID-19 convalescent plasma-given on 10/11    I discussed her clinical situation with Dr. Madrid today.    Danny Moscoso MD  10/12/2020  10:27 EDT

## 2020-10-12 NOTE — PLAN OF CARE
Goal Outcome Evaluation:  Plan of Care Reviewed With: patient  Progress: improving  Outcome Summary: Pt remains on 2L NC this shift. Rested well overnight with no concerns/complaints. VSS. Will continue to monitor.   0615: Per pt request, O2 now on 1L NC. Pt satting in high 90s.

## 2020-10-12 NOTE — PROGRESS NOTES
[facility] Medicine Services  PROGRESS NOTE    Patient Name: Pati Carter  : 1930  MRN: [id number]    Date of Admission: 10/9/2020  Primary Care Physician: Latoya Bolaños MD    Subjective   Subjective     CC: f/u covid pneumonia    HPI: Up in bed. She is still frustrated at remaining in the hospital. No concerns from nursing.    Review of Systems  Gen- No fevers, chills  CV- No chest pain, palpitations  Resp- No cough, dyspnea  GI- No N/V/D, abd pain    Objective   Objective     Vital Signs:   Temp:  [96.4 °F (35.8 °C)-98.2 °F (36.8 °C)] 97 °F (36.1 °C)  Heart Rate:  [66-97] 85  Resp:  [18] 18  BP: (115-139)/(64-79) 133/64        Physical Exam:  With patient's consent, physical exam was conducted via visual telemedicine encounter with bedside portion accommodated by nursing staff due to patient's current isolation requirements in the interest of PPE conservation.    Constitutional: No acute distress, awake, alert, nontoxic, normal body habitus  HENT: NCAT, MMM, no conjunctival injection, O2 via NC  Respiratory: Good effort, nonlabored respirations   Cardiovascular: RRR on tele  Musculoskeletal: No edema, normal muscle tone and mass for age  GI: Soft, no grimace, nondistended  Psychiatric: Appropriate affect, good insight and judgement, cooperative  Neurologic: Oriented x 3, movements symmetric BUE and BLE, speech clear and fluent  Skin: No visible rashes, no jaundice seen on exposed skin       Results Reviewed:  Results from last 7 days   Lab Units 10/10/20  1010 10/09/20  1054   WBC 10*3/mm3 4.49 5.39   HEMOGLOBIN g/dL 9.0* 9.2*   HEMATOCRIT % 29.3* 28.7*   PLATELETS 10*3/mm3 330 412   PROCALCITONIN ng/mL  --  0.19     Results from last 7 days   Lab Units 10/10/20  1010 10/09/20  1054   SODIUM mmol/L 141 137   POTASSIUM mmol/L 3.7 3.8   CHLORIDE mmol/L 105 100   CO2 mmol/L 29.0 29.0   BUN mg/dL 11 15   CREATININE mg/dL 0.88 0.94   GLUCOSE mg/dL 101* 98   CALCIUM mg/dL 8.1*  8.4   ALT (SGPT) U/L  --  10   AST (SGOT) U/L  --  27   TROPONIN T ng/mL  --  <0.010   PROBNP pg/mL  --  967.3     Estimated Creatinine Clearance: 42.1 mL/min (by C-G formula based on SCr of 0.88 mg/dL).    Microbiology Results Abnormal     Procedure Component Value - Date/Time    Blood Culture - Blood, Arm, Right [[id number]] Collected: 10/09/20 1054    Lab Status: Preliminary result Specimen: Blood from Arm, Right Updated: 10/12/20 1130     Blood Culture No growth at 3 days    Blood Culture - Blood, Arm, Right [[id number]] Collected: 10/09/20 1045    Lab Status: Preliminary result Specimen: Blood from Arm, Right Updated: 10/12/20 1130     Blood Culture No growth at 3 days    Urine Culture - Urine, Urine, Clean Catch [[id number]] Collected: 10/09/20 1412    Lab Status: Final result Specimen: Urine, Clean Catch Updated: 10/10/20 1257     Urine Culture >100,000 CFU/mL Mixed Abhi Isolated    Narrative:      Specimen contains mixed organisms of questionable pathogenicity which indicates contamination with commensal abhi.  Further identification is unlikely to provide clinically useful information.  Suggest recollection.                 I have reviewed the medications:  Scheduled Meds:acetaminophen, 1,000 mg, Oral, Q8H  aspirin, 81 mg, Oral, Daily  budesonide-formoterol, 2 puff, Inhalation, BID - RT  dexamethasone, 6 mg, Oral, Daily  diclofenac, 2 g, Topical, TID  dilTIAZem CD, 120 mg, Oral, Daily  docusate sodium, 100 mg, Oral, Daily  enoxaparin, 40 mg, Subcutaneous, Daily  famotidine, 20 mg, Oral, BID  folic acid, 1,000 mcg, Oral, Daily  INV GS-5734 remdesivir in NS IVPB, 100 mg, Intravenous, Q24H  lactobacillus acidophilus, 1 capsule, Oral, Daily  multivitamin and minerals, 15 mL, Oral, Daily  polyethylene glycol, 17 g, Oral, Daily  QUEtiapine, 25 mg, Oral, Nightly  sodium chloride, 10 mL, Intravenous, Q12H      Continuous Infusions:Pharmacy Consult,       PRN Meds:.albuterol sulfate HFA  •  magnesium sulfate **OR**  magnesium sulfate **OR** magnesium sulfate  •  melatonin  •  ondansetron **OR** ondansetron  •  Pharmacy Consult  •  potassium chloride **OR** potassium chloride **OR** potassium chloride  •  sennosides-docusate  •  sodium chloride  •  sodium chloride    Assessment/Plan   Assessment & Plan     Active Hospital Problems    Diagnosis  POA   • **COVID-19 [U07.1]  Yes   • Immunosuppressed status on methotrexate [D84.9]  Yes   • COPD (chronic obstructive pulmonary disease) (CMS/[facility]) [J44.9]  Yes   • Rheumatoid arthritis (CMS/[facility]) [M06.9]  Yes   • Hypertension [I10]  Yes      Resolved Hospital Problems   No resolved problems to display.        Brief Hospital Course to date:  Pati Carter is a 89 y/o COVID + female brought in by family due to worsening fatigue, weakness. She developed hypoxia on 10/10 and was started on decadron/remdesevir. She also received 2 units of plasma. Continues to show improvement.     COVID 19 infection, COVID result obtained and placed in her chartlet.  Hypoxia  --Continues to show improvement and now on minimal O2. Has been started on IV remdesivir and decadron. Received 2 units plasm 10/11.  --Dr. Moscoso following, have d/w him. Pleased with patient's progress.  --Continue symbicort/albuterol inh.   --Will move to every other day lab draws per her requests unless she were to worsen.  --PT/OT recs SNF at d/c. Have d/w CM.     AMS  --Better. Mostly disorientation associated with awakening though cannot r/o mild encephalopathy caused by COVID infection.  --Continue seroquel qhs.      COPD, now with exacerbation due to above  --Added steroids as above. Continue symbicort, albuterol.   --ICS, pulmonary toilet.     RA   --Immunocompromised on MTX. Holding that med. Supportive care w/ pain control prn.     HTN  --Controlled. Continue home diltiazem.      I have d/w her daughter Danette @ 1300 today. Updated her patient's improvement and ability to wean oxygen. She is hoping to hear from CM today  regarding SNFs.      DVT prophylaxis: Lovenox     Disposition: I expect the patient to be discharged TBD to SNF.    CODE STATUS:   Code Status and Medical Interventions:   Ordered at: 10/09/20 1200     Limited Support to NOT Include:    Intubation     Code Status:    No CPR     Medical Interventions (Level of Support Prior to Arrest):    Limited       Lubna Madrid II, DO  10/12/20

## 2020-10-12 NOTE — PLAN OF CARE
Problem: Adult Inpatient Plan of Care  Goal: Plan of Care Review  Recent Flowsheet Documentation  Taken 10/12/2020 1617 by Kathryn Jay, PT  Progress: improving  Plan of Care Reviewed With: patient  Outcome Summary: Pt requried MODA for supine to EOB and SPT 2x, bed to BSC to recliner. Unsteady with transfers. Deferred further gait d/t weakness and incontinent bladder. Improved activity tolerance.

## 2020-10-13 LAB
ALBUMIN SERPL-MCNC: 2.6 G/DL (ref 3.5–5.2)
ALBUMIN/GLOB SERPL: 0.9 G/DL
ALP SERPL-CCNC: 47 U/L (ref 39–117)
ALT SERPL W P-5'-P-CCNC: 7 U/L (ref 1–33)
ANION GAP SERPL CALCULATED.3IONS-SCNC: 6 MMOL/L (ref 5–15)
AST SERPL-CCNC: 13 U/L (ref 1–32)
BASOPHILS # BLD AUTO: 0 10*3/MM3 (ref 0–0.2)
BASOPHILS NFR BLD AUTO: 0 % (ref 0–1.5)
BILIRUB SERPL-MCNC: <0.2 MG/DL (ref 0–1.2)
BUN SERPL-MCNC: 30 MG/DL (ref 8–23)
BUN/CREAT SERPL: 31.3 (ref 7–25)
CALCIUM SPEC-SCNC: 7.9 MG/DL (ref 8.2–9.6)
CHLORIDE SERPL-SCNC: 109 MMOL/L (ref 98–107)
CK SERPL-CCNC: 14 U/L (ref 20–180)
CO2 SERPL-SCNC: 27 MMOL/L (ref 22–29)
CREAT SERPL-MCNC: 0.96 MG/DL (ref 0.57–1)
CRP SERPL-MCNC: 0.98 MG/DL (ref 0–0.5)
D DIMER PPP FEU-MCNC: 3.09 MCGFEU/ML (ref 0–0.56)
DEPRECATED RDW RBC AUTO: 63 FL (ref 37–54)
EOSINOPHIL # BLD AUTO: 0 10*3/MM3 (ref 0–0.4)
EOSINOPHIL NFR BLD AUTO: 0 % (ref 0.3–6.2)
ERYTHROCYTE [DISTWIDTH] IN BLOOD BY AUTOMATED COUNT: 15.9 % (ref 12.3–15.4)
FERRITIN SERPL-MCNC: 445.3 NG/ML (ref 13–150)
FIBRINOGEN PPP-MCNC: 339 MG/DL (ref 215–430)
GFR SERPL CREATININE-BSD FRML MDRD: 55 ML/MIN/1.73
GLOBULIN UR ELPH-MCNC: 2.8 GM/DL
GLUCOSE SERPL-MCNC: 116 MG/DL (ref 65–99)
HCT VFR BLD AUTO: 25.6 % (ref 34–46.6)
HGB BLD-MCNC: 8 G/DL (ref 12–15.9)
IMM GRANULOCYTES # BLD AUTO: 0.05 10*3/MM3 (ref 0–0.05)
IMM GRANULOCYTES NFR BLD AUTO: 0.7 % (ref 0–0.5)
LDH SERPL-CCNC: 154 U/L (ref 135–214)
LYMPHOCYTES # BLD AUTO: 0.82 10*3/MM3 (ref 0.7–3.1)
LYMPHOCYTES NFR BLD AUTO: 11.4 % (ref 19.6–45.3)
MCH RBC QN AUTO: 34.2 PG (ref 26.6–33)
MCHC RBC AUTO-ENTMCNC: 31.3 G/DL (ref 31.5–35.7)
MCV RBC AUTO: 109.4 FL (ref 79–97)
MONOCYTES # BLD AUTO: 0.48 10*3/MM3 (ref 0.1–0.9)
MONOCYTES NFR BLD AUTO: 6.7 % (ref 5–12)
NEUTROPHILS NFR BLD AUTO: 5.84 10*3/MM3 (ref 1.7–7)
NEUTROPHILS NFR BLD AUTO: 81.2 % (ref 42.7–76)
NRBC BLD AUTO-RTO: 0 /100 WBC (ref 0–0.2)
PLATELET # BLD AUTO: 357 10*3/MM3 (ref 140–450)
PMV BLD AUTO: 9.8 FL (ref 6–12)
POTASSIUM SERPL-SCNC: 3.9 MMOL/L (ref 3.5–5.2)
PROT SERPL-MCNC: 5.4 G/DL (ref 6–8.5)
RBC # BLD AUTO: 2.34 10*6/MM3 (ref 3.77–5.28)
SODIUM SERPL-SCNC: 142 MMOL/L (ref 136–145)
WBC # BLD AUTO: 7.19 10*3/MM3 (ref 3.4–10.8)

## 2020-10-13 PROCEDURE — 85379 FIBRIN DEGRADATION QUANT: CPT | Performed by: INTERNAL MEDICINE

## 2020-10-13 PROCEDURE — 85025 COMPLETE CBC W/AUTO DIFF WBC: CPT | Performed by: INTERNAL MEDICINE

## 2020-10-13 PROCEDURE — 63710000001 DEXAMETHASONE PER 0.25 MG: Performed by: INTERNAL MEDICINE

## 2020-10-13 PROCEDURE — 83615 LACTATE (LD) (LDH) ENZYME: CPT | Performed by: INTERNAL MEDICINE

## 2020-10-13 PROCEDURE — 86140 C-REACTIVE PROTEIN: CPT | Performed by: INTERNAL MEDICINE

## 2020-10-13 PROCEDURE — 99232 SBSQ HOSP IP/OBS MODERATE 35: CPT | Performed by: INTERNAL MEDICINE

## 2020-10-13 PROCEDURE — 94799 UNLISTED PULMONARY SVC/PX: CPT

## 2020-10-13 PROCEDURE — 80053 COMPREHEN METABOLIC PANEL: CPT | Performed by: INTERNAL MEDICINE

## 2020-10-13 PROCEDURE — 82550 ASSAY OF CK (CPK): CPT | Performed by: INTERNAL MEDICINE

## 2020-10-13 PROCEDURE — 85384 FIBRINOGEN ACTIVITY: CPT | Performed by: INTERNAL MEDICINE

## 2020-10-13 PROCEDURE — 25010000002 ENOXAPARIN PER 10 MG: Performed by: INTERNAL MEDICINE

## 2020-10-13 PROCEDURE — 82728 ASSAY OF FERRITIN: CPT | Performed by: INTERNAL MEDICINE

## 2020-10-13 RX ADMIN — Medication 1 CAPSULE: at 09:36

## 2020-10-13 RX ADMIN — DICLOFENAC 2 G: 10 GEL TOPICAL at 15:50

## 2020-10-13 RX ADMIN — DICLOFENAC 2 G: 10 GEL TOPICAL at 21:50

## 2020-10-13 RX ADMIN — DOCUSATE SODIUM 100 MG: 100 CAPSULE ORAL at 09:36

## 2020-10-13 RX ADMIN — FOLIC ACID 1000 MCG: 1 TABLET ORAL at 09:36

## 2020-10-13 RX ADMIN — ACETAMINOPHEN 1000 MG: 500 TABLET, FILM COATED ORAL at 13:29

## 2020-10-13 RX ADMIN — MULTIVITAMIN 15 ML: LIQUID ORAL at 09:36

## 2020-10-13 RX ADMIN — ASPIRIN 81 MG: 81 TABLET, COATED ORAL at 09:36

## 2020-10-13 RX ADMIN — DICLOFENAC 2 G: 10 GEL TOPICAL at 09:36

## 2020-10-13 RX ADMIN — DEXAMETHASONE 6 MG: 4 TABLET ORAL at 09:36

## 2020-10-13 RX ADMIN — POLYETHYLENE GLYCOL 3350 17 G: 17 POWDER, FOR SOLUTION ORAL at 09:36

## 2020-10-13 RX ADMIN — SODIUM CHLORIDE, PRESERVATIVE FREE 10 ML: 5 INJECTION INTRAVENOUS at 21:50

## 2020-10-13 RX ADMIN — BUDESONIDE AND FORMOTEROL FUMARATE DIHYDRATE 2 PUFF: 160; 4.5 AEROSOL RESPIRATORY (INHALATION) at 08:34

## 2020-10-13 RX ADMIN — ACETAMINOPHEN 1000 MG: 500 TABLET, FILM COATED ORAL at 05:40

## 2020-10-13 RX ADMIN — BUDESONIDE AND FORMOTEROL FUMARATE DIHYDRATE 2 PUFF: 160; 4.5 AEROSOL RESPIRATORY (INHALATION) at 20:36

## 2020-10-13 RX ADMIN — SODIUM CHLORIDE, PRESERVATIVE FREE 10 ML: 5 INJECTION INTRAVENOUS at 09:35

## 2020-10-13 RX ADMIN — DILTIAZEM HYDROCHLORIDE 120 MG: 120 CAPSULE, COATED, EXTENDED RELEASE ORAL at 09:35

## 2020-10-13 RX ADMIN — ENOXAPARIN SODIUM 40 MG: 40 INJECTION SUBCUTANEOUS at 09:35

## 2020-10-13 RX ADMIN — FAMOTIDINE 20 MG: 20 TABLET, FILM COATED ORAL at 09:36

## 2020-10-13 RX ADMIN — REMDESIVIR 100 MG: 100 INJECTION, POWDER, LYOPHILIZED, FOR SOLUTION INTRAVENOUS at 17:13

## 2020-10-13 NOTE — PROGRESS NOTES
[facility] Medicine Services  PROGRESS NOTE    Patient Name: Pati Carter  : 1930  MRN: [id number]    Date of Admission: 10/9/2020  Primary Care Physician: Latoya Bolaños MD    Subjective   Subjective     CC: Follow-up COVID-19 pneumonia    HPI:No acute events overnight, patient is resting comfortably.    Review of Systems  Gen- No fevers, chills  CV- No chest pain, palpitations  Resp- No cough, dyspnea  GI- No N/V/D, abd pain    All other systems reviewed and are negative  Objective   Objective     Vital Signs:   Temp:  [96.8 °F (36 °C)-97.6 °F (36.4 °C)] 96.8 °F (36 °C)  Heart Rate:  [67-91] 74  Resp:  [18-20] 18  BP: (122-140)/(63-73) 140/72        Physical Exam:  With patient's consent, physical exam was conducted via visual telemedicine encounter with bedside portion accommodated by nursing staff due to patient's current isolation requirements in the interest of PPE conservation.    Constitutional: Elderly lady, in no acute distress, awake, alert, nontoxic  HENT: NCAT, MMM, no conjunctival injection  Respiratory:  good effort, nonlabored respirations   Cardiovascular: RRR on tele  Musculoskeletal: No edema, normal muscle tone and mass for age  GI: Soft, no grimace, nondistended  Psychiatric: Appropriate affect, good insight and judgement, cooperative  Neurologic: Oriented x 3, movements symmetric BUE and BLE  Skin: No visible rashes,       Results Reviewed:  Results from last 7 days   Lab Units 10/13/20  0634 10/10/20  1010 10/09/20  1054   WBC 10*3/mm3 7.19 4.49 5.39   HEMOGLOBIN g/dL 8.0* 9.0* 9.2*   HEMATOCRIT % 25.6* 29.3* 28.7*   PLATELETS 10*3/mm3 357 330 412   PROCALCITONIN ng/mL  --   --  0.19     Results from last 7 days   Lab Units 10/13/20  0634 10/10/20  1010 10/09/20  1054   SODIUM mmol/L 142 141 137   POTASSIUM mmol/L 3.9 3.7 3.8   CHLORIDE mmol/L 109* 105 100   CO2 mmol/L 27.0 29.0 29.0   BUN mg/dL 30* 11 15   CREATININE mg/dL 0.96 0.88 0.94   GLUCOSE mg/dL  116* 101* 98   CALCIUM mg/dL 7.9* 8.1* 8.4   ALT (SGPT) U/L 7  --  10   AST (SGOT) U/L 13  --  27   TROPONIN T ng/mL  --   --  <0.010   PROBNP pg/mL  --   --  967.3     Estimated Creatinine Clearance: 38.6 mL/min (by C-G formula based on SCr of 0.96 mg/dL).    Microbiology Results Abnormal     Procedure Component Value - Date/Time    Blood Culture - Blood, Arm, Right [[id number]] Collected: 10/09/20 1054    Lab Status: Preliminary result Specimen: Blood from Arm, Right Updated: 10/13/20 1130     Blood Culture No growth at 4 days    Blood Culture - Blood, Arm, Right [[id number]] Collected: 10/09/20 1045    Lab Status: Preliminary result Specimen: Blood from Arm, Right Updated: 10/13/20 1130     Blood Culture No growth at 4 days    Urine Culture - Urine, Urine, Clean Catch [[id number]] Collected: 10/09/20 1412    Lab Status: Final result Specimen: Urine, Clean Catch Updated: 10/10/20 1257     Urine Culture >100,000 CFU/mL Mixed Abhi Isolated    Narrative:      Specimen contains mixed organisms of questionable pathogenicity which indicates contamination with commensal abhi.  Further identification is unlikely to provide clinically useful information.  Suggest recollection.          Imaging Results (Last 24 Hours)     ** No results found for the last 24 hours. **             I have reviewed the medications:  Scheduled Meds:acetaminophen, 1,000 mg, Oral, Q8H  aspirin, 81 mg, Oral, Daily  budesonide-formoterol, 2 puff, Inhalation, BID - RT  dexamethasone, 6 mg, Oral, Daily  diclofenac, 2 g, Topical, TID  dilTIAZem CD, 120 mg, Oral, Daily  docusate sodium, 100 mg, Oral, Daily  enoxaparin, 40 mg, Subcutaneous, Daily  famotidine, 20 mg, Oral, Daily  folic acid, 1,000 mcg, Oral, Daily  INV [id number] remdesivir in NS IVPB, 100 mg, Intravenous, Q24H  lactobacillus acidophilus, 1 capsule, Oral, Daily  multivitamin and minerals, 15 mL, Oral, Daily  polyethylene glycol, 17 g, Oral, Daily  QUEtiapine, 25 mg, Oral, Nightly  sodium  chloride, 10 mL, Intravenous, Q12H      Continuous Infusions:   PRN Meds:.albuterol sulfate HFA  •  magnesium sulfate **OR** magnesium sulfate **OR** magnesium sulfate  •  melatonin  •  ondansetron **OR** ondansetron  •  potassium chloride **OR** potassium chloride **OR** potassium chloride  •  sennosides-docusate  •  sodium chloride  •  sodium chloride    Assessment/Plan   Assessment & Plan     Active Hospital Problems    Diagnosis  POA   • **COVID-19 [U07.1]  Yes   • Immunosuppressed status on methotrexate [D84.9]  Yes   • COPD (chronic obstructive pulmonary disease) (CMS/[facility]) [J44.9]  Yes   • Rheumatoid arthritis (CMS/Formerly [facility]) [M06.9]  Yes   • Hypertension [I10]  Yes      Resolved Hospital Problems   No resolved problems to display.     Brief Hospital Course to date:  Pati Carter is a 89 y.o. female with past medical history of COPD, hypertension, rheumatoid arthritis on methotrexate who presented with worsening fatigue and weakness found to be COVID-19 positive, became hypoxic 10/11 and has since been initiated on Decadron and Remdesivir, she is also s/p 2 units of convalescent plasma.    Plan  COVID-19 infection  -Positive results currently in chartlet  -ID following, patient currently on Remdesivir last dose 10/14, continue Decadron for 10 days, she is now s/p 2 units of convalescent plasma 10/11  -She continues to show some improvement, now on minimal O2, continue Symbicort and albuterol  -Monitor COVID labs every other day    COPD with mild exacerbation  -Continue steroids, Symbicort and albuterol    Rheumatoid arthritis  -Patient is immunocompromised on methotrexate, currently on hold    Hypertension BP currently stable, continue hold diltiazem    DVT Prophylaxis: Lovenox    Disposition: TBD, anticipate discharge to SNF, CM following    Called daughter Danette, [phone] updated and all questions answered.    CODE STATUS:   Code Status and Medical Interventions:   Ordered at: 10/09/20 1200     Limited  Support to NOT Include:    Intubation     Code Status:    No CPR     Medical Interventions (Level of Support Prior to Arrest):    Limited       Antonella Stuart MD  10/13/20

## 2020-10-13 NOTE — PROGRESS NOTES
INFECTIOUS DISEASE Progress Note    Pati Carter  7/26/1930  [id number]      Admission Date: 10/9/2020      Requesting Provider: No ref. provider found  Evaluating Physician: Danny Moscoso MD    Reason for Consultation: COVID-19 pneumonia    History of present illness:    10/10/20:Patient is a 89 y.o. female with COPD and rheumatoid arthritis on methotrexate therapy who is seen today for evaluation of COVID-19 pneumonia.  She was just discharged from here late last month after a hospital course for acute colitis (C. difficile negative) with a history of recent E. coli/Otero a UTI.  She was seen by Dr. Mathis and was discharged to [facility].  Her family removed her from [facility] when they realized that there was a COVID-19 outbreak there.  She has recently developed worsening weakness, myalgias, and fatigue.  She underwent COVID-19 testing on Wednesday which was positive.  She was admitted yesterday and initially did not require supplemental oxygen but this evening she has developed hypoxemia with an O2 saturation of 88% on room air and is requiring 2 L of supplemental oxygen.  She has now been placed on Decadron and Remdesivir therapy.  She denies severe dyspnea and cough.  She denies loss of taste or smell.  She complains of headache, weakness, fatigue, and arthralgias.  2 of her daughters apparently have COVID-19 also.  10/11/20: She remained afebrile overnight. She is requiring 2 L of supplemental oxygen.  She slept most of the night and did not arouse to give consent for convalescent plasma.  I did talk to her daughter, Danette, in detail last night about the current therapeutic regimen including the convalescent plasma and she has agreed with this treatment regimen.  Today, the patient herself is agreed to receive the convalescent plasma and will be infused this afternoon.  She denies increased dyspnea.  The nursing staff indicate that she is not desaturating when  moving around.  10/12/2020: She is now down to 1 L of oxygen.  She is remained afebrile.  The nursing staff indicates that she has continued to improve.  10/13/20: She has been afebrile overnight. She is currently on room air. She will receive her last dose of remdesivir tomorrow.    Past Medical History:   Diagnosis Date   • Anemia     Description: A.  Dx 2006- borderline intermittent.   • Back pain    • Benign colonic polyp 9/28/2016    Description: A.  Dx 1999.   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  Rule out chronic persistent asthma, COPD, or obliterative bronchiolitis.- Rae   • Degeneration of intervertebral disc of lumbar region     Description: A.  Diagnosed in April 2013 with advanced multilevel with severe spinal stenosis, followed by Dr. Pillai for pain management.   • Gastroesophageal reflux disease    • Hearing loss    • History of colonoscopy 01/01/1999    NORMAL PER PATIENT    • History of mammogram 01/01/2011    NORMAL PER PT    • History of Papanicolaou smear of cervix 01/01/2010    NORMAL PER PT    • History of varicella    • Hyperlipidemia     Description: A.  Dx 2006.   • Hypertension     Description: A. Dx 2001.   • Macular degeneration    • Ovarian mass     Dx 8/15- benign left cystic adnexal mass   • Rheumatoid arthritis (CMS/[facility])     Description: A.  Diagnosed in 2000 and and followed by Dr. Constantino (now Dr. Kaplan). B.  On methotrexate therapy since 2000. C.  Off low-dose prednisone therapy.       Past Surgical History:   Procedure Laterality Date   • APPENDECTOMY     • CARPAL TUNNEL RELEASE Left 01/01/2003    HISTORY OF NEUROPLASTY DECOMPRESSION MEDIAN NERVE AT CARPAL TUNNEL LEFT   • CATARACT EXTRACTION Bilateral 01/01/2009   • CHOLECYSTECTOMY  01/01/1962   • HIP CANNULATED SCREW PLACEMENT Right 1/25/2020    Procedure: HIP CANNULATED  SCREW PLACEMENT RIGHT;  Surgeon: Karlos Blount MD;  Location: [facility];  Service: Orthopedics   • KNEE ARTHROSCOPY Left 01/01/2001    MENISCAL REPAIR   • KYPHOPLASTY  06/18/2015    T11 AND L1 (JOSE A)   • PELVIC LAPAROSCOPY  01/01/1996    REMOVAL OF BENIGN UTERINE AND RIGHT OVARIAN TUMORS   • SALPINGO OOPHORECTOMY Left 08/26/2015    REMOVAL OF LEFT OVARY AND TUBE (benign cystic mass)       Family History   Problem Relation Age of Onset   • Hypertension Mother    • Diabetes Mother        Social History     Socioeconomic History   • Marital status:      Spouse name: Not on file   • Number of children: Not on file   • Years of education: Not on file   • Highest education level: Not on file   Tobacco Use   • Smoking status: Former Smoker   • Smokeless tobacco: Never Used   Substance and Sexual Activity   • Alcohol use: No   • Drug use: No   • Sexual activity: Defer   Social History Narrative    Patient moved to [facility] assisted living 3/26/20. On 3/28/20 patient visited ED for fall-related injuries.  In response, family looking into hiring a , to visit patient BID for ADLs/ transfers. Family drives patient to app, but says [facility] may provide in future.       Allergies   Allergen Reactions   • Bactrim [Sulfamethoxazole-Trimethoprim] Other (See Comments)     Stomach cramps    • Ciprofloxacin Other (See Comments)     Other reaction(s): shaking  HCI TABS/ SHAKING   • Levofloxacin Diarrhea         Medication:    Current Facility-Administered Medications:   •  acetaminophen (TYLENOL) tablet 1,000 mg, 1,000 mg, Oral, Q8H, Lubna Madrid M II, DO, 1,000 mg at 10/13/20 0540  •  albuterol sulfate HFA (PROVENTIL HFA;VENTOLIN HFA;PROAIR HFA) inhaler 6 puff, 6 puff, Inhalation, Q4H PRN, Lubna Madrid M II, DO  •  aspirin EC tablet 81 mg, 81 mg, Oral, Daily, Lubna Madrid M II, DO, 81 mg at 10/12/20 0942  •  budesonide-formoterol (SYMBICORT) 160-4.5 MCG/ACT inhaler 2 puff, 2 puff,  Inhalation, BID - RT, [facility]cy, Lubna M II, DO, 2 puff at 10/12/20 2056  •  dexamethasone (DECADRON) tablet 6 mg, 6 mg, Oral, Daily, [facility]cy, Lubna M II, DO, 6 mg at 10/12/20 0942  •  diclofenac (VOLTAREN) 1 % gel 2 g, 2 g, Topical, TID, [facility]cy, Lubna M II, DO, 2 g at 10/12/20 2210  •  dilTIAZem CD (CARDIZEM CD) 24 hr capsule 120 mg, 120 mg, Oral, Daily, [facility]cy, Lubna M II, DO, 120 mg at 10/12/20 0941  •  docusate sodium (COLACE) capsule 100 mg, 100 mg, Oral, Daily, [facility]cy, Lubna M II, DO, 100 mg at 10/12/20 0941  •  enoxaparin (LOVENOX) syringe 40 mg, 40 mg, Subcutaneous, Daily, [facility]cy, Lubna M II, DO, 40 mg at 10/12/20 0941  •  famotidine (PEPCID) tablet 20 mg, 20 mg, Oral, Daily, [facility]cy, Lubna M II, DO  •  folic acid (FOLVITE) tablet 1,000 mcg, 1,000 mcg, Oral, Daily, [facility], Lubna M II, DO, 1,000 mcg at 10/12/20 0941  •  [COMPLETED] INV GS-5734 remdesivir 200 mg in sodium chloride 0.9 % 250 mL IVPB (powder vial), 200 mg, Intravenous, Q24H, 200 mg at 10/10/20 2007 **FOLLOWED BY** INV GS-5734 remdesivir 100 mg in sodium chloride 0.9 % 250 mL IVPB (powder vial), 100 mg, Intravenous, Q24H, [facility]cy, Lubna M II, DO, 100 mg at 10/12/20 1818  •  lactobacillus acidophilus (RISAQUAD) capsule 1 capsule, 1 capsule, Oral, Daily, [facility]cy, Lubna M II, DO, 1 capsule at 10/12/20 0941  •  Magnesium Sulfate 2 gram Bolus, followed by 8 gram infusion (total Mg dose 10 grams)- Mg less than or equal to 1mg/dL, 2 g, Intravenous, PRN **OR** Magnesium Sulfate 2 gram / 50mL Infusion (GIVE X 3 BAGS TO EQUAL 6GM TOTAL DOSE) - Mg 1.1 - 1.5 mg/dl, 2 g, Intravenous, PRN **OR** Magnesium Sulfate 4 gram infusion- Mg 1.6-1.9 mg/dL, 4 g, Intravenous, PRN, [facility]cy, Lubna M II, DO  •  melatonin tablet 5 mg, 5 mg, Oral, Nightly PRN, [facility], Lubna M II, DO  •  multivitamin and minerals liquid 15 mL, 15 mL, Oral, Daily, [facility], Lubna M II, DO, 15 mL at 10/12/20 1606  •  ondansetron (ZOFRAN) tablet 4 mg, 4 mg, Oral, Q6H PRN **OR** ondansetron  (ZOFRAN) injection 4 mg, 4 mg, Intravenous, Q6H PRN, [facility]cy Lubna M II, DO  •  Pharmacy Consult, , Does not apply, Continuous PRN, [facility]cy Lubna M II, DO  •  polyethylene glycol (MIRALAX) packet 17 g, 17 g, Oral, Daily, MercyRamoncy M II, DO, 17 g at 10/12/20 0942  •  potassium chloride (MICRO-K) CR capsule 40 mEq, 40 mEq, Oral, PRN **OR** potassium chloride (KLOR-CON) packet 40 mEq, 40 mEq, Oral, PRN **OR** potassium chloride 10 mEq in 100 mL IVPB, 10 mEq, Intravenous, Q1H PRN, [facility]cy Lubna M II, DO  •  QUEtiapine (SEROquel) tablet 25 mg, 25 mg, Oral, Nightly, [facility]cy Lubna M II, DO, 25 mg at 10/12/20 2210  •  sennosides-docusate (PERICOLACE) 8.6-50 MG per tablet 2 tablet, 2 tablet, Oral, BID PRN, [facility]cy Lubna M II, DO  •  sodium chloride 0.9 % flush 10 mL, 10 mL, Intravenous, PRN, Blair Ventura MD  •  sodium chloride 0.9 % flush 10 mL, 10 mL, Intravenous, Q12H, Ramon Madridcy M II, DO, 10 mL at 10/12/20 2210  •  sodium chloride 0.9 % flush 10 mL, 10 mL, Intravenous, PRN, [facility] Lubna M II, DO    Antibiotics:  Anti-Infectives (From admission, onward)    Ordered     Dose/Rate Route Frequency Start Stop    10/10/20 1743  INV GS-5734 remdesivir 100 mg in sodium chloride 0.9 % 250 mL IVPB (powder vial)     Ordering Provider: Lubna Madrid M II, DO    100 mg  over 60 Minutes Intravenous Every 24 Hours 10/11/20 1830 10/15/20 1829    10/10/20 1743  INV GS-5734 remdesivir 200 mg in sodium chloride 0.9 % 250 mL IVPB (powder vial)     Ordering Provider: Lubna Madrid M II, DO    200 mg  over 60 Minutes Intravenous Every 24 Hours 10/10/20 1830 10/10/20 2107            Review of Systems:  See ROS    Physical Exam:   Vital Signs  Temp (24hrs), Av.4 °F (36.3 °C), Min:97 °F (36.1 °C), Max:97.6 °F (36.4 °C)    Temp  Min: 97 °F (36.1 °C)  Max: 97.6 °F (36.4 °C)  BP  Min: 122/68  Max: 136/73  Pulse  Min: 66  Max: 91  Resp  Min: 18  Max: 18  SpO2  Min: 91 %  Max: 97 %     Her exam was done by direct  visualization from outside the room  in the presence of the COVID-19 pandemic with a critical shortage of PPE.    GENERAL: Awake and alert, in no acute distress.   HEENT: No labial ulcers  NECK: Supple   HEART: RRR;  LUNGS: No respiratory distress  ABDOMEN: Nondistended  :  Without Cisse catheter.  MSK: No focal joint swelling or erythema  SKIN: Warm and dry without cutaneous eruptions on Inspection/palpation.    NEURO: 5/5 strength bilaterally  PSYCHIATRIC:     Laboratory Data    Results from last 7 days   Lab Units 10/10/20  1010 10/09/20  1054   WBC 10*3/mm3 4.49 5.39   HEMOGLOBIN g/dL 9.0* 9.2*   HEMATOCRIT % 29.3* 28.7*   PLATELETS 10*3/mm3 330 412     Results from last 7 days   Lab Units 10/10/20  1010   SODIUM mmol/L 141   POTASSIUM mmol/L 3.7   CHLORIDE mmol/L 105   CO2 mmol/L 29.0   BUN mg/dL 11   CREATININE mg/dL 0.88   GLUCOSE mg/dL 101*   CALCIUM mg/dL 8.1*     Results from last 7 days   Lab Units 10/09/20  1054   ALK PHOS U/L 64   BILIRUBIN mg/dL 0.2   ALT (SGPT) U/L 10   AST (SGOT) U/L 27         Results from last 7 days   Lab Units 10/09/20  1054   CRP mg/dL 2.48*     Results from last 7 days   Lab Units 10/09/20  1054   LACTATE mmol/L 1.7             Estimated Creatinine Clearance: 42.1 mL/min (by C-G formula based on SCr of 0.88 mg/dL).      Microbiology:  Blood Culture   Date Value Ref Range Status   10/09/2020 No growth at 24 hours  Preliminary     No results found for: BCIDPCR, CXREFLEX, CSFCX, CULTURETIS  No results found for: CULTURES, HSVCX, URCX  No results found for: EYECULTURE, GCCX, LABHSV  No results found for: LEGIONELLA, MRSACX, MUMPSCX, MYCOPLASCX  No results found for: NOCARDIACX, STOOLCX  Urine Culture   Date Value Ref Range Status   10/09/2020 >100,000 CFU/mL Mixed Katherine Isolated  Final     No results found for: VIRALCULTU, WOUNDCX        Radiology:  Imaging Results (Last 72 Hours)     ** No results found for the last 72 hours. **        I read her radiographic  studies    Impression:  1.  COVID-19 pneumonia-with acute hypoxic respiratory failure.  She has been on Decadron and Remdesivir.  She received convalescent plasma on 10/11.  She appears to be improving.  2.  Acute hypoxic respiratory failure  3.  COPD  4.  Rheumatoid arthritis- on methotrexate therapy  5.  Immunocompromised host      PLAN/RECOMMENDATIONS:   1.  Hold methotrexate  2.  Decadron 6 mg IV/p.o. daily x10 days  3.  Remdesivir IV daily x5 days-Tthis will be completed on 10/14  4.  DVT prophylaxis  5.  COVID-19 convalescent plasma-given on 10/11  6.  Possible discharge tomorrow    Danny Moscoso MD  10/13/2020  06:08 EDT

## 2020-10-13 NOTE — PROGRESS NOTES
Continued Stay Note  [facility]     Patient Name: Pati Carter  MRN: [id number]  Today's Date: 10/13/2020    Admit Date: 10/9/2020    Discharge Plan     Row Name 10/13/20 1416       Plan    Plan Comments  Both [facility] and [facility] can offer pt a bed. WILLIAM spoke with pts daughter Danette who reports [facility] is their first choice. CM has updated Serge.        Discharge Codes    No documentation.       Expected Discharge Date and Time     Expected Discharge Date Expected Discharge Time    Oct 16, 2020             Meredith Rosario RN

## 2020-10-13 NOTE — PLAN OF CARE
Goal Outcome Evaluation:  Plan of Care Reviewed With: patient  Progress: improving  Outcome Summary: Pt rested well overnight with no complaints or concerns. Remains on 1L NC. Pt was very pleasant. VSS. will continue to monitor.

## 2020-10-13 NOTE — PLAN OF CARE
Goal Outcome Evaluation:  Plan of Care Reviewed With: patient  Progress: improving  Outcome Summary: Patient remained on room air throughout the shift, her vital signs remainezs stable. No acute issues arose during the shift, will continue POC.

## 2020-10-14 LAB
BACTERIA SPEC AEROBE CULT: NORMAL
BACTERIA SPEC AEROBE CULT: NORMAL
DEPRECATED RDW RBC AUTO: 64.4 FL (ref 37–54)
ERYTHROCYTE [DISTWIDTH] IN BLOOD BY AUTOMATED COUNT: 15.7 % (ref 12.3–15.4)
HCT VFR BLD AUTO: 30.2 % (ref 34–46.6)
HGB BLD-MCNC: 9.3 G/DL (ref 12–15.9)
MCH RBC QN AUTO: 34.3 PG (ref 26.6–33)
MCHC RBC AUTO-ENTMCNC: 30.8 G/DL (ref 31.5–35.7)
MCV RBC AUTO: 111.4 FL (ref 79–97)
PLATELET # BLD AUTO: 428 10*3/MM3 (ref 140–450)
PMV BLD AUTO: 9.9 FL (ref 6–12)
RBC # BLD AUTO: 2.71 10*6/MM3 (ref 3.77–5.28)
WBC # BLD AUTO: 8.16 10*3/MM3 (ref 3.4–10.8)

## 2020-10-14 PROCEDURE — 97116 GAIT TRAINING THERAPY: CPT

## 2020-10-14 PROCEDURE — 97110 THERAPEUTIC EXERCISES: CPT

## 2020-10-14 PROCEDURE — 63710000001 DEXAMETHASONE PER 0.25 MG: Performed by: INTERNAL MEDICINE

## 2020-10-14 PROCEDURE — 25010000002 ENOXAPARIN PER 10 MG: Performed by: INTERNAL MEDICINE

## 2020-10-14 PROCEDURE — 94799 UNLISTED PULMONARY SVC/PX: CPT

## 2020-10-14 PROCEDURE — 99232 SBSQ HOSP IP/OBS MODERATE 35: CPT | Performed by: INTERNAL MEDICINE

## 2020-10-14 PROCEDURE — 85027 COMPLETE CBC AUTOMATED: CPT | Performed by: INTERNAL MEDICINE

## 2020-10-14 RX ADMIN — ENOXAPARIN SODIUM 40 MG: 40 INJECTION SUBCUTANEOUS at 10:08

## 2020-10-14 RX ADMIN — FOLIC ACID 1000 MCG: 1 TABLET ORAL at 10:06

## 2020-10-14 RX ADMIN — ASPIRIN 81 MG: 81 TABLET, COATED ORAL at 10:08

## 2020-10-14 RX ADMIN — DILTIAZEM HYDROCHLORIDE 120 MG: 120 CAPSULE, COATED, EXTENDED RELEASE ORAL at 10:06

## 2020-10-14 RX ADMIN — DICLOFENAC 2 G: 10 GEL TOPICAL at 10:05

## 2020-10-14 RX ADMIN — REMDESIVIR 100 MG: 100 INJECTION, POWDER, LYOPHILIZED, FOR SOLUTION INTRAVENOUS at 17:47

## 2020-10-14 RX ADMIN — DICLOFENAC 2 G: 10 GEL TOPICAL at 15:38

## 2020-10-14 RX ADMIN — SODIUM CHLORIDE, PRESERVATIVE FREE 10 ML: 5 INJECTION INTRAVENOUS at 20:48

## 2020-10-14 RX ADMIN — ACETAMINOPHEN 1000 MG: 500 TABLET, FILM COATED ORAL at 06:45

## 2020-10-14 RX ADMIN — QUETIAPINE FUMARATE 25 MG: 25 TABLET ORAL at 20:47

## 2020-10-14 RX ADMIN — SODIUM CHLORIDE, PRESERVATIVE FREE 10 ML: 5 INJECTION INTRAVENOUS at 10:06

## 2020-10-14 RX ADMIN — ACETAMINOPHEN 1000 MG: 500 TABLET, FILM COATED ORAL at 20:47

## 2020-10-14 RX ADMIN — Medication 1 CAPSULE: at 10:06

## 2020-10-14 RX ADMIN — DICLOFENAC 2 G: 10 GEL TOPICAL at 20:47

## 2020-10-14 RX ADMIN — MULTIVITAMIN 15 ML: LIQUID ORAL at 10:09

## 2020-10-14 RX ADMIN — DEXAMETHASONE 6 MG: 4 TABLET ORAL at 10:07

## 2020-10-14 RX ADMIN — MELATONIN TAB 5 MG 5 MG: 5 TAB at 03:38

## 2020-10-14 RX ADMIN — POLYETHYLENE GLYCOL 3350 17 G: 17 POWDER, FOR SOLUTION ORAL at 10:05

## 2020-10-14 RX ADMIN — DOCUSATE SODIUM 100 MG: 100 CAPSULE ORAL at 10:06

## 2020-10-14 RX ADMIN — BUDESONIDE AND FORMOTEROL FUMARATE DIHYDRATE 2 PUFF: 160; 4.5 AEROSOL RESPIRATORY (INHALATION) at 08:26

## 2020-10-14 RX ADMIN — BUDESONIDE AND FORMOTEROL FUMARATE DIHYDRATE 2 PUFF: 160; 4.5 AEROSOL RESPIRATORY (INHALATION) at 20:56

## 2020-10-14 RX ADMIN — FAMOTIDINE 20 MG: 20 TABLET, FILM COATED ORAL at 10:07

## 2020-10-14 NOTE — PROGRESS NOTES
INFECTIOUS DISEASE Progress Note    Pati Carter  7/26/1930  [id number]      Admission Date: 10/9/2020      Requesting Provider: No ref. provider found  Evaluating Physician: Danny Moscoso MD    Reason for Consultation: COVID-19 pneumonia    History of present illness:    10/10/20:Patient is a 89 y.o. female with COPD and rheumatoid arthritis on methotrexate therapy who is seen today for evaluation of COVID-19 pneumonia.  She was just discharged from here late last month after a hospital course for acute colitis (C. difficile negative) with a history of recent E. coli/Caroline a UTI.  She was seen by Dr. Mathis and was discharged to [facility].  Her family removed her from [facility] when they realized that there was a COVID-19 outbreak there.  She has recently developed worsening weakness, myalgias, and fatigue.  She underwent COVID-19 testing on Wednesday which was positive.  She was admitted yesterday and initially did not require supplemental oxygen but this evening she has developed hypoxemia with an O2 saturation of 88% on room air and is requiring 2 L of supplemental oxygen.  She has now been placed on Decadron and Remdesivir therapy.  She denies severe dyspnea and cough.  She denies loss of taste or smell.  She complains of headache, weakness, fatigue, and arthralgias.  2 of her daughters apparently have COVID-19 also.  10/11/20: She remained afebrile overnight. She is requiring 2 L of supplemental oxygen.  She slept most of the night and did not arouse to give consent for convalescent plasma.  I did talk to her daughter, Danette, in detail last night about the current therapeutic regimen including the convalescent plasma and she has agreed with this treatment regimen.  Today, the patient herself is agreed to receive the convalescent plasma and will be infused this afternoon.  She denies increased dyspnea.  The nursing staff indicate that she is not desaturating when  moving around.  10/12/2020: She is now down to 1 L of oxygen.  She is remained afebrile.  The nursing staff indicates that she has continued to improve.  10/13/20: She has been afebrile overnight. She is currently on room air. She will receive her last dose of remdesivir tomorrow.  10/14/20: She remains on room air. She has remained afebrile overnight. The nursing staff reports no new problems overnight.    Past Medical History:   Diagnosis Date   • Anemia     Description: A.  Dx 2006- borderline intermittent.   • Back pain    • Benign colonic polyp 9/28/2016    Description: A.  Dx 1999.   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  Rule out chronic persistent asthma, COPD, or obliterative bronchiolitis.- Rae   • Degeneration of intervertebral disc of lumbar region     Description: A.  Diagnosed in April 2013 with advanced multilevel with severe spinal stenosis, followed by Dr. Pillai for pain management.   • Gastroesophageal reflux disease    • Hearing loss    • History of colonoscopy 01/01/1999    NORMAL PER PATIENT    • History of mammogram 01/01/2011    NORMAL PER PT    • History of Papanicolaou smear of cervix 01/01/2010    NORMAL PER PT    • History of varicella    • Hyperlipidemia     Description: A.  Dx 2006.   • Hypertension     Description: A. Dx 2001.   • Macular degeneration    • Ovarian mass     Dx 8/15- benign left cystic adnexal mass   • Rheumatoid arthritis (CMS/HCC)     Description: A.  Diagnosed in 2000 and and followed by Dr. Constantino (now Dr. Kaplan). B.  On methotrexate therapy since 2000. C.  Off low-dose prednisone therapy.       Past Surgical History:   Procedure Laterality Date   • APPENDECTOMY     • CARPAL TUNNEL RELEASE Left 01/01/2003    HISTORY OF NEUROPLASTY DECOMPRESSION MEDIAN NERVE AT CARPAL TUNNEL LEFT   • CATARACT EXTRACTION Bilateral  01/01/2009   • CHOLECYSTECTOMY  01/01/1962   • HIP CANNULATED SCREW PLACEMENT Right 1/25/2020    Procedure: HIP CANNULATED SCREW PLACEMENT RIGHT;  Surgeon: Karlos Blount MD;  Location: [facility];  Service: Orthopedics   • KNEE ARTHROSCOPY Left 01/01/2001    MENISCAL REPAIR   • KYPHOPLASTY  06/18/2015    T11 AND L1 (JOSE A)   • PELVIC LAPAROSCOPY  01/01/1996    REMOVAL OF BENIGN UTERINE AND RIGHT OVARIAN TUMORS   • SALPINGO OOPHORECTOMY Left 08/26/2015    REMOVAL OF LEFT OVARY AND TUBE (benign cystic mass)       Family History   Problem Relation Age of Onset   • Hypertension Mother    • Diabetes Mother        Social History     Socioeconomic History   • Marital status:      Spouse name: Not on file   • Number of children: Not on file   • Years of education: Not on file   • Highest education level: Not on file   Tobacco Use   • Smoking status: Former Smoker   • Smokeless tobacco: Never Used   Substance and Sexual Activity   • Alcohol use: No   • Drug use: No   • Sexual activity: Defer   Social History Narrative    Patient moved to [facility] assisted living 3/26/20. On 3/28/20 patient visited ED for fall-related injuries.  In response, family looking into hiring a , to visit patient BID for ADLs/ transfers. Family drives patient to app, but says [facility] may provide in future.       Allergies   Allergen Reactions   • Bactrim [Sulfamethoxazole-Trimethoprim] Other (See Comments)     Stomach cramps    • Ciprofloxacin Other (See Comments)     Other reaction(s): shaking  HCI TABS/ SHAKING   • Levofloxacin Diarrhea         Medication:    Current Facility-Administered Medications:   •  acetaminophen (TYLENOL) tablet 1,000 mg, 1,000 mg, Oral, Q8H, Lubna Madrid II DO, 1,000 mg at 10/13/20 1329  •  albuterol sulfate HFA (PROVENTIL HFA;VENTOLIN HFA;PROAIR HFA) inhaler 6 puff, 6 puff, Inhalation, Q4H PRN, Lubna Madrid II, DO  •  aspirin EC tablet 81 mg, 81 mg, Oral, Daily,  eMrcy, Lubna M II, DO, 81 mg at 10/13/20 0936  •  budesonide-formoterol (SYMBICORT) 160-4.5 MCG/ACT inhaler 2 puff, 2 puff, Inhalation, BID - RT, Mercy Lubna M II, DO, 2 puff at 10/13/20 2036  •  dexamethasone (DECADRON) tablet 6 mg, 6 mg, Oral, Daily, Mercy, Lubna M II, DO, 6 mg at 10/13/20 0936  •  diclofenac (VOLTAREN) 1 % gel 2 g, 2 g, Topical, TID, Mercy, Lubna M II, DO, 2 g at 10/13/20 2150  •  dilTIAZem CD (CARDIZEM CD) 24 hr capsule 120 mg, 120 mg, Oral, Daily, Mercy, Lubna M II, DO, 120 mg at 10/13/20 0935  •  docusate sodium (COLACE) capsule 100 mg, 100 mg, Oral, Daily, Mercy, Lubna M II, DO, 100 mg at 10/13/20 0936  •  enoxaparin (LOVENOX) syringe 40 mg, 40 mg, Subcutaneous, Daily, Mercy, Lubna M II, DO, 40 mg at 10/13/20 0935  •  famotidine (PEPCID) tablet 20 mg, 20 mg, Oral, Daily, Mercy, Lubna M II, DO, 20 mg at 10/13/20 0936  •  folic acid (FOLVITE) tablet 1,000 mcg, 1,000 mcg, Oral, Daily, Mercy Lubna M II, DO, 1,000 mcg at 10/13/20 0936  •  [COMPLETED] INV GS-5734 remdesivir 200 mg in sodium chloride 0.9 % 250 mL IVPB (powder vial), 200 mg, Intravenous, Q24H, 200 mg at 10/10/20 2007 **FOLLOWED BY** INV GS-5734 remdesivir 100 mg in sodium chloride 0.9 % 250 mL IVPB (powder vial), 100 mg, Intravenous, Q24H, Mercy, Lubna M II, DO, 100 mg at 10/13/20 1713  •  lactobacillus acidophilus (RISAQUAD) capsule 1 capsule, 1 capsule, Oral, Daily, Mercy, Lubna M II, DO, 1 capsule at 10/13/20 0936  •  Magnesium Sulfate 2 gram Bolus, followed by 8 gram infusion (total Mg dose 10 grams)- Mg less than or equal to 1mg/dL, 2 g, Intravenous, PRN **OR** Magnesium Sulfate 2 gram / 50mL Infusion (GIVE X 3 BAGS TO EQUAL 6GM TOTAL DOSE) - Mg 1.1 - 1.5 mg/dl, 2 g, Intravenous, PRN **OR** Magnesium Sulfate 4 gram infusion- Mg 1.6-1.9 mg/dL, 4 g, Intravenous, PRN, Lubna Madrid M II, DO  •  melatonin tablet 5 mg, 5 mg, Oral, Nightly PRN, Lubna Madrid II, DO, 5 mg at 10/14/20 0338  •  multivitamin  and minerals liquid 15 mL, 15 mL, Oral, Daily, Mercy, Lubna M II, DO, 15 mL at 10/13/20 0936  •  ondansetron (ZOFRAN) tablet 4 mg, 4 mg, Oral, Q6H PRN **OR** ondansetron (ZOFRAN) injection 4 mg, 4 mg, Intravenous, Q6H PRN, Mercy, Lubna M II, DO  •  polyethylene glycol (MIRALAX) packet 17 g, 17 g, Oral, Daily, Mercy, Lubna M II, DO, 17 g at 10/13/20 0936  •  potassium chloride (MICRO-K) CR capsule 40 mEq, 40 mEq, Oral, PRN **OR** potassium chloride (KLOR-CON) packet 40 mEq, 40 mEq, Oral, PRN **OR** potassium chloride 10 mEq in 100 mL IVPB, 10 mEq, Intravenous, Q1H PRN, Mercy, Lubna M II, DO  •  QUEtiapine (SEROquel) tablet 25 mg, 25 mg, Oral, Nightly, Mercy, Lubna M II, DO, 25 mg at 10/12/20 2210  •  sennosides-docusate (PERICOLACE) 8.6-50 MG per tablet 2 tablet, 2 tablet, Oral, BID PRN, Mercy, Lubna M II, DO  •  sodium chloride 0.9 % flush 10 mL, 10 mL, Intravenous, PRN, Blair Ventura MD  •  sodium chloride 0.9 % flush 10 mL, 10 mL, Intravenous, Q12H, Mercy, Lubna M II, DO, 10 mL at 10/13/20 2150  •  sodium chloride 0.9 % flush 10 mL, 10 mL, Intravenous, PRN, Mercy, Lubna M II, DO    Antibiotics:  Anti-Infectives (From admission, onward)    Ordered     Dose/Rate Route Frequency Start Stop    10/10/20 1743  INV GS-5734 remdesivir 100 mg in sodium chloride 0.9 % 250 mL IVPB (powder vial)     Ordering Provider: Ramon Madridcy M II, DO    100 mg  over 60 Minutes Intravenous Every 24 Hours 10/11/20 1830 10/15/20 1829    10/10/20 1743  INV GS-5734 remdesivir 200 mg in sodium chloride 0.9 % 250 mL IVPB (powder vial)     Ordering Provider: Lubna Madrid II, DO    200 mg  over 60 Minutes Intravenous Every 24 Hours 10/10/20 1830 10/10/20 2107            Review of Systems:  See ROS    Physical Exam:   Vital Signs  Temp (24hrs), Av.5 °F (36.4 °C), Min:96.8 °F (36 °C), Max:98.6 °F (37 °C)    Temp  Min: 96.8 °F (36 °C)  Max: 98.6 °F (37 °C)  BP  Min: 127/65  Max: 142/81  Pulse  Min: 60  Max:  91  Resp  Min: 16  Max: 20  SpO2  Min: 92 %  Max: 95 %     Her exam was done by direct visualization from outside the room  in the presence of the COVID-19 pandemic with a critical shortage of PPE.    GENERAL: Awake and alert, in no acute distress.   HEENT: No labial ulcers  NECK: Supple   HEART: RRR;  LUNGS: No respiratory distress  ABDOMEN: Nondistended  :  Without Cisse catheter.  MSK: No focal joint swelling or erythema  SKIN: Warm and dry without cutaneous eruptions on Inspection/palpation.    NEURO: 5/5 strength bilaterally  PSYCHIATRIC:     Laboratory Data    Results from last 7 days   Lab Units 10/13/20  0634 10/10/20  1010 10/09/20  1054   WBC 10*3/mm3 7.19 4.49 5.39   HEMOGLOBIN g/dL 8.0* 9.0* 9.2*   HEMATOCRIT % 25.6* 29.3* 28.7*   PLATELETS 10*3/mm3 357 330 412     Results from last 7 days   Lab Units 10/13/20  0634   SODIUM mmol/L 142   POTASSIUM mmol/L 3.9   CHLORIDE mmol/L 109*   CO2 mmol/L 27.0   BUN mg/dL 30*   CREATININE mg/dL 0.96   GLUCOSE mg/dL 116*   CALCIUM mg/dL 7.9*     Results from last 7 days   Lab Units 10/13/20  0634   ALK PHOS U/L 47   BILIRUBIN mg/dL <0.2   ALT (SGPT) U/L 7   AST (SGOT) U/L 13         Results from last 7 days   Lab Units 10/13/20  0634   CRP mg/dL 0.98*     Results from last 7 days   Lab Units 10/09/20  1054   LACTATE mmol/L 1.7     Results from last 7 days   Lab Units 10/13/20  0634   CK TOTAL U/L 14*         Estimated Creatinine Clearance: 38.6 mL/min (by C-G formula based on SCr of 0.96 mg/dL).      Microbiology:  Blood Culture   Date Value Ref Range Status   10/09/2020 No growth at 24 hours  Preliminary     No results found for: BCIDPCR, CXREFLEX, CSFCX, CULTURETIS  No results found for: CULTURES, HSVCX, URCX  No results found for: EYECULTURE, GCCX, LABHSV  No results found for: LEGIONELLA, MRSACX, MUMPSCX, MYCOPLASCX  No results found for: NOCARDIACX, STOOLCX  Urine Culture   Date Value Ref Range Status   10/09/2020 >100,000 CFU/mL Mixed Katherine Isolated  Final      No results found for: VIRALCULTU, WOUNDCX        Radiology:  Imaging Results (Last 72 Hours)     ** No results found for the last 72 hours. **        I read her radiographic studies    Impression:  1.  COVID-19 pneumonia-with acute hypoxic respiratory failure.  She has been on Decadron and Remdesivir.  She received convalescent plasma on 10/11.  She appears to be improving.  2.  Acute hypoxic respiratory failure  3.  COPD  4.  Rheumatoid arthritis- on methotrexate therapy  5.  Immunocompromised host      PLAN/RECOMMENDATIONS:   1.  Hold methotrexate  2.  Decadron 6 mg IV/p.o. daily x10 days  3.  Remdesivir IV daily x5 days-this will be completed today  4.  DVT prophylaxis  5.  COVID-19 convalescent plasma-given on 10/11  6.  Discharge anytime from my standpoint    I discussed her clinical situation and discharge with Dr. Stuart today.    Danny Moscoso MD  10/14/2020  06:17 EDT

## 2020-10-14 NOTE — PROGRESS NOTES
[facility] Medicine Services  PROGRESS NOTE    Patient Name: Pati Carter  : 1930  MRN: [id number]    Date of Admission: 10/9/2020  Primary Care Physician: Latoya Bolaños MD    Subjective   Subjective     CC: Follow-up COVID-19 pneumonia    HPI: No acute events overnight, patient with no complaints, she is is asking about leaving the hospital, she is tired of just laying down.    Review of Systems   Gen- No fevers, chills  CV- No chest pain, palpitations  Resp- No cough, dyspnea  GI- No N/V/D, abd pain    All other systems reviewed and are negative    Objective   Objective     Vital Signs:   Temp:  [96.8 °F (36 °C)-98.6 °F (37 °C)] 96.8 °F (36 °C)  Heart Rate:  [60-90] 61  Resp:  [16-20] 18  BP: (127-144)/() 144/70        Physical Exam:  With patient's consent, physical exam was conducted via visual telemedicine encounter with bedside portion accommodated by nursing staff due to patient's current isolation requirements in the interest of PPE conservation.    Constitutional: Elderly lady, in no acute distress, awake, alert, nontoxic,   HENT: NCAT, MMM, no conjunctival injection  Respiratory: Clear to auscultation bilaterally, good effort, nonlabored respirations   Cardiovascular: RRR on telemetry   musculoskeletal: No edema, normal muscle tone and mass for age  GI: Soft, no grimace, nondistended  Psychiatric: Appropriate affect, good insight and judgement, cooperative  Neurologic: Mild confusion movements symmetric BUE and BLE, speech clear and fluent  Skin: No visible rashes,       Results Reviewed:  Results from last 7 days   Lab Units 10/13/20  0634 10/10/20  1010 10/09/20  1054   WBC 10*3/mm3 7.19 4.49 5.39   HEMOGLOBIN g/dL 8.0* 9.0* 9.2*   HEMATOCRIT % 25.6* 29.3* 28.7*   PLATELETS 10*3/mm3 357 330 412   PROCALCITONIN ng/mL  --   --  0.19     Results from last 7 days   Lab Units 10/13/20  0634 10/10/20  1010 10/09/20  1054   SODIUM mmol/L 142 141 137   POTASSIUM  mmol/L 3.9 3.7 3.8   CHLORIDE mmol/L 109* 105 100   CO2 mmol/L 27.0 29.0 29.0   BUN mg/dL 30* 11 15   CREATININE mg/dL 0.96 0.88 0.94   GLUCOSE mg/dL 116* 101* 98   CALCIUM mg/dL 7.9* 8.1* 8.4   ALT (SGPT) U/L 7  --  10   AST (SGOT) U/L 13  --  27   TROPONIN T ng/mL  --   --  <0.010   PROBNP pg/mL  --   --  967.3     Estimated Creatinine Clearance: 38.6 mL/min (by C-G formula based on SCr of 0.96 mg/dL).    Microbiology Results Abnormal     Procedure Component Value - Date/Time    Blood Culture - Blood, Arm, Right [[id number]] Collected: 10/09/20 1054    Lab Status: Preliminary result Specimen: Blood from Arm, Right Updated: 10/13/20 1130     Blood Culture No growth at 4 days    Blood Culture - Blood, Arm, Right [[id number]] Collected: 10/09/20 1045    Lab Status: Preliminary result Specimen: Blood from Arm, Right Updated: 10/13/20 1130     Blood Culture No growth at 4 days    Urine Culture - Urine, Urine, Clean Catch [[id number]] Collected: 10/09/20 1412    Lab Status: Final result Specimen: Urine, Clean Catch Updated: 10/10/20 1257     Urine Culture >100,000 CFU/mL Mixed Abhi Isolated    Narrative:      Specimen contains mixed organisms of questionable pathogenicity which indicates contamination with commensal abhi.  Further identification is unlikely to provide clinically useful information.  Suggest recollection.          Imaging Results (Last 24 Hours)     ** No results found for the last 24 hours. **               I have reviewed the medications:  Scheduled Meds:acetaminophen, 1,000 mg, Oral, Q8H  aspirin, 81 mg, Oral, Daily  budesonide-formoterol, 2 puff, Inhalation, BID - RT  dexamethasone, 6 mg, Oral, Daily  diclofenac, 2 g, Topical, TID  dilTIAZem CD, 120 mg, Oral, Daily  docusate sodium, 100 mg, Oral, Daily  enoxaparin, 40 mg, Subcutaneous, Daily  famotidine, 20 mg, Oral, Daily  folic acid, 1,000 mcg, Oral, Daily  INV [id number] remdesivir in NS IVPB, 100 mg, Intravenous, Q24H  lactobacillus acidophilus, 1  capsule, Oral, Daily  multivitamin and minerals, 15 mL, Oral, Daily  polyethylene glycol, 17 g, Oral, Daily  QUEtiapine, 25 mg, Oral, Nightly  sodium chloride, 10 mL, Intravenous, Q12H      Continuous Infusions:   PRN Meds:.albuterol sulfate HFA  •  magnesium sulfate **OR** magnesium sulfate **OR** magnesium sulfate  •  melatonin  •  ondansetron **OR** ondansetron  •  potassium chloride **OR** potassium chloride **OR** potassium chloride  •  sennosides-docusate  •  sodium chloride  •  sodium chloride    Assessment/Plan   Assessment & Plan     Active Hospital Problems    Diagnosis  POA   • **COVID-19 [U07.1]  Yes   • Immunosuppressed status on methotrexate [D84.9]  Yes   • COPD (chronic obstructive pulmonary disease) (CMS/[facility]) [J44.9]  Yes   • Rheumatoid arthritis (CMS/[facility]) [M06.9]  Yes   • Hypertension [I10]  Yes      Resolved Hospital Problems   No resolved problems to display.        Brief Hospital Course to date:  Pati Carter is a 89 y.o. female with past medical history of COPD, hypertension, rheumatoid arthritis on methotrexate who presented with worsening fatigue and weakness found to be COVID-19 positive, became hypoxic 10/11 and has since been initiated on Decadron and Remdesivir, she is also s/p 2 units of convalescent plasma.     Plan  COVID-19 infection  -Positive results currently in chartlet  -ID following, patient currently on Remdesivir last dose today 10/14, continue Decadron for 10 days, she is now s/p 2 units of convalescent plasma 10/11  -She continues to show some improvement, now not on any O2, continue Symbicort and albuterol  -Monitor COVID labs every other day     COPD with mild exacerbation  -Continue steroids, Symbicort and albuterol     Rheumatoid arthritis  -Patient is immunocompromised on methotrexate, currently on hold     Hypertension BP currently stable, continue hold diltiazem     DVT Prophylaxis: Lovenox     Disposition: TBD, anticipate discharge to SNF, CM following     Called  daughter Danette, [phone] updated and all questions answered.       CODE STATUS:   Code Status and Medical Interventions:   Ordered at: 10/09/20 1200     Limited Support to NOT Include:    Intubation     Code Status:    No CPR     Medical Interventions (Level of Support Prior to Arrest):    Limited       Antonella Stuart MD  10/14/20

## 2020-10-14 NOTE — PROGRESS NOTES
Continued Stay Note  [facility]     Patient Name: Pati Carter  MRN: [id number]  Today's Date: 10/14/2020    Admit Date: 10/9/2020    Discharge Plan     Row Name 10/14/20 0939       Plan    Plan  update    Patient/Family in Agreement with Plan  yes    Plan Comments  Received a call from Analia with [facility] who advises that they can offer patient a bed but need to know ASAP if they want to accept it because the family was on the fence about coming there and they have another patient for the bed.  Called Serge with [facility] and left voicemail regarding referral with call back number.  Per CM notes from yesterday, [facility] was interested in taking patient as well.  CM following.  Patient plan is to discharge to rehab bed via car with family to transport.    Final Discharge Disposition Code  03 - skilled nursing facility (SNF)        Discharge Codes    No documentation.       Expected Discharge Date and Time     Expected Discharge Date Expected Discharge Time    Oct 16, 2020             Romelia Hart RN

## 2020-10-14 NOTE — THERAPY TREATMENT NOTE
Patient Name: Pati Carter  : 1930    MRN: [id number]                              Today's Date: 10/14/2020       Admit Date: 10/9/2020    Visit Dx:     ICD-10-CM ICD-9-CM   1. COVID-19  U07.1 079.89     Patient Active Problem List   Diagnosis   • Macular degeneration   • Rheumatoid arthritis (CMS/HCC)   • Hearing loss   • Hyperlipidemia   • Gastroesophageal reflux disease   • Hypertension   • Constipation   • Anemia   • Compression fracture of lumbar vertebra (CMS/HCC)   • Degeneration of intervertebral disc of lumbar region   • COPD (chronic obstructive pulmonary disease) (CMS/HCC)   • H/O calcium pyrophosphate deposition disease (CPPD)   • Falls frequently   • Sepsis (CMS/HCC)   • Immunosuppressed status on methotrexate   • Ferny Bonnet syndrome   • UTI (urinary tract infection), bacterial   • JENNIE (acute kidney injury) (CMS/HCC)   • Bacterial colitis   • COVID-19     Past Medical History:   Diagnosis Date   • Anemia     Description: A.  Dx - borderline intermittent.   • Back pain    • Benign colonic polyp 2016    Description: A.  Dx .   • Ferny Bonnet syndrome 2020   • Compression fracture of lumbar vertebra (CMS/HCC)    • Constipation    • COPD (chronic obstructive pulmonary disease) (CMS/HCC)     Description: A.  Rule out chronic persistent asthma, COPD, or obliterative bronchiolitis.- Butch   • Degeneration of intervertebral disc of lumbar region     Description: A.  Diagnosed in 2013 with advanced multilevel with severe spinal stenosis, followed by Dr. Pillai for pain management.   • Gastroesophageal reflux disease    • Hearing loss    • History of colonoscopy 1999    NORMAL PER PATIENT    • History of mammogram 2011    NORMAL PER PT    • History of Papanicolaou smear of cervix 2010    NORMAL PER PT    • History of varicella    • Hyperlipidemia     Description: A.  Dx .   • Hypertension     Description: A. Dx .   • Macular degeneration    •  Ovarian mass     Dx 8/15- benign left cystic adnexal mass   • Rheumatoid arthritis (CMS/HCC)     Description: A.  Diagnosed in 2000 and and followed by Dr. Constantino (now Dr. Kaplan). B.  On methotrexate therapy since 2000. C.  Off low-dose prednisone therapy.     Past Surgical History:   Procedure Laterality Date   • APPENDECTOMY     • CARPAL TUNNEL RELEASE Left 01/01/2003    HISTORY OF NEUROPLASTY DECOMPRESSION MEDIAN NERVE AT CARPAL TUNNEL LEFT   • CATARACT EXTRACTION Bilateral 01/01/2009   • CHOLECYSTECTOMY  01/01/1962   • HIP CANNULATED SCREW PLACEMENT Right 1/25/2020    Procedure: HIP CANNULATED SCREW PLACEMENT RIGHT;  Surgeon: Karlos Blount MD;  Location: [facility];  Service: Orthopedics   • KNEE ARTHROSCOPY Left 01/01/2001    MENISCAL REPAIR   • KYPHOPLASTY  06/18/2015    T11 AND L1 (JOSE A)   • PELVIC LAPAROSCOPY  01/01/1996    REMOVAL OF BENIGN UTERINE AND RIGHT OVARIAN TUMORS   • SALPINGO OOPHORECTOMY Left 08/26/2015    REMOVAL OF LEFT OVARY AND TUBE (benign cystic mass)     General Information     Row Name 10/14/20 1319          Physical Therapy Time and Intention    Document Type  therapy note (daily note)  -VG     Mode of Treatment  individual therapy;physical therapy  -VG     Row Name 10/14/20 1319          General Information    Existing Precautions/Restrictions  fall  -VG     Row Name 10/14/20 1319          Cognition    Orientation Status (Cognition)  oriented x 3  -VG       User Key  (r) = Recorded By, (t) = Taken By, (c) = Cosigned By    Initials Name Provider Type    VG Kathryn Jay PT Physical Therapist        Mobility     Row Name 10/14/20 1319          Bed Mobility    Supine-Sit [place] (Bed Mobility)  minimum assist (75% patient effort);verbal cues  -VG     Assistive Device (Bed Mobility)  bed rails;head of bed elevated  -VG     Comment (Bed Mobility)  Cues for hand placement and sequencing. Assist at trunk for supine to EOB.  -VG     Row Name 10/14/20 4196          Transfers     Comment (Transfers)  Cues for hand placement and sequencing.  -VG     Row Name 10/14/20 1319          Sit-Stand Transfer    Sit-Stand [place] (Transfers)  minimum assist (75% patient effort);1 person assist;verbal cues  -VG     Assistive Device (Sit-Stand Transfers)  walker, front-wheeled  -VG     Row Name 10/14/20 1319          Gait/Stairs (Locomotion)    [place] Level (Gait)  minimum assist (75% patient effort);moderate assist (50% patient effort);1 person assist;verbal cues  -VG     Assistive Device (Gait)  walker, front-wheeled  -VG     Distance in Feet (Gait)  20  -VG     Deviations/Abnormal Patterns (Gait)  pancho decreased;festinating/shuffling;gait speed decreased  -VG     Bilateral Gait Deviations  forward flexed posture  -VG     Comment (Gait/Stairs)  Distance limited by fatigue, weakness. Cues for upright posture, keeping body within RW frame, Improved endurance and strength.  -VG       User Key  (r) = Recorded By, (t) = Taken By, (c) = Cosigned By    Initials Name Provider Type    VG Kathryn Jay, PT Physical Therapist        Obj/Interventions     Row Name 10/14/20 1322          Hip (Therapeutic Exercise)    Hip Strengthening (Therapeutic Exercise)  bilateral;aBduction;aDduction;marching while seated;sitting;10 repetitions  -VG     Row Name 10/14/20 1322          Knee (Therapeutic Exercise)    Knee Strengthening (Therapeutic Exercise)  bilateral;LAQ (long arc quad);sitting;10 repetitions  -VG     Row Name 10/14/20 1322          Ankle (Therapeutic Exercise)    Ankle Strengthening (Therapeutic Exercise)  bilateral;dorsiflexion;plantarflexion;sitting;10 repetitions  -VG     Row Name 10/14/20 1322          Balance    Balance Assessment  standing dynamic balance  -VG     Static Sitting Balance  moderate impairment  -VG     Dynamic Sitting Balance  moderate impairment  -VG     Static Standing Balance  moderate impairment  -VG     Dynamic Standing Balance  moderate impairment  -VG       User  Key  (r) = Recorded By, (t) = Taken By, (c) = Cosigned By    Initials Name Provider Type    VG Kathryn Jay, PT Physical Therapist        Goals/Plan    No documentation.       Clinical Impression     Row Name 10/14/20 1327          Pain Scale: Numbers Pre/Post-Treatment    Pretreatment Pain Rating  0/10 - no pain  -VG     Posttreatment Pain Rating  0/10 - no pain  -VG     Row Name 10/14/20 1327          Plan of Care Review    Plan of Care Reviewed With  patient  -VG     Progress  improving  -VG     Outcome Summary  Pt ambulated 20 ft with RW and MIN-MODA. Limited by fatigue, weakness, SOA. VSS on RA. Improved endurance, strength, activity tolerance. Continue to recommend IRF upon d/c.  -VG     Row Name 10/14/20 1327 10/14/20 1213       Vital Signs    Pre Systolic BP Rehab  154  -VG  154  -VG    Pre Treatment Diastolic BP  73  -VG  73  -VG    Post Systolic BP Rehab  156  -VG  156  -VG    Post Treatment Diastolic BP  88  -VG  88  -VG    Pretreatment Heart Rate (beats/min)  80  -VG  80  -VG    Posttreatment Heart Rate (beats/min)  83  -VG  83  -VG    Pre SpO2 (%)  95  -VG  95  -VG    O2 Delivery Pre Treatment  room air  -VG  room air  -VG    Post SpO2 (%)  92  -VG  92  -VG    O2 Delivery Post Treatment  room air  -VG  room air  -VG    Pre Patient Position  Supine  -VG  Supine  -VG    Post Patient Position  Sitting  -VG  Sitting  -VG    Row Name 10/14/20 1327          Positioning and Restraints    Pre-Treatment Position  in bed  -VG     Post Treatment Position  chair  -VG     In Chair  reclined;call light within reach;encouraged to call for assist;exit alarm on;waffle cushion;legs elevated;heels elevated  -VG       User Key  (r) = Recorded By, (t) = Taken By, (c) = Cosigned By    Initials Name Provider Type    VG Kathryn Jay, PT Physical Therapist        Outcome Measures     Row Name 10/14/20 1333          How much help from another person do you currently need...    Turning from your back to your side while in  flat bed without using bedrails?  3  -VG     Moving from lying on back to sitting on the side of a flat bed without bedrails?  3  -VG     Moving to and from a bed to a chair (including a wheelchair)?  3  -VG     Standing up from a chair using your arms (e.g., wheelchair, bedside chair)?  3  -VG     Climbing 3-5 steps with a railing?  1  -VG     To walk in hospital room?  2  -VG     AM-PAC 6 Clicks Score (PT)  15  -VG     Row Name 10/14/20 1330          Functional Assessment    Outcome Measure Options  AM-PAC 6 Clicks Basic Mobility (PT)  -VG       User Key  (r) = Recorded By, (t) = Taken By, (c) = Cosigned By    Initials Name Provider Type    VG Kathryn Jay, PT Physical Therapist        Physical Therapy Education                 Title: PT OT SLP Therapies (In Progress)     Topic: Physical Therapy (Done)     Point: Mobility training (Done)     Learning Progress Summary           Patient Acceptance, E, VU by VG at 10/14/2020 1331    Acceptance, E, VU by VG at 10/12/2020 1629    Acceptance, E,D, VU,NR by MB at 10/10/2020 1358    Comment: Provided education re: fall precautions, benefits of mobility, home safety, transfer safety/mechanics, HEP.                   Point: Home exercise program (Done)     Learning Progress Summary           Patient Acceptance, E, VU by VG at 10/14/2020 1331    Acceptance, E, VU by VG at 10/12/2020 1629    Acceptance, E,D, VU,NR by MB at 10/10/2020 1358    Comment: Provided education re: fall precautions, benefits of mobility, home safety, transfer safety/mechanics, HEP.                   Point: Body mechanics (Done)     Learning Progress Summary           Patient Acceptance, E, VU by VG at 10/14/2020 1331    Acceptance, E, VU by VG at 10/12/2020 1629    Acceptance, E,D, VU,NR by MB at 10/10/2020 1358    Comment: Provided education re: fall precautions, benefits of mobility, home safety, transfer safety/mechanics, HEP.                   Point: Precautions (Done)     Learning Progress  Summary           Patient Acceptance, E, VU by  at 10/14/2020 1331    Acceptance, E, VU by  at 10/12/2020 1629    Acceptance, E,D, VU,NR by MB at 10/10/2020 1358    Comment: Provided education re: fall precautions, benefits of mobility, home safety, transfer safety/mechanics, HEP.                               User Key     Initials Effective Dates Name Provider Type Discipline    MB 03/14/16 -  Nayeli Chapa, PT Physical Therapist PT     05/29/18 -  Kathryn Jay, PT Physical Therapist PT              PT Recommendation and Plan     Plan of Care Reviewed With: patient  Progress: improving  Outcome Summary: Pt ambulated 20 ft with RW and MIN-MODA. Limited by fatigue, weakness, SOA. VSS on RA. Improved endurance, strength, activity tolerance. Continue to recommend IRF upon d/c.     Time Calculation:   PT Charges     Row Name 10/14/20 1140             Time Calculation    Start Time  1140  -VG      PT Received On  10/14/20  -      PT Goal Re-Cert Due Date  10/20/20  -         Time Calculation- PT    Total Timed Code Minutes- PT  40 minute(s)  -VG         Timed Charges    61077 - PT Therapeutic Exercise Minutes  10  -VG      76067 - Gait Training Minutes   30  -VG        User Key  (r) = Recorded By, (t) = Taken By, (c) = Cosigned By    Initials Name Provider Type     Kathryn Jay, PT Physical Therapist        Therapy Charges for Today     Code Description Service Date Service Provider Modifiers Qty    49534703179 HC PT THER PROC EA 15 MIN 10/14/2020 Kathryn Jay, PT GP 1    [id number] HC GAIT TRAINING EA 15 MIN 10/14/2020 Kathryn Jay, PT GP 2          PT G-Codes  Outcome Measure Options: AM-PAC 6 Clicks Basic Mobility (PT)  AM-PAC 6 Clicks Score (PT): 15  AM-PAC 6 Clicks Score (OT): 12    Ciera Jay PT  10/14/2020

## 2020-10-14 NOTE — PLAN OF CARE
Goal Outcome Evaluation:  Plan of Care Reviewed With: patient  Progress: improving  Outcome Summary: Pt rested this shift with no concerns or complaints. Remains on room air. Afebrile. VSS. Will continue to monitor.

## 2020-10-14 NOTE — PLAN OF CARE
Problem: Adult Inpatient Plan of Care  Goal: Plan of Care Review  Recent Flowsheet Documentation  Taken 10/14/2020 1327 by Kathryn Jay, PT  Progress: improving  Plan of Care Reviewed With: patient  Outcome Summary: Pt ambulated 20 ft with RW and MIN-MODA. Limited by fatigue, weakness, SOA. VSS on RA. Improved endurance, strength, activity tolerance. Continue to recommend IRF upon d/c.

## 2020-10-14 NOTE — PROGRESS NOTES
Continued Stay Note  [facility]     Patient Name: Pati Carter  MRN: [id number]  Today's Date: 10/14/2020    Admit Date: 10/9/2020    Discharge Plan     Row Name 10/14/20 1027       Plan    Plan  update    Patient/Family in Agreement with Plan  yes    Plan Comments  Per MD rounds today, patient to get does of Remdesivir today and could go tomorrow if has bed.  Received a call from Serge with [facility] who advises that they can offer patient a bed and will call back regarding what unit.  MD notified.  Called patient room, no answer.  Called patient daughter, , to advise of bed offer at [facility] who verbalizes understanding, relief and agreement with plan.  She request [facility] van to transport.  Called to scheduled [facility] van who is able to transport at end of day, likely 1600 and will call back to confirm exact time.  CM following.  Patient plan is to discharge to [facility] tomorrow via [facility] van for transport.    Final Discharge Disposition Code  62 - inpatient rehab facility    Row Name 10/14/20 0939       Plan    Plan  update    Patient/Family in Agreement with Plan  yes    Plan Comments  Received a call from Analia with [facility] who advises that they can offer patient a bed but need to know ASAP if they want to accept it because the family was on the fence about coming there and they have another patient for the bed.  Called Serge with [facility] and left voicemail regarding referral with call back number.  Per CM notes from yesterday, [facility] was interested in taking patient as well.  CM following.  Patient plan is to discharge to rehab bed via car with family to transport.    Final Discharge Disposition Code  03 - skilled nursing facility (SNF)        Discharge Codes    No documentation.       Expected Discharge Date and Time     Expected Discharge Date Expected Discharge Time    Oct 16, 2020             Romelia Hart RN

## 2020-10-15 VITALS
OXYGEN SATURATION: 96 % | SYSTOLIC BLOOD PRESSURE: 143 MMHG | HEIGHT: 64 IN | DIASTOLIC BLOOD PRESSURE: 69 MMHG | TEMPERATURE: 98.2 F | BODY MASS INDEX: 28.17 KG/M2 | WEIGHT: 165 LBS | RESPIRATION RATE: 16 BRPM | HEART RATE: 84 BPM

## 2020-10-15 LAB
ALBUMIN SERPL-MCNC: 2.8 G/DL (ref 3.5–5.2)
ALBUMIN/GLOB SERPL: 0.9 G/DL
ALP SERPL-CCNC: 55 U/L (ref 39–117)
ALT SERPL W P-5'-P-CCNC: 11 U/L (ref 1–33)
ANION GAP SERPL CALCULATED.3IONS-SCNC: 8 MMOL/L (ref 5–15)
AST SERPL-CCNC: 21 U/L (ref 1–32)
BASOPHILS # BLD AUTO: 0.01 10*3/MM3 (ref 0–0.2)
BASOPHILS NFR BLD AUTO: 0.1 % (ref 0–1.5)
BILIRUB SERPL-MCNC: 0.2 MG/DL (ref 0–1.2)
BUN SERPL-MCNC: 32 MG/DL (ref 8–23)
BUN/CREAT SERPL: 40.5 (ref 7–25)
CALCIUM SPEC-SCNC: 8.3 MG/DL (ref 8.2–9.6)
CHLORIDE SERPL-SCNC: 108 MMOL/L (ref 98–107)
CK SERPL-CCNC: 25 U/L (ref 20–180)
CO2 SERPL-SCNC: 24 MMOL/L (ref 22–29)
CREAT SERPL-MCNC: 0.79 MG/DL (ref 0.57–1)
CRP SERPL-MCNC: 0.53 MG/DL (ref 0–0.5)
D DIMER PPP FEU-MCNC: 4.5 MCGFEU/ML (ref 0–0.56)
DEPRECATED RDW RBC AUTO: 60.4 FL (ref 37–54)
EOSINOPHIL # BLD AUTO: 0 10*3/MM3 (ref 0–0.4)
EOSINOPHIL NFR BLD AUTO: 0 % (ref 0.3–6.2)
ERYTHROCYTE [DISTWIDTH] IN BLOOD BY AUTOMATED COUNT: 15.1 % (ref 12.3–15.4)
FERRITIN SERPL-MCNC: 460.5 NG/ML (ref 13–150)
FIBRINOGEN PPP-MCNC: 365 MG/DL (ref 215–430)
GFR SERPL CREATININE-BSD FRML MDRD: 68 ML/MIN/1.73
GLOBULIN UR ELPH-MCNC: 3.1 GM/DL
GLUCOSE SERPL-MCNC: 101 MG/DL (ref 65–99)
HCT VFR BLD AUTO: 30 % (ref 34–46.6)
HGB BLD-MCNC: 9.4 G/DL (ref 12–15.9)
IMM GRANULOCYTES # BLD AUTO: 0.19 10*3/MM3 (ref 0–0.05)
IMM GRANULOCYTES NFR BLD AUTO: 2.6 % (ref 0–0.5)
LDH SERPL-CCNC: 191 U/L (ref 135–214)
LYMPHOCYTES # BLD AUTO: 0.96 10*3/MM3 (ref 0.7–3.1)
LYMPHOCYTES NFR BLD AUTO: 13.4 % (ref 19.6–45.3)
MCH RBC QN AUTO: 33.7 PG (ref 26.6–33)
MCHC RBC AUTO-ENTMCNC: 31.3 G/DL (ref 31.5–35.7)
MCV RBC AUTO: 107.5 FL (ref 79–97)
MONOCYTES # BLD AUTO: 0.54 10*3/MM3 (ref 0.1–0.9)
MONOCYTES NFR BLD AUTO: 7.5 % (ref 5–12)
NEUTROPHILS NFR BLD AUTO: 5.47 10*3/MM3 (ref 1.7–7)
NEUTROPHILS NFR BLD AUTO: 76.4 % (ref 42.7–76)
NRBC BLD AUTO-RTO: 0 /100 WBC (ref 0–0.2)
PLATELET # BLD AUTO: 458 10*3/MM3 (ref 140–450)
PMV BLD AUTO: 9.9 FL (ref 6–12)
POTASSIUM SERPL-SCNC: 4.4 MMOL/L (ref 3.5–5.2)
PROT SERPL-MCNC: 5.9 G/DL (ref 6–8.5)
RBC # BLD AUTO: 2.79 10*6/MM3 (ref 3.77–5.28)
SODIUM SERPL-SCNC: 140 MMOL/L (ref 136–145)
WBC # BLD AUTO: 7.17 10*3/MM3 (ref 3.4–10.8)

## 2020-10-15 PROCEDURE — 97535 SELF CARE MNGMENT TRAINING: CPT

## 2020-10-15 PROCEDURE — 85379 FIBRIN DEGRADATION QUANT: CPT | Performed by: INTERNAL MEDICINE

## 2020-10-15 PROCEDURE — 86140 C-REACTIVE PROTEIN: CPT | Performed by: INTERNAL MEDICINE

## 2020-10-15 PROCEDURE — 80053 COMPREHEN METABOLIC PANEL: CPT | Performed by: INTERNAL MEDICINE

## 2020-10-15 PROCEDURE — 25010000002 ENOXAPARIN PER 10 MG: Performed by: INTERNAL MEDICINE

## 2020-10-15 PROCEDURE — 83615 LACTATE (LD) (LDH) ENZYME: CPT | Performed by: INTERNAL MEDICINE

## 2020-10-15 PROCEDURE — 94799 UNLISTED PULMONARY SVC/PX: CPT

## 2020-10-15 PROCEDURE — 82728 ASSAY OF FERRITIN: CPT | Performed by: INTERNAL MEDICINE

## 2020-10-15 PROCEDURE — 85384 FIBRINOGEN ACTIVITY: CPT | Performed by: INTERNAL MEDICINE

## 2020-10-15 PROCEDURE — 99238 HOSP IP/OBS DSCHRG MGMT 30/<: CPT | Performed by: INTERNAL MEDICINE

## 2020-10-15 PROCEDURE — 85025 COMPLETE CBC W/AUTO DIFF WBC: CPT | Performed by: INTERNAL MEDICINE

## 2020-10-15 PROCEDURE — 63710000001 DEXAMETHASONE PER 0.25 MG: Performed by: INTERNAL MEDICINE

## 2020-10-15 PROCEDURE — 97110 THERAPEUTIC EXERCISES: CPT

## 2020-10-15 PROCEDURE — 82550 ASSAY OF CK (CPK): CPT | Performed by: INTERNAL MEDICINE

## 2020-10-15 RX ADMIN — POLYETHYLENE GLYCOL 3350 17 G: 17 POWDER, FOR SOLUTION ORAL at 09:17

## 2020-10-15 RX ADMIN — SODIUM CHLORIDE, PRESERVATIVE FREE 10 ML: 5 INJECTION INTRAVENOUS at 09:19

## 2020-10-15 RX ADMIN — DEXAMETHASONE 6 MG: 4 TABLET ORAL at 09:19

## 2020-10-15 RX ADMIN — ASPIRIN 81 MG: 81 TABLET, COATED ORAL at 09:19

## 2020-10-15 RX ADMIN — FAMOTIDINE 20 MG: 20 TABLET, FILM COATED ORAL at 09:19

## 2020-10-15 RX ADMIN — MULTIVITAMIN 15 ML: LIQUID ORAL at 09:17

## 2020-10-15 RX ADMIN — Medication 1 CAPSULE: at 09:19

## 2020-10-15 RX ADMIN — DILTIAZEM HYDROCHLORIDE 120 MG: 120 CAPSULE, COATED, EXTENDED RELEASE ORAL at 09:19

## 2020-10-15 RX ADMIN — ACETAMINOPHEN 1000 MG: 500 TABLET, FILM COATED ORAL at 05:12

## 2020-10-15 RX ADMIN — BUDESONIDE AND FORMOTEROL FUMARATE DIHYDRATE 2 PUFF: 160; 4.5 AEROSOL RESPIRATORY (INHALATION) at 11:18

## 2020-10-15 RX ADMIN — FOLIC ACID 1000 MCG: 1 TABLET ORAL at 09:19

## 2020-10-15 RX ADMIN — DOCUSATE SODIUM 100 MG: 100 CAPSULE ORAL at 09:19

## 2020-10-15 RX ADMIN — ENOXAPARIN SODIUM 40 MG: 40 INJECTION SUBCUTANEOUS at 09:19

## 2020-10-15 RX ADMIN — ACETAMINOPHEN 1000 MG: 500 TABLET, FILM COATED ORAL at 14:09

## 2020-10-15 RX ADMIN — DICLOFENAC 2 G: 10 GEL TOPICAL at 09:21

## 2020-10-15 NOTE — DISCHARGE INSTR - APPOINTMENTS
You have an appointment with Marsha Mathis MD  on November 23, 2020 @ 1:45 PM.   Call them if you have any questions. Phone: [phone]  [address] [place] [id number]

## 2020-10-15 NOTE — PROGRESS NOTES
Case Management Discharge Note      Final Note: Per MD rounds patient to discharge today.  Patient has [facility] Medical Van transport scheduled today at 1530.  Discharge orders have been placed.  Spoke to Serge with [facility] to advise of [facility] medical van time wh ois expecting to received patient to the [facility] 2 unit today.  Spoke with patient daughter, Danette, to advise of [facility] medical van time who verbalizes understanding and agreement with plan.  RN notfied.  Patient plan is to discharge to [facility] 2 Unit today via [facility] medical van scheduled for 1530.  Nursing to call report to [phone].         Selected Continued Care - Admitted Since 10/9/2020     Destination Coordination complete    Service Provider Selected Services Address Phone Fax    [facility]  Inpatient Rehabilitation [address] [address] [phone]          Durable Medical Equipment    No services have been selected for the patient.              Dialysis/Infusion    No services have been selected for the patient.              Home Medical Care    No services have been selected for the patient.              Therapy    No services have been selected for the patient.              Community Resources    No services have been selected for the patient.                Selected Continued Care - Prior Encounters Includes selections from prior encounters from 7/11/2020 to 10/15/2020    Discharged on 9/28/2020 Admission date: 9/16/2020 - Discharge disposition: Skilled Nursing Facility (DC - External)    Destination     Service Provider Selected Services Address Phone Fax    [facility]  Skilled Nursing [address] [id number] [phone]                         Final Discharge Disposition Code: 62 - inpatient rehab facility

## 2020-10-15 NOTE — DISCHARGE SUMMARY
[facility] Medicine Services  DISCHARGE SUMMARY    Patient Name: Pati Carter  : 1930  MRN: [id number]    Date of Admission: 10/9/2020 10:17 AM  Date of Discharge: 10/15/2020  Primary Care Physician: Latoya Bolaños MD    Consults     Date and Time Order Name Status Description    2020 Inpatient Gastroenterology Consult Completed     2020 Inpatient Infectious Diseases Consult Completed           Hospital Course     Presenting Problem:   COVID-19 [U07.1]  COVID-19 [U07.1]  COVID-19 [U07.1]    Active Hospital Problems    Diagnosis  POA   • **COVID-19 [U07.1]  Yes   • Immunosuppressed status on methotrexate [D84.9]  Yes   • COPD (chronic obstructive pulmonary disease) (CMS/HCC) [J44.9]  Yes   • Rheumatoid arthritis (CMS/[facility]) [M06.9]  Yes   • Hypertension [I10]  Yes      Resolved Hospital Problems   No resolved problems to display.      Hospital Course:  Pati Carter is a 89 y.o. female with past medical history of COPD, hypertension, rheumatoid arthritis on methotrexate who presented with worsening fatigue and weakness found to be COVID-19 positive, became hypoxic 10/11 and has since been initiated on Decadron and Remdesivir, she is also s/p 2 units of convalescent plasma.     COVID-19 infection  -ID was consulted, patient is status post 5 days of Remdesivir last dose today 10/14, she also started on Decadron and has since received 5 days course, we will discontinue this at discharge as patient is currently on room air, she is also s/p 2 units of convalescent plasma 10/11.  Her Covid monitoring labs have remained stable.  -Patient was on Lovenox 40 mg for DVT prophylaxis, her IMPROVE VTE risk score is 2 and discharge does not qualify for continued anticoagulation at discharge  -PT/OT evaluated patient and did recommend rehabilitation.     COPD with mild exacerbation  -She is s/p steroids, okay to continue Symbicort and albuterol at discharge.     Rheumatoid  arthritis  -Patient is immunocompromised on methotrexate, this was held during her hospitalization, okay to resume at discharge     Hypertension BP currently stable, okay to resume antihypertensives at discharge    Discharge Follow Up Recommendations for outpatient labs/diagnostics:  Follow-up with PCP in 1 week    Day of Discharge     HPI: No acute events overnight, patient rested well, that she is feeling better.    Review of Systems  Gen- No fevers, chills  CV- No chest pain, palpitations  Resp- No cough, dyspnea  GI- No N/V/D, abd pain    All other systems reviewed and are negative    Vital Signs:   Temp:  [97.5 °F (36.4 °C)-98.2 °F (36.8 °C)] 98.2 °F (36.8 °C)  Heart Rate:  [60-73] 73  Resp:  [16-18] 18  BP: (143-162)/(69-90) 143/69     Physical Exam:  With patient's consent, physical exam was conducted via visual telemedicine encounter with bedside portion accommodated by nursing staff due to patient's current isolation requirements in the interest of PPE conservation.    Constitutional: Chronically ill elderly lady, no acute distress, awake, alert, nontoxic, normal body habitus  HENT: NCAT, MMM, no conjunctival injection  Respiratory: Nonlabored respirations, on room air cardiovascular: RRR on telemetry  Musculoskeletal: No edema, normal muscle tone and mass for age  GI: Soft, no grimace, nondistended  Psychiatric: Appropriate affect, good insight and judgement, cooperative  Neurologic: Confused, movements symmetric BUE and BLE, speech clear and fluent  Skin: No visible rashes      Pertinent  and/or Most Recent Results     Results from last 7 days   Lab Units 10/15/20  0648 10/14/20  1103 10/13/20  0634 10/10/20  1010 10/09/20  1054   WBC 10*3/mm3  --  8.16 7.19 4.49 5.39   HEMOGLOBIN g/dL  --  9.3* 8.0* 9.0* 9.2*   HEMATOCRIT %  --  30.2* 25.6* 29.3* 28.7*   PLATELETS 10*3/mm3  --  428 357 330 412   SODIUM mmol/L 140  --  142 141 137   POTASSIUM mmol/L 4.4  --  3.9 3.7 3.8   CHLORIDE mmol/L 108*  --  109*  105 100   CO2 mmol/L 24.0  --  27.0 29.0 29.0   BUN mg/dL 32*  --  30* 11 15   CREATININE mg/dL 0.79  --  0.96 0.88 0.94   GLUCOSE mg/dL 101*  --  116* 101* 98   CALCIUM mg/dL 8.3  --  7.9* 8.1* 8.4     Results from last 7 days   Lab Units 10/15/20  0648 10/13/20  0634 10/09/20  1054   BILIRUBIN mg/dL 0.2 <0.2 0.2   ALK PHOS U/L 55 47 64   ALT (SGPT) U/L 11 7 10   AST (SGOT) U/L 21 13 27           Invalid input(s): TG, LDLCALC, LDLREALC  Results from last 7 days   Lab Units 10/09/20  1054   PROBNP pg/mL 967.3   TROPONIN T ng/mL <0.010   PROCALCITONIN ng/mL 0.19   LACTATE mmol/L 1.7       Brief Urine Lab Results  (Last result in the past 365 days)      Color   Clarity   Blood   Leuk Est   Nitrite   Protein   CREAT   Urine HCG        10/09/20 1412 Dark Yellow Clear Negative Small (1+) Negative 30 mg/dL (1+)               Microbiology Results Abnormal     Procedure Component Value - Date/Time    Blood Culture - Blood, Arm, Right [[id number]] Collected: 10/09/20 1054    Lab Status: Final result Specimen: Blood from Arm, Right Updated: 10/14/20 1130     Blood Culture No growth at 5 days    Blood Culture - Blood, Arm, Right [[id number]] Collected: 10/09/20 1045    Lab Status: Final result Specimen: Blood from Arm, Right Updated: 10/14/20 1130     Blood Culture No growth at 5 days    Urine Culture - Urine, Urine, Clean Catch [[id number]] Collected: 10/09/20 1412    Lab Status: Final result Specimen: Urine, Clean Catch Updated: 10/10/20 1257     Urine Culture >100,000 CFU/mL Mixed Abhi Isolated    Narrative:      Specimen contains mixed organisms of questionable pathogenicity which indicates contamination with commensal abhi.  Further identification is unlikely to provide clinically useful information.  Suggest recollection.          Imaging Results (All)     Procedure Component Value Units Date/Time    XR Chest 1 View [[id number]] Collected: 10/09/20 1133     Updated: 10/09/20 1502    Narrative:      EXAMINATION: XR CHEST 1  VW-      INDICATION: COVID-19 positive, dyspnea and fever.      COMPARISON: 09/22/2020.     FINDINGS: Portable chest reveals cardiac and mediastinal silhouettes  within normal limits. Underlying chronic and emphysematous changes are  seen in the lung fields bilaterally. No focal parenchymal opacification  present. No pleural effusion or pneumothorax. Degenerative change is  seen within the spine.           Impression:      Chronic changes seen diffusely throughout the lung fields.  Degenerative change is seen within the spine with no acute parenchymal  disease.     D:  10/09/2020  E:  10/09/2020     This report was finalized on 10/9/2020 2:59 PM by Dr. Niurka Zimmerman MD.             Results for orders placed in visit on 12/20/16   SCANNED VASCULAR STUDIES       Results for orders placed in visit on 12/20/16   SCANNED VASCULAR STUDIES       Plan for Follow-up of Pending Labs/Results:   Pending Labs     Order Current Status    CBC & Differential In process    CBC Auto Differential In process    Scan Slide In process        Discharge Details        Discharge Medications      Continue These Medications      Instructions Start Date   acetaminophen 500 MG tablet  Commonly known as: TYLENOL   1,000 mg, Oral, Every 8 Hours      albuterol sulfate  (90 Base) MCG/ACT inhaler  Commonly known as: PROVENTIL HFA;VENTOLIN HFA;PROAIR HFA   2 puffs, Inhalation, Every 4 Hours PRN      aspirin 81 MG EC tablet   81 mg, Oral, Daily      Breo Ellipta 100-25 MCG/INH inhaler  Generic drug: Fluticasone Furoate-Vilanterol   1 puff, Inhalation, Daily - RT      CENTRUM SILVER ADULT 50+ PO   1 tablet, Oral, Daily      diclofenac 1 % gel gel  Commonly known as: VOLTAREN   2 g, Topical, 3 Times Daily      dilTIAZem  MG 24 hr capsule  Commonly known as: CARDIZEM CD   120 mg, Oral, Daily      docusate sodium 100 MG capsule  Commonly known as: COLACE   100 mg, Oral, Daily      famotidine 20 MG tablet  Commonly known as: PEPCID   20  mg, Oral, 2 Times Daily      folic acid 1 MG tablet  Commonly known as: FOLVITE   TAKE ONE TABLET BY MOUTH DAILY      lactobacillus acidophilus capsule capsule   1 capsule, Oral, Daily      melatonin 5 MG tablet tablet   5 mg, Oral, Nightly PRN      methotrexate 2.5 MG tablet   10 mg, Oral, Weekly, Resume methotrexate on 10/5      ondansetron 4 MG tablet  Commonly known as: ZOFRAN   4 mg, Oral, Every 6 Hours PRN      PA VITAMIN D-3 PO   2,000 Units, Oral, Daily      polyethylene glycol 17 g packet  Commonly known as: MIRALAX   17 g, Oral, Daily      PUMPKIN SEED OIL PO   1,000 mg, Oral, Daily      QUEtiapine 25 MG tablet  Commonly known as: SEROquel   25 mg, Oral, Nightly      vitamin B-12 1000 MCG tablet  Commonly known as: CYANOCOBALAMIN   TAKE ONE TABLET BY MOUTH DAILY             Allergies   Allergen Reactions   • Bactrim [Sulfamethoxazole-Trimethoprim] Other (See Comments)     Stomach cramps    • Ciprofloxacin Other (See Comments)     Other reaction(s): shaking  HCI TABS/ SHAKING   • Levofloxacin Diarrhea       Discharge Disposition: [facility] ([place] [facility])  Diet:  Hospital:  Diet Order   Procedures   • Diet Regular       Activity: As tolerated    Restrictions or Other Recommendations:  None       CODE STATUS:    Code Status and Medical Interventions:   Ordered at: 10/09/20 1200     Limited Support to NOT Include:    Intubation     Code Status:    No CPR     Medical Interventions (Level of Support Prior to Arrest):    Limited       Future Appointments   Date Time Provider Department Center   1/12/2021 12:15 PM Latoya Bolaños MD [facility] None       Antonella Stuart MD  10/15/20      Time Spent on Discharge:  I spent  15 minutes on this discharge activity which included: face-to-face encounter with the patient, reviewing the data in the system, coordination of the care with the nursing staff as well as consultants, documentation, and entering orders.

## 2020-10-15 NOTE — PROGRESS NOTES
INFECTIOUS DISEASE Progress Note    Pati Carter  7/26/1930  [id number]      Admission Date: 10/9/2020      Requesting Provider: No ref. provider found  Evaluating Physician: Danny Moscoso MD    Reason for Consultation: COVID-19 pneumonia    History of present illness:    10/10/20:Patient is a 89 y.o. female with COPD and rheumatoid arthritis on methotrexate therapy who is seen today for evaluation of COVID-19 pneumonia.  She was just discharged from here late last month after a hospital course for acute colitis (C. difficile negative) with a history of recent E. coli/Early a UTI.  She was seen by Dr. Mathis and was discharged to [facility].  Her family removed her from [facility] when they realized that there was a COVID-19 outbreak there.  She has recently developed worsening weakness, myalgias, and fatigue.  She underwent COVID-19 testing on Wednesday which was positive.  She was admitted yesterday and initially did not require supplemental oxygen but this evening she has developed hypoxemia with an O2 saturation of 88% on room air and is requiring 2 L of supplemental oxygen.  She has now been placed on Decadron and Remdesivir therapy.  She denies severe dyspnea and cough.  She denies loss of taste or smell.  She complains of headache, weakness, fatigue, and arthralgias.  2 of her daughters apparently have COVID-19 also.  10/11/20: She remained afebrile overnight. She is requiring 2 L of supplemental oxygen.  She slept most of the night and did not arouse to give consent for convalescent plasma.  I did talk to her daughter, Danette, in detail last night about the current therapeutic regimen including the convalescent plasma and she has agreed with this treatment regimen.  Today, the patient herself is agreed to receive the convalescent plasma and will be infused this afternoon.  She denies increased dyspnea.  The nursing staff indicate that she is not desaturating when  moving around.  10/12/2020: She is now down to 1 L of oxygen.  She is remained afebrile.  The nursing staff indicates that she has continued to improve.  10/13/20: She has been afebrile overnight. She is currently on room air. She will receive her last dose of remdesivir tomorrow.  10/14/20: She remains on room air. She has remained afebrile overnight. The nursing staff reports no new problems overnight.  10/15/2020: She remains on room air.  The nursing staff report no issues overnight.  She is being transferred to [facility] soon.    Past Medical History:   Diagnosis Date   • Anemia     Description: A.  Dx 2006- borderline intermittent.   • Back pain    • Benign colonic polyp 9/28/2016    Description: AJoshua  Dx 1999.   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  Rule out chronic persistent asthma, COPD, or obliterative bronchiolitis.- Butch   • COVID-19 virus infection 10/11/2020   • Degeneration of intervertebral disc of lumbar region     Description: A.  Diagnosed in April 2013 with advanced multilevel with severe spinal stenosis, followed by Dr. Pillai for pain management.   • Gastroesophageal reflux disease    • Hearing loss    • History of colonoscopy 01/01/1999    NORMAL PER PATIENT    • History of mammogram 01/01/2011    NORMAL PER PT    • History of Papanicolaou smear of cervix 01/01/2010    NORMAL PER PT    • History of varicella    • Hyperlipidemia     Description: A.  Dx 2006.   • Hypertension     Description: A. Dx 2001.   • Macular degeneration    • Ovarian mass     Dx 8/15- benign left cystic adnexal mass   • Rheumatoid arthritis (CMS/[facility])     Description: A.  Diagnosed in 2000 and and followed by Dr. Constantino (now Dr. Kaplan). B.  On methotrexate therapy since 2000. C.  Off low-dose prednisone therapy.       Past Surgical History:    Procedure Laterality Date   • APPENDECTOMY     • CARPAL TUNNEL RELEASE Left 01/01/2003    HISTORY OF NEUROPLASTY DECOMPRESSION MEDIAN NERVE AT CARPAL TUNNEL LEFT   • CATARACT EXTRACTION Bilateral 01/01/2009   • CHOLECYSTECTOMY  01/01/1962   • HIP CANNULATED SCREW PLACEMENT Right 1/25/2020    Procedure: HIP CANNULATED SCREW PLACEMENT RIGHT;  Surgeon: Karlos Blount MD;  Location: [facility];  Service: Orthopedics   • KNEE ARTHROSCOPY Left 01/01/2001    MENISCAL REPAIR   • KYPHOPLASTY  06/18/2015    T11 AND L1 (JOSE A)   • PELVIC LAPAROSCOPY  01/01/1996    REMOVAL OF BENIGN UTERINE AND RIGHT OVARIAN TUMORS   • SALPINGO OOPHORECTOMY Left 08/26/2015    REMOVAL OF LEFT OVARY AND TUBE (benign cystic mass)       Family History   Problem Relation Age of Onset   • Hypertension Mother    • Diabetes Mother        Social History     Socioeconomic History   • Marital status:      Spouse name: Not on file   • Number of children: Not on file   • Years of education: Not on file   • Highest education level: Not on file   Tobacco Use   • Smoking status: Former Smoker   • Smokeless tobacco: Never Used   Substance and Sexual Activity   • Alcohol use: No   • Drug use: No   • Sexual activity: Defer   Social History Narrative    Patient moved to [facility] assisted living 3/26/20. On 3/28/20 patient visited ED for fall-related injuries.  In response, family looking into hiring a , to visit patient BID for ADLs/ transfers. Family drives patient to app, but says [facility] may provide in future.       Allergies   Allergen Reactions   • Bactrim [Sulfamethoxazole-Trimethoprim] Other (See Comments)     Stomach cramps    • Ciprofloxacin Other (See Comments)     Other reaction(s): shaking  HCI TABS/ SHAKING   • Levofloxacin Diarrhea         Medication:    Current Facility-Administered Medications:   •  acetaminophen (TYLENOL) tablet 1,000 mg, 1,000 mg, Oral, Q8H, Lubna Madrid II, DO, 1,000 mg at  10/15/20 1409  •  albuterol sulfate HFA (PROVENTIL HFA;VENTOLIN HFA;PROAIR HFA) inhaler 6 puff, 6 puff, Inhalation, Q4H PRN, Mercy, Lubna M II, DO  •  aspirin EC tablet 81 mg, 81 mg, Oral, Daily, Mercy, Lubna M II, DO, 81 mg at 10/15/20 0919  •  budesonide-formoterol (SYMBICORT) 160-4.5 MCG/ACT inhaler 2 puff, 2 puff, Inhalation, BID - RT, Mercy, Lubna M II, DO, 2 puff at 10/15/20 1118  •  dexamethasone (DECADRON) tablet 6 mg, 6 mg, Oral, Daily, Mercy, Lubna M II, DO, 6 mg at 10/15/20 0919  •  diclofenac (VOLTAREN) 1 % gel 2 g, 2 g, Topical, TID, Mercy, Lubna M II, DO, 2 g at 10/15/20 0921  •  dilTIAZem CD (CARDIZEM CD) 24 hr capsule 120 mg, 120 mg, Oral, Daily, Mercy, Lubna M II, DO, 120 mg at 10/15/20 0919  •  docusate sodium (COLACE) capsule 100 mg, 100 mg, Oral, Daily, Mercy, Lubna M II, DO, 100 mg at 10/15/20 0919  •  enoxaparin (LOVENOX) syringe 40 mg, 40 mg, Subcutaneous, Daily, Mercy, Lubna M II, DO, 40 mg at 10/15/20 0919  •  famotidine (PEPCID) tablet 20 mg, 20 mg, Oral, Daily, Mercy, Lubna M II, DO, 20 mg at 10/15/20 0919  •  folic acid (FOLVITE) tablet 1,000 mcg, 1,000 mcg, Oral, Daily, Mercy, Lubna M II, DO, 1,000 mcg at 10/15/20 0919  •  lactobacillus acidophilus (RISAQUAD) capsule 1 capsule, 1 capsule, Oral, Daily, Mercy, Lubna M II, DO, 1 capsule at 10/15/20 0919  •  Magnesium Sulfate 2 gram Bolus, followed by 8 gram infusion (total Mg dose 10 grams)- Mg less than or equal to 1mg/dL, 2 g, Intravenous, PRN **OR** Magnesium Sulfate 2 gram / 50mL Infusion (GIVE X 3 BAGS TO EQUAL 6GM TOTAL DOSE) - Mg 1.1 - 1.5 mg/dl, 2 g, Intravenous, PRN **OR** Magnesium Sulfate 4 gram infusion- Mg 1.6-1.9 mg/dL, 4 g, Intravenous, PRN, Lubna Madrid M II, DO  •  melatonin tablet 5 mg, 5 mg, Oral, Nightly PRN, Lubna Madrid II, DO, 5 mg at 10/14/20 0338  •  multivitamin and minerals liquid 15 mL, 15 mL, Oral, Daily, Lubna Madrid II, DO, 15 mL at 10/15/20 0917  •  ondansetron (ZOFRAN) tablet  4 mg, 4 mg, Oral, Q6H PRN **OR** ondansetron (ZOFRAN) injection 4 mg, 4 mg, Intravenous, Q6H PRN, Mercy, Lubna M II, DO  •  polyethylene glycol (MIRALAX) packet 17 g, 17 g, Oral, Daily, Mercy, Lubna M II, DO, 17 g at 10/15/20 0917  •  potassium chloride (MICRO-K) CR capsule 40 mEq, 40 mEq, Oral, PRN **OR** potassium chloride (KLOR-CON) packet 40 mEq, 40 mEq, Oral, PRN **OR** potassium chloride 10 mEq in 100 mL IVPB, 10 mEq, Intravenous, Q1H PRN, Mercy, Lubna M II, DO  •  QUEtiapine (SEROquel) tablet 25 mg, 25 mg, Oral, Nightly, Mercy, Lubna M II, DO, 25 mg at 10/14/20 2047  •  sennosides-docusate (PERICOLACE) 8.6-50 MG per tablet 2 tablet, 2 tablet, Oral, BID PRN, Mercy, Lubna M II, DO  •  sodium chloride 0.9 % flush 10 mL, 10 mL, Intravenous, PRN, Blair Ventura MD  •  sodium chloride 0.9 % flush 10 mL, 10 mL, Intravenous, Q12H, Mercy, Lubna M II, DO, 10 mL at 10/15/20 0919  •  sodium chloride 0.9 % flush 10 mL, 10 mL, Intravenous, PRN, Mercy, Lubna M II, DO    Current Outpatient Medications:   •  acetaminophen (TYLENOL) 500 MG tablet, Take 2 tablets by mouth Every 8 (Eight) Hours., Disp: , Rfl:   •  albuterol sulfate  (90 Base) MCG/ACT inhaler, Inhale 2 puffs Every 4 (Four) Hours As Needed for Wheezing or Shortness of Air., Disp: 3 inhaler, Rfl: 4  •  aspirin 81 MG EC tablet, Take 1 tablet by mouth Daily., Disp: 30 tablet, Rfl: 10  •  Cholecalciferol (PA VITAMIN D-3 PO), Take 2,000 Units by mouth Daily., Disp: , Rfl:   •  diclofenac (VOLTAREN) 1 % gel gel, Apply 2 g topically to the appropriate area as directed 3 (Three) Times a Day., Disp: 1 tube, Rfl: 2  •  dilTIAZem CD (CARDIZEM CD) 120 MG 24 hr capsule, Take 1 capsule by mouth Daily., Disp: 21 capsule, Rfl: 0  •  docusate sodium (COLACE) 100 MG capsule, Take 1 capsule by mouth Daily., Disp: 30 capsule, Rfl: 10  •  famotidine (PEPCID) 20 MG tablet, Take 1 tablet by mouth 2 (Two) Times a Day., Disp: 60 tablet, Rfl: 10  •  Fluticasone  Furoate-Vilanterol (Breo Ellipta) 100-25 MCG/INH inhaler, Inhale 1 puff Daily., Disp: , Rfl:   •  folic acid (FOLVITE) 1 MG tablet, TAKE ONE TABLET BY MOUTH DAILY, Disp: 30 tablet, Rfl: 10  •  lactobacillus acidophilus (RISAQUAD) capsule capsule, Take 1 capsule by mouth Daily., Disp:  , Rfl:   •  melatonin 5 MG tablet tablet, Take 1 tablet by mouth At Night As Needed (sleep)., Disp:  , Rfl:   •  methotrexate 2.5 MG tablet, Take 4 tablets by mouth 1 (One) Time Per Week. Resume methotrexate on 10/5, Disp:  , Rfl:   •  Misc Natural Products (PUMPKIN SEED OIL PO), Take 1,000 mg by mouth Daily., Disp: , Rfl:   •  Multiple Vitamins-Minerals (CENTRUM SILVER ADULT 50+ PO), Take 1 tablet by mouth Daily., Disp: , Rfl:   •  ondansetron (ZOFRAN) 4 MG tablet, Take 1 tablet by mouth Every 6 (Six) Hours As Needed for Nausea or Vomiting., Disp:  , Rfl:   •  polyethylene glycol (MIRALAX) 17 g packet, Take 17 g by mouth Daily., Disp:  , Rfl:   •  QUEtiapine (SEROquel) 25 MG tablet, Take 1 tablet by mouth Every Night., Disp:  , Rfl:   •  vitamin B-12 (CYANOCOBALAMIN) 1000 MCG tablet, TAKE ONE TABLET BY MOUTH DAILY, Disp: 30 tablet, Rfl: 10    Antibiotics:  Anti-Infectives (From admission, onward)    Ordered     Dose/Rate Route Frequency Start Stop    10/10/20 1743  INV [id number] remdesivir 100 mg in sodium chloride 0.9 % 250 mL IVPB (powder vial)     Ordering Provider: Lubna Madrid II, DO    100 mg  over 60 Minutes Intravenous Every 24 Hours 10/11/20 1830 10/14/20 1847    10/10/20 1743  INV [id number] remdesivir 200 mg in sodium chloride 0.9 % 250 mL IVPB (powder vial)     Ordering Provider: Lubna Madrid II, DO    200 mg  over 60 Minutes Intravenous Every 24 Hours 10/10/20 1830 10/10/20 2107            Review of Systems:  See ROS    Physical Exam:   Vital Signs  Temp (24hrs), Av.9 °F (36.6 °C), Min:97.5 °F (36.4 °C), Max:98.2 °F (36.8 °C)    Temp  Min: 97.5 °F (36.4 °C)  Max: 98.2 °F (36.8 °C)  BP  Min: 143/69  Max:  162/90  Pulse  Min: 59  Max: 84  Resp  Min: 16  Max: 18  SpO2  Min: 90 %  Max: 96 %     Her exam was done by direct visualization from outside the room  in the presence of the COVID-19 pandemic with a critical shortage of PPE.    GENERAL: Awake and alert, in no acute distress.   HEENT: No labial ulcers  NECK: Supple   HEART: RRR;  LUNGS: No respiratory distress  ABDOMEN: Nondistended  :  Without Cisse catheter.  MSK: No focal joint swelling or erythema  SKIN: Warm and dry without cutaneous eruptions on Inspection/palpation.    NEURO: 5/5 strength bilaterally  PSYCHIATRIC:     Laboratory Data    Results from last 7 days   Lab Units 10/15/20  0648 10/14/20  1103 10/13/20  0634   WBC 10*3/mm3 7.17 8.16 7.19   HEMOGLOBIN g/dL 9.4* 9.3* 8.0*   HEMATOCRIT % 30.0* 30.2* 25.6*   PLATELETS 10*3/mm3 458* 428 357     Results from last 7 days   Lab Units 10/15/20  0648   SODIUM mmol/L 140   POTASSIUM mmol/L 4.4   CHLORIDE mmol/L 108*   CO2 mmol/L 24.0   BUN mg/dL 32*   CREATININE mg/dL 0.79   GLUCOSE mg/dL 101*   CALCIUM mg/dL 8.3     Results from last 7 days   Lab Units 10/15/20  0648   ALK PHOS U/L 55   BILIRUBIN mg/dL 0.2   ALT (SGPT) U/L 11   AST (SGOT) U/L 21         Results from last 7 days   Lab Units 10/15/20  0648   CRP mg/dL 0.53*     Results from last 7 days   Lab Units 10/09/20  1054   LACTATE mmol/L 1.7     Results from last 7 days   Lab Units 10/15/20  0648 10/13/20  0634   CK TOTAL U/L 25 14*         Estimated Creatinine Clearance: 46.3 mL/min (by C-G formula based on SCr of 0.79 mg/dL).      Microbiology:  Blood Culture   Date Value Ref Range Status   10/09/2020 No growth at 24 hours  Preliminary     No results found for: BCIDPCR, CXREFLEX, CSFCX, CULTURETIS  No results found for: CULTURES, HSVCX, URCX  No results found for: EYECULTURE, GCCX, LABHSV  No results found for: LEGIONELLA, MRSACX, MUMPSCX, MYCOPLASCX  No results found for: NOCARDIACX, STOOLCX  Urine Culture   Date Value Ref Range Status   10/09/2020  >100,000 CFU/mL Mixed Katherine Isolated  Final     No results found for: VIRALCULTU, WOUNDCX        Radiology:  Imaging Results (Last 72 Hours)     ** No results found for the last 72 hours. **        I read her radiographic studies    Impression:  1.  COVID-19 pneumonia-with acute hypoxic respiratory failure.  She has been on Decadron and Remdesivir.  She received convalescent plasma on 10/11.  She appears to be improving.  2.  Acute hypoxic respiratory failure  3.  COPD  4.  Rheumatoid arthritis- on methotrexate therapy  5.  Immunocompromised host      PLAN/RECOMMENDATIONS:   1.  Hold methotrexate  2.  Decadron 6 mg IV/p.o. daily x10 days  3.  Remdesivir IV daily x5 days-this will be completed today  4.  DVT prophylaxis  5.  COVID-19 convalescent plasma-given on 10/11  6.  Discharge anytime from my standpoint    Danny Moscoso MD  10/15/2020  18:10 EDT

## 2020-10-15 NOTE — THERAPY TREATMENT NOTE
Patient Name: Pati Carter  : 1930    MRN: [id number]                              Today's Date: 10/15/2020       Admit Date: 10/9/2020    Visit Dx:     ICD-10-CM ICD-9-CM   1. COVID-19  U07.1 079.89     Patient Active Problem List   Diagnosis   • Macular degeneration   • Rheumatoid arthritis (CMS/HCC)   • Hearing loss   • Hyperlipidemia   • Gastroesophageal reflux disease   • Hypertension   • Constipation   • Anemia   • Compression fracture of lumbar vertebra (CMS/HCC)   • Degeneration of intervertebral disc of lumbar region   • COPD (chronic obstructive pulmonary disease) (CMS/HCC)   • H/O calcium pyrophosphate deposition disease (CPPD)   • Falls frequently   • Sepsis (CMS/HCC)   • Immunosuppressed status on methotrexate   • Ferny Bonnet syndrome   • UTI (urinary tract infection), bacterial   • JENNIE (acute kidney injury) (CMS/HCC)   • Bacterial colitis   • COVID-19     Past Medical History:   Diagnosis Date   • Anemia     Description: A.  Dx - borderline intermittent.   • Back pain    • Benign colonic polyp 2016    Description: A.  Dx .   • Ferny Bonnet syndrome 2020   • Compression fracture of lumbar vertebra (CMS/HCC)    • Constipation    • COPD (chronic obstructive pulmonary disease) (CMS/HCC)     Description: A.  Rule out chronic persistent asthma, COPD, or obliterative bronchiolitis.- Butch   • Degeneration of intervertebral disc of lumbar region     Description: A.  Diagnosed in 2013 with advanced multilevel with severe spinal stenosis, followed by Dr. Pillai for pain management.   • Gastroesophageal reflux disease    • Hearing loss    • History of colonoscopy 1999    NORMAL PER PATIENT    • History of mammogram 2011    NORMAL PER PT    • History of Papanicolaou smear of cervix 2010    NORMAL PER PT    • History of varicella    • Hyperlipidemia     Description: A.  Dx .   • Hypertension     Description: A. Dx .   • Macular degeneration    •  Ovarian mass     Dx 8/15- benign left cystic adnexal mass   • Rheumatoid arthritis (CMS/HCC)     Description: A.  Diagnosed in 2000 and and followed by Dr. Constantino (now Dr. Kaplan). B.  On methotrexate therapy since 2000. C.  Off low-dose prednisone therapy.     Past Surgical History:   Procedure Laterality Date   • APPENDECTOMY     • CARPAL TUNNEL RELEASE Left 01/01/2003    HISTORY OF NEUROPLASTY DECOMPRESSION MEDIAN NERVE AT CARPAL TUNNEL LEFT   • CATARACT EXTRACTION Bilateral 01/01/2009   • CHOLECYSTECTOMY  01/01/1962   • HIP CANNULATED SCREW PLACEMENT Right 1/25/2020    Procedure: HIP CANNULATED SCREW PLACEMENT RIGHT;  Surgeon: Karlos Blount MD;  Location: [facility];  Service: Orthopedics   • KNEE ARTHROSCOPY Left 01/01/2001    MENISCAL REPAIR   • KYPHOPLASTY  06/18/2015    T11 AND L1 (JOSE A)   • PELVIC LAPAROSCOPY  01/01/1996    REMOVAL OF BENIGN UTERINE AND RIGHT OVARIAN TUMORS   • SALPINGO OOPHORECTOMY Left 08/26/2015    REMOVAL OF LEFT OVARY AND TUBE (benign cystic mass)     General Information     Row Name 10/15/20 1146          OT Time and Intention    Document Type  therapy note (daily note)  -ARIANE     Mode of Treatment  individual therapy;occupational therapy  -ARIANE     Row Name 10/15/20 1146          General Information    Existing Precautions/Restrictions  fall  -ARIANE     Barriers to Rehab  previous functional deficit;visual deficit;hearing deficit  -ARIANE     Row Name 10/15/20 1146          Cognition    Orientation Status (Cognition)  oriented x 3  -ARIANE     Row Name 10/15/20 1146          Safety Issues, Functional Mobility    Safety Issues Affecting Function (Mobility)  insight into deficits/self-awareness;awareness of need for assistance  -ARIANE     Impairments Affecting Function (Mobility)  balance;endurance/activity tolerance;strength  -ARIANE       User Key  (r) = Recorded By, (t) = Taken By, (c) = Cosigned By    Initials Name Provider Type    ARIANE Margarita Larry, OT Occupational Therapist         Mobility/ADL's     Row Name 10/15/20 1147          Bed Mobility    Bed Mobility  scooting/bridging  -ARIANE     Scooting/Bridging [place] (Bed Mobility)  moderate assist (50% patient effort);verbal cues;nonverbal cues (demo/gesture)  -ARIANE     Supine-Sit [place] (Bed Mobility)  contact guard;nonverbal cues (demo/gesture);verbal cues  -ARIANE     Bed Mobility, Safety Issues  decreased use of arms for pushing/pulling;decreased use of legs for bridging/pushing;impaired trunk control for bed mobility  -ARIANE     Assistive Device (Bed Mobility)  bed rails;head of bed elevated;draw sheet  -     Comment (Bed Mobility)  cues needed for use of grab bars to assist with supine to sit.  Pt. then was unable when upright sitting to scoot to edge of bed without mod A.  -     Row Name 10/15/20 1147          Transfers    Transfers  sit-stand transfer  -     Comment (Transfers)  cues for hand placement each time.  BSC placed over toilet to see if would help up, but still needing mod A.  -ARIANE     Sit-Stand [place] (Transfers)  minimum assist (75% patient effort);nonverbal cues (demo/gesture);verbal cues  -ARIANE     [place] Level (Toilet Transfer)  moderate assist (50% patient effort)  -     Assistive Device (Toilet Transfer)  grab bars/safety frame;walker, front-wheeled  -     Row Name 10/15/20 1147          Sit-Stand Transfer    Assistive Device (Sit-Stand Transfers)  walker, front-wheeled  -     Row Name 10/15/20 1147          Toilet Transfer    Type (Toilet Transfer)  sit-stand;stand-sit  -     Row Name 10/15/20 1147          Functional Mobility    Functional Mobility- Ind. Level  minimum assist (75% patient effort)  -ARIANE     Functional Mobility- Device  rolling walker  -ARIANE     Functional Mobility-Distance (Feet)  32  -ARIANE     Functional Mobility- Safety Issues  step length decreased  -ARIANE     Functional Mobility- Comment  pt. with flexed posture, needing some intermittent assist to steer walker and balance.  -ARIANE      Row Name 10/15/20 1147          Activities of Daily Living    BADL Assessment/Intervention  lower body dressing;toileting;grooming;upper body dressing  -ARIANE     Row Name 10/15/20 1147          Toileting Assessment/Training    [place] Level (Toileting)  toileting skills;dependent (less than 25% patient effort)  -ARIANE     Assistive Devices (Toileting)  commode, bedside without drop arms  -ARIANE     Position (Toileting)  supported standing  -ARIANE     Comment (Toileting)  BSC placed over toilet.  Pt. was able to push up to stand with assist and use grab bar to hold to steady self while OT wiped., but could not wipe self or pull up and down brief.  Pt. was incontinent on standing from bed before even getting brief up and floor clened.  -ARIANE     Row Name 10/15/20 1147          Lower Body Dressing Assessment/Training    [place] Level (Lower Body Dressing)  don;doff;socks and brief  -ARIANE     Position (Lower Body Dressing)  supported sitting;supported standing  -ARIANE     Comment (Lower Body Dressing)  Pt. attempted to reach brief to feet to tatiana, but unable.  Pt. then could not let go of walker to pull up brief, but able to assist holding up gown when placed in hand.  -ARIANE     Row Name 10/15/20 1147          Grooming Assessment/Training    [place] Level (Grooming)  set up;hair care, combing/brushing;oral care regimen;minimum assist (75% patient effort);wash face, hands;standby assist  -ARIANE     Position (Grooming)  supported sitting  -ARIANE     Comment (Grooming)  pt. could not stand, but was able to sit on stool at sink.  Assist to get tangles from back of hair and open tight toothpaste and place on brush.  Educated on squirt in mouth technique to try next attempt.  -ARIANE     Row Name 10/15/20 1147          Upper Body Dressing Assessment/Training    [place] Level (Upper Body Dressing)  doff;don;pajama/robe;maximum assist (25% patient effort)  -ARIANE     Position (Upper Body Dressing)  unsupported sitting  -ARIANE       User Key   (r) = Recorded By, (t) = Taken By, (c) = Cosigned By    Initials Name Provider Type    Margarita Waggoner OT Occupational Therapist        Obj/Interventions     Row Name 10/15/20 1155          Shoulder (Therapeutic Exercise)    Shoulder (Therapeutic Exercise)  AROM (active range of motion)  -ARIANE     Shoulder AROM (Therapeutic Exercise)  bilateral;flexion;extension;aBduction;aDduction;horizontal aBduction/aDduction;10 repetitions;sitting one rest period  -ARIANE     Row Name 10/15/20 1155          Elbow/Forearm (Therapeutic Exercise)    Elbow/Forearm (Therapeutic Exercise)  strengthening exercise  -ARIANE     Elbow/Forearm Strengthening (Therapeutic Exercise)  bilateral;flexion;extension;10 repetitions;sitting manual resistance sitting  -ARIANE     Row Name 10/15/20 1155          Balance    Static Sitting Balance  WFL  -ARIANE     Dynamic Sitting Balance  mild impairment  -ARIANE     Static Standing Balance  mild impairment  -ARIANE     Dynamic Standing Balance  moderate impairment  -ARIANE     Comment, Balance  pt. could stand using BSC and grab bar CGA for toileting, but could not stand for grooming at sink  -ARIANE     Row Name 10/15/20 1155          Therapeutic Exercise    Therapeutic Exercise  shoulder;elbow/forearm  -ARIANE       User Key  (r) = Recorded By, (t) = Taken By, (c) = Cosigned By    Initials Name Provider Type    Margarita Waggoner, OT Occupational Therapist        Goals/Plan     Row Name 10/15/20 1200          Bed Mobility Goal 1 (OT)    Progress/Outcomes (Bed Mobility Goal 1, OT)  goal ongoing  -ARIANE     Row Name 10/15/20 1200          Transfer Goal 1 (OT)    Progress/Outcome (Transfer Goal 1, OT)  goal ongoing  -ARIANE     Row Name 10/15/20 1200          Toileting Goal 1 (OT)    Progress/Outcome (Toileting Goal 1, OT)  goal ongoing  -ARIANE     Row Name 10/15/20 1200          Strength Goal 1 (OT)    Progress/Outcome (Strength Goal 1, OT)  continuing progress toward goal  -ARIANE       User Key  (r) = Recorded By, (t) = Taken By, (c) = Cosigned  By    Initials Name Provider Type    Margarita Waggoner, OT Occupational Therapist        Clinical Impression     Row Name 10/15/20 2278          Pain Assessment    Additional Documentation  Pain Scale: Numbers Pre/Post-Treatment (Group)  -ARIANE     Row Name 10/15/20 2073          Pain Scale: Numbers Pre/Post-Treatment    Pretreatment Pain Rating  0/10 - no pain  -ARIANE     Posttreatment Pain Rating  1/10  -ARIANE     Pain Location - Side  Bilateral  -ARIANE     Pain Location - Orientation  upper  -ARIANE     Pain Location  extremity  -ARIANE     Pain Intervention(s)  Repositioned  -ARIANE     Row Name 10/15/20 9654          Plan of Care Review    Plan of Care Reviewed With  patient  -ARIANE     Progress  improving  -ARIANE     Outcome Summary  Some improvement overall in endurance and balance.  Max dependent for toileting and LBD due to limited flexibility and balance.  Min/SBA grooming sitting sinkside.  Min to mod A with transfers.  Pt. able to mobilize to bathroom then to chair min A with wx.  Tolerated 10 reps BUE TE.  Cont OT POC pending discharge to rehab.  -     Row Name 10/15/20 0206          Vital Signs    Pre Systolic BP Rehab  143  -ARIANE     Pre Treatment Diastolic BP  69  -ARIANE     Post Systolic BP Rehab  137  -ARIANE     Post Treatment Diastolic BP  72  -ARIANE     Pretreatment Heart Rate (beats/min)  65  -ARIANE     Posttreatment Heart Rate (beats/min)  72  -ARIANE     Pre SpO2 (%)  95  -ARIANE     O2 Delivery Pre Treatment  room air  -ARIANE     Intra SpO2 (%)  96  -ARIANE     O2 Delivery Intra Treatment  room air  -ARIANE     Post SpO2 (%)  93  -ARIANE     O2 Delivery Post Treatment  room air  -ARIANE     Pre Patient Position  Supine  -ARIANE     Intra Patient Position  Sitting just post standing  -ARIANE     Post Patient Position  Sitting  -ARIANE     Row Name 10/15/20 7772          Positioning and Restraints    Pre-Treatment Position  in bed  -ARIANE     Post Treatment Position  chair  -ARIANE     In Chair  reclined;call light within reach;encouraged to call for assist;exit alarm on;waffle  cushion;heels elevated  -ARIANE       User Key  (r) = Recorded By, (t) = Taken By, (c) = Cosigned By    Initials Name Provider Type    Margarita Waggoner OT Occupational Therapist        Outcome Measures     Row Name 10/15/20 1201          How much help from another is currently needed...    Putting on and taking off regular lower body clothing?  1  -ARIANE     Bathing (including washing, rinsing, and drying)  2  -ARIANE     Toileting (which includes using toilet bed pan or urinal)  1  -ARIANE     Putting on and taking off regular upper body clothing  2  -ARIANE     Taking care of personal grooming (such as brushing teeth)  3  -ARIANE     Eating meals  3  -ARIANE     AM-PAC 6 Clicks Score (OT)  12  -ARIANE       User Key  (r) = Recorded By, (t) = Taken By, (c) = Cosigned By    Initials Name Provider Type    Margarita Waggoner OT Occupational Therapist        Occupational Therapy Education                 Title: PT OT SLP Therapies (In Progress)     Topic: Occupational Therapy (In Progress)     Point: ADL training (Done)     Description:   Instruct learner(s) on proper safety adaptation and remediation techniques during self care or transfers.   Instruct in proper use of assistive devices.              Learning Progress Summary           Patient Acceptance, E,D, VU,NR by ARIANE at 10/15/2020 1025    Comment: transfer technique and safety, bed mobility, ADL safety sitting sinkside. UE TE    Acceptance, E, NR by  at 10/11/2020 1120    Comment: Educated pt regarding role of therapy and ongoing treatment plan                   Point: Home exercise program (Done)     Description:   Instruct learner(s) on appropriate technique for monitoring, assisting and/or progressing therapeutic exercises/activities.              Learning Progress Summary           Patient Acceptance, E,D, VU,NR by ARIANE at 10/15/2020 1025    Comment: transfer technique and safety, bed mobility, ADL safety sitting sinkside. UE TE                   Point: Precautions (Done)      Description:   Instruct learner(s) on prescribed precautions during self-care and functional transfers.              Learning Progress Summary           Patient Acceptance, E,D, VU,NR by  at 10/15/2020 1025    Comment: transfer technique and safety, bed mobility, ADL safety sitting sinkside. UE TE                   Point: Body mechanics (Not Started)     Description:   Instruct learner(s) on proper positioning and spine alignment during self-care, functional mobility activities and/or exercises.              Learner Progress:  Not documented in this visit.                      User Key     Initials Effective Dates Name Provider Type Discipline    ARIANE 06/08/18 -  Margarita Larry, OT Occupational Therapist OT     06/22/15 -  Carolin Underwood OT Occupational Therapist OT              OT Recommendation and Plan     Plan of Care Review  Plan of Care Reviewed With: patient  Progress: improving  Outcome Summary: Some improvement overall in endurance and balance.  Max dependent for toileting and LBD due to limited flexibility and balance.  Min/SBA grooming sitting sinkside.  Min to mod A with transfers.  Pt. able to mobilize to bathroom then to chair min A with wx.  Tolerated 10 reps BUE TE.  Cont OT POC pending discharge to rehab.     Time Calculation:   Time Calculation- OT     Row Name 10/15/20 1202             Time Calculation- OT    OT Start Time  1025  -ARIANE      OT Received On  10/15/20  -      OT Goal Re-Cert Due Date  10/21/20  -         Timed Charges    30138 - OT Therapeutic Exercise Minutes  8  -ARIANE      37375 - OT Therapeutic Activity Minutes  8  -ARIANE      50578 - OT Self Care/Mgmt Minutes  28  -ARIANE        User Key  (r) = Recorded By, (t) = Taken By, (c) = Cosigned By    Initials Name Provider Type    Margarita Waggoner, OT Occupational Therapist        Therapy Charges for Today     Code Description Service Date Service Provider Modifiers Qty    78303401029 HC OT THER PROC EA 15 MIN 10/15/2020 Margarita Larry  Shabana OT GO 1    67378009073 HC OT SELF CARE/MGMT/TRAIN EA 15 MIN 10/15/2020 Margarita Larry OT GO 2               Margarita Larry OT  10/15/2020

## 2020-10-15 NOTE — PLAN OF CARE
Goal Outcome Evaluation:  Plan of Care Reviewed With: patient  Progress: improving  Outcome Summary: Slept most of the night. No acute distress noted. VSS. Will cont with plan of care

## 2020-10-15 NOTE — PLAN OF CARE
Problem: Adult Inpatient Plan of Care  Goal: Plan of Care Review  Recent Flowsheet Documentation  Taken 10/15/2020 1157 by Margarita Larry, OT  Progress: improving  Plan of Care Reviewed With: patient  Outcome Summary: Some improvement overall in endurance and balance.  Max dependent for toileting and LBD due to limited flexibility and balance.  Min/SBA grooming sitting sinkside.  Min to mod A with transfers.  Pt. able to mobilize to bathroom then to chair min A with wx.  Tolerated 10 reps BUE TE.  Cont OT POC pending discharge to rehab.

## 2020-10-15 NOTE — PROGRESS NOTES
Continued Stay Note  [facility]     Patient Name: Pati Carter  MRN: [id number]  Today's Date: 10/15/2020    Admit Date: 10/9/2020    Discharge Plan     Row Name 10/15/20 1432       Plan    Plan  update    Patient/Family in Agreement with Plan  yes    Plan Comments  Received a call regarding [facility] van transport time as being 1545.  RN notified.    Final Discharge Disposition Code  03 - skilled nursing facility (SNF)        Discharge Codes    No documentation.       Expected Discharge Date and Time     Expected Discharge Date Expected Discharge Time    Oct 15, 2020             Romelia Hart, RN

## 2020-10-19 ENCOUNTER — EPISODE CHANGES (OUTPATIENT)
Dept: CASE MANAGEMENT | Facility: OTHER | Age: 85
End: 2020-10-19

## 2020-10-27 ENCOUNTER — TELEPHONE (OUTPATIENT)
Dept: INTERNAL MEDICINE | Facility: CLINIC | Age: 85
End: 2020-10-27

## 2020-10-27 NOTE — TELEPHONE ENCOUNTER
GUMARO WITH HOME HEALTH CALLED TO SEE IF DR DEXTER WILL FOLLOW THE ORDERS FOR HOME HEALTH    PLEASE CALL AND ADVISE [phone]

## 2020-11-02 ENCOUNTER — READMISSION MANAGEMENT (OUTPATIENT)
Dept: CALL CENTER | Facility: HOSPITAL | Age: 85
End: 2020-11-02

## 2020-11-02 NOTE — OUTREACH NOTE
Prep Survey      Responses   [facility] facility patient discharged from?  Non-BH   Is LACE score < 7 ?  Non-BH Discharge   Eligibility  [facility]   Date of Admission  10/15/20   Date of Discharge  11/02/20   Discharge Disposition  Home-Health Care [facility] [place] [place]   Discharge diagnosis  COVID-19, COPD with mild exacerbation   Does the patient have one of the following disease processes/diagnoses(primary or secondary)?  COVID-19   Does the patient have Home health ordered?  Yes   What is the Home health agency?   Home - with Home Health Services   Prep survey completed?  Yes          Ginna Scherer RN

## 2020-11-03 ENCOUNTER — TRANSITIONAL CARE MANAGEMENT TELEPHONE ENCOUNTER (OUTPATIENT)
Dept: CALL CENTER | Facility: HOSPITAL | Age: 85
End: 2020-11-03

## 2020-11-03 NOTE — OUTREACH NOTE
Call Center TCM Note      Responses   [facility] patient discharged from?  Non-   Does the patient have one of the following disease processes/diagnoses(primary or secondary)?  COVID-19   TCM attempt successful?  Yes   Call start time  1231   Call end time  1235   General alerts for this patient  Pt lives at [facility]   Discharge diagnosis  COVID-19, COPD with mild exacerbation   Person spoke with today (if not patient) and relationship  Lilian-daughter    Meds reviewed with patient/caregiver?  Yes   Is the patient having any side effects they believe may be caused by any medication additions or changes?  No   Does the patient have all medications ordered at discharge?  N/A   Is the patient taking all medications as directed (includes completed medication regime)?  Yes   Does the patient have a primary care provider?   Yes   Does the patient have an appointment with their PCP within 7 days of discharge?  Yes   Comments regarding PCP  Hospital d/c f/u appt is on 11/6/20 at 10:30 am    Has the patient kept scheduled appointments due by today?  Yes   What is the Home health agency?   Home - with Home Health Services   Psychosocial issues?  No   Did the patient receive a copy of their discharge instructions?  -- [Pt was discharged from [facility] on 11/2/20. Sister may have discharge paperwork. ]   Nursing interventions  Reviewed instructions with patient   What is the patient's perception of their health status since discharge?  Same   Is the patient/caregiver able to teach back signs and symptoms related to disease process for when to call PCP?  Yes   Is the patient/caregiver able to teach back signs and symptoms related to disease process for when to call 911?  Yes   Is the patient/caregiver able to teach back the hierarchy of who to call/visit for symptoms/problems? PCP, Specialist, Home health nurse, Urgent Care, ED, 911  Yes   If the patient is a current smoker, are they able to teach back resources for  cessation?  Not a smoker   TCM call completed?  Yes          Chika Villa RN    11/3/2020, 12:35 EST

## 2020-11-09 ENCOUNTER — TELEPHONE (OUTPATIENT)
Dept: INTERNAL MEDICINE | Facility: CLINIC | Age: 85
End: 2020-11-09

## 2020-11-09 NOTE — TELEPHONE ENCOUNTER
Spoke with [facility] and gave verbal order.   She will have the office fax the results to Dr Bolaños once received.

## 2020-11-09 NOTE — TELEPHONE ENCOUNTER
CHLOE WITH [facility] STATES THAT THE PATIENT'S DAUGHTER BELIEVES THE PATIENT HAS A UTI BECAUSE OF BURNING DURING URINATION.  CHLOE STATES SHE HAS SCHEDULED VISIT WITH THE PATIENT TOMORROW, 11/10/2020, AND IS REQUESTING A CALL BACK FOR VERBAL ORDERS A URINALYSIS    PLEASE CALL [phone]

## 2020-11-13 ENCOUNTER — LAB (OUTPATIENT)
Dept: INTERNAL MEDICINE | Facility: CLINIC | Age: 85
End: 2020-11-13

## 2020-11-13 DIAGNOSIS — R39.9 UTI SYMPTOMS: Primary | ICD-10-CM

## 2020-11-13 DIAGNOSIS — R39.9 UTI SYMPTOMS: ICD-10-CM

## 2020-11-13 LAB
BILIRUB BLD-MCNC: NEGATIVE MG/DL
CLARITY, POC: ABNORMAL
COLOR UR: YELLOW
EXPIRATION DATE: ABNORMAL
GLUCOSE UR STRIP-MCNC: NEGATIVE MG/DL
KETONES UR QL: ABNORMAL
LEUKOCYTE EST, POC: ABNORMAL
Lab: ABNORMAL
NITRITE UR-MCNC: POSITIVE MG/ML
PH UR: 5 [PH] (ref 5–8)
PROT UR STRIP-MCNC: ABNORMAL MG/DL
RBC # UR STRIP: ABNORMAL /UL
SP GR UR: 1.02 (ref 1–1.03)
UROBILINOGEN UR QL: NORMAL

## 2020-11-13 PROCEDURE — 87077 CULTURE AEROBIC IDENTIFY: CPT | Performed by: INTERNAL MEDICINE

## 2020-11-13 PROCEDURE — 87186 SC STD MICRODIL/AGAR DIL: CPT | Performed by: INTERNAL MEDICINE

## 2020-11-13 PROCEDURE — 81003 URINALYSIS AUTO W/O SCOPE: CPT | Performed by: INTERNAL MEDICINE

## 2020-11-13 PROCEDURE — 87086 URINE CULTURE/COLONY COUNT: CPT | Performed by: INTERNAL MEDICINE

## 2020-11-13 NOTE — TELEPHONE ENCOUNTER
Daughter, Danette, returning call, notified her of Dr. Subbaswamys msg. She said it has already been 5 days and the last time this happened she ended up in the hospital.  Danette then got a call on the other line and we got disconnected.

## 2020-11-13 NOTE — TELEPHONE ENCOUNTER
Caller: Danette Suresh    Relationship: Emergency Contact  (DANETTE, DAUGHTER)  Best call back number: [phone]    Medication needed:   Requested Prescriptions      No prescriptions requested or ordered in this encounter       When do you need the refill by:11/13/2020    What details did the patient provide when requesting the medication:BENNY'S MOTHER HAS A BAD UTI INFECTION AND WOULD LIKE TO SEE IF SHE CAN GET A PRESCRIPTION CALLED IN FOR IT. SHE ALSO STATED THAT IF  URINE SAMPLE IS NEEDED. SHE CAN GO GET IT FROM HER MOTHER AS LONG SHE CAN GET  SOMETHING TO PUT THE URINE IN. HER MOTHER IS IN AN [facility]ITANT LIVING FAC[facility] RIGHT NOW    Does the patient have less than a 3 day supply:  [x] Yes  [] No    What is the patient's preferred pharmacy: [facility] [address] [phone] [facility] [phone]

## 2020-11-13 NOTE — TELEPHONE ENCOUNTER
Jessi from [facility] called and states she is faxing over the urine results. She states the APRN at the facility doesn't want to treat her until the other sample results come back because she thinks the 1st specimen was contaminated. She states the patient is having symptoms. She can be reached at [phone].

## 2020-11-13 NOTE — TELEPHONE ENCOUNTER
Spoke to daughter she is heading over there right now to get a urine specimen and is going to drop it off before 4:30 today.

## 2020-11-13 NOTE — TELEPHONE ENCOUNTER
If pt is stable and there is concern for contaminated urine sample, I would not Rx abx until cx results and agree w/ plan per APRN from [facility].

## 2020-11-15 LAB — BACTERIA SPEC AEROBE CULT: ABNORMAL

## 2020-11-15 RX ORDER — CEFDINIR 300 MG/1
300 CAPSULE ORAL 2 TIMES DAILY
Qty: 20 CAPSULE | Refills: 0 | Status: SHIPPED | OUTPATIENT
Start: 2020-11-15 | End: 2020-11-25

## 2020-11-16 ENCOUNTER — OUTSIDE FACILITY SERVICE (OUTPATIENT)
Dept: INTERNAL MEDICINE | Facility: CLINIC | Age: 85
End: 2020-11-16

## 2020-11-16 PROCEDURE — G0180 MD CERTIFICATION HHA PATIENT: HCPCS | Performed by: INTERNAL MEDICINE

## 2020-11-19 ENCOUNTER — TELEPHONE (OUTPATIENT)
Dept: INTERNAL MEDICINE | Facility: CLINIC | Age: 85
End: 2020-11-19

## 2020-11-19 NOTE — TELEPHONE ENCOUNTER
Paige from [facility] called, requesting an order to stop taking DOXYCYCLINE 100MG TAB and to begin cefdinir (OMNICEF) 300 MG capsule          Per Paige DOXYCYCLINE 100MG TAB was proscribed by PA at the [facility] for UTI. Daughter states current med isn't working. Patient has been taking this medication since Monday. Any questions or concerns please call [phone]

## 2020-11-20 RX ORDER — CEFDINIR 300 MG/1
300 CAPSULE ORAL 2 TIMES DAILY
Qty: 20 CAPSULE | Refills: 0 | Status: CANCELLED | OUTPATIENT
Start: 2020-11-20 | End: 2020-11-30

## 2020-11-20 NOTE — TELEPHONE ENCOUNTER
5 days ago they got Rx from the nurse practioner-doxycycline     Spoke with Danette  - her mom is till have urinary frequency and wants to change her to omnicef     See message from 11/16/2020     She did not start the the omnicef from Dr Landers  As the nursing home told her they didn't think it would take care of her infection .   Danette says she has the onmicef at home

## 2020-11-20 NOTE — TELEPHONE ENCOUNTER
Per urine culture results, the cefdinir should take care of the infection.  I recommend they start that and complete the course.

## 2020-11-20 NOTE — TELEPHONE ENCOUNTER
Paige notified to start the Cefdinir Rx     Dr Bolaños  Can you print the RX  Paige at [facility] needs a writen order faxed when I spoke to her .

## 2020-11-24 ENCOUNTER — OFFICE VISIT (OUTPATIENT)
Dept: INTERNAL MEDICINE | Facility: CLINIC | Age: 85
End: 2020-11-24

## 2020-11-24 ENCOUNTER — LAB (OUTPATIENT)
Dept: LAB | Facility: HOSPITAL | Age: 85
End: 2020-11-24

## 2020-11-24 VITALS
OXYGEN SATURATION: 95 % | BODY MASS INDEX: 22.02 KG/M2 | HEART RATE: 102 BPM | WEIGHT: 129 LBS | DIASTOLIC BLOOD PRESSURE: 80 MMHG | SYSTOLIC BLOOD PRESSURE: 140 MMHG | HEIGHT: 64 IN

## 2020-11-24 DIAGNOSIS — M53.3 COCCYX PAIN: ICD-10-CM

## 2020-11-24 DIAGNOSIS — R30.0 DYSURIA: Primary | ICD-10-CM

## 2020-11-24 DIAGNOSIS — N39.0 FREQUENT UTI: ICD-10-CM

## 2020-11-24 DIAGNOSIS — R30.0 DYSURIA: ICD-10-CM

## 2020-11-24 PROBLEM — R41.89 IMPAIRED COGNITION: Status: ACTIVE | Noted: 2020-11-24

## 2020-11-24 PROBLEM — Z74.09 IMPAIRED MOBILITY: Status: ACTIVE | Noted: 2020-10-16

## 2020-11-24 LAB
BILIRUB BLD-MCNC: NEGATIVE MG/DL
CLARITY, POC: CLEAR
COLOR UR: YELLOW
GLUCOSE UR STRIP-MCNC: ABNORMAL MG/DL
KETONES UR QL: NEGATIVE
LEUKOCYTE EST, POC: ABNORMAL
NITRITE UR-MCNC: NEGATIVE MG/ML
PH UR: 6 [PH] (ref 5–8)
PROT UR STRIP-MCNC: ABNORMAL MG/DL
RBC # UR STRIP: NEGATIVE /UL
SP GR UR: 1.02 (ref 1–1.03)
UROBILINOGEN UR QL: NORMAL

## 2020-11-24 PROCEDURE — 81003 URINALYSIS AUTO W/O SCOPE: CPT | Performed by: NURSE PRACTITIONER

## 2020-11-24 PROCEDURE — 87086 URINE CULTURE/COLONY COUNT: CPT

## 2020-11-24 PROCEDURE — 99203 OFFICE O/P NEW LOW 30 MIN: CPT | Performed by: NURSE PRACTITIONER

## 2020-11-24 NOTE — PROGRESS NOTES
Chief Complaint   Patient presents with   • Establish Care     Pt been dealing uti for several weeks   • Urinary Tract Infection       History of Present Illness  89 y.o.female presents for establish care and uti symptoms.  Previously followed by Dr Bolaños. Pt has recently moved into [place], and would like to have pcp services closer to living arrangements. She is accompanied by her daughter today.  Pt describes having UTI symptoms off and on for couple years with frequent UTI. Has been more frequent over last several weeks with urinary frequency, nocturia, incont and falls.  Her back always hurts. Recent tx with doxy no better and changed to cefinir which she has been taking for about 5 days.  Not much better still with frequency. Last urine culture with ecoli. Has seen urology in past; tried tropsium helped some. Would like to see different urologist female to see what else can be done; interested in possible prophy maintenance abx. Over summer had nitrofurantoin and two other rounds of cefdinir.   Pt fell a few days ago and hit her tailbone.  Having BM ok.  Other chronic medical problems. htn RA    Review of Systems   Constitutional: Negative for chills and fever.   Respiratory: Negative for shortness of breath.    Cardiovascular: Negative for chest pain.   Gastrointestinal: Positive for constipation. Negative for abdominal pain, diarrhea, nausea and vomiting.   Genitourinary: Positive for difficulty urinating, dysuria, frequency, urgency and urinary incontinence. Negative for flank pain, hematuria and pelvic pain.   Musculoskeletal: Positive for arthralgias and back pain.   Neurological: Negative for light-headedness.         [facility]  The following portions of the patient's history were reviewed and updated as appropriate: allergies, current medications, past family history, past medical history, past social history, past surgical history and problem list.     Past Medical History:   Diagnosis Date   • Anemia      Description: A.  Dx 2006- borderline intermittent.   • Back pain    • Benign colonic polyp 9/28/2016    Description: A.  Dx 1999.   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  Rule out chronic persistent asthma, COPD, or obliterative bronchiolitis.- Rae   • COVID-19 virus infection 10/11/2020   • Degeneration of intervertebral disc of lumbar region     Description: A.  Diagnosed in April 2013 with advanced multilevel with severe spinal stenosis, followed by Dr. Pillai for pain management.   • Gastroesophageal reflux disease    • Hearing loss    • History of colonoscopy 01/01/1999    NORMAL PER PATIENT    • History of mammogram 01/01/2011    NORMAL PER PT    • History of Papanicolaou smear of cervix 01/01/2010    NORMAL PER PT    • History of varicella    • Hyperlipidemia     Description: A.  Dx 2006.   • Hypertension     Description: A. Dx 2001.   • Macular degeneration    • Ovarian mass     Dx 8/15- benign left cystic adnexal mass   • Rheumatoid arthritis (CMS/[facility])     Description: A.  Diagnosed in 2000 and and followed by Dr. Constantino (now Dr. Kaplan). B.  On methotrexate therapy since 2000. C.  Off low-dose prednisone therapy.      Past Surgical History:   Procedure Laterality Date   • APPENDECTOMY     • CARPAL TUNNEL RELEASE Left 01/01/2003    HISTORY OF NEUROPLASTY DECOMPRESSION MEDIAN NERVE AT CARPAL TUNNEL LEFT   • CATARACT EXTRACTION Bilateral 01/01/2009   • CHOLECYSTECTOMY  01/01/1962   • HIP CANNULATED SCREW PLACEMENT Right 1/25/2020    Procedure: HIP CANNULATED SCREW PLACEMENT RIGHT;  Surgeon: Karlos Blount MD;  Location: [facility];  Service: Orthopedics   • KNEE ARTHROSCOPY Left 01/01/2001    MENISCAL REPAIR   • KYPHOPLASTY  06/18/2015    T11 AND L1 (JOSE A)   • PELVIC LAPAROSCOPY  01/01/1996    REMOVAL OF BENIGN UTERINE AND RIGHT OVARIAN TUMORS   •  SALPINGO OOPHORECTOMY Left 08/26/2015    REMOVAL OF LEFT OVARY AND TUBE (benign cystic mass)      Allergies   Allergen Reactions   • Bactrim [Sulfamethoxazole-Trimethoprim] Other (See Comments)     Stomach cramps    • Ciprofloxacin Other (See Comments)     Other reaction(s): shaking  HCI TABS/ SHAKING   • Levofloxacin Diarrhea   • Trimethoprim GI Intolerance      Social History     Socioeconomic History   • Marital status:    Tobacco Use   • Smoking status: Former Smoker   • Smokeless tobacco: Never Used   Substance and Sexual Activity   • Alcohol use: No   • Drug use: No   • Sexual activity: Defer   Social History Narrative    Patient moved to [facility] assisted living 3/26/20. On 3/28/20 patient visited ED for fall-related injuries.  In response, family looking into hiring a , to visit patient BID for ADLs/ transfers. Family drives patient to appts, but says Jm Encinas may provide in future.     Family History   Problem Relation Age of Onset   • Hypertension Mother    • Diabetes Mother             Current Outpatient Medications:   •  acetaminophen (TYLENOL) 500 MG tablet, Take 2 tablets by mouth Every 8 (Eight) Hours., Disp: , Rfl:   •  albuterol sulfate  (90 Base) MCG/ACT inhaler, Inhale 2 puffs Every 4 (Four) Hours As Needed for Wheezing or Shortness of Air., Disp: 3 inhaler, Rfl: 4  •  aspirin 81 MG EC tablet, Take 1 tablet by mouth Daily., Disp: 30 tablet, Rfl: 10  •  cefdinir (OMNICEF) 300 MG capsule, Take 1 capsule by mouth 2 (Two) Times a Day for 10 days., Disp: 20 capsule, Rfl: 0  •  Cholecalciferol (PA VITAMIN D-3 PO), Take 2,000 Units by mouth Daily., Disp: , Rfl:   •  diclofenac (VOLTAREN) 1 % gel gel, Apply 2 g topically to the appropriate area as directed 3 (Three) Times a Day., Disp: 1 tube, Rfl: 2  •  dilTIAZem CD (CARDIZEM CD) 120 MG 24 hr capsule, Take 1 capsule by mouth Daily., Disp: 21 capsule, Rfl: 0  •  docusate sodium (COLACE) 100 MG capsule, Take 1  "capsule by mouth Daily., Disp: 30 capsule, Rfl: 10  •  famotidine (PEPCID) 20 MG tablet, Take 1 tablet by mouth 2 (Two) Times a Day., Disp: 60 tablet, Rfl: 10  •  Fluticasone Furoate-Vilanterol (Breo Ellipta) 100-25 MCG/INH inhaler, Inhale 1 puff Daily., Disp: , Rfl:   •  folic acid (FOLVITE) 1 MG tablet, TAKE ONE TABLET BY MOUTH DAILY, Disp: 30 tablet, Rfl: 10  •  lactobacillus acidophilus (RISAQUAD) capsule capsule, Take 1 capsule by mouth Daily., Disp:  , Rfl:   •  melatonin 5 MG tablet tablet, Take 1 tablet by mouth At Night As Needed (sleep)., Disp:  , Rfl:   •  methotrexate 2.5 MG tablet, Take 4 tablets by mouth 1 (One) Time Per Week. Resume methotrexate on 10/5, Disp:  , Rfl:   •  Multiple Vitamins-Minerals (CENTRUM SILVER ADULT 50+ PO), Take 1 tablet by mouth Daily., Disp: , Rfl:   •  ondansetron (ZOFRAN) 4 MG tablet, Take 1 tablet by mouth Every 6 (Six) Hours As Needed for Nausea or Vomiting., Disp:  , Rfl:   •  polyethylene glycol (MIRALAX) 17 g packet, Take 17 g by mouth Daily., Disp:  , Rfl:   •  vitamin B-12 (CYANOCOBALAMIN) 1000 MCG tablet, TAKE ONE TABLET BY MOUTH DAILY, Disp: 30 tablet, Rfl: 10  •  QUEtiapine (SEROquel) 25 MG tablet, Take 1 tablet by mouth Every Night., Disp:  , Rfl:     VITALS:  /80   Pulse 102   Ht 162.6 cm (64\")   Wt 58.5 kg (129 lb)   SpO2 95%   Breastfeeding No   BMI 22.14 kg/m²     Physical Exam  HENT:      Head: Normocephalic and atraumatic.      Nose: Nose normal.   Eyes:      Extraocular Movements: Extraocular movements intact.      Conjunctiva/sclera: Conjunctivae normal.      Pupils: Pupils are equal, round, and reactive to light.   Cardiovascular:      Rate and Rhythm: Normal rate and regular rhythm.   Pulmonary:      Effort: Pulmonary effort is normal. No respiratory distress.      Breath sounds: Normal breath sounds.   Abdominal:      General: Bowel sounds are normal.      Palpations: Abdomen is soft.      Tenderness: There is no abdominal tenderness. "   Musculoskeletal:      Comments: Uses walker for assistance   Skin:     General: Skin is warm and dry.   Neurological:      General: No focal deficit present.      Mental Status: She is alert and oriented to person, place, and time.   Psychiatric:         Mood and Affect: Mood normal.         LABS  Results for orders placed or performed in visit on 11/24/20   POCT urinalysis dipstick, automated    Specimen: Urine   Result Value Ref Range    Color Yellow Yellow, Straw, Dark Yellow, Selena    Clarity, UA Clear Clear    Specific Gravity  1.025 1.005 - 1.030    pH, Urine 6.0 5.0 - 8.0    Leukocytes Trace (A) Negative    Nitrite, UA Negative Negative    Protein, POC Trace (A) Negative mg/dL    Glucose, UA 1+ (A) Negative, 1000 mg/dL (3+) mg/dL    Ketones, UA Negative Negative    Urobilinogen, UA Normal Normal    Bilirubin Negative Negative    Blood, UA Negative Negative         ASSESSMENT/PLAN  Diagnoses and all orders for this visit:    1. Dysuria (Primary)  Comments:  with frequency and recent UTI; cont cefdinir. refer to urology  Orders:  -     POCT urinalysis dipstick, automated  -     Urine Culture - Urine, Urine, Clean Catch; Future  -     Ambulatory Referral to Urology    2. Coccyx pain  -     Cushioned Inflatable Ring    3. Frequent UTI  -     Ambulatory Referral to Urology        I discussed the patients findings and my recommendations with patient.  Patient was encouraged to keep me informed of any acute changes, lack of improvement, or any new concerning symptoms.  Patient voiced understanding of all instructions and denied further questions.      FOLLOW-UP  Return in about 7 months (around 7/8/2021), or if symptoms worsen or fail to improve, for Medicare Wellness.    Electronically signed by:    JANIYA Ye  11/24/2020    EMR Dragon/Transcription Disclaimer:  Much of this encounter note is an electronic transcription/translation of spoken language to printed text.  The electronic translation of  spoken language may permit erroneous, or at times, nonsensical words or phrases to be inadvertently transcribed.  Although I have reviewed the note for such errors, some may still exist

## 2020-11-26 LAB — BACTERIA SPEC AEROBE CULT: NO GROWTH

## 2020-12-15 ENCOUNTER — LAB (OUTPATIENT)
Dept: LAB | Facility: HOSPITAL | Age: 85
End: 2020-12-15

## 2020-12-15 ENCOUNTER — PATIENT MESSAGE (OUTPATIENT)
Dept: INTERNAL MEDICINE | Facility: CLINIC | Age: 85
End: 2020-12-15

## 2020-12-15 DIAGNOSIS — R39.9 UTI SYMPTOMS: ICD-10-CM

## 2020-12-15 DIAGNOSIS — R30.0 DYSURIA: ICD-10-CM

## 2020-12-15 DIAGNOSIS — N39.0 FREQUENT UTI: Primary | ICD-10-CM

## 2020-12-15 LAB
BILIRUB BLD-MCNC: NEGATIVE MG/DL
CLARITY, POC: ABNORMAL
COLOR UR: YELLOW
GLUCOSE UR STRIP-MCNC: NEGATIVE MG/DL
KETONES UR QL: NEGATIVE
LEUKOCYTE EST, POC: NEGATIVE
NITRITE UR-MCNC: NEGATIVE MG/ML
PH UR: 7.5 [PH] (ref 5–8)
PROT UR STRIP-MCNC: NEGATIVE MG/DL
RBC # UR STRIP: NEGATIVE /UL
SP GR UR: 1.01 (ref 1–1.03)
UROBILINOGEN UR QL: NORMAL

## 2020-12-15 PROCEDURE — 87186 SC STD MICRODIL/AGAR DIL: CPT | Performed by: NURSE PRACTITIONER

## 2020-12-15 PROCEDURE — 81003 URINALYSIS AUTO W/O SCOPE: CPT | Performed by: NURSE PRACTITIONER

## 2020-12-15 PROCEDURE — 87086 URINE CULTURE/COLONY COUNT: CPT | Performed by: NURSE PRACTITIONER

## 2020-12-15 PROCEDURE — 87077 CULTURE AEROBIC IDENTIFY: CPT | Performed by: NURSE PRACTITIONER

## 2020-12-15 NOTE — TELEPHONE ENCOUNTER
Mary I have placed an order for her urine sample that was dropped off due to you being out of the office.

## 2020-12-17 LAB — BACTERIA SPEC AEROBE CULT: ABNORMAL

## 2020-12-17 RX ORDER — CEFUROXIME AXETIL 500 MG/1
500 TABLET ORAL 2 TIMES DAILY
Qty: 14 TABLET | Refills: 0 | Status: SHIPPED | OUTPATIENT
Start: 2020-12-17 | End: 2020-12-24

## 2021-02-06 ENCOUNTER — CLINICAL SUPPORT (OUTPATIENT)
Dept: INTERNAL MEDICINE | Facility: CLINIC | Age: 86
End: 2021-02-06

## 2021-02-06 DIAGNOSIS — R39.9 UTI SYMPTOMS: Primary | ICD-10-CM

## 2021-02-06 LAB
BILIRUB BLD-MCNC: NEGATIVE MG/DL
CLARITY, POC: CLEAR
COLOR UR: YELLOW
GLUCOSE UR STRIP-MCNC: NEGATIVE MG/DL
KETONES UR QL: NEGATIVE
LEUKOCYTE EST, POC: NEGATIVE
NITRITE UR-MCNC: NEGATIVE MG/ML
PH UR: 7.5 [PH] (ref 5–8)
PROT UR STRIP-MCNC: NEGATIVE MG/DL
RBC # UR STRIP: NEGATIVE /UL
SP GR UR: 1.01 (ref 1–1.03)
UROBILINOGEN UR QL: NORMAL

## 2021-02-06 PROCEDURE — 87077 CULTURE AEROBIC IDENTIFY: CPT | Performed by: NURSE PRACTITIONER

## 2021-02-06 PROCEDURE — 87086 URINE CULTURE/COLONY COUNT: CPT | Performed by: NURSE PRACTITIONER

## 2021-02-06 PROCEDURE — 87186 SC STD MICRODIL/AGAR DIL: CPT | Performed by: NURSE PRACTITIONER

## 2021-02-06 PROCEDURE — 81003 URINALYSIS AUTO W/O SCOPE: CPT | Performed by: NURSE PRACTITIONER

## 2021-02-09 LAB — BACTERIA SPEC AEROBE CULT: ABNORMAL

## 2021-02-09 RX ORDER — CEFDINIR 300 MG/1
300 CAPSULE ORAL 2 TIMES DAILY
Qty: 14 CAPSULE | Refills: 0 | Status: SHIPPED | OUTPATIENT
Start: 2021-02-09 | End: 2021-02-16

## 2021-02-11 ENCOUNTER — TELEPHONE (OUTPATIENT)
Dept: INTERNAL MEDICINE | Facility: CLINIC | Age: 86
End: 2021-02-11

## 2021-02-11 NOTE — TELEPHONE ENCOUNTER
PT DAUGHTER CALLED AND STATED THAT SHE HAS RECEIVED PT CULTURE RESULTS AND REQUEST FOR SOME ONE ELSE TO REVIEW THE RESULTS.    PLEASE ADVISE.  CALL BACK:[phone]    
Spoke with pt daughter informed her that RX was sent for UTI sx. Voiced understanding   
Patient has no objection to blood transfusions.

## 2021-03-08 ENCOUNTER — TELEPHONE (OUTPATIENT)
Dept: INTERNAL MEDICINE | Facility: CLINIC | Age: 86
End: 2021-03-08

## 2021-03-08 DIAGNOSIS — R30.0 DYSURIA: Primary | ICD-10-CM

## 2021-03-08 PROCEDURE — 81003 URINALYSIS AUTO W/O SCOPE: CPT | Performed by: NURSE PRACTITIONER

## 2021-03-08 NOTE — TELEPHONE ENCOUNTER
Patient's daughter Danette requested a call back. Patient is having frequent urination, trouble controlling her bladder, having hot flashes, chills, and urgent urination. This was first noticed yesterday. Danette would like to know if she could bring a urine sample from the patient's nursing home to the office and have a urinalysis done.     Please call and advise. Danette's call back [phone]

## 2021-03-09 ENCOUNTER — LAB (OUTPATIENT)
Dept: LAB | Facility: HOSPITAL | Age: 86
End: 2021-03-09

## 2021-03-09 DIAGNOSIS — R35.0 URINARY FREQUENCY: ICD-10-CM

## 2021-03-09 DIAGNOSIS — R35.0 URINARY FREQUENCY: Primary | ICD-10-CM

## 2021-03-09 DIAGNOSIS — N30.01 ACUTE CYSTITIS WITH HEMATURIA: Primary | ICD-10-CM

## 2021-03-09 LAB
BILIRUB BLD-MCNC: NEGATIVE MG/DL
BILIRUB UR QL STRIP: NEGATIVE
CLARITY UR: ABNORMAL
CLARITY, POC: CLEAR
COLOR UR: YELLOW
COLOR UR: YELLOW
GLUCOSE UR STRIP-MCNC: NEGATIVE MG/DL
GLUCOSE UR STRIP-MCNC: NEGATIVE MG/DL
HGB UR QL STRIP.AUTO: ABNORMAL
KETONES UR QL STRIP: NEGATIVE
KETONES UR QL: NEGATIVE
LEUKOCYTE EST, POC: NEGATIVE
LEUKOCYTE ESTERASE UR QL STRIP.AUTO: NEGATIVE
NITRITE UR QL STRIP: NEGATIVE
NITRITE UR-MCNC: NEGATIVE MG/ML
PH UR STRIP.AUTO: 7 [PH] (ref 5–8)
PH UR: 6.5 [PH] (ref 5–8)
PROT UR QL STRIP: ABNORMAL
PROT UR STRIP-MCNC: NEGATIVE MG/DL
RBC # UR STRIP: ABNORMAL /UL
SP GR UR STRIP: 1.02 (ref 1–1.03)
SP GR UR: 1.02 (ref 1–1.03)
UROBILINOGEN UR QL STRIP: ABNORMAL
UROBILINOGEN UR QL: NORMAL

## 2021-03-09 PROCEDURE — 87086 URINE CULTURE/COLONY COUNT: CPT

## 2021-03-09 PROCEDURE — 87077 CULTURE AEROBIC IDENTIFY: CPT

## 2021-03-09 PROCEDURE — 87186 SC STD MICRODIL/AGAR DIL: CPT

## 2021-03-09 PROCEDURE — 81001 URINALYSIS AUTO W/SCOPE: CPT

## 2021-03-09 RX ORDER — CEFDINIR 300 MG/1
300 CAPSULE ORAL 2 TIMES DAILY
Qty: 14 CAPSULE | Refills: 0 | Status: SHIPPED | OUTPATIENT
Start: 2021-03-09 | End: 2021-03-16 | Stop reason: SDUPTHER

## 2021-03-09 NOTE — TELEPHONE ENCOUNTER
Danette, pt's daughter, stated that she dropped off sample last night right before close. Stated she is leaving in morning to go out of town, would like to get results and abx if possible before leaving, if not her sister should be able to take care of it, but would rather make sure herself. Not sure if it has been collected yet

## 2021-03-09 NOTE — TELEPHONE ENCOUNTER
Let pt daughter know UA had 3+ blood.  I have sent rx for uti.  Will send urine for culture to confirm UTI and antibiotics that is sensitive too.

## 2021-03-10 LAB
BACTERIA UR QL AUTO: ABNORMAL /HPF
HYALINE CASTS UR QL AUTO: ABNORMAL /LPF
RBC # UR: ABNORMAL /HPF
REF LAB TEST METHOD: ABNORMAL
SQUAMOUS #/AREA URNS HPF: ABNORMAL /HPF
WBC UR QL AUTO: ABNORMAL /HPF
YEAST URNS QL MICRO: ABNORMAL /HPF

## 2021-03-12 LAB — BACTERIA SPEC AEROBE CULT: ABNORMAL

## 2021-03-16 DIAGNOSIS — N30.01 ACUTE CYSTITIS WITH HEMATURIA: ICD-10-CM

## 2021-03-16 RX ORDER — CEFDINIR 300 MG/1
300 CAPSULE ORAL 2 TIMES DAILY
Qty: 8 CAPSULE | Refills: 0 | Status: SHIPPED | OUTPATIENT
Start: 2021-03-16 | End: 2021-03-20

## 2021-03-23 ENCOUNTER — LAB (OUTPATIENT)
Dept: LAB | Facility: HOSPITAL | Age: 86
End: 2021-03-23

## 2021-03-23 ENCOUNTER — OFFICE VISIT (OUTPATIENT)
Dept: INTERNAL MEDICINE | Facility: CLINIC | Age: 86
End: 2021-03-23

## 2021-03-23 VITALS
SYSTOLIC BLOOD PRESSURE: 132 MMHG | HEIGHT: 64 IN | HEART RATE: 92 BPM | BODY MASS INDEX: 22.53 KG/M2 | DIASTOLIC BLOOD PRESSURE: 78 MMHG | WEIGHT: 132 LBS | TEMPERATURE: 96.9 F | OXYGEN SATURATION: 94 %

## 2021-03-23 DIAGNOSIS — R35.0 URINARY FREQUENCY: ICD-10-CM

## 2021-03-23 DIAGNOSIS — N30.01 ACUTE CYSTITIS WITH HEMATURIA: Primary | ICD-10-CM

## 2021-03-23 DIAGNOSIS — R35.0 URINE FREQUENCY: ICD-10-CM

## 2021-03-23 DIAGNOSIS — R35.0 URINE FREQUENCY: Primary | ICD-10-CM

## 2021-03-23 DIAGNOSIS — R73.9 HYPERGLYCEMIA: ICD-10-CM

## 2021-03-23 LAB
BACTERIA UR QL AUTO: ABNORMAL /HPF
BILIRUB BLD-MCNC: NEGATIVE MG/DL
BILIRUB UR QL STRIP: NEGATIVE
CLARITY UR: ABNORMAL
CLARITY, POC: ABNORMAL
COLOR UR: ABNORMAL
COLOR UR: ABNORMAL
GLUCOSE UR STRIP-MCNC: NEGATIVE MG/DL
GLUCOSE UR STRIP-MCNC: NEGATIVE MG/DL
HBA1C MFR BLD: 4.6 %
HGB UR QL STRIP.AUTO: ABNORMAL
HYALINE CASTS UR QL AUTO: ABNORMAL /LPF
KETONES UR QL STRIP: NEGATIVE
KETONES UR QL: NEGATIVE
LEUKOCYTE EST, POC: ABNORMAL
LEUKOCYTE ESTERASE UR QL STRIP.AUTO: ABNORMAL
NITRITE UR QL STRIP: NEGATIVE
NITRITE UR-MCNC: NEGATIVE MG/ML
PH UR STRIP.AUTO: 5.5 [PH] (ref 5–8)
PH UR: 5.5 [PH] (ref 5–8)
PROT UR QL STRIP: ABNORMAL
PROT UR STRIP-MCNC: ABNORMAL MG/DL
RBC # UR STRIP: ABNORMAL /UL
RBC # UR: ABNORMAL /HPF
REF LAB TEST METHOD: ABNORMAL
SP GR UR STRIP: 1.03 (ref 1–1.03)
SP GR UR: 1.03 (ref 1–1.03)
SQUAMOUS #/AREA URNS HPF: ABNORMAL /HPF
UROBILINOGEN UR QL STRIP: ABNORMAL
UROBILINOGEN UR QL: NORMAL
WBC UR QL AUTO: ABNORMAL /HPF

## 2021-03-23 PROCEDURE — 87086 URINE CULTURE/COLONY COUNT: CPT

## 2021-03-23 PROCEDURE — 81001 URINALYSIS AUTO W/SCOPE: CPT

## 2021-03-23 PROCEDURE — 83036 HEMOGLOBIN GLYCOSYLATED A1C: CPT | Performed by: NURSE PRACTITIONER

## 2021-03-23 PROCEDURE — 81003 URINALYSIS AUTO W/O SCOPE: CPT | Performed by: NURSE PRACTITIONER

## 2021-03-23 PROCEDURE — 99213 OFFICE O/P EST LOW 20 MIN: CPT | Performed by: NURSE PRACTITIONER

## 2021-03-23 PROCEDURE — 96372 THER/PROPH/DIAG INJ SC/IM: CPT | Performed by: NURSE PRACTITIONER

## 2021-03-23 RX ORDER — CEFTRIAXONE 1 G/1
1 INJECTION, POWDER, FOR SOLUTION INTRAMUSCULAR; INTRAVENOUS ONCE
Status: COMPLETED | OUTPATIENT
Start: 2021-03-23 | End: 2021-03-23

## 2021-03-23 RX ORDER — MELOXICAM 7.5 MG/1
TABLET ORAL
COMMUNITY
Start: 2021-03-04 | End: 2022-07-27 | Stop reason: HOSPADM

## 2021-03-23 RX ORDER — TRAMADOL HYDROCHLORIDE 50 MG/1
50 TABLET ORAL 2 TIMES DAILY PRN
Status: ON HOLD | COMMUNITY
Start: 2021-02-11 | End: 2022-07-27 | Stop reason: SDUPTHER

## 2021-03-23 RX ORDER — CEFDINIR 300 MG/1
300 CAPSULE ORAL 2 TIMES DAILY
Qty: 14 CAPSULE | Refills: 0 | Status: SHIPPED | OUTPATIENT
Start: 2021-03-23 | End: 2021-03-30

## 2021-03-23 RX ORDER — MIRABEGRON 50 MG/1
50 TABLET, FILM COATED, EXTENDED RELEASE ORAL DAILY
COMMUNITY
Start: 2021-02-23 | End: 2022-08-17 | Stop reason: HOSPADM

## 2021-03-23 RX ADMIN — CEFTRIAXONE 1 G: 1 INJECTION, POWDER, FOR SOLUTION INTRAMUSCULAR; INTRAVENOUS at 13:34

## 2021-03-24 LAB — BACTERIA SPEC AEROBE CULT: NO GROWTH

## 2021-03-24 NOTE — PROGRESS NOTES
Chief Complaint   Patient presents with   • Urinary Tract Infection       History of Present Illness    89 y.o.female presents for uti.  Recurrent uti's; most recent proteus mirabelis. tx with ceftin; got some better then return of sx changed to omnicef. Again some better but sx return. Positive urinary frequency, dysuria. No gross hematuria or fever or n/v. Feels a little dizziness; last night was reaching upward for something and got dizzy fell backwards onto right hip. A little sore but ok. Walking with walker today as usual. Has appt with urology April 1. Has been taking myrbetriq and daily maintenance abx to see if helps decreased number of UTI but so far not helping.  Since pt has urinary frequency and intermittent pain in feet, she wants to be checked for diabetes. Remote mild hyperglycemia on prior lab.    ROS: any positive referenced above.   Other ROS negative.      [facility]  The following portions of the patient's history were reviewed and updated as appropriate: allergies, current medications, past family history, past medical history, past social history, past surgical history and problem list.     Past Medical History:   Diagnosis Date   • Anemia     Description: A.  Dx 2006- borderline intermittent.   • Back pain    • Benign colonic polyp 9/28/2016    Description: A.  Dx 1999.   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  Rule out chronic persistent asthma, COPD, or obliterative bronchiolitis.- Butch   • COVID-19 virus infection 10/11/2020   • Degeneration of intervertebral disc of lumbar region     Description: A.  Diagnosed in April 2013 with advanced multilevel with severe spinal stenosis, followed by Dr. Pillai for pain management.   • Gastroesophageal reflux disease    • Hearing loss    • History of colonoscopy 01/01/1999    NORMAL PER PATIENT    •  History of mammogram 01/01/2011    NORMAL PER PT    • History of Papanicolaou smear of cervix 01/01/2010    NORMAL PER PT    • History of varicella    • Hyperlipidemia     Description: A.  Dx 2006.   • Hypertension     Description: A. Dx 2001.   • Macular degeneration    • Ovarian mass     Dx 8/15- benign left cystic adnexal mass   • Rheumatoid arthritis (CMS/HCC)     Description: A.  Diagnosed in 2000 and and followed by Dr. Constantino (now Dr. Kaplan). B.  On methotrexate therapy since 2000. C.  Off low-dose prednisone therapy.      Allergies   Allergen Reactions   • Bactrim [Sulfamethoxazole-Trimethoprim] Other (See Comments)     Stomach cramps    • Ciprofloxacin Other (See Comments)     Other reaction(s): shaking  HCI TABS/ SHAKING   • Levofloxacin Diarrhea   • Trimethoprim GI Intolerance      Social History     Tobacco Use   • Smoking status: Former Smoker   • Smokeless tobacco: Never Used   Substance Use Topics   • Alcohol use: No   • Drug use: No         Current Outpatient Medications:   •  acetaminophen (TYLENOL) 500 MG tablet, Take 2 tablets by mouth Every 8 (Eight) Hours., Disp: , Rfl:   •  albuterol sulfate  (90 Base) MCG/ACT inhaler, Inhale 2 puffs Every 4 (Four) Hours As Needed for Wheezing or Shortness of Air., Disp: 3 inhaler, Rfl: 4  •  aspirin 81 MG EC tablet, Take 1 tablet by mouth Daily., Disp: 30 tablet, Rfl: 10  •  Cholecalciferol (PA VITAMIN D-3 PO), Take 2,000 Units by mouth Daily., Disp: , Rfl:   •  diclofenac (VOLTAREN) 1 % gel gel, Apply 2 g topically to the appropriate area as directed 3 (Three) Times a Day., Disp: 1 tube, Rfl: 2  •  dilTIAZem CD (CARDIZEM CD) 120 MG 24 hr capsule, Take 1 capsule by mouth Daily., Disp: 21 capsule, Rfl: 0  •  docusate sodium (COLACE) 100 MG capsule, Take 1 capsule by mouth Daily., Disp: 30 capsule, Rfl: 10  •  famotidine (PEPCID) 20 MG tablet, Take 1 tablet by mouth 2 (Two) Times a Day., Disp: 60 tablet, Rfl: 10  •  Fluticasone Furoate-Vilanterol (Breo  "Ellipta) 100-25 MCG/INH inhaler, Inhale 1 puff Daily., Disp: , Rfl:   •  folic acid (FOLVITE) 1 MG tablet, TAKE ONE TABLET BY MOUTH DAILY, Disp: 30 tablet, Rfl: 10  •  lactobacillus acidophilus (RISAQUAD) capsule capsule, Take 1 capsule by mouth Daily., Disp:  , Rfl:   •  melatonin 5 MG tablet tablet, Take 1 tablet by mouth At Night As Needed (sleep)., Disp:  , Rfl:   •  methotrexate 2.5 MG tablet, Take 4 tablets by mouth 1 (One) Time Per Week. Resume methotrexate on 10/5, Disp:  , Rfl:   •  Multiple Vitamins-Minerals (CENTRUM SILVER ADULT 50+ PO), Take 1 tablet by mouth Daily., Disp: , Rfl:   •  ondansetron (ZOFRAN) 4 MG tablet, Take 1 tablet by mouth Every 6 (Six) Hours As Needed for Nausea or Vomiting., Disp:  , Rfl:   •  polyethylene glycol (MIRALAX) 17 g packet, Take 17 g by mouth Daily., Disp:  , Rfl:   •  QUEtiapine (SEROquel) 25 MG tablet, Take 1 tablet by mouth Every Night., Disp:  , Rfl:   •  vitamin B-12 (CYANOCOBALAMIN) 1000 MCG tablet, TAKE ONE TABLET BY MOUTH DAILY, Disp: 30 tablet, Rfl: 10  •  cefdinir (OMNICEF) 300 MG capsule, Take 1 capsule by mouth 2 (Two) Times a Day for 7 days. Start March 24, Disp: 14 capsule, Rfl: 0  •  meloxicam (MOBIC) 7.5 MG tablet, , Disp: , Rfl:   •  Myrbetriq 50 MG tablet sustained-release 24 hour 24 hr tablet, , Disp: , Rfl:   •  traMADol (ULTRAM) 50 MG tablet, , Disp: , Rfl:   No current facility-administered medications for this visit.    VITALS:  /78   Pulse 92   Temp 96.9 °F (36.1 °C)   Ht 162.6 cm (64\")   Wt 59.9 kg (132 lb)   SpO2 94%   Breastfeeding No   BMI 22.66 kg/m²     Physical Exam  HENT:      Head: Normocephalic.   Cardiovascular:      Rate and Rhythm: Normal rate and regular rhythm.      Heart sounds: Normal heart sounds.   Pulmonary:      Effort: Pulmonary effort is normal. No respiratory distress.   Abdominal:      Tenderness: There is no right CVA tenderness or left CVA tenderness.   Musculoskeletal:      Comments: Walking with walker " assist. Slow ROM hips lower ext at baseline.   Skin:     General: Skin is warm and dry.   Neurological:      General: No focal deficit present.      Mental Status: She is alert and oriented to person, place, and time.   Psychiatric:         Mood and Affect: Mood normal.         Result Review :   POC Glycosylated Hemoglobin (Hb A1C)  Order: [id number]  Status:  Final result   Visible to patient:  Yes (Jesus)   Next appt:  None   Dx:  Urinary frequency; Hyperglycemia  Specimen Information: Blood        Component   Ref Range & Units 11 d ago   Hemoglobin A1C   % 4.6    Resulting [facility]         Specimen Collected: 03/23/21 13:36 Last Resulted: 03/23/21 13:36             POCT urinalysis dipstick, automated  Order: [id number]  Status:  Final result   Visible to patient:  Yes (Jaymejulissadiane)   Next appt:  None   Dx:  Urinary frequency  Specimen Information: Urine        Component   Ref Range & Units 11 d ago   Color   Yellow, Straw, Dark Yellow, Selena Dark Yellow    Clarity, UA   Clear Slightly CloudyAbnormal     Specific Gravity    1.005 - 1.030 1.030    pH, Urine   5.0 - 8.0 5.5    Leukocytes   Negative Small (1+)Abnormal     Comment: 70 Nikolas/ uL   Nitrite, UA   Negative Negative    Protein, POC   Negative mg/dL 2+Abnormal     Comment: 100 mg/ dL   Glucose, UA   Negative, 1000 mg/dL (3+) mg/dL Negative    Ketones, UA   Negative Negative    Urobilinogen, UA   Normal Normal    Bilirubin   Negative Negative    Blood, UA   Negative 3+Abnormal     Comment: 200 Evens/ uL   Resulting [facility]         Specimen Collected: 03/23/21 12:59 Last Resulted: 03/23/21 12:59                  Assessment and Plan    Diagnoses and all orders for this visit:    1. Acute cystitis with hematuria (Primary)  -     Urine Culture - Urine, Urine, Clean Catch; Future  -     cefTRIAXone (ROCEPHIN) injection 1 g  -     cefdinir (OMNICEF) 300 MG capsule; Take 1 capsule by mouth 2 (Two) Times a  Day for 7 days. Start March 24  Dispense: 14 capsule; Refill: 0  Keep appt with urology.  Allergy to cipro and bactrim noted. Other med resistant. Discussed considering augmentin; pt/family prefer to not do as GI side effect.     2. Urinary frequency  -     POCT urinalysis dipstick, automated  -     Cancel: Hemoglobin A1c; Future  -     POC Glycosylated Hemoglobin (Hb A1C)    3. hyperglycemia   -     POC Glycosylated Hemoglobin (Hb A1C)  Follow Up   Return if symptoms worsen or fail to improve.      I discussed the patients findings and my recommendations with patient.  Patient was encouraged to keep me informed of any acute changes, lack of improvement, or any new concerning symptoms.  Patient voiced understanding of all instructions and denied further questions.    Electronically signed by:    JANIYA Ye  03/23/2021    EMR Dragon/Transcription Disclaimer:  Much of this encounter note is an electronic transcription/translation of spoken language to printed text.  The electronic translation of spoken language may permit erroneous, or at times, nonsensical words or phrases to be inadvertently transcribed.  Although I have reviewed the note for such errors, some may still exist

## 2021-05-06 ENCOUNTER — TELEPHONE (OUTPATIENT)
Dept: INTERNAL MEDICINE | Facility: CLINIC | Age: 86
End: 2021-05-06

## 2021-05-06 ENCOUNTER — LAB (OUTPATIENT)
Dept: LAB | Facility: HOSPITAL | Age: 86
End: 2021-05-06

## 2021-05-06 ENCOUNTER — TRANSCRIBE ORDERS (OUTPATIENT)
Dept: LAB | Facility: HOSPITAL | Age: 86
End: 2021-05-06

## 2021-05-06 DIAGNOSIS — R35.0 URINARY FREQUENCY: ICD-10-CM

## 2021-05-06 DIAGNOSIS — R35.0 URINARY FREQUENCY: Primary | ICD-10-CM

## 2021-05-06 PROCEDURE — 87086 URINE CULTURE/COLONY COUNT: CPT

## 2021-05-06 NOTE — TELEPHONE ENCOUNTER
Patient's daughter called and stated urologist wanted patient to have urine sample tested for UTI and was wandering if she could drop specimen off.  Advised daughter that they would need to send order to clinic in order for us to collect UA.  Fax number given to daughter for clinic to fax order.

## 2021-05-07 LAB — BACTERIA SPEC AEROBE CULT: NO GROWTH

## 2021-05-27 ENCOUNTER — LAB (OUTPATIENT)
Dept: LAB | Facility: HOSPITAL | Age: 86
End: 2021-05-27

## 2021-05-27 DIAGNOSIS — R30.0 DYSURIA: Primary | ICD-10-CM

## 2021-05-27 DIAGNOSIS — R35.0 FREQUENCY OF URINATION: Primary | ICD-10-CM

## 2021-05-27 DIAGNOSIS — R30.0 DYSURIA: ICD-10-CM

## 2021-05-27 DIAGNOSIS — R35.0 FREQUENCY OF URINATION: ICD-10-CM

## 2021-05-27 LAB
BILIRUB UR QL STRIP: NEGATIVE
CLARITY UR: ABNORMAL
COLOR UR: YELLOW
GLUCOSE UR STRIP-MCNC: NEGATIVE MG/DL
HGB UR QL STRIP.AUTO: ABNORMAL
KETONES UR QL STRIP: NEGATIVE
LEUKOCYTE ESTERASE UR QL STRIP.AUTO: ABNORMAL
NITRITE UR QL STRIP: NEGATIVE
PH UR STRIP.AUTO: 6 [PH] (ref 5–8)
PROT UR QL STRIP: ABNORMAL
SP GR UR STRIP: 1.03 (ref 1–1.03)
UROBILINOGEN UR QL STRIP: ABNORMAL

## 2021-05-27 PROCEDURE — 87086 URINE CULTURE/COLONY COUNT: CPT

## 2021-05-27 PROCEDURE — 81001 URINALYSIS AUTO W/SCOPE: CPT

## 2021-05-28 ENCOUNTER — TELEPHONE (OUTPATIENT)
Dept: INTERNAL MEDICINE | Facility: CLINIC | Age: 86
End: 2021-05-28

## 2021-05-28 DIAGNOSIS — N30.00 ACUTE CYSTITIS WITHOUT HEMATURIA: Primary | ICD-10-CM

## 2021-05-28 LAB
BACTERIA UR QL AUTO: ABNORMAL /HPF
BACTERIA UR QL AUTO: ABNORMAL /HPF
BILIRUB UR QL STRIP: NEGATIVE
CLARITY UR: ABNORMAL
COD CRY URNS QL: ABNORMAL /HPF
COLOR UR: YELLOW
GLUCOSE UR STRIP-MCNC: NEGATIVE MG/DL
HGB UR QL STRIP.AUTO: ABNORMAL
HYALINE CASTS UR QL AUTO: ABNORMAL /LPF
HYALINE CASTS UR QL AUTO: ABNORMAL /LPF
KETONES UR QL STRIP: NEGATIVE
LEUKOCYTE ESTERASE UR QL STRIP.AUTO: ABNORMAL
NITRITE UR QL STRIP: NEGATIVE
PH UR STRIP.AUTO: 6 [PH] (ref 5–8)
PROT UR QL STRIP: ABNORMAL
RBC # UR: ABNORMAL /HPF
RBC # UR: ABNORMAL /HPF
REF LAB TEST METHOD: ABNORMAL
REF LAB TEST METHOD: ABNORMAL
SP GR UR STRIP: 1.03 (ref 1–1.03)
SQUAMOUS #/AREA URNS HPF: ABNORMAL /HPF
SQUAMOUS #/AREA URNS HPF: ABNORMAL /HPF
UROBILINOGEN UR QL STRIP: ABNORMAL
WBC UR QL AUTO: ABNORMAL /HPF
WBC UR QL AUTO: ABNORMAL /HPF

## 2021-05-28 RX ORDER — CEFDINIR 300 MG/1
300 CAPSULE ORAL 2 TIMES DAILY
Qty: 14 CAPSULE | Refills: 0 | Status: SHIPPED | OUTPATIENT
Start: 2021-05-28 | End: 2021-06-01

## 2021-05-28 NOTE — TELEPHONE ENCOUNTER
Pt's daughter states that her mother's UTI is getting worse, she is having a lot of frequency and urgency. She is getting up to go to the bathroom every 25 mins and they are afraid she is going to fall getting up that often. She states that she now has the chills and her mom said that she can't take it anymore. Her sister Lilian (number below) is taking care of her today so we need to call Lilian back to let her know if she needs to bring Pati in here today, take her to urgent care or if you can just go ahead and send in antibiotics without having the urine culture back. Please advise.         Lilian  [phone]

## 2021-05-28 NOTE — TELEPHONE ENCOUNTER
Let pt/ pt family know I have sent rx to pharmacy for uti. Can go ahead and start.  Will update plan of care once urine culture rcvd.

## 2021-05-28 NOTE — TELEPHONE ENCOUNTER
Caller: Danette Suresh    Relationship: Emergency Contact    Best call back number: [phone]    What is the best time to reach you: BEFORE 9:30 ANYTIME AFTER LEAVE A VOICEMAIL     Who are you requesting to speak with (clinical staff, provider,  specific staff member): CLINICAL STAFF    Do you know the name of the person who called: DAUGHTER    What was the call regarding: PATIENTS DAUGHTER WANTS A CALL ABOUT HER MOTHERS URINE CULTURE.     Do you require a callback: YES

## 2021-05-29 LAB — BACTERIA SPEC AEROBE CULT: NO GROWTH

## 2021-06-01 ENCOUNTER — OFFICE VISIT (OUTPATIENT)
Dept: INTERNAL MEDICINE | Facility: CLINIC | Age: 86
End: 2021-06-01

## 2021-06-01 VITALS
SYSTOLIC BLOOD PRESSURE: 140 MMHG | HEIGHT: 64 IN | BODY MASS INDEX: 21.85 KG/M2 | WEIGHT: 128 LBS | OXYGEN SATURATION: 95 % | HEART RATE: 95 BPM | DIASTOLIC BLOOD PRESSURE: 80 MMHG

## 2021-06-01 DIAGNOSIS — Z00.00 MEDICARE ANNUAL WELLNESS VISIT, SUBSEQUENT: Primary | ICD-10-CM

## 2021-06-01 DIAGNOSIS — N32.81 OVERACTIVE BLADDER: ICD-10-CM

## 2021-06-01 DIAGNOSIS — K21.9 GASTROESOPHAGEAL REFLUX DISEASE WITHOUT ESOPHAGITIS: ICD-10-CM

## 2021-06-01 DIAGNOSIS — K59.00 CONSTIPATION, UNSPECIFIED CONSTIPATION TYPE: ICD-10-CM

## 2021-06-01 DIAGNOSIS — E55.9 VITAMIN D DEFICIENCY: ICD-10-CM

## 2021-06-01 PROCEDURE — G0439 PPPS, SUBSEQ VISIT: HCPCS | Performed by: NURSE PRACTITIONER

## 2021-06-01 NOTE — PROGRESS NOTES
QUICK REFERENCE INFORMATION:  The ABCs of the Annual Wellness Visit    Subsequent Medicare Wellness Visit    HEALTH RISK ASSESSMENT    7/26/1930    Recent Hospitalizations:  No hospitalization(s) within the last year..    covid sept.  Current Medical Providers:  Patient Care Team:  Maddi Buck APRN as PCP - General (Nurse Practitioner)  Marsha Mathis MD as Consulting Physician (Rheumatology)  Beulah Potter MD  urology    Smoking Status:  Social History     Tobacco Use   Smoking Status Former Smoker   Smokeless Tobacco Never Used     Alcohol Consumption:  Social History     Substance and Sexual Activity   Alcohol Use No     Depression Screen:   PHQ-2/PHQ-9 Depression Screening 6/1/2021   Little interest or pleasure in doing things 0   Feeling down, depressed, or hopeless 0   Total Score 0       Health Habits and Functional and Cognitive Screening:  Functional & Cognitive Status 6/1/2021   Do you have difficulty preparing food and eating? Yes   Do you have difficulty bathing yourself, getting dressed or grooming yourself? Yes   Do you have difficulty using the toilet? Yes   Do you have difficulty moving around from place to place? Yes   Do you have trouble with steps or getting out of a bed or a chair? Yes   Current Diet Well Balanced Diet   Dental Exam Not up to date   Eye Exam Up to date   Exercise (times per week) 2 times per week   Current Exercise Activities Include Light Weight/Kettebells   Do you need help using the phone?  No   Are you deaf or do you have serious difficulty hearing?  No   Do you need help with transportation? Yes   Do you need help shopping? Yes   Do you need help preparing meals?  Yes   Do you need help with housework?  Yes   Do you need help with laundry? Yes   Do you need help taking your medications? Yes   Do you need help managing money? Yes   Do you ever drive or ride in a car without wearing a seat belt? Yes   Have you felt unusual stress, anger or loneliness  in the last month? No   Who do you live with? Community   If you need help, do you have trouble finding someone available to you? No   Have you been bothered in the last four weeks by sexual problems? No   Do you have difficulty concentrating, remembering or making decisions? No       Does the patient have evidence of cognitive impairment? No    Aspirin use counseling: Taking ASA appropriately as indicated      Recent Lab Results:  Ordered.    Age-appropriate Screening Schedule:  Refer to the list below for future screening recommendations based on patient's age, sex and/or medical conditions. Orders for these recommended tests are listed in the plan section. The patient has been provided with a written plan.    Health Maintenance   Topic Date Due   • DXA SCAN  Never done   • INFLUENZA VACCINE  08/01/2021   • LIPID PANEL  08/19/2021   • TDAP/TD VACCINES (2 - Td or Tdap) 04/11/2027   • ZOSTER VACCINE  Addressed        Subjective   History of Present Illness    Pati Carter is a 89 y.o. female who presents for an Subsequent Wellness Visit. Pt lives at [facility] in [place]. She is accompanied by her daughter to today's appt.  Continues to have overactive bladder symptoms with urinary urge stress incontinence and nocturia that is very bothersome for pt. She has to get up several times at night for urination. Has had UTI's in past. Recent cultures have been negative. Followed by  urology. Has tried estrogen ring; caused discomfort. Does not always use estrogen cream like is ordered. Taking mybetriq doesn't help much but wants to try to continue.     The following portions of the patient's history were reviewed and updated as appropriate: allergies, current medications, past family history, past medical history, past social history, past surgical history and problem list.    Outpatient Medications Prior to Visit   Medication Sig Dispense Refill   • acetaminophen (TYLENOL) 500 MG tablet Take 2  tablets by mouth Every 8 (Eight) Hours.     • albuterol sulfate  (90 Base) MCG/ACT inhaler Inhale 2 puffs Every 4 (Four) Hours As Needed for Wheezing or Shortness of Air. 3 inhaler 4   • aspirin 81 MG EC tablet Take 1 tablet by mouth Daily. 30 tablet 10   • cefdinir (OMNICEF) 300 MG capsule Take 1 capsule by mouth 2 (Two) Times a Day for 7 days. 14 capsule 0   • Cholecalciferol (PA VITAMIN D-3 PO) Take 2,000 Units by mouth Daily.     • diclofenac (VOLTAREN) 1 % gel gel Apply 2 g topically to the appropriate area as directed 3 (Three) Times a Day. 1 tube 2   • dilTIAZem CD (CARDIZEM CD) 120 MG 24 hr capsule Take 1 capsule by mouth Daily. 21 capsule 0   • docusate sodium (COLACE) 100 MG capsule Take 1 capsule by mouth Daily. 30 capsule 10   • famotidine (PEPCID) 20 MG tablet Take 1 tablet by mouth 2 (Two) Times a Day. 60 tablet 10   • Fluticasone Furoate-Vilanterol (Breo Ellipta) 100-25 MCG/INH inhaler Inhale 1 puff Daily.     • folic acid (FOLVITE) 1 MG tablet TAKE ONE TABLET BY MOUTH DAILY 30 tablet 10   • lactobacillus acidophilus (RISAQUAD) capsule capsule Take 1 capsule by mouth Daily.     • melatonin 5 MG tablet tablet Take 1 tablet by mouth At Night As Needed (sleep).     • meloxicam (MOBIC) 7.5 MG tablet      • methotrexate 2.5 MG tablet Take 4 tablets by mouth 1 (One) Time Per Week. Resume methotrexate on 10/5     • Multiple Vitamins-Minerals (CENTRUM SILVER ADULT 50+ PO) Take 1 tablet by mouth Daily.     • Myrbetriq 50 MG tablet sustained-release 24 hour 24 hr tablet      • polyethylene glycol (MIRALAX) 17 g packet Take 17 g by mouth Daily.     • traMADol (ULTRAM) 50 MG tablet      • vitamin B-12 (CYANOCOBALAMIN) 1000 MCG tablet TAKE ONE TABLET BY MOUTH DAILY 30 tablet 10   • ondansetron (ZOFRAN) 4 MG tablet Take 1 tablet by mouth Every 6 (Six) Hours As Needed for Nausea or Vomiting.     • QUEtiapine (SEROquel) 25 MG tablet Take 1 tablet by mouth Every Night.       No facility-administered medications  prior to visit.     Past Medical History:   Diagnosis Date   • Anemia     Description: A.  Dx 2006- borderline intermittent.   • Back pain    • Benign colonic polyp 9/28/2016    Description: A.  Dx 1999.   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  Rule out chronic persistent asthma, COPD, or obliterative bronchiolitis.- Rae   • COVID-19 10/9/2020   • COVID-19 virus infection 10/11/2020   • Degeneration of intervertebral disc of lumbar region     Description: A.  Diagnosed in April 2013 with advanced multilevel with severe spinal stenosis, followed by Dr. Pillai for pain management.   • Gastroesophageal reflux disease    • Hearing loss    • History of colonoscopy 01/01/1999    NORMAL PER PATIENT    • History of mammogram 01/01/2011    NORMAL PER PT    • History of Papanicolaou smear of cervix 01/01/2010    NORMAL PER PT    • History of varicella    • Hyperlipidemia     Description: A.  Dx 2006.   • Hypertension     Description: A. Dx 2001.   • Macular degeneration    • Ovarian mass     Dx 8/15- benign left cystic adnexal mass   • Rheumatoid arthritis (CMS/[facility])     Description: A.  Diagnosed in 2000 and and followed by Dr. Constantino (now Dr. Kaplan). B.  On methotrexate therapy since 2000. C.  Off low-dose prednisone therapy.         Advance Care Planning:  ACP discussion was held with the patient during this visit. Patient has an advance directive (not in EMR), copy requested.    Identification of Risk Factors:  Risk factors include: Advance Directive Discussion  Fall Risk  Osteoporosis Risk  Urinary Incontinence.    Review of Systems   Constitutional: Negative for chills and fever.   Respiratory: Negative for shortness of breath.    Cardiovascular: Negative for chest pain and palpitations.   Gastrointestinal: Negative for abdominal pain, constipation, diarrhea, nausea  "and vomiting.   Genitourinary: Positive for difficulty urinating, frequency and urgency. Negative for dysuria, flank pain and hematuria.   Musculoskeletal: Positive for arthralgias and back pain. Negative for myalgias.   Neurological: Positive for weakness. Negative for dizziness and headaches.   Psychiatric/Behavioral: Negative for self-injury and suicidal ideas. The patient is not nervous/anxious.        Compared to one year ago, the patient feels her physical health is worse.  Compared to one year ago, the patient feels her mental health is the same.    Objective     Physical Exam  HENT:      Head: Normocephalic.   Eyes:      General:         Right eye: No discharge.         Left eye: No discharge.      Extraocular Movements: Extraocular movements intact.      Conjunctiva/sclera: Conjunctivae normal.      Pupils: Pupils are equal, round, and reactive to light.   Cardiovascular:      Rate and Rhythm: Normal rate and regular rhythm.      Heart sounds: Normal heart sounds.   Pulmonary:      Effort: Pulmonary effort is normal.      Breath sounds: Normal breath sounds. No wheezing.   Musculoskeletal:      Cervical back: Normal range of motion.   Lymphadenopathy:      Cervical: No cervical adenopathy.   Skin:     General: Skin is warm and dry.      Findings: No rash.   Neurological:      General: No focal deficit present.      Mental Status: She is alert and oriented to person, place, and time.   Psychiatric:         Mood and Affect: Mood normal.         Thought Content: Thought content normal.         Vitals:    06/01/21 1151   BP: 140/80   Pulse: 95   SpO2: 95%   Weight: 58.1 kg (128 lb)   Height: 162.6 cm (64\")   PainSc:   2       Patient's Body mass index is 21.97 kg/m². indicating that she is within normal range (BMI 18.5-24.9). No BMI management plan needed..      Assessment/Plan   Patient Self-Management and Personalized Health Advice  The patient has been provided with information about: fall prevention and " preventive services including:   · Annual Wellness Visit (AWV).  Discussed DEXA. Rheumatology notes risk of osteoporosis tx outweighs benefits. Focus on fall prevention; cont calcium vit D supplement.  Visit Diagnoses:    ICD-10-CM ICD-9-CM   1. Medicare annual wellness visit, subsequent  Z00.00 V70.0   2. Vitamin D deficiency  E55.9 268.9   3. Overactive bladder  N32.81 596.51   4. Gastroesophageal reflux disease without esophagitis  K21.9 530.81   5. Constipation, unspecified constipation type  K59.00 564.00       Orders Placed This Encounter   Procedures   • Lipid Panel     Needs to be fasting     Standing Status:   Future   • Vitamin D 25 Hydroxy     Standing Status:   Future     Order Specific Question:   Release to patient     Answer:   Immediate   • CBC (No Diff)     Standing Status:   Future     Order Specific Question:   Release to patient     Answer:   Immediate   • Comprehensive Metabolic Panel     Standing Status:   Future     Order Specific Question:   Release to patient     Answer:   Immediate       Outpatient Encounter Medications as of 6/1/2021   Medication Sig Dispense Refill   • acetaminophen (TYLENOL) 500 MG tablet Take 2 tablets by mouth Every 8 (Eight) Hours. (Patient taking differently: Take 1,300 mg by mouth Every 6 (Six) Hours As Needed for Mild Pain .)     • albuterol sulfate  (90 Base) MCG/ACT inhaler Inhale 2 puffs Every 4 (Four) Hours As Needed for Wheezing or Shortness of Air. 3 inhaler 4   • aspirin 81 MG EC tablet Take 1 tablet by mouth Daily. 30 tablet 10   • benzonatate (TESSALON) 100 MG capsule Take 200 mg by mouth 3 (Three) Times a Day As Needed for Cough.     • calcium carbonate, oyster shell, 500 MG tablet tablet Take  by mouth 2 (Two) Times a Day.     • Cholecalciferol (PA VITAMIN D-3 PO) Take 2,000 Units by mouth Daily.     • colchicine 0.6 MG tablet Take 0.6 mg by mouth Daily.     • diclofenac (VOLTAREN) 1 % gel gel Apply 2 g topically to the appropriate area as  directed 3 (Three) Times a Day. 1 tube 2   • dilTIAZem CD (CARDIZEM CD) 120 MG 24 hr capsule Take 1 capsule by mouth Daily. 21 capsule 0   • docusate sodium (COLACE) 100 MG capsule Take 1 capsule by mouth Daily. 30 capsule 10   • famotidine (PEPCID) 20 MG tablet Take 1 tablet by mouth 2 (Two) Times a Day. 60 tablet 10   • Fluticasone Furoate-Vilanterol (Breo Ellipta) 100-25 MCG/INH inhaler Inhale 1 puff Daily.     • folic acid (FOLVITE) 1 MG tablet TAKE ONE TABLET BY MOUTH DAILY 30 tablet 10   • lactobacillus acidophilus (RISAQUAD) capsule capsule Take 1 capsule by mouth Daily.     • melatonin 5 MG tablet tablet Take 1 tablet by mouth At Night As Needed (sleep).     • meloxicam (MOBIC) 7.5 MG tablet      • methenamine (HIPREX) 1 g tablet Take 1 g by mouth 2 (Two) Times a Day With Meals.     • methotrexate 2.5 MG tablet Take 4 tablets by mouth 1 (One) Time Per Week. Resume methotrexate on 10/5     • Multiple Vitamins-Minerals (CENTRUM SILVER ADULT 50+ PO) Take 1 tablet by mouth Daily.     • Myrbetriq 50 MG tablet sustained-release 24 hour 24 hr tablet      • polyethylene glycol (MIRALAX) 17 g packet Take 17 g by mouth Daily.     • QUEtiapine (SEROquel) 25 MG tablet Take 1 tablet by mouth Every Night.     • simethicone (MYLICON) 125 MG chewable tablet Chew 125 mg Every 6 (Six) Hours As Needed for Flatulence.     • traMADol (ULTRAM) 50 MG tablet 50 mg 2 (Two) Times a Day As Needed for Moderate Pain .     • vitamin B-12 (CYANOCOBALAMIN) 1000 MCG tablet TAKE ONE TABLET BY MOUTH DAILY 30 tablet 10   • [DISCONTINUED] cefdinir (OMNICEF) 300 MG capsule Take 1 capsule by mouth 2 (Two) Times a Day for 7 days. 14 capsule 0   • ondansetron (ZOFRAN) 4 MG tablet Take 1 tablet by mouth Every 6 (Six) Hours As Needed for Nausea or Vomiting.       No facility-administered encounter medications on file as of 6/1/2021.       Reviewed use of high risk medication in the elderly: yes, tramadol. use sparingly.  Reviewed for potential of  harmful drug interactions in the elderly: yes, none    Follow Up:  Return in about 1 year (around 6/1/2022), or if symptoms worsen or fail to improve, for Medicare Wellness.     An After Visit Summary and PPPS with all of these plans were given to the patient.         Maddi Villalobos, APRN

## 2021-06-03 ENCOUNTER — TELEPHONE (OUTPATIENT)
Dept: INTERNAL MEDICINE | Facility: CLINIC | Age: 86
End: 2021-06-03

## 2021-06-03 PROBLEM — N17.9 AKI (ACUTE KIDNEY INJURY): Status: RESOLVED | Noted: 2020-09-16 | Resolved: 2021-06-03

## 2021-06-03 PROBLEM — A41.9 SEPSIS (HCC): Status: RESOLVED | Noted: 2020-04-02 | Resolved: 2021-06-03

## 2021-06-03 PROBLEM — A04.9 BACTERIAL COLITIS: Status: RESOLVED | Noted: 2020-09-16 | Resolved: 2021-06-03

## 2021-06-03 PROBLEM — U07.1 COVID-19: Status: RESOLVED | Noted: 2020-10-09 | Resolved: 2021-06-03

## 2021-06-03 RX ORDER — FAMOTIDINE 20 MG/1
20 TABLET, FILM COATED ORAL 2 TIMES DAILY
Qty: 60 TABLET | Refills: 11 | Status: CANCELLED | OUTPATIENT
Start: 2021-06-03

## 2021-06-03 RX ORDER — DOCUSATE SODIUM 100 MG/1
100 CAPSULE, LIQUID FILLED ORAL DAILY
Qty: 30 CAPSULE | Refills: 11 | Status: CANCELLED | OUTPATIENT
Start: 2021-06-03

## 2021-06-03 RX ORDER — FOLIC ACID 1 MG/1
1000 TABLET ORAL DAILY
Qty: 30 TABLET | Refills: 11 | Status: CANCELLED | OUTPATIENT
Start: 2021-06-03

## 2021-06-03 RX ORDER — LANOLIN ALCOHOL/MO/W.PET/CERES
1000 CREAM (GRAM) TOPICAL DAILY
Qty: 30 TABLET | Refills: 11 | Status: CANCELLED | OUTPATIENT
Start: 2021-06-03

## 2021-06-03 RX ORDER — ALBUTEROL SULFATE 90 UG/1
2 AEROSOL, METERED RESPIRATORY (INHALATION) EVERY 4 HOURS PRN
Qty: 18 G | Refills: 11 | Status: CANCELLED | OUTPATIENT
Start: 2021-06-03

## 2021-06-03 RX ORDER — ASPIRIN 81 MG/1
81 TABLET ORAL DAILY
Qty: 30 TABLET | Refills: 11 | Status: CANCELLED | OUTPATIENT
Start: 2021-06-03

## 2021-06-03 RX ORDER — MIRABEGRON 50 MG/1
50 TABLET, FILM COATED, EXTENDED RELEASE ORAL DAILY
Qty: 30 TABLET | Refills: 1 | Status: CANCELLED | COMMUNITY
Start: 2021-06-03

## 2021-06-03 NOTE — TELEPHONE ENCOUNTER
I [facility].  When daughter returns call please see if I am available to speak with.  If not my question for her is, several of her mom's medicines are still under Dr. Bolaños's name and will be needing new prescriptions this summer, so I wanted to get all those updated for pt.  I have orders pending under my visit with pt this week.  My question is I do not see any refill on the diltiazem med since July 2020.  Just wanted to make sure on this med that pt is still taking. I will get all her prescriptions updated except her rheumatology meds to her pharmacy.

## 2021-06-03 NOTE — TELEPHONE ENCOUNTER
Pt daughter returned my call.  She is going to contact pt assisted living where patient stays and clarify if taking the diltiazem, since no recent rx on file from prior pcp. Also, she will let me know which pharmacy to send prescriptions so can be delivered to facility where pt lives. When she calls back can transfer call to [place] if I am not here. Make sure to clarify diltiazem rx and pharmacy.

## 2021-06-07 ENCOUNTER — TELEPHONE (OUTPATIENT)
Dept: INTERNAL MEDICINE | Facility: CLINIC | Age: 86
End: 2021-06-07

## 2021-06-07 NOTE — TELEPHONE ENCOUNTER
Pt's daughter stated that she does take it, and she has a list of the medications that the pt is given at nursing home. Stated she would drop off the list and pharmacy information tomorrow.

## 2021-06-07 NOTE — TELEPHONE ENCOUNTER
----- Message from JANIYA Brown sent at 6/7/2021  4:42 PM EDT -----  See my prior phone note.  Please call pt daughter back.  I am still needing to know for sure if pt is taking the diltiazem. Is on her med list from visit last week but I do not see that any refills have been sent since July last year since with her prior pcp.  Is pt taking diltiazem? All of her other meds are due refills and I need to send thos in. Pt daughter was going to call back with pharmacy info, so meds could be delivered to facility where pt lives.  I need that pharmacy info to send refills.

## 2021-06-08 RX ORDER — SIMETHICONE 125 MG
125 TABLET,CHEWABLE ORAL EVERY 6 HOURS PRN
COMMUNITY
End: 2022-06-08

## 2021-06-08 RX ORDER — METHENAMINE HIPPURATE 1000 MG/1
1 TABLET ORAL 2 TIMES DAILY WITH MEALS
COMMUNITY
End: 2022-03-07 | Stop reason: ALTCHOICE

## 2021-06-08 RX ORDER — COLCHICINE 0.6 MG/1
0.6 TABLET ORAL DAILY
COMMUNITY
End: 2022-06-08

## 2021-06-08 RX ORDER — BENZONATATE 100 MG/1
200 CAPSULE ORAL 3 TIMES DAILY PRN
COMMUNITY
End: 2021-10-05

## 2021-06-08 RX ORDER — CALCIUM CARBONATE 500(1250)
TABLET ORAL 2 TIMES DAILY
COMMUNITY
End: 2022-08-17 | Stop reason: HOSPADM

## 2021-06-15 ENCOUNTER — TELEPHONE (OUTPATIENT)
Dept: INTERNAL MEDICINE | Facility: CLINIC | Age: 86
End: 2021-06-15

## 2021-06-15 NOTE — TELEPHONE ENCOUNTER
Caller: Danette Suresh    Relationship: Emergency Contact    Best call back number: [phone]     What orders are you requesting (i.e. lab or imaging): URINE SAMPLE    In what timeframe would the patient need to come in: ASAP    Where will you receive your lab/imaging services: [facility] [phone]- [facility]    Additional notes:[facility] CALLED STATING THAT THEY BELIEVE PATIENT HAS A UTI. THEY NEED AN ORDER TO COLLECT THE SAMPLE. PATIENT IS FREQUENTLY URINATING AND SHOWING CONFUSION. ASKED FOR IT TO BE MARKED URGENT

## 2021-06-16 ENCOUNTER — TELEPHONE (OUTPATIENT)
Dept: INTERNAL MEDICINE | Facility: CLINIC | Age: 86
End: 2021-06-16

## 2021-06-16 ENCOUNTER — LAB (OUTPATIENT)
Dept: LAB | Facility: HOSPITAL | Age: 86
End: 2021-06-16

## 2021-06-16 DIAGNOSIS — R35.0 URINARY FREQUENCY: Primary | ICD-10-CM

## 2021-06-16 DIAGNOSIS — R35.0 URINARY FREQUENCY: ICD-10-CM

## 2021-06-16 LAB
BILIRUB UR QL STRIP: NEGATIVE
CLARITY UR: ABNORMAL
COLOR UR: YELLOW
GLUCOSE UR STRIP-MCNC: NEGATIVE MG/DL
HGB UR QL STRIP.AUTO: ABNORMAL
KETONES UR QL STRIP: NEGATIVE
LEUKOCYTE ESTERASE UR QL STRIP.AUTO: ABNORMAL
NITRITE UR QL STRIP: NEGATIVE
PH UR STRIP.AUTO: 6 [PH] (ref 5–8)
PROT UR QL STRIP: ABNORMAL
SP GR UR STRIP: 1.02 (ref 1–1.03)
UROBILINOGEN UR QL STRIP: ABNORMAL

## 2021-06-16 PROCEDURE — 87086 URINE CULTURE/COLONY COUNT: CPT

## 2021-06-16 PROCEDURE — 81001 URINALYSIS AUTO W/SCOPE: CPT

## 2021-06-16 NOTE — TELEPHONE ENCOUNTER
JAMI Early from [facility] returned my call and took order for a urine sample for the daughter to bring in for testing.

## 2021-06-16 NOTE — TELEPHONE ENCOUNTER
JAMI Early from [facility] called and took the orders for DC of the Seroquel and to take only one Melatonin at night for sleeping.

## 2021-06-16 NOTE — TELEPHONE ENCOUNTER
----- Message from JANIYA Brown sent at 6/16/2021  7:48 AM EDT -----  Please see pt daughter msg regarding Seroquel.    Can you please contact [facility] - Director of Nursing Jessi Doyle [phone] cell [phone]. My Mom should be on Melatonin at night (one pill) to help with sleeping.     I have dc the seroquel but we will need to call them and let them know or send a copy of the dc order.   Thanks.

## 2021-06-17 ENCOUNTER — PATIENT MESSAGE (OUTPATIENT)
Dept: INTERNAL MEDICINE | Facility: CLINIC | Age: 86
End: 2021-06-17

## 2021-06-17 LAB
BACTERIA SPEC AEROBE CULT: NO GROWTH
BACTERIA UR QL AUTO: ABNORMAL /HPF
COD CRY URNS QL: ABNORMAL /HPF
HYALINE CASTS UR QL AUTO: ABNORMAL /LPF
RBC # UR: ABNORMAL /HPF
REF LAB TEST METHOD: ABNORMAL
SQUAMOUS #/AREA URNS HPF: ABNORMAL /HPF
WBC UR QL AUTO: ABNORMAL /HPF

## 2021-06-17 RX ORDER — CEFUROXIME AXETIL 500 MG/1
500 TABLET ORAL 2 TIMES DAILY
Qty: 14 TABLET | Refills: 0 | Status: SHIPPED | OUTPATIENT
Start: 2021-06-17 | End: 2021-06-24

## 2021-06-17 NOTE — TELEPHONE ENCOUNTER
Maddi Buck, JANIYA 6/17/2021 1:37 PM EDT    Return pt daughter call.  UA looks like UTI, but as we discussed, has looked positive before only to get the culture back and was negative. This one does have 4+ bacteria count, so will start on abx. Will have final culture back tomorrow late afternoon.  ----- Message -----  From: Elba Monson MA  Sent: 6/17/2021 10:43 AM EDT  To: JANIYA Brown  Subject: FW: Non-Urgent Medical Question       ----- Message -----  From: Pati Carter  Sent: 6/17/2021 9:54 AM EDT  To: [facility]  Subject: Non-Urgent Medical Question     Miss Buck, I see the lab tests are in. Would someone be able to call me as I will be away from my computer for the day if I need to  a prescription?     Thank you,    Danette Suresh [phone]

## 2021-06-21 NOTE — PROGRESS NOTES
Hello.  Urine culture again did not grow significant bacteria over the weekend.  Is difficult, when urine analysis looks like bacteria but then doesn't grow anything.  By the time I get culture results, she has already had several days treatment.  If she feels better with the med I gave her she can finish, but I probably would not treat for UTI unless we get a positive culture in future. Let me know if you have any questions.

## 2021-06-23 ENCOUNTER — TELEPHONE (OUTPATIENT)
Dept: INTERNAL MEDICINE | Facility: CLINIC | Age: 86
End: 2021-06-23

## 2021-06-23 NOTE — TELEPHONE ENCOUNTER
Caller: [facility]    Relationship: PROVIDER    Best call back number: [phone]    What orders are you requesting (i.e. lab or imaging): ORDER    In what timeframe would the patient need to come in: ASAP    Where will you receive your lab/imaging services: N/A    Additional notes: [facility] STATED THAT PATIENT WOULD NEED HOME HEALTH ORDER JUST IN CASE PATIENT NEEDED TO GET SERVICES LIKE TAKING OUT CATH FAXED -[phone]

## 2021-06-24 DIAGNOSIS — Z97.8 FOLEY CATHETER IN PLACE: Primary | ICD-10-CM

## 2021-06-24 DIAGNOSIS — R53.81 PHYSICAL DEBILITY: ICD-10-CM

## 2021-06-24 NOTE — TELEPHONE ENCOUNTER
Caller: [facility]    Relationship: Provider    Best call back number: [phone]    What was the call regarding: [facility] IS CALLING FOR AN UPDATE ON THIS ORDER.    Do you require a callback: YES

## 2021-06-25 ENCOUNTER — HOME HEALTH ADMISSION (OUTPATIENT)
Dept: HOME HEALTH SERVICES | Facility: HOME HEALTHCARE | Age: 86
End: 2021-06-25

## 2021-07-02 ENCOUNTER — TELEPHONE (OUTPATIENT)
Dept: INTERNAL MEDICINE | Facility: CLINIC | Age: 86
End: 2021-07-02

## 2021-07-02 NOTE — TELEPHONE ENCOUNTER
Caller: [facility]    Relationship: shelter    Best call back number: [phone]    What orders are you requesting (i.e. lab or imaging): XRAY    In what timeframe would the patient need to come in: ASAP    Where will [facility]     Additional notes: RIANA STATES THAT THE PATIENTS ARM IS SWOLLEN.

## 2021-07-03 NOTE — TELEPHONE ENCOUNTER
Did pt have an injury? Which arm? What part of arm? Swelling can be due to mult different things may need an appt to determine.

## 2021-07-08 NOTE — TELEPHONE ENCOUNTER
Nurse Nneka mayorga/ [facility] stated pt hasn't complained of pain and has full range of motion. She stated fell last week and had a knot and swollen.

## 2021-08-31 ENCOUNTER — TELEPHONE (OUTPATIENT)
Dept: INTERNAL MEDICINE | Facility: CLINIC | Age: 86
End: 2021-08-31

## 2021-08-31 NOTE — TELEPHONE ENCOUNTER
Called Danette pt having an increase in urination in last 24hrs, no signs of confusion although called daughter at 5am to tell her she had a UTI and needed to got to the hospital. No itching or burning.

## 2021-08-31 NOTE — TELEPHONE ENCOUNTER
Danette, daughter, is calling back to check on the order to bring in a specimen to have it checked for a uti.   Please call her back at [phone]

## 2021-08-31 NOTE — TELEPHONE ENCOUNTER
Caller: Danette Suresh    Relationship: Emergency Contact    Best call back number: [phone]    What orders are you requesting (i.e. lab or imaging): URINALYSIS     In what timeframe would the patient need to come in: ASAP    Where will you receive your lab/imaging services: WITH [facility]     Additional notes: PATIENT LIVES IN A [facility]ISTANT [facility]IVING- THINKS PATIENT HAS A UTI WANT TO BRING A URIN SAMPLE TO THE LAB TODAY- WANTS JANIYA [place] TO PLACE A ORDER FOR THIS     PLEASE CALL PATIENTS DAUGHTER WHEN THIS HAS BEEN DONE

## 2021-09-02 ENCOUNTER — OFFICE VISIT (OUTPATIENT)
Dept: INTERNAL MEDICINE | Facility: CLINIC | Age: 86
End: 2021-09-02

## 2021-09-02 ENCOUNTER — LAB (OUTPATIENT)
Dept: LAB | Facility: HOSPITAL | Age: 86
End: 2021-09-02

## 2021-09-02 VITALS
BODY MASS INDEX: 20.94 KG/M2 | SYSTOLIC BLOOD PRESSURE: 152 MMHG | RESPIRATION RATE: 20 BRPM | OXYGEN SATURATION: 95 % | DIASTOLIC BLOOD PRESSURE: 84 MMHG | HEART RATE: 102 BPM | WEIGHT: 122 LBS | TEMPERATURE: 97.7 F

## 2021-09-02 DIAGNOSIS — L97.519 ULCER OF RIGHT FOOT, UNSPECIFIED ULCER STAGE (HCC): ICD-10-CM

## 2021-09-02 DIAGNOSIS — R63.0 ANOREXIA: ICD-10-CM

## 2021-09-02 DIAGNOSIS — I87.2 VENOUS INSUFFICIENCY OF RIGHT LEG: ICD-10-CM

## 2021-09-02 DIAGNOSIS — R63.4 UNEXPLAINED WEIGHT LOSS: ICD-10-CM

## 2021-09-02 DIAGNOSIS — R35.0 URINARY FREQUENCY: Primary | ICD-10-CM

## 2021-09-02 PROCEDURE — 87186 SC STD MICRODIL/AGAR DIL: CPT

## 2021-09-02 PROCEDURE — 87070 CULTURE OTHR SPECIMN AEROBIC: CPT

## 2021-09-02 PROCEDURE — 87205 SMEAR GRAM STAIN: CPT

## 2021-09-02 PROCEDURE — 99213 OFFICE O/P EST LOW 20 MIN: CPT | Performed by: NURSE PRACTITIONER

## 2021-09-02 RX ORDER — CEPHALEXIN 500 MG/1
500 CAPSULE ORAL 2 TIMES DAILY
Qty: 14 CAPSULE | Refills: 0 | Status: SHIPPED | OUTPATIENT
Start: 2021-09-02 | End: 2021-09-09

## 2021-09-02 RX ORDER — COLLAGENASE SANTYL 250 [ARB'U]/G
OINTMENT TOPICAL
COMMUNITY
Start: 2021-08-24 | End: 2022-06-08

## 2021-09-02 RX ORDER — LACTOSE-REDUCED FOOD
1 LIQUID (ML) ORAL 2 TIMES DAILY
Qty: 237 ML | Refills: 11 | Status: SHIPPED | OUTPATIENT
Start: 2021-09-02 | End: 2022-09-02

## 2021-09-02 RX ORDER — IBUPROFEN 200 MG
CAPSULE ORAL
COMMUNITY
End: 2021-10-05 | Stop reason: SDUPTHER

## 2021-09-02 NOTE — PROGRESS NOTES
Pati Carter is a 89 y.o. female who presents for urinary and slow healing sore.    Chief Complaint   Patient presents with   • Urinary Tract Infection   • Foot Injury     Sore on right foot not healing        89 yo female presents with urinary symptoms and a sore on her right foot that is not healing.        Past Medical History:   Diagnosis Date   • Anemia     Description: A.  Dx 2006- borderline intermittent.   • Back pain    • Benign colonic polyp 9/28/2016    Description: A.  Dx 1999.   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)    • Constipation    • COPD (chronic obstructive pulmonary disease) (CMS/[facility])     Description: A.  Rule out chronic persistent asthma, COPD, or obliterative bronchiolitis.- Rae   • COVID-19 virus infection 10/11/2020   • CTS (carpal tunnel syndrome)    • Degeneration of intervertebral disc of lumbar region     Description: A.  Diagnosed in April 2013 with advanced multilevel with severe spinal stenosis, followed by Dr. Pillai for pain management.   • Gastroesophageal reflux disease    • Hearing loss    • History of calcium pyrophosphate deposition disease (CPPD)    • History of colonoscopy 01/01/1999    NORMAL PER PATIENT    • History of mammogram 01/01/2011    NORMAL PER PT    • History of Papanicolaou smear of cervix 01/01/2010    NORMAL PER PT    • History of varicella    • Hyperlipidemia     Description: A.  Dx 2006.   • Hypertension     Description: A. Dx 2001.   • Macular degeneration    • Nocturia    • Osteoporosis    • Ovarian mass     Dx 8/15- benign left cystic adnexal mass   • Overactive bladder    • Pelvic floor dysfunction    • Rheumatoid arthritis (CMS/[facility])     Description: A.  Diagnosed in 2000 and and followed by Dr. Constantino (now Dr. Kaplan). B.  On methotrexate therapy since 2000. C.  Off low-dose prednisone therapy.   • Vitamin D deficiency        Past Surgical History:   Procedure  Laterality Date   • APPENDECTOMY     • CARPAL TUNNEL RELEASE Left 01/01/2003    HISTORY OF NEUROPLASTY DECOMPRESSION MEDIAN NERVE AT CARPAL TUNNEL LEFT   • CATARACT EXTRACTION Bilateral 01/01/2009   • CHOLECYSTECTOMY  01/01/1962   • HIP CANNULATED SCREW PLACEMENT Right 1/25/2020    Procedure: HIP CANNULATED SCREW PLACEMENT RIGHT;  Surgeon: Karlos Blount MD;  Location: [facility];  Service: Orthopedics   • KNEE ARTHROSCOPY Left 01/01/2001    MENISCAL REPAIR   • KYPHOPLASTY  06/18/2015    T11 AND L1 (JOSE A)   • PELVIC LAPAROSCOPY  01/01/1996    REMOVAL OF BENIGN UTERINE AND RIGHT OVARIAN TUMORS   • SALPINGO OOPHORECTOMY Left 08/26/2015    REMOVAL OF LEFT OVARY AND TUBE (benign cystic mass)       Family History   Problem Relation Age of Onset   • Hypertension Mother    • Diabetes Mother        Social History     Socioeconomic History   • Marital status:      Spouse name: Not on file   • Number of children: Not on file   • Years of education: Not on file   • Highest education level: Not on file   Tobacco Use   • Smoking status: Former Smoker   • Smokeless tobacco: Never Used   Substance and Sexual Activity   • Alcohol use: No   • Drug use: No   • Sexual activity: Defer       Allergies   Allergen Reactions   • Ciprofloxacin Other (See Comments) and Unknown - High Severity     Other reaction(s): shaking  HCI TABS/ SHAKING  Other reaction(s): shaking  HCI TABS/ SHAKING   • Levofloxacin Diarrhea and Unknown - High Severity   • Sulfamethoxazole-Trimethoprim Other (See Comments), Rash and Unknown - High Severity     Stomach cramps   Stomach cramps    • Sulfamethoxazole Rash   • Trimethoprim GI Intolerance, Diarrhea and Rash       ROS    Review of Systems   Constitutional: Positive for fatigue, fever (pt reports felt like she had a fever yesterday morning 9/1/21) and unexpected weight loss. Negative for chills.   Respiratory: Negative for cough.    Cardiovascular: Negative for chest pain.   Gastrointestinal:  Negative for abdominal pain, nausea and vomiting.   Genitourinary: Positive for difficulty urinating and frequency. Negative for dysuria, flank pain, hematuria, pelvic pain, pelvic pressure, urgency and vaginal discharge.   Musculoskeletal: Positive for back pain (and flank pain ).   Skin: Negative for rash.   Allergic/Immunologic: Negative for environmental allergies and immunocompromised state.   Neurological: Positive for tremors, light-headedness and confusion. Negative for dizziness.   Hematological: Negative for adenopathy.   Psychiatric/Behavioral: The patient is not nervous/anxious.        Vitals:    09/02/21 1105   BP: 152/84   Pulse: 102   Resp: 20   Temp: 97.7 °F (36.5 °C)   SpO2: 95%   Weight: 55.3 kg (122 lb)   PainSc: 0-No pain         Current Outpatient Medications:   •  acetaminophen (TYLENOL) 500 MG tablet, Take 2 tablets by mouth Every 8 (Eight) Hours. (Patient taking differently: Take 1,300 mg by mouth Every 6 (Six) Hours As Needed for Mild Pain .), Disp: , Rfl:   •  albuterol sulfate  (90 Base) MCG/ACT inhaler, Inhale 2 puffs Every 4 (Four) Hours As Needed for Wheezing or Shortness of Air., Disp: 3 inhaler, Rfl: 4  •  aspirin 81 MG EC tablet, Take 1 tablet by mouth Daily., Disp: 30 tablet, Rfl: 10  •  benzonatate (TESSALON) 100 MG capsule, Take 200 mg by mouth 3 (Three) Times a Day As Needed for Cough., Disp: , Rfl:   •  calcium carbonate (OS-LILLY) 1250 (500 Ca) MG tablet, Take  by mouth., Disp: , Rfl:   •  calcium carbonate, oyster shell, 500 MG tablet tablet, Take  by mouth 2 (Two) Times a Day., Disp: , Rfl:   •  Cholecalciferol (PA VITAMIN D-3 PO), Take 2,000 Units by mouth Daily., Disp: , Rfl:   •  colchicine 0.6 MG tablet, Take 0.6 mg by mouth Daily., Disp: , Rfl:   •  diclofenac (VOLTAREN) 1 % gel gel, Apply 2 g topically to the appropriate area as directed 3 (Three) Times a Day., Disp: 1 tube, Rfl: 2  •  dilTIAZem CD (CARDIZEM CD) 120 MG 24 hr capsule, Take 1 capsule by mouth Daily.,  Disp: 21 capsule, Rfl: 0  •  docusate sodium (COLACE) 100 MG capsule, Take 1 capsule by mouth Daily., Disp: 30 capsule, Rfl: 10  •  famotidine (PEPCID) 20 MG tablet, Take 1 tablet by mouth 2 (Two) Times a Day., Disp: 60 tablet, Rfl: 10  •  Fluticasone Furoate-Vilanterol (Breo Ellipta) 100-25 MCG/INH inhaler, Inhale 1 puff Daily., Disp: , Rfl:   •  folic acid (FOLVITE) 1 MG tablet, TAKE ONE TABLET BY MOUTH DAILY, Disp: 30 tablet, Rfl: 10  •  lactobacillus acidophilus (RISAQUAD) capsule capsule, Take 1 capsule by mouth Daily., Disp:  , Rfl:   •  melatonin 5 MG tablet tablet, Take 1 tablet by mouth At Night As Needed (sleep)., Disp:  , Rfl:   •  meloxicam (MOBIC) 7.5 MG tablet, , Disp: , Rfl:   •  methenamine (HIPREX) 1 g tablet, Take 1 g by mouth 2 (Two) Times a Day With Meals., Disp: , Rfl:   •  methotrexate 2.5 MG tablet, Take 4 tablets by mouth 1 (One) Time Per Week. Resume methotrexate on 10/5, Disp:  , Rfl:   •  Multiple Vitamins-Minerals (CENTRUM SILVER ADULT 50+ PO), Take 1 tablet by mouth Daily., Disp: , Rfl:   •  Myrbetriq 50 MG tablet sustained-release 24 hour 24 hr tablet, , Disp: , Rfl:   •  ondansetron (ZOFRAN) 4 MG tablet, Take 1 tablet by mouth Every 6 (Six) Hours As Needed for Nausea or Vomiting., Disp:  , Rfl:   •  polyethylene glycol (MIRALAX) 17 g packet, Take 17 g by mouth Daily., Disp:  , Rfl:   •  Santyl 250 UNIT/GM ointment, APPLY NICKEL THICK AMOUNT TO WOUND BASE TWICE DAILY, Disp: , Rfl:   •  simethicone (MYLICON) 125 MG chewable tablet, Chew 125 mg Every 6 (Six) Hours As Needed for Flatulence., Disp: , Rfl:   •  traMADol (ULTRAM) 50 MG tablet, 50 mg 2 (Two) Times a Day As Needed for Moderate Pain ., Disp: , Rfl:   •  vitamin B-12 (CYANOCOBALAMIN) 1000 MCG tablet, TAKE ONE TABLET BY MOUTH DAILY, Disp: 30 tablet, Rfl: 10  •  cephalexin (Keflex) 500 MG capsule, Take 1 capsule by mouth 2 (Two) Times a Day for 7 days., Disp: 14 capsule, Rfl: 0  •  Nutritional Supplements (Ensure), Take 1 can by  mouth 2 (two) times a day., Disp: 237 mL, Rfl: 11    PE    Physical Exam  Vitals and nursing note reviewed.   Constitutional:       General: She is not in acute distress.     Appearance: Normal appearance. She is well-developed. She is not diaphoretic.   HENT:      Head: Normocephalic and atraumatic.      Nose: Nose normal.      Mouth/Throat:      Mouth: Mucous membranes are moist.      Pharynx: Oropharynx is clear.   Eyes:      Conjunctiva/sclera: Conjunctivae normal.      Pupils: Pupils are equal, round, and reactive to light.   Cardiovascular:      Rate and Rhythm: Normal rate and regular rhythm.      Pulses: Normal pulses.      Heart sounds: Normal heart sounds. No murmur heard.   No friction rub. No gallop.       Comments: Daughter reports she was recently diagnosed with venous insufficiency in her RLE. Foot is cool to the touvh.  Pulmonary:      Effort: Pulmonary effort is normal.      Breath sounds: Normal breath sounds.   Abdominal:      General: Bowel sounds are normal.      Palpations: Abdomen is soft.      Tenderness: There is no abdominal tenderness.   Musculoskeletal:         General: No tenderness. Normal range of motion.      Cervical back: Normal range of motion and neck supple.      Right lower leg: Edema present.      Left lower leg: Edema present.      Right foot: Normal range of motion.        Feet:    Feet:      Right foot:      Skin integrity: Ulcer present.      Comments: Scant drainage from ulcer   Lymphadenopathy:      Cervical: No cervical adenopathy.   Skin:     General: Skin is warm and dry.      Capillary Refill: Capillary refill takes less than 2 seconds.      Findings: No rash.   Neurological:      General: No focal deficit present.      Mental Status: She is alert and oriented to person, place, and time. Mental status is at baseline.   Psychiatric:         Attention and Perception: Attention and perception normal.         Mood and Affect: Mood and affect normal.         Speech: Speech  normal.         Behavior: Behavior normal. Behavior is cooperative.         Thought Content: Thought content normal.         Cognition and Memory: Cognition is impaired.         Judgment: Judgment normal.      Comments: Daughter reports has noticed moments of confusion           A/P    Problem List Items Addressed This Visit     None      Visit Diagnoses     Urinary frequency    -  Primary    Relevant Medications    cephalexin (Keflex) 500 MG capsule    Other Relevant Orders    Urinalysis With Culture If Indicated - Urine, Clean Catch    Venous insufficiency of right leg        Ulcer of right foot, unspecified ulcer stage (CMS/HCC)        Relevant Orders    Wound Culture - Wound, Foot    Anorexia        Relevant Medications    Nutritional Supplements (Ensure)    Unexplained weight loss        Relevant Medications    Nutritional Supplements (Ensure)          Assessment      ICD-10-CM ICD-9-CM   1. Urinary frequency  R35.0 788.41   2. Venous insufficiency of right leg  I87.2 459.81   3. Ulcer of right foot, unspecified ulcer stage (CMS/HCC)  L97.519 707.15   4. Anorexia  R63.0 783.0   5. Unexplained weight loss  R63.4 783.21            Problems Addressed this Visit     None      Visit Diagnoses     Urinary frequency    -  Primary    Relevant Medications    cephalexin (Keflex) 500 MG capsule    Other Relevant Orders    Urinalysis With Culture If Indicated - Urine, Clean Catch    Venous insufficiency of right leg        Ulcer of right foot, unspecified ulcer stage (CMS/HCC)        Relevant Orders    Wound Culture - Wound, Foot    Anorexia        Relevant Medications    Nutritional Supplements (Ensure)    Unexplained weight loss        Relevant Medications    Nutritional Supplements (Ensure)      Diagnoses       Codes Comments    Urinary frequency    -  Primary ICD-10-CM: R35.0  ICD-9-CM: 788.41     Venous insufficiency of right leg     ICD-10-CM: I87.2  ICD-9-CM: 459.81     Ulcer of right foot, unspecified ulcer stage  (CMS/[facility])     ICD-10-CM: L97.519  ICD-9-CM: 707.15     Anorexia     ICD-10-CM: R63.0  ICD-9-CM: 783.0     Unexplained weight loss     ICD-10-CM: R63.4  ICD-9-CM: 783.21            Plan    Orders Placed This Encounter   Procedures   • Wound Culture - Wound, Foot   • Urinalysis With Culture If Indicated - Urine, Clean Catch     New Medications Ordered This Visit   Medications   • Nutritional Supplements (Ensure)     Sig: Take 1 can by mouth 2 (two) times a day.     Dispense:  237 mL     Refill:  11   • cephalexin (Keflex) 500 MG capsule     Sig: Take 1 capsule by mouth 2 (Two) Times a Day for 7 days.     Dispense:  14 capsule     Refill:  0                 Plan:    Plan of care reviewed with patient at the conclusion of today's visit. Education was provided regarding diagnosis, management and any prescribed or recommended OTC medications.  Patient verbalizes understanding of and agreement with management plan.    Return if symptoms worsen or fail to improve.     JANIYA Pascual

## 2021-09-03 DIAGNOSIS — R39.9 UTI SYMPTOMS: Primary | ICD-10-CM

## 2021-09-05 LAB
BACTERIA SPEC AEROBE CULT: ABNORMAL
GRAM STN SPEC: ABNORMAL

## 2021-09-22 ENCOUNTER — TELEPHONE (OUTPATIENT)
Dept: INTERNAL MEDICINE | Facility: CLINIC | Age: 86
End: 2021-09-22

## 2021-09-22 NOTE — TELEPHONE ENCOUNTER
Caller: CHINMAY    Relationship to patient: GRAHAM     Best call back number: [phone]    Patient is needing: CHINMAY STATES THE PATIENT HAS LOST ABOUT 10LBS IN A MONTH AND WANTING TO KNOW IF AN APPETITE STIMULANT COULD BE STARTED

## 2021-09-23 RX ORDER — MEGESTROL ACETATE 40 MG/1
40 TABLET ORAL DAILY
Qty: 30 TABLET | Refills: 5 | Status: SHIPPED | OUTPATIENT
Start: 2021-09-23 | End: 2022-06-08 | Stop reason: SINTOL

## 2021-09-23 NOTE — TELEPHONE ENCOUNTER
Let them know I placed order for Megace 40mg tab; 1 tab by mouth daily.  Pt lives at [place]. I sent rx to pharmacy on file [facility].

## 2021-09-23 NOTE — TELEPHONE ENCOUNTER
Called and spoke to Nneka and gave her the medication information and instruction. She verbalized her understanding and stated she would find a way to get it from [facility].

## 2021-10-05 ENCOUNTER — TELEPHONE (OUTPATIENT)
Dept: INTERNAL MEDICINE | Facility: CLINIC | Age: 86
End: 2021-10-05

## 2021-10-05 ENCOUNTER — OFFICE VISIT (OUTPATIENT)
Dept: INTERNAL MEDICINE | Facility: CLINIC | Age: 86
End: 2021-10-05

## 2021-10-05 ENCOUNTER — LAB (OUTPATIENT)
Dept: LAB | Facility: HOSPITAL | Age: 86
End: 2021-10-05

## 2021-10-05 VITALS
DIASTOLIC BLOOD PRESSURE: 80 MMHG | SYSTOLIC BLOOD PRESSURE: 142 MMHG | HEIGHT: 64 IN | OXYGEN SATURATION: 94 % | HEART RATE: 77 BPM | BODY MASS INDEX: 20.83 KG/M2 | WEIGHT: 122 LBS

## 2021-10-05 DIAGNOSIS — R35.0 URINARY FREQUENCY: ICD-10-CM

## 2021-10-05 DIAGNOSIS — R35.1 NOCTURIA: ICD-10-CM

## 2021-10-05 DIAGNOSIS — R35.0 URINARY FREQUENCY: Primary | ICD-10-CM

## 2021-10-05 PROCEDURE — 81001 URINALYSIS AUTO W/SCOPE: CPT

## 2021-10-05 PROCEDURE — 87086 URINE CULTURE/COLONY COUNT: CPT

## 2021-10-05 PROCEDURE — 99213 OFFICE O/P EST LOW 20 MIN: CPT

## 2021-10-05 RX ORDER — CEFDINIR 300 MG/1
300 CAPSULE ORAL 2 TIMES DAILY
Qty: 14 CAPSULE | Refills: 0 | Status: SHIPPED | OUTPATIENT
Start: 2021-10-05 | End: 2021-10-07

## 2021-10-05 NOTE — PROGRESS NOTES
Chief Complaint  Urinary Tract Infection (Pt c/o frequency, confusion and leaking)    Pati Carter presents to [facility]    HPI: Patient is accompanied by her daughter who is acting as the historian. Confusion, increased urination, and increased frequency x 2-3 days. Daughter reports that patient usually displays these symptoms when she has a UTI. Has been treated for multiple UTI's over the past year. Also endorses nocturia and nocturnal incontinence that has been prevalent for the past couple of months. Has been following with a urologist at  and has tried multiple medications for nocturia and nocturnal incontinence with no improvement in symptoms. States that patient has not been able to sleep the last few nights due to nocturia. Reports that patient has also been hallucinating and seeing cats in garbage cans. Pain is rated as a 0 out of 10.       Subjective         Health Maintenance   Topic   • DXA SCAN    • INFLUENZA VACCINE    • ANNUAL WELLNESS VISIT    • LIPID PANEL    • TDAP/TD VACCINES (2 - Td or Tdap)   • COVID-19 Vaccine    • Pneumococcal Vaccine 65+    • ZOSTER VACCINE        [facility]  The following portions of the patient's history were reviewed and updated as appropriate: allergies, current medications, past family history, past medical history, past social history, past surgical history and problem list.     Past Medical History:   Diagnosis Date   • Anemia     Description: A.  Dx 2006- borderline intermittent.   • Back pain    • Benign colonic polyp 9/28/2016    Description: A.  Dx 1999.   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  Rule out chronic persistent asthma, COPD, or obliterative bronchiolitis.Benitez Rae   • COVID-19 virus infection 10/11/2020   • CTS (carpal tunnel syndrome)    •  Degeneration of intervertebral disc of lumbar region     Description: A.  Diagnosed in April 2013 with advanced multilevel with severe spinal stenosis, followed by Dr. Pillai for pain management.   • Gastroesophageal reflux disease    • Hearing loss    • History of calcium pyrophosphate deposition disease (CPPD)    • History of colonoscopy 01/01/1999    NORMAL PER PATIENT    • History of mammogram 01/01/2011    NORMAL PER PT    • History of Papanicolaou smear of cervix 01/01/2010    NORMAL PER PT    • History of varicella    • Hyperlipidemia     Description: A.  Dx 2006.   • Hypertension     Description: A. Dx 2001.   • Macular degeneration    • Nocturia    • Osteoporosis    • Ovarian mass     Dx 8/15- benign left cystic adnexal mass   • Overactive bladder    • Pelvic floor dysfunction    • Rheumatoid arthritis (HCC)     Description: A.  Diagnosed in 2000 and and followed by Dr. Constantino (now Dr. Kaplan). B.  On methotrexate therapy since 2000. C.  Off low-dose prednisone therapy.   • Vitamin D deficiency       Allergies   Allergen Reactions   • Ciprofloxacin Other (See Comments) and Unknown - High Severity     Other reaction(s): shaking  HCI TABS/ SHAKING  Other reaction(s): shaking  HCI TABS/ SHAKING   • Levofloxacin Diarrhea and Unknown - High Severity   • Sulfamethoxazole-Trimethoprim Other (See Comments), Rash and Unknown - High Severity     Stomach cramps   Stomach cramps    • Sulfamethoxazole Rash   • Trimethoprim GI Intolerance, Diarrhea and Rash      Social History     Tobacco Use   • Smoking status: Former Smoker   • Smokeless tobacco: Never Used   Substance Use Topics   • Alcohol use: No   • Drug use: No     Past Surgical History:   Procedure Laterality Date   • APPENDECTOMY     • CARPAL TUNNEL RELEASE Left 01/01/2003    HISTORY OF NEUROPLASTY DECOMPRESSION MEDIAN NERVE AT CARPAL TUNNEL LEFT   • CATARACT EXTRACTION Bilateral 01/01/2009   • CHOLECYSTECTOMY  01/01/1962   • HIP CANNULATED SCREW PLACEMENT  Right 1/25/2020    Procedure: HIP CANNULATED SCREW PLACEMENT RIGHT;  Surgeon: Karlos Blount MD;  Location: [facility];  Service: Orthopedics   • KNEE ARTHROSCOPY Left 01/01/2001    MENISCAL REPAIR   • KYPHOPLASTY  06/18/2015    T11 AND L1 (JOSE A)   • PELVIC LAPAROSCOPY  01/01/1996    REMOVAL OF BENIGN UTERINE AND RIGHT OVARIAN TUMORS   • SALPINGO OOPHORECTOMY Left 08/26/2015    REMOVAL OF LEFT OVARY AND TUBE (benign cystic mass)      Family History   Problem Relation Age of Onset   • Hypertension Mother    • Diabetes Mother          Current Outpatient Medications:   •  acetaminophen (TYLENOL) 500 MG tablet, Take 2 tablets by mouth Every 8 (Eight) Hours. (Patient taking differently: Take 1,300 mg by mouth Every 6 (Six) Hours As Needed for Mild Pain .), Disp: , Rfl:   •  albuterol sulfate  (90 Base) MCG/ACT inhaler, Inhale 2 puffs Every 4 (Four) Hours As Needed for Wheezing or Shortness of Air., Disp: 3 inhaler, Rfl: 4  •  aspirin 81 MG EC tablet, Take 1 tablet by mouth Daily., Disp: 30 tablet, Rfl: 10  •  calcium carbonate, oyster shell, 500 MG tablet tablet, Take  by mouth 2 (Two) Times a Day., Disp: , Rfl:   •  Cholecalciferol (PA VITAMIN D-3 PO), Take 2,000 Units by mouth Daily., Disp: , Rfl:   •  colchicine 0.6 MG tablet, Take 0.6 mg by mouth Daily., Disp: , Rfl:   •  diclofenac (VOLTAREN) 1 % gel gel, Apply 2 g topically to the appropriate area as directed 3 (Three) Times a Day., Disp: 1 tube, Rfl: 2  •  dilTIAZem CD (CARDIZEM CD) 120 MG 24 hr capsule, Take 1 capsule by mouth Daily., Disp: 21 capsule, Rfl: 0  •  docusate sodium (COLACE) 100 MG capsule, Take 1 capsule by mouth Daily., Disp: 30 capsule, Rfl: 10  •  famotidine (PEPCID) 20 MG tablet, Take 1 tablet by mouth 2 (Two) Times a Day., Disp: 60 tablet, Rfl: 10  •  Fluticasone Furoate-Vilanterol (Breo Ellipta) 100-25 MCG/INH inhaler, Inhale 1 puff Daily., Disp: , Rfl:   •  folic acid (FOLVITE) 1 MG tablet, TAKE ONE TABLET BY MOUTH DAILY, Disp:  30 tablet, Rfl: 10  •  lactobacillus acidophilus (RISAQUAD) capsule capsule, Take 1 capsule by mouth Daily., Disp:  , Rfl:   •  megestrol (MEGACE) 40 MG tablet, Take 1 tablet by mouth Daily., Disp: 30 tablet, Rfl: 5  •  melatonin 5 MG tablet tablet, Take 1 tablet by mouth At Night As Needed (sleep)., Disp:  , Rfl:   •  meloxicam (MOBIC) 7.5 MG tablet, , Disp: , Rfl:   •  methenamine (HIPREX) 1 g tablet, Take 1 g by mouth 2 (Two) Times a Day With Meals., Disp: , Rfl:   •  methotrexate 2.5 MG tablet, Take 4 tablets by mouth 1 (One) Time Per Week. Resume methotrexate on 10/5, Disp:  , Rfl:   •  Multiple Vitamins-Minerals (CENTRUM SILVER ADULT 50+ PO), Take 1 tablet by mouth Daily., Disp: , Rfl:   •  Myrbetriq 50 MG tablet sustained-release 24 hour 24 hr tablet, , Disp: , Rfl:   •  Nutritional Supplements (Ensure), Take 1 can by mouth 2 (two) times a day., Disp: 237 mL, Rfl: 11  •  ondansetron (ZOFRAN) 4 MG tablet, Take 1 tablet by mouth Every 6 (Six) Hours As Needed for Nausea or Vomiting., Disp:  , Rfl:   •  polyethylene glycol (MIRALAX) 17 g packet, Take 17 g by mouth Daily., Disp:  , Rfl:   •  Santyl 250 UNIT/GM ointment, APPLY NICKEL THICK AMOUNT TO WOUND BASE TWICE DAILY, Disp: , Rfl:   •  simethicone (MYLICON) 125 MG chewable tablet, Chew 125 mg Every 6 (Six) Hours As Needed for Flatulence., Disp: , Rfl:   •  traMADol (ULTRAM) 50 MG tablet, 50 mg 2 (Two) Times a Day As Needed for Moderate Pain ., Disp: , Rfl:   •  vitamin B-12 (CYANOCOBALAMIN) 1000 MCG tablet, TAKE ONE TABLET BY MOUTH DAILY, Disp: 30 tablet, Rfl: 10  •  cefdinir (OMNICEF) 300 MG capsule, Take 1 capsule by mouth 2 (Two) Times a Day for 7 days., Disp: 14 capsule, Rfl: 0    Review of Systems   Constitutional: Negative for chills and fever.   Respiratory: Negative for apnea, cough, choking, chest tightness, shortness of breath, wheezing and stridor.    Cardiovascular: Negative for chest pain, palpitations and leg swelling.   Gastrointestinal:  "Negative for abdominal distention, abdominal pain, anal bleeding, blood in stool, constipation, diarrhea, nausea, rectal pain, vomiting, GERD and indigestion.   Genitourinary: Positive for difficulty urinating, dysuria, frequency, urgency and urinary incontinence. Negative for amenorrhea, breast discharge, breast lump, breast pain, decreased libido, decreased urine volume, dyspareunia, flank pain, genital sores, hematuria, menstrual problem, pelvic pain, pelvic pressure, vaginal bleeding, vaginal discharge and vaginal pain.   Musculoskeletal: Negative for arthralgias, back pain, gait problem, joint swelling, myalgias, neck pain, neck stiffness and bursitis.       Objective   Vital Signs  /80   Pulse 77   Ht 162.6 cm (64\")   Wt 55.3 kg (122 lb)   SpO2 94%   BMI 20.94 kg/m²     Physical Exam  Constitutional:       Appearance: Normal appearance.   HENT:      Head: Normocephalic.      Mouth/Throat:      Mouth: Mucous membranes are dry.      Pharynx: Oropharynx is clear.   Eyes:      Extraocular Movements: Extraocular movements intact.      Conjunctiva/sclera: Conjunctivae normal.      Pupils: Pupils are equal, round, and reactive to light.   Cardiovascular:      Rate and Rhythm: Normal rate and regular rhythm.      Pulses: Normal pulses.      Heart sounds: Normal heart sounds.   Pulmonary:      Effort: Pulmonary effort is normal.      Breath sounds: Normal breath sounds.   Abdominal:      General: Bowel sounds are normal.   Skin:     General: Skin is warm.      Capillary Refill: Capillary refill takes less than 2 seconds.   Neurological:      Mental Status: She is alert. She is disoriented.      GCS: GCS eye subscore is 4. GCS verbal subscore is 4. GCS motor subscore is 6.      Comments: Disoriented to place.    Psychiatric:         Attention and Perception: She perceives visual hallucinations.         Mood and Affect: Mood normal.         Behavior: Behavior normal.          Result Review :     The following " data was reviewed by: JANIYA Ellison on 10/05/2021:  [facility]    CMP 10/13/20 10/15/20   Glucose 116 (A) 101 (A)   BUN 30 (A) 32 (A)   Creatinine 0.96 0.79   eGFR Non African Am 55 (A) 68   Sodium 142 140   Potassium 3.9 4.4   Chloride 109 (A) 108 (A)   Calcium 7.9 (A) 8.3   Albumin 2.60 (A) 2.80 (A)   Total Bilirubin <0.2 0.2   Alkaline Phosphatase 47 55   AST (SGOT) 13 21   ALT (SGPT) 7 11   (A) Abnormal value       Comments are available for some flowsheets but are not being displayed.             Assessment and Plan    1. Urinary frequency  - cefdinir (OMNICEF) 300 MG capsule; Take 1 capsule by mouth 2 (Two) Times a Day for 7 days.  Dispense: 14 capsule; Refill: 0  - Patient was unable to provide a urine specimen for dipstick today in office. However she was able to drop off a urine sample to the lab. Will treat for UTI at this time due to onset of confusion. Will d/c antibiotic if no evidence of UTI. Discussed with patient's daughter how these frequent onsets of confusion could likely be related to delirium from the patient's lack of sleep rather than UTIs. Patient's daughter stated that she had similar symptoms while hospitalized once and was given Seroquel and the symptoms improved after a couple of nights. Reports that patient cannot sleep due to nocturia and nocturnal incontinence. A referral has been place to urology at the daughter's request for a second opinion.   2. Nocturia  - Ambulatory Referral to Urology      Follow Up     Return for Next scheduled follow up.    Patient was given instructions and counseling regarding her condition or for health maintenance advice. Please see specific information pulled into the AVS if appropriate.    Part of this note may be an electronic transcription/translation of spoken language to printed text using the Dragon Dictation System.    Electronically signed by:   JANIYA Ellison  10/05/2021

## 2021-10-05 NOTE — TELEPHONE ENCOUNTER
Caller: Pati Carter    Relationship to patient: Self    Best call back number: [phone]    Chief complaint: CONFUSION, LEAKING URINE    Patient directed to call 911 or go to their nearest emergency room. YES     Patient verbalized understanding: [] Yes  [x] No  If no, why? PATIENT LIVES NEAR BY AND DID NOT WANT TO GO TO THE ER    Additional notes:

## 2021-10-06 LAB
BACTERIA UR QL AUTO: ABNORMAL /HPF
BILIRUB UR QL STRIP: NEGATIVE
CLARITY UR: CLEAR
COLOR UR: ABNORMAL
GLUCOSE UR STRIP-MCNC: NEGATIVE MG/DL
HGB UR QL STRIP.AUTO: NEGATIVE
HYALINE CASTS UR QL AUTO: ABNORMAL /LPF
KETONES UR QL STRIP: NEGATIVE
LEUKOCYTE ESTERASE UR QL STRIP.AUTO: ABNORMAL
NITRITE UR QL STRIP: NEGATIVE
PH UR STRIP.AUTO: 5.5 [PH] (ref 5–8)
PROT UR QL STRIP: ABNORMAL
RBC # UR: ABNORMAL /HPF
REF LAB TEST METHOD: ABNORMAL
SP GR UR STRIP: 1.03 (ref 1–1.03)
SQUAMOUS #/AREA URNS HPF: ABNORMAL /HPF
UROBILINOGEN UR QL STRIP: ABNORMAL
WBC UR QL AUTO: ABNORMAL /HPF

## 2021-10-07 ENCOUNTER — TELEPHONE (OUTPATIENT)
Dept: INTERNAL MEDICINE | Facility: CLINIC | Age: 86
End: 2021-10-07

## 2021-10-07 DIAGNOSIS — R44.3 HALLUCINATIONS: Primary | ICD-10-CM

## 2021-10-07 LAB — BACTERIA SPEC AEROBE CULT: NO GROWTH

## 2021-11-03 ENCOUNTER — TELEPHONE (OUTPATIENT)
Dept: INTERNAL MEDICINE | Facility: CLINIC | Age: 86
End: 2021-11-03

## 2021-11-03 NOTE — TELEPHONE ENCOUNTER
Mary @ [facility] called requesting orders for PT/OT/Speech.  States several requests have been faxed.  Did  Not see in records of faxed orders.      Pt is experiencing a functional decline, difficulty swalling, trouble w/ ADL's & mobility.    Ph:  [phone]  F:  [phone]

## 2021-11-08 NOTE — TELEPHONE ENCOUNTER
Please return call. I am fine with a verbal order for PT OT speech therapy.  I have not vd fax for this.

## 2021-11-09 NOTE — TELEPHONE ENCOUNTER
Called and spoke to Mary and she verified the verbal order but stated she has to have a signature, will fax again. If not received by 2pm today I will provide my email to have sent that way.

## 2021-11-09 NOTE — TELEPHONE ENCOUNTER
Fax received, signed by kSye and faxed back to facility. Called and informed SKYE at [facility]

## 2021-11-11 ENCOUNTER — OFFICE VISIT (OUTPATIENT)
Dept: UROLOGY | Facility: CLINIC | Age: 86
End: 2021-11-11

## 2021-11-11 VITALS
HEART RATE: 76 BPM | BODY MASS INDEX: 21.03 KG/M2 | SYSTOLIC BLOOD PRESSURE: 138 MMHG | DIASTOLIC BLOOD PRESSURE: 78 MMHG | OXYGEN SATURATION: 98 % | WEIGHT: 123.2 LBS | TEMPERATURE: 96.6 F | HEIGHT: 64 IN

## 2021-11-11 DIAGNOSIS — N39.46 MIXED STRESS AND URGE URINARY INCONTINENCE: Primary | ICD-10-CM

## 2021-11-11 DIAGNOSIS — R35.1 NOCTURIA: ICD-10-CM

## 2021-11-11 PROCEDURE — 51798 US URINE CAPACITY MEASURE: CPT | Performed by: STUDENT IN AN ORGANIZED HEALTH CARE EDUCATION/TRAINING PROGRAM

## 2021-11-11 PROCEDURE — 99204 OFFICE O/P NEW MOD 45 MIN: CPT | Performed by: STUDENT IN AN ORGANIZED HEALTH CARE EDUCATION/TRAINING PROGRAM

## 2021-11-11 RX ORDER — AMMONIUM LACTATE 120 MG/G
1 CREAM TOPICAL 2 TIMES DAILY
COMMUNITY
End: 2022-06-08

## 2021-11-11 RX ORDER — BENZONATATE 100 MG/1
100 CAPSULE ORAL 3 TIMES DAILY PRN
COMMUNITY
End: 2022-06-08

## 2021-11-11 NOTE — PROGRESS NOTES
LUTS Female Office Visit      Patient Name: Pati Carter  : 1930   MRN: [id number]     Chief Complaint:  Lower Urinary Tract Symptoms.   Chief Complaint   Patient presents with   • New Patient   • Nocturia     x 1 year.   Second opinion       Referring Provider: Dustin Bennett APRN    History of Present Illness: Mr. Carter is a 89 y.o. female with history lower urinary tract symptoms, primarily nocturia.  She follows with Dr. Bowen at [facility] as well as her physicians assistant.  Patient has been struggling with nocturia for a number of years.  I reviewed all the outside records from the [facility], she has previously been on trospium for urgency, frequency and significant nocturia up to five times or more night.  She had initial concern for recurrent UTI and there are multiple urinalysis with pyuria.  She was placed on methenamine hippurate with significant improvement in her UTI frequency.  She states that a assisted living facility.  She has also been using vaginal estrogen cream, she was transitioned to a vaginal estrogen ring which she did not tolerate and was removed.  Due to significant nocturia, the patient was even trialed on constant indwelling catheter, her daughter reports that this was removed after one or 2 weeks due to concern for UTI potentially.  Daughter also states patient did not tolerate catheter very much.    She does have a history of constipation and will go a few days between bowel movements.  She is on a bowel regimen with bisacodyl p.o. and MiraLAX daily.    Daughter states that she is here today for second opinion, they have been offered intravesical Botox as a potential option for her ongoing nocturia which is significantly affecting her quality of sleep.    The patient's daughter states that she uses diapers and is often unable to get to the bathroom in time at night.  She reports in the past they have gone through 8 diapers a night.   She has mild stress urinary incontinence at this time as well.    She has been taking mirabegron 50 mg daily and they are not sure if this is helping.    Postvoid residual bladder scan 18 mL today.    Reviewed the patient's CT scan performed 9/22/2020, this demonstrates moderately atrophic bilateral kidneys, a left upper pole renal lesion which appears to be a hyperdense cyst and is not obviously enhancing.    Previous treatments include: Trospium (discontinued), mirabegron 50 mg daily, methenamine hippurate, vaginal estrogen cream    Subjective      Review of System: Review of Systems   Constitutional: Negative for chills, fatigue, fever and unexpected weight change.   HENT: Negative for sore throat.    Eyes: Negative for visual disturbance.   Respiratory: Negative for cough, chest tightness and shortness of breath.    Cardiovascular: Negative for chest pain and leg swelling.   Gastrointestinal: Negative for blood in stool, constipation, diarrhea, nausea, rectal pain and vomiting.   Genitourinary: Positive for frequency. Negative for decreased urine volume, difficulty urinating, dysuria, enuresis, flank pain, genital sores, hematuria and urgency.   Musculoskeletal: Negative for back pain and joint swelling.   Skin: Negative for rash and wound.   Neurological: Negative for seizures, speech difficulty, weakness and headaches.   Psychiatric/Behavioral: Negative for confusion, sleep disturbance and suicidal ideas. The patient is not nervous/anxious.       I have reviewed the ROS documented by my clinical staff, I have updated appropriately and I agree. Hiram Yao MD    Past Medical History:  Past Medical History:   Diagnosis Date   • Anemia     Description: A.  Dx 2006- borderline intermittent.   • Back pain    • Benign colonic polyp 9/28/2016    Description: A.  Dx 1999.   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  Rule out chronic persistent asthma, COPD, or obliterative bronchiolitis.- Rae   • COVID-19 virus infection 10/11/2020   • CTS (carpal tunnel syndrome)    • Degeneration of intervertebral disc of lumbar region     Description: A.  Diagnosed in April 2013 with advanced multilevel with severe spinal stenosis, followed by Dr. Pillai for pain management.   • Gastroesophageal reflux disease    • Hearing loss    • History of calcium pyrophosphate deposition disease (CPPD)    • History of colonoscopy 01/01/1999    NORMAL PER PATIENT    • History of mammogram 01/01/2011    NORMAL PER PT    • History of Papanicolaou smear of cervix 01/01/2010    NORMAL PER PT    • History of varicella    • Hyperlipidemia     Description: A.  Dx 2006.   • Hypertension     Description: A. Dx 2001.   • Macular degeneration    • Nocturia    • Osteoporosis    • Ovarian mass     Dx 8/15- benign left cystic adnexal mass   • Overactive bladder    • Pelvic floor dysfunction    • Rheumatoid arthritis (HCC)     Description: A.  Diagnosed in 2000 and and followed by Dr. Constantino (now Dr. Kaplan). B.  On methotrexate therapy since 2000. C.  Off low-dose prednisone therapy.   • Vitamin D deficiency        Past Surgical History:  Past Surgical History:   Procedure Laterality Date   • APPENDECTOMY     • CARPAL TUNNEL RELEASE Left 01/01/2003    HISTORY OF NEUROPLASTY DECOMPRESSION MEDIAN NERVE AT CARPAL TUNNEL LEFT   • CATARACT EXTRACTION Bilateral 01/01/2009   • CHOLECYSTECTOMY  01/01/1962   • HIP CANNULATED SCREW PLACEMENT Right 1/25/2020    Procedure: HIP CANNULATED SCREW PLACEMENT RIGHT;  Surgeon: Karlos Blount MD;  Location: [facility];  Service: Orthopedics   • KNEE ARTHROSCOPY Left 01/01/2001    MENISCAL REPAIR   • KYPHOPLASTY  06/18/2015    T11 AND L1 (JOSE A)   • PELVIC LAPAROSCOPY  01/01/1996    REMOVAL OF BENIGN UTERINE AND RIGHT OVARIAN TUMORS   • SALPINGO  OOPHORECTOMY Left 08/26/2015    REMOVAL OF LEFT OVARY AND TUBE (benign cystic mass)       Medications:    Current Outpatient Medications:   •  acetaminophen (TYLENOL) 500 MG tablet, Take 2 tablets by mouth Every 8 (Eight) Hours. (Patient taking differently: Take 1,300 mg by mouth Every 6 (Six) Hours As Needed for Mild Pain .), Disp: , Rfl:   •  albuterol sulfate  (90 Base) MCG/ACT inhaler, Inhale 2 puffs Every 4 (Four) Hours As Needed for Wheezing or Shortness of Air., Disp: 3 inhaler, Rfl: 4  •  ammonium lactate (AMLACTIN) 12 % cream, Apply 1 application topically to the appropriate area as directed 2 (Two) Times a Day., Disp: , Rfl:   •  aspirin 81 MG EC tablet, Take 1 tablet by mouth Daily., Disp: 30 tablet, Rfl: 10  •  benzonatate (TESSALON) 100 MG capsule, Take 100 mg by mouth 3 (Three) Times a Day As Needed for Cough., Disp: , Rfl:   •  BISACODYL PO, Insert 1 suppository into the rectum As Needed., Disp: , Rfl:   •  calcium carbonate, oyster shell, 500 MG tablet tablet, Take  by mouth 2 (Two) Times a Day., Disp: , Rfl:   •  Cholecalciferol (PA VITAMIN D-3 PO), Take 2,000 Units by mouth Daily., Disp: , Rfl:   •  colchicine 0.6 MG tablet, Take 0.6 mg by mouth Daily., Disp: , Rfl:   •  diclofenac (VOLTAREN) 1 % gel gel, Apply 2 g topically to the appropriate area as directed 3 (Three) Times a Day., Disp: 1 tube, Rfl: 2  •  dilTIAZem CD (CARDIZEM CD) 120 MG 24 hr capsule, Take 1 capsule by mouth Daily., Disp: 21 capsule, Rfl: 0  •  docusate sodium (COLACE) 100 MG capsule, Take 1 capsule by mouth Daily., Disp: 30 capsule, Rfl: 10  •  famotidine (PEPCID) 20 MG tablet, Take 1 tablet by mouth 2 (Two) Times a Day., Disp: 60 tablet, Rfl: 10  •  Fluticasone Furoate-Vilanterol (Breo Ellipta) 100-25 MCG/INH inhaler, Inhale 1 puff Daily., Disp: , Rfl:   •  folic acid (FOLVITE) 1 MG tablet, TAKE ONE TABLET BY MOUTH DAILY, Disp: 30 tablet, Rfl: 10  •  lactobacillus acidophilus (RISAQUAD) capsule capsule, Take 1 capsule  by mouth Daily., Disp:  , Rfl:   •  megestrol (MEGACE) 40 MG tablet, Take 1 tablet by mouth Daily., Disp: 30 tablet, Rfl: 5  •  melatonin 5 MG tablet tablet, Take 1 tablet by mouth At Night As Needed (sleep)., Disp:  , Rfl:   •  meloxicam (MOBIC) 7.5 MG tablet, , Disp: , Rfl:   •  methenamine (HIPREX) 1 g tablet, Take 1 g by mouth 2 (Two) Times a Day With Meals., Disp: , Rfl:   •  methotrexate 2.5 MG tablet, Take 4 tablets by mouth 1 (One) Time Per Week. Resume methotrexate on 10/5, Disp:  , Rfl:   •  Multiple Vitamins-Minerals (CENTRUM SILVER ADULT 50+ PO), Take 1 tablet by mouth Daily., Disp: , Rfl:   •  Myrbetriq 50 MG tablet sustained-release 24 hour 24 hr tablet, , Disp: , Rfl:   •  Nutritional Supplements (Ensure), Take 1 can by mouth 2 (two) times a day., Disp: 237 mL, Rfl: 11  •  ondansetron (ZOFRAN) 4 MG tablet, Take 1 tablet by mouth Every 6 (Six) Hours As Needed for Nausea or Vomiting., Disp:  , Rfl:   •  polyethylene glycol (MIRALAX) 17 g packet, Take 17 g by mouth Daily., Disp:  , Rfl:   •  Santyl 250 UNIT/GM ointment, APPLY NICKEL THICK AMOUNT TO WOUND BASE TWICE DAILY, Disp: , Rfl:   •  simethicone (MYLICON) 125 MG chewable tablet, Chew 125 mg Every 6 (Six) Hours As Needed for Flatulence., Disp: , Rfl:   •  traMADol (ULTRAM) 50 MG tablet, 50 mg 2 (Two) Times a Day As Needed for Moderate Pain ., Disp: , Rfl:   •  vitamin B-12 (CYANOCOBALAMIN) 1000 MCG tablet, TAKE ONE TABLET BY MOUTH DAILY, Disp: 30 tablet, Rfl: 10    Allergies:  Allergies   Allergen Reactions   • Ciprofloxacin Other (See Comments) and Unknown - High Severity     Other reaction(s): shaking  HCI TABS/ SHAKING  Other reaction(s): shaking  HCI TABS/ SHAKING   • Levofloxacin Diarrhea and Unknown - High Severity   • Sulfamethoxazole-Trimethoprim Other (See Comments), Rash and Unknown - High Severity     Stomach cramps   Stomach cramps    • Sulfamethoxazole Rash   • Trimethoprim GI Intolerance, Diarrhea and Rash       Social History:  Social  "History     Socioeconomic History   • Marital status:    Tobacco Use   • Smoking status: Former Smoker     Years: 0.50   • Smokeless tobacco: Never Used   Vaping Use   • Vaping Use: Never used   Substance and Sexual Activity   • Alcohol use: No   • Drug use: No   • Sexual activity: Not Currently       Family History:  Family History   Problem Relation Age of Onset   • Hypertension Mother    • Diabetes Mother      Post void residual bladder scan:    18 mL    Objective     Physical Exam:   Vital Signs:   Vitals:    11/11/21 1351   BP: 138/78   Pulse: 76   Temp: 96.6 °F (35.9 °C)   TempSrc: Temporal   SpO2: 98%   Weight: 55.9 kg (123 lb 3.2 oz)   Height: 162.6 cm (64\")     Body mass index is 21.15 kg/m².     Physical Exam  Vitals and nursing note reviewed. Exam conducted with a chaperone present.   Constitutional:       Appearance: Normal appearance.   HENT:      Head: Normocephalic and atraumatic.      Mouth/Throat:      Mouth: Mucous membranes are moist.      Pharynx: Oropharynx is clear.   Eyes:      Extraocular Movements: Extraocular movements intact.      Conjunctiva/sclera: Conjunctivae normal.   Cardiovascular:      Rate and Rhythm: Normal rate and regular rhythm.   Pulmonary:      Effort: Pulmonary effort is normal. No respiratory distress.   Genitourinary:     Comments:    Musculoskeletal:         General: Normal range of motion.      Cervical back: Normal range of motion.   Skin:     General: Skin is warm and dry.   Neurological:      General: No focal deficit present.      Mental Status: She is alert and oriented to person, place, and time.   Psychiatric:         Mood and Affect: Mood normal.         Behavior: Behavior normal.         Labs:   Brief Urine Lab Results  (Last result in the past 365 days)      Color   Clarity   Blood   Leuk Est   Nitrite   Protein   CREAT   Urine HCG        10/05/21 1614 Dark Yellow   Clear   Negative   Trace   Negative   30 mg/dL (1+)                 Urine Culture  "   Urine Culture 5/27/21 6/16/21 10/5/21   Urine Culture No growth No growth No growth              Lab Results   Component Value Date    GLUCOSE 101 (H) 10/15/2020    CALCIUM 8.3 10/15/2020     10/15/2020    K 4.4 10/15/2020    CO2 24.0 10/15/2020     (H) 10/15/2020    BUN 32 (H) 10/15/2020    CREATININE 0.79 10/15/2020    EGFRIFNONA 68 10/15/2020    BCR 40.5 (H) 10/15/2020    ANIONGAP 8.0 10/15/2020       Lab Results   Component Value Date    WBC 7.17 10/15/2020    HGB 9.4 (L) 10/15/2020    HCT 30.0 (L) 10/15/2020    .5 (H) 10/15/2020     (H) 10/15/2020       Images:   No Images in the past 120 days found..    Measures:   Tobacco:   Pati Carter  reports that she has quit smoking. She quit after 0.50 years of use. She has never used smokeless tobacco.         Urine Incontinence: (NOUI)  Patient reports that she is currently experiencing mixed urinary incontinence with both urgency and stress and this is managed with diapers.    Assessment / Plan      Assessment:  Mrs. Carter is a 89 y.o. female who presented today with lower urinary tract symptoms.  Primary symptom is significant nocturia, patient and her daughter report the patient will sleep many hours in the morning due to waking at night with urgency to urinate and requiring urinating in a diaper.  I evaluated the outside records from [facility] urology department, she has exhausted nearly all of the options available for treatment of her nocturia and she is currently being managed with mirabegron appropriately.  The other option is intravesical Botox which we discussed can sometimes result in relief of significant storage related symptoms for up to 3 to 12 months.  There is a risk of urinary retention usually less than 10% but may be higher in the elderly population.  We also discussed the possible risk of Botox which includes urinary infection, hematuria requiring fulguration, or bladder injury which is rare.  In this  situation the patient who elects to undergo intravesical Botox would need to be willing to be intermittently catheterized for retention if this develops.  She does live in a assisted living facility with nursing care and it may be possible to manage this with intermittent catheterizing via nursing help if this develops.    The daughter reports she was here today for a second opinion and has previously been offered this approach, she would like to discuss this with her family, she was unsure if she would like to follow with me or with the [facility] urology department in the future.  I gave her my card and asked her to give us a call if she would like to further discuss or proceed with intravesical Botox which we would perform in the main operating room given the patient's age and frailty.    Diagnoses and all orders for this visit:    1. Mixed stress and urge urinary incontinence (Primary)  2. Nocturia    -Continue mirabegron 50 mg daily, continue vaginal estrogen cream, discussed possible intravesical Botox instillation, daughter would like to discuss this with her family and may call us back if they would like to continue to follow with the [facility] urology department     Follow Up:   Return if symptoms worsen or fail to improve.    I spent approximately 45 minutes providing clinical care for this patient; including review of patient's chart and provider documentation, face to face time spent with patient in examination room (obtaining history, performing physical exam, discussing diagnosis and management options), placing orders, and completing patient documentation.     Hiram Yao MD  [facility] [place]

## 2021-11-21 RX ORDER — MULTIVIT-MIN/FA/LYCOPEN/LUTEIN .4-300-25
TABLET ORAL
Qty: 28 TABLET | Refills: 10 | Status: SHIPPED | OUTPATIENT
Start: 2021-11-21 | End: 2022-08-17 | Stop reason: HOSPADM

## 2021-11-30 ENCOUNTER — OFFICE VISIT (OUTPATIENT)
Dept: INTERNAL MEDICINE | Facility: CLINIC | Age: 86
End: 2021-11-30

## 2021-11-30 VITALS
DIASTOLIC BLOOD PRESSURE: 88 MMHG | OXYGEN SATURATION: 95 % | SYSTOLIC BLOOD PRESSURE: 152 MMHG | HEART RATE: 102 BPM | TEMPERATURE: 97.6 F

## 2021-11-30 DIAGNOSIS — K59.00 CONSTIPATION, UNSPECIFIED CONSTIPATION TYPE: Primary | ICD-10-CM

## 2021-11-30 PROCEDURE — 99214 OFFICE O/P EST MOD 30 MIN: CPT | Performed by: NURSE PRACTITIONER

## 2021-11-30 RX ORDER — POLYETHYLENE GLYCOL 3350 17 G/17G
17 POWDER, FOR SOLUTION ORAL DAILY
Start: 2021-11-30

## 2021-11-30 RX ORDER — BISACODYL 10 MG
10 SUPPOSITORY, RECTAL RECTAL DAILY PRN
Qty: 30 EACH | Refills: 0 | Status: SHIPPED | OUTPATIENT
Start: 2021-11-30 | End: 2022-06-08

## 2021-12-02 ENCOUNTER — TELEPHONE (OUTPATIENT)
Dept: INTERNAL MEDICINE | Facility: CLINIC | Age: 86
End: 2021-12-02

## 2021-12-02 NOTE — TELEPHONE ENCOUNTER
I had told the daughter she may need to do the suppository for a couple of consecutive days if needed.  Once she did have a bowel movement with the suppository, I wanted to then start miralax daily. Hold if diarrhea.

## 2021-12-02 NOTE — TELEPHONE ENCOUNTER
GILBERT, DIRECTOR OF NURSING [facility], CALLED STATING THEY GAVE PT A SUPPOSITORY, AND SHE HAD A LARGE BOWEL MOVEMENT SO THEY DID A KUB.    PT'S DAUGHTER IS UPSET BECAUSE THEY DIDN'T GIVE HER ANOTHER SUPPOSITORY AFTERWARDS AND GILBERT WANTED TO MAKE SURE WHAT THEY DID WAS CORRECT AS THE DAUGHTER USUALLY COMMUNICATES WITH THE DOCTOR THROUGH PREFERENCE.     PLEASE GIVE GILBERT A CALL IF ANYTHING HAS BEEN DONE WRONG  MANISHA CELL: [phone]

## 2021-12-02 NOTE — TELEPHONE ENCOUNTER
Called and clarified orders with Jessi at facility and verified the plan, they will fax over an order form to sign.    Called Danette and CECELIA that we had clarified orders with facility.

## 2022-01-01 NOTE — TELEPHONE ENCOUNTER
Called Patient's Daughter and let her know that the Cefdinir could be discontinued as there was no growth on the urine culture. Discussed how the confusion and hallucinations could be due to delirium r/t patient's lack of sleep and disorientation of sleep-wake cycle r/t nocturia. Encouraged patient to follow-up with PCP for further evaluation and management.    Statement Selected

## 2022-01-12 RX ORDER — ACETAMINOPHEN 325 MG/1
TABLET ORAL
Qty: 60 TABLET | Refills: 10 | Status: SHIPPED | OUTPATIENT
Start: 2022-01-12

## 2022-01-21 NOTE — TELEPHONE ENCOUNTER
Prescription printed.   Skin Substitute Text: The defect edges were debeveled with a #15 scalpel blade.  Given the location of the defect, shape of the defect and the proximity to free margins a skin substitute graft was deemed most appropriate.  The graft material was trimmed to fit the size of the defect. The graft was then placed in the primary defect and oriented appropriately.

## 2022-02-02 RX ORDER — TRAMADOL HYDROCHLORIDE 50 MG/1
TABLET ORAL
Qty: 60 TABLET | Refills: 4 | OUTPATIENT
Start: 2022-02-02

## 2022-03-05 PROCEDURE — 87186 SC STD MICRODIL/AGAR DIL: CPT | Performed by: NURSE PRACTITIONER

## 2022-03-05 PROCEDURE — 87088 URINE BACTERIA CULTURE: CPT | Performed by: NURSE PRACTITIONER

## 2022-03-05 PROCEDURE — 87086 URINE CULTURE/COLONY COUNT: CPT | Performed by: NURSE PRACTITIONER

## 2022-03-22 ENCOUNTER — OFFICE VISIT (OUTPATIENT)
Dept: INTERNAL MEDICINE | Facility: CLINIC | Age: 87
End: 2022-03-22

## 2022-03-22 VITALS
TEMPERATURE: 96 F | OXYGEN SATURATION: 97 % | SYSTOLIC BLOOD PRESSURE: 126 MMHG | HEART RATE: 71 BPM | DIASTOLIC BLOOD PRESSURE: 72 MMHG | BODY MASS INDEX: 20.01 KG/M2 | WEIGHT: 124 LBS

## 2022-03-22 DIAGNOSIS — R35.0 URINARY FREQUENCY: ICD-10-CM

## 2022-03-22 DIAGNOSIS — R19.7 DIARRHEA, UNSPECIFIED TYPE: Primary | ICD-10-CM

## 2022-03-22 LAB
BILIRUB BLD-MCNC: NEGATIVE MG/DL
CLARITY, POC: CLEAR
COLOR UR: YELLOW
EXPIRATION DATE: ABNORMAL
GLUCOSE UR STRIP-MCNC: NEGATIVE MG/DL
KETONES UR QL: NEGATIVE
LEUKOCYTE EST, POC: ABNORMAL
Lab: ABNORMAL
NITRITE UR-MCNC: NEGATIVE MG/ML
PH UR: 6 [PH] (ref 5–8)
PROT UR STRIP-MCNC: NEGATIVE MG/DL
RBC # UR STRIP: ABNORMAL /UL
SP GR UR: 1.01 (ref 1–1.03)
UROBILINOGEN UR QL: NORMAL

## 2022-03-22 PROCEDURE — 99214 OFFICE O/P EST MOD 30 MIN: CPT | Performed by: STUDENT IN AN ORGANIZED HEALTH CARE EDUCATION/TRAINING PROGRAM

## 2022-03-22 PROCEDURE — 81003 URINALYSIS AUTO W/O SCOPE: CPT | Performed by: STUDENT IN AN ORGANIZED HEALTH CARE EDUCATION/TRAINING PROGRAM

## 2022-03-22 RX ORDER — NITROFURANTOIN 25; 75 MG/1; MG/1
100 CAPSULE ORAL 2 TIMES DAILY
Qty: 14 CAPSULE | Refills: 0 | Status: SHIPPED | OUTPATIENT
Start: 2022-03-22 | End: 2022-05-24

## 2022-03-23 NOTE — PROGRESS NOTES
Internal Medicine Acute Visit     Patient Name: Pati Carter  : 1930   MRN: [id number]     Chief Complaint:    Chief Complaint   Patient presents with   • Urinary Tract Infection   • Fatigue       History of Present Illness: Pati Carter is a 89 y.o. female who presents for diarrhea. She was treated for a UTI 1.5 weeks ago with macrobid. She completed therapy and began having diarrhea. She also reports urinary symptoms though upon further questioning sounds more like urinary incontinence. Her daughter does note some confusion that is consistent with prior UTIs and that she usually does not have urinary symptoms when she has a UTI.  She denies abdominal pain. When asked about blood in stool she says that occasionally she'll have a streak of red on top of the stool but not mixed in.    Subjective     I have reviewed and the following portions of the patient's history were updated as appropriate: past family history, past medical history, past social history, past surgical history and problem list.    Allergies:   Allergies   Allergen Reactions   • Ciprofloxacin Other (See Comments) and Unknown - High Severity     Other reaction(s): shaking  HCI TABS/ SHAKING  Other reaction(s): shaking  HCI TABS/ SHAKING   • Levofloxacin Diarrhea and Unknown - High Severity   • Sulfamethoxazole-Trimethoprim Other (See Comments), Rash and Unknown - High Severity     Stomach cramps   Stomach cramps    • Sulfamethoxazole Rash   • Trimethoprim GI Intolerance, Diarrhea and Rash       Objective     Physical Exam:  Vital Signs:   Vitals:    22 1209   BP: 126/72   Pulse: 71   Temp: 96 °F (35.6 °C)   SpO2: 97%   Weight: 56.2 kg (124 lb)   PainSc: 0-No pain     Body mass index is 20.01 kg/m².    Physical Exam  Vitals and nursing note reviewed.   Constitutional:       General: She is not in acute distress.     Appearance: Normal appearance. She is not ill-appearing or toxic-appearing.   Cardiovascular:       Rate and Rhythm: Normal rate.   Pulmonary:      Effort: Pulmonary effort is normal. No respiratory distress.   Abdominal:      General: There is no distension.      Palpations: Abdomen is soft.      Tenderness: There is no abdominal tenderness. There is no guarding or rebound.   Skin:     General: Skin is warm and dry.   Neurological:      Mental Status: She is alert.       Assessment / Plan      Assessment/Plan:   Diagnoses and all orders for this visit:    1. Diarrhea, unspecified type (Primary)  Since she has had recent antibiotics, will test stool for C Diff. If negative, treat with imodium.   -     Clostridium Difficile Toxin - Stool, Per Rectum; Future    2. Urinary frequency  Frequency may be more related to urinary incontinence. Her daughter reports that her UTI symptom is typically confusion which she has been having over the last day and that she does not typically have dysuria or urgency. Will send urine for culture and treat. Plan to stop antibiotic if C Diff positive.   -     POC Urinalysis Dipstick, Automated  -     nitrofurantoin, macrocrystal-monohydrate, (Macrobid) 100 MG capsule; Take 1 capsule by mouth 2 (Two) Times a Day.  Dispense: 14 capsule; Refill: 0    Follow Up:   Return for Next scheduled follow up.    Time:   I spent approximately 20 minutes providing clinical care for this patient; including review of patient's chart and provider documentation, face to face time spent with patient in examination room (obtaining history, performing physical exam, discussing diagnosis and management options), placing orders, and completing patient documentation.     Rosie Shah MD  [facility] [place]

## 2022-05-05 NOTE — THERAPY TREATMENT NOTE
Called pharmacy [address] and called in another 4 days of Augmentin 500-125mg BID.  She only received 1 pill last night and needs another 8 to complete a 7 day course.   No

## 2022-05-24 ENCOUNTER — OFFICE VISIT (OUTPATIENT)
Dept: INTERNAL MEDICINE | Facility: CLINIC | Age: 87
End: 2022-05-24

## 2022-05-24 ENCOUNTER — TELEPHONE (OUTPATIENT)
Dept: INTERNAL MEDICINE | Facility: CLINIC | Age: 87
End: 2022-05-24

## 2022-05-24 VITALS
HEART RATE: 95 BPM | SYSTOLIC BLOOD PRESSURE: 148 MMHG | OXYGEN SATURATION: 95 % | TEMPERATURE: 98.1 F | DIASTOLIC BLOOD PRESSURE: 84 MMHG | RESPIRATION RATE: 16 BRPM

## 2022-05-24 DIAGNOSIS — R30.0 DYSURIA: Primary | ICD-10-CM

## 2022-05-24 DIAGNOSIS — N30.01 ACUTE CYSTITIS WITH HEMATURIA: ICD-10-CM

## 2022-05-24 LAB
BILIRUB BLD-MCNC: NEGATIVE MG/DL
CLARITY, POC: ABNORMAL
COLOR UR: YELLOW
EXPIRATION DATE: ABNORMAL
GLUCOSE UR STRIP-MCNC: NEGATIVE MG/DL
KETONES UR QL: NEGATIVE
LEUKOCYTE EST, POC: ABNORMAL
Lab: ABNORMAL
NITRITE UR-MCNC: NEGATIVE MG/ML
PH UR: 6 [PH] (ref 5–8)
PROT UR STRIP-MCNC: ABNORMAL MG/DL
RBC # UR STRIP: ABNORMAL /UL
SP GR UR: 1.02 (ref 1–1.03)
UROBILINOGEN UR QL: NORMAL

## 2022-05-24 PROCEDURE — 87086 URINE CULTURE/COLONY COUNT: CPT

## 2022-05-24 PROCEDURE — 81003 URINALYSIS AUTO W/O SCOPE: CPT

## 2022-05-24 PROCEDURE — 99213 OFFICE O/P EST LOW 20 MIN: CPT

## 2022-05-24 RX ORDER — CEFDINIR 300 MG/1
300 CAPSULE ORAL 2 TIMES DAILY
Qty: 14 CAPSULE | Refills: 0 | Status: SHIPPED | OUTPATIENT
Start: 2022-05-24 | End: 2022-05-31

## 2022-05-24 NOTE — TELEPHONE ENCOUNTER
Dana spoke with patients daughter and informed her that she will need an appt. Patient is scheduled to see Dustin this morning at 11:30 am

## 2022-05-24 NOTE — PROGRESS NOTES
Chief Complaint  Urinary Tract Infection    Pati Carter presents to [facility]    HPI: Increased urinary frequency, urgency and dysuria x 2 days. Has also noted some increased mental fogginess. States that she always gets confused with her UTI's. Denies fevers or chills. States that fluid intake has been poor. Has tried d-mannose with no improvement in symptoms. Pain is rated as a 0 out of 10.     Subjective       Health Maintenance   Topic   • DXA SCAN    • Pneumococcal Vaccine 65+ (3 - PPSV23 or PCV20)   • COVID-19 Vaccine (3 - Pfizer risk series)   • ANNUAL WELLNESS VISIT    • LIPID PANEL    • INFLUENZA VACCINE    • TDAP/TD VACCINES (2 - Td or Tdap)   • ZOSTER VACCINE        [facility]  The following portions of the patient's history were reviewed and updated as appropriate: allergies, current medications, past family history, past medical history, past social history, past surgical history and problem list.     Past Medical History:   Diagnosis Date   • Anemia     Description: YUVAL Agrawal 2006- borderline intermittent.   • Back pain    • Benign colonic polyp 9/28/2016    Description: AJoshua Agrawal 1999.   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  Rule out chronic persistent asthma, COPD, or obliterative bronchiolitis.- Butch   • COVID-19 virus infection 10/11/2020   • CTS (carpal tunnel syndrome)    • Degeneration of intervertebral disc of lumbar region     Description: A.  Diagnosed in April 2013 with advanced multilevel with severe spinal stenosis, followed by Dr. Pillai for pain management.   • Gastroesophageal reflux disease    • Hearing loss    • History of calcium pyrophosphate deposition disease (CPPD)    • History of colonoscopy 01/01/1999    NORMAL PER PATIENT    • History of mammogram 01/01/2011    NORMAL PER  PT    • History of Papanicolaou smear of cervix 01/01/2010    NORMAL PER PT    • History of varicella    • Hyperlipidemia     Description: A.  Dx 2006.   • Hypertension     Description: A. Dx 2001.   • Macular degeneration    • Nocturia    • Osteoporosis    • Ovarian mass     Dx 8/15- benign left cystic adnexal mass   • Overactive bladder    • Pelvic floor dysfunction    • Rheumatoid arthritis (HCC)     Description: A.  Diagnosed in 2000 and and followed by Dr. Constantino (now Dr. Kaplan). B.  On methotrexate therapy since 2000. C.  Off low-dose prednisone therapy.   • Vitamin D deficiency       Allergies   Allergen Reactions   • Ciprofloxacin Other (See Comments) and Unknown - High Severity     Other reaction(s): shaking  HCI TABS/ SHAKING  Other reaction(s): shaking  HCI TABS/ SHAKING   • Levofloxacin Diarrhea and Unknown - High Severity   • Sulfamethoxazole-Trimethoprim Other (See Comments), Rash and Unknown - High Severity     Stomach cramps   Stomach cramps    • Sulfamethoxazole Rash   • Trimethoprim GI Intolerance, Diarrhea and Rash      Social History     Tobacco Use   • Smoking status: Former Smoker     Years: 0.50   • Smokeless tobacco: Never Used   Vaping Use   • Vaping Use: Never used   Substance Use Topics   • Alcohol use: No   • Drug use: No     Past Surgical History:   Procedure Laterality Date   • APPENDECTOMY     • CARPAL TUNNEL RELEASE Left 01/01/2003    HISTORY OF NEUROPLASTY DECOMPRESSION MEDIAN NERVE AT CARPAL TUNNEL LEFT   • CATARACT EXTRACTION Bilateral 01/01/2009   • CHOLECYSTECTOMY  01/01/1962   • HIP CANNULATED SCREW PLACEMENT Right 1/25/2020    Procedure: HIP CANNULATED SCREW PLACEMENT RIGHT;  Surgeon: Karlos Blount MD;  Location: [facility];  Service: Orthopedics   • KNEE ARTHROSCOPY Left 01/01/2001    MENISCAL REPAIR   • KYPHOPLASTY  06/18/2015    T11 AND L1 (JOSE A)   • PELVIC LAPAROSCOPY  01/01/1996    REMOVAL OF BENIGN UTERINE AND RIGHT OVARIAN TUMORS   • SALPINGO OOPHORECTOMY Left  08/26/2015    REMOVAL OF LEFT OVARY AND TUBE (benign cystic mass)      Family History   Problem Relation Age of Onset   • Hypertension Mother    • Diabetes Mother          Current Outpatient Medications:   •  acetaminophen (TYLENOL) 325 MG tablet, TAKE 2 TABLETS (650MG) BY MOUTH EVERY NIGHT WITH TRAMADOL, Disp: 60 tablet, Rfl: 10  •  acetaminophen (TYLENOL) 500 MG tablet, Take 2 tablets by mouth Every 8 (Eight) Hours. (Patient taking differently: Take 1,300 mg by mouth Every 6 (Six) Hours As Needed for Mild Pain .), Disp: , Rfl:   •  albuterol sulfate  (90 Base) MCG/ACT inhaler, Inhale 2 puffs Every 4 (Four) Hours As Needed for Wheezing or Shortness of Air., Disp: 3 inhaler, Rfl: 4  •  ammonium lactate (AMLACTIN) 12 % cream, Apply 1 application topically to the appropriate area as directed 2 (Two) Times a Day., Disp: , Rfl:   •  aspirin 81 MG EC tablet, Take 1 tablet by mouth Daily., Disp: 30 tablet, Rfl: 10  •  benzonatate (TESSALON) 100 MG capsule, Take 100 mg by mouth 3 (Three) Times a Day As Needed for Cough., Disp: , Rfl:   •  bisacodyl (DULCOLAX) 10 MG suppository, Insert 1 suppository into the rectum Daily As Needed for Constipation., Disp: 30 each, Rfl: 0  •  calcium carbonate, oyster shell, 500 MG tablet tablet, Take  by mouth 2 (Two) Times a Day., Disp: , Rfl:   •  Cerovite Senior multivitamin tablet, TAKE 1 TABLET BY MOUTH ONCE DAILY, Disp: 28 tablet, Rfl: 10  •  Cholecalciferol (PA VITAMIN D-3 PO), Take 2,000 Units by mouth Daily., Disp: , Rfl:   •  colchicine 0.6 MG tablet, Take 0.6 mg by mouth Daily., Disp: , Rfl:   •  Diclofenac Sodium (VOLTAREN) 1 % gel gel, , Disp: , Rfl:   •  dilTIAZem CD (CARDIZEM CD) 120 MG 24 hr capsule, Take 1 capsule by mouth Daily., Disp: 21 capsule, Rfl: 0  •  docusate sodium (COLACE) 100 MG capsule, Take 1 capsule by mouth Daily., Disp: 30 capsule, Rfl: 10  •  famotidine (PEPCID) 20 MG tablet, Take 1 tablet by mouth 2 (Two) Times a Day., Disp: 60 tablet, Rfl: 10  •   Fluticasone Furoate-Vilanterol (Breo Ellipta) 100-25 MCG/INH inhaler, Inhale 1 puff Daily., Disp: , Rfl:   •  folic acid (FOLVITE) 1 MG tablet, TAKE ONE TABLET BY MOUTH DAILY, Disp: 30 tablet, Rfl: 10  •  lactobacillus acidophilus (RISAQUAD) capsule capsule, Take 1 capsule by mouth Daily., Disp:  , Rfl:   •  megestrol (MEGACE) 40 MG tablet, Take 1 tablet by mouth Daily., Disp: 30 tablet, Rfl: 5  •  melatonin 5 MG tablet tablet, Take 1 tablet by mouth At Night As Needed (sleep)., Disp:  , Rfl:   •  meloxicam (MOBIC) 7.5 MG tablet, , Disp: , Rfl:   •  methotrexate 2.5 MG tablet, Take 4 tablets by mouth 1 (One) Time Per Week. Resume methotrexate on 10/5, Disp:  , Rfl:   •  Mitigare 0.6 MG capsule capsule, , Disp: , Rfl:   •  Multiple Vitamins-Minerals (CENTRUM SILVER ADULT 50+ PO), Take 1 tablet by mouth Daily., Disp: , Rfl:   •  Myrbetriq 50 MG tablet sustained-release 24 hour 24 hr tablet, , Disp: , Rfl:   •  Nutritional Supplements (Ensure), Take 1 can by mouth 2 (two) times a day., Disp: 237 mL, Rfl: 11  •  ondansetron (ZOFRAN) 4 MG tablet, Take 1 tablet by mouth Every 6 (Six) Hours As Needed for Nausea or Vomiting., Disp:  , Rfl:   •  polyethylene glycol (MIRALAX) 17 g packet, Take 17 g by mouth Daily. As needed for constipation; can decrease to half dose every other day if loose stools., Disp: , Rfl:   •  Santyl 250 UNIT/GM ointment, APPLY NICKEL THICK AMOUNT TO WOUND BASE TWICE DAILY, Disp: , Rfl:   •  simethicone (MYLICON) 125 MG chewable tablet, Chew 125 mg Every 6 (Six) Hours As Needed for Flatulence., Disp: , Rfl:   •  traMADol (ULTRAM) 50 MG tablet, 50 mg 2 (Two) Times a Day As Needed for Moderate Pain ., Disp: , Rfl:   •  vitamin B-12 (CYANOCOBALAMIN) 1000 MCG tablet, TAKE ONE TABLET BY MOUTH DAILY, Disp: 30 tablet, Rfl: 10  •  cefdinir (OMNICEF) 300 MG capsule, Take 1 capsule by mouth 2 (Two) Times a Day for 7 days., Disp: 14 capsule, Rfl: 0    Review of Systems   Constitutional: Negative for activity  change, appetite change, chills, fatigue and fever.   Respiratory: Negative for apnea, cough, choking, chest tightness, shortness of breath, wheezing and stridor.    Cardiovascular: Negative for chest pain, palpitations and leg swelling.   Gastrointestinal: Negative for abdominal distention, abdominal pain, anal bleeding, blood in stool, constipation, diarrhea, nausea, rectal pain, vomiting, GERD and indigestion.   Genitourinary: Positive for dysuria, frequency and urgency. Negative for amenorrhea, breast discharge, breast lump, breast pain, decreased libido, decreased urine volume, difficulty urinating, dyspareunia, flank pain, genital sores, hematuria, menstrual problem, pelvic pain, pelvic pressure, urinary incontinence, vaginal bleeding, vaginal discharge and vaginal pain.   Neurological: Positive for confusion. Negative for dizziness, tremors, seizures, syncope, facial asymmetry, speech difficulty, weakness, light-headedness, numbness, headache and memory problem.       Objective   Vital Signs  /84   Pulse 95   Temp 98.1 °F (36.7 °C) (Oral)   Resp 16   SpO2 95%     Physical Exam  Constitutional:       Comments: Chronically ill appearing    HENT:      Head: Normocephalic.      Mouth/Throat:      Mouth: Mucous membranes are moist.      Pharynx: Oropharynx is clear.   Eyes:      Conjunctiva/sclera: Conjunctivae normal.      Pupils: Pupils are equal, round, and reactive to light.   Cardiovascular:      Rate and Rhythm: Normal rate and regular rhythm.      Pulses: Normal pulses.      Heart sounds: Normal heart sounds.   Pulmonary:      Effort: Pulmonary effort is normal.      Breath sounds: Normal breath sounds.   Abdominal:      Tenderness: There is abdominal tenderness in the suprapubic area. There is no right CVA tenderness or left CVA tenderness.   Skin:     General: Skin is warm and dry.   Neurological:      Mental Status: She is alert and oriented to person, place, and time.      GCS: GCS eye  subscore is 4. GCS verbal subscore is 5. GCS motor subscore is 6.      Cranial Nerves: Cranial nerves are intact.      Sensory: Sensation is intact.      Motor: Motor function is intact.      Coordination: Coordination is intact.      Gait: Gait abnormal.      Comments: Using wheelchair    Psychiatric:         Mood and Affect: Mood normal.         Behavior: Behavior normal.         Thought Content: Thought content normal.         Judgment: Judgment normal.          Result Review :     The following data was reviewed by: JANIYA Ellison on 05/24/2022:      UA    Urinalysis 3/5/22 3/22/22 5/24/22   Ketones, UA Negative Negative Negative   Leukocytes, UA Small (1+) (A) Small (1+) (A) Trace (A)   (A) Abnormal value       Comments are available for some flowsheets but are not being displayed.             Assessment and Plan    1. Dysuria  - POCT urinalysis dipstick, automated  - Urine Culture - Urine, Urine, Clean Catch; Future  - Urine Culture - Urine, Urine, Clean Catch    2. Acute cystitis with hematuria  - cefdinir (OMNICEF) 300 MG capsule; Take 1 capsule by mouth 2 (Two) Times a Day for 7 days.  Dispense: 14 capsule; Refill: 0  - Will treat for UTI. Patient is A&O x 3 in office. Encouraged to have a low threshold for seeking emergency care if symptoms were to worsen. Encouraged to seek emergency care or follow-up in office if symptoms did not improve over the next 24-48 hours. Will send UC and adjust antibiotics as indicated based on susceptibility. Encouraged to drink 5-6 bottles of water per day. Patient and caregiver verbalized understanding and were agreeable with this POC.     Follow Up     Return for Next scheduled follow up.    Patient was given instructions and counseling regarding her condition or for health maintenance advice. Please see specific information pulled into the AVS if appropriate.    Part of this note may be an electronic transcription/translation of spoken language to printed text using the  Dragon Dictation System.    Electronically signed by:   Dustin Bennett, JANIYA  05/24/2022

## 2022-05-25 LAB — BACTERIA SPEC AEROBE CULT: NO GROWTH

## 2022-06-08 ENCOUNTER — OFFICE VISIT (OUTPATIENT)
Dept: INTERNAL MEDICINE | Facility: CLINIC | Age: 87
End: 2022-06-08

## 2022-06-08 VITALS
OXYGEN SATURATION: 98 % | DIASTOLIC BLOOD PRESSURE: 76 MMHG | HEIGHT: 66 IN | SYSTOLIC BLOOD PRESSURE: 132 MMHG | HEART RATE: 100 BPM | BODY MASS INDEX: 20.01 KG/M2 | TEMPERATURE: 98.8 F

## 2022-06-08 DIAGNOSIS — Z74.09 IMPAIRED MOBILITY: ICD-10-CM

## 2022-06-08 DIAGNOSIS — L97.511 ULCER OF RIGHT FOOT, LIMITED TO BREAKDOWN OF SKIN: ICD-10-CM

## 2022-06-08 DIAGNOSIS — M06.9 RHEUMATOID ARTHRITIS INVOLVING MULTIPLE SITES, UNSPECIFIED WHETHER RHEUMATOID FACTOR PRESENT: ICD-10-CM

## 2022-06-08 DIAGNOSIS — K59.04 CHRONIC IDIOPATHIC CONSTIPATION: Primary | ICD-10-CM

## 2022-06-08 DIAGNOSIS — H35.30 MACULAR DEGENERATION OF BOTH EYES, UNSPECIFIED TYPE: ICD-10-CM

## 2022-06-08 DIAGNOSIS — N39.0 RECURRENT UTI: ICD-10-CM

## 2022-06-08 DIAGNOSIS — K21.9 GASTROESOPHAGEAL REFLUX DISEASE WITHOUT ESOPHAGITIS: ICD-10-CM

## 2022-06-08 DIAGNOSIS — E78.49 OTHER HYPERLIPIDEMIA: ICD-10-CM

## 2022-06-08 DIAGNOSIS — I10 PRIMARY HYPERTENSION: ICD-10-CM

## 2022-06-08 DIAGNOSIS — Z87.39 H/O CALCIUM PYROPHOSPHATE DEPOSITION DISEASE (CPPD): ICD-10-CM

## 2022-06-08 DIAGNOSIS — R35.1 NOCTURIA: ICD-10-CM

## 2022-06-08 DIAGNOSIS — R41.89 IMPAIRED COGNITION: ICD-10-CM

## 2022-06-08 DIAGNOSIS — I73.9 PERIPHERAL VASCULAR DISEASE: ICD-10-CM

## 2022-06-08 DIAGNOSIS — H90.3 SENSORINEURAL HEARING LOSS (SNHL) OF BOTH EARS: ICD-10-CM

## 2022-06-08 DIAGNOSIS — H53.16 CHARLES BONNET SYNDROME: ICD-10-CM

## 2022-06-08 DIAGNOSIS — J43.9 PULMONARY EMPHYSEMA, UNSPECIFIED EMPHYSEMA TYPE: ICD-10-CM

## 2022-06-08 PROCEDURE — G2212 PROLONG OUTPT/OFFICE VIS: HCPCS | Performed by: INTERNAL MEDICINE

## 2022-06-08 PROCEDURE — 99215 OFFICE O/P EST HI 40 MIN: CPT | Performed by: INTERNAL MEDICINE

## 2022-06-08 RX ORDER — FAMOTIDINE 20 MG/1
20 TABLET, FILM COATED ORAL DAILY
COMMUNITY
End: 2022-06-08 | Stop reason: SDUPTHER

## 2022-06-08 RX ORDER — SENNOSIDES 8.6 MG
2 CAPSULE ORAL EVERY 8 HOURS
COMMUNITY
End: 2022-07-27 | Stop reason: HOSPADM

## 2022-06-08 RX ORDER — METHENAMINE HIPPURATE 1000 MG/1
1 TABLET ORAL EVERY 12 HOURS
COMMUNITY

## 2022-06-08 RX ORDER — FAMOTIDINE 40 MG/1
40 TABLET, FILM COATED ORAL DAILY
Qty: 90 TABLET | Refills: 1 | Status: SHIPPED | OUTPATIENT
Start: 2022-06-08

## 2022-06-08 NOTE — PROGRESS NOTES
Internal Medicine New Patient  Pati Carter is a 89 y.o. female who presents today to establish care and with concerns as outlined below.    Chief Complaint  Chief Complaint   Patient presents with   • Establish Care     Off board from Mary   • Hypertension     Follow up   • Hyperlipidemia     Follow up        HPI  Ms. Carter comes in today to transition care from Mary DENSON. She is here with her daughter. She resides at [facility] after hip fracture in 2020. She has caregivers from her assisted living who dispense her medications but otherwise do not provide other services. She does wear a button on her wrist that she can use to call them when needed. Her daughter notes that they do also have PT services which she plans to resume as Ms. Carter spends most of her time in her chair and is becoming weak. She notes that she or her siblings stop over nightly and she tries to also stop over for lunch.     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 She has chronic constipation with bowel regimen of as needed senna-s and miralax. Her daughter does not believe she is getting these routinely. She has GERD on pepcid 20mg daily. She notes issues with vomiting after eating or drinking. She has RA and is followed by Dr. Mathis. On MTX and folic acid. Her daughter believes they have done relatively recent labs. She has had recurrent UTI and is on hiprex. She also has nocturia and her daughter reports she may be up every 30 minutes to use the bathroom. She is on myrbetriq. She has been followed by urology, Dr. Bowen, at  and had second opinion here with Dr. Yao. She was offered intravesicular botox injections but her daughters discussed and opted not to pursue this as she has been sensitive to new medications in the past and they did not want to risk possibility for urinary retention. She has vascular disease and has been seen by Dr. Valiente who  recommended a procedure which they opted not to pursue. She has chronic wound on her R lateral foot overlying bony prominence. It is currently healed with a scab. She is following with podiatry and planned to get well fitting shoes. She notes pain off and on in her feet, worst at night. She is prescribed tramadol 50mg BID PRN by Dr. Mathis but her daughter believes she does not receive this as often as she should. She also takes daily meloxicam. She has a history of CPPD and is on colchicine. She has COPD on breo and as needed albuterol. No respiratory complaints today.       Review of Systems  Review of Systems   Constitutional: Positive for appetite change (poor appetite, chronic) and fatigue. Negative for activity change and fever.   HENT: Positive for hearing loss.    Eyes: Positive for blurred vision (macular degeneration, blind).   Respiratory: Negative.    Cardiovascular: Positive for leg swelling. Negative for chest pain and palpitations.   Gastrointestinal: Positive for abdominal distention, constipation, vomiting and indigestion. Negative for abdominal pain, anal bleeding, blood in stool and diarrhea.   Genitourinary: Positive for frequency. Negative for dysuria.        +nocturia   Musculoskeletal: Positive for arthralgias, gait problem and myalgias.   Skin: Positive for wound.   Neurological: Positive for weakness.   Psychiatric/Behavioral: Positive for hallucinations (osbaldo bonnet syndrome) and sleep disturbance.        Past Medical History  Past Medical History:   Diagnosis Date   • Anemia     Description: A.  Dx 2006- borderline intermittent.   • Back pain    • Benign colonic polyp 9/28/2016    Description: A.  Dx 1999.   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  Rule out chronic persistent asthma, COPD, or obliterative  bronchiolitis.- Rae   • COVID-19 virus infection 10/11/2020   • CTS (carpal tunnel syndrome)    • Degeneration of intervertebral disc of lumbar region     Description: A.  Diagnosed in April 2013 with advanced multilevel with severe spinal stenosis, followed by Dr. Pillai for pain management.   • Gastroesophageal reflux disease    • Hearing loss    • History of calcium pyrophosphate deposition disease (CPPD)    • History of colonoscopy 01/01/1999    NORMAL PER PATIENT    • History of mammogram 01/01/2011    NORMAL PER PT    • History of Papanicolaou smear of cervix 01/01/2010    NORMAL PER PT    • History of varicella    • Hyperlipidemia     Description: A.  Dx 2006.   • Hypertension     Description: A. Dx 2001.   • Macular degeneration    • Nocturia    • Osteoporosis    • Ovarian mass     Dx 8/15- benign left cystic adnexal mass   • Overactive bladder    • Pelvic floor dysfunction    • Rheumatoid arthritis (HCC)     Description: A.  Diagnosed in 2000 and and followed by Dr. Constantino (now Dr. Kaplan). B.  On methotrexate therapy since 2000. C.  Off low-dose prednisone therapy.   • Vitamin D deficiency         Surgical History  Past Surgical History:   Procedure Laterality Date   • APPENDECTOMY     • CARPAL TUNNEL RELEASE Left 01/01/2003    HISTORY OF NEUROPLASTY DECOMPRESSION MEDIAN NERVE AT CARPAL TUNNEL LEFT   • CATARACT EXTRACTION Bilateral 01/01/2009   • CHOLECYSTECTOMY  01/01/1962   • HIP CANNULATED SCREW PLACEMENT Right 1/25/2020    Procedure: HIP CANNULATED SCREW PLACEMENT RIGHT;  Surgeon: Karlos Blount MD;  Location: [facility];  Service: Orthopedics   • KNEE ARTHROSCOPY Left 01/01/2001    MENISCAL REPAIR   • KYPHOPLASTY  06/18/2015    T11 AND L1 (JOSE A)   • PELVIC LAPAROSCOPY  01/01/1996    REMOVAL OF BENIGN UTERINE AND RIGHT OVARIAN TUMORS   • SALPINGO OOPHORECTOMY Left 08/26/2015    REMOVAL OF LEFT OVARY AND TUBE (benign cystic mass)        Family History  Family History   Problem Relation Age  of Onset   • Hypertension Mother    • Diabetes Mother         Social History  Social History     Socioeconomic History   • Marital status:    Tobacco Use   • Smoking status: Former Smoker     Years: 0.50   • Smokeless tobacco: Never Used   Vaping Use   • Vaping Use: Never used   Substance and Sexual Activity   • Alcohol use: No   • Drug use: No   • Sexual activity: Not Currently        Current Medications  Current Outpatient Medications on File Prior to Visit   Medication Sig Dispense Refill   • acetaminophen (TYLENOL) 325 MG tablet TAKE 2 TABLETS (650MG) BY MOUTH EVERY NIGHT WITH TRAMADOL 60 tablet 10   • acetaminophen (TYLENOL) 650 MG 8 hr tablet Take 2 tablets by mouth Every 8 (Eight) Hours.     • albuterol sulfate  (90 Base) MCG/ACT inhaler Inhale 2 puffs Every 4 (Four) Hours As Needed for Wheezing or Shortness of Air. 3 inhaler 4   • aspirin 81 MG EC tablet Take 1 tablet by mouth Daily. 30 tablet 10   • calcium carbonate, oyster shell, 500 MG tablet tablet Take  by mouth 2 (Two) Times a Day.     • Cerovite Senior multivitamin tablet TAKE 1 TABLET BY MOUTH ONCE DAILY 28 tablet 10   • Cholecalciferol (PA VITAMIN D-3 PO) Take 2,000 Units by mouth Daily.     • Diclofenac Sodium (VOLTAREN) 1 % gel gel      • dilTIAZem CD (CARDIZEM CD) 120 MG 24 hr capsule Take 1 capsule by mouth Daily. 21 capsule 0   • Fluticasone Furoate-Vilanterol (Breo Ellipta) 100-25 MCG/INH inhaler Inhale 1 puff Daily.     • folic acid (FOLVITE) 1 MG tablet TAKE ONE TABLET BY MOUTH DAILY 30 tablet 10   • melatonin 5 MG tablet tablet Take 1 tablet by mouth At Night As Needed (sleep).     • meloxicam (MOBIC) 7.5 MG tablet      • methenamine (HIPREX) 1 g tablet Take 1 tablet by mouth Every 12 (Twelve) Hours.     • methotrexate 2.5 MG tablet Take 15 mg by mouth 1 (One) Time Per Week.     • Mitigare 0.6 MG capsule capsule      • Myrbetriq 50 MG tablet sustained-release 24 hour 24 hr tablet      • Nutritional Supplements (Ensure) Take 1  can by mouth 2 (two) times a day. 237 mL 11   • polyethylene glycol (MIRALAX) 17 g packet Take 17 g by mouth Daily. As needed for constipation; can decrease to half dose every other day if loose stools.     • Sennosides-Docusate Sodium (SENNA S PO) Take 1 tablet by mouth At Night As Needed for Constipation.     • traMADol (ULTRAM) 50 MG tablet 50 mg 2 (Two) Times a Day As Needed for Moderate Pain .     • vitamin B-12 (CYANOCOBALAMIN) 1000 MCG tablet TAKE ONE TABLET BY MOUTH DAILY 30 tablet 10   • [DISCONTINUED] famotidine (PEPCID) 20 MG tablet Take 20 mg by mouth Daily.     • [DISCONTINUED] acetaminophen (TYLENOL) 500 MG tablet Take 2 tablets by mouth Every 8 (Eight) Hours. (Patient taking differently: Take 1,300 mg by mouth Every 6 (Six) Hours As Needed for Mild Pain .)     • [DISCONTINUED] ammonium lactate (AMLACTIN) 12 % cream Apply 1 application topically to the appropriate area as directed 2 (Two) Times a Day.     • [DISCONTINUED] benzonatate (TESSALON) 100 MG capsule Take 100 mg by mouth 3 (Three) Times a Day As Needed for Cough.     • [DISCONTINUED] bisacodyl (DULCOLAX) 10 MG suppository Insert 1 suppository into the rectum Daily As Needed for Constipation. 30 each 0   • [DISCONTINUED] colchicine 0.6 MG tablet Take 0.6 mg by mouth Daily.     • [DISCONTINUED] docusate sodium (COLACE) 100 MG capsule Take 1 capsule by mouth Daily. 30 capsule 10   • [DISCONTINUED] famotidine (PEPCID) 20 MG tablet Take 1 tablet by mouth 2 (Two) Times a Day. 60 tablet 10   • [DISCONTINUED] lactobacillus acidophilus (RISAQUAD) capsule capsule Take 1 capsule by mouth Daily.     • [DISCONTINUED] megestrol (MEGACE) 40 MG tablet Take 1 tablet by mouth Daily. 30 tablet 5   • [DISCONTINUED] methotrexate 2.5 MG tablet Take 4 tablets by mouth 1 (One) Time Per Week. Resume methotrexate on 10/5     • [DISCONTINUED] Multiple Vitamins-Minerals (CENTRUM SILVER ADULT 50+ PO) Take 1 tablet by mouth Daily.     • [DISCONTINUED] ondansetron (ZOFRAN)  "4 MG tablet Take 1 tablet by mouth Every 6 (Six) Hours As Needed for Nausea or Vomiting.     • [DISCONTINUED] Santyl 250 UNIT/GM ointment APPLY NICKEL THICK AMOUNT TO WOUND BASE TWICE DAILY     • [DISCONTINUED] simethicone (MYLICON) 125 MG chewable tablet Chew 125 mg Every 6 (Six) Hours As Needed for Flatulence.       No current facility-administered medications on file prior to visit.       Allergies  Allergies   Allergen Reactions   • Ciprofloxacin Other (See Comments) and Unknown - High Severity     Other reaction(s): shaking  HCI TABS/ SHAKING  Other reaction(s): shaking  HCI TABS/ SHAKING   • Levofloxacin Diarrhea and Unknown - High Severity   • Sulfamethoxazole-Trimethoprim Other (See Comments), Rash and Unknown - High Severity     Stomach cramps   Stomach cramps    • Sulfamethoxazole Rash   • Trimethoprim GI Intolerance, Diarrhea and Rash        Objective  Visit Vitals  /76   Pulse 100   Temp 98.8 °F (37.1 °C)   Ht 167.6 cm (66\")   SpO2 98%   BMI 20.01 kg/m²        Physical Exam  Physical Exam  Vitals and nursing note reviewed.   Constitutional:       General: She is not in acute distress.     Appearance: She is well-developed. She is not ill-appearing or toxic-appearing.      Comments: E;collins, seated in wheelchair   HENT:      Head: Normocephalic and atraumatic.   Eyes:      Conjunctiva/sclera: Conjunctivae normal.   Cardiovascular:      Rate and Rhythm: Normal rate and regular rhythm.      Heart sounds: Normal heart sounds. No murmur heard.     Comments: diminished DP/PT pulses in R foot and ankle  Pulmonary:      Effort: Pulmonary effort is normal. No respiratory distress.      Breath sounds: Normal breath sounds. No wheezing, rhonchi or rales.      Comments: Lung sounds diminished throughout  Abdominal:      General: Bowel sounds are normal. There is no distension.      Palpations: Abdomen is soft. There is no mass.      Tenderness: There is no abdominal tenderness.   Musculoskeletal:      Right " lower leg: Edema (trace-1+) present.      Left lower leg: Edema (trace-1+) present.   Skin:     General: Skin is warm and dry.      Comments: Small superficial ulcer on lateral side of R foot with overlying scab. No drainage, erythema, warmth. Under this ulcer is a bony prominence.    Neurological:      Mental Status: She is alert and oriented to person, place, and time.      Motor: Weakness (generalized) present.      Gait: Gait abnormal.   Psychiatric:         Mood and Affect: Mood normal.         Behavior: Behavior normal.          Results  Results for orders placed or performed in visit on 05/24/22   Urine Culture - Urine, Urine, Clean Catch    Specimen: Urine, Clean Catch   Result Value Ref Range    Urine Culture No growth    POCT urinalysis dipstick, automated    Specimen: Urine   Result Value Ref Range    Color Yellow Yellow, Straw, Dark Yellow, Selena    Clarity, UA Slightly Cloudy (A) Clear    Specific Gravity  1.025 1.005 - 1.030    pH, Urine 6.0 5.0 - 8.0    Leukocytes Trace (A) Negative    Nitrite, UA Negative Negative    Protein, POC 30 mg/dL (A) Negative mg/dL    Glucose, UA Negative Negative, 1000 mg/dL (3+) mg/dL    Ketones, UA Negative Negative    Urobilinogen, UA Normal Normal    Bilirubin Negative Negative    Blood, UA 3+ (A) Negative    Lot Number [id number]     Expiration Date 7/25/23         Assessment and Plan  Diagnoses and all orders for this visit:    Chronic idiopathic constipation  - Uncontrolled, not reliably receiving bowel regimen from assisted living caregivers.   - May be contributing to some of her urinary symptoms as well as indigestion, bloating.  - Recommend miralax daily and senna-s nightly PRN    Nocturia  - Frequent urination primarily at night. Has urologist, Dr. Bowen, on myrbetriq.  - She had second opinion with Dr. Yao who discussed intravesicular botox but this has been declined.  - Constipation may be causing some of her urinary issues, discussed  above.    Gastroesophageal reflux disease without esophagitis  - She reports indigestion and frequent regurgitation after eating, drinking. Will increase pepcid to 40mg daily. May need PPI, hopefully can avoid endoscopy.    Rheumatoid arthritis involving multiple sites, unspecified whether rheumatoid factor present (HCC)  - Follows with Dr. Mathis  - On MTX 15mg weekly and folic acid  - Takes meloxicam 7.5mg daily, tylenol PRN, voltaren gel, and tramadol 50mg BID PRN for pain. Notably daughter does not believe she is getting tramadol often however patient complains of nightly foot pain. Recommend she begin receiving tramadol at night to better control pain and promote better sleep. Discussed this may worsen constipation so will need to initiate bowel regimen as discussed above.  - Daughter reports recent labs at [facility]. Will request records.    Impaired mobility, Impaired cognition  - Resides in assisted living who dispenses medications. Will be starting PT for generalized weakness and debility. Has fallen in 2020 resulting in hip fracture. No recent falls.    Recurrent UTI  - Has frequent UTI. Likely in part due to vaginal atrophy and poor fluid intake.  - No current symptoms, usually confused.  - On hiprex BID    Primary hypertension  - BP controlled, continue diltiazem 120mg daily    Other hyperlipidemia  - No recent lipid panel. Would not recommend statin given uncertain benefit at her age.    Macular degeneration of both eyes, unspecified type, complicated by Ferny Bonnet syndrome  - Has significant vision loss and hallucinations related to this. Resides in assisted living.    Sensorineural hearing loss (SNHL) of both ears  - Able to hold conversation in office today    H/O calcium pyrophosphate deposition disease (CPPD)  - No current joint swelling, on colchicine PRN    Pulmonary emphysema, unspecified emphysema type (HCC)  - Stable, continue breo and albuterol PRN    Peripheral vascular disease  (HCC)  - Poor pulses in R foot and ankle today. Has been to Dr. Valiente but declined procedure. Will get records.  - She is on ASA 81mg daily    Ulcer of right foot, limited to breakdown of skin (HCC)  - Has healing ulcer overlying bony prominence on lateral R foot, no signs of infection today  - Slow healing due to peripheral vascular disease  - Agree that better fitting footwear will help, she is getting this through podiatrist    Health Maintenance   Topic Date Due   • DXA SCAN  Never done   • Pneumococcal Vaccine 65+ (3 - PPSV23 or PCV20) 07/15/2018   • COVID-19 Vaccine (4 - Booster) 04/30/2021   • ANNUAL WELLNESS VISIT  06/01/2022   • LIPID PANEL  06/02/2022   • INFLUENZA VACCINE  08/01/2022   • TDAP/TD VACCINES (2 - Td or Tdap) 04/11/2027   • ZOSTER VACCINE  Addressed     Health Maintenance  - Immunizations: recommend 4th COVID shot.   - Depression screening: screen next visit    Return in about 3 months (around 9/8/2022) for Medicare Wellness 45 minutes.    Today I have spent a total of 73 minutes caring for Pati Carter.  This includes time spent preparing for the visit, reviewing tests, performing a medically appropriate examination and/or evaluation , counseling and educating the patient/family/caregiver, ordering medications, tests, or procedures and documenting information in the medical record.    Addendum:  Received fax from [facility]. Office notes included but no recent labs aside from urinalysis and cx collected by our office. There is note that she was to have CBC, CMP, ESR, and CRP every 12 weeks and standing order placed for these.

## 2022-06-09 ENCOUNTER — PATIENT ROUNDING (BHMG ONLY) (OUTPATIENT)
Dept: INTERNAL MEDICINE | Facility: CLINIC | Age: 87
End: 2022-06-09

## 2022-06-14 DIAGNOSIS — J43.9 PULMONARY EMPHYSEMA, UNSPECIFIED EMPHYSEMA TYPE: Primary | ICD-10-CM

## 2022-06-15 NOTE — TELEPHONE ENCOUNTER
Was filled by historical provider  Last Office Visit: 06/08/22  Next Office Visit:09/19/2022    Labs completed in past 6 months? yes  Labs completed in past year? yes    Last Refill Date:  Quantity:  Refills:    Pharmacy:     Please review pended refill request for any changes needed on refills or quantities. Thank you!

## 2022-06-24 ENCOUNTER — TELEPHONE (OUTPATIENT)
Dept: INTERNAL MEDICINE | Facility: CLINIC | Age: 87
End: 2022-06-24

## 2022-06-24 PROCEDURE — 87086 URINE CULTURE/COLONY COUNT: CPT | Performed by: NURSE PRACTITIONER

## 2022-06-24 NOTE — TELEPHONE ENCOUNTER
Office visit always needed for UTI symptoms. If she is unhappy with that recommendation she can return to past PCP. In regards to current symptoms she was seen in [facility] today.

## 2022-06-24 NOTE — TELEPHONE ENCOUNTER
Caller: Danette Suresh    Relationship: Emergency Contact    Best call back number:[phone]    What orders are you requesting (i.e. lab or imaging): URINALYSIS ORDER    In what timeframe would the patient need to come in: ASAP    Where will you receive your lab/imaging services: N/A    Additional notes: DANETTE STATED THAT SHE WAS WANTING TO GET SAMPLE OF PATIENT'S URINE TO TEST TODAY FOR UTI BECAUSE PATIENT IS HAVING SYMPTOMS FOR THAT AND DID NOT WANT TO BRING HER INTO OFFICE JUST DROP OFF SAMPLE    PLEASE ADVISE ASAP

## 2022-06-24 NOTE — TELEPHONE ENCOUNTER
Pt daughter Abraham called, on kaveh, states that the pt needs an order for a UA; I let pt daughter know that an office visit is necessary for this.  Pt daughter stated pt would not be able to do that.  Also let pt daughter know first johnny w/ Dr. Eduardo would be Monday.  Pt daughter stated if Dr. Vargas did not get back to them w/i the hour they would take the pt to Dr. Bolaños her previous provdier.

## 2022-07-08 PROBLEM — M81.0 AGE-RELATED OSTEOPOROSIS WITHOUT CURRENT PATHOLOGICAL FRACTURE: Status: ACTIVE | Noted: 2022-07-08

## 2022-07-17 PROCEDURE — 87086 URINE CULTURE/COLONY COUNT: CPT | Performed by: NURSE PRACTITIONER

## 2022-07-18 ENCOUNTER — TELEPHONE (OUTPATIENT)
Dept: INTERNAL MEDICINE | Facility: CLINIC | Age: 87
End: 2022-07-18

## 2022-07-18 NOTE — TELEPHONE ENCOUNTER
April @ [facility] called.  The pt's daughter took the pt to [facility] yesterday.  Today the pt has c/o of urinary problems.  April would like call back on what to do.  PH:  [phone]-  Nurse Line @ [facility]

## 2022-07-19 NOTE — TELEPHONE ENCOUNTER
Please advise caller that urine culture from [facility] visit was negative so patient does not have an infection causing her urinary symptoms. What symptoms is she experiencing?

## 2022-07-20 ENCOUNTER — TELEPHONE (OUTPATIENT)
Dept: INTERNAL MEDICINE | Facility: CLINIC | Age: 87
End: 2022-07-20

## 2022-07-20 NOTE — TELEPHONE ENCOUNTER
GILBERT FROM [facility] WAS CALLING IN ON BEHALF OF PATIENT.    SHE SAID THAT THE PATIENT FELL ABOUT TWO DAYS AGO AND IS PRETTY BANGED UP.    SHE IS REQUESTING ORDER FOR XR OF R KNEE AND R HIP.    PLEASE ADVISE    GILBERT, [facility]  [phone]

## 2022-07-21 ENCOUNTER — TELEPHONE (OUTPATIENT)
Dept: INTERNAL MEDICINE | Facility: CLINIC | Age: 87
End: 2022-07-21

## 2022-07-21 DIAGNOSIS — R19.7 DIARRHEA, UNSPECIFIED TYPE: Primary | ICD-10-CM

## 2022-07-21 RX ORDER — LOPERAMIDE HYDROCHLORIDE 2 MG/1
2 TABLET ORAL 4 TIMES DAILY PRN
Qty: 8 TABLET | Refills: 0 | Status: SHIPPED | OUTPATIENT
Start: 2022-07-21 | End: 2022-07-27 | Stop reason: HOSPADM

## 2022-07-21 NOTE — TELEPHONE ENCOUNTER
There is a second telephone encounter regarding same issues also open. Please refer to that encounter moving forward. Thanks.

## 2022-07-21 NOTE — TELEPHONE ENCOUNTER
Caller: [employer]    Relationship: GRAHAM    Best call back number:[phone]    What medication are you requesting: ANTIBIOTIC AND IMMODIUM    What are your current symptoms: UTI AND DIARRHEA    How long have you been experiencing symptoms: 07/17/22 UTI  07/20/22 DIARRHEA    Have you had these symptoms before:    [x] Yes  [] No    Have you been treated for these symptoms before:   [x] Yes  [] No    If a prescription is needed, what is your preferred pharmacy and phone number:    [facility]    Additional notes:  PATIENT WAS SEEN AT [facility] FOR THE URINARY ISSUES, THEY PRESCRIBED AUGMENTIN FOR THAT BUT THE UA CULTURE SHOWS THAT IT IS RESISTANT TO THE AUGMENTIN.

## 2022-07-21 NOTE — TELEPHONE ENCOUNTER
Spoke with Nneka and she said patient had a positive urine culture that was done on the 15th and reported to them on the 17th and augmentin was sent in for her but shows it is resistant and now patient is having a lot of diarrhea, she doesn't know who wrote for the antibiotic, Nneka is going to find out more information and will call back

## 2022-07-21 NOTE — TELEPHONE ENCOUNTER
Since her urine testing was negative for infection her urinary frequency is most likely related to overactive bladder. She is on the myrbetriq for this but dose is already maxed out. Has she tried any other medications like vesicare or oxybutynin in the past? These could be tried in place of myrbetriq if they have not been tried previously.

## 2022-07-22 LAB
ALBUMIN SERPL-MCNC: 4 G/DL (ref 3.5–5.2)
ALBUMIN/GLOB SERPL: 1.3 G/DL
ALP SERPL-CCNC: 220 U/L (ref 39–117)
ALT SERPL W P-5'-P-CCNC: 80 U/L (ref 1–33)
ANION GAP SERPL CALCULATED.3IONS-SCNC: 10 MMOL/L (ref 5–15)
AST SERPL-CCNC: 120 U/L (ref 1–32)
BASOPHILS # BLD AUTO: 0.03 10*3/MM3 (ref 0–0.2)
BASOPHILS NFR BLD AUTO: 0.4 % (ref 0–1.5)
BILIRUB SERPL-MCNC: 0.3 MG/DL (ref 0–1.2)
BUN SERPL-MCNC: 29 MG/DL (ref 8–23)
BUN/CREAT SERPL: 31.5 (ref 7–25)
CALCIUM SPEC-SCNC: 10.1 MG/DL (ref 8.2–9.6)
CHLORIDE SERPL-SCNC: 101 MMOL/L (ref 98–107)
CO2 SERPL-SCNC: 30 MMOL/L (ref 22–29)
CREAT SERPL-MCNC: 0.92 MG/DL (ref 0.57–1)
DEPRECATED RDW RBC AUTO: 55 FL (ref 37–54)
EGFRCR SERPLBLD CKD-EPI 2021: 58.9 ML/MIN/1.73
EOSINOPHIL # BLD AUTO: 0.27 10*3/MM3 (ref 0–0.4)
EOSINOPHIL NFR BLD AUTO: 3.7 % (ref 0.3–6.2)
ERYTHROCYTE [DISTWIDTH] IN BLOOD BY AUTOMATED COUNT: 15.1 % (ref 12.3–15.4)
GLOBULIN UR ELPH-MCNC: 3.1 GM/DL
GLUCOSE SERPL-MCNC: 104 MG/DL (ref 65–99)
HCT VFR BLD AUTO: 28.4 % (ref 34–46.6)
HGB BLD-MCNC: 9.3 G/DL (ref 12–15.9)
HOLD SPECIMEN: NORMAL
IMM GRANULOCYTES # BLD AUTO: 0.03 10*3/MM3 (ref 0–0.05)
IMM GRANULOCYTES NFR BLD AUTO: 0.4 % (ref 0–0.5)
LYMPHOCYTES # BLD AUTO: 0.75 10*3/MM3 (ref 0.7–3.1)
LYMPHOCYTES NFR BLD AUTO: 10.4 % (ref 19.6–45.3)
MCH RBC QN AUTO: 33.1 PG (ref 26.6–33)
MCHC RBC AUTO-ENTMCNC: 32.7 G/DL (ref 31.5–35.7)
MCV RBC AUTO: 101.1 FL (ref 79–97)
MONOCYTES # BLD AUTO: 0.73 10*3/MM3 (ref 0.1–0.9)
MONOCYTES NFR BLD AUTO: 10.1 % (ref 5–12)
NEUTROPHILS NFR BLD AUTO: 5.4 10*3/MM3 (ref 1.7–7)
NEUTROPHILS NFR BLD AUTO: 75 % (ref 42.7–76)
NRBC BLD AUTO-RTO: 0 /100 WBC (ref 0–0.2)
PLATELET # BLD AUTO: 297 10*3/MM3 (ref 140–450)
PMV BLD AUTO: 9.8 FL (ref 6–12)
POTASSIUM SERPL-SCNC: 3.9 MMOL/L (ref 3.5–5.2)
PROT SERPL-MCNC: 7.1 G/DL (ref 6–8.5)
RBC # BLD AUTO: 2.81 10*6/MM3 (ref 3.77–5.28)
SODIUM SERPL-SCNC: 141 MMOL/L (ref 136–145)
WBC NRBC COR # BLD: 7.21 10*3/MM3 (ref 3.4–10.8)
WHOLE BLOOD HOLD COAG: NORMAL
WHOLE BLOOD HOLD SPECIMEN: NORMAL

## 2022-07-22 PROCEDURE — 99284 EMERGENCY DEPT VISIT MOD MDM: CPT

## 2022-07-22 PROCEDURE — 80053 COMPREHEN METABOLIC PANEL: CPT

## 2022-07-22 PROCEDURE — 85025 COMPLETE CBC W/AUTO DIFF WBC: CPT

## 2022-07-22 PROCEDURE — 80074 ACUTE HEPATITIS PANEL: CPT | Performed by: INTERNAL MEDICINE

## 2022-07-22 RX ORDER — SODIUM CHLORIDE 0.9 % (FLUSH) 0.9 %
10 SYRINGE (ML) INJECTION AS NEEDED
Status: DISCONTINUED | OUTPATIENT
Start: 2022-07-22 | End: 2022-07-27 | Stop reason: HOSPADM

## 2022-07-22 NOTE — TELEPHONE ENCOUNTER
Spoke with Sheyla to let her know of Dr Eduardo message, she states they have the immodium and have started her on it.

## 2022-07-23 ENCOUNTER — HOSPITAL ENCOUNTER (INPATIENT)
Facility: HOSPITAL | Age: 87
LOS: 3 days | Discharge: NURSING, REHAB OR LONG-TERM CARE | End: 2022-07-27
Attending: EMERGENCY MEDICINE | Admitting: INTERNAL MEDICINE

## 2022-07-23 ENCOUNTER — APPOINTMENT (OUTPATIENT)
Dept: ULTRASOUND IMAGING | Facility: HOSPITAL | Age: 87
End: 2022-07-23

## 2022-07-23 ENCOUNTER — APPOINTMENT (OUTPATIENT)
Dept: CT IMAGING | Facility: HOSPITAL | Age: 87
End: 2022-07-23

## 2022-07-23 DIAGNOSIS — R10.30 LOWER ABDOMINAL PAIN: ICD-10-CM

## 2022-07-23 DIAGNOSIS — R31.0 GROSS HEMATURIA: ICD-10-CM

## 2022-07-23 DIAGNOSIS — S00.03XA CONTUSION OF SCALP, INITIAL ENCOUNTER: ICD-10-CM

## 2022-07-23 DIAGNOSIS — R19.00 PELVIC MASS: ICD-10-CM
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
## 2022-07-23 DIAGNOSIS — R19.7 DIARRHEA, UNSPECIFIED TYPE: ICD-10-CM

## 2022-07-23 DIAGNOSIS — R29.6 FALLS FREQUENTLY: ICD-10-CM

## 2022-07-23 LAB
BACTERIA UR QL AUTO: ABNORMAL /HPF
BILIRUB UR QL STRIP: NEGATIVE
C DIFF TOX GENS STL QL NAA+PROBE: DETECTED
CLARITY UR: ABNORMAL
COLOR UR: YELLOW
D-LACTATE SERPL-SCNC: 0.9 MMOL/L (ref 0.5–2)
FLUAV SUBTYP SPEC NAA+PROBE: NOT DETECTED
FLUBV RNA ISLT QL NAA+PROBE: NOT DETECTED
GLUCOSE UR STRIP-MCNC: NEGATIVE MG/DL
HAV IGM SERPL QL IA: NORMAL
HBV CORE IGM SERPL QL IA: NORMAL
HBV SURFACE AG SERPL QL IA: NORMAL
HCV AB SER DONR QL: NORMAL
HGB UR QL STRIP.AUTO: ABNORMAL
HYALINE CASTS UR QL AUTO: ABNORMAL /LPF
KETONES UR QL STRIP: NEGATIVE
LEUKOCYTE ESTERASE UR QL STRIP.AUTO: ABNORMAL
LIPASE SERPL-CCNC: 20 U/L (ref 13–60)
NITRITE UR QL STRIP: NEGATIVE
PH UR STRIP.AUTO: 5.5 [PH] (ref 5–8)
PROCALCITONIN SERPL-MCNC: 0.15 NG/ML (ref 0–0.25)
PROCALCITONIN SERPL-MCNC: 0.22 NG/ML (ref 0–0.25)
PROT UR QL STRIP: ABNORMAL
RBC # UR STRIP: ABNORMAL /HPF
REF LAB TEST METHOD: ABNORMAL
SARS-COV-2 RNA PNL SPEC NAA+PROBE: NOT DETECTED
SP GR UR STRIP: 1.02 (ref 1–1.03)
SQUAMOUS #/AREA URNS HPF: ABNORMAL /HPF
UROBILINOGEN UR QL STRIP: ABNORMAL
WBC # UR STRIP: ABNORMAL /HPF

## 2022-07-23 PROCEDURE — 99223 1ST HOSP IP/OBS HIGH 75: CPT | Performed by: INTERNAL MEDICINE

## 2022-07-23 PROCEDURE — 94761 N-INVAS EAR/PLS OXIMETRY MLT: CPT

## 2022-07-23 PROCEDURE — 83690 ASSAY OF LIPASE: CPT | Performed by: PHYSICIAN ASSISTANT

## 2022-07-23 PROCEDURE — 76857 US EXAM PELVIC LIMITED: CPT

## 2022-07-23 PROCEDURE — 70450 CT HEAD/BRAIN W/O DYE: CPT

## 2022-07-23 PROCEDURE — 97165 OT EVAL LOW COMPLEX 30 MIN: CPT

## 2022-07-23 PROCEDURE — G0378 HOSPITAL OBSERVATION PER HR: HCPCS

## 2022-07-23 PROCEDURE — 97535 SELF CARE MNGMENT TRAINING: CPT

## 2022-07-23 PROCEDURE — 84145 PROCALCITONIN (PCT): CPT | Performed by: INTERNAL MEDICINE

## 2022-07-23 PROCEDURE — 87493 C DIFF AMPLIFIED PROBE: CPT | Performed by: INTERNAL MEDICINE

## 2022-07-23 PROCEDURE — 94640 AIRWAY INHALATION TREATMENT: CPT

## 2022-07-23 PROCEDURE — 87086 URINE CULTURE/COLONY COUNT: CPT | Performed by: INTERNAL MEDICINE

## 2022-07-23 PROCEDURE — 74177 CT ABD & PELVIS W/CONTRAST: CPT

## 2022-07-23 PROCEDURE — 25010000002 IOPAMIDOL 61 % SOLUTION: Performed by: EMERGENCY MEDICINE

## 2022-07-23 PROCEDURE — 76705 ECHO EXAM OF ABDOMEN: CPT

## 2022-07-23 PROCEDURE — 94799 UNLISTED PULMONARY SVC/PX: CPT

## 2022-07-23 PROCEDURE — 99222 1ST HOSP IP/OBS MODERATE 55: CPT | Performed by: STUDENT IN AN ORGANIZED HEALTH CARE EDUCATION/TRAINING PROGRAM

## 2022-07-23 PROCEDURE — 83605 ASSAY OF LACTIC ACID: CPT | Performed by: PHYSICIAN ASSISTANT

## 2022-07-23 PROCEDURE — 94664 DEMO&/EVAL PT USE INHALER: CPT

## 2022-07-23 PROCEDURE — 81001 URINALYSIS AUTO W/SCOPE: CPT | Performed by: EMERGENCY MEDICINE

## 2022-07-23 PROCEDURE — 87636 SARSCOV2 & INF A&B AMP PRB: CPT | Performed by: PHYSICIAN ASSISTANT

## 2022-07-23 RX ORDER — VANCOMYCIN HYDROCHLORIDE 125 MG/1
125 CAPSULE ORAL EVERY 6 HOURS SCHEDULED
Status: DISCONTINUED | OUTPATIENT
Start: 2022-07-23 | End: 2022-07-27 | Stop reason: HOSPADM

## 2022-07-23 RX ORDER — ACETAMINOPHEN 325 MG/1
650 TABLET ORAL EVERY 4 HOURS PRN
Status: DISCONTINUED | OUTPATIENT
Start: 2022-07-23 | End: 2022-07-27 | Stop reason: HOSPADM

## 2022-07-23 RX ORDER — TRAMADOL HYDROCHLORIDE 50 MG/1
50 TABLET ORAL 2 TIMES DAILY PRN
Status: DISCONTINUED | OUTPATIENT
Start: 2022-07-23 | End: 2022-07-27 | Stop reason: HOSPADM

## 2022-07-23 RX ORDER — AMINO ACIDS/PROTEIN HYDROLYS 11G-40/45
30 LIQUID IN PACKET (ML) ORAL 2 TIMES DAILY
Status: DISCONTINUED | OUTPATIENT
Start: 2022-07-23 | End: 2022-07-27 | Stop reason: HOSPADM

## 2022-07-23 RX ORDER — SODIUM CHLORIDE, SODIUM LACTATE, POTASSIUM CHLORIDE, CALCIUM CHLORIDE 600; 310; 30; 20 MG/100ML; MG/100ML; MG/100ML; MG/100ML
50 INJECTION, SOLUTION INTRAVENOUS CONTINUOUS
Status: ACTIVE | OUTPATIENT
Start: 2022-07-23 | End: 2022-07-23

## 2022-07-23 RX ORDER — FOLIC ACID 1 MG/1
1000 TABLET ORAL DAILY
Status: DISCONTINUED | OUTPATIENT
Start: 2022-07-23 | End: 2022-07-27 | Stop reason: HOSPADM

## 2022-07-23 RX ORDER — FAMOTIDINE 20 MG/1
20 TABLET, FILM COATED ORAL DAILY
Status: DISCONTINUED | OUTPATIENT
Start: 2022-07-24 | End: 2022-07-27 | Stop reason: HOSPADM

## 2022-07-23 RX ORDER — DILTIAZEM HYDROCHLORIDE 120 MG/1
120 CAPSULE, COATED, EXTENDED RELEASE ORAL DAILY
Status: DISCONTINUED | OUTPATIENT
Start: 2022-07-23 | End: 2022-07-27 | Stop reason: HOSPADM

## 2022-07-23 RX ORDER — BUDESONIDE AND FORMOTEROL FUMARATE DIHYDRATE 80; 4.5 UG/1; UG/1
2 AEROSOL RESPIRATORY (INHALATION)
Status: DISCONTINUED | OUTPATIENT
Start: 2022-07-23 | End: 2022-07-27 | Stop reason: HOSPADM

## 2022-07-23 RX ORDER — VANCOMYCIN HYDROCHLORIDE 125 MG/1
125 CAPSULE ORAL EVERY OTHER DAY
Status: DISCONTINUED | OUTPATIENT
Start: 2022-08-18 | End: 2022-07-25

## 2022-07-23 RX ORDER — VANCOMYCIN HYDROCHLORIDE 125 MG/1
125 CAPSULE ORAL EVERY 12 HOURS
Status: DISCONTINUED | OUTPATIENT
Start: 2022-08-02 | End: 2022-07-25

## 2022-07-23 RX ORDER — VANCOMYCIN HYDROCHLORIDE 125 MG/1
125 CAPSULE ORAL EVERY 24 HOURS
Status: DISCONTINUED | OUTPATIENT
Start: 2022-08-10 | End: 2022-07-25

## 2022-07-23 RX ORDER — MELATONIN
2000 DAILY
Status: DISCONTINUED | OUTPATIENT
Start: 2022-07-23 | End: 2022-07-27 | Stop reason: HOSPADM

## 2022-07-23 RX ORDER — LANOLIN ALCOHOL/MO/W.PET/CERES
1000 CREAM (GRAM) TOPICAL DAILY
Status: DISCONTINUED | OUTPATIENT
Start: 2022-07-23 | End: 2022-07-27 | Stop reason: HOSPADM

## 2022-07-23 RX ORDER — FAMOTIDINE 20 MG/1
40 TABLET, FILM COATED ORAL DAILY
Status: DISCONTINUED | OUTPATIENT
Start: 2022-07-23 | End: 2022-07-23

## 2022-07-23 RX ORDER — SODIUM CHLORIDE 0.9 % (FLUSH) 0.9 %
10 SYRINGE (ML) INJECTION AS NEEDED
Status: DISCONTINUED | OUTPATIENT
Start: 2022-07-23 | End: 2022-07-27 | Stop reason: HOSPADM

## 2022-07-23 RX ORDER — SODIUM CHLORIDE 0.9 % (FLUSH) 0.9 %
10 SYRINGE (ML) INJECTION EVERY 12 HOURS SCHEDULED
Status: DISCONTINUED | OUTPATIENT
Start: 2022-07-23 | End: 2022-07-27 | Stop reason: HOSPADM

## 2022-07-23 RX ORDER — CHOLECALCIFEROL (VITAMIN D3) 125 MCG
5 CAPSULE ORAL NIGHTLY PRN
Status: DISCONTINUED | OUTPATIENT
Start: 2022-07-23 | End: 2022-07-27 | Stop reason: HOSPADM

## 2022-07-23 RX ORDER — ONDANSETRON 2 MG/ML
4 INJECTION INTRAMUSCULAR; INTRAVENOUS EVERY 6 HOURS PRN
Status: DISCONTINUED | OUTPATIENT
Start: 2022-07-23 | End: 2022-07-27 | Stop reason: HOSPADM

## 2022-07-23 RX ORDER — ACETAMINOPHEN 160 MG/5ML
650 SOLUTION ORAL EVERY 4 HOURS PRN
Status: DISCONTINUED | OUTPATIENT
Start: 2022-07-23 | End: 2022-07-27 | Stop reason: HOSPADM

## 2022-07-23 RX ORDER — ACETAMINOPHEN 650 MG/1
650 SUPPOSITORY RECTAL EVERY 4 HOURS PRN
Status: DISCONTINUED | OUTPATIENT
Start: 2022-07-23 | End: 2022-07-27 | Stop reason: HOSPADM

## 2022-07-23 RX ORDER — ASPIRIN 81 MG/1
81 TABLET ORAL DAILY
Status: DISCONTINUED | OUTPATIENT
Start: 2022-07-23 | End: 2022-07-27 | Stop reason: HOSPADM

## 2022-07-23 RX ORDER — ALBUTEROL SULFATE 2.5 MG/3ML
2.5 SOLUTION RESPIRATORY (INHALATION) EVERY 4 HOURS PRN
Status: DISCONTINUED | OUTPATIENT
Start: 2022-07-23 | End: 2022-07-27 | Stop reason: HOSPADM

## 2022-07-23 RX ADMIN — IOPAMIDOL 100 ML: 612 INJECTION, SOLUTION INTRAVENOUS at 01:34

## 2022-07-23 RX ADMIN — SODIUM CHLORIDE, POTASSIUM CHLORIDE, SODIUM LACTATE AND CALCIUM CHLORIDE 50 ML/HR: 600; 310; 30; 20 INJECTION, SOLUTION INTRAVENOUS at 06:22

## 2022-07-23 RX ADMIN — Medication 10 ML: at 19:42

## 2022-07-23 RX ADMIN — FOLIC ACID 1000 MCG: 1 TABLET ORAL at 09:10

## 2022-07-23 RX ADMIN — VANCOMYCIN HYDROCHLORIDE 125 MG: 125 CAPSULE ORAL at 19:42

## 2022-07-23 RX ADMIN — CYANOCOBALAMIN TAB 1000 MCG 1000 MCG: 1000 TAB at 09:10

## 2022-07-23 RX ADMIN — ASPIRIN 81 MG: 81 TABLET, COATED ORAL at 09:09

## 2022-07-23 RX ADMIN — BUDESONIDE AND FORMOTEROL FUMARATE DIHYDRATE 2 PUFF: 80; 4.5 AEROSOL RESPIRATORY (INHALATION) at 20:49

## 2022-07-23 RX ADMIN — BUDESONIDE AND FORMOTEROL FUMARATE DIHYDRATE 2 PUFF: 80; 4.5 AEROSOL RESPIRATORY (INHALATION) at 11:55

## 2022-07-23 RX ADMIN — FAMOTIDINE 40 MG: 20 TABLET ORAL at 09:09

## 2022-07-23 RX ADMIN — Medication 2000 UNITS: at 09:09

## 2022-07-23 RX ADMIN — DILTIAZEM HYDROCHLORIDE 120 MG: 120 CAPSULE, COATED, EXTENDED RELEASE ORAL at 09:09

## 2022-07-23 RX ADMIN — Medication 30 ML: at 09:35

## 2022-07-23 RX ADMIN — SODIUM CHLORIDE 1000 ML: 9 INJECTION, SOLUTION INTRAVENOUS at 00:55

## 2022-07-24 LAB
ANION GAP SERPL CALCULATED.3IONS-SCNC: 9 MMOL/L (ref 5–15)
BACTERIA SPEC AEROBE CULT: NO GROWTH
BASOPHILS # BLD AUTO: 0.04 10*3/MM3 (ref 0–0.2)
BASOPHILS NFR BLD AUTO: 0.5 % (ref 0–1.5)
BUN SERPL-MCNC: 20 MG/DL (ref 8–23)
BUN/CREAT SERPL: 18.9 (ref 7–25)
CALCIUM SPEC-SCNC: 9 MG/DL (ref 8.2–9.6)
CHLORIDE SERPL-SCNC: 99 MMOL/L (ref 98–107)
CO2 SERPL-SCNC: 28 MMOL/L (ref 22–29)
CREAT SERPL-MCNC: 1.06 MG/DL (ref 0.57–1)
DEPRECATED RDW RBC AUTO: 54.9 FL (ref 37–54)
EGFRCR SERPLBLD CKD-EPI 2021: 49.7 ML/MIN/1.73
EOSINOPHIL # BLD AUTO: 0.2 10*3/MM3 (ref 0–0.4)
EOSINOPHIL NFR BLD AUTO: 2.3 % (ref 0.3–6.2)
ERYTHROCYTE [DISTWIDTH] IN BLOOD BY AUTOMATED COUNT: 15.4 % (ref 12.3–15.4)
GLUCOSE SERPL-MCNC: 97 MG/DL (ref 65–99)
HCT VFR BLD AUTO: 29.1 % (ref 34–46.6)
HGB BLD-MCNC: 9.5 G/DL (ref 12–15.9)
IMM GRANULOCYTES # BLD AUTO: 0.05 10*3/MM3 (ref 0–0.05)
IMM GRANULOCYTES NFR BLD AUTO: 0.6 % (ref 0–0.5)
INR PPP: 1.09 (ref 0.84–1.13)
LYMPHOCYTES # BLD AUTO: 0.71 10*3/MM3 (ref 0.7–3.1)
LYMPHOCYTES NFR BLD AUTO: 8.1 % (ref 19.6–45.3)
MAGNESIUM SERPL-MCNC: 1.5 MG/DL (ref 1.7–2.3)
MCH RBC QN AUTO: 32.4 PG (ref 26.6–33)
MCHC RBC AUTO-ENTMCNC: 32.6 G/DL (ref 31.5–35.7)
MCV RBC AUTO: 99.3 FL (ref 79–97)
MONOCYTES # BLD AUTO: 0.71 10*3/MM3 (ref 0.1–0.9)
MONOCYTES NFR BLD AUTO: 8.1 % (ref 5–12)
NEUTROPHILS NFR BLD AUTO: 7.08 10*3/MM3 (ref 1.7–7)
NEUTROPHILS NFR BLD AUTO: 80.4 % (ref 42.7–76)
NRBC BLD AUTO-RTO: 0 /100 WBC (ref 0–0.2)
PLATELET # BLD AUTO: 253 10*3/MM3 (ref 140–450)
PMV BLD AUTO: 10.1 FL (ref 6–12)
POTASSIUM SERPL-SCNC: 3.5 MMOL/L (ref 3.5–5.2)
PROTHROMBIN TIME: 14.1 SECONDS (ref 11.4–14.4)
RBC # BLD AUTO: 2.93 10*6/MM3 (ref 3.77–5.28)
SODIUM SERPL-SCNC: 136 MMOL/L (ref 136–145)
WBC NRBC COR # BLD: 8.79 10*3/MM3 (ref 3.4–10.8)

## 2022-07-24 PROCEDURE — 85610 PROTHROMBIN TIME: CPT | Performed by: INTERNAL MEDICINE

## 2022-07-24 PROCEDURE — G0378 HOSPITAL OBSERVATION PER HR: HCPCS

## 2022-07-24 PROCEDURE — 94799 UNLISTED PULMONARY SVC/PX: CPT

## 2022-07-24 PROCEDURE — 85025 COMPLETE CBC W/AUTO DIFF WBC: CPT | Performed by: INTERNAL MEDICINE

## 2022-07-24 PROCEDURE — 83735 ASSAY OF MAGNESIUM: CPT | Performed by: INTERNAL MEDICINE

## 2022-07-24 PROCEDURE — 94664 DEMO&/EVAL PT USE INHALER: CPT

## 2022-07-24 PROCEDURE — 80048 BASIC METABOLIC PNL TOTAL CA: CPT | Performed by: INTERNAL MEDICINE

## 2022-07-24 PROCEDURE — 97110 THERAPEUTIC EXERCISES: CPT

## 2022-07-24 PROCEDURE — 94761 N-INVAS EAR/PLS OXIMETRY MLT: CPT

## 2022-07-24 PROCEDURE — 25010000002 MAGNESIUM SULFATE 2 GM/50ML SOLUTION: Performed by: NURSE PRACTITIONER

## 2022-07-24 PROCEDURE — 97162 PT EVAL MOD COMPLEX 30 MIN: CPT

## 2022-07-24 PROCEDURE — 99232 SBSQ HOSP IP/OBS MODERATE 35: CPT | Performed by: INTERNAL MEDICINE

## 2022-07-24 RX ORDER — MAGNESIUM SULFATE HEPTAHYDRATE 40 MG/ML
2 INJECTION, SOLUTION INTRAVENOUS ONCE
Status: COMPLETED | OUTPATIENT
Start: 2022-07-24 | End: 2022-07-25

## 2022-07-24 RX ADMIN — VANCOMYCIN HYDROCHLORIDE 125 MG: 125 CAPSULE ORAL at 06:48

## 2022-07-24 RX ADMIN — FAMOTIDINE 20 MG: 20 TABLET ORAL at 08:45

## 2022-07-24 RX ADMIN — DILTIAZEM HYDROCHLORIDE 120 MG: 120 CAPSULE, COATED, EXTENDED RELEASE ORAL at 08:45

## 2022-07-24 RX ADMIN — ASPIRIN 81 MG: 81 TABLET, COATED ORAL at 08:45

## 2022-07-24 RX ADMIN — VANCOMYCIN HYDROCHLORIDE 125 MG: 125 CAPSULE ORAL at 23:01

## 2022-07-24 RX ADMIN — VANCOMYCIN HYDROCHLORIDE 125 MG: 125 CAPSULE ORAL at 11:36

## 2022-07-24 RX ADMIN — Medication 10 ML: at 20:06

## 2022-07-24 RX ADMIN — BUDESONIDE AND FORMOTEROL FUMARATE DIHYDRATE 2 PUFF: 80; 4.5 AEROSOL RESPIRATORY (INHALATION) at 20:16

## 2022-07-24 RX ADMIN — Medication 2000 UNITS: at 08:45

## 2022-07-24 RX ADMIN — CYANOCOBALAMIN TAB 1000 MCG 1000 MCG: 1000 TAB at 08:45

## 2022-07-24 RX ADMIN — FOLIC ACID 1000 MCG: 1 TABLET ORAL at 08:45

## 2022-07-24 RX ADMIN — BUDESONIDE AND FORMOTEROL FUMARATE DIHYDRATE 2 PUFF: 80; 4.5 AEROSOL RESPIRATORY (INHALATION) at 21:12

## 2022-07-24 RX ADMIN — VANCOMYCIN HYDROCHLORIDE 125 MG: 125 CAPSULE ORAL at 00:44

## 2022-07-24 RX ADMIN — MAGNESIUM SULFATE HEPTAHYDRATE 2 G: 2 INJECTION, SOLUTION INTRAVENOUS at 23:01

## 2022-07-24 RX ADMIN — Medication 10 ML: at 11:39

## 2022-07-24 RX ADMIN — BUDESONIDE AND FORMOTEROL FUMARATE DIHYDRATE 2 PUFF: 80; 4.5 AEROSOL RESPIRATORY (INHALATION) at 10:25

## 2022-07-24 RX ADMIN — VANCOMYCIN HYDROCHLORIDE 125 MG: 125 CAPSULE ORAL at 17:53

## 2022-07-25 ENCOUNTER — TELEPHONE (OUTPATIENT)
Dept: UROLOGY | Facility: CLINIC | Age: 87
End: 2022-07-25

## 2022-07-25 LAB
ANION GAP SERPL CALCULATED.3IONS-SCNC: 9 MMOL/L (ref 5–15)
BASOPHILS # BLD AUTO: 0.04 10*3/MM3 (ref 0–0.2)
BASOPHILS NFR BLD AUTO: 0.4 % (ref 0–1.5)
BUN SERPL-MCNC: 21 MG/DL (ref 8–23)
BUN/CREAT SERPL: 21.2 (ref 7–25)
CALCIUM SPEC-SCNC: 8.9 MG/DL (ref 8.2–9.6)
CHLORIDE SERPL-SCNC: 98 MMOL/L (ref 98–107)
CO2 SERPL-SCNC: 27 MMOL/L (ref 22–29)
CREAT SERPL-MCNC: 0.99 MG/DL (ref 0.57–1)
DEPRECATED RDW RBC AUTO: 57.1 FL (ref 37–54)
EGFRCR SERPLBLD CKD-EPI 2021: 53.9 ML/MIN/1.73
EOSINOPHIL # BLD AUTO: 0.31 10*3/MM3 (ref 0–0.4)
EOSINOPHIL NFR BLD AUTO: 2.8 % (ref 0.3–6.2)
ERYTHROCYTE [DISTWIDTH] IN BLOOD BY AUTOMATED COUNT: 15.7 % (ref 12.3–15.4)
GLUCOSE SERPL-MCNC: 99 MG/DL (ref 65–99)
HCT VFR BLD AUTO: 31.3 % (ref 34–46.6)
HGB BLD-MCNC: 10.2 G/DL (ref 12–15.9)
IMM GRANULOCYTES # BLD AUTO: 0.03 10*3/MM3 (ref 0–0.05)
IMM GRANULOCYTES NFR BLD AUTO: 0.3 % (ref 0–0.5)
LYMPHOCYTES # BLD AUTO: 0.84 10*3/MM3 (ref 0.7–3.1)
LYMPHOCYTES NFR BLD AUTO: 7.6 % (ref 19.6–45.3)
MAGNESIUM SERPL-MCNC: 1.9 MG/DL (ref 1.7–2.3)
MCH RBC QN AUTO: 32.8 PG (ref 26.6–33)
MCHC RBC AUTO-ENTMCNC: 32.6 G/DL (ref 31.5–35.7)
MCV RBC AUTO: 100.6 FL (ref 79–97)
MONOCYTES # BLD AUTO: 0.78 10*3/MM3 (ref 0.1–0.9)
MONOCYTES NFR BLD AUTO: 7.1 % (ref 5–12)
NEUTROPHILS NFR BLD AUTO: 81.8 % (ref 42.7–76)
NEUTROPHILS NFR BLD AUTO: 9.04 10*3/MM3 (ref 1.7–7)
NRBC BLD AUTO-RTO: 0 /100 WBC (ref 0–0.2)
PLATELET # BLD AUTO: 285 10*3/MM3 (ref 140–450)
PMV BLD AUTO: 10.1 FL (ref 6–12)
POTASSIUM SERPL-SCNC: 3.6 MMOL/L (ref 3.5–5.2)
RBC # BLD AUTO: 3.11 10*6/MM3 (ref 3.77–5.28)
SODIUM SERPL-SCNC: 134 MMOL/L (ref 136–145)
WBC NRBC COR # BLD: 11.04 10*3/MM3 (ref 3.4–10.8)

## 2022-07-25 PROCEDURE — 94799 UNLISTED PULMONARY SVC/PX: CPT

## 2022-07-25 PROCEDURE — 97535 SELF CARE MNGMENT TRAINING: CPT

## 2022-07-25 PROCEDURE — 85025 COMPLETE CBC W/AUTO DIFF WBC: CPT | Performed by: INTERNAL MEDICINE

## 2022-07-25 PROCEDURE — 80048 BASIC METABOLIC PNL TOTAL CA: CPT | Performed by: INTERNAL MEDICINE

## 2022-07-25 PROCEDURE — 83735 ASSAY OF MAGNESIUM: CPT | Performed by: INTERNAL MEDICINE

## 2022-07-25 PROCEDURE — 99232 SBSQ HOSP IP/OBS MODERATE 35: CPT | Performed by: INTERNAL MEDICINE

## 2022-07-25 RX ADMIN — VANCOMYCIN HYDROCHLORIDE 125 MG: 125 CAPSULE ORAL at 17:07

## 2022-07-25 RX ADMIN — Medication 10 ML: at 19:53

## 2022-07-25 RX ADMIN — Medication 2000 UNITS: at 09:01

## 2022-07-25 RX ADMIN — BUDESONIDE AND FORMOTEROL FUMARATE DIHYDRATE 2 PUFF: 80; 4.5 AEROSOL RESPIRATORY (INHALATION) at 18:57

## 2022-07-25 RX ADMIN — FAMOTIDINE 20 MG: 20 TABLET ORAL at 08:54

## 2022-07-25 RX ADMIN — Medication 10 ML: at 09:01

## 2022-07-25 RX ADMIN — VANCOMYCIN HYDROCHLORIDE 125 MG: 125 CAPSULE ORAL at 23:34

## 2022-07-25 RX ADMIN — ACETAMINOPHEN 650 MG: 325 TABLET ORAL at 23:34

## 2022-07-25 RX ADMIN — DILTIAZEM HYDROCHLORIDE 120 MG: 120 CAPSULE, COATED, EXTENDED RELEASE ORAL at 08:53

## 2022-07-25 RX ADMIN — VANCOMYCIN HYDROCHLORIDE 125 MG: 125 CAPSULE ORAL at 12:32

## 2022-07-25 RX ADMIN — FOLIC ACID 1000 MCG: 1 TABLET ORAL at 08:53

## 2022-07-25 RX ADMIN — CYANOCOBALAMIN TAB 1000 MCG 1000 MCG: 1000 TAB at 08:54

## 2022-07-25 RX ADMIN — ASPIRIN 81 MG: 81 TABLET, COATED ORAL at 08:54

## 2022-07-25 RX ADMIN — VANCOMYCIN HYDROCHLORIDE 125 MG: 125 CAPSULE ORAL at 05:24

## 2022-07-25 NOTE — TELEPHONE ENCOUNTER
Spoke with Lilian patient's daughter, who states patient is going to a rehabilitation center after been discharged from the hospital.      Lilian states she is not sure how long pt will be at the rehabilitation center, therefore she will call us when we can schedule f/u appointment.

## 2022-07-25 NOTE — TELEPHONE ENCOUNTER
----- Message from Hiram Yao MD sent at 7/23/2022 12:47 PM EDT -----  Regarding: Consult patient needs follow-up  This patient is admitted to the hospital with possible bladder mass and left kidney mass.  She needs to see me back in follow-up in about 2 weeks, call her daughter Danette to arrange    Hiram Yao MD

## 2022-07-26 ENCOUNTER — APPOINTMENT (OUTPATIENT)
Dept: GENERAL RADIOLOGY | Facility: HOSPITAL | Age: 87
End: 2022-07-26

## 2022-07-26 LAB
ANION GAP SERPL CALCULATED.3IONS-SCNC: 8 MMOL/L (ref 5–15)
BASOPHILS # BLD AUTO: 0.04 10*3/MM3 (ref 0–0.2)
BASOPHILS NFR BLD AUTO: 0.4 % (ref 0–1.5)
BUN SERPL-MCNC: 21 MG/DL (ref 8–23)
BUN/CREAT SERPL: 19.6 (ref 7–25)
CALCIUM SPEC-SCNC: 9 MG/DL (ref 8.2–9.6)
CHLORIDE SERPL-SCNC: 100 MMOL/L (ref 98–107)
CO2 SERPL-SCNC: 28 MMOL/L (ref 22–29)
CREAT SERPL-MCNC: 1.07 MG/DL (ref 0.57–1)
DEPRECATED RDW RBC AUTO: 55.8 FL (ref 37–54)
EGFRCR SERPLBLD CKD-EPI 2021: 48.8 ML/MIN/1.73
EOSINOPHIL # BLD AUTO: 0.33 10*3/MM3 (ref 0–0.4)
EOSINOPHIL NFR BLD AUTO: 3.1 % (ref 0.3–6.2)
ERYTHROCYTE [DISTWIDTH] IN BLOOD BY AUTOMATED COUNT: 15.6 % (ref 12.3–15.4)
GLUCOSE SERPL-MCNC: 86 MG/DL (ref 65–99)
HCT VFR BLD AUTO: 30.7 % (ref 34–46.6)
HGB BLD-MCNC: 10.2 G/DL (ref 12–15.9)
IMM GRANULOCYTES # BLD AUTO: 0.06 10*3/MM3 (ref 0–0.05)
IMM GRANULOCYTES NFR BLD AUTO: 0.6 % (ref 0–0.5)
LYMPHOCYTES # BLD AUTO: 0.82 10*3/MM3 (ref 0.7–3.1)
LYMPHOCYTES NFR BLD AUTO: 7.7 % (ref 19.6–45.3)
MAGNESIUM SERPL-MCNC: 1.8 MG/DL (ref 1.7–2.3)
MCH RBC QN AUTO: 32.7 PG (ref 26.6–33)
MCHC RBC AUTO-ENTMCNC: 33.2 G/DL (ref 31.5–35.7)
MCV RBC AUTO: 98.4 FL (ref 79–97)
MONOCYTES # BLD AUTO: 1.17 10*3/MM3 (ref 0.1–0.9)
MONOCYTES NFR BLD AUTO: 11 % (ref 5–12)
NEUTROPHILS NFR BLD AUTO: 77.2 % (ref 42.7–76)
NEUTROPHILS NFR BLD AUTO: 8.17 10*3/MM3 (ref 1.7–7)
NRBC BLD AUTO-RTO: 0 /100 WBC (ref 0–0.2)
PLATELET # BLD AUTO: 275 10*3/MM3 (ref 140–450)
PMV BLD AUTO: 9.8 FL (ref 6–12)
POTASSIUM SERPL-SCNC: 3.8 MMOL/L (ref 3.5–5.2)
RBC # BLD AUTO: 3.12 10*6/MM3 (ref 3.77–5.28)
SODIUM SERPL-SCNC: 136 MMOL/L (ref 136–145)
WBC NRBC COR # BLD: 10.59 10*3/MM3 (ref 3.4–10.8)

## 2022-07-26 PROCEDURE — 94799 UNLISTED PULMONARY SVC/PX: CPT

## 2022-07-26 PROCEDURE — 80048 BASIC METABOLIC PNL TOTAL CA: CPT | Performed by: INTERNAL MEDICINE

## 2022-07-26 PROCEDURE — 73630 X-RAY EXAM OF FOOT: CPT

## 2022-07-26 PROCEDURE — 85025 COMPLETE CBC W/AUTO DIFF WBC: CPT | Performed by: INTERNAL MEDICINE

## 2022-07-26 PROCEDURE — 99232 SBSQ HOSP IP/OBS MODERATE 35: CPT | Performed by: INTERNAL MEDICINE

## 2022-07-26 PROCEDURE — 83735 ASSAY OF MAGNESIUM: CPT | Performed by: INTERNAL MEDICINE

## 2022-07-26 RX ORDER — POVIDONE-IODINE 10 MG/ML
SOLUTION TOPICAL DAILY
Status: DISCONTINUED | OUTPATIENT
Start: 2022-07-26 | End: 2022-07-27 | Stop reason: HOSPADM

## 2022-07-26 RX ORDER — CASTOR OIL AND BALSAM, PERU 788; 87 MG/G; MG/G
1 OINTMENT TOPICAL EVERY 12 HOURS SCHEDULED
Status: DISCONTINUED | OUTPATIENT
Start: 2022-07-26 | End: 2022-07-27 | Stop reason: HOSPADM

## 2022-07-26 RX ADMIN — CYANOCOBALAMIN TAB 1000 MCG 1000 MCG: 1000 TAB at 08:50

## 2022-07-26 RX ADMIN — VANCOMYCIN HYDROCHLORIDE 125 MG: 125 CAPSULE ORAL at 05:39

## 2022-07-26 RX ADMIN — DILTIAZEM HYDROCHLORIDE 120 MG: 120 CAPSULE, COATED, EXTENDED RELEASE ORAL at 08:50

## 2022-07-26 RX ADMIN — ASPIRIN 81 MG: 81 TABLET, COATED ORAL at 08:50

## 2022-07-26 RX ADMIN — FAMOTIDINE 20 MG: 20 TABLET ORAL at 08:51

## 2022-07-26 RX ADMIN — VANCOMYCIN HYDROCHLORIDE 125 MG: 125 CAPSULE ORAL at 17:25

## 2022-07-26 RX ADMIN — Medication 30 ML: at 11:00

## 2022-07-26 RX ADMIN — BUDESONIDE AND FORMOTEROL FUMARATE DIHYDRATE 2 PUFF: 80; 4.5 AEROSOL RESPIRATORY (INHALATION) at 08:21

## 2022-07-26 RX ADMIN — BUDESONIDE AND FORMOTEROL FUMARATE DIHYDRATE 2 PUFF: 80; 4.5 AEROSOL RESPIRATORY (INHALATION) at 19:23

## 2022-07-26 RX ADMIN — Medication 2000 UNITS: at 08:50

## 2022-07-26 RX ADMIN — Medication 10 ML: at 08:51

## 2022-07-26 RX ADMIN — Medication 10 ML: at 20:11

## 2022-07-26 RX ADMIN — FOLIC ACID 1000 MCG: 1 TABLET ORAL at 08:50

## 2022-07-26 RX ADMIN — Medication 30 ML: at 20:11

## 2022-07-26 RX ADMIN — POVIDONE-IODINE: 10 SOLUTION TOPICAL at 17:28

## 2022-07-26 RX ADMIN — VANCOMYCIN HYDROCHLORIDE 125 MG: 125 CAPSULE ORAL at 11:28

## 2022-07-26 RX ADMIN — CASTOR OIL AND BALSAM, PERU 1 APPLICATION: 788; 87 OINTMENT TOPICAL at 20:55

## 2022-07-26 NOTE — TELEPHONE ENCOUNTER
Spoke with Lilian, she states she does not want to schedule an appointment right now just wants to get pt all settle down.      She will call later on and schedule f/u appointment for pt.

## 2022-07-27 VITALS
RESPIRATION RATE: 18 BRPM | HEART RATE: 93 BPM | HEIGHT: 62 IN | SYSTOLIC BLOOD PRESSURE: 154 MMHG | WEIGHT: 118.9 LBS | DIASTOLIC BLOOD PRESSURE: 79 MMHG | OXYGEN SATURATION: 93 % | BODY MASS INDEX: 21.88 KG/M2 | TEMPERATURE: 97.3 F

## 2022-07-27 PROBLEM — N39.0 RECURRENT UTI: Status: RESOLVED | Noted: 2020-09-16 | Resolved: 2022-07-27

## 2022-07-27 PROBLEM — R19.7 DIARRHEA: Status: RESOLVED | Noted: 2022-07-23 | Resolved: 2022-07-27

## 2022-07-27 PROCEDURE — 94799 UNLISTED PULMONARY SVC/PX: CPT

## 2022-07-27 PROCEDURE — 97530 THERAPEUTIC ACTIVITIES: CPT

## 2022-07-27 PROCEDURE — 99221 1ST HOSP IP/OBS SF/LOW 40: CPT | Performed by: OBSTETRICS & GYNECOLOGY

## 2022-07-27 PROCEDURE — 94664 DEMO&/EVAL PT USE INHALER: CPT

## 2022-07-27 PROCEDURE — 97110 THERAPEUTIC EXERCISES: CPT

## 2022-07-27 PROCEDURE — 99239 HOSP IP/OBS DSCHRG MGMT >30: CPT | Performed by: INTERNAL MEDICINE

## 2022-07-27 RX ORDER — POVIDONE-IODINE 10 MG/ML
SOLUTION TOPICAL DAILY
Qty: 50 EACH | Refills: 12 | Status: SHIPPED | OUTPATIENT
Start: 2022-07-28 | End: 2022-08-17 | Stop reason: HOSPADM

## 2022-07-27 RX ORDER — TRAMADOL HYDROCHLORIDE 50 MG/1
50 TABLET ORAL 2 TIMES DAILY PRN
Qty: 6 TABLET | Refills: 0 | Status: SHIPPED | OUTPATIENT
Start: 2022-07-27 | End: 2022-08-09

## 2022-07-27 RX ORDER — VANCOMYCIN HYDROCHLORIDE 125 MG/1
125 CAPSULE ORAL EVERY 6 HOURS SCHEDULED
Qty: 25 CAPSULE | Refills: 0 | Status: SHIPPED | OUTPATIENT
Start: 2022-07-27 | End: 2022-08-03

## 2022-07-27 RX ADMIN — VANCOMYCIN HYDROCHLORIDE 125 MG: 125 CAPSULE ORAL at 12:16

## 2022-07-27 RX ADMIN — FAMOTIDINE 20 MG: 20 TABLET ORAL at 09:58

## 2022-07-27 RX ADMIN — POVIDONE-IODINE: 10 SOLUTION TOPICAL at 09:59

## 2022-07-27 RX ADMIN — Medication 2000 UNITS: at 09:58

## 2022-07-27 RX ADMIN — BUDESONIDE AND FORMOTEROL FUMARATE DIHYDRATE 2 PUFF: 80; 4.5 AEROSOL RESPIRATORY (INHALATION) at 08:02

## 2022-07-27 RX ADMIN — CYANOCOBALAMIN TAB 1000 MCG 1000 MCG: 1000 TAB at 09:58

## 2022-07-27 RX ADMIN — VANCOMYCIN HYDROCHLORIDE 125 MG: 125 CAPSULE ORAL at 06:21

## 2022-07-27 RX ADMIN — ASPIRIN 81 MG: 81 TABLET, COATED ORAL at 09:58

## 2022-07-27 RX ADMIN — Medication 10 ML: at 09:59

## 2022-07-27 RX ADMIN — FOLIC ACID 1000 MCG: 1 TABLET ORAL at 09:58

## 2022-07-27 RX ADMIN — DILTIAZEM HYDROCHLORIDE 120 MG: 120 CAPSULE, COATED, EXTENDED RELEASE ORAL at 09:59

## 2022-07-27 RX ADMIN — VANCOMYCIN HYDROCHLORIDE 125 MG: 125 CAPSULE ORAL at 00:10

## 2022-07-27 RX ADMIN — CASTOR OIL AND BALSAM, PERU 1 APPLICATION: 788; 87 OINTMENT TOPICAL at 09:59

## 2022-08-09 ENCOUNTER — APPOINTMENT (OUTPATIENT)
Dept: MRI IMAGING | Facility: HOSPITAL | Age: 87
End: 2022-08-09

## 2022-08-09 ENCOUNTER — HOSPITAL ENCOUNTER (INPATIENT)
Facility: HOSPITAL | Age: 87
LOS: 8 days | Discharge: NURSING, REHAB OR LONG-TERM CARE | End: 2022-08-17
Attending: EMERGENCY MEDICINE | Admitting: HOSPITALIST

## 2022-08-09 ENCOUNTER — APPOINTMENT (OUTPATIENT)
Dept: CT IMAGING | Facility: HOSPITAL | Age: 87
End: 2022-08-09

## 2022-08-09 ENCOUNTER — APPOINTMENT (OUTPATIENT)
Dept: GENERAL RADIOLOGY | Facility: HOSPITAL | Age: 87
End: 2022-08-09

## 2022-08-09 DIAGNOSIS — Z86.19 HISTORY OF CLOSTRIDIOIDES DIFFICILE COLITIS: ICD-10-CM

## 2022-08-09 DIAGNOSIS — R53.81 DECLINING FUNCTIONAL STATUS: ICD-10-CM

## 2022-08-09 DIAGNOSIS — R13.12 OROPHARYNGEAL DYSPHAGIA: ICD-10-CM

## 2022-08-09 DIAGNOSIS — D64.9 CHRONIC ANEMIA: ICD-10-CM

## 2022-08-09 DIAGNOSIS — I10 ELEVATED BLOOD PRESSURE READING WITH DIAGNOSIS OF HYPERTENSION: ICD-10-CM

## 2022-08-09 DIAGNOSIS — M06.9 RHEUMATOID ARTHRITIS INVOLVING MULTIPLE SITES, UNSPECIFIED WHETHER RHEUMATOID FACTOR PRESENT: ICD-10-CM

## 2022-08-09 DIAGNOSIS — R41.82 ACUTE ON CHRONIC ALTERATION IN MENTAL STATUS: Primary | ICD-10-CM

## 2022-08-09 DIAGNOSIS — Z66 DNR (DO NOT RESUSCITATE): ICD-10-CM

## 2022-08-09 DIAGNOSIS — R47.1 DYSARTHRIA: ICD-10-CM

## 2022-08-09 DIAGNOSIS — E83.52 HYPERCALCEMIA: ICD-10-CM

## 2022-08-09 DIAGNOSIS — R41.841 COGNITIVE COMMUNICATION DEFICIT: ICD-10-CM

## 2022-08-09 DIAGNOSIS — N17.9 ACUTE RENAL FAILURE, UNSPECIFIED ACUTE RENAL FAILURE TYPE: ICD-10-CM

## 2022-08-09 LAB
ALBUMIN SERPL-MCNC: 3.2 G/DL (ref 3.5–5.2)
ALBUMIN/GLOB SERPL: 0.8 G/DL
ALP SERPL-CCNC: 118 U/L (ref 39–117)
ALT SERPL W P-5'-P-CCNC: 17 U/L (ref 1–33)
ANION GAP SERPL CALCULATED.3IONS-SCNC: 11 MMOL/L (ref 5–15)
ARTERIAL PATENCY WRIST A: ABNORMAL
AST SERPL-CCNC: 20 U/L (ref 1–32)
ATMOSPHERIC PRESS: ABNORMAL MM[HG]
BACTERIA UR QL AUTO: ABNORMAL /HPF
BASE EXCESS BLDA CALC-SCNC: 8.5 MMOL/L (ref 0–2)
BASOPHILS # BLD AUTO: 0.1 10*3/MM3 (ref 0–0.2)
BASOPHILS NFR BLD AUTO: 0.6 % (ref 0–1.5)
BDY SITE: ABNORMAL
BILIRUB SERPL-MCNC: 0.4 MG/DL (ref 0–1.2)
BILIRUB UR QL STRIP: NEGATIVE
BODY TEMPERATURE: 37 C
BUN SERPL-MCNC: 35 MG/DL (ref 8–23)
BUN/CREAT SERPL: 17.2 (ref 7–25)
CA-I SERPL ISE-MCNC: 1.86 MMOL/L (ref 1.12–1.32)
CALCIUM SPEC-SCNC: 13.3 MG/DL (ref 8.2–9.6)
CHLORIDE SERPL-SCNC: 98 MMOL/L (ref 98–107)
CLARITY UR: CLEAR
CO2 BLDA-SCNC: 34.4 MMOL/L (ref 22–33)
CO2 SERPL-SCNC: 30 MMOL/L (ref 22–29)
COHGB MFR BLD: 1.3 % (ref 0–2)
COLOR UR: YELLOW
CREAT SERPL-MCNC: 2.04 MG/DL (ref 0.57–1)
D-LACTATE SERPL-SCNC: 1.2 MMOL/L (ref 0.5–2)
DEPRECATED RDW RBC AUTO: 57.1 FL (ref 37–54)
EGFRCR SERPLBLD CKD-EPI 2021: 22.5 ML/MIN/1.73
EOSINOPHIL # BLD AUTO: 0.1 10*3/MM3 (ref 0–0.4)
EOSINOPHIL NFR BLD AUTO: 0.6 % (ref 0.3–6.2)
EPAP: 0
ERYTHROCYTE [DISTWIDTH] IN BLOOD BY AUTOMATED COUNT: 16 % (ref 12.3–15.4)
FLUAV RNA RESP QL NAA+PROBE: NOT DETECTED
FLUBV RNA RESP QL NAA+PROBE: NOT DETECTED
GLOBULIN UR ELPH-MCNC: 4.2 GM/DL
GLUCOSE SERPL-MCNC: 104 MG/DL (ref 65–99)
GLUCOSE UR STRIP-MCNC: NEGATIVE MG/DL
HCO3 BLDA-SCNC: 33 MMOL/L (ref 20–26)
HCT VFR BLD AUTO: 30.9 % (ref 34–46.6)
HCT VFR BLD CALC: 30.4 % (ref 38–51)
HGB BLD-MCNC: 10.1 G/DL (ref 12–15.9)
HGB BLDA-MCNC: 9.9 G/DL (ref 14–18)
HGB UR QL STRIP.AUTO: NEGATIVE
HYALINE CASTS UR QL AUTO: ABNORMAL /LPF
IMM GRANULOCYTES # BLD AUTO: 0.1 10*3/MM3 (ref 0–0.05)
IMM GRANULOCYTES NFR BLD AUTO: 0.6 % (ref 0–0.5)
INHALED O2 CONCENTRATION: 28 %
IPAP: 0
KETONES UR QL STRIP: NEGATIVE
LEUKOCYTE ESTERASE UR QL STRIP.AUTO: ABNORMAL
LIPASE SERPL-CCNC: 10 U/L (ref 13–60)
LYMPHOCYTES # BLD AUTO: 0.99 10*3/MM3 (ref 0.7–3.1)
LYMPHOCYTES NFR BLD AUTO: 5.5 % (ref 19.6–45.3)
MCH RBC QN AUTO: 31.8 PG (ref 26.6–33)
MCHC RBC AUTO-ENTMCNC: 32.7 G/DL (ref 31.5–35.7)
MCV RBC AUTO: 97.2 FL (ref 79–97)
METHGB BLD QL: 0.4 % (ref 0–1.5)
MODALITY: ABNORMAL
MONOCYTES # BLD AUTO: 1.48 10*3/MM3 (ref 0.1–0.9)
MONOCYTES NFR BLD AUTO: 8.3 % (ref 5–12)
NEUTROPHILS NFR BLD AUTO: 15.13 10*3/MM3 (ref 1.7–7)
NEUTROPHILS NFR BLD AUTO: 84.4 % (ref 42.7–76)
NITRITE UR QL STRIP: NEGATIVE
NOTE: ABNORMAL
NRBC BLD AUTO-RTO: 0 /100 WBC (ref 0–0.2)
NT-PROBNP SERPL-MCNC: 4237 PG/ML (ref 0–1800)
OXYHGB MFR BLDV: 90.6 % (ref 94–99)
PAW @ PEAK INSP FLOW SETTING VENT: 0 CMH2O
PCO2 BLDA: 44.6 MM HG (ref 35–45)
PCO2 TEMP ADJ BLD: 44.6 MM HG (ref 35–45)
PH BLDA: 7.48 PH UNITS (ref 7.35–7.45)
PH UR STRIP.AUTO: 5.5 [PH] (ref 5–8)
PH, TEMP CORRECTED: 7.48 PH UNITS
PLATELET # BLD AUTO: 436 10*3/MM3 (ref 140–450)
PMV BLD AUTO: 9.7 FL (ref 6–12)
PO2 BLDA: 61.5 MM HG (ref 83–108)
PO2 TEMP ADJ BLD: 61.5 MM HG (ref 83–108)
POTASSIUM SERPL-SCNC: 3.8 MMOL/L (ref 3.5–5.2)
PROCALCITONIN SERPL-MCNC: 0.33 NG/ML (ref 0–0.25)
PROT SERPL-MCNC: 7.4 G/DL (ref 6–8.5)
PROT UR QL STRIP: ABNORMAL
RBC # BLD AUTO: 3.18 10*6/MM3 (ref 3.77–5.28)
RBC # UR STRIP: ABNORMAL /HPF
REF LAB TEST METHOD: ABNORMAL
SARS-COV-2 RNA RESP QL NAA+PROBE: NOT DETECTED
SODIUM SERPL-SCNC: 139 MMOL/L (ref 136–145)
SP GR UR STRIP: 1.01 (ref 1–1.03)
SQUAMOUS #/AREA URNS HPF: ABNORMAL /HPF
TOTAL RATE: 0 BREATHS/MINUTE
TROPONIN T SERPL-MCNC: 0.03 NG/ML (ref 0–0.03)
UROBILINOGEN UR QL STRIP: ABNORMAL
WBC # UR STRIP: ABNORMAL /HPF
WBC NRBC COR # BLD: 17.9 10*3/MM3 (ref 3.4–10.8)

## 2022-08-09 PROCEDURE — 99223 1ST HOSP IP/OBS HIGH 75: CPT | Performed by: INTERNAL MEDICINE

## 2022-08-09 PROCEDURE — 70547 MR ANGIOGRAPHY NECK W/O DYE: CPT

## 2022-08-09 PROCEDURE — 87086 URINE CULTURE/COLONY COUNT: CPT | Performed by: EMERGENCY MEDICINE

## 2022-08-09 PROCEDURE — 82805 BLOOD GASES W/O2 SATURATION: CPT

## 2022-08-09 PROCEDURE — 36600 WITHDRAWAL OF ARTERIAL BLOOD: CPT

## 2022-08-09 PROCEDURE — P9612 CATHETERIZE FOR URINE SPEC: HCPCS

## 2022-08-09 PROCEDURE — 83880 ASSAY OF NATRIURETIC PEPTIDE: CPT | Performed by: EMERGENCY MEDICINE

## 2022-08-09 PROCEDURE — 87040 BLOOD CULTURE FOR BACTERIA: CPT | Performed by: EMERGENCY MEDICINE

## 2022-08-09 PROCEDURE — 70551 MRI BRAIN STEM W/O DYE: CPT

## 2022-08-09 PROCEDURE — 83050 HGB METHEMOGLOBIN QUAN: CPT

## 2022-08-09 PROCEDURE — 93005 ELECTROCARDIOGRAM TRACING: CPT

## 2022-08-09 PROCEDURE — 85025 COMPLETE CBC W/AUTO DIFF WBC: CPT | Performed by: EMERGENCY MEDICINE

## 2022-08-09 PROCEDURE — 84484 ASSAY OF TROPONIN QUANT: CPT | Performed by: EMERGENCY MEDICINE

## 2022-08-09 PROCEDURE — 84145 PROCALCITONIN (PCT): CPT | Performed by: EMERGENCY MEDICINE

## 2022-08-09 PROCEDURE — 87636 SARSCOV2 & INF A&B AMP PRB: CPT | Performed by: EMERGENCY MEDICINE

## 2022-08-09 PROCEDURE — 82375 ASSAY CARBOXYHB QUANT: CPT

## 2022-08-09 PROCEDURE — 83690 ASSAY OF LIPASE: CPT | Performed by: EMERGENCY MEDICINE

## 2022-08-09 PROCEDURE — 71045 X-RAY EXAM CHEST 1 VIEW: CPT

## 2022-08-09 PROCEDURE — 82330 ASSAY OF CALCIUM: CPT | Performed by: EMERGENCY MEDICINE

## 2022-08-09 PROCEDURE — 99285 EMERGENCY DEPT VISIT HI MDM: CPT

## 2022-08-09 PROCEDURE — 83605 ASSAY OF LACTIC ACID: CPT | Performed by: EMERGENCY MEDICINE

## 2022-08-09 PROCEDURE — 99223 1ST HOSP IP/OBS HIGH 75: CPT

## 2022-08-09 PROCEDURE — 70544 MR ANGIOGRAPHY HEAD W/O DYE: CPT

## 2022-08-09 PROCEDURE — 81001 URINALYSIS AUTO W/SCOPE: CPT | Performed by: EMERGENCY MEDICINE

## 2022-08-09 PROCEDURE — 70450 CT HEAD/BRAIN W/O DYE: CPT

## 2022-08-09 PROCEDURE — 80053 COMPREHEN METABOLIC PANEL: CPT | Performed by: EMERGENCY MEDICINE

## 2022-08-09 RX ORDER — ACETAMINOPHEN 325 MG/1
650 TABLET ORAL EVERY 4 HOURS PRN
Status: DISCONTINUED | OUTPATIENT
Start: 2022-08-09 | End: 2022-08-17 | Stop reason: HOSPADM

## 2022-08-09 RX ORDER — AMOXICILLIN AND CLAVULANATE POTASSIUM 875; 125 MG/1; MG/1
1 TABLET, FILM COATED ORAL 2 TIMES DAILY
COMMUNITY
Start: 2022-08-08 | End: 2022-08-09

## 2022-08-09 RX ORDER — SODIUM CHLORIDE 0.9 % (FLUSH) 0.9 %
10 SYRINGE (ML) INJECTION AS NEEDED
Status: DISCONTINUED | OUTPATIENT
Start: 2022-08-09 | End: 2022-08-17 | Stop reason: HOSPADM

## 2022-08-09 RX ORDER — SODIUM CHLORIDE 0.9 % (FLUSH) 0.9 %
10 SYRINGE (ML) INJECTION EVERY 12 HOURS SCHEDULED
Status: DISCONTINUED | OUTPATIENT
Start: 2022-08-09 | End: 2022-08-17 | Stop reason: HOSPADM

## 2022-08-09 RX ORDER — ACETAMINOPHEN 160 MG/5ML
650 SOLUTION ORAL EVERY 4 HOURS PRN
Status: DISCONTINUED | OUTPATIENT
Start: 2022-08-09 | End: 2022-08-17 | Stop reason: HOSPADM

## 2022-08-09 RX ORDER — SODIUM CHLORIDE 9 MG/ML
125 INJECTION, SOLUTION INTRAVENOUS CONTINUOUS
Status: DISCONTINUED | OUTPATIENT
Start: 2022-08-09 | End: 2022-08-14

## 2022-08-09 RX ORDER — ACETAMINOPHEN 325 MG/1
650 TABLET ORAL NIGHTLY
Status: DISCONTINUED | OUTPATIENT
Start: 2022-08-10 | End: 2022-08-17 | Stop reason: HOSPADM

## 2022-08-09 RX ORDER — TRAMADOL HYDROCHLORIDE 50 MG/1
50 TABLET ORAL 2 TIMES DAILY PRN
Status: DISCONTINUED | OUTPATIENT
Start: 2022-08-09 | End: 2022-08-17 | Stop reason: HOSPADM

## 2022-08-09 RX ORDER — BISACODYL 5 MG/1
5 TABLET, DELAYED RELEASE ORAL DAILY PRN
Status: DISCONTINUED | OUTPATIENT
Start: 2022-08-09 | End: 2022-08-17 | Stop reason: HOSPADM

## 2022-08-09 RX ORDER — DILTIAZEM HYDROCHLORIDE 120 MG/1
120 CAPSULE, COATED, EXTENDED RELEASE ORAL DAILY
Status: DISCONTINUED | OUTPATIENT
Start: 2022-08-10 | End: 2022-08-12

## 2022-08-09 RX ORDER — AMOXICILLIN 250 MG
2 CAPSULE ORAL 2 TIMES DAILY
Status: DISCONTINUED | OUTPATIENT
Start: 2022-08-09 | End: 2022-08-17 | Stop reason: HOSPADM

## 2022-08-09 RX ORDER — TRAMADOL HYDROCHLORIDE 50 MG/1
50 TABLET ORAL 2 TIMES DAILY PRN
Status: ON HOLD | COMMUNITY
End: 2022-08-17 | Stop reason: SDUPTHER

## 2022-08-09 RX ORDER — BISACODYL 10 MG
10 SUPPOSITORY, RECTAL RECTAL DAILY PRN
Status: DISCONTINUED | OUTPATIENT
Start: 2022-08-09 | End: 2022-08-17 | Stop reason: HOSPADM

## 2022-08-09 RX ORDER — ALBUTEROL SULFATE 2.5 MG/3ML
2.5 SOLUTION RESPIRATORY (INHALATION) EVERY 6 HOURS PRN
Status: DISCONTINUED | OUTPATIENT
Start: 2022-08-09 | End: 2022-08-17 | Stop reason: HOSPADM

## 2022-08-09 RX ORDER — FAMOTIDINE 20 MG/1
40 TABLET, FILM COATED ORAL DAILY
Status: DISCONTINUED | OUTPATIENT
Start: 2022-08-10 | End: 2022-08-10

## 2022-08-09 RX ORDER — CHOLECALCIFEROL (VITAMIN D3) 125 MCG
5 CAPSULE ORAL NIGHTLY PRN
Status: DISCONTINUED | OUTPATIENT
Start: 2022-08-09 | End: 2022-08-17 | Stop reason: HOSPADM

## 2022-08-09 RX ORDER — FOLIC ACID 1 MG/1
1000 TABLET ORAL DAILY
Status: DISCONTINUED | OUTPATIENT
Start: 2022-08-10 | End: 2022-08-17 | Stop reason: HOSPADM

## 2022-08-09 RX ORDER — VANCOMYCIN HYDROCHLORIDE 125 MG/1
125 CAPSULE ORAL 4 TIMES DAILY
COMMUNITY
Start: 2022-08-08 | End: 2022-08-17 | Stop reason: HOSPADM

## 2022-08-09 RX ORDER — BUDESONIDE AND FORMOTEROL FUMARATE DIHYDRATE 80; 4.5 UG/1; UG/1
2 AEROSOL RESPIRATORY (INHALATION)
Status: DISCONTINUED | OUTPATIENT
Start: 2022-08-10 | End: 2022-08-17 | Stop reason: HOSPADM

## 2022-08-09 RX ORDER — POLYETHYLENE GLYCOL 3350 17 G/17G
17 POWDER, FOR SOLUTION ORAL DAILY PRN
Status: DISCONTINUED | OUTPATIENT
Start: 2022-08-09 | End: 2022-08-17 | Stop reason: HOSPADM

## 2022-08-09 RX ORDER — ASPIRIN 325 MG
325 TABLET ORAL DAILY
Status: DISCONTINUED | OUTPATIENT
Start: 2022-08-10 | End: 2022-08-17 | Stop reason: HOSPADM

## 2022-08-09 RX ORDER — ASPIRIN 300 MG/1
300 SUPPOSITORY RECTAL DAILY
Status: DISCONTINUED | OUTPATIENT
Start: 2022-08-10 | End: 2022-08-17 | Stop reason: HOSPADM

## 2022-08-09 RX ORDER — ASPIRIN 81 MG/1
81 TABLET ORAL DAILY
Status: DISCONTINUED | OUTPATIENT
Start: 2022-08-10 | End: 2022-08-09 | Stop reason: SDUPTHER

## 2022-08-09 RX ORDER — ACETAMINOPHEN 650 MG/1
650 SUPPOSITORY RECTAL EVERY 4 HOURS PRN
Status: DISCONTINUED | OUTPATIENT
Start: 2022-08-09 | End: 2022-08-17 | Stop reason: HOSPADM

## 2022-08-09 RX ORDER — ATORVASTATIN CALCIUM 40 MG/1
80 TABLET, FILM COATED ORAL NIGHTLY
Status: DISCONTINUED | OUTPATIENT
Start: 2022-08-09 | End: 2022-08-17 | Stop reason: HOSPADM

## 2022-08-09 RX ADMIN — SODIUM CHLORIDE 500 ML: 9 INJECTION, SOLUTION INTRAVENOUS at 18:18

## 2022-08-09 RX ADMIN — SODIUM CHLORIDE 1000 ML: 9 INJECTION, SOLUTION INTRAVENOUS at 19:23

## 2022-08-09 NOTE — ED PROVIDER NOTES
Subjective   Patient presents to the emergency department via [facility] secondary to 36 to 48 hours of declining functional status.  The patient typically is able to ambulate around her skilled nursing facility with the assistance of walker.  Over the last couple of days, she has had decreased activity and decreased responsiveness with increased alteration.  Patient has a significant history for recurrent aspirations and staff is concerned that she has aspirated once again.  Chart review does demonstrate that the patient does have a DNR status.  Patient admitted in July secondary to fall.  He also noted a pelvic mass at that point that was evaluated by Sunny Larose and was noted to be stable compared to prior studies.  Patient unable to provide any information and is responding with moans but will not verbalize appropriately.      History provided by:  EMS personnel and patient  History limited by:  Mental status change      Review of Systems   Unable to perform ROS: Mental status change   Respiratory: Positive for cough and shortness of breath.    Psychiatric/Behavioral: Positive for confusion.       Past Medical History:   Diagnosis Date   • Anemia     Description: YUVAL Agrawal 2006- borderline intermittent.   • Back pain    • Benign colonic polyp 9/28/2016    Description: AJoshua  Dx 1999.   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  Rule out chronic persistent asthma, COPD, or obliterative bronchiolitis.- Butch   • COVID-19 virus infection 10/11/2020   • CTS (carpal tunnel syndrome)    • Degeneration of intervertebral disc of lumbar region     Description: A.  Diagnosed in April 2013 with advanced multilevel with severe spinal stenosis, followed by Dr. Pillai for pain management.   • Gastroesophageal reflux disease    • Hearing loss    • History of calcium  pyrophosphate deposition disease (CPPD)    • History of colonoscopy 01/01/1999    NORMAL PER PATIENT    • History of mammogram 01/01/2011    NORMAL PER PT    • History of Papanicolaou smear of cervix 01/01/2010    NORMAL PER PT    • History of varicella    • Hyperlipidemia     Description: A.  Dx 2006.   • Hypertension     Description: A. Dx 2001.   • Macular degeneration    • Nocturia    • Osteoporosis    • Ovarian mass     Dx 8/15- benign left cystic adnexal mass   • Overactive bladder    • Pelvic floor dysfunction    • Rheumatoid arthritis (HCC)     Description: A.  Diagnosed in 2000 and and followed by Dr. Constantino (now Dr. Kaplan). B.  On methotrexate therapy since 2000. C.  Off low-dose prednisone therapy.   • Vitamin D deficiency        Allergies   Allergen Reactions   • Ciprofloxacin Other (See Comments) and Unknown - High Severity     Other reaction(s): shaking  HCI TABS/ SHAKING  Other reaction(s): shaking  HCI TABS/ SHAKING   • Levofloxacin Diarrhea and Unknown - High Severity   • Sulfamethoxazole-Trimethoprim Other (See Comments), Rash and Unknown - High Severity     Stomach cramps   Stomach cramps    • Sulfamethoxazole Rash   • Trimethoprim GI Intolerance, Diarrhea and Rash       Past Surgical History:   Procedure Laterality Date   • APPENDECTOMY     • CARPAL TUNNEL RELEASE Left 01/01/2003    HISTORY OF NEUROPLASTY DECOMPRESSION MEDIAN NERVE AT CARPAL TUNNEL LEFT   • CATARACT EXTRACTION Bilateral 01/01/2009   • CHOLECYSTECTOMY  01/01/1962   • HIP CANNULATED SCREW PLACEMENT Right 1/25/2020    Procedure: HIP CANNULATED SCREW PLACEMENT RIGHT;  Surgeon: Karlos Blount MD;  Location: [facility];  Service: Orthopedics   • KNEE ARTHROSCOPY Left 01/01/2001    MENISCAL REPAIR   • KYPHOPLASTY  06/18/2015    T11 AND L1 (JOSE A)   • PELVIC LAPAROSCOPY  01/01/1996    REMOVAL OF BENIGN UTERINE AND RIGHT OVARIAN TUMORS   • SALPINGO OOPHORECTOMY Left 08/26/2015    REMOVAL OF LEFT OVARY AND TUBE (benign cystic mass)        Family History   Problem Relation Age of Onset   • Hypertension Mother    • Diabetes Mother        Social History     Socioeconomic History   • Marital status:    Tobacco Use   • Smoking status: Former Smoker     Years: 0.50   • Smokeless tobacco: Never Used   Vaping Use   • Vaping Use: Never used   Substance and Sexual Activity   • Alcohol use: No   • Drug use: No   • Sexual activity: Not Currently           Objective   Physical Exam  Vitals and nursing note reviewed.   Constitutional:       General: She is not in acute distress.     Appearance: She is ill-appearing. She is not toxic-appearing.      Comments: Patient is thin and chronically ill in appearance.  Multiple resolving areas of ecchymoses around the face.   HENT:      Head: Normocephalic.   Eyes:      Pupils: Pupils are equal, round, and reactive to light.   Cardiovascular:      Rate and Rhythm: Normal rate and regular rhythm.      Pulses: Normal pulses.   Pulmonary:      Effort: Pulmonary effort is normal.      Comments: Coarse wet breath sounds bilaterally.  No overt distress.  Abdominal:      Palpations: Abdomen is soft.      Tenderness: There is no guarding.   Musculoskeletal:         General: No deformity.      Right lower leg: No edema.      Left lower leg: No edema.   Skin:     Capillary Refill: Capillary refill takes less than 2 seconds.   Neurological:      Mental Status: She is alert. She is confused.      GCS: GCS eye subscore is 4. GCS verbal subscore is 4. GCS motor subscore is 6.      Comments: Patient significantly altered.  She has her eyes open and will make eye contact and will moan, but does not answer questions otherwise appropriately.   Psychiatric:         Cognition and Memory: Cognition is impaired.         Procedures           ED Course  ED Course as of 08/09/22 2017   Tue Aug 09, 2022   1706 XR Chest 1 View  Personally reviewed the single view the chest demonstrating no focal lobar infiltrate.  See report for  radiology for details. [RS]   1718 pO2, Arterial(!): 61.5 [RS]   1718 pH, Arterial(!): 7.478 [RS]   1755 WBC(!): 17.90 [RS]   1804 Creatinine(!): 2.04 [RS]   1804 Calcium(!!): 13.3 [RS]   1822 Ionized Calcium(!): 1.86 [RS]   1902 Patient with alteration in mental status progressive over the last 2 days with acute kidney injury as well as hypercalcemia and chronic anemia.  Plan admission for further evaluation and management.  CT scan of the head ordered.  We will contact the stroke navigator per request of the hospitalist.  Case discussed with Dr. Avila who is agreeable with admission. [RS]   1936 Stroke navigator consulted for evaluation secondary to the alteration in mental status. [RS]   2016 CT Head Without Contrast  Personally reviewed CT scan of the head demonstrating no overt evidence of hemorrhage.  See report for radiology for details. [RS]      ED Course User Index  [RS] Brian Walker MD                                           MDM  Number of Diagnoses or Management Options  Acute on chronic alteration in mental status  Acute renal failure, unspecified acute renal failure type (HCC)  Chronic anemia  Declining functional status  DNR (do not resuscitate)  Elevated blood pressure reading with diagnosis of hypertension  History of Clostridioides difficile colitis  Hypercalcemia  Diagnosis management comments: Recent Results (from the past 24 hour(s))  -ECG 12 Lead:   Collection Time: 08/09/22  4:45 PM       Result                      Value             Ref Range           QT Interval                 324               ms                  QTC Interval                434               ms             -Blood Gas, Arterial With Co-Ox:   Collection Time: 08/09/22  5:15 PM  Specimen: Arterial Blood       Result                      Value             Ref Range           Site                                                          Right Brachial       Theo's Test                N/A                                    pH, Arterial                7.478 (H)         7.350 - 7.45*       pCO2, Arterial              44.6              35.0 - 45.0 *       pO2, Arterial               61.5 (L)          83.0 - 108.0*       HCO3, Arterial              33.0 (H)          20.0 - 26.0 *       Base Excess, Arterial       8.5 (H)           0.0 - 2.0 mm*       Hemoglobin, Blood Gas       9.9 (L)           14 - 18 g/dL        Hematocrit, Blood Gas       30.4 (L)          38.0 - 51.0 %       Oxyhemoglobin               90.6 (L)          94 - 99 %           Methemoglobin               0.40              0.00 - 1.50 %       Carboxyhemoglobin           1.3               0 - 2 %             CO2 Content                 34.4 (H)          22 - 33 mmol*       Temperature                 37.0              C                   Barometric Pressure fo*                                           Modality                    Nasal Cannula                         FIO2                        28                %                   Rate                        0                 Breaths/sohan*       PIP                         0                 cmH2O               IPAP                        0                                     EPAP                        0                                     Note                                                              pH, Temp Corrected          7.478             pH Units            pCO2, Temperature Andrea*     44.6              35 - 45 mm Hg       pO2, Temperature Corre*     61.5 (L)          83 - 108 mm *  -Comprehensive Metabolic Panel:   Collection Time: 08/09/22  5:20 PM  Specimen: Blood       Result                      Value             Ref Range           Glucose                     104 (H)           65 - 99 mg/dL       BUN                         35 (H)            8 - 23 mg/dL        Creatinine                  2.04 (H)          0.57 - 1.00 *       Sodium                      139               136 - 145 mm*        Potassium                   3.8               3.5 - 5.2 mm*       Chloride                    98                98 - 107 mmo*       CO2                         30.0 (H)          22.0 - 29.0 *       Calcium                     13.3 (C)          8.2 - 9.6 mg*       Total Protein               7.4               6.0 - 8.5 g/*       Albumin                     3.20 (L)          3.50 - 5.20 *       ALT (SGPT)                  17                1 - 33 U/L          AST (SGOT)                  20                1 - 32 U/L          Alkaline Phosphatase        118 (H)           39 - 117 U/L        Total Bilirubin             0.4               0.0 - 1.2 mg*       Globulin                    4.2               gm/dL               A/G Ratio                   0.8               g/dL                BUN/Creatinine Ratio        17.2              7.0 - 25.0          Anion Gap                   11.0              5.0 - 15.0 m*       eGFR                        22.5 (L)          >60.0 mL/min*  -Lipase:   Collection Time: 08/09/22  5:20 PM  Specimen: Blood       Result                      Value             Ref Range           Lipase                      10 (L)            13 - 60 U/L    -BNP:   Collection Time: 08/09/22  5:20 PM  Specimen: Blood       Result                      Value             Ref Range           proBNP                      4,237.0 (H)       0.0 - 1,800.*  -Troponin:   Collection Time: 08/09/22  5:20 PM  Specimen: Blood       Result                      Value             Ref Range           Troponin T                  0.032 (C)         0.000 - 0.03*  -Lactic Acid, Plasma:   Collection Time: 08/09/22  5:20 PM  Specimen: Blood       Result                      Value             Ref Range           Lactate                     1.2               0.5 - 2.0 mm*  -Procalcitonin:   Collection Time: 08/09/22  5:20 PM  Specimen: Blood       Result                      Value             Ref Range           Procalcitonin                0.33 (H)          0.00 - 0.25 *  -CBC Auto Differential:   Collection Time: 08/09/22  5:20 PM  Specimen: Blood       Result                      Value             Ref Range           WBC                         17.90 (H)         3.40 - 10.80*       RBC                         3.18 (L)          3.77 - 5.28 *       Hemoglobin                  10.1 (L)          12.0 - 15.9 *       Hematocrit                  30.9 (L)          34.0 - 46.6 %       MCV                         97.2 (H)          79.0 - 97.0 *       MCH                         31.8              26.6 - 33.0 *       MCHC                        32.7              31.5 - 35.7 *       RDW                         16.0 (H)          12.3 - 15.4 %       RDW-SD                      57.1 (H)          37.0 - 54.0 *       MPV                         9.7               6.0 - 12.0 fL       Platelets                   436               140 - 450 10*       Neutrophil %                84.4 (H)          42.7 - 76.0 %       Lymphocyte %                5.5 (L)           19.6 - 45.3 %       Monocyte %                  8.3               5.0 - 12.0 %        Eosinophil %                0.6               0.3 - 6.2 %         Basophil %                  0.6               0.0 - 1.5 %         Immature Grans %            0.6 (H)           0.0 - 0.5 %         Neutrophils, Absolute       15.13 (H)         1.70 - 7.00 *       Lymphocytes, Absolute       0.99              0.70 - 3.10 *       Monocytes, Absolute         1.48 (H)          0.10 - 0.90 *       Eosinophils, Absolute       0.10              0.00 - 0.40 *       Basophils, Absolute         0.10              0.00 - 0.20 *       Immature Grans, Absolu*     0.10 (H)          0.00 - 0.05 *       nRBC                        0.0               0.0 - 0.2 /1*  -Calcium, Ionized:   Collection Time: 08/09/22  5:20 PM  Specimen: Blood       Result                      Value             Ref Range           Ionized Calcium             1.86 (H)           1.12 - 1.32 *  -COVID-19 and FLU A/B PCR - Swab, Nasopharynx:   Collection Time: 08/09/22  6:12 PM  Specimen: Nasopharynx; Swab       Result                      Value             Ref Range           COVID19                     Not Detected      Not Detected*       Influenza A PCR             Not Detected      Not Detected        Influenza B PCR             Not Detected      Not Detected   -Urinalysis With Culture If Indicated - Urine, Catheter:   Collection Time: 08/09/22  6:38 PM  Specimen: Urine, Catheter       Result                      Value             Ref Range           Color, UA                   Yellow            Yellow, Straw       Appearance, UA              Clear             Clear               pH, UA                      5.5               5.0 - 8.0           Specific [place], UA        1.013             1.001 - 1.030       Glucose, UA                 Negative          Negative            Ketones, UA                 Negative          Negative            Bilirubin, UA               Negative          Negative            Blood, UA                   Negative          Negative            Protein, UA                                   Negative        30 mg/dL (1+) (A)       Leuk Esterase, UA           Trace (A)         Negative            Nitrite, UA                 Negative          Negative            Urobilinogen, UA            0.2 E.U./dL       0.2 - 1.0 E.*  -Urinalysis, Microscopic Only - Urine, Catheter:   Collection Time: 08/09/22  6:38 PM  Specimen: Urine, Catheter       Result                      Value             Ref Range           RBC, UA                     0-2               None Seen, 0*       WBC, UA                     6-12 (A)          None Seen, 0*       Bacteria, UA                None Seen         None Seen, T*       Squamous Epithelial Ce*     3-6 (A)           None Seen, 0*       Hyaline Casts, UA           0-6               0 - 6 /LPF          Methodology                                                    Automated Microscopy  Note: In addition to lab results from this visit, the labs listed above may include labs taken at another facility or during a different encounter within the last 24 hours. Please correlate lab times with ED admission and discharge times for further clarification of the services performed during this visit.    XR Chest 1 View   Final Result    1.  No definite acute pulmonary process.    2.  Ectasia thoracic aorta         This report was finalized on 8/9/2022 4:57 PM by Julian Bowen MD.          CT Head Without Contrast    (Results Pending)  MRI Angiogram Head Without Contrast    (Results Pending)  MRI Angiogram Neck Without Contrast    (Results Pending)  MRI Brain Without Contrast    (Results Pending)  -----------------------------------------------------            08/09/22 08/09/22 08/09/22 08/09/22               1730      1800      1830      1900     -----------------------------------------------------   BP:       145/81    137/82    142/84    144/87     BP Location:Right arm Right arm Right arm Right arm    Patient Position:  Lying     Lying     Lying     Lying      Pulse:     108       100       107       107       Resp:       18        18        18        18       Temp:                                              TempSrc:                                           SpO2:      94%       95%       98%       98%       Weight:                                            Height:                                           -----------------------------------------------------  Medications  sodium chloride 0.9 % flush 10 mL (has no administration in time range)  sodium chloride 0.9 % bolus 500 mL (0 mL Intravenous Stopped 8/9/22 1909)  sodium chloride 0.9 % bolus 1,000 mL (1,000 mL Intravenous New Bag 8/9/22 1923)  ECG/EMG Results (last 24 hours)     Procedure Component Value Units Date/Time     ECG 12 Lead ([id number]) Collected: 08/09/22 1645     Updated: 08/09/22 1645     QT Interval 324 ms      QTC Interval 434 ms     Narrative:      Test Reason : weakness  Blood Pressure :   */*   mmHG  Vent. Rate : 108 BPM     Atrial Rate : 108 BPM     P-R Int : 152 ms          QRS Dur :  88 ms      QT Int : 324 ms       P-R-T Axes :  50   6  66 degrees     QTc Int : 434 ms    Sinus tachycardia  Cannot rule out Anterior infarct (cited on or before 09-AUG-2022)  Abnormal ECG  When compared with ECG of 09-OCT-2020 10:30,  No significant change was found    Referred By: EDMD           Confirmed By:       ECG 12 Lead   Preliminary Result    Test Reason : weakness    Blood Pressure :   */*   mmHG    Vent. Rate : 108 BPM     Atrial Rate : 108 BPM       P-R Int : 152 ms          QRS Dur :  88 ms        QT Int : 324 ms       P-R-T Axes :  50   6  66 degrees       QTc Int : 434 ms        Sinus tachycardia    Cannot rule out Anterior infarct (cited on or before 09-AUG-2022)    Abnormal ECG    When compared with ECG of 09-OCT-2020 10:30,    No significant change was found        Referred By: EDMD           Confirmed By:             Amount and/or Complexity of Data Reviewed  Clinical lab tests: reviewed  Tests in the radiology section of CPT®: reviewed  Decide to obtain previous medical records or to obtain history from someone other than the patient: yes  Obtain history from someone other than the patient: yes  Discuss the patient with other providers: yes  Independent visualization of images, tracings, or specimens: yes        Final diagnoses:   Acute on chronic alteration in mental status   Acute renal failure, unspecified acute renal failure type (HCC)   Hypercalcemia   Chronic anemia   Elevated blood pressure reading with diagnosis of hypertension   History of Clostridioides difficile colitis   DNR (do not resuscitate)   Declining functional status       ED Disposition  ED Disposition     ED Disposition   Decision to Admit     Condition   --    Comment   Level of Care: Telemetry [5]   Diagnosis: Acute on chronic alteration in mental status [[id number]]   Admitting Physician: KAREN LIND III [[id number]]   Attending Physician: KAREN LIND III [[id number]]   Isolate for COVID?: No [0]   Bed Request Comments: inpt tele   Certification: I Certify That Inpatient Hospital Services Are Medically Necessary For Greater Than 2 Midnights               No follow-up provider specified.       Medication List      ASK your doctor about these medications    famotidine 40 MG tablet  Commonly known as: PEPCID  Take 1 tablet by mouth Daily.  Ask about: Which instructions should I use?     traMADol 50 MG tablet  Commonly known as: ULTRAM  Ask about: Which instructions should I use?             Brian Walker MD  08/09/22 2018

## 2022-08-10 ENCOUNTER — APPOINTMENT (OUTPATIENT)
Dept: CT IMAGING | Facility: HOSPITAL | Age: 87
End: 2022-08-10

## 2022-08-10 LAB
25(OH)D3 SERPL-MCNC: 47 NG/ML (ref 30–100)
ALBUMIN SERPL-MCNC: 2.6 G/DL (ref 3.5–5.2)
ANION GAP SERPL CALCULATED.3IONS-SCNC: 11 MMOL/L (ref 5–15)
BACTERIA SPEC AEROBE CULT: NO GROWTH
BUN SERPL-MCNC: 36 MG/DL (ref 8–23)
BUN/CREAT SERPL: 17 (ref 7–25)
CA-I SERPL ISE-MCNC: 1.76 MMOL/L (ref 1.12–1.32)
CALCIUM SPEC-SCNC: 11.8 MG/DL (ref 8.2–9.6)
CHLORIDE SERPL-SCNC: 105 MMOL/L (ref 98–107)
CHOLEST SERPL-MCNC: 115 MG/DL (ref 0–200)
CO2 SERPL-SCNC: 26 MMOL/L (ref 22–29)
CREAT SERPL-MCNC: 2.12 MG/DL (ref 0.57–1)
DEPRECATED RDW RBC AUTO: 58.4 FL (ref 37–54)
EGFRCR SERPLBLD CKD-EPI 2021: 21.5 ML/MIN/1.73
ERYTHROCYTE [DISTWIDTH] IN BLOOD BY AUTOMATED COUNT: 16 % (ref 12.3–15.4)
GLUCOSE BLDC GLUCOMTR-MCNC: 77 MG/DL (ref 70–130)
GLUCOSE SERPL-MCNC: 80 MG/DL (ref 65–99)
HBA1C MFR BLD: 5.1 % (ref 4.8–5.6)
HCT VFR BLD AUTO: 29 % (ref 34–46.6)
HDLC SERPL-MCNC: 27 MG/DL (ref 40–60)
HGB BLD-MCNC: 9.1 G/DL (ref 12–15.9)
LDLC SERPL CALC-MCNC: 67 MG/DL (ref 0–100)
LDLC/HDLC SERPL: 2.4 {RATIO}
MCH RBC QN AUTO: 31.5 PG (ref 26.6–33)
MCHC RBC AUTO-ENTMCNC: 31.4 G/DL (ref 31.5–35.7)
MCV RBC AUTO: 100.3 FL (ref 79–97)
PHOSPHATE SERPL-MCNC: 4 MG/DL (ref 2.5–4.5)
PLATELET # BLD AUTO: 369 10*3/MM3 (ref 140–450)
PMV BLD AUTO: 10 FL (ref 6–12)
POTASSIUM SERPL-SCNC: 3.8 MMOL/L (ref 3.5–5.2)
PTH-INTACT SERPL-MCNC: 13.5 PG/ML (ref 15–65)
RBC # BLD AUTO: 2.89 10*6/MM3 (ref 3.77–5.28)
SODIUM SERPL-SCNC: 142 MMOL/L (ref 136–145)
TRIGL SERPL-MCNC: 116 MG/DL (ref 0–150)
VLDLC SERPL-MCNC: 21 MG/DL (ref 5–40)
WBC NRBC COR # BLD: 12.45 10*3/MM3 (ref 3.4–10.8)

## 2022-08-10 PROCEDURE — 82397 CHEMILUMINESCENT ASSAY: CPT | Performed by: INTERNAL MEDICINE

## 2022-08-10 PROCEDURE — 80061 LIPID PANEL: CPT

## 2022-08-10 PROCEDURE — 85027 COMPLETE CBC AUTOMATED: CPT | Performed by: INTERNAL MEDICINE

## 2022-08-10 PROCEDURE — 97166 OT EVAL MOD COMPLEX 45 MIN: CPT

## 2022-08-10 PROCEDURE — 99232 SBSQ HOSP IP/OBS MODERATE 35: CPT | Performed by: STUDENT IN AN ORGANIZED HEALTH CARE EDUCATION/TRAINING PROGRAM

## 2022-08-10 PROCEDURE — 92523 SPEECH SOUND LANG COMPREHEN: CPT

## 2022-08-10 PROCEDURE — 83970 ASSAY OF PARATHORMONE: CPT | Performed by: INTERNAL MEDICINE

## 2022-08-10 PROCEDURE — 70486 CT MAXILLOFACIAL W/O DYE: CPT

## 2022-08-10 PROCEDURE — 99233 SBSQ HOSP IP/OBS HIGH 50: CPT | Performed by: PSYCHIATRY & NEUROLOGY

## 2022-08-10 PROCEDURE — 82962 GLUCOSE BLOOD TEST: CPT

## 2022-08-10 PROCEDURE — 83036 HEMOGLOBIN GLYCOSYLATED A1C: CPT

## 2022-08-10 PROCEDURE — 92610 EVALUATE SWALLOWING FUNCTION: CPT

## 2022-08-10 PROCEDURE — 80069 RENAL FUNCTION PANEL: CPT | Performed by: INTERNAL MEDICINE

## 2022-08-10 PROCEDURE — 82306 VITAMIN D 25 HYDROXY: CPT | Performed by: INTERNAL MEDICINE

## 2022-08-10 PROCEDURE — 82330 ASSAY OF CALCIUM: CPT | Performed by: INTERNAL MEDICINE

## 2022-08-10 PROCEDURE — 97535 SELF CARE MNGMENT TRAINING: CPT

## 2022-08-10 RX ORDER — FAMOTIDINE 20 MG/1
20 TABLET, FILM COATED ORAL DAILY
Status: DISCONTINUED | OUTPATIENT
Start: 2022-08-11 | End: 2022-08-17 | Stop reason: HOSPADM

## 2022-08-10 RX ADMIN — DILTIAZEM HYDROCHLORIDE 120 MG: 120 CAPSULE, COATED, EXTENDED RELEASE ORAL at 12:31

## 2022-08-10 RX ADMIN — MICONAZOLE NITRATE 1 APPLICATION: 20 CREAM TOPICAL at 22:35

## 2022-08-10 RX ADMIN — Medication 10 ML: at 00:04

## 2022-08-10 RX ADMIN — Medication 10 ML: at 22:35

## 2022-08-10 RX ADMIN — ATORVASTATIN CALCIUM 80 MG: 40 TABLET, FILM COATED ORAL at 22:34

## 2022-08-10 RX ADMIN — SODIUM CHLORIDE 100 ML/HR: 9 INJECTION, SOLUTION INTRAVENOUS at 00:04

## 2022-08-10 RX ADMIN — DICLOFENAC 2 G: 10 GEL TOPICAL at 22:35

## 2022-08-10 RX ADMIN — FOLIC ACID 1000 MCG: 1 TABLET ORAL at 12:31

## 2022-08-10 RX ADMIN — ACETAMINOPHEN 650 MG: 325 TABLET, FILM COATED ORAL at 22:34

## 2022-08-10 RX ADMIN — DICLOFENAC 2 G: 10 GEL TOPICAL at 10:16

## 2022-08-10 RX ADMIN — SENNOSIDES AND DOCUSATE SODIUM 2 TABLET: 50; 8.6 TABLET ORAL at 12:31

## 2022-08-10 RX ADMIN — SODIUM CHLORIDE 100 ML/HR: 9 INJECTION, SOLUTION INTRAVENOUS at 22:34

## 2022-08-10 RX ADMIN — ACETAMINOPHEN 650 MG: 325 TABLET ORAL at 12:31

## 2022-08-10 RX ADMIN — SENNOSIDES AND DOCUSATE SODIUM 2 TABLET: 50; 8.6 TABLET ORAL at 22:34

## 2022-08-10 RX ADMIN — Medication 10 ML: at 22:34

## 2022-08-10 RX ADMIN — Medication 5 MG: at 22:34

## 2022-08-10 RX ADMIN — SODIUM CHLORIDE 100 ML/HR: 9 INJECTION, SOLUTION INTRAVENOUS at 10:16

## 2022-08-10 RX ADMIN — ASPIRIN 325 MG ORAL TABLET 325 MG: 325 PILL ORAL at 12:31

## 2022-08-10 RX ADMIN — DICLOFENAC 2 G: 10 GEL TOPICAL at 00:04

## 2022-08-10 NOTE — PROGRESS NOTES
[facility] Medicine Services  PROGRESS NOTE    Patient Name: Pati Carter  : 1930  MRN: [id number]    Date of Admission: 2022  Primary Care Physician: Pallavi Eduardo MD    Subjective   Subjective     CC:  Confusion, dehydration    HPI:  Improving w fluids. D/w daughter at bedside.     ROS:  Difficult to obtain given underlying mental status    Objective   Objective     Vital Signs:   Temp:  [96.5 °F (35.8 °C)-98.4 °F (36.9 °C)] 96.5 °F (35.8 °C)  Heart Rate:  [] 81  Resp:  [18-21] 20  BP: (129-165)/(64-89) 165/89  Flow (L/min):  [2] 2     Physical Exam:  Constitutional: elderly frail appearing woman, in no acute distress, non toxic appearing  Eyes: PERRL, sclerae anicteric, no conjunctival injection  HENT: NCAT, mucous membranes moist  Neck: Supple, trachea midline  Respiratory: Clear to auscultation bilaterally, nonlabored respirations, on NC  Cardiovascular: RRR, no murmurs, rubs, or gallops, palpable radial pulses bilaterally  Gastrointestinal: Positive bowel sounds, soft, nontender, nondistended  Musculoskeletal: No bilateral ankle edema, no clubbing or cyanosis to extremities; bitemporal wasting  Psychiatric: Unable to assess  Neurologic: Mumbles/moans to stimuli, moving extremities spontaneously    Results Reviewed:  LAB RESULTS:      Lab 08/10/22  0533 08/09/22  1720 08/07/22  1918   WBC 12.45* 17.90* 12.72*   HEMOGLOBIN 9.1* 10.1* 9.1*   HEMATOCRIT 29.0* 30.9* 29.8*   PLATELETS 369 436 486*   NEUTROS ABS  --  15.13* 8.45*   IMMATURE GRANS (ABS)  --  0.10* 0.11*   LYMPHS ABS  --  0.99 2.29   MONOS ABS  --  1.48* 1.58*   EOS ABS  --  0.10 0.22   .3* 97.2* 101.4*   PROCALCITONIN  --  0.33*  --    LACTATE  --  1.2  --          Lab 08/10/22  0533 08/09/22  17222   SODIUM 142 139 139   POTASSIUM 3.8 3.8 3.8   CHLORIDE 105 98 97*   CO2 26.0 30.0* 31.0*   ANION GAP 11.0 11.0 11.0   BUN 36* 35* 36*   CREATININE 2.12* 2.04* 1.92*   EGFR 21.5*  22.5* 24.2*   GLUCOSE 80 104* 88   CALCIUM 11.8* 13.3* 13.0*   IONIZED CALCIUM 1.76* 1.86*  --    PHOSPHORUS 4.0  --   --    HEMOGLOBIN A1C 5.10  --   --          Lab 08/10/22  0533 08/09/22  1720   TOTAL PROTEIN  --  7.4   ALBUMIN 2.60* 3.20*   GLOBULIN  --  4.2   ALT (SGPT)  --  17   AST (SGOT)  --  20   BILIRUBIN  --  0.4   ALK PHOS  --  118*   LIPASE  --  10*         Lab 08/09/22  1720   PROBNP 4,237.0*   TROPONIN T 0.032*         Lab 08/10/22  0533   CHOLESTEROL 115   LDL CHOL 67   HDL CHOL 27*   TRIGLYCERIDES 116             Lab 08/09/22  1715   PH, ARTERIAL 7.478*   PCO2, ARTERIAL 44.6   PO2 ART 61.5*   FIO2 28   HCO3 ART 33.0*   BASE EXCESS ART 8.5*   CARBOXYHEMOGLOBIN 1.3     Brief Urine Lab Results  (Last result in the past 365 days)      Color   Clarity   Blood   Leuk Est   Nitrite   Protein   CREAT   Urine HCG        08/09/22 1838 Yellow   Clear   Negative   Trace   Negative   30 mg/dL (1+)                 Microbiology Results Abnormal     Procedure Component Value - Date/Time    Urine Culture - Urine, Urine, Catheter [[id number]]  (Normal) Collected: 08/09/22 1838    Lab Status: Preliminary result Specimen: Urine, Catheter Updated: 08/10/22 1107     Urine Culture No growth    COVID PRE-OP / PRE-PROCEDURE SCREENING ORDER (NO ISOLATION) - Swab, Nasopharynx [[id number]]  (Normal) Collected: 08/09/22 1812    Lab Status: Final result Specimen: Swab from Nasopharynx Updated: 08/09/22 1907    Narrative:      The following orders were created for panel order COVID PRE-OP / PRE-PROCEDURE SCREENING ORDER (NO ISOLATION) - Swab, Nasopharynx.  Procedure                               Abnormality         Status                     ---------                               -----------         ------                     COVID-19 and FLU A/B PCR...[[id number]]  Normal              Final result                 Please view results for these tests on the individual orders.    COVID-19 and FLU A/B PCR - Swab, Nasopharynx  [[id number]]  (Normal) Collected: 08/09/22 1812    Lab Status: Final result Specimen: Swab from Nasopharynx Updated: 08/09/22 1907     COVID19 Not Detected     Influenza A PCR Not Detected     Influenza B PCR Not Detected    Narrative:      Fact sheet for providers: https://www.fda.gov/media/995704/download    Fact sheet for patients: https://www.fda.gov/media/874244/download    Test performed by PCR.          CT Head Without Contrast    Result Date: 8/9/2022  DATE OF EXAM: 8/9/2022 8:10 PM  PROCEDURE: CT HEAD WO CONTRAST-  INDICATIONS: AMS - somnolence; R41.82-Altered mental status, unspecified; N17.9-Acute kidney failure, unspecified; E83.52-Hypercalcemia; D64.9-Anemia, unspecified; I10-Essential (primary) hypertension; Z86.19-Personal history of other infectious and parasitic diseases; Z66-Do not resuscitate; R53.81-Other malaise  COMPARISON: No comparisons available.  TECHNIQUE: Routine transaxial and coronal reconstruction images were obtained through the head without the administration of contrast. Automated exposure control and iterative reconstruction methods were used.  The radiation dose reduction device was turned on for each scan per the ALARA (As Low as Reasonably Achievable) protocol.  FINDINGS: Gray-white differentiation is maintained and there is no evidence of intracranial hemorrhage, mass or mass effect. Age-related changes of the brain are present including volume loss and typical periventricular sequela of chronic small vessel ischemia. There is otherwise no evidence of intracranial hemorrhage, mass or mass effect. The ventricles are normal in size and configuration accounting for surrounding volume loss. The orbits are normal and the paranasal sinuses are grossly clear.      Impression: Age-related changes of the brain as above, otherwise without evidence of acute intracranial abnormality.   This report was finalized on 8/9/2022 8:51 PM by Galo Mejias.      MRI Angiogram Head Without  Contrast    Result Date: 8/9/2022  DATE OF EXAM: 8/9/2022 9:51 PM  PROCEDURE: MRI ANGIOGRAM NECK WO CONTRAST-, MRI ANGIOGRAM HEAD WO CONTRAST-, MRI BRAIN WO CONTRAST-  INDICATIONS: Neuro deficit, acute, stroke suspected; R41.82-Altered mental status, unspecified; N17.9-Acute kidney failure, unspecified; E83.52-Hypercalcemia; D64.9-Anemia, unspecified; I10-Essential (primary) hypertension; Z86.19-Personal history of other infectious and parasitic diseases; Z66-Do not resuscitate; R53.81-Other malaise  COMPARISON: No comparisons available.  TECHNIQUE: Multiplanar multisequence MRI of the brain performed without IV contrast. Noncontrast time-of-flight 3-dimensional MR angiogram of the head and neck with three-dimensional maximum intensity projections postprocessed  FINDINGS: No acute infarct is present on diffusion weighted sequences. Midline structures are unremarkable and the craniocervical junction is satisfactory in appearance. Age-related changes are present with generalized volume loss and typical periventricular leukomalacia favored to reflect chronic small vessel ischemia. There is no evidence of intracranial hemorrhage, mass or mass effect. The ventricles are normal in size and configuration, accounting for surrounding volume loss. The orbits are unremarkable and the paranasal sinuses are grossly clear.  MR angiogram demonstrates no evidence of flow-limiting stenosis in the neck. The common and bilateral internal carotid arteries are patent. The vertebral arteries are normal in course and caliber. Intracranially, the carotid siphons demonstrate no evidence of flow-limiting stenosis. The branching [place] of [place] vessels including bilateral anterior, middle and posterior cerebral arteries are patent without evidence of high-grade flow limiting stenosis, large vessel occlusion or aneurysmal dilatation. The vertebrobasilar system is patent.      Impression: Age-related changes are present as above. There is  otherwise no evidence of infarct, hemorrhage, mass or mass effect.  Essentially normal MR angiogram with no evidence of flow-limiting stenosis, large vessel occlusion or aneurysmal dilatation.  This report was finalized on 8/9/2022 10:24 PM by Galo Mejias.      MRI Angiogram Neck Without Contrast    Result Date: 8/9/2022  DATE OF EXAM: 8/9/2022 9:51 PM  PROCEDURE: MRI ANGIOGRAM NECK WO CONTRAST-, MRI ANGIOGRAM HEAD WO CONTRAST-, MRI BRAIN WO CONTRAST-  INDICATIONS: Neuro deficit, acute, stroke suspected; R41.82-Altered mental status, unspecified; N17.9-Acute kidney failure, unspecified; E83.52-Hypercalcemia; D64.9-Anemia, unspecified; I10-Essential (primary) hypertension; Z86.19-Personal history of other infectious and parasitic diseases; Z66-Do not resuscitate; R53.81-Other malaise  COMPARISON: No comparisons available.  TECHNIQUE: Multiplanar multisequence MRI of the brain performed without IV contrast. Noncontrast time-of-flight 3-dimensional MR angiogram of the head and neck with three-dimensional maximum intensity projections postprocessed  FINDINGS: No acute infarct is present on diffusion weighted sequences. Midline structures are unremarkable and the craniocervical junction is satisfactory in appearance. Age-related changes are present with generalized volume loss and typical periventricular leukomalacia favored to reflect chronic small vessel ischemia. There is no evidence of intracranial hemorrhage, mass or mass effect. The ventricles are normal in size and configuration, accounting for surrounding volume loss. The orbits are unremarkable and the paranasal sinuses are grossly clear.  MR angiogram demonstrates no evidence of flow-limiting stenosis in the neck. The common and bilateral internal carotid arteries are patent. The vertebral arteries are normal in course and caliber. Intracranially, the carotid siphons demonstrate no evidence of flow-limiting stenosis. The branching [place] of [place] vessels  including bilateral anterior, middle and posterior cerebral arteries are patent without evidence of high-grade flow limiting stenosis, large vessel occlusion or aneurysmal dilatation. The vertebrobasilar system is patent.      Impression: Age-related changes are present as above. There is otherwise no evidence of infarct, hemorrhage, mass or mass effect.  Essentially normal MR angiogram with no evidence of flow-limiting stenosis, large vessel occlusion or aneurysmal dilatation.  This report was finalized on 8/9/2022 10:24 PM by Galo Mejias.      MRI Brain Without Contrast    Result Date: 8/9/2022  DATE OF EXAM: 8/9/2022 9:51 PM  PROCEDURE: MRI ANGIOGRAM NECK WO CONTRAST-, MRI ANGIOGRAM HEAD WO CONTRAST-, MRI BRAIN WO CONTRAST-  INDICATIONS: Neuro deficit, acute, stroke suspected; R41.82-Altered mental status, unspecified; N17.9-Acute kidney failure, unspecified; E83.52-Hypercalcemia; D64.9-Anemia, unspecified; I10-Essential (primary) hypertension; Z86.19-Personal history of other infectious and parasitic diseases; Z66-Do not resuscitate; R53.81-Other malaise  COMPARISON: No comparisons available.  TECHNIQUE: Multiplanar multisequence MRI of the brain performed without IV contrast. Noncontrast time-of-flight 3-dimensional MR angiogram of the head and neck with three-dimensional maximum intensity projections postprocessed  FINDINGS: No acute infarct is present on diffusion weighted sequences. Midline structures are unremarkable and the craniocervical junction is satisfactory in appearance. Age-related changes are present with generalized volume loss and typical periventricular leukomalacia favored to reflect chronic small vessel ischemia. There is no evidence of intracranial hemorrhage, mass or mass effect. The ventricles are normal in size and configuration, accounting for surrounding volume loss. The orbits are unremarkable and the paranasal sinuses are grossly clear.  MR angiogram demonstrates no evidence of  flow-limiting stenosis in the neck. The common and bilateral internal carotid arteries are patent. The vertebral arteries are normal in course and caliber. Intracranially, the carotid siphons demonstrate no evidence of flow-limiting stenosis. The branching [place] of [place] vessels including bilateral anterior, middle and posterior cerebral arteries are patent without evidence of high-grade flow limiting stenosis, large vessel occlusion or aneurysmal dilatation. The vertebrobasilar system is patent.      Impression: Age-related changes are present as above. There is otherwise no evidence of infarct, hemorrhage, mass or mass effect.  Essentially normal MR angiogram with no evidence of flow-limiting stenosis, large vessel occlusion or aneurysmal dilatation.  This report was finalized on 8/9/2022 10:24 PM by Galo Mejias.      XR Chest 1 View    Result Date: 8/9/2022  DATE OF EXAM: 8/9/2022 4:40 PM  PROCEDURE: XR CHEST 1 VW-  INDICATIONS: AMS w/ hx of aspiration  COMPARISON: October 9, 2020  TECHNIQUE: Single radiographic AP view of the chest was obtained.  FINDINGS: There is ectasia of the thoracic aorta. The lungs seem relatively clear. It looks like there are some emphysematous changes. There is vertebral augmentation within the spine in the thoracolumbar area.      Impression: 1.  No definite acute pulmonary process. 2.  Ectasia thoracic aorta  This report was finalized on 8/9/2022 4:57 PM by Julian Bowen MD.            I have reviewed the medications:  Scheduled Meds:acetaminophen, 650 mg, Oral, Nightly  aspirin, 325 mg, Oral, Daily   Or  aspirin, 300 mg, Rectal, Daily  atorvastatin, 80 mg, Oral, Nightly  budesonide-formoterol, 2 puff, Inhalation, BID - RT  Diclofenac Sodium, 2 g, Topical, Q12H  dilTIAZem CD, 120 mg, Oral, Daily  [START ON 8/11/2022] famotidine, 20 mg, Oral, Daily  folic acid, 1,000 mcg, Oral, Daily  senna-docusate sodium, 2 tablet, Oral, BID  sodium chloride, 10 mL, Intravenous, Q12H  sodium  chloride, 10 mL, Intravenous, Q12H      Continuous Infusions:sodium chloride, 100 mL/hr, Last Rate: 100 mL/hr (08/10/22 1016)      PRN Meds:.•  acetaminophen **OR** acetaminophen **OR** acetaminophen  •  albuterol  •  senna-docusate sodium **AND** polyethylene glycol **AND** bisacodyl **AND** bisacodyl  •  melatonin  •  [COMPLETED] Insert peripheral IV **AND** sodium chloride  •  sodium chloride  •  sodium chloride  •  traMADol    Assessment & Plan   Assessment & Plan     Active Hospital Problems    Diagnosis  POA   • **Hypercalcemia [E83.52]  Yes   • Acute on chronic alteration in mental status [R41.82]  Yes   • Acute kidney injury (HCC) [N17.9]  Yes   • Pelvic mass [R19.00]  Yes   • Impaired cognition [R41.89]  Yes   • Impaired mobility [Z74.09]  Yes   • 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        Brief Hospital Course to date:  Pati Carter is a 89 y.o. female [place] resident with recent admission for C. 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 PTH is low. Calcium improving w fluids. Pending PTHrP  - Continuous IVF ordered, recheck calcium in a.m.; hold oral calcium/vitamin D supplements     JENNIE on CKD 3  -Baseline GFR 40s; Cr 0.9-1.1  -Suspect prerenal from lack of oral intake, IVF as above am labs     Likely ongoing aspiration  - SLP eval     Hypertension  Hyperlipidemia  PVD  -Aspirin, diltiazem     COPD  - Home inhalers, substituted for formulary     GERD  - Pepcid     Rheumatoid arthritis  - Per previous documentation, on MTX, however not listed on home med list     Recent C. difficile colitis  -Completed oral vancomycin     1.8 cm LT renal lesion  Bladder wall lesion  Pelvic mass  -Seen by Dr. Yao last admit, monitoring/following up opt  -Seen by Dr. Mello lat admit, gyn mass is stable, expectant management, if large volume blood loss she recommended formal Dx and rad-onc therapy  -d/w dtr at bedside, they prefer more conservative measures and likely would not pursue invasive measures     Obtaining Facial CT for concern of dental abscess. Might be contributing to poor PO intake      Expected Discharge Location and Transportation: long term care  Expected Discharge Date: 8/15    DVT prophylaxis:  Mechanical DVT prophylaxis orders are present.          CODE STATUS:   Code Status and Medical Interventions:   Ordered at: 08/09/22 7281     Medical Intervention Limits:    NO intubation (DNI)     Code Status (Patient has no pulse and is not breathing):    No CPR (Do Not Attempt to Resuscitate)     Medical Interventions (Patient has pulse or is breathing):    Limited Support       Micheline  MD Mallorie  08/10/22

## 2022-08-10 NOTE — PROGRESS NOTES
Daily Progress Note  Neurology     LOS: 1 day     Subjective     Chief Complaint: Altered mental status.    Interval History: No acute issues overnight.  Slightly better since admission and as per daughter, mentation has improved somewhat.    ROS: Negative for fever, chest pain or shortness of breath.    Objective     Vital signs in last 24 hours:  Temp:  [96.5 °F (35.8 °C)-98.4 °F (36.9 °C)] 96.5 °F (35.8 °C)  Heart Rate:  [] 81  Resp:  [18-21] 20  BP: (129-165)/(64-89) 165/89      Physical Exam:   General: Lying in bed with eyes open. In NAD.     Respiratory: Respirations unlabored   CV: RRR       Neurologic Exam:   Mental status: Awake, alert, Follows commands. Speech is slow and somewhat hypophonic.   CN: II-XII intact to detailed exam except for visual acuity reduced to finger counting to 1-2 feet.    Motor: Strength full (5/5) throughout in BUE and BLE.  Bilateral cogwheeling rigidity noted.  Intermittent left hand tremors noted.  Masked face noted.   Reflexes: 2+ and symmetric throughout          Results Review:    Results from last 7 days   Lab Units 08/10/22  0533   WBC 10*3/mm3 12.45*   HEMOGLOBIN g/dL 9.1*   HEMATOCRIT % 29.0*   PLATELETS 10*3/mm3 369     Results from last 7 days   Lab Units 08/10/22  0533   SODIUM mmol/L 142   POTASSIUM mmol/L 3.8   CHLORIDE mmol/L 105   CO2 mmol/L 26.0   BUN mg/dL 36*   CREATININE mg/dL 2.12*   CALCIUM mg/dL 11.8*       CT Head Without Contrast    Result Date: 8/9/2022  Age-related changes of the brain as above, otherwise without evidence of acute intracranial abnormality.   This report was finalized on 8/9/2022 8:51 PM by Galo Mejias.      MRI Angiogram Head Without Contrast    Result Date: 8/9/2022  Age-related changes are present as above. There is otherwise no evidence of infarct, hemorrhage, mass or mass effect.  Essentially normal MR angiogram with no  evidence of flow-limiting stenosis, large vessel occlusion or aneurysmal dilatation.  This report was finalized on 8/9/2022 10:24 PM by Galo Mejias.      MRI Angiogram Neck Without Contrast    Result Date: 8/9/2022  Age-related changes are present as above. There is otherwise no evidence of infarct, hemorrhage, mass or mass effect.  Essentially normal MR angiogram with no evidence of flow-limiting stenosis, large vessel occlusion or aneurysmal dilatation.  This report was finalized on 8/9/2022 10:24 PM by Galo Mejias.      MRI Brain Without Contrast    Result Date: 8/9/2022  Age-related changes are present as above. There is otherwise no evidence of infarct, hemorrhage, mass or mass effect.  Essentially normal MR angiogram with no evidence of flow-limiting stenosis, large vessel occlusion or aneurysmal dilatation.  This report was finalized on 8/9/2022 10:24 PM by Galo Mejias.      XR Chest 1 View    Result Date: 8/9/2022  1.  No definite acute pulmonary process. 2.  Ectasia thoracic aorta  This report was finalized on 8/9/2022 4:57 PM by Julian Bowen MD.      I personally reviewed MRI brain without contrast which did not reveal any evidence of acute stroke.  It showed mild to moderate chronic nonspecific white matter changes.  MRI brain and neck were reviewed personally as well which did not reveal any evidence of vascular abnormalities or flow-limiting stenosis.    Ionized calcium: 1.86-->1.76    UA: Leukocyte esterase trace positive, urine culture and blood culture is pending.    Assessment & Plan     89-year-old female who is assisted-living facility resident with recent admission for C. difficile colitis during which she was found to have several concerning masses within abdomen/pelvis, history of Ferny Garret syndrome, macular degeneration, hypercalcemia, CKD stage III, hypertension, hyperlipidemia who presents from assisted living facility because of worsening in mental status.    1.   Altered mental status:  -Likely due to underlying hypercalcemia and infectious process-UTI.  MRI brain is negative for acute ischemia.  MR angiogram of brain and neck did not reveal any flow-limiting stenosis or vascular abnormalities.  -Does have history of Ferny Bonnet syndrome and a history of visual hallucinations.  Daughter who is at bedside is concerned for possibility of Parkinson's.  She reports shuffling gait, overall slowness and intermittent tremors.  She was noted to have masked face, leadpipe rigidity and mild intermittent left hand tremors on examination today.  Parkinson's disease is certainly a possibility but this can be evaluated thoroughly as an outpatient once current acute issues are resolved.  -Recently found pelvic mass, bladder evaluation.  Further work-up was deferred due to age and baseline condition.  -Will need aggressive OT, PT and speech therapy.  -Neurology will continue to follow.      Freddy Rich MD  08/10/22  11:41 EDT

## 2022-08-10 NOTE — CONSULTS
Order noted for diabetes education per stroke protocol. A1c 5.1%. No history of diabetes noted. Does not qualify for OP diabetes/stroke class. Thank you.

## 2022-08-10 NOTE — CONSULTS
Stroke Consult Note    Patient Name: aPti Carter   MRN: [id number]  Age: 89 y.o.  Sex: female  : 1930    Primary Care Physician: Pallavi Eduardo MD  Referring Physician:  Brian Walker MD    TIME STROKE TEAM CALLED:  EST     TIME PATIENT SEEN:  EST    Handedness: Unknown  Race:     Chief Complaint/Reason for Consultation: AMS x 4 days    HPI: Pati Carter is a 89 year old female with known medical history of anemia, Ferny Garret syndrome, chondrocalcinosis, COPD, history of COVID-19, GERD, hearing loss, calcium pyrophosphate deposition disease, hyperlipidemia, hypertension, macular degeneration, rheumatoid arthritis who presents from [facility] via [facility] for evaluation of altered mental status and progressive weakness.      The patient is usually able to ambulate with the assistance of her walker, over the last several days she has had decreased activity and decreased responsiveness along with confusion.  She has a significant history for recurrent aspiration and her daughter is concerned that she may have aspirated again.      She was recently discharged on 2022 after a fall and C. difficile colitis.  She was discharged on p.o. vancomycin.  She also has recurrent UTIs, pelvic mass, and a small renal mass, and is being followed by urology    Asked to see as an inpatient consult per hospitalist request due to altered mental status, met patient in room after CT scan.  Patient is confused, she is able to tell me her age and her name but is unaware of the year, situation, or where she is.  NIH stroke scale 13, patient is a generally weak, left arm was weaker against gravity than her right.  Patient unable to track finger with her eyes however she would track examiner around the room.  Patient not able to consistently follow commands however no overt dysmetria appreciated, patient would not smile to command, however no overt facial droop or facial asymmetry  appreciated at rest or when patient was verbalizing.  PERRL. /78    Her daughter tells me that over the last several days, since Friday, the patient has not been in her normal state of health and that she has had periods of not being able to talk well with seeming confused and saying off-the-wall comments, she has not been able to walk, normally she ambulates well with a walker.  Daughter is concerned as she believes that she is extremely dehydrated and her lips were very dry when she first arrived to the hospital.    Due to the last known well being 8/5/2022 and patient's poor kidney function will order MRI/MRA without contrast imaging for further stroke evaluation    Last Known Normal Date/Time: 8/5/2022  EST     Review of Systems   Unable to perform ROS: Mental status change   Patient unable to participate in review of systems, however daughter is at bedside and reports that patient altered mental status and fatigue started on Friday and has had brief moments of patient seeming more alert, but overall her mental state and weakness have progressively worsened since Friday.  She also tells me that she has Ferny Garret syndrome and that at baseline she would occasionally state have visual hallucinations.    Past Medical History:   Diagnosis Date   • Anemia     Description: A.  Dx 2006- borderline intermittent.   • Back pain    • Benign colonic polyp 9/28/2016    Description: A.  Dx 1999.   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  Rule out chronic persistent asthma, COPD, or obliterative bronchiolitis.- Butch   • COVID-19 virus infection 10/11/2020   • CTS (carpal tunnel syndrome)    • Degeneration of intervertebral disc of lumbar region     Description: A.  Diagnosed in April 2013 with advanced multilevel with severe spinal stenosis,  followed by Dr. Pillai for pain management.   • Gastroesophageal reflux disease    • Hearing loss    • History of calcium pyrophosphate deposition disease (CPPD)    • History of colonoscopy 01/01/1999    NORMAL PER PATIENT    • History of mammogram 01/01/2011    NORMAL PER PT    • History of Papanicolaou smear of cervix 01/01/2010    NORMAL PER PT    • History of varicella    • Hyperlipidemia     Description: A.  Dx 2006.   • Hypertension     Description: A. Dx 2001.   • Macular degeneration    • Nocturia    • Osteoporosis    • Ovarian mass     Dx 8/15- benign left cystic adnexal mass   • Overactive bladder    • Pelvic floor dysfunction    • Rheumatoid arthritis (HCC)     Description: A.  Diagnosed in 2000 and and followed by Dr. Constantino (now Dr. Kaplan). B.  On methotrexate therapy since 2000. C.  Off low-dose prednisone therapy.   • Vitamin D deficiency      Past Surgical History:   Procedure Laterality Date   • APPENDECTOMY     • CARPAL TUNNEL RELEASE Left 01/01/2003    HISTORY OF NEUROPLASTY DECOMPRESSION MEDIAN NERVE AT CARPAL TUNNEL LEFT   • CATARACT EXTRACTION Bilateral 01/01/2009   • CHOLECYSTECTOMY  01/01/1962   • HIP CANNULATED SCREW PLACEMENT Right 1/25/2020    Procedure: HIP CANNULATED SCREW PLACEMENT RIGHT;  Surgeon: Karlos Blount MD;  Location: [facility];  Service: Orthopedics   • KNEE ARTHROSCOPY Left 01/01/2001    MENISCAL REPAIR   • KYPHOPLASTY  06/18/2015    T11 AND L1 (JOSE A)   • PELVIC LAPAROSCOPY  01/01/1996    REMOVAL OF BENIGN UTERINE AND RIGHT OVARIAN TUMORS   • SALPINGO OOPHORECTOMY Left 08/26/2015    REMOVAL OF LEFT OVARY AND TUBE (benign cystic mass)     Family History   Problem Relation Age of Onset   • Hypertension Mother    • Diabetes Mother      Social History     Socioeconomic History   • Marital status:    Tobacco Use   • Smoking status: Former Smoker     Years: 0.50   • Smokeless tobacco: Never Used   Vaping Use   • Vaping Use: Never used   Substance and Sexual  Activity   • Alcohol use: No   • Drug use: No   • Sexual activity: Not Currently     Allergies   Allergen Reactions   • Ciprofloxacin Other (See Comments) and Unknown - High Severity     Other reaction(s): shaking  HCI TABS/ SHAKING  Other reaction(s): shaking  HCI TABS/ SHAKING   • Levofloxacin Diarrhea and Unknown - High Severity   • Sulfamethoxazole-Trimethoprim Other (See Comments), Rash and Unknown - High Severity     Stomach cramps   Stomach cramps    • Sulfamethoxazole Rash   • Trimethoprim GI Intolerance, Diarrhea and Rash     Prior to Admission medications    Medication Sig Start Date End Date Taking? Authorizing Provider   acetaminophen (TYLENOL) 325 MG tablet TAKE 2 TABLETS (650MG) BY MOUTH EVERY NIGHT WITH TRAMADOL 1/12/22  Yes Maddi Buck APRN   aspirin 81 MG EC tablet Take 1 tablet by mouth Daily. 6/4/20  Yes Latoya Bolaños MD   Breo Ellipta 100-25 MCG/INH inhaler INHALE ONE PUFF BY MOUTH ONCE DAILY 6/15/22  Yes Pallavi Eduardo MD   calcium carbonate, oyster shell, 500 MG tablet tablet Take  by mouth 2 (Two) Times a Day.   Yes Palmira Proctor MD   Cerovite Senior multivitamin tablet TAKE 1 TABLET BY MOUTH ONCE DAILY 11/21/21  Yes Maddi Buck APRN   Cholecalciferol (PA VITAMIN D-3 PO) Take 2,000 Units by mouth Daily.   Yes Palmira Proctor MD   dilTIAZem CD (CARDIZEM CD) 120 MG 24 hr capsule Take 1 capsule by mouth Daily. 7/16/20  Yes Latoya Bolaños MD   famotidine (PEPCID) 40 MG tablet Take 1 tablet by mouth Daily. 6/8/22  Yes Pallavi Eduardo MD   folic acid (FOLVITE) 1 MG tablet TAKE ONE TABLET BY MOUTH DAILY 5/6/20  Yes Latoya Bolaños MD   melatonin 5 MG tablet tablet Take 1 tablet by mouth At Night As Needed (sleep). 9/28/20  Yes Malu Sena APRN   meloxicam (MOBIC) 7.5 MG tablet Take 1 tablet by mouth Daily. 6/29/22  Yes Emergency, Nurse Epic, RN   Mitigare 0.6 MG capsule capsule  2/2/22  Yes Emergency, Nurse Armen RN   Myrbetriq 50 MG tablet  sustained-release 24 hour 24 hr tablet Take 50 mg by mouth Daily. 2/23/21  Yes Palmira rPoctor MD   Nutritional Supplements (Ensure) Take 1 can by mouth 2 (two) times a day. 9/2/21 9/2/22 Yes Roge Hernandez APRN   polyethylene glycol (MIRALAX) 17 g packet Take 17 g by mouth Daily. As needed for constipation; can decrease to half dose every other day if loose stools. 11/30/21  Yes Maddi Buck APRN   povidone-iodine 10 % swab Apply  topically to the appropriate area as directed Daily. 7/28/22 7/28/23 Yes Lubna Madrid II, DO   Sennosides-Docusate Sodium (SENNA S PO) Take 1 tablet by mouth At Night As Needed for Constipation.   Yes Palmira Proctor MD   vitamin B-12 (CYANOCOBALAMIN) 1000 MCG tablet TAKE ONE TABLET BY MOUTH DAILY 5/6/20  Yes Latoya Bolaños MD   albuterol sulfate  (90 Base) MCG/ACT inhaler Inhale 2 puffs Every 4 (Four) Hours As Needed for Wheezing or Shortness of Air. 10/23/19   Latoya Bolaños MD   amoxicillin-clavulanate (AUGMENTIN) 875-125 MG per tablet Take 1 tablet by mouth 2 (Two) Times a Day. 8/8/22 8/12/22  Palmira Proctor MD   Diclofenac Sodium (VOLTAREN) 1 % gel gel Apply 2 g topically to the appropriate area as directed Every 12 (Twelve) Hours. 6/29/22   Emergency, Nurse Epic, RN   methenamine (HIPREX) 1 g tablet Take 1 tablet by mouth Every 12 (Twelve) Hours.    Palmira Proctor MD   traMADol (ULTRAM) 50 MG tablet Take 50 mg by mouth 2 (Two) Times a Day As Needed for Moderate Pain .    Palmira Proctor MD   vancomycin (Vancocin) 125 MG capsule Take 125 mg by mouth 4 (Four) Times a Day. 8/8/22 8/18/22  Palmira Proctor MD   famotidine (PEPCID) 40 MG tablet Take 1 tablet by mouth.  8/9/22  Emergency, Nurse JAMI Ayers   traMADol (ULTRAM) 50 MG tablet Take 1 tablet by mouth 2 (Two) Times a Day As Needed for Moderate Pain . 7/27/22 8/9/22  Lubna Madrid II, DO         Temp:  [98 °F (36.7 °C)] 98 °F (36.7 °C)  Heart Rate:  [100-112]  101  Resp:  [18-21] 18  BP: (137-155)/(78-87) 146/78     Neurological Exam  Mental Status  Drowsy. Oriented only to person. Speech is normal. Expressive aphasia present.    Cranial Nerves  CN II: Patient not able to state number of fingers in visual fields, however does react to visual threat in all visual fields.  CN III, IV, VI: Normal lids and orbits bilaterally. Pupils equal round and reactive to light bilaterally. Will track examiner around room, however ocular motility examination limited by patient inability to follow commands.  CN V: Facial sensation is normal.  CN VII: Full and symmetric facial movement.  CN VIII: Hard of hearing.  CN IX, X: Palate elevates symmetrically  CN XI: Patient not able to follow commands to test CN XI.  CN XII: Tongue appears midline when patient is verbalizing, however she was not able to follow command to stick her tongue.    Motor  Decreased muscle bulk throughout. No fasciculations present.  RUE 3/5  LUE 2/5  RLE 1/5  LLE 1/5.    Sensory  Light touch is normal in upper and lower extremities.     Reflexes                                            Right                      Left  Plantar                           Downgoing                Downgoing    Coordination    Patient not able to follow multi-step commands however no overt dysmetria appreciated.    Gait    Not observed secondary to weakness.      Physical Exam  Constitutional:       Appearance: She is ill-appearing.   HENT:      Head: Normocephalic and atraumatic.      Mouth/Throat:      Mouth: Mucous membranes are dry.   Eyes:      General: Lids are normal.      Pupils: Pupils are equal, round, and reactive to light.   Cardiovascular:      Rate and Rhythm: Regular rhythm. Tachycardia present.   Pulmonary:      Effort: Pulmonary effort is normal.      Comments: 3L NC  Skin:     General: Skin is warm and dry.      Findings: Bruising present.   Neurological:      Mental Status: She is disoriented.      Motor: Weakness  present.   Psychiatric:         Speech: Speech normal.         Acute Stroke Data    Thrombolytic Inclusion / Exclusion Criteria    Time: 20:50 EDT  Person Administering Scale: JANIYA Stiles    Inclusion Criteria  [x]   18 years of age or greater   []   Onset of symptoms < 4.5 hours before beginning treatment (stroke onset = time patient was last seen well or without symptoms).   []   Diagnosis of acute ischemic stroke causing measurable disabling deficit (Complete Hemianopia, Any Aphasia, Visual or Sensory Extinction, Any weakness limiting sustained effort against gravity)   []   Any remaining deficit considered potentially disabling in view of patient and practitioner   Exclusion criteria (Do not proceed with Alteplase if any are checked under exclusion criteria)  [x]   Onset unknown or GREATER than 4.5 hours   []   ICH on CT/MRI   []   CT demonstrates hypodensity representing acute or subacute infarct   []   Significant head trauma or prior stroke in the previous 3 months   []   Symptoms suggestive of subarachnoid hemorrhage   []   History of un-ruptured intracranial aneurysm GREATER than 10 mm   []   Recent intracranial or intraspinal surgery within the last 3 months   []   Arterial puncture at a non-compressible site in the previous 7 days   []   Active internal bleeding   []   Acute bleeding tendency   []   Platelet count LESS than 100,000 for known hematological diseases such as leukemia, thrombocytopenia or chronic cirrhosis   []   Current use of anticoagulant with INR GREATER than 1.7 or PT GREATER than 15 seconds, aPTT GREATER than 40 seconds   []   Heparin received within 48 hours, resulting in abnormally elevated aPTT GREATER than upper limit of normal   []   Current use of direct thrombin inhibitors or direct factor Xa inhibitors in the past 48 hours   []   Elevated blood pressure refractory to treatment (systolic GREATER than 185 mm/Hg or diastolic  GREATER than 110 mm/Hg   []   Suspected  infective endocarditis and aortic arch dissection   []   Current use of therapeutic treatment dose of low-molecular-weight heparin (LMWH) within the previous 24 hours   []   Structural GI malignancy or bleed   Relative exclusion for all patients  []   Only minor non-disabling symptoms   []   Pregnancy   []   Seizure at onset with postictal residual neurological impairments   []   Major surgery or previous trauma within past 14 days   []   History of previous spontaneous ICH, intracranial neoplasm, or AV malformation   []   Postpartum (within previous 14 days)   []   Recent GI or urinary tract hemorrhage (within previous 21 days)   []   Recent acute MI (within previous 3 months)   []   History of un-ruptured intracranial aneurysm LESS than 10 mm   []   History of ruptured intracranial aneurysm   []   Blood glucose LESS than 50 mg/dL (2.7 mmol/L)   []   Dural puncture within the last 7 days   []   Known GREATER than 10 cerebral microbleeds   Additional exclusions for patients with symptoms onset between 3 and 4.5 hours.  []   Age > 80.   []   On any anticoagulants regardless of INR  >>> Warfarin (Coumadin), Heparin, Enoxaparin (Lovenox), fondaparinux (Arixtra), bivalirudin (Angiomax), Argatroban, dabigatran (Pradaxa), rivaroxaban (Xarelto), or apixaban (Eliquis)   []   Severe stroke (NIHSS > 25).   []   History of BOTH diabetes and previous ischemic stroke.   []   The risks and benefits have been discussed with the patient or family related to the administration of IV thrombolytic therapy for stroke symptoms.   []   I have discussed and reviewed the patient's case and imaging with the attending prior to IV thrombolytic therapy.   N/A Time IV thrombolytic administered       Hospital Meds:  Scheduled-    Infusions-     PRNs- •  [COMPLETED] Insert peripheral IV **AND** sodium chloride    Functional Status Prior to Current Stroke/Juno Score: 4    MODIFIED JUNO SCALE (to be assessed for each patient having history of  stroke) []Stroke history but not assessed  []0: No symptoms at all  []1: No significant disability despite symptoms  []2: Slight disability  []3: Moderate disability  [x]4: Moderately severe disability  []5: Severe disability  []6: Death       NIH Stroke Scale  Time: 20:50 EDT  Person Administering Scale: JANIYA Stiles    Interval: baseline  1a. Level of Consciousness: 1-->Not alert, but arousable by minor stimulation to obey, answer, or respond  1b. LOC Questions: 1-->Answers one question correctly  1c. LOC Commands: 1-->Performs one task correctly  2. Best Gaze: 0-->Normal  3. Visual: 0-->No visual loss  4. Facial Palsy: 0-->Normal symmetrical movements  5a. Motor Arm, Left: 2-->Some effort against gravity, limb cannot get to or maintain (if cued) 90 (or 45) degrees, drifts down to bed, but has some effort against gravity  5b. Motor Arm, Right: 1-->Drift, limb holds 90 (or 45) degrees, but drifts down before full 10 secs, does not hit bed or other support  6a. Motor Leg, Left: 3-->No effort against gravity, leg falls to bed immediately  6b. Motor Leg, Right: 3-->No effort against gravity, leg falls to bed immediately  7. Limb Ataxia: 0-->Absent  8. Sensory: 0-->Normal, no sensory loss  9. Best Language: 1-->Mild-to-moderate aphasia, some obvious loss of fluency or facility of comprehension, without significant limitation on ideas expressed or form of expression. Reduction of speech and/or comprehension, however, makes conversation. . . (see row details)  10. Dysarthria: 0-->Normal  11. Extinction and Inattention (formerly Neglect): 0-->No abnormality    Total (NIH Stroke Scale): 13    Results Reviewed:  I have personally reviewed current lab, radiology, and data and agree with results.    CT Head Without Contrast    Result Date: 8/9/2022  Age-related changes of the brain as above, otherwise without evidence of acute intracranial abnormality.   This report was finalized on 8/9/2022 8:51 PM by Galo  Randell.        MRI brain/MRA head and neck pending    CT Head negative for acute intracranial abnormality, no hemorrhage  Glucose 104  Sodium 139  Creatinine 2.04  Calcium 13.3  EGFR 22.5  ALT 17  AST 20  Lactate 1.2  WBC 17.9  Hemoglobin 10.1  Hematocrit 30.9  Platelets 436  Urinalysis positive for trace leukocytes, protein, WBC, epithelial cells    Assessment/Plan:    Pati Carter is a 89 year old female with known medical history of anemia, Ferny Garret syndrome, chondrocalcinosis, COPD, history of COVID-19, GERD, hearing loss, calcium pyrophosphate deposition disease, hyperlipidemia, hypertension, macular degeneration, rheumatoid arthritis who presents from [facility] for evaluation of altered mental status and progressive weakness.      Patient is not a candidate for IV TNK d/t LKW >4.5H  Patient is not a candidate for emergent endovascular intervention d/t LKW on 8/5/2022, additional imaging pending at this time    Antiplatelet PTA: ASA 81 mg  Anticoagulant PTA: None        1. Altered Mental Status, weakness  1. Differential diagnosis includes CVA vs infectious cause   2. Will initiate TIA/Ischemic Stroke order set without thrombolytic therapy  3. MRI brain/MRA head and neck without contrast  4.  mg  5. NPO pending bedside dysphagia, when cleared recommend cardiac heart healthy diet  6. Activity as tolerated, PT/OT to work with patient in AM  7. TTE, no bubble study  8. A1C  2. Hypertension  1. Please allow for permissive HTN, SBP <220  3. Hyperlipidemia  1. Fasting lipid panel in AM  2. Lipitor 80 mg      Plan of care discussed with ED physician Dr. Walker and with patient's daughter.     Thank you for allowing us to participate in the care of this patient, stroke neurology will continue to follow.       Tonya Rainey, JANIYA  August 9, 2022  20:50 EDT

## 2022-08-10 NOTE — PLAN OF CARE
Goal Outcome Evaluation:  Plan of Care Reviewed With: patient, daughter        Progress: no change   SLP evaluation completed. Will address dysarthria and cognitive impairments in tx. Will further assess swallowing w/ MBS. Please see note for further details and recommendations.

## 2022-08-10 NOTE — H&P
[facility]  HISTORY AND PHYSICAL    Patient Name: Pati Carter  : 1930  MRN: [id number]  Primary Care Physician: Pallavi Eduardo MD  Date of admission: 2022      Subjective   Subjective     Chief Complaint:  From facility with confusion    HPI:  Pati Carter is a 89 y.o. female AL resident w/ GERD, HTN, HLD, RA on MTX, COPD, PVD, with recent treatment for recurrent UTI and hospitalization 22-22 for C. difficile colitis treated with oral vancomycin.  During that hospitalization she was identified to have small LT renal lesion (per Uro concerning for poss RCC), bladder lesion, uterine lesion (per Dr. Vasquez felt stable/benign, not a candidate for systemic therapy or surgical intervention).  She was DC'd to [facility] for acute rehab with plans for outpatient follow-up with urology.  Daughter at bedside notes during her time at rehab she has not thrived, however they have been able to get her up and mobile, over the last several days she has acutely been declining, NH physician checked labs and a CXR and started her on Augmentin with prophylactic oral vancomycin for presumptive pneumonia.  Both daughters had to return to work full-time and have not been able to spend time at the facility assisting with her or helping feed her.  Per daughter she requires assistance with all meals, in the ED today the daughter noted her mother's lips seem very dry and cracked and her tongue seem very dry and treatable.  The patient herself is mumbling only and unable to provide meaningful contribution to her history.      Review of Systems   Unable to be obtained due to patient's mentation    Personal History     Past Medical History:   Diagnosis Date   • Anemia     Description: A.  Dx - borderline intermittent.   • Back pain    • Benign colonic polyp 2016    Description: A.  Dx .   • Ferny Bonnet syndrome 2020   • Chondrocalcinosis      knees   • Compression fracture of lumbar vertebra (HCC)    • Constipation    • COPD (chronic obstructive pulmonary disease) (HCC)     Description: A.  Rule out chronic persistent asthma, COPD, or obliterative bronchiolitis.- Rae   • COVID-19 virus infection 10/11/2020   • CTS (carpal tunnel syndrome)    • Degeneration of intervertebral disc of lumbar region     Description: A.  Diagnosed in April 2013 with advanced multilevel with severe spinal stenosis, followed by Dr. Pillai for pain management.   • Gastroesophageal reflux disease    • Hearing loss    • History of calcium pyrophosphate deposition disease (CPPD)    • History of colonoscopy 01/01/1999    NORMAL PER PATIENT    • History of mammogram 01/01/2011    NORMAL PER PT    • History of Papanicolaou smear of cervix 01/01/2010    NORMAL PER PT    • History of varicella    • Hyperlipidemia     Description: A.  Dx 2006.   • Hypertension     Description: A. Dx 2001.   • Macular degeneration    • Nocturia    • Osteoporosis    • Ovarian mass     Dx 8/15- benign left cystic adnexal mass   • Overactive bladder    • Pelvic floor dysfunction    • Rheumatoid arthritis (HCC)     Description: A.  Diagnosed in 2000 and and followed by Dr. Constantino (now Dr. Kaplan). B.  On methotrexate therapy since 2000. C.  Off low-dose prednisone therapy.   • Vitamin D deficiency              Past Surgical History:   Procedure Laterality Date   • APPENDECTOMY     • CARPAL TUNNEL RELEASE Left 01/01/2003    HISTORY OF NEUROPLASTY DECOMPRESSION MEDIAN NERVE AT CARPAL TUNNEL LEFT   • CATARACT EXTRACTION Bilateral 01/01/2009   • CHOLECYSTECTOMY  01/01/1962   • HIP CANNULATED SCREW PLACEMENT Right 1/25/2020    Procedure: HIP CANNULATED SCREW PLACEMENT RIGHT;  Surgeon: Karlos Blount MD;  Location: [facility];  Service: Orthopedics   • KNEE ARTHROSCOPY Left 01/01/2001    MENISCAL REPAIR   • KYPHOPLASTY  06/18/2015    T11 AND L1 (JOSE A)   • PELVIC LAPAROSCOPY  01/01/1996    REMOVAL OF  BENIGN UTERINE AND RIGHT OVARIAN TUMORS   • SALPINGO OOPHORECTOMY Left 08/26/2015    REMOVAL OF LEFT OVARY AND TUBE (benign cystic mass)       Family History:  family history includes Diabetes in her mother; Hypertension in her mother. Otherwise pertinent FHx was reviewed and unremarkable.     Social History:  reports that she has quit smoking. She quit after 0.50 years of use. She has never used smokeless tobacco. She reports that she does not drink alcohol and does not use drugs.  Social History     Social History Narrative    Patient moved to [facility] assisted living 3/26/20. On 3/28/20 patient visited ED for fall-related injuries.  In response, family looking into hiring a , to visit patient BID for ADLs/ transfers. Family drives patient to app, but says Jm Encinas may provide in future.       Medications:  Available home medication information reviewed.  (Not in a hospital admission)      Allergies   Allergen Reactions   • Ciprofloxacin Other (See Comments) and Unknown - High Severity     Other reaction(s): shaking  HCI TABS/ SHAKING  Other reaction(s): shaking  HCI TABS/ SHAKING   • Levofloxacin Diarrhea and Unknown - High Severity   • Sulfamethoxazole-Trimethoprim Other (See Comments), Rash and Unknown - High Severity     Stomach cramps   Stomach cramps    • Sulfamethoxazole Rash   • Trimethoprim GI Intolerance, Diarrhea and Rash       Objective   Objective     Vital Signs:   Temp:  [98 °F (36.7 °C)] 98 °F (36.7 °C)  Heart Rate:  [100-112] 101  Resp:  [18-21] 18  BP: (137-155)/(78-87) 146/78  Flow (L/min):  [2] 2  Total (NIH Stroke Scale): 13    Physical Exam   Constitutional: Lethargic, laying on ED stretcher, moans/grimaces to painful stimuli  Eyes: PERRL, sclerae anicteric, no conjunctival injection  HENT: NCAT, mucous membranes moist  Neck: Supple, trachea midline  Respiratory: Clear to auscultation bilaterally, nonlabored respirations   Cardiovascular: RRR, no murmurs, rubs,  or gallops, palpable radial pulses bilaterally  Gastrointestinal: Positive bowel sounds, soft, nontender, nondistended  Musculoskeletal: No bilateral ankle edema, no clubbing or cyanosis to extremities; bitemporal wasting  Psychiatric: Unable to assess  Neurologic: Mumbles/moans to stimuli, moving extremities spontaneously    Result Review:  I have personally reviewed the results from the time of this admission to 8/9/2022 22:17 EDT and agree with these findings:  [x]  Laboratory list / accordion  []  Microbiology  []  Radiology  []  EKG/Telemetry   []  Cardiology/Vascular   []  Pathology  []  Old records  []  Other:  Most notable findings include: Hypercalcemia        LAB RESULTS:      Lab 08/09/22  1720 08/07/22  1918   WBC 17.90* 12.72*   HEMOGLOBIN 10.1* 9.1*   HEMATOCRIT 30.9* 29.8*   PLATELETS 436 486*   NEUTROS ABS 15.13* 8.45*   IMMATURE GRANS (ABS) 0.10* 0.11*   LYMPHS ABS 0.99 2.29   MONOS ABS 1.48* 1.58*   EOS ABS 0.10 0.22   MCV 97.2* 101.4*   PROCALCITONIN 0.33*  --    LACTATE 1.2  --          Lab 08/09/22  1720 08/07/22  1918   SODIUM 139 139   POTASSIUM 3.8 3.8   CHLORIDE 98 97*   CO2 30.0* 31.0*   ANION GAP 11.0 11.0   BUN 35* 36*   CREATININE 2.04* 1.92*   EGFR 22.5* 24.2*   GLUCOSE 104* 88   CALCIUM 13.3* 13.0*   IONIZED CALCIUM 1.86*  --          Lab 08/09/22  1720   TOTAL PROTEIN 7.4   ALBUMIN 3.20*   GLOBULIN 4.2   ALT (SGPT) 17   AST (SGOT) 20   BILIRUBIN 0.4   ALK PHOS 118*   LIPASE 10*         Lab 08/09/22  1720   PROBNP 4,237.0*   TROPONIN T 0.032*                 Lab 08/09/22  1715   PH, ARTERIAL 7.478*   PCO2, ARTERIAL 44.6   PO2 ART 61.5*   FIO2 28   HCO3 ART 33.0*   BASE EXCESS ART 8.5*   CARBOXYHEMOGLOBIN 1.3     UA    Urinalysis 6/24/22 7/23/22 7/23/22 8/9/22 8/9/22     0038 0038 1838 1838   Squamous Epithelial Cells, UA   0-2  3-6 (A)   Specific Gravity, UA  1.022  1.013    Ketones, UA Negative Negative  Negative    Blood, UA  Large (3+) (A)  Negative    Leukocytes, UA Negative  Moderate (2+) (A)  Trace (A)    Nitrite, UA  Negative  Negative    RBC, UA   Too Numerous to Count (A)  0-2   WBC, UA   Too Numerous to Count (A)  6-12 (A)   Bacteria, UA   None Seen  None Seen   (A) Abnormal value              Microbiology Results (last 10 days)     Procedure Component Value - Date/Time    COVID PRE-OP / PRE-PROCEDURE SCREENING ORDER (NO ISOLATION) - Swab, Nasopharynx [[id number]]  (Normal) Collected: 08/09/22 1812    Lab Status: Final result Specimen: Swab from Nasopharynx Updated: 08/09/22 1907    Narrative:      The following orders were created for panel order COVID PRE-OP / PRE-PROCEDURE SCREENING ORDER (NO ISOLATION) - Swab, Nasopharynx.  Procedure                               Abnormality         Status                     ---------                               -----------         ------                     COVID-19 and FLU A/B PCR...[[id number]]  Normal              Final result                 Please view results for these tests on the individual orders.    COVID-19 and FLU A/B PCR - Swab, Nasopharynx [[id number]]  (Normal) Collected: 08/09/22 1812    Lab Status: Final result Specimen: Swab from Nasopharynx Updated: 08/09/22 1907     COVID19 Not Detected     Influenza A PCR Not Detected     Influenza B PCR Not Detected    Narrative:      Fact sheet for providers: https://www.fda.gov/media/319269/download    Fact sheet for patients: https://www.fda.gov/media/381558/download    Test performed by PCR.          CT Head Without Contrast    Result Date: 8/9/2022  DATE OF EXAM: 8/9/2022 8:10 PM  PROCEDURE: CT HEAD WO CONTRAST-  INDICATIONS: AMS - somnolence; R41.82-Altered mental status, unspecified; N17.9-Acute kidney failure, unspecified; E83.52-Hypercalcemia; D64.9-Anemia, unspecified; I10-Essential (primary) hypertension; Z86.19-Personal history of other infectious and parasitic diseases; Z66-Do not resuscitate; R53.81-Other malaise  COMPARISON: No comparisons available.  TECHNIQUE: Routine  transaxial and coronal reconstruction images were obtained through the head without the administration of contrast. Automated exposure control and iterative reconstruction methods were used.  The radiation dose reduction device was turned on for each scan per the ALARA (As Low as Reasonably Achievable) protocol.  FINDINGS: Gray-white differentiation is maintained and there is no evidence of intracranial hemorrhage, mass or mass effect. Age-related changes of the brain are present including volume loss and typical periventricular sequela of chronic small vessel ischemia. There is otherwise no evidence of intracranial hemorrhage, mass or mass effect. The ventricles are normal in size and configuration accounting for surrounding volume loss. The orbits are normal and the paranasal sinuses are grossly clear.      Impression: Age-related changes of the brain as above, otherwise without evidence of acute intracranial abnormality.   This report was finalized on 8/9/2022 8:51 PM by Galo Mejias.      XR Chest 1 View    Result Date: 8/9/2022  DATE OF EXAM: 8/9/2022 4:40 PM  PROCEDURE: XR CHEST 1 VW-  INDICATIONS: AMS w/ hx of aspiration  COMPARISON: October 9, 2020  TECHNIQUE: Single radiographic AP view of the chest was obtained.  FINDINGS: There is ectasia of the thoracic aorta. The lungs seem relatively clear. It looks like there are some emphysematous changes. There is vertebral augmentation within the spine in the thoracolumbar area.      Impression: 1.  No definite acute pulmonary process. 2.  Ectasia thoracic aorta  This report was finalized on 8/9/2022 4:57 PM by Julian Bowen MD.            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  Rule out chronic persistent asthma, COPD, or obliterative bronchiolitis.- Rae     • Constipation [K59.00]  Yes   • Rheumatoid arthritis (HCC) [M06.9]  Yes     Description: A.  Diagnosed in 2000 and and followed by Dr. Constantino (now Dr. Kaplan)- now Dr. Del HO  On methotrexate therapy since 2000.  C.  Off low-dose prednisone therapy.     • Hearing loss [H91.90]  Yes   • Hyperlipidemia [E78.5]  Yes     Description: A.  Dx 2006.     • Hypertension [I10]  Yes     Description: A. Dx 2001.       Summary: This is a 89-year-old female AL resident with recent admission for C. 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 per stroke neurology, once they are comfortable with a diet she will need assistance with all meals  - Suspect hypercalcemia from oral supplements/vitamins with renal failure and severe volume contraction; additionally could be paraneoplastic from multiple masses within her abdomen/pelvis; calcium labs ordered  - Continuous IVF ordered, recheck calcium in  a.m.; hold oral calcium/vitamin D supplements    JENNIE on CKD 3  -Baseline GFR 40s; Cr 0.9-1.1  -Suspect prerenal from lack of oral intake, IVF as above and repeat labs in the a.m.; if not improving I would obtain imaging of her urinary tract and pursue further laboratory testing    Likely ongoing aspiration  - SLP eval    Hypertension  Hyperlipidemia  PVD  -Aspirin, diltiazem    COPD  - Home inhalers, substituted for formulary    GERD  - Pepcid    Rheumatoid arthritis  - Per previous documentation, on MTX, however not listed on home med list    Recent C. difficile colitis  -Completed oral vancomycin    1.8 cm LT renal lesion  Bladder wall lesion  Pelvic mass  -Seen by Dr. Yao last admit, monitoring/following up opt  -Seen by Dr. Mello lat admit, gyn mass is stable, expectant management, if large volume blood loss she recommended formal Dx and rad-onc therapy  -d/w dtr at bedside, they prefer more conservative measures and likely would not pursue invasive measures      DVT prophylaxis:  SCD's      CODE STATUS:  DNR/DNI  Code Status and Medical Interventions:   Ordered at: 08/09/22 0722     Medical Intervention Limits:    NO intubation (DNI)     Code Status (Patient has no pulse and is not breathing):    No CPR (Do Not Attempt to Resuscitate)     Medical Interventions (Patient has pulse or is breathing):    Limited Support         Logan Sawyer,   08/09/22

## 2022-08-10 NOTE — PLAN OF CARE
Problem: Adult Inpatient Plan of Care  Goal: Plan of Care Review  Recent Flowsheet Documentation  Taken 8/10/2022 5869 by Anh Brennan OT  Plan of Care Reviewed With:   patient   daughter  Outcome Evaluation: OT IE completed. Pt is lethargic, Ox1, and participates in therapy with encouragement and increased time. Max Ax1 supine to sit. Mod Ax2 STS and step to chair. Don brief for all OOB activity. Pt requires total care for LB bathing/dressing following incontinent episodes x2 this session. Pt presents with acute confusion, strength, endurance, and balance deficits limiting mobility and self-care. OT will follow IP. Recommend SNF at d/c. Supportive family.

## 2022-08-10 NOTE — PROGRESS NOTES
Clinical Nutrition     Patient Name: Pati Carter  YOB: 1930  MRN: [id number]  Date of Encounter: 08/10/22 12:38 EDT  Admission date: 8/9/2022    Reason for Visit   Identified at risk by screening criteria, MST score 2+     Patient seen by diet tech for reduced oral intake and unintentional weight loss of 10 lbs or more in two months and decreased appetite.    EMR Reviewed: Yes       Diet Nutrition Related History:      Diet tech spoke with patient's daughter (patient not able to verbalize at this time). Daughter states the patient has been in a facility and needs to be fed and questions how much this was happening. Daughter reports she can see her mom has lost quite a bit of weight and that she always has had a good appetite. In the ER, daughter noticed her mom's lips were dry and cracked and her tongue was dry. A zeroed bed scale weight was requested by diet tech from RN. RN stated when PT comes in they will get a zeroed bed scale weight. Patient failed her RN dysphagia screen and SLP followed up with a Swallow Evaluation today and determined Dys II, pureed, with nectar thick liquids. Patient is having a video swallow test on 8/11/22. Daughter reports no known food allergies for the patient. Patient agreeable to supplement as long as it was chocolate flavor.    8/9/2022 53.071 kg 117 lb Estimated -   7/23/2022 53.933 kg 118 lb 14.4 oz Bed scale -   7/22/2022 53.978 kg 119 lb Stated -   7/17/2022 53.978 kg 119 lb - -   6/24/2022 54.432 kg 120 lb Stated -   6/24/2022 54.432 kg 120 lb Stated -   6/8/2022 (No Data)  (No Data)  - Report   3/22/2022 56.246 kg 124 lb - Report   3/5/2022 58.514 kg 129 lb Stated -   11/11/2021 55.883 kg 123 lb 3.2 oz - Report   10/5/2021 55.339 kg 122 lb - Report   9/2/2021 55.339 kg 122 lb - Report   6/1/2021 58.06 kg 128 lb - Report   3/23/2021 59.875 kg 132 lb - Report   11/24/2020 58.514 kg 129 lb - Report             Anthropometrics   Height:  "Height: 165.1 cm (65\")  Admission Weight: 117 lbs (estimated weight.) Pending RN getting a zeroed bed scale weight  Flowsheet Rows    Flowsheet Row First Filed Value   Admission Height 165.1 cm (65\") Documented at 08/09/2022 1631   Admission Weight 53.1 kg (117 lb) Documented at 08/09/2022 1631        Last Filed Weight:Weight: 53.1 kg (117 lb) (08/09/22 1631)  Weight Method: Estimated  BMI: BMI (Calculated): 19.5  BMI classification: Normal: 18.5-24.9kg/m2   IBW: Ideal Body Weight (IBW) (kg): 57.29    UBW: Unknown   Weight change: Unknown     % change: Unknown    Time frame:  Unknown       Current Nutrition Prescription     Diet Dysphagia; II - Pureed; Nectar / Syrup Thick    Average Intake from Charting:       Nothing documented at this time.    Intake History:     Intake Category  Unable to determine at this time.    Actions   Appropriateness for MSA:  Per review patient may qualify for malnutrition diagnosis, RDN to assess/evaluate per protocol.     Interventions:   Follow treatment progress, Interview for preferences, Encourage intake, Supplement provided Boost+, TID    Michelle Duque,   Time Spent: 45 minutes  "

## 2022-08-10 NOTE — NURSING NOTE
[facility] Nursing Consult: left medial gluteal PI; benny 14    Patient in bed, daughter, Danette Suresh, and patient's RN at bedside.  Skin assessed and the following noted:    1.Wound Assessment    Wound Type: possible healing pressure injury vs IAD with FT skin loss    Location: Left gluteal  Measurements: 1 x 1 x 0.2 cm  Wound Bed: red, pink, slow to césar but blanchable     Wound Edges: dry, open  Periwound Skin:  hyperpigmentation indicative of scarring, dry  Drainage:none    Odor: none  Pain: Yes, with palpation    Care provided: Cleanse with barrier wipes, applied thick coat of zinc barrier cream.  Educated daughter on what a pressure injury is and given this is not over bony prominence, WOC is doubtful that this is an injury due to pressure most likely IAD with full-thickness skin loss.    Recommendation(s) for management of wound: See wound care orders    Image:            2.Wound Assessment    Wound Type: MASD with suspected yeast infection    Location: Under bilateral breasts left greater than right    Wound Bed: Red with satellite lesions and patient says it is itchy, blanchable    Periwound Skin: Intact blanchable  Drainage: None    Odor: None    Pain: None    Care provided: Assessed, patient responded that she would prefer cream antifungal versus powder.    Recommendation(s) for management of wound: See wound care orders    Image:         2.Wound Assessment    Wound Type: Ecchymosis/traumatic (due to fall per patient's daughter)    Location: Bilateral sides of forehead and bilateral cheeks; swelling to right side of forehead    Wound Bed: Purple/maroon  Wound Edges: Open  Periwound Skin: Blanchable intact  Drainage: None    Odor: None    Pain: With palpation, yes    Care provided: Assessed    Recommendation(s) for management of wound: Keep clean, dry, and offloaded.  Image:        Pressure Injury Prevention Protocol (initiate for Benny Score of 18 or less): Most recent Benny Scale score: 14    *Please  apply waffle mattress overlay if not already completed    *Keep skin dry, turn q 2 hr, keep heels elevated and offloaded with offloading heel boots.     *Apply z-guard to bottom BID and as needed with incontinence episodes.    *Apply silicone foam border dressing to bony prominences.      *Follow C.A.R.E protocol if medical devices (Bipap, mathew, Ng tube, etc) are being used.    All skin interventions in place.  Head to toe assessment completed. Heels and bottom blanchable, intact and offloaded.    Discussed plan of care with  RN.      Thank you for consulting the WOC Nurse.  WOC Team will sign off.  If alteration to skin integrity or change in wound bed presentation, please contact WOC team.

## 2022-08-10 NOTE — THERAPY EVALUATION
Acute Care - Speech Language Pathology Initial Evaluation  [facility]   Cognitive-Communication Evaluation  & Clinical Swallow Evaluation     Patient Name: Pati Carter  : 1930  MRN: [id number]  Today's Date: 8/10/2022               Admit Date: 2022     Visit Dx:    ICD-10-CM ICD-9-CM   1. Acute on chronic alteration in mental status  R41.82 780.09   2. Acute renal failure, unspecified acute renal failure type (HCC)  N17.9 584.9   3. Hypercalcemia  E83.52 275.42   4. Chronic anemia  D64.9 285.9   5. Elevated blood pressure reading with diagnosis of hypertension  I10 401.9   6. History of Clostridioides difficile colitis  Z86.19 V12.79   7. DNR (do not resuscitate)  Z66 V49.86   8. Declining functional status  R53.81 799.3   9. Dysphagia, unspecified type  R13.10 787.20   10. Dysarthria  R47.1 784.51   11. Cognitive communication deficit  R41.841 799.52     Patient Active Problem List   Diagnosis   • Macular degeneration   • Rheumatoid arthritis ([facility])   • Hearing loss   • Hyperlipidemia   • Gastroesophageal reflux disease   • Hypertension   • Constipation   • Anemia   • Compression fracture of lumbar vertebra ([facility])   • Degeneration of intervertebral disc of lumbar region   • COPD (chronic obstructive pulmonary disease) ([facility])   • H/O calcium pyrophosphate deposition disease (CPPD)   • Falls frequently   • Immunosuppressed status on methotrexate   • Ferny Bonnet syndrome   • Impaired cognition   • Impaired mobility   • Ulcer of right foot, limited to breakdown of skin ([facility])   • Peripheral vascular disease ([facility])   • Nocturia   • Age-related osteoporosis without current pathological fracture   • Pelvic mass   • Fall, initial encounter   • Acute on chronic alteration in mental status   • Hypercalcemia   • Acute kidney injury ([facility])     Past Medical History:   Diagnosis Date   • Anemia     Description: A.  Dx - borderline intermittent.   • Back pain    • Benign colonic polyp 2016    Description:  A.  Dx 1999.   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  Rule out chronic persistent asthma, COPD, or obliterative bronchiolitis.- Rae   • COVID-19 virus infection 10/11/2020   • CTS (carpal tunnel syndrome)    • Degeneration of intervertebral disc of lumbar region     Description: A.  Diagnosed in April 2013 with advanced multilevel with severe spinal stenosis, followed by Dr. Pillai for pain management.   • Gastroesophageal reflux disease    • Hearing loss    • History of calcium pyrophosphate deposition disease (CPPD)    • History of colonoscopy 01/01/1999    NORMAL PER PATIENT    • History of mammogram 01/01/2011    NORMAL PER PT    • History of Papanicolaou smear of cervix 01/01/2010    NORMAL PER PT    • History of varicella    • Hyperlipidemia     Description: A.  Dx 2006.   • Hypertension     Description: A. Dx 2001.   • Macular degeneration    • Nocturia    • Osteoporosis    • Ovarian mass     Dx 8/15- benign left cystic adnexal mass   • Overactive bladder    • Pelvic floor dysfunction    • Rheumatoid arthritis (HCC)     Description: A.  Diagnosed in 2000 and and followed by Dr. Constantino (now Dr. Kaplan). B.  On methotrexate therapy since 2000. C.  Off low-dose prednisone therapy.   • Vitamin D deficiency      Past Surgical History:   Procedure Laterality Date   • APPENDECTOMY     • CARPAL TUNNEL RELEASE Left 01/01/2003    HISTORY OF NEUROPLASTY DECOMPRESSION MEDIAN NERVE AT CARPAL TUNNEL LEFT   • CATARACT EXTRACTION Bilateral 01/01/2009   • CHOLECYSTECTOMY  01/01/1962   • HIP CANNULATED SCREW PLACEMENT Right 1/25/2020    Procedure: HIP CANNULATED SCREW PLACEMENT RIGHT;  Surgeon: Karlos Blount MD;  Location: [facility];  Service: Orthopedics   • KNEE ARTHROSCOPY Left 01/01/2001    MENISCAL REPAIR   • KYPHOPLASTY  06/18/2015    T11  AND L1 (JOSE A)   • PELVIC LAPAROSCOPY  01/01/1996    REMOVAL OF BENIGN UTERINE AND RIGHT OVARIAN TUMORS   • SALPINGO OOPHORECTOMY Left 08/26/2015    REMOVAL OF LEFT OVARY AND TUBE (benign cystic mass)       SLP Recommendation and Plan  SLP Diagnosis: Evaluation limited by time constraints. Pt presented w/ moderate dysarthria and suspect at least moderate cognitive impairments. Typically Ox4 at baseline per dtr. Both cognition and motor speech recently declining. (08/10/22 1100)        Swallow Criteria for Skilled Therapeutic Interventions Met: demonstrates skilled criteria (08/10/22 1130)  SLC Criteria for Skilled Therapy Interventions Met: yes (08/10/22 1100)  Anticipated Discharge Disposition (SLP): anticipate therapy at next level of care (08/10/22 1130)        Predicted Duration Therapy Intervention (Days): until discharge (08/10/22 1100)                    Plan of Care Reviewed With: patient, daughter (08/10/22 1442)  Progress: no change (08/10/22 1442)      SLP EVALUATION (last 72 hours)     SLP SLC Evaluation     Row Name 08/10/22 1100                   Communication Assessment/Intervention    Document Type evaluation  -AC        Subjective Information no complaints  -AC        Patient Observations alert;cooperative  -AC        Patient/Family/Caregiver Comments/Observations Dtr present.  -AC        Patient Effort good  -AC                  General Information    Patient Profile Reviewed yes  -AC        Pertinent History Of Current Problem Adm w/ hypercalcemia, r/o CVA. Recent adm 7/23-27 for cdiff. Hx RA, hearing loss, COPD, GERD, macular degeneration, Ferny Bonnet syndrome. Dtr concerned for PD, but no formal dx as of yet.  -AC        Precautions/Limitations, Vision vision impairment, bilaterally  -AC        Precautions/Limitations, Hearing hearing impairment, bilaterally;other (see comments)  fxnl w/ increased volume  -AC        Prior Level of Function-Communication other (see comments)  dtr has noticed  recent decline in cognition & motor speech  -AC        Plans/Goals Discussed with patient and family;agreed upon  -AC        Barriers to Rehab hearing deficit  -AC        Patient's Goals for Discharge patient did not state  -AC        Family Goals for Discharge functional communication;functional cognition  -AC                  Pain    Additional Documentation Pain Scale: FACES Pre/Post-Treatment (Group)  -AC                  Pain Scale: FACES Pre/Post-Treatment    Pain: FACES Scale, Pretreatment 2-->hurts little bit  -AC        Posttreatment Pain Rating 2-->hurts little bit  -AC        Pain Location - Side/Orientation Bilateral  -AC        Pain Location lower  -AC        Pain Location - extremity  -AC        Pre/Posttreatment Pain Comment Baseline & r/t RA per pt/family.  -AC                  Comprehension Assessment/Intervention    Comprehension Assessment/Intervention Auditory Comprehension  -AC                  Auditory Comprehension Assessment/Intervention    Auditory Comprehension (Communication) unable/difficult to assess;other (see comments)  WFL for basic tasks  -AC        Answers Questions (Communication) WFL;personal;yes/no  -AC        Able to Follow Commands (Communication) WFL;1-step  -AC                  Expression Assessment/Intervention    Expression Assessment/Intervention verbal expression  -AC                  Verbal Expression Assessment/Intervention    Verbal Expression unable/difficult to assess;other (see comments)  WFL for basic tasks  -AC        Automatic Speech (Communication) WFL;response to greeting  -AC        Repetition WFL;sounds  -AC        Spontaneous/Functional Words WFL;simple  -AC                  Oral Motor Structure and Function    Dentition Assessment teeth are in poor condition;other (see comments)  pt reported concern for tooth abscess L molar; notified MD  -AC        Mucosal Quality dry;sticky;other (see comments)  minimal dark brown secretions--? dried blood, dtr reported  had noticed dark secretions in oral cavity since time of admit  -AC                  Oral Musculature and Cranial Nerve Assessment    Oral Motor General Assessment generalized oral motor weakness  -AC                  Motor Speech Assessment/Intervention    Motor Speech Function moderate impairment;unfamiliar listener  -AC        Characteristics Consistent with Dysarthria decreased intensity;decreased articulation  -AC        Conversational Speech (Communication) moderate impairment;simple  -AC                  Cognitive Assessment Intervention- SLP    Cognitive Function (Cognition) unable/difficult to assess;other (see comments)  suspect at least moderate  -AC        Orientation Status (Cognition) moderate impairment;time;place;situation;WFL;person  -AC                  SLP Evaluation Clinical Impressions    SLP Diagnosis Evaluation limited by time constraints. Pt presented w/ moderate dysarthria and suspect at least moderate cognitive impairments. Typically Ox4 at baseline per dtr. Both cognition and motor speech recently declining.  -AC        Rehab Potential/Prognosis fair  -        SLC Criteria for Skilled Therapy Interventions Met yes  -AC        Functional Impact difficulty communicating wants, needs;difficulty communicating in an emergency;decreased ability to respond to situations safely  -AC                  Recommendations    Therapy Frequency (SLP SLC) 5 days per week  -AC        Predicted Duration Therapy Intervention (Days) until discharge  -        Anticipated Discharge Disposition (SLP) anticipate therapy at next level of care  -              User Key  (r) = Recorded By, (t) = Taken By, (c) = Cosigned By    Initials Name Effective Dates    AC Sylvie Mckeon, MS CCC-SLP 06/16/21 -                    EDUCATION  The patient has been educated in the following areas:     Cognitive Impairment Communication Impairment.           SLP GOALS     Row Name 08/10/22 0531             Phonation Goal 1 (SLP)  "   Improve Phonation By Goal 1 (SLP) using loud speech;80%;with maximum cues (25-49%);other (see comments)  on vowel \"ah\"  -AC      Time Frame (Phonation Goal 1, SLP) short term goal (STG)  -AC              Awareness of Basic Personal Information Goal 1 (SLP)    Improve Awareness of Basic Personal Information Goal 1 (SLP) answering yes/no questions regarding personal/biographical information;naming self, family members;90%;with minimal cues (75-90%)  -AC      Time Frame (Awareness of Basic Personal Information Goal 1, SLP) short term goal (STG)  -AC              Orientation Goal 1 (SLP)    Improve Orientation Through Goal 1 (SLP) demonstrating orientation to year;demonstrating orientation to place;demonstrating orientation to disease/impairment;90%;with minimal cues (75-90%)  -AC      Time Frame (Orientation Goal 1, SLP) short term goal (STG)  -AC              Additional Goal 1 (SLP)    Additional Goal 1, SLP LTG: Pt will improve cognitive-communication skills in order to participate in care w/ min cues.  -AC      Time Frame (Additional Goal 1, SLP) by discharge  -AC            User Key  (r) = Recorded By, (t) = Taken By, (c) = Cosigned By    Initials Name Provider Type    Sylvie Henderson MS CCC-SLP Speech and Language Pathologist                        Time Calculation:      Time Calculation- SLP     Row Name 08/10/22 1443             Time Calculation- SLP    SLP Start Time 1100  -AC      SLP Received On 08/10/22  -AC              Untimed Charges    32702-DR Eval Speech and Production w/ Language Minutes 23  -AC      85539-VS Eval Oral Pharyng Swallow Minutes 48  -AC              Total Minutes    Untimed Charges Total Minutes 71  -AC       Total Minutes 71  -AC            User Key  (r) = Recorded By, (t) = Taken By, (c) = Cosigned By    Initials Name Provider Type    Sylvie Henderson MS CCC-SLP Speech and Language Pathologist                Therapy Charges for Today     Code Description Service Date Service " Provider Modifiers Qty    99950732685 HC ST EVAL ORAL PHARYNG SWALLOW 3 8/10/2022 Sylvie Mckeon, MS CCC-SLP GN 1    05584268972 HC ST EVAL SPEECH AND PROD W LANG  2 8/10/2022 Sylvie Mckeon, MS CCC-SLP GN 1                     Sylvie Mckeon, MS CCC-SLP  8/10/2022 and Acute Care - Speech Language Pathology   Swallow Initial [facility]     Patient Name: Pati Carter  : 1930  MRN: [id number]  Today's Date: 8/10/2022               Admit Date: 2022    Visit Dx:     ICD-10-CM ICD-9-CM   1. Acute on chronic alteration in mental status  R41.82 780.09   2. Acute renal failure, unspecified acute renal failure type ([facility])  N17.9 584.9   3. Hypercalcemia  E83.52 275.42   4. Chronic anemia  D64.9 285.9   5. Elevated blood pressure reading with diagnosis of hypertension  I10 401.9   6. History of Clostridioides difficile colitis  Z86.19 V12.79   7. DNR (do not resuscitate)  Z66 V49.86   8. Declining functional status  R53.81 799.3   9. Dysphagia, unspecified type  R13.10 787.20   10. Dysarthria  R47.1 784.51   11. Cognitive communication deficit  R41.841 799.52     Patient Active Problem List   Diagnosis   • Macular degeneration   • Rheumatoid arthritis ([facility])   • Hearing loss   • Hyperlipidemia   • Gastroesophageal reflux disease   • Hypertension   • Constipation   • Anemia   • Compression fracture of lumbar vertebra ([facility])   • Degeneration of intervertebral disc of lumbar region   • COPD (chronic obstructive pulmonary disease) ([facility])   • H/O calcium pyrophosphate deposition disease (CPPD)   • Falls frequently   • Immunosuppressed status on methotrexate   • Ferny Bonnet syndrome   • Impaired cognition   • Impaired mobility   • Ulcer of right foot, limited to breakdown of skin ([facility])   • Peripheral vascular disease ([facility])   • Nocturia   • Age-related osteoporosis without current pathological fracture   • Pelvic mass   • Fall, initial encounter   • Acute on chronic alteration in mental status   •  Hypercalcemia   • Acute kidney injury (HCC)     Past Medical History:   Diagnosis Date   • Anemia     Description: A.  Dx 2006- borderline intermittent.   • Back pain    • Benign colonic polyp 9/28/2016    Description: A.  Dx 1999.   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  Rule out chronic persistent asthma, COPD, or obliterative bronchiolitis.- Rae   • COVID-19 virus infection 10/11/2020   • CTS (carpal tunnel syndrome)    • Degeneration of intervertebral disc of lumbar region     Description: A.  Diagnosed in April 2013 with advanced multilevel with severe spinal stenosis, followed by Dr. Pillai for pain management.   • Gastroesophageal reflux disease    • Hearing loss    • History of calcium pyrophosphate deposition disease (CPPD)    • History of colonoscopy 01/01/1999    NORMAL PER PATIENT    • History of mammogram 01/01/2011    NORMAL PER PT    • History of Papanicolaou smear of cervix 01/01/2010    NORMAL PER PT    • History of varicella    • Hyperlipidemia     Description: A.  Dx 2006.   • Hypertension     Description: A. Dx 2001.   • Macular degeneration    • Nocturia    • Osteoporosis    • Ovarian mass     Dx 8/15- benign left cystic adnexal mass   • Overactive bladder    • Pelvic floor dysfunction    • Rheumatoid arthritis (HCC)     Description: A.  Diagnosed in 2000 and and followed by Dr. Constantino (now Dr. Kaplan). B.  On methotrexate therapy since 2000. C.  Off low-dose prednisone therapy.   • Vitamin D deficiency      Past Surgical History:   Procedure Laterality Date   • APPENDECTOMY     • CARPAL TUNNEL RELEASE Left 01/01/2003    HISTORY OF NEUROPLASTY DECOMPRESSION MEDIAN NERVE AT CARPAL TUNNEL LEFT   • CATARACT EXTRACTION Bilateral 01/01/2009   • CHOLECYSTECTOMY  01/01/1962   • HIP CANNULATED SCREW PLACEMENT Right 1/25/2020     Procedure: HIP CANNULATED SCREW PLACEMENT RIGHT;  Surgeon: Karlos Blount MD;  [facility]  Service: Orthopedics   • KNEE ARTHROSCOPY Left 01/01/2001    MENISCAL REPAIR   • KYPHOPLASTY  06/18/2015    T11 AND L1 (JOSE A)   • PELVIC LAPAROSCOPY  01/01/1996    REMOVAL OF BENIGN UTERINE AND RIGHT OVARIAN TUMORS   • SALPINGO OOPHORECTOMY Left 08/26/2015    REMOVAL OF LEFT OVARY AND TUBE (benign cystic mass)       SLP Recommendation and Plan  SLP Swallowing Diagnosis: suspected pharyngeal dysphagia, mild-moderate, oral dysphagia (08/10/22 1130)  SLP Diet Recommendation: mechanical soft with no mixed consistencies, nectar thick liquids (08/10/22 1130)  Recommended Precautions and Strategies: upright posture during/after eating, general aspiration precautions, assist with feeding, 1:1 supervision (08/10/22 1130)  SLP Rec. for Method of Medication Administration: meds whole, with pudding or applesauce, as tolerated (08/10/22 1130)     Monitor for Signs of Aspiration: yes, notify SLP if any concerns (08/10/22 1130)  Recommended Diagnostics: VFSS (MBS) (08/10/22 1130)  Swallow Criteria for Skilled Therapeutic Interventions Met: demonstrates skilled criteria (08/10/22 1130)  Anticipated Discharge Disposition (SLP): anticipate therapy at next level of care (08/10/22 1130)  Rehab Potential/Prognosis, Swallowing: re-evaluate goals as necessary (08/10/22 1130)     Predicted Duration Therapy Intervention (Days): until discharge (08/10/22 1100)                                  Plan of Care Reviewed With: patient, daughter  Progress: no change      SWALLOW EVALUATION (last 72 hours)     SLP Adult Swallow Evaluation     Row Name 08/10/22 1130                   Rehab Evaluation    Document Type evaluation  -AC        Patient Effort good  -AC                  General Information    Patient Profile Reviewed yes  -AC        Current Method of Nutrition NPO  -AC        Prior Level of Function-Swallowing puree;other (see  comments)  recently placed on pureed diet & having increased difficulty w/ liquids per dtr  -AC        Plans/Goals Discussed with patient and family;agreed upon  -AC        Barriers to Rehab hearing deficit  -AC        Patient's Goals for Discharge return to PO diet  -AC        Family Goals for Discharge patient able to return to PO diet;patient able to eat/drink without coughing/choking  -AC                  Pain Scale: FACES Pre/Post-Treatment    Pain: FACES Scale, Pretreatment 2-->hurts little bit  -AC        Posttreatment Pain Rating 2-->hurts little bit  -AC        Pain Location - Side/Orientation Bilateral  -AC        Pain Location lower  -AC        Pain Location - extremity  -AC        Pre/Posttreatment Pain Comment Baseline & r/t RA per pt/family.  -AC                  Oral Motor Structure and Function    Secretion Management WNL/WFL  -AC                  General Eating/Swallowing Observations    Respiratory Support Currently in Use nasal cannula  -AC        Eating/Swallowing Skills fed by SLP;self-fed;other (see comments)  w/ assist  -AC        Positioning During Eating upright 90 degree;upright in bed  -AC        Utensils Used spoon;cup;straw  -AC        Consistencies Trialed thin liquids;nectar/syrup-thick liquids;pureed;regular textures  -AC                  Clinical Swallow Eval    Oral Prep Phase impaired  -AC        Oral Transit WFL  -AC        Oral Residue impaired  -AC        Pharyngeal Phase suspected pharyngeal impairment  -AC                  Oral Prep Concerns    Oral Prep Concerns prolonged mastication  -AC        Prolonged Mastication regular consistencies  -AC                  Oral Residue Concerns    Oral Residue Concerns diffuse residue throughout oral cavity  -AC        Diffuse Residue Throughout Oral Cavity regular consistencies  -AC        Oral Residue Concerns, Comment Mild-mod, cleared w/ puree & liquid wash  -AC                  Pharyngeal Phase Concerns    Pharyngeal Phase Concerns  "cough  -AC        Cough thin;regular consistencies  -        Pharyngeal Phase Concerns, Comment No overt clinical s/sxs aspiration w/ nectar-thick liquid and puree.  -AC                  SLP Evaluation Clinical Impression    SLP Swallowing Diagnosis suspected pharyngeal dysphagia;mild-moderate;oral dysphagia  -        Functional Impact risk of aspiration/pneumonia  -        Rehab Potential/Prognosis, Swallowing re-evaluate goals as necessary  -        Swallow Criteria for Skilled Therapeutic Interventions Met demonstrates skilled criteria  -AC                  Recommendations    SLP Diet Recommendation mechanical soft with no mixed consistencies;nectar thick liquids  -        Recommended Diagnostics VFSS (MBS)  -        Recommended Precautions and Strategies upright posture during/after eating;general aspiration precautions;assist with feeding;1:1 supervision  -        Oral Care Recommendations Oral Care BID/PRN  -AC        SLP Rec. for Method of Medication Administration meds whole;with pudding or applesauce;as tolerated  -        Monitor for Signs of Aspiration yes;notify SLP if any concerns  -        Anticipated Discharge Disposition (SLP) anticipate therapy at next level of care  -              User Key  (r) = Recorded By, (t) = Taken By, (c) = Cosigned By    Initials Name Effective Dates     Sylvie Mckeon, MS CCC-SLP 06/16/21 -                 EDUCATION  The patient has been educated in the following areas:   Dysphagia (Swallowing Impairment) Oral Care/Hydration Modified Diet Instruction.        SLP GOALS     Row Name 08/10/22 1130             Phonation Goal 1 (SLP)    Improve Phonation By Goal 1 (SLP) using loud speech;80%;with maximum cues (25-49%);other (see comments)  on vowel \"ah\"  -      Time Frame (Phonation Goal 1, SLP) short term goal (STG)  -              Awareness of Basic Personal Information Goal 1 (SLP)    Improve Awareness of Basic Personal Information Goal 1 (SLP) " answering yes/no questions regarding personal/biographical information;naming self, family members;90%;with minimal cues (75-90%)  -AC      Time Frame (Awareness of Basic Personal Information Goal 1, SLP) short term goal (STG)  -AC              Orientation Goal 1 (SLP)    Improve Orientation Through Goal 1 (SLP) demonstrating orientation to year;demonstrating orientation to place;demonstrating orientation to disease/impairment;90%;with minimal cues (75-90%)  -AC      Time Frame (Orientation Goal 1, SLP) short term goal (STG)  -AC              Additional Goal 1 (SLP)    Additional Goal 1, SLP LTG: Pt will improve cognitive-communication skills in order to participate in care w/ min cues.  -AC      Time Frame (Additional Goal 1, SLP) by discharge  -AC            User Key  (r) = Recorded By, (t) = Taken By, (c) = Cosigned By    Initials Name Provider Type    Sylvie Henderson MS CCC-SLP Speech and Language Pathologist                   Time Calculation:    Time Calculation- SLP     Row Name 08/10/22 1443             Time Calculation- SLP    SLP Start Time 1100  -AC      SLP Received On 08/10/22  -AC              Untimed Charges    59957-ZP Eval Speech and Production w/ Language Minutes 23  -AC      59626-OO Eval Oral Pharyng Swallow Minutes 48  -AC              Total Minutes    Untimed Charges Total Minutes 71  -AC       Total Minutes 71  -AC            User Key  (r) = Recorded By, (t) = Taken By, (c) = Cosigned By    Initials Name Provider Type     Sylvie Mckeon MS CCC-SLP Speech and Language Pathologist                Therapy Charges for Today     Code Description Service Date Service Provider Modifiers Qty    27516642059 HC ST EVAL ORAL PHARYNG SWALLOW 3 8/10/2022 Sylvie Mckeon MS CCC-SLP GN 1    [id number] HC ST EVAL SPEECH AND PROD W LANG  2 8/10/2022 Sylvie Mckeon MS CCC-SLP GN 1      Patient was not wearing a face mask and did exhibit coughing during this therapy encounter.  Procedure performed was  aerosolizing, involved close contact (within 6 feet for at least 15 minutes or longer), and involved contact with infectious secretions or specimens.  Therapist used appropriate personal protective equipment including gloves, standard procedure mask and eye protection.  Appropriate PPE was worn during the entire therapy session.  Hand hygiene was completed before and after therapy session.            Sylvie Mckeon MS CCC-SLP  8/10/2022

## 2022-08-10 NOTE — THERAPY EVALUATION
Patient Name: Pati Carter  : 1930    MRN: [id number]                              Today's Date: 8/10/2022       Admit Date: 2022    Visit Dx:     ICD-10-CM ICD-9-CM   1. Acute on chronic alteration in mental status  R41.82 780.09   2. Acute renal failure, unspecified acute renal failure type (HCC)  N17.9 584.9   3. Hypercalcemia  E83.52 275.42   4. Chronic anemia  D64.9 285.9   5. Elevated blood pressure reading with diagnosis of hypertension  I10 401.9   6. History of Clostridioides difficile colitis  Z86.19 V12.79   7. DNR (do not resuscitate)  Z66 V49.86   8. Declining functional status  R53.81 799.3   9. Dysphagia, unspecified type  R13.10 787.20   10. Dysarthria  R47.1 784.51   11. Cognitive communication deficit  R41.841 799.52     Patient Active Problem List   Diagnosis   • Macular degeneration   • Rheumatoid arthritis ([facility])   • Hearing loss   • Hyperlipidemia   • Gastroesophageal reflux disease   • Hypertension   • Constipation   • Anemia   • Compression fracture of lumbar vertebra ([facility])   • Degeneration of intervertebral disc of lumbar region   • COPD (chronic obstructive pulmonary disease) ([facility])   • H/O calcium pyrophosphate deposition disease (CPPD)   • Falls frequently   • Immunosuppressed status on methotrexate   • Ferny Bonnet syndrome   • Impaired cognition   • Impaired mobility   • Ulcer of right foot, limited to breakdown of skin ([facility])   • Peripheral vascular disease ([facility])   • Nocturia   • Age-related osteoporosis without current pathological fracture   • Pelvic mass   • Fall, initial encounter   • Acute on chronic alteration in mental status   • Hypercalcemia   • Acute kidney injury (HCC)     Past Medical History:   Diagnosis Date   • Anemia     Description: A.  Dx 2006- borderline intermittent.   • Back pain    • Benign colonic polyp 2016    Description: A.  Dx .   • Ferny Bonnet syndrome 2020   • Chondrocalcinosis     knees   • Compression fracture of lumbar  vertebra (HCC)    • Constipation    • COPD (chronic obstructive pulmonary disease) ([facility])     Description: A.  Rule out chronic persistent asthma, COPD, or obliterative bronchiolitis.- Rae   • COVID-19 virus infection 10/11/2020   • CTS (carpal tunnel syndrome)    • Degeneration of intervertebral disc of lumbar region     Description: A.  Diagnosed in April 2013 with advanced multilevel with severe spinal stenosis, followed by Dr. Pillai for pain management.   • Gastroesophageal reflux disease    • Hearing loss    • History of calcium pyrophosphate deposition disease (CPPD)    • History of colonoscopy 01/01/1999    NORMAL PER PATIENT    • History of mammogram 01/01/2011    NORMAL PER PT    • History of Papanicolaou smear of cervix 01/01/2010    NORMAL PER PT    • History of varicella    • Hyperlipidemia     Description: A.  Dx 2006.   • Hypertension     Description: A. Dx 2001.   • Macular degeneration    • Nocturia    • Osteoporosis    • Ovarian mass     Dx 8/15- benign left cystic adnexal mass   • Overactive bladder    • Pelvic floor dysfunction    • Rheumatoid arthritis ([facility])     Description: A.  Diagnosed in 2000 and and followed by Dr. Constantino (now Dr. Kaplan). B.  On methotrexate therapy since 2000. C.  Off low-dose prednisone therapy.   • Vitamin D deficiency      Past Surgical History:   Procedure Laterality Date   • APPENDECTOMY     • CARPAL TUNNEL RELEASE Left 01/01/2003    HISTORY OF NEUROPLASTY DECOMPRESSION MEDIAN NERVE AT CARPAL TUNNEL LEFT   • CATARACT EXTRACTION Bilateral 01/01/2009   • CHOLECYSTECTOMY  01/01/1962   • HIP CANNULATED SCREW PLACEMENT Right 1/25/2020    Procedure: HIP CANNULATED SCREW PLACEMENT RIGHT;  Surgeon: Karlos Blount MD;  [facility];  Service: Orthopedics   • KNEE ARTHROSCOPY Left 01/01/2001    MENISCAL REPAIR   • KYPHOPLASTY  06/18/2015    T11 AND L1 (JOSE A)   • PELVIC LAPAROSCOPY  01/01/1996    REMOVAL OF BENIGN UTERINE AND RIGHT OVARIAN TUMORS   •  SALPINGO OOPHORECTOMY Left 08/26/2015    REMOVAL OF LEFT OVARY AND TUBE (benign cystic mass)      General Information     Row Name 08/10/22 1443          OT Time and Intention    Document Type evaluation  -TB     Mode of Treatment occupational therapy;individual therapy  -TB     Row Name 08/10/22 1443          General Information    Patient Profile Reviewed yes  -TB     Prior Level of Function max assist:;all household mobility;transfer;bed mobility;ADL's  Recent functional decline with recent hospitalization for fall & CDiff.  -TB     Existing Precautions/Restrictions fall;oxygen therapy device and L/min;other (see comments)  Acute confusion on baseline mild confusion.  -TB     Barriers to Rehab medically complex;previous functional deficit;cognitive status  -TB     Row Name 08/10/22 1443          Occupational Profile    Reason for Services/Referral (Occupational Profile) Occupational decline  -TB     Environmental Supports and Barriers (Occupational Profile) Pt is a facility resident. No steps to navigate. Until recent illness with progressive decline, pt was up in her apt independently using RW. History of aspiration.  -TB     Row Name 08/10/22 1443          Living Environment    People in Home facility resident  -TB     Row Name 08/10/22 1443          Home Main Entrance    Number of Stairs, Main Entrance none  -TB     Row Name 08/10/22 1443          Stairs Within Home, Primary    Number of Stairs, Within Home, Primary none  -TB     Row Name 08/10/22 1443          Cognition    Orientation Status (Cognition) oriented to;person  -TB     Row Name 08/10/22 1443          Safety Issues, Functional Mobility    Safety Issues Affecting Function (Mobility) insight into deficits/self-awareness;awareness of need for assistance;safety precaution awareness;safety precautions follow-through/compliance;sequencing abilities;judgment;problem-solving  -TB     Impairments Affecting Function (Mobility)  cognition;balance;endurance/activity tolerance;pain;strength;range of motion (ROM);postural/trunk control  -TB     Cognitive Impairments, Mobility Safety/Performance awareness, need for assistance;insight into deficits/self-awareness;problem-solving/reasoning;safety precaution awareness;safety precaution follow-through;sequencing abilities;judgment  -TB     Comment, Safety Issues/Impairments (Mobility) Pt able to stand and step to chair with Mod Ax2. Don brief for all mobility/transfers.  -TB           User Key  (r) = Recorded By, (t) = Taken By, (c) = Cosigned By    Initials Name Provider Type     Anh Brennan, OT Occupational Therapist                 Mobility/ADL's     Row Name 08/10/22 1447          Bed Mobility    Bed Mobility supine-sit;scooting/bridging  -TB     Scooting/Bridging [place] (Bed Mobility) maximum assist (25% patient effort);verbal cues;1 person assist  -TB     Supine-Sit [place] (Bed Mobility) maximum assist (25% patient effort);verbal cues;1 person assist  -TB     Bed Mobility, Safety Issues cognitive deficits limit understanding;decreased use of arms for pushing/pulling;decreased use of legs for bridging/pushing;impaired trunk control for bed mobility  -TB     Assistive Device (Bed Mobility) head of bed elevated;draw sheet;bed rails  -     Row Name 08/10/22 1447          Transfers    Transfers sit-stand transfer;bed-chair transfer;stand-sit transfer  -TB     Bed-Chair [place] (Transfers) moderate assist (50% patient effort);2 person assist;verbal cues  -TB     Sit-Stand [place] (Transfers) moderate assist (50% patient effort);2 person assist;verbal cues  -TB     Stand-Sit [place] (Transfers) moderate assist (50% patient effort);2 person assist;verbal cues  -     Row Name 08/10/22 1447          Bed-Chair Transfer    Comment, (Bed-Chair Transfer) BUE support  -     Row Name 08/10/22 1447          Functional Mobility    Functional Mobility- Comment Pt  able to stand and step to chair with Mod Ax2. Don brief for all mobility/transfers.  -TB     Row Name 08/10/22 1447          Activities of Daily Living    BADL Assessment/Intervention bathing;lower body dressing;feeding;toileting  -TB     Row Name 08/10/22 1447          Bathing Assessment/Intervention    [place] Level (Bathing) lower body;dependent (less than 25% patient effort)  -TB     Position (Bathing) supported sitting;supported standing  -TB     Comment, (Bathing) Total care for LB bathing following incontinent episodes x2 this session  -TB     Row Name 08/10/22 1447          Lower Body Dressing Assessment/Training    [place] Level (Lower Body Dressing) doff;don;socks;dependent (less than 25% patient effort)  -TB     Position (Lower Body Dressing) supported sitting  -TB     Comment, (Lower Body Dressing) Total care to doff soiled socks and don clean following incontinent episodes x2 this session  -TB     Row Name 08/10/22 1447          Self-Feeding Assessment/Training    [place] Level (Feeding) minimum assist (75% patient effort);liquids to mouth  -TB     Position (Self-Feeding) supported sitting  -TB     Comment, (Feeding) Per chart review, pt has h/o aspiration. Thickened liquids per ST recommendation with follow up MBS pending.  -TB     Row Name 08/10/22 1447          Toileting Assessment/Training    [place] Level (Toileting) toileting skills;dependent (less than 25% patient effort)  -TB     Comment, (Toileting) Incontinent episodes x2/purwick, brief with all activity  -TB           User Key  (r) = Recorded By, (t) = Taken By, (c) = Cosigned By    Initials Name Provider Type    TB Anh Brennan OT Occupational Therapist               Obj/Interventions     Row Name 08/10/22 1451          Sensory Assessment (Somatosensory)    Sensory Assessment (Somatosensory) UE sensation intact  -     Row Name 08/10/22 1451          Vision Assessment/Intervention    Visual  Impairment/Limitations other (see comments)  -TB     Vision Assessment Comment Macular Degeneration. Ferny Bonnet Syndrome (visual hallucinations related to declining vision).  -TB     Row Name 08/10/22 1451          Range of Motion Comprehensive    General Range of Motion bilateral upper extremity ROM WFL  -TB     Comment, General Range of Motion AJIT AHRDING WFL for self-care  -TB     Row Name 08/10/22 1451          Strength Comprehensive (MMT)    General Manual Muscle Testing (MMT) Assessment upper extremity strength deficits identified  -TB     Comment, General Manual Muscle Testing (MMT) Assessment Generalized weakness/deconditioned. BUE functionally 4-/5  -TB     Row Name 08/10/22 1451          Balance    Balance Assessment sitting static balance;sitting dynamic balance;sit to stand dynamic balance;standing static balance;standing dynamic balance  -TB     Static Sitting Balance contact guard;verbal cues  -TB     Dynamic Sitting Balance minimal assist;verbal cues  -TB     Position, Sitting Balance supported;sitting in chair;sitting edge of bed  -TB     Sit to Stand Dynamic Balance moderate assist;2-person assist;verbal cues  -TB     Static Standing Balance moderate assist;1-person assist;verbal cues  -TB     Dynamic Standing Balance moderate assist;2-person assist;verbal cues  -TB     Position/Device Used, Standing Balance supported  -TB     Balance Interventions sitting;standing;sit to stand;supported;dynamic;static;occupation based/functional task  -TB           User Key  (r) = Recorded By, (t) = Taken By, (c) = Cosigned By    Initials Name Provider Type    TB Anh Brennan OT Occupational Therapist               Goals/Plan     Row Name 08/10/22 1454          Transfer Goal 1 (OT)    Activity/Assistive Device (Transfer Goal 1, OT) toilet;sit-to-stand/stand-to-sit  -TB     [place] Level/Cues Needed (Transfer Goal 1, OT) moderate assist (50-74% patient effort);verbal cues required  -TB     Time  Frame (Transfer Goal 1, OT) by discharge  -TB     Progress/Outcome (Transfer Goal 1, OT) goal ongoing  -TB     Row Name 08/10/22 1458          Grooming Goal 1 (OT)    Activity/Device (Grooming Goal 1, OT) oral care  -TB     [place] (Grooming Goal 1, OT) minimum assist (75% or more patient effort);verbal cues required  -TB     Time Frame (Grooming Goal 1, OT) by discharge  -TB     Progress/Outcome (Grooming Goal 1, OT) goal ongoing  -TB     Row Name 08/10/22 1458          Self-Feeding Goal 1 (OT)    Activity/Device (Self-Feeding Goal 1, OT) liquids to mouth;scoop food and bring to mouth  -TB     [place] Level/Cues Needed (Self-Feeding Goal 1, OT) minimum assist (75% or more patient effort)  -TB     Time Frame (Self-Feeding Goal 1, OT) by discharge  -TB     Progress/Outcomes (Self-Feeding Goal 1, OT) goal ongoing  -TB     Row Name 08/10/22 1458          Strength Goal 1 (OT)    Strength Goal 1 (OT) Pt/family demonstrate understanding to complete daily HEP to support recovery of function/self-care performance.  -TB     Time Frame (Strength Goal 1, OT) by discharge  -TB     Progress/Outcome (Strength Goal 1, OT) goal ongoing  -TB           User Key  (r) = Recorded By, (t) = Taken By, (c) = Cosigned By    Initials Name Provider Type    TB Anh Brennan, OT Occupational Therapist               Clinical Impression     Row Name 08/10/22 1454          Pain Assessment    Pain Intervention(s) Ambulation/increased activity;Repositioned  -TB     Additional Documentation Pain Scale: FACES Pre/Post-Treatment (Group)  -TB     Row Name 08/10/22 4004          Pain Scale: FACES Pre/Post-Treatment    Pain: FACES Scale, Pretreatment 0-->no hurt  -TB     Posttreatment Pain Rating 4-->hurts little more  -TB     Pain Location - Side/Orientation Bilateral  -TB     Pain Location - hand;knee  -TB     Pre/Posttreatment Pain Comment PMH: RA  -TB     Row Name 08/10/22 1454          Plan of Care Review    Plan of Care Reviewed  With patient;daughter  -TB     Outcome Evaluation OT IE completed. Pt is lethargic, Ox1, and participates in therapy with encouragement and increased time. Max Ax1 supine to sit. Mod Ax2 STS and step to chair. Don brief for all OOB activity. Pt requires total care for LB bathing/dressing following incontinent episodes x2 this session. Pt presents with acute confusion, strength, endurance, and balance deficits limiting mobility and self-care. OT will follow IP. Recommend SNF at d/c. Supportive family.  -TB     Row Name 08/10/22 9733          Therapy Assessment/Plan (OT)    Rehab Potential (OT) fair, will monitor progress closely  -TB     Criteria for Skilled Therapeutic Interventions Met (OT) yes;meets criteria;skilled treatment is necessary  -TB     Therapy Frequency (OT) daily  -TB     Row Name 08/10/22 0825          Therapy Plan Review/Discharge Plan (OT)    Anticipated Discharge Disposition (OT) skilled nursing facility  -TB     Row Name 08/10/22 1990          Vital Signs    Pre Systolic BP Rehab --  RN cleared OT  -TB     Pre SpO2 (%) 96  -TB     O2 Delivery Pre Treatment nasal cannula  -TB     O2 Delivery Intra Treatment nasal cannula  -TB     Post SpO2 (%) 98  -TB     O2 Delivery Post Treatment nasal cannula  -TB     Pre Patient Position Supine  -TB     Intra Patient Position Standing  -TB     Post Patient Position Sitting  -TB     Row Name 08/10/22 0457          Positioning and Restraints    Pre-Treatment Position in bed  -TB     Post Treatment Position chair  -TB     In Chair notified nsg;reclined;call light within reach;encouraged to call for assist;exit alarm on;with family/caregiver;legs elevated  -TB           User Key  (r) = Recorded By, (t) = Taken By, (c) = Cosigned By    Initials Name Provider Type    TB Anh Brennan, OT Occupational Therapist               Outcome Measures     Row Name 08/10/22 1500          How much help from another is currently needed...    Putting on and taking off  regular lower body clothing? 1  -TB     Bathing (including washing, rinsing, and drying) 1  -TB     Toileting (which includes using toilet bed pan or urinal) 1  -TB     Putting on and taking off regular upper body clothing 2  -TB     Taking care of personal grooming (such as brushing teeth) 2  -TB     Eating meals 3  -TB     AM-PAC 6 Clicks Score (OT) 10  -TB     Row Name 08/10/22 0800          How much help from another person do you currently need...    Turning from your back to your side while in flat bed without using bedrails? 2  -CC     Moving from lying on back to sitting on the side of a flat bed without bedrails? 2  -CC     Moving to and from a bed to a chair (including a wheelchair)? 2  -CC     Standing up from a chair using your arms (e.g., wheelchair, bedside chair)? 2  -CC     Climbing 3-5 steps with a railing? 1  -CC     To walk in hospital room? 2  -CC     AM-PAC 6 Clicks Score (PT) 11  -CC     Highest level of mobility 4 --> Transferred to chair/commode  -CC     Row Name 08/10/22 1500          Functional Assessment    Outcome Measure Options AM-PAC 6 Clicks Daily Activity (OT)  -TB           User Key  (r) = Recorded By, (t) = Taken By, (c) = Cosigned By    Initials Name Provider Type    TB Anh Brennan OT Occupational Therapist    CC Yaneth Chen RN Registered Nurse                Occupational Therapy Education                 Title: PT OT SLP Therapies (In Progress)     Topic: Occupational Therapy (In Progress)     Point: ADL training (In Progress)     Description:   Instruct learner(s) on proper safety adaptation and remediation techniques during self care or transfers.   Instruct in proper use of assistive devices.              Learning Progress Summary           Patient Acceptance, E,D, NR by TB at 8/10/2022 1501   Family Acceptance, E,D, NR by TB at 8/10/2022 1501                   Point: Home exercise program (Not Started)     Description:   Instruct learner(s) on appropriate  technique for monitoring, assisting and/or progressing therapeutic exercises/activities.              Learner Progress:  Not documented in this visit.          Point: Precautions (Not Started)     Description:   Instruct learner(s) on prescribed precautions during self-care and functional transfers.              Learner Progress:  Not documented in this visit.          Point: Body mechanics (Not Started)     Description:   Instruct learner(s) on proper positioning and spine alignment during self-care, functional mobility activities and/or exercises.              Learner Progress:  Not documented in this visit.                      User Key     Initials Effective Dates Name Provider Type Discipline     06/16/21 -  Anh Brennan OT Occupational Therapist OT              OT Recommendation and Plan  Therapy Frequency (OT): daily  Plan of Care Review  Plan of Care Reviewed With: patient, daughter  Outcome Evaluation: OT IE completed. Pt is lethargic, Ox1, and participates in therapy with encouragement and increased time. Max Ax1 supine to sit. Mod Ax2 STS and step to chair. Don brief for all OOB activity. Pt requires total care for LB bathing/dressing following incontinent episodes x2 this session. Pt presents with acute confusion, strength, endurance, and balance deficits limiting mobility and self-care. OT will follow IP. Recommend SNF at d/c. Supportive family.     Time Calculation:    Time Calculation- OT     Row Name 08/10/22 1334             Time Calculation- OT    OT Start Time 1334  -TB      OT Received On 08/10/22  -TB      OT Goal Re-Cert Due Date 08/20/22  -TB              Timed Charges    00094 - OT Self Care/Mgmt Minutes 30  -TB              Untimed Charges    OT Eval/Re-eval Minutes 60  -TB              Total Minutes    Timed Charges Total Minutes 30  -TB      Untimed Charges Total Minutes 60  -TB       Total Minutes 90  -TB            User Key  (r) = Recorded By, (t) = Taken By, (c) = Cosigned  By    Initials Name Provider Type    TB Anh Brennan OT Occupational Therapist              Therapy Charges for Today     Code Description Service Date Service Provider Modifiers Qty    01962308132 HC OT SELF CARE/MGMT/TRAIN EA 15 MIN 8/10/2022 Anh Brennan OT GO 2    [id number]  OT EVAL MOD COMPLEXITY 4 8/10/2022 Anh Brennan OT GO 1               Anh Brennan OT  8/10/2022

## 2022-08-10 NOTE — CASE MANAGEMENT/SOCIAL WORK
Discharge Planning Assessment  [facility]     Patient Name: Pati Carter  MRN: [id number]  Today's Date: 8/10/2022    Admit Date: 8/9/2022     Discharge Needs Assessment    No documentation.                Discharge Plan     Row Name 08/10/22 1614       Plan    Plan IDP/Rehab    Patient/Family in Agreement with Plan yes    Plan Comments I spoke with pt's daughter Danette at the bedside. Pt was transferred to [facility] from [facility]. She has been there for 1 week for skilled rehab. Pt was D/C from [facility] on 7/27/22 to [facility].  Family does not want pt going back to [facility]. They would like referrals to [facility] and [facility]. Pt was living at [facility] prior to previous admission. Used a RW to ambulate. No home O2/Cpap/HH/OP PT. PCP-Dr. Pallavi Eduardo. Confirmed Medicare and [facility] SUPP.    Final Discharge Disposition Code 30 - still a patient              Continued Care and Services - Admitted Since 8/9/2022    Coordination has not been started for this encounter.     Selected Continued Care - Prior Encounters Includes selections from prior encounters from 5/11/2022 to 8/10/2022    Discharged on 7/27/2022 Admission date: 7/23/2022 - Discharge disposition: Skilled Nursing Facility (DC - External)    Destination     Service Provider Selected Services Address Phone Fax Patient Preferred    [facility]  Skilled Nursing [address] [place] [address] [phone] --                       Demographic Summary    No documentation.                Functional Status    No documentation.                Psychosocial    No documentation.                Abuse/Neglect    No documentation.                Legal    No documentation.                Substance Abuse    No documentation.                Patient Forms    No documentation.                   Romelia Rees RN

## 2022-08-11 ENCOUNTER — APPOINTMENT (OUTPATIENT)
Dept: GENERAL RADIOLOGY | Facility: HOSPITAL | Age: 87
End: 2022-08-11

## 2022-08-11 LAB
ANION GAP SERPL CALCULATED.3IONS-SCNC: 9 MMOL/L (ref 5–15)
BUN SERPL-MCNC: 37 MG/DL (ref 8–23)
BUN/CREAT SERPL: 19 (ref 7–25)
CALCIUM SPEC-SCNC: 11.2 MG/DL (ref 8.2–9.6)
CHLORIDE SERPL-SCNC: 107 MMOL/L (ref 98–107)
CO2 SERPL-SCNC: 27 MMOL/L (ref 22–29)
CREAT SERPL-MCNC: 1.95 MG/DL (ref 0.57–1)
EGFRCR SERPLBLD CKD-EPI 2021: 23.8 ML/MIN/1.73
GLUCOSE SERPL-MCNC: 94 MG/DL (ref 65–99)
POTASSIUM SERPL-SCNC: 3.8 MMOL/L (ref 3.5–5.2)
SODIUM SERPL-SCNC: 143 MMOL/L (ref 136–145)

## 2022-08-11 PROCEDURE — 74230 X-RAY XM SWLNG FUNCJ C+: CPT

## 2022-08-11 PROCEDURE — 99233 SBSQ HOSP IP/OBS HIGH 50: CPT | Performed by: PSYCHIATRY & NEUROLOGY

## 2022-08-11 PROCEDURE — 80048 BASIC METABOLIC PNL TOTAL CA: CPT | Performed by: STUDENT IN AN ORGANIZED HEALTH CARE EDUCATION/TRAINING PROGRAM

## 2022-08-11 PROCEDURE — 99232 SBSQ HOSP IP/OBS MODERATE 35: CPT | Performed by: STUDENT IN AN ORGANIZED HEALTH CARE EDUCATION/TRAINING PROGRAM

## 2022-08-11 PROCEDURE — 97162 PT EVAL MOD COMPLEX 30 MIN: CPT

## 2022-08-11 PROCEDURE — 94799 UNLISTED PULMONARY SVC/PX: CPT

## 2022-08-11 PROCEDURE — 92611 MOTION FLUOROSCOPY/SWALLOW: CPT

## 2022-08-11 RX ADMIN — DICLOFENAC 2 G: 10 GEL TOPICAL at 22:34

## 2022-08-11 RX ADMIN — SENNOSIDES AND DOCUSATE SODIUM 2 TABLET: 50; 8.6 TABLET ORAL at 09:59

## 2022-08-11 RX ADMIN — ATORVASTATIN CALCIUM 80 MG: 40 TABLET, FILM COATED ORAL at 22:33

## 2022-08-11 RX ADMIN — POLYETHYLENE GLYCOL 3350 17 G: 17 POWDER, FOR SOLUTION ORAL at 09:59

## 2022-08-11 RX ADMIN — ASPIRIN 325 MG ORAL TABLET 325 MG: 325 PILL ORAL at 09:59

## 2022-08-11 RX ADMIN — SODIUM CHLORIDE 100 ML/HR: 9 INJECTION, SOLUTION INTRAVENOUS at 10:00

## 2022-08-11 RX ADMIN — SENNOSIDES AND DOCUSATE SODIUM 2 TABLET: 50; 8.6 TABLET ORAL at 22:34

## 2022-08-11 RX ADMIN — Medication 5 MG: at 22:33

## 2022-08-11 RX ADMIN — DILTIAZEM HYDROCHLORIDE 120 MG: 120 CAPSULE, COATED, EXTENDED RELEASE ORAL at 09:59

## 2022-08-11 RX ADMIN — MICONAZOLE NITRATE 1 APPLICATION: 20 CREAM TOPICAL at 22:34

## 2022-08-11 RX ADMIN — BARIUM SULFATE 20 ML: 400 PASTE ORAL at 13:16

## 2022-08-11 RX ADMIN — BUDESONIDE AND FORMOTEROL FUMARATE DIHYDRATE 2 PUFF: 80; 4.5 AEROSOL RESPIRATORY (INHALATION) at 08:27

## 2022-08-11 RX ADMIN — Medication 10 ML: at 22:34

## 2022-08-11 RX ADMIN — BARIUM SULFATE 100 ML: 0.81 POWDER, FOR SUSPENSION ORAL at 13:16

## 2022-08-11 RX ADMIN — MICONAZOLE NITRATE 1 APPLICATION: 20 CREAM TOPICAL at 10:00

## 2022-08-11 RX ADMIN — BARIUM SULFATE 50 ML: 400 SUSPENSION ORAL at 13:16

## 2022-08-11 RX ADMIN — TRAMADOL HYDROCHLORIDE 50 MG: 50 TABLET ORAL at 10:00

## 2022-08-11 RX ADMIN — FOLIC ACID 1000 MCG: 1 TABLET ORAL at 09:59

## 2022-08-11 RX ADMIN — FAMOTIDINE 20 MG: 20 TABLET ORAL at 09:59

## 2022-08-11 RX ADMIN — ACETAMINOPHEN 650 MG: 325 TABLET, FILM COATED ORAL at 22:33

## 2022-08-11 RX ADMIN — DICLOFENAC 2 G: 10 GEL TOPICAL at 10:00

## 2022-08-11 RX ADMIN — BUDESONIDE AND FORMOTEROL FUMARATE DIHYDRATE 2 PUFF: 80; 4.5 AEROSOL RESPIRATORY (INHALATION) at 22:44

## 2022-08-11 RX ADMIN — BARIUM SULFATE 10 ML: 400 SUSPENSION ORAL at 13:16

## 2022-08-11 RX ADMIN — TRAMADOL HYDROCHLORIDE 50 MG: 50 TABLET ORAL at 22:34

## 2022-08-11 NOTE — PROGRESS NOTES
Daily Progress Note  Neurology     LOS: 2 days     Subjective     Chief Complaint: Altered mental status.    Interval History: No acute issues overnight.  No further decline in mentation.  Remained stable without any worsening.    ROS: Negative for fever, chest pain or shortness of breath.    Objective     Vital signs in last 24 hours:  Temp:  [97.1 °F (36.2 °C)-98 °F (36.7 °C)] 97.1 °F (36.2 °C)  Heart Rate:  [61-87] 61  Resp:  [18-20] 18  BP: (126-165)/(75-91) 151/75      Physical Exam:   General: Lying in bed with eyes closed. In NAD.     Respiratory: Respirations unlabored   CV: RRR       Neurologic Exam:   Mental status: Awake, alert, Follows commands. Speech fluent   CN: II-XII intact to detailed exam except for visual acuity reduced to finger counting to 1 feet.   Motor: Moves all 4 extremities spontaneously.   Reflexes: 1+ and symmetric throughout          Results Review:    Results from last 7 days   Lab Units 08/10/22  0533   WBC 10*3/mm3 12.45*   HEMOGLOBIN g/dL 9.1*   HEMATOCRIT % 29.0*   PLATELETS 10*3/mm3 369     Results from last 7 days   Lab Units 08/11/22  0802   SODIUM mmol/L 143   POTASSIUM mmol/L 3.8   CHLORIDE mmol/L 107   CO2 mmol/L 27.0   BUN mg/dL 37*   CREATININE mg/dL 1.95*   CALCIUM mg/dL 11.2*       CT Head Without Contrast    Result Date: 8/9/2022  Age-related changes of the brain as above, otherwise without evidence of acute intracranial abnormality.   This report was finalized on 8/9/2022 8:51 PM by Galo Mejias.      MRI Angiogram Head Without Contrast    Result Date: 8/9/2022  Age-related changes are present as above. There is otherwise no evidence of infarct, hemorrhage, mass or mass effect.  Essentially normal MR angiogram with no evidence of flow-limiting stenosis, large vessel occlusion or aneurysmal dilatation.  This report was finalized on 8/9/2022 10:24 PM by Galo  Randell.      MRI Angiogram Neck Without Contrast    Result Date: 8/9/2022  Age-related changes are present as above. There is otherwise no evidence of infarct, hemorrhage, mass or mass effect.  Essentially normal MR angiogram with no evidence of flow-limiting stenosis, large vessel occlusion or aneurysmal dilatation.  This report was finalized on 8/9/2022 10:24 PM by Galo Mejias.      MRI Brain Without Contrast    Result Date: 8/9/2022  Age-related changes are present as above. There is otherwise no evidence of infarct, hemorrhage, mass or mass effect.  Essentially normal MR angiogram with no evidence of flow-limiting stenosis, large vessel occlusion or aneurysmal dilatation.  This report was finalized on 8/9/2022 10:24 PM by Galo Mejias.      XR Chest 1 View    Result Date: 8/9/2022  1.  No definite acute pulmonary process. 2.  Ectasia thoracic aorta  This report was finalized on 8/9/2022 4:57 PM by Julian Bowen MD.      CT Facial Bones Without Contrast    Result Date: 8/10/2022  There is extensive dental hardware with associated streak artifact, obscuring evaluation of the oral cavity. There is no distinct evidence of focal fluid collection otherwise concerning for periosteal abscess. Dentition appears overall intact, without definite evidence of prominent periapical lucency to more specifically suggest abscess.  This report was finalized on 8/10/2022 5:53 PM by Galo Mejias.            Assessment & Plan     89-year-old female who was assisted living facility resident with recent admission for C. difficile colitis during which she was found to have several concerning masses within the abdomen/pelvis, history of Gretchen's syndrome, macular degeneration, hypercalcemia, CKD stage III, hypertension hyperlipidemia who presented because of worsening in mental status.    -AMS likely due to underlying hypercalcemia.  Management of hypercalcemia per primary team.  MRI brain did not reveal any acute  abnormalities.  -?  Parkinson's disease.  Will need thorough evaluation as an outpatient for confirmation of diagnosis.  She does have signs suggestive of Parkinson's such as masked face, bradykinesia, subtle intermittent resting tremors.  -Will need aggressive OT, PT and speech therapy.    Freddy Rich MD  08/11/22  13:40 EDT

## 2022-08-11 NOTE — MBS/VFSS/FEES
Acute Care - Speech Language Pathology   Swallow Re-Evaluation [facility]     Modified Barium Swallow Study (MBS)       Patient Name: Pati Carter  : 1930  MRN: [id number]  Today's Date: 2022               Admit Date: 2022    Visit Dx:     ICD-10-CM ICD-9-CM   1. Acute on chronic alteration in mental status  R41.82 780.09   2. Acute renal failure, unspecified acute renal failure type (HCC)  N17.9 584.9   3. Hypercalcemia  E83.52 275.42   4. Chronic anemia  D64.9 285.9   5. Elevated blood pressure reading with diagnosis of hypertension  I10 401.9   6. History of Clostridioides difficile colitis  Z86.19 V12.79   7. DNR (do not resuscitate)  Z66 V49.86   8. Declining functional status  R53.81 799.3   9. Oropharyngeal dysphagia  R13.12 787.22   10. Dysarthria  R47.1 784.51   11. Cognitive communication deficit  R41.841 799.52     Patient Active Problem List   Diagnosis   • Macular degeneration   • Rheumatoid arthritis ([facility])   • Hearing loss   • Hyperlipidemia   • Gastroesophageal reflux disease   • Hypertension   • Constipation   • Anemia   • Compression fracture of lumbar vertebra ([facility])   • Degeneration of intervertebral disc of lumbar region   • COPD (chronic obstructive pulmonary disease) ([facility])   • H/O calcium pyrophosphate deposition disease (CPPD)   • Falls frequently   • Immunosuppressed status on methotrexate   • Ferny Bonnet syndrome   • Impaired cognition   • Impaired mobility   • Ulcer of right foot, limited to breakdown of skin ([facility])   • Peripheral vascular disease ([facility])   • Nocturia   • Age-related osteoporosis without current pathological fracture   • Pelvic mass   • Fall, initial encounter   • Acute on chronic alteration in mental status   • Hypercalcemia   • Acute kidney injury ([facility])     Past Medical History:   Diagnosis Date   • Anemia     Description: A.  Dx - borderline intermittent.   • Back pain    • Benign colonic polyp 2016    Description: A.  Dx .   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  Rule out chronic persistent asthma, COPD, or obliterative bronchiolitis.- Rae   • COVID-19 virus infection 10/11/2020   • CTS (carpal tunnel syndrome)    • Degeneration of intervertebral disc of lumbar region     Description: A.  Diagnosed in April 2013 with advanced multilevel with severe spinal stenosis, followed by Dr. Pillai for pain management.   • Gastroesophageal reflux disease    • Hearing loss    • History of calcium pyrophosphate deposition disease (CPPD)    • History of colonoscopy 01/01/1999    NORMAL PER PATIENT    • History of mammogram 01/01/2011    NORMAL PER PT    • History of Papanicolaou smear of cervix 01/01/2010    NORMAL PER PT    • History of varicella    • Hyperlipidemia     Description: A.  Dx 2006.   • Hypertension     Description: A. Dx 2001.   • Macular degeneration    • Nocturia    • Osteoporosis    • Ovarian mass     Dx 8/15- benign left cystic adnexal mass   • Overactive bladder    • Pelvic floor dysfunction    • Rheumatoid arthritis ([facility])     Description: A.  Diagnosed in 2000 and and followed by Dr. Constantino (now Dr. Kaplan). B.  On methotrexate therapy since 2000. C.  Off low-dose prednisone therapy.   • Vitamin D deficiency      Past Surgical History:   Procedure Laterality Date   • APPENDECTOMY     • CARPAL TUNNEL RELEASE Left 01/01/2003    HISTORY OF NEUROPLASTY DECOMPRESSION MEDIAN NERVE AT CARPAL TUNNEL LEFT   • CATARACT EXTRACTION Bilateral 01/01/2009   • CHOLECYSTECTOMY  01/01/1962   • HIP CANNULATED SCREW PLACEMENT Right 1/25/2020    Procedure: HIP CANNULATED SCREW PLACEMENT RIGHT;  Surgeon: Karlos Blount MD;  Location: [facility];  Service: Orthopedics   • KNEE ARTHROSCOPY Left 01/01/2001    MENISCAL REPAIR   • KYPHOPLASTY  06/18/2015    T11 AND L1 (JOSE A)   •  PELVIC LAPAROSCOPY  01/01/1996    REMOVAL OF BENIGN UTERINE AND RIGHT OVARIAN TUMORS   • SALPINGO OOPHORECTOMY Left 08/26/2015    REMOVAL OF LEFT OVARY AND TUBE (benign cystic mass)       SLP Recommendation and Plan  SLP Swallowing Diagnosis: severe, pharyngeal dysphagia (08/11/22 1315)  SLP Diet Recommendation: comfort diet, per physician discretion, other (see comments) (pureed, honey thick as no safe PO diet can be recommended.) (08/11/22 1315)     SLP Rec. for Method of Medication Administration: meds via alternate route (08/11/22 1315)     Monitor for Signs of Aspiration: notify SLP if any concerns (08/11/22 1315)     Swallow Criteria for Skilled Therapeutic Interventions Met: demonstrates skilled criteria (08/11/22 1315)  Anticipated Discharge Disposition (SLP): anticipate therapy at next level of care, other (see comments) (pending goals of care decisions) (08/11/22 1315)  Rehab Potential/Prognosis, Swallowing: re-evaluate goals as necessary (08/11/22 1315)  Therapy Frequency (Swallow): PRN (08/11/22 1315)  Predicted Duration Therapy Intervention (Days): until discharge (08/11/22 1315)                                         SWALLOW EVALUATION (last 72 hours)     SLP Adult Swallow Evaluation     Row Name 08/11/22 1315 08/10/22 1130                Rehab Evaluation    Document Type re-evaluation  -CH evaluation  -AC       Subjective Information no complaints  -CH --       Patient Observations alert;cooperative;agree to therapy  -CH --       Patient/Family/Caregiver Comments/Observations none present  -CH --       Patient Effort adequate  -CH good  -AC       Comment completed in collaboration with   - --       Symptoms Noted During/After Treatment none  -CH --                General Information    Patient Profile Reviewed yes  -CH yes  -AC       Pertinent History Of Current Problem see prior evaluation  -CH --       Current Method of Nutrition pureed;nectar/syrup-thick liquids  -CH NPO  -AC        Precautions/Limitations, Vision vision impairment, bilaterally  - --       Precautions/Limitations, Hearing hearing impairment, bilaterally;other (see comments)  functional w increased volume  - --       Prior Level of Function-Swallowing -- puree;other (see comments)  recently placed on pureed diet & having increased difficulty w/ liquids per dtr  -       Plans/Goals Discussed with -- patient and family;agreed upon  -       Barriers to Rehab -- hearing deficit  -       Patient's Goals for Discharge -- return to PO diet  -       Family Goals for Discharge -- patient able to return to PO diet;patient able to eat/drink without coughing/choking  -                Pain    Additional Documentation Pain Scale: FACES Pre/Post-Treatment (Group)  - --                Pain Scale: Numbers Pre/Post-Treatment    Pretreatment Pain Rating 0/10 - no pain  - --       Posttreatment Pain Rating 0/10 - no pain  - --                Pain Scale: FACES Pre/Post-Treatment    Pain: FACES Scale, Pretreatment 0-->no hurt  - 2-->hurts little bit  -AC       Posttreatment Pain Rating 0-->no hurt  -CH 2-->hurts little bit  -AC       Pain Location - Side/Orientation -- Bilateral  -AC       Pain Location -- lower  -AC       Pain Location - -- extremity  -AC       Pre/Posttreatment Pain Comment -- Baseline & r/t RA per pt/family.  -AC                Oral Motor Structure and Function    Secretion Management -- WNL/WFL  -                General Eating/Swallowing Observations    Respiratory Support Currently in Use nasal cannula  - nasal cannula  -       Eating/Swallowing Skills fed by SLP  - fed by SLP;self-fed;other (see comments)  w/ assist  -       Positioning During Eating upright 90 degree;upright in chair  - upright 90 degree;upright in bed  -       Utensils Used -- spoon;cup;straw  -       Consistencies Trialed -- thin liquids;nectar/syrup-thick liquids;pureed;regular textures  -                Clinical  Swallow Eval    Oral Prep Phase -- impaired  -AC       Oral Transit -- WFL  -AC       Oral Residue -- impaired  -AC       Pharyngeal Phase -- suspected pharyngeal impairment  -AC                Oral Prep Concerns    Oral Prep Concerns -- prolonged mastication  -AC       Prolonged Mastication -- regular consistencies  -AC                Oral Residue Concerns    Oral Residue Concerns -- diffuse residue throughout oral cavity  -AC       Diffuse Residue Throughout Oral Cavity -- regular consistencies  -AC       Oral Residue Concerns, Comment -- Mild-mod, cleared w/ puree & liquid wash  -AC                Pharyngeal Phase Concerns    Pharyngeal Phase Concerns -- cough  -AC       Cough -- thin;regular consistencies  -AC       Pharyngeal Phase Concerns, Comment -- No overt clinical s/sxs aspiration w/ nectar-thick liquid and puree.  -AC                MBS/VFSS    Utensils Used spoon;cup;straw  -CH --       Consistencies Trialed thin liquids;nectar/syrup-thick liquids;honey-thick liquids;pudding thick  -CH --                MBS/VFSS Interpretation    Oral Prep Phase WFL  -CH --       Oral Transit Phase WFL  -CH --       Oral Residue WFL  -CH --       VFSS Summary Severe pharyngeal dysphagia with delay to the pyriforms with liquids. aspiration of thin liquids during the swallow and nectar thick liquids before the swallow. Honey thick was aspirated after the swallow as pharyngeal residue deepened. It is inevitable that pureed/pudding thick would also be aspirated.  Patient was unable to clear residue or aspirated material with cued throat clear or swallow. Aspiration was sensed only with larger amounts/deeper into the trachea. Otherwise aspiration was silent. Patient is unsafe for oral feeding without risk for aspiration. Given patients age, unsure if feeding tube would lengthen or improve quality of life.  If comfort diet: least amount of aspiration observed with pureed, honey thick liquids. If patient shows aversion,  consider comfort diet of  pureed, thin liquids.  -CH --                Initiation of Pharyngeal Swallow    Initiation of Pharyngeal Swallow bolus in pyriform sinuses  -CH --       Pharyngeal Phase impaired pharyngeal phase of swallowing  -CH --       Aspiration Before the Swallow nectar-thick liquids  -CH --       Aspiration During the Swallow thin liquids;secondary to delayed swallow initiation or mistiming;secondary to reduced laryngeal elevation;secondary to reduced vestibular closure;secondary to reduced laryngeal (VF) closure  -CH --       Aspiration After the Swallow honey-thick liquids  -CH --       Response to Aspiration cough;inconsistent response;could not clear subglottic material  -CH --       Rosenbek's Scale thin:;nectar:;honey:;7--->level 7  -CH --       Pharyngeal Residue thin liquids;nectar-thick liquids;honey-thick liquids;pudding/puree;secondary to reduced posterior pharyngeal wall stripping;secondary to reduced laryngeal elevation;secondary to reduced hyolaryngeal excursion  -CH --       Response to Residue unable to clear residue  -CH --       Attempted Compensatory Maneuvers bolus size;bolus presentation style  -CH --       Response to Attempted Compensatory Maneuvers did not prevent aspiration  -CH --                SLP Evaluation Clinical Impression    SLP Swallowing Diagnosis severe;pharyngeal dysphagia  -CH suspected pharyngeal dysphagia;mild-moderate;oral dysphagia  -AC       Functional Impact risk of aspiration/pneumonia;risk of malnutrition;risk of dehydration  -CH risk of aspiration/pneumonia  -AC       Rehab Potential/Prognosis, Swallowing re-evaluate goals as necessary  -CH re-evaluate goals as necessary  -       Swallow Criteria for Skilled Therapeutic Interventions Met demonstrates skilled criteria  -CH demonstrates skilled criteria  -                Recommendations    Therapy Frequency (Swallow) PRN  -CH --       Predicted Duration Therapy Intervention (Days) until discharge   -CH --       SLP Diet Recommendation comfort diet, per physician discretion;other (see comments)  pureed, honey thick as no safe PO diet can be recommended.  - mechanical soft with no mixed consistencies;nectar thick liquids  -       Recommended Diagnostics -- VFSS (MBS)  -       Recommended Precautions and Strategies -- upright posture during/after eating;general aspiration precautions;assist with feeding;1:1 supervision  -       Oral Care Recommendations Oral Care BID/PRN;Suction toothbrush  - Oral Care BID/PRN  -       SLP Rec. for Method of Medication Administration meds via alternate route  - meds whole;with pudding or applesauce;as tolerated  -       Monitor for Signs of Aspiration notify SLP if any concerns  - yes;notify SLP if any concerns  -       Anticipated Discharge Disposition (SLP) anticipate therapy at next level of care;other (see comments)  pending goals of care decisions  - anticipate therapy at next level of care  -             User Key  (r) = Recorded By, (t) = Taken By, (c) = Cosigned By    Initials Name Effective Dates     Sylvie Mckeon MS CCC-SLP 06/16/21 -      Julia Dent MS CCC-SLP 06/16/21 -                 EDUCATION  The patient has been educated in the following areas:   Tracheo-Esophageal Prosthesis Dysphagia (Swallowing Impairment) Oral Care/Hydration NPO rationale comfort diet.        SLP GOALS     Row Name 08/11/22 1315 08/10/22 1130          (STG) Patient will tolerate trials of    Consistencies Trialed (Tolerate trials) pureed textures;honey/ moderately thick liquids  - --     Desired Outcome (Tolerate trials) for pleasure/comfort;without signs of distress  -CH --     [place] (Tolerate trials) independently (over 90% accuracy)  - --     Time Frame (Tolerate trials) by discharge  - --            (STG) Swallow 1    (LTG) Swallow LTG: Patient will tolerate comfort diet of pureed, honey thick liquids without s/s of aversion or  "discomfort/distress  - --     [place] (Swallow Long Term Goal) with minimal cues (75-90% accuracy)  - --     Time Frame (Swallow Long Term Goal) by discharge  - --            Phonation Goal 1 (SLP)    Improve Phonation By Goal 1 (SLP) -- using loud speech;80%;with maximum cues (25-49%);other (see comments)  on vowel \"ah\"  -AC     Time Frame (Phonation Goal 1, SLP) -- short term goal (STG)  -AC            Awareness of Basic Personal Information Goal 1 (SLP)    Improve Awareness of Basic Personal Information Goal 1 (SLP) -- answering yes/no questions regarding personal/biographical information;naming self, family members;90%;with minimal cues (75-90%)  -AC     Time Frame (Awareness of Basic Personal Information Goal 1, SLP) -- short term goal (STG)  -AC            Orientation Goal 1 (SLP)    Improve Orientation Through Goal 1 (SLP) -- demonstrating orientation to year;demonstrating orientation to place;demonstrating orientation to disease/impairment;90%;with minimal cues (75-90%)  -AC     Time Frame (Orientation Goal 1, SLP) -- short term goal (STG)  -AC            Additional Goal 1 (SLP)    Additional Goal 1, SLP -- LTG: Pt will improve cognitive-communication skills in order to participate in care w/ min cues.  -AC     Time Frame (Additional Goal 1, SLP) -- by discharge  -           User Key  (r) = Recorded By, (t) = Taken By, (c) = Cosigned By    Initials Name Provider Type     Sylvie Mckeon, MS CCC-SLP Speech and Language Pathologist     Julia Dent, MS CCC-SLP Speech and Language Pathologist                   Time Calculation:    Time Calculation- SLP     Row Name 08/11/22 1547             Time Calculation- SLP    SLP Start Time 1515  -CH      SLP Received On 08/11/22  -CH              Untimed Charges    SLP Eval/Re-eval  ST Motion Fluoro Eval Swallow - 18054  -      04173-XT Motion Fluoro Eval Swallow Minutes 90  -CH              Total Minutes    Untimed Charges Total Minutes 90  -CH   "     Total Minutes 90  -CH            User Key  (r) = Recorded By, (t) = Taken By, (c) = Cosigned By    Initials Name Provider Type     Julia Dent MS CCC-SLP Speech and Language Pathologist                Therapy Charges for Today     Code Description Service Date Service Provider Modifiers Qty    [id number] HC ST MOTION FLUORO EVAL SWALLOW 6 8/11/2022 Julia Dent MS CCC-CHANCE GN 1               MS TATI Ritchie  8/11/2022     Patient was not wearing a face mask and did exhibit coughing during this therapy encounter.  Procedure performed was aerosolizing, involved close contact (within 6 feet for at least 15 minutes or longer), and did not involve contact with infectious secretions or specimens.  Therapist used appropriate personal protective equipment including gloves, standard procedure mask, eye protection and gown.  Appropriate PPE was worn during the entire therapy session.  Hand hygiene was completed before and after therapy session.

## 2022-08-11 NOTE — CASE MANAGEMENT/SOCIAL WORK
Continued Stay Note  [facility]     Patient Name: Pati Carter  MRN: [id number]  Today's Date: 8/11/2022    Admit Date: 8/9/2022     Discharge Plan     Row Name 08/11/22 1417       Plan    Plan skilled nursing facility    Patient/Family in Agreement with Plan yes    Plan Comments Mrs. Carter will need short term rehab before returning to her personal care apartment at [place]. I have made referrals to the [facility] and the [facility] [place] in search of a skilled rehab bed per her family's request.    Final Discharge Disposition Code 30 - still a patient               Discharge Codes    No documentation.                     Devan Chavez RN

## 2022-08-11 NOTE — PLAN OF CARE
Goal Outcome Evaluation:  Plan of Care Reviewed With: patient, daughter           Outcome Evaluation: Patient required moderate to max assist with all mobility. She is able to stand and bear wt and take a few steps. Recommend SNF at discharge

## 2022-08-11 NOTE — PROGRESS NOTES
[facility] Medicine Services  PROGRESS NOTE    Patient Name: Pati Carter  : 1930  MRN: [id number]    Date of Admission: 2022  Primary Care Physician: Pallavi Eduardo MD    Subjective   Subjective     CC:  Confusion, dehydration    HPI:  No acute overnight events, improving w fluids    ROS:  Difficult to obtain given underlying mental status    Objective   Objective     Vital Signs:   Temp:  [97.1 °F (36.2 °C)-97.7 °F (36.5 °C)] 97.1 °F (36.2 °C)  Heart Rate:  [61-87] 61  Resp:  [18] 18  BP: (151-165)/(75-91) 151/75  Flow (L/min):  [2] 2     Physical Exam:  Constitutional: elderly frail appearing woman, in no acute distress, non toxic appearing  Eyes: PERRL, sclerae anicteric, no conjunctival injection  HENT: NCAT, mucous membranes moist  Neck: Supple, trachea midline  Respiratory: Clear to auscultation bilaterally, nonlabored respirations, on NC  Cardiovascular: RRR, no murmurs, rubs, or gallops, palpable radial pulses bilaterally  Gastrointestinal: Positive bowel sounds, soft, nontender, nondistended  Musculoskeletal: No bilateral ankle edema, no clubbing or cyanosis to extremities; bitemporal wasting  Psychiatric: Unable to assess  Neurologic: Mumbles/moans to stimuli, moving extremities spontaneously    Results Reviewed:  LAB RESULTS:      Lab 08/10/22  0522   WBC 12.45* 17.90* 12.72*   HEMOGLOBIN 9.1* 10.1* 9.1*   HEMATOCRIT 29.0* 30.9* 29.8*   PLATELETS 369 436 486*   NEUTROS ABS  --  15.13* 8.45*   IMMATURE GRANS (ABS)  --  0.10* 0.11*   LYMPHS ABS  --  0.99 2.29   MONOS ABS  --  1.48* 1.58*   EOS ABS  --  0.10 0.22   .3* 97.2* 101.4*   PROCALCITONIN  --  0.33*  --    LACTATE  --  1.2  --          Lab 22  0802 08/10/22  0533 22   SODIUM 143 142 139 139   POTASSIUM 3.8 3.8 3.8 3.8   CHLORIDE 107 105 98 97*   CO2 27.0 26.0 30.0* 31.0*   ANION GAP 9.0 11.0 11.0 11.0   BUN 37* 36* 35* 36*    CREATININE 1.95* 2.12* 2.04* 1.92*   EGFR 23.8* 21.5* 22.5* 24.2*   GLUCOSE 94 80 104* 88   CALCIUM 11.2* 11.8* 13.3* 13.0*   IONIZED CALCIUM  --  1.76* 1.86*  --    PHOSPHORUS  --  4.0  --   --    HEMOGLOBIN A1C  --  5.10  --   --          Lab 08/10/22  0533 08/09/22  1720   TOTAL PROTEIN  --  7.4   ALBUMIN 2.60* 3.20*   GLOBULIN  --  4.2   ALT (SGPT)  --  17   AST (SGOT)  --  20   BILIRUBIN  --  0.4   ALK PHOS  --  118*   LIPASE  --  10*         Lab 08/09/22  1720   PROBNP 4,237.0*   TROPONIN T 0.032*         Lab 08/10/22  0533   CHOLESTEROL 115   LDL CHOL 67   HDL CHOL 27*   TRIGLYCERIDES 116             Lab 08/09/22  1715   PH, ARTERIAL 7.478*   PCO2, ARTERIAL 44.6   PO2 ART 61.5*   FIO2 28   HCO3 ART 33.0*   BASE EXCESS ART 8.5*   CARBOXYHEMOGLOBIN 1.3     Brief Urine Lab Results  (Last result in the past 365 days)      Color   Clarity   Blood   Leuk Est   Nitrite   Protein   CREAT   Urine HCG        08/09/22 1838 Yellow   Clear   Negative   Trace   Negative   30 mg/dL (1+)                 Microbiology Results Abnormal     Procedure Component Value - Date/Time    Urine Culture - Urine, Urine, Catheter [[id number]]  (Normal) Collected: 08/09/22 1838    Lab Status: Final result Specimen: Urine, Catheter Updated: 08/10/22 2040     Urine Culture No growth    Blood Culture - Blood, Hand, Right [[id number]]  (Normal) Collected: 08/09/22 1810    Lab Status: Preliminary result Specimen: Blood from Hand, Right Updated: 08/10/22 1848     Blood Culture No growth at 24 hours    Blood Culture - Blood, Arm, Left [[id number]]  (Normal) Collected: 08/09/22 1721    Lab Status: Preliminary result Specimen: Blood from Arm, Left Updated: 08/10/22 1748     Blood Culture No growth at 24 hours    COVID PRE-OP / PRE-PROCEDURE SCREENING ORDER (NO ISOLATION) - Swab, Nasopharynx [[id number]]  (Normal) Collected: 08/09/22 1812    Lab Status: Final result Specimen: Swab from Nasopharynx Updated: 08/09/22 1907    Narrative:      The following  orders were created for panel order COVID PRE-OP / PRE-PROCEDURE SCREENING ORDER (NO ISOLATION) - Swab, Nasopharynx.  Procedure                               Abnormality         Status                     ---------                               -----------         ------                     COVID-19 and FLU A/B PCR...[[id number]]  Normal              Final result                 Please view results for these tests on the individual orders.    COVID-19 and FLU A/B PCR - Swab, Nasopharynx [[id number]]  (Normal) Collected: 08/09/22 1812    Lab Status: Final result Specimen: Swab from Nasopharynx Updated: 08/09/22 1907     COVID19 Not Detected     Influenza A PCR Not Detected     Influenza B PCR Not Detected    Narrative:      Fact sheet for providers: https://www.fda.gov/media/544473/download    Fact sheet for patients: https://www.fda.gov/media/391019/download    Test performed by PCR.          CT Head Without Contrast    Result Date: 8/9/2022  DATE OF EXAM: 8/9/2022 8:10 PM  PROCEDURE: CT HEAD WO CONTRAST-  INDICATIONS: AMS - somnolence; R41.82-Altered mental status, unspecified; N17.9-Acute kidney failure, unspecified; E83.52-Hypercalcemia; D64.9-Anemia, unspecified; I10-Essential (primary) hypertension; Z86.19-Personal history of other infectious and parasitic diseases; Z66-Do not resuscitate; R53.81-Other malaise  COMPARISON: No comparisons available.  TECHNIQUE: Routine transaxial and coronal reconstruction images were obtained through the head without the administration of contrast. Automated exposure control and iterative reconstruction methods were used.  The radiation dose reduction device was turned on for each scan per the ALARA (As Low as Reasonably Achievable) protocol.  FINDINGS: Gray-white differentiation is maintained and there is no evidence of intracranial hemorrhage, mass or mass effect. Age-related changes of the brain are present including volume loss and typical periventricular sequela of chronic  small vessel ischemia. There is otherwise no evidence of intracranial hemorrhage, mass or mass effect. The ventricles are normal in size and configuration accounting for surrounding volume loss. The orbits are normal and the paranasal sinuses are grossly clear.      Impression: Age-related changes of the brain as above, otherwise without evidence of acute intracranial abnormality.   This report was finalized on 8/9/2022 8:51 PM by Galo Mejias.      MRI Angiogram Head Without Contrast    Result Date: 8/9/2022  DATE OF EXAM: 8/9/2022 9:51 PM  PROCEDURE: MRI ANGIOGRAM NECK WO CONTRAST-, MRI ANGIOGRAM HEAD WO CONTRAST-, MRI BRAIN WO CONTRAST-  INDICATIONS: Neuro deficit, acute, stroke suspected; R41.82-Altered mental status, unspecified; N17.9-Acute kidney failure, unspecified; E83.52-Hypercalcemia; D64.9-Anemia, unspecified; I10-Essential (primary) hypertension; Z86.19-Personal history of other infectious and parasitic diseases; Z66-Do not resuscitate; R53.81-Other malaise  COMPARISON: No comparisons available.  TECHNIQUE: Multiplanar multisequence MRI of the brain performed without IV contrast. Noncontrast time-of-flight 3-dimensional MR angiogram of the head and neck with three-dimensional maximum intensity projections postprocessed  FINDINGS: No acute infarct is present on diffusion weighted sequences. Midline structures are unremarkable and the craniocervical junction is satisfactory in appearance. Age-related changes are present with generalized volume loss and typical periventricular leukomalacia favored to reflect chronic small vessel ischemia. There is no evidence of intracranial hemorrhage, mass or mass effect. The ventricles are normal in size and configuration, accounting for surrounding volume loss. The orbits are unremarkable and the paranasal sinuses are grossly clear.  MR angiogram demonstrates no evidence of flow-limiting stenosis in the neck. The common and bilateral internal carotid arteries are  patent. The vertebral arteries are normal in course and caliber. Intracranially, the carotid siphons demonstrate no evidence of flow-limiting stenosis. The branching [place] of Rees vessels including bilateral anterior, middle and posterior cerebral arteries are patent without evidence of high-grade flow limiting stenosis, large vessel occlusion or aneurysmal dilatation. The vertebrobasilar system is patent.      Impression: Age-related changes are present as above. There is otherwise no evidence of infarct, hemorrhage, mass or mass effect.  Essentially normal MR angiogram with no evidence of flow-limiting stenosis, large vessel occlusion or aneurysmal dilatation.  This report was finalized on 8/9/2022 10:24 PM by aGlo Mejias.      MRI Angiogram Neck Without Contrast    Result Date: 8/9/2022  DATE OF EXAM: 8/9/2022 9:51 PM  PROCEDURE: MRI ANGIOGRAM NECK WO CONTRAST-, MRI ANGIOGRAM HEAD WO CONTRAST-, MRI BRAIN WO CONTRAST-  INDICATIONS: Neuro deficit, acute, stroke suspected; R41.82-Altered mental status, unspecified; N17.9-Acute kidney failure, unspecified; E83.52-Hypercalcemia; D64.9-Anemia, unspecified; I10-Essential (primary) hypertension; Z86.19-Personal history of other infectious and parasitic diseases; Z66-Do not resuscitate; R53.81-Other malaise  COMPARISON: No comparisons available.  TECHNIQUE: Multiplanar multisequence MRI of the brain performed without IV contrast. Noncontrast time-of-flight 3-dimensional MR angiogram of the head and neck with three-dimensional maximum intensity projections postprocessed  FINDINGS: No acute infarct is present on diffusion weighted sequences. Midline structures are unremarkable and the craniocervical junction is satisfactory in appearance. Age-related changes are present with generalized volume loss and typical periventricular leukomalacia favored to reflect chronic small vessel ischemia. There is no evidence of intracranial hemorrhage, mass or mass effect. The  ventricles are normal in size and configuration, accounting for surrounding volume loss. The orbits are unremarkable and the paranasal sinuses are grossly clear.  MR angiogram demonstrates no evidence of flow-limiting stenosis in the neck. The common and bilateral internal carotid arteries are patent. The vertebral arteries are normal in course and caliber. Intracranially, the carotid siphons demonstrate no evidence of flow-limiting stenosis. The branching [place] of Rees vessels including bilateral anterior, middle and posterior cerebral arteries are patent without evidence of high-grade flow limiting stenosis, large vessel occlusion or aneurysmal dilatation. The vertebrobasilar system is patent.      Impression: Age-related changes are present as above. There is otherwise no evidence of infarct, hemorrhage, mass or mass effect.  Essentially normal MR angiogram with no evidence of flow-limiting stenosis, large vessel occlusion or aneurysmal dilatation.  This report was finalized on 8/9/2022 10:24 PM by Galo Mejias.      MRI Brain Without Contrast    Result Date: 8/9/2022  DATE OF EXAM: 8/9/2022 9:51 PM  PROCEDURE: MRI ANGIOGRAM NECK WO CONTRAST-, MRI ANGIOGRAM HEAD WO CONTRAST-, MRI BRAIN WO CONTRAST-  INDICATIONS: Neuro deficit, acute, stroke suspected; R41.82-Altered mental status, unspecified; N17.9-Acute kidney failure, unspecified; E83.52-Hypercalcemia; D64.9-Anemia, unspecified; I10-Essential (primary) hypertension; Z86.19-Personal history of other infectious and parasitic diseases; Z66-Do not resuscitate; R53.81-Other malaise  COMPARISON: No comparisons available.  TECHNIQUE: Multiplanar multisequence MRI of the brain performed without IV contrast. Noncontrast time-of-flight 3-dimensional MR angiogram of the head and neck with three-dimensional maximum intensity projections postprocessed  FINDINGS: No acute infarct is present on diffusion weighted sequences. Midline structures are unremarkable and the  craniocervical junction is satisfactory in appearance. Age-related changes are present with generalized volume loss and typical periventricular leukomalacia favored to reflect chronic small vessel ischemia. There is no evidence of intracranial hemorrhage, mass or mass effect. The ventricles are normal in size and configuration, accounting for surrounding volume loss. The orbits are unremarkable and the paranasal sinuses are grossly clear.  MR angiogram demonstrates no evidence of flow-limiting stenosis in the neck. The common and bilateral internal carotid arteries are patent. The vertebral arteries are normal in course and caliber. Intracranially, the carotid siphons demonstrate no evidence of flow-limiting stenosis. The branching [place] of Rees vessels including bilateral anterior, middle and posterior cerebral arteries are patent without evidence of high-grade flow limiting stenosis, large vessel occlusion or aneurysmal dilatation. The vertebrobasilar system is patent.      Impression: Age-related changes are present as above. There is otherwise no evidence of infarct, hemorrhage, mass or mass effect.  Essentially normal MR angiogram with no evidence of flow-limiting stenosis, large vessel occlusion or aneurysmal dilatation.  This report was finalized on 8/9/2022 10:24 PM by Galo Mejias.      XR Chest 1 View    Result Date: 8/9/2022  DATE OF EXAM: 8/9/2022 4:40 PM  PROCEDURE: XR CHEST 1 VW-  INDICATIONS: AMS w/ hx of aspiration  COMPARISON: October 9, 2020  TECHNIQUE: Single radiographic AP view of the chest was obtained.  FINDINGS: There is ectasia of the thoracic aorta. The lungs seem relatively clear. It looks like there are some emphysematous changes. There is vertebral augmentation within the spine in the thoracolumbar area.      Impression: 1.  No definite acute pulmonary process. 2.  Ectasia thoracic aorta  This report was finalized on 8/9/2022 4:57 PM by Julian Bowen MD.      CT Facial Bones  Without Contrast    Result Date: 8/10/2022  DATE OF EXAM: 8/10/2022 5:21 PM  PROCEDURE: CT FACIAL BONES WO CONTRAST-  INDICATIONS: Maxillary/facial abscess; R41.82-Altered mental status, unspecified; N17.9-Acute kidney failure, unspecified; E83.52-Hypercalcemia; D64.9-Anemia, unspecified; I10-Essential (primary) hypertension; Z86.19-Personal history of other infectious and parasitic diseases; Z66-Do not resuscitate; R53.81-Other malaise; R13.10-Dysphagia, unspecified; R47.1-Dysarthria and anarthria; R41.841-Cognitive communi  COMPARISON: No comparisons available.  TECHNIQUE: Routine transaxial slices were obtained through the facial region without the administration of intravenous contrast. Reconstructed coronal and sagittal images were also obtained. Automated exposure controls and iterative reconstruction methods were used.  The radiation dose reduction device was turned on for each scan per the ALARA (As Low as Reasonably Achievable) protocol.  FINDINGS: Limited intracranial evaluation demonstrates no acute finding. The orbits are unremarkable. The paranasal sinuses are grossly clear. There is no acute facial bone fracture. There is extensive dental hardware with associated streak artifact, obscuring evaluation of the oral cavity. There is no distinct evidence of focal fluid collection otherwise concerning for definite periosteal abscess. Dentition appears overall intact, without definite evidence of prominent periapical lucency to more specifically suggest abscess.      Impression: There is extensive dental hardware with associated streak artifact, obscuring evaluation of the oral cavity. There is no distinct evidence of focal fluid collection otherwise concerning for periosteal abscess. Dentition appears overall intact, without definite evidence of prominent periapical lucency to more specifically suggest abscess.  This report was finalized on 8/10/2022 5:53 PM by Galo Mejias.            I have reviewed  the medications:  Scheduled Meds:acetaminophen, 650 mg, Oral, Nightly  aspirin, 325 mg, Oral, Daily   Or  aspirin, 300 mg, Rectal, Daily  atorvastatin, 80 mg, Oral, Nightly  budesonide-formoterol, 2 puff, Inhalation, BID - RT  Diclofenac Sodium, 2 g, Topical, Q12H  dilTIAZem CD, 120 mg, Oral, Daily  famotidine, 20 mg, Oral, Daily  folic acid, 1,000 mcg, Oral, Daily  miconazole, 1 application, Topical, Q12H  senna-docusate sodium, 2 tablet, Oral, BID  sodium chloride, 10 mL, Intravenous, Q12H  sodium chloride, 10 mL, Intravenous, Q12H      Continuous Infusions:sodium chloride, 100 mL/hr, Last Rate: 100 mL/hr (08/11/22 1000)      PRN Meds:.•  acetaminophen **OR** acetaminophen **OR** acetaminophen  •  albuterol  •  senna-docusate sodium **AND** polyethylene glycol **AND** bisacodyl **AND** bisacodyl  •  melatonin  •  [COMPLETED] Insert peripheral IV **AND** sodium chloride  •  sodium chloride  •  sodium chloride  •  traMADol    Assessment & Plan   Assessment & Plan     Active Hospital Problems    Diagnosis  POA   • **Hypercalcemia [E83.52]  Yes   • Acute on chronic alteration in mental status [R41.82]  Yes   • Acute kidney injury (HCC) [N17.9]  Yes   • Pelvic mass [R19.00]  Yes   • Impaired cognition [R41.89]  Yes   • Impaired mobility [Z74.09]  Yes   • 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        Brief Hospital Course to date:  Pati Carter is a 89 y.o. female [place] resident with recent admission for C. 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, multifactorial  Hypercalcemia  Ferny Garret syndrome  - Baseline has some visual hallucinations related to Ferny Garret syndrome; daughter notes significant worsening of mentation/incomprehensible speech  - CT head w/o acute finding, stroke neuro assessed  --MRI brain and angiogram w/o acute problems  - Suspect symptoms largely related to hypercalcemia; patient is a mandatory feeder and there is a question as to whether she was being fed/provided for her at the facility  - speech following  - Suspect hypercalcemia from oral supplements/vitamins with renal failure and severe volume contraction; additionally could be paraneoplastic from multiple masses within her abdomen/pelvis; calcium labs ordered. PTH is low. Calcium improving w fluids. Pending PTHrP  - Continuous IVF ordered, recheck calcium in a.m.; hold oral calcium/vitamin D supplements  --questionable Parkinson's per neuro- evaluation to continue outpatient     JENNIE on CKD 3  -Baseline GFR 40s; Cr 0.9-1.1  -Suspect prerenal from lack of oral intake, IVF as above am labs  --improving w fluids     Likely ongoing aspiration  - SLP following     Hypertension  Hyperlipidemia  PVD  -Aspirin, diltiazem     COPD  - Home inhalers, substituted for formulary     GERD  - Pepcid     Rheumatoid arthritis  - Per previous documentation, on MTX, however not listed on home med list     Recent C. difficile colitis  -Completed oral vancomycin     1.8 cm LT renal lesion  Bladder wall lesion  Pelvic mass  -Seen by Dr. Yao last admit, monitoring/following up opt  -Seen by Dr. Mello last admit, gyn mass is stable, expectant management, if large volume blood loss she recommended formal Dx and rad-onc therapy  -family prefers more conservative measures and likely would not pursue invasive measures     Obtaining Facial  CT for concern of dental abscess. Might be contributing to poor PO intake      Expected Discharge Location and Transportation: long term care, short term rehab before returning  Expected Discharge Date: 8/15    DVT prophylaxis:  Mechanical DVT prophylaxis orders are present.     AM-PAC 6 Clicks Score (PT): 10 (08/11/22 1012)    CODE STATUS:   Code Status and Medical Interventions:   Ordered at: 08/09/22 0010     Medical Intervention Limits:    NO intubation (DNI)     Code Status (Patient has no pulse and is not breathing):    No CPR (Do Not Attempt to Resuscitate)     Medical Interventions (Patient has pulse or is breathing):    Limited Support       Micheline Nobles MD  08/11/22

## 2022-08-11 NOTE — PLAN OF CARE
Goal Outcome Evaluation:           Progress: no change  Outcome Evaluation: Pt oriented to self only today, NSR w/ PACs. Room air. appeared to be slightly restelss and uncomfortable this am, see MAR. pt completed barrium swallow test today, see Note. sat in chair for most of the shift, able to stand and pivot w/ 2 assist. no new issues at this time.

## 2022-08-11 NOTE — PLAN OF CARE
Goal Outcome Evaluation:  Plan of Care Reviewed With: patient    SLP re-evaluation completed. Will continue to address dysphagia pending goals of care decisions. Please see note for further details and recommendations.

## 2022-08-11 NOTE — DISCHARGE PLACEMENT REQUEST
"Case management [phone]  Bert Carter (89 y.o. Female)             Date of Birth   07/26/1930    Social Security Number       Address   [address] [place] [address]    Home Phone   [phone]    MRN   [id number]       Episcopal   None    Marital Status                               Admission Date   8/9/22    Admission Type   Emergency    Admitting Provider   Micheline Nobles MD    Attending Provider   Micheline Nobles MD    Department, Room/Bed   [facility] 3E, S343/1       Discharge Date       Discharge Disposition       Discharge Destination                               Attending Provider: Micheline Nobles MD    Allergies: Ciprofloxacin, Levofloxacin, Sulfamethoxazole-trimethoprim, Sulfamethoxazole, Trimethoprim    Isolation: Spore   Infection: C.difficile (07/23/22)   Code Status: No CPR   Advance Care Planning Activity    Ht: 165.1 cm (65\")   Wt: 54.9 kg (121 lb)    Admission Cmt: None   Principal Problem: Hypercalcemia [E83.52]                 Active Insurance as of 8/9/2022     Primary Coverage     Payor Plan Insurance Group Employer/Plan Group    MEDICARE MEDICARE A & B      Payor Plan Address Payor Plan Phone Number Payor Plan Fax Number Effective Dates    [address] [phone]  7/1/1995 - None Entered    [facility] [id number]       Subscriber Name Subscriber Birth Date Member ID       BERT CARTER 7/26/1930 [id number]           Secondary Coverage     Payor Plan Insurance Group Employer/Plan Group    AARP MC SUP AARP HEALTH CARE OPTIONS      Payor Plan Address Payor Plan Phone Number Payor Plan Fax Number Effective Dates    [facility] [phone]  1/1/2016 - None Entered    [address]       [facility] [address]       Subscriber Name Subscriber Birth Date Member ID       BERT CARTER 7/26/1930 [id number]                 Emergency Contacts      (Rel.) Home Phone Work Phone Mobile Phone    OLLIE CARTER (Daughter) -- -- [phone]    " Danette Suresh (Daughter) -- -- [phone]               History & Physical      Sawyer, Logan Weiner DO at 22 4807              [facility] Medicine Services  HISTORY AND PHYSICAL    Patient Name: Pati Carter  : 1930  MRN: [id number]  Primary Care Physician: Pallavi Eduardo MD  Date of admission: 2022      Subjective   Subjective     Chief Complaint:  From facility with confusion    HPI:  Pati Carter is a 89 y.o. female AL resident w/ GERD, HTN, HLD, RA on MTX, COPD, PVD, with recent treatment for recurrent UTI and hospitalization 22-22 for C. difficile colitis treated with oral vancomycin.  During that hospitalization she was identified to have small LT renal lesion (per Uro concerning for poss RCC), bladder lesion, uterine lesion (per Dr. Vasquez felt stable/benign, not a candidate for systemic therapy or surgical intervention).  She was DC'd to [facility] for acute rehab with plans for outpatient follow-up with urology.  Daughter at bedside notes during her time at rehab she has not thrived, however they have been able to get her up and mobile, over the last several days she has acutely been declining, NH physician checked labs and a CXR and started her on Augmentin with prophylactic oral vancomycin for presumptive pneumonia.  Both daughters had to return to work full-time and have not been able to spend time at the facility assisting with her or helping feed her.  Per daughter she requires assistance with all meals, in the ED today the daughter noted her mother's lips seem very dry and cracked and her tongue seem very dry and treatable.  The patient herself is mumbling only and unable to provide meaningful contribution to her history.      Review of Systems   Unable to be obtained due to patient's mentation    Personal History     Past Medical History:   Diagnosis Date   • Anemia     Description: A.  Dx - borderline intermittent.   • Back pain     • Benign colonic polyp 9/28/2016    Description: A.  Dx 1999.   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  Rule out chronic persistent asthma, COPD, or obliterative bronchiolitis.- Rae   • COVID-19 virus infection 10/11/2020   • CTS (carpal tunnel syndrome)    • Degeneration of intervertebral disc of lumbar region     Description: A.  Diagnosed in April 2013 with advanced multilevel with severe spinal stenosis, followed by Dr. Pillai for pain management.   • Gastroesophageal reflux disease    • Hearing loss    • History of calcium pyrophosphate deposition disease (CPPD)    • History of colonoscopy 01/01/1999    NORMAL PER PATIENT    • History of mammogram 01/01/2011    NORMAL PER PT    • History of Papanicolaou smear of cervix 01/01/2010    NORMAL PER PT    • History of varicella    • Hyperlipidemia     Description: A.  Dx 2006.   • Hypertension     Description: A. Dx 2001.   • Macular degeneration    • Nocturia    • Osteoporosis    • Ovarian mass     Dx 8/15- benign left cystic adnexal mass   • Overactive bladder    • Pelvic floor dysfunction    • Rheumatoid arthritis (HCC)     Description: A.  Diagnosed in 2000 and and followed by Dr. Constantino (now Dr. Kaplan). B.  On methotrexate therapy since 2000. C.  Off low-dose prednisone therapy.   • Vitamin D deficiency              Past Surgical History:   Procedure Laterality Date   • APPENDECTOMY     • CARPAL TUNNEL RELEASE Left 01/01/2003    HISTORY OF NEUROPLASTY DECOMPRESSION MEDIAN NERVE AT CARPAL TUNNEL LEFT   • CATARACT EXTRACTION Bilateral 01/01/2009   • CHOLECYSTECTOMY  01/01/1962   • HIP CANNULATED SCREW PLACEMENT Right 1/25/2020    Procedure: HIP CANNULATED SCREW PLACEMENT RIGHT;  Surgeon: Karlos Blount MD;  [facility]: [facility];  Service: Orthopedics   • KNEE ARTHROSCOPY Left  01/01/2001    MENISCAL REPAIR   • KYPHOPLASTY  06/18/2015    T11 AND L1 (JOSE A)   • PELVIC LAPAROSCOPY  01/01/1996    REMOVAL OF BENIGN UTERINE AND RIGHT OVARIAN TUMORS   • SALPINGO OOPHORECTOMY Left 08/26/2015    REMOVAL OF LEFT OVARY AND TUBE (benign cystic mass)       Family History:  family history includes Diabetes in her mother; Hypertension in her mother. Otherwise pertinent FHx was reviewed and unremarkable.     Social History:  reports that she has quit smoking. She quit after 0.50 years of use. She has never used smokeless tobacco. She reports that she does not drink alcohol and does not use drugs.  Social History     Social History Narrative    Patient moved to [facility] assisted living 3/26/20. On 3/28/20 patient visited ED for fall-related injuries.  In response, family looking into hiring a , to visit patient BID for ADLs/ transfers. Family drives patient to appts, but says Jm Encinas may provide in future.       Medications:  Available home medication information reviewed.  (Not in a hospital admission)      Allergies   Allergen Reactions   • Ciprofloxacin Other (See Comments) and Unknown - High Severity     Other reaction(s): shaking  HCI TABS/ SHAKING  Other reaction(s): shaking  HCI TABS/ SHAKING   • Levofloxacin Diarrhea and Unknown - High Severity   • Sulfamethoxazole-Trimethoprim Other (See Comments), Rash and Unknown - High Severity     Stomach cramps   Stomach cramps    • Sulfamethoxazole Rash   • Trimethoprim GI Intolerance, Diarrhea and Rash       Objective   Objective     Vital Signs:   Temp:  [98 °F (36.7 °C)] 98 °F (36.7 °C)  Heart Rate:  [100-112] 101  Resp:  [18-21] 18  BP: (137-155)/(78-87) 146/78  Flow (L/min):  [2] 2  Total (NIH Stroke Scale): 13    Physical Exam   Constitutional: Lethargic, laying on ED stretcher, moans/grimaces to painful stimuli  Eyes: PERRL, sclerae anicteric, no conjunctival injection  HENT: NCAT, mucous membranes moist  Neck:  Supple, trachea midline  Respiratory: Clear to auscultation bilaterally, nonlabored respirations   Cardiovascular: RRR, no murmurs, rubs, or gallops, palpable radial pulses bilaterally  Gastrointestinal: Positive bowel sounds, soft, nontender, nondistended  Musculoskeletal: No bilateral ankle edema, no clubbing or cyanosis to extremities; bitemporal wasting  Psychiatric: Unable to assess  Neurologic: Mumbles/moans to stimuli, moving extremities spontaneously    Result Review:  I have personally reviewed the results from the time of this admission to 8/9/2022 22:17 EDT and agree with these findings:  [x]  Laboratory list / accordion  []  Microbiology  []  Radiology  []  EKG/Telemetry   []  Cardiology/Vascular   []  Pathology  []  Old records  []  Other:  Most notable findings include: Hypercalcemia        LAB RESULTS:      Lab 08/09/22 1720 08/07/22  1918   WBC 17.90* 12.72*   HEMOGLOBIN 10.1* 9.1*   HEMATOCRIT 30.9* 29.8*   PLATELETS 436 486*   NEUTROS ABS 15.13* 8.45*   IMMATURE GRANS (ABS) 0.10* 0.11*   LYMPHS ABS 0.99 2.29   MONOS ABS 1.48* 1.58*   EOS ABS 0.10 0.22   MCV 97.2* 101.4*   PROCALCITONIN 0.33*  --    LACTATE 1.2  --          Lab 08/09/22  1720 08/07/22  1918   SODIUM 139 139   POTASSIUM 3.8 3.8   CHLORIDE 98 97*   CO2 30.0* 31.0*   ANION GAP 11.0 11.0   BUN 35* 36*   CREATININE 2.04* 1.92*   EGFR 22.5* 24.2*   GLUCOSE 104* 88   CALCIUM 13.3* 13.0*   IONIZED CALCIUM 1.86*  --          Lab 08/09/22  1720   TOTAL PROTEIN 7.4   ALBUMIN 3.20*   GLOBULIN 4.2   ALT (SGPT) 17   AST (SGOT) 20   BILIRUBIN 0.4   ALK PHOS 118*   LIPASE 10*         Lab 08/09/22  1720   PROBNP 4,237.0*   TROPONIN T 0.032*                 Lab 08/09/22  1715   PH, ARTERIAL 7.478*   PCO2, ARTERIAL 44.6   PO2 ART 61.5*   FIO2 28   HCO3 ART 33.0*   BASE EXCESS ART 8.5*   CARBOXYHEMOGLOBIN 1.3     UA    Urinalysis 6/24/22 7/23/22 7/23/22 8/9/22 8/9/22     0038 0038 1838 1838   Squamous Epithelial Cells, UA   0-2  3-6 (A)   Specific  Gravity, UA  1.022  1.013    Ketones, UA Negative Negative  Negative    Blood, UA  Large (3+) (A)  Negative    Leukocytes, UA Negative Moderate (2+) (A)  Trace (A)    Nitrite, UA  Negative  Negative    RBC, UA   Too Numerous to Count (A)  0-2   WBC, UA   Too Numerous to Count (A)  6-12 (A)   Bacteria, UA   None Seen  None Seen   (A) Abnormal value              Microbiology Results (last 10 days)     Procedure Component Value - Date/Time    COVID PRE-OP / PRE-PROCEDURE SCREENING ORDER (NO ISOLATION) - Swab, Nasopharynx [[id number]]  (Normal) Collected: 08/09/22 1812    Lab Status: Final result Specimen: Swab from Nasopharynx Updated: 08/09/22 1907    Narrative:      The following orders were created for panel order COVID PRE-OP / PRE-PROCEDURE SCREENING ORDER (NO ISOLATION) - Swab, Nasopharynx.  Procedure                               Abnormality         Status                     ---------                               -----------         ------                     COVID-19 and FLU A/B PCR...[[id number]]  Normal              Final result                 Please view results for these tests on the individual orders.    COVID-19 and FLU A/B PCR - Swab, Nasopharynx [[id number]]  (Normal) Collected: 08/09/22 1812    Lab Status: Final result Specimen: Swab from Nasopharynx Updated: 08/09/22 1907     COVID19 Not Detected     Influenza A PCR Not Detected     Influenza B PCR Not Detected    Narrative:      Fact sheet for providers: https://www.fda.gov/media/992504/download    Fact sheet for patients: https://www.fda.gov/media/815436/download    Test performed by PCR.          CT Head Without Contrast    Result Date: 8/9/2022  DATE OF EXAM: 8/9/2022 8:10 PM  PROCEDURE: CT HEAD WO CONTRAST-  INDICATIONS: AMS - somnolence; R41.82-Altered mental status, unspecified; N17.9-Acute kidney failure, unspecified; E83.52-Hypercalcemia; D64.9-Anemia, unspecified; I10-Essential (primary) hypertension; Z86.19-Personal history of other  infectious and parasitic diseases; Z66-Do not resuscitate; R53.81-Other malaise  COMPARISON: No comparisons available.  TECHNIQUE: Routine transaxial and coronal reconstruction images were obtained through the head without the administration of contrast. Automated exposure control and iterative reconstruction methods were used.  The radiation dose reduction device was turned on for each scan per the ALARA (As Low as Reasonably Achievable) protocol.  FINDINGS: Gray-white differentiation is maintained and there is no evidence of intracranial hemorrhage, mass or mass effect. Age-related changes of the brain are present including volume loss and typical periventricular sequela of chronic small vessel ischemia. There is otherwise no evidence of intracranial hemorrhage, mass or mass effect. The ventricles are normal in size and configuration accounting for surrounding volume loss. The orbits are normal and the paranasal sinuses are grossly clear.      Impression: Age-related changes of the brain as above, otherwise without evidence of acute intracranial abnormality.   This report was finalized on 8/9/2022 8:51 PM by Galo Mejias.      XR Chest 1 View    Result Date: 8/9/2022  DATE OF EXAM: 8/9/2022 4:40 PM  PROCEDURE: XR CHEST 1 VW-  INDICATIONS: AMS w/ hx of aspiration  COMPARISON: October 9, 2020  TECHNIQUE: Single radiographic AP view of the chest was obtained.  FINDINGS: There is ectasia of the thoracic aorta. The lungs seem relatively clear. It looks like there are some emphysematous changes. There is vertebral augmentation within the spine in the thoracolumbar area.      Impression: 1.  No definite acute pulmonary process. 2.  Ectasia thoracic aorta  This report was finalized on 8/9/2022 4:57 PM by Julian Bowen MD.            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  Rule out chronic persistent asthma, COPD, or obliterative bronchiolitis.- Rae     • Constipation [K59.00]  Yes   • Rheumatoid arthritis (HCC) [M06.9]  Yes     Description: A.  Diagnosed in 2000 and and followed by Dr. Constantino (now Dr. Kaplan)- now Dr. Del HO  On methotrexate therapy since 2000.  C.  Off low-dose prednisone therapy.     • Hearing loss [H91.90]  Yes   • Hyperlipidemia [E78.5]  Yes     Description: A.  Dx 2006.     • Hypertension [I10]  Yes     Description: A. Dx 2001.       Summary: This is a 89-year-old female AL resident with recent admission for C. 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 per stroke neurology, once they are comfortable with a diet she will need assistance with all meals  - Suspect hypercalcemia from oral supplements/vitamins with renal failure and severe volume contraction; additionally  could be paraneoplastic from multiple masses within her abdomen/pelvis; calcium labs ordered  - Continuous IVF ordered, recheck calcium in a.m.; hold oral calcium/vitamin D supplements    JENNIE on CKD 3  -Baseline GFR 40s; Cr 0.9-1.1  -Suspect prerenal from lack of oral intake, IVF as above and repeat labs in the a.m.; if not improving I would obtain imaging of her urinary tract and pursue further laboratory testing    Likely ongoing aspiration  - SLP eval    Hypertension  Hyperlipidemia  PVD  -Aspirin, diltiazem    COPD  - Home inhalers, substituted for formulary    GERD  - Pepcid    Rheumatoid arthritis  - Per previous documentation, on MTX, however not listed on home med list    Recent C. difficile colitis  -Completed oral vancomycin    1.8 cm LT renal lesion  Bladder wall lesion  Pelvic mass  -Seen by Dr. Yao last admit, monitoring/following up opt  -Seen by Dr. Mello lat admit, gyn mass is stable, expectant management, if large volume blood loss she recommended formal Dx and rad-onc therapy  -d/w dtr at bedside, they prefer more conservative measures and likely would not pursue invasive measures      DVT prophylaxis:  SCD's      CODE STATUS:  DNR/DNI  Code Status and Medical Interventions:   Ordered at: 08/09/22 2215     Medical Intervention Limits:    NO intubation (DNI)     Code Status (Patient has no pulse and is not breathing):    No CPR (Do Not Attempt to Resuscitate)     Medical Interventions (Patient has pulse or is breathing):    Limited Support         Logan Sawyer DO  08/09/22      Electronically signed by Logan Sawyer DO at 08/09/22 2245          Physician Progress Notes (most recent note)      Freddy Rich MD at 08/11/22 1340                                                                                                               Daily Progress Note  Neurology     LOS: 2 days     Subjective     Chief Complaint: Altered mental status.    Interval History: No acute issues  overnight.  No further decline in mentation.  Remained stable without any worsening.    ROS: Negative for fever, chest pain or shortness of breath.    Objective     Vital signs in last 24 hours:  Temp:  [97.1 °F (36.2 °C)-98 °F (36.7 °C)] 97.1 °F (36.2 °C)  Heart Rate:  [61-87] 61  Resp:  [18-20] 18  BP: (126-165)/(75-91) 151/75      Physical Exam:   General: Lying in bed with eyes closed. In NAD.     Respiratory: Respirations unlabored   CV: RRR       Neurologic Exam:   Mental status: Awake, alert, Follows commands. Speech fluent   CN: II-XII intact to detailed exam except for visual acuity reduced to finger counting to 1 feet.   Motor: Moves all 4 extremities spontaneously.   Reflexes: 1+ and symmetric throughout          Results Review:    Results from last 7 days   Lab Units 08/10/22  0533   WBC 10*3/mm3 12.45*   HEMOGLOBIN g/dL 9.1*   HEMATOCRIT % 29.0*   PLATELETS 10*3/mm3 369     Results from last 7 days   Lab Units 08/11/22  0802   SODIUM mmol/L 143   POTASSIUM mmol/L 3.8   CHLORIDE mmol/L 107   CO2 mmol/L 27.0   BUN mg/dL 37*   CREATININE mg/dL 1.95*   CALCIUM mg/dL 11.2*       CT Head Without Contrast    Result Date: 8/9/2022  Age-related changes of the brain as above, otherwise without evidence of acute intracranial abnormality.   This report was finalized on 8/9/2022 8:51 PM by Galo Mejias.      MRI Angiogram Head Without Contrast    Result Date: 8/9/2022  Age-related changes are present as above. There is otherwise no evidence of infarct, hemorrhage, mass or mass effect.  Essentially normal MR angiogram with no evidence of flow-limiting stenosis, large vessel occlusion or aneurysmal dilatation.  This report was finalized on 8/9/2022 10:24 PM by Galo Mejias.      MRI Angiogram Neck Without Contrast    Result Date: 8/9/2022  Age-related changes are present as above. There is otherwise no evidence of infarct, hemorrhage, mass or mass effect.  Essentially normal MR angiogram with no evidence of  flow-limiting stenosis, large vessel occlusion or aneurysmal dilatation.  This report was finalized on 8/9/2022 10:24 PM by Galo Mejias.      MRI Brain Without Contrast    Result Date: 8/9/2022  Age-related changes are present as above. There is otherwise no evidence of infarct, hemorrhage, mass or mass effect.  Essentially normal MR angiogram with no evidence of flow-limiting stenosis, large vessel occlusion or aneurysmal dilatation.  This report was finalized on 8/9/2022 10:24 PM by Galo Mejias.      XR Chest 1 View    Result Date: 8/9/2022  1.  No definite acute pulmonary process. 2.  Ectasia thoracic aorta  This report was finalized on 8/9/2022 4:57 PM by Julian Bowen MD.      CT Facial Bones Without Contrast    Result Date: 8/10/2022  There is extensive dental hardware with associated streak artifact, obscuring evaluation of the oral cavity. There is no distinct evidence of focal fluid collection otherwise concerning for periosteal abscess. Dentition appears overall intact, without definite evidence of prominent periapical lucency to more specifically suggest abscess.  This report was finalized on 8/10/2022 5:53 PM by Galo Mejias.            Assessment & Plan     89-year-old female who was assisted living facility resident with recent admission for C. difficile colitis during which she was found to have several concerning masses within the abdomen/pelvis, history of Gretchen's syndrome, macular degeneration, hypercalcemia, CKD stage III, hypertension hyperlipidemia who presented because of worsening in mental status.    -AMS likely due to underlying hypercalcemia.  Management of hypercalcemia per primary team.  MRI brain did not reveal any acute abnormalities.  -?  Parkinson's disease.  Will need thorough evaluation as an outpatient for confirmation of diagnosis.  She does have signs suggestive of Parkinson's such as masked face, bradykinesia, subtle intermittent resting tremors.  -Will need  aggressive OT, PT and speech therapy.    Freddy Rich MD  22  13:40 EDT              Electronically signed by Freddy Rich MD at 22 1344          Physical Therapy Notes (last 24 hours)      Dionte Weldon, PT at 22 0858  Version 1 of 1         Patient Name: Pati Carter  : 1930    MRN: [id number]                              Today's Date: 2022       Admit Date: 2022    Visit Dx:     ICD-10-CM ICD-9-CM   1. Acute on chronic alteration in mental status  R41.82 780.09   2. Acute renal failure, unspecified acute renal failure type (HCC)  N17.9 584.9   3. Hypercalcemia  E83.52 275.42   4. Chronic anemia  D64.9 285.9   5. Elevated blood pressure reading with diagnosis of hypertension  I10 401.9   6. History of Clostridioides difficile colitis  Z86.19 V12.79   7. DNR (do not resuscitate)  Z66 V49.86   8. Declining functional status  R53.81 799.3   9. Dysphagia, unspecified type  R13.10 787.20   10. Dysarthria  R47.1 784.51   11. 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  Dx 2006- borderline intermittent.   • Back pain    • Benign colonic polyp 9/28/2016    Description: A.  Dx 1999.   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  Rule out chronic persistent asthma, COPD, or obliterative bronchiolitis.- Rae   • COVID-19 virus infection 10/11/2020   • CTS (carpal tunnel syndrome)    • Degeneration of intervertebral disc of lumbar region     Description: A.  Diagnosed in April 2013 with advanced multilevel with severe spinal stenosis, followed by Dr. Pillai for pain management.   • Gastroesophageal reflux disease    • Hearing loss    • History of calcium pyrophosphate deposition disease (CPPD)    • History of colonoscopy 01/01/1999    NORMAL PER PATIENT    • History of mammogram 01/01/2011    NORMAL PER PT    • History of Papanicolaou smear of cervix 01/01/2010    NORMAL PER PT    • History of varicella    • Hyperlipidemia     Description: A.  Dx 2006.   • Hypertension     Description: A. Dx 2001.   • Macular degeneration    • Nocturia    • Osteoporosis    • Ovarian mass     Dx 8/15- benign left cystic adnexal mass   • Overactive bladder    • Pelvic floor dysfunction    • Rheumatoid arthritis (HCC)     Description: A.  Diagnosed in 2000 and and followed by Dr. Constantino (now Dr. Kaplan). B.  On methotrexate therapy since 2000. C.  Off low-dose prednisone therapy.   • Vitamin D deficiency      Past Surgical History:   Procedure Laterality Date   • APPENDECTOMY     • CARPAL TUNNEL RELEASE Left 01/01/2003    HISTORY OF NEUROPLASTY DECOMPRESSION MEDIAN NERVE AT CARPAL TUNNEL LEFT   • CATARACT EXTRACTION Bilateral 01/01/2009   • CHOLECYSTECTOMY  01/01/1962   • HIP CANNULATED SCREW PLACEMENT Right 1/25/2020    Procedure: HIP CANNULATED SCREW PLACEMENT RIGHT;  Surgeon: Mine  Karlos ZAMORA MD;  Location: [facility], [facility];  Service: Orthopedics   • KNEE ARTHROSCOPY Left 01/01/2001    MENISCAL REPAIR   • KYPHOPLASTY  06/18/2015    T11 AND L1 (JOSE A)   • PELVIC LAPAROSCOPY  01/01/1996    REMOVAL OF BENIGN UTERINE AND RIGHT OVARIAN TUMORS   • SALPINGO OOPHORECTOMY Left 08/26/2015    REMOVAL OF LEFT OVARY AND TUBE (benign cystic mass)      General Information     Row Name 08/11/22 0939          Physical Therapy Time and Intention    Document Type evaluation  -SC     Mode of Treatment physical therapy  -SC     Row Name 08/11/22 0939          General Information    Patient Profile Reviewed yes  -SC     Prior Level of Function --   prior to recent illness and SNF, patient lived in assisted living and ambulated with a walker. Now with declined mobility and falls  -SC     Existing Precautions/Restrictions fall;oxygen therapy device and L/min;other (see comments)  confusion at baseline, visual deficit  -SC     Barriers to Rehab medically complex;previous functional deficit;cognitive status  -SC     Row Name 08/11/22 0939          Living Environment    People in Home facility resident  -SC     Row Name 08/11/22 0939          Home Main Entrance    Number of Stairs, Main Entrance none  -SC     Row Name 08/11/22 0939          Stairs Within Home, Primary    Number of Stairs, Within Home, Primary none  -SC     Row Name 08/11/22 0939          Cognition    Orientation Status (Cognition) oriented to;person  -SC     Row Name 08/11/22 0939          Safety Issues, Functional Mobility    Safety Issues Affecting Function (Mobility) judgment;problem-solving;sequencing abilities  -SC     Impairments Affecting Function (Mobility) cognition;balance;endurance/activity tolerance;pain;strength;range of motion (ROM);postural/trunk control;motor control  -SC     Cognitive Impairments, Mobility Safety/Performance insight into deficits/self-awareness;judgment;problem-solving/reasoning  -SC     Comment, Safety Issues/Impairments  (Mobility) alert, talking, following 3/4 commands  -SC           User Key  (r) = Recorded By, (t) = Taken By, (c) = Cosigned By    Initials Name Provider Type    SC Dionte Weldon, PT Physical Therapist               Mobility     Row Name 08/11/22 0942          Bed Mobility    Bed Mobility supine-sit;scooting/bridging  -SC     Scooting/Bridging [place] (Bed Mobility) verbal cues;2 person assist;dependent (less than 25% patient effort)  -SC     Supine-Sit [place] (Bed Mobility) verbal cues;1 person assist;maximum assist (25% patient effort)  -SC     Assistive Device (Bed Mobility) bed rails;draw sheet;head of bed elevated  -SC     Comment, (Bed Mobility) cues for getting up to edge of bed . Able to assist wtih moving legs and using arms holding onto PT. Took time to work on sitting balance on EOB . L side lean noted.  -SC     Row Name 08/11/22 0942          Transfers    Comment, (Transfers) STS from EOB with mod assist. Focused on transfers over to chair . PT guiding walker and patient able to take steps over. NO buckling noted.  -SC     Row Name 08/11/22 0942          Bed-Chair Transfer    Bed-Chair Kelley (Transfers) moderate assist (50% patient effort);2 person assist;verbal cues  -SC     Assistive Device (Bed-Chair Transfers) walker, front-wheeled  -[facility] Name 08/11/22 0942          Sit-Stand Transfer    Sit-Stand Kelley (Transfers) moderate assist (50% patient effort);2 person assist;verbal cues  -SC     Assistive Device (Sit-Stand Transfers) walker, front-wheeled  -SC     Row Name 08/11/22 0942          Gait/Stairs (Locomotion)    [place] Level (Gait) 2 person assist;moderate assist (50% patient effort)  -SC     Distance in Feet (Gait) 3  -SC     Deviations/Abnormal Patterns (Gait) weight shifting decreased;stride length decreased;festinating/shuffling  -SC     Bilateral Gait Deviations forward flexed posture  -SC     Comment, (Gait/Stairs) STS from EOB with mod assist. Focused  on transfers over to chair . PT guiding walker and patient able to take steps over. NO buckling noted. No buckling noted  -SC           User Key  (r) = Recorded By, (t) = Taken By, (c) = Cosigned By    Initials Name Provider Type    SC Dionte Weldon, PT Physical Therapist               Obj/Interventions     [facility] Name 08/11/22 0947          Range of Motion Comprehensive    Comment, General Range of Motion All joints with some limits to ROM 30%.  -[facility] Name 08/11/22 0947          Strength Comprehensive (MMT)    Comment, General Manual Muscle Testing (MMT) Assessment B LE quads 3+/5  -[facility] Name 08/11/22 0947          Motor Skills    Therapeutic Exercise hip;knee  -[facility] Name 08/11/22 0947          Hip (Therapeutic Exercise)    Hip (Therapeutic Exercise) AAROM (active assistive range of motion)  -SC     Hip AAROM (Therapeutic Exercise) bilateral;flexion;extension;aBduction;aDduction;10 repetitions;supine  -SC     Row Name 08/11/22 0947          Knee (Therapeutic Exercise)    Knee (Therapeutic Exercise) AAROM (active assistive range of motion)  -SC     Knee AROM (Therapeutic Exercise) bilateral;flexion;extension;LAQ (long arc quad);10 repetitions  -[facility] Name 08/11/22 0947          Balance    Static Sitting Balance 1-person assist;minimal assist  -SC     Dynamic Sitting Balance 1-person assist;moderate assist  -SC     Position, Sitting Balance supported  -SC     Static Standing Balance 2-person assist;moderate assist  -SC     Dynamic Standing Balance 2-person assist;moderate assist  -SC     Position/Device Used, Standing Balance supported;walker, rolling  -SC     Comment, Balance took time to work on standing balance on edge of bed. wit tactile and verbal cues.. [id number] improvement with time  -[facility] Name 08/11/22 0947          Sensory Assessment (Somatosensory)    Sensory Assessment (Somatosensory) sensation intact  -SC           User Key  (r) = Recorded By, (t) = Taken By, (c) = Cosigned  By    Initials Name Provider Type    SC Dionte Weldon, PT Physical Therapist               Goals/Plan     Row Name 08/11/22 1007          Bed Mobility Goal 1 (PT)    Activity/Assistive Device (Bed Mobility Goal 1, PT) sit to supine;scooting  -SC     [place] Level/Cues Needed (Bed Mobility Goal 1, PT) minimum assist (75% or more patient effort)  -SC     Time Frame (Bed Mobility Goal 1, PT) long term goal (LTG);10 days  -[facility] Name 08/11/22 1007          Transfer Goal 1 (PT)    Activity/Assistive Device (Transfer Goal 1, PT) sit-to-stand/stand-to-sit;bed-to-chair/chair-to-bed;walker, rolling  -SC     [place] Level/Cues Needed (Transfer Goal 1, PT) minimum assist (75% or more patient effort)  -SC     Time Frame (Transfer Goal 1, PT) long term goal (LTG);10 days  -[facility] Name 08/11/22 1007          Gait Training Goal 1 (PT)    Activity/Assistive Device (Gait Training Goal 1, PT) gait (walking locomotion);walker, rolling  -SC     [place] Level (Gait Training Goal 1, PT) moderate assist (50-74% patient effort)  -SC     Distance (Gait Training Goal 1, PT) 20  -SC     Time Frame (Gait Training Goal 1, PT) long term goal (LTG);10 days  -[facility] Name 08/11/22 1007          Therapy Assessment/Plan (PT)    Planned Therapy Interventions (PT) balance training;bed mobility training;gait training;home exercise program;strengthening;patient/family education;transfer training  -SC           User Key  (r) = Recorded By, (t) = Taken By, (c) = Cosigned By    Initials Name Provider Type    SC Dionte Weldon, PT Physical Therapist               Clinical Impression     Row Name 08/11/22 1001          Pain    Pretreatment Pain Rating 0/10 - no pain  -SC     Posttreatment Pain Rating 0/10 - no pain  -[facility] Name 08/11/22 1001          Plan of Care Review    Plan of Care Reviewed With patient;daughter  -SC     Outcome Evaluation Patient required moderate to max assist with all mobility. She is able to stand  and bear wt and take a few steps. Recommend SNF at discharge  -[facility] Name 08/11/22 1001          Therapy Assessment/Plan (PT)    Patient/Family Therapy Goals Statement (PT) did not state  -SC     Rehab Potential (PT) fair, will monitor progress closely  -SC     Criteria for Skilled Interventions Met (PT) yes;meets criteria  -SC     Therapy Frequency (PT) daily  -[facility] Name 08/11/22 1001          Vital Signs    Post Systolic BP Rehab 140  -SC     Post Treatment Diastolic   -SC     Intratreatment Heart Rate (beats/min) 104  -SC     Posttreatment Heart Rate (beats/min) 82  -SC     Row Name 08/11/22 1001          Positioning and Restraints    Pre-Treatment Position in bed  -SC     Post Treatment Position chair  -SC     In Chair notified nsg;reclined;sitting;call light within reach;encouraged to call for assist;exit alarm on;with family/caregiver  -SC           User Key  (r) = Recorded By, (t) = Taken By, (c) = Cosigned By    Initials Name Provider Type    SC Dionte Weldon, PT Physical Therapist               Outcome Measures     [facility] Name 08/11/22 1012          How much help from another person do you currently need...    Turning from your back to your side while in flat bed without using bedrails? 2  -SC     Moving from lying on back to sitting on the side of a flat bed without bedrails? 1  -SC     Moving to and from a bed to a chair (including a wheelchair)? 2  -SC     Standing up from a chair using your arms (e.g., wheelchair, bedside chair)? 2  -SC     Climbing 3-5 steps with a railing? 1  -SC     To walk in hospital room? 2  -SC     AM-PAC 6 Clicks Score (PT) 10  -SC     Highest level of mobility 4 --> Transferred to chair/commode  -[facility] Name 08/11/22 1012          Functional Assessment    Outcome Measure Options AM-PAC 6 Clicks Basic Mobility (PT)  -SC           User Key  (r) = Recorded By, (t) = Taken By, (c) = Cosigned By    Initials Name Provider Type    Dionte Fatima, PT Physical  Therapist                             Physical Therapy Education                 Title: PT OT SLP Therapies (In Progress)     Topic: Physical Therapy (Done)     Point: Mobility training (Done)     Learning Progress Summary           Patient LANCE Cole, VU by SC at 8/11/2022 1015    Comment: Reviewed benefits of activity   Family LANCE Cole, VU by SC at 8/11/2022 1015    Comment: Reviewed benefits of activity                   Point: Home exercise program (Done)     Learning Progress Summary           Patient DouglasLANCE VU by SC at 8/11/2022 1015    Comment: Reviewed benefits of activity   Family Douglas E, VU by SC at 8/11/2022 1015    Comment: Reviewed benefits of activity                   Point: Body mechanics (Done)     Learning Progress Summary           Patient Douglas E, VU by SC at 8/11/2022 1015    Comment: Reviewed benefits of activity   Family Douglas E, VU by SC at 8/11/2022 1015    Comment: Reviewed benefits of activity                   Point: Precautions (Done)     Learning Progress Summary           Patient DouglasLANEC VU by SC at 8/11/2022 1015    Comment: Reviewed benefits of activity   Family LANCE Cole VU by SC at 8/11/2022 1015    Comment: Reviewed benefits of activity                               User Key     Initials Effective Dates Name Provider Type Discipline    SC 06/16/21 -  Dionte Weldon, PT Physical Therapist PT              PT Recommendation and Plan  Planned Therapy Interventions (PT): balance training, bed mobility training, gait training, home exercise program, strengthening, patient/family education, transfer training  Plan of Care Reviewed With: patient, daughter  Outcome Evaluation: Patient required moderate to max assist with all mobility. She is able to stand and bear wt and take a few steps. Recommend SNF at discharge     Time Calculation:    PT Charges     Row Name 08/11/22 0858             Time Calculation    Start Time 0858  -SC      PT Received On 08/11/22  -SC      PT Goal  Re-Cert Due Date 22  -SC              Untimed Charges    PT Eval/Re-eval Minutes 60  -SC              Total Minutes    Untimed Charges Total Minutes 60  -SC       Total Minutes 60  -SC            User Key  (r) = Recorded By, (t) = Taken By, (c) = Cosigned By    Initials Name Provider Type    SC Dionte Weldon PT Physical Therapist              Therapy Charges for Today     Code Description Service Date Service Provider Modifiers Qty    75707117598 HC PT EVAL MOD COMPLEXITY 4 2022 Dionte Weldon PT GP 1          PT G-Codes  Outcome Measure Options: AM-PAC 6 Clicks Basic Mobility (PT)  AM-PAC 6 Clicks Score (PT): 10  AM-PAC 6 Clicks Score (OT): 10    Dionte Weldon PT  2022      Electronically signed by Dionte Weldon PT at 22 1017     Dionte Weldon PT at 22 0859  Version 1 of 1       Goal Outcome Evaluation:  Plan of Care Reviewed With: patient, daughter           Outcome Evaluation: Patient required moderate to max assist with all mobility. She is able to stand and bear wt and take a few steps. Recommend SNF at discharge    Electronically signed by Dionte Weldon PT at 22 1016          Occupational Therapy Notes (last 48 hours)      Anh Brennan, OT at 08/10/22 1334          Patient Name: Pati Carter  : 1930    MRN: [id number]                              Today's Date: 8/10/2022       Admit Date: 2022    Visit Dx:     ICD-10-CM ICD-9-CM   1. Acute on chronic alteration in mental status  R41.82 780.09   2. Acute renal failure, unspecified acute renal failure type (HCC)  N17.9 584.9   3. Hypercalcemia  E83.52 275.42   4. Chronic anemia  D64.9 285.9   5. Elevated blood pressure reading with diagnosis of hypertension  I10 401.9   6. History of Clostridioides difficile colitis  Z86.19 V12.79   7. DNR (do not resuscitate)  Z66 V49.86   8. Declining functional status  R53.81 799.3   9. Dysphagia, unspecified type  R13.10 787.20   10. Dysarthria   R47.1 784.51   11. 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  Dx 2006- borderline intermittent.   • Back pain    • Benign colonic polyp 9/28/2016    Description: A.  Dx 1999.   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  Rule out chronic persistent asthma, COPD, or obliterative bronchiolitis.- Butch   • COVID-19 virus infection 10/11/2020   • CTS (carpal tunnel syndrome)    • Degeneration of intervertebral disc of lumbar region     Description: A.  Diagnosed in April 2013 with advanced multilevel with severe spinal stenosis, followed by Dr. Pillai for pain management.   • Gastroesophageal reflux disease    • Hearing loss    • History of calcium pyrophosphate deposition disease (CPPD)    • History of colonoscopy 01/01/1999    NORMAL PER PATIENT    • History of mammogram 01/01/2011    NORMAL PER  PT    • History of Papanicolaou smear of cervix 01/01/2010    NORMAL PER PT    • History of varicella    • Hyperlipidemia     Description: A.  Dx 2006.   • Hypertension     Description: A. Dx 2001.   • Macular degeneration    • Nocturia    • Osteoporosis    • Ovarian mass     Dx 8/15- benign left cystic adnexal mass   • Overactive bladder    • Pelvic floor dysfunction    • Rheumatoid arthritis (HCC)     Description: A.  Diagnosed in 2000 and and followed by Dr. Constantino (now Dr. Kaplan). B.  On methotrexate therapy since 2000. C.  Off low-dose prednisone therapy.   • Vitamin D deficiency      Past Surgical History:   Procedure Laterality Date   • APPENDECTOMY     • CARPAL TUNNEL RELEASE Left 01/01/2003    HISTORY OF NEUROPLASTY DECOMPRESSION MEDIAN NERVE AT CARPAL TUNNEL LEFT   • CATARACT EXTRACTION Bilateral 01/01/2009   • CHOLECYSTECTOMY  01/01/1962   • HIP CANNULATED SCREW PLACEMENT Right 1/25/2020    Procedure: HIP CANNULATED SCREW PLACEMENT RIGHT;  Surgeon: Karlos Blount MD;  Location: [facility];  Service: Orthopedics   • KNEE ARTHROSCOPY Left 01/01/2001    MENISCAL REPAIR   • KYPHOPLASTY  06/18/2015    T11 AND L1 (JOSE A)   • PELVIC LAPAROSCOPY  01/01/1996    REMOVAL OF BENIGN UTERINE AND RIGHT OVARIAN TUMORS   • SALPINGO OOPHORECTOMY Left 08/26/2015    REMOVAL OF LEFT OVARY AND TUBE (benign cystic mass)      General Information     Row Name 08/10/22 1443          OT Time and Intention    Document Type evaluation  -TB     Mode of Treatment occupational therapy;individual therapy  -TB     Row Name 08/10/22 1443          General Information    Patient Profile Reviewed yes  -TB     Prior Level of Function max assist:;all household mobility;transfer;bed mobility;ADL's  Recent functional decline with recent hospitalization for fall & CDiff.  -TB     Existing Precautions/Restrictions fall;oxygen therapy device and L/min;other (see comments)  Acute confusion on baseline mild confusion.  -TB     Barriers to  Rehab medically complex;previous functional deficit;cognitive status  -TB     Row Name 08/10/22 1443          Occupational Profile    Reason for Services/Referral (Occupational Profile) Occupational decline  -TB     Environmental Supports and Barriers (Occupational Profile) Pt is a facility resident. No steps to navigate. Until recent illness with progressive decline, pt was up in her apt independently using RW. History of aspiration.  -TB     Row Name 08/10/22 1443          Living Environment    People in Home facility resident  -TB     Row Name 08/10/22 1443          Home Main Entrance    Number of Stairs, Main Entrance none  -TB     Row Name 08/10/22 1443          Stairs Within Home, Primary    Number of Stairs, Within Home, Primary none  -TB     Row Name 08/10/22 1443          Cognition    Orientation Status (Cognition) oriented to;person  -TB     Row Name 08/10/22 1443          Safety Issues, Functional Mobility    Safety Issues Affecting Function (Mobility) insight into deficits/self-awareness;awareness of need for assistance;safety precaution awareness;safety precautions follow-through/compliance;sequencing abilities;judgment;problem-solving  -TB     Impairments Affecting Function (Mobility) cognition;balance;endurance/activity tolerance;pain;strength;range of motion (ROM);postural/trunk control  -TB     Cognitive Impairments, Mobility Safety/Performance awareness, need for assistance;insight into deficits/self-awareness;problem-solving/reasoning;safety precaution awareness;safety precaution follow-through;sequencing abilities;judgment  -TB     Comment, Safety Issues/Impairments (Mobility) Pt able to stand and step to chair with Mod Ax2. Don brief for all mobility/transfers.  -TB           User Key  (r) = Recorded By, (t) = Taken By, (c) = Cosigned By    Initials Name Provider Type    TB Anh Brennan, OT Occupational Therapist                 Mobility/ADL's     Row Name 08/10/22 1447          Bed  Mobility    Bed Mobility supine-sit;scooting/bridging  -TB     Scooting/Bridging [place] (Bed Mobility) maximum assist (25% patient effort);verbal cues;1 person assist  -TB     Supine-Sit [place] (Bed Mobility) maximum assist (25% patient effort);verbal cues;1 person assist  -TB     Bed Mobility, Safety Issues cognitive deficits limit understanding;decreased use of arms for pushing/pulling;decreased use of legs for bridging/pushing;impaired trunk control for bed mobility  -TB     Assistive Device (Bed Mobility) head of bed elevated;draw sheet;bed rails  -     Row Name 08/10/22 1447          Transfers    Transfers sit-stand transfer;bed-chair transfer;stand-sit transfer  -TB     Bed-Chair [place] (Transfers) moderate assist (50% patient effort);2 person assist;verbal cues  -TB     Sit-Stand [place] (Transfers) moderate assist (50% patient effort);2 person assist;verbal cues  -TB     Stand-Sit [place] (Transfers) moderate assist (50% patient effort);2 person assist;verbal cues  -     Row Name 08/10/22 1447          Bed-Chair Transfer    Comment, (Bed-Chair Transfer) BUE support  -     Row Name 08/10/22 1447          Functional Mobility    Functional Mobility- Comment Pt able to stand and step to chair with Mod Ax2. Don brief for all mobility/transfers.  -     Row Name 08/10/22 1447          Activities of Daily Living    BADL Assessment/Intervention bathing;lower body dressing;feeding;toileting  -     Row Name 08/10/22 1447          Bathing Assessment/Intervention    [place] Level (Bathing) lower body;dependent (less than 25% patient effort)  -TB     Position (Bathing) supported sitting;supported standing  -TB     Comment, (Bathing) Total care for LB bathing following incontinent episodes x2 this session  -     Row Name 08/10/22 1447          Lower Body Dressing Assessment/Training    [place] Level (Lower Body Dressing) doff;don;socks;dependent (less than 25% patient  effort)  -TB     Position (Lower Body Dressing) supported sitting  -TB     Comment, (Lower Body Dressing) Total care to doff soiled socks and don clean following incontinent episodes x2 this session  -TB     Row Name 08/10/22 1447          Self-Feeding Assessment/Training    [place] Level (Feeding) minimum assist (75% patient effort);liquids to mouth  -TB     Position (Self-Feeding) supported sitting  -TB     Comment, (Feeding) Per chart review, pt has h/o aspiration. Thickened liquids per ST recommendation with follow up MBS pending.  -TB     Row Name 08/10/22 1447          Toileting Assessment/Training    [place] Level (Toileting) toileting skills;dependent (less than 25% patient effort)  -TB     Comment, (Toileting) Incontinent episodes x2/purwick, brief with all activity  -TB           User Key  (r) = Recorded By, (t) = Taken By, (c) = Cosigned By    Initials Name Provider Type    TB Anh Brennan, OT Occupational Therapist               Obj/Interventions     Row Name 08/10/22 1451          Sensory Assessment (Somatosensory)    Sensory Assessment (Somatosensory) UE sensation intact  -TB     Row Name 08/10/22 1451          Vision Assessment/Intervention    Visual Impairment/Limitations other (see comments)  -TB     Vision Assessment Comment Macular Degeneration. Ferny Bonnet Syndrome (visual hallucinations related to declining vision).  -TB     Row Name 08/10/22 1451          Range of Motion Comprehensive    General Range of Motion bilateral upper extremity ROM WFL  -TB     Comment, General Range of Motion BUE AAROM WFL for self-care  -TB     Row Name 08/10/22 1451          Strength Comprehensive (MMT)    General Manual Muscle Testing (MMT) Assessment upper extremity strength deficits identified  -TB     Comment, General Manual Muscle Testing (MMT) Assessment Generalized weakness/deconditioned. BUE functionally 4-/5  -TB     Row Name 08/10/22 1451          Balance    Balance Assessment  sitting static balance;sitting dynamic balance;sit to stand dynamic balance;standing static balance;standing dynamic balance  -TB     Static Sitting Balance contact guard;verbal cues  -TB     Dynamic Sitting Balance minimal assist;verbal cues  -TB     Position, Sitting Balance supported;sitting in chair;sitting edge of bed  -TB     Sit to Stand Dynamic Balance moderate assist;2-person assist;verbal cues  -TB     Static Standing Balance moderate assist;1-person assist;verbal cues  -TB     Dynamic Standing Balance moderate assist;2-person assist;verbal cues  -TB     Position/Device Used, Standing Balance supported  -TB     Balance Interventions sitting;standing;sit to stand;supported;dynamic;static;occupation based/functional task  -TB           User Key  (r) = Recorded By, (t) = Taken By, (c) = Cosigned By    Initials Name Provider Type    TB Anh Brennan OT Occupational Therapist               Goals/Plan     Row Name 08/10/22 1458          Transfer Goal 1 (OT)    Activity/Assistive Device (Transfer Goal 1, OT) toilet;sit-to-stand/stand-to-sit  -TB     [place] Level/Cues Needed (Transfer Goal 1, OT) moderate assist (50-74% patient effort);verbal cues required  -TB     Time Frame (Transfer Goal 1, OT) by discharge  -TB     Progress/Outcome (Transfer Goal 1, OT) goal ongoing  -TB     Row Name 08/10/22 1458          Grooming Goal 1 (OT)    Activity/Device (Grooming Goal 1, OT) oral care  -TB     [place] (Grooming Goal 1, OT) minimum assist (75% or more patient effort);verbal cues required  -TB     Time Frame (Grooming Goal 1, OT) by discharge  -TB     Progress/Outcome (Grooming Goal 1, OT) goal ongoing  -TB     Row Name 08/10/22 1458          Self-Feeding Goal 1 (OT)    Activity/Device (Self-Feeding Goal 1, OT) liquids to mouth;scoop food and bring to mouth  -TB     [place] Level/Cues Needed (Self-Feeding Goal 1, OT) minimum assist (75% or more patient effort)  -TB     Time Frame (Self-Feeding  Goal 1, OT) by discharge  -TB     Progress/Outcomes (Self-Feeding Goal 1, OT) goal ongoing  -TB     Row Name 08/10/22 1459          Strength Goal 1 (OT)    Strength Goal 1 (OT) Pt/family demonstrate understanding to complete daily HEP to support recovery of function/self-care performance.  -TB     Time Frame (Strength Goal 1, OT) by discharge  -TB     Progress/Outcome (Strength Goal 1, OT) goal ongoing  -TB           User Key  (r) = Recorded By, (t) = Taken By, (c) = Cosigned By    Initials Name Provider Type    TB Anh Brennan, OT Occupational Therapist               Clinical Impression     Row Name 08/10/22 1451          Pain Assessment    Pain Intervention(s) Ambulation/increased activity;Repositioned  -TB     Additional Documentation Pain Scale: FACES Pre/Post-Treatment (Group)  -TB     Row Name 08/10/22 7234          Pain Scale: FACES Pre/Post-Treatment    Pain: FACES Scale, Pretreatment 0-->no hurt  -TB     Posttreatment Pain Rating 4-->hurts little more  -TB     Pain Location - Side/Orientation Bilateral  -TB     Pain Location - hand;knee  -TB     Pre/Posttreatment Pain Comment PMH: RA  -TB     Row Name 08/10/22 6958          Plan of Care Review    Plan of Care Reviewed With patient;daughter  -TB     Outcome Evaluation OT IE completed. Pt is lethargic, Ox1, and participates in therapy with encouragement and increased time. Max Ax1 supine to sit. Mod Ax2 STS and step to chair. Don brief for all OOB activity. Pt requires total care for LB bathing/dressing following incontinent episodes x2 this session. Pt presents with acute confusion, strength, endurance, and balance deficits limiting mobility and self-care. OT will follow IP. Recommend SNF at d/c. Supportive family.  -TB     Row Name 08/10/22 1459          Therapy Assessment/Plan (OT)    Rehab Potential (OT) fair, will monitor progress closely  -TB     Criteria for Skilled Therapeutic Interventions Met (OT) yes;meets criteria;skilled treatment is  necessary  -TB     Therapy Frequency (OT) daily  -TB     Row Name 08/10/22 1454          Therapy Plan Review/Discharge Plan (OT)    Anticipated Discharge Disposition (OT) skilled nursing facility  -TB     Row Name 08/10/22 1454          Vital Signs    Pre Systolic BP Rehab --  RN cleared OT  -TB     Pre SpO2 (%) 96  -TB     O2 Delivery Pre Treatment nasal cannula  -TB     O2 Delivery Intra Treatment nasal cannula  -TB     Post SpO2 (%) 98  -TB     O2 Delivery Post Treatment nasal cannula  -TB     Pre Patient Position Supine  -TB     Intra Patient Position Standing  -TB     Post Patient Position Sitting  -TB     Row Name 08/10/22 1454          Positioning and Restraints    Pre-Treatment Position in bed  -TB     Post Treatment Position chair  -TB     In Chair notified nsg;reclined;call light within reach;encouraged to call for assist;exit alarm on;with family/caregiver;legs elevated  -TB           User Key  (r) = Recorded By, (t) = Taken By, (c) = Cosigned By    Initials Name Provider Type    TB Anh Brennan, OT Occupational Therapist               Outcome Measures     Row Name 08/10/22 1500          How much help from another is currently needed...    Putting on and taking off regular lower body clothing? 1  -TB     Bathing (including washing, rinsing, and drying) 1  -TB     Toileting (which includes using toilet bed pan or urinal) 1  -TB     Putting on and taking off regular upper body clothing 2  -TB     Taking care of personal grooming (such as brushing teeth) 2  -TB     Eating meals 3  -TB     AM-PAC 6 Clicks Score (OT) 10  -TB     Row Name 08/10/22 0800          How much help from another person do you currently need...    Turning from your back to your side while in flat bed without using bedrails? 2  -CC     Moving from lying on back to sitting on the side of a flat bed without bedrails? 2  -CC     Moving to and from a bed to a chair (including a wheelchair)? 2  -CC     Standing up from a chair using  your arms (e.g., wheelchair, bedside chair)? 2  -CC     Climbing 3-5 steps with a railing? 1  -CC     To walk in hospital room? 2  -CC     AM-PAC 6 Clicks Score (PT) 11  -CC     Highest level of mobility 4 --> Transferred to chair/commode  -CC     Row Name 08/10/22 1500          Functional Assessment    Outcome Measure Options AM-PAC 6 Clicks Daily Activity (OT)  -TB           User Key  (r) = Recorded By, (t) = Taken By, (c) = Cosigned By    Initials Name Provider Type    TB Anh Brennan, OT Occupational Therapist    CC Yaneth Chen RN Registered Nurse                Occupational Therapy Education                 Title: PT OT SLP Therapies (In Progress)     Topic: Occupational Therapy (In Progress)     Point: ADL training (In Progress)     Description:   Instruct learner(s) on proper safety adaptation and remediation techniques during self care or transfers.   Instruct in proper use of assistive devices.              Learning Progress Summary           Patient Acceptance, E,D, NR by TB at 8/10/2022 1501   Family Acceptance, E,D, NR by TB at 8/10/2022 1501                   Point: Home exercise program (Not Started)     Description:   Instruct learner(s) on appropriate technique for monitoring, assisting and/or progressing therapeutic exercises/activities.              Learner Progress:  Not documented in this visit.          Point: Precautions (Not Started)     Description:   Instruct learner(s) on prescribed precautions during self-care and functional transfers.              Learner Progress:  Not documented in this visit.          Point: Body mechanics (Not Started)     Description:   Instruct learner(s) on proper positioning and spine alignment during self-care, functional mobility activities and/or exercises.              Learner Progress:  Not documented in this visit.                      User Key     Initials Effective Dates Name Provider Type Discipline    TB 06/16/21 -  Anh Brennan  CHRISTIANO Tiwari Occupational Therapist OT              OT Recommendation and Plan  Therapy Frequency (OT): daily  Plan of Care Review  Plan of Care Reviewed With: patient, daughter  Outcome Evaluation: OT IE completed. Pt is lethargic, Ox1, and participates in therapy with encouragement and increased time. Max Ax1 supine to sit. Mod Ax2 STS and step to chair. Don brief for all OOB activity. Pt requires total care for LB bathing/dressing following incontinent episodes x2 this session. Pt presents with acute confusion, strength, endurance, and balance deficits limiting mobility and self-care. OT will follow IP. Recommend SNF at d/c. Supportive family.     Time Calculation:    Time Calculation- OT     Row Name 08/10/22 1334             Time Calculation- OT    OT Start Time 1334  -TB      OT Received On 08/10/22  -TB      OT Goal Re-Cert Due Date 08/20/22  -TB              Timed Charges    02254 - OT Self Care/Mgmt Minutes 30  -TB              Untimed Charges    OT Eval/Re-eval Minutes 60  -TB              Total Minutes    Timed Charges Total Minutes 30  -TB      Untimed Charges Total Minutes 60  -TB       Total Minutes 90  -TB            User Key  (r) = Recorded By, (t) = Taken By, (c) = Cosigned By    Initials Name Provider Type    TB Anh Brennan OT Occupational Therapist              Therapy Charges for Today     Code Description Service Date Service Provider Modifiers Qty    01315458210 HC OT SELF CARE/MGMT/TRAIN EA 15 MIN 8/10/2022 nAh Brennan OT GO 2    18870610089 HC OT EVAL MOD COMPLEXITY 4 8/10/2022 Anh Brennan OT GO 1               Anh Brennan OT  8/10/2022    Electronically signed by Anh Brennan OT at 08/10/22 1503     Anh Brennan OT at 08/10/22 1334          Problem: Adult Inpatient Plan of Care  Goal: Plan of Care Review  Recent Flowsheet Documentation  Taken 8/10/2022 1454 by Anh Brennan OT  Plan of Care Reviewed With:  •  patient  • daughter  Outcome Evaluation: OT IE completed. Pt is lethargic, Ox1, and participates in therapy with encouragement and increased time. Max Ax1 supine to sit. Mod Ax2 STS and step to chair. Don brief for all OOB activity. Pt requires total care for LB bathing/dressing following incontinent episodes x2 this session. Pt presents with acute confusion, strength, endurance, and balance deficits limiting mobility and self-care. OT will follow IP. Recommend SNF at d/c. Supportive family.       Electronically signed by Anh Brennan OT at 08/10/22 0545

## 2022-08-11 NOTE — THERAPY EVALUATION
Patient Name: Pati Carter  : 1930    MRN: [id number]                              Today's Date: 2022       Admit Date: 2022    Visit Dx:     ICD-10-CM ICD-9-CM   1. Acute on chronic alteration in mental status  R41.82 780.09   2. Acute renal failure, unspecified acute renal failure type (HCC)  N17.9 584.9   3. Hypercalcemia  E83.52 275.42   4. Chronic anemia  D64.9 285.9   5. Elevated blood pressure reading with diagnosis of hypertension  I10 401.9   6. History of Clostridioides difficile colitis  Z86.19 V12.79   7. DNR (do not resuscitate)  Z66 V49.86   8. Declining functional status  R53.81 799.3   9. Dysphagia, unspecified type  R13.10 787.20   10. Dysarthria  R47.1 784.51   11. Cognitive communication deficit  R41.841 799.52     Patient Active Problem List   Diagnosis   • Macular degeneration   • Rheumatoid arthritis ([facility])   • Hearing loss   • Hyperlipidemia   • Gastroesophageal reflux disease   • Hypertension   • Constipation   • Anemia   • Compression fracture of lumbar vertebra ([facility])   • Degeneration of intervertebral disc of lumbar region   • COPD (chronic obstructive pulmonary disease) ([facility])   • H/O calcium pyrophosphate deposition disease (CPPD)   • Falls frequently   • Immunosuppressed status on methotrexate   • Ferny Bonnet syndrome   • Impaired cognition   • Impaired mobility   • Ulcer of right foot, limited to breakdown of skin ([facility])   • Peripheral vascular disease ([facility])   • Nocturia   • Age-related osteoporosis without current pathological fracture   • Pelvic mass   • Fall, initial encounter   • Acute on chronic alteration in mental status   • Hypercalcemia   • Acute kidney injury (HCC)     Past Medical History:   Diagnosis Date   • Anemia     Description: A.  Dx 2006- borderline intermittent.   • Back pain    • Benign colonic polyp 2016    Description: A.  Dx .   • Ferny Bonnet syndrome 2020   • Chondrocalcinosis     knees   • Compression fracture of lumbar  vertebra (HCC)    • Constipation    • COPD (chronic obstructive pulmonary disease) ([facility])     Description: A.  Rule out chronic persistent asthma, COPD, or obliterative bronchiolitis.- Rae   • COVID-19 virus infection 10/11/2020   • CTS (carpal tunnel syndrome)    • Degeneration of intervertebral disc of lumbar region     Description: A.  Diagnosed in April 2013 with advanced multilevel with severe spinal stenosis, followed by Dr. Pillai for pain management.   • Gastroesophageal reflux disease    • Hearing loss    • History of calcium pyrophosphate deposition disease (CPPD)    • History of colonoscopy 01/01/1999    NORMAL PER PATIENT    • History of mammogram 01/01/2011    NORMAL PER PT    • History of Papanicolaou smear of cervix 01/01/2010    NORMAL PER PT    • History of varicella    • Hyperlipidemia     Description: A.  Dx 2006.   • Hypertension     Description: A. Dx 2001.   • Macular degeneration    • Nocturia    • Osteoporosis    • Ovarian mass     Dx 8/15- benign left cystic adnexal mass   • Overactive bladder    • Pelvic floor dysfunction    • Rheumatoid arthritis ([facility])     Description: A.  Diagnosed in 2000 and and followed by Dr. Constantino (now Dr. Kaplan). B.  On methotrexate therapy since 2000. C.  Off low-dose prednisone therapy.   • Vitamin D deficiency      Past Surgical History:   Procedure Laterality Date   • APPENDECTOMY     • CARPAL TUNNEL RELEASE Left 01/01/2003    HISTORY OF NEUROPLASTY DECOMPRESSION MEDIAN NERVE AT CARPAL TUNNEL LEFT   • CATARACT EXTRACTION Bilateral 01/01/2009   • CHOLECYSTECTOMY  01/01/1962   • HIP CANNULATED SCREW PLACEMENT Right 1/25/2020    Procedure: HIP CANNULATED SCREW PLACEMENT RIGHT;  Surgeon: Karlos Blount MD;  Location: [facility];  Service: Orthopedics   • KNEE ARTHROSCOPY Left 01/01/2001    MENISCAL REPAIR   • KYPHOPLASTY  06/18/2015    T11 AND L1 (JOSE A)   • PELVIC LAPAROSCOPY  01/01/1996    REMOVAL OF BENIGN UTERINE AND RIGHT OVARIAN TUMORS   •  SALPINGO OOPHORECTOMY Left 08/26/2015    REMOVAL OF LEFT OVARY AND TUBE (benign cystic mass)      General Information     [facility] Name 08/11/22 0939          Physical Therapy Time and Intention    Document Type evaluation  -SC     Mode of Treatment physical therapy  -SC     Row Name 08/11/22 0939          General Information    Patient Profile Reviewed yes  -SC     Prior Level of Function --   prior to recent illness and SNF, patient lived in assisted living and ambulated with a walker. Now with declined mobility and falls  -SC     Existing Precautions/Restrictions fall;oxygen therapy device and L/min;other (see comments)  confusion at baseline, visual deficit  -SC     Barriers to Rehab medically complex;previous functional deficit;cognitive status  -SC     Row Name 08/11/22 0939          Living Environment    People in Home facility resident  -SC     Row Name 08/11/22 0939          Home Main Entrance    Number of Stairs, Main Entrance none  -[facility] Name 08/11/22 0939          Stairs Within Home, Primary    Number of Stairs, Within Home, Primary none  -SC     Row Name 08/11/22 0939          Cognition    Orientation Status (Cognition) oriented to;person  -SC     Row Name 08/11/22 0939          Safety Issues, Functional Mobility    Safety Issues Affecting Function (Mobility) judgment;problem-solving;sequencing abilities  -SC     Impairments Affecting Function (Mobility) cognition;balance;endurance/activity tolerance;pain;strength;range of motion (ROM);postural/trunk control;motor control  -SC     Cognitive Impairments, Mobility Safety/Performance insight into deficits/self-awareness;judgment;problem-solving/reasoning  -SC     Comment, Safety Issues/Impairments (Mobility) alert, talking, following 3/4 commands  -SC           User Key  (r) = Recorded By, (t) = Taken By, (c) = Cosigned By    Initials Name Provider Type    SC Dionte Weldon, PT Physical Therapist               Mobility     Row Name 08/11/22 0980           Bed Mobility    Bed Mobility supine-sit;scooting/bridging  -SC     Scooting/Bridging [place] (Bed Mobility) verbal cues;2 person assist;dependent (less than 25% patient effort)  -SC     Supine-Sit [place] (Bed Mobility) verbal cues;1 person assist;maximum assist (25% patient effort)  -SC     Assistive Device (Bed Mobility) bed rails;draw sheet;head of bed elevated  -SC     Comment, (Bed Mobility) cues for getting up to edge of bed . Able to assist wtih moving legs and using arms holding onto PT. Took time to work on sitting balance on EOB . L side lean noted.  -SC     Row Name 08/11/22 0942          Transfers    Comment, (Transfers) STS from EOB with mod assist. Focused on transfers over to chair . PT guiding walker and patient able to take steps over. NO buckling noted.  -SC     Row Name 08/11/22 0942          Bed-Chair Transfer    Bed-Chair [place] (Transfers) moderate assist (50% patient effort);2 person assist;verbal cues  -SC     Assistive Device (Bed-Chair Transfers) walker, front-wheeled  -SC     Row Name 08/11/22 0942          Sit-Stand Transfer    Sit-Stand [place] (Transfers) moderate assist (50% patient effort);2 person assist;verbal cues  -SC     Assistive Device (Sit-Stand Transfers) walker, front-wheeled  -SC     Row Name 08/11/22 0942          Gait/Stairs (Locomotion)    [place] Level (Gait) 2 person assist;moderate assist (50% patient effort)  -SC     Distance in Feet (Gait) 3  -SC     Deviations/Abnormal Patterns (Gait) weight shifting decreased;stride length decreased;festinating/shuffling  -SC     Bilateral Gait Deviations forward flexed posture  -SC     Comment, (Gait/Stairs) STS from EOB with mod assist. Focused on transfers over to chair . PT guiding walker and patient able to take steps over. NO buckling noted. No buckling noted  -SC           User Key  (r) = Recorded By, (t) = Taken By, (c) = Cosigned By    Initials Name Provider Type    SC Dionte Weldon, PT  Physical Therapist               Obj/Interventions     Row Name 08/11/22 09          Range of Motion Comprehensive    Comment, General Range of Motion All joints with some limits to ROM 30%.  -[facility] Name 08/11/22 0947          Strength Comprehensive (MMT)    Comment, General Manual Muscle Testing (MMT) Assessment B LE quads 3+/5  -SC     Row Name 08/11/22 0947          Motor Skills    Therapeutic Exercise hip;knee  -SC     Row Name 08/11/22 09          Hip (Therapeutic Exercise)    Hip (Therapeutic Exercise) AAROM (active assistive range of motion)  -SC     Hip AAROM (Therapeutic Exercise) bilateral;flexion;extension;aBduction;aDduction;10 repetitions;supine  -SC     Row Name 08/11/22 09          Knee (Therapeutic Exercise)    Knee (Therapeutic Exercise) AAROM (active assistive range of motion)  -SC     Knee AROM (Therapeutic Exercise) bilateral;flexion;extension;LAQ (long arc quad);10 repetitions  -SC     Row Name 08/11/22 09          Balance    Static Sitting Balance 1-person assist;minimal assist  -SC     Dynamic Sitting Balance 1-person assist;moderate assist  -SC     Position, Sitting Balance supported  -SC     Static Standing Balance 2-person assist;moderate assist  -SC     Dynamic Standing Balance 2-person assist;moderate assist  -SC     Position/Device Used, Standing Balance supported;walker, rolling  -SC     Comment, Balance took time to work on standing balance on edge of bed. wit tactile and verbal cues.. [id number] improvement with time  -SC     Row Name 08/11/22 0947          Sensory Assessment (Somatosensory)    Sensory Assessment (Somatosensory) sensation intact  -SC           User Key  (r) = Recorded By, (t) = Taken By, (c) = Cosigned By    Initials Name Provider Type    SC Dionte Weldon, PT Physical Therapist               Goals/Plan     Row Name 08/11/22 1007          Bed Mobility Goal 1 (PT)    Activity/Assistive Device (Bed Mobility Goal 1, PT) sit to supine;scooting  -SC      [place] Level/Cues Needed (Bed Mobility Goal 1, PT) minimum assist (75% or more patient effort)  -SC     Time Frame (Bed Mobility Goal 1, PT) long term goal (LTG);10 days  -[facility] Name 08/11/22 1007          Transfer Goal 1 (PT)    Activity/Assistive Device (Transfer Goal 1, PT) sit-to-stand/stand-to-sit;bed-to-chair/chair-to-bed;walker, rolling  -SC     [place] Level/Cues Needed (Transfer Goal 1, PT) minimum assist (75% or more patient effort)  -SC     Time Frame (Transfer Goal 1, PT) long term goal (LTG);10 days  -[facility] Name 08/11/22 1007          Gait Training Goal 1 (PT)    Activity/Assistive Device (Gait Training Goal 1, PT) gait (walking locomotion);walker, rolling  -SC     [place] Level (Gait Training Goal 1, PT) moderate assist (50-74% patient effort)  -SC     Distance (Gait Training Goal 1, PT) 20  -SC     Time Frame (Gait Training Goal 1, PT) long term goal (LTG);10 days  -SC     Row Name 08/11/22 1007          Therapy Assessment/Plan (PT)    Planned Therapy Interventions (PT) balance training;bed mobility training;gait training;home exercise program;strengthening;patient/family education;transfer training  -SC           User Key  (r) = Recorded By, (t) = Taken By, (c) = Cosigned By    Initials Name Provider Type    SC Dionte Weldon, PT Physical Therapist               Clinical Impression     Row Name 08/11/22 1001          Pain    Pretreatment Pain Rating 0/10 - no pain  -SC     Posttreatment Pain Rating 0/10 - no pain  -SC     Row Name 08/11/22 1001          Plan of Care Review    Plan of Care Reviewed With patient;daughter  -SC     Outcome Evaluation Patient required moderate to max assist with all mobility. She is able to stand and bear wt and take a few steps. Recommend SNF at discharge  -[facility] Name 08/11/22 1001          Therapy Assessment/Plan (PT)    Patient/Family Therapy Goals Statement (PT) did not state  -SC     Rehab Potential (PT) fair, will monitor progress  closely  -SC     Criteria for Skilled Interventions Met (PT) yes;meets criteria  -SC     Therapy Frequency (PT) daily  -SC     Row Name 08/11/22 1001          Vital Signs    Post Systolic BP Rehab 140  -SC     Post Treatment Diastolic   -SC     Intratreatment Heart Rate (beats/min) 104  -SC     Posttreatment Heart Rate (beats/min) 82  -[facility] Name 08/11/22 1001          Positioning and Restraints    Pre-Treatment Position in bed  -SC     Post Treatment Position chair  -SC     In Chair notified nsg;reclined;sitting;call light within reach;encouraged to call for assist;exit alarm on;with family/caregiver  -SC           User Key  (r) = Recorded By, (t) = Taken By, (c) = Cosigned By    Initials Name Provider Type    Dionte Fatima PT Physical Therapist               Outcome Measures     [facility] Name 08/11/22 1012          How much help from another person do you currently need...    Turning from your back to your side while in flat bed without using bedrails? 2  -SC     Moving from lying on back to sitting on the side of a flat bed without bedrails? 1  -SC     Moving to and from a bed to a chair (including a wheelchair)? 2  -SC     Standing up from a chair using your arms (e.g., wheelchair, bedside chair)? 2  -SC     Climbing 3-5 steps with a railing? 1  -SC     To walk in hospital room? 2  -SC     AM-PAC 6 Clicks Score (PT) 10  -SC     Highest level of mobility 4 --> Transferred to chair/commode  -[facility] Name 08/11/22 1012          Functional Assessment    Outcome Measure Options AM-PAC 6 Clicks Basic Mobility (PT)  -SC           User Key  (r) = Recorded By, (t) = Taken By, (c) = Cosigned By    Initials Name Provider Type    Dionte Fatima PT Physical Therapist                             Physical Therapy Education                 Title: PT OT SLP Therapies (In Progress)     Topic: Physical Therapy (Done)     Point: Mobility training (Done)     Learning Progress Summary           Patient LANCE Cole  VU by SC at 8/11/2022 1015    Comment: Reviewed benefits of activity   Family LANCE Cole, VU by SC at 8/11/2022 1015    Comment: Reviewed benefits of activity                   Point: Home exercise program (Done)     Learning Progress Summary           Patient LANCE Cole, VU by SC at 8/11/2022 1015    Comment: Reviewed benefits of activity   Family LANCE Cole, VU by SC at 8/11/2022 1015    Comment: Reviewed benefits of activity                   Point: Body mechanics (Done)     Learning Progress Summary           Patient LANCE Cole, VU by SC at 8/11/2022 1015    Comment: Reviewed benefits of activity   Family Douglas E, VU by SC at 8/11/2022 1015    Comment: Reviewed benefits of activity                   Point: Precautions (Done)     Learning Progress Summary           Patient LANCE Cole, VU by SC at 8/11/2022 1015    Comment: Reviewed benefits of activity   Family LANCE Cole, VU by SC at 8/11/2022 1015    Comment: Reviewed benefits of activity                               User Key     Initials Effective Dates Name Provider Type Discipline    SC 06/16/21 -  Dionte Weldon, PT Physical Therapist PT              PT Recommendation and Plan  Planned Therapy Interventions (PT): balance training, bed mobility training, gait training, home exercise program, strengthening, patient/family education, transfer training  Plan of Care Reviewed With: patient, daughter  Outcome Evaluation: Patient required moderate to max assist with all mobility. She is able to stand and bear wt and take a few steps. Recommend SNF at discharge     Time Calculation:    PT Charges     Row Name 08/11/22 0858             Time Calculation    Start Time 0858  -SC      PT Received On 08/11/22  -SC      PT Goal Re-Cert Due Date 08/21/22  -SC              Untimed Charges    PT Eval/Re-eval Minutes 60  -SC              Total Minutes    Untimed Charges Total Minutes 60  -SC       Total Minutes 60  -SC            User Key  (r) = Recorded By, (t) = Taken By, (c) =  Cosigned By    Initials Name Provider Type    SC Dionte Weldon, PT Physical Therapist              Therapy Charges for Today     Code Description Service Date Service Provider Modifiers Qty    69965076135 HC PT EVAL MOD COMPLEXITY 4 8/11/2022 Dionte Weldon, PT GP 1          PT G-Codes  Outcome Measure Options: AM-PAC 6 Clicks Basic Mobility (PT)  AM-PAC 6 Clicks Score (PT): 10  AM-PAC 6 Clicks Score (OT): 10    Dionte Weldon PT  8/11/2022

## 2022-08-12 LAB
ANION GAP SERPL CALCULATED.3IONS-SCNC: 8 MMOL/L (ref 5–15)
BUN SERPL-MCNC: 31 MG/DL (ref 8–23)
BUN/CREAT SERPL: 20.8 (ref 7–25)
CA-I SERPL ISE-MCNC: 1.66 MMOL/L (ref 1.12–1.32)
CALCIUM SPEC-SCNC: 11.2 MG/DL (ref 8.2–9.6)
CHLORIDE SERPL-SCNC: 108 MMOL/L (ref 98–107)
CO2 SERPL-SCNC: 28 MMOL/L (ref 22–29)
CREAT SERPL-MCNC: 1.49 MG/DL (ref 0.57–1)
EGFRCR SERPLBLD CKD-EPI 2021: 32.8 ML/MIN/1.73
GLUCOSE SERPL-MCNC: 86 MG/DL (ref 65–99)
POTASSIUM SERPL-SCNC: 3.7 MMOL/L (ref 3.5–5.2)
SODIUM SERPL-SCNC: 144 MMOL/L (ref 136–145)

## 2022-08-12 PROCEDURE — 82330 ASSAY OF CALCIUM: CPT | Performed by: INTERNAL MEDICINE

## 2022-08-12 PROCEDURE — 94761 N-INVAS EAR/PLS OXIMETRY MLT: CPT

## 2022-08-12 PROCEDURE — 94799 UNLISTED PULMONARY SVC/PX: CPT

## 2022-08-12 PROCEDURE — 80048 BASIC METABOLIC PNL TOTAL CA: CPT | Performed by: STUDENT IN AN ORGANIZED HEALTH CARE EDUCATION/TRAINING PROGRAM

## 2022-08-12 PROCEDURE — 25010000002 ONDANSETRON PER 1 MG: Performed by: INTERNAL MEDICINE

## 2022-08-12 PROCEDURE — 99232 SBSQ HOSP IP/OBS MODERATE 35: CPT | Performed by: INTERNAL MEDICINE

## 2022-08-12 RX ORDER — ONDANSETRON 2 MG/ML
4 INJECTION INTRAMUSCULAR; INTRAVENOUS EVERY 6 HOURS PRN
Status: DISCONTINUED | OUTPATIENT
Start: 2022-08-12 | End: 2022-08-17 | Stop reason: HOSPADM

## 2022-08-12 RX ADMIN — FAMOTIDINE 20 MG: 20 TABLET ORAL at 08:47

## 2022-08-12 RX ADMIN — TRAMADOL HYDROCHLORIDE 50 MG: 50 TABLET ORAL at 08:47

## 2022-08-12 RX ADMIN — Medication 5 MG: at 21:42

## 2022-08-12 RX ADMIN — SENNOSIDES AND DOCUSATE SODIUM 2 TABLET: 50; 8.6 TABLET ORAL at 08:47

## 2022-08-12 RX ADMIN — FOLIC ACID 1000 MCG: 1 TABLET ORAL at 08:47

## 2022-08-12 RX ADMIN — MICONAZOLE NITRATE 1 APPLICATION: 20 CREAM TOPICAL at 08:48

## 2022-08-12 RX ADMIN — MICONAZOLE NITRATE 1 APPLICATION: 20 CREAM TOPICAL at 21:41

## 2022-08-12 RX ADMIN — DILTIAZEM HYDROCHLORIDE 30 MG: 30 TABLET, FILM COATED ORAL at 18:51

## 2022-08-12 RX ADMIN — DILTIAZEM HYDROCHLORIDE 30 MG: 30 TABLET, FILM COATED ORAL at 12:21

## 2022-08-12 RX ADMIN — TRAMADOL HYDROCHLORIDE 50 MG: 50 TABLET ORAL at 21:42

## 2022-08-12 RX ADMIN — DICLOFENAC 2 G: 10 GEL TOPICAL at 21:42

## 2022-08-12 RX ADMIN — BUDESONIDE AND FORMOTEROL FUMARATE DIHYDRATE 2 PUFF: 80; 4.5 AEROSOL RESPIRATORY (INHALATION) at 09:15

## 2022-08-12 RX ADMIN — ASPIRIN 325 MG ORAL TABLET 325 MG: 325 PILL ORAL at 08:47

## 2022-08-12 RX ADMIN — BISACODYL 10 MG: 10 SUPPOSITORY RECTAL at 18:55

## 2022-08-12 RX ADMIN — ACETAMINOPHEN 650 MG: 325 TABLET, FILM COATED ORAL at 21:41

## 2022-08-12 RX ADMIN — SODIUM CHLORIDE 100 ML/HR: 9 INJECTION, SOLUTION INTRAVENOUS at 08:48

## 2022-08-12 RX ADMIN — ATORVASTATIN CALCIUM 80 MG: 40 TABLET, FILM COATED ORAL at 21:41

## 2022-08-12 RX ADMIN — ONDANSETRON 4 MG: 2 INJECTION INTRAMUSCULAR; INTRAVENOUS at 09:52

## 2022-08-12 RX ADMIN — DICLOFENAC 2 G: 10 GEL TOPICAL at 08:48

## 2022-08-12 RX ADMIN — SENNOSIDES AND DOCUSATE SODIUM 2 TABLET: 50; 8.6 TABLET ORAL at 21:42

## 2022-08-12 RX ADMIN — BUDESONIDE AND FORMOTEROL FUMARATE DIHYDRATE 2 PUFF: 80; 4.5 AEROSOL RESPIRATORY (INHALATION) at 19:12

## 2022-08-12 RX ADMIN — Medication 10 ML: at 21:42

## 2022-08-12 RX ADMIN — BISACODYL 5 MG: 5 TABLET, COATED ORAL at 05:09

## 2022-08-12 RX ADMIN — DILTIAZEM HYDROCHLORIDE 30 MG: 30 TABLET, FILM COATED ORAL at 08:47

## 2022-08-12 NOTE — PLAN OF CARE
Goal Outcome Evaluation:  Plan of Care Reviewed With: daughter         Dtr called SLP back this AM. SLp reviewed results of MBS and diet recommendations/options with dtr. She is in agreement with modified diet of puree and honey-thick liquids, and further adjustments as needed, especially if showing aversion. SLP will continue to follow.

## 2022-08-12 NOTE — CONSULTS
Clinical Nutrition     Nutrition Assessment  Reason for Visit:   Identified at risk by screening criteria, MST score 2+, Malnutrition Severity Assessment      Patient Name: Pati Carter  YOB: 1930  MRN: [id number]  Date of Encounter: 08/11/22 20:16 EDT  Admission date: 8/9/2022      Comments:  Pt meets criteria for non severe acute malnutrition based on mild wasting. See flowsheet note.      Admission Diagnosis    Acute on chronic alteration in mental status [R41.82]     Hospital Problem List    Hypercalcemia    Rheumatoid arthritis (HCC)    Hearing loss    Hyperlipidemia    Hypertension    Constipation    COPD (chronic obstructive pulmonary disease) (HCC)    Ferny Bonnet syndrome    Impaired cognition    Impaired mobility    Pelvic mass    Acute on chronic alteration in mental status    Acute kidney injury (HCC)      Other Applicable: Macular Degeneration, [place], GERD, PVD, osteoporosis,hx: Calcium pyrophosphate deposition dz, Vit D defic, recent C Diff     Applicable Interval History:  8/10 SLP rec dysphagia IV nectar thick liquid  8/11 SLP rec dysphagia II honey thick liquid    Applicable PMH/PSxH:     PMH: She  has a past medical history of Anemia, Back pain, Benign colonic polyp (9/28/2016), Ferny Bonnet syndrome (7/7/2020), Chondrocalcinosis, Compression fracture of lumbar vertebra ([facility]), Constipation, COPD (chronic obstructive pulmonary disease) ([facility]), COVID-19 virus infection (10/11/2020), CTS (carpal tunnel syndrome), Degeneration of intervertebral disc of lumbar region, Gastroesophageal reflux disease, Hearing loss, History of calcium pyrophosphate deposition disease (CPPD), History of colonoscopy (01/01/1999), History of mammogram (01/01/2011), History of Papanicolaou smear of cervix (01/01/2010), History of varicella, Hyperlipidemia, Hypertension, Macular degeneration, Nocturia, Osteoporosis, Ovarian mass, Overactive bladder, Pelvic floor dysfunction, Rheumatoid  "arthritis (HCC), and Vitamin D deficiency.   PSxH: She  has a past surgical history that includes Appendectomy; Cholecystectomy (01/01/1962); Carpal tunnel release (Left, 01/01/2003); Salpingoophorectomy (Left, 08/26/2015); Cataract extraction (Bilateral, 01/01/2009); Pelvic laparoscopy (01/01/1996); Kyphoplasty (06/18/2015); Knee arthroscopy (Left, 01/01/2001); and Hip Cannulated Screw Placement (Right, 1/25/2020).         Diet/Nutrition Related History:     Family allow pt does best w food w family member. Needs assistance 2/2 poor vision. Does best w finger food. (note now on pureed diet)      Labs reviewed   Yes      Medications reviewed   Yes  Folvite, Pericolace        Intake/Ouptut 24 hrs reviewed   Yes      Anthropometrics     Admission Height 165.1 cm (65\") Documented at 08/09/2022 1631   Admission Weight 53.1 kg (117 lb) Documented at 08/09/2022 1631       Height: 165.1 cm (65\")    Last filed wt: Weight: 54.9 kg (121 lb) (08/10/22 2100)  Weight Method: Bed scale    BMI: BMI (Calculated): 20.1  Normal: 18.5-24.9kg/m2    Ideal Body Weight (IBW) (kg): 57.29    Weight Change   UBW:Per  lbs on 3/22  Weight change:  % wt change:   Time frame of weight loss:      Nutrition Focused Physical Exam  Date:     Pt meets criteria for non severe acute malnutrition based on mild wasting. See flowsheet note.    Current Nutrition Prescription     PO: Diet Dysphagia; II - Pureed; Honey Thick  Orders Placed This Encounter      Dietary Nutrition Supplements Boost Plus; chocolate  3x/da     Intake: insuffic data 25% x 1 meal recorded      Nutrition Diagnosis     8/11  Problem Malnutrition  non severe acute   Etiology Inconsistent intake w multiple issues including AMS   Signs/Symptoms Mild wasting   Status:      Goal:   General: Nutrition to support treatment  PO: Establish PO, Increase intake  Additional goals:      Nutrition Intervention     Follow treatment progress, Care plan reviewed, Menu provided, Encourage " intake      Monitoring/Evaluation:   Per protocol, PO intake, Supplement intake, Pertinent labs, Weight, Symptoms, Swallow function        Fabiola Cuellar RD,   Time Spent: 30 min

## 2022-08-12 NOTE — PROGRESS NOTES
Malnutrition Severity Assessment    Patient Name:  Pati Carter  YOB: 1930  MRN: [id number]  Admit Date:  8/9/2022    Patient meets criteria for : Moderate (non-severe) Malnutrition (pt meets criteria for non severe acute malnutrition based on mild wasting.)    Comments:      Malnutrition Severity Assessment  Malnutrition Type: Acute Disease or Injury - Related Malnutrition  Malnutrition Type (last 8 hours)     Malnutrition Severity Assessment     Row Name 08/11/22 2022       Malnutrition Severity Assessment    Malnutrition Type Acute Disease or Injury - Related Malnutrition    Row Name 08/11/22 2022       Insufficient Energy Intake     Insufficient Energy Intake Findings --  unable to quantify    Row Name 08/11/22 2022       Unintentional Weight Loss     Unintentional Weight Loss Findings --  if current bed wt accurate, no signif at this time    Row Name 08/11/22 2022       Muscle Loss    Loss of Muscle Mass Findings Mild    Pentecostalism Region --  mild    Clavicle Bone Region --  mild    Acromion Bone Region --  mild    Scapular Bone Region --  mild    Dorsal Hand Region --  mild    Patellar Region None    Anterior Thigh Region None    Posterior Calf Region None    Row Name 08/11/22 2022       Fat Loss    Subcutaneous Fat Loss Findings Mild    Orbital Region  --  mild    Upper Arm Region --  mild    Thoracic & Lumbar Region --  mild    Row Name 08/11/22 2022       Criteria Met (Must meet criteria for severity in at least 2 of these categories: M Wasting, Fat Loss, Fluid, Secondary Signs, Wt. Status, Intake)    Patient meets criteria for  Moderate (non-severe) Malnutrition  pt meets criteria for non severe acute malnutrition based on mild wasting.                Electronically signed by:  Fabiola Cuellar RD  08/11/22 20:33 EDT

## 2022-08-12 NOTE — PROGRESS NOTES
[facility] Medicine Services  PROGRESS NOTE    Patient Name: Pati Carter  : 1930  MRN: [id number]    Date of Admission: 2022  Primary Care Physician: Pallavi Eduardo MD    Subjective   Subjective     CC:  Follow-up confusion, hypercalcemia    HPI:  Patient is pleasantly confused this morning, no family at bedside.  She denies acute complaints.  Calcium is improving with IVF    ROS:  Gen- No fevers, chills  CV- No chest pain, palpitations  Resp- No cough, dyspnea  GI- No N/V/D, abd pain    Objective   Objective     Vital Signs:   Temp:  [96.8 °F (36 °C)-97.7 °F (36.5 °C)] 97.7 °F (36.5 °C)  Heart Rate:  [78-95] 95  Resp:  [18] 18  BP: (145-173)/(83-97) 166/89     Physical Exam:  Constitutional: Awake, alert, elderly female sitting up in bed in no acute distress  HENT: NCAT, mucous membranes moist  Respiratory: Clear to auscultation bilaterally, respiratory effort normal   Cardiovascular: RRR, no murmurs, rubs, or gallops, palpable radial pulses  Gastrointestinal: Positive bowel sounds, soft, nontender, nondistended  Musculoskeletal: No bilateral ankle edema, bitemporal wasting  Psychiatric: Appropriate affect, cooperative  Neurologic: Pleasantly confused, speech clear    Results Reviewed:  LAB RESULTS:      Lab 08/10/22  0522   WBC 12.45* 17.90* 12.72*   HEMOGLOBIN 9.1* 10.1* 9.1*   HEMATOCRIT 29.0* 30.9* 29.8*   PLATELETS 369 436 486*   NEUTROS ABS  --  15.13* 8.45*   IMMATURE GRANS (ABS)  --  0.10* 0.11*   LYMPHS ABS  --  0.99 2.29   MONOS ABS  --  1.48* 1.58*   EOS ABS  --  0.10 0.22   .3* 97.2* 101.4*   PROCALCITONIN  --  0.33*  --    LACTATE  --  1.2  --          Lab 22  0802 08/10/22  0533 22  17222   SODIUM 143 142 139 139   POTASSIUM 3.8 3.8 3.8 3.8   CHLORIDE 107 105 98 97*   CO2 27.0 26.0 30.0* 31.0*   ANION GAP 9.0 11.0 11.0 11.0   BUN 37* 36* 35* 36*   CREATININE 1.95* 2.12* 2.04* 1.92*   EGFR  23.8* 21.5* 22.5* 24.2*   GLUCOSE 94 80 104* 88   CALCIUM 11.2* 11.8* 13.3* 13.0*   IONIZED CALCIUM  --  1.76* 1.86*  --    PHOSPHORUS  --  4.0  --   --    HEMOGLOBIN A1C  --  5.10  --   --          Lab 08/10/22  0533 08/09/22  1720   TOTAL PROTEIN  --  7.4   ALBUMIN 2.60* 3.20*   GLOBULIN  --  4.2   ALT (SGPT)  --  17   AST (SGOT)  --  20   BILIRUBIN  --  0.4   ALK PHOS  --  118*   LIPASE  --  10*         Lab 08/09/22  1720   PROBNP 4,237.0*   TROPONIN T 0.032*         Lab 08/10/22  0533   CHOLESTEROL 115   LDL CHOL 67   HDL CHOL 27*   TRIGLYCERIDES 116             Lab 08/09/22  1715   PH, ARTERIAL 7.478*   PCO2, ARTERIAL 44.6   PO2 ART 61.5*   FIO2 28   HCO3 ART 33.0*   BASE EXCESS ART 8.5*   CARBOXYHEMOGLOBIN 1.3     Brief Urine Lab Results  (Last result in the past 365 days)      Color   Clarity   Blood   Leuk Est   Nitrite   Protein   CREAT   Urine HCG        08/09/22 1838 Yellow   Clear   Negative   Trace   Negative   30 mg/dL (1+)                 Microbiology Results Abnormal     Procedure Component Value - Date/Time    Blood Culture - Blood, Hand, Right [[id number]]  (Normal) Collected: 08/09/22 1810    Lab Status: Preliminary result Specimen: Blood from Hand, Right Updated: 08/11/22 1846     Blood Culture No growth at 2 days    Blood Culture - Blood, Arm, Left [[id number]]  (Normal) Collected: 08/09/22 1721    Lab Status: Preliminary result Specimen: Blood from Arm, Left Updated: 08/11/22 1746     Blood Culture No growth at 2 days    Urine Culture - Urine, Urine, Catheter [[id number]]  (Normal) Collected: 08/09/22 1838    Lab Status: Final result Specimen: Urine, Catheter Updated: 08/10/22 2040     Urine Culture No growth    COVID PRE-OP / PRE-PROCEDURE SCREENING ORDER (NO ISOLATION) - Swab, Nasopharynx [[id number]]  (Normal) Collected: 08/09/22 1812    Lab Status: Final result Specimen: Swab from Nasopharynx Updated: 08/09/22 1907    Narrative:      The following orders were created for panel order COVID  PRE-OP / PRE-PROCEDURE SCREENING ORDER (NO ISOLATION) - Swab, Nasopharynx.  Procedure                               Abnormality         Status                     ---------                               -----------         ------                     COVID-19 and FLU A/B PCR...[[id number]]  Normal              Final result                 Please view results for these tests on the individual orders.    COVID-19 and FLU A/B PCR - Swab, Nasopharynx [[id number]]  (Normal) Collected: 08/09/22 1812    Lab Status: Final result Specimen: Swab from Nasopharynx Updated: 08/09/22 1907     COVID19 Not Detected     Influenza A PCR Not Detected     Influenza B PCR Not Detected    Narrative:      Fact sheet for providers: https://www.fda.gov/media/535981/download    Fact sheet for patients: https://www.fda.gov/media/726393/download    Test performed by PCR.          FL Video Swallow With Speech Single Contrast    Result Date: 8/11/2022  EXAMINATION: FL VIDEO SWALLOW W SPEECH SINGLE-CONTRAST-  INDICATION: dysphagia; R41.82-Altered mental status, unspecified; N17.9-Acute kidney failure, unspecified; E83.52-Hypercalcemia; D64.9-Anemia, unspecified; I10-Essential (primary) hypertension; Z86.19-Personal history of other infectious and parasitic diseases; Z66-Do not resuscitate; R53.81-Other malaise; R13.10-Dysphagia, unspecified; R47.1-Dysarthria and anarthria; R41.841-Cognitive communication deficit  TECHNIQUE: 1 minute and 24 seconds of fluoroscopic time was used for this exam. 9 associated fluoroscopic loops were saved. The patient was evaluated in the seated lateral position while taking a variety of consistencies of barium by mouth under the direction of speech pathology.  COMPARISON: NONE  FINDINGS: 1 minute and 24 seconds of fluoroscopy provided for a modified barium swallow. Please see speech therapy report for full details and recommendations.       Impression: Fluoroscopy provided for a modified barium swallow. Please see  speech therapy report for full details and recommendations.    This report was finalized on 8/11/2022 4:18 PM by Brian Yao MD.      CT Facial Bones Without Contrast    Result Date: 8/10/2022  DATE OF EXAM: 8/10/2022 5:21 PM  PROCEDURE: CT FACIAL BONES WO CONTRAST-  INDICATIONS: Maxillary/facial abscess; R41.82-Altered mental status, unspecified; N17.9-Acute kidney failure, unspecified; E83.52-Hypercalcemia; D64.9-Anemia, unspecified; I10-Essential (primary) hypertension; Z86.19-Personal history of other infectious and parasitic diseases; Z66-Do not resuscitate; R53.81-Other malaise; R13.10-Dysphagia, unspecified; R47.1-Dysarthria and anarthria; R41.841-Cognitive communi  COMPARISON: No comparisons available.  TECHNIQUE: Routine transaxial slices were obtained through the facial region without the administration of intravenous contrast. Reconstructed coronal and sagittal images were also obtained. Automated exposure controls and iterative reconstruction methods were used.  The radiation dose reduction device was turned on for each scan per the ALARA (As Low as Reasonably Achievable) protocol.  FINDINGS: Limited intracranial evaluation demonstrates no acute finding. The orbits are unremarkable. The paranasal sinuses are grossly clear. There is no acute facial bone fracture. There is extensive dental hardware with associated streak artifact, obscuring evaluation of the oral cavity. There is no distinct evidence of focal fluid collection otherwise concerning for definite periosteal abscess. Dentition appears overall intact, without definite evidence of prominent periapical lucency to more specifically suggest abscess.      Impression: There is extensive dental hardware with associated streak artifact, obscuring evaluation of the oral cavity. There is no distinct evidence of focal fluid collection otherwise concerning for periosteal abscess. Dentition appears overall intact, without definite evidence of prominent  periapical lucency to more specifically suggest abscess.  This report was finalized on 8/10/2022 5:53 PM by Galo Mejias.            I have reviewed the medications:  Scheduled Meds:acetaminophen, 650 mg, Oral, Nightly  aspirin, 325 mg, Oral, Daily   Or  aspirin, 300 mg, Rectal, Daily  atorvastatin, 80 mg, Oral, Nightly  budesonide-formoterol, 2 puff, Inhalation, BID - RT  Diclofenac Sodium, 2 g, Topical, Q12H  dilTIAZem, 30 mg, Oral, Q6H  famotidine, 20 mg, Oral, Daily  folic acid, 1,000 mcg, Oral, Daily  miconazole, 1 application, Topical, Q12H  senna-docusate sodium, 2 tablet, Oral, BID  sodium chloride, 10 mL, Intravenous, Q12H  sodium chloride, 10 mL, Intravenous, Q12H      Continuous Infusions:sodium chloride, 100 mL/hr, Last Rate: 100 mL/hr (08/11/22 1000)      PRN Meds:.•  acetaminophen **OR** acetaminophen **OR** acetaminophen  •  albuterol  •  senna-docusate sodium **AND** polyethylene glycol **AND** bisacodyl **AND** bisacodyl  •  melatonin  •  [COMPLETED] Insert peripheral IV **AND** sodium chloride  •  sodium chloride  •  sodium chloride  •  traMADol    Assessment & Plan   Assessment & Plan     Active Hospital Problems    Diagnosis  POA   • **Hypercalcemia [E83.52]  Yes   • Acute on chronic alteration in mental status [R41.82]  Yes   • Acute kidney injury (HCC) [N17.9]  Yes   • Pelvic mass [R19.00]  Yes   • Impaired cognition [R41.89]  Yes   • Impaired mobility [Z74.09]  Yes   • 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        Brief Hospital Course to date:  Pati Carter is a 89 y[place]o. female [place] with recent admission for C. 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, multifactorial  Hypercalcemia  Ferny Garret syndrome  - Visual halluc @ b-line; Ferny Garret syndrome; dtr noted sig worsening mentation/incomprehensible speech @ arrival  - CT head w/o acute finding, stroke neuro has evaluated; MRI negative  -  SLP follows; diet per them  -  Patient requires assistance with all meals  -  Supplemental calcium/vitamin D on hold  -  PTH appropriately suppressed, PTHrP pending  -Add on ionized calcium this a.m., continue IVF     JENNIE on CKD 3  -Baseline GFR 40s; Cr 0.9-1.1  -Function improving with IVF     Likely ongoing aspiration  - SLP eval     Hypertension  Hyperlipidemia  PVD  -Aspirin, diltiazem     COPD  - Home inhalers, substituted for formulary     GERD  - Pepcid     Rheumatoid arthritis  - Per previous documentation, on MTX, however not listed on home med list     Recent C. difficile colitis  -Completed oral vancomycin     1.8 cm LT renal lesion  Bladder wall lesion  Pelvic mass  -Seen by Dr. Yao last admit, monitoring/following up opt  -Seen by Dr. Mello lat admit, gyn mass is stable, expectant management, if large volume blood loss she recommended formal Dx and rad-onc therapy  -d/w dtr at bedside, they prefer more conservative measures and likely would not pursue invasive measures    Expected Discharge Location and Transportation: Skilled rehab, via ambulance  Expected Discharge Date: 8/15    DVT prophylaxis:  Mechanical DVT prophylaxis orders  are present.     AM-PAC 6 Clicks Score (PT): 10 (08/11/22 1012)    CODE STATUS:   Code Status and Medical Interventions:   Ordered at: 08/09/22 6484     Medical Intervention Limits:    NO intubation (DNI)     Code Status (Patient has no pulse and is not breathing):    No CPR (Do Not Attempt to Resuscitate)     Medical Interventions (Patient has pulse or is breathing):    Limited Support       Logan Sawyer, DO  08/12/22

## 2022-08-12 NOTE — CASE MANAGEMENT/SOCIAL WORK
Continued Stay Note  [facility]     Patient Name: Pati Carter  MRN: [id number]  Today's Date: 8/12/2022    Admit Date: 8/9/2022     Discharge Plan     Row Name 08/12/22 1453       Plan    Plan skilled nursing facility    Provided Post Acute Provider List? Yes    Post Acute Provider List Nursing Home    Delivered To Support Person    Plan Comments I followed up on referrals made in search of a skilled rehab bed. Mrs. Carter has not received any bed offers to date. I discussed this with her daughter Lilian on the phone. She is not sure at this time where she would like additional referrals made. I have provided her with a map of all skilled facilities in [facility]. She is going to review and discuss with her sister.    Final Discharge Disposition Code 30 - still a patient               Discharge Codes    No documentation.               Expected Discharge Date and Time     Expected Discharge Date Expected Discharge Time    Aug 15, 2022             Devan Chavez RN

## 2022-08-13 LAB
ANION GAP SERPL CALCULATED.3IONS-SCNC: 10 MMOL/L (ref 5–15)
BUN SERPL-MCNC: 25 MG/DL (ref 8–23)
BUN/CREAT SERPL: 18.9 (ref 7–25)
CA-I SERPL ISE-MCNC: 1.53 MMOL/L (ref 1.12–1.32)
CALCIUM SPEC-SCNC: 10.3 MG/DL (ref 8.2–9.6)
CHLORIDE SERPL-SCNC: 109 MMOL/L (ref 98–107)
CO2 SERPL-SCNC: 19 MMOL/L (ref 22–29)
CREAT SERPL-MCNC: 1.32 MG/DL (ref 0.57–1)
EGFRCR SERPLBLD CKD-EPI 2021: 38 ML/MIN/1.73
GLUCOSE SERPL-MCNC: 92 MG/DL (ref 65–99)
POTASSIUM SERPL-SCNC: 3.4 MMOL/L (ref 3.5–5.2)
SODIUM SERPL-SCNC: 138 MMOL/L (ref 136–145)

## 2022-08-13 PROCEDURE — 25010000002 ONDANSETRON PER 1 MG: Performed by: INTERNAL MEDICINE

## 2022-08-13 PROCEDURE — 80048 BASIC METABOLIC PNL TOTAL CA: CPT | Performed by: INTERNAL MEDICINE

## 2022-08-13 PROCEDURE — 94799 UNLISTED PULMONARY SVC/PX: CPT

## 2022-08-13 PROCEDURE — 92610 EVALUATE SWALLOWING FUNCTION: CPT

## 2022-08-13 PROCEDURE — 99232 SBSQ HOSP IP/OBS MODERATE 35: CPT | Performed by: INTERNAL MEDICINE

## 2022-08-13 PROCEDURE — 82330 ASSAY OF CALCIUM: CPT | Performed by: INTERNAL MEDICINE

## 2022-08-13 PROCEDURE — 99233 SBSQ HOSP IP/OBS HIGH 50: CPT | Performed by: PSYCHIATRY & NEUROLOGY

## 2022-08-13 RX ADMIN — DILTIAZEM HYDROCHLORIDE 30 MG: 30 TABLET, FILM COATED ORAL at 18:29

## 2022-08-13 RX ADMIN — DILTIAZEM HYDROCHLORIDE 30 MG: 30 TABLET, FILM COATED ORAL at 06:39

## 2022-08-13 RX ADMIN — DILTIAZEM HYDROCHLORIDE 30 MG: 30 TABLET, FILM COATED ORAL at 13:06

## 2022-08-13 RX ADMIN — Medication 10 ML: at 22:56

## 2022-08-13 RX ADMIN — Medication 5 MG: at 22:55

## 2022-08-13 RX ADMIN — ONDANSETRON 4 MG: 2 INJECTION INTRAMUSCULAR; INTRAVENOUS at 17:37

## 2022-08-13 RX ADMIN — TRAMADOL HYDROCHLORIDE 50 MG: 50 TABLET ORAL at 08:43

## 2022-08-13 RX ADMIN — DILTIAZEM HYDROCHLORIDE 30 MG: 30 TABLET, FILM COATED ORAL at 23:00

## 2022-08-13 RX ADMIN — MICONAZOLE NITRATE 1 APPLICATION: 20 CREAM TOPICAL at 22:57

## 2022-08-13 RX ADMIN — DILTIAZEM HYDROCHLORIDE 30 MG: 30 TABLET, FILM COATED ORAL at 01:20

## 2022-08-13 RX ADMIN — ACETAMINOPHEN 650 MG: 325 TABLET, FILM COATED ORAL at 22:54

## 2022-08-13 RX ADMIN — DICLOFENAC 2 G: 10 GEL TOPICAL at 22:55

## 2022-08-13 RX ADMIN — MICONAZOLE NITRATE 1 APPLICATION: 20 CREAM TOPICAL at 08:42

## 2022-08-13 RX ADMIN — BUDESONIDE AND FORMOTEROL FUMARATE DIHYDRATE 2 PUFF: 80; 4.5 AEROSOL RESPIRATORY (INHALATION) at 21:41

## 2022-08-13 RX ADMIN — SENNOSIDES AND DOCUSATE SODIUM 2 TABLET: 50; 8.6 TABLET ORAL at 08:42

## 2022-08-13 RX ADMIN — FOLIC ACID 1000 MCG: 1 TABLET ORAL at 08:42

## 2022-08-13 RX ADMIN — FAMOTIDINE 20 MG: 20 TABLET ORAL at 08:42

## 2022-08-13 RX ADMIN — ASPIRIN 325 MG ORAL TABLET 325 MG: 325 PILL ORAL at 08:42

## 2022-08-13 RX ADMIN — TRAMADOL HYDROCHLORIDE 50 MG: 50 TABLET ORAL at 22:54

## 2022-08-13 RX ADMIN — SODIUM CHLORIDE 125 ML/HR: 9 INJECTION, SOLUTION INTRAVENOUS at 16:33

## 2022-08-13 RX ADMIN — ATORVASTATIN CALCIUM 80 MG: 40 TABLET, FILM COATED ORAL at 22:55

## 2022-08-13 RX ADMIN — DICLOFENAC 2 G: 10 GEL TOPICAL at 08:42

## 2022-08-13 NOTE — THERAPY RE-EVALUATION
Acute Care - Speech Language Pathology   Swallow Re-Evaluation [facility]     Patient Name: Pati Carter  : 1930  MRN: [id number]  Today's Date: 2022               Admit Date: 2022    Visit Dx:     ICD-10-CM ICD-9-CM   1. Acute on chronic alteration in mental status  R41.82 780.09   2. Acute renal failure, unspecified acute renal failure type (HCC)  N17.9 584.9   3. Hypercalcemia  E83.52 275.42   4. Chronic anemia  D64.9 285.9   5. Elevated blood pressure reading with diagnosis of hypertension  I10 401.9   6. History of Clostridioides difficile colitis  Z86.19 V12.79   7. DNR (do not resuscitate)  Z66 V49.86   8. Declining functional status  R53.81 799.3   9. Oropharyngeal dysphagia  R13.12 787.22   10. Dysarthria  R47.1 784.51   11. Cognitive communication deficit  R41.841 799.52     Patient Active Problem List   Diagnosis   • Macular degeneration   • Rheumatoid arthritis ([facility])   • Hearing loss   • Hyperlipidemia   • Gastroesophageal reflux disease   • Hypertension   • Constipation   • Anemia   • Compression fracture of lumbar vertebra ([facility])   • Degeneration of intervertebral disc of lumbar region   • COPD (chronic obstructive pulmonary disease) ([facility])   • H/O calcium pyrophosphate deposition disease (CPPD)   • Falls frequently   • Immunosuppressed status on methotrexate   • Ferny Bonnet syndrome   • Impaired cognition   • Impaired mobility   • Ulcer of right foot, limited to breakdown of skin ([facility])   • Peripheral vascular disease ([facility])   • Nocturia   • Age-related osteoporosis without current pathological fracture   • Pelvic mass   • Fall, initial encounter   • Acute on chronic alteration in mental status   • Hypercalcemia   • Acute kidney injury (HCC)     Past Medical History:   Diagnosis Date   • Anemia     Description: A.  Dx - borderline intermittent.   • Back pain    • Benign colonic polyp 2016    Description: A.  Dx .   • Ferny Bonnet syndrome 2020   •  Chondrocalcinosis     knees   • Compression fracture of lumbar vertebra (HCC)    • Constipation    • COPD (chronic obstructive pulmonary disease) (HCC)     Description: A.  Rule out chronic persistent asthma, COPD, or obliterative bronchiolitis.- Rae   • COVID-19 virus infection 10/11/2020   • CTS (carpal tunnel syndrome)    • Degeneration of intervertebral disc of lumbar region     Description: A.  Diagnosed in April 2013 with advanced multilevel with severe spinal stenosis, followed by Dr. Pillai for pain management.   • Gastroesophageal reflux disease    • Hearing loss    • History of calcium pyrophosphate deposition disease (CPPD)    • History of colonoscopy 01/01/1999    NORMAL PER PATIENT    • History of mammogram 01/01/2011    NORMAL PER PT    • History of Papanicolaou smear of cervix 01/01/2010    NORMAL PER PT    • History of varicella    • Hyperlipidemia     Description: A.  Dx 2006.   • Hypertension     Description: A. Dx 2001.   • Macular degeneration    • Nocturia    • Osteoporosis    • Ovarian mass     Dx 8/15- benign left cystic adnexal mass   • Overactive bladder    • Pelvic floor dysfunction    • Rheumatoid arthritis (HCC)     Description: A.  Diagnosed in 2000 and and followed by Dr. Constantino (now Dr. Kaplan). B.  On methotrexate therapy since 2000. C.  Off low-dose prednisone therapy.   • Vitamin D deficiency      Past Surgical History:   Procedure Laterality Date   • APPENDECTOMY     • CARPAL TUNNEL RELEASE Left 01/01/2003    HISTORY OF NEUROPLASTY DECOMPRESSION MEDIAN NERVE AT CARPAL TUNNEL LEFT   • CATARACT EXTRACTION Bilateral 01/01/2009   • CHOLECYSTECTOMY  01/01/1962   • HIP CANNULATED SCREW PLACEMENT Right 1/25/2020    Procedure: HIP CANNULATED SCREW PLACEMENT RIGHT;  Surgeon: Karlos Blount MD;  Location: [facility];  Service: Orthopedics   • KNEE ARTHROSCOPY Left 01/01/2001    MENISCAL REPAIR   • KYPHOPLASTY  06/18/2015    T11 AND L1 (JOSE A)   • PELVIC LAPAROSCOPY  01/01/1996     REMOVAL OF BENIGN UTERINE AND RIGHT OVARIAN TUMORS   • SALPINGO OOPHORECTOMY Left 08/26/2015    REMOVAL OF LEFT OVARY AND TUBE (benign cystic mass)       SLP Recommendation and Plan  SLP Swallowing Diagnosis: pharyngeal dysphagia (08/13/22 1500)  SLP Diet Recommendation: comfort diet, per physician discretion, mechanical soft with no mixed consistencies, thin liquids (08/13/22 1500)  Recommended Precautions and Strategies: upright posture during/after eating, no straw, general aspiration precautions, fatigue precautions (08/13/22 1500)  SLP Rec. for Method of Medication Administration: meds crushed, with pudding or applesauce (08/13/22 1500)     Monitor for Signs of Aspiration: notify SLP if any concerns (08/13/22 1500)     Swallow Criteria for Skilled Therapeutic Interventions Met: demonstrates skilled criteria (08/13/22 1500)  Anticipated Discharge Disposition (SLP): unknown (08/13/22 1500)  Rehab Potential/Prognosis, Swallowing: re-evaluate goals as necessary (08/13/22 1500)  Therapy Frequency (Swallow): PRN (08/13/22 1500)  Predicted Duration Therapy Intervention (Days): until discharge (08/13/22 1500)                                  Plan of Care Reviewed With: patient  Progress: no change      SWALLOW EVALUATION (last 72 hours)     SLP Adult Swallow Evaluation     Row Name 08/13/22 1500 08/11/22 1317                Rehab Evaluation    Document Type re-evaluation  -AW re-evaluation  -CH       Subjective Information no complaints  -AW no complaints  -CH       Patient Observations alert;cooperative  -AW alert;cooperative;agree to therapy  -CH       Patient/Family/Caregiver Comments/Observations none present  -AW none present  -CH       Patient Effort good  -AW adequate  -CH       Comment -- completed in collaboration with   -CH       Symptoms Noted During/After Treatment none  -AW none  -CH                General Information    Patient Profile Reviewed yes  -AW yes  -CH       Pertinent History Of Current  Problem see prior  -AW see prior evaluation  -       Current Method of Nutrition pureed;honey-thick liquids  -AW pureed;nectar/syrup-thick liquids  -       Precautions/Limitations, Vision vision impairment, bilaterally  -AW vision impairment, bilaterally  -CH       Precautions/Limitations, Hearing hearing impairment, bilaterally;other (see comments)  -AW hearing impairment, bilaterally;other (see comments)  functional w increased volume  -       Prior Level of Function-Swallowing puree  -AW --       Plans/Goals Discussed with patient;other (see comments)  RN d/w dtr who called and dtr was ok with changes. She had requested re-eval as pt not eating puree  -AW --       Barriers to Rehab medically complex;previous functional deficit  -AW --       Patient's Goals for Discharge patient could not state  -AW --                Pain    Additional Documentation -- Pain Scale: FACES Pre/Post-Treatment (Group)  -                Pain Scale: Numbers Pre/Post-Treatment    Pretreatment Pain Rating -- 0/10 - no pain  -       Posttreatment Pain Rating -- 0/10 - no pain  -                Pain Scale: FACES Pre/Post-Treatment    Pain: FACES Scale, Pretreatment 0-->no hurt  -AW 0-->no hurt  -       Posttreatment Pain Rating 0-->no hurt  -AW 0-->no hurt  -                Oral Motor Structure and Function    Dentition Assessment natural, present and adequate  -AW --       Secretion Management WNL/WFL  -AW --       Mucosal Quality moist, healthy  -AW --       Volitional Swallow WFL  -AW --                Oral Musculature and Cranial Nerve Assessment    Oral Motor General Assessment generalized oral motor weakness  -AW --                General Eating/Swallowing Observations    Respiratory Support Currently in Use -- nasal cannula  -       Eating/Swallowing Skills self-fed  -AW fed by SLP  -       Positioning During Eating upright 90 degree  -AW upright 90 degree;upright in chair  -       Utensils Used  "spoon;cup;straw;adapted cup  -AW --       Consistencies Trialed thin liquids;honey-thick liquids;pureed;regular textures;soft textures  -AW --                Respiratory    Respiratory Status WFL  -AW --                Clinical Swallow Eval    Oral Prep Phase WFL  -AW --       Oral Transit WFL  -AW --       Oral Residue WFL  -AW --       Pharyngeal Phase no overt signs/symptoms of pharyngeal impairment  known pharyngeal per MBS - was on L2, HT as aspirated this amount the least  -AW --       Clinical Swallow Evaluation Summary Pt re-evaluated as limited intake with puree. Pt was able to chew cracker, and commented how good the thin milk was. Provided an adaptive cup and she was able to hold this and drink from spout very well. Pt is known aspirator, but was tolerating this without discomfort. When given honey, she said she didn't like the \"sticky.\" Pt seems eager to be able to chew some foods and seemed to enjoy having thin liquids again. Will place on L4, thins and monitor. RN spoke with dtr via phone who was ok with this plan.  -AW --                Pharyngeal Phase Concerns    Pharyngeal Phase Concerns, Comment known aspiration, comfort diet  -AW --                MBS/VFSS    Utensils Used -- spoon;cup;straw  -       Consistencies Trialed -- thin liquids;nectar/syrup-thick liquids;honey-thick liquids;pudding thick  -                MBS/VFSS Interpretation    Oral Prep Phase -- WFL  -CH       Oral Transit Phase -- WFL  -CH       Oral Residue -- WFL  -       VFSS Summary -- Severe pharyngeal dysphagia with delay to the pyriforms with liquids. aspiration of thin liquids during the swallow and nectar thick liquids before the swallow. Honey thick was aspirated after the swallow as pharyngeal residue deepened. It is inevitable that pureed/pudding thick would also be aspirated.  Patient was unable to clear residue or aspirated material with cued throat clear or swallow. Aspiration was sensed only with larger " amounts/deeper into the trachea. Otherwise aspiration was silent. Patient is unsafe for oral feeding without risk for aspiration. Given patients age, unsure if feeding tube would lengthen or improve quality of life.  If comfort diet: least amount of aspiration observed with pureed, honey thick liquids. If patient shows aversion, consider comfort diet of  pureed, thin liquids.  -CH                Initiation of Pharyngeal Swallow    Initiation of Pharyngeal Swallow -- bolus in pyriform sinuses  -CH       Pharyngeal Phase -- impaired pharyngeal phase of swallowing  -CH       Aspiration Before the Swallow -- nectar-thick liquids  -CH       Aspiration During the Swallow -- thin liquids;secondary to delayed swallow initiation or mistiming;secondary to reduced laryngeal elevation;secondary to reduced vestibular closure;secondary to reduced laryngeal (VF) closure  -CH       Aspiration After the Swallow -- honey-thick liquids  -CH       Response to Aspiration -- cough;inconsistent response;could not clear subglottic material  -CH       Rosenbek's Scale -- thin:;nectar:;honey:;7--->level 7  -CH       Pharyngeal Residue -- thin liquids;nectar-thick liquids;honey-thick liquids;pudding/puree;secondary to reduced posterior pharyngeal wall stripping;secondary to reduced laryngeal elevation;secondary to reduced hyolaryngeal excursion  -CH       Response to Residue -- unable to clear residue  -CH       Attempted Compensatory Maneuvers -- bolus size;bolus presentation style  -CH       Response to Attempted Compensatory Maneuvers -- did not prevent aspiration  -CH                SLP Evaluation Clinical Impression    SLP Swallowing Diagnosis pharyngeal dysphagia  -AW severe;pharyngeal dysphagia  -CH       Functional Impact risk of aspiration/pneumonia  -AW risk of aspiration/pneumonia;risk of malnutrition;risk of dehydration  -CH       Rehab Potential/Prognosis, Swallowing re-evaluate goals as necessary  -AW re-evaluate goals as  necessary  -       Swallow Criteria for Skilled Therapeutic Interventions Met demonstrates skilled criteria  -AW demonstrates skilled criteria  -CH                Recommendations    Therapy Frequency (Swallow) PRN  -AW PRN  -CH       Predicted Duration Therapy Intervention (Days) until discharge  -AW until discharge  -CH       SLP Diet Recommendation comfort diet, per physician discretion;mechanical soft with no mixed consistencies;thin liquids  -AW comfort diet, per physician discretion;other (see comments)  pureed, honey thick as no safe PO diet can be recommended.  -       Recommended Precautions and Strategies upright posture during/after eating;no straw;general aspiration precautions;fatigue precautions  -AW --       Oral Care Recommendations Oral Care BID/PRN  -AW Oral Care BID/PRN;Suction toothbrush  -       SLP Rec. for Method of Medication Administration meds crushed;with pudding or applesauce  -AW meds via alternate route  -       Monitor for Signs of Aspiration notify SLP if any concerns  -AW notify SLP if any concerns  -CH       Anticipated Discharge Disposition (SLP) unknown  -AW anticipate therapy at next level of care;other (see comments)  pending goals of care decisions  -       Equipment Issued to Patient adaptive cup  only about 2 oz in cup at a time, any more and it gets too heavy for her to self-administer  -AW --             User Key  (r) = Recorded By, (t) = Taken By, (c) = Cosigned By    Initials Name Effective Dates    AW Yu Castillo, MS CCC-SLP 06/16/21 -      Julia Dent MS CCC-SLP 06/16/21 -                 EDUCATION  The patient has been educated in the following areas:   Dysphagia (Swallowing Impairment).        SLP GOALS     Row Name 08/11/22 1315             (STG) Patient will tolerate trials of    Consistencies Trialed (Tolerate trials) pureed textures;honey/ moderately thick liquids  -      Desired Outcome (Tolerate trials) for pleasure/comfort;without signs  of distress  -CH      [place] (Tolerate trials) independently (over 90% accuracy)  -CH      Time Frame (Tolerate trials) by discharge  -CH              (STG) Swallow 1    (LTG) Swallow LTG: Patient will tolerate comfort diet of pureed, honey thick liquids without s/s of aversion or discomfort/distress  -CH      [place] (Swallow Long Term Goal) with minimal cues (75-90% accuracy)  -CH      Time Frame (Swallow Long Term Goal) by discharge  -CH            User Key  (r) = Recorded By, (t) = Taken By, (c) = Cosigned By    Initials Name Provider Type    Julia Arnold MS CCC-SLP Speech and Language Pathologist                   Time Calculation:    Time Calculation- SLP     Row Name 08/13/22 1555             Time Calculation- SLP    SLP Start Time 1400  -AW      SLP Stop Time 1500  -AW      SLP Time Calculation (min) 60 min  -AW      SLP Received On 08/13/22  -AW            User Key  (r) = Recorded By, (t) = Taken By, (c) = Cosigned By    Initials Name Provider Type     Yu Castillo MS CCC-SLP Speech and Language Pathologist                Therapy Charges for Today     Code Description Service Date Service Provider Modifiers Qty    [id number]  ST EVAL ORAL PHARYNG SWALLOW 4 8/13/2022 Yu Castillo MS CCC-SLP GN 1            Patient was not wearing a face mask and did not exhibit coughing during this therapy encounter.  Procedure performed was aerosolizing, involved close contact (within 6 feet for at least 15 minutes or longer), and did not involve contact with infectious secretions or specimens.  Therapist used appropriate personal protective equipment including gloves, standard procedure mask and eye protection.  Appropriate PPE was worn during the entire therapy session.  Hand hygiene was completed before and after therapy session.       Yu Castillo MS CCC-SLP  8/13/2022

## 2022-08-13 NOTE — PROGRESS NOTES
[facility] Medicine Services  PROGRESS NOTE    Patient Name: Pati Carter  : 1930  MRN: [id number]    Date of Admission: 2022  Primary Care Physician: Pallavi Eduardo MD    Subjective   Subjective     CC:  Follow-up hypercalcemia, confusion    HPI:  Complains of feeling cold this morning, otherwise no acute complaints.  No family at bedside.  Calcium gradually improving    ROS:  Gen- No fevers, chills  CV- No chest pain, palpitations  Resp- No cough, dyspnea  GI- No N/V/D, abd pain    Objective   Objective     Vital Signs:   Temp:  [97 °F (36.1 °C)-98.5 °F (36.9 °C)] 98.5 °F (36.9 °C)  Heart Rate:  [] 88  Resp:  [17-18] 18  BP: (151-176)/() 169/94     Physical Exam:  Constitutional: Awake, alert, elderly female sitting up in bedside chair under multiple layers of blankets  HENT: NCAT, mucous membranes moist  Respiratory: Clear to auscultation bilaterally, respiratory effort normal   Cardiovascular: RRR, no murmurs, rubs, or gallops, palpable radial pulses  Gastrointestinal: Positive bowel sounds, soft, nontender, nondistended  Musculoskeletal: No bilateral ankle edema, bitemporal wasting  Psychiatric: Appropriate affect, cooperative  Neurologic: Pleasantly confused, speech clear    Results Reviewed:  LAB RESULTS:      Lab 08/10/22  0533 22  1720 22  1918   WBC 12.45* 17.90* 12.72*   HEMOGLOBIN 9.1* 10.1* 9.1*   HEMATOCRIT 29.0* 30.9* 29.8*   PLATELETS 369 436 486*   NEUTROS ABS  --  15.13* 8.45*   IMMATURE GRANS (ABS)  --  0.10* 0.11*   LYMPHS ABS  --  0.99 2.29   MONOS ABS  --  1.48* 1.58*   EOS ABS  --  0.10 0.22   .3* 97.2* 101.4*   PROCALCITONIN  --  0.33*  --    LACTATE  --  1.2  --          Lab 22  0700 22  0730 22  0802 08/10/22  0533 22  1720   SODIUM 138 144 143 142 139   POTASSIUM 3.4* 3.7 3.8 3.8 3.8   CHLORIDE 109* 108* 107 105 98   CO2 19.0* 28.0 27.0 26.0 30.0*   ANION GAP 10.0 8.0 9.0 11.0 11.0   BUN  25* 31* 37* 36* 35*   CREATININE 1.32* 1.49* 1.95* 2.12* 2.04*   EGFR 38.0* 32.8* 23.8* 21.5* 22.5*   GLUCOSE 92 86 94 80 104*   CALCIUM 10.3* 11.2* 11.2* 11.8* 13.3*   IONIZED CALCIUM 1.53* 1.66*  --  1.76* 1.86*   PHOSPHORUS  --   --   --  4.0  --    HEMOGLOBIN A1C  --   --   --  5.10  --          Lab 08/10/22  0533 08/09/22  1720   TOTAL PROTEIN  --  7.4   ALBUMIN 2.60* 3.20*   GLOBULIN  --  4.2   ALT (SGPT)  --  17   AST (SGOT)  --  20   BILIRUBIN  --  0.4   ALK PHOS  --  118*   LIPASE  --  10*         Lab 08/09/22  1720   PROBNP 4,237.0*   TROPONIN T 0.032*         Lab 08/10/22  0533   CHOLESTEROL 115   LDL CHOL 67   HDL CHOL 27*   TRIGLYCERIDES 116             Lab 08/09/22  1715   PH, ARTERIAL 7.478*   PCO2, ARTERIAL 44.6   PO2 ART 61.5*   FIO2 28   HCO3 ART 33.0*   BASE EXCESS ART 8.5*   CARBOXYHEMOGLOBIN 1.3     Brief Urine Lab Results  (Last result in the past 365 days)      Color   Clarity   Blood   Leuk Est   Nitrite   Protein   CREAT   Urine HCG        08/09/22 1838 Yellow   Clear   Negative   Trace   Negative   30 mg/dL (1+)                 Microbiology Results Abnormal     Procedure Component Value - Date/Time    Blood Culture - Blood, Hand, Right [[id number]]  (Normal) Collected: 08/09/22 1810    Lab Status: Preliminary result Specimen: Blood from Hand, Right Updated: 08/12/22 1846     Blood Culture No growth at 3 days    Blood Culture - Blood, Arm, Left [[id number]]  (Normal) Collected: 08/09/22 1721    Lab Status: Preliminary result Specimen: Blood from Arm, Left Updated: 08/12/22 1747     Blood Culture No growth at 3 days    Urine Culture - Urine, Urine, Catheter [[id number]]  (Normal) Collected: 08/09/22 1838    Lab Status: Final result Specimen: Urine, Catheter Updated: 08/10/22 2040     Urine Culture No growth    COVID PRE-OP / PRE-PROCEDURE SCREENING ORDER (NO ISOLATION) - Swab, Nasopharynx [[id number]]  (Normal) Collected: 08/09/22 6532    Lab Status: Final result Specimen: Swab from Nasopharynx  Updated: 08/09/22 1907    Narrative:      The following orders were created for panel order COVID PRE-OP / PRE-PROCEDURE SCREENING ORDER (NO ISOLATION) - Swab, Nasopharynx.  Procedure                               Abnormality         Status                     ---------                               -----------         ------                     COVID-19 and FLU A/B PCR...[[id number]]  Normal              Final result                 Please view results for these tests on the individual orders.    COVID-19 and FLU A/B PCR - Swab, Nasopharynx [[id number]]  (Normal) Collected: 08/09/22 1812    Lab Status: Final result Specimen: Swab from Nasopharynx Updated: 08/09/22 1907     COVID19 Not Detected     Influenza A PCR Not Detected     Influenza B PCR Not Detected    Narrative:      Fact sheet for providers: https://www.fda.gov/media/753679/download    Fact sheet for patients: https://www.fda.gov/media/201316/download    Test performed by PCR.          FL Video Swallow With Speech Single Contrast    Result Date: 8/11/2022  EXAMINATION: FL VIDEO SWALLOW W SPEECH SINGLE-CONTRAST-  INDICATION: dysphagia; R41.82-Altered mental status, unspecified; N17.9-Acute kidney failure, unspecified; E83.52-Hypercalcemia; D64.9-Anemia, unspecified; I10-Essential (primary) hypertension; Z86.19-Personal history of other infectious and parasitic diseases; Z66-Do not resuscitate; R53.81-Other malaise; R13.10-Dysphagia, unspecified; R47.1-Dysarthria and anarthria; R41.841-Cognitive communication deficit  TECHNIQUE: 1 minute and 24 seconds of fluoroscopic time was used for this exam. 9 associated fluoroscopic loops were saved. The patient was evaluated in the seated lateral position while taking a variety of consistencies of barium by mouth under the direction of speech pathology.  COMPARISON: NONE  FINDINGS: 1 minute and 24 seconds of fluoroscopy provided for a modified barium swallow. Please see speech therapy report for full details and  recommendations.       Impression: Fluoroscopy provided for a modified barium swallow. Please see speech therapy report for full details and recommendations.    This report was finalized on 8/11/2022 4:18 PM by Brian Yao MD.            I have reviewed the medications:  Scheduled Meds:acetaminophen, 650 mg, Oral, Nightly  aspirin, 325 mg, Oral, Daily   Or  aspirin, 300 mg, Rectal, Daily  atorvastatin, 80 mg, Oral, Nightly  budesonide-formoterol, 2 puff, Inhalation, BID - RT  Diclofenac Sodium, 2 g, Topical, Q12H  dilTIAZem, 30 mg, Oral, Q6H  famotidine, 20 mg, Oral, Daily  folic acid, 1,000 mcg, Oral, Daily  miconazole, 1 application, Topical, Q12H  senna-docusate sodium, 2 tablet, Oral, BID  sodium chloride, 10 mL, Intravenous, Q12H  sodium chloride, 10 mL, Intravenous, Q12H      Continuous Infusions:sodium chloride, 125 mL/hr, Last Rate: 125 mL/hr (08/12/22 2141)      PRN Meds:.•  acetaminophen **OR** acetaminophen **OR** acetaminophen  •  albuterol  •  senna-docusate sodium **AND** polyethylene glycol **AND** bisacodyl **AND** bisacodyl  •  melatonin  •  ondansetron  •  [COMPLETED] Insert peripheral IV **AND** sodium chloride  •  sodium chloride  •  sodium chloride  •  traMADol    Assessment & Plan   Assessment & Plan     Active Hospital Problems    Diagnosis  POA   • **Hypercalcemia [E83.52]  Yes   • Acute on chronic alteration in mental status [R41.82]  Yes   • Acute kidney injury (HCC) [N17.9]  Yes   • Pelvic mass [R19.00]  Yes   • Impaired cognition [R41.89]  Yes   • Impaired mobility [Z74.09]  Yes   • 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        Brief Hospital Course to date:  Pati Emma is a 89 y.o.  female [place] resident with recent admission for C. 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, multifactorial  Hypercalcemia  Ferny Garret syndrome  - Visual halluc @ b-line; Ferny Garret syndrome; dtr noted sig worsening mentation/incomprehensible speech @ arrival  - CT head w/o acute finding, stroke neuro has evaluated; MRI negative  -  SLP follows; diet per their recommendations  -  Patient requires assistance with all meals  -  Supplemental calcium/vitamin D on hold  -  PTH appropriately suppressed, PTHrP pending  -Ionized calcium improving, continue IVF     JENNIE on CKD 3  -Baseline GFR 40s; Cr 0.9-1.1  -Continues to improve, continue IVF     Likely ongoing aspiration  - SLP following, on modified diet     Hypertension  Hyperlipidemia  PVD  -Aspirin, diltiazem     COPD  - Home inhalers, substituted for formulary     GERD  - Pepcid     Rheumatoid arthritis  - Per previous documentation, on MTX, however not listed on home med list     Recent C. difficile colitis  -Completed oral vancomycin pta     1.8 cm LT renal lesion  Bladder wall lesion  Pelvic mass  -Seen by Dr. Yao last admit, monitoring/following up opt  -Seen by Dr. Mello lat admit, gyn mass is stable, expectant management, if large volume blood loss she recommended formal Dx and rad-onc therapy  -d/w dtr at bedside, they prefer more conservative measures and likely would not pursue invasive  measures    Expected Discharge Location and Transportation: Skilled rehab, via ambulance  Expected Discharge Date: 8/16    DVT prophylaxis:  Mechanical DVT prophylaxis orders are present.     AM-PAC 6 Clicks Score (PT): 10 (08/12/22 0800)    CODE STATUS:   Code Status and Medical Interventions:   Ordered at: 08/09/22 9256     Medical Intervention Limits:    NO intubation (DNI)     Code Status (Patient has no pulse and is not breathing):    No CPR (Do Not Attempt to Resuscitate)     Medical Interventions (Patient has pulse or is breathing):    Limited Support       Logan Sawyer, DO  08/13/22

## 2022-08-13 NOTE — PLAN OF CARE
Problem: Adult Inpatient Plan of Care  Goal: Plan of Care Review  Outcome: Ongoing, Progressing  Flowsheets (Taken 8/13/2022 7273)  Progress: no change  Plan of Care Reviewed With: patient   Goal Outcome Evaluation:  Plan of Care Reviewed With: patient        Progress: no change   SLP re-evaluation completed. Will continue to address dysphagia. Please see note for further details and recommendations.

## 2022-08-13 NOTE — PROGRESS NOTES
Daily Progress Note  Neurology     LOS: 4 days     Subjective     Chief Complaint: Altered mental status.    Interval History: No acute issues overnight.  She remains pleasantly confused.    ROS: Negative for fever, chest pain or shortness of breath.    Objective     Vital signs in last 24 hours:  Temp:  [97 °F (36.1 °C)-98.5 °F (36.9 °C)] 98.5 °F (36.9 °C)  Heart Rate:  [] 88  Resp:  [18] 18  BP: (151-169)/(69-94) 169/94      Physical Exam:   General: Lying in bed with eyes closed. In NAD.     Respiratory: Respirations unlabored   CV: RRR       Neurologic Exam:   Mental status: Awake, alert, Follows commands. Speech is slow and hypophonic.   CN: II-XII intact to detailed exam except for reduced visual acuity to finger counting to 1 foot.   Motor: Strength full (5/5) throughout in BUE and BLE    Reflexes: 2+ and symmetric throughout          Results Review:    Results from last 7 days   Lab Units 08/10/22  0533   WBC 10*3/mm3 12.45*   HEMOGLOBIN g/dL 9.1*   HEMATOCRIT % 29.0*   PLATELETS 10*3/mm3 369     Results from last 7 days   Lab Units 08/13/22  0700   SODIUM mmol/L 138   POTASSIUM mmol/L 3.4*   CHLORIDE mmol/L 109*   CO2 mmol/L 19.0*   BUN mg/dL 25*   CREATININE mg/dL 1.32*   CALCIUM mg/dL 10.3*         Ionized calcium: 1.86->1.76->1.66->1.53    Assessment & Plan   89-year-old female who was assisted living facility resident with recent admission for C. difficile colitis during which she was found to have several concerning masses within the abdomen/pelvis, history of Gretchen's syndrome, macular degeneration, hypercalcemia, CKD stage III, hypertension hyperlipidemia who presented because of     1.  Altered mental status:  -Likely due to underlying hypercalcemia.  Calcium is trending down.  -MRI did not reveal any evidence of acute abnormalities.  -Possible Parkinson's.  -Continue with OT, PT.  -Neurology  sign off.  Please call back with any questions or concerns that you may have.      Freddy Rich MD  08/13/22  11:43 EDT

## 2022-08-14 LAB
ANION GAP SERPL CALCULATED.3IONS-SCNC: 9 MMOL/L (ref 5–15)
BACTERIA SPEC AEROBE CULT: NORMAL
BACTERIA SPEC AEROBE CULT: NORMAL
BUN SERPL-MCNC: 20 MG/DL (ref 8–23)
BUN/CREAT SERPL: 16.5 (ref 7–25)
CA-I SERPL ISE-MCNC: 1.49 MMOL/L (ref 1.12–1.32)
CALCIUM SPEC-SCNC: 9.7 MG/DL (ref 8.2–9.6)
CHLORIDE SERPL-SCNC: 115 MMOL/L (ref 98–107)
CO2 SERPL-SCNC: 25 MMOL/L (ref 22–29)
CREAT SERPL-MCNC: 1.21 MG/DL (ref 0.57–1)
EGFRCR SERPLBLD CKD-EPI 2021: 42.1 ML/MIN/1.73
GLUCOSE SERPL-MCNC: 80 MG/DL (ref 65–99)
POTASSIUM SERPL-SCNC: 3.2 MMOL/L (ref 3.5–5.2)
SODIUM SERPL-SCNC: 149 MMOL/L (ref 136–145)

## 2022-08-14 PROCEDURE — 94799 UNLISTED PULMONARY SVC/PX: CPT

## 2022-08-14 PROCEDURE — 94664 DEMO&/EVAL PT USE INHALER: CPT

## 2022-08-14 PROCEDURE — 94761 N-INVAS EAR/PLS OXIMETRY MLT: CPT

## 2022-08-14 PROCEDURE — 80048 BASIC METABOLIC PNL TOTAL CA: CPT | Performed by: INTERNAL MEDICINE

## 2022-08-14 PROCEDURE — 82330 ASSAY OF CALCIUM: CPT | Performed by: INTERNAL MEDICINE

## 2022-08-14 PROCEDURE — 99232 SBSQ HOSP IP/OBS MODERATE 35: CPT | Performed by: INTERNAL MEDICINE

## 2022-08-14 PROCEDURE — 97535 SELF CARE MNGMENT TRAINING: CPT

## 2022-08-14 RX ORDER — MAGNESIUM SULFATE HEPTAHYDRATE 40 MG/ML
4 INJECTION, SOLUTION INTRAVENOUS AS NEEDED
Status: DISCONTINUED | OUTPATIENT
Start: 2022-08-14 | End: 2022-08-17 | Stop reason: HOSPADM

## 2022-08-14 RX ORDER — POTASSIUM CHLORIDE 1.5 G/1.77G
40 POWDER, FOR SOLUTION ORAL AS NEEDED
Status: DISCONTINUED | OUTPATIENT
Start: 2022-08-14 | End: 2022-08-17 | Stop reason: HOSPADM

## 2022-08-14 RX ORDER — POTASSIUM CHLORIDE 750 MG/1
40 CAPSULE, EXTENDED RELEASE ORAL AS NEEDED
Status: DISCONTINUED | OUTPATIENT
Start: 2022-08-14 | End: 2022-08-17 | Stop reason: HOSPADM

## 2022-08-14 RX ORDER — POTASSIUM CHLORIDE 7.45 MG/ML
10 INJECTION INTRAVENOUS
Status: DISCONTINUED | OUTPATIENT
Start: 2022-08-14 | End: 2022-08-17 | Stop reason: HOSPADM

## 2022-08-14 RX ORDER — MAGNESIUM SULFATE HEPTAHYDRATE 40 MG/ML
2 INJECTION, SOLUTION INTRAVENOUS AS NEEDED
Status: DISCONTINUED | OUTPATIENT
Start: 2022-08-14 | End: 2022-08-17 | Stop reason: HOSPADM

## 2022-08-14 RX ADMIN — DILTIAZEM HYDROCHLORIDE 30 MG: 30 TABLET, FILM COATED ORAL at 08:39

## 2022-08-14 RX ADMIN — SODIUM CHLORIDE 125 ML/HR: 9 INJECTION, SOLUTION INTRAVENOUS at 01:03

## 2022-08-14 RX ADMIN — POTASSIUM CHLORIDE 40 MEQ: 1.5 POWDER, FOR SOLUTION ORAL at 12:25

## 2022-08-14 RX ADMIN — Medication 10 ML: at 08:39

## 2022-08-14 RX ADMIN — FAMOTIDINE 20 MG: 20 TABLET ORAL at 08:39

## 2022-08-14 RX ADMIN — DILTIAZEM HYDROCHLORIDE 30 MG: 30 TABLET, FILM COATED ORAL at 17:40

## 2022-08-14 RX ADMIN — BUDESONIDE AND FORMOTEROL FUMARATE DIHYDRATE 2 PUFF: 80; 4.5 AEROSOL RESPIRATORY (INHALATION) at 07:18

## 2022-08-14 RX ADMIN — DICLOFENAC 2 G: 10 GEL TOPICAL at 08:42

## 2022-08-14 RX ADMIN — MICONAZOLE NITRATE 1 APPLICATION: 20 CREAM TOPICAL at 08:39

## 2022-08-14 RX ADMIN — BUDESONIDE AND FORMOTEROL FUMARATE DIHYDRATE 2 PUFF: 80; 4.5 AEROSOL RESPIRATORY (INHALATION) at 19:26

## 2022-08-14 RX ADMIN — ASPIRIN 325 MG ORAL TABLET 325 MG: 325 PILL ORAL at 08:39

## 2022-08-14 RX ADMIN — FOLIC ACID 1000 MCG: 1 TABLET ORAL at 08:39

## 2022-08-14 RX ADMIN — DILTIAZEM HYDROCHLORIDE 30 MG: 30 TABLET, FILM COATED ORAL at 12:25

## 2022-08-14 RX ADMIN — TRAMADOL HYDROCHLORIDE 50 MG: 50 TABLET ORAL at 08:39

## 2022-08-14 RX ADMIN — POTASSIUM CHLORIDE 40 MEQ: 1.5 POWDER, FOR SOLUTION ORAL at 17:40

## 2022-08-14 NOTE — THERAPY TREATMENT NOTE
Patient Name: Pati Carter  : 1930    MRN: [id number]                              Today's Date: 2022       Admit Date: 2022    Visit Dx:     ICD-10-CM ICD-9-CM   1. Acute on chronic alteration in mental status  R41.82 780.09   2. Acute renal failure, unspecified acute renal failure type (HCC)  N17.9 584.9   3. Hypercalcemia  E83.52 275.42   4. Chronic anemia  D64.9 285.9   5. Elevated blood pressure reading with diagnosis of hypertension  I10 401.9   6. History of Clostridioides difficile colitis  Z86.19 V12.79   7. DNR (do not resuscitate)  Z66 V49.86   8. Declining functional status  R53.81 799.3   9. Oropharyngeal dysphagia  R13.12 787.22   10. Dysarthria  R47.1 784.51   11. Cognitive communication deficit  R41.841 799.52     Patient Active Problem List   Diagnosis   • Macular degeneration   • Rheumatoid arthritis (Formerly [facility])   • Hearing loss   • Hyperlipidemia   • Gastroesophageal reflux disease   • Hypertension   • Constipation   • Anemia   • Compression fracture of lumbar vertebra ([facility])   • Degeneration of intervertebral disc of lumbar region   • COPD (chronic obstructive pulmonary disease) ([facility])   • H/O calcium pyrophosphate deposition disease (CPPD)   • Falls frequently   • Immunosuppressed status on methotrexate   • Ferny Bonnet syndrome   • Impaired cognition   • Impaired mobility   • Ulcer of right foot, limited to breakdown of skin ([facility])   • Peripheral vascular disease ([facility])   • Nocturia   • Age-related osteoporosis without current pathological fracture   • Pelvic mass   • Fall, initial encounter   • Acute on chronic alteration in mental status   • Hypercalcemia   • Acute kidney injury (HCC)     Past Medical History:   Diagnosis Date   • Anemia     Description: A.  Dx 2006- borderline intermittent.   • Back pain    • Benign colonic polyp 2016    Description: A.  Dx .   • Ferny Bonnet syndrome 2020   • Chondrocalcinosis     knees   • Compression fracture of lumbar vertebra  ([facility])    • Constipation    • COPD (chronic obstructive pulmonary disease) ([facility])     Description: A.  Rule out chronic persistent asthma, COPD, or obliterative bronchiolitis.- Rae   • COVID-19 virus infection 10/11/2020   • CTS (carpal tunnel syndrome)    • Degeneration of intervertebral disc of lumbar region     Description: A.  Diagnosed in April 2013 with advanced multilevel with severe spinal stenosis, followed by Dr. Pillai for pain management.   • Gastroesophageal reflux disease    • Hearing loss    • History of calcium pyrophosphate deposition disease (CPPD)    • History of colonoscopy 01/01/1999    NORMAL PER PATIENT    • History of mammogram 01/01/2011    NORMAL PER PT    • History of Papanicolaou smear of cervix 01/01/2010    NORMAL PER PT    • History of varicella    • Hyperlipidemia     Description: A.  Dx 2006.   • Hypertension     Description: A. Dx 2001.   • Macular degeneration    • Nocturia    • Osteoporosis    • Ovarian mass     Dx 8/15- benign left cystic adnexal mass   • Overactive bladder    • Pelvic floor dysfunction    • Rheumatoid arthritis ([facility])     Description: A.  Diagnosed in 2000 and and followed by Dr. Constantino (now Dr. Kaplan). B.  On methotrexate therapy since 2000. C.  Off low-dose prednisone therapy.   • Vitamin D deficiency      Past Surgical History:   Procedure Laterality Date   • APPENDECTOMY     • CARPAL TUNNEL RELEASE Left 01/01/2003    HISTORY OF NEUROPLASTY DECOMPRESSION MEDIAN NERVE AT CARPAL TUNNEL LEFT   • CATARACT EXTRACTION Bilateral 01/01/2009   • CHOLECYSTECTOMY  01/01/1962   • HIP CANNULATED SCREW PLACEMENT Right 1/25/2020    Procedure: HIP CANNULATED SCREW PLACEMENT RIGHT;  Surgeon: Karlos Blount MD;  Location: [facility];  Service: Orthopedics   • KNEE ARTHROSCOPY Left 01/01/2001    MENISCAL REPAIR   • KYPHOPLASTY  06/18/2015    T11 AND L1 (JOSE A)   • PELVIC LAPAROSCOPY  01/01/1996    REMOVAL OF BENIGN UTERINE AND RIGHT OVARIAN TUMORS   • SALPINGO  OOPHORECTOMY Left 08/26/2015    REMOVAL OF LEFT OVARY AND TUBE (benign cystic mass)      General Information     Row Name 08/14/22 135          OT Time and Intention    Document Type therapy note (daily note)  -TA     Mode of Treatment occupational therapy  -TA     Row Name 08/14/22 0679          General Information    Patient Profile Reviewed yes  -TA     Existing Precautions/Restrictions fall  osbaldo bonnet syndrome- visual deficit causing visual specific hallucinations  -TA     Barriers to Rehab medically complex;previous functional deficit;cognitive status;visual deficit  -TA     Row Name 08/14/22 9159          Cognition    Orientation Status (Cognition) oriented to;person;disoriented to;place;situation;time  -TA     Row Name 08/14/22 1936          Safety Issues, Functional Mobility    Safety Issues Affecting Function (Mobility) insight into deficits/self-awareness;safety precaution awareness;safety precautions follow-through/compliance;judgment;impulsivity  -TA     Impairments Affecting Function (Mobility) balance;cognition;endurance/activity tolerance;strength;visual/perceptual  -TA     Cognitive Impairments, Mobility Safety/Performance impulsivity;insight into deficits/self-awareness;judgment;safety precaution awareness;safety precaution follow-through  -TA           User Key  (r) = Recorded By, (t) = Taken By, (c) = Cosigned By    Initials Name Provider Type    TA Geovanny Monroy, OT Occupational Therapist                 Mobility/ADL's     Row Name 08/14/22 4319          Bed Mobility    Comment, (Bed Mobility) Pt UIC  -TA     Row Name 08/14/22 1355          Transfers    Transfers sit-stand transfer;toilet transfer  -TA     Sit-Stand [place] (Transfers) moderate assist (50% patient effort);verbal cues  -TA     [place] Level (Toilet Transfer) moderate assist (50% patient effort);verbal cues  -TA     Assistive Device (Toilet Transfer) commode, bedside without drop arms  BUE support/gait belt  used for stand step pivot chair<>BSC  -TA     Row Name 08/14/22 1359          Sit-Stand Transfer    Assistive Device (Sit-Stand Transfers) walker, front-wheeled  -TA     Comment, (Sit-Stand Transfer) STS at RW to lower and raise brief  -TA     Row Name 08/14/22 1359          Toilet Transfer    Type (Toilet Transfer) stand pivot/stand step  -TA     Comment, (Toilet Transfer) Responds to simple 1 step commands  -TA     Row Name 08/14/22 [facility]9          Activities of Daily Living    BADL Assessment/Intervention upper body dressing;toileting;lower body dressing;feeding;grooming  -TA     Row Name 08/14/22 1359          Lower Body Dressing Assessment/Training    [place] Level (Lower Body Dressing) doff;don;maximum assist (25% patient effort);verbal cues  doff soiled brief, don clean brief  -TA     Position (Lower Body Dressing) supported sitting;supported standing  -TA     Row Name 08/14/22 1359          Self-Feeding Assessment/Training    [place] Level (Feeding) liquids to mouth;set up;supervision  -TA     Assistive Devices (Feeding) adapted cup  -TA     Row Name 08/14/22 1359          Toileting Assessment/Training    [place] Level (Toileting) adjust/manage clothing;perform perineal hygiene;maximum assist (25% patient effort)  -TA     Assistive Devices (Toileting) commode, bedside without drop arms  -TA     Position (Toileting) supported sitting;supported standing  -TA     Row Name 08/14/22 1359          Upper Body Dressing Assessment/Training    [place] Level (Upper Body Dressing) doff;don;maximum assist (25% patient effort)  hospital gown wet, new gown donned  -TA     Position (Upper Body Dressing) supported sitting  -TA     Row Name 08/14/22 1359          Grooming Assessment/Training    [place] Level (Grooming) wash face, hands;hair care, combing/brushing;minimum assist (75% patient effort);verbal cues;set up  washed face/hands with set up, Min A for brushing posterior aspect of head  -TA            User Key  (r) = Recorded By, (t) = Taken By, (c) = Cosigned By    Initials Name Provider Type    Geovanny Sharp OT Occupational Therapist               Obj/Interventions     Row Name 08/14/22 5287          Balance    Balance Assessment sitting dynamic balance;standing dynamic balance  -TA     Dynamic Sitting Balance standby assist;verbal cues  -TA     Position, Sitting Balance sitting in chair  -TA     Dynamic Standing Balance moderate assist;verbal cues  -TA     Position/Device Used, Standing Balance walker, front-wheeled  -TA     Balance Interventions sit to stand;occupation based/functional task;weight shifting activity;UE activity with balance activity  -TA           User Key  (r) = Recorded By, (t) = Taken By, (c) = Cosigned By    Initials Name Provider Type    Geovanny Sharp OT Occupational Therapist               Goals/Plan     Row Name 08/14/22 8882          Therapy Assessment/Plan (OT)    Planned Therapy Interventions (OT) BADL retraining;functional balance retraining;activity tolerance training;occupation/activity based interventions;patient/caregiver education/training;ROM/therapeutic exercise;strengthening exercise;transfer/mobility retraining  -TA           User Key  (r) = Recorded By, (t) = Taken By, (c) = Cosigned By    Initials Name Provider Type    Geovanny Sharp, OT Occupational Therapist               Clinical Impression     Row Name 08/14/22 5634          Pain Assessment    Pretreatment Pain Rating 0/10 - no pain  -TA     Posttreatment Pain Rating 0/10 - no pain  -TA     Pre/Posttreatment Pain Comment Pt denied pain, tolerated  -TA     Pain Intervention(s) Ambulation/increased activity;Repositioned  -TA     Row Name 08/14/22 6401          Plan of Care Review    Plan of Care Reviewed With patient  -TA     Progress improving  -TA     Outcome Evaluation VSS; Pt progressing with fxl mobility requiring Mod A/RW STS from recliner and Mod A/BUE support for stand step pivot  recliner<>BSC, Pt required Max A to doff soiled brief and don clean brief and for post toilet hygiene, completed wash face/hands/brush hair with Min A, changed into dry hospital gown with Max A. Continue per OT POC. Recommend SNF at discharge.  -TA     Row Name 08/14/22 5939          Therapy Assessment/Plan (OT)    Rehab Potential (OT) good, to achieve stated therapy goals  -TA     Criteria for Skilled Therapeutic Interventions Met (OT) yes;skilled treatment is necessary  -TA     Therapy Frequency (OT) daily  -TA     Predicted Duration of Therapy Intervention (OT) 1 week  -TA     Row Name 08/14/22 1359          Therapy Plan Review/Discharge Plan (OT)    Anticipated Discharge Disposition (OT) skilled nursing facility  -TA     Row Name 08/14/22 1359          Vital Signs    Pre Systolic BP Rehab --  VSS; RN cleared pt for tx  -TA     O2 Delivery Pre Treatment room air  -TA     O2 Delivery Intra Treatment room air  -TA     O2 Delivery Post Treatment room air  -TA     Pre Patient Position Sitting  -TA     Intra Patient Position Standing  -TA     Post Patient Position Sitting  -TA     Row Name 08/14/22 8513          Positioning and Restraints    Pre-Treatment Position sitting in chair/recliner  -TA     Post Treatment Position chair  -TA     In Chair notified nsg;reclined;call light within reach;encouraged to call for assist;exit alarm on;waffle cushion;LUE elevated;legs elevated  -TA           User Key  (r) = Recorded By, (t) = Taken By, (c) = Cosigned By    Initials Name Provider Type    Geovanny Sharp, OT Occupational Therapist               Outcome Measures     Row Name 08/14/22 1359          How much help from another is currently needed...    Putting on and taking off regular lower body clothing? 2  -TA     Bathing (including washing, rinsing, and drying) 2  -TA     Toileting (which includes using toilet bed pan or urinal) 2  -TA     Putting on and taking off regular upper body clothing 2  -TA     Taking care  of personal grooming (such as brushing teeth) 3  -TA     Eating meals 3  -TA     AM-PAC 6 Clicks Score (OT) 14  -TA     Row Name 08/14/22 0800          How much help from another person do you currently need...    Turning from your back to your side while in flat bed without using bedrails? 2  -HS     Moving from lying on back to sitting on the side of a flat bed without bedrails? 2  -HS     Moving to and from a bed to a chair (including a wheelchair)? 2  -HS     Standing up from a chair using your arms (e.g., wheelchair, bedside chair)? 2  -HS     Climbing 3-5 steps with a railing? 2  -HS     To walk in hospital room? 2  -HS     AM-PAC 6 Clicks Score (PT) 12  -HS     Highest level of mobility 4 --> Transferred to chair/commode  -HS     Row Name 08/14/22 1359          Functional Assessment    Outcome Measure Options AM-PAC 6 Clicks Daily Activity (OT)  -TA           User Key  (r) = Recorded By, (t) = Taken By, (c) = Cosigned By    Initials Name Provider Type    TA Geovanny Monroy OT Occupational Therapist    HS Valeria Barrow RN Registered Nurse                Occupational Therapy Education                 Title: PT OT SLP Therapies (In Progress)     Topic: Occupational Therapy (In Progress)     Point: ADL training (In Progress)     Description:   Instruct learner(s) on proper safety adaptation and remediation techniques during self care or transfers.   Instruct in proper use of assistive devices.              Learning Progress Summary           Patient Acceptance, E,D, NR by TB at 8/10/2022 1501   Family Acceptance, E,D, NR by TB at 8/10/2022 1501                   Point: Home exercise program (Not Started)     Description:   Instruct learner(s) on appropriate technique for monitoring, assisting and/or progressing therapeutic exercises/activities.              Learner Progress:  Not documented in this visit.          Point: Precautions (Not Started)     Description:   Instruct learner(s) on prescribed  precautions during self-care and functional transfers.              Learner Progress:  Not documented in this visit.          Point: Body mechanics (Not Started)     Description:   Instruct learner(s) on proper positioning and spine alignment during self-care, functional mobility activities and/or exercises.              Learner Progress:  Not documented in this visit.                      User Key     Initials Effective Dates Name Provider Type Discipline    TB 06/16/21 -  Anh Brennan OT Occupational Therapist OT              OT Recommendation and Plan  Planned Therapy Interventions (OT): BADL retraining, functional balance retraining, activity tolerance training, occupation/activity based interventions, patient/caregiver education/training, ROM/therapeutic exercise, strengthening exercise, transfer/mobility retraining  Therapy Frequency (OT): daily  Plan of Care Review  Plan of Care Reviewed With: patient  Progress: improving  Outcome Evaluation: VSS; Pt progressing with fxl mobility requiring Mod A/RW STS from recliner and Mod A/BUE support for stand step pivot recliner<>BSC, Pt required Max A to doff soiled brief and don clean brief and for post toilet hygiene, completed wash face/hands/brush hair with Min A, changed into dry hospital gown with Max A. Continue per OT POC. Recommend SNF at discharge.     Time Calculation:    Time Calculation- OT     Row Name 08/14/22 1359             Time Calculation- OT    OT Start Time 1359  ttc 31 minutes  -TA      Total Timed Code Minutes- OT 31 minute(s)  -TA      OT Received On 08/14/22  -TA      OT Goal Re-Cert Due Date 08/20/22  -TA            User Key  (r) = Recorded By, (t) = Taken By, (c) = Cosigned By    Initials Name Provider Type    TA Geovanny Monroy OT Occupational Therapist              Therapy Charges for Today     Code Description Service Date Service Provider Modifiers Qty    [id number] HC OT SELF CARE/MGMT/TRAIN EA 15 MIN 8/14/2022  Porfirio, Geovanny SINGH, OT GO 2               Geovanny Monroy, OT  8/14/2022

## 2022-08-14 NOTE — PROGRESS NOTES
[facility] Medicine Services  PROGRESS NOTE    Patient Name: Pati Carter  : 1930  MRN: [id number]    Date of Admission: 2022  Primary Care Physician: Pallavi Eduardo MD    Subjective   Subjective     CC:  Follow-up hypercalcemia    HPI:  Patient just pulled out her IV.  Calcium has been slowly trending down.  Daughter at bedside today, discussed possible causes of hypercalcemia, if it appears malignant she would be more interested in conservative/palliative measures.  Patient herself denies acute complaints    ROS:  Gen- No fevers, chills  CV- No chest pain, palpitations  Resp- No cough, dyspnea  GI- No N/V/D, abd pain    Objective   Objective     Vital Signs:   Temp:  [97.7 °F (36.5 °C)-99.2 °F (37.3 °C)] 97.7 °F (36.5 °C)  Heart Rate:  [] 91  Resp:  [18-20] 18  BP: (111-167)/(52-85) 164/85     Physical Exam:  Constitutional: Awake, alert, elderly female sitting up in bedside chair  HENT: NCAT, mucous membranes moist  Respiratory: Clear to auscultation bilaterally, respiratory effort normal   Cardiovascular: RRR, no murmurs, rubs, or gallops, palpable radial pulses  Gastrointestinal: Positive bowel sounds, soft, nontender, nondistended  Musculoskeletal: No bilateral ankle edema, bitemporal wasting  Psychiatric: Appropriate affect, cooperative  Neurologic: Pleasantly confused, speech clear, moving extremities spontaneously    Results Reviewed:  LAB RESULTS:      Lab 08/10/22  0533 22  1720 22  1918   WBC 12.45* 17.90* 12.72*   HEMOGLOBIN 9.1* 10.1* 9.1*   HEMATOCRIT 29.0* 30.9* 29.8*   PLATELETS 369 436 486*   NEUTROS ABS  --  15.13* 8.45*   IMMATURE GRANS (ABS)  --  0.10* 0.11*   LYMPHS ABS  --  0.99 2.29   MONOS ABS  --  1.48* 1.58*   EOS ABS  --  0.10 0.22   .3* 97.2* 101.4*   PROCALCITONIN  --  0.33*  --    LACTATE  --  1.2  --          Lab 22  0700 22  0730 22  0802 08/10/22  0533 22  1720   SODIUM 138 144 143 142  139   POTASSIUM 3.4* 3.7 3.8 3.8 3.8   CHLORIDE 109* 108* 107 105 98   CO2 19.0* 28.0 27.0 26.0 30.0*   ANION GAP 10.0 8.0 9.0 11.0 11.0   BUN 25* 31* 37* 36* 35*   CREATININE 1.32* 1.49* 1.95* 2.12* 2.04*   EGFR 38.0* 32.8* 23.8* 21.5* 22.5*   GLUCOSE 92 86 94 80 104*   CALCIUM 10.3* 11.2* 11.2* 11.8* 13.3*   IONIZED CALCIUM 1.53* 1.66*  --  1.76* 1.86*   PHOSPHORUS  --   --   --  4.0  --    HEMOGLOBIN A1C  --   --   --  5.10  --          Lab 08/10/22  0533 08/09/22  1720   TOTAL PROTEIN  --  7.4   ALBUMIN 2.60* 3.20*   GLOBULIN  --  4.2   ALT (SGPT)  --  17   AST (SGOT)  --  20   BILIRUBIN  --  0.4   ALK PHOS  --  118*   LIPASE  --  10*         Lab 08/09/22  1720   PROBNP 4,237.0*   TROPONIN T 0.032*         Lab 08/10/22  0533   CHOLESTEROL 115   LDL CHOL 67   HDL CHOL 27*   TRIGLYCERIDES 116             Lab 08/09/22  1715   PH, ARTERIAL 7.478*   PCO2, ARTERIAL 44.6   PO2 ART 61.5*   FIO2 28   HCO3 ART 33.0*   BASE EXCESS ART 8.5*   CARBOXYHEMOGLOBIN 1.3     Brief Urine Lab Results  (Last result in the past 365 days)      Color   Clarity   Blood   Leuk Est   Nitrite   Protein   CREAT   Urine HCG        08/09/22 1838 Yellow   Clear   Negative   Trace   Negative   30 mg/dL (1+)                 Microbiology Results Abnormal     Procedure Component Value - Date/Time    Blood Culture - Blood, Hand, Right [[id number]]  (Normal) Collected: 08/09/22 1810    Lab Status: Preliminary result Specimen: Blood from Hand, Right Updated: 08/13/22 1847     Blood Culture No growth at 4 days    Blood Culture - Blood, Arm, Left [[id number]]  (Normal) Collected: 08/09/22 1721    Lab Status: Preliminary result Specimen: Blood from Arm, Left Updated: 08/13/22 1747     Blood Culture No growth at 4 days    Urine Culture - Urine, Urine, Catheter [[id number]]  (Normal) Collected: 08/09/22 1838    Lab Status: Final result Specimen: Urine, Catheter Updated: 08/10/22 2040     Urine Culture No growth    COVID PRE-OP / PRE-PROCEDURE SCREENING ORDER  (NO ISOLATION) - Swab, Nasopharynx [[id number]]  (Normal) Collected: 08/09/22 1812    Lab Status: Final result Specimen: Swab from Nasopharynx Updated: 08/09/22 1907    Narrative:      The following orders were created for panel order COVID PRE-OP / PRE-PROCEDURE SCREENING ORDER (NO ISOLATION) - Swab, Nasopharynx.  Procedure                               Abnormality         Status                     ---------                               -----------         ------                     COVID-19 and FLU A/B PCR...[[id number]]  Normal              Final result                 Please view results for these tests on the individual orders.    COVID-19 and FLU A/B PCR - Swab, Nasopharynx [[id number]]  (Normal) Collected: 08/09/22 1812    Lab Status: Final result Specimen: Swab from Nasopharynx Updated: 08/09/22 1907     COVID19 Not Detected     Influenza A PCR Not Detected     Influenza B PCR Not Detected    Narrative:      Fact sheet for providers: https://www.fda.gov/media/185133/download    Fact sheet for patients: https://www.fda.gov/media/986933/download    Test performed by PCR.          No radiology results from the last 24 hrs        I have reviewed the medications:  Scheduled Meds:acetaminophen, 650 mg, Oral, Nightly  aspirin, 325 mg, Oral, Daily   Or  aspirin, 300 mg, Rectal, Daily  atorvastatin, 80 mg, Oral, Nightly  budesonide-formoterol, 2 puff, Inhalation, BID - RT  Diclofenac Sodium, 2 g, Topical, Q12H  dilTIAZem, 30 mg, Oral, Q6H  famotidine, 20 mg, Oral, Daily  folic acid, 1,000 mcg, Oral, Daily  miconazole, 1 application, Topical, Q12H  senna-docusate sodium, 2 tablet, Oral, BID  sodium chloride, 10 mL, Intravenous, Q12H  sodium chloride, 10 mL, Intravenous, Q12H      Continuous Infusions:sodium chloride, 125 mL/hr, Last Rate: 125 mL/hr (08/14/22 0103)      PRN Meds:.•  acetaminophen **OR** acetaminophen **OR** acetaminophen  •  albuterol  •  senna-docusate sodium **AND** polyethylene glycol **AND**  bisacodyl **AND** bisacodyl  •  melatonin  •  ondansetron  •  [COMPLETED] Insert peripheral IV **AND** sodium chloride  •  sodium chloride  •  sodium chloride  •  traMADol    Assessment & Plan   Assessment & Plan     Active Hospital Problems    Diagnosis  POA   • **Hypercalcemia [E83.52]  Yes   • Acute on chronic alteration in mental status [R41.82]  Yes   • Acute kidney injury (HCC) [N17.9]  Yes   • Pelvic mass [R19.00]  Yes   • Impaired cognition [R41.89]  Yes   • Impaired mobility [Z74.09]  Yes   • 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        Brief Hospital Course to date:  Pati Carter is a 89 y.o. female [place] resident with recent admission for C. 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, multifactorial  Hypercalcemia  Ferny Garret syndrome  - Visual halluc @ b-line; Ferny Garret syndrome; dtr noted sig worsening mentation/incomprehensible speech @ arrival  - CT head w/o acute finding, stroke neuro has evaluated; MRI negative  -  SLP follows; diet per their recommendations  -  Patient requires assistance with all meals  -  Supplemental calcium/vitamin D on hold  -  PTH  appropriately suppressed, PTHrP still pending  -Ionized calcium close to normal, she pulled out her IV, we will leave the IV out and recheck calcium tomorrow; if calcium trending back up or PTH-RP are concerning for malignant process, her daughter would prefer more palliative/hospice direction     JENNIE on CKD 3, improving  -Baseline GFR 40s; Cr 0.9-1.1  -Renal function near normal, as above recheck labs off of IVF tomorrow     Likely ongoing aspiration  - SLP following, on modified diet     Hypertension  Hyperlipidemia  PVD  -Aspirin, diltiazem     COPD  - Home inhalers, substituted for formulary     GERD  - Pepcid     Rheumatoid arthritis  - Per previous documentation, on MTX, however not listed on home med list     Recent C. difficile colitis  -Completed oral vancomycin pta     1.8 cm LT renal lesion  Bladder wall lesion  Pelvic mass  -Seen by Dr. Yao last admit, monitoring/following up opt  -Seen by Dr. Mello lat admit, gyn mass is stable, expectant management, if large volume blood loss she recommended formal Dx and rad-onc therapy  -d/w dtr at bedside, they prefer more conservative measures and likely would not pursue invasive measures    Expected Discharge Location and Transportation: Previous assisted living patient, anticipate short-term rehab with transition to long-term care +/- hospice  Expected Discharge Date: 8/16    DVT prophylaxis:  Mechanical DVT prophylaxis orders are present.     AM-PAC 6 Clicks Score (PT): 10 (08/13/22 0800)    CODE STATUS:   Code Status and Medical Interventions:   Ordered at: 08/09/22 8412     Medical Intervention Limits:    NO intubation (DNI)     Code Status (Patient has no pulse and is not breathing):    No CPR (Do Not Attempt to Resuscitate)     Medical Interventions (Patient has pulse or is breathing):    Limited Support       Logan Sawyer, DO  08/14/22

## 2022-08-14 NOTE — PLAN OF CARE
Problem: Adult Inpatient Plan of Care  Goal: Plan of Care Review  Recent Flowsheet Documentation  Taken 8/14/2022 1359 by Geovanny Monroy, OT  Progress: improving  Plan of Care Reviewed With: patient  Outcome Evaluation:   VSS   Pt progressing with fxl mobility requiring Mod A/RW STS from recliner and Mod A/BUE support for stand step pivot recliner<>BSC, Pt required Max A to doff soiled brief and don clean brief and for post toilet hygiene, completed wash face/hands/brush hair with Min A, changed into dry hospital gown with Max A. Continue per OT POC. Recommend SNF at discharge.   Goal Outcome Evaluation:  Plan of Care Reviewed With: patient        Progress: improving  Outcome Evaluation: VSS; Pt progressing with fxl mobility requiring Mod A/RW STS from recliner and Mod A/BUE support for stand step pivot recliner<>BSC, Pt required Max A to doff soiled brief and don clean brief and for post toilet hygiene, completed wash face/hands/brush hair with Min A, changed into dry hospital gown with Max A. Continue per OT POC. Recommend SNF at discharge.

## 2022-08-15 ENCOUNTER — TELEPHONE (OUTPATIENT)
Dept: INTERNAL MEDICINE | Facility: CLINIC | Age: 87
End: 2022-08-15

## 2022-08-15 LAB
ANION GAP SERPL CALCULATED.3IONS-SCNC: 7 MMOL/L (ref 5–15)
BUN SERPL-MCNC: 20 MG/DL (ref 8–23)
BUN/CREAT SERPL: 17.2 (ref 7–25)
CA-I SERPL ISE-MCNC: 1.53 MMOL/L (ref 1.12–1.32)
CALCIUM SPEC-SCNC: 9.9 MG/DL (ref 8.2–9.6)
CHLORIDE SERPL-SCNC: 108 MMOL/L (ref 98–107)
CO2 SERPL-SCNC: 26 MMOL/L (ref 22–29)
CREAT SERPL-MCNC: 1.16 MG/DL (ref 0.57–1)
EGFRCR SERPLBLD CKD-EPI 2021: 44.3 ML/MIN/1.73
GLUCOSE SERPL-MCNC: 91 MG/DL (ref 65–99)
POTASSIUM SERPL-SCNC: 4.1 MMOL/L (ref 3.5–5.2)
PTH RELATED PROT SERPL-SCNC: <2 PMOL/L
QT INTERVAL: 324 MS
QTC INTERVAL: 434 MS
SODIUM SERPL-SCNC: 141 MMOL/L (ref 136–145)

## 2022-08-15 PROCEDURE — 94761 N-INVAS EAR/PLS OXIMETRY MLT: CPT

## 2022-08-15 PROCEDURE — 94664 DEMO&/EVAL PT USE INHALER: CPT

## 2022-08-15 PROCEDURE — 94799 UNLISTED PULMONARY SVC/PX: CPT

## 2022-08-15 PROCEDURE — 97116 GAIT TRAINING THERAPY: CPT

## 2022-08-15 PROCEDURE — 99232 SBSQ HOSP IP/OBS MODERATE 35: CPT | Performed by: INTERNAL MEDICINE

## 2022-08-15 PROCEDURE — 80048 BASIC METABOLIC PNL TOTAL CA: CPT | Performed by: INTERNAL MEDICINE

## 2022-08-15 PROCEDURE — 82330 ASSAY OF CALCIUM: CPT | Performed by: INTERNAL MEDICINE

## 2022-08-15 RX ADMIN — DILTIAZEM HYDROCHLORIDE 30 MG: 30 TABLET, FILM COATED ORAL at 05:28

## 2022-08-15 RX ADMIN — Medication 5 MG: at 00:10

## 2022-08-15 RX ADMIN — DILTIAZEM HYDROCHLORIDE 30 MG: 30 TABLET, FILM COATED ORAL at 17:39

## 2022-08-15 RX ADMIN — ATORVASTATIN CALCIUM 80 MG: 40 TABLET, FILM COATED ORAL at 21:36

## 2022-08-15 RX ADMIN — DICLOFENAC 2 G: 10 GEL TOPICAL at 21:36

## 2022-08-15 RX ADMIN — TRAMADOL HYDROCHLORIDE 50 MG: 50 TABLET ORAL at 00:10

## 2022-08-15 RX ADMIN — BUDESONIDE AND FORMOTEROL FUMARATE DIHYDRATE 2 PUFF: 80; 4.5 AEROSOL RESPIRATORY (INHALATION) at 19:11

## 2022-08-15 RX ADMIN — DILTIAZEM HYDROCHLORIDE 30 MG: 30 TABLET, FILM COATED ORAL at 12:07

## 2022-08-15 RX ADMIN — DILTIAZEM HYDROCHLORIDE 30 MG: 30 TABLET, FILM COATED ORAL at 00:10

## 2022-08-15 RX ADMIN — ACETAMINOPHEN 650 MG: 325 TABLET, FILM COATED ORAL at 00:09

## 2022-08-15 RX ADMIN — FAMOTIDINE 20 MG: 20 TABLET ORAL at 08:52

## 2022-08-15 RX ADMIN — MICONAZOLE NITRATE 1 APPLICATION: 20 CREAM TOPICAL at 00:10

## 2022-08-15 RX ADMIN — MICONAZOLE NITRATE 1 APPLICATION: 20 CREAM TOPICAL at 21:36

## 2022-08-15 RX ADMIN — MICONAZOLE NITRATE 1 APPLICATION: 20 CREAM TOPICAL at 08:52

## 2022-08-15 RX ADMIN — ATORVASTATIN CALCIUM 80 MG: 40 TABLET, FILM COATED ORAL at 00:10

## 2022-08-15 RX ADMIN — FOLIC ACID 1000 MCG: 1 TABLET ORAL at 08:52

## 2022-08-15 RX ADMIN — DICLOFENAC 2 G: 10 GEL TOPICAL at 08:51

## 2022-08-15 RX ADMIN — BUDESONIDE AND FORMOTEROL FUMARATE DIHYDRATE 2 PUFF: 80; 4.5 AEROSOL RESPIRATORY (INHALATION) at 07:12

## 2022-08-15 RX ADMIN — ASPIRIN 325 MG ORAL TABLET 325 MG: 325 PILL ORAL at 08:52

## 2022-08-15 RX ADMIN — ACETAMINOPHEN 650 MG: 325 TABLET, FILM COATED ORAL at 21:36

## 2022-08-15 NOTE — DISCHARGE PLACEMENT REQUEST
"Bert Carter (89 y.o. Female)   Referring MD:Dr. Logan Sawyer   PCP: Dr. Pallavi Eduardo  Hospice Dx: Pelvic mass/hyperkalemia  Covid negative on 8/9/22  BMI: 20.14 kg            Date of Birth   07/26/1930    Social Security Number       Address   [address] [place] [address]    Home Phone   [phone]    MRN   [id number]       Episcopalian   None    Marital Status                               Admission Date   8/9/22    Admission Type   Emergency    Admitting Provider   Logan Sawyer DO    Attending Provider   Logan Sawyer DO    Department, Room/Bed   [facility] 3E, S343/1       Discharge Date       Discharge Disposition       Discharge Destination                               Attending Provider: Logan Sawyer DO    Allergies: Ciprofloxacin, Levofloxacin, Sulfamethoxazole-trimethoprim, Sulfamethoxazole, Trimethoprim    Isolation: Spore   Infection: C.difficile (07/23/22)   Code Status: No CPR   Advance Care Planning Activity    Ht: 165.1 cm (65\")   Wt: 54.9 kg (121 lb)    Admission Cmt: None   Principal Problem: Hypercalcemia [E83.52]                 Active Insurance as of 8/9/2022     Primary Coverage     Payor Plan Insurance Group Employer/Plan Group    MEDICARE MEDICARE A & B      Payor Plan Address Payor Plan Phone Number Payor Plan Fax Number Effective Dates    [address] [phone]  7/1/1995 - None Entered    [facility] [id number]       Subscriber Name Subscriber Birth Date Member ID       BERT CARTER 7/26/1930 [id number]           Secondary Coverage     Payor Plan Insurance Group Employer/Plan Group    AARP  SUP AARP HEALTH CARE OPTIONS      Payor Plan Address Payor Plan Phone Number Payor Plan Fax Number Effective Dates    [facility] [phone]  1/1/2016 - None Entered    [address]       [facility] [address]       Subscriber Name Subscriber Birth Date Member ID       BERT CARTER 7/26/1930 [id number]           "       Emergency Contacts      (Rel.) Home Phone Work Phone Mobile Phone    OLLIE CARTER (Daughter) -- -- [phone]    Danette Suresh (Daughter) -- -- [phone]            Emergency Contact Information     Name Relation Home Work Mobile    OLLIE CARTER Daughter   [phone]    Danette Suresh Daughter   [phone]          Insurance Information                MEDICARE/MEDICARE A & B Phone: [phone]    Subscriber: Pati Carter Subscriber#: [id number]    Group#: -- Precert#: --        [facility] SUP/[facility] HEALTH CARE OPTIONS Phone: [phone]    Subscriber: Pati Carter Subscriber#: [id number]    Group#: -- Precert#: --          Problem List           Codes Noted - Resolved       Hospital    Acute on chronic alteration in mental status ICD-10-CM: R41.82  ICD-9-CM: 780.09 8/9/2022 - Present    * (Principal) Hypercalcemia ICD-10-CM: E83.52  ICD-9-CM: 275.42 8/9/2022 - Present    Acute kidney injury (HCC) ICD-10-CM: N17.9  ICD-9-CM: 584.9 8/9/2022 - Present    Pelvic mass ICD-10-CM: R19.00  ICD-9-CM: 789.30 7/23/2022 - Present    Impaired cognition ICD-10-CM: R41.89  ICD-9-CM: 294.9 11/24/2020 - Present    Impaired mobility ICD-10-CM: Z74.09  ICD-9-CM: 799.89 10/16/2020 - Present    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  ICD-9-CM: 368.16 7/7/2020 - Present    COPD (chronic obstructive pulmonary disease) (HCC) ICD-10-CM: J44.9  ICD-9-CM: 496 10/5/2016 - Present    Constipation ICD-10-CM: K59.00  ICD-9-CM: 564.00 9/29/2016 - Present    Rheumatoid arthritis (HCC) ICD-10-CM: M06.9  ICD-9-CM: 714.0 9/28/2016 - Present    Hearing loss ICD-10-CM: H91.90  ICD-9-CM: 389.9 9/28/2016 - Present    Hyperlipidemia ICD-10-CM: E78.5  ICD-9-CM: 272.4 9/28/2016 - Present    Hypertension ICD-10-CM: I10  ICD-9-CM: 401.9 9/28/2016 - Present       Non-Hospital    Fall, initial encounter ICD-10-CM: W19.XXXA  ICD-9-CM: E888.9 7/23/2022 - Present    Age-related osteoporosis without current pathological  fracture ICD-10-CM: M81.0  ICD-9-CM: 733.01 2022 - Present    Ulcer of right foot, limited to breakdown of skin (HCC) ICD-10-CM: L97.511  ICD-9-CM: 707.15 2022 - Present    Peripheral vascular disease (HCC) ICD-10-CM: I73.9  ICD-9-CM: 443.9 2022 - Present    Nocturia ICD-10-CM: R35.1  ICD-9-CM: 788.43 2022 - Present    Falls frequently ICD-10-CM: R29.6  ICD-9-CM: V15.88 2020 - Present    Immunosuppressed status on methotrexate ICD-10-CM: D84.9  ICD-9-CM: 279.9 2020 - Present    H/O calcium pyrophosphate deposition disease (CPPD) ICD-10-CM: Z87.39  ICD-9-CM: V13.4 2020 - Present    Anemia ICD-10-CM: D64.9  ICD-9-CM: 285.9 10/5/2016 - Present    Compression fracture of lumbar vertebra (HCC) ICD-10-CM: S32.000A  ICD-9-CM: 805.4 10/5/2016 - Present    Degeneration of intervertebral disc of lumbar region ICD-10-CM: M51.36  ICD-9-CM: 722.52 10/5/2016 - Present    Macular degeneration ICD-10-CM: H35.30  ICD-9-CM: 362.50 2016 - Present    Gastroesophageal reflux disease ICD-10-CM: K21.9  ICD-9-CM: 530.81 2016 - Present             History & Physical      Logan Sawyer DO at [address] [place] [address] Medicine Services  HISTORY AND PHYSICAL    Patient Name: Pati Carter  : 1930  MRN: [id number]  Primary Care Physician: Pallavi Eduardo MD  Date of admission: 2022      Subjective   Subjective     Chief Complaint:  From facility with confusion    HPI:  Pati Carter is a 89 y.o. female [place] resident w/ GERD, HTN, HLD, RA on MTX, COPD, PVD, with recent treatment for recurrent UTI and hospitalization 22-22 for C. difficile colitis treated with oral vancomycin.  During that hospitalization she was identified to have small LT renal lesion (per Uro concerning for poss RCC), bladder lesion, uterine lesion (per Dr. Vasquez felt stable/benign, not a candidate for systemic therapy or surgical intervention).  She was DC'd to  [facility] for acute rehab with plans for outpatient follow-up with urology.  Daughter at bedside notes during her time at rehab she has not thrived, however they have been able to get her up and mobile, over the last several days she has acutely been declining, NH physician checked labs and a CXR and started her on Augmentin with prophylactic oral vancomycin for presumptive pneumonia.  Both daughters had to return to work full-time and have not been able to spend time at the facility assisting with her or helping feed her.  Per daughter she requires assistance with all meals, in the ED today the daughter noted her mother's lips seem very dry and cracked and her tongue seem very dry and treatable.  The patient herself is mumbling only and unable to provide meaningful contribution to her history.      Review of Systems   Unable to be obtained due to patient's mentation    Personal History     Past Medical History:   Diagnosis Date   • Anemia     Description: A.  Dx 2006- borderline intermittent.   • Back pain    • Benign colonic polyp 9/28/2016    Description: A.  Dx 1999.   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  Rule out chronic persistent asthma, COPD, or obliterative bronchiolitis.- Butch   • COVID-19 virus infection 10/11/2020   • CTS (carpal tunnel syndrome)    • Degeneration of intervertebral disc of lumbar region     Description: A.  Diagnosed in April 2013 with advanced multilevel with severe spinal stenosis, followed by Dr. Pillai for pain management.   • Gastroesophageal reflux disease    • Hearing loss    • History of calcium pyrophosphate deposition disease (CPPD)    • History of colonoscopy 01/01/1999    NORMAL PER PATIENT    • History of mammogram 01/01/2011    NORMAL PER PT    • History of Papanicolaou smear of cervix 01/01/2010     NORMAL PER PT    • History of varicella    • Hyperlipidemia     Description: A.  Dx 2006.   • Hypertension     Description: A. Dx 2001.   • Macular degeneration    • Nocturia    • Osteoporosis    • Ovarian mass     Dx 8/15- benign left cystic adnexal mass   • Overactive bladder    • Pelvic floor dysfunction    • Rheumatoid arthritis (HCC)     Description: A.  Diagnosed in 2000 and and followed by Dr. Constantino (now Dr. Kaplan). B.  On methotrexate therapy since 2000. C.  Off low-dose prednisone therapy.   • Vitamin D deficiency              Past Surgical History:   Procedure Laterality Date   • APPENDECTOMY     • CARPAL TUNNEL RELEASE Left 01/01/2003    HISTORY OF NEUROPLASTY DECOMPRESSION MEDIAN NERVE AT CARPAL TUNNEL LEFT   • CATARACT EXTRACTION Bilateral 01/01/2009   • CHOLECYSTECTOMY  01/01/1962   • HIP CANNULATED SCREW PLACEMENT Right 1/25/2020    Procedure: HIP CANNULATED SCREW PLACEMENT RIGHT;  Surgeon: Karlos Blount MD;  [facility];  Service: Orthopedics   • KNEE ARTHROSCOPY Left 01/01/2001    MENISCAL REPAIR   • KYPHOPLASTY  06/18/2015    T11 AND L1 (JOSE A)   • PELVIC LAPAROSCOPY  01/01/1996    REMOVAL OF BENIGN UTERINE AND RIGHT OVARIAN TUMORS   • SALPINGO OOPHORECTOMY Left 08/26/2015    REMOVAL OF LEFT OVARY AND TUBE (benign cystic mass)       Family History:  family history includes Diabetes in her mother; Hypertension in her mother. Otherwise pertinent FHx was reviewed and unremarkable.     Social History:  reports that she has quit smoking. She quit after 0.50 years of use. She has never used smokeless tobacco. She reports that she does not drink alcohol and does not use drugs.  Social History     Social History Narrative    Patient moved to [facility] assisted living 3/26/20. On 3/28/20 patient visited ED for fall-related injuries.  In response, family looking into hiring a , to visit patient BID for ADLs/ transfers. Family drives patient to app, but says [facility] may provide in future.       Medications:  Available home medication information reviewed.  (Not in a hospital admission)      Allergies   Allergen Reactions   • Ciprofloxacin Other (See Comments) and Unknown - High Severity     Other reaction(s): shaking  HCI TABS/ SHAKING  Other reaction(s): shaking  HCI TABS/ SHAKING   • Levofloxacin Diarrhea and Unknown - High Severity   • Sulfamethoxazole-Trimethoprim Other (See Comments), Rash and Unknown - High Severity     Stomach cramps   Stomach cramps    • Sulfamethoxazole Rash   • Trimethoprim GI Intolerance, Diarrhea and Rash       Objective   Objective     Vital Signs:   Temp:  [98 °F (36.7 °C)] 98 °F (36.7 °C)  Heart Rate:  [100-112] 101  Resp:  [18-21] 18  BP: (137-155)/(78-87) 146/78  Flow (L/min):  [2] 2  Total (NIH Stroke Scale): 13    Physical Exam   Constitutional: Lethargic, laying on ED stretcher, moans/grimaces to painful stimuli  Eyes: PERRL, sclerae anicteric, no conjunctival injection  HENT: NCAT, mucous membranes moist  Neck: Supple, trachea midline  Respiratory: Clear to auscultation bilaterally, nonlabored respirations   Cardiovascular: RRR, no murmurs, rubs, or gallops, palpable radial pulses bilaterally  Gastrointestinal: Positive bowel sounds, soft, nontender, nondistended  Musculoskeletal: No bilateral ankle edema, no clubbing or cyanosis to extremities; bitemporal wasting  Psychiatric: Unable to assess  Neurologic: Mumbles/moans to stimuli, moving extremities spontaneously    Result Review:  I have personally reviewed the results from the time of this admission to 8/9/2022 22:17 EDT and agree with these findings:  [x]  Laboratory list / accordion  []  Microbiology  []  Radiology  []  EKG/Telemetry   []  Cardiology/Vascular   []  Pathology  []  Old records  []  Other:  Most notable findings include: Hypercalcemia        LAB RESULTS:      Lab 08/09/22  1720 08/07/22  1918   WBC 17.90* 12.72*   HEMOGLOBIN 10.1* 9.1*   HEMATOCRIT 30.9* 29.8*    PLATELETS 436 486*   NEUTROS ABS 15.13* 8.45*   IMMATURE GRANS (ABS) 0.10* 0.11*   LYMPHS ABS 0.99 2.29   MONOS ABS 1.48* 1.58*   EOS ABS 0.10 0.22   MCV 97.2* 101.4*   PROCALCITONIN 0.33*  --    LACTATE 1.2  --          Lab 08/09/22  1720 08/07/22  1918   SODIUM 139 139   POTASSIUM 3.8 3.8   CHLORIDE 98 97*   CO2 30.0* 31.0*   ANION GAP 11.0 11.0   BUN 35* 36*   CREATININE 2.04* 1.92*   EGFR 22.5* 24.2*   GLUCOSE 104* 88   CALCIUM 13.3* 13.0*   IONIZED CALCIUM 1.86*  --          Lab 08/09/22  1720   TOTAL PROTEIN 7.4   ALBUMIN 3.20*   GLOBULIN 4.2   ALT (SGPT) 17   AST (SGOT) 20   BILIRUBIN 0.4   ALK PHOS 118*   LIPASE 10*         Lab 08/09/22  1720   PROBNP 4,237.0*   TROPONIN T 0.032*                 Lab 08/09/22  1715   PH, ARTERIAL 7.478*   PCO2, ARTERIAL 44.6   PO2 ART 61.5*   FIO2 28   HCO3 ART 33.0*   BASE EXCESS ART 8.5*   CARBOXYHEMOGLOBIN 1.3     UA    Urinalysis 6/24/22 7/23/22 7/23/22 8/9/22 8/9/22     0038 0038 1838 1838   Squamous Epithelial Cells, UA   0-2  3-6 (A)   Specific Gravity, UA  1.022  1.013    Ketones, UA Negative Negative  Negative    Blood, UA  Large (3+) (A)  Negative    Leukocytes, UA Negative Moderate (2+) (A)  Trace (A)    Nitrite, UA  Negative  Negative    RBC, UA   Too Numerous to Count (A)  0-2   WBC, UA   Too Numerous to Count (A)  6-12 (A)   Bacteria, UA   None Seen  None Seen   (A) Abnormal value              Microbiology Results (last 10 days)     Procedure Component Value - Date/Time    COVID PRE-OP / PRE-PROCEDURE SCREENING ORDER (NO ISOLATION) - Swab, Nasopharynx [[id number]]  (Normal) Collected: 08/09/22 1812    Lab Status: Final result Specimen: Swab from Nasopharynx Updated: 08/09/22 1907    Narrative:      The following orders were created for panel order COVID PRE-OP / PRE-PROCEDURE SCREENING ORDER (NO ISOLATION) - Swab, Nasopharynx.  Procedure                               Abnormality         Status                     ---------                                -----------         ------                     COVID-19 and FLU A/B PCR...[[id number]]  Normal              Final result                 Please view results for these tests on the individual orders.    COVID-19 and FLU A/B PCR - Swab, Nasopharynx [[id number]]  (Normal) Collected: 08/09/22 1812    Lab Status: Final result Specimen: Swab from Nasopharynx Updated: 08/09/22 1907     COVID19 Not Detected     Influenza A PCR Not Detected     Influenza B PCR Not Detected    Narrative:      Fact sheet for providers: https://www.fda.gov/media/722288/download    Fact sheet for patients: https://www.fda.gov/media/696584/download    Test performed by PCR.          CT Head Without Contrast    Result Date: 8/9/2022  DATE OF EXAM: 8/9/2022 8:10 PM  PROCEDURE: CT HEAD WO CONTRAST-  INDICATIONS: AMS - somnolence; R41.82-Altered mental status, unspecified; N17.9-Acute kidney failure, unspecified; E83.52-Hypercalcemia; D64.9-Anemia, unspecified; I10-Essential (primary) hypertension; Z86.19-Personal history of other infectious and parasitic diseases; Z66-Do not resuscitate; R53.81-Other malaise  COMPARISON: No comparisons available.  TECHNIQUE: Routine transaxial and coronal reconstruction images were obtained through the head without the administration of contrast. Automated exposure control and iterative reconstruction methods were used.  The radiation dose reduction device was turned on for each scan per the ALARA (As Low as Reasonably Achievable) protocol.  FINDINGS: Gray-white differentiation is maintained and there is no evidence of intracranial hemorrhage, mass or mass effect. Age-related changes of the brain are present including volume loss and typical periventricular sequela of chronic small vessel ischemia. There is otherwise no evidence of intracranial hemorrhage, mass or mass effect. The ventricles are normal in size and configuration accounting for surrounding volume loss. The orbits are normal and the paranasal sinuses are  grossly clear.      Impression: Age-related changes of the brain as above, otherwise without evidence of acute intracranial abnormality.   This report was finalized on 8/9/2022 8:51 PM by Galo Mejias.      XR Chest 1 View    Result Date: 8/9/2022  DATE OF EXAM: 8/9/2022 4:40 PM  PROCEDURE: XR CHEST 1 VW-  INDICATIONS: AMS w/ hx of aspiration  COMPARISON: October 9, 2020  TECHNIQUE: Single radiographic AP view of the chest was obtained.  FINDINGS: There is ectasia of the thoracic aorta. The lungs seem relatively clear. It looks like there are some emphysematous changes. There is vertebral augmentation within the spine in the thoracolumbar area.      Impression: 1.  No definite acute pulmonary process. 2.  Ectasia thoracic aorta  This report was finalized on 8/9/2022 4:57 PM by Julian Bowen MD.            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  Rule out chronic persistent asthma, COPD, or obliterative bronchiolitis.- Rae     • Constipation [K59.00]  Yes   • Rheumatoid arthritis (HCC) [M06.9]  Yes     Description: A.  Diagnosed in 2000 and and followed by Dr. Constantino (now Dr. Kaplan)- now Dr. Del HO  On methotrexate therapy since 2000.  C.  Off low-dose prednisone therapy.     • Hearing loss [H91.90]  Yes   • Hyperlipidemia [E78.5]  Yes     Description: A.  Dx 2006.     • Hypertension [I10]  Yes     Description: A. Dx 2001.       Summary: This is a 89-year-old female [place] resident with recent admission for C. 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 per stroke neurology, once they are comfortable with a diet she will need assistance with all meals  - Suspect hypercalcemia from oral supplements/vitamins with renal failure and severe volume contraction; additionally could be paraneoplastic from multiple masses within her abdomen/pelvis; calcium labs ordered  - Continuous IVF ordered, recheck calcium in a.m.; hold oral calcium/vitamin D supplements    JENNIE on CKD 3  -Baseline GFR 40s; Cr 0.9-1.1  -Suspect prerenal from lack of oral intake, IVF as above and repeat labs in the a.m.; if not improving I would obtain imaging of her urinary tract and pursue further laboratory testing    Likely ongoing aspiration  - SLP eval    Hypertension  Hyperlipidemia  PVD  -Aspirin, diltiazem    COPD  - Home inhalers, substituted for formulary    GERD  - Pepcid    Rheumatoid arthritis  - Per previous documentation, on MTX, however not listed on home med list    Recent C. difficile colitis  -Completed oral vancomycin    1.8 cm LT renal lesion  Bladder wall lesion  Pelvic mass  -Seen by Dr. Yao last admit, monitoring/following up opt  -Seen by Dr. Mello lat admit, gyn mass is stable, expectant management,  if large volume blood loss she recommended formal Dx and rad-onc therapy  -d/w dtr at bedside, they prefer more conservative measures and likely would not pursue invasive measures      DVT prophylaxis:  SCD's      CODE STATUS:  DNR/DNI  Code Status and Medical Interventions:   Ordered at: 08/09/22 2215     Medical Intervention Limits:    NO intubation (DNI)     Code Status (Patient has no pulse and is not breathing):    No CPR (Do Not Attempt to Resuscitate)     Medical Interventions (Patient has pulse or is breathing):    Limited Support         Logan Sawyer DO  08/09/22      Electronically signed by Logan Sawyer DO at 08/09/22 2245         Current Facility-Administered Medications   Medication Dose Route Frequency Provider Last Rate Last Admin   • acetaminophen (TYLENOL) tablet 650 mg  650 mg Oral Q4H PRN Logan Sawyer DO   650 mg at 08/10/22 1231    Or   • acetaminophen (TYLENOL) 160 MG/5ML solution 650 mg  650 mg Oral Q4H PRN Logan Sawyer DO        Or   • acetaminophen (TYLENOL) suppository 650 mg  650 mg Rectal Q4H PRN Logan Sawyer DO       • acetaminophen (TYLENOL) tablet 650 mg  650 mg Oral Nightly Logan Sawyer DO   650 mg at 08/15/22 0009   • albuterol (PROVENTIL) nebulizer solution 0.083% 2.5 mg/3mL  2.5 mg Nebulization Q6H PRN Logan Sawyer DO       • aspirin tablet 325 mg  325 mg Oral Daily Tonya Rainey APRN   325 mg at 08/15/22 0852    Or   • aspirin suppository 300 mg  300 mg Rectal Daily Tonya Rainey APRN       • atorvastatin (LIPITOR) tablet 80 mg  80 mg Oral Nightly Tonya Rainey APRN   80 mg at 08/15/22 0010   • sennosides-docusate (PERICOLACE) 8.6-50 MG per tablet 2 tablet  2 tablet Oral BID Logan Sawyer DO   2 tablet at 08/13/22 0842    And   • polyethylene glycol (MIRALAX) packet 17 g  17 g Oral Daily PRN Logan Sawyer DO   17 g at 08/11/22 0959    And   • bisacodyl (DULCOLAX) EC tablet 5 mg  5 mg Oral  Daily PRN Logan Sawyer DO   5 mg at 08/12/22 0509    And   • bisacodyl (DULCOLAX) suppository 10 mg  10 mg Rectal Daily PRN Logan Sawyer DO   10 mg at 08/12/22 1855   • budesonide-formoterol (SYMBICORT) 80-4.5 MCG/ACT inhaler 2 puff  2 puff Inhalation BID - RT Logan Sawyer DO   2 puff at 08/15/22 0712   • Diclofenac Sodium (VOLTAREN) 1 % gel 2 g  2 g Topical Q12H Logan Sawyer DO   2 g at 08/15/22 0851   • dilTIAZem (CARDIZEM) tablet 30 mg  30 mg Oral Q6H Logan Sawyer DO   30 mg at 08/15/22 1207   • famotidine (PEPCID) tablet 20 mg  20 mg Oral Daily Pamela Estrella PharmD   20 mg at 08/15/22 0852   • folic acid (FOLVITE) tablet 1,000 mcg  1,000 mcg Oral Daily Logan Sawyer DO   1,000 mcg at 08/15/22 0852   • Magnesium Sulfate 2 gram Bolus, followed by 8 gram infusion (total Mg dose 10 grams)- Mg less than or equal to 1mg/dL  2 g Intravenous PRN Logan Sawyer DO        Or   • Magnesium Sulfate 2 gram / 50mL Infusion (GIVE X 3 BAGS TO EQUAL 6GM TOTAL DOSE) - Mg 1.1 - 1.5 mg/dl  2 g Intravenous PRN Logan Sawyer DO        Or   • Magnesium Sulfate 4 gram infusion- Mg 1.6-1.9 mg/dL  4 g Intravenous PRN Logan Sawyer DO       • melatonin tablet 5 mg  5 mg Oral Nightly PRN Logan Sawyer DO   5 mg at 08/15/22 0010   • miconazole (MICOTIN) 2 % cream 1 application  1 application Topical Q12H Micheline Nobles MD   1 application at 08/15/22 0852   • ondansetron (ZOFRAN) injection 4 mg  4 mg Intravenous Q6H PRN Logan Sawyer DO   4 mg at 08/13/22 1737   • potassium chloride (MICRO-K) CR capsule 40 mEq  40 mEq Oral PRN Logan Sawyer DO        Or   • potassium chloride (KLOR-CON) packet 40 mEq  40 mEq Oral PRN Logan Sawyer DO   40 mEq at 08/14/22 1740    Or   • potassium chloride 10 mEq in 100 mL IVPB  10 mEq Intravenous Q1H PRN Logan Sawyer DO       • sodium chloride 0.9 % flush 10 mL  10 mL Intravenous PRN Aaron,  Brian Nicholas MD       • sodium chloride 0.9 % flush 10 mL  10 mL Intravenous Q12H Tonya Rainey APRN   10 mL at 22 2256   • sodium chloride 0.9 % flush 10 mL  10 mL Intravenous PRN Tonya Rainey APRN       • sodium chloride 0.9 % flush 10 mL  10 mL Intravenous Q12H Logan Sawyer, DO   10 mL at 22 0839   • sodium chloride 0.9 % flush 10 mL  10 mL Intravenous PRN Logan Sawyer DO       • traMADol (ULTRAM) tablet 50 mg  50 mg Oral BID PRN Logan Sawyer DO   50 mg at 08/15/22 0010        Physician Progress Notes (last 72 hours)      Logan Sawyer DO at 08/15/22 0744              [facility] Medicine Services  PROGRESS NOTE    Patient Name: Pati Carter  : 1930  MRN: [id number]    Date of Admission: 2022  Primary Care Physician: Pallavi Eduardo MD    Subjective   Subjective     CC:  Follow-up hypercalcemia    HPI:  Complains of pain in the bottom of both of her feet when she is upright/weightbearing.  Otherwise no acute complaints.  Daughter called via telephone and updated, she is interested in short-term rehab with attention transition to long-term care under hospice    ROS:  Gen- No fevers, chills  CV- No chest pain, palpitations  Resp- No cough, dyspnea  GI- No N/V/D, abd pain    Objective   Objective     Vital Signs:   Temp:  [97.8 °F (36.6 °C)-98.9 °F (37.2 °C)] 97.9 °F (36.6 °C)  Heart Rate:  [66-95] 79  Resp:  [16-20] 18  BP: (137-155)/(72-94) 155/91  Flow (L/min):  [2] 2     Physical Exam:  Constitutional: Initially sleeping but easily awoken, elderly female sitting up in bedside chair no acute distress  HENT: NCAT, mucous membranes moist  Respiratory: Clear to auscultation bilaterally, respiratory effort normal   Cardiovascular: RRR, no murmurs, rubs, or gallops, palpable radial pulses  Gastrointestinal: Positive bowel sounds, soft, nontender, nondistended  Musculoskeletal: No bilateral ankle edema, bitemporal  wasting  Psychiatric: Appropriate affect, cooperative  Neurologic: Pleasantly confused, speech clear, moving extremities spontaneously    Results Reviewed:  LAB RESULTS:      Lab 08/10/22  0533 08/09/22  1720   WBC 12.45* 17.90*   HEMOGLOBIN 9.1* 10.1*   HEMATOCRIT 29.0* 30.9*   PLATELETS 369 436   NEUTROS ABS  --  15.13*   IMMATURE GRANS (ABS)  --  0.10*   LYMPHS ABS  --  0.99   MONOS ABS  --  1.48*   EOS ABS  --  0.10   .3* 97.2*   PROCALCITONIN  --  0.33*   LACTATE  --  1.2         Lab 08/15/22  0607 08/14/22  0706 08/13/22  0700 08/12/22  0730 08/11/22  0802 08/10/22  0533   SODIUM 141 149* 138 144 143 142   POTASSIUM 4.1 3.2* 3.4* 3.7 3.8 3.8   CHLORIDE 108* 115* 109* 108* 107 105   CO2 26.0 25.0 19.0* 28.0 27.0 26.0   ANION GAP 7.0 9.0 10.0 8.0 9.0 11.0   BUN 20 20 25* 31* 37* 36*   CREATININE 1.16* 1.21* 1.32* 1.49* 1.95* 2.12*   EGFR 44.3* 42.1* 38.0* 32.8* 23.8* 21.5*   GLUCOSE 91 80 92 86 94 80   CALCIUM 9.9* 9.7* 10.3* 11.2* 11.2* 11.8*   IONIZED CALCIUM 1.53* 1.49* 1.53* 1.66*  --  1.76*   PHOSPHORUS  --   --   --   --   --  4.0   HEMOGLOBIN A1C  --   --   --   --   --  5.10         Lab 08/10/22  0533 08/09/22  1720   TOTAL PROTEIN  --  7.4   ALBUMIN 2.60* 3.20*   GLOBULIN  --  4.2   ALT (SGPT)  --  17   AST (SGOT)  --  20   BILIRUBIN  --  0.4   ALK PHOS  --  118*   LIPASE  --  10*         Lab 08/09/22  1720   PROBNP 4,237.0*   TROPONIN T 0.032*         Lab 08/10/22  0533   CHOLESTEROL 115   LDL CHOL 67   HDL CHOL 27*   TRIGLYCERIDES 116             Lab 08/09/22  1715   PH, ARTERIAL 7.478*   PCO2, ARTERIAL 44.6   PO2 ART 61.5*   FIO2 28   HCO3 ART 33.0*   BASE EXCESS ART 8.5*   CARBOXYHEMOGLOBIN 1.3     Brief Urine Lab Results  (Last result in the past 365 days)      Color   Clarity   Blood   Leuk Est   Nitrite   Protein   CREAT   Urine HCG        08/09/22 1838 Yellow   Clear   Negative   Trace   Negative   30 mg/dL (1+)                 Microbiology Results Abnormal     Procedure Component Value -  Date/Time    Blood Culture - Blood, Hand, Right [[id number]]  (Normal) Collected: 08/09/22 1810    Lab Status: Final result Specimen: Blood from Hand, Right Updated: 08/14/22 1847     Blood Culture No growth at 5 days    Blood Culture - Blood, Arm, Left [[id number]]  (Normal) Collected: 08/09/22 1721    Lab Status: Final result Specimen: Blood from Arm, Left Updated: 08/14/22 1747     Blood Culture No growth at 5 days    Urine Culture - Urine, Urine, Catheter [[id number]]  (Normal) Collected: 08/09/22 1838    Lab Status: Final result Specimen: Urine, Catheter Updated: 08/10/22 2040     Urine Culture No growth    COVID PRE-OP / PRE-PROCEDURE SCREENING ORDER (NO ISOLATION) - Swab, Nasopharynx [[id number]]  (Normal) Collected: 08/09/22 1812    Lab Status: Final result Specimen: Swab from Nasopharynx Updated: 08/09/22 1907    Narrative:      The following orders were created for panel order COVID PRE-OP / PRE-PROCEDURE SCREENING ORDER (NO ISOLATION) - Swab, Nasopharynx.  Procedure                               Abnormality         Status                     ---------                               -----------         ------                     COVID-19 and FLU A/B PCR...[[id number]]  Normal              Final result                 Please view results for these tests on the individual orders.    COVID-19 and FLU A/B PCR - Swab, Nasopharynx [[id number]]  (Normal) Collected: 08/09/22 1812    Lab Status: Final result Specimen: Swab from Nasopharynx Updated: 08/09/22 1907     COVID19 Not Detected     Influenza A PCR Not Detected     Influenza B PCR Not Detected    Narrative:      Fact sheet for providers: https://www.fda.gov/media/849976/download    Fact sheet for patients: https://www.fda.gov/media/681720/download    Test performed by PCR.          No radiology results from the last 24 hrs        I have reviewed the medications:  Scheduled Meds:acetaminophen, 650 mg, Oral, Nightly  aspirin, 325 mg, Oral, Daily   Or  aspirin, 300  mg, Rectal, Daily  atorvastatin, 80 mg, Oral, Nightly  budesonide-formoterol, 2 puff, Inhalation, BID - RT  Diclofenac Sodium, 2 g, Topical, Q12H  dilTIAZem, 30 mg, Oral, Q6H  famotidine, 20 mg, Oral, Daily  folic acid, 1,000 mcg, Oral, Daily  miconazole, 1 application, Topical, Q12H  senna-docusate sodium, 2 tablet, Oral, BID  sodium chloride, 10 mL, Intravenous, Q12H  sodium chloride, 10 mL, Intravenous, Q12H      Continuous Infusions:   PRN Meds:.•  acetaminophen **OR** acetaminophen **OR** acetaminophen  •  albuterol  •  senna-docusate sodium **AND** polyethylene glycol **AND** bisacodyl **AND** bisacodyl  •  magnesium sulfate **OR** magnesium sulfate **OR** magnesium sulfate  •  melatonin  •  ondansetron  •  potassium chloride **OR** potassium chloride **OR** potassium chloride  •  [COMPLETED] Insert peripheral IV **AND** sodium chloride  •  sodium chloride  •  sodium chloride  •  traMADol    Assessment & Plan   Assessment & Plan     Active Hospital Problems    Diagnosis  POA   • **Hypercalcemia [E83.52]  Yes   • Acute on chronic alteration in mental status [R41.82]  Yes   • Acute kidney injury (HCC) [N17.9]  Yes   • Pelvic mass [R19.00]  Yes   • Impaired cognition [R41.89]  Yes   • Impaired mobility [Z74.09]  Yes   • 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        Brief Hospital Course to date:  Pati Carter is a 89 y.o. female [place] resident with recent admission for C. 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, multifactorial  Hypercalcemia  Ferny Garret syndrome  - Visual halluc @ b-line; Ferny Garret syndrome; dtr noted sig worsening mentation/incomprehensible speech @ arrival  - CT head w/o acute finding, stroke neuro has evaluated; MRI negative  -  SLP follows; diet per their recommendations  -  Patient requires assistance with all meals  -  Supplemental calcium/vitamin D on hold  -  PTH appropriately suppressed, PTHrP still pending  -IoCa slightly inc w/ DC'ed fluids; concern for paraneoplastic process, dtr updated via phone, interested in hospice informational session (order placed, hospice liaison updated via telephone); repeat IoCa edie, if cont trend up and PTH RP elevated will add bisphosphonate     JENNIE on CKD 3, improving  -Baseline GFR 40s; Cr 0.9-1.1  -High-end of baseline today     Likely ongoing aspiration  - SLP following, on modified diet     Hypertension  Hyperlipidemia  PVD  -Aspirin, diltiazem     COPD  - Home inhalers, substituted for formulary     GERD  - Pepcid     Rheumatoid arthritis  - Per previous documentation, on MTX, however not listed on home med list     Recent C. difficile colitis  -Completed oral vancomycin pta     1.8 cm LT renal lesion  Bladder wall lesion  Pelvic mass  -Seen by Dr. Yao last admit, monitoring/following up opt  -Seen by Dr. Mello lat admit, gyn mass is stable, expectant management, if large volume blood loss she recommended formal Dx and rad-onc therapy  -d/w dtr at bedside, they prefer more conservative measures and likely would not pursue invasive measures    Expected Discharge Location and Transportation: Short-term  rehab with transition to long-term care +/- hospice; medical van  Expected Discharge Date:     DVT prophylaxis:  Mechanical DVT prophylaxis orders are present.     AM-PAC 6 Clicks Score (PT): 12 (22 0800)    CODE STATUS:   Code Status and Medical Interventions:   Ordered at: 22 2215     Medical Intervention Limits:    NO intubation (DNI)     Code Status (Patient has no pulse and is not breathing):    No CPR (Do Not Attempt to Resuscitate)     Medical Interventions (Patient has pulse or is breathing):    Limited Support       Logan Sawyer DO  08/15/22                Electronically signed by Logan Sawyer DO at 08/15/22 3687     Logan Sawyer DO at 22 [phone]              [facility]  PROGRESS NOTE    Patient Name: Pati Carter  : 1930  MRN: [id number]    Date of Admission: 2022  Primary Care Physician: Pallavi Eduardo MD    Subjective   Subjective     CC:  Follow-up hypercalcemia    HPI:  Patient just pulled out her IV.  Calcium has been slowly trending down.  Daughter at bedside today, discussed possible causes of hypercalcemia, if it appears malignant she would be more interested in conservative/palliative measures.  Patient herself denies acute complaints    ROS:  Gen- No fevers, chills  CV- No chest pain, palpitations  Resp- No cough, dyspnea  GI- No N/V/D, abd pain    Objective   Objective     Vital Signs:   Temp:  [97.7 °F (36.5 °C)-99.2 °F (37.3 °C)] 97.7 °F (36.5 °C)  Heart Rate:  [] 91  Resp:  [18-20] 18  BP: (111-167)/(52-85) 164/85     Physical Exam:  Constitutional: Awake, alert, elderly female sitting up in bedside chair  HENT: NCAT, mucous membranes moist  Respiratory: Clear to auscultation bilaterally, respiratory effort normal   Cardiovascular: RRR, no murmurs, rubs, or gallops, palpable radial pulses  Gastrointestinal: Positive bowel sounds, soft, nontender, nondistended  Musculoskeletal: No  bilateral ankle edema, bitemporal wasting  Psychiatric: Appropriate affect, cooperative  Neurologic: Pleasantly confused, speech clear, moving extremities spontaneously    Results Reviewed:  LAB RESULTS:      Lab 08/10/22  0533 08/09/22  1720 08/07/22  1918   WBC 12.45* 17.90* 12.72*   HEMOGLOBIN 9.1* 10.1* 9.1*   HEMATOCRIT 29.0* 30.9* 29.8*   PLATELETS 369 436 486*   NEUTROS ABS  --  15.13* 8.45*   IMMATURE GRANS (ABS)  --  0.10* 0.11*   LYMPHS ABS  --  0.99 2.29   MONOS ABS  --  1.48* 1.58*   EOS ABS  --  0.10 0.22   .3* 97.2* 101.4*   PROCALCITONIN  --  0.33*  --    LACTATE  --  1.2  --          Lab 08/13/22  0700 08/12/22  0730 08/11/22  0802 08/10/22  0533 08/09/22  1720   SODIUM 138 144 143 142 139   POTASSIUM 3.4* 3.7 3.8 3.8 3.8   CHLORIDE 109* 108* 107 105 98   CO2 19.0* 28.0 27.0 26.0 30.0*   ANION GAP 10.0 8.0 9.0 11.0 11.0   BUN 25* 31* 37* 36* 35*   CREATININE 1.32* 1.49* 1.95* 2.12* 2.04*   EGFR 38.0* 32.8* 23.8* 21.5* 22.5*   GLUCOSE 92 86 94 80 104*   CALCIUM 10.3* 11.2* 11.2* 11.8* 13.3*   IONIZED CALCIUM 1.53* 1.66*  --  1.76* 1.86*   PHOSPHORUS  --   --   --  4.0  --    HEMOGLOBIN A1C  --   --   --  5.10  --          Lab 08/10/22  0533 08/09/22  1720   TOTAL PROTEIN  --  7.4   ALBUMIN 2.60* 3.20*   GLOBULIN  --  4.2   ALT (SGPT)  --  17   AST (SGOT)  --  20   BILIRUBIN  --  0.4   ALK PHOS  --  118*   LIPASE  --  10*         Lab 08/09/22  1720   PROBNP 4,237.0*   TROPONIN T 0.032*         Lab 08/10/22  0533   CHOLESTEROL 115   LDL CHOL 67   HDL CHOL 27*   TRIGLYCERIDES 116             Lab 08/09/22  1715   PH, ARTERIAL 7.478*   PCO2, ARTERIAL 44.6   PO2 ART 61.5*   FIO2 28   HCO3 ART 33.0*   BASE EXCESS ART 8.5*   CARBOXYHEMOGLOBIN 1.3     Brief Urine Lab Results  (Last result in the past 365 days)      Color   Clarity   Blood   Leuk Est   Nitrite   Protein   CREAT   Urine HCG        08/09/22 1838 Yellow   Clear   Negative   Trace   Negative   30 mg/dL (1+)                 Microbiology  Results Abnormal     Procedure Component Value - Date/Time    Blood Culture - Blood, Hand, Right [[id number]]  (Normal) Collected: 08/09/22 1810    Lab Status: Preliminary result Specimen: Blood from Hand, Right Updated: 08/13/22 1847     Blood Culture No growth at 4 days    Blood Culture - Blood, Arm, Left [[id number]]  (Normal) Collected: 08/09/22 1721    Lab Status: Preliminary result Specimen: Blood from Arm, Left Updated: 08/13/22 1747     Blood Culture No growth at 4 days    Urine Culture - Urine, Urine, Catheter [[id number]]  (Normal) Collected: 08/09/22 1838    Lab Status: Final result Specimen: Urine, Catheter Updated: 08/10/22 2040     Urine Culture No growth    COVID PRE-OP / PRE-PROCEDURE SCREENING ORDER (NO ISOLATION) - Swab, Nasopharynx [[id number]]  (Normal) Collected: 08/09/22 1812    Lab Status: Final result Specimen: Swab from Nasopharynx Updated: 08/09/22 1907    Narrative:      The following orders were created for panel order COVID PRE-OP / PRE-PROCEDURE SCREENING ORDER (NO ISOLATION) - Swab, Nasopharynx.  Procedure                               Abnormality         Status                     ---------                               -----------         ------                     COVID-19 and FLU A/B PCR...[[id number]]  Normal              Final result                 Please view results for these tests on the individual orders.    COVID-19 and FLU A/B PCR - Swab, Nasopharynx [[id number]]  (Normal) Collected: 08/09/22 1812    Lab Status: Final result Specimen: Swab from Nasopharynx Updated: 08/09/22 1907     COVID19 Not Detected     Influenza A PCR Not Detected     Influenza B PCR Not Detected    Narrative:      Fact sheet for providers: https://www.fda.gov/media/577342/download    Fact sheet for patients: https://www.fda.gov/media/484955/download    Test performed by PCR.          No radiology results from the last 24 hrs        I have reviewed the medications:  Scheduled Meds:acetaminophen, 650 mg,  Oral, Nightly  aspirin, 325 mg, Oral, Daily   Or  aspirin, 300 mg, Rectal, Daily  atorvastatin, 80 mg, Oral, Nightly  budesonide-formoterol, 2 puff, Inhalation, BID - RT  Diclofenac Sodium, 2 g, Topical, Q12H  dilTIAZem, 30 mg, Oral, Q6H  famotidine, 20 mg, Oral, Daily  folic acid, 1,000 mcg, Oral, Daily  miconazole, 1 application, Topical, Q12H  senna-docusate sodium, 2 tablet, Oral, BID  sodium chloride, 10 mL, Intravenous, Q12H  sodium chloride, 10 mL, Intravenous, Q12H      Continuous Infusions:sodium chloride, 125 mL/hr, Last Rate: 125 mL/hr (08/14/22 0103)      PRN Meds:.•  acetaminophen **OR** acetaminophen **OR** acetaminophen  •  albuterol  •  senna-docusate sodium **AND** polyethylene glycol **AND** bisacodyl **AND** bisacodyl  •  melatonin  •  ondansetron  •  [COMPLETED] Insert peripheral IV **AND** sodium chloride  •  sodium chloride  •  sodium chloride  •  traMADol    Assessment & Plan   Assessment & Plan     Active Hospital Problems    Diagnosis  POA   • **Hypercalcemia [E83.52]  Yes   • Acute on chronic alteration in mental status [R41.82]  Yes   • Acute kidney injury (HCC) [N17.9]  Yes   • Pelvic mass [R19.00]  Yes   • Impaired cognition [R41.89]  Yes   • Impaired mobility [Z74.09]  Yes   • 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        Brief Hospital Course to date:  Pati Carter is a 89 y.o. female [place] resident with recent admission for C. 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, multifactorial  Hypercalcemia  Ferny Garret syndrome  - Visual halluc @ b-line; Ferny Garret syndrome; dtr noted sig worsening mentation/incomprehensible speech @ arrival  - CT head w/o acute finding, stroke neuro has evaluated; MRI negative  -  SLP follows; diet per their recommendations  -  Patient requires assistance with all meals  -  Supplemental calcium/vitamin D on hold  -  PTH appropriately suppressed, PTHrP still pending  -Ionized calcium close to normal, she pulled out her IV, we will leave the IV out and recheck calcium tomorrow; if calcium trending back up or PTH-RP are concerning for malignant process, her daughter would prefer more palliative/hospice direction     JENNIE on CKD 3, improving  -Baseline GFR 40s; Cr 0.9-1.1  -Renal function near normal, as above recheck labs off of IVF tomorrow     Likely ongoing aspiration  - SLP following, on modified diet     Hypertension  Hyperlipidemia  PVD  -Aspirin, diltiazem     COPD  - Home inhalers, substituted for formulary     GERD  - Pepcid     Rheumatoid arthritis  - Per previous documentation, on MTX, however not listed on home med list     Recent C. difficile colitis  -Completed oral vancomycin pta     1.8 cm LT renal lesion  Bladder wall lesion  Pelvic mass  -Seen by Dr. Yao last admit, monitoring/following up opt  -Seen by Dr. Mello lat admit, gyn mass is stable, expectant management, if large volume blood loss she recommended formal Dx and rad-onc therapy  -d/w dtr at bedside, they prefer more conservative measures and likely would not pursue invasive measures    Expected Discharge Location and Transportation: Previous  assisted living patient, anticipate short-term rehab with transition to long-term care +/- hospice  Expected Discharge Date: 8/16    DVT prophylaxis:  Mechanical DVT prophylaxis orders are present.     AM-PAC 6 Clicks Score (PT): 10 (08/13/22 0800)    CODE STATUS:   Code Status and Medical Interventions:   Ordered at: 08/09/22 4130     Medical Intervention Limits:    NO intubation (DNI)     Code Status (Patient has no pulse and is not breathing):    No CPR (Do Not Attempt to Resuscitate)     Medical Interventions (Patient has pulse or is breathing):    Limited Support       Logan Sawyer DO  08/14/22                Electronically signed by Logan Sawyer DO at 08/14/22 1251     Freddy Rich MD at 08/13/22 1143                                                                                                               Daily Progress Note  Neurology     LOS: 4 days     Subjective     Chief Complaint: Altered mental status.    Interval History: No acute issues overnight.  She remains pleasantly confused.    ROS: Negative for fever, chest pain or shortness of breath.    Objective     Vital signs in last 24 hours:  Temp:  [97 °F (36.1 °C)-98.5 °F (36.9 °C)] 98.5 °F (36.9 °C)  Heart Rate:  [] 88  Resp:  [18] 18  BP: (151-169)/(69-94) 169/94      Physical Exam:   General: Lying in bed with eyes closed. In NAD.     Respiratory: Respirations unlabored   CV: RRR       Neurologic Exam:   Mental status: Awake, alert, Follows commands. Speech is slow and hypophonic.   CN: II-XII intact to detailed exam except for reduced visual acuity to finger counting to 1 foot.   Motor: Strength full (5/5) throughout in BUE and BLE    Reflexes: 2+ and symmetric throughout          Results Review:    Results from last 7 days   Lab Units 08/10/22  0533   WBC 10*3/mm3 12.45*   HEMOGLOBIN g/dL 9.1*   HEMATOCRIT % 29.0*   PLATELETS 10*3/mm3 369     Results from last 7 days   Lab Units 08/13/22  0700   SODIUM mmol/L 138    POTASSIUM mmol/L 3.4*   CHLORIDE mmol/L 109*   CO2 mmol/L 19.0*   BUN mg/dL 25*   CREATININE mg/dL 1.32*   CALCIUM mg/dL 10.3*         Ionized calcium: 1.86->1.76->1.66->1.53    Assessment & Plan   89-year-old female who was assisted living facility resident with recent admission for C. difficile colitis during which she was found to have several concerning masses within the abdomen/pelvis, history of Gretchen's syndrome, macular degeneration, hypercalcemia, CKD stage III, hypertension hyperlipidemia who presented because of     1.  Altered mental status:  -Likely due to underlying hypercalcemia.  Calcium is trending down.  -MRI did not reveal any evidence of acute abnormalities.  -Possible Parkinson's.  -Continue with OT, PT.  -Neurology sign off.  Please call back with any questions or concerns that you may have.      Freddy Rich MD  22  11:43 EDT              Electronically signed by Freddy Rich MD at 22 1147     Logan Sawyer DO at 22 0804              [facility]  PROGRESS NOTE    Patient Name: Pati Carter  : 1930  MRN: [id number]    Date of Admission: 2022  Primary Care Physician: Pallavi Eduardo MD    Subjective   Subjective     CC:  Follow-up hypercalcemia, confusion    HPI:  Complains of feeling cold this morning, otherwise no acute complaints.  No family at bedside.  Calcium gradually improving    ROS:  Gen- No fevers, chills  CV- No chest pain, palpitations  Resp- No cough, dyspnea  GI- No N/V/D, abd pain    Objective   Objective     Vital Signs:   Temp:  [97 °F (36.1 °C)-98.5 °F (36.9 °C)] 98.5 °F (36.9 °C)  Heart Rate:  [] 88  Resp:  [17-18] 18  BP: (151-176)/() 169/94     Physical Exam:  Constitutional: Awake, alert, elderly female sitting up in bedside chair under multiple layers of blankets  HENT: NCAT, mucous membranes moist  Respiratory: Clear to auscultation bilaterally, respiratory effort  normal   Cardiovascular: RRR, no murmurs, rubs, or gallops, palpable radial pulses  Gastrointestinal: Positive bowel sounds, soft, nontender, nondistended  Musculoskeletal: No bilateral ankle edema, bitemporal wasting  Psychiatric: Appropriate affect, cooperative  Neurologic: Pleasantly confused, speech clear    Results Reviewed:  LAB RESULTS:      Lab 08/10/22  0533 08/09/22  1720 08/07/22  1918   WBC 12.45* 17.90* 12.72*   HEMOGLOBIN 9.1* 10.1* 9.1*   HEMATOCRIT 29.0* 30.9* 29.8*   PLATELETS 369 436 486*   NEUTROS ABS  --  15.13* 8.45*   IMMATURE GRANS (ABS)  --  0.10* 0.11*   LYMPHS ABS  --  0.99 2.29   MONOS ABS  --  1.48* 1.58*   EOS ABS  --  0.10 0.22   .3* 97.2* 101.4*   PROCALCITONIN  --  0.33*  --    LACTATE  --  1.2  --          Lab 08/13/22  0700 08/12/22  0730 08/11/22  0802 08/10/22  0533 08/09/22  1720   SODIUM 138 144 143 142 139   POTASSIUM 3.4* 3.7 3.8 3.8 3.8   CHLORIDE 109* 108* 107 105 98   CO2 19.0* 28.0 27.0 26.0 30.0*   ANION GAP 10.0 8.0 9.0 11.0 11.0   BUN 25* 31* 37* 36* 35*   CREATININE 1.32* 1.49* 1.95* 2.12* 2.04*   EGFR 38.0* 32.8* 23.8* 21.5* 22.5*   GLUCOSE 92 86 94 80 104*   CALCIUM 10.3* 11.2* 11.2* 11.8* 13.3*   IONIZED CALCIUM 1.53* 1.66*  --  1.76* 1.86*   PHOSPHORUS  --   --   --  4.0  --    HEMOGLOBIN A1C  --   --   --  5.10  --          Lab 08/10/22  0533 08/09/22  1720   TOTAL PROTEIN  --  7.4   ALBUMIN 2.60* 3.20*   GLOBULIN  --  4.2   ALT (SGPT)  --  17   AST (SGOT)  --  20   BILIRUBIN  --  0.4   ALK PHOS  --  118*   LIPASE  --  10*         Lab 08/09/22  1720   PROBNP 4,237.0*   TROPONIN T 0.032*         Lab 08/10/22  0533   CHOLESTEROL 115   LDL CHOL 67   HDL CHOL 27*   TRIGLYCERIDES 116             Lab 08/09/22  1715   PH, ARTERIAL 7.478*   PCO2, ARTERIAL 44.6   PO2 ART 61.5*   FIO2 28   HCO3 ART 33.0*   BASE EXCESS ART 8.5*   CARBOXYHEMOGLOBIN 1.3     Brief Urine Lab Results  (Last result in the past 365 days)      Color   Clarity   Blood   Leuk Est   Nitrite    Protein   CREAT   Urine HCG        08/09/22 1838 Yellow   Clear   Negative   Trace   Negative   30 mg/dL (1+)                 Microbiology Results Abnormal     Procedure Component Value - Date/Time    Blood Culture - Blood, Hand, Right [[id number]]  (Normal) Collected: 08/09/22 1810    Lab Status: Preliminary result Specimen: Blood from Hand, Right Updated: 08/12/22 1846     Blood Culture No growth at 3 days    Blood Culture - Blood, Arm, Left [[id number]]  (Normal) Collected: 08/09/22 1721    Lab Status: Preliminary result Specimen: Blood from Arm, Left Updated: 08/12/22 1747     Blood Culture No growth at 3 days    Urine Culture - Urine, Urine, Catheter [[id number]]  (Normal) Collected: 08/09/22 1838    Lab Status: Final result Specimen: Urine, Catheter Updated: 08/10/22 2040     Urine Culture No growth    COVID PRE-OP / PRE-PROCEDURE SCREENING ORDER (NO ISOLATION) - Swab, Nasopharynx [[id number]]  (Normal) Collected: 08/09/22 1812    Lab Status: Final result Specimen: Swab from Nasopharynx Updated: 08/09/22 1907    Narrative:      The following orders were created for panel order COVID PRE-OP / PRE-PROCEDURE SCREENING ORDER (NO ISOLATION) - Swab, Nasopharynx.  Procedure                               Abnormality         Status                     ---------                               -----------         ------                     COVID-19 and FLU A/B PCR...[[id number]]  Normal              Final result                 Please view results for these tests on the individual orders.    COVID-19 and FLU A/B PCR - Swab, Nasopharynx [[id number]]  (Normal) Collected: 08/09/22 1812    Lab Status: Final result Specimen: Swab from Nasopharynx Updated: 08/09/22 1907     COVID19 Not Detected     Influenza A PCR Not Detected     Influenza B PCR Not Detected    Narrative:      Fact sheet for providers: https://www.fda.gov/media/879649/download    Fact sheet for patients: https://www.fda.gov/media/089206/download    Test performed by  PCR.          FL Video Swallow With Speech Single Contrast    Result Date: 8/11/2022  EXAMINATION: FL VIDEO SWALLOW W SPEECH SINGLE-CONTRAST-  INDICATION: dysphagia; R41.82-Altered mental status, unspecified; N17.9-Acute kidney failure, unspecified; E83.52-Hypercalcemia; D64.9-Anemia, unspecified; I10-Essential (primary) hypertension; Z86.19-Personal history of other infectious and parasitic diseases; Z66-Do not resuscitate; R53.81-Other malaise; R13.10-Dysphagia, unspecified; R47.1-Dysarthria and anarthria; R41.841-Cognitive communication deficit  TECHNIQUE: 1 minute and 24 seconds of fluoroscopic time was used for this exam. 9 associated fluoroscopic loops were saved. The patient was evaluated in the seated lateral position while taking a variety of consistencies of barium by mouth under the direction of speech pathology.  COMPARISON: NONE  FINDINGS: 1 minute and 24 seconds of fluoroscopy provided for a modified barium swallow. Please see speech therapy report for full details and recommendations.       Impression: Fluoroscopy provided for a modified barium swallow. Please see speech therapy report for full details and recommendations.    This report was finalized on 8/11/2022 4:18 PM by Brian Yao MD.            I have reviewed the medications:  Scheduled Meds:acetaminophen, 650 mg, Oral, Nightly  aspirin, 325 mg, Oral, Daily   Or  aspirin, 300 mg, Rectal, Daily  atorvastatin, 80 mg, Oral, Nightly  budesonide-formoterol, 2 puff, Inhalation, BID - RT  Diclofenac Sodium, 2 g, Topical, Q12H  dilTIAZem, 30 mg, Oral, Q6H  famotidine, 20 mg, Oral, Daily  folic acid, 1,000 mcg, Oral, Daily  miconazole, 1 application, Topical, Q12H  senna-docusate sodium, 2 tablet, Oral, BID  sodium chloride, 10 mL, Intravenous, Q12H  sodium chloride, 10 mL, Intravenous, Q12H      Continuous Infusions:sodium chloride, 125 mL/hr, Last Rate: 125 mL/hr (08/12/22 6627)      PRN Meds:.•  acetaminophen **OR** acetaminophen **OR**  acetaminophen  •  albuterol  •  senna-docusate sodium **AND** polyethylene glycol **AND** bisacodyl **AND** bisacodyl  •  melatonin  •  ondansetron  •  [COMPLETED] Insert peripheral IV **AND** sodium chloride  •  sodium chloride  •  sodium chloride  •  traMADol    Assessment & Plan   Assessment & Plan     Active Hospital Problems    Diagnosis  POA   • **Hypercalcemia [E83.52]  Yes   • Acute on chronic alteration in mental status [R41.82]  Yes   • Acute kidney injury (HCC) [N17.9]  Yes   • Pelvic mass [R19.00]  Yes   • Impaired cognition [R41.89]  Yes   • Impaired mobility [Z74.09]  Yes   • 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        Brief Hospital Course to date:  Pati Carter is a 89 y.o. female [place] resident with recent admission for C. difficile colitis, identified several concerning masses within her abdomen/pelvis during that stay however further evaluation was deferred due to her age/baseline status, she was discharged to rehab at [facility]; initially daughters were assisting with her care, she does require assistance with all foods, both daughters had to return to work and several days later patient was noted to be acutely confused, hypercalcemic, with change in renal function; there is clinical concern for volume depletion elated to poor oral intake at rehab    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, multifactorial  Hypercalcemia  Ferny Garret syndrome  - Visual halluc @ b-line; Ferny Garret syndrome; dtr noted sig worsening mentation/incomprehensible speech @ arrival  - CT head w/o acute finding, stroke neuro has evaluated; MRI negative  -  SLP follows; diet per their recommendations  -   Patient requires assistance with all meals  -  Supplemental calcium/vitamin D on hold  -  PTH appropriately suppressed, PTHrP pending  -Ionized calcium improving, continue IVF     JENNIE on CKD 3  -Baseline GFR 40s; Cr 0.9-1.1  -Continues to improve, continue IVF     Likely ongoing aspiration  - SLP following, on modified diet     Hypertension  Hyperlipidemia  PVD  -Aspirin, diltiazem     COPD  - Home inhalers, substituted for formulary     GERD  - Pepcid     Rheumatoid arthritis  - Per previous documentation, on MTX, however not listed on home med list     Recent C. difficile colitis  -Completed oral vancomycin pta     1.8 cm LT renal lesion  Bladder wall lesion  Pelvic mass  -Seen by Dr. Yao last admit, monitoring/following up opt  -Seen by Dr. Mello lat admit, gyn mass is stable, expectant management, if large volume blood loss she recommended formal Dx and rad-onc therapy  -d/w dtr at bedside, they prefer more conservative measures and likely would not pursue invasive measures    Expected Discharge Location and Transportation: Skilled rehab, via ambulance  Expected Discharge Date: 8/16    DVT prophylaxis:  Mechanical DVT prophylaxis orders are present.     AM-PAC 6 Clicks Score (PT): 10 (08/12/22 0800)    CODE STATUS:   Code Status and Medical Interventions:   Ordered at: 08/09/22 0898     Medical Intervention Limits:    NO intubation (DNI)     Code Status (Patient has no pulse and is not breathing):    No CPR (Do Not Attempt to Resuscitate)     Medical Interventions (Patient has pulse or is breathing):    Limited Support       Logan Sawyer DO  08/13/22                Electronically signed by Logan Sawyer DO at 08/13/22 5097       Consult Notes (last 72 hours)  Notes from 08/12/22 1431 through 08/15/22 1431   No notes of this type exist for this encounter.

## 2022-08-15 NOTE — TELEPHONE ENCOUNTER
ERNESTO WITH  [employer] IS CALLING IN REGARDS TO THE PATIENT.    SHE IS WANTING TO SEE IF DR. DAWSON WILL FOLLOW THE PATIENT IF PLACED IN HOSPICE CARE.    PLEASE ADVISE    ERNESTO  [employer] [facility]  [phone]

## 2022-08-15 NOTE — PLAN OF CARE
Goal Outcome Evaluation:  Plan of Care Reviewed With: patient        Progress: declining  Outcome Evaluation: Disoriented to situation, SArrhythmia, RA, no c/o pain, calcium worse (9.9) and ionized Ca 1.53, up to chair this shift, Purewick in place, c-diff precautions maintained, plan is SNF, Benny 16/all skin measures in place, Falls score 20/bed alarm active

## 2022-08-15 NOTE — THERAPY TREATMENT NOTE
Patient Name: Pati Carter  : 1930    MRN: [id number]                              Today's Date: 8/15/2022       Admit Date: 2022    Visit Dx:     ICD-10-CM ICD-9-CM   1. Acute on chronic alteration in mental status  R41.82 780.09   2. Acute renal failure, unspecified acute renal failure type (HCC)  N17.9 584.9   3. Hypercalcemia  E83.52 275.42   4. Chronic anemia  D64.9 285.9   5. Elevated blood pressure reading with diagnosis of hypertension  I10 401.9   6. History of Clostridioides difficile colitis  Z86.19 V12.79   7. DNR (do not resuscitate)  Z66 V49.86   8. Declining functional status  R53.81 799.3   9. Oropharyngeal dysphagia  R13.12 787.22   10. Dysarthria  R47.1 784.51   11. Cognitive communication deficit  R41.841 799.52     Patient Active Problem List   Diagnosis   • Macular degeneration   • Rheumatoid arthritis ([facility])   • Hearing loss   • Hyperlipidemia   • Gastroesophageal reflux disease   • Hypertension   • Constipation   • Anemia   • Compression fracture of lumbar vertebra ([facility])   • Degeneration of intervertebral disc of lumbar region   • COPD (chronic obstructive pulmonary disease) ([facility])   • H/O calcium pyrophosphate deposition disease (CPPD)   • Falls frequently   • Immunosuppressed status on methotrexate   • Ferny Bonnet syndrome   • Impaired cognition   • Impaired mobility   • Ulcer of right foot, limited to breakdown of skin ([facility])   • Peripheral vascular disease ([facility])   • Nocturia   • Age-related osteoporosis without current pathological fracture   • Pelvic mass   • Fall, initial encounter   • Acute on chronic alteration in mental status   • Hypercalcemia   • Acute kidney injury (HCC)     Past Medical History:   Diagnosis Date   • Anemia     Description: A.  Dx 2006- borderline intermittent.   • Back pain    • Benign colonic polyp 2016    Description: A.  Dx .   • Ferny Bonnet syndrome 2020   • Chondrocalcinosis     knees   • Compression fracture of lumbar vertebra  ([facility])    • Constipation    • COPD (chronic obstructive pulmonary disease) ([facility])     Description: A.  Rule out chronic persistent asthma, COPD, or obliterative bronchiolitis.- Rae   • COVID-19 virus infection 10/11/2020   • CTS (carpal tunnel syndrome)    • Degeneration of intervertebral disc of lumbar region     Description: A.  Diagnosed in April 2013 with advanced multilevel with severe spinal stenosis, followed by Dr. Pillai for pain management.   • Gastroesophageal reflux disease    • Hearing loss    • History of calcium pyrophosphate deposition disease (CPPD)    • History of colonoscopy 01/01/1999    NORMAL PER PATIENT    • History of mammogram 01/01/2011    NORMAL PER PT    • History of Papanicolaou smear of cervix 01/01/2010    NORMAL PER PT    • History of varicella    • Hyperlipidemia     Description: A.  Dx 2006.   • Hypertension     Description: A. Dx 2001.   • Macular degeneration    • Nocturia    • Osteoporosis    • Ovarian mass     Dx 8/15- benign left cystic adnexal mass   • Overactive bladder    • Pelvic floor dysfunction    • Rheumatoid arthritis ([facility])     Description: A.  Diagnosed in 2000 and and followed by Dr. Constantino (now Dr. Kaplan). B.  On methotrexate therapy since 2000. C.  Off low-dose prednisone therapy.   • Vitamin D deficiency      Past Surgical History:   Procedure Laterality Date   • APPENDECTOMY     • CARPAL TUNNEL RELEASE Left 01/01/2003    HISTORY OF NEUROPLASTY DECOMPRESSION MEDIAN NERVE AT CARPAL TUNNEL LEFT   • CATARACT EXTRACTION Bilateral 01/01/2009   • CHOLECYSTECTOMY  01/01/1962   • HIP CANNULATED SCREW PLACEMENT Right 1/25/2020    Procedure: HIP CANNULATED SCREW PLACEMENT RIGHT;  Surgeon: Karlos Blount MD;  Location: [facility];  Service: Orthopedics   • KNEE ARTHROSCOPY Left 01/01/2001    MENISCAL REPAIR   • KYPHOPLASTY  06/18/2015    T11 AND L1 (JOSE A)   • PELVIC LAPAROSCOPY  01/01/1996    REMOVAL OF BENIGN UTERINE AND RIGHT OVARIAN TUMORS   • SALPINGO  OOPHORECTOMY Left 08/26/2015    REMOVAL OF LEFT OVARY AND TUBE (benign cystic mass)      General Information     [facility] Name 08/15/22 1001          Physical Therapy Time and Intention    Document Type therapy note (daily note)  -SC     Mode of Treatment physical therapy  -[facility] Name 08/15/22 1001          General Information    Patient Profile Reviewed yes  -SC     Existing Precautions/Restrictions fall;other (see comments)  +tremors, possible parkinsons, c-diff  -[facility] Name 08/15/22 1001          Cognition    Orientation Status (Cognition) time;oriented to;person  -[facility] Name 08/15/22 1001          Safety Issues, Functional Mobility    Impairments Affecting Function (Mobility) balance;cognition;endurance/activity tolerance;strength;visual/perceptual  -SC     Cognitive Impairments, Mobility Safety/Performance attention;judgment;problem-solving/reasoning;insight into deficits/self-awareness  -SC     Comment, Safety Issues/Impairments (Mobility) alert, talking, following 3/4 commands  -SC           User Key  (r) = Recorded By, (t) = Taken By, (c) = Cosigned By    Initials Name Provider Type    SC Dionte Weldon PT Physical Therapist               Mobility     [facility] Name 08/15/22 1003          Bed Mobility    Bed Mobility supine-sit;scooting/bridging  -SC     Scooting/Bridging [place] (Bed Mobility) verbal cues;2 person assist;dependent (less than 25% patient effort)  -SC     Supine-Sit [place] (Bed Mobility) verbal cues;1 person assist;maximum assist (25% patient effort)  -SC     Assistive Device (Bed Mobility) bed rails;draw sheet;head of bed elevated  -SC     Comment, (Bed Mobility) up to edge of bed with minimal participation from patient  -[facility] Name 08/15/22 1003          Transfers    Comment, (Transfers) STS from EOB . Demonstrated slow transfer with posterior lean initially. Took time to work on standing posture and balance with tactile cues, posterior lean inhibition. Demonstrated  improvement with time  -[facility] Name 08/15/22 1003          Bed-Chair Transfer    Bed-Chair [place] (Transfers) moderate assist (50% patient effort);2 person assist;verbal cues  -SC     Assistive Device (Bed-Chair Transfers) walker, front-wheeled  -SC     Comment, (Bed-Chair Transfer) 4  -[facility] Name 08/15/22 1003          Sit-Stand Transfer    Sit-Stand [place] (Transfers) moderate assist (50% patient effort);verbal cues  -SC     Assistive Device (Sit-Stand Transfers) walker, front-wheeled  -[facility] Name 08/15/22 1003          Gait/Stairs (Locomotion)    [place] Level (Gait) 2 person assist;moderate assist (50% patient effort)  -SC     Distance in Feet (Gait) 4  -SC     Deviations/Abnormal Patterns (Gait) weight shifting decreased;stride length decreased;festinating/shuffling  -SC     Bilateral Gait Deviations forward flexed posture  -SC     Comment, (Gait/Stairs) Required PT to control walker 100%. Required standing wt shifting activities prior to taking steps with walker. NO buckling. Tremors noted in UEs  -SC           User Key  (r) = Recorded By, (t) = Taken By, (c) = Cosigned By    Initials Name Provider Type    SC Dionte Weldon, PT Physical Therapist               Obj/Interventions     [facility] Name 08/15/22 1007          Motor Skills    Therapeutic Exercise knee;shoulder  -SC     Row Name 08/15/22 1007          Shoulder (Therapeutic Exercise)    Shoulder (Therapeutic Exercise) AROM (active range of motion)  -SC     Shoulder AROM (Therapeutic Exercise) bilateral;flexion;extension;aBduction;sitting;10 repetitions  -SC     Row Name 08/15/22 1007          Hip (Therapeutic Exercise)    Hip (Therapeutic Exercise) AAROM (active assistive range of motion)  -SC     Hip AAROM (Therapeutic Exercise) flexion;extension;10 repetitions  -[facility] Name 08/15/22 1007          Balance    Dynamic Standing Balance 2-person assist;moderate assist  -SC     Position/Device Used, Standing Balance walker,  rolling  -SC     Comment, Balance posterior lean on standing  -SC           User Key  (r) = Recorded By, (t) = Taken By, (c) = Cosigned By    Initials Name Provider Type    SC Dionte Weldon, PT Physical Therapist               Goals/Plan    No documentation.                Clinical Impression     [facility] Name 08/15/22 1008          Pain    Additional Documentation Pain Scale: FACES Pre/Post-Treatment (Group)  -SC     Row Name 08/15/22 1008          Pain Scale: FACES Pre/Post-Treatment    Pain: FACES Scale, Pretreatment 0-->no hurt  -SC     Posttreatment Pain Rating 6-->hurts even more  -SC     Pain Location - Side/Orientation Bilateral  -SC     Pain Location - foot  -[facility] Name 08/15/22 1008          Plan of Care Review    Plan of Care Reviewed With patient  -SC     Progress no change  -SC     Outcome Evaluation Patient willing to participate in therapeutic activities. SHe continues to require max to mod assist with all mobility. Today she c/o bilateral foot pain limiting ambulation.  -[facility] 08/15/22 1008          Therapy Assessment/Plan (PT)    Patient/Family Therapy Goals Statement (PT) did not state  -SC     Rehab Potential (PT) fair, will monitor progress closely  -SC     Criteria for Skilled Interventions Met (PT) yes  -SC     Therapy Frequency (PT) daily  -[facility] Name 08/15/22 1008          Vital Signs    Posttreatment Heart Rate (beats/min) 90  -SC     Row Name 08/15/22 1008          Positioning and Restraints    Pre-Treatment Position in bed  -SC     Post Treatment Position chair  -SC     In Chair notified nsg;reclined;sitting;encouraged to call for assist;call light within reach  -SC           User Key  (r) = Recorded By, (t) = Taken By, (c) = Cosigned By    Initials Name Provider Type    SC Dionte Weldon, PT Physical Therapist               Outcome Measures     [facility] Name 08/15/22 1011          How much help from another person do you currently need...    Turning from your back to your  side while in flat bed without using bedrails? 2  -SC     Moving from lying on back to sitting on the side of a flat bed without bedrails? 1  -SC     Standing up from a chair using your arms (e.g., wheelchair, bedside chair)? 2  -SC     Climbing 3-5 steps with a railing? 1  -SC     To walk in hospital room? 2  -SC     Row Name 08/15/22 1011          Functional Assessment    Outcome Measure Options AM-PAC 6 Clicks Basic Mobility (PT)  -SC           User Key  (r) = Recorded By, (t) = Taken By, (c) = Cosigned By    Initials Name Provider Type    SC Dionte Weldon PT Physical Therapist                             Physical Therapy Education                 Title: PT OT SLP Therapies (In Progress)     Topic: Physical Therapy (Done)     Point: Mobility training (Done)     Learning Progress Summary           Patient Eager, E, VU by SC at 8/11/2022 1015    Comment: Reviewed benefits of activity   Family Aylaer E, VU by SC at 8/11/2022 1015    Comment: Reviewed benefits of activity                   Point: Home exercise program (Done)     Learning Progress Summary           Patient Eager, E, VU by SC at 8/11/2022 1015    Comment: Reviewed benefits of activity   Family Aylaer, E, VU by SC at 8/11/2022 1015    Comment: Reviewed benefits of activity                   Point: Body mechanics (Done)     Learning Progress Summary           Patient Eager, E, VU by SC at 8/11/2022 1015    Comment: Reviewed benefits of activity   Family Eager, E, VU by SC at 8/11/2022 1015    Comment: Reviewed benefits of activity                   Point: Precautions (Done)     Learning Progress Summary           Patient Eager, E, VU by SC at 8/11/2022 1015    Comment: Reviewed benefits of activity   Family Eager, E, VU by SC at 8/11/2022 1015    Comment: Reviewed benefits of activity                               User Key     Initials Effective Dates Name Provider Type [facility] 06/16/21 -  Dionte Weldon PT Physical Therapist PT               PT Recommendation and Plan  Planned Therapy Interventions (PT): balance training, bed mobility training, gait training, home exercise program, strengthening, patient/family education, transfer training  Plan of Care Reviewed With: patient  Progress: no change  Outcome Evaluation: Patient willing to participate in therapeutic activities. SHe continues to require max to mod assist with all mobility. Today she c/o bilateral foot pain limiting ambulation.     Time Calculation:    PT Charges     Row Name 08/15/22 0940             Time Calculation    Start Time 0940  -SC      PT Received On 08/15/22  -SC      PT Goal Re-Cert Due Date 08/21/22  -SC              Time Calculation- PT    Total Timed Code Minutes- PT 20 minute(s)  -SC              Timed Charges    26957 - PT Therapeutic Exercise Minutes 4  -SC      09921 - Gait Training Minutes  8  -SC      51584 - PT Therapeutic Activity Minutes 8  -SC              Total Minutes    Timed Charges Total Minutes 20  -SC       Total Minutes 20  -SC            User Key  (r) = Recorded By, (t) = Taken By, (c) = Cosigned By    Initials Name Provider Type    SC Dionte Weldon, PT Physical Therapist              Therapy Charges for Today     Code Description Service Date Service Provider Modifiers Qty    [id number] HC GAIT TRAINING EA 15 MIN 8/15/2022 Dionte Weldon, PT GP 1    [id number]  PT THER SUPP EA 15 MIN 8/15/2022 Dionte Weldon, PT GP 2          PT G-Codes  Outcome Measure Options: AM-PAC 6 Clicks Basic Mobility (PT)  AM-PAC 6 Clicks Score (PT): 12  AM-PAC 6 Clicks Score (OT): 14    Dionte Weldon, PT  8/15/2022

## 2022-08-15 NOTE — CASE MANAGEMENT/SOCIAL WORK
Continued Stay Note  [facility]     Patient Name: Pati Carter  MRN: [id number]  Today's Date: 8/15/2022    Admit Date: 8/9/2022     Discharge Plan     Row Name 08/15/22 1202       Plan    Plan skilled nursing facility    Plan Comments Mrs. Carter has had no bed offers to date. I spoke with her daughter Lilian regarding additional rehab referrals. She requests that referrals are made to all [facility], [facility], and [facility]. I have made these referrals. [facility] cannot accomodate Mrs. Carter at this time due to bed availability. [facility] has clinically denied Mrs. Carter's referral. The referral to [facility] is still pending review.    Final Discharge Disposition Code 30 - still a patient               Discharge Codes    No documentation.                 Devan Chavez RN

## 2022-08-15 NOTE — TELEPHONE ENCOUNTER
Called to inform Julissa that Dr Eduardo will defer to hospice providers. Was left on hold for almost 10 minutes. Will attempt to inform her tomorrow.

## 2022-08-15 NOTE — PLAN OF CARE
Goal Outcome Evaluation:  Plan of Care Reviewed With: patient        Progress: no change  Outcome Evaluation: Patient willing to participate in therapeutic activities. SHe continues to require max to mod assist with all mobility. Today she c/o bilateral foot pain limiting ambulation.

## 2022-08-15 NOTE — PROGRESS NOTES
[facility] Medicine Services  PROGRESS NOTE    Patient Name: Pati Carter  : 1930  MRN: [id number]    Date of Admission: 2022  Primary Care Physician: Pallavi Eduardo MD    Subjective   Subjective     CC:  Follow-up hypercalcemia    HPI:  Complains of pain in the bottom of both of her feet when she is upright/weightbearing.  Otherwise no acute complaints.  Daughter called via telephone and updated, she is interested in short-term rehab with attention transition to long-term care under hospice    ROS:  Gen- No fevers, chills  CV- No chest pain, palpitations  Resp- No cough, dyspnea  GI- No N/V/D, abd pain    Objective   Objective     Vital Signs:   Temp:  [97.8 °F (36.6 °C)-98.9 °F (37.2 °C)] 97.9 °F (36.6 °C)  Heart Rate:  [66-95] 79  Resp:  [16-20] 18  BP: (137-155)/(72-94) 155/91  Flow (L/min):  [2] 2     Physical Exam:  Constitutional: Initially sleeping but easily awoken, elderly female sitting up in bedside chair no acute distress  HENT: NCAT, mucous membranes moist  Respiratory: Clear to auscultation bilaterally, respiratory effort normal   Cardiovascular: RRR, no murmurs, rubs, or gallops, palpable radial pulses  Gastrointestinal: Positive bowel sounds, soft, nontender, nondistended  Musculoskeletal: No bilateral ankle edema, bitemporal wasting  Psychiatric: Appropriate affect, cooperative  Neurologic: Pleasantly confused, speech clear, moving extremities spontaneously    Results Reviewed:  LAB RESULTS:      Lab 08/10/22  0533 22  1720   WBC 12.45* 17.90*   HEMOGLOBIN 9.1* 10.1*   HEMATOCRIT 29.0* 30.9*   PLATELETS 369 436   NEUTROS ABS  --  15.13*   IMMATURE GRANS (ABS)  --  0.10*   LYMPHS ABS  --  0.99   MONOS ABS  --  1.48*   EOS ABS  --  0.10   .3* 97.2*   PROCALCITONIN  --  0.33*   LACTATE  --  1.2         Lab 08/15/22  0607 22  0706 22  0700 22  0730 22  0802 08/10/22  0533   SODIUM 141 149* 138 144 143 142   POTASSIUM  4.1 3.2* 3.4* 3.7 3.8 3.8   CHLORIDE 108* 115* 109* 108* 107 105   CO2 26.0 25.0 19.0* 28.0 27.0 26.0   ANION GAP 7.0 9.0 10.0 8.0 9.0 11.0   BUN 20 20 25* 31* 37* 36*   CREATININE 1.16* 1.21* 1.32* 1.49* 1.95* 2.12*   EGFR 44.3* 42.1* 38.0* 32.8* 23.8* 21.5*   GLUCOSE 91 80 92 86 94 80   CALCIUM 9.9* 9.7* 10.3* 11.2* 11.2* 11.8*   IONIZED CALCIUM 1.53* 1.49* 1.53* 1.66*  --  1.76*   PHOSPHORUS  --   --   --   --   --  4.0   HEMOGLOBIN A1C  --   --   --   --   --  5.10         Lab 08/10/22  0533 08/09/22  1720   TOTAL PROTEIN  --  7.4   ALBUMIN 2.60* 3.20*   GLOBULIN  --  4.2   ALT (SGPT)  --  17   AST (SGOT)  --  20   BILIRUBIN  --  0.4   ALK PHOS  --  118*   LIPASE  --  10*         Lab 08/09/22  1720   PROBNP 4,237.0*   TROPONIN T 0.032*         Lab 08/10/22  0533   CHOLESTEROL 115   LDL CHOL 67   HDL CHOL 27*   TRIGLYCERIDES 116             Lab 08/09/22  1715   PH, ARTERIAL 7.478*   PCO2, ARTERIAL 44.6   PO2 ART 61.5*   FIO2 28   HCO3 ART 33.0*   BASE EXCESS ART 8.5*   CARBOXYHEMOGLOBIN 1.3     Brief Urine Lab Results  (Last result in the past 365 days)      Color   Clarity   Blood   Leuk Est   Nitrite   Protein   CREAT   Urine HCG        08/09/22 1838 Yellow   Clear   Negative   Trace   Negative   30 mg/dL (1+)                 Microbiology Results Abnormal     Procedure Component Value - Date/Time    Blood Culture - Blood, Hand, Right [[id number]]  (Normal) Collected: 08/09/22 1810    Lab Status: Final result Specimen: Blood from Hand, Right Updated: 08/14/22 1847     Blood Culture No growth at 5 days    Blood Culture - Blood, Arm, Left [[id number]]  (Normal) Collected: 08/09/22 1721    Lab Status: Final result Specimen: Blood from Arm, Left Updated: 08/14/22 1747     Blood Culture No growth at 5 days    Urine Culture - Urine, Urine, Catheter [[id number]]  (Normal) Collected: 08/09/22 1838    Lab Status: Final result Specimen: Urine, Catheter Updated: 08/10/22 2040     Urine Culture No growth    COVID PRE-OP /  PRE-PROCEDURE SCREENING ORDER (NO ISOLATION) - Swab, Nasopharynx [[id number]]  (Normal) Collected: 08/09/22 1812    Lab Status: Final result Specimen: Swab from Nasopharynx Updated: 08/09/22 1907    Narrative:      The following orders were created for panel order COVID PRE-OP / PRE-PROCEDURE SCREENING ORDER (NO ISOLATION) - Swab, Nasopharynx.  Procedure                               Abnormality         Status                     ---------                               -----------         ------                     COVID-19 and FLU A/B PCR...[[id number]]  Normal              Final result                 Please view results for these tests on the individual orders.    COVID-19 and FLU A/B PCR - Swab, Nasopharynx [[id number]]  (Normal) Collected: 08/09/22 1812    Lab Status: Final result Specimen: Swab from Nasopharynx Updated: 08/09/22 1907     COVID19 Not Detected     Influenza A PCR Not Detected     Influenza B PCR Not Detected    Narrative:      Fact sheet for providers: https://www.fda.gov/media/040244/download    Fact sheet for patients: https://www.fda.gov/media/794741/download    Test performed by PCR.          No radiology results from the last 24 hrs        I have reviewed the medications:  Scheduled Meds:acetaminophen, 650 mg, Oral, Nightly  aspirin, 325 mg, Oral, Daily   Or  aspirin, 300 mg, Rectal, Daily  atorvastatin, 80 mg, Oral, Nightly  budesonide-formoterol, 2 puff, Inhalation, BID - RT  Diclofenac Sodium, 2 g, Topical, Q12H  dilTIAZem, 30 mg, Oral, Q6H  famotidine, 20 mg, Oral, Daily  folic acid, 1,000 mcg, Oral, Daily  miconazole, 1 application, Topical, Q12H  senna-docusate sodium, 2 tablet, Oral, BID  sodium chloride, 10 mL, Intravenous, Q12H  sodium chloride, 10 mL, Intravenous, Q12H      Continuous Infusions:   PRN Meds:.•  acetaminophen **OR** acetaminophen **OR** acetaminophen  •  albuterol  •  senna-docusate sodium **AND** polyethylene glycol **AND** bisacodyl **AND** bisacodyl  •  magnesium  sulfate **OR** magnesium sulfate **OR** magnesium sulfate  •  melatonin  •  ondansetron  •  potassium chloride **OR** potassium chloride **OR** potassium chloride  •  [COMPLETED] Insert peripheral IV **AND** sodium chloride  •  sodium chloride  •  sodium chloride  •  traMADol    Assessment & Plan   Assessment & Plan     Active Hospital Problems    Diagnosis  POA   • **Hypercalcemia [E83.52]  Yes   • Acute on chronic alteration in mental status [R41.82]  Yes   • Acute kidney injury (HCC) [N17.9]  Yes   • Pelvic mass [R19.00]  Yes   • Impaired cognition [R41.89]  Yes   • Impaired mobility [Z74.09]  Yes   • 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        Brief Hospital Course to date:  Pati Carter is a 89 y.o. female [place] resident with recent admission for C. 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, multifactorial  Hypercalcemia  Ferny Garret syndrome  - Visual halluc @ b-line; Ferny Garret syndrome; dtr noted sig worsening mentation/incomprehensible speech @ arrival  - CT head w/o acute finding, stroke neuro has evaluated; MRI negative  -  SLP follows; diet per their  recommendations  -  Patient requires assistance with all meals  -  Supplemental calcium/vitamin D on hold  -  PTH appropriately suppressed, PTHrP still pending  -IoCa slightly inc w/ DC'ed fluids; concern for paraneoplastic process, dtr updated via phone, interested in hospice informational session (order placed, hospice liaison updated via telephone); repeat IoCa edie, if cont trend up and PTH RP elevated will add bisphosphonate     JENNIE on CKD 3, improving  -Baseline GFR 40s; Cr 0.9-1.1  -High-end of baseline today     Likely ongoing aspiration  - SLP following, on modified diet     Hypertension  Hyperlipidemia  PVD  -Aspirin, diltiazem     COPD  - Home inhalers, substituted for formulary     GERD  - Pepcid     Rheumatoid arthritis  - Per previous documentation, on MTX, however not listed on home med list     Recent C. difficile colitis  -Completed oral vancomycin pta     1.8 cm LT renal lesion  Bladder wall lesion  Pelvic mass  -Seen by Dr. Yao last admit, monitoring/following up opt  -Seen by Dr. Mello lat admit, gyn mass is stable, expectant management, if large volume blood loss she recommended formal Dx and rad-onc therapy  -d/w dtr at bedside, they prefer more conservative measures and likely would not pursue invasive measures    Expected Discharge Location and Transportation: Short-term rehab with transition to long-term care +/- hospice; medical van  Expected Discharge Date: 8/16    DVT prophylaxis:  Mechanical DVT prophylaxis orders are present.     AM-PAC 6 Clicks Score (PT): 12 (08/14/22 0800)    CODE STATUS:   Code Status and Medical Interventions:   Ordered at: 08/09/22 0094     Medical Intervention Limits:    NO intubation (DNI)     Code Status (Patient has no pulse and is not breathing):    No CPR (Do Not Attempt to Resuscitate)     Medical Interventions (Patient has pulse or is breathing):    Limited Support       Logan Sawyer, DO  08/15/22

## 2022-08-15 NOTE — PROGRESS NOTES
Continued Stay Note  [facility]     Patient Name: Pati Carter  MRN: [id number]  Today's Date: 8/15/2022    Admit Date: 8/9/2022     Discharge Plan     Row Name 08/15/22 8060       Plan    Plan SNF w/ possible transition to LTC    Patient/Family in Agreement with Plan yes    Plan Comments Hospice consult received. Chart reviewed. Call placed to the pt.'s daughter, Lilian, to educate on hospice services/POC/philosophy. Hospice related questions/concerns answered/addressed. Per conversation, Lilian, does state that the pt will need to transition to a SNF & that it is highly likely that the pt will need to remain in a facility for LTC. Writer did explain that pt will not be able to access her Medicare skilled care days & have hospice services. Writer also explained that Hospice does not pay for room & board @ the NH & that room & board is covered by either private pay or Medicaid. Writer also explained that hospice can f/u w/ the pt./family in 2 weeks post d/c & that the pt can be assessed @ the NH by hospice if needed. Lilian states that she would like to speak w/ her siblings. Writer did explain that hospice staff can meet w/ the family @ the hospital if desired. Lilian does have the contact number for home/out pt hospice & is agreeable to have hospice follow the pt. Hospice will continue to follow. If further assistance is needed, please call [phone].               Discharge Codes    No documentation.               Expected Discharge Date and Time     Expected Discharge Date Expected Discharge Time    Aug 16, 2022             Linda Castaneda

## 2022-08-16 LAB
ANION GAP SERPL CALCULATED.3IONS-SCNC: 9 MMOL/L (ref 5–15)
BUN SERPL-MCNC: 22 MG/DL (ref 8–23)
BUN/CREAT SERPL: 18.6 (ref 7–25)
CA-I SERPL ISE-MCNC: 1.46 MMOL/L (ref 1.12–1.32)
CALCIUM SPEC-SCNC: 9.6 MG/DL (ref 8.2–9.6)
CHLORIDE SERPL-SCNC: 107 MMOL/L (ref 98–107)
CO2 SERPL-SCNC: 24 MMOL/L (ref 22–29)
CREAT SERPL-MCNC: 1.18 MG/DL (ref 0.57–1)
EGFRCR SERPLBLD CKD-EPI 2021: 43.4 ML/MIN/1.73
GLUCOSE SERPL-MCNC: 82 MG/DL (ref 65–99)
POTASSIUM SERPL-SCNC: 3.8 MMOL/L (ref 3.5–5.2)
SARS-COV-2 RDRP RESP QL NAA+PROBE: NORMAL
SODIUM SERPL-SCNC: 140 MMOL/L (ref 136–145)

## 2022-08-16 PROCEDURE — 63710000001 METHOTREXATE PER 2.5 MG: Performed by: INTERNAL MEDICINE

## 2022-08-16 PROCEDURE — 80048 BASIC METABOLIC PNL TOTAL CA: CPT | Performed by: INTERNAL MEDICINE

## 2022-08-16 PROCEDURE — 92507 TX SP LANG VOICE COMM INDIV: CPT

## 2022-08-16 PROCEDURE — 92526 ORAL FUNCTION THERAPY: CPT

## 2022-08-16 PROCEDURE — 87635 SARS-COV-2 COVID-19 AMP PRB: CPT | Performed by: INTERNAL MEDICINE

## 2022-08-16 PROCEDURE — 94799 UNLISTED PULMONARY SVC/PX: CPT

## 2022-08-16 PROCEDURE — 82330 ASSAY OF CALCIUM: CPT | Performed by: INTERNAL MEDICINE

## 2022-08-16 PROCEDURE — 94664 DEMO&/EVAL PT USE INHALER: CPT

## 2022-08-16 PROCEDURE — 99232 SBSQ HOSP IP/OBS MODERATE 35: CPT | Performed by: INTERNAL MEDICINE

## 2022-08-16 PROCEDURE — 94761 N-INVAS EAR/PLS OXIMETRY MLT: CPT

## 2022-08-16 RX ADMIN — MICONAZOLE NITRATE 1 APPLICATION: 20 CREAM TOPICAL at 20:29

## 2022-08-16 RX ADMIN — DILTIAZEM HYDROCHLORIDE 30 MG: 30 TABLET, FILM COATED ORAL at 17:56

## 2022-08-16 RX ADMIN — METHOTREXATE 15 MG: 2.5 TABLET ORAL at 13:24

## 2022-08-16 RX ADMIN — FAMOTIDINE 20 MG: 20 TABLET ORAL at 09:13

## 2022-08-16 RX ADMIN — DILTIAZEM HYDROCHLORIDE 30 MG: 30 TABLET, FILM COATED ORAL at 00:48

## 2022-08-16 RX ADMIN — SENNOSIDES AND DOCUSATE SODIUM 2 TABLET: 50; 8.6 TABLET ORAL at 09:13

## 2022-08-16 RX ADMIN — DILTIAZEM HYDROCHLORIDE 30 MG: 30 TABLET, FILM COATED ORAL at 06:23

## 2022-08-16 RX ADMIN — DILTIAZEM HYDROCHLORIDE 30 MG: 30 TABLET, FILM COATED ORAL at 11:07

## 2022-08-16 RX ADMIN — DICLOFENAC 2 G: 10 GEL TOPICAL at 09:13

## 2022-08-16 RX ADMIN — ASPIRIN 325 MG ORAL TABLET 325 MG: 325 PILL ORAL at 09:13

## 2022-08-16 RX ADMIN — MICONAZOLE NITRATE 1 APPLICATION: 20 CREAM TOPICAL at 09:13

## 2022-08-16 RX ADMIN — ACETAMINOPHEN 650 MG: 325 TABLET, FILM COATED ORAL at 20:29

## 2022-08-16 RX ADMIN — ATORVASTATIN CALCIUM 80 MG: 40 TABLET, FILM COATED ORAL at 20:29

## 2022-08-16 RX ADMIN — FOLIC ACID 1000 MCG: 1 TABLET ORAL at 09:13

## 2022-08-16 RX ADMIN — BUDESONIDE AND FORMOTEROL FUMARATE DIHYDRATE 2 PUFF: 80; 4.5 AEROSOL RESPIRATORY (INHALATION) at 07:07

## 2022-08-16 RX ADMIN — DICLOFENAC 2 G: 10 GEL TOPICAL at 20:29

## 2022-08-16 NOTE — PLAN OF CARE
Goal Outcome Evaluation:  Plan of Care Reviewed With: patient    SLP treatment completed. Will continue to address diet tolerance and cognitive-communication in tx. Please see note for further details and recommendations.'

## 2022-08-16 NOTE — THERAPY TREATMENT NOTE
Acute Care - Speech Language Pathology Treatment Note  [facility]     Patient Name: Pati Carter  : 1930  MRN: [id number]  Today's Date: 2022               Admit Date: 2022     Visit Dx:    ICD-10-CM ICD-9-CM   1. Acute on chronic alteration in mental status  R41.82 780.09   2. Acute renal failure, unspecified acute renal failure type (HCC)  N17.9 584.9   3. Hypercalcemia  E83.52 275.42   4. Chronic anemia  D64.9 285.9   5. Elevated blood pressure reading with diagnosis of hypertension  I10 401.9   6. History of Clostridioides difficile colitis  Z86.19 V12.79   7. DNR (do not resuscitate)  Z66 V49.86   8. Declining functional status  R53.81 799.3   9. Oropharyngeal dysphagia  R13.12 787.22   10. Dysarthria  R47.1 784.51   11. Cognitive communication deficit  R41.841 799.52     Patient Active Problem List   Diagnosis   • Macular degeneration   • Rheumatoid arthritis ([facility])   • Hearing loss   • Hyperlipidemia   • Gastroesophageal reflux disease   • Hypertension   • Constipation   • Anemia   • Compression fracture of lumbar vertebra ([facility])   • Degeneration of intervertebral disc of lumbar region   • COPD (chronic obstructive pulmonary disease) ([facility])   • H/O calcium pyrophosphate deposition disease (CPPD)   • Falls frequently   • Immunosuppressed status on methotrexate   • Ferny Bonnet syndrome   • Impaired cognition   • Impaired mobility   • Ulcer of right foot, limited to breakdown of skin ([facility])   • Peripheral vascular disease ([facility])   • Nocturia   • Age-related osteoporosis without current pathological fracture   • Pelvic mass   • Fall, initial encounter   • Acute on chronic alteration in mental status   • Hypercalcemia   • Acute kidney injury (HCC)     Past Medical History:   Diagnosis Date   • Anemia     Description: A.  Dx - borderline intermittent.   • Back pain    • Benign colonic polyp 2016    Description: A.  Dx .   • Ferny Bonnet syndrome 2020   • Chondrocalcinosis      knees   • Compression fracture of lumbar vertebra (HCC)    • Constipation    • COPD (chronic obstructive pulmonary disease) (HCC)     Description: A.  Rule out chronic persistent asthma, COPD, or obliterative bronchiolitis.- Rae   • COVID-19 virus infection 10/11/2020   • CTS (carpal tunnel syndrome)    • Degeneration of intervertebral disc of lumbar region     Description: A.  Diagnosed in April 2013 with advanced multilevel with severe spinal stenosis, followed by Dr. Pillai for pain management.   • Gastroesophageal reflux disease    • Hearing loss    • History of calcium pyrophosphate deposition disease (CPPD)    • History of colonoscopy 01/01/1999    NORMAL PER PATIENT    • History of mammogram 01/01/2011    NORMAL PER PT    • History of Papanicolaou smear of cervix 01/01/2010    NORMAL PER PT    • History of varicella    • Hyperlipidemia     Description: A.  Dx 2006.   • Hypertension     Description: A. Dx 2001.   • Macular degeneration    • Nocturia    • Osteoporosis    • Ovarian mass     Dx 8/15- benign left cystic adnexal mass   • Overactive bladder    • Pelvic floor dysfunction    • Rheumatoid arthritis (HCC)     Description: A.  Diagnosed in 2000 and and followed by Dr. Constantino (now Dr. Kaplan). B.  On methotrexate therapy since 2000. C.  Off low-dose prednisone therapy.   • Vitamin D deficiency      Past Surgical History:   Procedure Laterality Date   • APPENDECTOMY     • CARPAL TUNNEL RELEASE Left 01/01/2003    HISTORY OF NEUROPLASTY DECOMPRESSION MEDIAN NERVE AT CARPAL TUNNEL LEFT   • CATARACT EXTRACTION Bilateral 01/01/2009   • CHOLECYSTECTOMY  01/01/1962   • HIP CANNULATED SCREW PLACEMENT Right 1/25/2020    Procedure: HIP CANNULATED SCREW PLACEMENT RIGHT;  Surgeon: Karlos Blount MD;  Location: [facility];  Service: Orthopedics   • KNEE ARTHROSCOPY Left 01/01/2001    MENISCAL REPAIR   • KYPHOPLASTY  06/18/2015    T11 AND L1 (JOSE A)   • PELVIC LAPAROSCOPY  01/01/1996    REMOVAL OF BENIGN  UTERINE AND RIGHT OVARIAN TUMORS   • SALPINGO OOPHORECTOMY Left 08/26/2015    REMOVAL OF LEFT OVARY AND TUBE (benign cystic mass)       SLP Recommendation and Plan  SLP Diagnosis: Evaluation limited by time constraints. Pt presented w/ moderate dysarthria and suspect at least moderate cognitive impairments. Typically Ox4 at baseline per dtr. Both cognition and motor speech recently declining. (08/16/22 0910)        SLC Criteria for Skilled Therapy Interventions Met: yes (08/16/22 0910)                          Treatment Assessment (SLP): Patient participated in cognitive communication and dysphagia tx this date. Confusion impacting. (08/16/22 0910)            SLP EVALUATION (last 72 hours)     SLP SLC Evaluation     Row Name 08/16/22 0910                   Communication Assessment/Intervention    Document Type therapy note (daily note)  -CH        Subjective Information no complaints  -        Patient Observations alert;cooperative;agree to therapy  -        Patient/Family/Caregiver Comments/Observations none present  -        Patient Effort good  -CH        Symptoms Noted During/After Treatment none  -CH                  General Information    Patient Profile Reviewed yes  -CH                  Pain    Additional Documentation Pain Scale: FACES Pre/Post-Treatment (Group)  -                  Pain Scale: FACES Pre/Post-Treatment    Pain: FACES Scale, Pretreatment 0-->no hurt  -CH        Posttreatment Pain Rating 0-->no hurt  -CH                  SLP Evaluation Clinical Impressions    SLP Diagnosis Evaluation limited by time constraints. Pt presented w/ moderate dysarthria and suspect at least moderate cognitive impairments. Typically Ox4 at baseline per dtr. Both cognition and motor speech recently declining.  -CH        Rehab Potential/Prognosis fair  -        SLC Criteria for Skilled Therapy Interventions Met yes  -CH        Functional Impact difficulty communicating wants, needs;difficulty communicating  in an emergency;decreased ability to respond to situations safely  -                  SLP Treatment Clinical Impressions    Treatment Assessment (SLP) Patient participated in cognitive communication and dysphagia tx this date. Confusion impacting.  -              User Key  (r) = Recorded By, (t) = Taken By, (c) = Cosigned By    Initials Name Effective Dates     Jailene Julia, MS CCC-SLP 06/16/21 -                    EDUCATION  The patient has been educated in the following areas:     Cognitive Impairment Communication Impairment.           SLP GOALS     Row Name 08/16/22 0910             (STG) Patient will tolerate trials of    Consistencies Trialed (Tolerate trials) pureed textures;honey/ moderately thick liquids  -CH      Desired Outcome (Tolerate trials) for pleasure/comfort;without signs of distress  -CH      [place] (Tolerate trials) independently (over 90% accuracy)  -      Time Frame (Tolerate trials) by discharge  -CH      Progress/Outcomes (Tolerate trials) goal revised this date;good progress toward goal  -CH      Comment (Tolerate trials) RN reported coughing with am meal when fed by tech, however, no aversion or discomfort observed with trials of thin liquids by adaptive cup or with soft solids.  -              ([facility] [place]) Swallow 1    (LTG) Swallow LTG: Patient will tolerate comfort diet of lvl 4, thin liquids without s/s of aversion or discomfort/distress  -CH      [place] (Swallow Long Term Goal) with minimal cues (75-90% accuracy)  -CH      Time Frame (Swallow Long Term Goal) by discharge  -CH      Progress/Outcomes (Swallow Long Term Goal) good progress toward goal;goal revised this date  -CH      Comment (Swallow Long Term Goal) RN reported coughing with am meal when fed by tech, however, no aversion or discomfort observed with trials of thin liquids by adaptive cup or with soft solids.  -CH              Phonation Goal 1 (SLP)    Improve Phonation By Goal 1 (SLP) using loud  speech;80%;with maximum cues (25-49%);other (see comments)  -CH      Time Frame (Phonation Goal 1, SLP) short term goal (STG)  -CH      Progress (Phonation Goal 1, SLP) 60%;with minimal cues (75-90%)  -CH      Progress/Outcomes (Phonation Goal 1, SLP) good progress toward goal  -CH      Comment (Phonation Goal 1, SLP) min/mod cues for use of loud speech  -CH              Awareness of Basic Personal Information Goal 1 (SLP)    Improve Awareness of Basic Personal Information Goal 1 (SLP) answering yes/no questions regarding personal/biographical information;naming self, family members;90%;with minimal cues (75-90%)  -CH      Time Frame (Awareness of Basic Personal Information Goal 1, SLP) short term goal (STG)  -CH      Progress (Awareness of Basic Personal Information Goal 1, SLP) 70%;with minimal cues (75-90%)  -CH      Progress/Outcomes (Awareness of Basic Personal Information Goal 1, SLP) good progress toward goal  -CH              Orientation Goal 1 (SLP)    Improve Orientation Through Goal 1 (SLP) demonstrating orientation to year;demonstrating orientation to place;demonstrating orientation to disease/impairment;90%;with minimal cues (75-90%)  -CH      Time Frame (Orientation Goal 1, SLP) short term goal (STG)  -CH      Progress (Orientation Goal 1, SLP) 30%;with maximum cues (25-49%)  -CH      Progress/Outcomes (Orientation Goal 1, SLP) continuing progress toward goal  -CH      Comment (Orientation Goal 1, SLP) oriented to self, not to place, time or situation. Thought she was in [facility] and that her father was as well. Unable to see electronic board to aid in orientation.  -CH              Additional Goal 1 (SLP)    Additional Goal 1, SLP LTG: Pt will improve cognitive-communication skills in order to participate in care w/ min cues.  -CH      Time Frame (Additional Goal 1, SLP) by discharge  -CH      Progress/Outcomes (Additional Goal 1, SLP) continuing progress toward goal  -CH            User Key  (r) =  Recorded By, (t) = Taken By, (c) = Cosigned By    Initials Name Provider Type    JARAD Julia Dent MS CCC-SLP Speech and Language Pathologist                        Time Calculation:      Time Calculation- SLP     Row Name 22 0944             Time Calculation- SLP    SLP Start Time 0910  -CH      SLP Received On 22  -              Untimed Charges    36721-NR Treatment/ST Modification Prosth Aug Alter  38  -CH      40705-YA Treatment Swallow Minutes 25  -CH              Total Minutes    Untimed Charges Total Minutes 63  -CH       Total Minutes 63  -CH            User Key  (r) = Recorded By, (t) = Taken By, (c) = Cosigned By    Initials Name Provider Type    JARAD Julia Dent MS CCC-SLP Speech and Language Pathologist                Therapy Charges for Today     Code Description Service Date Service Provider Modifiers Qty    34300726839 HC ST TREATMENT SWALLOW 2 2022 Julia Dent MS CCC-SLP GN 1    72975349086 HC ST TREATMENT SPEECH 3 2022 Julia Dent MS CCC-SLP GN 1                     Julia Dent MS CCC-SLP  2022 and Acute Care - Speech Language Pathology   Swallow Treatment Note [facility]     Patient Name: Pati Carter  : 1930  MRN: [id number]  Today's Date: 2022               Admit Date: 2022    Visit Dx:     ICD-10-CM ICD-9-CM   1. Acute on chronic alteration in mental status  R41.82 780.09   2. Acute renal failure, unspecified acute renal failure type (HCC)  N17.9 584.9   3. Hypercalcemia  E83.52 275.42   4. Chronic anemia  D64.9 285.9   5. Elevated blood pressure reading with diagnosis of hypertension  I10 401.9   6. History of Clostridioides difficile colitis  Z86.19 V12.79   7. DNR (do not resuscitate)  Z66 V49.86   8. Declining functional status  R53.81 799.3   9. Oropharyngeal dysphagia  R13.12 787.22   10. Dysarthria  R47.1 784.51   11. Cognitive communication deficit  R41.841 799.52     Patient Active Problem List   Diagnosis    • Macular degeneration   • Rheumatoid arthritis (HCC)   • Hearing loss   • Hyperlipidemia   • Gastroesophageal reflux disease   • Hypertension   • Constipation   • Anemia   • Compression fracture of lumbar vertebra (HCC)   • Degeneration of intervertebral disc of lumbar region   • COPD (chronic obstructive pulmonary disease) ([facility])   • H/O calcium pyrophosphate deposition disease (CPPD)   • Falls frequently   • Immunosuppressed status on methotrexate   • Ferny Bonnet syndrome   • Impaired cognition   • Impaired mobility   • Ulcer of right foot, limited to breakdown of skin ([facility])   • Peripheral vascular disease ([facility])   • Nocturia   • Age-related osteoporosis without current pathological fracture   • Pelvic mass   • Fall, initial encounter   • Acute on chronic alteration in mental status   • Hypercalcemia   • Acute kidney injury ([facility])     Past Medical History:   Diagnosis Date   • Anemia     Description: A.  Dx 2006- borderline intermittent.   • Back pain    • Benign colonic polyp 9/28/2016    Description: A.  Dx 1999.   • Ferny Bonnet syndrome 7/7/2020   • Chondrocalcinosis     knees   • Compression fracture of lumbar vertebra (HCC)    • Constipation    • COPD (chronic obstructive pulmonary disease) ([facility])     Description: A.  Rule out chronic persistent asthma, COPD, or obliterative bronchiolitis.- Butch   • COVID-19 virus infection 10/11/2020   • CTS (carpal tunnel syndrome)    • Degeneration of intervertebral disc of lumbar region     Description: A.  Diagnosed in April 2013 with advanced multilevel with severe spinal stenosis, followed by Dr. Pillai for pain management.   • Gastroesophageal reflux disease    • Hearing loss    • History of calcium pyrophosphate deposition disease (CPPD)    • History of colonoscopy 01/01/1999    NORMAL PER PATIENT    • History of mammogram 01/01/2011    NORMAL PER PT    • History of Papanicolaou smear of cervix 01/01/2010    NORMAL PER PT    • History of varicella    •  Hyperlipidemia     Description: A.  Dx 2006.   • Hypertension     Description: A. Dx 2001.   • Macular degeneration    • Nocturia    • Osteoporosis    • Ovarian mass     Dx 8/15- benign left cystic adnexal mass   • Overactive bladder    • Pelvic floor dysfunction    • Rheumatoid arthritis (HCC)     Description: A.  Diagnosed in 2000 and and followed by Dr. Constantino (now Dr. Kaplan). B.  On methotrexate therapy since 2000. C.  Off low-dose prednisone therapy.   • Vitamin D deficiency      Past Surgical History:   Procedure Laterality Date   • APPENDECTOMY     • CARPAL TUNNEL RELEASE Left 01/01/2003    HISTORY OF NEUROPLASTY DECOMPRESSION MEDIAN NERVE AT CARPAL TUNNEL LEFT   • CATARACT EXTRACTION Bilateral 01/01/2009   • CHOLECYSTECTOMY  01/01/1962   • HIP CANNULATED SCREW PLACEMENT Right 1/25/2020    Procedure: HIP CANNULATED SCREW PLACEMENT RIGHT;  Surgeon: Karlos Blount MD;  Location: [facility];  Service: Orthopedics   • KNEE ARTHROSCOPY Left 01/01/2001    MENISCAL REPAIR   • KYPHOPLASTY  06/18/2015    T11 AND L1 (JOSE A)   • PELVIC LAPAROSCOPY  01/01/1996    REMOVAL OF BENIGN UTERINE AND RIGHT OVARIAN TUMORS   • SALPINGO OOPHORECTOMY Left 08/26/2015    REMOVAL OF LEFT OVARY AND TUBE (benign cystic mass)       SLP Recommendation and Plan                                                         Treatment Assessment (SLP): Patient participated in cognitive communication and dysphagia tx this date. Confusion impacting. (08/16/22 0910)            Plan of Care Reviewed With: patient      SWALLOW EVALUATION (last 72 hours)     SLP Adult Swallow Evaluation     Row Name 08/13/22 1500                   Rehab Evaluation    Document Type re-evaluation  -AW        Subjective Information no complaints  -AW        Patient Observations alert;cooperative  -AW        Patient/Family/Caregiver Comments/Observations none present  -AW        Patient Effort good  -AW        Symptoms Noted During/After Treatment none  -AW                   General Information    Patient Profile Reviewed yes  -AW        Pertinent History Of Current Problem see prior  -AW        Current Method of Nutrition pureed;honey-thick liquids  -AW        Precautions/Limitations, Vision vision impairment, bilaterally  -AW        Precautions/Limitations, Hearing hearing impairment, bilaterally;other (see comments)  -AW        Prior Level of Function-Swallowing puree  -AW        Plans/Goals Discussed with patient;other (see comments)  RN d/w dtr who called and dtr was ok with changes. She had requested re-eval as pt not eating puree  -AW        Barriers to Rehab medically complex;previous functional deficit  -AW        Patient's Goals for Discharge patient could not state  -AW                  Pain Scale: FACES Pre/Post-Treatment    Pain: FACES Scale, Pretreatment 0-->no hurt  -AW        Posttreatment Pain Rating 0-->no hurt  -AW                  Oral Motor Structure and Function    Dentition Assessment natural, present and adequate  -AW        Secretion Management WNL/WFL  -AW        Mucosal Quality moist, healthy  -AW        Volitional Swallow WFL  -AW                  Oral Musculature and Cranial Nerve Assessment    Oral Motor General Assessment generalized oral motor weakness  -AW                  General Eating/Swallowing Observations    Eating/Swallowing Skills self-fed  -AW        Positioning During Eating upright 90 degree  -AW        Utensils Used spoon;cup;straw;adapted cup  -AW        Consistencies Trialed thin liquids;honey-thick liquids;pureed;regular textures;soft textures  -AW                  Respiratory    Respiratory Status WFL  -AW                  Clinical Swallow Eval    Oral Prep Phase WFL  -AW        Oral Transit WFL  -AW        Oral Residue WFL  -AW        Pharyngeal Phase no overt signs/symptoms of pharyngeal impairment  known pharyngeal per MBS - was on L2, HT as aspirated this amount the least  -AW        Clinical Swallow Evaluation Summary Pt  "re-evaluated as limited intake with puree. Pt was able to chew cracker, and commented how good the thin milk was. Provided an adaptive cup and she was able to hold this and drink from spout very well. Pt is known aspirator, but was tolerating this without discomfort. When given honey, she said she didn't like the \"sticky.\" Pt seems eager to be able to chew some foods and seemed to enjoy having thin liquids again. Will place on L4, thins and monitor. RN spoke with dtr via phone who was ok with this plan.  -AW                  Pharyngeal Phase Concerns    Pharyngeal Phase Concerns, Comment known aspiration, comfort diet  -AW                  SLP Evaluation Clinical Impression    SLP Swallowing Diagnosis pharyngeal dysphagia  -AW        Functional Impact risk of aspiration/pneumonia  -AW        Rehab Potential/Prognosis, Swallowing re-evaluate goals as necessary  -AW        Swallow Criteria for Skilled Therapeutic Interventions Met demonstrates skilled criteria  -AW                  Recommendations    Therapy Frequency (Swallow) PRN  -AW        Predicted Duration Therapy Intervention (Days) until discharge  -AW        SLP Diet Recommendation comfort diet, per physician discretion;mechanical soft with no mixed consistencies;thin liquids  -AW        Recommended Precautions and Strategies upright posture during/after eating;no straw;general aspiration precautions;fatigue precautions  -AW        Oral Care Recommendations Oral Care BID/PRN  -AW        SLP Rec. for Method of Medication Administration meds crushed;with pudding or applesauce  -AW        Monitor for Signs of Aspiration notify SLP if any concerns  -AW        Anticipated Discharge Disposition (SLP) unknown  -AW        Equipment Issued to Patient adaptive cup  only about 2 oz in cup at a time, any more and it gets too heavy for her to self-administer  -AW              User Key  (r) = Recorded By, (t) = Taken By, (c) = Cosigned By    Initials Name Effective Dates "    Yu Rinaldi, MS CCC-SLP 06/16/21 -                 EDUCATION  The patient has been educated in the following areas:   Dysphagia (Swallowing Impairment) Oral Care/Hydration Modified Diet Instruction.        SLP GOALS     Row Name 08/16/22 0910             (STG) Patient will tolerate trials of    Consistencies Trialed (Tolerate trials) pureed textures;honey/ moderately thick liquids  -CH      Desired Outcome (Tolerate trials) for pleasure/comfort;without signs of distress  -CH      [place] (Tolerate trials) independently (over 90% accuracy)  -CH      Time Frame (Tolerate trials) by discharge  -CH      Progress/Outcomes (Tolerate trials) goal revised this date;good progress toward goal  -CH      Comment (Tolerate trials) RN reported coughing with am meal when fed by tech, however, no aversion or discomfort observed with trials of thin liquids by adaptive cup or with soft solids.  -CH              (STG) Swallow 1    (LTG) Swallow LTG: Patient will tolerate comfort diet of lvl 4, thin liquids without s/s of aversion or discomfort/distress  -CH      [place] (Swallow Long Term Goal) with minimal cues (75-90% accuracy)  -CH      Time Frame (Swallow Long Term Goal) by discharge  -CH      Progress/Outcomes (Swallow Long Term Goal) good progress toward goal;goal revised this date  -CH      Comment (Swallow Long Term Goal) RN reported coughing with am meal when fed by tech, however, no aversion or discomfort observed with trials of thin liquids by adaptive cup or with soft solids.  -CH              Phonation Goal 1 (SLP)    Improve Phonation By Goal 1 (SLP) using loud speech;80%;with maximum cues (25-49%);other (see comments)  -CH      Time Frame (Phonation Goal 1, SLP) short term goal (STG)  -CH      Progress (Phonation Goal 1, SLP) 60%;with minimal cues (75-90%)  -CH      Progress/Outcomes (Phonation Goal 1, SLP) good progress toward goal  -CH      Comment (Phonation Goal 1, SLP) min/mod cues for use of  loud speech  -CH              Awareness of Basic Personal Information Goal 1 (SLP)    Improve Awareness of Basic Personal Information Goal 1 (SLP) answering yes/no questions regarding personal/biographical information;naming self, family members;90%;with minimal cues (75-90%)  -CH      Time Frame (Awareness of Basic Personal Information Goal 1, SLP) short term goal (STG)  -CH      Progress (Awareness of Basic Personal Information Goal 1, SLP) 70%;with minimal cues (75-90%)  -CH      Progress/Outcomes (Awareness of Basic Personal Information Goal 1, SLP) good progress toward goal  -CH              Orientation Goal 1 (SLP)    Improve Orientation Through Goal 1 (SLP) demonstrating orientation to year;demonstrating orientation to place;demonstrating orientation to disease/impairment;90%;with minimal cues (75-90%)  -CH      Time Frame (Orientation Goal 1, SLP) short term goal (STG)  -CH      Progress (Orientation Goal 1, SLP) 30%;with maximum cues (25-49%)  -CH      Progress/Outcomes (Orientation Goal 1, SLP) continuing progress toward goal  -CH      Comment (Orientation Goal 1, SLP) oriented to self, not to place, time or situation. [facility] was in [facility] and that her father was as well. Unable to see electronic board to aid in orientation.  -CH              Additional Goal 1 (SLP)    Additional Goal 1, SLP LTG: Pt will improve cognitive-communication skills in order to participate in care w/ min cues.  -CH      Time Frame (Additional Goal 1, SLP) by discharge  -CH      Progress/Outcomes (Additional Goal 1, SLP) continuing progress toward goal  -CH            User Key  (r) = Recorded By, (t) = Taken By, (c) = Cosigned By    Initials Name Provider Type    Julia Arnold MS CCC-SLP Speech and Language Pathologist                   Time Calculation:    Time Calculation- SLP     Row Name 08/16/22 0944             Time Calculation- SLP    SLP Start Time 0910  -      SLP Received On 08/16/22  -               Untimed Charges    30819-IL Treatment/ST Modification Prosth Aug Alter  38  -CH      97270-SQ Treatment Swallow Minutes 25  -CH              Total Minutes    Untimed Charges Total Minutes 63  -CH       Total Minutes 63  -CH            User Key  (r) = Recorded By, (t) = Taken By, (c) = Cosigned By    Initials Name Provider Type    Julia Arnold MS CCC-SLP Speech and Language Pathologist                Therapy Charges for Today     Code Description Service Date Service Provider Modifiers Qty    [id number]  ST TREATMENT SWALLOW 2 8/16/2022 Julia Dent MS CCC-SLP GN 1    [id number]  ST TREATMENT SPEECH 3 8/16/2022 Julia Dent MS CCC-SLP GN 1               Julia Dent MS CCC-SLP  8/16/2022       Patient was not wearing a face mask and did exhibit coughing during this therapy encounter.  Procedure performed was aerosolizing, involved close contact (within 6 feet for at least 15 minutes or longer), and did not involve contact with infectious secretions or specimens.  Therapist used appropriate personal protective equipment including gloves, standard procedure mask, eye protection and gown.  Appropriate PPE was worn during the entire therapy session.  Hand hygiene was completed before and after therapy session.

## 2022-08-16 NOTE — CASE MANAGEMENT/SOCIAL WORK
Case Management Discharge Note      Final Note: Per Glenda in admissions, they can offer Mrs. Carter a skilled rehab bed that transitions into longterm care. I updated her daughter Lilian who verbalizes agreement with this plan. [facility] ambulance is scheduled to transport Mrs. Carter there tomorrow at 845. Nurse to call report to [phone] unit 200.    Provided Post Acute Provider List?: Yes  Post Acute Provider List: Nursing Home  Delivered To: Support Person    Selected Continued Care - Admitted Since 8/9/2022     Destination Coordination complete.    Service Provider Selected Services Address Phone Fax Patient Preferred    [facility]  Skilled Nursing [address], [facility] [address] [phone] --          Durable Medical Equipment    No services have been selected for the patient.              Dialysis/Infusion    No services have been selected for the patient.              Home Medical Care    No services have been selected for the patient.              Therapy    No services have been selected for the patient.              Community Resources    No services have been selected for the patient.              Community & DME    No services have been selected for the patient.                Selected Continued Care - Prior Encounters Includes selections from prior encounters from 5/11/2022 to 8/16/2022    Discharged on 7/27/2022 Admission date: 7/23/2022 - Discharge disposition: Skilled Nursing Facility (DC - External)    Destination     Service Provider Selected Services Address Phone Fax Patient Preferred    [facility]  Skilled Nursing [address] [id number] [phone] --                    Transportation Services  Ambulance: [facility]    Final Discharge Disposition Code: 03 - skilled nursing facility (SNF)

## 2022-08-16 NOTE — PROGRESS NOTES
[facility] Medicine Services  PROGRESS NOTE    Patient Name: Pati Carter  : 1930  MRN: [id number]    Date of Admission: 2022  Primary Care Physician: Pallavi Eduardo MD    Subjective   Subjective     CC:  Follow-up hypercalcemia, confusion    HPI:  Mental status remained stable, denies acute complaints this morning.  Calcium remains approximately stable.  Called and updated patient's daughter via telephone this morning.    ROS:  Gen- No fevers, chills  CV- No chest pain, palpitations  Resp- No cough, dyspnea  GI- No N/V/D, abd pain    Objective   Objective     Vital Signs:   Temp:  [97.7 °F (36.5 °C)-98.7 °F (37.1 °C)] 98.7 °F (37.1 °C)  Heart Rate:  [] 81  Resp:  [16-18] 16  BP: (110-180)/(63-99) 126/63     Physical Exam:  Constitutional: Resting quietly in bed no acute distress  HENT: NCAT, mucous membranes moist  Respiratory: Clear to auscultation bilaterally, respiratory effort normal   Cardiovascular: RRR, no murmurs, rubs, or gallops, palpable radial pulses  Gastrointestinal: Positive bowel sounds, soft, nontender, nondistended  Musculoskeletal: No bilateral ankle edema, bitemporal wasting  Psychiatric: Appropriate affect, cooperative  Neurologic: Speech clear and fluent, moving extremities spontaneously    Results Reviewed:  LAB RESULTS:      Lab 08/10/22  0533 22  1720   WBC 12.45* 17.90*   HEMOGLOBIN 9.1* 10.1*   HEMATOCRIT 29.0* 30.9*   PLATELETS 369 436   NEUTROS ABS  --  15.13*   IMMATURE GRANS (ABS)  --  0.10*   LYMPHS ABS  --  0.99   MONOS ABS  --  1.48*   EOS ABS  --  0.10   .3* 97.2*   PROCALCITONIN  --  0.33*   LACTATE  --  1.2         Lab 22  0501 08/15/22  0607 22  0706 22  0700 22  0730 22  0802 08/10/22  0533   SODIUM 140 141 149* 138 144   < > 142   POTASSIUM 3.8 4.1 3.2* 3.4* 3.7   < > 3.8   CHLORIDE 107 108* 115* 109* 108*   < > 105   CO2 24.0 26.0 25.0 19.0* 28.0   < > 26.0   ANION GAP 9.0 7.0  9.0 10.0 8.0   < > 11.0   BUN 22 20 20 25* 31*   < > 36*   CREATININE 1.18* 1.16* 1.21* 1.32* 1.49*   < > 2.12*   EGFR 43.4* 44.3* 42.1* 38.0* 32.8*   < > 21.5*   GLUCOSE 82 91 80 92 86   < > 80   CALCIUM 9.6 9.9* 9.7* 10.3* 11.2*   < > 11.8*   IONIZED CALCIUM 1.46* 1.53* 1.49* 1.53* 1.66*  --  1.76*   PHOSPHORUS  --   --   --   --   --   --  4.0   HEMOGLOBIN A1C  --   --   --   --   --   --  5.10    < > = values in this interval not displayed.         Lab 08/10/22  0533 08/09/22  1720   TOTAL PROTEIN  --  7.4   ALBUMIN 2.60* 3.20*   GLOBULIN  --  4.2   ALT (SGPT)  --  17   AST (SGOT)  --  20   BILIRUBIN  --  0.4   ALK PHOS  --  118*   LIPASE  --  10*         Lab 08/09/22  1720   PROBNP 4,237.0*   TROPONIN T 0.032*         Lab 08/10/22  0533   CHOLESTEROL 115   LDL CHOL 67   HDL CHOL 27*   TRIGLYCERIDES 116             Lab 08/09/22  1715   PH, ARTERIAL 7.478*   PCO2, ARTERIAL 44.6   PO2 ART 61.5*   FIO2 28   HCO3 ART 33.0*   BASE EXCESS ART 8.5*   CARBOXYHEMOGLOBIN 1.3     Brief Urine Lab Results  (Last result in the past 365 days)      Color   Clarity   Blood   Leuk Est   Nitrite   Protein   CREAT   Urine HCG        08/09/22 1838 Yellow   Clear   Negative   Trace   Negative   30 mg/dL (1+)                 Microbiology Results Abnormal     Procedure Component Value - Date/Time    Blood Culture - Blood, Hand, Right [[id number]]  (Normal) Collected: 08/09/22 1810    Lab Status: Final result Specimen: Blood from Hand, Right Updated: 08/14/22 1847     Blood Culture No growth at 5 days    Blood Culture - Blood, Arm, Left [[id number]]  (Normal) Collected: 08/09/22 1721    Lab Status: Final result Specimen: Blood from Arm, Left Updated: 08/14/22 1747     Blood Culture No growth at 5 days    Urine Culture - Urine, Urine, Catheter [[id number]]  (Normal) Collected: 08/09/22 1838    Lab Status: Final result Specimen: Urine, Catheter Updated: 08/10/22 2040     Urine Culture No growth    COVID PRE-OP / PRE-PROCEDURE SCREENING ORDER  (NO ISOLATION) - Swab, Nasopharynx [[id number]]  (Normal) Collected: 08/09/22 1812    Lab Status: Final result Specimen: Swab from Nasopharynx Updated: 08/09/22 1907    Narrative:      The following orders were created for panel order COVID PRE-OP / PRE-PROCEDURE SCREENING ORDER (NO ISOLATION) - Swab, Nasopharynx.  Procedure                               Abnormality         Status                     ---------                               -----------         ------                     COVID-19 and FLU A/B PCR...[[id number]]  Normal              Final result                 Please view results for these tests on the individual orders.    COVID-19 and FLU A/B PCR - Swab, Nasopharynx [[id number]]  (Normal) Collected: 08/09/22 1812    Lab Status: Final result Specimen: Swab from Nasopharynx Updated: 08/09/22 1907     COVID19 Not Detected     Influenza A PCR Not Detected     Influenza B PCR Not Detected    Narrative:      Fact sheet for providers: https://www.fda.gov/media/318593/download    Fact sheet for patients: https://www.fda.gov/media/643842/download    Test performed by PCR.          No radiology results from the last 24 hrs        I have reviewed the medications:  Scheduled Meds:acetaminophen, 650 mg, Oral, Nightly  aspirin, 325 mg, Oral, Daily   Or  aspirin, 300 mg, Rectal, Daily  atorvastatin, 80 mg, Oral, Nightly  budesonide-formoterol, 2 puff, Inhalation, BID - RT  Diclofenac Sodium, 2 g, Topical, Q12H  dilTIAZem, 30 mg, Oral, Q6H  famotidine, 20 mg, Oral, Daily  folic acid, 1,000 mcg, Oral, Daily  miconazole, 1 application, Topical, Q12H  senna-docusate sodium, 2 tablet, Oral, BID  sodium chloride, 10 mL, Intravenous, Q12H  sodium chloride, 10 mL, Intravenous, Q12H      Continuous Infusions:   PRN Meds:.•  acetaminophen **OR** acetaminophen **OR** acetaminophen  •  albuterol  •  senna-docusate sodium **AND** polyethylene glycol **AND** bisacodyl **AND** bisacodyl  •  magnesium sulfate **OR** magnesium sulfate  **OR** magnesium sulfate  •  melatonin  •  ondansetron  •  potassium chloride **OR** potassium chloride **OR** potassium chloride  •  [COMPLETED] Insert peripheral IV **AND** sodium chloride  •  sodium chloride  •  sodium chloride  •  traMADol    Assessment & Plan   Assessment & Plan     Active Hospital Problems    Diagnosis  POA   • **Hypercalcemia [E83.52]  Yes   • Acute on chronic alteration in mental status [R41.82]  Yes   • Acute kidney injury (HCC) [N17.9]  Yes   • Pelvic mass [R19.00]  Yes   • Impaired cognition [R41.89]  Yes   • Impaired mobility [Z74.09]  Yes   • 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        Brief Hospital Course to date:  Pati Carter is a 89 y.o. female assisted-living resident with recent admission for C. difficile colitis, she was identified to have several concerning masses within her abdomen/pelvis, during that hospital stay however further evaluation was deferred in consideration of her age and baseline functional status.  She was discharged to rehab at [facility].  This patient requires assistance with all meals and her daughters were initially helping feed her at the rehab facility, they subsequently had returned to work and were not able to help the patient at her facility.  She was brought to the ED for evaluation of confusion, with identified hypercalcemia and decreased renal function; daughter is at the time of admission pointed out that the patient's mouth was exceedingly dry with her tongue dry and cracked, concerning for poor oral intake.  She has been significantly volume resuscitated with ionized calcium slowly trending down and leveling off around 1.5, IV fluids have been  discontinued since 8/14 and her calcium levels remain approximately the same.  Her encephalopathy has significantly improved.  Her PTH-RP has returned within normal limits, PTH is appropriately low, home vitamin D/calcium supplements have been on hold.  Hospice was consulted during her stay in the setting of clinical concern that hypercalcemia was malignancy related, as she is currently stable off IVF we are planning to proceed with short-term rehab placement and potential return to assisted living vs more likely transition to long-term care.  If hypercalcemia becomes a recurrent issue she may be considered for zoledronic acid/bisphosphonate.    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, multifactorial, improved  Hypercalcemia, improved/stable  Ferny Garret syndrome  - Visual halluc @ b-line; Ferny Garret syndrome; dtr noted sig worsening mentation/incomprehensible speech @ arrival  - CT head w/o acute finding, stroke neuro has evaluated; MRI negative  -  SLP follows; diet per their recommendations  -  Patient requires assistance with all meals  -  Supplemental calcium/vitamin D on hold  -  PTH appropriately suppressed, PTHrP undetectable  -IoCa elevated but stable x4 days after being off IVF ~x48 hours     JENNIE on CKD 3, improving  -Baseline GFR 40s; Cr 0.9-1.1  -High-end of baseline today     Likely ongoing aspiration  - SLP following, on modified diet     Hypertension  Hyperlipidemia  PVD  -Aspirin, diltiazem     COPD  - Home inhalers, substituted for formulary     GERD  - Pepcid     Rheumatoid arthritis  -  Per chart review patient was on MTX, during admission July 2022 her LFTs were modestly elevated and this was discontinued with planned PCP follow-up, unfortunately it does not appear she has been able to make that follow-up appointment, LFTs have normalized here; I will restart home MTX 15 mg weekly today 8/16     Recent C. difficile colitis  -Completed oral vancomycin pta     1.8 cm LT renal  lesion  Bladder wall lesion  Pelvic mass  -Seen by Dr. Yao last admit, monitoring/following up opt  -Seen by Dr. Mello lat admit, gyn mass is stable, expectant management, if large volume blood loss she recommended formal Dx and rad-onc therapy  -d/w dtr at bedside, they prefer more conservative measures and likely would not pursue invasive measures    Expected Discharge Location and Transportation: [facility], medical transport  Expected Discharge Date: 8/17 @ 0845    DVT prophylaxis:  Mechanical DVT prophylaxis orders are present.     AM-PAC 6 Clicks Score (PT): 10 (08/15/22 0830)    CODE STATUS:   Code Status and Medical Interventions:   Ordered at: 08/09/22 6890     Medical Intervention Limits:    NO intubation (DNI)     Code Status (Patient has no pulse and is not breathing):    No CPR (Do Not Attempt to Resuscitate)     Medical Interventions (Patient has pulse or is breathing):    Limited Support       Logan Sawyer, DO  08/16/22

## 2022-08-16 NOTE — PROGRESS NOTES
Continued Stay Note   Justino     Patient Name: Pati Carter  MRN: [id number]  Today's Date: 8/16/2022    Admit Date: 8/9/2022     Discharge Plan     Row Name 08/16/22 1451       Plan    Plan SNF    Patient/Family in Agreement with Plan yes    Plan Comments Pt will d/c to [facility] on 8/17/22 & will access Medicare skilled days & then transition to LTC. [facility] will f/u w/ pt/daughter in 2 weeks on 8/30/22. [facility] Intake has been updated.               Discharge Codes    No documentation.               Expected Discharge Date and Time     Expected Discharge Date Expected Discharge Time    Aug 16, 2022             Linda Castaneda

## 2022-08-17 VITALS
DIASTOLIC BLOOD PRESSURE: 82 MMHG | RESPIRATION RATE: 18 BRPM | WEIGHT: 121 LBS | SYSTOLIC BLOOD PRESSURE: 156 MMHG | BODY MASS INDEX: 20.16 KG/M2 | TEMPERATURE: 98.6 F | HEIGHT: 65 IN | OXYGEN SATURATION: 94 % | HEART RATE: 86 BPM

## 2022-08-17 PROBLEM — E44.0 MODERATE MALNUTRITION (HCC): Status: ACTIVE | Noted: 2022-08-17

## 2022-08-17 PROCEDURE — 99239 HOSP IP/OBS DSCHRG MGMT >30: CPT | Performed by: NURSE PRACTITIONER

## 2022-08-17 RX ORDER — TRAMADOL HYDROCHLORIDE 50 MG/1
50 TABLET ORAL 2 TIMES DAILY PRN
Qty: 6 TABLET | Refills: 0 | Status: SHIPPED | OUTPATIENT
Start: 2022-08-17

## 2022-08-17 RX ADMIN — SENNOSIDES AND DOCUSATE SODIUM 2 TABLET: 50; 8.6 TABLET ORAL at 08:00

## 2022-08-17 RX ADMIN — DICLOFENAC 2 G: 10 GEL TOPICAL at 08:00

## 2022-08-17 RX ADMIN — MICONAZOLE NITRATE 1 APPLICATION: 20 CREAM TOPICAL at 08:00

## 2022-08-17 RX ADMIN — ASPIRIN 325 MG ORAL TABLET 325 MG: 325 PILL ORAL at 08:00

## 2022-08-17 RX ADMIN — FOLIC ACID 1000 MCG: 1 TABLET ORAL at 08:00

## 2022-08-17 RX ADMIN — FAMOTIDINE 20 MG: 20 TABLET ORAL at 08:00

## 2022-08-17 NOTE — DISCHARGE PLACEMENT REQUEST
"Bert Carter (89 y.o. Female)   D/C Summary            Date of Birth   07/26/1930    Social Security Number       Address   [address] [place] [address]    Home Phone   [phone]    MRN   [id number]       Gnosticist   None    Marital Status                               Admission Date   8/9/22    Admission Type   Emergency    Admitting Provider   Henri Leon MD    Attending Provider   Henri Leon MD    Department, Room/Bed   [facility] 3E, S343/1       Discharge Date       Discharge Disposition   Skilled Nursing Facility (DC - External)    Discharge Destination                               Attending Provider: Henri Leon MD    Allergies: Ciprofloxacin, Levofloxacin, Sulfamethoxazole-trimethoprim, Sulfamethoxazole, Trimethoprim    Isolation: Spore   Infection: C.difficile (07/23/22)   Code Status: No CPR   Advance Care Planning Activity    Ht: 165.1 cm (65\")   Wt: 54.9 kg (121 lb)    Admission Cmt: None   Principal Problem: Hypercalcemia [E83.52]                 Active Insurance as of 8/9/2022     Primary Coverage     Payor Plan Insurance Group Employer/Plan Group    MEDICARE MEDICARE A & B      Payor Plan Address Payor Plan Phone Number Payor Plan Fax Number Effective Dates    [address] [phone]  7/1/1995 - None Entered    [facility] [id number]       Subscriber Name Subscriber Birth Date Member ID       BERT CARTER 7/26/1930 [id number]           Secondary Coverage     Payor Plan Insurance Group Employer/Plan Group    AARP MC SUP AARP HEALTH CARE OPTIONS      Payor Plan Address Payor Plan Phone Number Payor Plan Fax Number Effective Dates    [facility] [phone]  1/1/2016 - None Entered    [address]       [facility] [id number]       Subscriber Name Subscriber Birth Date Member ID       BERT CARTER 7/26/1930 [id number]                 Emergency Contacts      (Rel.) Home Phone Work Phone Mobile Phone    OLLIE CARTER " (Daughter) -- -- [phone]    Danette Suresh (Daughter) -- -- [phone]                 Discharge Summary      Malu Sena APRN at 22 0751              [facility] Medicine Services  DISCHARGE SUMMARY    Patient Name: Pati Carter  : 1930  MRN: [id number]    Date of Admission: 2022  4:22 PM  Date of Discharge:  2022  Primary Care Physician: Pallavi Eduardo MD    Consults     Date and Time Order Name Status Description    2022  9:20 PM Inpatient Neurology Consult Stroke Completed     2022 10:13 AM Inpatient Gynecologic Oncology Consult Completed     2022  3:11 AM Inpatient Urology Consult Completed           Hospital Course     Presenting Problem:   Acute on chronic alteration in mental status [R41.82]    Active Hospital Problems    Diagnosis  POA   • **Hypercalcemia [E83.52]  Yes   • Moderate malnutrition (HCC) [E44.0]  Yes   • Acute on chronic alteration in mental status [R41.82]  Yes   • Acute kidney injury (HCC) [N17.9]  Yes   • Pelvic mass [R19.00]  Yes   • Impaired cognition [R41.89]  Yes   • Impaired mobility [Z74.09]  Yes   • 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          Hospital Course:  Pati Carter is a 89 y.o. female  assisted-living resident with recent admission for C. difficile colitis, she was identified to have several concerning masses within her abdomen/pelvis, during that hospital stay however further evaluation was deferred in consideration of her age and baseline functional status.  She was discharged to rehab at [facility], [place].  This patient requires assistance with all meals and her daughters were initially helping feed her at  the rehab facility, they subsequently had returned to work and were not able to help the patient at her facility.  She was brought to the ED for evaluation of confusion, with identified hypercalcemia and decreased renal function; daughter at the time of admission pointed out that the patient's mouth was exceedingly dry with her tongue dry and cracked, concerning for poor oral intake.  She has been significantly volume resuscitated with ionized calcium slowly trending down and leveling off around 1.5, IV fluids have been discontinued since 8/14 and her calcium levels remain approximately the same.  Her encephalopathy has significantly improved.  Her PTH-RP has returned within normal limits, PTH is appropriately low, home vitamin D/calcium supplements have been on hold.  Hospice was consulted during her stay in the setting of clinical concern that hypercalcemia was malignancy related, as she is currently stable off IVF we are planning to proceed with short-term rehab placement and potential return to assisted living vs more likely transition to long-term care.  If hypercalcemia becomes a recurrent issue she may be considered for zoledronic acid/bisphosphonate.     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, multifactorial, improved  Hypercalcemia, improved/stable  Ferny Garret syndrome  - Visual halluc @ b-line; Ferny Bonnet syndrome; dtr noted significant worsening mentation/incomprehensible speech @ arrival  - CT head w/o acute finding, stroke neuro has evaluated; MRI negative  -  SLP follows; diet per their recommendations  -  Patient requires assistance with all meals  -  Supplemental calcium/vitamin D will remain on hold at discharge  -  PTH appropriately suppressed, PTHrP undetectable  -IoCa elevated but stable x4 days after being off IVF ~x48 hours     JENNIE on CKD 3, improving  -Baseline GFR 40s; Cr 0.9-1.1  -Patient is close to baseline     Likely ongoing aspiration  - SLP following, on modified  diet. Will need assistance with all meals.       Hypertension  Hyperlipidemia  PVD  -Aspirin, diltiazem     COPD  - Home inhalers, substituted for formulary while hospitalized.      GERD  - Pepcid     Rheumatoid arthritis  -  Per chart review patient was on MTX, during admission July 2022 her LFTs were modestly elevated and this was discontinued with planned PCP follow-up, unfortunately it does not appear she has been able to make that follow-up appointment, LFTs have normalized here; We restarted home MTX 15 mg weekly starting on 8/16     Recent C. difficile colitis  -Completed oral vancomycin pta     1.8 cm LT renal lesion  Bladder wall lesion  Pelvic mass  -Seen by Dr. Yao last admit, monitoring/following up opt  -Seen by Dr. Mello lat admit, gyn mass is stable, expectant management, if large volume blood loss she recommended formal Dx and rad-onc therapy  -d/w dtr at bedside, they prefer more conservative measures and likely would not pursue invasive measures      Discharge Follow Up Recommendations for outpatient labs/diagnostics:   Follow up with PCP first available hospital follow up appt  Hospice will follow up with the patient's daughter once she has completed her skilled days at the nursing facility.      Day of Discharge     HPI:   Resting in bed this morning. Reports a dry mouth and is asking for a drink of water.      Review of Systems  Gen- No fevers, chills  CV- No chest pain, palpitations  Resp- No cough, dyspnea  GI- No N/V/D, abd pain        Vital Signs:   Temp:  [97.6 °F (36.4 °C)-98.3 °F (36.8 °C)] 98.1 °F (36.7 °C)  Heart Rate:  [72-97] 97  Resp:  [16] 16  BP: (148-180)/() 151/77      Physical Exam:  Constitutional: No acute distress, awake, alert, resting in bed  HENT: NCAT, mucous membranes dry, bitemporal wasting  Respiratory: Clear to auscultation bilaterally, respiratory effort normal on room air  Cardiovascular: RRR, no murmurs, rubs, or gallops  Gastrointestinal: Positive  bowel sounds, soft, nontender, nondistended  Musculoskeletal: No bilateral ankle edema  Psychiatric: Appropriate affect, cooperative  Neurologic: Oriented x 3, strength symmetric in all extremities, Cranial Nerves grossly intact to confrontation, speech clear  Skin: No rashes noted to exposed loose fragile skin       Pertinent  and/or Most Recent Results     LAB RESULTS:          Lab 08/16/22  0501 08/15/22  0607 08/14/22  0706 08/13/22  0700 08/12/22  0730   SODIUM 140 141 149* 138 144   POTASSIUM 3.8 4.1 3.2* 3.4* 3.7   CHLORIDE 107 108* 115* 109* 108*   CO2 24.0 26.0 25.0 19.0* 28.0   ANION GAP 9.0 7.0 9.0 10.0 8.0   BUN 22 20 20 25* 31*   CREATININE 1.18* 1.16* 1.21* 1.32* 1.49*   EGFR 43.4* 44.3* 42.1* 38.0* 32.8*   GLUCOSE 82 91 80 92 86   CALCIUM 9.6 9.9* 9.7* 10.3* 11.2*   IONIZED CALCIUM 1.46* 1.53* 1.49* 1.53* 1.66*                         Brief Urine Lab Results  (Last result in the past 365 days)      Color   Clarity   Blood   Leuk Est   Nitrite   Protein   CREAT   Urine HCG        08/09/22 1838 Yellow   Clear   Negative   Trace   Negative   30 mg/dL (1+)               Microbiology Results (last 10 days)     Procedure Component Value - Date/Time    COVID PRE-OP / PRE-PROCEDURE SCREENING ORDER (NO ISOLATION) - Swab, Nasopharynx [[id number]]  (Normal) Collected: 08/16/22 1127    Lab Status: Final result Specimen: Swab from Nasopharynx Updated: 08/16/22 1149    Narrative:      The following orders were created for panel order COVID PRE-OP / PRE-PROCEDURE SCREENING ORDER (NO ISOLATION) - Swab, Nasopharynx.  Procedure                               Abnormality         Status                     ---------                               -----------         ------                     COVID-19, ABBOTT IN-HOUS...[[id number]]  Normal              Final result                 Please view results for these tests on the individual orders.    COVID-19, ABBOTT IN-HOUSE,NASAL Swab (NO TRANSPORT MEDIA) 2 HR TAT - Swab,  Nasopharynx [[id number]]  (Normal) Collected: 08/16/22 1127    Lab Status: Final result Specimen: Swab from Nasopharynx Updated: 08/16/22 1149     COVID19 Presumptive Negative    Narrative:      Fact sheet for providers: https://www.fda.gov/media/446520/download     Fact sheet for patients: https://www.fda.gov/media/374806/download    Test performed by PCR.  If inconsistent with clinical signs and symptoms patient should be tested with different authorized molecular test.    Urine Culture - Urine, Urine, Catheter [[id number]]  (Normal) Collected: 08/09/22 1838    Lab Status: Final result Specimen: Urine, Catheter Updated: 08/10/22 2040     Urine Culture No growth    COVID PRE-OP / PRE-PROCEDURE SCREENING ORDER (NO ISOLATION) - Swab, Nasopharynx [[id number]]  (Normal) Collected: 08/09/22 1812    Lab Status: Final result Specimen: Swab from Nasopharynx Updated: 08/09/22 1907    Narrative:      The following orders were created for panel order COVID PRE-OP / PRE-PROCEDURE SCREENING ORDER (NO ISOLATION) - Swab, Nasopharynx.  Procedure                               Abnormality         Status                     ---------                               -----------         ------                     COVID-19 and FLU A/B PCR...[[id number]]  Normal              Final result                 Please view results for these tests on the individual orders.    COVID-19 and FLU A/B PCR - Swab, Nasopharynx [[id number]]  (Normal) Collected: 08/09/22 1812    Lab Status: Final result Specimen: Swab from Nasopharynx Updated: 08/09/22 1907     COVID19 Not Detected     Influenza A PCR Not Detected     Influenza B PCR Not Detected    Narrative:      Fact sheet for providers: https://www.fda.gov/media/008819/download    Fact sheet for patients: https://www.fda.gov/media/638632/download    Test performed by PCR.    Blood Culture - Blood, Hand, Right [[id number]]  (Normal) Collected: 08/09/22 1810    Lab Status: Final result Specimen: Blood from  Hand, Right Updated: 08/14/22 1847     Blood Culture No growth at 5 days    Blood Culture - Blood, Arm, Left [[id number]]  (Normal) Collected: 08/09/22 1721    Lab Status: Final result Specimen: Blood from Arm, Left Updated: 08/14/22 1747     Blood Culture No growth at 5 days          CT Head Without Contrast    Result Date: 8/9/2022  DATE OF EXAM: 8/9/2022 8:10 PM  PROCEDURE: CT HEAD WO CONTRAST-  INDICATIONS: AMS - somnolence; R41.82-Altered mental status, unspecified; N17.9-Acute kidney failure, unspecified; E83.52-Hypercalcemia; D64.9-Anemia, unspecified; I10-Essential (primary) hypertension; Z86.19-Personal history of other infectious and parasitic diseases; Z66-Do not resuscitate; R53.81-Other malaise  COMPARISON: No comparisons available.  TECHNIQUE: Routine transaxial and coronal reconstruction images were obtained through the head without the administration of contrast. Automated exposure control and iterative reconstruction methods were used.  The radiation dose reduction device was turned on for each scan per the ALARA (As Low as Reasonably Achievable) protocol.  FINDINGS: Gray-white differentiation is maintained and there is no evidence of intracranial hemorrhage, mass or mass effect. Age-related changes of the brain are present including volume loss and typical periventricular sequela of chronic small vessel ischemia. There is otherwise no evidence of intracranial hemorrhage, mass or mass effect. The ventricles are normal in size and configuration accounting for surrounding volume loss. The orbits are normal and the paranasal sinuses are grossly clear.      Age-related changes of the brain as above, otherwise without evidence of acute intracranial abnormality.   This report was finalized on 8/9/2022 8:51 PM by Galo Mejias.      MRI Angiogram Head Without Contrast    Result Date: 8/9/2022  DATE OF EXAM: 8/9/2022 9:51 PM  PROCEDURE: MRI ANGIOGRAM NECK WO CONTRAST-, MRI ANGIOGRAM HEAD WO CONTRAST-, MRI  BRAIN WO CONTRAST-  INDICATIONS: Neuro deficit, acute, stroke suspected; R41.82-Altered mental status, unspecified; N17.9-Acute kidney failure, unspecified; E83.52-Hypercalcemia; D64.9-Anemia, unspecified; I10-Essential (primary) hypertension; Z86.19-Personal history of other infectious and parasitic diseases; Z66-Do not resuscitate; R53.81-Other malaise  COMPARISON: No comparisons available.  TECHNIQUE: Multiplanar multisequence MRI of the brain performed without IV contrast. Noncontrast time-of-flight 3-dimensional MR angiogram of the head and neck with three-dimensional maximum intensity projections postprocessed  FINDINGS: No acute infarct is present on diffusion weighted sequences. Midline structures are unremarkable and the craniocervical junction is satisfactory in appearance. Age-related changes are present with generalized volume loss and typical periventricular leukomalacia favored to reflect chronic small vessel ischemia. There is no evidence of intracranial hemorrhage, mass or mass effect. The ventricles are normal in size and configuration, accounting for surrounding volume loss. The orbits are unremarkable and the paranasal sinuses are grossly clear.  MR angiogram demonstrates no evidence of flow-limiting stenosis in the neck. The common and bilateral internal carotid arteries are patent. The vertebral arteries are normal in course and caliber. Intracranially, the carotid siphons demonstrate no evidence of flow-limiting stenosis. The branching [place] vessels including bilateral anterior, middle and posterior cerebral arteries are patent without evidence of high-grade flow limiting stenosis, large vessel occlusion or aneurysmal dilatation. The vertebrobasilar system is patent.      Age-related changes are present as above. There is otherwise no evidence of infarct, hemorrhage, mass or mass effect.  Essentially normal MR angiogram with no evidence of flow-limiting stenosis, large vessel  occlusion or aneurysmal dilatation.  This report was finalized on 8/9/2022 10:24 PM by Galo Mejias.      MRI Angiogram Neck Without Contrast    Result Date: 8/9/2022  DATE OF EXAM: 8/9/2022 9:51 PM  PROCEDURE: MRI ANGIOGRAM NECK WO CONTRAST-, MRI ANGIOGRAM HEAD WO CONTRAST-, MRI BRAIN WO CONTRAST-  INDICATIONS: Neuro deficit, acute, stroke suspected; R41.82-Altered mental status, unspecified; N17.9-Acute kidney failure, unspecified; E83.52-Hypercalcemia; D64.9-Anemia, unspecified; I10-Essential (primary) hypertension; Z86.19-Personal history of other infectious and parasitic diseases; Z66-Do not resuscitate; R53.81-Other malaise  COMPARISON: No comparisons available.  TECHNIQUE: Multiplanar multisequence MRI of the brain performed without IV contrast. Noncontrast time-of-flight 3-dimensional MR angiogram of the head and neck with three-dimensional maximum intensity projections postprocessed  FINDINGS: No acute infarct is present on diffusion weighted sequences. Midline structures are unremarkable and the craniocervical junction is satisfactory in appearance. Age-related changes are present with generalized volume loss and typical periventricular leukomalacia favored to reflect chronic small vessel ischemia. There is no evidence of intracranial hemorrhage, mass or mass effect. The ventricles are normal in size and configuration, accounting for surrounding volume loss. The orbits are unremarkable and the paranasal sinuses are grossly clear.  MR angiogram demonstrates no evidence of flow-limiting stenosis in the neck. The common and bilateral internal carotid arteries are patent. The vertebral arteries are normal in course and caliber. Intracranially, the carotid siphons demonstrate no evidence of flow-limiting stenosis. The branching [place] of Rees vessels including bilateral anterior, middle and posterior cerebral arteries are patent without evidence of high-grade flow limiting stenosis, large vessel occlusion  or aneurysmal dilatation. The vertebrobasilar system is patent.      Age-related changes are present as above. There is otherwise no evidence of infarct, hemorrhage, mass or mass effect.  Essentially normal MR angiogram with no evidence of flow-limiting stenosis, large vessel occlusion or aneurysmal dilatation.  This report was finalized on 8/9/2022 10:24 PM by Galo Mejias.      MRI Brain Without Contrast    Result Date: 8/9/2022  DATE OF EXAM: 8/9/2022 9:51 PM  PROCEDURE: MRI ANGIOGRAM NECK WO CONTRAST-, MRI ANGIOGRAM HEAD WO CONTRAST-, MRI BRAIN WO CONTRAST-  INDICATIONS: Neuro deficit, acute, stroke suspected; R41.82-Altered mental status, unspecified; N17.9-Acute kidney failure, unspecified; E83.52-Hypercalcemia; D64.9-Anemia, unspecified; I10-Essential (primary) hypertension; Z86.19-Personal history of other infectious and parasitic diseases; Z66-Do not resuscitate; R53.81-Other malaise  COMPARISON: No comparisons available.  TECHNIQUE: Multiplanar multisequence MRI of the brain performed without IV contrast. Noncontrast time-of-flight 3-dimensional MR angiogram of the head and neck with three-dimensional maximum intensity projections postprocessed  FINDINGS: No acute infarct is present on diffusion weighted sequences. Midline structures are unremarkable and the craniocervical junction is satisfactory in appearance. Age-related changes are present with generalized volume loss and typical periventricular leukomalacia favored to reflect chronic small vessel ischemia. There is no evidence of intracranial hemorrhage, mass or mass effect. The ventricles are normal in size and configuration, accounting for surrounding volume loss. The orbits are unremarkable and the paranasal sinuses are grossly clear.  MR angiogram demonstrates no evidence of flow-limiting stenosis in the neck. The common and bilateral internal carotid arteries are patent. The vertebral arteries are normal in course and caliber. Intracranially,  the carotid siphons demonstrate no evidence of flow-limiting stenosis. The branching [place] of Rees vessels including bilateral anterior, middle and posterior cerebral arteries are patent without evidence of high-grade flow limiting stenosis, large vessel occlusion or aneurysmal dilatation. The vertebrobasilar system is patent.      Age-related changes are present as above. There is otherwise no evidence of infarct, hemorrhage, mass or mass effect.  Essentially normal MR angiogram with no evidence of flow-limiting stenosis, large vessel occlusion or aneurysmal dilatation.  This report was finalized on 8/9/2022 10:24 PM by Galo Mejias.      FL Video Swallow With Speech Single Contrast    Result Date: 8/11/2022  EXAMINATION: FL VIDEO SWALLOW W SPEECH SINGLE-CONTRAST-  INDICATION: dysphagia; R41.82-Altered mental status, unspecified; N17.9-Acute kidney failure, unspecified; E83.52-Hypercalcemia; D64.9-Anemia, unspecified; I10-Essential (primary) hypertension; Z86.19-Personal history of other infectious and parasitic diseases; Z66-Do not resuscitate; R53.81-Other malaise; R13.10-Dysphagia, unspecified; R47.1-Dysarthria and anarthria; R41.841-Cognitive communication deficit  TECHNIQUE: 1 minute and 24 seconds of fluoroscopic time was used for this exam. 9 associated fluoroscopic loops were saved. The patient was evaluated in the seated lateral position while taking a variety of consistencies of barium by mouth under the direction of speech pathology.  COMPARISON: NONE  FINDINGS: 1 minute and 24 seconds of fluoroscopy provided for a modified barium swallow. Please see speech therapy report for full details and recommendations.       Fluoroscopy provided for a modified barium swallow. Please see speech therapy report for full details and recommendations.    This report was finalized on 8/11/2022 4:18 PM by Brian Yao MD.      XR Chest 1 View    Result Date: 8/9/2022  DATE OF EXAM: 8/9/2022 4:40 PM  PROCEDURE:  XR CHEST 1 VW-  INDICATIONS: AMS w/ hx of aspiration  COMPARISON: October 9, 2020  TECHNIQUE: Single radiographic AP view of the chest was obtained.  FINDINGS: There is ectasia of the thoracic aorta. The lungs seem relatively clear. It looks like there are some emphysematous changes. There is vertebral augmentation within the spine in the thoracolumbar area.      1.  No definite acute pulmonary process. 2.  Ectasia thoracic aorta  This report was finalized on 8/9/2022 4:57 PM by Julian Bowen MD.      CT Facial Bones Without Contrast    Result Date: 8/10/2022  DATE OF EXAM: 8/10/2022 5:21 PM  PROCEDURE: CT FACIAL BONES WO CONTRAST-  INDICATIONS: Maxillary/facial abscess; R41.82-Altered mental status, unspecified; N17.9-Acute kidney failure, unspecified; E83.52-Hypercalcemia; D64.9-Anemia, unspecified; I10-Essential (primary) hypertension; Z86.19-Personal history of other infectious and parasitic diseases; Z66-Do not resuscitate; R53.81-Other malaise; R13.10-Dysphagia, unspecified; R47.1-Dysarthria and anarthria; R41.841-Cognitive communi  COMPARISON: No comparisons available.  TECHNIQUE: Routine transaxial slices were obtained through the facial region without the administration of intravenous contrast. Reconstructed coronal and sagittal images were also obtained. Automated exposure controls and iterative reconstruction methods were used.  The radiation dose reduction device was turned on for each scan per the ALARA (As Low as Reasonably Achievable) protocol.  FINDINGS: Limited intracranial evaluation demonstrates no acute finding. The orbits are unremarkable. The paranasal sinuses are grossly clear. There is no acute facial bone fracture. There is extensive dental hardware with associated streak artifact, obscuring evaluation of the oral cavity. There is no distinct evidence of focal fluid collection otherwise concerning for definite periosteal abscess. Dentition appears overall intact, without definite evidence of  prominent periapical lucency to more specifically suggest abscess.      There is extensive dental hardware with associated streak artifact, obscuring evaluation of the oral cavity. There is no distinct evidence of focal fluid collection otherwise concerning for periosteal abscess. Dentition appears overall intact, without definite evidence of prominent periapical lucency to more specifically suggest abscess.  This report was finalized on 8/10/2022 5:53 PM by Galo Mejias.        Results for orders placed in visit on 12/20/16    SCANNED VASCULAR STUDIES      Results for orders placed in visit on 12/20/16    SCANNED VASCULAR STUDIES          Plan for Follow-up of Pending Labs/Results:     Discharge Details        Discharge Medications      New Medications      Instructions Start Date   dilTIAZem 30 MG tablet  Commonly known as: CARDIZEM   30 mg, Oral, Every 6 Hours Scheduled      methotrexate 15 MG tablet  Commonly known as: TREXALL   15 mg, Oral, Weekly   Start Date: August 23, 2022     miconazole 2 % cream  Commonly known as: MICOTIN   1 application, Topical, Every 12 Hours Scheduled         Continue These Medications      Instructions Start Date   acetaminophen 325 MG tablet  Commonly known as: TYLENOL   TAKE 2 TABLETS (650MG) BY MOUTH EVERY NIGHT WITH TRAMADOL      albuterol sulfate  (90 Base) MCG/ACT inhaler  Commonly known as: PROVENTIL HFA;VENTOLIN HFA;PROAIR HFA   2 puffs, Inhalation, Every 4 Hours PRN      aspirin 81 MG EC tablet   81 mg, Oral, Daily      Breo Ellipta 100-25 MCG/INH inhaler  Generic drug: Fluticasone Furoate-Vilanterol   INHALE ONE PUFF BY MOUTH ONCE DAILY      Diclofenac Sodium 1 % gel gel  Commonly known as: VOLTAREN   2 g, Topical, Every 12 Hours      Ensure   1 can, Oral, 2 times daily      famotidine 40 MG tablet  Commonly known as: PEPCID   40 mg, Oral, Daily      folic acid 1 MG tablet  Commonly known as: FOLVITE   TAKE ONE TABLET BY MOUTH DAILY      melatonin 5 MG tablet  tablet   5 mg, Oral, Nightly PRN      meloxicam 7.5 MG tablet  Commonly known as: MOBIC   1 tablet, Oral, Daily      methenamine 1 g tablet  Commonly known as: HIPREX   1 tablet, Oral, Every 12 Hours      polyethylene glycol 17 g packet  Commonly known as: MIRALAX   17 g, Oral, Daily, As needed for constipation; can decrease to half dose every other day if loose stools.      SENNA S PO   1 tablet, Oral, Nightly PRN      traMADol 50 MG tablet  Commonly known as: ULTRAM   50 mg, Oral, 2 Times Daily PRN      vitamin B-12 1000 MCG tablet  Commonly known as: CYANOCOBALAMIN   TAKE ONE TABLET BY MOUTH DAILY         Stop These Medications    calcium carbonate (oyster shell) 500 MG tablet tablet     Cerovite Senior tablet tablet  Generic drug: multivitamin with minerals     dilTIAZem  MG 24 hr capsule  Commonly known as: CARDIZEM CD     Mitigare 0.6 MG capsule capsule  Generic drug: colchicine     Myrbetriq 50 MG tablet sustained-release 24 hour 24 hr tablet  Generic drug: Mirabegron ER     PA VITAMIN D-3 PO     povidone-iodine 10 % swab     Vancocin 125 MG capsule  Generic drug: vancomycin            Allergies   Allergen Reactions   • Ciprofloxacin Other (See Comments) and Unknown - High Severity     Other reaction(s): shaking  HCI TABS/ SHAKING  Other reaction(s): shaking  HCI TABS/ SHAKING   • Levofloxacin Diarrhea and Unknown - High Severity   • Sulfamethoxazole-Trimethoprim Other (See Comments), Rash and Unknown - High Severity     Stomach cramps   Stomach cramps    • Sulfamethoxazole Rash   • Trimethoprim GI Intolerance, Diarrhea and Rash         Discharge Disposition:  Skilled Nursing Facility (DC - External)    Diet:  Hospital:  Diet Order   Procedures   • Diet Dysphagia; IV - Mechanical Soft No Mixed Consistencies; Thin       Activity:  Activity Instructions     Activity as Tolerated                 CODE STATUS:    Code Status and Medical Interventions:   Ordered at: 08/09/22 2155     Medical Intervention  Limits:    NO intubation (DNI)     Code Status (Patient has no pulse and is not breathing):    No CPR (Do Not Attempt to Resuscitate)     Medical Interventions (Patient has pulse or is breathing):    Limited Support       Future Appointments   Date Time Provider Department Center   8/17/2022  8:45 AM [facility]   9/19/2022 11:00 AM Pallavi Eduardo MD E  [facility]       Additional Instructions for the Follow-ups that You Need to Schedule     Discharge Follow-up with PCP   As directed       Currently Documented PCP:    Pallavi Eduardo MD    PCP Phone Number:    [phone]     Follow Up Details: first available hospital follow up appt                     JANIYA Warren  08/17/22      Time Spent on Discharge:  I spent  40  minutes on this discharge activity which included: face-to-face encounter with the patient, reviewing the data in the system, coordination of the care with the nursing staff as well as consultants, documentation, and entering orders.            Electronically signed by Malu Sena APRN at 08/17/22 0837

## 2022-08-17 NOTE — DISCHARGE SUMMARY
[facility] Medicine Services  DISCHARGE SUMMARY    Patient Name: Pati Carter  : 1930  MRN: [id number]    Date of Admission: 2022  4:22 PM  Date of Discharge:  2022  Primary Care Physician: Pallavi Eduardo MD    Consults     Date and Time Order Name Status Description    2022  9:20 PM Inpatient Neurology Consult Stroke Completed     2022 10:13 AM Inpatient Gynecologic Oncology Consult Completed     2022  3:11 AM Inpatient Urology Consult Completed           Hospital Course     Presenting Problem:   Acute on chronic alteration in mental status [R41.82]    Active Hospital Problems    Diagnosis  POA   • **Hypercalcemia [E83.52]  Yes   • Moderate malnutrition (HCC) [E44.0]  Yes   • Acute on chronic alteration in mental status [R41.82]  Yes   • Acute kidney injury (HCC) [N17.9]  Yes   • Pelvic mass [R19.00]  Yes   • Impaired cognition [R41.89]  Yes   • Impaired mobility [Z74.09]  Yes   • 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          Hospital Course:  Pati Carter is a 89 y.o. female  assisted-living resident with recent admission for C. difficile colitis, she was identified to have several concerning masses within her abdomen/pelvis, during that hospital stay however further evaluation was deferred in consideration of her age and baseline functional status.  She was discharged to rehab at [facility].  This patient requires assistance with all meals and her daughters were initially helping feed her at the rehab facility, they subsequently had returned to work and were not able to help the patient at her facility.  She was brought to the ED for evaluation of  confusion, with identified hypercalcemia and decreased renal function; daughter at the time of admission pointed out that the patient's mouth was exceedingly dry with her tongue dry and cracked, concerning for poor oral intake.  She has been significantly volume resuscitated with ionized calcium slowly trending down and leveling off around 1.5, IV fluids have been discontinued since 8/14 and her calcium levels remain approximately the same.  Her encephalopathy has significantly improved.  Her PTH-RP has returned within normal limits, PTH is appropriately low, home vitamin D/calcium supplements have been on hold.  Hospice was consulted during her stay in the setting of clinical concern that hypercalcemia was malignancy related, as she is currently stable off IVF we are planning to proceed with short-term rehab placement and potential return to assisted living vs more likely transition to long-term care.  If hypercalcemia becomes a recurrent issue she may be considered for zoledronic acid/bisphosphonate.     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, multifactorial, improved  Hypercalcemia, improved/stable  Ferny Garret syndrome  - Visual halluc @ b-line; Ferny Bonnet syndrome; dtr noted significant worsening mentation/incomprehensible speech @ arrival  - CT head w/o acute finding, stroke neuro has evaluated; MRI negative  -  SLP follows; diet per their recommendations  -  Patient requires assistance with all meals  -  Supplemental calcium/vitamin D will remain on hold at discharge  -  PTH appropriately suppressed, PTHrP undetectable  -IoCa elevated but stable x4 days after being off IVF ~x48 hours     JENNIE on CKD 3, improving  -Baseline GFR 40s; Cr 0.9-1.1  -Patient is close to baseline     Likely ongoing aspiration  - SLP following, on modified diet. Will need assistance with all meals.       Hypertension  Hyperlipidemia  PVD  -Aspirin, diltiazem     COPD  - Home inhalers, substituted for formulary  while hospitalized.      GERD  - Pepcid     Rheumatoid arthritis  -  Per chart review patient was on MTX, during admission July 2022 her LFTs were modestly elevated and this was discontinued with planned PCP follow-up, unfortunately it does not appear she has been able to make that follow-up appointment, LFTs have normalized here; We restarted home MTX 15 mg weekly starting on 8/16     Recent C. difficile colitis  -Completed oral vancomycin pta     1.8 cm LT renal lesion  Bladder wall lesion  Pelvic mass  -Seen by Dr. Yao last admit, monitoring/following up opt  -Seen by Dr. Mello lat admit, gyn mass is stable, expectant management, if large volume blood loss she recommended formal Dx and rad-onc therapy  -d/w dtr at bedside, they prefer more conservative measures and likely would not pursue invasive measures      Discharge Follow Up Recommendations for outpatient labs/diagnostics:   Follow up with PCP first available hospital follow up appt  Hospice will follow up with the patient's daughter once she has completed her skilled days at the nursing facility.      Day of Discharge     HPI:   Resting in bed this morning. Reports a dry mouth and is asking for a drink of water.      Review of Systems  Gen- No fevers, chills  CV- No chest pain, palpitations  Resp- No cough, dyspnea  GI- No N/V/D, abd pain        Vital Signs:   Temp:  [97.6 °F (36.4 °C)-98.3 °F (36.8 °C)] 98.1 °F (36.7 °C)  Heart Rate:  [72-97] 97  Resp:  [16] 16  BP: (148-180)/() 151/77      Physical Exam:  Constitutional: No acute distress, awake, alert, resting in bed  HENT: NCAT, mucous membranes dry, bitemporal wasting  Respiratory: Clear to auscultation bilaterally, respiratory effort normal on room air  Cardiovascular: RRR, no murmurs, rubs, or gallops  Gastrointestinal: Positive bowel sounds, soft, nontender, nondistended  Musculoskeletal: No bilateral ankle edema  Psychiatric: Appropriate affect, cooperative  Neurologic: Oriented x  3, strength symmetric in all extremities, Cranial Nerves grossly intact to confrontation, speech clear  Skin: No rashes noted to exposed loose fragile skin       Pertinent  and/or Most Recent Results     LAB RESULTS:          Lab 08/16/22  0501 08/15/22  0607 08/14/22  0706 08/13/22  0700 08/12/22  0730   SODIUM 140 141 149* 138 144   POTASSIUM 3.8 4.1 3.2* 3.4* 3.7   CHLORIDE 107 108* 115* 109* 108*   CO2 24.0 26.0 25.0 19.0* 28.0   ANION GAP 9.0 7.0 9.0 10.0 8.0   BUN 22 20 20 25* 31*   CREATININE 1.18* 1.16* 1.21* 1.32* 1.49*   EGFR 43.4* 44.3* 42.1* 38.0* 32.8*   GLUCOSE 82 91 80 92 86   CALCIUM 9.6 9.9* 9.7* 10.3* 11.2*   IONIZED CALCIUM 1.46* 1.53* 1.49* 1.53* 1.66*                         Brief Urine Lab Results  (Last result in the past 365 days)      Color   Clarity   Blood   Leuk Est   Nitrite   Protein   CREAT   Urine HCG        08/09/22 1838 Yellow   Clear   Negative   Trace   Negative   30 mg/dL (1+)               Microbiology Results (last 10 days)     Procedure Component Value - Date/Time    COVID PRE-OP / PRE-PROCEDURE SCREENING ORDER (NO ISOLATION) - Swab, Nasopharynx [[id number]]  (Normal) Collected: 08/16/22 1127    Lab Status: Final result Specimen: Swab from Nasopharynx Updated: 08/16/22 1149    Narrative:      The following orders were created for panel order COVID PRE-OP / PRE-PROCEDURE SCREENING ORDER (NO ISOLATION) - Swab, Nasopharynx.  Procedure                               Abnormality         Status                     ---------                               -----------         ------                     COVID-19, ABBOTT IN-HOUS...[[id number]]  Normal              Final result                 Please view results for these tests on the individual orders.    COVID-19, ABBOTT IN-HOUSE,NASAL Swab (NO TRANSPORT MEDIA) 2 HR TAT - Swab, Nasopharynx [[id number]]  (Normal) Collected: 08/16/22 1127    Lab Status: Final result Specimen: Swab from Nasopharynx Updated: 08/16/22 1149     COVID19  87y Female presents with mid back pain s/p recent fall last month on 19. Was seen in  ED and diagnosed with acute compression fracture of T6. Patient was brought from home to the ED today  for acutely worsening back pain for the last 1-2 days. Denies fall/trauma since previous evaluation. Ambulates at baseline with cane/walker. Denies numbness/tingling at this time. Denies red flag symptoms of saddle anesthesia and bowel/bladder incontinence at this time.    PAST MEDICAL & SURGICAL HISTORY:  Hypothyroid  Hypertension, unspecified type  History of cholecystectomy  History of appendectomy      Home Medications:  Aldactone 25 mg oral tablet: 1 tab(s) orally once a day (14 Sep 2019 12:01)  [place] Thyroid 90 mg oral tablet: 1 tab(s) orally once a day (14 Sep 2019 12:01)  aspirin 81 mg oral tablet: 1 tab(s) orally once a day (14 Sep 2019 12:01)  buPROPion 150 mg/24 hours (XL) oral tablet, extended release: 1 tab(s) orally every 24 hours (14 Sep 2019 12:01)  furosemide 20 mg oral tablet: 1 tab(s) orally once a day (14 Sep 2019 12:01)  lisinopril 5 mg oral tablet: 1 tab(s) orally once a day (14 Sep 2019 12:01)      Allergies    Medrol (Other)  oxycodone (Hives; [place])  predniSONE (Other)  sulfa drugs (Other)    Intolerances                              12.1   5.94  )-----------( 247      ( 14 Sep 2019 08:33 )             36.1     -    142  |  108  |  32<H>  ----------------------------<  98  5.0   |  26  |  1.34<H>    Ca    9.6      14 Sep 2019 08:33    TPro  6.3  /  Alb  3.3  /  TBili  0.5  /  DBili  x   /  AST  19  /  ALT  16  /  AlkPhos  86  09-14    PT/INR - ( 14 Sep 2019 09:57 )   PT: 12.0 sec;   INR: 1.08 ratio         PTT - ( 14 Sep 2019 09:57 )  PTT:28.4 sec  Urinalysis Basic - ( 14 Sep 2019 09:14 )    Color: Yellow / Appearance: Clear / S.010 / pH: x  Gluc: x / Ketone: Trace  / Bili: Negative / Urobili: Negative mg/dL   Blood: x / Protein: 30 mg/dL / Nitrite: Negative   Leuk Esterase: Moderate / RBC: 0-2 /HPF / WBC 26-50   Sq Epi: x / Non Sq Epi: Moderate / Bacteria: Few      Vital Signs Last 24 Hrs  T(C): 36.6 (14 Sep 2019 17:01), Max: 36.6 (14 Sep 2019 17:)  T(F): 97.8 (14 Sep 2019 17:), Max: 97.8 (14 Sep 2019 17:01)  HR: 89 (14 Sep 2019 17:) (84 - 90)  BP: 121/52 (14 Sep 2019 17:) (118/46 - 145/61)  BP(mean): --  RR: 18 (14 Sep 2019 17:) (17 - 18)  SpO2: 100% (14 Sep 2019 17:) (94% - 100%)    PHYSICAL EXAM  GEN: NAD, Awake and Alert    SPINE: Skin intact, no bony tenderness or step-offs appreciated throughout cervical/thoracic/lumbar/sacral spine    Denies saddle anesthesia  Denies loss of bowel/bladder incontinence       Motor:                     C5                C6              C7               C8           T1   R             5/5                5/5            5/5              5/5          5/5  L             5/5                5/5            5/5              5/5          5/5                    L2                  L3             L4              L5            S1  R            5/5                5/5             5/5            5/5          5/5  L             5/5                5/5            5/5            5/5          5/5      Sensory:            C5         C6         C7      C8       T1        (0=absent, 1=impaired, 2=normal, NT=not testable)  R         2            2           2        2         2  L          2            2           2        2         2               L2          L3         L4      L5       S1         (0=absent, 1=impaired, 2=normal, NT=not testable)  R         2            2            2        2        2  L          2            2           2        2         2      Negative Babinski  Negative Myoclonus      Secondary Exam: Benign, NVI, intact, no other orthopedic injuries at this time, compartments soft and compressible      IMAGING  CT Chest: T6 vertebral compression fracture mildly progressed from prior imaging\  CT TSp (): Acute T6 vertebral compression fracture Presumptive Negative    Narrative:      Fact sheet for providers: https://www.fda.gov/media/001242/download     Fact sheet for patients: https://www.fda.gov/media/116807/download    Test performed by PCR.  If inconsistent with clinical signs and symptoms patient should be tested with different authorized molecular test.    Urine Culture - Urine, Urine, Catheter [[id number]]  (Normal) Collected: 08/09/22 1838    Lab Status: Final result Specimen: Urine, Catheter Updated: 08/10/22 2040     Urine Culture No growth    COVID PRE-OP / PRE-PROCEDURE SCREENING ORDER (NO ISOLATION) - Swab, Nasopharynx [[id number]]  (Normal) Collected: 08/09/22 1812    Lab Status: Final result Specimen: Swab from Nasopharynx Updated: 08/09/22 1907    Narrative:      The following orders were created for panel order COVID PRE-OP / PRE-PROCEDURE SCREENING ORDER (NO ISOLATION) - Swab, Nasopharynx.  Procedure                               Abnormality         Status                     ---------                               -----------         ------                     COVID-19 and FLU A/B PCR...[[id number]]  Normal              Final result                 Please view results for these tests on the individual orders.    COVID-19 and FLU A/B PCR - Swab, Nasopharynx [[id number]]  (Normal) Collected: 08/09/22 1812    Lab Status: Final result Specimen: Swab from Nasopharynx Updated: 08/09/22 1907     COVID19 Not Detected     Influenza A PCR Not Detected     Influenza B PCR Not Detected    Narrative:      Fact sheet for providers: https://www.fda.gov/media/889118/download    Fact sheet for patients: https://www.fda.gov/media/091908/download    Test performed by PCR.    Blood Culture - Blood, Hand, Right [[id number]]  (Normal) Collected: 08/09/22 1810    Lab Status: Final result Specimen: Blood from Hand, Right Updated: 08/14/22 1847     Blood Culture No growth at 5 days    Blood Culture - Blood, Arm, Left [[id number]]  (Normal) Collected: 08/09/22 1721     Lab Status: Final result Specimen: Blood from Arm, Left Updated: 08/14/22 1747     Blood Culture No growth at 5 days          CT Head Without Contrast    Result Date: 8/9/2022  DATE OF EXAM: 8/9/2022 8:10 PM  PROCEDURE: CT HEAD WO CONTRAST-  INDICATIONS: AMS - somnolence; R41.82-Altered mental status, unspecified; N17.9-Acute kidney failure, unspecified; E83.52-Hypercalcemia; D64.9-Anemia, unspecified; I10-Essential (primary) hypertension; Z86.19-Personal history of other infectious and parasitic diseases; Z66-Do not resuscitate; R53.81-Other malaise  COMPARISON: No comparisons available.  TECHNIQUE: Routine transaxial and coronal reconstruction images were obtained through the head without the administration of contrast. Automated exposure control and iterative reconstruction methods were used.  The radiation dose reduction device was turned on for each scan per the ALARA (As Low as Reasonably Achievable) protocol.  FINDINGS: Gray-white differentiation is maintained and there is no evidence of intracranial hemorrhage, mass or mass effect. Age-related changes of the brain are present including volume loss and typical periventricular sequela of chronic small vessel ischemia. There is otherwise no evidence of intracranial hemorrhage, mass or mass effect. The ventricles are normal in size and configuration accounting for surrounding volume loss. The orbits are normal and the paranasal sinuses are grossly clear.      Age-related changes of the brain as above, otherwise without evidence of acute intracranial abnormality.   This report was finalized on 8/9/2022 8:51 PM by Galo Mejias.      MRI Angiogram Head Without Contrast    Result Date: 8/9/2022  DATE OF EXAM: 8/9/2022 9:51 PM  PROCEDURE: MRI ANGIOGRAM NECK WO CONTRAST-, MRI ANGIOGRAM HEAD WO CONTRAST-, MRI BRAIN WO CONTRAST-  INDICATIONS: Neuro deficit, acute, stroke suspected; R41.82-Altered mental status, unspecified; N17.9-Acute kidney failure, unspecified;  E83.52-Hypercalcemia; D64.9-Anemia, unspecified; I10-Essential (primary) hypertension; Z86.19-Personal history of other infectious and parasitic diseases; Z66-Do not resuscitate; R53.81-Other malaise  COMPARISON: No comparisons available.  TECHNIQUE: Multiplanar multisequence MRI of the brain performed without IV contrast. Noncontrast time-of-flight 3-dimensional MR angiogram of the head and neck with three-dimensional maximum intensity projections postprocessed  FINDINGS: No acute infarct is present on diffusion weighted sequences. Midline structures are unremarkable and the craniocervical junction is satisfactory in appearance. Age-related changes are present with generalized volume loss and typical periventricular leukomalacia favored to reflect chronic small vessel ischemia. There is no evidence of intracranial hemorrhage, mass or mass effect. The ventricles are normal in size and configuration, accounting for surrounding volume loss. The orbits are unremarkable and the paranasal sinuses are grossly clear.  MR angiogram demonstrates no evidence of flow-limiting stenosis in the neck. The common and bilateral internal carotid arteries are patent. The vertebral arteries are normal in course and caliber. Intracranially, the carotid siphons demonstrate no evidence of flow-limiting stenosis. The branching [place] vessels including bilateral anterior, middle and posterior cerebral arteries are patent without evidence of high-grade flow limiting stenosis, large vessel occlusion or aneurysmal dilatation. The vertebrobasilar system is patent.      Age-related changes are present as above. There is otherwise no evidence of infarct, hemorrhage, mass or mass effect.  Essentially normal MR angiogram with no evidence of flow-limiting stenosis, large vessel occlusion or aneurysmal dilatation.  This report was finalized on 8/9/2022 10:24 PM by Galo Mejias.      MRI Angiogram Neck Without Contrast    Result Date:  8/9/2022  DATE OF EXAM: 8/9/2022 9:51 PM  PROCEDURE: MRI ANGIOGRAM NECK WO CONTRAST-, MRI ANGIOGRAM HEAD WO CONTRAST-, MRI BRAIN WO CONTRAST-  INDICATIONS: Neuro deficit, acute, stroke suspected; R41.82-Altered mental status, unspecified; N17.9-Acute kidney failure, unspecified; E83.52-Hypercalcemia; D64.9-Anemia, unspecified; I10-Essential (primary) hypertension; Z86.19-Personal history of other infectious and parasitic diseases; Z66-Do not resuscitate; R53.81-Other malaise  COMPARISON: No comparisons available.  TECHNIQUE: Multiplanar multisequence MRI of the brain performed without IV contrast. Noncontrast time-of-flight 3-dimensional MR angiogram of the head and neck with three-dimensional maximum intensity projections postprocessed  FINDINGS: No acute infarct is present on diffusion weighted sequences. Midline structures are unremarkable and the craniocervical junction is satisfactory in appearance. Age-related changes are present with generalized volume loss and typical periventricular leukomalacia favored to reflect chronic small vessel ischemia. There is no evidence of intracranial hemorrhage, mass or mass effect. The ventricles are normal in size and configuration, accounting for surrounding volume loss. The orbits are unremarkable and the paranasal sinuses are grossly clear.  MR angiogram demonstrates no evidence of flow-limiting stenosis in the neck. The common and bilateral internal carotid arteries are patent. The vertebral arteries are normal in course and caliber. Intracranially, the carotid siphons demonstrate no evidence of flow-limiting stenosis. The branching [place] vessels including bilateral anterior, middle and posterior cerebral arteries are patent without evidence of high-grade flow limiting stenosis, large vessel occlusion or aneurysmal dilatation. The vertebrobasilar system is patent.      Age-related changes are present as above. There is otherwise no evidence of infarct,  87y Female presents with mid back pain s/p recent fall last month on 19. Was seen in  ED and diagnosed with acute compression fracture of T6. Patient was brought from home to the ED today  for acutely worsening back pain for the last 1-2 days. Denies fall/trauma since previous evaluation. Ambulates at baseline with cane/walker. Denies numbness/tingling at this time. Denies red flag symptoms of saddle anesthesia and bowel/bladder incontinence at this time. Per patient family, patient MRI performed at [facility] ([address])      PAST MEDICAL & SURGICAL HISTORY:  Hypothyroid  Hypertension, unspecified type  History of cholecystectomy  History of appendectomy      Home Medications:  Aldactone 25 mg oral tablet: 1 tab(s) orally once a day (14 Sep 2019 12:01)  [place] Thyroid 90 mg oral tablet: 1 tab(s) orally once a day (14 Sep 2019 12:01)  aspirin 81 mg oral tablet: 1 tab(s) orally once a day (14 Sep 2019 12:01)  buPROPion 150 mg/24 hours (XL) oral tablet, extended release: 1 tab(s) orally every 24 hours (14 Sep 2019 12:01)  furosemide 20 mg oral tablet: 1 tab(s) orally once a day (14 Sep 2019 12:01)  lisinopril 5 mg oral tablet: 1 tab(s) orally once a day (14 Sep 2019 12:01)      Allergies    Medrol (Other)  oxycodone (Hives; [place])  predniSONE (Other)  sulfa drugs (Other)    Intolerances                              12.1   5.94  )-----------( 247      ( 14 Sep 2019 08:33 )             36.1     -    142  |  108  |  32<H>  ----------------------------<  98  5.0   |  26  |  1.34<H>    Ca    9.6      14 Sep 2019 08:33    TPro  6.3  /  Alb  3.3  /  TBili  0.5  /  DBili  x   /  AST  19  /  ALT  16  /  AlkPhos  86  09-14    PT/INR - ( 14 Sep 2019 09:57 )   PT: 12.0 sec;   INR: 1.08 ratio         PTT - ( 14 Sep 2019 09:57 )  PTT:28.4 sec  Urinalysis Basic - ( 14 Sep 2019 09:14 )    Color: Yellow / Appearance: Clear / S.010 / pH: x  Gluc: x / Ketone: Trace  / Bili: Negative / Urobili: Negative mg/dL   Blood: x / Protein: 30 mg/dL / Nitrite: Negative   Leuk Esterase: Moderate / RBC: 0-2 /HPF / WBC 26-50   Sq Epi: x / Non Sq Epi: Moderate / Bacteria: Few      Vital Signs Last 24 Hrs  T(C): 36.6 (14 Sep 2019 17:01), Max: 36.6 (14 Sep 2019 17:)  T(F): 97.8 (14 Sep 2019 17:), Max: 97.8 (14 Sep 2019 17:)  HR: 89 (14 Sep 2019 17:) (84 - 90)  BP: 121/52 (14 Sep 2019 17:) (118/46 - 145/61)  BP(mean): --  RR: 18 (14 Sep 2019 17:) (17 - 18)  SpO2: 100% (14 Sep 2019 17:) (94% - 100%)    PHYSICAL EXAM  GEN: NAD, Awake and Alert    SPINE: Skin intact, no bony tenderness or step-offs appreciated throughout cervical/thoracic/lumbar/sacral spine, no paraspinal muscular tenderness.    Denies saddle anesthesia  Denies loss of bowel/bladder incontinence       Motor:                     C5                C6              C7               C8           T1   R             5/5                5/5            5/5              5/5          5/5  L             5/5                5/5            5/5              5/5          5/5                    L2                  L3             L4              L5            S1  R            5/5                5/5             5/5            5/5          5/5  L             5/5                5/5            5/5            5/5          5/5      Sensory:            C5         C6         C7      C8       T1        (0=absent, 1=impaired, 2=normal, NT=not testable)  R         2            2           2        2         2  L          2            2           2        2         2               L2          L3         L4      L5       S1         (0=absent, 1=impaired, 2=normal, NT=not testable)  R         2            2            2        2        2  L          2            2           2        2         2      Negative Babinski  Negative Myoclonus    Secondary Exam: Benign, NVI, intact, no other orthopedic injuries at this time, compartments soft and compressible      IMAGING  CT Chest: T6 vertebral compression fracture mildly progressed from prior imaging  CT TSp (): Acute T6 vertebral compression fracture hemorrhage, mass or mass effect.  Essentially normal MR angiogram with no evidence of flow-limiting stenosis, large vessel occlusion or aneurysmal dilatation.  This report was finalized on 8/9/2022 10:24 PM by Galo Mejias.      MRI Brain Without Contrast    Result Date: 8/9/2022  DATE OF EXAM: 8/9/2022 9:51 PM  PROCEDURE: MRI ANGIOGRAM NECK WO CONTRAST-, MRI ANGIOGRAM HEAD WO CONTRAST-, MRI BRAIN WO CONTRAST-  INDICATIONS: Neuro deficit, acute, stroke suspected; R41.82-Altered mental status, unspecified; N17.9-Acute kidney failure, unspecified; E83.52-Hypercalcemia; D64.9-Anemia, unspecified; I10-Essential (primary) hypertension; Z86.19-Personal history of other infectious and parasitic diseases; Z66-Do not resuscitate; R53.81-Other malaise  COMPARISON: No comparisons available.  TECHNIQUE: Multiplanar multisequence MRI of the brain performed without IV contrast. Noncontrast time-of-flight 3-dimensional MR angiogram of the head and neck with three-dimensional maximum intensity projections postprocessed  FINDINGS: No acute infarct is present on diffusion weighted sequences. Midline structures are unremarkable and the craniocervical junction is satisfactory in appearance. Age-related changes are present with generalized volume loss and typical periventricular leukomalacia favored to reflect chronic small vessel ischemia. There is no evidence of intracranial hemorrhage, mass or mass effect. The ventricles are normal in size and configuration, accounting for surrounding volume loss. The orbits are unremarkable and the paranasal sinuses are grossly clear.  MR angiogram demonstrates no evidence of flow-limiting stenosis in the neck. The common and bilateral internal carotid arteries are patent. The vertebral arteries are normal in course and caliber. Intracranially, the carotid siphons demonstrate no evidence of flow-limiting stenosis. The branching [place] vessels including bilateral anterior, middle and  posterior cerebral arteries are patent without evidence of high-grade flow limiting stenosis, large vessel occlusion or aneurysmal dilatation. The vertebrobasilar system is patent.      Age-related changes are present as above. There is otherwise no evidence of infarct, hemorrhage, mass or mass effect.  Essentially normal MR angiogram with no evidence of flow-limiting stenosis, large vessel occlusion or aneurysmal dilatation.  This report was finalized on 8/9/2022 10:24 PM by Galo Mejias.      FL Video Swallow With Speech Single Contrast    Result Date: 8/11/2022  EXAMINATION: FL VIDEO SWALLOW W SPEECH SINGLE-CONTRAST-  INDICATION: dysphagia; R41.82-Altered mental status, unspecified; N17.9-Acute kidney failure, unspecified; E83.52-Hypercalcemia; D64.9-Anemia, unspecified; I10-Essential (primary) hypertension; Z86.19-Personal history of other infectious and parasitic diseases; Z66-Do not resuscitate; R53.81-Other malaise; R13.10-Dysphagia, unspecified; R47.1-Dysarthria and anarthria; R41.841-Cognitive communication deficit  TECHNIQUE: 1 minute and 24 seconds of fluoroscopic time was used for this exam. 9 associated fluoroscopic loops were saved. The patient was evaluated in the seated lateral position while taking a variety of consistencies of barium by mouth under the direction of speech pathology.  COMPARISON: NONE  FINDINGS: 1 minute and 24 seconds of fluoroscopy provided for a modified barium swallow. Please see speech therapy report for full details and recommendations.       Fluoroscopy provided for a modified barium swallow. Please see speech therapy report for full details and recommendations.    This report was finalized on 8/11/2022 4:18 PM by Brian Yao MD.      XR Chest 1 View    Result Date: 8/9/2022  DATE OF EXAM: 8/9/2022 4:40 PM  PROCEDURE: XR CHEST 1 VW-  INDICATIONS: AMS w/ hx of aspiration  COMPARISON: October 9, 2020  TECHNIQUE: Single radiographic AP view of the chest was obtained.   87y Female presents with mid back pain s/p recent fall last month on 19. Was seen in  ED and diagnosed with acute compression fracture of T6. Patient was brought from home to the ED today  for acutely worsening back pain for the last 1-2 days. Denies fall/trauma since previous evaluation. Ambulates at baseline with cane/walker. Denies numbness/tingling at this time. Denies red flag symptoms of saddle anesthesia and bowel/bladder incontinence at this time. Per patient family, patient MRI performed at [facility] ([address])      PAST MEDICAL & SURGICAL HISTORY:  Hypothyroid  Hypertension, unspecified type  History of cholecystectomy  History of appendectomy      Home Medications:  Aldactone 25 mg oral tablet: 1 tab(s) orally once a day (14 Sep 2019 12:01)  [place] Thyroid 90 mg oral tablet: 1 tab(s) orally once a day (14 Sep 2019 12:01)  aspirin 81 mg oral tablet: 1 tab(s) orally once a day (14 Sep 2019 12:01)  buPROPion 150 mg/24 hours (XL) oral tablet, extended release: 1 tab(s) orally every 24 hours (14 Sep 2019 12:01)  furosemide 20 mg oral tablet: 1 tab(s) orally once a day (14 Sep 2019 12:01)  lisinopril 5 mg oral tablet: 1 tab(s) orally once a day (14 Sep 2019 12:01)      Allergies    Medrol (Other)  oxycodone (Hives; [place])  predniSONE (Other)  sulfa drugs (Other)    Intolerances                              12.1   5.94  )-----------( 247      ( 14 Sep 2019 08:33 )             36.1     -    142  |  108  |  32<H>  ----------------------------<  98  5.0   |  26  |  1.34<H>    Ca    9.6      14 Sep 2019 08:33    TPro  6.3  /  Alb  3.3  /  TBili  0.5  /  DBili  x   /  AST  19  /  ALT  16  /  AlkPhos  86  09-14    PT/INR - ( 14 Sep 2019 09:57 )   PT: 12.0 sec;   INR: 1.08 ratio         PTT - ( 14 Sep 2019 09:57 )  PTT:28.4 sec  Urinalysis Basic - ( 14 Sep 2019 09:14 )    Color: Yellow / Appearance: Clear / S.010 / pH: x  Gluc: x / Ketone: Trace  / Bili: Negative / Urobili: Negative mg/dL   Blood: x / Protein: 30 mg/dL / Nitrite: Negative   Leuk Esterase: Moderate / RBC: 0-2 /HPF / WBC 26-50   Sq Epi: x / Non Sq Epi: Moderate / Bacteria: Few      Vital Signs Last 24 Hrs  T(C): 36.6 (14 Sep 2019 17:01), Max: 36.6 (14 Sep 2019 17:)  T(F): 97.8 (14 Sep 2019 17:), Max: 97.8 (14 Sep 2019 17:)  HR: 89 (14 Sep 2019 17:) (84 - 90)  BP: 121/52 (14 Sep 2019 17:) (118/46 - 145/61)  BP(mean): --  RR: 18 (14 Sep 2019 17:) (17 - 18)  SpO2: 100% (14 Sep 2019 17:) (94% - 100%)    PHYSICAL EXAM  GEN: NAD, Awake and Alert    SPINE: Skin intact, TTP over T6 spinous process, no bony tenderness or step-offs appreciated throughout cervical/thoracic/lumbar/sacral spine, no paraspinal muscular tenderness.    Denies saddle anesthesia  Denies loss of bowel/bladder incontinence       Motor:                     C5                C6              C7               C8           T1   R             5/5                5/5            5/5              5/5          5/5  L             5/5                5/5            5/5              5/5          5/5                    L2                  L3             L4              L5            S1  R            5/5                5/5             5/5            5/5          5/5  L             5/5                5/5            5/5            5/5          5/5      Sensory:            C5         C6         C7      C8       T1        (0=absent, 1=impaired, 2=normal, NT=not testable)  R         2            2           2        2         2  L          2            2           2        2         2               L2          L3         L4      L5       S1         (0=absent, 1=impaired, 2=normal, NT=not testable)  R         2            2            2        2        2  L          2            2           2        2         2      Negative Babinski  Negative Myoclonus    Secondary Exam: Benign, NVI, intact, no other orthopedic injuries at this time, compartments soft and compressible      IMAGING  CT Chest: T6 vertebral compression fracture mildly progressed from prior imaging  CT TSp (): Acute T6 vertebral compression fracture FINDINGS: There is ectasia of the thoracic aorta. The lungs seem relatively clear. It looks like there are some emphysematous changes. There is vertebral augmentation within the spine in the thoracolumbar area.      1.  No definite acute pulmonary process. 2.  Ectasia thoracic aorta  This report was finalized on 8/9/2022 4:57 PM by Julian Bowen MD.      CT Facial Bones Without Contrast    Result Date: 8/10/2022  DATE OF EXAM: 8/10/2022 5:21 PM  PROCEDURE: CT FACIAL BONES WO CONTRAST-  INDICATIONS: Maxillary/facial abscess; R41.82-Altered mental status, unspecified; N17.9-Acute kidney failure, unspecified; E83.52-Hypercalcemia; D64.9-Anemia, unspecified; I10-Essential (primary) hypertension; Z86.19-Personal history of other infectious and parasitic diseases; Z66-Do not resuscitate; R53.81-Other malaise; R13.10-Dysphagia, unspecified; R47.1-Dysarthria and anarthria; R41.841-Cognitive communi  COMPARISON: No comparisons available.  TECHNIQUE: Routine transaxial slices were obtained through the facial region without the administration of intravenous contrast. Reconstructed coronal and sagittal images were also obtained. Automated exposure controls and iterative reconstruction methods were used.  The radiation dose reduction device was turned on for each scan per the ALARA (As Low as Reasonably Achievable) protocol.  FINDINGS: Limited intracranial evaluation demonstrates no acute finding. The orbits are unremarkable. The paranasal sinuses are grossly clear. There is no acute facial bone fracture. There is extensive dental hardware with associated streak artifact, obscuring evaluation of the oral cavity. There is no distinct evidence of focal fluid collection otherwise concerning for definite periosteal abscess. Dentition appears overall intact, without definite evidence of prominent periapical lucency to more specifically suggest abscess.      There is extensive dental hardware with associated streak artifact, obscuring  evaluation of the oral cavity. There is no distinct evidence of focal fluid collection otherwise concerning for periosteal abscess. Dentition appears overall intact, without definite evidence of prominent periapical lucency to more specifically suggest abscess.  This report was finalized on 8/10/2022 5:53 PM by Galo Mejias.        Results for orders placed in visit on 12/20/16    SCANNED VASCULAR STUDIES      Results for orders placed in visit on 12/20/16    SCANNED VASCULAR STUDIES          Plan for Follow-up of Pending Labs/Results:     Discharge Details        Discharge Medications      New Medications      Instructions Start Date   dilTIAZem 30 MG tablet  Commonly known as: CARDIZEM   30 mg, Oral, Every 6 Hours Scheduled      methotrexate 15 MG tablet  Commonly known as: TREXALL   15 mg, Oral, Weekly   Start Date: August 23, 2022     miconazole 2 % cream  Commonly known as: MICOTIN   1 application, Topical, Every 12 Hours Scheduled         Continue These Medications      Instructions Start Date   acetaminophen 325 MG tablet  Commonly known as: TYLENOL   TAKE 2 TABLETS (650MG) BY MOUTH EVERY NIGHT WITH TRAMADOL      albuterol sulfate  (90 Base) MCG/ACT inhaler  Commonly known as: PROVENTIL HFA;VENTOLIN HFA;PROAIR HFA   2 puffs, Inhalation, Every 4 Hours PRN      aspirin 81 MG EC tablet   81 mg, Oral, Daily      Breo Ellipta 100-25 MCG/INH inhaler  Generic drug: Fluticasone Furoate-Vilanterol   INHALE ONE PUFF BY MOUTH ONCE DAILY      Diclofenac Sodium 1 % gel gel  Commonly known as: VOLTAREN   2 g, Topical, Every 12 Hours      Ensure   1 can, Oral, 2 times daily      famotidine 40 MG tablet  Commonly known as: PEPCID   40 mg, Oral, Daily      folic acid 1 MG tablet  Commonly known as: FOLVITE   TAKE ONE TABLET BY MOUTH DAILY      melatonin 5 MG tablet tablet   5 mg, Oral, Nightly PRN      meloxicam 7.5 MG tablet  Commonly known as: MOBIC   1 tablet, Oral, Daily      methenamine 1 g tablet  Commonly  known as: HIPREX   1 tablet, Oral, Every 12 Hours      polyethylene glycol 17 g packet  Commonly known as: MIRALAX   17 g, Oral, Daily, As needed for constipation; can decrease to half dose every other day if loose stools.      SENNA S PO   1 tablet, Oral, Nightly PRN      traMADol 50 MG tablet  Commonly known as: ULTRAM   50 mg, Oral, 2 Times Daily PRN      vitamin B-12 1000 MCG tablet  Commonly known as: CYANOCOBALAMIN   TAKE ONE TABLET BY MOUTH DAILY         Stop These Medications    calcium carbonate (oyster shell) 500 MG tablet tablet     Cer[place]ite Senior tablet tablet  Generic drug: multivitamin with minerals     dilTIAZem  MG 24 hr capsule  Commonly known as: CARDIZEM CD     Mitigare 0.6 MG capsule capsule  Generic drug: colchicine     Myrbetriq 50 MG tablet sustained-release 24 hour 24 hr tablet  Generic drug: Mirabegron ER     PA VITAMIN D-3 PO     povidone-iodine 10 % swab     Vancocin 125 MG capsule  Generic drug: vancomycin            Allergies   Allergen Reactions   • Ciprofloxacin Other (See Comments) and Unknown - High Severity     Other reaction(s): shaking  HCI TABS/ SHAKING  Other reaction(s): shaking  HCI TABS/ SHAKING   • Levofloxacin Diarrhea and Unknown - High Severity   • Sulfamethoxazole-Trimethoprim Other (See Comments), Rash and Unknown - High Severity     Stomach cramps   Stomach cramps    • Sulfamethoxazole Rash   • Trimethoprim GI Intolerance, Diarrhea and Rash         Discharge Disposition:  Skilled Nursing Facility (DC - External)    Diet:  Hospital:  Diet Order   Procedures   • Diet Dysphagia; IV - Mechanical Soft No Mixed Consistencies; Thin       Activity:  Activity Instructions     Activity as Tolerated                 CODE STATUS:    Code Status and Medical Interventions:   Ordered at: 08/09/22 5833     Medical Intervention Limits:    NO intubation (DNI)     Code Status (Patient has no pulse and is not breathing):    No CPR (Do Not Attempt to Resuscitate)     Medical  no Interventions (Patient has pulse or is breathing):    Limited Support       Future Appointments   Date Time Provider Department Center   8/17/2022  8:45 AM [facility]   9/19/2022 11:00 AM Pallavi Eduardo MD [facility]       Additional Instructions for the Follow-ups that You Need to Schedule     Discharge Follow-up with PCP   As directed       Currently Documented PCP:    Pallavi Eduardo MD    PCP Phone Number:    [phone]     Follow Up Details: first available hospital follow up appt                     JANIYA [facility]  08/17/22      Time Spent on Discharge:  I spent  40  minutes on this discharge activity which included: face-to-face encounter with the patient, reviewing the data in the system, coordination of the care with the nursing staff as well as consultants, documentation, and entering orders.

## 2022-08-17 NOTE — DISCHARGE PLACEMENT REQUEST
"Bert Carter (89 y.o. Female)             Date of Birth   07/26/1930    Social Security Number       Address   [address] [place] [address]    Home Phone   [phone]    MRN   [id number]       Rastafarian   None    Marital Status                               Admission Date   8/9/22    Admission Type   Emergency    Admitting Provider   Henri Leon MD    Attending Provider   Henri Leon MD    Department, Room/Bed   [facility] 3E, S343/1       Discharge Date       Discharge Disposition   Skilled Nursing Facility (DC - External)    Discharge Destination                               Attending Provider: Henri Leon MD    Allergies: Ciprofloxacin, Levofloxacin, Sulfamethoxazole-trimethoprim, Sulfamethoxazole, Trimethoprim    Isolation: Spore   Infection: C.difficile (07/23/22)   Code Status: No CPR   Advance Care Planning Activity    Ht: 165.1 cm (65\")   Wt: 54.9 kg (121 lb)    Admission Cmt: None   Principal Problem: Hypercalcemia [E83.52]                 Active Insurance as of 8/9/2022     Primary Coverage     Payor Plan Insurance Group Employer/Plan Group    MEDICARE MEDICARE A & B      Payor Plan Address Payor Plan Phone Number Payor Plan Fax Number Effective Dates    [address] [phone]  7/1/1995 - None Entered    [facility] [id number]       Subscriber Name Subscriber Birth Date Member ID       BERT CARTER 7/26/1930 [id number]           Secondary Coverage     Payor Plan Insurance Group Employer/Plan Group    AARP MC SUP AARP HEALTH CARE OPTIONS      Payor Plan Address Payor Plan Phone Number Payor Plan Fax Number Effective Dates    [facility] [phone]  1/1/2016 - None Entered    [address]       [facility] [id number]       Subscriber Name Subscriber Birth Date Member ID       BERT CARTER 7/26/1930 [id number]                 Emergency Contacts      (Rel.) Home Phone Work Phone Mobile Phone    OLLIE CARTER (Daughter) -- -- " [phone]    Danette Suresh (Daughter) -- -- [phone]               Discharge Summary      Malu Sena APRN at 22 0751              [facility] Medicine Services  DISCHARGE SUMMARY    Patient Name: Pati Carter  : 1930  MRN: [id number]    Date of Admission: 2022  4:22 PM  Date of Discharge:  2022  Primary Care Physician: Pallavi Eduardo MD    Consults     Date and Time Order Name Status Description    2022  9:20 PM Inpatient Neurology Consult Stroke Completed     2022 10:13 AM Inpatient Gynecologic Oncology Consult Completed     2022  3:11 AM Inpatient Urology Consult Completed           Hospital Course     Presenting Problem:   Acute on chronic alteration in mental status [R41.82]    Active Hospital Problems    Diagnosis  POA   • **Hypercalcemia [E83.52]  Yes   • Moderate malnutrition (HCC) [E44.0]  Yes   • Acute on chronic alteration in mental status [R41.82]  Yes   • Acute kidney injury (HCC) [N17.9]  Yes   • Pelvic mass [R19.00]  Yes   • Impaired cognition [R41.89]  Yes   • Impaired mobility [Z74.09]  Yes   • 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          Hospital Course:  Pati Carter is a 89 y.o. female  assisted-living resident with recent admission for C. difficile colitis, she was identified to have several concerning masses within her abdomen/pelvis, during that hospital stay however further evaluation was deferred in consideration of her age and baseline functional status.  She was discharged to rehab at [facility].  This patient requires assistance with all meals and her daughters were initially helping feed her at the rehab  facility, they subsequently had returned to work and were not able to help the patient at her facility.  She was brought to the ED for evaluation of confusion, with identified hypercalcemia and decreased renal function; daughter at the time of admission pointed out that the patient's mouth was exceedingly dry with her tongue dry and cracked, concerning for poor oral intake.  She has been significantly volume resuscitated with ionized calcium slowly trending down and leveling off around 1.5, IV fluids have been discontinued since 8/14 and her calcium levels remain approximately the same.  Her encephalopathy has significantly improved.  Her PTH-RP has returned within normal limits, PTH is appropriately low, home vitamin D/calcium supplements have been on hold.  Hospice was consulted during her stay in the setting of clinical concern that hypercalcemia was malignancy related, as she is currently stable off IVF we are planning to proceed with short-term rehab placement and potential return to assisted living vs more likely transition to long-term care.  If hypercalcemia becomes a recurrent issue she may be considered for zoledronic acid/bisphosphonate.     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, multifactorial, improved  Hypercalcemia, improved/stable  Ferny Garret syndrome  - Visual halluc @ b-line; Ferny Bonnet syndrome; dtr noted significant worsening mentation/incomprehensible speech @ arrival  - CT head w/o acute finding, stroke neuro has evaluated; MRI negative  -  SLP follows; diet per their recommendations  -  Patient requires assistance with all meals  -  Supplemental calcium/vitamin D will remain on hold at discharge  -  PTH appropriately suppressed, PTHrP undetectable  -IoCa elevated but stable x4 days after being off IVF ~x48 hours     JENNIE on CKD 3, improving  -Baseline GFR 40s; Cr 0.9-1.1  -Patient is close to baseline     Likely ongoing aspiration  - SLP following, on modified diet. Will  need assistance with all meals.       Hypertension  Hyperlipidemia  PVD  -Aspirin, diltiazem     COPD  - Home inhalers, substituted for formulary while hospitalized.      GERD  - Pepcid     Rheumatoid arthritis  -  Per chart review patient was on MTX, during admission July 2022 her LFTs were modestly elevated and this was discontinued with planned PCP follow-up, unfortunately it does not appear she has been able to make that follow-up appointment, LFTs have normalized here; We restarted home MTX 15 mg weekly starting on 8/16     Recent C. difficile colitis  -Completed oral vancomycin pta     1.8 cm LT renal lesion  Bladder wall lesion  Pelvic mass  -Seen by Dr. Yao last admit, monitoring/following up opt  -Seen by Dr. Mello lat admit, gyn mass is stable, expectant management, if large volume blood loss she recommended formal Dx and rad-onc therapy  -d/w dtr at bedside, they prefer more conservative measures and likely would not pursue invasive measures      Discharge Follow Up Recommendations for outpatient labs/diagnostics:   Follow up with PCP first available hospital follow up appt  Hospice will follow up with the patient's daughter once she has completed her skilled days at the nursing facility.      Day of Discharge     HPI:   Resting in bed this morning. Reports a dry mouth and is asking for a drink of water.      Review of Systems  Gen- No fevers, chills  CV- No chest pain, palpitations  Resp- No cough, dyspnea  GI- No N/V/D, abd pain        Vital Signs:   Temp:  [97.6 °F (36.4 °C)-98.3 °F (36.8 °C)] 98.1 °F (36.7 °C)  Heart Rate:  [72-97] 97  Resp:  [16] 16  BP: (148-180)/() 151/77      Physical Exam:  Constitutional: No acute distress, awake, alert, resting in bed  HENT: NCAT, mucous membranes dry, bitemporal wasting  Respiratory: Clear to auscultation bilaterally, respiratory effort normal on room air  Cardiovascular: RRR, no murmurs, rubs, or gallops  Gastrointestinal: Positive bowel  sounds, soft, nontender, nondistended  Musculoskeletal: No bilateral ankle edema  Psychiatric: Appropriate affect, cooperative  Neurologic: Oriented x 3, strength symmetric in all extremities, Cranial Nerves grossly intact to confrontation, speech clear  Skin: No rashes noted to exposed loose fragile skin       Pertinent  and/or Most Recent Results     LAB RESULTS:          Lab 08/16/22  0501 08/15/22  0607 08/14/22  0706 08/13/22  0700 08/12/22  0730   SODIUM 140 141 149* 138 144   POTASSIUM 3.8 4.1 3.2* 3.4* 3.7   CHLORIDE 107 108* 115* 109* 108*   CO2 24.0 26.0 25.0 19.0* 28.0   ANION GAP 9.0 7.0 9.0 10.0 8.0   BUN 22 20 20 25* 31*   CREATININE 1.18* 1.16* 1.21* 1.32* 1.49*   EGFR 43.4* 44.3* 42.1* 38.0* 32.8*   GLUCOSE 82 91 80 92 86   CALCIUM 9.6 9.9* 9.7* 10.3* 11.2*   IONIZED CALCIUM 1.46* 1.53* 1.49* 1.53* 1.66*                         Brief Urine Lab Results  (Last result in the past 365 days)      Color   Clarity   Blood   Leuk Est   Nitrite   Protein   CREAT   Urine HCG        08/09/22 1838 Yellow   Clear   Negative   Trace   Negative   30 mg/dL (1+)               Microbiology Results (last 10 days)     Procedure Component Value - Date/Time    COVID PRE-OP / PRE-PROCEDURE SCREENING ORDER (NO ISOLATION) - Swab, Nasopharynx [[id number]]  (Normal) Collected: 08/16/22 1127    Lab Status: Final result Specimen: Swab from Nasopharynx Updated: 08/16/22 1149    Narrative:      The following orders were created for panel order COVID PRE-OP / PRE-PROCEDURE SCREENING ORDER (NO ISOLATION) - Swab, Nasopharynx.  Procedure                               Abnormality         Status                     ---------                               -----------         ------                     COVID-19, ABBOTT IN-HOUS...[[id number]]  Normal              Final result                 Please view results for these tests on the individual orders.    COVID-19, ABBOTT IN-HOUSE,NASAL Swab (NO TRANSPORT MEDIA) 2 HR TAT - Swab, Nasopharynx  [[id number]]  (Normal) Collected: 08/16/22 1127    Lab Status: Final result Specimen: Swab from Nasopharynx Updated: 08/16/22 1149     COVID19 Presumptive Negative    Narrative:      Fact sheet for providers: https://www.fda.gov/media/372707/download     Fact sheet for patients: https://www.fda.gov/media/007829/download    Test performed by PCR.  If inconsistent with clinical signs and symptoms patient should be tested with different authorized molecular test.    Urine Culture - Urine, Urine, Catheter [[id number]]  (Normal) Collected: 08/09/22 1838    Lab Status: Final result Specimen: Urine, Catheter Updated: 08/10/22 2040     Urine Culture No growth    COVID PRE-OP / PRE-PROCEDURE SCREENING ORDER (NO ISOLATION) - Swab, Nasopharynx [[id number]]  (Normal) Collected: 08/09/22 1812    Lab Status: Final result Specimen: Swab from Nasopharynx Updated: 08/09/22 1907    Narrative:      The following orders were created for panel order COVID PRE-OP / PRE-PROCEDURE SCREENING ORDER (NO ISOLATION) - Swab, Nasopharynx.  Procedure                               Abnormality         Status                     ---------                               -----------         ------                     COVID-19 and FLU A/B PCR...[[id number]]  Normal              Final result                 Please view results for these tests on the individual orders.    COVID-19 and FLU A/B PCR - Swab, Nasopharynx [[id number]]  (Normal) Collected: 08/09/22 1812    Lab Status: Final result Specimen: Swab from Nasopharynx Updated: 08/09/22 1907     COVID19 Not Detected     Influenza A PCR Not Detected     Influenza B PCR Not Detected    Narrative:      Fact sheet for providers: https://www.fda.gov/media/972215/download    Fact sheet for patients: https://www.fda.gov/media/254395/download    Test performed by PCR.    Blood Culture - Blood, Hand, Right [[id number]]  (Normal) Collected: 08/09/22 1810    Lab Status: Final result Specimen: Blood from Hand, Right  Updated: 08/14/22 1847     Blood Culture No growth at 5 days    Blood Culture - Blood, Arm, Left [[id number]]  (Normal) Collected: 08/09/22 1721    Lab Status: Final result Specimen: Blood from Arm, Left Updated: 08/14/22 1747     Blood Culture No growth at 5 days          CT Head Without Contrast    Result Date: 8/9/2022  DATE OF EXAM: 8/9/2022 8:10 PM  PROCEDURE: CT HEAD WO CONTRAST-  INDICATIONS: AMS - somnolence; R41.82-Altered mental status, unspecified; N17.9-Acute kidney failure, unspecified; E83.52-Hypercalcemia; D64.9-Anemia, unspecified; I10-Essential (primary) hypertension; Z86.19-Personal history of other infectious and parasitic diseases; Z66-Do not resuscitate; R53.81-Other malaise  COMPARISON: No comparisons available.  TECHNIQUE: Routine transaxial and coronal reconstruction images were obtained through the head without the administration of contrast. Automated exposure control and iterative reconstruction methods were used.  The radiation dose reduction device was turned on for each scan per the ALARA (As Low as Reasonably Achievable) protocol.  FINDINGS: Gray-white differentiation is maintained and there is no evidence of intracranial hemorrhage, mass or mass effect. Age-related changes of the brain are present including volume loss and typical periventricular sequela of chronic small vessel ischemia. There is otherwise no evidence of intracranial hemorrhage, mass or mass effect. The ventricles are normal in size and configuration accounting for surrounding volume loss. The orbits are normal and the paranasal sinuses are grossly clear.      Age-related changes of the brain as above, otherwise without evidence of acute intracranial abnormality.   This report was finalized on 8/9/2022 8:51 PM by Galo Mejias.      MRI Angiogram Head Without Contrast    Result Date: 8/9/2022  DATE OF EXAM: 8/9/2022 9:51 PM  PROCEDURE: MRI ANGIOGRAM NECK WO CONTRAST-, MRI ANGIOGRAM HEAD WO CONTRAST-, MRI BRAIN WO  CONTRAST-  INDICATIONS: Neuro deficit, acute, stroke suspected; R41.82-Altered mental status, unspecified; N17.9-Acute kidney failure, unspecified; E83.52-Hypercalcemia; D64.9-Anemia, unspecified; I10-Essential (primary) hypertension; Z86.19-Personal history of other infectious and parasitic diseases; Z66-Do not resuscitate; R53.81-Other malaise  COMPARISON: No comparisons available.  TECHNIQUE: Multiplanar multisequence MRI of the brain performed without IV contrast. Noncontrast time-of-flight 3-dimensional MR angiogram of the head and neck with three-dimensional maximum intensity projections postprocessed  FINDINGS: No acute infarct is present on diffusion weighted sequences. Midline structures are unremarkable and the craniocervical junction is satisfactory in appearance. Age-related changes are present with generalized volume loss and typical periventricular leukomalacia favored to reflect chronic small vessel ischemia. There is no evidence of intracranial hemorrhage, mass or mass effect. The ventricles are normal in size and configuration, accounting for surrounding volume loss. The orbits are unremarkable and the paranasal sinuses are grossly clear.  MR angiogram demonstrates no evidence of flow-limiting stenosis in the neck. The common and bilateral internal carotid arteries are patent. The vertebral arteries are normal in course and caliber. Intracranially, the carotid siphons demonstrate no evidence of flow-limiting stenosis. The branching [place] of Rees vessels including bilateral anterior, middle and posterior cerebral arteries are patent without evidence of high-grade flow limiting stenosis, large vessel occlusion or aneurysmal dilatation. The vertebrobasilar system is patent.      Age-related changes are present as above. There is otherwise no evidence of infarct, hemorrhage, mass or mass effect.  Essentially normal MR angiogram with no evidence of flow-limiting stenosis, large vessel occlusion or  aneurysmal dilatation.  This report was finalized on 8/9/2022 10:24 PM by Galo Mejias.      MRI Angiogram Neck Without Contrast    Result Date: 8/9/2022  DATE OF EXAM: 8/9/2022 9:51 PM  PROCEDURE: MRI ANGIOGRAM NECK WO CONTRAST-, MRI ANGIOGRAM HEAD WO CONTRAST-, MRI BRAIN WO CONTRAST-  INDICATIONS: Neuro deficit, acute, stroke suspected; R41.82-Altered mental status, unspecified; N17.9-Acute kidney failure, unspecified; E83.52-Hypercalcemia; D64.9-Anemia, unspecified; I10-Essential (primary) hypertension; Z86.19-Personal history of other infectious and parasitic diseases; Z66-Do not resuscitate; R53.81-Other malaise  COMPARISON: No comparisons available.  TECHNIQUE: Multiplanar multisequence MRI of the brain performed without IV contrast. Noncontrast time-of-flight 3-dimensional MR angiogram of the head and neck with three-dimensional maximum intensity projections postprocessed  FINDINGS: No acute infarct is present on diffusion weighted sequences. Midline structures are unremarkable and the craniocervical junction is satisfactory in appearance. Age-related changes are present with generalized volume loss and typical periventricular leukomalacia favored to reflect chronic small vessel ischemia. There is no evidence of intracranial hemorrhage, mass or mass effect. The ventricles are normal in size and configuration, accounting for surrounding volume loss. The orbits are unremarkable and the paranasal sinuses are grossly clear.  MR angiogram demonstrates no evidence of flow-limiting stenosis in the neck. The common and bilateral internal carotid arteries are patent. The vertebral arteries are normal in course and caliber. Intracranially, the carotid siphons demonstrate no evidence of flow-limiting stenosis. The branching [place] of Rees vessels including bilateral anterior, middle and posterior cerebral arteries are patent without evidence of high-grade flow limiting stenosis, large vessel occlusion or  aneurysmal dilatation. The vertebrobasilar system is patent.      Age-related changes are present as above. There is otherwise no evidence of infarct, hemorrhage, mass or mass effect.  Essentially normal MR angiogram with no evidence of flow-limiting stenosis, large vessel occlusion or aneurysmal dilatation.  This report was finalized on 8/9/2022 10:24 PM by Galo Mejias.      MRI Brain Without Contrast    Result Date: 8/9/2022  DATE OF EXAM: 8/9/2022 9:51 PM  PROCEDURE: MRI ANGIOGRAM NECK WO CONTRAST-, MRI ANGIOGRAM HEAD WO CONTRAST-, MRI BRAIN WO CONTRAST-  INDICATIONS: Neuro deficit, acute, stroke suspected; R41.82-Altered mental status, unspecified; N17.9-Acute kidney failure, unspecified; E83.52-Hypercalcemia; D64.9-Anemia, unspecified; I10-Essential (primary) hypertension; Z86.19-Personal history of other infectious and parasitic diseases; Z66-Do not resuscitate; R53.81-Other malaise  COMPARISON: No comparisons available.  TECHNIQUE: Multiplanar multisequence MRI of the brain performed without IV contrast. Noncontrast time-of-flight 3-dimensional MR angiogram of the head and neck with three-dimensional maximum intensity projections postprocessed  FINDINGS: No acute infarct is present on diffusion weighted sequences. Midline structures are unremarkable and the craniocervical junction is satisfactory in appearance. Age-related changes are present with generalized volume loss and typical periventricular leukomalacia favored to reflect chronic small vessel ischemia. There is no evidence of intracranial hemorrhage, mass or mass effect. The ventricles are normal in size and configuration, accounting for surrounding volume loss. The orbits are unremarkable and the paranasal sinuses are grossly clear.  MR angiogram demonstrates no evidence of flow-limiting stenosis in the neck. The common and bilateral internal carotid arteries are patent. The vertebral arteries are normal in course and caliber. Intracranially,  the carotid siphons demonstrate no evidence of flow-limiting stenosis. The branching [place] of Rees vessels including bilateral anterior, middle and posterior cerebral arteries are patent without evidence of high-grade flow limiting stenosis, large vessel occlusion or aneurysmal dilatation. The vertebrobasilar system is patent.      Age-related changes are present as above. There is otherwise no evidence of infarct, hemorrhage, mass or mass effect.  Essentially normal MR angiogram with no evidence of flow-limiting stenosis, large vessel occlusion or aneurysmal dilatation.  This report was finalized on 8/9/2022 10:24 PM by Galo Mejias.      FL Video Swallow With Speech Single Contrast    Result Date: 8/11/2022  EXAMINATION: FL VIDEO SWALLOW W SPEECH SINGLE-CONTRAST-  INDICATION: dysphagia; R41.82-Altered mental status, unspecified; N17.9-Acute kidney failure, unspecified; E83.52-Hypercalcemia; D64.9-Anemia, unspecified; I10-Essential (primary) hypertension; Z86.19-Personal history of other infectious and parasitic diseases; Z66-Do not resuscitate; R53.81-Other malaise; R13.10-Dysphagia, unspecified; R47.1-Dysarthria and anarthria; R41.841-Cognitive communication deficit  TECHNIQUE: 1 minute and 24 seconds of fluoroscopic time was used for this exam. 9 associated fluoroscopic loops were saved. The patient was evaluated in the seated lateral position while taking a variety of consistencies of barium by mouth under the direction of speech pathology.  COMPARISON: NONE  FINDINGS: 1 minute and 24 seconds of fluoroscopy provided for a modified barium swallow. Please see speech therapy report for full details and recommendations.       Fluoroscopy provided for a modified barium swallow. Please see speech therapy report for full details and recommendations.    This report was finalized on 8/11/2022 4:18 PM by Brian Yao MD.      XR Chest 1 View    Result Date: 8/9/2022  DATE OF EXAM: 8/9/2022 4:40 PM  PROCEDURE:  XR CHEST 1 VW-  INDICATIONS: AMS w/ hx of aspiration  COMPARISON: October 9, 2020  TECHNIQUE: Single radiographic AP view of the chest was obtained.  FINDINGS: There is ectasia of the thoracic aorta. The lungs seem relatively clear. It looks like there are some emphysematous changes. There is vertebral augmentation within the spine in the thoracolumbar area.      1.  No definite acute pulmonary process. 2.  Ectasia thoracic aorta  This report was finalized on 8/9/2022 4:57 PM by Julian Bowen MD.      CT Facial Bones Without Contrast    Result Date: 8/10/2022  DATE OF EXAM: 8/10/2022 5:21 PM  PROCEDURE: CT FACIAL BONES WO CONTRAST-  INDICATIONS: Maxillary/facial abscess; R41.82-Altered mental status, unspecified; N17.9-Acute kidney failure, unspecified; E83.52-Hypercalcemia; D64.9-Anemia, unspecified; I10-Essential (primary) hypertension; Z86.19-Personal history of other infectious and parasitic diseases; Z66-Do not resuscitate; R53.81-Other malaise; R13.10-Dysphagia, unspecified; R47.1-Dysarthria and anarthria; R41.841-Cognitive communi  COMPARISON: No comparisons available.  TECHNIQUE: Routine transaxial slices were obtained through the facial region without the administration of intravenous contrast. Reconstructed coronal and sagittal images were also obtained. Automated exposure controls and iterative reconstruction methods were used.  The radiation dose reduction device was turned on for each scan per the ALARA (As Low as Reasonably Achievable) protocol.  FINDINGS: Limited intracranial evaluation demonstrates no acute finding. The orbits are unremarkable. The paranasal sinuses are grossly clear. There is no acute facial bone fracture. There is extensive dental hardware with associated streak artifact, obscuring evaluation of the oral cavity. There is no distinct evidence of focal fluid collection otherwise concerning for definite periosteal abscess. Dentition appears overall intact, without definite evidence of  prominent periapical lucency to more specifically suggest abscess.      There is extensive dental hardware with associated streak artifact, obscuring evaluation of the oral cavity. There is no distinct evidence of focal fluid collection otherwise concerning for periosteal abscess. Dentition appears overall intact, without definite evidence of prominent periapical lucency to more specifically suggest abscess.  This report was finalized on 8/10/2022 5:53 PM by Galo Mejias.        Results for orders placed in visit on 12/20/16    SCANNED VASCULAR STUDIES      Results for orders placed in visit on 12/20/16    SCANNED VASCULAR STUDIES          Plan for Follow-up of Pending Labs/Results:     Discharge Details        Discharge Medications      New Medications      Instructions Start Date   dilTIAZem 30 MG tablet  Commonly known as: CARDIZEM   30 mg, Oral, Every 6 Hours Scheduled      methotrexate 15 MG tablet  Commonly known as: TREXALL   15 mg, Oral, Weekly   Start Date: August 23, 2022     miconazole 2 % cream  Commonly known as: MICOTIN   1 application, Topical, Every 12 Hours Scheduled         Continue These Medications      Instructions Start Date   acetaminophen 325 MG tablet  Commonly known as: TYLENOL   TAKE 2 TABLETS (650MG) BY MOUTH EVERY NIGHT WITH TRAMADOL      albuterol sulfate  (90 Base) MCG/ACT inhaler  Commonly known as: PROVENTIL HFA;VENTOLIN HFA;PROAIR HFA   2 puffs, Inhalation, Every 4 Hours PRN      aspirin 81 MG EC tablet   81 mg, Oral, Daily      Breo Ellipta 100-25 MCG/INH inhaler  Generic drug: Fluticasone Furoate-Vilanterol   INHALE ONE PUFF BY MOUTH ONCE DAILY      Diclofenac Sodium 1 % gel gel  Commonly known as: VOLTAREN   2 g, Topical, Every 12 Hours      Ensure   1 can, Oral, 2 times daily      famotidine 40 MG tablet  Commonly known as: PEPCID   40 mg, Oral, Daily      folic acid 1 MG tablet  Commonly known as: FOLVITE   TAKE ONE TABLET BY MOUTH DAILY      melatonin 5 MG tablet  tablet   5 mg, Oral, Nightly PRN      meloxicam 7.5 MG tablet  Commonly known as: MOBIC   1 tablet, Oral, Daily      methenamine 1 g tablet  Commonly known as: HIPREX   1 tablet, Oral, Every 12 Hours      polyethylene glycol 17 g packet  Commonly known as: MIRALAX   17 g, Oral, Daily, As needed for constipation; can decrease to half dose every other day if loose stools.      SENNA S PO   1 tablet, Oral, Nightly PRN      traMADol 50 MG tablet  Commonly known as: ULTRAM   50 mg, Oral, 2 Times Daily PRN      vitamin B-12 1000 MCG tablet  Commonly known as: CYANOCOBALAMIN   TAKE ONE TABLET BY MOUTH DAILY         Stop These Medications    calcium carbonate (oyster shell) 500 MG tablet tablet     Cerovite Senior tablet tablet  Generic drug: multivitamin with minerals     dilTIAZem  MG 24 hr capsule  Commonly known as: CARDIZEM CD     Mitigare 0.6 MG capsule capsule  Generic drug: colchicine     Myrbetriq 50 MG tablet sustained-release 24 hour 24 hr tablet  Generic drug: Mirabegron ER     PA VITAMIN D-3 PO     povidone-iodine 10 % swab     Vancocin 125 MG capsule  Generic drug: vancomycin            Allergies   Allergen Reactions   • Ciprofloxacin Other (See Comments) and Unknown - High Severity     Other reaction(s): shaking  HCI TABS/ SHAKING  Other reaction(s): shaking  HCI TABS/ SHAKING   • Levofloxacin Diarrhea and Unknown - High Severity   • Sulfamethoxazole-Trimethoprim Other (See Comments), Rash and Unknown - High Severity     Stomach cramps   Stomach cramps    • Sulfamethoxazole Rash   • Trimethoprim GI Intolerance, Diarrhea and Rash         Discharge Disposition:  Skilled Nursing Facility (DC - External)    Diet:  Hospital:  Diet Order   Procedures   • Diet Dysphagia; IV - Mechanical Soft No Mixed Consistencies; Thin       Activity:  Activity Instructions     Activity as Tolerated                 CODE STATUS:    Code Status and Medical Interventions:   Ordered at: 08/09/22 2176     Medical Intervention  Limits:    NO intubation (DNI)     Code Status (Patient has no pulse and is not breathing):    No CPR (Do Not Attempt to Resuscitate)     Medical Interventions (Patient has pulse or is breathing):    Limited Support       Future Appointments   Date Time Provider Department Center   8/17/2022  8:45 AM [facility]   9/19/2022 11:00 AM [facility]       Additional Instructions for the Follow-ups that You Need to Schedule     Discharge Follow-up with PCP   As directed       Currently Documented PCP:    Pallavi Eduardo MD    PCP Phone Number:    [phone]     Follow Up Details: first available hospital follow up appt                     JANIYA Warren  08/17/22      Time Spent on Discharge:  I spent  40  minutes on this discharge activity which included: face-to-face encounter with the patient, reviewing the data in the system, coordination of the care with the nursing staff as well as consultants, documentation, and entering orders.            Electronically signed by Malu Sena APRN at 08/17/22 0837

## 2023-01-05 ENCOUNTER — TELEPHONE (OUTPATIENT)
Dept: UROLOGY | Facility: CLINIC | Age: 88
End: 2023-01-05
Payer: MEDICARE

## 2023-01-05 NOTE — TELEPHONE ENCOUNTER
Urine tubes filled with urine sample in the refrigerator.    Unable to find reason for these samples or who dropped the samples off to the office.    Could not find documentation from Dr. Yao ordering urine sample.    Date collected:  12/21/22    Dr. Yao, please advise.  Do you know anything about these urine samples?  Thank you.

## 2023-01-18 ENCOUNTER — OFFICE VISIT (OUTPATIENT)
Dept: UROLOGY | Facility: CLINIC | Age: 88
End: 2023-01-18
Payer: MEDICARE

## 2023-01-18 VITALS — HEIGHT: 65 IN | BODY MASS INDEX: 20.16 KG/M2 | WEIGHT: 121 LBS

## 2023-01-18 DIAGNOSIS — N39.41 URGENCY INCONTINENCE: ICD-10-CM

## 2023-01-18 DIAGNOSIS — N39.46 MIXED STRESS AND URGE URINARY INCONTINENCE: Primary | ICD-10-CM

## 2023-01-18 DIAGNOSIS — N28.89 LEFT RENAL MASS: ICD-10-CM

## 2023-01-18 DIAGNOSIS — R82.81 PYURIA: ICD-10-CM

## 2023-01-18 LAB
BILIRUB BLD-MCNC: NEGATIVE MG/DL
CLARITY, POC: CLEAR
COLOR UR: YELLOW
EXPIRATION DATE: ABNORMAL
GLUCOSE UR STRIP-MCNC: NEGATIVE MG/DL
KETONES UR QL: NEGATIVE
LEUKOCYTE EST, POC: ABNORMAL
Lab: ABNORMAL
NITRITE UR-MCNC: NEGATIVE MG/ML
PH UR: 6 [PH] (ref 5–8)
PROT UR STRIP-MCNC: ABNORMAL MG/DL
RBC # UR STRIP: ABNORMAL /UL
SP GR UR: 1.03 (ref 1–1.03)
UROBILINOGEN UR QL: NORMAL

## 2023-01-18 PROCEDURE — 99214 OFFICE O/P EST MOD 30 MIN: CPT | Performed by: STUDENT IN AN ORGANIZED HEALTH CARE EDUCATION/TRAINING PROGRAM

## 2023-01-18 PROCEDURE — 81003 URINALYSIS AUTO W/O SCOPE: CPT | Performed by: STUDENT IN AN ORGANIZED HEALTH CARE EDUCATION/TRAINING PROGRAM

## 2023-01-18 PROCEDURE — 87086 URINE CULTURE/COLONY COUNT: CPT | Performed by: STUDENT IN AN ORGANIZED HEALTH CARE EDUCATION/TRAINING PROGRAM

## 2023-01-18 RX ORDER — DILTIAZEM HYDROCHLORIDE 120 MG/1
CAPSULE, COATED, EXTENDED RELEASE ORAL
COMMUNITY
Start: 2022-12-14

## 2023-01-18 RX ORDER — NITROFURANTOIN 25; 75 MG/1; MG/1
CAPSULE ORAL
COMMUNITY
Start: 2022-12-30 | End: 2023-01-18

## 2023-01-18 RX ORDER — FOLIC ACID 1 MG/1
1 TABLET ORAL
COMMUNITY
Start: 2022-10-26 | End: 2023-01-18 | Stop reason: SDUPTHER

## 2023-01-18 RX ORDER — FLUCONAZOLE 150 MG/1
TABLET ORAL
COMMUNITY
Start: 2022-12-30

## 2023-01-18 RX ORDER — NIRMATRELVIR AND RITONAVIR 300-100 MG
KIT ORAL
COMMUNITY
Start: 2023-01-09

## 2023-01-18 RX ORDER — POLYETHYLENE GLYCOL 3350 17 G/17G
POWDER, FOR SOLUTION ORAL EVERY 12 HOURS
COMMUNITY
End: 2023-01-18

## 2023-01-18 RX ORDER — POTASSIUM CHLORIDE 20 MEQ/1
TABLET, EXTENDED RELEASE ORAL
COMMUNITY
Start: 2022-12-14

## 2023-01-18 RX ORDER — COLCHICINE 0.6 MG/1
1 CAPSULE ORAL DAILY
COMMUNITY
Start: 2022-10-26

## 2023-01-18 RX ORDER — POTASSIUM CHLORIDE 20 MEQ/1
1 TABLET, EXTENDED RELEASE ORAL EVERY 12 HOURS
COMMUNITY
End: 2023-01-18 | Stop reason: SDUPTHER

## 2023-01-18 RX ORDER — LEVOTHYROXINE SODIUM 0.03 MG/1
TABLET ORAL
COMMUNITY
Start: 2022-12-14

## 2023-01-18 RX ORDER — COLCHICINE 0.6 MG/1
CAPSULE ORAL
COMMUNITY
Start: 2022-12-14 | End: 2023-01-18

## 2023-01-18 RX ORDER — METHOTREXATE 2.5 MG/1
TABLET ORAL
COMMUNITY
Start: 2022-12-14

## 2023-01-18 NOTE — PROGRESS NOTES
Follow Up Office Visit      Patient Name: Pati Carter  : 1930   MRN: [id number]     Chief Complaint:    Chief Complaint   Patient presents with   • Mixed stress and urge urinary incontinence       Referring Provider: No ref. provider found    History of Present Illness: Pati Carter is a 89 y.o. female who presents today for follow up regarding urinary incontinence, indeterminate left renal mass, concerning endometrial mass.    In regards to the urinary incontinence the patient is experiencing, she does have persistent nighttime awakening to urinate which represents a fall risk and the patient is accompanied by her daughter today which she states is continuing.  She was placed on Myrbetriq with minimal improvement in her symptoms.    Patient was seen as an inpatient consultation in 2022 at which point she was admitted with abdominal pain and profuse diarrhea.  She was found to have C. difficile colitis.  CT demonstrated large endometrial mass relatively indeterminant and gynecology felt as though conservative monitoring were in her best interest given her age and frailty.    CT performed at that time also demonstrated possible debris in the bladder and imaging was evaluated with the family at that time was demonstrates a possible papillary lesion in the right bladder wall.  She denies gross hematuria.    Patient is an assisted living facility resident.  She has urinary urgency and likely insensate incontinence.  She wears a diaper.        Subjective      Review of System: Review of Systems   Genitourinary: Negative for decreased urine volume, difficulty urinating, dysuria, enuresis, flank pain, frequency, hematuria and urgency.      I have reviewed the ROS documented by my clinical staff, I have updated appropriately and I agree. Hiram Yao MD    I have reviewed and the following portions of the patient's history were updated as appropriate: past family history, past medical  history, past social history, past surgical history and problem list.    Medications:     Current Outpatient Medications:   •  acetaminophen (TYLENOL) 325 MG tablet, TAKE 2 TABLETS (650MG) BY MOUTH EVERY NIGHT WITH TRAMADOL, Disp: 60 tablet, Rfl: 10  •  albuterol sulfate  (90 Base) MCG/ACT inhaler, Inhale 2 puffs Every 4 (Four) Hours As Needed for Wheezing or Shortness of Air., Disp: 3 inhaler, Rfl: 4  •  aspirin 81 MG EC tablet, Take 1 tablet by mouth Daily., Disp: 30 tablet, Rfl: 10  •  Breo Ellipta 100-25 MCG/INH inhaler, INHALE ONE PUFF BY MOUTH ONCE DAILY, Disp: 60 each, Rfl: 10  •  colchicine 0.6 MG capsule capsule, Take 1 capsule by mouth Daily., Disp: , Rfl:   •  Diclofenac Sodium (VOLTAREN) 1 % gel gel, Apply 2 g topically to the appropriate area as directed Every 12 (Twelve) Hours., Disp: , Rfl:   •  dilTIAZem CD (CARDIZEM CD) 120 MG 24 hr capsule, , Disp: , Rfl:   •  famotidine (PEPCID) 40 MG tablet, Take 1 tablet by mouth Daily., Disp: 90 tablet, Rfl: 1  •  fluconazole (DIFLUCAN) 150 MG tablet, , Disp: , Rfl:   •  folic acid (FOLVITE) 1 MG tablet, TAKE ONE TABLET BY MOUTH DAILY, Disp: 30 tablet, Rfl: 10  •  folic acid (FOLVITE) 1 MG tablet, Take 1 tablet by mouth., Disp: , Rfl:   •  levothyroxine (SYNTHROID, LEVOTHROID) 25 MCG tablet, , Disp: , Rfl:   •  melatonin 5 MG tablet tablet, Take 1 tablet by mouth At Night As Needed (sleep)., Disp:  , Rfl:   •  meloxicam (MOBIC) 7.5 MG tablet, Take 1 tablet by mouth Daily., Disp: , Rfl:   •  methenamine (HIPREX) 1 g tablet, Take 1 tablet by mouth Every 12 (Twelve) Hours., Disp: , Rfl:   •  methotrexate (TREXALL) 15 MG tablet, Take 1 tablet by mouth 1 (One) Time Per Week. Indications: Non-oncologic, Disp: , Rfl:   •  methotrexate 2.5 MG tablet, Take 6 tablets by mouth., Disp: , Rfl:   •  methotrexate 2.5 MG tablet, , Disp: , Rfl:   •  miconazole (MICOTIN) 2 % cream, Apply 1 application topically to the appropriate area as directed Every 12 (Twelve) Hours.,  "Disp: , Rfl:   •  Paxlovid, 300/100, 20 x 150 MG & 10 x 100MG tablet therapy pack tablet, , Disp: , Rfl:   •  polyethylene glycol (MIRALAX) 17 g packet, Take 17 g by mouth Daily. As needed for constipation; can decrease to half dose every other day if loose stools., Disp: , Rfl:   •  potassium chloride (K-DUR,KLOR-CON) 20 MEQ CR tablet, , Disp: , Rfl:   •  Sennosides-Docusate Sodium (SENNA S PO), Take 1 tablet by mouth At Night As Needed for Constipation., Disp: , Rfl:   •  traMADol (ULTRAM) 50 MG tablet, Take 1 tablet by mouth 2 (Two) Times a Day As Needed for Moderate Pain ., Disp: 6 tablet, Rfl: 0  •  vitamin B-12 (CYANOCOBALAMIN) 1000 MCG tablet, TAKE ONE TABLET BY MOUTH DAILY, Disp: 30 tablet, Rfl: 10  •  dilTIAZem (CARDIZEM) 30 MG tablet, Take 1 tablet by mouth Every 6 (Six) Hours., Disp: , Rfl:   •  miconazole (MICOTIN) 2 % cream, Apply  topically to the appropriate area as directed Every 12 (Twelve) Hours., Disp: , Rfl:   •  Mitigare 0.6 MG capsule capsule, , Disp: , Rfl:   •  potassium chloride (K-DUR,KLOR-CON) 20 MEQ CR tablet, Take 1 tablet by mouth Every 12 (Twelve) Hours., Disp: , Rfl:   •  Vibegron 75 MG tablet, Take 1 tablet by mouth Daily., Disp: 30 tablet, Rfl: 0    Allergies:   Allergies   Allergen Reactions   • Ciprofloxacin Other (See Comments) and Unknown - High Severity     Other reaction(s): shaking  HCI TABS/ SHAKING  Other reaction(s): shaking  HCI TABS/ SHAKING   • Levofloxacin Diarrhea and Unknown - High Severity   • Sulfamethoxazole-Trimethoprim Other (See Comments), Rash and Unknown - High Severity     Stomach cramps   Stomach cramps    • Sulfamethoxazole Rash   • Trimethoprim GI Intolerance, Diarrhea and Rash         Objective     Physical Exam:   Vital Signs:   Vitals:    01/18/23 1140   Weight: 54.9 kg (121 lb)   Height: 165.1 cm (65\")   PainSc: 0-No pain     Body mass index is 20.14 kg/m².     Physical Exam    Labs:   Brief Urine Lab Results  (Last result in the past 365 days)      " Color   Clarity   Blood   Leuk Est   Nitrite   Protein   CREAT   Urine HCG        01/18/23 1209 Yellow   Clear   3+   Moderate (2+)   Negative   3+                 Urine Culture    Urine Culture 7/17/22 7/23/22 8/9/22   Urine Culture No growth No growth No growth              Lab Results   Component Value Date    GLUCOSE 82 08/16/2022    CALCIUM 9.6 08/16/2022     08/16/2022    K 3.8 08/16/2022    CO2 24.0 08/16/2022     08/16/2022    BUN 22 08/16/2022    CREATININE 1.18 (H) 08/16/2022    EGFRIFNONA 68 10/15/2020    BCR 18.6 08/16/2022    ANIONGAP 9.0 08/16/2022       Lab Results   Component Value Date    WBC 12.45 (H) 08/10/2022    HGB 9.1 (L) 08/10/2022    HCT 29.0 (L) 08/10/2022    .3 (H) 08/10/2022     08/10/2022       Images:   No Images in the past 120 days found..    Measures:   Tobacco:   Pati Carter  reports that she has quit smoking. Her smoking use included cigarettes. She has never used smokeless tobacco..         Urine Incontinence: ( NOUI)  Patient reports that she is experiencing likely insensate and urgency incontinence.      Assessment / Plan      Assessment/Plan:   89 y.o. female who presented today for follow up of multiple urologic issues.  The patient has an indeterminate 1.8 cm lesion arising from the upper pole of the left kidney.  She also has a possible small papillary lesion versus bladder debris noted on CT scan from July.  She has a large endometrial lesion versus cyst, gynecology has elected for conservative monitoring given the patient's age and frailty.  I had a long discussion with the patient and her daughter today, her primary complaint is ongoing incontinence and nighttime awakening which represents a fall risk for the patient.  Given her ongoing urinary incontinence it is reasonable to consider catheterization however they would like to defer.  She had previously been catheterized for similar issues with a different urologist but elected to  discontinue this due to fear of UTI.  She has multiple negative urine cultures throughout 2022.    She has leukocytes and blood on urinalysis today and I will send for culture.    Given no significant improvement on Myrbetriq I would like to discontinue this and trial her on 75 mg Vibegron.    We discussed that according to the studies there is minimal risk or side effects associated with the medication.  But I will monitor for side effects.    Daughter is also curious whether a pure wick suction device would be beneficial at her facility.  This is something we can consider ordering for the patient to prevent nighttime awakening and to prevent fall risk.  They are amenable to monitoring her response to medication change and seeing me back in 4 weeks for follow-up at this time.  In regards to her indeterminate left renal lesion and possible small bladder lesion versus bladder debris, it would be reasonable to continue conservative monitoring rather than any treatment for these given her age and comorbidities.  The family would like to avoid any aggressive intervention for these issues.    Diagnoses and all orders for this visit:    1. Mixed stress and urge urinary incontinence (Primary)  -     POC Urinalysis Dipstick, Automated    2. Urgency incontinence  -     Vibegron 75 MG tablet; Take 1 tablet by mouth Daily.  Dispense: 30 tablet; Refill: 0    3. Pyuria  -     Urine Culture - Urine, Urine, Clean Catch; Future  -     Urine Culture - Urine, Urine, Clean Catch    4. Left renal mass  - 1.8 cm indeterminate left renal lesion, I discussed even in the setting of a small renal cell carcinoma we would recommend watchful waiting rather than aggressive work-up or biopsy given low risk of metastasis or eventual death from renal cell carcinoma given her age         Follow Up:   Return in about 4 weeks (around 2/15/2023).    I spent approximately 30 minutes providing clinical care for this patient; including review of  patient's chart and provider documentation, face to face time spent with patient in examination room (obtaining history, performing physical exam, discussing diagnosis and management options), placing orders, and completing patient documentation.     Hiram Yao MD  [facility] [place]

## 2023-01-19 LAB — BACTERIA SPEC AEROBE CULT: NO GROWTH

## 2023-01-19 NOTE — TELEPHONE ENCOUNTER
-Pre-pregnancy weight 181 lbs  -Current weight 194 lbs  -TWG 13 lbs  -Follow GDM diet  SYMONE CALLED TO GIVE AN UPDATE ON THE PT. PT IS STILL HAVING SHORTNESS OF BREATH MOSTLY IN THE MORNING. SHE HAD A DIMINISHING SOUND IN HER LUNGS. FINISH HER ANTIBIOTIC TWO DAYS AGO. NO FEVER OR COUGH. DAUGHTER HAS STARTED HER ON A ALBUTEROL INHALER TODAY.  PT FELL BEFORE SHE WENT INTO THE HOSPITAL AND SPOT ON HER LEFT LOWER BACK AND WANT TO SEND A PICTURE SO THAT THE DR CAN LOOK AT IT.    SYMONE CONTACT [phone]

## 2023-01-19 NOTE — PROGRESS NOTES
Please let patient's daughter know her urine culture from clinic is negative for UTI growth.     Hiram Yao MD

## 2023-02-17 ENCOUNTER — TELEPHONE (OUTPATIENT)
Dept: UROLOGY | Facility: CLINIC | Age: 88
End: 2023-02-17
Payer: MEDICARE

## 2023-02-17 DIAGNOSIS — N32.81 OVERACTIVE BLADDER: Primary | ICD-10-CM

## 2023-02-17 NOTE — TELEPHONE ENCOUNTER
Shivam, a nurse at [facility], called and said the family no longer wants pt to take Gemtesa due to it being so expensive. They were asking if she could go back to Myrbetriq? Please advise

## 2023-03-22 ENCOUNTER — TELEPHONE (OUTPATIENT)
Dept: UROLOGY | Facility: CLINIC | Age: 88
End: 2023-03-22
Payer: MEDICARE

## 2023-03-22 DIAGNOSIS — N32.81 OVERACTIVE BLADDER: ICD-10-CM

## 2023-03-22 NOTE — TELEPHONE ENCOUNTER
Jessi called from [facility] called and said the PT had not received her Myrbetriq prescription. It looks like it was sent to [facility], and she requested that it be sent to [facility]. Her number is [phone].Thank you!

## 2023-03-22 NOTE — TELEPHONE ENCOUNTER
Send message to Dr. Yao to send Myrbetriq  To [facility]    Dr. Yao, please send med to the above pharmacy. Thank you.

## 2023-03-23 NOTE — TELEPHONE ENCOUNTER
Informed patient's daughter Ulices has been sent to [facility]. Daughter, state they had to cancel the last appointment because pt got shingles.  But will call to reschedule one pt has recovered.

## 2023-08-06 ENCOUNTER — HOSPITAL ENCOUNTER (EMERGENCY)
Facility: HOSPITAL | Age: 88
Discharge: HOME | End: 2023-08-06
Attending: EMERGENCY MEDICINE | Admitting: EMERGENCY MEDICINE
Payer: MEDICARE

## 2023-08-06 ENCOUNTER — APPOINTMENT (OUTPATIENT)
Dept: CT IMAGING | Facility: HOSPITAL | Age: 88
End: 2023-08-06
Payer: MEDICARE

## 2023-08-06 VITALS
SYSTOLIC BLOOD PRESSURE: 160 MMHG | RESPIRATION RATE: 18 BRPM | BODY MASS INDEX: 20.4 KG/M2 | HEART RATE: 64 BPM | WEIGHT: 130 LBS | DIASTOLIC BLOOD PRESSURE: 81 MMHG | OXYGEN SATURATION: 95 % | TEMPERATURE: 97.9 F | HEIGHT: 67 IN
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
## 2023-08-06 DIAGNOSIS — R07.9 CHEST PAIN, UNSPECIFIED TYPE: Primary | ICD-10-CM

## 2023-08-06 DIAGNOSIS — I10 ELEVATED BLOOD PRESSURE READING WITH DIAGNOSIS OF HYPERTENSION: ICD-10-CM

## 2023-08-06 LAB
ALBUMIN SERPL-MCNC: 4.2 G/DL (ref 3.5–5.2)
ALBUMIN/GLOB SERPL: 1.8 G/DL
ALP SERPL-CCNC: 97 U/L (ref 39–117)
ALT SERPL W P-5'-P-CCNC: 7 U/L (ref 1–33)
ANION GAP SERPL CALCULATED.3IONS-SCNC: 11 MMOL/L (ref 5–15)
AST SERPL-CCNC: 17 U/L (ref 1–32)
BASOPHILS # BLD AUTO: 0.03 10*3/MM3 (ref 0–0.2)
BASOPHILS NFR BLD AUTO: 0.3 % (ref 0–1.5)
BILIRUB SERPL-MCNC: 0.4 MG/DL (ref 0–1.2)
BUN SERPL-MCNC: 26 MG/DL (ref 8–23)
BUN/CREAT SERPL: 28.6 (ref 7–25)
CALCIUM SPEC-SCNC: 9 MG/DL (ref 8.2–9.6)
CHLORIDE SERPL-SCNC: 106 MMOL/L (ref 98–107)
CO2 SERPL-SCNC: 25 MMOL/L (ref 22–29)
CREAT SERPL-MCNC: 0.91 MG/DL (ref 0.57–1)
DEPRECATED RDW RBC AUTO: 55.6 FL (ref 37–54)
EGFRCR SERPLBLD CKD-EPI 2021: 58.9 ML/MIN/1.73
EOSINOPHIL # BLD AUTO: 0.19 10*3/MM3 (ref 0–0.4)
EOSINOPHIL NFR BLD AUTO: 2.2 % (ref 0.3–6.2)
ERYTHROCYTE [DISTWIDTH] IN BLOOD BY AUTOMATED COUNT: 14.3 % (ref 12.3–15.4)
GEN 5 2HR TROPONIN T REFLEX: 37 NG/L
GLOBULIN UR ELPH-MCNC: 2.4 GM/DL
GLUCOSE SERPL-MCNC: 91 MG/DL (ref 65–99)
HCT VFR BLD AUTO: 35.4 % (ref 34–46.6)
HGB BLD-MCNC: 11.1 G/DL (ref 12–15.9)
HOLD SPECIMEN: NORMAL
IMM GRANULOCYTES # BLD AUTO: 0.04 10*3/MM3 (ref 0–0.05)
IMM GRANULOCYTES NFR BLD AUTO: 0.5 % (ref 0–0.5)
LIPASE SERPL-CCNC: 20 U/L (ref 13–60)
LYMPHOCYTES # BLD AUTO: 1.26 10*3/MM3 (ref 0.7–3.1)
LYMPHOCYTES NFR BLD AUTO: 14.4 % (ref 19.6–45.3)
MCH RBC QN AUTO: 33.4 PG (ref 26.6–33)
MCHC RBC AUTO-ENTMCNC: 31.4 G/DL (ref 31.5–35.7)
MCV RBC AUTO: 106.6 FL (ref 79–97)
MONOCYTES # BLD AUTO: 0.62 10*3/MM3 (ref 0.1–0.9)
MONOCYTES NFR BLD AUTO: 7.1 % (ref 5–12)
NEUTROPHILS NFR BLD AUTO: 6.62 10*3/MM3 (ref 1.7–7)
NEUTROPHILS NFR BLD AUTO: 75.5 % (ref 42.7–76)
NRBC BLD AUTO-RTO: 0 /100 WBC (ref 0–0.2)
NT-PROBNP SERPL-MCNC: 820.7 PG/ML (ref 0–1800)
PLATELET # BLD AUTO: 196 10*3/MM3 (ref 140–450)
PMV BLD AUTO: 10.2 FL (ref 6–12)
POTASSIUM SERPL-SCNC: 4.3 MMOL/L (ref 3.5–5.2)
PROT SERPL-MCNC: 6.6 G/DL (ref 6–8.5)
QT INTERVAL: 358 MS
QTC INTERVAL: 445 MS
RBC # BLD AUTO: 3.32 10*6/MM3 (ref 3.77–5.28)
SODIUM SERPL-SCNC: 142 MMOL/L (ref 136–145)
TROPONIN T DELTA: -3 NG/L
TROPONIN T SERPL HS-MCNC: 40 NG/L
WBC NRBC COR # BLD: 8.76 10*3/MM3 (ref 3.4–10.8)
WHOLE BLOOD HOLD COAG: NORMAL
WHOLE BLOOD HOLD SPECIMEN: NORMAL

## 2023-08-06 PROCEDURE — 80053 COMPREHEN METABOLIC PANEL: CPT | Performed by: EMERGENCY MEDICINE

## 2023-08-06 PROCEDURE — 85025 COMPLETE CBC W/AUTO DIFF WBC: CPT | Performed by: EMERGENCY MEDICINE

## 2023-08-06 PROCEDURE — 83690 ASSAY OF LIPASE: CPT | Performed by: EMERGENCY MEDICINE

## 2023-08-06 PROCEDURE — 84484 ASSAY OF TROPONIN QUANT: CPT | Performed by: EMERGENCY MEDICINE

## 2023-08-06 PROCEDURE — 71250 CT THORAX DX C-: CPT

## 2023-08-06 PROCEDURE — 93005 ELECTROCARDIOGRAM TRACING: CPT | Performed by: EMERGENCY MEDICINE

## 2023-08-06 PROCEDURE — 83880 ASSAY OF NATRIURETIC PEPTIDE: CPT | Performed by: EMERGENCY MEDICINE

## 2023-08-06 PROCEDURE — 99284 EMERGENCY DEPT VISIT MOD MDM: CPT

## 2023-08-06 PROCEDURE — 36415 COLL VENOUS BLD VENIPUNCTURE: CPT

## 2023-08-06 RX ORDER — ASPIRIN 81 MG/1
324 TABLET, CHEWABLE ORAL ONCE
Status: DISCONTINUED | OUTPATIENT
Start: 2023-08-06 | End: 2023-08-06 | Stop reason: HOSPADM

## 2023-08-06 RX ORDER — SODIUM CHLORIDE 0.9 % (FLUSH) 0.9 %
10 SYRINGE (ML) INJECTION AS NEEDED
Status: DISCONTINUED | OUTPATIENT
Start: 2023-08-06 | End: 2023-08-06 | Stop reason: HOSPADM

## 2023-08-06 NOTE — ED PROVIDER NOTES
Subjective   History of Present Illness  Patient is a very pleasant 89-year-old female presenting to the emergency department with chest pain which she describes as tightness across the entire chest that started yesterday around 5 PM after a fall.  EMS reports that the long-term care facility was not aware of the fall.  They report that they were notified this morning of the chest pain.  Initial chest pain reported is 9 out of 10.  It improved in route with nitroglycerin.  She states it is returned as well.  Patient does not have a significant cardiac history.  No loss of consciousness.  Chart review of the patient's EMR from the facility does not show any anticoagulation.  There is DNR paperwork noted.    History provided by:  Patient and EMS personnel    Review of Systems    Past Medical History:   Diagnosis Date    Anemia     Description: YUVAL Agrawal 2006- borderline intermittent.    Back pain     Benign colonic polyp 9/28/2016    Description: YUVAL Agrawal 1999.    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  Rule out chronic persistent asthma, COPD, or obliterative bronchiolitis.- Butch    COVID-19 virus infection 10/11/2020    CTS (carpal tunnel syndrome)     Degeneration of intervertebral disc of lumbar region     Description: A.  Diagnosed in April 2013 with advanced multilevel with severe spinal stenosis, followed by Dr. Pillai for pain management.    Gastroesophageal reflux disease     Hearing loss     History of calcium pyrophosphate deposition disease (CPPD)     History of colonoscopy 01/01/1999    NORMAL PER PATIENT     History of mammogram 01/01/2011    NORMAL PER PT     History of Papanicolaou smear of cervix 01/01/2010    NORMAL PER PT     History of varicella     Hyperlipidemia     Description: YUVAL Agrawal 2006.    Hypertension     Description: YUVAL Agrawal 2001.    Macular  degeneration     Nocturia     Osteoporosis     Ovarian mass     Dx 8/15- benign left cystic adnexal mass    Overactive bladder     Pelvic floor dysfunction     Rheumatoid arthritis     Description: A.  Diagnosed in 2000 and and followed by Dr. Constantino (now Dr. Kaplan). B.  On methotrexate therapy since 2000. C.  Off low-dose prednisone therapy.    Vitamin D deficiency        Allergies   Allergen Reactions    Ciprofloxacin Other (See Comments) and Unknown - High Severity     Other reaction(s): shaking  HCI TABS/ SHAKING  Other reaction(s): shaking  HCI TABS/ SHAKING    Levofloxacin Diarrhea and Unknown - High Severity    Sulfamethoxazole-Trimethoprim Other (See Comments), Rash and Unknown - High Severity     Stomach cramps   Stomach cramps     Sulfamethoxazole Rash    Trimethoprim GI Intolerance, Diarrhea and Rash       Past Surgical History:   Procedure Laterality Date    APPENDECTOMY      CARPAL TUNNEL RELEASE Left 01/01/2003    HISTORY OF NEUROPLASTY DECOMPRESSION MEDIAN NERVE AT CARPAL TUNNEL LEFT    CATARACT EXTRACTION Bilateral 01/01/2009    CHOLECYSTECTOMY  01/01/1962    HIP CANNULATED SCREW PLACEMENT Right 1/25/2020    Procedure: HIP CANNULATED SCREW PLACEMENT RIGHT;  Surgeon: Karlos Blount MD;  Location: [facility];  Service: Orthopedics    KNEE ARTHROSCOPY Left 01/01/2001    MENISCAL REPAIR    KYPHOPLASTY  06/18/2015    T11 AND L1 (JOSE A)    PELVIC LAPAROSCOPY  01/01/1996    REMOVAL OF BENIGN UTERINE AND RIGHT OVARIAN TUMORS    SALPINGO OOPHORECTOMY Left 08/26/2015    REMOVAL OF LEFT OVARY AND TUBE (benign cystic mass)       Family History   Problem Relation Age of Onset    Hypertension Mother     Diabetes Mother        Social History     Socioeconomic History    Marital status:    Tobacco Use    Smoking status: Former     Years: 0.50     Types: Cigarettes    Smokeless tobacco: Never   Vaping Use    Vaping Use: Never used   Substance and Sexual Activity    Alcohol use: No    Drug use: No     Sexual activity: Not Currently           Objective   Physical Exam  Vitals and nursing note reviewed.   Constitutional:       General: She is not in acute distress.     Appearance: She is well-developed and normal weight. She is not ill-appearing or toxic-appearing.   Cardiovascular:      Rate and Rhythm: Normal rate and regular rhythm.      Pulses:           Radial pulses are 2+ on the right side and 2+ on the left side.   Pulmonary:      Effort: Pulmonary effort is normal. No respiratory distress.      Breath sounds: Normal breath sounds.   Chest:      Chest wall: No deformity or crepitus.   Abdominal:      Palpations: Abdomen is soft.   Musculoskeletal:         General: Normal range of motion.      Comments: Subacute ecchymoses noted of the bilateral anterior knees.  Mild abrasion right elbow region.  No gross deformity.  Neurovascular intact.   Skin:     General: Skin is warm and dry.   Neurological:      General: No focal deficit present.      Mental Status: She is alert.   Psychiatric:         Mood and Affect: Mood normal.         Behavior: Behavior normal.       Procedures           ED Course  ED Course as of 08/06/23 1200   Sun Aug 06, 2023   0821 I reviewed multiple EKGs and rhythm strips from EMS.  The rhythm strips on my interpretation demonstrate a normal sinus rhythm.  No significant ischemic changes or ectopy/arrhythmia appreciated. [RS]   0837 I talked with the patient's daughter who is now at bedside and updated her on the presentation and findings thus far.  I discussed the findings of our EKGs as well as those of EMS as well. [RS]   0901 CT Chest Without Contrast Diagnostic  Personally reviewed the CT scan of the chest.  On my interpretation there are significant degenerative and age-related changes.  No pneumothorax visualized. [RS]   1114 Patient is resting comfortably.  No emergent findings on her evaluation here in the ER.  No evidence of of unstable acute coronary syndrome.  No evidence acute  traumatic findings.  I did discuss the findings and the differential with the daughter at bedside. I have reviewed results, considerations, and diagnosis with the patient and/or their representative. Anticipatory guidance provided. Follow-up plan reviewed. Precautions for acute return for re-evaluations also reviewed. This including potential for worsening of the presenting condition and need for further evaluation, admission, and/or intervention as indicated. Opportunity to as questions provided. I advised them to return for any concerns and stressed the importance of timely follow-up and outpatient services. They verbalized understanding.   [RS]      ED Course User Index  [RS] Brian Walker MD                      HEART Score: 4                      Medical Decision Making  Problems Addressed:  Chest pain, unspecified type: complicated acute illness or injury  Elevated blood pressure reading with diagnosis of hypertension: complicated acute illness or injury  Fall, initial encounter: complicated acute illness or injury    Amount and/or Complexity of Data Reviewed  Independent Historian: caregiver and EMS  External Data Reviewed: labs and notes.  Labs: ordered.  Radiology: ordered. Decision-making details documented in ED Course.  ECG/medicine tests: ordered.    Risk  OTC drugs.  Prescription drug management.        Final diagnoses:   Chest pain, unspecified type   Fall, initial encounter   Elevated blood pressure reading with diagnosis of hypertension       ED Disposition  ED Disposition       ED Disposition   Discharge    Condition   Stable    Comment   --               Pallavi Eduardo MD  [address] [facility] [address] [phone]          [facility] EMERGENCY DEPARTMENT  [address] [facility] [address] [phone]    As needed, If symptoms worsen or ANY concerns.         Medication List        Stop      levothyroxine 25 MCG tablet  Commonly known as:  SYNTHROID, LEVOTHROID     Paxlovid (300/100) 20 x 150 MG & 10 x 100MG tablet therapy pack tablet  Generic drug: Nirmatrelvir&Ritonavir 300/100                 Brian Walker MD  08/06/23 1200

## 2023-08-07 LAB
QT INTERVAL: 410 MS
QTC INTERVAL: 419 MS

## 2023-11-14 NOTE — TELEPHONE ENCOUNTER
HR=82 bpm, TJBO=133/66 mmhg, SpO2=95.0 %, Resp=21 B/min, EtCO2=36 mmHg, Apnea=2 Seconds, Pain=0, Hernandez=2, Comment=nsr Med list printed    Dr Bolaños   Do you want me to schedule a follow up once she is discharged?

## 2024-01-01 ENCOUNTER — HOSPITAL ENCOUNTER (INPATIENT)
Facility: HOSPITAL | Age: 89
LOS: 11 days | DRG: 951 | End: 2024-01-28
Attending: INTERNAL MEDICINE | Admitting: INTERNAL MEDICINE
Payer: COMMERCIAL

## 2024-01-01 VITALS
SYSTOLIC BLOOD PRESSURE: 127 MMHG | RESPIRATION RATE: 16 BRPM | TEMPERATURE: 97.7 F | OXYGEN SATURATION: 94 % | DIASTOLIC BLOOD PRESSURE: 80 MMHG | HEART RATE: 99 BPM

## 2024-01-01 PROCEDURE — 25010000002 MORPHINE PER 10 MG: Performed by: NURSE PRACTITIONER

## 2024-01-01 PROCEDURE — 25010000002 GLYCOPYRROLATE 0.2 MG/ML SOLUTION: Performed by: INTERNAL MEDICINE

## 2024-01-01 PROCEDURE — 25010000002 KETOROLAC TROMETHAMINE PER 15 MG: Performed by: NURSE PRACTITIONER

## 2024-01-01 PROCEDURE — 25010000002 METHYLNALTREXONE 12 MG/0.6ML SOLUTION: Performed by: NURSE PRACTITIONER

## 2024-01-01 PROCEDURE — 25010000002 MIDAZOLAM PER 1 MG: Performed by: NURSE PRACTITIONER

## 2024-01-01 PROCEDURE — 25010000002 HALOPERIDOL LACTATE PER 5 MG: Performed by: NURSE PRACTITIONER

## 2024-01-01 PROCEDURE — 25010000002 MIDAZOLAM PER 1 MG: Performed by: INTERNAL MEDICINE

## 2024-01-01 PROCEDURE — 25010000002 FUROSEMIDE PER 20 MG: Performed by: NURSE PRACTITIONER

## 2024-01-01 PROCEDURE — 0 MIDAZOLAM 1 MG/ML 100ML NS 100 MG/100ML SOLUTION: Performed by: NURSE PRACTITIONER

## 2024-01-01 RX ORDER — TRAMADOL HYDROCHLORIDE 50 MG/1
50 TABLET ORAL EVERY 8 HOURS PRN
Status: DISCONTINUED | OUTPATIENT
Start: 2024-01-01 | End: 2024-01-01

## 2024-01-01 RX ORDER — MIDAZOLAM IN NACL,ISO-OSMOT/PF 50 MG/50ML
2 INFUSION BOTTLE (ML) INTRAVENOUS CONTINUOUS
Status: DISCONTINUED | OUTPATIENT
Start: 2024-01-01 | End: 2024-01-01 | Stop reason: HOSPADM

## 2024-01-01 RX ORDER — SERTRALINE HYDROCHLORIDE 25 MG/1
25 TABLET, FILM COATED ORAL DAILY
Status: DISCONTINUED | OUTPATIENT
Start: 2024-01-01 | End: 2024-01-01

## 2024-01-01 RX ORDER — FUROSEMIDE 10 MG/ML
20 INJECTION INTRAMUSCULAR; INTRAVENOUS EVERY 4 HOURS PRN
Status: DISCONTINUED | OUTPATIENT
Start: 2024-01-01 | End: 2024-01-01 | Stop reason: HOSPADM

## 2024-01-01 RX ORDER — KETOROLAC TROMETHAMINE 15 MG/ML
15 INJECTION, SOLUTION INTRAMUSCULAR; INTRAVENOUS EVERY 8 HOURS
Qty: 15 ML | Refills: 0 | Status: DISCONTINUED | OUTPATIENT
Start: 2024-01-01 | End: 2024-01-01

## 2024-01-01 RX ORDER — SODIUM CHLORIDE 0.9 % (FLUSH) 0.9 %
10 SYRINGE (ML) INJECTION EVERY 12 HOURS SCHEDULED
Status: DISCONTINUED | OUTPATIENT
Start: 2024-01-01 | End: 2024-01-01

## 2024-01-01 RX ORDER — SODIUM CHLORIDE 9 MG/ML
40 INJECTION, SOLUTION INTRAVENOUS AS NEEDED
Status: DISCONTINUED | OUTPATIENT
Start: 2024-01-01 | End: 2024-01-01

## 2024-01-01 RX ORDER — SODIUM CHLORIDE 0.9 % (FLUSH) 0.9 %
10 SYRINGE (ML) INJECTION AS NEEDED
Status: DISCONTINUED | OUTPATIENT
Start: 2024-01-01 | End: 2024-01-01 | Stop reason: HOSPADM

## 2024-01-01 RX ORDER — LIDOCAINE 4 G/G
1 PATCH TOPICAL
Status: DISCONTINUED | OUTPATIENT
Start: 2024-01-01 | End: 2024-01-01

## 2024-01-01 RX ORDER — HALOPERIDOL 5 MG/ML
2 INJECTION INTRAMUSCULAR EVERY 4 HOURS PRN
Status: DISCONTINUED | OUTPATIENT
Start: 2024-01-01 | End: 2024-01-01 | Stop reason: HOSPADM

## 2024-01-01 RX ORDER — DILTIAZEM HYDROCHLORIDE 120 MG/1
120 CAPSULE, COATED, EXTENDED RELEASE ORAL
Status: DISCONTINUED | OUTPATIENT
Start: 2024-01-01 | End: 2024-01-01

## 2024-01-01 RX ORDER — OXYBUTYNIN CHLORIDE 5 MG/1
5 TABLET, EXTENDED RELEASE ORAL DAILY
Status: DISCONTINUED | OUTPATIENT
Start: 2024-01-01 | End: 2024-01-01

## 2024-01-01 RX ORDER — SCOLOPAMINE TRANSDERMAL SYSTEM 1 MG/1
1 PATCH, EXTENDED RELEASE TRANSDERMAL
Status: DISCONTINUED | OUTPATIENT
Start: 2024-01-01 | End: 2024-01-01 | Stop reason: HOSPADM

## 2024-01-01 RX ORDER — MIDAZOLAM HYDROCHLORIDE 1 MG/ML
1 INJECTION INTRAMUSCULAR; INTRAVENOUS
Status: DISCONTINUED | OUTPATIENT
Start: 2024-01-01 | End: 2024-01-01

## 2024-01-01 RX ORDER — FOLIC ACID 1 MG/1
1000 TABLET ORAL DAILY
Status: DISCONTINUED | OUTPATIENT
Start: 2024-01-01 | End: 2024-01-01

## 2024-01-01 RX ORDER — METOPROLOL TARTRATE 1 MG/ML
2.5 INJECTION, SOLUTION INTRAVENOUS EVERY 6 HOURS SCHEDULED
Status: DISCONTINUED | OUTPATIENT
Start: 2024-01-01 | End: 2024-01-01

## 2024-01-01 RX ORDER — ACETAMINOPHEN 650 MG/1
650 SUPPOSITORY RECTAL EVERY 4 HOURS PRN
Status: DISCONTINUED | OUTPATIENT
Start: 2024-01-01 | End: 2024-01-01 | Stop reason: HOSPADM

## 2024-01-01 RX ORDER — MORPHINE SULFATE 2 MG/ML
2 INJECTION, SOLUTION INTRAMUSCULAR; INTRAVENOUS
Status: DISCONTINUED | OUTPATIENT
Start: 2024-01-01 | End: 2024-01-01

## 2024-01-01 RX ORDER — FAMOTIDINE 20 MG/1
40 TABLET, FILM COATED ORAL DAILY
Status: DISCONTINUED | OUTPATIENT
Start: 2024-01-01 | End: 2024-01-01

## 2024-01-01 RX ORDER — KETOROLAC TROMETHAMINE 15 MG/ML
15 INJECTION, SOLUTION INTRAMUSCULAR; INTRAVENOUS EVERY 6 HOURS PRN
Status: DISCONTINUED | OUTPATIENT
Start: 2024-01-01 | End: 2024-01-01 | Stop reason: HOSPADM

## 2024-01-01 RX ORDER — METHENAMINE HIPPURATE 1000 MG/1
1 TABLET ORAL EVERY 12 HOURS
Status: DISCONTINUED | OUTPATIENT
Start: 2024-01-01 | End: 2024-01-01

## 2024-01-01 RX ORDER — BISACODYL 10 MG
10 SUPPOSITORY, RECTAL RECTAL ONCE
Status: COMPLETED | OUTPATIENT
Start: 2024-01-01 | End: 2024-01-01

## 2024-01-01 RX ORDER — NALOXONE HCL 0.4 MG/ML
0.4 VIAL (ML) INJECTION
Status: DISCONTINUED | OUTPATIENT
Start: 2024-01-01 | End: 2024-01-01

## 2024-01-01 RX ORDER — BUDESONIDE AND FORMOTEROL FUMARATE DIHYDRATE 160; 4.5 UG/1; UG/1
1 AEROSOL RESPIRATORY (INHALATION)
Status: DISCONTINUED | OUTPATIENT
Start: 2024-01-01 | End: 2024-01-01

## 2024-01-01 RX ORDER — KETOROLAC TROMETHAMINE 30 MG/ML
15 INJECTION, SOLUTION INTRAMUSCULAR; INTRAVENOUS EVERY 6 HOURS PRN
Status: DISPENSED | OUTPATIENT
Start: 2024-01-01 | End: 2024-01-01

## 2024-01-01 RX ORDER — IPRATROPIUM BROMIDE AND ALBUTEROL SULFATE 2.5; .5 MG/3ML; MG/3ML
SOLUTION RESPIRATORY (INHALATION)
Status: ACTIVE
Start: 2024-01-01 | End: 2024-01-01

## 2024-01-01 RX ORDER — LORAZEPAM 0.5 MG/1
0.5 TABLET ORAL EVERY 6 HOURS PRN
Status: DISCONTINUED | OUTPATIENT
Start: 2024-01-01 | End: 2024-01-01

## 2024-01-01 RX ORDER — POLYETHYLENE GLYCOL 3350 17 G/17G
17 POWDER, FOR SOLUTION ORAL DAILY
Status: DISCONTINUED | OUTPATIENT
Start: 2024-01-01 | End: 2024-01-01

## 2024-01-01 RX ORDER — PANTOPRAZOLE SODIUM 40 MG/10ML
40 INJECTION, POWDER, LYOPHILIZED, FOR SOLUTION INTRAVENOUS
Status: DISCONTINUED | OUTPATIENT
Start: 2024-01-01 | End: 2024-01-01

## 2024-01-01 RX ORDER — BISACODYL 10 MG
10 SUPPOSITORY, RECTAL RECTAL DAILY PRN
Status: DISCONTINUED | OUTPATIENT
Start: 2024-01-01 | End: 2024-01-01

## 2024-01-01 RX ORDER — MORPHINE SULFATE 2 MG/ML
1 INJECTION, SOLUTION INTRAMUSCULAR; INTRAVENOUS EVERY 6 HOURS
Status: DISCONTINUED | OUTPATIENT
Start: 2024-01-01 | End: 2024-01-01

## 2024-01-01 RX ORDER — GUAIFENESIN 600 MG/1
600 TABLET, EXTENDED RELEASE ORAL EVERY 12 HOURS SCHEDULED
Status: DISCONTINUED | OUTPATIENT
Start: 2024-01-01 | End: 2024-01-01

## 2024-01-01 RX ORDER — LANOLIN ALCOHOL/MO/W.PET/CERES
1000 CREAM (GRAM) TOPICAL DAILY
Status: DISCONTINUED | OUTPATIENT
Start: 2024-01-01 | End: 2024-01-01

## 2024-01-01 RX ORDER — MIDAZOLAM HYDROCHLORIDE 1 MG/ML
0.5 INJECTION INTRAMUSCULAR; INTRAVENOUS EVERY 4 HOURS PRN
Status: DISCONTINUED | OUTPATIENT
Start: 2024-01-01 | End: 2024-01-01

## 2024-01-01 RX ORDER — PHENOBARBITAL SODIUM 65 MG/ML
65 INJECTION, SOLUTION INTRAMUSCULAR; INTRAVENOUS EVERY 4 HOURS PRN
Status: DISCONTINUED | OUTPATIENT
Start: 2024-01-01 | End: 2024-01-01 | Stop reason: HOSPADM

## 2024-01-01 RX ORDER — MIDAZOLAM HYDROCHLORIDE 1 MG/ML
1 INJECTION INTRAMUSCULAR; INTRAVENOUS EVERY 4 HOURS
Status: DISCONTINUED | OUTPATIENT
Start: 2024-01-01 | End: 2024-01-01

## 2024-01-01 RX ORDER — MIDAZOLAM HYDROCHLORIDE 1 MG/ML
1 INJECTION INTRAMUSCULAR; INTRAVENOUS
Status: DISCONTINUED | OUTPATIENT
Start: 2024-01-01 | End: 2024-01-01 | Stop reason: HOSPADM

## 2024-01-01 RX ORDER — BISACODYL 5 MG/1
5 TABLET, DELAYED RELEASE ORAL DAILY PRN
Status: DISCONTINUED | OUTPATIENT
Start: 2024-01-01 | End: 2024-01-01

## 2024-01-01 RX ORDER — MORPHINE SULFATE 2 MG/ML
2 INJECTION, SOLUTION INTRAMUSCULAR; INTRAVENOUS EVERY 4 HOURS
Status: DISCONTINUED | OUTPATIENT
Start: 2024-01-01 | End: 2024-01-01

## 2024-01-01 RX ORDER — BISACODYL 10 MG
10 SUPPOSITORY, RECTAL RECTAL DAILY PRN
Status: DISCONTINUED | OUTPATIENT
Start: 2024-01-01 | End: 2024-01-01 | Stop reason: HOSPADM

## 2024-01-01 RX ORDER — ACETAMINOPHEN 160 MG/5ML
650 SOLUTION ORAL EVERY 4 HOURS PRN
Status: DISCONTINUED | OUTPATIENT
Start: 2024-01-01 | End: 2024-01-01 | Stop reason: HOSPADM

## 2024-01-01 RX ORDER — ACETAMINOPHEN 325 MG/1
650 TABLET ORAL EVERY 4 HOURS PRN
Status: DISCONTINUED | OUTPATIENT
Start: 2024-01-01 | End: 2024-01-01 | Stop reason: HOSPADM

## 2024-01-01 RX ORDER — AMOXICILLIN 250 MG
2 CAPSULE ORAL 2 TIMES DAILY PRN
Status: DISCONTINUED | OUTPATIENT
Start: 2024-01-01 | End: 2024-01-01

## 2024-01-01 RX ORDER — IPRATROPIUM BROMIDE AND ALBUTEROL SULFATE 2.5; .5 MG/3ML; MG/3ML
3 SOLUTION RESPIRATORY (INHALATION)
Status: DISCONTINUED | OUTPATIENT
Start: 2024-01-01 | End: 2024-01-01

## 2024-01-01 RX ORDER — ONDANSETRON 4 MG/1
4 TABLET, FILM COATED ORAL EVERY 6 HOURS PRN
Status: DISCONTINUED | OUTPATIENT
Start: 2024-01-01 | End: 2024-01-01

## 2024-01-01 RX ORDER — POLYVINYL ALCOHOL 14 MG/ML
1 SOLUTION/ DROPS OPHTHALMIC
Status: DISCONTINUED | OUTPATIENT
Start: 2024-01-01 | End: 2024-01-01 | Stop reason: HOSPADM

## 2024-01-01 RX ORDER — ONDANSETRON 2 MG/ML
4 INJECTION INTRAMUSCULAR; INTRAVENOUS EVERY 6 HOURS PRN
Status: DISCONTINUED | OUTPATIENT
Start: 2024-01-01 | End: 2024-01-01

## 2024-01-01 RX ORDER — NITROGLYCERIN 0.4 MG/1
0.4 TABLET SUBLINGUAL
Status: DISCONTINUED | OUTPATIENT
Start: 2024-01-01 | End: 2024-01-01

## 2024-01-01 RX ORDER — CHOLECALCIFEROL (VITAMIN D3) 125 MCG
5 CAPSULE ORAL NIGHTLY PRN
Status: DISCONTINUED | OUTPATIENT
Start: 2024-01-01 | End: 2024-01-01

## 2024-01-01 RX ORDER — ASPIRIN 81 MG/1
81 TABLET ORAL DAILY
Status: DISCONTINUED | OUTPATIENT
Start: 2024-01-01 | End: 2024-01-01

## 2024-01-01 RX ORDER — ACETAMINOPHEN 500 MG
1000 TABLET ORAL EVERY 8 HOURS
Status: DISCONTINUED | OUTPATIENT
Start: 2024-01-01 | End: 2024-01-01

## 2024-01-01 RX ORDER — GLYCOPYRROLATE 0.2 MG/ML
0.4 INJECTION INTRAMUSCULAR; INTRAVENOUS EVERY 4 HOURS PRN
Status: DISCONTINUED | OUTPATIENT
Start: 2024-01-01 | End: 2024-01-01 | Stop reason: HOSPADM

## 2024-01-01 RX ORDER — MORPHINE SULFATE 2 MG/ML
1 INJECTION, SOLUTION INTRAMUSCULAR; INTRAVENOUS
Status: DISCONTINUED | OUTPATIENT
Start: 2024-01-01 | End: 2024-01-01

## 2024-01-01 RX ADMIN — MORPHINE SULFATE 1 MG: 2 INJECTION, SOLUTION INTRAMUSCULAR; INTRAVENOUS at 03:51

## 2024-01-01 RX ADMIN — MIDAZOLAM HYDROCHLORIDE 0.5 MG: 1 INJECTION, SOLUTION INTRAMUSCULAR; INTRAVENOUS at 08:53

## 2024-01-01 RX ADMIN — MORPHINE SULFATE 2 MG: 2 INJECTION, SOLUTION INTRAMUSCULAR; INTRAVENOUS at 00:25

## 2024-01-01 RX ADMIN — MORPHINE SULFATE 1 MG: 2 INJECTION, SOLUTION INTRAMUSCULAR; INTRAVENOUS at 03:03

## 2024-01-01 RX ADMIN — DICLOFENAC SODIUM 2 G: 9.3 GEL TOPICAL at 17:45

## 2024-01-01 RX ADMIN — DICLOFENAC SODIUM 2 G: 9.3 GEL TOPICAL at 11:53

## 2024-01-01 RX ADMIN — MIDAZOLAM HYDROCHLORIDE 1 MG: 1 INJECTION, SOLUTION INTRAMUSCULAR; INTRAVENOUS at 04:58

## 2024-01-01 RX ADMIN — GLYCOPYRROLATE 0.4 MG: 0.2 INJECTION INTRAMUSCULAR; INTRAVENOUS at 20:16

## 2024-01-01 RX ADMIN — MINERAL OIL: 1000 LIQUID ORAL at 21:50

## 2024-01-01 RX ADMIN — MORPHINE SULFATE 2 MG: 2 INJECTION, SOLUTION INTRAMUSCULAR; INTRAVENOUS at 05:28

## 2024-01-01 RX ADMIN — MIDAZOLAM HYDROCHLORIDE 1 MG: 1 INJECTION, SOLUTION INTRAMUSCULAR; INTRAVENOUS at 05:35

## 2024-01-01 RX ADMIN — MIDAZOLAM HYDROCHLORIDE 0.5 MG: 1 INJECTION, SOLUTION INTRAMUSCULAR; INTRAVENOUS at 19:41

## 2024-01-01 RX ADMIN — DICLOFENAC SODIUM 2 G: 9.3 GEL TOPICAL at 17:48

## 2024-01-01 RX ADMIN — GLYCOPYRROLATE 0.4 MG: 0.2 INJECTION INTRAMUSCULAR; INTRAVENOUS at 14:45

## 2024-01-01 RX ADMIN — NYSTATIN 500000 UNITS: 100000 SUSPENSION ORAL at 08:36

## 2024-01-01 RX ADMIN — MIDAZOLAM HYDROCHLORIDE 1 MG: 1 INJECTION, SOLUTION INTRAMUSCULAR; INTRAVENOUS at 21:08

## 2024-01-01 RX ADMIN — DICLOFENAC SODIUM 2 G: 9.3 GEL TOPICAL at 11:33

## 2024-01-01 RX ADMIN — MIDAZOLAM HYDROCHLORIDE 1 MG/HR: 1 INJECTION, SOLUTION INTRAVENOUS at 10:04

## 2024-01-01 RX ADMIN — METHYLNALTREXONE BROMIDE 4 MG: 12 INJECTION, SOLUTION SUBCUTANEOUS at 08:48

## 2024-01-01 RX ADMIN — BISACODYL 10 MG: 10 SUPPOSITORY RECTAL at 15:05

## 2024-01-01 RX ADMIN — GLYCOPYRROLATE 0.4 MG: 0.2 INJECTION INTRAMUSCULAR; INTRAVENOUS at 03:33

## 2024-01-01 RX ADMIN — GLYCOPYRROLATE 0.4 MG: 0.2 INJECTION INTRAMUSCULAR; INTRAVENOUS at 21:46

## 2024-01-01 RX ADMIN — MORPHINE SULFATE 2 MG: 2 INJECTION, SOLUTION INTRAMUSCULAR; INTRAVENOUS at 17:23

## 2024-01-01 RX ADMIN — MIDAZOLAM HYDROCHLORIDE 1 MG: 1 INJECTION, SOLUTION INTRAMUSCULAR; INTRAVENOUS at 15:34

## 2024-01-01 RX ADMIN — MINERAL OIL 1 ML: 1000 LIQUID ORAL at 08:36

## 2024-01-01 RX ADMIN — MINERAL OIL: 1000 LIQUID ORAL at 17:42

## 2024-01-01 RX ADMIN — MORPHINE SULFATE 2 MG: 2 INJECTION, SOLUTION INTRAMUSCULAR; INTRAVENOUS at 22:42

## 2024-01-01 RX ADMIN — MORPHINE SULFATE 4 MG: 4 INJECTION, SOLUTION INTRAMUSCULAR; INTRAVENOUS at 03:34

## 2024-01-01 RX ADMIN — MINERAL OIL: 1000 LIQUID ORAL at 20:37

## 2024-01-01 RX ADMIN — MIDAZOLAM HYDROCHLORIDE 1 MG: 1 INJECTION, SOLUTION INTRAMUSCULAR; INTRAVENOUS at 17:49

## 2024-01-01 RX ADMIN — METHYLNALTREXONE BROMIDE 4 MG: 12 INJECTION, SOLUTION SUBCUTANEOUS at 08:20

## 2024-01-01 RX ADMIN — HALOPERIDOL LACTATE 2 MG: 5 INJECTION, SOLUTION INTRAMUSCULAR at 16:35

## 2024-01-01 RX ADMIN — DICLOFENAC SODIUM 2 G: 9.3 GEL TOPICAL at 12:19

## 2024-01-01 RX ADMIN — NYSTATIN 500000 UNITS: 100000 SUSPENSION ORAL at 12:19

## 2024-01-01 RX ADMIN — GLYCOPYRROLATE 0.4 MG: 0.2 INJECTION INTRAMUSCULAR; INTRAVENOUS at 00:32

## 2024-01-01 RX ADMIN — GLYCOPYRROLATE 0.4 MG: 0.2 INJECTION INTRAMUSCULAR; INTRAVENOUS at 09:33

## 2024-01-01 RX ADMIN — FUROSEMIDE 20 MG: 10 INJECTION, SOLUTION INTRAMUSCULAR; INTRAVENOUS at 20:16

## 2024-01-01 RX ADMIN — DICLOFENAC SODIUM 2 G: 9.3 GEL TOPICAL at 20:16

## 2024-01-01 RX ADMIN — DICLOFENAC SODIUM 2 G: 9.3 GEL TOPICAL at 08:16

## 2024-01-01 RX ADMIN — MORPHINE SULFATE 1 MG: 2 INJECTION, SOLUTION INTRAMUSCULAR; INTRAVENOUS at 03:56

## 2024-01-01 RX ADMIN — KETOROLAC TROMETHAMINE 15 MG: 30 INJECTION, SOLUTION INTRAMUSCULAR; INTRAVENOUS at 15:06

## 2024-01-01 RX ADMIN — NYSTATIN 500000 UNITS: 100000 SUSPENSION ORAL at 08:48

## 2024-01-01 RX ADMIN — MIDAZOLAM HYDROCHLORIDE 1 MG: 1 INJECTION, SOLUTION INTRAMUSCULAR; INTRAVENOUS at 01:43

## 2024-01-01 RX ADMIN — NYSTATIN 500000 UNITS: 100000 SUSPENSION ORAL at 13:27

## 2024-01-01 RX ADMIN — METHYLNALTREXONE BROMIDE 4 MG: 12 INJECTION, SOLUTION SUBCUTANEOUS at 08:12

## 2024-01-01 RX ADMIN — MORPHINE SULFATE 1 MG: 2 INJECTION, SOLUTION INTRAMUSCULAR; INTRAVENOUS at 15:35

## 2024-01-01 RX ADMIN — MORPHINE SULFATE 1 MG: 2 INJECTION, SOLUTION INTRAMUSCULAR; INTRAVENOUS at 08:37

## 2024-01-01 RX ADMIN — MORPHINE SULFATE 1 MG: 2 INJECTION, SOLUTION INTRAMUSCULAR; INTRAVENOUS at 21:04

## 2024-01-01 RX ADMIN — MIDAZOLAM HYDROCHLORIDE 1 MG: 1 INJECTION, SOLUTION INTRAMUSCULAR; INTRAVENOUS at 21:33

## 2024-01-01 RX ADMIN — MORPHINE SULFATE 4 MG: 4 INJECTION, SOLUTION INTRAMUSCULAR; INTRAVENOUS at 14:30

## 2024-01-01 RX ADMIN — FUROSEMIDE 20 MG: 10 INJECTION, SOLUTION INTRAMUSCULAR; INTRAVENOUS at 15:02

## 2024-01-01 RX ADMIN — MORPHINE SULFATE 1 MG: 2 INJECTION, SOLUTION INTRAMUSCULAR; INTRAVENOUS at 08:54

## 2024-01-01 RX ADMIN — GLYCOPYRROLATE 0.4 MG: 0.2 INJECTION INTRAMUSCULAR; INTRAVENOUS at 15:56

## 2024-01-01 RX ADMIN — MORPHINE SULFATE 2 MG: 2 INJECTION, SOLUTION INTRAMUSCULAR; INTRAVENOUS at 23:20

## 2024-01-01 RX ADMIN — MORPHINE SULFATE 2 MG: 2 INJECTION, SOLUTION INTRAMUSCULAR; INTRAVENOUS at 18:23

## 2024-01-01 RX ADMIN — SCOPOLAMINE 1 PATCH: 1.5 PATCH, EXTENDED RELEASE TRANSDERMAL at 08:20

## 2024-01-01 RX ADMIN — MORPHINE SULFATE 1 MG: 2 INJECTION, SOLUTION INTRAMUSCULAR; INTRAVENOUS at 20:31

## 2024-01-01 RX ADMIN — NYSTATIN 500000 UNITS: 100000 SUSPENSION ORAL at 21:51

## 2024-01-01 RX ADMIN — MINERAL OIL: 1000 LIQUID ORAL at 08:55

## 2024-01-01 RX ADMIN — DICLOFENAC SODIUM 2 G: 9.3 GEL TOPICAL at 13:28

## 2024-01-01 RX ADMIN — MIDAZOLAM HYDROCHLORIDE 1 MG: 1 INJECTION, SOLUTION INTRAMUSCULAR; INTRAVENOUS at 16:18

## 2024-01-01 RX ADMIN — FUROSEMIDE 20 MG: 10 INJECTION, SOLUTION INTRAMUSCULAR; INTRAVENOUS at 14:46

## 2024-01-01 RX ADMIN — MORPHINE SULFATE 4 MG: 4 INJECTION, SOLUTION INTRAMUSCULAR; INTRAVENOUS at 17:49

## 2024-01-01 RX ADMIN — DICLOFENAC SODIUM 2 G: 9.3 GEL TOPICAL at 21:22

## 2024-01-01 RX ADMIN — METHYLNALTREXONE BROMIDE 4 MG: 12 INJECTION, SOLUTION SUBCUTANEOUS at 12:48

## 2024-01-01 RX ADMIN — MIDAZOLAM HYDROCHLORIDE 0.5 MG: 1 INJECTION, SOLUTION INTRAMUSCULAR; INTRAVENOUS at 19:25

## 2024-01-01 RX ADMIN — GLYCOPYRROLATE 0.4 MG: 0.2 INJECTION INTRAMUSCULAR; INTRAVENOUS at 23:19

## 2024-01-01 RX ADMIN — DICLOFENAC SODIUM 2 G: 9.3 GEL TOPICAL at 08:55

## 2024-01-01 RX ADMIN — MINERAL OIL: 1000 LIQUID ORAL at 08:49

## 2024-01-01 RX ADMIN — DICLOFENAC SODIUM 2 G: 9.3 GEL TOPICAL at 21:32

## 2024-01-01 RX ADMIN — MORPHINE SULFATE 2 MG: 2 INJECTION, SOLUTION INTRAMUSCULAR; INTRAVENOUS at 00:46

## 2024-01-01 RX ADMIN — GLYCOPYRROLATE 0.4 MG: 0.2 INJECTION INTRAMUSCULAR; INTRAVENOUS at 06:44

## 2024-01-01 RX ADMIN — MORPHINE SULFATE 2 MG: 2 INJECTION, SOLUTION INTRAMUSCULAR; INTRAVENOUS at 05:26

## 2024-01-01 RX ADMIN — NYSTATIN 500000 UNITS: 100000 SUSPENSION ORAL at 17:43

## 2024-01-01 RX ADMIN — MIDAZOLAM HYDROCHLORIDE 0.5 MG: 1 INJECTION, SOLUTION INTRAMUSCULAR; INTRAVENOUS at 07:00

## 2024-01-01 RX ADMIN — MORPHINE SULFATE 1 MG: 2 INJECTION, SOLUTION INTRAMUSCULAR; INTRAVENOUS at 15:06

## 2024-01-01 RX ADMIN — DICLOFENAC SODIUM 2 G: 9.3 GEL TOPICAL at 20:38

## 2024-01-01 RX ADMIN — MORPHINE SULFATE 2 MG: 2 INJECTION, SOLUTION INTRAMUSCULAR; INTRAVENOUS at 10:19

## 2024-01-01 RX ADMIN — MORPHINE SULFATE 2 MG: 2 INJECTION, SOLUTION INTRAMUSCULAR; INTRAVENOUS at 04:58

## 2024-01-01 RX ADMIN — MORPHINE SULFATE 2 MG: 2 INJECTION, SOLUTION INTRAMUSCULAR; INTRAVENOUS at 20:34

## 2024-01-01 RX ADMIN — MIDAZOLAM HYDROCHLORIDE 1 MG: 1 INJECTION, SOLUTION INTRAMUSCULAR; INTRAVENOUS at 18:47

## 2024-01-01 RX ADMIN — MORPHINE SULFATE 2 MG: 2 INJECTION, SOLUTION INTRAMUSCULAR; INTRAVENOUS at 20:24

## 2024-01-01 RX ADMIN — MINERAL OIL: 1000 LIQUID ORAL at 21:22

## 2024-01-01 RX ADMIN — KETOROLAC TROMETHAMINE 15 MG: 15 INJECTION, SOLUTION INTRAMUSCULAR; INTRAVENOUS at 14:00

## 2024-01-01 RX ADMIN — MORPHINE SULFATE 4 MG: 4 INJECTION, SOLUTION INTRAMUSCULAR; INTRAVENOUS at 08:12

## 2024-01-01 RX ADMIN — MORPHINE SULFATE 1 MG: 2 INJECTION, SOLUTION INTRAMUSCULAR; INTRAVENOUS at 09:33

## 2024-01-01 RX ADMIN — FUROSEMIDE 20 MG: 10 INJECTION, SOLUTION INTRAMUSCULAR; INTRAVENOUS at 21:36

## 2024-01-01 RX ADMIN — MORPHINE SULFATE 2 MG: 2 INJECTION, SOLUTION INTRAMUSCULAR; INTRAVENOUS at 18:39

## 2024-01-01 RX ADMIN — GLYCOPYRROLATE 0.4 MG: 0.2 INJECTION INTRAMUSCULAR; INTRAVENOUS at 00:24

## 2024-01-01 RX ADMIN — FUROSEMIDE 20 MG: 10 INJECTION, SOLUTION INTRAMUSCULAR; INTRAVENOUS at 02:27

## 2024-01-01 RX ADMIN — MINERAL OIL: 1000 LIQUID ORAL at 02:21

## 2024-01-01 RX ADMIN — MORPHINE SULFATE 1 MG: 2 INJECTION, SOLUTION INTRAMUSCULAR; INTRAVENOUS at 02:18

## 2024-01-01 RX ADMIN — MIDAZOLAM HYDROCHLORIDE 1 MG: 1 INJECTION, SOLUTION INTRAMUSCULAR; INTRAVENOUS at 00:21

## 2024-01-01 RX ADMIN — FUROSEMIDE 20 MG: 10 INJECTION, SOLUTION INTRAMUSCULAR; INTRAVENOUS at 08:16

## 2024-01-01 RX ADMIN — MORPHINE SULFATE 2 MG: 2 INJECTION, SOLUTION INTRAMUSCULAR; INTRAVENOUS at 05:03

## 2024-01-01 RX ADMIN — NYSTATIN 500000 UNITS: 100000 SUSPENSION ORAL at 08:20

## 2024-01-01 RX ADMIN — GLYCOPYRROLATE 0.4 MG: 0.2 INJECTION INTRAMUSCULAR; INTRAVENOUS at 06:08

## 2024-01-01 RX ADMIN — MORPHINE SULFATE 1 MG: 2 INJECTION, SOLUTION INTRAMUSCULAR; INTRAVENOUS at 21:22

## 2024-01-01 RX ADMIN — BISACODYL 10 MG: 10 SUPPOSITORY RECTAL at 11:45

## 2024-01-01 RX ADMIN — MINERAL OIL: 1000 LIQUID ORAL at 14:53

## 2024-01-01 RX ADMIN — MORPHINE SULFATE 4 MG: 4 INJECTION, SOLUTION INTRAMUSCULAR; INTRAVENOUS at 02:23

## 2024-01-01 RX ADMIN — HALOPERIDOL LACTATE 2 MG: 5 INJECTION, SOLUTION INTRAMUSCULAR at 07:44

## 2024-01-01 RX ADMIN — MINERAL OIL: 1000 LIQUID ORAL at 03:51

## 2024-01-01 RX ADMIN — NYSTATIN 500000 UNITS: 100000 SUSPENSION ORAL at 08:12

## 2024-01-01 RX ADMIN — MORPHINE SULFATE 1 MG: 2 INJECTION, SOLUTION INTRAMUSCULAR; INTRAVENOUS at 15:05

## 2024-01-01 RX ADMIN — DICLOFENAC SODIUM 2 G: 9.3 GEL TOPICAL at 17:19

## 2024-01-01 RX ADMIN — MORPHINE SULFATE 1 MG: 2 INJECTION, SOLUTION INTRAMUSCULAR; INTRAVENOUS at 11:52

## 2024-01-01 RX ADMIN — MORPHINE SULFATE 2 MG: 2 INJECTION, SOLUTION INTRAMUSCULAR; INTRAVENOUS at 00:21

## 2024-01-01 RX ADMIN — MORPHINE SULFATE 2 MG: 2 INJECTION, SOLUTION INTRAMUSCULAR; INTRAVENOUS at 01:43

## 2024-01-01 RX ADMIN — MIDAZOLAM HYDROCHLORIDE 1 MG: 1 INJECTION, SOLUTION INTRAMUSCULAR; INTRAVENOUS at 15:00

## 2024-01-01 RX ADMIN — MORPHINE SULFATE 4 MG: 4 INJECTION, SOLUTION INTRAMUSCULAR; INTRAVENOUS at 11:06

## 2024-01-01 RX ADMIN — MIDAZOLAM HYDROCHLORIDE 1 MG: 1 INJECTION, SOLUTION INTRAMUSCULAR; INTRAVENOUS at 03:33

## 2024-01-01 RX ADMIN — MORPHINE SULFATE 2 MG: 2 INJECTION, SOLUTION INTRAMUSCULAR; INTRAVENOUS at 14:00

## 2024-01-01 RX ADMIN — MORPHINE SULFATE 4 MG: 4 INJECTION, SOLUTION INTRAMUSCULAR; INTRAVENOUS at 18:11

## 2024-01-01 RX ADMIN — NYSTATIN 500000 UNITS: 100000 SUSPENSION ORAL at 09:06

## 2024-01-01 RX ADMIN — MINERAL OIL: 1000 LIQUID ORAL at 09:00

## 2024-01-01 RX ADMIN — NYSTATIN 500000 UNITS: 100000 SUSPENSION ORAL at 08:53

## 2024-01-01 RX ADMIN — SCOPOLAMINE 1 PATCH: 1.5 PATCH, EXTENDED RELEASE TRANSDERMAL at 13:06

## 2024-01-01 RX ADMIN — GLYCOPYRROLATE 0.4 MG: 0.2 INJECTION INTRAMUSCULAR; INTRAVENOUS at 08:16

## 2024-01-01 RX ADMIN — MINERAL OIL: 1000 LIQUID ORAL at 08:21

## 2024-01-01 RX ADMIN — MORPHINE SULFATE 2 MG: 2 INJECTION, SOLUTION INTRAMUSCULAR; INTRAVENOUS at 07:01

## 2024-01-01 RX ADMIN — NYSTATIN 500000 UNITS: 100000 SUSPENSION ORAL at 07:44

## 2024-01-01 RX ADMIN — MORPHINE SULFATE 2 MG: 2 INJECTION, SOLUTION INTRAMUSCULAR; INTRAVENOUS at 21:17

## 2024-01-01 RX ADMIN — MIDAZOLAM HYDROCHLORIDE 1 MG: 1 INJECTION, SOLUTION INTRAMUSCULAR; INTRAVENOUS at 00:46

## 2024-01-01 RX ADMIN — MIDAZOLAM HYDROCHLORIDE 1 MG: 1 INJECTION, SOLUTION INTRAMUSCULAR; INTRAVENOUS at 07:47

## 2024-01-01 RX ADMIN — MIDAZOLAM HYDROCHLORIDE 1 MG: 1 INJECTION, SOLUTION INTRAMUSCULAR; INTRAVENOUS at 11:06

## 2024-01-01 RX ADMIN — MINERAL OIL: 1000 LIQUID ORAL at 10:20

## 2024-01-01 RX ADMIN — MORPHINE SULFATE 4 MG: 4 INJECTION, SOLUTION INTRAMUSCULAR; INTRAVENOUS at 21:24

## 2024-01-01 RX ADMIN — MIDAZOLAM HYDROCHLORIDE 1 MG: 1 INJECTION, SOLUTION INTRAMUSCULAR; INTRAVENOUS at 05:33

## 2024-01-01 RX ADMIN — DICLOFENAC SODIUM 2 G: 9.3 GEL TOPICAL at 21:04

## 2024-01-01 RX ADMIN — DICLOFENAC SODIUM 2 G: 9.3 GEL TOPICAL at 08:30

## 2024-01-01 RX ADMIN — DICLOFENAC SODIUM 2 G: 9.3 GEL TOPICAL at 08:38

## 2024-01-01 RX ADMIN — MORPHINE SULFATE 1 MG: 2 INJECTION, SOLUTION INTRAMUSCULAR; INTRAVENOUS at 20:17

## 2024-01-01 RX ADMIN — MORPHINE SULFATE 1 MG: 2 INJECTION, SOLUTION INTRAMUSCULAR; INTRAVENOUS at 14:53

## 2024-01-01 RX ADMIN — MINERAL OIL: 1000 LIQUID ORAL at 09:35

## 2024-01-01 RX ADMIN — MORPHINE SULFATE 2 MG: 2 INJECTION, SOLUTION INTRAMUSCULAR; INTRAVENOUS at 11:07

## 2024-01-01 RX ADMIN — MORPHINE SULFATE 1 MG: 2 INJECTION, SOLUTION INTRAMUSCULAR; INTRAVENOUS at 21:32

## 2024-01-01 RX ADMIN — MIDAZOLAM HYDROCHLORIDE 1 MG: 1 INJECTION, SOLUTION INTRAMUSCULAR; INTRAVENOUS at 13:54

## 2024-01-01 RX ADMIN — MORPHINE SULFATE 2 MG: 2 INJECTION, SOLUTION INTRAMUSCULAR; INTRAVENOUS at 05:33

## 2024-01-01 RX ADMIN — NYSTATIN 500000 UNITS: 100000 SUSPENSION ORAL at 11:06

## 2024-01-01 RX ADMIN — BISACODYL 10 MG: 10 SUPPOSITORY RECTAL at 15:00

## 2024-01-01 RX ADMIN — MORPHINE SULFATE 2 MG: 2 INJECTION, SOLUTION INTRAMUSCULAR; INTRAVENOUS at 00:15

## 2024-01-01 RX ADMIN — MINERAL OIL: 1000 LIQUID ORAL at 21:24

## 2024-01-01 RX ADMIN — MORPHINE SULFATE 1 MG: 2 INJECTION, SOLUTION INTRAMUSCULAR; INTRAVENOUS at 08:52

## 2024-01-01 RX ADMIN — NYSTATIN 500000 UNITS: 100000 SUSPENSION ORAL at 11:31

## 2024-01-01 RX ADMIN — MORPHINE SULFATE 2 MG: 2 INJECTION, SOLUTION INTRAMUSCULAR; INTRAVENOUS at 14:59

## 2024-01-01 RX ADMIN — MIDAZOLAM HYDROCHLORIDE 1 MG: 1 INJECTION, SOLUTION INTRAMUSCULAR; INTRAVENOUS at 12:48

## 2024-01-01 RX ADMIN — MORPHINE SULFATE 1 MG: 2 INJECTION, SOLUTION INTRAMUSCULAR; INTRAVENOUS at 03:33

## 2024-01-01 RX ADMIN — SCOPOLAMINE 1 PATCH: 1.5 PATCH, EXTENDED RELEASE TRANSDERMAL at 12:48

## 2024-01-01 RX ADMIN — MORPHINE SULFATE 2 MG: 2 INJECTION, SOLUTION INTRAMUSCULAR; INTRAVENOUS at 16:35

## 2024-01-01 RX ADMIN — FUROSEMIDE 20 MG: 10 INJECTION, SOLUTION INTRAMUSCULAR; INTRAVENOUS at 08:12

## 2024-01-01 RX ADMIN — MORPHINE SULFATE 4 MG: 4 INJECTION, SOLUTION INTRAMUSCULAR; INTRAVENOUS at 10:04

## 2024-01-01 RX ADMIN — MORPHINE SULFATE 2 MG: 2 INJECTION, SOLUTION INTRAMUSCULAR; INTRAVENOUS at 19:25

## 2024-01-01 RX ADMIN — MORPHINE SULFATE 4 MG: 4 INJECTION, SOLUTION INTRAMUSCULAR; INTRAVENOUS at 06:07

## 2024-01-01 RX ADMIN — NYSTATIN 500000 UNITS: 100000 SUSPENSION ORAL at 11:52

## 2024-01-01 RX ADMIN — MINERAL OIL: 1000 LIQUID ORAL at 08:12

## 2024-01-01 RX ADMIN — MORPHINE SULFATE 2 MG: 2 INJECTION, SOLUTION INTRAMUSCULAR; INTRAVENOUS at 08:20

## 2024-01-01 RX ADMIN — NYSTATIN 500000 UNITS: 100000 SUSPENSION ORAL at 10:26

## 2024-01-01 RX ADMIN — GLYCOPYRROLATE 0.4 MG: 0.2 INJECTION INTRAMUSCULAR; INTRAVENOUS at 05:28

## 2024-01-01 RX ADMIN — MORPHINE SULFATE 2 MG: 2 INJECTION, SOLUTION INTRAMUSCULAR; INTRAVENOUS at 08:48

## 2024-01-01 RX ADMIN — MINERAL OIL: 1000 LIQUID ORAL at 21:32

## 2024-01-01 RX ADMIN — NYSTATIN 500000 UNITS: 100000 SUSPENSION ORAL at 13:28

## 2024-01-01 RX ADMIN — DICLOFENAC SODIUM 2 G: 9.3 GEL TOPICAL at 22:43

## 2024-01-01 RX ADMIN — NYSTATIN 500000 UNITS: 100000 SUSPENSION ORAL at 17:47

## 2024-01-01 RX ADMIN — MORPHINE SULFATE 2 MG: 2 INJECTION, SOLUTION INTRAMUSCULAR; INTRAVENOUS at 00:09

## 2024-01-01 RX ADMIN — MORPHINE SULFATE 1 MG: 2 INJECTION, SOLUTION INTRAMUSCULAR; INTRAVENOUS at 14:46

## 2024-01-01 RX ADMIN — KETOROLAC TROMETHAMINE 15 MG: 15 INJECTION, SOLUTION INTRAMUSCULAR; INTRAVENOUS at 06:11

## 2024-01-01 RX ADMIN — NYSTATIN 500000 UNITS: 100000 SUSPENSION ORAL at 13:07

## 2024-01-01 RX ADMIN — MORPHINE SULFATE 2 MG: 2 INJECTION, SOLUTION INTRAMUSCULAR; INTRAVENOUS at 12:49

## 2024-01-01 RX ADMIN — MORPHINE SULFATE 2 MG: 2 INJECTION, SOLUTION INTRAMUSCULAR; INTRAVENOUS at 06:11

## 2024-01-01 RX ADMIN — MORPHINE SULFATE 2 MG: 2 INJECTION, SOLUTION INTRAMUSCULAR; INTRAVENOUS at 21:08

## 2024-01-01 RX ADMIN — MORPHINE SULFATE 4 MG: 4 INJECTION, SOLUTION INTRAMUSCULAR; INTRAVENOUS at 07:44

## 2024-01-01 RX ADMIN — NYSTATIN 500000 UNITS: 100000 SUSPENSION ORAL at 21:24

## 2024-01-01 RX ADMIN — MORPHINE SULFATE 2 MG: 2 INJECTION, SOLUTION INTRAMUSCULAR; INTRAVENOUS at 13:27

## 2024-01-01 RX ADMIN — DICLOFENAC SODIUM 2 G: 9.3 GEL TOPICAL at 08:52

## 2024-01-01 RX ADMIN — NYSTATIN 500000 UNITS: 100000 SUSPENSION ORAL at 08:15

## 2024-01-01 RX ADMIN — KETOROLAC TROMETHAMINE 15 MG: 15 INJECTION, SOLUTION INTRAMUSCULAR; INTRAVENOUS at 00:09

## 2024-01-01 RX ADMIN — GLYCOPYRROLATE 0.4 MG: 0.2 INJECTION INTRAMUSCULAR; INTRAVENOUS at 10:43

## 2024-01-01 RX ADMIN — MINERAL OIL: 1000 LIQUID ORAL at 21:09

## 2024-01-01 RX ADMIN — NYSTATIN 500000 UNITS: 100000 SUSPENSION ORAL at 17:23

## 2024-01-01 RX ADMIN — MORPHINE SULFATE 2 MG: 2 INJECTION, SOLUTION INTRAMUSCULAR; INTRAVENOUS at 12:36

## 2024-01-01 RX ADMIN — MORPHINE SULFATE 2 MG: 2 INJECTION, SOLUTION INTRAMUSCULAR; INTRAVENOUS at 13:07

## 2024-01-01 RX ADMIN — MORPHINE SULFATE 4 MG: 4 INJECTION, SOLUTION INTRAMUSCULAR; INTRAVENOUS at 15:34

## 2024-01-02 ENCOUNTER — APPOINTMENT (OUTPATIENT)
Dept: GENERAL RADIOLOGY | Facility: HOSPITAL | Age: 89
End: 2024-01-02
Payer: MEDICARE

## 2024-01-02 ENCOUNTER — HOSPITAL ENCOUNTER (EMERGENCY)
Facility: HOSPITAL | Age: 89
Discharge: HOME | End: 2024-01-02
Attending: EMERGENCY MEDICINE | Admitting: EMERGENCY MEDICINE
Payer: MEDICARE

## 2024-01-02 ENCOUNTER — APPOINTMENT (OUTPATIENT)
Dept: CT IMAGING | Facility: HOSPITAL | Age: 89
End: 2024-01-02
Payer: MEDICARE

## 2024-01-02 VITALS
SYSTOLIC BLOOD PRESSURE: 176 MMHG | HEIGHT: 67 IN | OXYGEN SATURATION: 94 % | RESPIRATION RATE: 16 BRPM | BODY MASS INDEX: 20.4 KG/M2 | DIASTOLIC BLOOD PRESSURE: 98 MMHG | TEMPERATURE: 98.8 F | HEART RATE: 81 BPM | WEIGHT: 130 LBS

## 2024-01-02 DIAGNOSIS — S09.90XA INJURY OF HEAD, INITIAL ENCOUNTER: Primary | ICD-10-CM

## 2024-01-02 DIAGNOSIS — N39.0 ACUTE UTI: ICD-10-CM

## 2024-01-02 DIAGNOSIS — S00.03XA HEMATOMA OF SCALP, INITIAL ENCOUNTER: ICD-10-CM

## 2024-01-02 LAB
ALBUMIN SERPL-MCNC: 4.5 G/DL (ref 3.5–5.2)
ALBUMIN/GLOB SERPL: 2 G/DL
ALP SERPL-CCNC: 109 U/L (ref 39–117)
ALT SERPL W P-5'-P-CCNC: 10 U/L (ref 1–33)
ANION GAP SERPL CALCULATED.3IONS-SCNC: 12 MMOL/L (ref 5–15)
AST SERPL-CCNC: 17 U/L (ref 1–32)
BACTERIA UR QL AUTO: ABNORMAL /HPF
BASOPHILS # BLD AUTO: 0.04 10*3/MM3 (ref 0–0.2)
BASOPHILS NFR BLD AUTO: 0.4 % (ref 0–1.5)
BILIRUB SERPL-MCNC: 0.4 MG/DL (ref 0–1.2)
BILIRUB UR QL STRIP: NEGATIVE
BUN SERPL-MCNC: 21 MG/DL (ref 8–23)
BUN/CREAT SERPL: 23.3 (ref 7–25)
CALCIUM SPEC-SCNC: 9.4 MG/DL (ref 8.2–9.6)
CHLORIDE SERPL-SCNC: 103 MMOL/L (ref 98–107)
CLARITY UR: ABNORMAL
CO2 SERPL-SCNC: 27 MMOL/L (ref 22–29)
COLOR UR: YELLOW
CREAT SERPL-MCNC: 0.9 MG/DL (ref 0.57–1)
DEPRECATED RDW RBC AUTO: 58.9 FL (ref 37–54)
EGFRCR SERPLBLD CKD-EPI 2021: 59.7 ML/MIN/1.73
EOSINOPHIL # BLD AUTO: 0.04 10*3/MM3 (ref 0–0.4)
EOSINOPHIL NFR BLD AUTO: 0.4 % (ref 0.3–6.2)
ERYTHROCYTE [DISTWIDTH] IN BLOOD BY AUTOMATED COUNT: 14.8 % (ref 12.3–15.4)
GLOBULIN UR ELPH-MCNC: 2.2 GM/DL
GLUCOSE SERPL-MCNC: 98 MG/DL (ref 65–99)
GLUCOSE UR STRIP-MCNC: NEGATIVE MG/DL
HCT VFR BLD AUTO: 36.1 % (ref 34–46.6)
HGB BLD-MCNC: 11.4 G/DL (ref 12–15.9)
HGB UR QL STRIP.AUTO: ABNORMAL
HOLD SPECIMEN: NORMAL
HYALINE CASTS UR QL AUTO: ABNORMAL /LPF
IMM GRANULOCYTES # BLD AUTO: 0.1 10*3/MM3 (ref 0–0.05)
IMM GRANULOCYTES NFR BLD AUTO: 1 % (ref 0–0.5)
KETONES UR QL STRIP: NEGATIVE
LEUKOCYTE ESTERASE UR QL STRIP.AUTO: ABNORMAL
LYMPHOCYTES # BLD AUTO: 0.8 10*3/MM3 (ref 0.7–3.1)
LYMPHOCYTES NFR BLD AUTO: 8.1 % (ref 19.6–45.3)
MAGNESIUM SERPL-MCNC: 1.8 MG/DL (ref 1.7–2.3)
MCH RBC QN AUTO: 33.8 PG (ref 26.6–33)
MCHC RBC AUTO-ENTMCNC: 31.6 G/DL (ref 31.5–35.7)
MCV RBC AUTO: 107.1 FL (ref 79–97)
MONOCYTES # BLD AUTO: 0.44 10*3/MM3 (ref 0.1–0.9)
MONOCYTES NFR BLD AUTO: 4.4 % (ref 5–12)
NEUTROPHILS NFR BLD AUTO: 8.47 10*3/MM3 (ref 1.7–7)
NEUTROPHILS NFR BLD AUTO: 85.7 % (ref 42.7–76)
NITRITE UR QL STRIP: NEGATIVE
NRBC BLD AUTO-RTO: 0 /100 WBC (ref 0–0.2)
PH UR STRIP.AUTO: 5.5 [PH] (ref 5–8)
PLATELET # BLD AUTO: 230 10*3/MM3 (ref 140–450)
PMV BLD AUTO: 10.1 FL (ref 6–12)
POTASSIUM SERPL-SCNC: 4.4 MMOL/L (ref 3.5–5.2)
PROT SERPL-MCNC: 6.7 G/DL (ref 6–8.5)
PROT UR QL STRIP: ABNORMAL
QT INTERVAL: 370 MS
QTC INTERVAL: 434 MS
RBC # BLD AUTO: 3.37 10*6/MM3 (ref 3.77–5.28)
RBC # UR STRIP: ABNORMAL /HPF
REF LAB TEST METHOD: ABNORMAL
SODIUM SERPL-SCNC: 142 MMOL/L (ref 136–145)
SP GR UR STRIP: 1.02 (ref 1–1.03)
SQUAMOUS #/AREA URNS HPF: ABNORMAL /HPF
TRANS CELLS #/AREA URNS HPF: ABNORMAL /HPF
TROPONIN T SERPL HS-MCNC: 35 NG/L
UROBILINOGEN UR QL STRIP: ABNORMAL
WBC # UR STRIP: ABNORMAL /HPF
WBC NRBC COR # BLD AUTO: 9.89 10*3/MM3 (ref 3.4–10.8)
WHOLE BLOOD HOLD COAG: NORMAL
WHOLE BLOOD HOLD SPECIMEN: NORMAL

## 2024-01-02 PROCEDURE — 84484 ASSAY OF TROPONIN QUANT: CPT | Performed by: EMERGENCY MEDICINE

## 2024-01-02 PROCEDURE — 93005 ELECTROCARDIOGRAM TRACING: CPT

## 2024-01-02 PROCEDURE — 85025 COMPLETE CBC W/AUTO DIFF WBC: CPT | Performed by: EMERGENCY MEDICINE

## 2024-01-02 PROCEDURE — 93005 ELECTROCARDIOGRAM TRACING: CPT | Performed by: EMERGENCY MEDICINE

## 2024-01-02 PROCEDURE — 99284 EMERGENCY DEPT VISIT MOD MDM: CPT

## 2024-01-02 PROCEDURE — 83735 ASSAY OF MAGNESIUM: CPT | Performed by: EMERGENCY MEDICINE

## 2024-01-02 PROCEDURE — 80053 COMPREHEN METABOLIC PANEL: CPT | Performed by: EMERGENCY MEDICINE

## 2024-01-02 PROCEDURE — 70450 CT HEAD/BRAIN W/O DYE: CPT

## 2024-01-02 PROCEDURE — 71045 X-RAY EXAM CHEST 1 VIEW: CPT

## 2024-01-02 PROCEDURE — 81001 URINALYSIS AUTO W/SCOPE: CPT | Performed by: EMERGENCY MEDICINE

## 2024-01-02 RX ORDER — ACETAMINOPHEN 500 MG
1000 TABLET ORAL ONCE
Status: COMPLETED | OUTPATIENT
Start: 2024-01-02 | End: 2024-01-02

## 2024-01-02 RX ORDER — SODIUM CHLORIDE 0.9 % (FLUSH) 0.9 %
10 SYRINGE (ML) INJECTION AS NEEDED
Status: DISCONTINUED | OUTPATIENT
Start: 2024-01-02 | End: 2024-01-02 | Stop reason: HOSPADM

## 2024-01-02 RX ORDER — CEFUROXIME AXETIL 500 MG/1
500 TABLET ORAL 2 TIMES DAILY
Qty: 10 TABLET | Refills: 0 | Status: SHIPPED | OUTPATIENT
Start: 2024-01-02 | End: 2024-01-08

## 2024-01-02 RX ORDER — CEFUROXIME AXETIL 250 MG/1
500 TABLET ORAL ONCE
Status: COMPLETED | OUTPATIENT
Start: 2024-01-02 | End: 2024-01-02

## 2024-01-02 RX ADMIN — ACETAMINOPHEN 1000 MG: 500 TABLET ORAL at 18:35

## 2024-01-02 RX ADMIN — CEFUROXIME AXETIL 500 MG: 250 TABLET, FILM COATED ORAL at 18:35

## 2024-01-02 NOTE — ED PROVIDER NOTES
EMERGENCY DEPARTMENT ENCOUNTER    Pt Name: Pati Carter  MRN: [id number]  Pt :   1930  Room Number:    Date of encounter:  2024  PCP: Pallavi Eduardo MD  ED Provider: Danny Le MD    Historian: Patient and paramedics      HPI:  Chief Complaint: Fall with head injury        Context: Pati Carter is a 89 y.o. female who presents to the ED c/o fall with head injury at her nursing facility.  The patient is uncertain as to the reason for her fall.  She does remember that she fell in the bathroom.  She struck her head and has a little bit of discomfort in the right side of her head.  She does not believe she lost consciousness.  She denies neck and back pain.      PAST MEDICAL HISTORY  Past Medical History:   Diagnosis Date    Anemia     Description: A.  Dx - borderline intermittent.    Back pain     Benign colonic polyp 2016    Description: A.  Dx .    Ferny Bonnet syndrome 2020    Chondrocalcinosis     knees    Compression fracture of lumbar vertebra     Constipation     COPD (chronic obstructive pulmonary disease)     Description: A.  Rule out chronic persistent asthma, COPD, or obliterative bronchiolitis.- Butch    COVID-19 virus infection 10/11/2020    CTS (carpal tunnel syndrome)     Degeneration of intervertebral disc of lumbar region     Description: A.  Diagnosed in 2013 with advanced multilevel with severe spinal stenosis, followed by Dr. Pillai for pain management.    Gastroesophageal reflux disease     Hearing loss     History of calcium pyrophosphate deposition disease (CPPD)     History of colonoscopy 1999    NORMAL PER PATIENT     History of mammogram 2011    NORMAL PER PT     History of Papanicolaou smear of cervix 2010    NORMAL PER PT     History of varicella     Hyperlipidemia     Description: A.  Dx .    Hypertension     Description: A. Dx .    Macular degeneration     Nocturia     Osteoporosis     Ovarian mass      Dx 8/15- benign left cystic adnexal mass    Overactive bladder     Pelvic floor dysfunction     Rheumatoid arthritis     Description: A.  Diagnosed in 2000 and and followed by Dr. Constantino (now Dr. Kaplan). B.  On methotrexate therapy since 2000. C.  Off low-dose prednisone therapy.    Vitamin D deficiency          PAST SURGICAL HISTORY  Past Surgical History:   Procedure Laterality Date    APPENDECTOMY      CARPAL TUNNEL RELEASE Left 01/01/2003    HISTORY OF NEUROPLASTY DECOMPRESSION MEDIAN NERVE AT CARPAL TUNNEL LEFT    CATARACT EXTRACTION Bilateral 01/01/2009    CHOLECYSTECTOMY  01/01/1962    HIP CANNULATED SCREW PLACEMENT Right 1/25/2020    Procedure: HIP CANNULATED SCREW PLACEMENT RIGHT;  Surgeon: Karlos Blount MD;  Location: [facility];  Service: Orthopedics    KNEE ARTHROSCOPY Left 01/01/2001    MENISCAL REPAIR    KYPHOPLASTY  06/18/2015    T11 AND L1 (JOSE A)    PELVIC LAPAROSCOPY  01/01/1996    REMOVAL OF BENIGN UTERINE AND RIGHT OVARIAN TUMORS    SALPINGO OOPHORECTOMY Left 08/26/2015    REMOVAL OF LEFT OVARY AND TUBE (benign cystic mass)         FAMILY HISTORY  Family History   Problem Relation Age of Onset    Hypertension Mother     Diabetes Mother          SOCIAL HISTORY  Social History     Socioeconomic History    Marital status:    Tobacco Use    Smoking status: Former     Years: .5     Types: Cigarettes    Smokeless tobacco: Never   Vaping Use    Vaping Use: Never used   Substance and Sexual Activity    Alcohol use: No    Drug use: No    Sexual activity: Not Currently         ALLERGIES  Ciprofloxacin, Levofloxacin, Sulfamethoxazole-trimethoprim, Sulfamethoxazole, and Trimethoprim        REVIEW OF SYSTEMS  Review of Systems       All systems reviewed and negative except for those discussed in HPI.       PHYSICAL EXAM    I have reviewed the triage vital signs and nursing notes.    ED Triage Vitals   Temp Heart Rate Resp BP SpO2   01/02/24 1626 01/02/24 1626 01/02/24 1626 01/02/24 1631  01/02/24 1626   98.8 °F (37.1 °C) 86 16 (!) 194/106 92 %      Temp src Heart Rate Source Patient Position BP Location FiO2 (%)   01/02/24 1626 01/02/24 1626 01/02/24 1626 01/02/24 1626 --   Oral Monitor Lying Right arm        Physical Exam  GENERAL:   Appears in no acute distress.  Very pleasant and smiling.  HENT: Nares patent.  Right parietal scalp hematoma posteriorly.  This is a relatively small hematoma.  EYES: No scleral icterus.  CV: Regular rhythm, regular rate.  No murmurs gallops rubs  RESPIRATORY: Normal effort.  No audible wheezes, rales or rhonchi.  Clear to auscultation  ABDOMEN: Soft, nontender  MUSCULOSKELETAL: No deformities.  No CT LS spine tenderness to palpation.  NEURO: Alert, moves all extremities, follows commands.  Oriented x 3  SKIN: Warm, dry, no rash visualized.  No skin breaks      LAB RESULTS  Recent Results (from the past 24 hour(s))   ECG 12 Lead ED Triage Standing Order; Weak / Dizzy / AMS    Collection Time: 01/02/24  4:30 PM   Result Value Ref Range    QT Interval 370 ms    QTC Interval 434 ms   Comprehensive Metabolic Panel    Collection Time: 01/02/24  5:25 PM    Specimen: Blood   Result Value Ref Range    Glucose 98 65 - 99 mg/dL    BUN 21 8 - 23 mg/dL    Creatinine 0.90 0.57 - 1.00 mg/dL    Sodium 142 136 - 145 mmol/L    Potassium 4.4 3.5 - 5.2 mmol/L    Chloride 103 98 - 107 mmol/L    CO2 27.0 22.0 - 29.0 mmol/L    Calcium 9.4 8.2 - 9.6 mg/dL    Total Protein 6.7 6.0 - 8.5 g/dL    Albumin 4.5 3.5 - 5.2 g/dL    ALT (SGPT) 10 1 - 33 U/L    AST (SGOT) 17 1 - 32 U/L    Alkaline Phosphatase 109 39 - 117 U/L    Total Bilirubin 0.4 0.0 - 1.2 mg/dL    Globulin 2.2 gm/dL    A/G Ratio 2.0 g/dL    BUN/Creatinine Ratio 23.3 7.0 - 25.0    Anion Gap 12.0 5.0 - 15.0 mmol/L    eGFR 59.7 (L) >60.0 mL/min/1.73   Magnesium    Collection Time: 01/02/24  5:25 PM    Specimen: Blood   Result Value Ref Range    Magnesium 1.8 1.7 - 2.3 mg/dL   CBC Auto Differential    Collection Time: 01/02/24  5:25 PM     Specimen: Blood   Result Value Ref Range    WBC 9.89 3.40 - 10.80 10*3/mm3    RBC 3.37 (L) 3.77 - 5.28 10*6/mm3    Hemoglobin 11.4 (L) 12.0 - 15.9 g/dL    Hematocrit 36.1 34.0 - 46.6 %    .1 (H) 79.0 - 97.0 fL    MCH 33.8 (H) 26.6 - 33.0 pg    MCHC 31.6 31.5 - 35.7 g/dL    RDW 14.8 12.3 - 15.4 %    RDW-SD 58.9 (H) 37.0 - 54.0 fl    MPV 10.1 6.0 - 12.0 fL    Platelets 230 140 - 450 10*3/mm3    Neutrophil % 85.7 (H) 42.7 - 76.0 %    Lymphocyte % 8.1 (L) 19.6 - 45.3 %    Monocyte % 4.4 (L) 5.0 - 12.0 %    Eosinophil % 0.4 0.3 - 6.2 %    Basophil % 0.4 0.0 - 1.5 %    Immature Grans % 1.0 (H) 0.0 - 0.5 %    Neutrophils, Absolute 8.47 (H) 1.70 - 7.00 10*3/mm3    Lymphocytes, Absolute 0.80 0.70 - 3.10 10*3/mm3    Monocytes, Absolute 0.44 0.10 - 0.90 10*3/mm3    Eosinophils, Absolute 0.04 0.00 - 0.40 10*3/mm3    Basophils, Absolute 0.04 0.00 - 0.20 10*3/mm3    Immature Grans, Absolute 0.10 (H) 0.00 - 0.05 10*3/mm3    nRBC 0.0 0.0 - 0.2 /100 WBC   Urinalysis With Microscopic If Indicated (No Culture) - Urine, Clean Catch    Collection Time: 01/02/24  5:38 PM    Specimen: Urine, Clean Catch   Result Value Ref Range    Color, UA Yellow Yellow, Straw    Appearance, UA Cloudy (A) Clear    pH, UA 5.5 5.0 - 8.0    Specific Gravity, UA 1.022 1.001 - 1.030    Glucose, UA Negative Negative    Ketones, UA Negative Negative    Bilirubin, UA Negative Negative    Blood, UA Large (3+) (A) Negative    Protein,  mg/dL (2+) (A) Negative    Leuk Esterase, UA Moderate (2+) (A) Negative    Nitrite, UA Negative Negative    Urobilinogen, UA 0.2 E.U./dL 0.2 - 1.0 E.U./dL   Urinalysis, Microscopic Only - Urine, Clean Catch    Collection Time: 01/02/24  5:38 PM    Specimen: Urine, Clean Catch   Result Value Ref Range    RBC, UA 21-50 (A) None Seen, 0-2 /HPF    WBC, UA Too Numerous to Count (A) None Seen, 0-2 /HPF    Bacteria, UA 1+ (A) None Seen, Trace /HPF    Squamous Epithelial Cells, UA 3-6 (A) None Seen, 0-2 /HPF    Transitional  Epithelial Cells, UA 3-6 (A) 0 - 2 /HPF    Hyaline Casts, UA 0-6 0 - 6 /LPF    Methodology Manual Light Microscopy    Single High Sensitivity Troponin T    Collection Time: 01/02/24  5:46 PM    Specimen: Blood   Result Value Ref Range    HS Troponin T 35 (H) <14 ng/L       If labs were ordered, I independently reviewed the results and considered them in treating the patient.        RADIOLOGY  CT Head Without Contrast    Result Date: 1/2/2024  CT HEAD WO CONTRAST Date of Exam: 1/2/2024 4:43 PM EST Indication: Head trauma, minor (Age >= 65y) Fall, hematoma back of head. Comparison: MR brain 8/9/2022 Technique: Axial CT images were obtained of the head without contrast administration.  Automated exposure control and iterative construction methods were used. Findings: Parenchyma:No acute intraparenchymal hemorrhage. No loss of gray-white differentiation to suggest large territory infarct. Moderate parenchymal volume loss. Scattered periventricular and subcortical white matter hypodensities, nonspecific, but most often  consistent with small vessel ischemic changes. No midline shift or herniation. Ventricles and extra axial spaces:Prominent ventricles and sulci secondary to volume loss. No extra axial fluid collection seen. Other:Lens replacements. Paranasal sinuses are clear. Mastoid air cells are clear. Calvarium is intact. Intracranial atherosclerotic calcification is present. Small right parietal scalp hematoma.     Impression: Small right parietal scalp hematoma. No evidence of underlying acute intracranial hemorrhage or large territory infarct. Chronic changes as above. Electronically Signed: Chris Prince MD  1/2/2024 5:04 PM EST  Workstation ID: [id number]    XR Chest 1 View    Result Date: 1/2/2024  XR CHEST 1 VW Date of Exam: 1/2/2024 4:48 PM EST Indication: Weak/Dizzy/AMS triage protocol Comparison: 8/6/2023 CT and 8/9/2022 radiograph Findings: Unchanged cardiomediastinal silhouette. No focal airspace  consolidation. No pleural effusion or pneumothorax. No acute osseous abnormality. Osteopenia. Sequela of lower thoracic vertebroplasties.     Impression: No acute cardiopulmonary abnormality. Electronically Signed: Logan Youssef MD  1/2/2024 5:03 PM EST  Workstation ID: [id number]     I ordered and independently reviewed the above noted radiographic studies.      I viewed images of CT head which showed no acute bleed per my independent interpretation.    See radiologist's dictation for official interpretation.        PROCEDURES    Procedures    ECG 12 Lead ED Triage Standing Order; Weak / Dizzy / AMS   Final Result   Test Reason : ED Triage Standing Order~   Blood Pressure :   */*   mmHG   Vent. Rate :  83 BPM     Atrial Rate :  83 BPM      P-R Int : 144 ms          QRS Dur :  78 ms       QT Int : 370 ms       P-R-T Axes :  96  -3  51 degrees      QTc Int : 434 ms      Sinus rhythm with premature atrial complexes in a pattern of bigeminy   Otherwise normal ECG   When compared with ECG of 06-AUG-2023 10:33,   premature atrial complexes are now present   Confirmed by JUAN MCKEE MD ([id number]) on 1/2/2024 5:41:32 PM      Referred By: EDMD           Confirmed By: JUAN MCKEE MD          MEDICATIONS GIVEN IN ER    Medications   sodium chloride 0.9 % flush 10 mL (has no administration in time range)   cefuroxime (CEFTIN) tablet 500 mg (has no administration in time range)   acetaminophen (TYLENOL) tablet 1,000 mg (has no administration in time range)         MEDICAL DECISION MAKING, PROGRESS, and CONSULTS    All labs, if obtained, have been independently reviewed by me.  All radiology studies, if obtained, have been reviewed by me and the radiologist dictating the report.  All EKG's, if obtained, have been independently viewed and interpreted by me/my attending physician.      Discussion below represents my analysis of pertinent findings related to patient's condition, differential diagnosis, treatment plan and final  disposition.                         Differential diagnosis:    Fall of unclear precipitating event.  This could be syncope or stumble.  Head injury would consider intracranial hemorrhage or skull fracture.      Additional sources:    - Discussed/ obtained information from independent historians: Paramedics gave report.  Nursing facility gave report.    - External (non-ED) record review: I reviewed multiple records on this patient to include prior CT scan of the chest dated 8/6/2023 which showed no acute disease.    - Chronic or social conditions impacting care: Cognitive decline with age of 89.    - Shared decision making: Patient in full agreement with current plan for evaluation and treatment.      Orders placed during this visit:  Orders Placed This Encounter   Procedures    XR Chest 1 View    CT Head Without Contrast    [place] Draw    Comprehensive Metabolic Panel    Single High Sensitivity Troponin T    Magnesium    Urinalysis With Microscopic If Indicated (No Culture) - Urine, Clean Catch    CBC Auto Differential    Urinalysis, Microscopic Only - Urine, Clean Catch    NPO Diet NPO Type: Strict NPO    Undress & Gown    Continuous Pulse Oximetry    Vital Signs    Orthostatic Blood Pressure    Oxygen Therapy- Nasal Cannula; Titrate 1-6 LPM Per SpO2; 90 - 95%    POC Glucose Once    ECG 12 Lead ED Triage Standing Order; Weak / Dizzy / AMS    Insert Peripheral IV    Fall Precautions    CBC & Differential    Green Top (Gel)    Lavender Top    Gold Top - SST    Gray Top    Light Blue Top         Additional orders considered but not ordered:  CT cervical spine    ED Course:    Consultants:      ED Course as of 01/02/24 1821 Tue Jan 02, 2024 1820 We are administering Tylenol and and antibiotics orally. [MS]      ED Course User Index  [MS] Danny Le MD              Shared Decision Making:  After my consideration of clinical presentation and any laboratory/radiology studies obtained, I discussed the findings  with the patient/patient representative who is in agreement with the treatment plan and the final disposition.   Risks and benefits of discharge and/or observation/admission were discussed.       AS OF 18:21 EST VITALS:    BP - (!) 181/92  HR - 85  TEMP - 98.8 °F (37.1 °C) (Oral)  O2 SATS - 92%                  DIAGNOSIS  Final diagnoses:   Injury of head, initial encounter   Hematoma of scalp, initial encounter   Acute UTI         DISPOSITION  DISCHARGE    Patient discharged in stable condition.    Reviewed implications of results, diagnosis, meds, responsibility to follow up, warning signs and symptoms of possible worsening, potential complications and reasons to return to ER.    Patient/Family voiced understanding of above instructions.    Discussed plan for discharge, as there is no emergent indication for admission.  Pt/family is agreeable and understands need for follow up and possible repeat testing.  Pt/family is aware that discharge does not mean that nothing is wrong but that it indicates no emergency is currently present that requires admission and they must continue care with follow-up as given below or with a physician of their choice.     FOLLOW-UP  Pallavi Eduardo MD  [address] [place] [address]  [phone]      NEXT AVAILABLE APPOINTMENT - RECHECK OF CONDITION    [facility] EMERGENCY DEPARTMENT  [address] [facility] [address] [phone]    IF YOU HAVE ANY CONCERN OF WORSENING CONDITION         Medication List        New Prescriptions      cefuroxime 500 MG tablet  Commonly known as: CEFTIN  Take 1 tablet by mouth 2 (Two) Times a Day.               Where to Get Your Medications        These medications were sent to [facility] [id number] - [facility] [address] [facility] - [phone]  - [phone]   [address] [id number]      Phone: [phone]   cefuroxime 500 MG tablet              Please note that portions of this document were completed with voice recognition software.        Danny Le MD  01/02/24 2878

## 2024-01-08 ENCOUNTER — APPOINTMENT (OUTPATIENT)
Dept: GENERAL RADIOLOGY | Facility: HOSPITAL | Age: 89
End: 2024-01-08
Payer: MEDICARE

## 2024-01-08 ENCOUNTER — APPOINTMENT (OUTPATIENT)
Dept: CT IMAGING | Facility: HOSPITAL | Age: 89
End: 2024-01-08
Payer: MEDICARE

## 2024-01-08 ENCOUNTER — HOSPITAL ENCOUNTER (INPATIENT)
Facility: HOSPITAL | Age: 89
LOS: 8 days | End: 2024-01-17
Attending: EMERGENCY MEDICINE | Admitting: INTERNAL MEDICINE
Payer: MEDICARE

## 2024-01-08 DIAGNOSIS — R13.10 DYSPHAGIA, UNSPECIFIED TYPE: ICD-10-CM

## 2024-01-08 DIAGNOSIS — R53.1 GENERALIZED WEAKNESS: ICD-10-CM
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
## 2024-01-08 DIAGNOSIS — S32.10XA CLOSED FRACTURE OF SACRUM, UNSPECIFIED PORTION OF SACRUM, INITIAL ENCOUNTER: Primary | ICD-10-CM

## 2024-01-08 PROBLEM — D53.9 MACROCYTIC ANEMIA: Status: ACTIVE | Noted: 2024-01-08

## 2024-01-08 PROBLEM — S32.9XXA PELVIC FRACTURE: Status: ACTIVE | Noted: 2024-01-08

## 2024-01-08 PROBLEM — D72.829 LEUKOCYTOSIS: Status: ACTIVE | Noted: 2024-01-08

## 2024-01-08 LAB
ALBUMIN SERPL-MCNC: 3.5 G/DL (ref 3.5–5.2)
ALBUMIN/GLOB SERPL: 1.1 G/DL
ALP SERPL-CCNC: 128 U/L (ref 39–117)
ALT SERPL W P-5'-P-CCNC: 8 U/L (ref 1–33)
ANION GAP SERPL CALCULATED.3IONS-SCNC: 10 MMOL/L (ref 5–15)
AST SERPL-CCNC: 15 U/L (ref 1–32)
BACTERIA UR QL AUTO: ABNORMAL /HPF
BASOPHILS # BLD AUTO: 0.06 10*3/MM3 (ref 0–0.2)
BASOPHILS NFR BLD AUTO: 0.4 % (ref 0–1.5)
BILIRUB SERPL-MCNC: 0.5 MG/DL (ref 0–1.2)
BILIRUB UR QL STRIP: NEGATIVE
BUN SERPL-MCNC: 24 MG/DL (ref 8–23)
BUN/CREAT SERPL: 32 (ref 7–25)
CALCIUM SPEC-SCNC: 8.6 MG/DL (ref 8.2–9.6)
CHLORIDE SERPL-SCNC: 104 MMOL/L (ref 98–107)
CLARITY UR: CLEAR
CO2 SERPL-SCNC: 24 MMOL/L (ref 22–29)
COLOR UR: YELLOW
CREAT SERPL-MCNC: 0.75 MG/DL (ref 0.57–1)
D-LACTATE SERPL-SCNC: 1.7 MMOL/L (ref 0.5–2)
DEPRECATED RDW RBC AUTO: 56.5 FL (ref 37–54)
EGFRCR SERPLBLD CKD-EPI 2021: 74.3 ML/MIN/1.73
EOSINOPHIL # BLD AUTO: 0.12 10*3/MM3 (ref 0–0.4)
EOSINOPHIL NFR BLD AUTO: 0.9 % (ref 0.3–6.2)
ERYTHROCYTE [DISTWIDTH] IN BLOOD BY AUTOMATED COUNT: 14.6 % (ref 12.3–15.4)
FLUAV SUBTYP SPEC NAA+PROBE: NOT DETECTED
FLUBV RNA ISLT QL NAA+PROBE: NOT DETECTED
GEN 5 2HR TROPONIN T REFLEX: 33 NG/L
GLOBULIN UR ELPH-MCNC: 3.1 GM/DL
GLUCOSE SERPL-MCNC: 102 MG/DL (ref 65–99)
GLUCOSE UR STRIP-MCNC: NEGATIVE MG/DL
HCT VFR BLD AUTO: 34.8 % (ref 34–46.6)
HGB BLD-MCNC: 11 G/DL (ref 12–15.9)
HGB UR QL STRIP.AUTO: ABNORMAL
HYALINE CASTS UR QL AUTO: ABNORMAL /LPF
IMM GRANULOCYTES # BLD AUTO: 0.09 10*3/MM3 (ref 0–0.05)
IMM GRANULOCYTES NFR BLD AUTO: 0.7 % (ref 0–0.5)
KETONES UR QL STRIP: NEGATIVE
LEUKOCYTE ESTERASE UR QL STRIP.AUTO: ABNORMAL
LYMPHOCYTES # BLD AUTO: 0.95 10*3/MM3 (ref 0.7–3.1)
LYMPHOCYTES NFR BLD AUTO: 7 % (ref 19.6–45.3)
MCH RBC QN AUTO: 33.4 PG (ref 26.6–33)
MCHC RBC AUTO-ENTMCNC: 31.6 G/DL (ref 31.5–35.7)
MCV RBC AUTO: 105.8 FL (ref 79–97)
MONOCYTES # BLD AUTO: 0.88 10*3/MM3 (ref 0.1–0.9)
MONOCYTES NFR BLD AUTO: 6.5 % (ref 5–12)
NEUTROPHILS NFR BLD AUTO: 11.48 10*3/MM3 (ref 1.7–7)
NEUTROPHILS NFR BLD AUTO: 84.5 % (ref 42.7–76)
NITRITE UR QL STRIP: NEGATIVE
NRBC BLD AUTO-RTO: 0 /100 WBC (ref 0–0.2)
PH UR STRIP.AUTO: 5.5 [PH] (ref 5–8)
PLATELET # BLD AUTO: 230 10*3/MM3 (ref 140–450)
PMV BLD AUTO: 10.1 FL (ref 6–12)
POTASSIUM SERPL-SCNC: 4.4 MMOL/L (ref 3.5–5.2)
PROCALCITONIN SERPL-MCNC: 0.31 NG/ML (ref 0–0.25)
PROT SERPL-MCNC: 6.6 G/DL (ref 6–8.5)
PROT UR QL STRIP: ABNORMAL
RBC # BLD AUTO: 3.29 10*6/MM3 (ref 3.77–5.28)
RBC # UR STRIP: ABNORMAL /HPF
REF LAB TEST METHOD: ABNORMAL
SARS-COV-2 RNA RESP QL NAA+PROBE: NOT DETECTED
SODIUM SERPL-SCNC: 138 MMOL/L (ref 136–145)
SP GR UR STRIP: 1.03 (ref 1–1.03)
SQUAMOUS #/AREA URNS HPF: ABNORMAL /HPF
TRANS CELLS #/AREA URNS HPF: ABNORMAL /HPF
TROPONIN T DELTA: -1 NG/L
TROPONIN T SERPL HS-MCNC: 34 NG/L
TSH SERPL DL<=0.05 MIU/L-ACNC: 2.72 UIU/ML (ref 0.27–4.2)
UROBILINOGEN UR QL STRIP: ABNORMAL
WBC # UR STRIP: ABNORMAL /HPF
WBC NRBC COR # BLD AUTO: 13.58 10*3/MM3 (ref 3.4–10.8)

## 2024-01-08 PROCEDURE — 83605 ASSAY OF LACTIC ACID: CPT | Performed by: NURSE PRACTITIONER

## 2024-01-08 PROCEDURE — 80053 COMPREHEN METABOLIC PANEL: CPT | Performed by: EMERGENCY MEDICINE

## 2024-01-08 PROCEDURE — 25810000003 SODIUM CHLORIDE 0.9 % SOLUTION: Performed by: EMERGENCY MEDICINE

## 2024-01-08 PROCEDURE — 85025 COMPLETE CBC W/AUTO DIFF WBC: CPT | Performed by: EMERGENCY MEDICINE

## 2024-01-08 PROCEDURE — 72131 CT LUMBAR SPINE W/O DYE: CPT

## 2024-01-08 PROCEDURE — 82306 VITAMIN D 25 HYDROXY: CPT | Performed by: INTERNAL MEDICINE

## 2024-01-08 PROCEDURE — 99223 1ST HOSP IP/OBS HIGH 75: CPT | Performed by: NURSE PRACTITIONER

## 2024-01-08 PROCEDURE — 93005 ELECTROCARDIOGRAM TRACING: CPT | Performed by: EMERGENCY MEDICINE

## 2024-01-08 PROCEDURE — 87636 SARSCOV2 & INF A&B AMP PRB: CPT | Performed by: EMERGENCY MEDICINE

## 2024-01-08 PROCEDURE — 82607 VITAMIN B-12: CPT | Performed by: INTERNAL MEDICINE

## 2024-01-08 PROCEDURE — 82746 ASSAY OF FOLIC ACID SERUM: CPT | Performed by: INTERNAL MEDICINE

## 2024-01-08 PROCEDURE — 84484 ASSAY OF TROPONIN QUANT: CPT | Performed by: EMERGENCY MEDICINE

## 2024-01-08 PROCEDURE — 87086 URINE CULTURE/COLONY COUNT: CPT | Performed by: EMERGENCY MEDICINE

## 2024-01-08 PROCEDURE — 73560 X-RAY EXAM OF KNEE 1 OR 2: CPT

## 2024-01-08 PROCEDURE — 87040 BLOOD CULTURE FOR BACTERIA: CPT | Performed by: NURSE PRACTITIONER

## 2024-01-08 PROCEDURE — 36415 COLL VENOUS BLD VENIPUNCTURE: CPT

## 2024-01-08 PROCEDURE — G0378 HOSPITAL OBSERVATION PER HR: HCPCS

## 2024-01-08 PROCEDURE — 99285 EMERGENCY DEPT VISIT HI MDM: CPT

## 2024-01-08 PROCEDURE — 84443 ASSAY THYROID STIM HORMONE: CPT | Performed by: INTERNAL MEDICINE

## 2024-01-08 PROCEDURE — 84145 PROCALCITONIN (PCT): CPT | Performed by: NURSE PRACTITIONER

## 2024-01-08 PROCEDURE — 81001 URINALYSIS AUTO W/SCOPE: CPT | Performed by: EMERGENCY MEDICINE

## 2024-01-08 PROCEDURE — 71045 X-RAY EXAM CHEST 1 VIEW: CPT

## 2024-01-08 RX ORDER — SODIUM CHLORIDE 0.9 % (FLUSH) 0.9 %
10 SYRINGE (ML) INJECTION EVERY 12 HOURS SCHEDULED
Status: DISCONTINUED | OUTPATIENT
Start: 2024-01-08 | End: 2024-01-17 | Stop reason: HOSPADM

## 2024-01-08 RX ORDER — BISACODYL 10 MG
10 SUPPOSITORY, RECTAL RECTAL DAILY PRN
Status: DISCONTINUED | OUTPATIENT
Start: 2024-01-08 | End: 2024-01-08 | Stop reason: SDUPTHER

## 2024-01-08 RX ORDER — SODIUM CHLORIDE 9 MG/ML
40 INJECTION, SOLUTION INTRAVENOUS AS NEEDED
Status: DISCONTINUED | OUTPATIENT
Start: 2024-01-08 | End: 2024-01-17 | Stop reason: HOSPADM

## 2024-01-08 RX ORDER — BUDESONIDE AND FORMOTEROL FUMARATE DIHYDRATE 160; 4.5 UG/1; UG/1
1 AEROSOL RESPIRATORY (INHALATION)
Status: DISCONTINUED | OUTPATIENT
Start: 2024-01-08 | End: 2024-01-17 | Stop reason: HOSPADM

## 2024-01-08 RX ORDER — BISACODYL 5 MG/1
5 TABLET, DELAYED RELEASE ORAL DAILY PRN
Status: DISCONTINUED | OUTPATIENT
Start: 2024-01-08 | End: 2024-01-08 | Stop reason: SDUPTHER

## 2024-01-08 RX ORDER — FAMOTIDINE 20 MG/1
40 TABLET, FILM COATED ORAL DAILY
Status: DISCONTINUED | OUTPATIENT
Start: 2024-01-09 | End: 2024-01-17 | Stop reason: HOSPADM

## 2024-01-08 RX ORDER — METHENAMINE HIPPURATE 1000 MG/1
1 TABLET ORAL EVERY 12 HOURS
Status: CANCELLED | OUTPATIENT
Start: 2024-01-08

## 2024-01-08 RX ORDER — SODIUM CHLORIDE 0.9 % (FLUSH) 0.9 %
10 SYRINGE (ML) INJECTION AS NEEDED
Status: DISCONTINUED | OUTPATIENT
Start: 2024-01-08 | End: 2024-01-17 | Stop reason: HOSPADM

## 2024-01-08 RX ORDER — METHENAMINE HIPPURATE 1000 MG/1
1 TABLET ORAL EVERY 12 HOURS
Status: DISCONTINUED | OUTPATIENT
Start: 2024-01-08 | End: 2024-01-17 | Stop reason: HOSPADM

## 2024-01-08 RX ORDER — ONDANSETRON 2 MG/ML
4 INJECTION INTRAMUSCULAR; INTRAVENOUS EVERY 6 HOURS PRN
Status: DISCONTINUED | OUTPATIENT
Start: 2024-01-08 | End: 2024-01-17 | Stop reason: HOSPADM

## 2024-01-08 RX ORDER — AMOXICILLIN 250 MG
2 CAPSULE ORAL 2 TIMES DAILY
Status: DISCONTINUED | OUTPATIENT
Start: 2024-01-08 | End: 2024-01-08 | Stop reason: SDUPTHER

## 2024-01-08 RX ORDER — BISACODYL 10 MG
10 SUPPOSITORY, RECTAL RECTAL DAILY PRN
Status: DISCONTINUED | OUTPATIENT
Start: 2024-01-08 | End: 2024-01-13

## 2024-01-08 RX ORDER — CHOLECALCIFEROL (VITAMIN D3) 125 MCG
5 CAPSULE ORAL NIGHTLY PRN
Status: DISCONTINUED | OUTPATIENT
Start: 2024-01-08 | End: 2024-01-17 | Stop reason: HOSPADM

## 2024-01-08 RX ORDER — TRAMADOL HYDROCHLORIDE 50 MG/1
50 TABLET ORAL EVERY 6 HOURS PRN
Status: DISCONTINUED | OUTPATIENT
Start: 2024-01-08 | End: 2024-01-11

## 2024-01-08 RX ORDER — FOLIC ACID 1 MG/1
1000 TABLET ORAL DAILY
Status: DISCONTINUED | OUTPATIENT
Start: 2024-01-09 | End: 2024-01-17 | Stop reason: HOSPADM

## 2024-01-08 RX ORDER — ONDANSETRON 4 MG/1
4 TABLET, FILM COATED ORAL EVERY 6 HOURS PRN
Status: DISCONTINUED | OUTPATIENT
Start: 2024-01-08 | End: 2024-01-17 | Stop reason: HOSPADM

## 2024-01-08 RX ORDER — ALBUTEROL SULFATE 90 UG/1
2 AEROSOL, METERED RESPIRATORY (INHALATION) EVERY 4 HOURS PRN
Status: DISCONTINUED | OUTPATIENT
Start: 2024-01-08 | End: 2024-01-17 | Stop reason: HOSPADM

## 2024-01-08 RX ORDER — OXYBUTYNIN CHLORIDE 5 MG/1
5 TABLET, EXTENDED RELEASE ORAL DAILY
Status: DISCONTINUED | OUTPATIENT
Start: 2024-01-09 | End: 2024-01-17 | Stop reason: HOSPADM

## 2024-01-08 RX ORDER — DILTIAZEM HYDROCHLORIDE 120 MG/1
120 CAPSULE, COATED, EXTENDED RELEASE ORAL
Status: DISCONTINUED | OUTPATIENT
Start: 2024-01-09 | End: 2024-01-17 | Stop reason: HOSPADM

## 2024-01-08 RX ORDER — ACETAMINOPHEN 325 MG/1
650 TABLET ORAL ONCE
Status: COMPLETED | OUTPATIENT
Start: 2024-01-08 | End: 2024-01-08

## 2024-01-08 RX ORDER — SERTRALINE HYDROCHLORIDE 25 MG/1
25 TABLET, FILM COATED ORAL DAILY
COMMUNITY

## 2024-01-08 RX ORDER — NALOXONE HCL 0.4 MG/ML
0.4 VIAL (ML) INJECTION
Status: DISCONTINUED | OUTPATIENT
Start: 2024-01-08 | End: 2024-01-16

## 2024-01-08 RX ORDER — BISACODYL 5 MG/1
5 TABLET, DELAYED RELEASE ORAL DAILY PRN
Status: DISCONTINUED | OUTPATIENT
Start: 2024-01-08 | End: 2024-01-13

## 2024-01-08 RX ORDER — AMOXICILLIN 250 MG
2 CAPSULE ORAL 2 TIMES DAILY
Status: DISCONTINUED | OUTPATIENT
Start: 2024-01-08 | End: 2024-01-13

## 2024-01-08 RX ORDER — MORPHINE SULFATE 2 MG/ML
1 INJECTION, SOLUTION INTRAMUSCULAR; INTRAVENOUS EVERY 4 HOURS PRN
Status: DISCONTINUED | OUTPATIENT
Start: 2024-01-08 | End: 2024-01-16

## 2024-01-08 RX ORDER — METHOTREXATE 2.5 MG/1
10 TABLET ORAL WEEKLY
COMMUNITY

## 2024-01-08 RX ORDER — POLYETHYLENE GLYCOL 3350 17 G/17G
17 POWDER, FOR SOLUTION ORAL DAILY PRN
Status: DISCONTINUED | OUTPATIENT
Start: 2024-01-08 | End: 2024-01-13

## 2024-01-08 RX ORDER — POLYETHYLENE GLYCOL 3350 17 G/17G
17 POWDER, FOR SOLUTION ORAL DAILY PRN
Status: DISCONTINUED | OUTPATIENT
Start: 2024-01-08 | End: 2024-01-08 | Stop reason: SDUPTHER

## 2024-01-08 RX ORDER — ASPIRIN 81 MG/1
81 TABLET ORAL DAILY
Status: CANCELLED | OUTPATIENT
Start: 2024-01-08

## 2024-01-08 RX ORDER — NITROGLYCERIN 0.4 MG/1
0.4 TABLET SUBLINGUAL
Status: DISCONTINUED | OUTPATIENT
Start: 2024-01-08 | End: 2024-01-17 | Stop reason: HOSPADM

## 2024-01-08 RX ORDER — LANOLIN ALCOHOL/MO/W.PET/CERES
1000 CREAM (GRAM) TOPICAL DAILY
Status: DISCONTINUED | OUTPATIENT
Start: 2024-01-09 | End: 2024-01-17 | Stop reason: HOSPADM

## 2024-01-08 RX ORDER — ACETAMINOPHEN 325 MG/1
650 TABLET ORAL EVERY 4 HOURS PRN
Status: DISCONTINUED | OUTPATIENT
Start: 2024-01-08 | End: 2024-01-11

## 2024-01-08 RX ADMIN — Medication 10 ML: at 21:59

## 2024-01-08 RX ADMIN — SODIUM CHLORIDE 1000 ML: 9 INJECTION, SOLUTION INTRAVENOUS at 16:06

## 2024-01-08 RX ADMIN — ACETAMINOPHEN 650 MG: 325 TABLET ORAL at 13:37

## 2024-01-08 NOTE — ED NOTES
.. Pati Carter    Nursing Report ED to Floor:  Mental status: A&O  Ambulatory status: with one assist  Oxygen Therapy:  RA to 2L PRN  Cardiac Rhythm: NSR  Admitted from: ED  Safety Concerns:  Fall Risk  Social Issues: pt lives in assisted living. Daughter at bedside  ED Room #:  22    ED Nurse Phone Extension - [phone] or may call [phone].      HPI:   Chief Complaint   Patient presents with    Back Pain       Past Medical History:  Past Medical History:   Diagnosis Date    Anemia     Description: A.  Dx 2006- borderline intermittent.    Back pain     Benign colonic polyp 9/28/2016    Description: A.  Dx 1999.    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  Rule out chronic persistent asthma, COPD, or obliterative bronchiolitis.- Rae    COVID-19 virus infection 10/11/2020    CTS (carpal tunnel syndrome)     Degeneration of intervertebral disc of lumbar region     Description: A.  Diagnosed in April 2013 with advanced multilevel with severe spinal stenosis, followed by Dr. Pillai for pain management.    Gastroesophageal reflux disease     Hearing loss     History of calcium pyrophosphate deposition disease (CPPD)     History of colonoscopy 01/01/1999    NORMAL PER PATIENT     History of mammogram 01/01/2011    NORMAL PER PT     History of Papanicolaou smear of cervix 01/01/2010    NORMAL PER PT     History of varicella     Hyperlipidemia     Description: A.  Dx 2006.    Hypertension     Description: A. Dx 2001.    Macular degeneration     Nocturia     Osteoporosis     Ovarian mass     Dx 8/15- benign left cystic adnexal mass    Overactive bladder     Pelvic floor dysfunction     Rheumatoid arthritis     Description: A.  Diagnosed in 2000 and and followed by Dr. Constantino (now Dr. Kaplan). B.  On methotrexate therapy since 2000. C.  Off low-dose prednisone therapy.     Vitamin D deficiency         Past Surgical History:  Past Surgical History:   Procedure Laterality Date    APPENDECTOMY      CARPAL TUNNEL RELEASE Left 01/01/2003    HISTORY OF NEUROPLASTY DECOMPRESSION MEDIAN NERVE AT CARPAL TUNNEL LEFT    CATARACT EXTRACTION Bilateral 01/01/2009    CHOLECYSTECTOMY  01/01/1962    HIP CANNULATED SCREW PLACEMENT Right 1/25/2020    Procedure: HIP CANNULATED SCREW PLACEMENT RIGHT;  Surgeon: Karlos Blount MD;  Location: [facility];  Service: Orthopedics    KNEE ARTHROSCOPY Left 01/01/2001    MENISCAL REPAIR    KYPHOPLASTY  06/18/2015    T11 AND L1 (JOSE A)    PELVIC LAPAROSCOPY  01/01/1996    REMOVAL OF BENIGN UTERINE AND RIGHT OVARIAN TUMORS    SALPINGO OOPHORECTOMY Left 08/26/2015    REMOVAL OF LEFT OVARY AND TUBE (benign cystic mass)        Admitting Doctor:   Patsy Steel MD    Consulting Provider(s):  Consults       Date and Time Order Name Status Description    1/8/2024  5:24 PM Inpatient Neurosurgery Consult               Admitting Diagnosis:   The primary encounter diagnosis was Closed fracture of sacrum, unspecified portion of sacrum, initial encounter. Diagnoses of Fall in home, initial encounter and Generalized weakness were also pertinent to this visit.    Most Recent Vitals:   Vitals:    01/08/24 1615 01/08/24 1630 01/08/24 1645 01/08/24 1700   BP:       Pulse: 80 80 83 90   Resp:       Temp:       SpO2: 90% 92% 92% 91%   Weight:       Height:           Active LDAs/IV Access:   Lines, Drains & Airways       Active LDAs       Name Placement date Placement time Site Days    Peripheral IV 01/08/24 1600 Anterior;Left Forearm 01/08/24  1600  Forearm  less than 1                    Labs (abnormal labs have a star):   Labs Reviewed   COMPREHENSIVE METABOLIC PANEL - Abnormal; Notable for the following components:       Result Value    Glucose 102 (*)     BUN 24 (*)     Alkaline Phosphatase 128 (*)     BUN/Creatinine Ratio 32.0 (*)     All other components within normal  limits    Narrative:     GFR Normal >60  Chronic Kidney Disease <60  Kidney Failure <15    The GFR formula is only valid for adults with stable renal function between ages 18 and 70.   TROPONIN - Abnormal; Notable for the following components:    HS Troponin T 34 (*)     All other components within normal limits    Narrative:     High Sensitive Troponin T Reference Range:  <14.0 ng/L- Negative Female for AMI  <22.0 ng/L- Negative Male for AMI  >=14 - Abnormal Female indicating possible myocardial injury.  >=22 - Abnormal Male indicating possible myocardial injury.   Clinicians would have to utilize clinical acumen, EKG, Troponin, and serial changes to determine if it is an Acute Myocardial Infarction or myocardial injury due to an underlying chronic condition.        CBC WITH AUTO DIFFERENTIAL - Abnormal; Notable for the following components:    WBC 13.58 (*)     RBC 3.29 (*)     Hemoglobin 11.0 (*)     .8 (*)     MCH 33.4 (*)     RDW-SD 56.5 (*)     Neutrophil % 84.5 (*)     Lymphocyte % 7.0 (*)     Immature Grans % 0.7 (*)     Neutrophils, Absolute 11.48 (*)     Immature Grans, Absolute 0.09 (*)     All other components within normal limits   URINALYSIS W/ CULTURE IF INDICATED - Abnormal; Notable for the following components:    Blood, UA Large (3+) (*)     Protein, UA 30 mg/dL (1+) (*)     Leuk Esterase, UA Small (1+) (*)     All other components within normal limits    Narrative:     In absence of clinical symptoms, the presence of pyuria, bacteria, and/or nitrites on the urinalysis result does not correlate with infection.   URINALYSIS, MICROSCOPIC ONLY - Abnormal; Notable for the following components:    RBC, UA 21-50 (*)     WBC, UA 21-50 (*)     Squamous Epithelial Cells, UA 13-20 (*)     Transitional Epithelial Cells, UA 3-6 (*)     All other components within normal limits   COVID-19 AND FLU A/B, NP SWAB IN TRANSPORT MEDIA 1 HR TAT - Normal    Narrative:     Fact sheet for providers:  https://www.fda.gov/media/162643/download    Fact sheet for patients: https://www.fda.gov/media/480575/download    Test performed by PCR.   TSH - Normal   COVID PRE-OP / PRE-PROCEDURE SCREENING ORDER (NO ISOLATION)    Narrative:     The following orders were created for panel order COVID PRE-OP / PRE-PROCEDURE SCREENING ORDER (NO ISOLATION) - Swab, Nasopharynx.  Procedure                               Abnormality         Status                     ---------                               -----------         ------                     COVID-19 and FLU A/B PCR...[[id number]]  Normal              Final result                 Please view results for these tests on the individual orders.   URINE CULTURE   FOLATE   VITAMIN B12   VITAMIN D,25-HYDROXY   HIGH SENSITIVITIY TROPONIN T 2HR   CBC AND DIFFERENTIAL    Narrative:     The following orders were created for panel order CBC & Differential.  Procedure                               Abnormality         Status                     ---------                               -----------         ------                     CBC Auto Differential[[id number]]        Abnormal            Final result               Scan Slide[[id number]]                                                                    Please view results for these tests on the individual orders.       Meds Given in ED:   Medications   sodium chloride 0.9 % bolus 1,000 mL (1,000 mL Intravenous New Bag 1/8/24 1606)   traMADol (ULTRAM) tablet 50 mg (has no administration in time range)   morphine injection 1 mg (has no administration in time range)     And   naloxone (NARCAN) injection 0.4 mg (has no administration in time range)   sennosides-docusate (PERICOLACE) 8.6-50 MG per tablet 2 tablet (has no administration in time range)     And   polyethylene glycol (MIRALAX) packet 17 g (has no administration in time range)     And   bisacodyl (DULCOLAX) EC tablet 5 mg (has no administration in time range)     And   bisacodyl  (DULCOLAX) suppository 10 mg (has no administration in time range)   acetaminophen (TYLENOL) tablet 650 mg (650 mg Oral Given 1/8/24 4024)

## 2024-01-08 NOTE — Clinical Note
Level of Care: Telemetry [5]   Diagnosis: Pelvic fracture [208870]   Admitting Physician: BEATRIZ ESCOTO [[id number]]   Attending Physician: BEATRIZ ESCOTO [[id number]]

## 2024-01-08 NOTE — H&P
[facility] Medicine Services  HISTORY AND PHYSICAL    Patient Name: Pati Carter  : 1930  MRN: [id number]  Primary Care Physician: Pallavi Eduardo MD  Date of admission: 2024    Subjective   Subjective     Chief Complaint:  Low back pain    HPI:  Pati Carter is a 89 y.o. female with PMH significant for rheumatoid arthritis, osteoporosis, frequent falls, compression fractures of lumbar vertebra, DDD, macular degeneration, stress and urinary incontinence who presents to [facility] ED for low back pain.  Patient had a fall at her assisted living facility a week ago, presented to the ED 2024, and had a CT scan of her head that was negative.  She was discharged on tramadol and cefepime for UTI.  When her daughter visited her today, she noted patient's pain was not controlled and patient described pain in her tailbone and weakness in her left leg.  She denies accompanying fever, chills, N/V/D, shortness of breath, chest pain.     CT scan of the lumbar spine shows minimally displaced sacral fracture at S1-S2, potentially high-grade stenosis at L5-S1 and moderate to marked canal stenosis at L2-L3, L3-L4, L4-L5 which is likely chronic. Patient will be admitted to hospital medicine for further evaluation and treatment. Labwork is normal for leukocytosis.      Review of Systems   Constitutional:  Negative for chills and fever.   Respiratory:  Negative for cough and shortness of breath.    Cardiovascular:  Negative for chest pain and palpitations.   Gastrointestinal:  Negative for abdominal pain, constipation, diarrhea, nausea and vomiting.   Genitourinary:  Positive for urgency. Negative for dysuria.   Musculoskeletal:  Positive for arthralgias and joint swelling (knees).   Neurological:  Positive for weakness. Negative for dizziness, syncope, light-headedness and headaches.   Psychiatric/Behavioral:  Positive for sleep disturbance (nocturia). Negative for confusion.          Personal History     Past Medical History:   Diagnosis Date    Anemia     Description: A.  Dx 2006- borderline intermittent.    Back pain     Benign colonic polyp 9/28/2016    Description: A.  Dx 1999.    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  Rule out chronic persistent asthma, COPD, or obliterative bronchiolitis.- Rae    COVID-19 virus infection 10/11/2020    CTS (carpal tunnel syndrome)     Degeneration of intervertebral disc of lumbar region     Description: A.  Diagnosed in April 2013 with advanced multilevel with severe spinal stenosis, followed by Dr. Pillai for pain management.    Gastroesophageal reflux disease     Hearing loss     History of calcium pyrophosphate deposition disease (CPPD)     History of colonoscopy 01/01/1999    NORMAL PER PATIENT     History of mammogram 01/01/2011    NORMAL PER PT     History of Papanicolaou smear of cervix 01/01/2010    NORMAL PER PT     History of varicella     Hyperlipidemia     Description: A.  Dx 2006.    Hypertension     Description: A. Dx 2001.    Macular degeneration     Nocturia     Osteoporosis     Ovarian mass     Dx 8/15- benign left cystic adnexal mass    Overactive bladder     Pelvic floor dysfunction     Rheumatoid arthritis     Description: A.  Diagnosed in 2000 and and followed by Dr. Constantino (now Dr. Kaplan). B.  On methotrexate therapy since 2000. C.  Off low-dose prednisone therapy.    Vitamin D deficiency              Past Surgical History:   Procedure Laterality Date    APPENDECTOMY      CARPAL TUNNEL RELEASE Left 01/01/2003    HISTORY OF NEUROPLASTY DECOMPRESSION MEDIAN NERVE AT CARPAL TUNNEL LEFT    CATARACT EXTRACTION Bilateral 01/01/2009    CHOLECYSTECTOMY  01/01/1962    HIP CANNULATED SCREW PLACEMENT Right 1/25/2020    Procedure: HIP CANNULATED SCREW PLACEMENT RIGHT;  Surgeon:  Karlos Blount MD;  Location: [facility];  Service: Orthopedics    KNEE ARTHROSCOPY Left 01/01/2001    MENISCAL REPAIR    KYPHOPLASTY  06/18/2015    T11 AND L1 (JSOE A)    PELVIC LAPAROSCOPY  01/01/1996    REMOVAL OF BENIGN UTERINE AND RIGHT OVARIAN TUMORS    SALPINGO OOPHORECTOMY Left 08/26/2015    REMOVAL OF LEFT OVARY AND TUBE (benign cystic mass)       Family History:  family history includes Diabetes in her mother; Hypertension in her mother.     Social History:  reports that she has quit smoking. Her smoking use included cigarettes. She has never used smokeless tobacco. She reports that she does not drink alcohol and does not use drugs.  Social History     Social History Narrative    Patient moved to [facility] assisted living 3/26/20. On 3/28/20 patient visited ED for fall-related injuries.  In response, family looking into hiring a , to visit patient BID for ADLs/ transfers. Family drives patient to appts, but says [facility] may provide in future.       Medications:  Diclofenac Sodium, Mirabegron ER, Sennosides-Docusate Sodium, acetaminophen, albuterol sulfate HFA, aspirin, colchicine, dilTIAZem CD, famotidine, folic acid, melatonin, meloxicam, methenamine, methotrexate, miconazole, polyethylene glycol, potassium chloride, and sertraline    Allergies   Allergen Reactions    Ciprofloxacin Other (See Comments) and Unknown - High Severity     Other reaction(s): shaking  HCI TABS/ SHAKING  Other reaction(s): shaking  HCI TABS/ SHAKING    Levofloxacin Diarrhea and Unknown - High Severity    Sulfamethoxazole-Trimethoprim Other (See Comments), Rash and Unknown - High Severity     Stomach cramps   Stomach cramps     Sulfamethoxazole Rash    Trimethoprim GI Intolerance, Diarrhea and Rash       Objective   Objective     Vital Signs:   Temp:  [98.5 °F (36.9 °C)] 98.5 °F (36.9 °C)  Heart Rate:  [73-83] 83  Resp:  [16] 16  BP: (140-159)/(75-80) 155/75    Physical Exam   Constitutional:  Awake, alert  Eyes: PERRLA, sclerae anicteric, no conjunctival injection  HENT: NCAT, mucous membranes moist  Neck: Supple, no thyromegaly, no lymphadenopathy, trachea midline  Respiratory: Diminished to auscultation bilaterally, nonlabored respirations, room air  Cardiovascular: RRR, no murmurs, rubs, or gallops, palpable pedal pulses bilaterally  Gastrointestinal: Positive bowel sounds, soft, nontender, nondistended  Musculoskeletal: 1+ bilateral ankle edema, no clubbing or cyanosis to extremities  Psychiatric: Appropriate affect, cooperative  Neurologic: Oriented x 3, 3/5 strength BUE, 2/5 strength BLE, Cranial Nerves grossly intact to confrontation, speech clear  Skin: No rashes      Result Review:  I have personally reviewed the results from the time of this admission to 1/8/2024 17:37 EST and agree with these findings:  [x]  Laboratory list / accordion  [x]  Microbiology  [x]  Radiology  [x]  EKG/Telemetry   []  Cardiology/Vascular   []  Pathology  [x]  Old records  []  Other:  Most notable findings include:     LAB RESULTS:      Lab 01/08/24  1558 01/02/24  1725   WBC 13.58* 9.89   HEMOGLOBIN 11.0* 11.4*   HEMATOCRIT 34.8 36.1   PLATELETS 230 230   NEUTROS ABS 11.48* 8.47*   IMMATURE GRANS (ABS) 0.09* 0.10*   LYMPHS ABS 0.95 0.80   MONOS ABS 0.88 0.44   EOS ABS 0.12 0.04   .8* 107.1*         Lab 01/08/24  1558 01/02/24  1725   SODIUM 138 142   POTASSIUM 4.4 4.4   CHLORIDE 104 103   CO2 24.0 27.0   ANION GAP 10.0 12.0   BUN 24* 21   CREATININE 0.75 0.90   EGFR 74.3 59.7*   GLUCOSE 102* 98   CALCIUM 8.6 9.4   MAGNESIUM  --  1.8   TSH 2.720  --          Lab 01/08/24  1558 01/02/24  1725   TOTAL PROTEIN 6.6 6.7   ALBUMIN 3.5 4.5   GLOBULIN 3.1 2.2   ALT (SGPT) 8 10   AST (SGOT) 15 17   BILIRUBIN 0.5 0.4   ALK PHOS 128* 109         Lab 01/08/24  1558 01/02/24  1746   HSTROP T 34* 35*                 Brief Urine Lab Results  (Last result in the past 365 days)        Color   Clarity   Blood   Leuk Est    Nitrite   Protein   CREAT   Urine HCG        01/08/24 1512 Yellow   Clear   Large (3+)   Small (1+)   Negative   30 mg/dL (1+)                 Microbiology Results (last 10 days)       Procedure Component Value - Date/Time    COVID PRE-OP / PRE-PROCEDURE SCREENING ORDER (NO ISOLATION) - Swab, Nasopharynx [[id number]]  (Normal) Collected: 01/08/24 1601    Lab Status: Final result Specimen: Swab from Nasopharynx Updated: 01/08/24 1649    Narrative:      The following orders were created for panel order COVID PRE-OP / PRE-PROCEDURE SCREENING ORDER (NO ISOLATION) - Swab, Nasopharynx.  Procedure                               Abnormality         Status                     ---------                               -----------         ------                     COVID-19 and FLU A/B PCR...[[id number]]  Normal              Final result                 Please view results for these tests on the individual orders.    COVID-19 and FLU A/B PCR, 1 HR TAT - Swab, Nasopharynx [[id number]]  (Normal) Collected: 01/08/24 1601    Lab Status: Final result Specimen: Swab from Nasopharynx Updated: 01/08/24 1649     COVID19 Not Detected     Influenza A PCR Not Detected     Influenza B PCR Not Detected    Narrative:      Fact sheet for providers: https://www.fda.gov/media/663749/download    Fact sheet for patients: https://www.fda.gov/media/216584/download    Test performed by PCR.            CT Lumbar Spine Without Contrast    Result Date: 1/8/2024  CT LUMBAR SPINE WO CONTRAST Date of Exam: 1/8/2024 3:33 PM EST Indication: sacral pain after a fall 1 week ago. Comparison: The most recent study for potential comparison is an abdomen pelvis CT scan from 7/23/2022. Technique: Axial CT images were obtained of the lumbar spine without contrast administration.  Reconstructed coronal and sagittal images were also obtained. Automated exposure control and iterative construction methods were used. Findings:  Comparing today's study with the sagittal  reconstructions of the 7/23/2022 abdominal scan, kyphoplasties at T11 and L1 are again noted. There is mild further loss of height of T12, and the superior endplate of L2. L3 compression deformity appears stable. Inferior endplate deformity of L4 appears stable. In summary, no significant interval change is seen and no clearly acute bony trauma is appreciated here. There is, however, new step-off at S1-S2, consistent with transverse fracture here that has occurred since the prior exam. No pars defects are seen and no posterior element fracture is seen. Coronal images show no clearly acute bony abnormality of the lumbar spine. Axial bone images show no acute appearing features in the lumbar spine. There is a nondisplaced fracture line extending to the anterior margin of the right sacral kori which appears acute or recent, images 98 through 103. Soft tissue axial images show no evidence of significant stenosis in the included T11-12 or T12-L1 levels. L1-2, canal appears lower limits of normal. L2-3, there is probably moderate or possibly greater canal stenosis due to vertebral osteophytes , facet hypertrophy and facet osteophytes. At L3-4, there is likely marked canal stenosis due to extensive posterior element hypertrophy, ligamentous hypertrophy and vertebral osteophytes. At L4-5, there is moderate canal stenosis due to facet and vertebral osteophytes. L5-S1, there appears to be fairly high-grade stenosis due to a large broad-based disc protrusion as seen on image 80 series 2. No hematoma is seen associated with the patient's nondisplaced right sacral fracture. No pathologic paraspinous mass or acute inflammatory focus is seen.       Impression: Impression: 1. Previous T11 and L1 kyphoplasties, and multilevel vertebral endplate compression deformities, some progression from 2022. No clearly acute lumbar spine trauma is seen. 2. Potentially high-grade stenosis at L5-S1 due to broad-based disc protrusion. Moderate to  marked canal stenosis at L2-L3 L3-4 L4-5, which is likely chronic, due to facet and vertebral osteophytes. 3. Minimally displaced sacral fracture, visible at S1-2 on the sagittal images, and nondisplaced fracture visible in the right sacral ala on axial images. Given the patient's osteopenia, a more extensive nondisplaced fracture could be present, but difficult to detect. Electronically Signed: Isael Lewis MD  1/8/2024 3:51 PM EST  Workstation ID: [id number]    XR Knee 1 or 2 View Left    Result Date: 1/8/2024  XR KNEE 1 OR 2 VW LEFT Date of Exam: 1/8/2024 1:28 PM EST Indication: Injury 1 month ago Comparison: None available. Findings: 2 views. There is osteoporosis. There is diffuse atherosclerotic vascular calcification. There is severe narrowing of the medial and lateral compartments. There is severe narrowing of the patellofemoral compartment. There are no acute or old fractures. There is no callus or periostitis. There is chondrocalcinosis. There is no definite effusion.     Impression: Impression: Degenerative osteoarthritis. Atherosclerosis. No acute process. Electronically Signed: Chika Khanna MD  1/8/2024 1:45 PM EST  Workstation ID: [id number]         Assessment & Plan   Assessment & Plan       Pelvic fracture    Rheumatoid arthritis    Gastroesophageal reflux disease    Hypertension    COPD (chronic obstructive pulmonary disease)    Sacral fracture, closed    Leukocytosis    Macrocytic anemia      Pati Carter is a 89 y.o. female with PMH significant for rheumatoid arthritis, osteoporosis, frequent falls, compression fractures of lumbar vertebra, DDD, macular degeneration, stress and urinary incontinence who presents to [facility] ED for low back pain.  Patient had a fall at her assisted living facility, [facility], a week ago, presented to the ED 1/2/2024, and was discharged on tramadol and cefepime for UTI.  Patient presents today for uncontrolled pain and progressive weakness, greater in her left leg.      Pelvic/sacral fracture  Lumbar stenosis  Frequent falls  --Initial fall 1/2/2024, CT head negative for acute intracranial process  --CT lumbar spine today shows minimally displaced sacral fracture at S1-S2, potentially high-grade stenosis at L5-S1 and moderate to marked canal stenosis at L2-L3, L3-L4, L4-L5 which is likely chronic  --Neurosurgery consult for progressive leg weakness, L>R, given potentially high-grade stenosis L5-S1  --Pain control  --Fall precautions  --PT and OT consults  --CM consult, will likely need SNF rehab  --AM BMP    Leukocytosis  --WBCs 13.58, abs neuts 11.48  --immunosuppressed on methotrexate weekly, afebrile  --check procal, lactic acid, BCx2  --check CXR, 91% on room air  --AM CBC    Rheumatoid arthritis  --hold home methotrexate    Anemia  --macrocytic, .8  --check B12, folate  --continue home cyanocobalamin, folic acid    HTN  PAD  --continue home diltiazem  --hold home ASA, colchicine    GERD  --continue home famotidine    Macular degeneration  --Fall precautions, up with assist, assist with feeding    Urinary incontinence  --hold home methenamine for elevated CrCl, s/p NS bolus in ED  --continue oxybutynin as formulary sub for Myrbetriq  --daily I's & O's    DVT prophylaxis:  Mechanical    CODE STATUS:    Medical Intervention Limits: NO intubation (DNI)  Code Status (Patient has no pulse and is not breathing): No CPR (Do Not Attempt to Resuscitate)  Medical Interventions (Patient has pulse or is breathing): Limited Support      Expected Discharge  Expected Discharge Date: 1/10/2024; Expected Discharge Time:       This note has been completed as part of a split-shared workflow.     Signature: Electronically signed by JANIYA Leslie, 01/08/24, 4:07 PM EST    Patient seen briefly and reviewed with Karen,  Agree with above.  Patsy Steel MD 01/08/24 23:42 EST

## 2024-01-08 NOTE — ED PROVIDER NOTES
Subjective   History of Present Illness  Mrs Carter arrives by ambulance from her assisted living facility with low back pain.  She tells me that since she fell a week ago it has been hurting.  She has been prescribed tramadol.  Her daughter visited her today and noted that her pain was not controlled and sent her here.  Mrs. Carter reports that it is more in her tailbone.  She denies chest pain or shortness of breath.  EMS reports that family was not convinced she had been given her tramadol on a regular schedule.  She got 1 before transport today.       Review of her records from a week ago indicate that she was seen more for head injury and had negative CT scan of her head.  No imaging was done of her low back or tailbone.       Her daughter arrives later and tells me that her main concern is that she has become very weak.  She is not able to get up and walk.  She has been sleeping more than usual.  She tells me some of this is from pain but most of it seems to be just weakness.  She has had some vomiting after her visit here a few days ago.  She denies that she has had cough or upper respiratory symptoms.      Review of Systems    Past Medical History:   Diagnosis Date    Anemia     Description: A.  Dx 2006- borderline intermittent.    Back pain     Benign colonic polyp 9/28/2016    Description: A.  Dx 1999.    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  Rule out chronic persistent asthma, COPD, or obliterative bronchiolitis.- Butch    COVID-19 virus infection 10/11/2020    CTS (carpal tunnel syndrome)     Degeneration of intervertebral disc of lumbar region     Description: A.  Diagnosed in April 2013 with advanced multilevel with severe spinal stenosis, followed by Dr. Pillai for pain management.    Gastroesophageal reflux disease     Hearing loss      History of calcium pyrophosphate deposition disease (CPPD)     History of colonoscopy 01/01/1999    NORMAL PER PATIENT     History of mammogram 01/01/2011    NORMAL PER PT     History of Papanicolaou smear of cervix 01/01/2010    NORMAL PER PT     History of varicella     Hyperlipidemia     Description: A.  Dx 2006.    Hypertension     Description: A. Dx 2001.    Macular degeneration     Nocturia     Osteoporosis     Ovarian mass     Dx 8/15- benign left cystic adnexal mass    Overactive bladder     Pelvic floor dysfunction     Rheumatoid arthritis     Description: A.  Diagnosed in 2000 and and followed by Dr. Constantino (now Dr. Kaplan). B.  On methotrexate therapy since 2000. C.  Off low-dose prednisone therapy.    Vitamin D deficiency        Allergies   Allergen Reactions    Ciprofloxacin Other (See Comments) and Unknown - High Severity     Other reaction(s): shaking  HCI TABS/ SHAKING  Other reaction(s): shaking  HCI TABS/ SHAKING    Levofloxacin Diarrhea and Unknown - High Severity    Sulfamethoxazole-Trimethoprim Other (See Comments), Rash and Unknown - High Severity     Stomach cramps   Stomach cramps     Sulfamethoxazole Rash    Trimethoprim GI Intolerance, Diarrhea and Rash       Past Surgical History:   Procedure Laterality Date    APPENDECTOMY      CARPAL TUNNEL RELEASE Left 01/01/2003    HISTORY OF NEUROPLASTY DECOMPRESSION MEDIAN NERVE AT CARPAL TUNNEL LEFT    CATARACT EXTRACTION Bilateral 01/01/2009    CHOLECYSTECTOMY  01/01/1962    HIP CANNULATED SCREW PLACEMENT Right 1/25/2020    Procedure: HIP CANNULATED SCREW PLACEMENT RIGHT;  Surgeon: Karlos Blount MD;  Location: [facility];  Service: Orthopedics    KNEE ARTHROSCOPY Left 01/01/2001    MENISCAL REPAIR    KYPHOPLASTY  06/18/2015    T11 AND L1 (JOSE A)    PELVIC LAPAROSCOPY  01/01/1996    REMOVAL OF BENIGN UTERINE AND RIGHT OVARIAN TUMORS    SALPINGO OOPHORECTOMY Left 08/26/2015    REMOVAL OF LEFT OVARY AND TUBE (benign cystic mass)       Family  History   Problem Relation Age of Onset    Hypertension Mother     Diabetes Mother        Social History     Socioeconomic History    Marital status:    Tobacco Use    Smoking status: Former     Years: .5     Types: Cigarettes    Smokeless tobacco: Never   Vaping Use    Vaping Use: Never used   Substance and Sexual Activity    Alcohol use: No    Drug use: No    Sexual activity: Not Currently           Objective   Physical Exam  Vitals and nursing note reviewed.   Constitutional:       General: She is not in acute distress.     Comments: She is able to provide history regarding her immediate symptoms.  She is able to tell me that she fell a week ago and has pain in her low back since that time.   HENT:      Head: Normocephalic and atraumatic.      Nose: Nose normal. No congestion or rhinorrhea.   Eyes:      General: No scleral icterus.     Conjunctiva/sclera: Conjunctivae normal.   Neck:      Comments: No JVD   Cardiovascular:      Rate and Rhythm: Normal rate and regular rhythm.      Heart sounds: No murmur heard.     No friction rub.   Pulmonary:      Effort: Pulmonary effort is normal.      Breath sounds: Normal breath sounds. No wheezing or rales.   Abdominal:      General: Bowel sounds are normal.      Palpations: Abdomen is soft.      Tenderness: There is no abdominal tenderness. There is no guarding or rebound.   Musculoskeletal:         General: No tenderness.      Cervical back: Normal range of motion and neck supple.      Right lower leg: No edema.      Left lower leg: No edema.      Comments: She is tender to palpate over the sacrum.  I do not palpate any instability.  She has no posterior midline tenderness over the lumbar spine.  She is tender to palpate over her left knee and tells me that she injured that a month or more ago.   Skin:     General: Skin is warm and dry.      Coloration: Skin is not pale.      Findings: No erythema.   Neurological:      General: No focal deficit present.       Mental Status: She is alert.      Motor: No weakness.      Coordination: Coordination normal.   Psychiatric:         Mood and Affect: Mood normal.         Behavior: Behavior normal.         Thought Content: Thought content normal.         Procedures           ED Course  ED Course as of 01/08/24 1607   Mon Jan 08, 2024   1452 Daughter is now at bedside.  She tells me that her mom has had a decline over the last couple of days.  She normally has back pain but has been weak.  She tells me today she was unable to get out of bed and has been sleeping more than usual.  She tells me she can no longer live in the assisted living facility. [DT]   1557 Have reviewed her CT images as well as the report.  She has a clearly acute sacral fracture.  She has compression deformities at 3 and 2.  I have searched her previous studies and do not see any prior documentation although they do not appear acute.  She tells me her pain is in the sacral area and she is tender there. [DT]      ED Course User Index  [DT] Rafa Campos MD                                             Medical Decision Making  Please see course notes.  I obtained history from the patient as well as her daughter.  Reviewed and interpreted recent records regarding recent emergency department visit.  Ordered and interpreted CT scans and reviewed images of those and compared with prior studies.  Ordered and interpreted labs.    Amount and/or Complexity of Data Reviewed  Independent Historian: caregiver  External Data Reviewed: notes.  Labs: ordered. Decision-making details documented in ED Course.  Radiology: ordered. Decision-making details documented in ED Course.  ECG/medicine tests: ordered. Decision-making details documented in ED Course.    Risk  OTC drugs.        Final diagnoses:   Fall in home, initial encounter   Closed fracture of sacrum, unspecified portion of sacrum, initial encounter   Generalized weakness       ED Disposition  ED Disposition       ED  Disposition   Decision to Admit    Condition   --    Comment   --               No follow-up provider specified.       Medication List      No changes were made to your prescriptions during this visit.            Rafa Campos MD  01/08/24 [id number]

## 2024-01-09 ENCOUNTER — APPOINTMENT (OUTPATIENT)
Dept: MRI IMAGING | Facility: HOSPITAL | Age: 89
End: 2024-01-09
Payer: MEDICARE

## 2024-01-09 LAB
25(OH)D3 SERPL-MCNC: 19.1 NG/ML (ref 30–100)
ANION GAP SERPL CALCULATED.3IONS-SCNC: 11 MMOL/L (ref 5–15)
BACTERIA SPEC AEROBE CULT: NORMAL
BUN SERPL-MCNC: 22 MG/DL (ref 8–23)
BUN/CREAT SERPL: 31.4 (ref 7–25)
CALCIUM SPEC-SCNC: 8.9 MG/DL (ref 8.2–9.6)
CHLORIDE SERPL-SCNC: 105 MMOL/L (ref 98–107)
CO2 SERPL-SCNC: 26 MMOL/L (ref 22–29)
CREAT SERPL-MCNC: 0.7 MG/DL (ref 0.57–1)
DEPRECATED RDW RBC AUTO: 56.3 FL (ref 37–54)
EGFRCR SERPLBLD CKD-EPI 2021: 80.8 ML/MIN/1.73
ERYTHROCYTE [DISTWIDTH] IN BLOOD BY AUTOMATED COUNT: 14.5 % (ref 12.3–15.4)
FOLATE SERPL-MCNC: >20 NG/ML (ref 4.78–24.2)
GLUCOSE SERPL-MCNC: 95 MG/DL (ref 65–99)
HCT VFR BLD AUTO: 33.7 % (ref 34–46.6)
HGB BLD-MCNC: 10.8 G/DL (ref 12–15.9)
MCH RBC QN AUTO: 33.9 PG (ref 26.6–33)
MCHC RBC AUTO-ENTMCNC: 32 G/DL (ref 31.5–35.7)
MCV RBC AUTO: 105.6 FL (ref 79–97)
PLATELET # BLD AUTO: 218 10*3/MM3 (ref 140–450)
PMV BLD AUTO: 10.6 FL (ref 6–12)
POTASSIUM SERPL-SCNC: 4.7 MMOL/L (ref 3.5–5.2)
RBC # BLD AUTO: 3.19 10*6/MM3 (ref 3.77–5.28)
SODIUM SERPL-SCNC: 142 MMOL/L (ref 136–145)
VIT B12 BLD-MCNC: 482 PG/ML (ref 211–946)
WBC NRBC COR # BLD AUTO: 11.93 10*3/MM3 (ref 3.4–10.8)

## 2024-01-09 PROCEDURE — 94799 UNLISTED PULMONARY SVC/PX: CPT

## 2024-01-09 PROCEDURE — 99232 SBSQ HOSP IP/OBS MODERATE 35: CPT | Performed by: INTERNAL MEDICINE

## 2024-01-09 PROCEDURE — 72148 MRI LUMBAR SPINE W/O DYE: CPT

## 2024-01-09 PROCEDURE — 25010000002 CEFTRIAXONE PER 250 MG: Performed by: INTERNAL MEDICINE

## 2024-01-09 PROCEDURE — 85027 COMPLETE CBC AUTOMATED: CPT | Performed by: NURSE PRACTITIONER

## 2024-01-09 PROCEDURE — 80048 BASIC METABOLIC PNL TOTAL CA: CPT | Performed by: NURSE PRACTITIONER

## 2024-01-09 PROCEDURE — 92610 EVALUATE SWALLOWING FUNCTION: CPT | Performed by: SPEECH-LANGUAGE PATHOLOGIST

## 2024-01-09 RX ORDER — SERTRALINE HYDROCHLORIDE 25 MG/1
25 TABLET, FILM COATED ORAL DAILY
Status: DISCONTINUED | OUTPATIENT
Start: 2024-01-09 | End: 2024-01-17 | Stop reason: HOSPADM

## 2024-01-09 RX ORDER — DOXYCYCLINE 100 MG/1
100 CAPSULE ORAL EVERY 12 HOURS SCHEDULED
Status: COMPLETED | OUTPATIENT
Start: 2024-01-09 | End: 2024-01-14

## 2024-01-09 RX ORDER — ASPIRIN 81 MG/1
81 TABLET ORAL DAILY
Status: DISCONTINUED | OUTPATIENT
Start: 2024-01-09 | End: 2024-01-17 | Stop reason: HOSPADM

## 2024-01-09 RX ADMIN — ACETAMINOPHEN 650 MG: 325 TABLET ORAL at 15:11

## 2024-01-09 RX ADMIN — Medication 5 MG: at 20:31

## 2024-01-09 RX ADMIN — SENNOSIDES AND DOCUSATE SODIUM 2 TABLET: 8.6; 5 TABLET ORAL at 09:16

## 2024-01-09 RX ADMIN — CEFTRIAXONE 2000 MG: 2 INJECTION, POWDER, FOR SOLUTION INTRAMUSCULAR; INTRAVENOUS at 17:03

## 2024-01-09 RX ADMIN — FOLIC ACID 1000 MCG: 1 TABLET ORAL at 09:15

## 2024-01-09 RX ADMIN — DILTIAZEM HYDROCHLORIDE 120 MG: 120 CAPSULE, COATED, EXTENDED RELEASE ORAL at 09:16

## 2024-01-09 RX ADMIN — SERTRALINE HYDROCHLORIDE 25 MG: 25 TABLET ORAL at 09:16

## 2024-01-09 RX ADMIN — BUDESONIDE AND FORMOTEROL FUMARATE DIHYDRATE 1 PUFF: 160; 4.5 AEROSOL RESPIRATORY (INHALATION) at 09:29

## 2024-01-09 RX ADMIN — SENNOSIDES AND DOCUSATE SODIUM 2 TABLET: 8.6; 5 TABLET ORAL at 20:31

## 2024-01-09 RX ADMIN — OXYBUTYNIN CHLORIDE 5 MG: 5 TABLET, EXTENDED RELEASE ORAL at 09:16

## 2024-01-09 RX ADMIN — Medication 10 ML: at 09:17

## 2024-01-09 RX ADMIN — Medication 10 ML: at 20:31

## 2024-01-09 RX ADMIN — DOXYCYCLINE 100 MG: 100 CAPSULE ORAL at 20:31

## 2024-01-09 RX ADMIN — FAMOTIDINE 40 MG: 20 TABLET, FILM COATED ORAL at 09:16

## 2024-01-09 RX ADMIN — ASPIRIN 81 MG: 81 TABLET, COATED ORAL at 09:16

## 2024-01-09 RX ADMIN — CYANOCOBALAMIN TAB 1000 MCG 1000 MCG: 1000 TAB at 09:16

## 2024-01-09 NOTE — CONSULTS
[facility]    Inpatient Neurosurgery Consult  Consult performed by: Ekaterina Chavez PA-C  Consult ordered by: Karen Goff APRN        Name: Pati Carter  YOB: 1930  MRN: [id number]    Referring Provider: No ref. provider found     Patient Care Team:  Pallavi Eduardo MD as PCP - General (Internal Medicine)  Marsha Mathis MD as Consulting Physician (Rheumatology)  Hiram Yao MD as Consulting Physician (Urology)    Chief Complaint: Back Pain      History of Present Illness: This is a 89 y.o. female who presented to [facility] ED for low back pain after a fall at her assisted living facility a week ago.  She initially evaluated in the ER on 1/2/2024 where a CT of her head was obtained which was negative.  She was found to have UTI at this time and was discharged home on tramadol for pain control as well as antibiotics.  Per chart review, patient's daughter visited her yesterday and found the patient's pain was not controlled and described weakness in her left leg.  On exam today, patient states prior to her fall she was able to walk around on her own with a walker, however after the fall, she states she is unable to walk.  Patient seems to be a poor historian.  When first asked, patient states she did not have any leg pain or numbness.  When asked again, she states she had pain radiating down both anterior legs as well as numbness of her kneecaps.  When asked, patient reports most of her pain is in her back.  She is able to urinate on her own.  RN states patient needs significant help to get to the bedside commode. Patient reports pain with sitting. No family at bedside.    PMHX  Allergies:  Allergies   Allergen Reactions    Ciprofloxacin Other (See Comments) and Unknown - High Severity     Other reaction(s): shaking  HCI TABS/ SHAKING  Other reaction(s): shaking  HCI TABS/ SHAKING    Levofloxacin Diarrhea and  Unknown - High Severity    Sulfamethoxazole-Trimethoprim Other (See Comments), Rash and Unknown - High Severity     Stomach cramps   Stomach cramps     Sulfamethoxazole Rash    Trimethoprim GI Intolerance, Diarrhea and Rash     Medications    Current Facility-Administered Medications:     acetaminophen (TYLENOL) tablet 650 mg, 650 mg, Oral, Q4H PRN, Karen Goff APRN    albuterol sulfate HFA (PROVENTIL HFA;VENTOLIN HFA;PROAIR HFA) inhaler 2 puff, 2 puff, Inhalation, Q4H PRN, Karen Goff APRN    aspirin EC tablet 81 mg, 81 mg, Oral, Daily, Patsy Steel MD, 81 mg at 01/09/24 0916    sennosides-docusate (PERICOLACE) 8.6-50 MG per tablet 2 tablet, 2 tablet, Oral, BID, 2 tablet at 01/09/24 0916 **AND** polyethylene glycol (MIRALAX) packet 17 g, 17 g, Oral, Daily PRN **AND** bisacodyl (DULCOLAX) EC tablet 5 mg, 5 mg, Oral, Daily PRN **AND** bisacodyl (DULCOLAX) suppository 10 mg, 10 mg, Rectal, Daily PRN, Patsy Steel MD    budesonide-formoterol (SYMBICORT) 160-4.5 MCG/ACT inhaler 1 puff, 1 puff, Inhalation, BID - RT, Karen Goff APRN, 1 puff at 01/09/24 0929    dilTIAZem CD (CARDIZEM CD) 24 hr capsule 120 mg, 120 mg, Oral, Q24H, Karen Goff APRN, 120 mg at 01/09/24 0916    famotidine (PEPCID) tablet 40 mg, 40 mg, Oral, Daily, Karen Goff APRADALBERTO, 40 mg at 01/09/24 0916    folic acid (FOLVITE) tablet 1,000 mcg, 1,000 mcg, Oral, Daily, Karen Goff APRADALBERTO, 1,000 mcg at 01/09/24 0915    Magnesium Standard Dose Replacement - Follow Nurse / BPA Driven Protocol, , Does not apply, PRN, Karen Goff APRN    melatonin tablet 5 mg, 5 mg, Oral, Nightly PRN, Karen Goff APRN    [Held by provider] methenamine (HIPREX) tablet 1 g, 1 g, Oral, Q12H, Karen Goff APRN    morphine injection 1 mg, 1 mg, Intravenous, Q4H PRN **AND** naloxone (NARCAN) injection 0.4 mg, 0.4 mg, Intravenous, Q5 Min PRN, Patsy Steel MD    nitroglycerin (NITROSTAT) SL tablet  0.4 mg, 0.4 mg, Sublingual, Q5 Min PRN, Karen Goff, APRN    ondansetron (ZOFRAN) tablet 4 mg, 4 mg, Oral, Q6H PRN **OR** ondansetron (ZOFRAN) injection 4 mg, 4 mg, Intravenous, Q6H PRN, Karen Goff E, APRN    oxybutynin XL (DITROPAN-XL) 24 hr tablet 5 mg, 5 mg, Oral, Daily, Karen Goff APRN, 5 mg at 01/09/24 0916    Potassium Replacement - Follow Nurse / BPA Driven Protocol, , Does not apply, PRN, Karen Goff, APRADALBERTO    sertraline (ZOLOFT) tablet 25 mg, 25 mg, Oral, Daily, Patsy Steel MD, 25 mg at 01/09/24 0916    sodium chloride 0.9 % flush 10 mL, 10 mL, Intravenous, Q12H, Karen Goff, APRN, 10 mL at 01/09/24 0917    sodium chloride 0.9 % flush 10 mL, 10 mL, Intravenous, PRN, Karen Goff, APRN    sodium chloride 0.9 % infusion 40 mL, 40 mL, Intravenous, PRN, Karen Goff, APRN    traMADol (ULTRAM) tablet 50 mg, 50 mg, Oral, Q6H PRN, Patsy Steel MD    vitamin B-12 (CYANOCOBALAMIN) tablet 1,000 mcg, 1,000 mcg, Oral, Daily, Karen Goff APRN, 1,000 mcg at 01/09/24 0916  Past Medical History:  Past Medical History:   Diagnosis Date    Anemia     Description: A.  Dx 2006- borderline intermittent.    Back pain     Benign colonic polyp 9/28/2016    Description: A.  Dx 1999.    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  Rule out chronic persistent asthma, COPD, or obliterative bronchiolitis.- Butch    COVID-19 virus infection 10/11/2020    CTS (carpal tunnel syndrome)     Degeneration of intervertebral disc of lumbar region     Description: A.  Diagnosed in April 2013 with advanced multilevel with severe spinal stenosis, followed by Dr. Pillai for pain management.    Gastroesophageal reflux disease     Hearing loss     History of calcium pyrophosphate deposition disease (CPPD)     History of  colonoscopy 01/01/1999    NORMAL PER PATIENT     History of mammogram 01/01/2011    NORMAL PER PT     History of Papanicolaou smear of cervix 01/01/2010    NORMAL PER PT     History of varicella     Hyperlipidemia     Description: A.  Dx 2006.    Hypertension     Description: A. Dx 2001.    Macular degeneration     Nocturia     Osteoporosis     Ovarian mass     Dx 8/15- benign left cystic adnexal mass    Overactive bladder     Pelvic floor dysfunction     Rheumatoid arthritis     Description: A.  Diagnosed in 2000 and and followed by Dr. Constantino (now Dr. Kaplan). B.  On methotrexate therapy since 2000. C.  Off low-dose prednisone therapy.    Vitamin D deficiency      Past Surgical History:  Past Surgical History:   Procedure Laterality Date    APPENDECTOMY      CARPAL TUNNEL RELEASE Left 01/01/2003    HISTORY OF NEUROPLASTY DECOMPRESSION MEDIAN NERVE AT CARPAL TUNNEL LEFT    CATARACT EXTRACTION Bilateral 01/01/2009    CHOLECYSTECTOMY  01/01/1962    HIP CANNULATED SCREW PLACEMENT Right 1/25/2020    Procedure: HIP CANNULATED SCREW PLACEMENT RIGHT;  Surgeon: Karlos Blount MD;  Location: [facility] OR;  Service: Orthopedics    KNEE ARTHROSCOPY Left 01/01/2001    MENISCAL REPAIR    KYPHOPLASTY  06/18/2015    T11 AND L1 (JOSE A)    PELVIC LAPAROSCOPY  01/01/1996    REMOVAL OF BENIGN UTERINE AND RIGHT OVARIAN TUMORS    SALPINGO OOPHORECTOMY Left 08/26/2015    REMOVAL OF LEFT OVARY AND TUBE (benign cystic mass)     Social Hx:  Social History     Tobacco Use    Smoking status: Former     Years: .5     Types: Cigarettes    Smokeless tobacco: Never   Vaping Use    Vaping Use: Never used   Substance Use Topics    Alcohol use: No    Drug use: No     Family Hx:  Family History   Problem Relation Age of Onset    Hypertension Mother     Diabetes Mother      Review of Systems:        Review of Systems   Genitourinary:  Negative for difficulty urinating.   Musculoskeletal:  Positive for back pain.   Neurological:  Positive for  "weakness.        Physical Exam:   /90   Pulse 98   Temp 97.5 °F (36.4 °C) (Oral)   Resp 18   Ht 157.5 cm (62\")   Wt 52.3 kg (115 lb 3.2 oz)   SpO2 93%   BMI 21.07 kg/m²   Patient appears comfortable, resting flat in bed  Opens eyes spontaneously  Face symmetric  5/5 strength in bilateral lower extremities  5/5 bilateral    Normal sensation to light touch in bilateral lower extremities  No peterson's or clonus bilaterally      Intake/Output:   Intake/Output Summary (Last 24 hours) at 1/9/2024 1123  Last data filed at 1/9/2024 1056  Gross per 24 hour   Intake 320 ml   Output 200 ml   Net 120 ml       Current Medications:   Current Facility-Administered Medications:     acetaminophen (TYLENOL) tablet 650 mg, 650 mg, Oral, Q4H PRN, Karen Goff APRN    albuterol sulfate HFA (PROVENTIL HFA;VENTOLIN HFA;PROAIR HFA) inhaler 2 puff, 2 puff, Inhalation, Q4H PRN, Karen Goff APRN    aspirin EC tablet 81 mg, 81 mg, Oral, Daily, Patsy Steel MD, 81 mg at 01/09/24 0916    sennosides-docusate (PERICOLACE) 8.6-50 MG per tablet 2 tablet, 2 tablet, Oral, BID, 2 tablet at 01/09/24 0916 **AND** polyethylene glycol (MIRALAX) packet 17 g, 17 g, Oral, Daily PRN **AND** bisacodyl (DULCOLAX) EC tablet 5 mg, 5 mg, Oral, Daily PRN **AND** bisacodyl (DULCOLAX) suppository 10 mg, 10 mg, Rectal, Daily PRN, Patsy Steel MD    budesonide-formoterol (SYMBICORT) 160-4.5 MCG/ACT inhaler 1 puff, 1 puff, Inhalation, BID - RT, Karen Goff APRN, 1 puff at 01/09/24 0929    dilTIAZem CD (CARDIZEM CD) 24 hr capsule 120 mg, 120 mg, Oral, Q24H, Karen Goff APRN, 120 mg at 01/09/24 0916    famotidine (PEPCID) tablet 40 mg, 40 mg, Oral, Daily, Karen Goff APRN, 40 mg at 01/09/24 0916    folic acid (FOLVITE) tablet 1,000 mcg, 1,000 mcg, Oral, Daily, Karen Goff APRN, 1,000 mcg at 01/09/24 0915    Magnesium Standard Dose Replacement - Follow Nurse / BPA Driven Protocol, , Does not " apply, PRN, Karen Goff, APRADALBERTO    melatonin tablet 5 mg, 5 mg, Oral, Nightly PRN, Karen Goff APRAADLBERTO    [Held by provider] methenamine (HIPREX) tablet 1 g, 1 g, Oral, Q12H, Karen Goff, APRN    morphine injection 1 mg, 1 mg, Intravenous, Q4H PRN **AND** naloxone (NARCAN) injection 0.4 mg, 0.4 mg, Intravenous, Q5 Min PRN, Patsy Steel MD    nitroglycerin (NITROSTAT) SL tablet 0.4 mg, 0.4 mg, Sublingual, Q5 Min PRN, Karen Goff APRN    ondansetron (ZOFRAN) tablet 4 mg, 4 mg, Oral, Q6H PRN **OR** ondansetron (ZOFRAN) injection 4 mg, 4 mg, Intravenous, Q6H PRN, Karen Goff APRN    oxybutynin XL (DITROPAN-XL) 24 hr tablet 5 mg, 5 mg, Oral, Daily, Karen Goff APRN, 5 mg at 01/09/24 0916    Potassium Replacement - Follow Nurse / BPA Driven Protocol, , Does not apply, PRN, Karen Goff APRADALBERTO    sertraline (ZOLOFT) tablet 25 mg, 25 mg, Oral, Daily, Patsy Steel MD, 25 mg at 01/09/24 0916    sodium chloride 0.9 % flush 10 mL, 10 mL, Intravenous, Q12H, Karen Goff APRN, 10 mL at 01/09/24 0917    sodium chloride 0.9 % flush 10 mL, 10 mL, Intravenous, PRN, Karen Goff APRADALBERTO    sodium chloride 0.9 % infusion 40 mL, 40 mL, Intravenous, PRN, Karen Goff APRADALBERTO    traMADol (ULTRAM) tablet 50 mg, 50 mg, Oral, Q6H PRN, Patsy Steel MD    vitamin B-12 (CYANOCOBALAMIN) tablet 1,000 mcg, 1,000 mcg, Oral, Daily, Karen Goff APRN, 1,000 mcg at 01/09/24 0916     Laboratory Results:      Lab 01/09/24  0428 01/08/24  2224 01/08/24  1759 01/08/24  1558 01/02/24  1725   WBC 11.93*  --   --  13.58* 9.89   HEMOGLOBIN 10.8*  --   --  11.0* 11.4*   HEMATOCRIT 33.7*  --   --  34.8 36.1   PLATELETS 218  --   --  230 230   NEUTROS ABS  --   --   --  11.48* 8.47*   IMMATURE GRANS (ABS)  --   --   --  0.09* 0.10*   LYMPHS ABS  --   --   --  0.95 0.80   MONOS ABS  --   --   --  0.88 0.44   EOS ABS  --   --   --  0.12 0.04   .6*  --   --  105.8*  107.1*   PROCALCITONIN  --   --  0.31*  --   --    LACTATE  --  1.7  --   --   --          Lab 01/09/24  0428 01/08/24  1558 01/02/24  1725   SODIUM 142 138 142   POTASSIUM 4.7 4.4 4.4   CHLORIDE 105 104 103   CO2 26.0 24.0 27.0   ANION GAP 11.0 10.0 12.0   BUN 22 24* 21   CREATININE 0.70 0.75 0.90   EGFR 80.8 74.3 59.7*   GLUCOSE 95 102* 98   CALCIUM 8.9 8.6 9.4   MAGNESIUM  --   --  1.8   TSH  --  2.720  --          Lab 01/08/24  1558 01/02/24  1725   TOTAL PROTEIN 6.6 6.7   ALBUMIN 3.5 4.5   GLOBULIN 3.1 2.2   ALT (SGPT) 8 10   AST (SGOT) 15 17   BILIRUBIN 0.5 0.4   ALK PHOS 128* 109         Lab 01/08/24  1759 01/08/24  1558 01/02/24  1746   HSTROP T 33* 34* 35*             Lab 01/08/24  1724   FOLATE >20.00   VITAMIN B 12 482         Brief Urine Lab Results  (Last result in the past 365 days)        Color   Clarity   Blood   Leuk Est   Nitrite   Protein   CREAT   Urine HCG        01/08/24 1512 Yellow   Clear   Large (3+)   Small (1+)   Negative   30 mg/dL (1+)                 Microbiology Results (last 10 days)       Procedure Component Value - Date/Time    COVID PRE-OP / PRE-PROCEDURE SCREENING ORDER (NO ISOLATION) - Swab, Nasopharynx [[id number]]  (Normal) Collected: 01/08/24 1601    Lab Status: Final result Specimen: Swab from Nasopharynx Updated: 01/08/24 1649    Narrative:      The following orders were created for panel order COVID PRE-OP / PRE-PROCEDURE SCREENING ORDER (NO ISOLATION) - Swab, Nasopharynx.  Procedure                               Abnormality         Status                     ---------                               -----------         ------                     COVID-19 and FLU A/B PCR...[[id number]]  Normal              Final result                 Please view results for these tests on the individual orders.    COVID-19 and FLU A/B PCR, 1 HR TAT - Swab, Nasopharynx [[id number]]  (Normal) Collected: 01/08/24 1601    Lab Status: Final result Specimen: Swab from Nasopharynx Updated: 01/08/24  1649     COVID19 Not Detected     Influenza A PCR Not Detected     Influenza B PCR Not Detected    Narrative:      Fact sheet for providers: https://www.fda.gov/media/663241/download    Fact sheet for patients: https://www.fda.gov/media/844050/download    Test performed by PCR.             Radiological studies:  Imaging Results (Last 24 Hours)       Procedure Component Value Units Date/Time    XR Chest 1 View [[id number]] Collected: 01/08/24 2207     Updated: 01/08/24 2214    Narrative:      XR CHEST 1 VW    Date of Exam: 1/8/2024 7:18 PM EST    Indication: leukocytosis, 91% on room air, diminished lung sounds bases    Comparison: 1/2/2024    Findings:  Previous thoracic kyphoplasties are noted. The heart shadow is upper limits of normal size. Mild diffuse pulmonary interstitial changes appear stable except in the right lung base, where there is increased peribronchial thickening and interstitial   change. No dense lung consolidation effusion or pneumothorax is seen.      Impression:      Impression:  Increased peribronchial thickening and interstitial change in the right lung base, potentially changes of bronchitis or early changes of pneumonia.      Electronically Signed: Isael Lewis MD    1/8/2024 10:11 PM EST    Workstation ID: [id number]    CT Lumbar Spine Without Contrast [[id number]] Collected: 01/08/24 1540     Updated: 01/08/24 1554    Narrative:      CT LUMBAR SPINE WO CONTRAST    Date of Exam: 1/8/2024 3:33 PM EST    Indication: sacral pain after a fall 1 week ago.    Comparison: The most recent study for potential comparison is an abdomen pelvis CT scan from 7/23/2022.   Technique: Axial CT images were obtained of the lumbar spine without contrast administration.  Reconstructed coronal and sagittal images were also obtained. Automated exposure control and iterative construction methods were used.      Findings:   Comparing today's study with the sagittal reconstructions of the 7/23/2022 abdominal scan,  kyphoplasties at T11 and L1 are again noted. There is mild further loss of height of T12, and the superior endplate of L2. L3 compression deformity appears   stable. Inferior endplate deformity of L4 appears stable. In summary, no significant interval change is seen and no clearly acute bony trauma is appreciated here.    There is, however, new step-off at S1-S2, consistent with transverse fracture here that has occurred since the prior exam. No pars defects are seen and no posterior element fracture is seen. Coronal images show no clearly acute bony abnormality of the   lumbar spine.    Axial bone images show no acute appearing features in the lumbar spine. There is a nondisplaced fracture line extending to the anterior margin of the right sacral kori which appears acute or recent, images 98 through 103.    Soft tissue axial images show no evidence of significant stenosis in the included T11-12 or T12-L1 levels.    L1-2, canal appears lower limits of normal.    L2-3, there is probably moderate or possibly greater canal stenosis due to vertebral osteophytes , facet hypertrophy and facet osteophytes.    At L3-4, there is likely marked canal stenosis due to extensive posterior element hypertrophy, ligamentous hypertrophy and vertebral osteophytes.    At L4-5, there is moderate canal stenosis due to facet and vertebral osteophytes.    L5-S1, there appears to be fairly high-grade stenosis due to a large broad-based disc protrusion as seen on image 80 series 2. No hematoma is seen associated with the patient's nondisplaced right sacral fracture. No pathologic paraspinous mass or acute   inflammatory focus is seen.          Impression:      Impression:    1. Previous T11 and L1 kyphoplasties, and multilevel vertebral endplate compression deformities, some progression from 2022. No clearly acute lumbar spine trauma is seen.    2. Potentially high-grade stenosis at L5-S1 due to broad-based disc protrusion. Moderate to  marked canal stenosis at L2-L3 L3-4 L4-5, which is likely chronic, due to facet and vertebral osteophytes.    3. Minimally displaced sacral fracture, visible at S1-2 on the sagittal images, and nondisplaced fracture visible in the right sacral ala on axial images. Given the patient's osteopenia, a more extensive nondisplaced fracture could be present, but   difficult to detect.        Electronically Signed: Isael Lewis MD    1/8/2024 3:51 PM EST    Workstation ID: [id number]    XR Knee 1 or 2 View Left [[id number]] Collected: 01/08/24 1344     Updated: 01/08/24 1348    Narrative:      XR KNEE 1 OR 2 VW LEFT    Date of Exam: 1/8/2024 1:28 PM EST    Indication: Injury 1 month ago    Comparison: None available.    Findings:  2 views. There is osteoporosis. There is diffuse atherosclerotic vascular calcification. There is severe narrowing of the medial and lateral compartments. There is severe narrowing of the patellofemoral compartment. There are no acute or old fractures.   There is no callus or periostitis. There is chondrocalcinosis. There is no definite effusion.      Impression:      Impression:  Degenerative osteoarthritis. Atherosclerosis. No acute process.      Electronically Signed: Chika Khanna MD    1/8/2024 1:45 PM EST    Workstation ID: [id number]            Diagnostic Imaging: The patient's diagnostic imaging was independently reviewed and interpreted by myself.    [facility]    Assessment and Plan:  This is a 89 y.o. female who presents to [facility] with back/tailbone pain after a fall 1 week ago at her assisted living facility.  CT lumbar spine shows minimally displaced sacral fracture her at S1-2,nondisplaced fracture visible in the right sacral ala, multiple levels of chronic stenosis due to facet arthropathy and osteophytes as well as potentially high-grade stenosis at L5-S1 due to broad-based disc bulge.  On exam, patient is full strength in bilateral lower extremities and no long track signs.  She requires coaxing  and multiple repeated instructions to participate fully in motor examination.  MRI lumbar spine ordered for further evaluation of lumbar stenosis.  Further recommendations after imaging is completed.    Ekaterina Chavez PA-C  01/09/24  11:23 EST

## 2024-01-09 NOTE — CASE MANAGEMENT/SOCIAL WORK
Continued Stay Note  [facility]     Patient Name: Pati Carter  MRN: [id number]  Today's Date: 1/9/2024    Admit Date: 1/8/2024    Plan: SNF   Discharge Plan       Row Name 01/09/24 1547       Plan    Plan SNF    Plan Comments I spoke with patient's daughter in regards to discharge planning. Patient resides in assisted living at [facility]. Patient and hired help provide additional care in facility. Patient has been there over 3 years. We discussed possibility of SNF and her daughter thinks this would be what they should pursue. She tells me the family is family with several facilities. She has asked for a referral to [facility], [facility] or [facility]. I will make referrals.    Final Discharge Disposition Code 03 - skilled nursing facility (SNF)      Row Name 01/09/24 1544       Plan    Plan SNF                   Discharge Codes    No documentation.                 Expected Discharge Date and Time       Expected Discharge Date Expected Discharge Time    Elia 10, 2024               Maryanne Martin RN

## 2024-01-09 NOTE — PROGRESS NOTES
[facility] Medicine Services  PROGRESS NOTE    Patient Name: Pati Carter  : 1930  MRN: [id number]    Date of Admission: 2024  Primary Care Physician: Pallavi Eduardo MD    Subjective   Subjective     CC:  fall    HPI:  No acute events. Some confusion. States she is tired. Reviewed comfort diet with daughter and she is agreeable. Reviewed pending Neurosurgery eval.       Objective   Objective     Vital Signs:   Temp:  [97.5 °F (36.4 °C)-97.9 °F (36.6 °C)] 97.9 °F (36.6 °C)  Heart Rate:  [80-98] 98  Resp:  [16-19] 19  BP: (140-179)/() 154/86     Physical Exam:  Constitutional: No acute distress, awake, alert  HENT: NCAT, mucous membranes moist  Respiratory: diminished but clear   Cardiovascular: RRR, no murmurs, rubs, or gallops  Gastrointestinal: Positive bowel sounds, soft, nontender, nondistended  Musculoskeletal: No bilateral ankle edema  Psychiatric: Appropriate affect, cooperative  Neurologic: lower ext weakness, Cranial Nerves grossly intact to confrontation, speech clear  Skin: No rashes      Results Reviewed:  LAB RESULTS:      Lab 24  2224 24  17524  1558 24  1725   WBC 11.93*  --   --  13.58* 9.89   HEMOGLOBIN 10.8*  --   --  11.0* 11.4*   HEMATOCRIT 33.7*  --   --  34.8 36.1   PLATELETS 218  --   --  230 230   NEUTROS ABS  --   --   --  11.48* 8.47*   IMMATURE GRANS (ABS)  --   --   --  0.09* 0.10*   LYMPHS ABS  --   --   --  0.95 0.80   MONOS ABS  --   --   --  0.88 0.44   EOS ABS  --   --   --  0.12 0.04   .6*  --   --  105.8* 107.1*   PROCALCITONIN  --   --  0.31*  --   --    LACTATE  --  1.7  --   --   --          Lab 24  1558 24  1725   SODIUM 142 138 142   POTASSIUM 4.7 4.4 4.4   CHLORIDE 105 104 103   CO2 26.0 24.0 27.0   ANION GAP 11.0 10.0 12.0   BUN 22 24* 21   CREATININE 0.70 0.75 0.90   EGFR 80.8 74.3 59.7*   GLUCOSE 95 102* 98   CALCIUM 8.9 8.6 9.4   MAGNESIUM  --    --  1.8   TSH  --  2.720  --          Lab 01/08/24  1558 01/02/24  1725   TOTAL PROTEIN 6.6 6.7   ALBUMIN 3.5 4.5   GLOBULIN 3.1 2.2   ALT (SGPT) 8 10   AST (SGOT) 15 17   BILIRUBIN 0.5 0.4   ALK PHOS 128* 109         Lab 01/08/24  1759 01/08/24  1558 01/02/24  1746   HSTROP T 33* 34* 35*             Lab 01/08/24  1724   FOLATE >20.00   VITAMIN B 12 482         Brief Urine Lab Results  (Last result in the past 365 days)        Color   Clarity   Blood   Leuk Est   Nitrite   Protein   CREAT   Urine HCG        01/08/24 1512 Yellow   Clear   Large (3+)   Small (1+)   Negative   30 mg/dL (1+)                   Microbiology Results Abnormal       Procedure Component Value - Date/Time    Urine Culture - Urine, Urine, Clean Catch [[id number]] Collected: 01/08/24 1512    Lab Status: Final result Specimen: Urine, Clean Catch Updated: 01/09/24 1215     Urine Culture <10,000 CFU/mL Mixed Katherine Isolated    Narrative:      Specimen contains mixed organisms of questionable pathogenicity suggestive of contamination. If symptoms persist, suggest recollection.  Colonization of the urinary tract without infection is common. Treatment is discouraged unless the patient is symptomatic, pregnant, or undergoing an invasive urologic procedure.    COVID PRE-OP / PRE-PROCEDURE SCREENING ORDER (NO ISOLATION) - Swab, Nasopharynx [[id number]]  (Normal) Collected: 01/08/24 1601    Lab Status: Final result Specimen: Swab from Nasopharynx Updated: 01/08/24 1649    Narrative:      The following orders were created for panel order COVID PRE-OP / PRE-PROCEDURE SCREENING ORDER (NO ISOLATION) - Swab, Nasopharynx.  Procedure                               Abnormality         Status                     ---------                               -----------         ------                     COVID-19 and FLU A/B PCR...[[id number]]  Normal              Final result                 Please view results for these tests on the individual orders.    COVID-19 and FLU  A/B PCR, 1 HR TAT - Swab, Nasopharynx [[id number]]  (Normal) Collected: 01/08/24 1601    Lab Status: Final result Specimen: Swab from Nasopharynx Updated: 01/08/24 1649     COVID19 Not Detected     Influenza A PCR Not Detected     Influenza B PCR Not Detected    Narrative:      Fact sheet for providers: https://www.fda.gov/media/888890/download    Fact sheet for patients: https://www.fda.gov/media/603819/download    Test performed by PCR.            XR Chest 1 View    Result Date: 1/8/2024  XR CHEST 1 VW Date of Exam: 1/8/2024 7:18 PM EST Indication: leukocytosis, 91% on room air, diminished lung sounds bases Comparison: 1/2/2024 Findings: Previous thoracic kyphoplasties are noted. The heart shadow is upper limits of normal size. Mild diffuse pulmonary interstitial changes appear stable except in the right lung base, where there is increased peribronchial thickening and interstitial change. No dense lung consolidation effusion or pneumothorax is seen.     Impression: Impression: Increased peribronchial thickening and interstitial change in the right lung base, potentially changes of bronchitis or early changes of pneumonia. Electronically Signed: Isael Lewis MD  1/8/2024 10:11 PM EST  Workstation ID: [id number]    CT Lumbar Spine Without Contrast    Result Date: 1/8/2024  CT LUMBAR SPINE WO CONTRAST Date of Exam: 1/8/2024 3:33 PM EST Indication: sacral pain after a fall 1 week ago. Comparison: The most recent study for potential comparison is an abdomen pelvis CT scan from 7/23/2022. Technique: Axial CT images were obtained of the lumbar spine without contrast administration.  Reconstructed coronal and sagittal images were also obtained. Automated exposure control and iterative construction methods were used. Findings:  Comparing today's study with the sagittal reconstructions of the 7/23/2022 abdominal scan, kyphoplasties at T11 and L1 are again noted. There is mild further loss of height of T12, and the superior  endplate of L2. L3 compression deformity appears stable. Inferior endplate deformity of L4 appears stable. In summary, no significant interval change is seen and no clearly acute bony trauma is appreciated here. There is, however, new step-off at S1-S2, consistent with transverse fracture here that has occurred since the prior exam. No pars defects are seen and no posterior element fracture is seen. Coronal images show no clearly acute bony abnormality of the lumbar spine. Axial bone images show no acute appearing features in the lumbar spine. There is a nondisplaced fracture line extending to the anterior margin of the right sacral kori which appears acute or recent, images 98 through 103. Soft tissue axial images show no evidence of significant stenosis in the included T11-12 or T12-L1 levels. L1-2, canal appears lower limits of normal. L2-3, there is probably moderate or possibly greater canal stenosis due to vertebral osteophytes , facet hypertrophy and facet osteophytes. At L3-4, there is likely marked canal stenosis due to extensive posterior element hypertrophy, ligamentous hypertrophy and vertebral osteophytes. At L4-5, there is moderate canal stenosis due to facet and vertebral osteophytes. L5-S1, there appears to be fairly high-grade stenosis due to a large broad-based disc protrusion as seen on image 80 series 2. No hematoma is seen associated with the patient's nondisplaced right sacral fracture. No pathologic paraspinous mass or acute inflammatory focus is seen.       Impression: Impression: 1. Previous T11 and L1 kyphoplasties, and multilevel vertebral endplate compression deformities, some progression from 2022. No clearly acute lumbar spine trauma is seen. 2. Potentially high-grade stenosis at L5-S1 due to broad-based disc protrusion. Moderate to marked canal stenosis at L2-L3 L3-4 L4-5, which is likely chronic, due to facet and vertebral osteophytes. 3. Minimally displaced sacral fracture, visible  at S1-2 on the sagittal images, and nondisplaced fracture visible in the right sacral ala on axial images. Given the patient's osteopenia, a more extensive nondisplaced fracture could be present, but difficult to detect. Electronically Signed: Isael Lewis MD  1/8/2024 3:51 PM EST  Workstation ID: [id number]    XR Knee 1 or 2 View Left    Result Date: 1/8/2024  XR KNEE 1 OR 2 VW LEFT Date of Exam: 1/8/2024 1:28 PM EST Indication: Injury 1 month ago Comparison: None available. Findings: 2 views. There is osteoporosis. There is diffuse atherosclerotic vascular calcification. There is severe narrowing of the medial and lateral compartments. There is severe narrowing of the patellofemoral compartment. There are no acute or old fractures. There is no callus or periostitis. There is chondrocalcinosis. There is no definite effusion.     Impression: Impression: Degenerative osteoarthritis. Atherosclerosis. No acute process. Electronically Signed: Chika Khanna MD  1/8/2024 1:45 PM EST  Workstation ID: [id number]         Current medications:  Scheduled Meds:aspirin, 81 mg, Oral, Daily  budesonide-formoterol, 1 puff, Inhalation, BID - RT  dilTIAZem CD, 120 mg, Oral, Q24H  famotidine, 40 mg, Oral, Daily  folic acid, 1,000 mcg, Oral, Daily  [Held by provider] methenamine, 1 g, Oral, Q12H  oxybutynin XL, 5 mg, Oral, Daily  senna-docusate sodium, 2 tablet, Oral, BID  sertraline, 25 mg, Oral, Daily  sodium chloride, 10 mL, Intravenous, Q12H  vitamin B-12, 1,000 mcg, Oral, Daily      Continuous Infusions:   PRN Meds:.  acetaminophen    albuterol sulfate HFA    senna-docusate sodium **AND** polyethylene glycol **AND** bisacodyl **AND** bisacodyl    Magnesium Standard Dose Replacement - Follow Nurse / BPA Driven Protocol    melatonin    Morphine **AND** naloxone    nitroglycerin    ondansetron **OR** ondansetron    Potassium Replacement - Follow Nurse / BPA Driven Protocol    sodium chloride    sodium chloride    traMADol    Assessment &  Plan   Assessment & Plan     Active Hospital Problems    Diagnosis  POA    **Pelvic fracture [S32.9XXA]  Yes    Sacral fracture, closed [S32.10XA]  Yes    Leukocytosis [D72.829]  Yes    Macrocytic anemia [D53.9]  Yes    COPD (chronic obstructive pulmonary disease) [J44.9]  Yes    Gastroesophageal reflux disease [K21.9]  Yes    Rheumatoid arthritis [M06.9]  Yes    Hypertension [I10]  Yes      Resolved Hospital Problems   No resolved problems to display.        Brief Hospital Course to date:  Pati Carter is a 89 y.o. female with PMH significant for rheumatoid arthritis, osteoporosis, frequent falls, compression fractures of lumbar vertebra, DDD, macular degeneration, stress and urinary incontinence who presents to [facility] ED for low back pain.  Patient had a fall at her assisted living facility, [facility], a week ago, presented to the ED 1/2/2024, and was discharged on tramadol and cefepime for UTI.  Patient presents today for uncontrolled pain and progressive weakness, greater in her left leg.      Pelvic/sacral fracture  Lumbar stenosis  Frequent falls  --Initial fall 1/2/2024, CT head negative for acute intracranial process  --CT lumbar spine  shows minimally displaced sacral fracture at S1-S2, potentially high-grade stenosis at L5-S1 and moderate to marked canal stenosis at L2-L3, L3-L4, L4-L5 which is likely chronic  --Neurosurgery consult for progressive leg weakness, L>R, given potentially high-grade stenosis L5-S1  -- MRI L spine pending   --Pain control  --Fall precautions  --PT and OT   --CM consult, will likely need SNF rehab     Leukocytosis  ? PNA   - UA ok; CXR with peribronchial thickening with changes in the right lung base with ? Early changes of PNA   - Elevated WBC and procal  - Bcx pending  - Start rocephin/doxy   - IS.     Rheumatoid arthritis  --hold home methotrexate     Anemia  --macrocytic, .8  -- B12 low normal, folate ok  --continue home cyanocobalamin, folic acid    Dysphagia   -  Speech eval -- patient with comfort diet in 2022. Reviewed with daughter. Will continue comfort diet.      HTN  PAD  --continue home diltiazem  --hold home ASA, colchicine     GERD  --continue home famotidine     Macular degeneration  --Fall precautions, up with assist, assist with feeding     Urinary incontinence  --hold home methenamine for elevated CrCl  --continue oxybutynin as formulary sub for Myrbetriq  --daily I's & O's    Expected Discharge Location and Transportation: PT/OT pending   Expected Discharge   Expected Discharge Date: 1/10/2024; Expected Discharge Time:      DVT prophylaxis:  No DVT prophylaxis order currently exists.     AM-PAC 6 Clicks Score (PT): 11 (01/09/24 0900)    CODE STATUS:   Code Status and Medical Interventions:   Ordered at: 01/08/24 1710     Medical Intervention Limits:    NO intubation (DNI)     Code Status (Patient has no pulse and is not breathing):    No CPR (Do Not Attempt to Resuscitate)     Medical Interventions (Patient has pulse or is breathing):    Limited Support       Traci Hebert DO  01/09/24

## 2024-01-09 NOTE — PLAN OF CARE
Problem: Adult Inpatient Plan of Care  Goal: Plan of Care Review  Outcome: Ongoing, Progressing  Goal: Patient-Specific Goal (Individualized)  Outcome: Ongoing, Progressing  Goal: Absence of Hospital-Acquired Illness or Injury  Outcome: Ongoing, Progressing  Intervention: Identify and Manage Fall Risk  Recent Flowsheet Documentation  Taken 1/9/2024 0400 by Nneka Hernandez RN  Safety Promotion/Fall Prevention:   activity supervised   assistive device/personal items within reach   clutter free environment maintained   fall prevention program maintained   nonskid shoes/slippers when out of bed   room organization consistent   safety round/check completed  Taken 1/9/2024 0000 by Nneka Hernandez RN  Safety Promotion/Fall Prevention:   activity supervised   assistive device/personal items within reach   clutter free environment maintained   fall prevention program maintained   nonskid shoes/slippers when out of bed   safety round/check completed   room organization consistent   lighting adjusted  Taken 1/8/2024 2000 by Nneka Hernandez RN  Safety Promotion/Fall Prevention:   activity supervised   assistive device/personal items within reach   clutter free environment maintained   fall prevention program maintained   nonskid shoes/slippers when out of bed   room organization consistent   safety round/check completed   lighting adjusted  Intervention: Prevent Skin Injury  Recent Flowsheet Documentation  Taken 1/9/2024 0400 by Nneka Hernandez RN  Body Position:   position changed independently   tilted   neutral head position   neutral body alignment  Skin Protection:   adhesive use limited   incontinence pads utilized   tubing/devices free from skin contact   transparent dressing maintained  Taken 1/9/2024 0000 by Nneka Hernandez RN  Skin Protection:   adhesive use limited   incontinence pads utilized   transparent dressing maintained   tubing/devices free from skin contact  Taken 1/8/2024 2000 by  Nneka Hernandez RN  Body Position:   tilted   neutral head position   neutral body alignment  Skin Protection:   adhesive use limited   incontinence pads utilized   transparent dressing maintained   tubing/devices free from skin contact  Intervention: Prevent and Manage VTE (Venous Thromboembolism) Risk  Recent Flowsheet Documentation  Taken 1/9/2024 0400 by Nneka Hernandez RN  VTE Prevention/Management:   bilateral   sequential compression devices on  Taken 1/9/2024 0000 by Nneka Hernandez RN  VTE Prevention/Management:   bilateral   sequential compression devices on  Taken 1/8/2024 2230 by Nneka Hernandez RN  Activity Management: (pt states unable to move back to bed, this rn and pct assisted pt back to bed.)   standing at bedside   up to bedside commode   back to bed   unable to complete activity (see comments)  Taken 1/8/2024 2000 by Nneka Hernandez RN  VTE Prevention/Management:   bilateral   sequential compression devices on  Intervention: Prevent Infection  Recent Flowsheet Documentation  Taken 1/9/2024 0400 by Nneka Hernandez RN  Infection Prevention:   single patient room provided   rest/sleep promoted   hand hygiene promoted  Goal: Optimal Comfort and Wellbeing  Outcome: Ongoing, Progressing  Intervention: Provide Person-Centered Care  Recent Flowsheet Documentation  Taken 1/8/2024 2000 by Nneka Hernandez RN  Trust Relationship/Rapport:   care explained   choices provided   empathic listening provided   questions encouraged   reassurance provided   thoughts/feelings acknowledged  Goal: Readiness for Transition of Care  Outcome: Ongoing, Progressing     Problem: Skin Injury Risk Increased  Goal: Skin Health and Integrity  Outcome: Ongoing, Progressing  Intervention: Optimize Skin Protection  Recent Flowsheet Documentation  Taken 1/9/2024 0400 by Nneka Hernandez RN  Pressure Reduction Techniques:   frequent weight shift encouraged   weight shift assistance provided  Head of  Bed (HOB) Positioning: HOB at 20-30 degrees  Pressure Reduction Devices:   pressure-redistributing mattress utilized   positioning supports utilized  Skin Protection:   adhesive use limited   incontinence pads utilized   tubing/devices free from skin contact   transparent dressing maintained  Taken 1/9/2024 0000 by Nneka Hernandez RN  Pressure Reduction Techniques:   frequent weight shift encouraged   weight shift assistance provided  Pressure Reduction Devices: (isotour bed) pressure-redistributing mattress utilized  Skin Protection:   adhesive use limited   incontinence pads utilized   transparent dressing maintained   tubing/devices free from skin contact  Taken 1/8/2024 2000 by Nneka Hernandez RN  Pressure Reduction Techniques:   frequent weight shift encouraged   weight shift assistance provided  Head of Bed (HOB) Positioning: HOB at 20-30 degrees  Pressure Reduction Devices: (isotour bed) pressure-redistributing mattress utilized  Skin Protection:   adhesive use limited   incontinence pads utilized   transparent dressing maintained   tubing/devices free from skin contact     Problem: Fall Injury Risk  Goal: Absence of Fall and Fall-Related Injury  Outcome: Ongoing, Progressing  Intervention: Identify and Manage Contributors  Recent Flowsheet Documentation  Taken 1/9/2024 0400 by Nneka Hernandez RN  Medication Review/Management: medications reviewed  Taken 1/9/2024 0000 by Nneka Hernandez RN  Medication Review/Management: medications reviewed  Taken 1/8/2024 2000 by Nneka Hernandez RN  Medication Review/Management: medications reviewed  Intervention: Promote Injury-Free Environment  Recent Flowsheet Documentation  Taken 1/9/2024 0400 by Nneka Hernandez RN  Safety Promotion/Fall Prevention:   activity supervised   assistive device/personal items within reach   clutter free environment maintained   fall prevention program maintained   nonskid shoes/slippers when out of bed   room  organization consistent   safety round/check completed  Taken 1/9/2024 0000 by Nneka Hernandez RN  Safety Promotion/Fall Prevention:   activity supervised   assistive device/personal items within reach   clutter free environment maintained   fall prevention program maintained   nonskid shoes/slippers when out of bed   safety round/check completed   room organization consistent   lighting adjusted  Taken 1/8/2024 2000 by Nneka Hernandez, RN  Safety Promotion/Fall Prevention:   activity supervised   assistive device/personal items within reach   clutter free environment maintained   fall prevention program maintained   nonskid shoes/slippers when out of bed   room organization consistent   safety round/check completed   lighting adjusted   Goal Outcome Evaluation:

## 2024-01-09 NOTE — THERAPY EVALUATION
Acute Care - Speech Language Pathology   Swallow Initial Evaluation [facility]  Clinical Swallow Evaluation       Patient Name: Pati Carter  : 1930  MRN: [id number]  Today's Date: 2024               Admit Date: 2024    Visit Dx:     ICD-10-CM ICD-9-CM   1. Closed fracture of sacrum, unspecified portion of sacrum, initial encounter  S32.10XA 805.6   2. Fall in home, initial encounter  W19.XXXA E888.9    Y92.009 E849.0   3. Generalized weakness  R53.1 780.79   4. 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  Dx 2006- borderline intermittent.    Back pain     Benign colonic polyp 2016    Description: A.  Dx .    Ferny Bonnet syndrome 2020    Chondrocalcinosis     knees    Compression fracture of lumbar vertebra     Constipation     COPD (chronic obstructive pulmonary disease)     Description: A.  Rule out chronic persistent asthma, COPD, or obliterative bronchiolitis.- Butch    COVID-19  virus infection 10/11/2020    CTS (carpal tunnel syndrome)     Degeneration of intervertebral disc of lumbar region     Description: A.  Diagnosed in April 2013 with advanced multilevel with severe spinal stenosis, followed by Dr. Pillai for pain management.    Gastroesophageal reflux disease     Hearing loss     History of calcium pyrophosphate deposition disease (CPPD)     History of colonoscopy 01/01/1999    NORMAL PER PATIENT     History of mammogram 01/01/2011    NORMAL PER PT     History of Papanicolaou smear of cervix 01/01/2010    NORMAL PER PT     History of varicella     Hyperlipidemia     Description: A.  Dx 2006.    Hypertension     Description: A. Dx 2001.    Macular degeneration     Nocturia     Osteoporosis     Ovarian mass     Dx 8/15- benign left cystic adnexal mass    Overactive bladder     Pelvic floor dysfunction     Rheumatoid arthritis     Description: A.  Diagnosed in 2000 and and followed by Dr. Constantino (now Dr. Kaplan). B.  On methotrexate therapy since 2000. C.  Off low-dose prednisone therapy.    Vitamin D deficiency      Past Surgical History:   Procedure Laterality Date    APPENDECTOMY      CARPAL TUNNEL RELEASE Left 01/01/2003    HISTORY OF NEUROPLASTY DECOMPRESSION MEDIAN NERVE AT CARPAL TUNNEL LEFT    CATARACT EXTRACTION Bilateral 01/01/2009    CHOLECYSTECTOMY  01/01/1962    HIP CANNULATED SCREW PLACEMENT Right 1/25/2020    Procedure: HIP CANNULATED SCREW PLACEMENT RIGHT;  Surgeon: Karlos Blount MD;  [facility];  Service: Orthopedics    KNEE ARTHROSCOPY Left 01/01/2001    MENISCAL REPAIR    KYPHOPLASTY  06/18/2015    T11 AND L1 (JOSE A)    PELVIC LAPAROSCOPY  01/01/1996    REMOVAL OF BENIGN UTERINE AND RIGHT OVARIAN TUMORS    SALPINGO OOPHORECTOMY Left 08/26/2015    REMOVAL OF LEFT OVARY AND TUBE (benign cystic mass)       SLP Recommendation and Plan  SLP Swallowing Diagnosis: suspect chronic, pharyngeal dysphagia (01/09/24 1400)  SLP Diet Recommendation: NPO, other  (see comments) (safest is NPO vs comfort diet (mech grnd, no mixed, thins) pending further GOC/POC) (01/09/24 1400)  Recommended Precautions and Strategies: general aspiration precautions (01/09/24 1400)  SLP Rec. for Method of Medication Administration: meds via alternate route (01/09/24 1400)     Monitor for Signs of Aspiration: yes, notify SLP if any concerns (01/09/24 1400)  Recommended Diagnostics: reassess via clinical swallow evaluation (01/09/24 1400)  Swallow Criteria for Skilled Therapeutic Interventions Met: demonstrates skilled criteria (01/09/24 1400)  Anticipated Discharge Disposition (SLP): skilled nursing facility, extended care facility (01/09/24 1400)  Rehab Potential/Prognosis, Swallowing: re-evaluate goals as necessary (01/09/24 1400)     Predicted Duration Therapy Intervention (Days): until discharge (01/09/24 1400)  Oral Care Recommendations: Oral Care BID/PRN, Suction toothbrush (01/09/24 1400)            Oral Care Recommendations: Oral Care BID/PRN, Suction toothbrush (01/09/24 1400)    Plan of Care Reviewed With: patient  Progress:  (initial eval)      SWALLOW EVALUATION (last 72 hours)       SLP Adult Swallow Evaluation       Row Name 01/09/24 1400       Rehab Evaluation    Document Type evaluation  -CJ    Subjective Information no complaints  -CJ    Patient Observations alert;cooperative  -CJ    Patient/Family/Caregiver Comments/Observations no family present  -CJ    Patient Effort fair  -CJ    Symptoms Noted During/After Treatment none  -CJ       General Information    Patient Profile Reviewed yes  -CJ    Pertinent History Of Current Problem Pt adm w/ pelvic fx and anemia; sig h/o RA, GERD, HTN, COPD, leukocytosis, macular degeneration, hearing loss, HLD, PVD, malnutrition, osbaldo bonnet syndrome. CXR: increased peribronchial thickening in R lung w/ possible bronchitis or PNA. MBS 8/11/22: osteophytes w/ severe dysphagia and recs for NPO. Pt elected to pursue comfort diet  -CJ    Current  Method of Nutrition regular textures;thin liquids  -    Precautions/Limitations, Vision vision impairment, bilaterally;other (see comments)  macular degeneration  -    Precautions/Limitations, Hearing hearing impairment, bilaterally  -    Prior Level of Function-Communication unknown  -    Prior Level of Function-Swallowing comfort diet  8/11/22  -    Plans/Goals Discussed with patient  -    Barriers to Rehab medically complex;previous functional deficit;cognitive status;visual deficit  -    Patient's Goals for Discharge patient did not state  -       Pain    Additional Documentation Pain Scale: FACES Pre/Post-Treatment (Group)  -       Pain Scale: FACES Pre/Post-Treatment    Pain: FACES Scale, Pretreatment 0-->no hurt  -CJ    Posttreatment Pain Rating 0-->no hurt  -CJ       Oral Motor Structure and Function    Secretion Management WNL/WFL  -    Mucosal Quality sticky;dry  -       Oral Musculature and Cranial Nerve Assessment    Oral Motor General Assessment generalized oral motor weakness  -       General Eating/Swallowing Observations    Respiratory Support Currently in Use room air  -    Eating/Swallowing Skills self-fed  -    Positioning During Eating upright in bed  -    Utensils Used spoon;cup;straw  -    Consistencies Trialed pureed;thin liquids;ice chips  -       Clinical Swallow Eval    Pharyngeal Phase suspected pharyngeal impairment  -    Clinical Swallow Evaluation Summary Noted pt previously elected to proceed w/ comfort diet. Pt w/ confusion this admission. Minimal po presentations accepted. Pt would not allow SLP to provide po presentations. Overt s/s of aspiration intermittently w/ all trials. Not appropriate for instrumental evaluation at this time. Unable to safely recommend any po presentations at this time. Will f/u for re-eval pending GOC/POC as neurosurgery consult pending. D/w DO/hospitalist and will proceed w/ previous comfort diet recs and have further  discussions regarding GOC  -       Pharyngeal Phase Concerns    Pharyngeal Phase Concerns cough;multiple swallows  -       SLP Evaluation Clinical Impression    SLP Swallowing Diagnosis suspect chronic;pharyngeal dysphagia  -    Functional Impact risk of aspiration/pneumonia  -    Rehab Potential/Prognosis, Swallowing re-evaluate goals as necessary  -    Swallow Criteria for Skilled Therapeutic Interventions Met demonstrates skilled criteria  -       Recommendations    Predicted Duration Therapy Intervention (Days) until discharge  -    SLP Diet Recommendation NPO;other (see comments)  safest is NPO vs comfort diet (mech grnd, no mixed, thins) pending further GOC/POC  -    Recommended Diagnostics reassess via clinical swallow evaluation  -    Recommended Precautions and Strategies general aspiration precautions  -    Oral Care Recommendations Oral Care BID/PRN;Suction toothbrush  -    SLP Rec. for Method of Medication Administration meds via alternate route  -    Monitor for Signs of Aspiration yes;notify SLP if any concerns  -    Anticipated Discharge Disposition (SLP) skilled nursing facility;extended care facility  -              User Key  (r) = Recorded By, (t) = Taken By, (c) = Cosigned By      Initials Name Effective Dates     Oriana Head, MS CCC-SLP 07/11/23 -                     EDUCATION  The patient has been educated in the following areas:   Dysphagia (Swallowing Impairment) Oral Care/Hydration Modified Diet Instruction.              Time Calculation:    Time Calculation- SLP       Row Name 01/09/24 1435             Time Calculation- [facility]    SLP Start Time 1400  -      SLP Received On 01/09/24  -         Untimed Charges    [id number] Eval Oral Pharyng Swallow Minutes 41  -CJ         Total Minutes    Untimed Charges Total Minutes 41  -       Total Minutes 41  -                User Key  (r) = Recorded By, (t) = Taken By, (c) = Cosigned By      Initials Name Provider  Type    CJ Oriana Head, MS CCC-SLP Speech and Language Pathologist                    Therapy Charges for Today       Code Description Service Date Service Provider Modifiers Qty    [id number]  ST EVAL ORAL PHARYNG SWALLOW 3 1/9/2024 Oriana Head, MS CCC-SLP GN 1                 Oriana Head MS CCC-SLP  1/9/2024

## 2024-01-09 NOTE — NURSING NOTE
[facility] EVER specialty bed- Bed # 3 ordered.    Patient must continue to be turned per [facility] policy.    At discharge, please wipe down bed with appropriate cleaning wipes, place in hallway near patient room with a clear plastic covering and notify Charge RN and [facility]. Thank you.    Misael Dunne RN, BSN, CCRN, CWOCN  Wound, Ostomy and Continence (WOC) Department  [facility]

## 2024-01-09 NOTE — PLAN OF CARE
Goal Outcome Evaluation:  Plan of Care Reviewed With: patient        Progress:  (initial eval)     SLP evaluation completed. Will continue to address dysphagia. Unable to safely recommend po diet at this time. Please see note for further details and recommendations.      Anticipated Discharge Disposition (SLP): skilled nursing facility, extended care facility          SLP Swallowing Diagnosis: suspect chronic, pharyngeal dysphagia (01/09/24 1400)

## 2024-01-10 LAB
DEPRECATED RDW RBC AUTO: 53.7 FL (ref 37–54)
ERYTHROCYTE [DISTWIDTH] IN BLOOD BY AUTOMATED COUNT: 14.4 % (ref 12.3–15.4)
GLUCOSE BLDC GLUCOMTR-MCNC: 129 MG/DL (ref 70–130)
HCT VFR BLD AUTO: 32.2 % (ref 34–46.6)
HGB BLD-MCNC: 10.3 G/DL (ref 12–15.9)
MCH RBC QN AUTO: 32.8 PG (ref 26.6–33)
MCHC RBC AUTO-ENTMCNC: 32 G/DL (ref 31.5–35.7)
MCV RBC AUTO: 102.5 FL (ref 79–97)
PLATELET # BLD AUTO: 251 10*3/MM3 (ref 140–450)
PMV BLD AUTO: 10 FL (ref 6–12)
QT INTERVAL: 396 MS
QTC INTERVAL: 456 MS
RBC # BLD AUTO: 3.14 10*6/MM3 (ref 3.77–5.28)
WBC NRBC COR # BLD AUTO: 10.14 10*3/MM3 (ref 3.4–10.8)

## 2024-01-10 PROCEDURE — 94799 UNLISTED PULMONARY SVC/PX: CPT

## 2024-01-10 PROCEDURE — 82948 REAGENT STRIP/BLOOD GLUCOSE: CPT

## 2024-01-10 PROCEDURE — 97166 OT EVAL MOD COMPLEX 45 MIN: CPT

## 2024-01-10 PROCEDURE — 85027 COMPLETE CBC AUTOMATED: CPT | Performed by: INTERNAL MEDICINE

## 2024-01-10 PROCEDURE — 99232 SBSQ HOSP IP/OBS MODERATE 35: CPT | Performed by: INTERNAL MEDICINE

## 2024-01-10 PROCEDURE — 97162 PT EVAL MOD COMPLEX 30 MIN: CPT

## 2024-01-10 PROCEDURE — 25010000002 CEFTRIAXONE PER 250 MG: Performed by: INTERNAL MEDICINE

## 2024-01-10 PROCEDURE — 92610 EVALUATE SWALLOWING FUNCTION: CPT

## 2024-01-10 PROCEDURE — 94664 DEMO&/EVAL PT USE INHALER: CPT

## 2024-01-10 RX ORDER — LIDOCAINE 4 G/G
1 PATCH TOPICAL
Status: DISCONTINUED | OUTPATIENT
Start: 2024-01-10 | End: 2024-01-17 | Stop reason: HOSPADM

## 2024-01-10 RX ADMIN — FAMOTIDINE 40 MG: 20 TABLET, FILM COATED ORAL at 09:06

## 2024-01-10 RX ADMIN — DOXYCYCLINE 100 MG: 100 CAPSULE ORAL at 21:27

## 2024-01-10 RX ADMIN — Medication 10 ML: at 09:11

## 2024-01-10 RX ADMIN — SENNOSIDES AND DOCUSATE SODIUM 2 TABLET: 8.6; 5 TABLET ORAL at 21:27

## 2024-01-10 RX ADMIN — DICLOFENAC SODIUM 2 G: 9.3 GEL TOPICAL at 12:27

## 2024-01-10 RX ADMIN — POLYETHYLENE GLYCOL 3350 17 G: 17 POWDER, FOR SOLUTION ORAL at 09:07

## 2024-01-10 RX ADMIN — CYANOCOBALAMIN TAB 1000 MCG 1000 MCG: 1000 TAB at 09:06

## 2024-01-10 RX ADMIN — BUDESONIDE AND FORMOTEROL FUMARATE DIHYDRATE 1 PUFF: 160; 4.5 AEROSOL RESPIRATORY (INHALATION) at 10:11

## 2024-01-10 RX ADMIN — BISACODYL 5 MG: 5 TABLET, COATED ORAL at 09:07

## 2024-01-10 RX ADMIN — DILTIAZEM HYDROCHLORIDE 120 MG: 120 CAPSULE, COATED, EXTENDED RELEASE ORAL at 09:07

## 2024-01-10 RX ADMIN — TRAMADOL HYDROCHLORIDE 50 MG: 50 TABLET, COATED ORAL at 21:27

## 2024-01-10 RX ADMIN — DOXYCYCLINE 100 MG: 100 CAPSULE ORAL at 09:06

## 2024-01-10 RX ADMIN — SENNOSIDES AND DOCUSATE SODIUM 2 TABLET: 8.6; 5 TABLET ORAL at 09:06

## 2024-01-10 RX ADMIN — ASPIRIN 81 MG: 81 TABLET, COATED ORAL at 09:06

## 2024-01-10 RX ADMIN — FOLIC ACID 1000 MCG: 1 TABLET ORAL at 09:06

## 2024-01-10 RX ADMIN — SERTRALINE HYDROCHLORIDE 25 MG: 25 TABLET ORAL at 09:06

## 2024-01-10 RX ADMIN — DICLOFENAC SODIUM 2 G: 9.3 GEL TOPICAL at 17:47

## 2024-01-10 RX ADMIN — LIDOCAINE 1 PATCH: 4 PATCH TOPICAL at 10:21

## 2024-01-10 RX ADMIN — OXYBUTYNIN CHLORIDE 5 MG: 5 TABLET, EXTENDED RELEASE ORAL at 09:06

## 2024-01-10 RX ADMIN — DICLOFENAC SODIUM 2 G: 9.3 GEL TOPICAL at 21:33

## 2024-01-10 RX ADMIN — CEFTRIAXONE 2000 MG: 2 INJECTION, POWDER, FOR SOLUTION INTRAMUSCULAR; INTRAVENOUS at 17:47

## 2024-01-10 RX ADMIN — Medication 5 MG: at 21:27

## 2024-01-10 NOTE — PLAN OF CARE
Problem: Adult Inpatient Plan of Care  Goal: Plan of Care Review  Recent Flowsheet Documentation  Taken 1/10/2024 1443 by Anh Brennan OT  Progress: (IE) --  Plan of Care Reviewed With: patient  Outcome Evaluation: OT IE completed. Pt is A/Ox2 and participates in therapy with encouragement and time. Presents below her baseline limited by acute pain, generalized weakness, and balance deficits. Mod Ax2 to transition to EOB sitting. Mod Ax2 STS and to take steps from EOB to chair. Pt requires assist with all ADLs. OT will follow IP to advance self-care as tolerated. Recommend SNF at d/c for best outcome.

## 2024-01-10 NOTE — PROGRESS NOTES
"Nutrition Services    Patient Name:  Pati Carter  YOB: 1930  MRN: [id number]  Admit Date:  1/8/2024    Patient identified to be at nutrition risk per nurse admission screen based on indicator of \"difficulty chewing/swallowing\".EMR reviewed, pt with comfort diet and does not currently meet screen criteria for nutrition risk. RD will continue to follow per protocol.    Electronically signed by:  Julissa Deras RD  01/10/24 07:52 EST   "

## 2024-01-10 NOTE — PROGRESS NOTES
"  [facility]    NEUROSURGERY PROGRESS NOTE    01/10/24    Chief Complaint: Fall    Subjective: Stable overnight. Patient reports pain in her right buttock    * No surgery found *    Objective:     /76   Pulse 67   Temp 98 °F (36.7 °C) (Oral)   Resp 17   Ht 157.5 cm (62\")   Wt 52.3 kg (115 lb 3.2 oz)   SpO2 95%   BMI 21.07 kg/m²        Physical Exam  Patient appears comfortable, resting  Awake, alert and oriented x 3  Speech f/c  Opens eyes spontaneously  EOM intact bilaterally  Pupils 3mm reactive bilaterally  Face symmetric  Tongue midline  Strength symmetric. 5/5 bilateral hip flexion, plantar and dorsiflexion  Normal sensation to light touch in all 4 extremities          Intake/Output Summary (Last 24 hours) at 1/10/2024 1050  Last data filed at 1/10/2024 0900  Gross per 24 hour   Intake 240 ml   Output 100 ml   Net 140 ml         Current Facility-Administered Medications:     acetaminophen (TYLENOL) tablet 650 mg, 650 mg, Oral, Q4H PRN, Karen Goff APRN, 650 mg at 01/09/24 1511    albuterol sulfate HFA (PROVENTIL HFA;VENTOLIN HFA;PROAIR HFA) inhaler 2 puff, 2 puff, Inhalation, Q4H PRN, Karen Goff APRN    aspirin EC tablet 81 mg, 81 mg, Oral, Daily, Patsy Steel MD, 81 mg at 01/10/24 0906    sennosides-docusate (PERICOLACE) 8.6-50 MG per tablet 2 tablet, 2 tablet, Oral, BID, 2 tablet at 01/10/24 0906 **AND** polyethylene glycol (MIRALAX) packet 17 g, 17 g, Oral, Daily PRN, 17 g at 01/10/24 0907 **AND** bisacodyl (DULCOLAX) EC tablet 5 mg, 5 mg, Oral, Daily PRN, 5 mg at 01/10/24 0907 **AND** bisacodyl (DULCOLAX) suppository 10 mg, 10 mg, Rectal, Daily PRN, Patsy Steel MD    budesonide-formoterol (SYMBICORT) 160-4.5 MCG/ACT inhaler 1 puff, 1 puff, Inhalation, BID - RT, Karen Goff, APRN, 1 puff at 01/10/24 1011    cefTRIAXone (ROCEPHIN) 2,000 mg in sodium chloride 0.9 % 100 mL IVPB, 2,000 mg, Intravenous, Q24H, " Traci Hebert DO, Last Rate: 200 mL/hr at 01/09/24 1703, 2,000 mg at 01/09/24 1703    Diclofenac Sodium (VOLTAREN) 1 % gel 2 g, 2 g, Topical, 4x Daily, Traci Hebert DO    dilTIAZem CD (CARDIZEM CD) 24 hr capsule 120 mg, 120 mg, Oral, Q24H, Karen Goff, APRN, 120 mg at 01/10/24 0907    doxycycline (MONODOX) capsule 100 mg, 100 mg, Oral, Q12H, Traci Hebert DO, 100 mg at 01/10/24 0906    famotidine (PEPCID) tablet 40 mg, 40 mg, Oral, Daily, Karen Goff, APRN, 40 mg at 01/10/24 0906    folic acid (FOLVITE) tablet 1,000 mcg, 1,000 mcg, Oral, Daily, Karen Goff APRN, 1,000 mcg at 01/10/24 0906    Lidocaine 4 % 1 patch, 1 patch, Transdermal, Q24H, Traci Hebert DO, 1 patch at 01/10/24 1021    Magnesium Standard Dose Replacement - Follow Nurse / BPA Driven Protocol, , Does not apply, PRN, Karen Goff, APRN    melatonin tablet 5 mg, 5 mg, Oral, Nightly PRN, Karen Goff, APRN, 5 mg at 01/09/24 2031    [Held by provider] methenamine (HIPREX) tablet 1 g, 1 g, Oral, Q12H, Karen Goff, APRN    morphine injection 1 mg, 1 mg, Intravenous, Q4H PRN **AND** naloxone (NARCAN) injection 0.4 mg, 0.4 mg, Intravenous, Q5 Min PRN, Patsy Steel MD    nitroglycerin (NITROSTAT) SL tablet 0.4 mg, 0.4 mg, Sublingual, Q5 Min PRN, Karen Goff, APRN    ondansetron (ZOFRAN) tablet 4 mg, 4 mg, Oral, Q6H PRN **OR** ondansetron (ZOFRAN) injection 4 mg, 4 mg, Intravenous, Q6H PRN, Karen Goff APRN    oxybutynin XL (DITROPAN-XL) 24 hr tablet 5 mg, 5 mg, Oral, Daily, Karen Goff APRN, 5 mg at 01/10/24 0906    Potassium Replacement - Follow Nurse / BPA Driven Protocol, , Does not apply, PRN, Karen Goff APRN    sertraline (ZOLOFT) tablet 25 mg, 25 mg, Oral, Daily, Patsy Steel MD, 25 mg at 01/10/24 0906    sodium chloride 0.9 % flush 10 mL, 10 mL, Intravenous, Q12H, Karen Goff APRN, 10 mL at 01/10/24 0911    sodium chloride 0.9 % flush 10  mL, 10 mL, Intravenous, PRN, Karen Goff APRN    sodium chloride 0.9 % infusion 40 mL, 40 mL, Intravenous, PRN, Karen Goff APRN    traMADol (ULTRAM) tablet 50 mg, 50 mg, Oral, Q6H PRN, Patsy Steel MD    vitamin B-12 (CYANOCOBALAMIN) tablet 1,000 mcg, 1,000 mcg, Oral, Daily, Karen Goff APRN, 1,000 mcg at 01/10/24 0906    Laboratory Results:        Lab 01/10/24  0647 01/09/24  0428 01/08/24  2224 01/08/24  1759 01/08/24  1558   WBC 10.14 11.93*  --   --  13.58*   HEMOGLOBIN 10.3* 10.8*  --   --  11.0*   HEMATOCRIT 32.2* 33.7*  --   --  34.8   PLATELETS 251 218  --   --  230   NEUTROS ABS  --   --   --   --  11.48*   IMMATURE GRANS (ABS)  --   --   --   --  0.09*   LYMPHS ABS  --   --   --   --  0.95   MONOS ABS  --   --   --   --  0.88   EOS ABS  --   --   --   --  0.12   .5* 105.6*  --   --  105.8*   PROCALCITONIN  --   --   --  0.31*  --    LACTATE  --   --  1.7  --   --          Lab 01/09/24  0428 01/08/24  1558   SODIUM 142 138   POTASSIUM 4.7 4.4   CHLORIDE 105 104   CO2 26.0 24.0   ANION GAP 11.0 10.0   BUN 22 24*   CREATININE 0.70 0.75   EGFR 80.8 74.3   GLUCOSE 95 102*   CALCIUM 8.9 8.6   TSH  --  2.720         Lab 01/08/24  1558   TOTAL PROTEIN 6.6   ALBUMIN 3.5   GLOBULIN 3.1   ALT (SGPT) 8   AST (SGOT) 15   BILIRUBIN 0.5   ALK PHOS 128*         Lab 01/08/24  1759 01/08/24  1558   HSTROP T 33* 34*             Lab 01/08/24  1724   FOLATE >20.00   VITAMIN B 12 482         Brief Urine Lab Results  (Last result in the past 365 days)        Color   Clarity   Blood   Leuk Est   Nitrite   Protein   CREAT   Urine HCG        01/08/24 1512 Yellow   Clear   Large (3+)   Small (1+)   Negative   30 mg/dL (1+)                 Microbiology Results (last 10 days)       Procedure Component Value - Date/Time    Blood Culture - Blood, Arm, Right [[id number]]  (Normal) Collected: 01/08/24 2220    Lab Status: Preliminary result Specimen: Blood from Arm, Right Updated: 01/09/24 2301      Blood Culture No growth at 24 hours    Narrative:      Less than seven (7) mL's of blood was collected.  Insufficient quantity may yield false negative results.    Blood Culture - Blood, Arm, Right [[id number]]  (Normal) Collected: 01/08/24 2215    Lab Status: Preliminary result Specimen: Blood from Arm, Right Updated: 01/09/24 2247     Blood Culture No growth at 24 hours    Narrative:      Less than seven (7) mL's of blood was collected.  Insufficient quantity may yield false negative results.    COVID PRE-OP / PRE-PROCEDURE SCREENING ORDER (NO ISOLATION) - Swab, Nasopharynx [[id number]]  (Normal) Collected: 01/08/24 1601    Lab Status: Final result Specimen: Swab from Nasopharynx Updated: 01/08/24 1649    Narrative:      The following orders were created for panel order COVID PRE-OP / PRE-PROCEDURE SCREENING ORDER (NO ISOLATION) - Swab, Nasopharynx.  Procedure                               Abnormality         Status                     ---------                               -----------         ------                     COVID-19 and FLU A/B PCR...[[id number]]  Normal              Final result                 Please view results for these tests on the individual orders.    COVID-19 and FLU A/B PCR, 1 HR TAT - Swab, Nasopharynx [[id number]]  (Normal) Collected: 01/08/24 1601    Lab Status: Final result Specimen: Swab from Nasopharynx Updated: 01/08/24 1649     COVID19 Not Detected     Influenza A PCR Not Detected     Influenza B PCR Not Detected    Narrative:      Fact sheet for providers: https://www.fda.gov/media/816689/download    Fact sheet for patients: https://www.fda.gov/media/293815/download    Test performed by PCR.    Urine Culture - Urine, Urine, Clean Catch [[id number]] Collected: 01/08/24 1512    Lab Status: Final result Specimen: Urine, Clean Catch Updated: 01/09/24 1215     Urine Culture <10,000 CFU/mL Mixed Katherine Isolated    Narrative:      Specimen contains mixed organisms of questionable pathogenicity  suggestive of contamination. If symptoms persist, suggest recollection.  Colonization of the urinary tract without infection is common. Treatment is discouraged unless the patient is symptomatic, pregnant, or undergoing an invasive urologic procedure.                     Diagnostic Imaging: I personally reviewed and interpreted the new imaging    Assessment and plan:  This is a 89-year-old female who presented to [facility] with back/tailbone pain after a fall 1 week ago at her assisted living facility.  Patient states most of her pain is in her right buttock which is likely associated with a nondisplaced fracture of the right sacral kori.  She is neurologically stable on exam.  MRI lumbar spine shows multilevel degenerative changes with spinal canal stenosis worst at L3-4 and L5-S1.  Imaging was reviewed by Dr. Davenport.  Due to patient's age, she is not a candidate for any sort of neurosurgical intervention.  Would recommend outpatient follow-up with pain management for possible injections in her lumbar spine as well as physical therapy.  Please call with any questions or concerns.  Neurosurgery will sign off at this time.      Any copied data from previous notes included in the (1) HPI, (2) PE, (3) MDM and/or Assessment and Plan has been reviewed and accurate as of 01/10/24.    Ekaterina Chavez PA-C  01/10/24  10:50 EST

## 2024-01-10 NOTE — THERAPY DISCHARGE NOTE
Acute Care - Speech Language Pathology   Swallow Re-Evaluation/Discharge [facility]  Clinical Swallow Comfort/Palliative Evaluation     Patient Name: Pati Carter  : 1930  MRN: [id number]  Today's Date: 1/10/2024               Admit Date: 2024    Visit Dx:    ICD-10-CM ICD-9-CM   1. Closed fracture of sacrum, unspecified portion of sacrum, initial encounter  S32.10XA 805.6   2. Fall in home, initial encounter  W19.XXXA E888.9    Y92.009 E849.0   3. Generalized weakness  R53.1 780.79   4. 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  Dx - borderline intermittent.    Back pain     Benign colonic polyp 2016    Description: A.  Dx .    Ferny Bonnet syndrome 2020    Chondrocalcinosis     knees    Compression fracture of lumbar vertebra     Constipation     COPD (chronic obstructive pulmonary disease)     Description: A.  Rule out chronic persistent asthma, COPD, or obliterative bronchiolitis.-  Butch    COVID-19 virus infection 10/11/2020    CTS (carpal tunnel syndrome)     Degeneration of intervertebral disc of lumbar region     Description: A.  Diagnosed in April 2013 with advanced multilevel with severe spinal stenosis, followed by Dr. Pillai for pain management.    Gastroesophageal reflux disease     Hearing loss     History of calcium pyrophosphate deposition disease (CPPD)     History of colonoscopy 01/01/1999    NORMAL PER PATIENT     History of mammogram 01/01/2011    NORMAL PER PT     History of Papanicolaou smear of cervix 01/01/2010    NORMAL PER PT     History of varicella     Hyperlipidemia     Description: A.  Dx 2006.    Hypertension     Description: A. Dx 2001.    Macular degeneration     Nocturia     Osteoporosis     Ovarian mass     Dx 8/15- benign left cystic adnexal mass    Overactive bladder     Pelvic floor dysfunction     Rheumatoid arthritis     Description: A.  Diagnosed in 2000 and and followed by Dr. Constantino (now Dr. Kaplan). B.  On methotrexate therapy since 2000. C.  Off low-dose prednisone therapy.    Vitamin D deficiency      Past Surgical History:   Procedure Laterality Date    APPENDECTOMY      CARPAL TUNNEL RELEASE Left 01/01/2003    HISTORY OF NEUROPLASTY DECOMPRESSION MEDIAN NERVE AT CARPAL TUNNEL LEFT    CATARACT EXTRACTION Bilateral 01/01/2009    CHOLECYSTECTOMY  01/01/1962    HIP CANNULATED SCREW PLACEMENT Right 1/25/2020    Procedure: HIP CANNULATED SCREW PLACEMENT RIGHT;  Surgeon: Karlos Blount MD;  Location: [facility];  Service: Orthopedics    KNEE ARTHROSCOPY Left 01/01/2001    MENISCAL REPAIR    KYPHOPLASTY  06/18/2015    T11 AND L1 (JOSE A)    PELVIC LAPAROSCOPY  01/01/1996    REMOVAL OF BENIGN UTERINE AND RIGHT OVARIAN TUMORS    SALPINGO OOPHORECTOMY Left 08/26/2015    REMOVAL OF LEFT OVARY AND TUBE (benign cystic mass)       SLP Recommendation and Plan  SLP Swallowing Diagnosis: suspect chronic, pharyngeal dysphagia (01/10/24 5705)  SLP Diet  Recommendation: comfort diet, per physician discretion, mechanical ground textures, no mixed consistencies, thin liquids, other (see comments) (consider upgrade to regular solids w/ assist to chop food into small bites if develops aversion to mech ground solids/no mixed consistencies) (01/10/24 1445)           Swallow Criteria for Skilled Therapeutic Interventions Met: baseline status (appeared to tolerate comfort diet) (01/10/24 1445)  Anticipated Discharge Disposition (SLP): No further SLP services warranted (01/10/24 1445)             Plan of Care Reviewed With: patient, caregiver (01/10/24 1523)  Progress: no change (01/10/24 1523)    SWALLOW EVALUATION (last 72 hours)       SLP Adult Swallow Evaluation       Row Name 01/10/24 1445 01/09/24 1400                Rehab Evaluation    Document Type discharge evaluation/summary  -AC evaluation  -CJ       Subjective Information no complaints  -AC no complaints  -CJ       Patient Observations alert;cooperative  -AC alert;cooperative  -CJ       Patient/Family/Caregiver Comments/Observations Pleasantly confused. Hired/familiar sitter @ bedside.  -AC no family present  -CJ       Patient Effort adequate  -AC fair  -CJ       Symptoms Noted During/After Treatment -- none  -CJ          General Information    Patient Profile Reviewed yes  -AC yes  -CJ       Pertinent History Of Current Problem See previous eval. Per chart, Dr. Hebert discussed swallowing recommendations w/ pt's dtr who agreed w/ comfort diet.  -AC Pt adm w/ pelvic fx and anemia; sig h/o RA, GERD, HTN, COPD, leukocytosis, macular degeneration, hearing loss, HLD, PVD, malnutrition, osbaldo bonnet syndrome. CXR: increased peribronchial thickening in R lung w/ possible bronchitis or PNA. MBS 8/11/22: osteophytes w/ severe dysphagia and recs for NPO. Pt elected to pursue comfort diet  -CJ       Current Method of Nutrition mechanical ground textures;no mixed consistencies;thin liquids;comfort diet  -AC regular  textures;thin liquids  -CJ       Precautions/Limitations, Vision -- vision impairment, bilaterally;other (see comments)  macular degeneration  -       Precautions/Limitations, Hearing -- hearing impairment, bilaterally  -       Prior Level of Function-Communication -- unknown  -       Prior Level of Function-Swallowing -- comfort diet  8/11/22  -       Plans/Goals Discussed with patient  - patient  -       Barriers to Rehab medically complex;previous functional deficit;cognitive status;visual deficit  - medically complex;previous functional deficit;cognitive status;visual deficit  -       Patient's Goals for Discharge patient did not state  - patient did not state  -          Pain    Additional Documentation -- Pain Scale: FACES Pre/Post-Treatment (Group)  -          Pain Scale: FACES Pre/Post-Treatment    Pain: FACES Scale, Pretreatment 0-->no hurt  -AC 0-->no hurt  -       Posttreatment Pain Rating 0-->no hurt  -AC 0-->no hurt  -          Oral Motor Structure and Function    Dentition Assessment teeth are in poor condition  - --       Secretion Management WNL/WFL  -AC WNL/WFL  -       Mucosal Quality moist, healthy  - sticky;dry  -          Oral Musculature and Cranial Nerve Assessment    Oral Motor General Assessment -- generalized oral motor weakness  -          General Eating/Swallowing Observations    Respiratory Support Currently in Use room air  - room air  -       Eating/Swallowing Skills fed by SLP  - self-fed  -       Positioning During Eating upright in bed  - upright in bed  -       Utensils Used spoon;straw  - spoon;cup;straw  -       Consistencies Trialed thin liquids;pureed;soft to chew textures;chopped  - pureed;thin liquids;ice chips  -          Clinical Swallow Eval    Oral Prep Phase impaired  - --       Oral Transit WFL  - --       Oral Residue [facility]  - --       Pharyngeal Phase suspected pharyngeal impairment  - suspected pharyngeal  impairment  -CJ       Esophageal Phase unremarkable  -AC --       Clinical Swallow Evaluation Summary -- Noted pt previously elected to proceed w/ comfort diet. Pt w/ confusion this admission. Minimal po presentations accepted. Pt would not allow SLP to provide po presentations. Overt s/s of aspiration intermittently w/ all trials. Not appropriate for instrumental evaluation at this time. Unable to safely recommend any po presentations at this time. Will f/u for re-eval pending GOC/POC as neurosurgery consult pending. D/w DO/hospitalist and will proceed w/ previous comfort diet recs and have further discussions regarding GOC  -CJ          Oral Prep Concerns    Oral Prep Concerns prolonged mastication  -AC --       Prolonged Mastication mechanical soft  -AC --       Oral Prep Concerns, Comment Suspect r/t dentition.  -AC --          Pharyngeal Phase Concerns    Pharyngeal Phase Concerns cough  -AC cough;multiple swallows  -       Cough thin;other (see comments)  delayed x1  -AC --       Pharyngeal Phase Concerns, Comment No overt signs of discomfort/distress w/ any consistencies trialed. Pt expressed desire to continue diet textures, as ordered. If develops aversion to mech ground, consider upgrade to regular textures w/ hospital/hired staff assist w/ chopping food into small bites.  -AC --          Swallowing Quality of Life Assessment    Education and counseling provided Comfort diet options;Oral care recommendations and rationale;Aspiration precautions;Feeding assistance and techniques  -AC --          SLP Evaluation Clinical Impression    SLP Swallowing Diagnosis suspect chronic;pharyngeal dysphagia  -AC suspect chronic;pharyngeal dysphagia  -       Functional Impact risk of aspiration/pneumonia  - risk of aspiration/pneumonia  -       Rehab Potential/Prognosis, Swallowing -- re-evaluate goals as necessary  -       Swallow Criteria for Skilled Therapeutic Interventions Met baseline status  appeared to  tolerate comfort diet  - demonstrates skilled criteria  -          Recommendations    Predicted Duration Therapy Intervention (Days) -- until discharge  -       SLP Diet Recommendation comfort diet, per physician discretion;mechanical ground textures;no mixed consistencies;thin liquids;other (see comments)  consider upgrade to regular solids w/ assist to chop food into small bites if develops aversion to [facility] ground solids/no mixed consistencies  - NPO;other (see comments)  safest is NPO vs comfort diet (mech grnd, no mixed, thins) pending further GOC/POC  -       Recommended Diagnostics -- reassess via clinical swallow evaluation  -       Recommended Precautions and Strategies upright posture during/after eating;general aspiration precautions;1:1 supervision;assist with feeding  - general aspiration precautions  -       Oral Care Recommendations Oral Care BID/PRN;Toothbrush  - Oral Care BID/PRN;Suction toothbrush  -       SLP Rec. for Method of Medication Administration as tolerated  - meds via alternate route  -       Monitor for Signs of Aspiration -- yes;notify SLP if any concerns  -       Anticipated Discharge Disposition (SLP) No further SLP services warranted  - skilled nursing facility;extended care facility  -                 User Key  (r) = Recorded By, (t) = Taken By, (c) = Cosigned By      Initials Name Effective Dates     Sylvie Mckeon, MS CCC-SLP 02/03/23 -     Oriana Ren, MS CCC-SLP 07/11/23 -                     EDUCATION  The patient has been educated in the following areas:   Dysphagia (Swallowing Impairment) Oral Care/Hydration.                 Time Calculation:    Time Calculation- SLP       Row Name 01/10/24 1523             Time Calculation- [facility]    SLP Start Time 1445  -      SLP Received On 01/10/24  -         Untimed Charges    72569-BD Eval Oral Pharyng Swallow Minutes 42  -         Total Minutes    Untimed Charges Total Minutes 42  -        Total Minutes 42  -AC                User Key  (r) = Recorded By, (t) = Taken By, (c) = Cosigned By      Initials Name Provider Type    AC Sylvie Mckeon MS CCC-SLP Speech and Language Pathologist                    Therapy Charges for Today       Code Description Service Date Service Provider Modifiers Qty    [id number]  ST EVAL ORAL PHARYNG SWALLOW 3 1/10/2024 Sylvie Mckeon MS CCC-SLP GN 1                 SLP Discharge Summary  Anticipated Discharge Disposition (SLP): No further SLP services warranted    Sylvie Mckeon MS CCC-SLP  1/10/2024

## 2024-01-10 NOTE — PLAN OF CARE
Goal Outcome Evaluation:  Plan of Care Reviewed With: patient           Outcome Evaluation: Pt. presents below baseline function w/generalized weakness, acute pain, balance deficits and decreased functional endurance affecting her ability to safely participate in functional mobility. She performed bed mobility and transfers w/mod assist of 2. Activity limited by pain, fatigue. Pt. educated spinal precautions. Pt. would benefit from IPPT to address stated deficits.      Anticipated Discharge Disposition (PT): skilled nursing facility

## 2024-01-10 NOTE — PROGRESS NOTES
[facility] Medicine Services  PROGRESS NOTE    Patient Name: Pati Carter  : 1930  MRN: [id number]    Date of Admission: 2024  Primary Care Physician: Pallavi Eduardo MD    Subjective   Subjective     CC:  fall    HPI:  No acute events. States she is sleepy. Asking for something topical for her buttock pain.       Objective   Objective     Vital Signs:   Temp:  [97.4 °F (36.3 °C)-98 °F (36.7 °C)] 98 °F (36.7 °C)  Heart Rate:  [81-98] 88  Resp:  [18-19] 18  BP: (142-170)/(76-95) 142/76  Flow (L/min):  [2] 2     Physical Exam:  Constitutional: No acute distress, awake, alert; frail  HENT: NCAT, mucous membranes moist  Respiratory: Clear to auscultation bilaterally, respiratory effort normal   Cardiovascular: RRR, no murmurs, rubs, or gallops  Gastrointestinal: Positive bowel sounds, soft, nontender, nondistended  Musculoskeletal: No bilateral ankle edema  Psychiatric: Appropriate affect, cooperative  Neurologic: strength symmetric in all extremities, Cranial Nerves grossly intact to confrontation, speech clear  Skin: No rashes      Results Reviewed:  LAB RESULTS:      Lab 01/10/24  0647 24  2224 24  1759 24  1558   WBC 10.14 11.93*  --   --  13.58*   HEMOGLOBIN 10.3* 10.8*  --   --  11.0*   HEMATOCRIT 32.2* 33.7*  --   --  34.8   PLATELETS 251 218  --   --  230   NEUTROS ABS  --   --   --   --  11.48*   IMMATURE GRANS (ABS)  --   --   --   --  0.09*   LYMPHS ABS  --   --   --   --  0.95   MONOS ABS  --   --   --   --  0.88   EOS ABS  --   --   --   --  0.12   .5* 105.6*  --   --  105.8*   PROCALCITONIN  --   --   --  0.31*  --    LACTATE  --   --  1.7  --   --          Lab 24  04224  1558   SODIUM 142 138   POTASSIUM 4.7 4.4   CHLORIDE 105 104   CO2 26.0 24.0   ANION GAP 11.0 10.0   BUN 22 24*   CREATININE 0.70 0.75   EGFR 80.8 74.3   GLUCOSE 95 102*   CALCIUM 8.9 8.6   TSH  --  2.720         Lab 24  7326    TOTAL PROTEIN 6.6   ALBUMIN 3.5   GLOBULIN 3.1   ALT (SGPT) 8   AST (SGOT) 15   BILIRUBIN 0.5   ALK PHOS 128*         Lab 01/08/24  1759 01/08/24  1558   HSTROP T 33* 34*             Lab 01/08/24  1724   FOLATE >20.00   VITAMIN B 12 482         Brief Urine Lab Results  (Last result in the past 365 days)        Color   Clarity   Blood   Leuk Est   Nitrite   Protein   CREAT   Urine HCG        01/08/24 1512 Yellow   Clear   Large (3+)   Small (1+)   Negative   30 mg/dL (1+)                   Microbiology Results Abnormal       Procedure Component Value - Date/Time    Blood Culture - Blood, Arm, Right [[id number]]  (Normal) Collected: 01/08/24 2220    Lab Status: Preliminary result Specimen: Blood from Arm, Right Updated: 01/09/24 2301     Blood Culture No growth at 24 hours    Narrative:      Less than seven (7) mL's of blood was collected.  Insufficient quantity may yield false negative results.    Blood Culture - Blood, Arm, Right [[id number]]  (Normal) Collected: 01/08/24 2215    Lab Status: Preliminary result Specimen: Blood from Arm, Right Updated: 01/09/24 2247     Blood Culture No growth at 24 hours    Narrative:      Less than seven (7) mL's of blood was collected.  Insufficient quantity may yield false negative results.    Urine Culture - Urine, Urine, Clean Catch [[id number]] Collected: 01/08/24 1512    Lab Status: Final result Specimen: Urine, Clean Catch Updated: 01/09/24 1215     Urine Culture <10,000 CFU/mL Mixed Katherine Isolated    Narrative:      Specimen contains mixed organisms of questionable pathogenicity suggestive of contamination. If symptoms persist, suggest recollection.  Colonization of the urinary tract without infection is common. Treatment is discouraged unless the patient is symptomatic, pregnant, or undergoing an invasive urologic procedure.    COVID PRE-OP / PRE-PROCEDURE SCREENING ORDER (NO ISOLATION) - Swab, Nasopharynx [[id number]]  (Normal) Collected: 01/08/24 1601    Lab Status: Final  result Specimen: Swab from Nasopharynx Updated: 01/08/24 1649    Narrative:      The following orders were created for panel order COVID PRE-OP / PRE-PROCEDURE SCREENING ORDER (NO ISOLATION) - Swab, Nasopharynx.  Procedure                               Abnormality         Status                     ---------                               -----------         ------                     COVID-19 and FLU A/B PCR...[[id number]]  Normal              Final result                 Please view results for these tests on the individual orders.    COVID-19 and FLU A/B PCR, 1 HR TAT - Swab, Nasopharynx [[id number]]  (Normal) Collected: 01/08/24 1601    Lab Status: Final result Specimen: Swab from Nasopharynx Updated: 01/08/24 1649     COVID19 Not Detected     Influenza A PCR Not Detected     Influenza B PCR Not Detected    Narrative:      Fact sheet for providers: https://www.fda.gov/media/865035/download    Fact sheet for patients: https://www.fda.gov/media/930689/download    Test performed by PCR.            MRI Lumbar Spine Without Contrast    Result Date: 1/9/2024  MRI LUMBAR SPINE WO CONTRAST Date of Exam: 1/9/2024 4:05 PM EST Indication: L5-S1 stenosis.  Comparison: CT lumbar spine from January 2024 and MRI lumbar spine from June 11, 2015 Technique:  Routine multiplanar/multisequence sequence images of the lumbar spine were obtained without contrast administration.  Findings: The alignment is anatomic. There are compression deformity status post kyphoplasty at T11 and L1. There are mild to moderate chronic compression deformities at T10, T12, L2, L3, and L4. There is marrow edema along the right sacral kori with a fracture identified on recent prior CT, which is likely subacute. There are degenerative endplate changes at all levels. There is disc desiccation at all levels with moderate degenerative loss of disc height at L3-4 and L4-5. The conus terminates at the L1-2 level. The posterior paravertebral soft tissues are  unremarkable. L1-2: Mild disc osteophyte complex. Mild bilateral facet arthropathy. Mild spinal canal stenosis. Moderate bilateral neural foraminal stenosis. L2-3: Moderate disc osteophyte complex. Mild right and moderate left facet arthropathy. Moderate spinal canal stenosis. Moderate right and moderate to severe left neural foraminal stenosis. L3-4: Moderate disc bulge. Moderate bilateral facet arthropathy with ligamentum flavum infolding. Severe spinal canal stenosis. Moderate bilateral neural foraminal stenosis. L4-5: Moderate disc bulge. Moderate bilateral facet arthropathy with ligamentum flavum infolding. Moderate spinal canal stenosis. Moderate to severe bilateral neural foraminal stenosis. L5-S1: Mild disc bulge with mild left paracentral disc protrusion narrowing the left lateral recess. Moderate bilateral facet arthropathy. Moderate spinal canal stenosis. Mild to moderate right and moderate left neural foraminal stenosis.     Impression: Impression: 1.Multilevel degenerative changes as described above, most prominent at L3-4 where there is severe spinal canal stenosis. 2.Moderate spinal canal stenosis at L5-S1. 3.Marrow edema along right sacral kori with fracture identified on recent prior CT, likely a subacute fracture. Electronically Signed: Jarrell Page MD  1/9/2024 5:33 PM EST  Workstation ID: [id number]    XR Chest 1 View    Result Date: 1/8/2024  XR CHEST 1 VW Date of Exam: 1/8/2024 7:18 PM EST Indication: leukocytosis, 91% on room air, diminished lung sounds bases Comparison: 1/2/2024 Findings: Previous thoracic kyphoplasties are noted. The heart shadow is upper limits of normal size. Mild diffuse pulmonary interstitial changes appear stable except in the right lung base, where there is increased peribronchial thickening and interstitial change. No dense lung consolidation effusion or pneumothorax is seen.     Impression: Impression: Increased peribronchial thickening and interstitial change in  the right lung base, potentially changes of bronchitis or early changes of pneumonia. Electronically Signed: Isael Lewis MD  1/8/2024 10:11 PM EST  Workstation ID: [id number]    CT Lumbar Spine Without Contrast    Result Date: 1/8/2024  CT LUMBAR SPINE WO CONTRAST Date of Exam: 1/8/2024 3:33 PM EST Indication: sacral pain after a fall 1 week ago. Comparison: The most recent study for potential comparison is an abdomen pelvis CT scan from 7/23/2022. Technique: Axial CT images were obtained of the lumbar spine without contrast administration.  Reconstructed coronal and sagittal images were also obtained. Automated exposure control and iterative construction methods were used. Findings:  Comparing today's study with the sagittal reconstructions of the 7/23/2022 abdominal scan, kyphoplasties at T11 and L1 are again noted. There is mild further loss of height of T12, and the superior endplate of L2. L3 compression deformity appears stable. Inferior endplate deformity of L4 appears stable. In summary, no significant interval change is seen and no clearly acute bony trauma is appreciated here. There is, however, new step-off at S1-S2, consistent with transverse fracture here that has occurred since the prior exam. No pars defects are seen and no posterior element fracture is seen. Coronal images show no clearly acute bony abnormality of the lumbar spine. Axial bone images show no acute appearing features in the lumbar spine. There is a nondisplaced fracture line extending to the anterior margin of the right sacral kori which appears acute or recent, images 98 through 103. Soft tissue axial images show no evidence of significant stenosis in the included T11-12 or T12-L1 levels. L1-2, canal appears lower limits of normal. L2-3, there is probably moderate or possibly greater canal stenosis due to vertebral osteophytes , facet hypertrophy and facet osteophytes. At L3-4, there is likely marked canal stenosis due to extensive  posterior element hypertrophy, ligamentous hypertrophy and vertebral osteophytes. At L4-5, there is moderate canal stenosis due to facet and vertebral osteophytes. L5-S1, there appears to be fairly high-grade stenosis due to a large broad-based disc protrusion as seen on image 80 series 2. No hematoma is seen associated with the patient's nondisplaced right sacral fracture. No pathologic paraspinous mass or acute inflammatory focus is seen.       Impression: Impression: 1. Previous T11 and L1 kyphoplasties, and multilevel vertebral endplate compression deformities, some progression from 2022. No clearly acute lumbar spine trauma is seen. 2. Potentially high-grade stenosis at L5-S1 due to broad-based disc protrusion. Moderate to marked canal stenosis at L2-L3 L3-4 L4-5, which is likely chronic, due to facet and vertebral osteophytes. 3. Minimally displaced sacral fracture, visible at S1-2 on the sagittal images, and nondisplaced fracture visible in the right sacral ala on axial images. Given the patient's osteopenia, a more extensive nondisplaced fracture could be present, but difficult to detect. Electronically Signed: Isael Lewis MD  1/8/2024 3:51 PM EST  Workstation ID: [id number]    XR Knee 1 or 2 View Left    Result Date: 1/8/2024  XR KNEE 1 OR 2 VW LEFT Date of Exam: 1/8/2024 1:28 PM EST Indication: Injury 1 month ago Comparison: None available. Findings: 2 views. There is osteoporosis. There is diffuse atherosclerotic vascular calcification. There is severe narrowing of the medial and lateral compartments. There is severe narrowing of the patellofemoral compartment. There are no acute or old fractures. There is no callus or periostitis. There is chondrocalcinosis. There is no definite effusion.     Impression: Impression: Degenerative osteoarthritis. Atherosclerosis. No acute process. Electronically Signed: Chika Khanna MD  1/8/2024 1:45 PM EST  Workstation ID: [id number]         Current medications:  Scheduled  Meds:aspirin, 81 mg, Oral, Daily  budesonide-formoterol, 1 puff, Inhalation, BID - RT  cefTRIAXone, 2,000 mg, Intravenous, Q24H  dilTIAZem CD, 120 mg, Oral, Q24H  doxycycline, 100 mg, Oral, Q12H  famotidine, 40 mg, Oral, Daily  folic acid, 1,000 mcg, Oral, Daily  [Held by provider] methenamine, 1 g, Oral, Q12H  oxybutynin XL, 5 mg, Oral, Daily  senna-docusate sodium, 2 tablet, Oral, BID  sertraline, 25 mg, Oral, Daily  sodium chloride, 10 mL, Intravenous, Q12H  vitamin B-12, 1,000 mcg, Oral, Daily      Continuous Infusions:   PRN Meds:.  acetaminophen    albuterol sulfate HFA    senna-docusate sodium **AND** polyethylene glycol **AND** bisacodyl **AND** bisacodyl    Magnesium Standard Dose Replacement - Follow Nurse / BPA Driven Protocol    melatonin    Morphine **AND** naloxone    nitroglycerin    ondansetron **OR** ondansetron    Potassium Replacement - Follow Nurse / BPA Driven Protocol    sodium chloride    sodium chloride    traMADol    Assessment & Plan   Assessment & Plan     Active Hospital Problems    Diagnosis  POA    **Pelvic fracture [S32.9XXA]  Yes    Sacral fracture, closed [S32.10XA]  Yes    Leukocytosis [D72.829]  Yes    Macrocytic anemia [D53.9]  Yes    COPD (chronic obstructive pulmonary disease) [J44.9]  Yes    Gastroesophageal reflux disease [K21.9]  Yes    Rheumatoid arthritis [M06.9]  Yes    Hypertension [I10]  Yes      Resolved Hospital Problems   No resolved problems to display.        Brief Hospital Course to date:  Pati Carter is a 89 y.o. female with PMH significant for rheumatoid arthritis, osteoporosis, frequent falls, compression fractures of lumbar vertebra, DDD, macular degeneration, stress and urinary incontinence who presents to [facility] ED for low back pain.  Patient had a fall at her assisted living facility, [place], a week ago, presented to the ED 1/2/2024, and was discharged on tramadol and cefepime for UTI.  Patient presents today for uncontrolled pain and progressive  weakness, greater in her left leg.      Pelvic/sacral fracture  Lumbar stenosis  Frequent falls  --Initial fall 1/2/2024, CT head negative for acute intracranial process  --CT lumbar spine  shows minimally displaced sacral fracture at S1-S2, potentially high-grade stenosis at L5-S1 and moderate to marked canal stenosis at L2-L3, L3-L4, L4-L5 which is likely chronic  --Neurosurgery consult for progressive leg weakness, L>R, given potentially high-grade stenosis L5-S1  -- MRI L spine multilevel degenerative changes most prominent at L3-4 with severe spinal canal stenosis, moderate spinal stenosis L5-S1  --Pain control -- add topicals; PRN tramadol and morphine   --Fall precautions  --PT and OT   --CM consult, will likely need SNF rehab     Leukocytosis  ? PNA   - UA ok; CXR with peribronchial thickening with changes in the right lung base with ? Early changes of PNA   - Elevated WBC and procal  - Bcx NGTD  - Start rocephin/doxy -- planning 5 days of treatment   - IS.      Rheumatoid arthritis  --hold home methotrexate     Anemia  --macrocytic, .8  -- B12 low normal, folate ok  --continue home cyanocobalamin, folic acid     Dysphagia   - Speech eval -- patient with comfort diet in 2022. Reviewed with daughter. Will continue comfort diet.      HTN  PAD  --continue home diltiazem  --hold home ASA, colchicine  - BP with fair control      GERD  --continue home famotidine     Macular degeneration  --Fall precautions, up with assist, assist with feeding     Urinary incontinence  --hold home methenamine for elevated CrCl  --continue oxybutynin as formulary sub for Myrbetriq  --daily I's & O's    Expected Discharge Location and Transportation: likely SNF  Expected Discharge   Expected Discharge Date: 1/10/2024; Expected Discharge Time:      DVT prophylaxis:  No DVT prophylaxis order currently exists.     AM-PAC 6 Clicks Score (PT): 11 (01/09/24 0900)    CODE STATUS:   Code Status and Medical Interventions:   Ordered at:  01/08/24 1710     Medical Intervention Limits:    NO intubation (DNI)     Code Status (Patient has no pulse and is not breathing):    No CPR (Do Not Attempt to Resuscitate)     Medical Interventions (Patient has pulse or is breathing):    Limited Support       Traci Hebert DO  01/10/24

## 2024-01-10 NOTE — THERAPY EVALUATION
Patient Name: Pati Carter  : 1930    MRN: [id number]                              Today's Date: 1/10/2024       Admit Date: 2024    Visit Dx:     ICD-10-CM ICD-9-CM   1. Closed fracture of sacrum, unspecified portion of sacrum, initial encounter  S32.10XA 805.6   2. Fall in home, initial encounter  W19.XXXA E888.9    Y92.009 E849.0   3. Generalized weakness  R53.1 780.79   4. 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  Dx 2006- borderline intermittent.    Back pain     Benign colonic polyp 2016    Description: A.  Dx .    Ferny Bonnet syndrome 2020    Chondrocalcinosis     knees    Compression fracture of lumbar vertebra     Constipation     COPD (chronic obstructive pulmonary disease)     Description: A.  Rule out chronic persistent asthma, COPD, or obliterative bronchiolitis.- Butch    COVID-19 virus infection 10/11/2020    CTS (carpal tunnel syndrome)     Degeneration of intervertebral  disc of lumbar region     Description: A.  Diagnosed in April 2013 with advanced multilevel with severe spinal stenosis, followed by Dr. Pillai for pain management.    Gastroesophageal reflux disease     Hearing loss     History of calcium pyrophosphate deposition disease (CPPD)     History of colonoscopy 01/01/1999    NORMAL PER PATIENT     History of mammogram 01/01/2011    NORMAL PER PT     History of Papanicolaou smear of cervix 01/01/2010    NORMAL PER PT     History of varicella     Hyperlipidemia     Description: A.  Dx 2006.    Hypertension     Description: A. Dx 2001.    Macular degeneration     Nocturia     Osteoporosis     Ovarian mass     Dx 8/15- benign left cystic adnexal mass    Overactive bladder     Pelvic floor dysfunction     Rheumatoid arthritis     Description: A.  Diagnosed in 2000 and and followed by Dr. Constantino (now Dr. Kaplan). B.  On methotrexate therapy since 2000. C.  Off low-dose prednisone therapy.    Vitamin D deficiency      Past Surgical History:   Procedure Laterality Date    APPENDECTOMY      CARPAL TUNNEL RELEASE Left 01/01/2003    HISTORY OF NEUROPLASTY DECOMPRESSION MEDIAN NERVE AT CARPAL TUNNEL LEFT    CATARACT EXTRACTION Bilateral 01/01/2009    CHOLECYSTECTOMY  01/01/1962    HIP CANNULATED SCREW PLACEMENT Right 1/25/2020    Procedure: HIP CANNULATED SCREW PLACEMENT RIGHT;  Surgeon: Karlos Blount MD;  Location: Replaced by [facility];  Service: Orthopedics    KNEE ARTHROSCOPY Left 01/01/2001    MENISCAL REPAIR    KYPHOPLASTY  06/18/2015    T11 AND L1 (JOSE A)    PELVIC LAPAROSCOPY  01/01/1996    REMOVAL OF BENIGN UTERINE AND RIGHT OVARIAN TUMORS    SALPINGO OOPHORECTOMY Left 08/26/2015    REMOVAL OF LEFT OVARY AND TUBE (benign cystic mass)      General Information       Row Name 01/10/24 1431          OT Time and Intention    Document Type evaluation  -TB     Mode of Treatment occupational therapy  -TB       Row Name 01/10/24 1431          General Information    Patient Profile  "Reviewed yes  -TB     Prior Level of Function mod assist:;transfer;feeding;grooming;max assist:;dressing;bathing;dependent:;w/c or scooter  -TB     Existing Precautions/Restrictions fall;other (see comments)  s/p fall with R sacral ala fx (conservative tx). Legally Blind/Mac Degeneration, BUE tremors  -TB     Barriers to Rehab medically complex;previous functional deficit;visual deficit  -TB       Row Name 01/10/24 1431          Occupational Profile    Reason for Services/Referral (Occupational Profile) Occupational decline  -TB     Environmental Supports and Barriers (Occupational Profile) Pt lives in an retirement. Requires assist for all ADLs and transfers at baseline. Limited ambulation. Up to w/c during the day.  -TB       Row Name 01/10/24 1431          Living Environment    People in Home facility resident  -TB       Row Name 01/10/24 1431          Home Main Entrance    Number of Stairs, Main Entrance none  -TB       Row Name 01/10/24 1431          Stairs Within Home, Primary    Number of Stairs, Within Home, Primary none  -TB       Row Name 01/10/24 1431          Cognition    Orientation Status (Cognition) oriented to;person;place  \"hospital\"  -TB       Row Name 01/10/24 1431          Safety Issues, Functional Mobility    Safety Issues Affecting Function (Mobility) insight into deficits/self-awareness;awareness of need for assistance;safety precaution awareness;safety precautions follow-through/compliance;sequencing abilities;problem-solving  -TB     Impairments Affecting Function (Mobility) balance;endurance/activity tolerance;motor control;pain;strength;postural/trunk control;range of motion (ROM);visual/perceptual  -TB     Comment, Safety Issues/Impairments (Mobility) Pt transfers with Mod Ax2 STS and step to chair from EOB  -TB               User Key  (r) = Recorded By, (t) = Taken By, (c) = Cosigned By      Initials Name Provider Type    TB Anh Brennan OT Occupational Therapist                     " Mobility/ADL's       Row Name 01/10/24 1435          Bed Mobility    Bed Mobility supine-sit;scooting/bridging  -TB     Scooting/Bridging [place] (Bed Mobility) moderate assist (50% patient effort);verbal cues  -TB     Supine-Sit [place] (Bed Mobility) moderate assist (50% patient effort);2 person assist;verbal cues  -TB     Bed Mobility, Safety Issues decreased use of arms for pushing/pulling;decreased use of legs for bridging/pushing  -TB     Assistive Device (Bed Mobility) draw sheet;bed rails  -TB     Comment, (Bed Mobility) Education, cues, and assist to sequence transition to EOB sitting. Limited by pain.  -       Row Name 01/10/24 1435          Transfers    Transfers sit-stand transfer;stand-sit transfer;bed-chair transfer  -TB     Comment, (Transfers) Education, cues, and assist for hand placement, sequencing, and safety. Good effort.  -       Row Name 01/10/24 1435          Bed-Chair Transfer    Bed-Chair [place] (Transfers) moderate assist (50% patient effort);2 person assist;verbal cues  -TB     Comment, (Bed-Chair Transfer) BUE support  -       Row Name 01/10/24 1435          Sit-Stand Transfer    Sit-Stand [place] (Transfers) moderate assist (50% patient effort);2 person assist;verbal cues  -       Row Name 01/10/24 1435          Stand-Sit Transfer    Stand-Sit [place] (Transfers) moderate assist (50% patient effort);2 person assist;verbal cues  -       Row Name 01/10/24 1435          Functional Mobility    Functional Mobility- Ind. Level moderate assist (50% patient effort);2 person assist required;verbal cues required  -     Functional Mobility- Device --  BUE support  -TB     Functional Mobility- Safety Issues balance decreased during turns  -     Functional Mobility- Comment Pt able to take steps from EOB to chair  -       Row Name 01/10/24 1435          Activities of Daily Living    BADL Assessment/Intervention lower body dressing;toileting  -       Row  Name 01/10/24 1435          Mobility    Extremity Weight-bearing Status right lower extremity  -TB     Right Lower Extremity (Weight-bearing Status) weight-bearing as tolerated (WBAT)  -TB       Row Name 01/10/24 1435          Lower Body Dressing Assessment/Training    [place] Level (Lower Body Dressing) don;socks;dependent (less than 25% patient effort)  -TB       Row Name 01/10/24 1435          Toileting Assessment/Training    [place] Level (Toileting) toileting skills;dependent (less than 25% patient effort)  -               User Key  (r) = Recorded By, (t) = Taken By, (c) = Cosigned By      Initials Name Provider Type     Anh Brennan, OT Occupational Therapist                   Obj/Interventions       Row Name 01/10/24 1440          Sensory Assessment (Somatosensory)    Sensory Assessment (Somatosensory) UE sensation intact  -       Row Name 01/10/24 1440          Vision Assessment/Intervention    Visual Impairment/Limitations legally blind;other (see comments)  -     Vision Assessment Comment Macular Degeneration with h/o Ferny Bonnet Syndrome  -       Row Name 01/10/24 1440          Strength Comprehensive (MMT)    Comment, General Manual Muscle Testing (MMT) Assessment Generalized weakness/deconditioned.  -       Row Name 01/10/24 1440          Balance    Balance Assessment sitting static balance;sitting dynamic balance;sit to stand dynamic balance;standing static balance;standing dynamic balance  -TB     Static Sitting Balance contact guard  -TB     Dynamic Sitting Balance minimal assist  -TB     Position, Sitting Balance sitting edge of bed  -     Sit to Stand Dynamic Balance moderate assist;2-person assist;verbal cues  -TB     Static Standing Balance minimal assist;2-person assist;verbal cues  -TB     Dynamic Standing Balance moderate assist;2-person assist;verbal cues  -TB     Position/Device Used, Standing Balance supported  -TB     Balance Interventions  sitting;standing;sit to stand;supported;static;dynamic;occupation based/functional task;UE activity with balance activity  -TB     Comment, Balance Pain limits transition to EOB sitting. Pt initially presents with strong R side posterior lean. Able to achieve and maintain midline with time.  -TB               User Key  (r) = Recorded By, (t) = Taken By, (c) = Cosigned By      Initials Name Provider Type    TB Anh Brennan OT Occupational Therapist                   Goals/Plan       Row Name 01/10/24 1447          Transfer Goal 1 (OT)    Activity/Assistive Device (Transfer Goal 1, OT) sit-to-stand/stand-to-sit;toilet  -TB     [place] Level/Cues Needed (Transfer Goal 1, OT) moderate assist (50-74% patient effort);verbal cues required  -TB     Time Frame (Transfer Goal 1, OT) by discharge  -TB     Progress/Outcome (Transfer Goal 1, OT) goal ongoing  -TB       Row Name 01/10/24 1447          Grooming Goal 1 (OT)    Activity/Device (Grooming Goal 1, OT) wash face, hands;hair care  -TB     [place] (Grooming Goal 1, OT) minimum assist (75% or more patient effort);verbal cues required  -TB     Time Frame (Grooming Goal 1, OT) by discharge  -TB     Strategies/Barriers (Grooming Goal 1, OT) Legally blind, tremors  -TB     Progress/Outcome (Grooming Goal 1, OT) goal ongoing  -TB       Row Name 01/10/24 1447          Self-Feeding Goal 1 (OT)    Activity/Device (Self-Feeding Goal 1, OT) liquids to mouth;finger foods  -TB     [place] Level/Cues Needed (Self-Feeding Goal 1, OT) verbal cues required;minimum assist (75% or more patient effort)  -TB     Time Frame (Self-Feeding Goal 1, OT) by discharge  -TB     Strategies/Barriers (Self-Feeding Goal 1, OT) Legally Blind, tremors  -TB     Progress/Outcomes (Self-Feeding Goal 1, OT) goal ongoing  -TB               User Key  (r) = Recorded By, (t) = Taken By, (c) = Cosigned By      Initials Name Provider Type    TB Anh Brennan OT Occupational  Therapist                   Clinical Impression       Row Name 01/10/24 1443          Pain Assessment    Pain Intervention(s) Ambulation/increased activity;Repositioned;Elevated  -TB     Additional Documentation Pain Scale: FACES Pre/Post-Treatment (Group)  -TB       Row Name 01/10/24 1443          Pain Scale: FACES Pre/Post-Treatment    Pain: FACES Scale, Pretreatment 4-->hurts little more  -TB     Posttreatment Pain Rating 6-->hurts even more  -TB     Pain Location - Side/Orientation Right  -TB     Pain Location generalized  -TB     Pain Location - hip;flank  -TB     Pre/Posttreatment Pain Comment Pt tolerates EOB/OOB activity with encouragement  -TB       Row Name 01/10/24 1443          Plan of Care Review    Plan of Care Reviewed With patient  -TB     Progress --  IE  -TB     Outcome Evaluation OT IE completed. Pt is A/Ox2 and participates in therapy with encouragement and time. Presents below her baseline limited by acute pain, generalized weakness, and balance deficits. Mod Ax2 to transition to EOB sitting. Mod Ax2 STS and to take steps from EOB to chair. Pt requires assist with all ADLs. OT will follow IP to advance self-care as tolerated. Recommend SNF at d/c for best outcome.  -TB       Row Name 01/10/24 1443          Therapy Assessment/Plan (OT)    Rehab Potential (OT) fair, will monitor progress closely  -TB     Criteria for Skilled Therapeutic Interventions Met (OT) yes;meets criteria;skilled treatment is necessary  -TB     Therapy Frequency (OT) daily  -TB       Row Name 01/10/24 1443          Therapy Plan Review/Discharge Plan (OT)    Anticipated Discharge Disposition (OT) skilled nursing facility  -TB       Row Name 01/10/24 1443          Vital Signs    Pre Systolic BP Rehab --  RN cleared OT  -TB     O2 Delivery Pre Treatment room air  -TB     Pre Patient Position Supine  -TB     Intra Patient Position Standing  -TB     Post Patient Position Sitting  -TB       Row Name 01/10/24 1443           Positioning and Restraints    Pre-Treatment Position in bed  -TB     Post Treatment Position chair  -TB     In Chair notified nsg;reclined;call light within reach;encouraged to call for assist;exit alarm on;on mechanical lift sling;waffle cushion;legs elevated  -TB               User Key  (r) = Recorded By, (t) = Taken By, (c) = Cosigned By      Initials Name Provider Type    TB Anh Brennan OT Occupational Therapist                   Outcome Measures       Row Name 01/10/24 1448          How much help from another is currently needed...    Putting on and taking off regular lower body clothing? 1  -TB     Bathing (including washing, rinsing, and drying) 2  -TB     Toileting (which includes using toilet bed pan or urinal) 1  -TB     Putting on and taking off regular upper body clothing 2  -TB     Taking care of personal grooming (such as brushing teeth) 2  -TB     Eating meals 2  -TB     AM-PAC 6 Clicks Score (OT) 10  -TB       Row Name 01/10/24 1448          Functional Assessment    Outcome Measure Options AM-PAC 6 Clicks Daily Activity (OT)  -TB               User Key  (r) = Recorded By, (t) = Taken By, (c) = Cosigned By      Initials Name Provider Type     Anh Brennan OT Occupational Therapist                    Occupational Therapy Education       Title: PT OT SLP Therapies (In Progress)       Topic: Occupational Therapy (In Progress)       Point: ADL training (In Progress)       Description:   Instruct learner(s) on proper safety adaptation and remediation techniques during self care or transfers.   Instruct in proper use of assistive devices.                  Learning Progress Summary             Patient Acceptance, E, NR by TB at 1/10/2024 [id number]                         Point: Home exercise program (Not Started)       Description:   Instruct learner(s) on appropriate technique for monitoring, assisting and/or progressing therapeutic exercises/activities.                  Learner  Progress:  Not documented in this visit.              Point: Precautions (Not Started)       Description:   Instruct learner(s) on prescribed precautions during self-care and functional transfers.                  Learner Progress:  Not documented in this visit.              Point: Body mechanics (Not Started)       Description:   Instruct learner(s) on proper positioning and spine alignment during self-care, functional mobility activities and/or exercises.                  Learner Progress:  Not documented in this visit.                              User Key       Initials Effective Dates Name Provider Type Discipline     07/11/23 -  Anh Brennan OT Occupational Therapist OT                  OT Recommendation and Plan  Therapy Frequency (OT): daily  Plan of Care Review  Plan of Care Reviewed With: patient  Progress:  (IE)  Outcome Evaluation: OT IE completed. Pt is A/Ox2 and participates in therapy with encouragement and time. Presents below her baseline limited by acute pain, generalized weakness, and balance deficits. Mod Ax2 to transition to EOB sitting. Mod Ax2 STS and to take steps from EOB to chair. Pt requires assist with all ADLs. OT will follow IP to advance self-care as tolerated. Recommend SNF at d/c for best outcome.     Time Calculation:   Evaluation Complexity (OT)  Review Occupational Profile/Medical/Therapy History Complexity: expanded/moderate complexity  Assessment, Occupational Performance/Identification of Deficit Complexity: 3-5 performance deficits  Clinical Decision Making Complexity (OT): detailed assessment/moderate complexity  Overall Complexity of Evaluation (OT): moderate complexity     Time Calculation- OT       Row Name 01/10/24 1308             Time Calculation- OT    OT Start Time 1308  -TB      OT Received On 01/10/24  -TB      OT Goal Re-Cert Due Date 01/20/24  -TB         Untimed Charges    OT Eval/Re-eval Minutes 53  -TB         Total Minutes    Untimed Charges Total  Minutes 53  -TB       Total Minutes 53  -TB                User Key  (r) = Recorded By, (t) = Taken By, (c) = Cosigned By      Initials Name Provider Type    TB Anh Brennan OT Occupational Therapist                  Therapy Charges for Today       Code Description Service Date Service Provider Modifiers Qty    [id number] HC OT EVAL MOD COMPLEXITY 4 1/10/2024 Anh Brennan OT GO 1                 Anh Brennan OT  1/10/2024

## 2024-01-10 NOTE — THERAPY EVALUATION
Patient Name: Pati Carter  : 1930    MRN: [id number]                              Today's Date: 1/10/2024       Admit Date: 2024    Visit Dx:     ICD-10-CM ICD-9-CM   1. Closed fracture of sacrum, unspecified portion of sacrum, initial encounter  S32.10XA 805.6   2. Fall in home, initial encounter  W19.XXXA E888.9    Y92.009 E849.0   3. Generalized weakness  R53.1 780.79   4. 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  Dx 2006- borderline intermittent.    Back pain     Benign colonic polyp 2016    Description: A.  Dx .    Ferny Bonnet syndrome 2020    Chondrocalcinosis     knees    Compression fracture of lumbar vertebra     Constipation     COPD (chronic obstructive pulmonary disease)     Description: A.  Rule out chronic persistent asthma, COPD, or obliterative bronchiolitis.- Butch    COVID-19 virus infection 10/11/2020    CTS (carpal tunnel syndrome)     Degeneration of intervertebral  disc of lumbar region     Description: A.  Diagnosed in April 2013 with advanced multilevel with severe spinal stenosis, followed by Dr. Pillai for pain management.    Gastroesophageal reflux disease     Hearing loss     History of calcium pyrophosphate deposition disease (CPPD)     History of colonoscopy 01/01/1999    NORMAL PER PATIENT     History of mammogram 01/01/2011    NORMAL PER PT     History of Papanicolaou smear of cervix 01/01/2010    NORMAL PER PT     History of varicella     Hyperlipidemia     Description: A.  Dx 2006.    Hypertension     Description: A. Dx 2001.    Macular degeneration     Nocturia     Osteoporosis     Ovarian mass     Dx 8/15- benign left cystic adnexal mass    Overactive bladder     Pelvic floor dysfunction     Rheumatoid arthritis     Description: A.  Diagnosed in 2000 and and followed by Dr. Constantino (now Dr. Kaplan). B.  On methotrexate therapy since 2000. C.  Off low-dose prednisone therapy.    Vitamin D deficiency      Past Surgical History:   Procedure Laterality Date    APPENDECTOMY      CARPAL TUNNEL RELEASE Left 01/01/2003    HISTORY OF NEUROPLASTY DECOMPRESSION MEDIAN NERVE AT CARPAL TUNNEL LEFT    CATARACT EXTRACTION Bilateral 01/01/2009    CHOLECYSTECTOMY  01/01/1962    HIP CANNULATED SCREW PLACEMENT Right 1/25/2020    Procedure: HIP CANNULATED SCREW PLACEMENT RIGHT;  Surgeon: Karlos Blount MD;  Location: [facility];  Service: Orthopedics    KNEE ARTHROSCOPY Left 01/01/2001    MENISCAL REPAIR    KYPHOPLASTY  06/18/2015    T11 AND L1 (JOSE A)    PELVIC LAPAROSCOPY  01/01/1996    REMOVAL OF BENIGN UTERINE AND RIGHT OVARIAN TUMORS    SALPINGO OOPHORECTOMY Left 08/26/2015    REMOVAL OF LEFT OVARY AND TUBE (benign cystic mass)      General Information       Row Name 01/10/24 1503          Physical Therapy Time and Intention    Document Type evaluation  -SS     Mode of Treatment physical therapy  -SS       Row Name 01/10/24 1503          General Information    Patient  Profile Reviewed yes  -SS     Prior Level of Function mod assist:;transfer;max assist:;w/c or scooter  -SS     Existing Precautions/Restrictions fall;other (see comments)  s/p fall with R sacral ala fx (conservative tx). Legally Blind/Mac Degeneration, BUE tremors  -SS     Barriers to Rehab medically complex;previous functional deficit;visual deficit  -SS       Row Name 01/10/24 1503          Living Environment    People in Home facility resident  -SS       Row Name 01/10/24 1503          Home Main Entrance    Number of Stairs, Main Entrance none  -SS       Row Name 01/10/24 1503          Stairs Within Home, Primary    Number of Stairs, Within Home, Primary none  -SS       Row Name 01/10/24 1503          Cognition    Orientation Status (Cognition) oriented to;person;place  -       Row Name 01/10/24 1503          Safety Issues, Functional Mobility    Safety Issues Affecting Function (Mobility) awareness of need for assistance;insight into deficits/self-awareness;judgment;problem-solving;safety precaution awareness;safety precautions follow-through/compliance;sequencing abilities  -     Impairments Affecting Function (Mobility) balance;endurance/activity tolerance;motor control;pain;strength;postural/trunk control;range of motion (ROM);visual/perceptual  -SS               User Key  (r) = Recorded By, (t) = Taken By, (c) = Cosigned By      Initials Name Provider Type     Maryanne Benitez PT Physical Therapist                   Mobility       Row Name 01/10/24 1506          Bed Mobility    Bed Mobility supine-sit;scooting/bridging;rolling left;rolling right  -SS     Rolling Left [place] (Bed Mobility) moderate assist (50% patient effort);supervision;verbal cues  -SS     Rolling Right [place] (Bed Mobility) moderate assist (50% patient effort);supervision;verbal cues  -SS     Scooting/Bridging [place] (Bed Mobility) moderate assist (50% patient effort);verbal cues  -SS     Supine-Sit [place] (Bed  Mobility) moderate assist (50% patient effort);2 person assist;verbal cues  -     Assistive Device (Bed Mobility) draw sheet;bed rails  -     Comment, (Bed Mobility) V/TC for log roll sequencing  -       Row Name 01/10/24 1504          Bed-Chair Transfer    Bed-Chair [place] (Transfers) moderate assist (50% patient effort);2 person assist;verbal cues;nonverbal cues (demo/gesture)  -     Assistive Device (Bed-Chair Transfers) other (see comments)  BUE support per pt. preference  -     Comment, (Bed-Chair Transfer) V/TC for sequencing steps to chair  -       Row Name 01/10/24 1504          Sit-Stand Transfer    Sit-Stand [place] (Transfers) moderate assist (50% patient effort);2 person assist;verbal cues  -     Assistive Device (Sit-Stand Transfers) other (see comments)  BUE support  -     Comment, (Sit-Stand Transfer) V/TC for hand placement, appropriate alignment, lowering with eccentric control  -       Row Name 01/10/24 1504          Gait/Stairs (Locomotion)    [place] Level (Gait) unable to assess  -     Comment, (Gait/Stairs) pt. declined  -       Row Name 01/10/24 1504          Mobility    Extremity Weight-bearing Status right lower extremity  -     Right Lower Extremity (Weight-bearing Status) weight-bearing as tolerated (WBAT)  -               User Key  (r) = Recorded By, (t) = Taken By, (c) = Cosigned By      Initials Name Provider Type     Maryanne Benitez, PT Physical Therapist                   Obj/Interventions       Row Name 01/10/24 1505          Range of Motion Comprehensive    General Range of Motion bilateral lower extremity ROM WFL  -     Comment, General Range of Motion pt. limited command following for formal testing; per functional mobility WFL  -       Row Name 01/10/24 1505          Strength Comprehensive (MMT)    Comment, General Manual Muscle Testing (MMT) Assessment BLE gross 3/5 per functional mobility  -       Row Name 01/10/24 1500           Motor Skills    Therapeutic Exercise other (see comments)  deferred exercise handout due to low vision  -       Row Name 01/10/24 1505          Balance    Balance Assessment sitting static balance;sitting dynamic balance;sit to stand dynamic balance;standing static balance;standing dynamic balance  -     Static Sitting Balance contact guard  -     Dynamic Sitting Balance minimal assist  -     Position, Sitting Balance unsupported;sitting edge of bed  -     Sit to Stand Dynamic Balance moderate assist;2-person assist  -SS     Static Standing Balance minimal assist;2-person assist  -SS     Dynamic Standing Balance moderate assist;2-person assist  -SS     Position/Device Used, Standing Balance supported;walker, front-wheeled  -     Balance Interventions sitting;standing;sit to stand;supported;static;dynamic  -       Row Name 01/10/24 1505          Sensory Assessment (Somatosensory)    Sensory Assessment (Somatosensory) LE sensation intact  -               User Key  (r) = Recorded By, (t) = Taken By, (c) = Cosigned By      Initials Name Provider Type     Maryanne Benitez, PT Physical Therapist                   Goals/Plan       Row Name 01/10/24 1512          Bed Mobility Goal 1 (PT)    Activity/Assistive Device (Bed Mobility Goal 1, PT) bed mobility activities, all  -SS     [place] Level/Cues Needed (Bed Mobility Goal 1, PT) minimum assist (75% or more patient effort)  -     Time Frame (Bed Mobility Goal 1, PT) long term goal (LTG);10 days  -       Row Name 01/10/24 1512          Transfer Goal 1 (PT)    Activity/Assistive Device (Transfer Goal 1, PT) sit-to-stand/stand-to-sit;bed-to-chair/chair-to-bed  -SS     [place] Level/Cues Needed (Transfer Goal 1, PT) minimum assist (75% or more patient effort)  -     Time Frame (Transfer Goal 1, PT) long term goal (LTG);10 days  -       Row Name 01/10/24 1512          Gait Training Goal 1 (PT)    Activity/Assistive Device (Gait Training Goal 1,  PT) gait (walking locomotion);assistive device use;walker, rolling  -     [place] Level (Gait Training Goal 1, PT) moderate assist (50-74% patient effort)  -     Distance (Gait Training Goal 1, PT) 10  -SS     Time Frame (Gait Training Goal 1, PT) long term goal (LTG);10 days  -       Row Name 01/10/24 4422          Therapy Assessment/Plan (PT)    Planned Therapy Interventions (PT) balance training;bed mobility training;gait training;home exercise program;lumbar stabilization;neuromuscular re-education;patient/family education;postural re-education;ROM (range of motion);strengthening;stretching;transfer training  -               User Key  (r) = Recorded By, (t) = Taken By, (c) = Cosigned By      Initials Name Provider Type     Maryanne Benitez, PT Physical Therapist                   Clinical Impression       Row Name 01/10/24 6495          Pain    Pain Intervention(s) Repositioned;Ambulation/increased activity;Elevated  -     Additional Documentation Pain Scale: FACES Pre/Post-Treatment (Group)  -[facility] Name 01/10/24 8271          Pain Scale: FACES Pre/Post-Treatment    Pain: FACES Scale, Pretreatment 4-->hurts little more  -     Posttreatment Pain Rating 6-->hurts even more  -     Pain Location - Side/Orientation Right  -     Pain Location generalized  -     Pain Location - hip;flank  -       Row Name 01/10/24 8773          Plan of Care Review    Plan of Care Reviewed With patient  -SS     Outcome Evaluation Pt. presents below baseline function w/generalized weakness, acute pain, balance deficits and decreased functional endurance affecting her ability to safely participate in functional mobility. She performed bed mobility and transfers w/mod assist of 2. Activity limited by pain, fatigue. Pt. educated spinal precautions. Pt. would benefit from IPPT to address stated deficits.  -       Row Name 01/10/24 0273          Therapy Assessment/Plan (PT)    Rehab Potential (PT) good, to  achieve stated therapy goals  -     Criteria for Skilled Interventions Met (PT) yes;meets criteria;skilled treatment is necessary  -     Therapy Frequency (PT) daily  -       Row Name 01/10/24 1509          Vital Signs    Pre Systolic BP Rehab 158  -SS     Pre Treatment Diastolic BP 77  -SS     Pretreatment Heart Rate (beats/min) 97  -SS     Pre Patient Position Supine  -SS       Row Name 01/10/24 1509          Positioning and Restraints    Pre-Treatment Position in bed  -SS     Post Treatment Position chair  -SS     In Chair notified nsg;reclined;call light within reach;encouraged to call for assist;exit alarm on;waffle cushion;on mechanical lift sling;legs elevated  -               User Key  (r) = Recorded By, (t) = Taken By, (c) = Cosigned By      Initials Name Provider Type    SS Maryanne Benitez, PT Physical Therapist                   Outcome Measures       Row Name 01/10/24 1513          How much help from another person do you currently need...    Turning from your back to your side while in flat bed without using bedrails? 2  -SS     Moving from lying on back to sitting on the side of a flat bed without bedrails? 2  -SS     Moving to and from a bed to a chair (including a wheelchair)? 2  -SS     Standing up from a chair using your arms (e.g., wheelchair, bedside chair)? 2  -SS     Climbing 3-5 steps with a railing? 1  -SS     To walk in hospital room? 2  -SS     AM-PAC 6 Clicks Score (PT) 11  -SS     Highest Level of Mobility Goal 4 --> Transfer to chair/commode  -       Row Name 01/10/24 1513 01/10/24 1448       Functional Assessment    Outcome Measure Options AM-PAC 6 Clicks Basic Mobility (PT)  - AM-PAC 6 Clicks Daily Activity (OT)  -TB              User Key  (r) = Recorded By, (t) = Taken By, (c) = Cosigned By      Initials Name Provider Type    Anh Andrea, OT Occupational Therapist    SS Maryanne Benitez, PT Physical Therapist                                 Physical Therapy  Education       Title: PT OT SLP Therapies (In Progress)       Topic: Physical Therapy (In Progress)       Point: Mobility training (Done)       Learning Progress Summary             Patient LANCE Cole, VU,DU,NR by  at 1/10/2024 1513    Comment: Educated pt. safety/technique w/bed mobility, transfers, PT POC, log roll technique, spinal precautions                         Point: Home exercise program (Not Started)       Learner Progress:  Not documented in this visit.              Point: Body mechanics (Done)       Learning Progress Summary             Patient LANCE Cole, VU,DU,NR by  at 1/10/2024 1513    Comment: Educated pt. safety/technique w/bed mobility, transfers, PT POC, log roll technique, spinal precautions                         Point: Precautions (Done)       Learning Progress Summary             Patient ALNCE Cole, VU,DU,NR by  at 1/10/2024 1513    Comment: Educated pt. safety/technique w/bed mobility, transfers, PT POC, log roll technique, spinal precautions                                         User Key       Initials Effective Dates Name Provider Type Discipline     06/01/21 -  Maryanne Benitez, PT Physical Therapist PT                  PT Recommendation and Plan  Planned Therapy Interventions (PT): balance training, bed mobility training, gait training, home exercise program, lumbar stabilization, neuromuscular re-education, patient/family education, postural re-education, ROM (range of motion), strengthening, stretching, transfer training  Plan of Care Reviewed With: patient  Outcome Evaluation: Pt. presents below baseline function w/generalized weakness, acute pain, balance deficits and decreased functional endurance affecting her ability to safely participate in functional mobility. She performed bed mobility and transfers w/mod assist of 2. Activity limited by pain, fatigue. Pt. educated spinal precautions. Pt. would benefit from IPPT to address stated deficits.     Time Calculation:   PT  Evaluation Complexity  History, PT Evaluation Complexity: 3 or more personal factors and/or comorbidities  Examination of Body Systems (PT Eval Complexity): total of 4 or more elements  Clinical Presentation (PT Evaluation Complexity): evolving  Clinical Decision Making (PT Evaluation Complexity): moderate complexity  Overall Complexity (PT Evaluation Complexity): moderate complexity     PT Charges       Row Name 01/10/24 1514             Time Calculation    Start Time 1320  -SS      PT Received On 01/10/24  -SS      PT Goal Re-Cert Due Date 01/20/24  -SS         Untimed Charges    PT Eval/Re-eval Minutes 50  -SS         Total Minutes    Untimed Charges Total Minutes 50  -SS       Total Minutes 50  -SS                User Key  (r) = Recorded By, (t) = Taken By, (c) = Cosigned By      Initials Name Provider Type    SS Maryanne Benitez, SANAZ Physical Therapist                  Therapy Charges for Today       Code Description Service Date Service Provider Modifiers Qty    [id number] HC PT EVAL MOD COMPLEXITY 4 1/10/2024 Maryanne Benitez PT GP 1            PT G-Codes  Outcome Measure Options: AM-PAC 6 Clicks Basic Mobility (PT)  AM-PAC 6 Clicks Score (PT): 11  AM-PAC 6 Clicks Score (OT): 10  PT Discharge Summary  Anticipated Discharge Disposition (PT): skilled nursing facility    Maryanne Benitez PT  1/10/2024

## 2024-01-11 ENCOUNTER — APPOINTMENT (OUTPATIENT)
Dept: CT IMAGING | Facility: HOSPITAL | Age: 89
End: 2024-01-11
Payer: MEDICARE

## 2024-01-11 PROCEDURE — 99222 1ST HOSP IP/OBS MODERATE 55: CPT | Performed by: OBSTETRICS & GYNECOLOGY

## 2024-01-11 PROCEDURE — 94761 N-INVAS EAR/PLS OXIMETRY MLT: CPT

## 2024-01-11 PROCEDURE — 94664 DEMO&/EVAL PT USE INHALER: CPT

## 2024-01-11 PROCEDURE — 74177 CT ABD & PELVIS W/CONTRAST: CPT

## 2024-01-11 PROCEDURE — 25010000002 CEFTRIAXONE PER 250 MG: Performed by: INTERNAL MEDICINE

## 2024-01-11 PROCEDURE — 97110 THERAPEUTIC EXERCISES: CPT

## 2024-01-11 PROCEDURE — 97530 THERAPEUTIC ACTIVITIES: CPT

## 2024-01-11 PROCEDURE — 94799 UNLISTED PULMONARY SVC/PX: CPT

## 2024-01-11 PROCEDURE — 99232 SBSQ HOSP IP/OBS MODERATE 35: CPT | Performed by: INTERNAL MEDICINE

## 2024-01-11 PROCEDURE — 25510000001 IOPAMIDOL 61 % SOLUTION: Performed by: INTERNAL MEDICINE

## 2024-01-11 RX ORDER — TRAMADOL HYDROCHLORIDE 50 MG/1
50 TABLET ORAL 2 TIMES DAILY
Status: DISCONTINUED | OUTPATIENT
Start: 2024-01-11 | End: 2024-01-16

## 2024-01-11 RX ORDER — ACETAMINOPHEN 500 MG
1000 TABLET ORAL EVERY 8 HOURS
Status: DISCONTINUED | OUTPATIENT
Start: 2024-01-11 | End: 2024-01-17 | Stop reason: HOSPADM

## 2024-01-11 RX ORDER — TRAMADOL HYDROCHLORIDE 50 MG/1
50 TABLET ORAL EVERY 8 HOURS PRN
Status: DISCONTINUED | OUTPATIENT
Start: 2024-01-11 | End: 2024-01-17 | Stop reason: HOSPADM

## 2024-01-11 RX ADMIN — ASPIRIN 81 MG: 81 TABLET, COATED ORAL at 08:28

## 2024-01-11 RX ADMIN — DICLOFENAC SODIUM 2 G: 9.3 GEL TOPICAL at 17:05

## 2024-01-11 RX ADMIN — SENNOSIDES AND DOCUSATE SODIUM 2 TABLET: 8.6; 5 TABLET ORAL at 08:28

## 2024-01-11 RX ADMIN — DOXYCYCLINE 100 MG: 100 CAPSULE ORAL at 08:28

## 2024-01-11 RX ADMIN — TRAMADOL HYDROCHLORIDE 50 MG: 50 TABLET, COATED ORAL at 21:31

## 2024-01-11 RX ADMIN — OXYBUTYNIN CHLORIDE 5 MG: 5 TABLET, EXTENDED RELEASE ORAL at 08:27

## 2024-01-11 RX ADMIN — TRAMADOL HYDROCHLORIDE 50 MG: 50 TABLET, COATED ORAL at 08:32

## 2024-01-11 RX ADMIN — SERTRALINE HYDROCHLORIDE 25 MG: 25 TABLET ORAL at 08:26

## 2024-01-11 RX ADMIN — IOPAMIDOL 80 ML: 612 INJECTION, SOLUTION INTRAVENOUS at 11:09

## 2024-01-11 RX ADMIN — Medication 10 ML: at 08:33

## 2024-01-11 RX ADMIN — SENNOSIDES AND DOCUSATE SODIUM 2 TABLET: 8.6; 5 TABLET ORAL at 21:31

## 2024-01-11 RX ADMIN — DICLOFENAC SODIUM 2 G: 9.3 GEL TOPICAL at 08:27

## 2024-01-11 RX ADMIN — BUDESONIDE AND FORMOTEROL FUMARATE DIHYDRATE 1 PUFF: 160; 4.5 AEROSOL RESPIRATORY (INHALATION) at 08:47

## 2024-01-11 RX ADMIN — DICLOFENAC SODIUM 2 G: 9.3 GEL TOPICAL at 21:31

## 2024-01-11 RX ADMIN — CEFTRIAXONE 2000 MG: 2 INJECTION, POWDER, FOR SOLUTION INTRAMUSCULAR; INTRAVENOUS at 17:05

## 2024-01-11 RX ADMIN — DOXYCYCLINE 100 MG: 100 CAPSULE ORAL at 21:31

## 2024-01-11 RX ADMIN — DILTIAZEM HYDROCHLORIDE 120 MG: 120 CAPSULE, COATED, EXTENDED RELEASE ORAL at 08:25

## 2024-01-11 RX ADMIN — CYANOCOBALAMIN TAB 1000 MCG 1000 MCG: 1000 TAB at 08:27

## 2024-01-11 RX ADMIN — FAMOTIDINE 40 MG: 20 TABLET, FILM COATED ORAL at 08:24

## 2024-01-11 RX ADMIN — LIDOCAINE 1 PATCH: 4 PATCH TOPICAL at 08:27

## 2024-01-11 RX ADMIN — FOLIC ACID 1000 MCG: 1 TABLET ORAL at 08:26

## 2024-01-11 RX ADMIN — DICLOFENAC SODIUM 2 G: 9.3 GEL TOPICAL at 11:50

## 2024-01-11 NOTE — THERAPY TREATMENT NOTE
Patient Name: Pati Carter  : 1930    MRN: [id number]                              Today's Date: 2024       Admit Date: 2024    Visit Dx:     ICD-10-CM ICD-9-CM   1. Closed fracture of sacrum, unspecified portion of sacrum, initial encounter  S32.10XA 805.6   2. Fall in home, initial encounter  W19.XXXA E888.9    Y92.009 E849.0   3. Generalized weakness  R53.1 780.79   4. 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  Dx 2006- borderline intermittent.    Back pain     Benign colonic polyp 2016    Description: A.  Dx .    Ferny Bonnet syndrome 2020    Chondrocalcinosis     knees    Compression fracture of lumbar vertebra     Constipation     COPD (chronic obstructive pulmonary disease)     Description: A.  Rule out chronic persistent asthma, COPD, or obliterative bronchiolitis.- Butch    COVID-19 virus infection 10/11/2020    CTS (carpal tunnel syndrome)     Degeneration of intervertebral  disc of lumbar region     Description: A.  Diagnosed in April 2013 with advanced multilevel with severe spinal stenosis, followed by Dr. Pillai for pain management.    Gastroesophageal reflux disease     Hearing loss     History of calcium pyrophosphate deposition disease (CPPD)     History of colonoscopy 01/01/1999    NORMAL PER PATIENT     History of mammogram 01/01/2011    NORMAL PER PT     History of Papanicolaou smear of cervix 01/01/2010    NORMAL PER PT     History of varicella     Hyperlipidemia     Description: A.  Dx 2006.    Hypertension     Description: A. Dx 2001.    Macular degeneration     Nocturia     Osteoporosis     Ovarian mass     Dx 8/15- benign left cystic adnexal mass    Overactive bladder     Pelvic floor dysfunction     Rheumatoid arthritis     Description: A.  Diagnosed in 2000 and and followed by Dr. Constantino (now Dr. Kaplan). B.  On methotrexate therapy since 2000. C.  Off low-dose prednisone therapy.    Vitamin D deficiency      Past Surgical History:   Procedure Laterality Date    APPENDECTOMY      CARPAL TUNNEL RELEASE Left 01/01/2003    HISTORY OF NEUROPLASTY DECOMPRESSION MEDIAN NERVE AT CARPAL TUNNEL LEFT    CATARACT EXTRACTION Bilateral 01/01/2009    CHOLECYSTECTOMY  01/01/1962    HIP CANNULATED SCREW PLACEMENT Right 1/25/2020    Procedure: HIP CANNULATED SCREW PLACEMENT RIGHT;  Surgeon: Karlos Blount MD;  [facility];  Service: Orthopedics    KNEE ARTHROSCOPY Left 01/01/2001    MENISCAL REPAIR    KYPHOPLASTY  06/18/2015    T11 AND L1 (JOSE A)    PELVIC LAPAROSCOPY  01/01/1996    REMOVAL OF BENIGN UTERINE AND RIGHT OVARIAN TUMORS    SALPINGO OOPHORECTOMY Left 08/26/2015    REMOVAL OF LEFT OVARY AND TUBE (benign cystic mass)      General Information       Row Name 01/11/24 1429          Physical Therapy Time and Intention    Document Type therapy note (daily note)  -     Mode of Treatment physical therapy  -       Row Name 01/11/24 1429          General Information     Patient Profile Reviewed yes  -     Existing Precautions/Restrictions fall;other (see comments)  s/p fall with R sacral ala fx (conservative tx). Legally Blind/Mac Degeneration, BUE tremors  -     Barriers to Rehab medically complex;previous functional deficit;visual deficit  [facility] 01/11/24 1429          Cognition    Orientation Status (Cognition) oriented to;person;verbal cues/prompts needed for orientation;place  -[facility] 01/11/24 1429          Safety Issues, Functional Mobility    Safety Issues Affecting Function (Mobility) awareness of need for assistance;insight into deficits/self-awareness;safety precaution awareness;safety precautions follow-through/compliance;sequencing abilities  -     Impairments Affecting Function (Mobility) balance;endurance/activity tolerance;motor control;pain;strength;postural/trunk control;range of motion (ROM);visual/perceptual  -     Comment, Safety Issues/Impairments (Mobility) Situational confusion  -               User Key  (r) = Recorded By, (t) = Taken By, (c) = Cosigned By      Initials Name Provider Type     Mimi Sandoval PT Physical Therapist                   Mobility       [facility] Name 01/11/24 1430          Bed Mobility    Bed Mobility supine-sit;sit-supine  -     Supine-Sit Heard (Bed Mobility) maximum assist (25% patient effort);1 person assist;verbal cues  -     Sit-Supine Heard (Bed Mobility) maximum assist (25% patient effort);1 person assist;verbal cues  -     Assistive Device (Bed Mobility) draw sheet;bed rails  -     Comment, (Bed Mobility) VCs for hand placement and sequencing. Cues for log rolling. Required assist at B LEs and trunk  -[facility] 01/11/24 1430          Transfers    Comment, (Transfers) VCs for hand placement and sequencing. Decreased WB through R LE noted  -[facility] 01/11/24 1430          Bed-Chair Transfer    Bed-Chair He[place] (Transfers) maximum assist (25% patient  effort);1 person assist;verbal cues  -     Assistive Device (Bed-Chair Transfers) other (see comments)  B UE support  -     Comment, (Bed-Chair Transfer) T/f bed<>BSC. Cues for reaching toward arm rest during transfer  -[facility] Name 01/11/24 1430          Sit-Stand Transfer    Sit-Stand [place] (Transfers) maximum assist (25% patient effort);1 person assist;verbal cues  -     Assistive Device (Sit-Stand Transfers) other (see comments)  B UE support  -     Comment, (Sit-Stand Transfer) STS x2 from EOB, x1 from BSC. Cues for upright posture  -[facility] Name 01/11/24 1430          Gait/Stairs (Locomotion)    [place] Level (Gait) unable to assess  -     Comment, (Gait/Stairs) pt declined d/t pain  -[facility] Name 01/11/24 1430          Mobility    Extremity Weight-bearing Status right lower extremity  -     Right Lower Extremity (Weight-bearing Status) weight-bearing as tolerated (WBAT)  -               User Key  (r) = Recorded By, (t) = Taken By, (c) = Cosigned By      Initials Name Provider Type     Mimi Sandoval PT Physical Therapist                   Obj/Interventions       [facility] Name 01/11/24 1439          Motor Skills    Therapeutic Exercise hip;knee;ankle  -[facility] Name 01/11/24 1439          Hip (Therapeutic Exercise)    Hip (Therapeutic Exercise) AAROM (active assistive range of motion)  -     Hip AAROM (Therapeutic Exercise) bilateral;aBduction;aDduction;10 repetitions;supine  -[facility] Name 01/11/24 1439          Knee (Therapeutic Exercise)    Knee (Therapeutic Exercise) AAROM (active assistive range of motion)  -     Knee AAROM (Therapeutic Exercise) bilateral;flexion;extension;10 repetitions;supine  -[facility] Name 01/11/24 1439          Ankle (Therapeutic Exercise)    Ankle (Therapeutic Exercise) AROM (active range of motion)  -     Ankle AROM (Therapeutic Exercise) bilateral;dorsiflexion;plantarflexion;10 repetitions;supine  [facility] Name  01/11/24 1432          Balance    Balance Assessment sitting static balance;sitting dynamic balance;sit to stand dynamic balance;standing static balance;standing dynamic balance  -     Static Sitting Balance contact guard  -     Dynamic Sitting Balance minimal assist  -     Position, Sitting Balance unsupported;sitting edge of bed;sitting in chair  -     Sit to Stand Dynamic Balance maximum assist;1-person assist;verbal cues  -     Static Standing Balance moderate assist;1-person assist;verbal cues  -     Dynamic Standing Balance maximum assist;1-person assist;verbal cues  -     Position/Device Used, Standing Balance supported;walker, front-wheeled  -     Balance Interventions sitting;standing;sit to stand;supported;static;dynamic  -               User Key  (r) = Recorded By, (t) = Taken By, (c) = Cosigned By      Initials Name Provider Type     Mimi Sandoval, PT Physical Therapist                   Goals/Plan    No documentation.                  Clinical Impression       Row Name 01/11/24 1448          Pain    Pain Intervention(s) Repositioned;Ambulation/increased activity  -       Row Name 01/11/24 144          Pain Scale: FACES Pre/Post-Treatment    Pain: FACES Scale, Pretreatment 4-->hurts little more  -     Posttreatment Pain Rating 6-->hurts even more  -     Pain Location - Side/Orientation Right  -     Pain Location generalized  -     Pain Location - hip;other (see comments)  buttock  -       Row Name 01/11/24 4764          Plan of Care Review    Plan of Care Reviewed With patient  [facility]     Progress no change  -     Outcome Evaluation Pt continues to present below baseline function d/t acute pain, generalized weakness, decreased balance, and decreased activity tolerance. Pt required maxA x1 for STS and to t/f bed<>BSC w/ B UE support. She would continue to benefit from skilled IP PT. Recommend SNF at d/c for best functional outcome.  -       Row Name 01/11/24 3327           Therapy Assessment/Plan (PT)    Rehab Potential (PT) good, to achieve stated therapy goals  -     Criteria for Skilled Interventions Met (PT) yes;meets criteria;skilled treatment is necessary  -     Therapy Frequency (PT) daily  -       Row Name 01/11/24 1440          Vital Signs    Pre Systolic BP Rehab 157  -LH     Pre Treatment Diastolic BP 80  -LH     O2 Delivery Pre Treatment room air  -     O2 Delivery Intra Treatment room air  -     O2 Delivery Post Treatment room air  -     Pre Patient Position Supine  -     Intra Patient Position Standing  -     Post Patient Position Supine  -       Row Name 01/11/24 1440          Positioning and Restraints    Pre-Treatment Position in bed  -     Post Treatment Position bed  -LH     In Bed supine;fowlers;call light within reach;encouraged to call for assist;exit alarm on;side rails up x3;SCD pump applied;notified [facility]               User Key  (r) = Recorded By, (t) = Taken By, (c) = Cosigned By      Initials Name Provider Type     Mimi Sandoval, PT Physical Therapist                   Outcome Measures       Row Name 01/11/24 1444 01/11/24 0810       How much help from another person do you currently need...    Turning from your back to your side while in flat bed without using bedrails? 2  - 2  -LHA    Moving from lying on back to sitting on the side of a flat bed without bedrails? 2  - 2  -LHA    Moving to and from a bed to a chair (including a wheelchair)? 2  - 2  -LHA    Standing up from a chair using your arms (e.g., wheelchair, bedside chair)? 2  - 2  -LHA    Climbing 3-5 steps with a railing? 1  - 1  -LHA    To walk in hospital room? 2  - 2  -LHA    AM-PAC 6 Clicks Score (PT) 11  - 11  -A    Highest Level of Mobility Goal 4 --> Transfer to chair/commode  - 4 --> Transfer to chair/commode  -A      Row Name 01/11/24 1444          Functional Assessment    Outcome Measure Options AM-PAC 6 Clicks Basic Mobility (PT)  [facility]                User Key  (r) = Recorded By, (t) = Taken By, (c) = Cosigned By      Initials Name Provider Type    [facility] Lashay Elise, RN Registered Nurse     Mimi Sandoval, PT Physical Therapist                                 Physical Therapy Education       Title: PT OT SLP Therapies (In Progress)       Topic: Physical Therapy (In Progress)       Point: Mobility training (Done)       Learning Progress Summary             Patient Acceptance, E, VU,NR by  at 1/11/2024 1445    Eager, E, VU,DU,NR by  at 1/10/2024 1513    Comment: Educated pt. safety/technique w/bed mobility, transfers, PT POC, log roll technique, spinal precautions                         Point: Home exercise program (Not Started)       Learner Progress:  Not documented in this visit.              Point: Body mechanics (Done)       Learning Progress Summary             Patient Acceptance, E, VU,NR by  at 1/11/2024 1445    Eager, E, VU,DU,NR by  at 1/10/2024 1513    Comment: Educated pt. safety/technique w/bed mobility, transfers, PT POC, log roll technique, spinal precautions                         Point: Precautions (Done)       Learning Progress Summary             Patient Acceptance, E, VU,NR by  at 1/11/2024 1445    Eager, E, VU,DU,NR by  at 1/10/2024 1513    Comment: Educated pt. safety/technique w/bed mobility, transfers, PT POC, log roll technique, spinal precautions                                         User Key       Initials Effective Dates Name Provider Type Discipline     06/01/21 -  Maryanne Benitez, PT Physical Therapist PT     09/21/23 -  Mimi Sandoval, PT Physical Therapist PT                  PT Recommendation and Plan     Plan of Care Reviewed With: patient  Progress: no change  Outcome Evaluation: Pt continues to present below baseline function d/t acute pain, generalized weakness, decreased balance, and decreased activity tolerance. Pt required maxA x1 for STS and to t/f bed<>BSC w/ B UE support. She would continue  to benefit from skilled IP PT. Recommend SNF at d/c for best functional outcome.     Time Calculation:         PT Charges       Row Name 01/11/24 1445             Time Calculation    Start Time 1322  -LH      PT Received On 01/11/24  -      PT Goal Re-Cert Due Date 01/20/24  -         Timed Charges    52185 - PT Therapeutic Exercise Minutes 8  -LH      31788 - PT Therapeutic Activity Minutes 20  -LH         Total Minutes    Timed Charges Total Minutes 28  -       Total Minutes 28  -LH                User Key  (r) = Recorded By, (t) = Taken By, (c) = Cosigned By      Initials Name Provider Type     Mimi Sandoval, SANAZ Physical Therapist                  Therapy Charges for Today       Code Description Service Date Service Provider Modifiers Qty    32829419922 HC PT THER PROC EA 15 MIN 1/11/2024 Mimi Sandoval, PT GP 1    03945381142 HC PT THERAPEUTIC ACT EA 15 MIN 1/11/2024 Mimi Sandoval, PT GP 1            PT G-Codes  Outcome Measure Options: AM-PAC 6 Clicks Basic Mobility (PT)  AM-PAC 6 Clicks Score (PT): 11  AM-PAC 6 Clicks Score (OT): 10  PT Discharge Summary  Anticipated Discharge Disposition (PT): skilled nursing facility    Mimi Sandoval PT  1/11/2024

## 2024-01-11 NOTE — PLAN OF CARE
AXO x4. Forgetful/confused at times. Vaginal bleeding noted, md aware. VSS. Ra. Incont. BOWEL MEDS GIVEN. Q2 turb, barrier applied. Up to BSC with 2. Voids, small amts, bladder scanned. Abx given, rosa maria. Pain meds given/ repositioning and pain is managed well. Nsr on tele. MD notified PRN, will continue to mx throughout shift.

## 2024-01-11 NOTE — PROGRESS NOTES
[facility] Medicine Services  PROGRESS NOTE    Patient Name: Pati Carter  : 1930  MRN: [id number]    Date of Admission: 2024  Primary Care Physician: Pallavi Eduardo MD    Subjective   Subjective     CC:  Fall, pain    HPI:  No acute events. States she is ok except pain when she sits or moves. Reviewed pending CT.       Objective   Objective     Vital Signs:   Temp:  [97.6 °F (36.4 °C)-98.9 °F (37.2 °C)] 97.7 °F (36.5 °C)  Heart Rate:  [64-93] 75  Resp:  [16-18] 16  BP: (139-156)/(76-89) 150/83     Physical Exam:  Constitutional: No acute distress, awake, alert; frail  HENT: NCAT, mucous membranes moist  Respiratory: diminished; poor air movement but clear  Cardiovascular: RRR, no murmurs, rubs, or gallops  Gastrointestinal: Positive bowel sounds, soft, nontender, nondistended  Musculoskeletal: No bilateral ankle edema  Psychiatric: Appropriate affect, cooperative  Neurologic: weakness lower legs - limited by pain, Cranial Nerves grossly intact to confrontation, speech clear  Skin: No rashes      Results Reviewed:  LAB RESULTS:      Lab 01/10/24  0647 24  0428 24  2224 24  1759 24  1558   WBC 10.14 11.93*  --   --  13.58*   HEMOGLOBIN 10.3* 10.8*  --   --  11.0*   HEMATOCRIT 32.2* 33.7*  --   --  34.8   PLATELETS 251 218  --   --  230   NEUTROS ABS  --   --   --   --  11.48*   IMMATURE GRANS (ABS)  --   --   --   --  0.09*   LYMPHS ABS  --   --   --   --  0.95   MONOS ABS  --   --   --   --  0.88   EOS ABS  --   --   --   --  0.12   .5* 105.6*  --   --  105.8*   PROCALCITONIN  --   --   --  0.31*  --    LACTATE  --   --  1.7  --   --          Lab 24  0428 24  1558   SODIUM 142 138   POTASSIUM 4.7 4.4   CHLORIDE 105 104   CO2 26.0 24.0   ANION GAP 11.0 10.0   BUN 22 24*   CREATININE 0.70 0.75   EGFR 80.8 74.3   GLUCOSE 95 102*   CALCIUM 8.9 8.6   TSH  --  2.720         Lab 24  1558   TOTAL PROTEIN 6.6   ALBUMIN 3.5    GLOBULIN 3.1   ALT (SGPT) 8   AST (SGOT) 15   BILIRUBIN 0.5   ALK PHOS 128*         Lab 01/08/24  1759 01/08/24  1558   HSTROP T 33* 34*             Lab 01/08/24  1724   FOLATE >20.00   VITAMIN B 12 482         Brief Urine Lab Results  (Last result in the past 365 days)        Color   Clarity   Blood   Leuk Est   Nitrite   Protein   CREAT   Urine HCG        01/08/24 1512 Yellow   Clear   Large (3+)   Small (1+)   Negative   30 mg/dL (1+)                   Microbiology Results Abnormal       Procedure Component Value - Date/Time    Blood Culture - Blood, Arm, Right [[id number]]  (Normal) Collected: 01/08/24 2220    Lab Status: Preliminary result Specimen: Blood from Arm, Right Updated: 01/10/24 2300     Blood Culture No growth at 2 days    Narrative:      Less than seven (7) mL's of blood was collected.  Insufficient quantity may yield false negative results.    Blood Culture - Blood, Arm, Right [[id number]]  (Normal) Collected: 01/08/24 2215    Lab Status: Preliminary result Specimen: Blood from Arm, Right Updated: 01/10/24 2246     Blood Culture No growth at 2 days    Narrative:      Less than seven (7) mL's of blood was collected.  Insufficient quantity may yield false negative results.    Urine Culture - Urine, Urine, Clean Catch [[id number]] Collected: 01/08/24 1512    Lab Status: Final result Specimen: Urine, Clean Catch Updated: 01/09/24 1215     Urine Culture <10,000 CFU/mL Mixed Katherine Isolated    Narrative:      Specimen contains mixed organisms of questionable pathogenicity suggestive of contamination. If symptoms persist, suggest recollection.  Colonization of the urinary tract without infection is common. Treatment is discouraged unless the patient is symptomatic, pregnant, or undergoing an invasive urologic procedure.    COVID PRE-OP / PRE-PROCEDURE SCREENING ORDER (NO ISOLATION) - Swab, Nasopharynx [[id number]]  (Normal) Collected: 01/08/24 1601    Lab Status: Final result Specimen: Swab from Nasopharynx  Updated: 01/08/24 1649    Narrative:      The following orders were created for panel order COVID PRE-OP / PRE-PROCEDURE SCREENING ORDER (NO ISOLATION) - Swab, Nasopharynx.  Procedure                               Abnormality         Status                     ---------                               -----------         ------                     COVID-19 and FLU A/B PCR...[[id number]]  Normal              Final result                 Please view results for these tests on the individual orders.    COVID-19 and FLU A/B PCR, 1 HR TAT - Swab, Nasopharynx [[id number]]  (Normal) Collected: 01/08/24 1601    Lab Status: Final result Specimen: Swab from Nasopharynx Updated: 01/08/24 1649     COVID19 Not Detected     Influenza A PCR Not Detected     Influenza B PCR Not Detected    Narrative:      Fact sheet for providers: https://www.fda.gov/media/198416/download    Fact sheet for patients: https://www.fda.gov/media/005219/download    Test performed by PCR.            CT Abdomen Pelvis With Contrast    Result Date: 1/11/2024  CT ABDOMEN PELVIS W CONTRAST Date of Exam: 1/11/2024 10:01 AM CST Indication: uterine mass; vaginal bleeding. Comparison: CT abdomen and pelvis 7/23/2022 Technique: Axial CT images were obtained of the abdomen and pelvis following the uneventful intravenous administration of 80 mL Isovue-300. Reconstructed coronal and sagittal images were also obtained. Automated exposure control and iterative construction methods were used. Findings: LUNG BASES:  Unremarkable without mass or infiltrate. LIVER:  Unremarkable parenchyma without focal lesion. BILIARY/GALLBLADDER: The gallbladder is surgically absent. SPLEEN:  Unremarkable PANCREAS: There is prominence to the pancreatic duct in the pancreatic head and body which appears unchanged. ADRENAL:  Unremarkable KIDNEYS: The left kidney has a low attenuating lesion along the posterior cortex measuring 1.3 cm present on prior study which may represent a hemorrhagic or  proteinaceous cyst. No calculus identified. GASTROINTESTINAL/MESENTERY: Evaluation of the gastrointestinal tract demonstrates mild diverticulosis. MESENTERIC VESSELS:  Patent. AORTA/IVC:  Normal caliber. RETROPERITONEUM/LYMPH NODES:  Unremarkable REPRODUCTIVE:  There is a complex cystic lesion in the pelvis measuring 6.2x7.7 cm . While this is not significantly changed in size there is increasing mural nodularity and thickness now measuring  4.2x1.8 cm in maximum diameter. Dystrophic calcification is also noted. BLADDER:  Unremarkable OSSEUS STRUCTURES: There is kyphoplasty at T11 and L1. Multilevel degenerative disc and facet disease at noted. Chronic compression deformity is present at L3. There has been surgical fixation of a right hip fracture with partially threaded screws.     Impression: Impression:   1. 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  2. Additional ancillary findings as described above. Electronically Signed: Rebecca Teran MD  1/11/2024 10:38 AM CST  Workstation ID: [id number]    MRI Lumbar Spine Without Contrast    Result Date: 1/9/2024  MRI LUMBAR SPINE WO CONTRAST Date of Exam: 1/9/2024 4:05 PM EST Indication: L5-S1 stenosis.  Comparison: CT lumbar spine from January 2024 and MRI lumbar spine from June 11, 2015 Technique:  Routine multiplanar/multisequence sequence images of the lumbar spine were obtained without contrast administration.  Findings: The alignment is anatomic. There are compression deformity status post kyphoplasty at T11 and L1. There are mild to moderate chronic compression deformities at T10, T12, L2, L3, and L4. There is marrow edema along the right sacral kori with a fracture identified on recent prior CT, which is likely subacute. There are degenerative endplate changes at all levels. There is disc desiccation at all levels with moderate degenerative loss of disc height at  L3-4 and L4-5. The conus terminates at the L1-2 level. The posterior paravertebral soft tissues are unremarkable. L1-2: Mild disc osteophyte complex. Mild bilateral facet arthropathy. Mild spinal canal stenosis. Moderate bilateral neural foraminal stenosis. L2-3: Moderate disc osteophyte complex. Mild right and moderate left facet arthropathy. Moderate spinal canal stenosis. Moderate right and moderate to severe left neural foraminal stenosis. L3-4: Moderate disc bulge. Moderate bilateral facet arthropathy with ligamentum flavum infolding. Severe spinal canal stenosis. Moderate bilateral neural foraminal stenosis. L4-5: Moderate disc bulge. Moderate bilateral facet arthropathy with ligamentum flavum infolding. Moderate spinal canal stenosis. Moderate to severe bilateral neural foraminal stenosis. L5-S1: Mild disc bulge with mild left paracentral disc protrusion narrowing the left lateral recess. Moderate bilateral facet arthropathy. Moderate spinal canal stenosis. Mild to moderate right and moderate left neural foraminal stenosis.     Impression: Impression: 1.Multilevel degenerative changes as described above, most prominent at L3-4 where there is severe spinal canal stenosis. 2.Moderate spinal canal stenosis at L5-S1. 3.Marrow edema along right sacral kori with fracture identified on recent prior CT, likely a subacute fracture. Electronically Signed: Jarrell Page MD  1/9/2024 5:33 PM EST  Workstation ID: [id number]         Current medications:  Scheduled Meds:aspirin, 81 mg, Oral, Daily  budesonide-formoterol, 1 puff, Inhalation, BID - RT  cefTRIAXone, 2,000 mg, Intravenous, Q24H  Diclofenac Sodium, 2 g, Topical, 4x Daily  dilTIAZem CD, 120 mg, Oral, Q24H  doxycycline, 100 mg, Oral, Q12H  famotidine, 40 mg, Oral, Daily  folic acid, 1,000 mcg, Oral, Daily  Lidocaine, 1 patch, Transdermal, Q24H  [Held by provider] methenamine, 1 g, Oral, Q12H  oxybutynin XL, 5 mg, Oral, Daily  senna-docusate sodium, 2 tablet,  Oral, BID  sertraline, 25 mg, Oral, Daily  sodium chloride, 10 mL, Intravenous, Q12H  vitamin B-12, 1,000 mcg, Oral, Daily      Continuous Infusions:   PRN Meds:.  acetaminophen    albuterol sulfate HFA    senna-docusate sodium **AND** polyethylene glycol **AND** bisacodyl **AND** bisacodyl    Magnesium Standard Dose Replacement - Follow Nurse / BPA Driven Protocol    melatonin    Morphine **AND** naloxone    nitroglycerin    ondansetron **OR** ondansetron    Potassium Replacement - Follow Nurse / BPA Driven Protocol    sodium chloride    sodium chloride    traMADol    Assessment & Plan   Assessment & Plan     Active Hospital Problems    Diagnosis  POA    **Pelvic fracture [S32.9XXA]  Yes    Sacral fracture, closed [S32.10XA]  Yes    Leukocytosis [D72.829]  Yes    Macrocytic anemia [D53.9]  Yes    COPD (chronic obstructive pulmonary disease) [J44.9]  Yes    Gastroesophageal reflux disease [K21.9]  Yes    Rheumatoid arthritis [M06.9]  Yes    Hypertension [I10]  Yes      Resolved Hospital Problems   No resolved problems to display.        Brief Hospital Course to date:  Pati Carter is a 89 y.o. female with PMH significant for rheumatoid arthritis, osteoporosis, frequent falls, compression fractures of lumbar vertebra, DDD, macular degeneration, stress and urinary incontinence who presents to [facility] ED for low back pain.  Patient had a fall at her assisted living facility, [facility], a week ago, presented to the ED 1/2/2024, and was discharged on tramadol and cefepime for UTI.  Patient presents today for uncontrolled pain and progressive weakness, greater in her left leg.      Pelvic/sacral fracture  Lumbar stenosis  Frequent falls  --Initial fall 1/2/2024, CT head negative for acute intracranial process  --CT lumbar spine  shows minimally displaced sacral fracture at S1-S2, potentially high-grade stenosis at L5-S1 and moderate to marked canal stenosis at L2-L3, L3-L4, L4-L5 which is likely chronic  --Neurosurgery  consult for progressive leg weakness, L>R, given potentially high-grade stenosis L5-S1  -- MRI L spine multilevel degenerative changes most prominent at L3-4 with severe spinal canal stenosis, moderate spinal stenosis L5-S1  --Pain control -- add topicals; PRN tramadol and morphine   - Palliative consult -- may need palliative outpatient as well   --Fall precautions  --PT and OT  -- SNF recs      Leukocytosis - improved   ? PNA   - UA ok; CXR with peribronchial thickening with changes in the right lung base with ? Early changes of PNA   - Elevated WBC and procal  - Bcx NGTD  - Start rocephin/doxy -- planning 5 days of treatment   - IS.     Vaginal bleeding   Uterine mass   - CT pelvis pending  - GYN consulted -- appreciate recs   - Further recs following imaging; but favoring conservative management      Rheumatoid arthritis  --hold home methotrexate     Anemia  --macrocytic, .8  -- B12 low normal, folate ok  --continue home B12, folic acid     Dysphagia   - Speech eval -- patient with comfort diet in 2022. Reviewed with daughter. Will continue comfort diet.      HTN  PAD  --continue home diltiazem  --hold home ASA, colchicine  - BP with fair control      GERD  --continue home famotidine     Macular degeneration  --Fall precautions, up with assist, assist with feeding     Urinary incontinence  --hold home methenamine -- currently on antibiotics   --continue oxybutynin as formulary sub for Myrbetriq  --daily I's & O's     Expected Discharge Location and Transportation: SNF  Expected Discharge   Expected Discharge Date: 1/12/2024; Expected Discharge Time:      DVT prophylaxis:  No DVT prophylaxis order currently exists.     AM-PAC 6 Clicks Score (PT): 11 (01/11/24 0810)    CODE STATUS:   Code Status and Medical Interventions:   Ordered at: 01/08/24 3570     Medical Intervention Limits:    NO intubation (DNI)     Code Status (Patient has no pulse and is not breathing):    No CPR (Do Not Attempt to Resuscitate)      Medical Interventions (Patient has pulse or is breathing):    Limited Support       Traci Hebert DO  01/11/24       No

## 2024-01-11 NOTE — PLAN OF CARE
Problem: Palliative Care  Goal: Enhanced Quality of Life  Intervention: Optimize Psychosocial Wellbeing  Flowsheets (Taken 1/11/2024 4324)  Supportive Measures:   active listening utilized   verbalization of feelings encouraged   Goal Outcome Evaluation:  Plan of Care Reviewed With: patient        Progress: no change  Outcome Evaluation: new palliative consult for pain from Dr Hebret.  Pt complains of pain with movement and transfers. She has had multiple falls.  Pt needs assist to get up to the bsc.  Ultram has been effective for pain but not tylenol. Tylenol and ultram scheduled to help control pain.  Pt resides at [facility]. Unlikely that pt can return to assisted living and may require snf placement. Pt has 3 children, Lilian, Danette and Ernie. Pt has a very poor appetite and has not eaten all day per nursing.  She is taking sips of fluids.  Pt has voided x2 minimal urine output.  No bm documented since admit despite multiple bowel meds. Pt may need a supp if no bm by tomorrow.  Continue to follow and discuss goals, monitor pain and symptoms.    Palliative IDT: Rn, SW, MD JANIYA,   After hours# [phone]

## 2024-01-11 NOTE — CONSULTS
Ob/Gyn Consult Note     Patient Name: Pati Carter  : 1930   MRN: [id number]   CSN: [id number]     Reason for Consultation: Postmenopausal Bleeding   Referring Provider: Traci Hebert DO     Subjective         History of Present Illness: Pati Carter is a 89 y.o. with a past medical history of a pelvic, likely uterine, mass, rheumatoid arthritis, osteoporosis, frequent falls, compression fractures of lumbar vertebra, macular degeneration, stress and urinary incontinence who presents with weakness after a fall. GYN is consulted due to vaginal bleeding noted by nursing.     Due to patients mental status, all history is obtained from chart review as well as phone conversations with the patient's daughter, Lilian.     The patient's been known to GYN oncology since .  She had a left salpingo-oophorectomy at that time with Dr. Mello for benign ovarian cyst.  In , the patient was admitted after a fall.  A pelvic mass was seen on transvaginal ultrasound.  A CT scan was obtained which showed a dilated endometrial canal.  Gynecologic oncology evaluated her at that time and given the frail condition the patient was in, a plan was made for observation. It seems as though the patient has continued to have intermittent spotting since that time, but no heavy vaginal bleeding.       Past Medical History:   Past Medical History:   Diagnosis Date    Anemia     Description: A.  Dx - borderline intermittent.    Back pain     Benign colonic polyp 2016    Description: A.  Dx .    Ferny Bonnet syndrome 2020    Chondrocalcinosis     knees    Compression fracture of lumbar vertebra     Constipation     COPD (chronic obstructive pulmonary disease)     Description: A.  Rule out chronic persistent asthma, COPD, or obliterative bronchiolitis.- Butch    COVID-19 virus infection 10/11/2020    CTS (carpal tunnel syndrome)     Degeneration of intervertebral disc of lumbar region      Description: A.  Diagnosed in April 2013 with advanced multilevel with severe spinal stenosis, followed by Dr. Pillai for pain management.    Gastroesophageal reflux disease     Hearing loss     History of calcium pyrophosphate deposition disease (CPPD)     History of colonoscopy 01/01/1999    NORMAL PER PATIENT     History of mammogram 01/01/2011    NORMAL PER PT     History of Papanicolaou smear of cervix 01/01/2010    NORMAL PER PT     History of varicella     Hyperlipidemia     Description: A.  Dx 2006.    Hypertension     Description: A. Dx 2001.    Macular degeneration     Nocturia     Osteoporosis     Ovarian mass     Dx 8/15- benign left cystic adnexal mass    Overactive bladder     Pelvic floor dysfunction     Rheumatoid arthritis     Description: A.  Diagnosed in 2000 and and followed by Dr. Constantino (now Dr. Kaplan). B.  On methotrexate therapy since 2000. C.  Off low-dose prednisone therapy.    Vitamin D deficiency        Past Surgical History:   Past Surgical History:   Procedure Laterality Date    APPENDECTOMY      CARPAL TUNNEL RELEASE Left 01/01/2003    HISTORY OF NEUROPLASTY DECOMPRESSION MEDIAN NERVE AT CARPAL TUNNEL LEFT    CATARACT EXTRACTION Bilateral 01/01/2009    CHOLECYSTECTOMY  01/01/1962    HIP CANNULATED SCREW PLACEMENT Right 1/25/2020    Procedure: HIP CANNULATED SCREW PLACEMENT RIGHT;  Surgeon: Karlos Blount MD;  Location: [facility];  Service: Orthopedics    KNEE ARTHROSCOPY Left 01/01/2001    MENISCAL REPAIR    KYPHOPLASTY  06/18/2015    T11 AND L1 (JOSE A)    PELVIC LAPAROSCOPY  01/01/1996    REMOVAL OF BENIGN UTERINE AND RIGHT OVARIAN TUMORS    SALPINGO OOPHORECTOMY Left 08/26/2015    REMOVAL OF LEFT OVARY AND TUBE (benign cystic mass)       Family History:   Family History   Problem Relation Age of Onset    Hypertension Mother     Diabetes Mother        Social History:   Social History     Socioeconomic History    Marital status:    Tobacco Use    Smoking status: Former      Years: .5     Types: Cigarettes    Smokeless tobacco: Never   Vaping Use    Vaping Use: Never used   Substance and Sexual Activity    Alcohol use: No    Drug use: No    Sexual activity: Not Currently       OB History:   OB History   No obstetric history on file.        Medications:     Current Facility-Administered Medications:     acetaminophen (TYLENOL) tablet 650 mg, 650 mg, Oral, Q4H PRN, Karen Goff APRN, 650 mg at 01/09/24 1511    albuterol sulfate HFA (PROVENTIL HFA;VENTOLIN HFA;PROAIR HFA) inhaler 2 puff, 2 puff, Inhalation, Q4H PRN, Karen Goff APRN    aspirin EC tablet 81 mg, 81 mg, Oral, Daily, Patsy Steel MD, 81 mg at 01/11/24 0828    sennosides-docusate (PERICOLACE) 8.6-50 MG per tablet 2 tablet, 2 tablet, Oral, BID, 2 tablet at 01/11/24 0828 **AND** polyethylene glycol (MIRALAX) packet 17 g, 17 g, Oral, Daily PRN, 17 g at 01/10/24 0907 **AND** bisacodyl (DULCOLAX) EC tablet 5 mg, 5 mg, Oral, Daily PRN, 5 mg at 01/10/24 0907 **AND** bisacodyl (DULCOLAX) suppository 10 mg, 10 mg, Rectal, Daily PRN, Patsy Steel MD    budesonide-formoterol (SYMBICORT) 160-4.5 MCG/ACT inhaler 1 puff, 1 puff, Inhalation, BID - RT, Karen Goff APRN, 1 puff at 01/11/24 0847    cefTRIAXone (ROCEPHIN) 2,000 mg in sodium chloride 0.9 % 100 mL IVPB, 2,000 mg, Intravenous, Q24H, Traci Hebert DO, Last Rate: 200 mL/hr at 01/10/24 1747, 2,000 mg at 01/10/24 1747    Diclofenac Sodium (VOLTAREN) 1 % gel 2 g, 2 g, Topical, 4x Daily, Traci Hebert DO, 2 g at 01/11/24 1150    dilTIAZem CD (CARDIZEM CD) 24 hr capsule 120 mg, 120 mg, Oral, Q24H, Karen Goff APRN, 120 mg at 01/11/24 0825    doxycycline (MONODOX) capsule 100 mg, 100 mg, Oral, Q12H, Traci Hebert DO, 100 mg at 01/11/24 0828    famotidine (PEPCID) tablet 40 mg, 40 mg, Oral, Daily, Karen Goff APRN, 40 mg at 01/11/24 0824    folic acid (FOLVITE) tablet 1,000 mcg, 1,000 mcg, Oral, Daily, Karen Goff  APRN, 1,000 mcg at 01/11/24 0826    Lidocaine 4 % 1 patch, 1 patch, Transdermal, Q24H, Traci Hebert, , 1 patch at 01/11/24 0827    Magnesium Standard Dose Replacement - Follow Nurse / BPA Driven Protocol, , Does not apply, PRN, Karen Goff E, APRN    melatonin tablet 5 mg, 5 mg, Oral, Nightly PRN, Karen Goff, APRN, 5 mg at 01/10/24 2127    [Held by provider] methenamine (HIPREX) tablet 1 g, 1 g, Oral, Q12H, Karen Goff, APRN    morphine injection 1 mg, 1 mg, Intravenous, Q4H PRN **AND** naloxone (NARCAN) injection 0.4 mg, 0.4 mg, Intravenous, Q5 Min PRN, Patsy Steel MD    nitroglycerin (NITROSTAT) SL tablet 0.4 mg, 0.4 mg, Sublingual, Q5 Min PRN, Karen Goff, APRN    ondansetron (ZOFRAN) tablet 4 mg, 4 mg, Oral, Q6H PRN **OR** ondansetron (ZOFRAN) injection 4 mg, 4 mg, Intravenous, Q6H PRN, Karen Goff, APRADALBERTO    oxybutynin XL (DITROPAN-XL) 24 hr tablet 5 mg, 5 mg, Oral, Daily, Karen Goff, APRN, 5 mg at 01/11/24 0827    Potassium Replacement - Follow Nurse / BPA Driven Protocol, , Does not apply, PRN, Karen Goff, APRN    sertraline (ZOLOFT) tablet 25 mg, 25 mg, Oral, Daily, Patsy Steel MD, 25 mg at 01/11/24 0826    sodium chloride 0.9 % flush 10 mL, 10 mL, Intravenous, Q12H, Karen Goff, APRN, 10 mL at 01/11/24 0833    sodium chloride 0.9 % flush 10 mL, 10 mL, Intravenous, PRN, Karen Goff, APRN    sodium chloride 0.9 % infusion 40 mL, 40 mL, Intravenous, PRN, Karen Goff APRN    traMADol (ULTRAM) tablet 50 mg, 50 mg, Oral, Q6H PRN, Patsy Steel MD, 50 mg at 01/11/24 0832    vitamin B-12 (CYANOCOBALAMIN) tablet 1,000 mcg, 1,000 mcg, Oral, Daily, Karen Goff APRN, 1,000 mcg at 01/11/24 0827    Allergies:   Allergies   Allergen Reactions    Ciprofloxacin Other (See Comments) and Unknown - High Severity     Other reaction(s): shaking  HCI TABS/ SHAKING  Other reaction(s): shaking  HCI TABS/ SHAKING     Levofloxacin Diarrhea and Unknown - High Severity    Sulfamethoxazole-Trimethoprim Other (See Comments), Rash and Unknown - High Severity     Stomach cramps   Stomach cramps     Sulfamethoxazole Rash    Trimethoprim GI Intolerance, Diarrhea and Rash       Review of Systems:   Limited due to mentation             Objective       Physical Exam:  Vital Signs:   Vitals:    01/11/24 0246 01/11/24 0804 01/11/24 0847 01/11/24 1136   BP: 144/81 150/83  157/80   BP Location: Right arm Right arm  Right arm   Patient Position: Lying Lying  Lying   Pulse: 87 64 75 82   Resp: 16 16  16   Temp: 97.6 °F (36.4 °C) 97.7 °F (36.5 °C)  97.9 °F (36.6 °C)   TempSrc: Axillary Axillary  Axillary   SpO2: 93% 94% 91% 90%   Weight:       Height:         BMI: Body mass index is 21.07 kg/m².   Weight:   Wt Readings from Last 3 Encounters:   01/08/24 52.3 kg (115 lb 3.2 oz)   01/02/24 59 kg (130 lb)   08/06/23 59 kg (130 lb)       General: appears stated age  cachectic  pale  slowed mentation   Heart: Not performed.   Lungs: breathing is unlabored   Abdomen: soft, non-tender; no masses  no umbilical or inguinal hernias are present  no hepato-splenomegaly   External Pelvic Exam: Clinical staff was present for exam  External genitalia:  normal external genitalia, no vaginal bleeding noted. No old blood on chuk pad. No blood on labia.        Results:   Results from last 7 days   Lab Units 01/10/24  0647 01/09/24  0428 01/08/24  1558   WBC 10*3/mm3 10.14 11.93* 13.58*   HEMOGLOBIN g/dL 10.3* 10.8* 11.0*   HEMATOCRIT % 32.2* 33.7* 34.8   PLATELETS 10*3/mm3 251 218 230       Results from last 7 days   Lab Units 01/09/24  0428 01/08/24  1558   SODIUM mmol/L 142 138   POTASSIUM mmol/L 4.7 4.4   CHLORIDE mmol/L 105 104   CO2 mmol/L 26.0 24.0   BUN mg/dL 22 24*   CREATININE mg/dL 0.70 0.75   CALCIUM mg/dL 8.9 8.6   BILIRUBIN mg/dL  --  0.5   ALK PHOS U/L  --  128*   ALT (SGPT) U/L  --  8   AST (SGOT) U/L  --  15   GLUCOSE mg/dL 95 102*         Imaging:    Results for orders placed during the hospital encounter of 01/08/24    CT Abdomen Pelvis With Contrast    Narrative  CT ABDOMEN PELVIS W CONTRAST    Date of Exam: 1/11/2024 10:01 AM CST    Indication: uterine mass; vaginal bleeding.    Comparison: CT abdomen and pelvis 7/23/2022    Technique: Axial CT images were obtained of the abdomen and pelvis following the uneventful intravenous administration of 80 mL Isovue-300. Reconstructed coronal and sagittal images were also obtained. Automated exposure control and iterative  construction methods were used.      Findings:  LUNG BASES:  Unremarkable without mass or infiltrate.    LIVER:  Unremarkable parenchyma without focal lesion.  BILIARY/GALLBLADDER: The gallbladder is surgically absent.  SPLEEN:  Unremarkable  PANCREAS: There is prominence to the pancreatic duct in the pancreatic head and body which appears unchanged.  ADRENAL:  Unremarkable  KIDNEYS: The left kidney has a low attenuating lesion along the posterior cortex measuring 1.3 cm present on prior study which may represent a hemorrhagic or proteinaceous cyst. No calculus identified.  GASTROINTESTINAL/MESENTERY: Evaluation of the gastrointestinal tract demonstrates mild diverticulosis.  MESENTERIC VESSELS:  Patent.  AORTA/IVC:  Normal caliber.    RETROPERITONEUM/LYMPH NODES:  Unremarkable    REPRODUCTIVE:  There is a complex cystic lesion in the pelvis measuring 6.2x7.7 cm . While this is not significantly changed in size there is increasing mural nodularity and thickness now measuring  4.2x1.8 cm in maximum diameter. Dystrophic  calcification is also noted.    BLADDER:  Unremarkable    OSSEUS STRUCTURES: There is kyphoplasty at T11 and L1. Multilevel degenerative disc and facet disease at noted. Chronic compression deformity is present at L3. There has been surgical fixation of a right hip fracture with partially threaded screws.    Impression  Impression:  1. Complex cystic mass in the pelvis with increasing  solid mural nodularity component compatible with progression of suspected neoplastic process.    2. Additional ancillary findings as described above.        Electronically Signed: Rebecca Teran MD  1/11/2024 10:38 AM [facility]  Workstation ID: [id number]           Assessment / Plan          This is a 89 y.o. woman with scant postmenopausal bleeding in the setting of a known uterine mass    Uterine Mass: After discussion with family last night, we agreed on obtaining a follow up CT scan.  The CT scan was obtained this morning and shows minimal change in the overall size of the cystic lesion.  However, there is increasing mural nodularity and thickening now measuring 4.2 x 1.8 cm.  Imaging reviewed with Dr. Mello, GYN oncology, who evaluated the patient last year.  She agrees that the imaging findings likely consistent with malignancy, but her recommendations are unchanged from last year given the patient's frailty.  Given minimal bleeding and her medical co-morbidities, I think the risk of pursuing a tissue diagnosis would drastically outweigh the benefit. If the patient were to develop heavy vaginal bleeding, that calculus could change, but for now would favor conservative management.  Gynecology will sign off at this time.  Please call if we can be of any further assistance.    Plan of care discussed with the patient's daughter Lilian after her CT scan had resulted. Plan of care was discussed with Dr. Mello who offered to see the patient if family desires. Per my discussion with Lilian, that is unnecessary at this time.       Boo Valiente MD  Obstetrics and Gynecology  Date: 01/11/24  Time: 13:34 EST

## 2024-01-11 NOTE — CONSULTS
"Palliative Care Initial Consult   Attending Physician: Traci Hebert DO  Referring Provider: Dr. Traci Hebert    Reason for Referral:  assistance with clarification of goals of care and pain    Code Status:   Code Status and Medical Interventions:   Ordered at: 01/08/24 1710     Medical Intervention Limits:    NO intubation (DNI)     Code Status (Patient has no pulse and is not breathing):    No CPR (Do Not Attempt to Resuscitate)     Medical Interventions (Patient has pulse or is breathing):    Limited Support      Advanced Directives:     Family/Support: Lilianmyrtle Carter (dtr), Danette Suresh (dtr)  Goals of Care: TBD.    HPI: Pati Carter is a 89 y.o. female with PMH significant for rheumatoid arthritis, osteoporosis, frequent falls, compression fracture of lumbar vertebra, DDD, macular degeneration, stress and urinary incontinence, uterine mass, recent ED visit on 1/2 post fall at [facility]. Patient presented to [facility] ED on 1/8 with left leg weakness. Work up revealed MRI of lumbar spine showing multilevel degenerative changes most prominent at L3-4 with severe spinal canal stenosis, moderate spinal stenosis L5-S1. Minimally displaced sacral fracture. Neurosurgery consulted and patient not a candidate for neurosurgical intervention due to age. SLP with chronic pharyngeal dysphagia on comfort diet since 2022. Vaginal bleeding noted with history of cystic lesion, CT today showing changes to cystic lesion, reviewed by GYN Oncology with continued recommendations for no further workup due to patient's frailty. Palliative Care consulted for GOC in the context of complex medical decision making and pain management.   Patient alert and oriented to self and location. She reports pain to her buttocks that is a constant ache, worse with increased movement. She is unable to use a number scale at this time. She does state that Tramadol \"helps\" with pain and Tylenol does not have any effect on her pain. She has " declined food today but has swallowed applesauce with crushed meds. She is looking forward to discharge. No family at bedside.   Patient has received Tramadol 50mg PO x2 doses in the last 24 hours.     ROS: +pain, sacral pain, constant ache, worse with movement, unable to use number scale, improves with tramadol. +debility, uses walker at baseline. +decreased appetite. Denies shortness of breath, nausea.       Past Medical History:   Diagnosis Date    Anemia     Description: A.  Dx 2006- borderline intermittent.    Back pain     Benign colonic polyp 9/28/2016    Description: A.  Dx 1999.    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  Rule out chronic persistent asthma, COPD, or obliterative bronchiolitis.- Rae    COVID-19 virus infection 10/11/2020    CTS (carpal tunnel syndrome)     Degeneration of intervertebral disc of lumbar region     Description: A.  Diagnosed in April 2013 with advanced multilevel with severe spinal stenosis, followed by Dr. Pillai for pain management.    Gastroesophageal reflux disease     Hearing loss     History of calcium pyrophosphate deposition disease (CPPD)     History of colonoscopy 01/01/1999    NORMAL PER PATIENT     History of mammogram 01/01/2011    NORMAL PER PT     History of Papanicolaou smear of cervix 01/01/2010    NORMAL PER PT     History of varicella     Hyperlipidemia     Description: A.  Dx 2006.    Hypertension     Description: A. Dx 2001.    Macular degeneration     Nocturia     Osteoporosis     Ovarian mass     Dx 8/15- benign left cystic adnexal mass    Overactive bladder     Pelvic floor dysfunction     Rheumatoid arthritis     Description: A.  Diagnosed in 2000 and and followed by Dr. Constantino (now Dr. Kaplan). B.  On methotrexate therapy since 2000. C.  Off low-dose prednisone therapy.    Vitamin D deficiency   "    Past Surgical History:   Procedure Laterality Date    APPENDECTOMY      CARPAL TUNNEL RELEASE Left 01/01/2003    HISTORY OF NEUROPLASTY DECOMPRESSION MEDIAN NERVE AT CARPAL TUNNEL LEFT    CATARACT EXTRACTION Bilateral 01/01/2009    CHOLECYSTECTOMY  01/01/1962    HIP CANNULATED SCREW PLACEMENT Right 1/25/2020    Procedure: HIP CANNULATED SCREW PLACEMENT RIGHT;  Surgeon: Karlos Blount MD;  Location: [facility];  Service: Orthopedics    KNEE ARTHROSCOPY Left 01/01/2001    MENISCAL REPAIR    KYPHOPLASTY  06/18/2015    T11 AND L1 (JOSE A)    PELVIC LAPAROSCOPY  01/01/1996    REMOVAL OF BENIGN UTERINE AND RIGHT OVARIAN TUMORS    SALPINGO OOPHORECTOMY Left 08/26/2015    REMOVAL OF LEFT OVARY AND TUBE (benign cystic mass)     Social History     Socioeconomic History    Marital status:    Tobacco Use    Smoking status: Former     Years: .5     Types: Cigarettes    Smokeless tobacco: Never   Vaping Use    Vaping Use: Never used   Substance and Sexual Activity    Alcohol use: No    Drug use: No    Sexual activity: Not Currently     Family History   Problem Relation Age of Onset    Hypertension Mother     Diabetes Mother        Allergies   Allergen Reactions    Ciprofloxacin Other (See Comments) and Unknown - High Severity     Other reaction(s): shaking  HCI TABS/ SHAKING  Other reaction(s): shaking  HCI TABS/ SHAKING    Levofloxacin Diarrhea and Unknown - High Severity    Sulfamethoxazole-Trimethoprim Other (See Comments), Rash and Unknown - High Severity     Stomach cramps   Stomach cramps     Sulfamethoxazole Rash    Trimethoprim GI Intolerance, Diarrhea and Rash       Current medication reviewed for route, type, dose and frequency and are current per MAR at time of dictation.    Palliative Performance Scale Score:  40%    /83 (BP Location: Right arm, Patient Position: Lying)   Pulse 75   Temp 97.7 °F (36.5 °C) (Axillary)   Resp 16   Ht 157.5 cm (62\")   Wt 52.3 kg (115 lb 3.2 oz)   SpO2 91%   " BMI 21.07 kg/m²     Intake/Output Summary (Last 24 hours) at 1/11/2024 1259  Last data filed at 1/10/2024 1800  Gross per 24 hour   Intake 240 ml   Output 250 ml   Net -10 ml       Physical Exam:    General Appearance:    Patient laying in bed, awake, alert, chronically ill appearing, frail, cooperative, NAD   HEENT:    NC/AT, EOMI, anicteric, MMM, face relaxed   Neck:   supple, trachea midline, no JVD   Lungs:     CTA bilat, diminished in bases; respirations regular, even and unlabored; RR 16-18 on exam, on RA    Heart:    RRR, normal S1 and S2, no M/R/G, HR 82   Abdomen:     Normal bowel sounds, soft, nontender, nondistended   G/U:   Deferred   MSK/Extremities:   Wasting, no edema   Pulses:   Pulses palpable and equal bilaterally   Skin:   Warm, dry   Neurologic:   A/Ox2, cooperative, MENARD, weakness   Psych:   Calm, appropriate         Labs:   Results from last 7 days   Lab Units 01/10/24  0647   WBC 10*3/mm3 10.14   HEMOGLOBIN g/dL 10.3*   HEMATOCRIT % 32.2*   PLATELETS 10*3/mm3 251     Results from last 7 days   Lab Units 01/09/24  0428   SODIUM mmol/L 142   POTASSIUM mmol/L 4.7   CHLORIDE mmol/L 105   CO2 mmol/L 26.0   BUN mg/dL 22   CREATININE mg/dL 0.70   GLUCOSE mg/dL 95   CALCIUM mg/dL 8.9     Results from last 7 days   Lab Units 01/09/24  0428 01/08/24  1558   SODIUM mmol/L 142 138   POTASSIUM mmol/L 4.7 4.4   CHLORIDE mmol/L 105 104   CO2 mmol/L 26.0 24.0   BUN mg/dL 22 24*   CREATININE mg/dL 0.70 0.75   CALCIUM mg/dL 8.9 8.6   BILIRUBIN mg/dL  --  0.5   ALK PHOS U/L  --  128*   ALT (SGPT) U/L  --  8   AST (SGOT) U/L  --  15   GLUCOSE mg/dL 95 102*     Imaging Results (Last 72 Hours)       Procedure Component Value Units Date/Time    CT Abdomen Pelvis With Contrast [[id number]] Collected: 01/11/24 1117     Updated: 01/11/24 1150    Narrative:      CT ABDOMEN PELVIS W CONTRAST    Date of Exam: 1/11/2024 10:01 AM CST    Indication: uterine mass; vaginal bleeding.    Comparison: CT abdomen and pelvis  7/23/2022    Technique: Axial CT images were obtained of the abdomen and pelvis following the uneventful intravenous administration of 80 mL Isovue-300. Reconstructed coronal and sagittal images were also obtained. Automated exposure control and iterative   construction methods were used.      Findings:  LUNG BASES:  Unremarkable without mass or infiltrate.    LIVER:  Unremarkable parenchyma without focal lesion.  BILIARY/GALLBLADDER: The gallbladder is surgically absent.  SPLEEN:  Unremarkable  PANCREAS: There is prominence to the pancreatic duct in the pancreatic head and body which appears unchanged.  ADRENAL:  Unremarkable  KIDNEYS: The left kidney has a low attenuating lesion along the posterior cortex measuring 1.3 cm present on prior study which may represent a hemorrhagic or proteinaceous cyst. No calculus identified.  GASTROINTESTINAL/MESENTERY: Evaluation of the gastrointestinal tract demonstrates mild diverticulosis.  MESENTERIC VESSELS:  Patent.  AORTA/IVC:  Normal caliber.    RETROPERITONEUM/LYMPH NODES:  Unremarkable    REPRODUCTIVE:  There is a complex cystic lesion in the pelvis measuring 6.2x7.7 cm . While this is not significantly changed in size there is increasing mural nodularity and thickness now measuring  4.2x1.8 cm in maximum diameter. Dystrophic   calcification is also noted.     BLADDER:  Unremarkable    OSSEUS STRUCTURES: There is kyphoplasty at T11 and L1. Multilevel degenerative disc and facet disease at noted. Chronic compression deformity is present at L3. There has been surgical fixation of a right hip fracture with partially threaded screws.        Impression:      Impression:    1. 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      2. Additional ancillary findings as described above.        Electronically Signed: Rebecca Teran MD    1/11/2024 10:38 AM [facility]    Workstation ID: [id number]     MRI Lumbar Spine Without Contrast [[id number]] Collected: 01/09/24 1714     Updated: 01/09/24 1736    Narrative:        MRI LUMBAR SPINE WO CONTRAST    Date of Exam: 1/9/2024 4:05 PM EST    Indication: L5-S1 stenosis.     Comparison: CT lumbar spine from January 2024 and MRI lumbar spine from June 11, 2015    Technique:  Routine multiplanar/multisequence sequence images of the lumbar spine were obtained without contrast administration.        Findings:  The alignment is anatomic. There are compression deformity status post kyphoplasty at T11 and L1. There are mild to moderate chronic compression deformities at T10, T12, L2, L3, and L4. There is marrow edema along the right sacral kori with a fracture   identified on recent prior CT, which is likely subacute. There are degenerative endplate changes at all levels. There is disc desiccation at all levels with moderate degenerative loss of disc height at L3-4 and L4-5. The conus terminates at the L1-2   level. The posterior paravertebral soft tissues are unremarkable.    L1-2: Mild disc osteophyte complex. Mild bilateral facet arthropathy. Mild spinal canal stenosis. Moderate bilateral neural foraminal stenosis.    L2-3: Moderate disc osteophyte complex. Mild right and moderate left facet arthropathy. Moderate spinal canal stenosis. Moderate right and moderate to severe left neural foraminal stenosis.    L3-4: Moderate disc bulge. Moderate bilateral facet arthropathy with ligamentum flavum infolding. Severe spinal canal stenosis. Moderate bilateral neural foraminal stenosis.    L4-5: Moderate disc bulge. Moderate bilateral facet arthropathy with ligamentum flavum infolding. Moderate spinal canal stenosis. Moderate to severe bilateral neural foraminal stenosis.    L5-S1: Mild disc bulge with mild left paracentral disc protrusion narrowing the left lateral recess. Moderate bilateral facet arthropathy. Moderate spinal canal stenosis. Mild to moderate right and moderate  left neural foraminal stenosis.      Impression:      Impression:  1.Multilevel degenerative changes as described above, most prominent at L3-4 where there is severe spinal canal stenosis.  2.Moderate spinal canal stenosis at L5-S1.  3.Marrow edema along right sacral kori with fracture identified on recent prior CT, likely a subacute fracture.        Electronically Signed: Jarrell Page MD    1/9/2024 5:33 PM EST    Workstation ID: [id number]    XR Chest 1 View [[id number]] Collected: 01/08/24 2207     Updated: 01/08/24 2214    Narrative:      XR CHEST 1 VW    Date of Exam: 1/8/2024 7:18 PM EST    Indication: leukocytosis, 91% on room air, diminished lung sounds bases    Comparison: 1/2/2024    Findings:  Previous thoracic kyphoplasties are noted. The heart shadow is upper limits of normal size. Mild diffuse pulmonary interstitial changes appear stable except in the right lung base, where there is increased peribronchial thickening and interstitial   change. No dense lung consolidation effusion or pneumothorax is seen.      Impression:      Impression:  Increased peribronchial thickening and interstitial change in the right lung base, potentially changes of bronchitis or early changes of pneumonia.      Electronically Signed: Isael Lewis MD    1/8/2024 10:11 PM EST    Workstation ID: [id number]    CT Lumbar Spine Without Contrast [[id number]] Collected: 01/08/24 1540     Updated: 01/08/24 1554    Narrative:      CT LUMBAR SPINE WO CONTRAST    Date of Exam: 1/8/2024 3:33 PM EST    Indication: sacral pain after a fall 1 week ago.    Comparison: The most recent study for potential comparison is an abdomen pelvis CT scan from 7/23/2022.   Technique: Axial CT images were obtained of the lumbar spine without contrast administration.  Reconstructed coronal and sagittal images were also obtained. Automated exposure control and iterative construction methods were used.      Findings:   Comparing today's study with the sagittal  reconstructions of the 7/23/2022 abdominal scan, kyphoplasties at T11 and L1 are again noted. There is mild further loss of height of T12, and the superior endplate of L2. L3 compression deformity appears   stable. Inferior endplate deformity of L4 appears stable. In summary, no significant interval change is seen and no clearly acute bony trauma is appreciated here.    There is, however, new step-off at S1-S2, consistent with transverse fracture here that has occurred since the prior exam. No pars defects are seen and no posterior element fracture is seen. Coronal images show no clearly acute bony abnormality of the   lumbar spine.    Axial bone images show no acute appearing features in the lumbar spine. There is a nondisplaced fracture line extending to the anterior margin of the right sacral kori which appears acute or recent, images 98 through 103.    Soft tissue axial images show no evidence of significant stenosis in the included T11-12 or T12-L1 levels.    L1-2, canal appears lower limits of normal.    L2-3, there is probably moderate or possibly greater canal stenosis due to vertebral osteophytes , facet hypertrophy and facet osteophytes.    At L3-4, there is likely marked canal stenosis due to extensive posterior element hypertrophy, ligamentous hypertrophy and vertebral osteophytes.    At L4-5, there is moderate canal stenosis due to facet and vertebral osteophytes.    L5-S1, there appears to be fairly high-grade stenosis due to a large broad-based disc protrusion as seen on image 80 series 2. No hematoma is seen associated with the patient's nondisplaced right sacral fracture. No pathologic paraspinous mass or acute   inflammatory focus is seen.          Impression:      Impression:    1. Previous T11 and L1 kyphoplasties, and multilevel vertebral endplate compression deformities, some progression from 2022. No clearly acute lumbar spine trauma is seen.    2. Potentially high-grade stenosis at L5-S1 due  to broad-based disc protrusion. Moderate to marked canal stenosis at L2-L3 L3-4 L4-5, which is likely chronic, due to facet and vertebral osteophytes.    3. Minimally displaced sacral fracture, visible at S1-2 on the sagittal images, and nondisplaced fracture visible in the right sacral ala on axial images. Given the patient's osteopenia, a more extensive nondisplaced fracture could be present, but   difficult to detect.        Electronically Signed: Isael Lewis MD    1/8/2024 3:51 PM EST    Workstation ID: [id number]    XR Knee 1 or 2 View Left [[id number]] Collected: 01/08/24 1344     Updated: 01/08/24 1348    Narrative:      XR KNEE 1 OR 2 VW LEFT    Date of Exam: 1/8/2024 1:28 PM EST    Indication: Injury 1 month ago    Comparison: None available.    Findings:  2 views. There is osteoporosis. There is diffuse atherosclerotic vascular calcification. There is severe narrowing of the medial and lateral compartments. There is severe narrowing of the patellofemoral compartment. There are no acute or old fractures.   There is no callus or periostitis. There is chondrocalcinosis. There is no definite effusion.      Impression:      Impression:  Degenerative osteoarthritis. Atherosclerosis. No acute process.      Electronically Signed: Chika Khanna MD    1/8/2024 1:45 PM EST    Workstation ID: [id number]                  Diagnostics: Reviewed    A:   Pelvic fracture    Rheumatoid arthritis    Gastroesophageal reflux disease    Hypertension    COPD (chronic obstructive pulmonary disease)    Sacral fracture, closed    Leukocytosis    Macrocytic anemia     89 y.o. female with RA, sacral fracture.    S/S:   Pain -sacral fracture  -started Tylenol 1000mg PO TID  -scheduled Tramadol 50mg PO BID, in addition to q8 hours prn breakthrough pain   -continue Lidocaine patch    2. Dysphagia -family electing comfort diet per chart review    3. Debility -PT/OT following    4. GOC -DNR/DNI -per review of chart  -no family at  bedside  -medications adjusted as above, patient is Palliative appropriate    P: Introduced Palliative Care and services to patient. Patient oriented to self and location. Discussed symptoms and medications adjusted as above. No family at bedside to discuss GOC. Will continue to follow and make medication adjustments as needed.     Thank you for this consult and allowing us to participate in patient's plan of care. Palliative Care Team will continue to follow patient. Please do not hesitate to contact us regarding further symptom management or goals of care needs.  Time: 60 minutes spent reviewing medical and medication records, assessing and examining patient, discussing with family, answering questions, providing some guidance about a plan and documentation of care, and coordinating care with other healthcare members, with > 50% time spent face to face.         Velma Alfaro, APRN  1/11/2024

## 2024-01-11 NOTE — H&P (VIEW-ONLY)
"Palliative Care Initial Consult   Attending Physician: Traci Hebert DO  Referring Provider: Dr. Traci Hebert    Reason for Referral:  assistance with clarification of goals of care and pain    Code Status:   Code Status and Medical Interventions:   Ordered at: 01/08/24 1710     Medical Intervention Limits:    NO intubation (DNI)     Code Status (Patient has no pulse and is not breathing):    No CPR (Do Not Attempt to Resuscitate)     Medical Interventions (Patient has pulse or is breathing):    Limited Support      Advanced Directives:     Family/Support: Lilianmyrtle Carter (dtr), Danette Suresh (dtr)  Goals of Care: TBD.    HPI: Pati Carter is a 89 y.o. female with PMH significant for rheumatoid arthritis, osteoporosis, frequent falls, compression fracture of lumbar vertebra, DDD, macular degeneration, stress and urinary incontinence, uterine mass, recent ED visit on 1/2 post fall at [facility]. Patient presented to [facility] ED on 1/8 with left leg weakness. Work up revealed MRI of lumbar spine showing multilevel degenerative changes most prominent at L3-4 with severe spinal canal stenosis, moderate spinal stenosis L5-S1. Minimally displaced sacral fracture. Neurosurgery consulted and patient not a candidate for neurosurgical intervention due to age. SLP with chronic pharyngeal dysphagia on comfort diet since 2022. Vaginal bleeding noted with history of cystic lesion, CT today showing changes to cystic lesion, reviewed by GYN Oncology with continued recommendations for no further workup due to patient's frailty. Palliative Care consulted for GOC in the context of complex medical decision making and pain management.   Patient alert and oriented to self and location. She reports pain to her buttocks that is a constant ache, worse with increased movement. She is unable to use a number scale at this time. She does state that Tramadol \"helps\" with pain and Tylenol does not have any effect on her pain. She has " declined food today but has swallowed applesauce with crushed meds. She is looking forward to discharge. No family at bedside.   Patient has received Tramadol 50mg PO x2 doses in the last 24 hours.     ROS: +pain, sacral pain, constant ache, worse with movement, unable to use number scale, improves with tramadol. +debility, uses walker at baseline. +decreased appetite. Denies shortness of breath, nausea.       Past Medical History:   Diagnosis Date    Anemia     Description: A.  Dx 2006- borderline intermittent.    Back pain     Benign colonic polyp 9/28/2016    Description: A.  Dx 1999.    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  Rule out chronic persistent asthma, COPD, or obliterative bronchiolitis.- Rae    COVID-19 virus infection 10/11/2020    CTS (carpal tunnel syndrome)     Degeneration of intervertebral disc of lumbar region     Description: A.  Diagnosed in April 2013 with advanced multilevel with severe spinal stenosis, followed by Dr. Pillai for pain management.    Gastroesophageal reflux disease     Hearing loss     History of calcium pyrophosphate deposition disease (CPPD)     History of colonoscopy 01/01/1999    NORMAL PER PATIENT     History of mammogram 01/01/2011    NORMAL PER PT     History of Papanicolaou smear of cervix 01/01/2010    NORMAL PER PT     History of varicella     Hyperlipidemia     Description: A.  Dx 2006.    Hypertension     Description: A. Dx 2001.    Macular degeneration     Nocturia     Osteoporosis     Ovarian mass     Dx 8/15- benign left cystic adnexal mass    Overactive bladder     Pelvic floor dysfunction     Rheumatoid arthritis     Description: A.  Diagnosed in 2000 and and followed by Dr. Constantino (now Dr. Kaplan). B.  On methotrexate therapy since 2000. C.  Off low-dose prednisone therapy.    Vitamin D deficiency   "    Past Surgical History:   Procedure Laterality Date    APPENDECTOMY      CARPAL TUNNEL RELEASE Left 01/01/2003    HISTORY OF NEUROPLASTY DECOMPRESSION MEDIAN NERVE AT CARPAL TUNNEL LEFT    CATARACT EXTRACTION Bilateral 01/01/2009    CHOLECYSTECTOMY  01/01/1962    HIP CANNULATED SCREW PLACEMENT Right 1/25/2020    Procedure: HIP CANNULATED SCREW PLACEMENT RIGHT;  Surgeon: Karlos Blount MD;  Location: [facility];  Service: Orthopedics    KNEE ARTHROSCOPY Left 01/01/2001    MENISCAL REPAIR    KYPHOPLASTY  06/18/2015    T11 AND L1 (JOSE A)    PELVIC LAPAROSCOPY  01/01/1996    REMOVAL OF BENIGN UTERINE AND RIGHT OVARIAN TUMORS    SALPINGO OOPHORECTOMY Left 08/26/2015    REMOVAL OF LEFT OVARY AND TUBE (benign cystic mass)     Social History     Socioeconomic History    Marital status:    Tobacco Use    Smoking status: Former     Years: .5     Types: Cigarettes    Smokeless tobacco: Never   Vaping Use    Vaping Use: Never used   Substance and Sexual Activity    Alcohol use: No    Drug use: No    Sexual activity: Not Currently     Family History   Problem Relation Age of Onset    Hypertension Mother     Diabetes Mother        Allergies   Allergen Reactions    Ciprofloxacin Other (See Comments) and Unknown - High Severity     Other reaction(s): shaking  HCI TABS/ SHAKING  Other reaction(s): shaking  HCI TABS/ SHAKING    Levofloxacin Diarrhea and Unknown - High Severity    Sulfamethoxazole-Trimethoprim Other (See Comments), Rash and Unknown - High Severity     Stomach cramps   Stomach cramps     Sulfamethoxazole Rash    Trimethoprim GI Intolerance, Diarrhea and Rash       Current medication reviewed for route, type, dose and frequency and are current per MAR at time of dictation.    Palliative Performance Scale Score:  40%    /83 (BP Location: Right arm, Patient Position: Lying)   Pulse 75   Temp 97.7 °F (36.5 °C) (Axillary)   Resp 16   Ht 157.5 cm (62\")   Wt 52.3 kg (115 lb 3.2 oz)   SpO2 91%   " BMI 21.07 kg/m²     Intake/Output Summary (Last 24 hours) at 1/11/2024 1259  Last data filed at 1/10/2024 1800  Gross per 24 hour   Intake 240 ml   Output 250 ml   Net -10 ml       Physical Exam:    General Appearance:    Patient laying in bed, awake, alert, chronically ill appearing, frail, cooperative, NAD   HEENT:    NC/AT, EOMI, anicteric, MMM, face relaxed   Neck:   supple, trachea midline, no JVD   Lungs:     CTA bilat, diminished in bases; respirations regular, even and unlabored; RR 16-18 on exam, on RA    Heart:    RRR, normal S1 and S2, no M/R/G, HR 82   Abdomen:     Normal bowel sounds, soft, nontender, nondistended   G/U:   Deferred   MSK/Extremities:   Wasting, no edema   Pulses:   Pulses palpable and equal bilaterally   Skin:   Warm, dry   Neurologic:   A/Ox2, cooperative, MENARD, weakness   Psych:   Calm, appropriate         Labs:   Results from last 7 days   Lab Units 01/10/24  0647   WBC 10*3/mm3 10.14   HEMOGLOBIN g/dL 10.3*   HEMATOCRIT % 32.2*   PLATELETS 10*3/mm3 251     Results from last 7 days   Lab Units 01/09/24  0428   SODIUM mmol/L 142   POTASSIUM mmol/L 4.7   CHLORIDE mmol/L 105   CO2 mmol/L 26.0   BUN mg/dL 22   CREATININE mg/dL 0.70   GLUCOSE mg/dL 95   CALCIUM mg/dL 8.9     Results from last 7 days   Lab Units 01/09/24  0428 01/08/24  1558   SODIUM mmol/L 142 138   POTASSIUM mmol/L 4.7 4.4   CHLORIDE mmol/L 105 104   CO2 mmol/L 26.0 24.0   BUN mg/dL 22 24*   CREATININE mg/dL 0.70 0.75   CALCIUM mg/dL 8.9 8.6   BILIRUBIN mg/dL  --  0.5   ALK PHOS U/L  --  128*   ALT (SGPT) U/L  --  8   AST (SGOT) U/L  --  15   GLUCOSE mg/dL 95 102*     Imaging Results (Last 72 Hours)       Procedure Component Value Units Date/Time    CT Abdomen Pelvis With Contrast [[id number]] Collected: 01/11/24 1117     Updated: 01/11/24 1150    Narrative:      CT ABDOMEN PELVIS W CONTRAST    Date of Exam: 1/11/2024 10:01 AM CST    Indication: uterine mass; vaginal bleeding.    Comparison: CT abdomen and pelvis  7/23/2022    Technique: Axial CT images were obtained of the abdomen and pelvis following the uneventful intravenous administration of 80 mL Isovue-300. Reconstructed coronal and sagittal images were also obtained. Automated exposure control and iterative   construction methods were used.      Findings:  LUNG BASES:  Unremarkable without mass or infiltrate.    LIVER:  Unremarkable parenchyma without focal lesion.  BILIARY/GALLBLADDER: The gallbladder is surgically absent.  SPLEEN:  Unremarkable  PANCREAS: There is prominence to the pancreatic duct in the pancreatic head and body which appears unchanged.  ADRENAL:  Unremarkable  KIDNEYS: The left kidney has a low attenuating lesion along the posterior cortex measuring 1.3 cm present on prior study which may represent a hemorrhagic or proteinaceous cyst. No calculus identified.  GASTROINTESTINAL/MESENTERY: Evaluation of the gastrointestinal tract demonstrates mild diverticulosis.  MESENTERIC VESSELS:  Patent.  AORTA/IVC:  Normal caliber.    RETROPERITONEUM/LYMPH NODES:  Unremarkable    REPRODUCTIVE:  There is a complex cystic lesion in the pelvis measuring 6.2x7.7 cm . While this is not significantly changed in size there is increasing mural nodularity and thickness now measuring  4.2x1.8 cm in maximum diameter. Dystrophic   calcification is also noted.     BLADDER:  Unremarkable    OSSEUS STRUCTURES: There is kyphoplasty at T11 and L1. Multilevel degenerative disc and facet disease at noted. Chronic compression deformity is present at L3. There has been surgical fixation of a right hip fracture with partially threaded screws.        Impression:      Impression:    1. 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      2. Additional ancillary findings as described above.        Electronically Signed: Rebecca Teran MD    1/11/2024 10:38 AM [facility]    Workstation ID: [id number]     MRI Lumbar Spine Without Contrast [[id number]] Collected: 01/09/24 1714     Updated: 01/09/24 1736    Narrative:        MRI LUMBAR SPINE WO CONTRAST    Date of Exam: 1/9/2024 4:05 PM EST    Indication: L5-S1 stenosis.     Comparison: CT lumbar spine from January 2024 and MRI lumbar spine from June 11, 2015    Technique:  Routine multiplanar/multisequence sequence images of the lumbar spine were obtained without contrast administration.        Findings:  The alignment is anatomic. There are compression deformity status post kyphoplasty at T11 and L1. There are mild to moderate chronic compression deformities at T10, T12, L2, L3, and L4. There is marrow edema along the right sacral kori with a fracture   identified on recent prior CT, which is likely subacute. There are degenerative endplate changes at all levels. There is disc desiccation at all levels with moderate degenerative loss of disc height at L3-4 and L4-5. The conus terminates at the L1-2   level. The posterior paravertebral soft tissues are unremarkable.    L1-2: Mild disc osteophyte complex. Mild bilateral facet arthropathy. Mild spinal canal stenosis. Moderate bilateral neural foraminal stenosis.    L2-3: Moderate disc osteophyte complex. Mild right and moderate left facet arthropathy. Moderate spinal canal stenosis. Moderate right and moderate to severe left neural foraminal stenosis.    L3-4: Moderate disc bulge. Moderate bilateral facet arthropathy with ligamentum flavum infolding. Severe spinal canal stenosis. Moderate bilateral neural foraminal stenosis.    L4-5: Moderate disc bulge. Moderate bilateral facet arthropathy with ligamentum flavum infolding. Moderate spinal canal stenosis. Moderate to severe bilateral neural foraminal stenosis.    L5-S1: Mild disc bulge with mild left paracentral disc protrusion narrowing the left lateral recess. Moderate bilateral facet arthropathy. Moderate spinal canal stenosis. Mild to moderate right and moderate  left neural foraminal stenosis.      Impression:      Impression:  1.Multilevel degenerative changes as described above, most prominent at L3-4 where there is severe spinal canal stenosis.  2.Moderate spinal canal stenosis at L5-S1.  3.Marrow edema along right sacral kori with fracture identified on recent prior CT, likely a subacute fracture.        Electronically Signed: Jarrell Page MD    1/9/2024 5:33 PM EST    Workstation ID: [id number]    XR Chest 1 View [[id number]] Collected: 01/08/24 2207     Updated: 01/08/24 2214    Narrative:      XR CHEST 1 VW    Date of Exam: 1/8/2024 7:18 PM EST    Indication: leukocytosis, 91% on room air, diminished lung sounds bases    Comparison: 1/2/2024    Findings:  Previous thoracic kyphoplasties are noted. The heart shadow is upper limits of normal size. Mild diffuse pulmonary interstitial changes appear stable except in the right lung base, where there is increased peribronchial thickening and interstitial   change. No dense lung consolidation effusion or pneumothorax is seen.      Impression:      Impression:  Increased peribronchial thickening and interstitial change in the right lung base, potentially changes of bronchitis or early changes of pneumonia.      Electronically Signed: Isael Lewis MD    1/8/2024 10:11 PM EST    Workstation ID: [id number]    CT Lumbar Spine Without Contrast [[id number]] Collected: 01/08/24 1540     Updated: 01/08/24 1554    Narrative:      CT LUMBAR SPINE WO CONTRAST    Date of Exam: 1/8/2024 3:33 PM EST    Indication: sacral pain after a fall 1 week ago.    Comparison: The most recent study for potential comparison is an abdomen pelvis CT scan from 7/23/2022.   Technique: Axial CT images were obtained of the lumbar spine without contrast administration.  Reconstructed coronal and sagittal images were also obtained. Automated exposure control and iterative construction methods were used.      Findings:   Comparing today's study with the sagittal  reconstructions of the 7/23/2022 abdominal scan, kyphoplasties at T11 and L1 are again noted. There is mild further loss of height of T12, and the superior endplate of L2. L3 compression deformity appears   stable. Inferior endplate deformity of L4 appears stable. In summary, no significant interval change is seen and no clearly acute bony trauma is appreciated here.    There is, however, new step-off at S1-S2, consistent with transverse fracture here that has occurred since the prior exam. No pars defects are seen and no posterior element fracture is seen. Coronal images show no clearly acute bony abnormality of the   lumbar spine.    Axial bone images show no acute appearing features in the lumbar spine. There is a nondisplaced fracture line extending to the anterior margin of the right sacral kori which appears acute or recent, images 98 through 103.    Soft tissue axial images show no evidence of significant stenosis in the included T11-12 or T12-L1 levels.    L1-2, canal appears lower limits of normal.    L2-3, there is probably moderate or possibly greater canal stenosis due to vertebral osteophytes , facet hypertrophy and facet osteophytes.    At L3-4, there is likely marked canal stenosis due to extensive posterior element hypertrophy, ligamentous hypertrophy and vertebral osteophytes.    At L4-5, there is moderate canal stenosis due to facet and vertebral osteophytes.    L5-S1, there appears to be fairly high-grade stenosis due to a large broad-based disc protrusion as seen on image 80 series 2. No hematoma is seen associated with the patient's nondisplaced right sacral fracture. No pathologic paraspinous mass or acute   inflammatory focus is seen.          Impression:      Impression:    1. Previous T11 and L1 kyphoplasties, and multilevel vertebral endplate compression deformities, some progression from 2022. No clearly acute lumbar spine trauma is seen.    2. Potentially high-grade stenosis at L5-S1 due  to broad-based disc protrusion. Moderate to marked canal stenosis at L2-L3 L3-4 L4-5, which is likely chronic, due to facet and vertebral osteophytes.    3. Minimally displaced sacral fracture, visible at S1-2 on the sagittal images, and nondisplaced fracture visible in the right sacral ala on axial images. Given the patient's osteopenia, a more extensive nondisplaced fracture could be present, but   difficult to detect.        Electronically Signed: Isael Lewis MD    1/8/2024 3:51 PM EST    Workstation ID: [id number]    XR Knee 1 or 2 View Left [[id number]] Collected: 01/08/24 1344     Updated: 01/08/24 1348    Narrative:      XR KNEE 1 OR 2 VW LEFT    Date of Exam: 1/8/2024 1:28 PM EST    Indication: Injury 1 month ago    Comparison: None available.    Findings:  2 views. There is osteoporosis. There is diffuse atherosclerotic vascular calcification. There is severe narrowing of the medial and lateral compartments. There is severe narrowing of the patellofemoral compartment. There are no acute or old fractures.   There is no callus or periostitis. There is chondrocalcinosis. There is no definite effusion.      Impression:      Impression:  Degenerative osteoarthritis. Atherosclerosis. No acute process.      Electronically Signed: Chika Khanna MD    1/8/2024 1:45 PM EST    Workstation ID: [id number]                  Diagnostics: Reviewed    A:   Pelvic fracture    Rheumatoid arthritis    Gastroesophageal reflux disease    Hypertension    COPD (chronic obstructive pulmonary disease)    Sacral fracture, closed    Leukocytosis    Macrocytic anemia     89 y.o. female with RA, sacral fracture.    S/S:   Pain -sacral fracture  -started Tylenol 1000mg PO TID  -scheduled Tramadol 50mg PO BID, in addition to q8 hours prn breakthrough pain   -continue Lidocaine patch    2. Dysphagia -family electing comfort diet per chart review    3. Debility -PT/OT following    4. GOC -DNR/DNI -per review of chart  -no family at  bedside  -medications adjusted as above, patient is Palliative appropriate    P: Introduced Palliative Care and services to patient. Patient oriented to self and location. Discussed symptoms and medications adjusted as above. No family at bedside to discuss GOC. Will continue to follow and make medication adjustments as needed.     Thank you for this consult and allowing us to participate in patient's plan of care. Palliative Care Team will continue to follow patient. Please do not hesitate to contact us regarding further symptom management or goals of care needs.  Time: 60 minutes spent reviewing medical and medication records, assessing and examining patient, discussing with family, answering questions, providing some guidance about a plan and documentation of care, and coordinating care with other healthcare members, with > 50% time spent face to face.         Velma Alfaro, APRN  1/11/2024

## 2024-01-12 PROCEDURE — 94799 UNLISTED PULMONARY SVC/PX: CPT

## 2024-01-12 PROCEDURE — 25010000002 CEFTRIAXONE PER 250 MG: Performed by: INTERNAL MEDICINE

## 2024-01-12 PROCEDURE — 97530 THERAPEUTIC ACTIVITIES: CPT

## 2024-01-12 PROCEDURE — 97110 THERAPEUTIC EXERCISES: CPT

## 2024-01-12 PROCEDURE — 94761 N-INVAS EAR/PLS OXIMETRY MLT: CPT

## 2024-01-12 PROCEDURE — 25010000002 MORPHINE PER 10 MG: Performed by: INTERNAL MEDICINE

## 2024-01-12 PROCEDURE — 94664 DEMO&/EVAL PT USE INHALER: CPT

## 2024-01-12 PROCEDURE — 99232 SBSQ HOSP IP/OBS MODERATE 35: CPT | Performed by: NURSE PRACTITIONER

## 2024-01-12 RX ORDER — BISACODYL 10 MG
10 SUPPOSITORY, RECTAL RECTAL ONCE
Status: COMPLETED | OUTPATIENT
Start: 2024-01-12 | End: 2024-01-12

## 2024-01-12 RX ADMIN — MORPHINE SULFATE 1 MG: 2 INJECTION, SOLUTION INTRAMUSCULAR; INTRAVENOUS at 16:05

## 2024-01-12 RX ADMIN — DILTIAZEM HYDROCHLORIDE 120 MG: 120 CAPSULE, COATED, EXTENDED RELEASE ORAL at 08:59

## 2024-01-12 RX ADMIN — LIDOCAINE 1 PATCH: 4 PATCH TOPICAL at 09:04

## 2024-01-12 RX ADMIN — DICLOFENAC SODIUM 2 G: 9.3 GEL TOPICAL at 21:37

## 2024-01-12 RX ADMIN — SENNOSIDES AND DOCUSATE SODIUM 2 TABLET: 8.6; 5 TABLET ORAL at 21:36

## 2024-01-12 RX ADMIN — BUDESONIDE AND FORMOTEROL FUMARATE DIHYDRATE 1 PUFF: 160; 4.5 AEROSOL RESPIRATORY (INHALATION) at 09:31

## 2024-01-12 RX ADMIN — DOXYCYCLINE 100 MG: 100 CAPSULE ORAL at 09:03

## 2024-01-12 RX ADMIN — DOXYCYCLINE 100 MG: 100 CAPSULE ORAL at 21:36

## 2024-01-12 RX ADMIN — DICLOFENAC SODIUM 2 G: 9.3 GEL TOPICAL at 17:17

## 2024-01-12 RX ADMIN — SERTRALINE HYDROCHLORIDE 25 MG: 25 TABLET ORAL at 09:02

## 2024-01-12 RX ADMIN — CYANOCOBALAMIN TAB 1000 MCG 1000 MCG: 1000 TAB at 09:04

## 2024-01-12 RX ADMIN — DICLOFENAC SODIUM 2 G: 9.3 GEL TOPICAL at 12:14

## 2024-01-12 RX ADMIN — ACETAMINOPHEN 1000 MG: 500 TABLET ORAL at 09:00

## 2024-01-12 RX ADMIN — TRAMADOL HYDROCHLORIDE 50 MG: 50 TABLET, COATED ORAL at 21:36

## 2024-01-12 RX ADMIN — BISACODYL 10 MG: 10 SUPPOSITORY RECTAL at 09:03

## 2024-01-12 RX ADMIN — ASPIRIN 81 MG: 81 TABLET, COATED ORAL at 09:03

## 2024-01-12 RX ADMIN — Medication 10 ML: at 09:05

## 2024-01-12 RX ADMIN — FAMOTIDINE 40 MG: 20 TABLET, FILM COATED ORAL at 09:01

## 2024-01-12 RX ADMIN — SENNOSIDES AND DOCUSATE SODIUM 2 TABLET: 8.6; 5 TABLET ORAL at 09:02

## 2024-01-12 RX ADMIN — CEFTRIAXONE 2000 MG: 2 INJECTION, POWDER, FOR SOLUTION INTRAMUSCULAR; INTRAVENOUS at 16:05

## 2024-01-12 RX ADMIN — ACETAMINOPHEN 1000 MG: 500 TABLET ORAL at 16:04

## 2024-01-12 RX ADMIN — Medication 10 ML: at 21:36

## 2024-01-12 RX ADMIN — POLYETHYLENE GLYCOL 3350 17 G: 17 POWDER, FOR SOLUTION ORAL at 12:18

## 2024-01-12 RX ADMIN — TRAMADOL HYDROCHLORIDE 50 MG: 50 TABLET, COATED ORAL at 09:02

## 2024-01-12 RX ADMIN — OXYBUTYNIN CHLORIDE 5 MG: 5 TABLET, EXTENDED RELEASE ORAL at 09:01

## 2024-01-12 RX ADMIN — FOLIC ACID 1000 MCG: 1 TABLET ORAL at 09:01

## 2024-01-12 RX ADMIN — DICLOFENAC SODIUM 2 G: 9.3 GEL TOPICAL at 08:59

## 2024-01-12 NOTE — PLAN OF CARE
Goal Outcome Evaluation:  Plan of Care Reviewed With: patient, caregiver        Progress: improving  Outcome Evaluation: Pt with good motivation/participation in therapy session this date demonstrating good improvements in functional activity tolerance and independence however pt remains well below her baseline level of function with generalized weakness, decreased functional activity tolerance, balance deficits, and R hip/RLE pain warranting further skilled PT services. Pt performed stand pivot transfers with ModAx1 and BUE support. PT continuing to recommend SNF at [place].      Anticipated Discharge Disposition (PT): skilled nursing facility

## 2024-01-12 NOTE — PLAN OF CARE
Problem: Palliative Care  Goal: Enhanced Quality of Life  Intervention: Optimize Psychosocial Wellbeing  Flowsheets (Taken 1/12/2024 1446)  Supportive Measures:   active listening utilized   verbalization of feelings encouraged   positive reinforcement provided  Family/Support System Care:   support provided   involvement promoted   presence promoted     Problem: Pain Acute  Goal: Acceptable Pain Control and Functional Ability  Intervention: Optimize Psychosocial Wellbeing  Recent Flowsheet Documentation  Taken 1/12/2024 1446 by Una Rosas RN  Supportive Measures:   active listening utilized   verbalization of feelings encouraged   positive reinforcement provided   Goal Outcome Evaluation:  Plan of Care Reviewed With: patient, caregiver        Progress: no change  Outcome Evaluation: Pt is alert with stm deficits. Pt's caregiver Latia present.  Pt likes vanilla boost but is not eating food. Latia states pt likes to drink hot tea and cookies and oatmeal in the morning.  Pt had a supp for constipation today. No bm documented since admit.  Pt's pain managed with ultram bid.  Plans for snf and hospice following.  Will make palliative referral to [facility] at d/c.  Palliative IDT: RN, SW, MD JANIYA,   Afterhours# [phone]

## 2024-01-12 NOTE — PLAN OF CARE
Goal Outcome Evaluation:           Progress: no change     VSS, on RA. Pain controlled with scheduled meds. Slept well.

## 2024-01-12 NOTE — CONSULTS
Continued Stay Note  [facility]     Patient Name: Pati Carter  MRN: [id number]  Today's Date: 1/12/2024    Admit Date: 1/8/2024    Plan: TBD   Discharge Plan       Row Name 01/12/24 1212       Plan    Plan TBD    Plan Comments Consult received, chart reviewed, phone call to daughter, Lilian Carter. Educated daughter in hospice services at [facility] facility--hospice disciplines provided, DME, medications, 24/7 phone number provided. Daughter agreeable for hospice liaison to follow during stay and do follow up phone call 2 weeks after discharge/STR. Please call [phone] for any questions or concerns.                   Discharge Codes    No documentation.                 Expected Discharge Date and Time       Expected Discharge Date Expected Discharge Time    Jan 12, 2024               Laquata Hume, RN

## 2024-01-12 NOTE — THERAPY TREATMENT NOTE
Patient Name: Pati Carter  : 1930    MRN: [id number]                              Today's Date: 2024       Admit Date: 2024    Visit Dx:     ICD-10-CM ICD-9-CM   1. Closed fracture of sacrum, unspecified portion of sacrum, initial encounter  S32.10XA 805.6   2. Fall in home, initial encounter  W19.XXXA E888.9    Y92.009 E849.0   3. Generalized weakness  R53.1 780.79   4. 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  Dx 2006- borderline intermittent.    Back pain     Benign colonic polyp 2016    Description: A.  Dx .    Ferny Bonnet syndrome 2020    Chondrocalcinosis     knees    Compression fracture of lumbar vertebra     Constipation     COPD (chronic obstructive pulmonary disease)     Description: A.  Rule out chronic persistent asthma, COPD, or obliterative bronchiolitis.- Butch    COVID-19 virus infection 10/11/2020    CTS (carpal tunnel syndrome)     Degeneration of intervertebral  disc of lumbar region     Description: A.  Diagnosed in April 2013 with advanced multilevel with severe spinal stenosis, followed by Dr. Pillai for pain management.    Gastroesophageal reflux disease     Hearing loss     History of calcium pyrophosphate deposition disease (CPPD)     History of colonoscopy 01/01/1999    NORMAL PER PATIENT     History of mammogram 01/01/2011    NORMAL PER PT     History of Papanicolaou smear of cervix 01/01/2010    NORMAL PER PT     History of varicella     Hyperlipidemia     Description: A.  Dx 2006.    Hypertension     Description: A. Dx 2001.    Macular degeneration     Nocturia     Osteoporosis     Ovarian mass     Dx 8/15- benign left cystic adnexal mass    Overactive bladder     Pelvic floor dysfunction     Rheumatoid arthritis     Description: A.  Diagnosed in 2000 and and followed by Dr. Constantino (now Dr. Kaplan). B.  On methotrexate therapy since 2000. C.  Off low-dose prednisone therapy.    Vitamin D deficiency      Past Surgical History:   Procedure Laterality Date    APPENDECTOMY      CARPAL TUNNEL RELEASE Left 01/01/2003    HISTORY OF NEUROPLASTY DECOMPRESSION MEDIAN NERVE AT CARPAL TUNNEL LEFT    CATARACT EXTRACTION Bilateral 01/01/2009    CHOLECYSTECTOMY  01/01/1962    HIP CANNULATED SCREW PLACEMENT Right 1/25/2020    Procedure: HIP CANNULATED SCREW PLACEMENT RIGHT;  Surgeon: Karlos Blount MD;  Location: [facility];  Service: Orthopedics    KNEE ARTHROSCOPY Left 01/01/2001    MENISCAL REPAIR    KYPHOPLASTY  06/18/2015    T11 AND L1 (JOSE A)    PELVIC LAPAROSCOPY  01/01/1996    REMOVAL OF BENIGN UTERINE AND RIGHT OVARIAN TUMORS    SALPINGO OOPHORECTOMY Left 08/26/2015    REMOVAL OF LEFT OVARY AND TUBE (benign cystic mass)      General Information       Row Name 01/12/24 1100          Physical Therapy Time and Intention    Document Type therapy note (daily note)  -     Mode of Treatment physical therapy;individual therapy  -       Row Name 01/12/24 1100           General Information    Existing Precautions/Restrictions fall;other (see comments)  s/p fall with R sacral ala fx (conservative tx). Legally Blind/Mac Degeneration, BUE tremors  -     Barriers to Rehab medically complex;previous functional deficit;visual deficit  -       Row Name 01/12/24 1100          Cognition    Orientation Status (Cognition) oriented to;person;place  -       Row Name 01/12/24 1100          Safety Issues, Functional Mobility    Safety Issues Affecting Function (Mobility) awareness of need for assistance;insight into deficits/self-awareness;safety precaution awareness;safety precautions follow-through/compliance;sequencing abilities  -     Impairments Affecting Function (Mobility) balance;endurance/activity tolerance;motor control;pain;strength;postural/trunk control;range of motion (ROM);visual/perceptual  -               User Key  (r) = Recorded By, (t) = Taken By, (c) = Cosigned By      Initials Name Provider Type     Clay Lundberg, PT Physical Therapist                   Mobility       Row Name 01/12/24 1100          Bed Mobility    Bed Mobility supine-sit;sit-supine;scooting/bridging  -     Scooting/Bridging [place] (Bed Mobility) moderate assist (50% patient effort);1 person assist;verbal cues;nonverbal cues (demo/gesture)  -     Supine-Sit [place] (Bed Mobility) minimum assist (75% patient effort);1 person assist;verbal cues;nonverbal cues (demo/gesture)  -     Sit-Supine [place] (Bed Mobility) moderate assist (50% patient effort);1 person assist;verbal cues;nonverbal cues (demo/gesture)  -     Assistive Device (Bed Mobility) bed rails;draw sheet;head of bed elevated  -     Comment, (Bed Mobility) Pt required moderate verbal and tactile cues for proper sequencing and technique. Considerable increase in time/effort to achieve sitting EOB. Pt with increased c/o R buttock pain and pain extending down to the R knee in sitting. Pt required assist to  positiong BLEs in bed from sit to supine.  -       Row Name 01/12/24 1100          Bed-Chair Transfer    Bed-Chair [place] (Transfers) moderate assist (50% patient effort);1 person assist;verbal cues;nonverbal cues (demo/gesture)  -     Assistive Device (Bed-Chair Transfers) other (see comments)  BUE support  -     Comment, (Bed-Chair Transfer) Pt performe bed to/from BSC transfer with BUE support. Pt with forward flexed posture and mild/moderate posterior lean throughout. Pt with decreased ability to accept RLE weight bearing d/t c/o pain. Cues to reach back for surface prior to return to sitting for safety  -       Row Name 01/12/24 1100          Sit-Stand Transfer    Sit-Stand [place] (Transfers) moderate assist (50% patient effort);1 person assist;verbal cues;nonverbal cues (demo/gesture)  -     Assistive Device (Sit-Stand Transfers) other (see comments)  BUE support  -     Comment, (Sit-Stand Transfer) Pt performed STS x3 from EOB and x1 from BSC. Pt initially required ModAx1 to achieve upright standing however with further trials and max cues for set up and technique pt able to improve to MinAx1. Pt with mild/moderate posterior lean during standing requiring assist to correct. Pt able to maintain standing for approximately 30 seconds prior to requesting seated rest break d/t R hip/RLE pain.  -[facility] Name 01/12/24 1100          Gait/Stairs (Locomotion)    [place] Level (Gait) unable to assess  -     Comment, (Gait/Stairs) Declined d/t R hip pain  -[facility] Name 01/12/24 1100          Mobility    Extremity Weight-bearing Status right lower extremity  -     Right Lower Extremity (Weight-bearing Status) weight-bearing as tolerated (WBAT)  -               User Key  (r) = Recorded By, (t) = Taken By, (c) = Cosigned By      Initials Name Provider Type     Clay Lundberg, PT Physical Therapist                   Obj/Interventions       [facility] Name 01/12/24 1105          Motor  Skills    Therapeutic Exercise hip;knee;ankle  -[facility] Name 01/12/24 1105          Hip (Therapeutic Exercise)    Hip AROM (Therapeutic Exercise) left;flexion;extension;aBduction;aDduction;supine;10 repetitions  -     Hip AAROM (Therapeutic Exercise) right;flexion;extension;aBduction;aDduction;supine;10 repetitions  -[facility] Name 01/12/24 1105          Knee (Therapeutic Exercise)    Knee (Therapeutic Exercise) AROM (active range of motion)  -     Knee AROM (Therapeutic Exercise) bilateral;extension;flexion;LAQ (long arc quad);heel slides;supine;10 repetitions  -[facility] Name 01/12/24 1105          Ankle (Therapeutic Exercise)    Ankle AROM (Therapeutic Exercise) bilateral;dorsiflexion;plantarflexion;10 repetitions  -[facility] Name 01/12/24 1105          Balance    Balance Assessment sitting static balance;sitting dynamic balance;standing static balance;standing dynamic balance  -     Static Sitting Balance supervision  -     Dynamic Sitting Balance contact guard  -     Position, Sitting Balance unsupported;sitting edge of bed;sitting in chair  -     Static Standing Balance minimal assist;1-person assist  -     Dynamic Standing Balance moderate assist;1-person assist  -     Position/Device Used, Standing Balance supported;other (see comments)  BUE support  -     Comment, Balance Pt with mild/moderate posterior lean and forward flexion along with decreased RLE weight bearing during standing limiting pt's balance.  -               User Key  (r) = Recorded By, (t) = Taken By, (c) = Cosigned By      Initials Name Provider Type     Clay Lundberg, PT Physical Therapist                   Goals/Plan    No documentation.                  Clinical Impression       [facility] Name 01/12/24 1107          Pain    Pain Intervention(s) Repositioned;Ambulation/increased activity  -     Additional Documentation Pain Scale: FACES Pre/Post-Treatment (Group)  -[facility] Name 01/12/24 1104           Pain Scale: FACES Pre/Post-Treatment    Pain: FACES Scale, Pretreatment 2-->hurts little bit  -     Posttreatment Pain Rating 6-->hurts even more  -     Pain Location - Side/Orientation Right  -     Pain Location generalized  -     Pain Location - hip;knee;other (see comments)  R hip, buttock, and knee  -       Row Name 01/12/24 1107          Plan of Care Review    Plan of Care Reviewed With patient;caregiver  -     Progress improving  -     Outcome Evaluation Pt with good motivation/participation in therapy session this date demonstrating good improvements in functional activity tolerance and independence however pt remains well below her baseline level of function with generalized weakness, decreased functional activity tolerance, balance deficits, and R hip/RLE pain warranting further skilled PT services. Pt performed stand pivot transfers with ModAx1 and BUE support. PT continuing to recommend SNF at [place].  -       Row Name 01/12/24 1107          Vital Signs    Pre Systolic BP Rehab 174  -     Pre Treatment Diastolic BP 84  -     Pretreatment Heart Rate (beats/min) 89  -     Intratreatment Heart Rate (beats/min) 120  -     Posttreatment Heart Rate (beats/min) 104  -     O2 Delivery Pre Treatment room air  -     O2 Delivery Intra Treatment room air  -     O2 Delivery Post Treatment room air  -     Pre Patient Position Supine  -     Intra Patient Position Standing  -     Post Patient Position Supine  -       Row Name 01/12/24 1107          Positioning and Restraints    Pre-Treatment Position in bed  -     Post Treatment Position bed  -     In Bed fowlers;call light within reach;encouraged to call for assist;exit alarm on;with family/caregiver;SCD pump applied  -               User Key  (r) = Recorded By, (t) = Taken By, (c) = Cosigned By      Initials Name Provider Type     Clay Lundberg, PT Physical Therapist                   Outcome Measures       Row Name 01/12/24  1116          How much help from another person do you currently need...    Turning from your back to your side while in flat bed without using bedrails? 3  -LH     Moving from lying on back to sitting on the side of a flat bed without bedrails? 3  -LH     Moving to and from a bed to a chair (including a wheelchair)? 2  -LH     Standing up from a chair using your arms (e.g., wheelchair, bedside chair)? 2  -LH     Climbing 3-5 steps with a railing? 1  -LH     To walk in hospital room? 1  -     AM-PAC 6 Clicks Score (PT) 12  -     Highest Level of Mobility Goal 4 --> Transfer to chair/commode  -               User Key  (r) = Recorded By, (t) = Taken By, (c) = Cosigned By      Initials Name Provider Type     Clay Lundberg, PT Physical Therapist                                 Physical Therapy Education       Title: PT OT SLP Therapies (In Progress)       Topic: Physical Therapy (Done)       Point: Mobility training (Done)       Learning Progress Summary             Patient Acceptance, E, VU,NR by  at 1/12/2024 1116    Acceptance, E, VU,NR by [facility] at 1/11/2024 1445    Eager, E, VU,DU,NR by  at 1/10/2024 1513    Comment: Educated pt. safety/technique w/bed mobility, transfers, PT POC, log roll technique, spinal precautions   Other Acceptance, E, VU,NR by  at 1/12/2024 1116                         Point: Home exercise program (Done)       Learning Progress Summary             Patient Acceptance, E, VU,NR by  at 1/12/2024 1116   Other Acceptance, E, VU,NR by  at 1/12/2024 1116                         Point: Body mechanics (Done)       Learning Progress Summary             Patient Acceptance, E, VU,NR by  at 1/12/2024 1116    Acceptance, E, VU,NR by [facility] at 1/11/2024 1445    Eager, E, VU,DU,NR by  at 1/10/2024 1513    Comment: Educated pt. safety/technique w/bed mobility, transfers, PT POC, log roll technique, spinal precautions   Other Acceptance, E, VU,NR by  at 1/12/2024 1116                          Point: Precautions (Done)       Learning Progress Summary             Patient Acceptance, E, VU,NR by  at 1/12/2024 1116    Acceptance, E, VU,NR by [facility] at 1/11/2024 1445    Eager, E, VU,DU,NR by  at 1/10/2024 1513    Comment: Educated pt. safety/technique w/bed mobility, transfers, PT POC, log roll technique, spinal precautions   Other Acceptance, E, VU,NR by  at 1/12/2024 1116                                         User Key       Initials Effective Dates Name Provider Type Discipline     09/21/21 -  Clay Lundberg, PT Physical Therapist PT     06/01/21 -  Maryanne Benitez, PT Physical Therapist PT    [facility] 09/21/23 -  Mimi Sandoval, PT Physical Therapist PT                  PT Recommendation and Plan     Plan of Care Reviewed With: patient, caregiver  Progress: improving  Outcome Evaluation: Pt with good motivation/participation in therapy session this date demonstrating good improvements in functional activity tolerance and independence however pt remains well below her baseline level of function with generalized weakness, decreased functional activity tolerance, balance deficits, and R hip/RLE pain warranting further skilled PT services. Pt performed stand pivot transfers with ModAx1 and BUE support. PT continuing to recommend SNF at [place].     Time Calculation:         PT Charges       Row Name 01/12/24 1117             Time Calculation    Start Time 1012  -      PT Received On 01/12/24  -      PT Goal Re-Cert Due Date 01/20/24  -         Timed Charges    75698 - PT Therapeutic Exercise Minutes 10  -LH      82547 - PT Therapeutic Activity Minutes 28  -         Total Minutes    Timed Charges Total Minutes 38  -       Total Minutes 38  -                User Key  (r) = Recorded By, (t) = Taken By, (c) = Cosigned By      Initials Name Provider Type     Clay Lundberg, PT Physical Therapist                  Therapy Charges for Today       Code Description Service Date Service Provider  Modifiers Qty    62918153689 HC PT THER PROC EA 15 MIN 1/12/2024 Clay Lundberg, PT GP 1    81589610260 HC PT THERAPEUTIC ACT EA 15 MIN 1/12/2024 Clay Lundberg, PT GP 2            PT G-Codes  Outcome Measure Options: AM-PAC 6 Clicks Basic Mobility (PT)  AM-PAC 6 Clicks Score (PT): 12  AM-PAC 6 Clicks Score (OT): 10  PT Discharge Summary  Anticipated Discharge Disposition (PT): skilled nursing facility    Clay Lundberg, PT  1/12/2024

## 2024-01-12 NOTE — CASE MANAGEMENT/SOCIAL WORK
Discharge Planning Assessment  [facility]     Patient Name: Pati Carter  MRN: [id number]  Today's Date: 1/12/2024    Admit Date: 1/8/2024    Plan: [facility]                   Discharge Plan       Row Name 01/12/24 1646       Plan    Plan [facility]    Patient/Family in Agreement with Plan yes    Plan Comments Followed up with Ms. Carter's daughter, Danette, by phone, for discharge planning.  Ms. Carter has been offered a skilled bed at [facility] for Monday, 1/15/24, if medically ready.  The family is agreeable to [facility] at this time.  [facility] ambulance is tentatively scheduled for Monday at 2pm.    CM will follow up with the family on Monday    Final Discharge Disposition Code 03 - skilled nursing facility (SNF)                  Continued Care and Services - Admitted Since 1/8/2024       Destination Coordination complete.      Service Provider Request Status Selected Services Address Phone Fax Patient Preferred    [facility]  [facility] Skilled [facility] [address] [facility] [phone] --    [facility] Pending - Request Sent N[facility]A [address] [facility] [id number] [phone] --                  Expected Discharge Date and Time       Expected Discharge Date Expected Discharge Time    Elia 15, 2024             Vikki Schumahcer RN

## 2024-01-12 NOTE — PROGRESS NOTES
"Palliative Care Daily Progress Note     C/C: Patient reports back pain controlled.     S: Medical record reviewed. Follow up visit for GOC in the context of complex medical care. Events noted. Patient tolerating meds crushed in applesauce. Drinking vanilla Boost. Reports pain controlled.     ROS: +pain, sacral pain, constant ache, worse with movement, unable to use number scale, improves with tramadol. +debility, uses walker at baseline. +decreased appetite. Denies shortness of breath, nausea.     O: Code Status:   Code Status and Medical Interventions:   Ordered at: 01/08/24 1710     Medical Intervention Limits:    NO intubation (DNI)     Code Status (Patient has no pulse and is not breathing):    No CPR (Do Not Attempt to Resuscitate)     Medical Interventions (Patient has pulse or is breathing):    Limited Support      Advanced Directives: Advance Directive Status: Patient does not have advance directive   Goals of Care: Ongoing.   Palliative Performance Scale Score: 40%     /84 (BP Location: Right arm, Patient Position: Lying)   Pulse 90   Temp 97.8 °F (36.6 °C) (Oral)   Resp 18   Ht 157.5 cm (62\")   Wt 52.3 kg (115 lb 3.2 oz)   SpO2 94%   BMI 21.07 kg/m²     Intake/Output Summary (Last 24 hours) at 1/12/2024 0914  Last data filed at 1/12/2024 0714  Gross per 24 hour   Intake --   Output 350 ml   Net -350 ml       PE:  General Appearance:    Patient laying in bed, awake, alert, chronically ill appearing, frail, cooperative, NAD   HEENT:    NC/AT, EOMI, anicteric, MMM, face relaxed   Neck:   supple, trachea midline, no JVD   Lungs:     CTA bilat, diminished in bases; respirations regular, even and unlabored; RR 16-18 on exam, on RA    Heart:    RRR, normal S1 and S2, no M/R/G, HR 93   Abdomen:     Normal bowel sounds, soft, nontender, nondistended   G/U:   Deferred   MSK/Extremities:   Wasting, no edema   Pulses:   Pulses palpable and equal bilaterally   Skin:   Warm, dry   Neurologic:   A/Ox2, " cooperative, MENARD, weakness   Psych:   Calm, appropriate     Meds: Reviewed and changes noted    Labs:   Results from last 7 days   Lab Units 01/10/24  0647   WBC 10*3/mm3 10.14   HEMOGLOBIN g/dL 10.3*   HEMATOCRIT % 32.2*   PLATELETS 10*3/mm3 251     Results from last 7 days   Lab Units 01/09/24  0428   SODIUM mmol/L 142   POTASSIUM mmol/L 4.7   CHLORIDE mmol/L 105   CO2 mmol/L 26.0   BUN mg/dL 22   CREATININE mg/dL 0.70   GLUCOSE mg/dL 95   CALCIUM mg/dL 8.9     Results from last 7 days   Lab Units 01/09/24  0428 01/08/24  1558   SODIUM mmol/L 142 138   POTASSIUM mmol/L 4.7 4.4   CHLORIDE mmol/L 105 104   CO2 mmol/L 26.0 24.0   BUN mg/dL 22 24*   CREATININE mg/dL 0.70 0.75   CALCIUM mg/dL 8.9 8.6   BILIRUBIN mg/dL  --  0.5   ALK PHOS U/L  --  128*   ALT (SGPT) U/L  --  8   AST (SGOT) U/L  --  15   GLUCOSE mg/dL 95 102*     Imaging Results (Last 72 Hours)       Procedure Component Value Units Date/Time    CT Abdomen Pelvis With Contrast [[id number]] Collected: 01/11/24 1117     Updated: 01/11/24 1150    Narrative:      CT ABDOMEN PELVIS W CONTRAST    Date of Exam: 1/11/2024 10:01 AM CST    Indication: uterine mass; vaginal bleeding.    Comparison: CT abdomen and pelvis 7/23/2022    Technique: Axial CT images were obtained of the abdomen and pelvis following the uneventful intravenous administration of 80 mL Isovue-300. Reconstructed coronal and sagittal images were also obtained. Automated exposure control and iterative   construction methods were used.      Findings:  LUNG BASES:  Unremarkable without mass or infiltrate.    LIVER:  Unremarkable parenchyma without focal lesion.  BILIARY/GALLBLADDER: The gallbladder is surgically absent.  SPLEEN:  Unremarkable  PANCREAS: There is prominence to the pancreatic duct in the pancreatic head and body which appears unchanged.  ADRENAL:  Unremarkable  KIDNEYS: The left kidney has a low attenuating lesion along the posterior cortex measuring 1.3 cm present on prior study which  may represent a hemorrhagic or proteinaceous cyst. No calculus identified.  GASTROINTESTINAL/MESENTERY: Evaluation of the gastrointestinal tract demonstrates mild diverticulosis.  MESENTERIC VESSELS:  Patent.  AORTA/IVC:  Normal caliber.    RETROPERITONEUM/LYMPH NODES:  Unremarkable    REPRODUCTIVE:  There is a complex cystic lesion in the pelvis measuring 6.2x7.7 cm . While this is not significantly changed in size there is increasing mural nodularity and thickness now measuring  4.2x1.8 cm in maximum diameter. Dystrophic   calcification is also noted.     BLADDER:  Unremarkable    OSSEUS STRUCTURES: There is kyphoplasty at T11 and L1. Multilevel degenerative disc and facet disease at noted. Chronic compression deformity is present at L3. There has been surgical fixation of a right hip fracture with partially threaded screws.        Impression:      Impression:    1. 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      2. Additional ancillary findings as described above.        Electronically Signed: Rebecca Teran MD    1/11/2024 10:38 AM CST    Workstation ID: [id number]    MRI Lumbar Spine Without Contrast [[id number]] Collected: 01/09/24 1714     Updated: 01/09/24 1736    Narrative:        MRI LUMBAR SPINE WO CONTRAST    Date of Exam: 1/9/2024 4:05 PM EST    Indication: L5-S1 stenosis.     Comparison: CT lumbar spine from January 2024 and MRI lumbar spine from June 11, 2015    Technique:  Routine multiplanar/multisequence sequence images of the lumbar spine were obtained without contrast administration.        Findings:  The alignment is anatomic. There are compression deformity status post kyphoplasty at T11 and L1. There are mild to moderate chronic compression deformities at T10, T12, L2, L3, and L4. There is marrow edema along the right sacral kori with a fracture   identified on recent prior CT, which is likely  subacute. There are degenerative endplate changes at all levels. There is disc desiccation at all levels with moderate degenerative loss of disc height at L3-4 and L4-5. The conus terminates at the L1-2   level. The posterior paravertebral soft tissues are unremarkable.    L1-2: Mild disc osteophyte complex. Mild bilateral facet arthropathy. Mild spinal canal stenosis. Moderate bilateral neural foraminal stenosis.    L2-3: Moderate disc osteophyte complex. Mild right and moderate left facet arthropathy. Moderate spinal canal stenosis. Moderate right and moderate to severe left neural foraminal stenosis.    L3-4: Moderate disc bulge. Moderate bilateral facet arthropathy with ligamentum flavum infolding. Severe spinal canal stenosis. Moderate bilateral neural foraminal stenosis.    L4-5: Moderate disc bulge. Moderate bilateral facet arthropathy with ligamentum flavum infolding. Moderate spinal canal stenosis. Moderate to severe bilateral neural foraminal stenosis.    L5-S1: Mild disc bulge with mild left paracentral disc protrusion narrowing the left lateral recess. Moderate bilateral facet arthropathy. Moderate spinal canal stenosis. Mild to moderate right and moderate left neural foraminal stenosis.      Impression:      Impression:  1.Multilevel degenerative changes as described above, most prominent at L3-4 where there is severe spinal canal stenosis.  2.Moderate spinal canal stenosis at L5-S1.  3.Marrow edema along right sacral kori with fracture identified on recent prior CT, likely a subacute fracture.        Electronically Signed: Jarrell Page MD    1/9/2024 5:33 PM EST    Workstation ID: [id number]                  Diagnostics: Reviewed    A:   Pelvic fracture    Rheumatoid arthritis    Gastroesophageal reflux disease    Hypertension    COPD (chronic obstructive pulmonary disease)    Sacral fracture, closed    Leukocytosis    Macrocytic anemia     89 y.o. female with RA, sacral fracture.    S/S:   Pain  -sacral fracture  -started Tylenol 1000mg PO TID  -scheduled Tramadol 50mg PO BID, in addition to q8 hours prn breakthrough pain   -continue Lidocaine patch     2. Dysphagia -family electing comfort diet per chart review     3. Debility -PT/OT following     4. GOC -DNR/DNI -per review of chart  -no family at bedside  -medications adjusted as above  -called and spoke to Lilian, daughter, on phone    P: Follow up visit for GOC in the context of complex medical decision making. Called Lilian, daughter, to discuss patient condition. Pain controlled with Tramadol. Daughter with concern that patient will need LTC in the future. Discussed STR followed by LTC with either Palliative services if goal is to return to hospital for further treatment versus hospice services with goal for symptom management at end of life. Daughter in agreement with further information regarding hospice services, hospice consult placed. Daughter in agreement with Palliative referral to follow at STR with transition to LTC to continue to monitor for hospice eligibility.   Palliative Care Team will continue to follow patient. Please do not hesitate to contact us regarding further sx mgmt or GOC needs.  Velma Alfaro, APRN  1/12/2024  Time spent: 35 minutes

## 2024-01-12 NOTE — PROGRESS NOTES
[facility] Medicine Services  PROGRESS NOTE    Patient Name: Pati Carter  : 1930  MRN: [id number]    Date of Admission: 2024  Primary Care Physician: Pallavi Eduardo MD    Subjective   Subjective     CC:  Fall, pain    HPI:  States she is doing well, caregiver at bedside. Assisting patient with food, patient is doing well drinking Boost, more than solid intake. No BM since admission per documentation and patient. No pain, no n/v. RN states no visible vaginal bleeding today.       Objective   Objective     Vital Signs:   Temp:  [97.3 °F (36.3 °C)-98.1 °F (36.7 °C)] 97.9 °F (36.6 °C)  Heart Rate:  [68-93] 68  Resp:  [16-20] 18  BP: (130-174)/(68-88) 130/68     Physical Exam:  Constitutional: No acute distress, awake, alert; frail/ elderly/ blind   HENT: NCAT, mucous membranes moist  Respiratory: decreased bases, normal resp effort   Cardiovascular: RRR, no murmurs, rubs, or gallops  Gastrointestinal: Positive bowel sounds, soft, nontender, nondistended  Musculoskeletal: No bilateral ankle edema  Psychiatric: Appropriate affect, cooperative  Neurologic: Oriented to person, MENARD with weakness in lower extremities, Cranial Nerves grossly intact to confrontation, speech clear  Skin: No rashes      Results Reviewed:  LAB RESULTS:      Lab 01/10/24  0647 24  0428 24  2224 24  1759 24  1558   WBC 10.14 11.93*  --   --  13.58*   HEMOGLOBIN 10.3* 10.8*  --   --  11.0*   HEMATOCRIT 32.2* 33.7*  --   --  34.8   PLATELETS 251 218  --   --  230   NEUTROS ABS  --   --   --   --  11.48*   IMMATURE GRANS (ABS)  --   --   --   --  0.09*   LYMPHS ABS  --   --   --   --  0.95   MONOS ABS  --   --   --   --  0.88   EOS ABS  --   --   --   --  0.12   .5* 105.6*  --   --  105.8*   PROCALCITONIN  --   --   --  0.31*  --    LACTATE  --   --  1.7  --   --          Lab 24  0428 24  1558   SODIUM 142 138   POTASSIUM 4.7 4.4   CHLORIDE 105 104   CO2 26.0  24.0   ANION GAP 11.0 10.0   BUN 22 24*   CREATININE 0.70 0.75   EGFR 80.8 74.3   GLUCOSE 95 102*   CALCIUM 8.9 8.6   TSH  --  2.720         Lab 01/08/24  1558   TOTAL PROTEIN 6.6   ALBUMIN 3.5   GLOBULIN 3.1   ALT (SGPT) 8   AST (SGOT) 15   BILIRUBIN 0.5   ALK PHOS 128*         Lab 01/08/24  1759 01/08/24  1558   HSTROP T 33* 34*             Lab 01/08/24  1724   FOLATE >20.00   VITAMIN B 12 482         Brief Urine Lab Results  (Last result in the past 365 days)        Color   Clarity   Blood   Leuk Est   Nitrite   Protein   CREAT   Urine HCG        01/08/24 1512 Yellow   Clear   Large (3+)   Small (1+)   Negative   30 mg/dL (1+)                   Microbiology Results Abnormal       Procedure Component Value - Date/Time    Blood Culture - Blood, Arm, Right [[id number]]  (Normal) Collected: 01/08/24 2220    Lab Status: Preliminary result Specimen: Blood from Arm, Right Updated: 01/11/24 2300     Blood Culture No growth at 3 days    Narrative:      Less than seven (7) mL's of blood was collected.  Insufficient quantity may yield false negative results.    Blood Culture - Blood, Arm, Right [[id number]]  (Normal) Collected: 01/08/24 2215    Lab Status: Preliminary result Specimen: Blood from Arm, Right Updated: 01/11/24 2246     Blood Culture No growth at 3 days    Narrative:      Less than seven (7) mL's of blood was collected.  Insufficient quantity may yield false negative results.    Urine Culture - Urine, Urine, Clean Catch [[id number]] Collected: 01/08/24 1512    Lab Status: Final result Specimen: Urine, Clean Catch Updated: 01/09/24 1215     Urine Culture <10,000 CFU/mL Mixed Katherine Isolated    Narrative:      Specimen contains mixed organisms of questionable pathogenicity suggestive of contamination. If symptoms persist, suggest recollection.  Colonization of the urinary tract without infection is common. Treatment is discouraged unless the patient is symptomatic, pregnant, or undergoing an invasive urologic  procedure.    COVID PRE-OP / PRE-PROCEDURE SCREENING ORDER (NO ISOLATION) - Swab, Nasopharynx [[id number]]  (Normal) Collected: 01/08/24 1601    Lab Status: Final result Specimen: Swab from Nasopharynx Updated: 01/08/24 1649    Narrative:      The following orders were created for panel order COVID PRE-OP / PRE-PROCEDURE SCREENING ORDER (NO ISOLATION) - Swab, Nasopharynx.  Procedure                               Abnormality         Status                     ---------                               -----------         ------                     COVID-19 and FLU A/B PCR...[[id number]]  Normal              Final result                 Please view results for these tests on the individual orders.    COVID-19 and FLU A/B PCR, 1 HR TAT - Swab, Nasopharynx [[id number]]  (Normal) Collected: 01/08/24 1601    Lab Status: Final result Specimen: Swab from Nasopharynx Updated: 01/08/24 1649     COVID19 Not Detected     Influenza A PCR Not Detected     Influenza B PCR Not Detected    Narrative:      Fact sheet for providers: https://www.fda.gov/media/201455/download    Fact sheet for patients: https://www.fda.gov/media/782181/download    Test performed by PCR.            CT Abdomen Pelvis With Contrast    Result Date: 1/11/2024  CT ABDOMEN PELVIS W CONTRAST Date of Exam: 1/11/2024 10:01 AM CST Indication: uterine mass; vaginal bleeding. Comparison: CT abdomen and pelvis 7/23/2022 Technique: Axial CT images were obtained of the abdomen and pelvis following the uneventful intravenous administration of 80 mL Isovue-300. Reconstructed coronal and sagittal images were also obtained. Automated exposure control and iterative construction methods were used. Findings: LUNG BASES:  Unremarkable without mass or infiltrate. LIVER:  Unremarkable parenchyma without focal lesion. BILIARY/GALLBLADDER: The gallbladder is surgically absent. SPLEEN:  Unremarkable PANCREAS: There is prominence to the pancreatic duct in the pancreatic head and body  which appears unchanged. ADRENAL:  Unremarkable KIDNEYS: The left kidney has a low attenuating lesion along the posterior cortex measuring 1.3 cm present on prior study which may represent a hemorrhagic or proteinaceous cyst. No calculus identified. GASTROINTESTINAL/MESENTERY: Evaluation of the gastrointestinal tract demonstrates mild diverticulosis. MESENTERIC VESSELS:  Patent. AORTA/IVC:  Normal caliber. RETROPERITONEUM/LYMPH NODES:  Unremarkable REPRODUCTIVE:  There is a complex cystic lesion in the pelvis measuring 6.2x7.7 cm . While this is not significantly changed in size there is increasing mural nodularity and thickness now measuring  4.2x1.8 cm in maximum diameter. Dystrophic calcification is also noted. BLADDER:  Unremarkable OSSEUS STRUCTURES: There is kyphoplasty at T11 and L1. Multilevel degenerative disc and facet disease at noted. Chronic compression deformity is present at L3. There has been surgical fixation of a right hip fracture with partially threaded screws.     Impression: Impression:   1. 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  2. Additional ancillary findings as described above. Electronically Signed: Rebecca Teran MD  1/11/2024 10:38 AM CST  Workstation ID: [id number]         Current medications:  Scheduled Meds:acetaminophen, 1,000 mg, Oral, Q8H  aspirin, 81 mg, Oral, Daily  budesonide-formoterol, 1 puff, Inhalation, BID - RT  cefTRIAXone, 2,000 mg, Intravenous, Q24H  Diclofenac Sodium, 2 g, Topical, 4x Daily  dilTIAZem CD, 120 mg, Oral, Q24H  doxycycline, 100 mg, Oral, Q12H  famotidine, 40 mg, Oral, Daily  folic acid, 1,000 mcg, Oral, Daily  Lidocaine, 1 patch, Transdermal, Q24H  [Held by provider] methenamine, 1 g, Oral, Q12H  oxybutynin XL, 5 mg, Oral, Daily  senna-docusate sodium, 2 tablet, Oral, BID  sertraline, 25 mg, Oral, Daily  sodium chloride, 10 mL, Intravenous,  Q12H  traMADol, 50 mg, Oral, BID  vitamin B-12, 1,000 mcg, Oral, Daily      Continuous Infusions:   PRN Meds:.  albuterol sulfate HFA    senna-docusate sodium **AND** polyethylene glycol **AND** bisacodyl **AND** bisacodyl    Magnesium Standard Dose Replacement - Follow Nurse / BPA Driven Protocol    melatonin    Morphine **AND** naloxone    nitroglycerin    ondansetron **OR** ondansetron    Potassium Replacement - Follow Nurse / BPA Driven Protocol    sodium chloride    sodium chloride    traMADol    Assessment & Plan   Assessment & Plan     Active Hospital Problems    Diagnosis  POA    **Pelvic fracture [S32.9XXA]  Yes    Sacral fracture, closed [S32.10XA]  Yes    Leukocytosis [D72.829]  Yes    Macrocytic anemia [D53.9]  Yes    COPD (chronic obstructive pulmonary disease) [J44.9]  Yes    Gastroesophageal reflux disease [K21.9]  Yes    Rheumatoid arthritis [M06.9]  Yes    Hypertension [I10]  Yes      Resolved Hospital Problems   No resolved problems to display.        Brief Hospital Course to date:  Pati Carter is a 89 y.o. female with PMH significant for rheumatoid arthritis, osteoporosis, frequent falls, compression fractures of lumbar vertebra, DDD, macular degeneration, stress and urinary incontinence who presents to [facility] ED for low back pain.  Patient had a fall at her assisted living facility, [facility], a week ago, presented to the ED 1/2/2024, and was discharged on tramadol and cefepime for UTI.  Patient presents today for uncontrolled pain and progressive weakness, greater in her left leg.      Pelvic/sacral fracture  Lumbar stenosis  Frequent falls  --Initial fall 1/2/2024, CT head negative for acute intracranial process  --CT lumbar spine  shows minimally displaced sacral fracture at S1-S2, potentially high-grade stenosis at L5-S1 and moderate to marked canal stenosis at L2-L3, L3-L4, L4-L5 which is likely chronic  --Neurosurgery consult for progressive leg weakness, L>R, given potentially  high-grade stenosis L5-S1  -- MRI L spine multilevel degenerative changes most prominent at L3-4 with severe spinal canal stenosis, moderate spinal stenosis L5-S1  --no role for NS, no surgical intervention needed at this time, and will continue with PT/ possible spinal injections outpatient   --Pain control -- add topicals; PRN tramadol and morphine   - Palliative consult -- may need palliative outpatient as well   --Fall precautions  --PT and OT  -- SNF recs      Leukocytosis - improved   ? PNA   - UA ok; CXR with peribronchial thickening with changes in the right lung base with ? Early changes of PNA   - Elevated WBC and procal  - Bcx NGTD  - Cont rocephin/doxy -- planning 5 days of treatment   - IS.     Vaginal bleeding   Uterine mass   - GYN consulted and has signed off   - CT pelvis with cystic mass approximately similar in size from previous imaging. Per GYN, case discussed with GYN/ONC as well, likely that this is a malignancy and will not pursue any intervention at this time due to patients frailty. Vag bleeding is minimal and will monitor. IF there were heavier bleeding, could consider tissue diagnosis and treatment, but for now will continue with conservative management.        Rheumatoid arthritis  --hold home methotrexate     Anemia  --macrocytic, .8  -- B12 low normal, folate ok  --continue home B12, folic acid     Dysphagia   - Speech eval -- patient with comfort diet in 2022. Reviewed with daughter. Will continue comfort diet     HTN  PAD  --continue home diltiazem  --hold home ASA, colchicine  - BP with fair control      GERD  --continue home famotidine     Macular degeneration  --Fall precautions, up with assist, assist with feeding     Urinary incontinence  --hold home methenamine -- currently on antibiotics   --continue oxybutynin as formulary sub for Myrbetriq  --daily I's & O's     Expected Discharge Location and Transportation: SNF soon   Expected Discharge   Expected Discharge Date:  1/12/2024; Expected Discharge Time:      DVT prophylaxis:  No DVT prophylaxis order currently exists.     AM-PAC 6 Clicks Score (PT): 12 (01/12/24 1116)    CODE STATUS:   Code Status and Medical Interventions:   Ordered at: 01/08/24 1710     Medical Intervention Limits:    NO intubation (DNI)     Code Status (Patient has no pulse and is not breathing):    No CPR (Do Not Attempt to Resuscitate)     Medical Interventions (Patient has pulse or is breathing):    Limited Support       Elin Fabian, APRN  01/12/24

## 2024-01-12 NOTE — THERAPY TREATMENT NOTE
Patient Name: Pati Carter  : 1930    MRN: [id number]                              Today's Date: 2024       Admit Date: 2024    Visit Dx:     ICD-10-CM ICD-9-CM   1. Closed fracture of sacrum, unspecified portion of sacrum, initial encounter  S32.10XA 805.6   2. Fall in home, initial encounter  W19.XXXA E888.9    Y92.009 E849.0   3. Generalized weakness  R53.1 780.79   4. 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  Dx 2006- borderline intermittent.    Back pain     Benign colonic polyp 2016    Description: A.  Dx .    Ferny Bonnet syndrome 2020    Chondrocalcinosis     knees    Compression fracture of lumbar vertebra     Constipation     COPD (chronic obstructive pulmonary disease)     Description: A.  Rule out chronic persistent asthma, COPD, or obliterative bronchiolitis.- Butch    COVID-19 virus infection 10/11/2020    CTS (carpal tunnel syndrome)     Degeneration of intervertebral  disc of lumbar region     Description: A.  Diagnosed in April 2013 with advanced multilevel with severe spinal stenosis, followed by Dr. Pillai for pain management.    Gastroesophageal reflux disease     Hearing loss     History of calcium pyrophosphate deposition disease (CPPD)     History of colonoscopy 01/01/1999    NORMAL PER PATIENT     History of mammogram 01/01/2011    NORMAL PER PT     History of Papanicolaou smear of cervix 01/01/2010    NORMAL PER PT     History of varicella     Hyperlipidemia     Description: A.  Dx 2006.    Hypertension     Description: A. Dx 2001.    Macular degeneration     Nocturia     Osteoporosis     Ovarian mass     Dx 8/15- benign left cystic adnexal mass    Overactive bladder     Pelvic floor dysfunction     Rheumatoid arthritis     Description: A.  Diagnosed in 2000 and and followed by Dr. Constantino (now Dr. Kaplan). B.  On methotrexate therapy since 2000. C.  Off low-dose prednisone therapy.    Vitamin D deficiency      Past Surgical History:   Procedure Laterality Date    APPENDECTOMY      CARPAL TUNNEL RELEASE Left 01/01/2003    HISTORY OF NEUROPLASTY DECOMPRESSION MEDIAN NERVE AT CARPAL TUNNEL LEFT    CATARACT EXTRACTION Bilateral 01/01/2009    CHOLECYSTECTOMY  01/01/1962    HIP CANNULATED SCREW PLACEMENT Right 1/25/2020    Procedure: HIP CANNULATED SCREW PLACEMENT RIGHT;  Surgeon: Karlos Blount MD;  [facility]: [facility];  Service: Orthopedics    KNEE ARTHROSCOPY Left 01/01/2001    MENISCAL REPAIR    KYPHOPLASTY  06/18/2015    T11 AND L1 (JOSE A)    PELVIC LAPAROSCOPY  01/01/1996    REMOVAL OF BENIGN UTERINE AND RIGHT OVARIAN TUMORS    SALPINGO OOPHORECTOMY Left 08/26/2015    REMOVAL OF LEFT OVARY AND TUBE (benign cystic mass)      General Information       Row Name 01/12/24 1544          OT Time and Intention    Document Type therapy note (daily note)  -JY     Mode of Treatment occupational therapy;individual therapy  -JY       Row Name 01/12/24 1544          General  Information    Patient Profile Reviewed yes  -JY     Existing Precautions/Restrictions fall;other (see comments)  legally blind/macular degeneration, spinal precautions for comfort; s/p fall w/ sacral ala fx; BUE tremors  -JY     Barriers to Rehab medically complex;previous functional deficit;visual deficit  -JY       Row Name 01/12/24 1540          Cognition    Orientation Status (Cognition) oriented to;person;situation;disoriented to;place;time  -JY       Row Name 01/12/24 1548          Safety Issues, Functional Mobility    Safety Issues Affecting Function (Mobility) awareness of need for assistance;insight into deficits/self-awareness;safety precaution awareness;safety precautions follow-through/compliance;sequencing abilities  -JY     Impairments Affecting Function (Mobility) balance;endurance/activity tolerance;motor control;pain;strength;postural/trunk control;range of motion (ROM);visual/perceptual  -JY     Comment, Safety Issues/Impairments (Mobility) situational confusion, grossly alert; reported significant pain throughout even initially at rest prior to OT interventions  -JY               User Key  (r) = Recorded By, (t) = Taken By, (c) = Cosigned By      Initials Name Provider Type    Nneka Best OT Occupational Therapist                     Mobility/ADL's       Row Name 01/12/24 0235          Bed Mobility    Bed Mobility other (see comments)  u/a to assess bed mobility d/t pt's significant pain (8/10) at rest  -SOL     Comment, (Bed Mobility) u/a to assess bed mobility d/t pt's significant pain (8/10) at rest; verbally reviewed minimizing twisting, bending, lifting to assist w/ pain  -JY       Row Name 01/12/24 3797          Transfers    Comment, (Transfers) not assessed this date; pt declined any EOB or OOB activity d/t pain  -JBUCKY       Row Name 01/12/24 4607          Bed-Chair Transfer    Bed-Chair [place] (Transfers) not tested  -JY       Row Name 01/12/24 1549          Sit-Stand Transfer     Sit-Stand [place] (Transfers) not tested  -[facility] Name 01/12/24 1547          Stand-Sit Transfer    Stand-Sit [place] (Transfers) not tested  -JY       Row Name 01/12/24 1547          Functional Mobility    Functional Mobility- Ind. Level not tested  -     Functional Mobility- Comment defer to PT for specifics  -JY       Row Name 01/12/24 1547          Activities of Daily Living    BADL Assessment/Intervention grooming  -JY       Row Name 01/12/24 1547          Mobility    Extremity Weight-bearing Status right lower extremity  -     Right Lower Extremity (Weight-bearing Status) weight-bearing as tolerated (WBAT)  -[facility] Name 01/12/24 1547          Grooming Assessment/Training    [place] Level (Grooming) wash face, hands;set up;supervision  -     Position (Grooming) supine  -     Comment, (Grooming) spv for provision of cues for initiation and follow through, no physical A  -               User Key  (r) = Recorded By, (t) = Taken By, (c) = Cosigned By      Initials Name Provider Type    Nneka Best OT Occupational Therapist                   Obj/Interventions       Row Name 01/12/24 1549          Elbow/Forearm (Therapeutic Exercise)    Elbow/Forearm (Therapeutic Exercise) AROM (active range of motion)  -     Elbow/Forearm AROM (Therapeutic Exercise) bilateral;flexion;extension;supination;pronation;supine;10 repetitions  -JY       Row Name 01/12/24 1549          Hand (Therapeutic Exercise)    Hand (Therapeutic Exercise) AROM (active range of motion)  -     Hand AROM/AAROM (Therapeutic Exercise) bilateral;AROM (active range of motion);finger flexion;finger extension;10 repetitions  -JY       Row Name 01/12/24 1549          Motor Skills    Therapeutic Exercise elbow/forearm;hand;other (see comments)  facilitated tolerable exercise grossly at elbow and wrist  -[facility] Name 01/12/24 1549          Balance    Comment, Balance pt deferred EOB or OOB mobility this date  thus u/a to assess progressive balance  -JY               User Key  (r) = Recorded By, (t) = Taken By, (c) = Cosigned By      Initials Name Provider Type    Nneka Best, CHRISTIANO Occupational Therapist                   Goals/Plan    No documentation.                  Clinical Impression       Row Name 01/12/24 1551          Pain Assessment    Pretreatment Pain Rating 8/10  -JY     Posttreatment Pain Rating 8/10  -JY     Pain Location - Side/Orientation Right  -J     Pain Location - buttock;back;hip  pelvic region  -JY     Pre/Posttreatment Pain Comment persistent pain reported, 8/10 at rest, reported increase even with hand/face washing; pain limited progressive interventions  -JY     Pain Intervention(s) Rest;Nursing Notified  -JY       Row Name 01/12/24 1551          Plan of Care Review    Plan of Care Reviewed With patient  -J     Progress no change  -J     Outcome Evaluation Pt limited by pain this date, rating at 8/10 at rest. RN notified. Pt declined EOB or OOB activity d/t pain and participated in bed level gentle TE at elbow, hand and face/hand washing. Pt req'd cues and spv after set up for hygiene and cues for TE to reach full available range. Pt continues to be below occuaptional performance limited by decreased activity tolerance, pain, muscle weakness, fatigue. Recommend cont'd IPOT POC if tolerable and SNF if pain managed.  -J       Row Name 01/12/24 1551          Therapy Assessment/Plan (OT)    Rehab Potential (OT) fair, will monitor progress closely  -J     Criteria for Skilled Therapeutic Interventions Met (OT) yes;meets criteria;skilled treatment is necessary  -JY     Therapy Frequency (OT) daily  -JY       Row Name 01/12/24 1551          Therapy Plan Review/Discharge Plan (OT)    Anticipated Discharge Disposition (OT) skilled nursing facility  -J       Row Name 01/12/24 1551          Vital Signs    Pre Systolic BP Rehab 130  -JY     Pre Treatment Diastolic BP 68  -JY     Pretreatment Heart  Rate (beats/min) 85  -JY     Posttreatment Heart Rate (beats/min) 87  -JY     O2 Delivery Pre Treatment room air  -JY     O2 Delivery Intra Treatment room air  -JY     O2 Delivery Post Treatment room air  -JY     Pre Patient Position Supine  -JY     Intra Patient Position Supine  -JY     Post Patient Position Supine  -JY       Row Name 01/12/24 1551          Positioning and Restraints    Post Treatment Position bed  -JY     In Bed notified nsg;supine;call light within reach;encouraged to call for assist;exit alarm on;side rails up x2;SCD pump applied  -JY               User Key  (r) = Recorded By, (t) = Taken By, (c) = Cosigned By      Initials Name Provider Type    Nneka Best, CHRISTIANO Occupational Therapist                   Outcome Measures       Row Name 01/12/24 2459          How much help from another is currently needed...    Putting on and taking off regular lower body clothing? 1  -JY     Bathing (including washing, rinsing, and drying) 2  -JY     Toileting (which includes using toilet bed pan or urinal) 1  -JY     Putting on and taking off regular upper body clothing 1  -JY     Taking care of personal grooming (such as brushing teeth) 2  -JY     Eating meals 2  -JY     AM-PAC 6 Clicks Score (OT) 9  -JY       Row Name 01/12/24 1116          How much help from another person do you currently need...    Turning from your back to your side while in flat bed without using bedrails? 3  -LH     Moving from lying on back to sitting on the side of a flat bed without bedrails? 3  -LH     Moving to and from a bed to a chair (including a wheelchair)? 2  -LH     Standing up from a chair using your arms (e.g., wheelchair, bedside chair)? 2  -LH     Climbing 3-5 steps with a railing? 1  -LH     To walk in hospital room? 1  -LH     AM-PAC 6 Clicks Score (PT) 12  -LH     Highest Level of Mobility Goal 4 --> Transfer to chair/commode  -       Row Name 01/12/24 3401          Functional Assessment    Outcome Measure  Options AM-PAC 6 Clicks Daily Activity (OT)  -SOL               User Key  (r) = Recorded By, (t) = Taken By, (c) = Cosigned By      Initials Name Provider Type    Nneka Best, OT Occupational Therapist    Clay Chaves, PT Physical Therapist                    Occupational Therapy Education       Title: PT OT SLP Therapies (In Progress)       Topic: Occupational Therapy (In Progress)       Point: ADL training (In Progress)       Description:   Instruct learner(s) on proper safety adaptation and remediation techniques during self care or transfers.   Instruct in proper use of assistive devices.                  Learning Progress Summary             Patient Acceptance, E,D, NR by SOL at 1/12/2024 1530    Acceptance, E, NR by TB at 1/10/2024 1449                         Point: Home exercise program (In Progress)       Description:   Instruct learner(s) on appropriate technique for monitoring, assisting and/or progressing therapeutic exercises/activities.                  Learning Progress Summary             Patient Acceptance, E,D, NR by OSL at 1/12/2024 1530                         Point: Precautions (In Progress)       Description:   Instruct learner(s) on prescribed precautions during self-care and functional transfers.                  Learning Progress Summary             Patient Acceptance, E,D, NR by SOL at 1/12/2024 1530    Acceptance, E,TB, NR by SK at 1/12/2024 0651                         Point: Body mechanics (In Progress)       Description:   Instruct learner(s) on proper positioning and spine alignment during self-care, functional mobility activities and/or exercises.                  Learning Progress Summary             Patient Acceptance, E,D, NR by SOL at 1/12/2024 1530                                         User Key       Initials Effective Dates Name Provider Type Discipline    TB 07/11/23 -  Anh Brennan, OT Occupational Therapist OT    SK 06/16/21 -  Kasia David RN  Registered Nurse Nurse    SOL 06/16/21 -  Nneka Kaminski OT Occupational Therapist OT                  OT Recommendation and Plan  Therapy Frequency (OT): daily  Plan of Care Review  Plan of Care Reviewed With: patient  Progress: no change  Outcome Evaluation: Pt limited by pain this date, rating at 8/10 at rest. RN notified. Pt declined EOB or OOB activity d/t pain and participated in bed level gentle TE at elbow, hand and face/hand washing. Pt req'd cues and spv after set up for hygiene and cues for TE to reach full available range. Pt continues to be below occuaptional performance limited by decreased activity tolerance, pain, muscle weakness, fatigue. Recommend cont'd IPOT POC if tolerable and SNF if pain managed.     Time Calculation:         Time Calculation- OT       Row Name 01/12/24 1557             Time Calculation- OT    OT Start Time 1530  -JY      OT Received On 01/12/24  -JY      OT Goal Re-Cert Due Date 01/20/24  -JY         Timed Charges    27567 - OT Therapeutic Exercise Minutes 7  -JY      97172 - OT Self Care/Mgmt Minutes 5  -JY         Total Minutes    Timed Charges Total Minutes 12  -JY       Total Minutes 12  -JY                User Key  (r) = Recorded By, (t) = Taken By, (c) = Cosigned By      Initials Name Provider Type    Nneka Best OT Occupational Therapist                  Therapy Charges for Today       Code Description Service Date Service Provider Modifiers Qty    [id number] HC OT THER PROC EA 15 MIN 1/12/2024 Nneka Kaminski OT GO 1                 Nneka Kaminski OT  1/12/2024

## 2024-01-12 NOTE — PLAN OF CARE
Goal Outcome Evaluation:  Plan of Care Reviewed With: patient        Progress: no change  Outcome Evaluation: Pt limited by pain this date, rating at 8/10 at rest. RN notified. Pt declined EOB or OOB activity d/t pain and participated in bed level gentle TE at elbow, hand and face/hand washing. Pt req'd cues and spv after set up for hygiene and cues for TE to reach full available range. Pt continues to be below occuaptional performance limited by decreased activity tolerance, pain, muscle weakness, fatigue. Recommend cont'd IPOT POC if tolerable and SNF if pain managed.      Anticipated Discharge Disposition (OT): skilled nursing facility

## 2024-01-12 NOTE — NURSING NOTE
Pt had some increased pain today diana with movement.  Pts LBM documented was 1/8/24 and we have given her Dulcolax and Miralax without success.  Pt has been more oriented to person and place today.  No vaginal bleeding noted today.  Daughter and sitter at bedside most of the day today.  Planning on transfer to [facility] on Monday 1-15-24 @ 2:00.

## 2024-01-12 NOTE — DISCHARGE PLACEMENT REQUEST
"Pati Carter (89 y.o. Female)  Referred: Velma Alfaro APRN  PCP:Pallavi Eduardo MD  Diagnosis: Cystic Lesion suspicious for malignancy      Date of Birth   07/26/1930    Social Security Number       Address   [address] [place] [address]    Home Phone   [phone]    MRN   [id number]       Synagogue   None    Marital Status                               Admission Date   1/8/24    Admission Type   Emergency    Admitting Provider   Traci Hebert DO    Attending Provider   Traci Hebert DO    Department, Room/Bed   [facility] 3G, S365/1       Discharge Date       Discharge Disposition       Discharge Destination                                 Attending Provider: Traci Hebert DO    Allergies: Ciprofloxacin, Levofloxacin, Sulfamethoxazole-trimethoprim, Sulfamethoxazole, Trimethoprim    Isolation: None   Infection: None   Code Status: No CPR    Ht: 157.5 cm (62\")   Wt: 52.3 kg (115 lb 3.2 oz)    Admission Cmt: None   Principal Problem: Pelvic fracture [S32.9XXA]                   Active Insurance as of 1/8/2024       Primary Coverage       Payor Plan Insurance Group Employer/Plan Group    MEDICARE MEDICARE A & B        Payor Plan Address Payor Plan Phone Number Payor Plan Fax Number Effective Dates    [address] [phone]  7/1/1995 - None Entered    [facility] [id number]         Subscriber Name Subscriber Birth Date Member ID       PATI CARTER 7/26/1930 [id number]               Secondary Coverage       Payor Plan Insurance Group Employer/Plan Group    [facility]        Payor Plan Address Payor Plan Phone Number Payor Plan Fax Number Effective Dates    [facility] [phone]  1/1/2024 - None Entered    [address]       [facility] [address]         Subscriber Name Subscriber Birth Date Member ID       PATI CARTER 7/26/1930 [id number]                     Emergency Contacts        (Rel.) Home Phone Work Phone " Mobile Phone    OLLIE CARTER (Daughter) -- -- [phone]    Danette Suresh (Daughter) -- -- [phone]              Emergency Contact Information       Name Relation Home Work Mobile    OLLIE CARTER Daughter   [phone]    Danette Suresh   [phone]          Insurance Information                  MEDICARE/MEDICARE A & B Phone: [phone]    Subscriber: Pati Carter Subscriber#: [id number]    Group#: -- Precert#: --        [facility] SUP/Health system HEALTH CARE OPTIONS Phone: [phone]    Subscriber: Pati Carter Subscriber#: [id number]    Group#: -- Precert#: --          Treatment Team         Provider   Role Specialty Contact     Traci Hebert DO  Attending Hospitalist  [phone]       Alison Venegas, PCT  Patient Care Technician --  [phone]       Zenia Lentz PCT  Patient Care Technician Emergency Medicine --     Latia Padron RN  Utilization Manager --  [phone]       Julissa Deras RD  Dietitian Nutrition  [phone]       Elin Fabian APRN  Nurse Practitioner Nurse Practitioner  [phone]       Boo Valiente MD  Consulting Physician Obstetrics and Gynecology  [phone]       Lashay Elise, JAMI  Registered Nurse --  [phone]       Clay Lundberg, PT  Physical Therapist Physical Therapy  [phone]       Amaya Polk MD  Consulting Physician Hospice and Palliative Medicine  [phone]       Suyapa Salamanca, RN  Charge Nurse --  [phone]       Vikki Schumacher RN   --  [phone]   3G/H, Rooms [phone]       Markus Chiu, RRT  Respiratory Therapist Respiratory Therapy  [phone]       Nneka Kaminski, OT  Occupational Therapist Occupational Therapy  [phone]            Current Facility-Administered Medications   Medication Dose Route Frequency Provider Last Rate Last Admin    acetaminophen (TYLENOL) tablet 1,000 mg  1,000 mg Oral Q8H Velma Alfaro APRN   1,000 mg at 01/12/24 0900    albuterol sulfate HFA (PROVENTIL HFA;VENTOLIN HFA;PROAIR HFA)  inhaler 2 puff  2 puff Inhalation Q4H PRN Karen Goff APRN        aspirin EC tablet 81 mg  81 mg Oral Daily Patsy Steel MD   81 mg at 01/12/24 0903    sennosides-docusate (PERICOLACE) 8.6-50 MG per tablet 2 tablet  2 tablet Oral BID Patsy Steel MD   2 tablet at 01/12/24 0902    And    polyethylene glycol (MIRALAX) packet 17 g  17 g Oral Daily PRN Patsy Steel MD   17 g at 01/10/24 0907    And    bisacodyl (DULCOLAX) EC tablet 5 mg  5 mg Oral Daily PRN Patsy Steel MD   5 mg at 01/10/24 0907    And    bisacodyl (DULCOLAX) suppository 10 mg  10 mg Rectal Daily PRN Patsy Steel MD        budesonide-formoterol (SYMBICORT) 160-4.5 MCG/ACT inhaler 1 puff  1 puff Inhalation BID - RT Karen Goff APRN   1 puff at 01/12/24 0931    cefTRIAXone (ROCEPHIN) 2,000 mg in sodium chloride 0.9 % 100 mL IVPB  2,000 mg Intravenous Q24H Traci Hebert  mL/hr at 01/11/24 1705 2,000 mg at 01/11/24 1705    Diclofenac Sodium (VOLTAREN) 1 % gel 2 g  2 g Topical 4x Daily Traci Hebert DO   2 g at 01/12/24 0859    dilTIAZem CD (CARDIZEM CD) 24 hr capsule 120 mg  120 mg Oral Q24H Karen Goff APRN   120 mg at 01/12/24 0859    doxycycline (MONODOX) capsule 100 mg  100 mg Oral Q12H Traci Hebert DO   100 mg at 01/12/24 0903    famotidine (PEPCID) tablet 40 mg  40 mg Oral Daily Karen Goff APRN   40 mg at 01/12/24 0901    folic acid (FOLVITE) tablet 1,000 mcg  1,000 mcg Oral Daily Karen Goff APRN   1,000 mcg at 01/12/24 0901    Lidocaine 4 % 1 patch  1 patch Transdermal Q24H Traci Hebert DO   1 patch at 01/12/24 0904    Magnesium Standard Dose Replacement - Follow Nurse / BPA Driven Protocol   Does not apply KALEIGHN Karen Goff APRN        melatonin tablet 5 mg  5 mg Oral Nightly PRN Karen Goff APRN   5 mg at 01/10/24 2127    [Held by provider] methenamine (HIPREX) tablet 1 g  1 g Oral Q12H Karen Goff APRN        morphine injection 1  mg  1 mg Intravenous Q4H PRN Patsy Steel MD        And    naloxone (NARCAN) injection 0.4 mg  0.4 mg Intravenous Q5 Min PRN Patsy Steel MD        nitroglycerin (NITROSTAT) SL tablet 0.4 mg  0.4 mg Sublingual Q5 Min PRN Karen Goff APRN        ondansetron (ZOFRAN) tablet 4 mg  4 mg Oral Q6H PRN Karen Goff APRN        Or    ondansetron (ZOFRAN) injection 4 mg  4 mg Intravenous Q6H PRN Karen Gfof, APRADALBERTO        oxybutynin XL (DITROPAN-XL) 24 hr tablet 5 mg  5 mg Oral Daily Karen Goff APRN   5 mg at 01/12/24 0901    Potassium Replacement - Follow Nurse / BPA Driven Protocol   Does not apply PRN Karen Goff, APRADALBERTO        sertraline (ZOLOFT) tablet 25 mg  25 mg Oral Daily Patsy Steel MD   25 mg at 01/12/24 0902    sodium chloride 0.9 % flush 10 mL  10 mL Intravenous Q12H Karen Goff, APRN   10 mL at 01/12/24 0905    sodium chloride 0.9 % flush 10 mL  10 mL Intravenous PRN Karen Goff APRN        sodium chloride 0.9 % infusion 40 mL  40 mL Intravenous PRN Karen Goff APRADALBERTO        traMADol (ULTRAM) tablet 50 mg  50 mg Oral BID Velma Alfaro APRN   50 mg at 01/12/24 0902    traMADol (ULTRAM) tablet 50 mg  50 mg Oral Q8H PRN Velma Alfaro APRN        vitamin B-12 (CYANOCOBALAMIN) tablet 1,000 mcg  1,000 mcg Oral Daily Karen Goff APRN   1,000 mcg at 01/12/24 0904        Physician Progress Notes (last 72 hours)        Velma Alfaro APRN at 01/12/24 0914          Palliative Care Daily Progress Note     C/C: Patient reports back pain controlled.     S: Medical record reviewed. Follow up visit for GOC in the context of complex medical care. Events noted. Patient tolerating meds crushed in applesauce. Drinking vanilla Boost. Reports pain controlled.     ROS: +pain, sacral pain, constant ache, worse with movement, unable to use number scale, improves with tramadol. +debility, uses walker at baseline. +decreased appetite. Denies  "shortness of breath, nausea.     O: Code Status:   Code Status and Medical Interventions:   Ordered at: 01/08/24 1710     Medical Intervention Limits:    NO intubation (DNI)     Code Status (Patient has no pulse and is not breathing):    No CPR (Do Not Attempt to Resuscitate)     Medical Interventions (Patient has pulse or is breathing):    Limited Support      Advanced Directives: Advance Directive Status: Patient does not have advance directive   Goals of Care: Ongoing.   Palliative Performance Scale Score: 40%     /84 (BP Location: Right arm, Patient Position: Lying)   Pulse 90   Temp 97.8 °F (36.6 °C) (Oral)   Resp 18   Ht 157.5 cm (62\")   Wt 52.3 kg (115 lb 3.2 oz)   SpO2 94%   BMI 21.07 kg/m²     Intake/Output Summary (Last 24 hours) at 1/12/2024 0914  Last data filed at 1/12/2024 0714  Gross per 24 hour   Intake --   Output 350 ml   Net -350 ml       PE:  General Appearance:    Patient laying in bed, awake, alert, chronically ill appearing, frail, cooperative, NAD   HEENT:    NC/AT, EOMI, anicteric, MMM, face relaxed   Neck:   supple, trachea midline, no JVD   Lungs:     CTA bilat, diminished in bases; respirations regular, even and unlabored; RR 16-18 on exam, on RA    Heart:    RRR, normal S1 and S2, no M/R/G, HR 93   Abdomen:     Normal bowel sounds, soft, nontender, nondistended   G/U:   Deferred   MSK/Extremities:   Wasting, no edema   Pulses:   Pulses palpable and equal bilaterally   Skin:   Warm, dry   Neurologic:   A/Ox2, cooperative, MENARD, weakness   Psych:   Calm, appropriate     Meds: Reviewed and changes noted    Labs:   Results from last 7 days   Lab Units 01/10/24  0647   WBC 10*3/mm3 10.14   HEMOGLOBIN g/dL 10.3*   HEMATOCRIT % 32.2*   PLATELETS 10*3/mm3 251     Results from last 7 days   Lab Units 01/09/24  0428   SODIUM mmol/L 142   POTASSIUM mmol/L 4.7   CHLORIDE mmol/L 105   CO2 mmol/L 26.0   BUN mg/dL 22   CREATININE mg/dL 0.70   GLUCOSE mg/dL 95   CALCIUM mg/dL 8.9     Results " from last 7 days   Lab Units 01/09/24  0428 01/08/24  1558   SODIUM mmol/L 142 138   POTASSIUM mmol/L 4.7 4.4   CHLORIDE mmol/L 105 104   CO2 mmol/L 26.0 24.0   BUN mg/dL 22 24*   CREATININE mg/dL 0.70 0.75   CALCIUM mg/dL 8.9 8.6   BILIRUBIN mg/dL  --  0.5   ALK PHOS U/L  --  128*   ALT (SGPT) U/L  --  8   AST (SGOT) U/L  --  15   GLUCOSE mg/dL 95 102*     Imaging Results (Last 72 Hours)       Procedure Component Value Units Date/Time    CT Abdomen Pelvis With Contrast [[id number]] Collected: 01/11/24 1117     Updated: 01/11/24 1150    Narrative:      CT ABDOMEN PELVIS W CONTRAST    Date of Exam: 1/11/2024 10:01 AM CST    Indication: uterine mass; vaginal bleeding.    Comparison: CT abdomen and pelvis 7/23/2022    Technique: Axial CT images were obtained of the abdomen and pelvis following the uneventful intravenous administration of 80 mL Isovue-300. Reconstructed coronal and sagittal images were also obtained. Automated exposure control and iterative   construction methods were used.      Findings:  LUNG BASES:  Unremarkable without mass or infiltrate.    LIVER:  Unremarkable parenchyma without focal lesion.  BILIARY/GALLBLADDER: The gallbladder is surgically absent.  SPLEEN:  Unremarkable  PANCREAS: There is prominence to the pancreatic duct in the pancreatic head and body which appears unchanged.  ADRENAL:  Unremarkable  KIDNEYS: The left kidney has a low attenuating lesion along the posterior cortex measuring 1.3 cm present on prior study which may represent a hemorrhagic or proteinaceous cyst. No calculus identified.  GASTROINTESTINAL/MESENTERY: Evaluation of the gastrointestinal tract demonstrates mild diverticulosis.  MESENTERIC VESSELS:  Patent.  AORTA/IVC:  Normal caliber.    RETROPERITONEUM/LYMPH NODES:  Unremarkable    REPRODUCTIVE:  There is a complex cystic lesion in the pelvis measuring 6.2x7.7 cm . While this is not significantly changed in size there is increasing mural nodularity and thickness now  measuring  4.2x1.8 cm in maximum diameter. Dystrophic   calcification is also noted.     BLADDER:  Unremarkable    OSSEUS STRUCTURES: There is kyphoplasty at T11 and L1. Multilevel degenerative disc and facet disease at noted. Chronic compression deformity is present at L3. There has been surgical fixation of a right hip fracture with partially threaded screws.        Impression:      Impression:    1. 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      2. Additional ancillary findings as described above.        Electronically Signed: Rebecca Teran MD    1/11/2024 10:38 AM [facility]    Workstation ID: [id number]    MRI Lumbar Spine Without Contrast [[id number]] Collected: 01/09/24 1714     Updated: 01/09/24 1736    Narrative:        MRI LUMBAR SPINE WO CONTRAST    Date of Exam: 1/9/2024 4:05 PM EST    Indication: L5-S1 stenosis.     Comparison: CT lumbar spine from January 2024 and MRI lumbar spine from June 11, 2015    Technique:  Routine multiplanar/multisequence sequence images of the lumbar spine were obtained without contrast administration.        Findings:  The alignment is anatomic. There are compression deformity status post kyphoplasty at T11 and L1. There are mild to moderate chronic compression deformities at T10, T12, L2, L3, and L4. There is marrow edema along the right sacral kori with a fracture   identified on recent prior CT, which is likely subacute. There are degenerative endplate changes at all levels. There is disc desiccation at all levels with moderate degenerative loss of disc height at L3-4 and L4-5. The conus terminates at the L1-2   level. The posterior paravertebral soft tissues are unremarkable.    L1-2: Mild disc osteophyte complex. Mild bilateral facet arthropathy. Mild spinal canal stenosis. Moderate bilateral neural foraminal stenosis.    L2-3: Moderate disc osteophyte complex. Mild right and  moderate left facet arthropathy. Moderate spinal canal stenosis. Moderate right and moderate to severe left neural foraminal stenosis.    L3-4: Moderate disc bulge. Moderate bilateral facet arthropathy with ligamentum flavum infolding. Severe spinal canal stenosis. Moderate bilateral neural foraminal stenosis.    L4-5: Moderate disc bulge. Moderate bilateral facet arthropathy with ligamentum flavum infolding. Moderate spinal canal stenosis. Moderate to severe bilateral neural foraminal stenosis.    L5-S1: Mild disc bulge with mild left paracentral disc protrusion narrowing the left lateral recess. Moderate bilateral facet arthropathy. Moderate spinal canal stenosis. Mild to moderate right and moderate left neural foraminal stenosis.      Impression:      Impression:  1.Multilevel degenerative changes as described above, most prominent at L3-4 where there is severe spinal canal stenosis.  2.Moderate spinal canal stenosis at L5-S1.  3.Marrow edema along right sacral kori with fracture identified on recent prior CT, likely a subacute fracture.        Electronically Signed: Jarrell Page MD    1/9/2024 5:33 PM EST    Workstation ID: [id number]                  Diagnostics: Reviewed    A:   Pelvic fracture    Rheumatoid arthritis    Gastroesophageal reflux disease    Hypertension    COPD (chronic obstructive pulmonary disease)    Sacral fracture, closed    Leukocytosis    Macrocytic anemia     89 y.o. female with RA, sacral fracture.    S/S:   Pain -sacral fracture  -started Tylenol 1000mg PO TID  -scheduled Tramadol 50mg PO BID, in addition to q8 hours prn breakthrough pain   -continue Lidocaine patch     2. Dysphagia -family electing comfort diet per chart review     3. Debility -PT/OT following     4. GOC -DNR/DNI -per review of chart  -no family at bedside  -medications adjusted as above  -called and spoke to Lilian, daughter, on phone    P: Follow up visit for GOC in the context of complex medical decision making.  Called Lilian, daughter, to discuss patient condition. Pain controlled with Tramadol. Daughter with concern that patient will need LTC in the future. Discussed STR followed by LTC with either Palliative services if goal is to return to hospital for further treatment versus hospice services with goal for symptom management at end of life. Daughter in agreement with further information regarding hospice services, hospice consult placed. Daughter in agreement with Palliative referral to follow at STR with transition to LTC to continue to monitor for hospice eligibility.   Palliative Care Team will continue to follow patient. Please do not hesitate to contact us regarding further sx mgmt or GOC needs.  JANIYA Healy  2024  Time spent: 35 minutes      Electronically signed by Velma Alfaro APRN at 24 1205       Traci Hebert DO at 24 1159              [facility] Medicine Services  PROGRESS NOTE    Patient Name: Pati Carter  : 1930  MRN: [id number]    Date of Admission: 2024  Primary Care Physician: Pallavi Eduardo MD    Subjective   Subjective     CC:  Fall, pain    HPI:  No acute events. States she is ok except pain when she sits or moves. Reviewed pending CT.       Objective   Objective     Vital Signs:   Temp:  [97.6 °F (36.4 °C)-98.9 °F (37.2 °C)] 97.7 °F (36.5 °C)  Heart Rate:  [64-93] 75  Resp:  [16-18] 16  BP: (139-156)/(76-89) 150/83     Physical Exam:  Constitutional: No acute distress, awake, alert; frail  HENT: NCAT, mucous membranes moist  Respiratory: diminished; poor air movement but clear  Cardiovascular: RRR, no murmurs, rubs, or gallops  Gastrointestinal: Positive bowel sounds, soft, nontender, nondistended  Musculoskeletal: No bilateral ankle edema  Psychiatric: Appropriate affect, cooperative  Neurologic: weakness lower legs - limited by pain, Cranial Nerves grossly intact to confrontation, speech clear  Skin: No  bryce      Results Reviewed:  LAB RESULTS:      Lab 01/10/24  0647 01/09/24  0428 01/08/24  2224 01/08/24  1759 01/08/24  1558   WBC 10.14 11.93*  --   --  13.58*   HEMOGLOBIN 10.3* 10.8*  --   --  11.0*   HEMATOCRIT 32.2* 33.7*  --   --  34.8   PLATELETS 251 218  --   --  230   NEUTROS ABS  --   --   --   --  11.48*   IMMATURE GRANS (ABS)  --   --   --   --  0.09*   LYMPHS ABS  --   --   --   --  0.95   MONOS ABS  --   --   --   --  0.88   EOS ABS  --   --   --   --  0.12   .5* 105.6*  --   --  105.8*   PROCALCITONIN  --   --   --  0.31*  --    LACTATE  --   --  1.7  --   --          Lab 01/09/24  0428 01/08/24  1558   SODIUM 142 138   POTASSIUM 4.7 4.4   CHLORIDE 105 104   CO2 26.0 24.0   ANION GAP 11.0 10.0   BUN 22 24*   CREATININE 0.70 0.75   EGFR 80.8 74.3   GLUCOSE 95 102*   CALCIUM 8.9 8.6   TSH  --  2.720         Lab 01/08/24  1558   TOTAL PROTEIN 6.6   ALBUMIN 3.5   GLOBULIN 3.1   ALT (SGPT) 8   AST (SGOT) 15   BILIRUBIN 0.5   ALK PHOS 128*         Lab 01/08/24  1759 01/08/24  1558   HSTROP T 33* 34*             Lab 01/08/24  1724   FOLATE >20.00   VITAMIN B 12 482         Brief Urine Lab Results  (Last result in the past 365 days)        Color   Clarity   Blood   Leuk Est   Nitrite   Protein   CREAT   Urine HCG        01/08/24 1512 Yellow   Clear   Large (3+)   Small (1+)   Negative   30 mg/dL (1+)                   Microbiology Results Abnormal       Procedure Component Value - Date/Time    Blood Culture - Blood, Arm, Right [[id number]]  (Normal) Collected: 01/08/24 2220    Lab Status: Preliminary result Specimen: Blood from Arm, Right Updated: 01/10/24 2300     Blood Culture No growth at 2 days    Narrative:      Less than seven (7) mL's of blood was collected.  Insufficient quantity may yield false negative results.    Blood Culture - Blood, Arm, Right [[id number]]  (Normal) Collected: 01/08/24 2215    Lab Status: Preliminary result Specimen: Blood from Arm, Right Updated: 01/10/24 2246     Blood  Culture No growth at 2 days    Narrative:      Less than seven (7) mL's of blood was collected.  Insufficient quantity may yield false negative results.    Urine Culture - Urine, Urine, Clean Catch [[id number]] Collected: 01/08/24 1512    Lab Status: Final result Specimen: Urine, Clean Catch Updated: 01/09/24 1215     Urine Culture <10,000 CFU/mL Mixed Katherine Isolated    Narrative:      Specimen contains mixed organisms of questionable pathogenicity suggestive of contamination. If symptoms persist, suggest recollection.  Colonization of the urinary tract without infection is common. Treatment is discouraged unless the patient is symptomatic, pregnant, or undergoing an invasive urologic procedure.    COVID PRE-OP / PRE-PROCEDURE SCREENING ORDER (NO ISOLATION) - Swab, Nasopharynx [[id number]]  (Normal) Collected: 01/08/24 1601    Lab Status: Final result Specimen: Swab from Nasopharynx Updated: 01/08/24 1649    Narrative:      The following orders were created for panel order COVID PRE-OP / PRE-PROCEDURE SCREENING ORDER (NO ISOLATION) - Swab, Nasopharynx.  Procedure                               Abnormality         Status                     ---------                               -----------         ------                     COVID-19 and FLU A/B PCR...[[id number]]  Normal              Final result                 Please view results for these tests on the individual orders.    COVID-19 and FLU A/B PCR, 1 HR TAT - Swab, Nasopharynx [[id number]]  (Normal) Collected: 01/08/24 1601    Lab Status: Final result Specimen: Swab from Nasopharynx Updated: 01/08/24 1649     COVID19 Not Detected     Influenza A PCR Not Detected     Influenza B PCR Not Detected    Narrative:      Fact sheet for providers: https://www.fda.gov/media/802515/download    Fact sheet for patients: https://www.fda.gov/media/066631/download    Test performed by PCR.            CT Abdomen Pelvis With Contrast    Result Date: 1/11/2024  CT ABDOMEN PELVIS W  CONTRAST Date of Exam: 1/11/2024 10:01 AM CST Indication: uterine mass; vaginal bleeding. Comparison: CT abdomen and pelvis 7/23/2022 Technique: Axial CT images were obtained of the abdomen and pelvis following the uneventful intravenous administration of 80 mL Isovue-300. Reconstructed coronal and sagittal images were also obtained. Automated exposure control and iterative construction methods were used. Findings: LUNG BASES:  Unremarkable without mass or infiltrate. LIVER:  Unremarkable parenchyma without focal lesion. BILIARY/GALLBLADDER: The gallbladder is surgically absent. SPLEEN:  Unremarkable PANCREAS: There is prominence to the pancreatic duct in the pancreatic head and body which appears unchanged. ADRENAL:  Unremarkable KIDNEYS: The left kidney has a low attenuating lesion along the posterior cortex measuring 1.3 cm present on prior study which may represent a hemorrhagic or proteinaceous cyst. No calculus identified. GASTROINTESTINAL/MESENTERY: Evaluation of the gastrointestinal tract demonstrates mild diverticulosis. MESENTERIC VESSELS:  Patent. AORTA/IVC:  Normal caliber. RETROPERITONEUM/LYMPH NODES:  Unremarkable REPRODUCTIVE:  There is a complex cystic lesion in the pelvis measuring 6.2x7.7 cm . While this is not significantly changed in size there is increasing mural nodularity and thickness now measuring  4.2x1.8 cm in maximum diameter. Dystrophic calcification is also noted. BLADDER:  Unremarkable OSSEUS STRUCTURES: There is kyphoplasty at T11 and L1. Multilevel degenerative disc and facet disease at noted. Chronic compression deformity is present at L3. There has been surgical fixation of a right hip fracture with partially threaded screws.     Impression: Impression:   1. 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  2. Additional ancillary findings as described above. Electronically Signed:  Rebecca Teran MD  1/11/2024 10:38 AM [facility]  Workstation ID: [id number]    MRI Lumbar Spine Without Contrast    Result Date: 1/9/2024  MRI LUMBAR SPINE WO CONTRAST Date of Exam: 1/9/2024 4:05 PM EST Indication: L5-S1 stenosis.  Comparison: CT lumbar spine from January 2024 and MRI lumbar spine from June 11, 2015 Technique:  Routine multiplanar/multisequence sequence images of the lumbar spine were obtained without contrast administration.  Findings: The alignment is anatomic. There are compression deformity status post kyphoplasty at T11 and L1. There are mild to moderate chronic compression deformities at T10, T12, L2, L3, and L4. There is marrow edema along the right sacral kori with a fracture identified on recent prior CT, which is likely subacute. There are degenerative endplate changes at all levels. There is disc desiccation at all levels with moderate degenerative loss of disc height at L3-4 and L4-5. The conus terminates at the L1-2 level. The posterior paravertebral soft tissues are unremarkable. L1-2: Mild disc osteophyte complex. Mild bilateral facet arthropathy. Mild spinal canal stenosis. Moderate bilateral neural foraminal stenosis. L2-3: Moderate disc osteophyte complex. Mild right and moderate left facet arthropathy. Moderate spinal canal stenosis. Moderate right and moderate to severe left neural foraminal stenosis. L3-4: Moderate disc bulge. Moderate bilateral facet arthropathy with ligamentum flavum infolding. Severe spinal canal stenosis. Moderate bilateral neural foraminal stenosis. L4-5: Moderate disc bulge. Moderate bilateral facet arthropathy with ligamentum flavum infolding. Moderate spinal canal stenosis. Moderate to severe bilateral neural foraminal stenosis. L5-S1: Mild disc bulge with mild left paracentral disc protrusion narrowing the left lateral recess. Moderate bilateral facet arthropathy. Moderate spinal canal stenosis. Mild to moderate right and moderate left neural foraminal  stenosis.     Impression: Impression: 1.Multilevel degenerative changes as described above, most prominent at L3-4 where there is severe spinal canal stenosis. 2.Moderate spinal canal stenosis at L5-S1. 3.Marrow edema along right sacral kori with fracture identified on recent prior CT, likely a subacute fracture. Electronically Signed: Jarrell Page MD  1/9/2024 5:33 PM EST  Workstation ID: [id number]         Current medications:  Scheduled Meds:aspirin, 81 mg, Oral, Daily  budesonide-formoterol, 1 puff, Inhalation, BID - RT  cefTRIAXone, 2,000 mg, Intravenous, Q24H  Diclofenac Sodium, 2 g, Topical, 4x Daily  dilTIAZem CD, 120 mg, Oral, Q24H  doxycycline, 100 mg, Oral, Q12H  famotidine, 40 mg, Oral, Daily  folic acid, 1,000 mcg, Oral, Daily  Lidocaine, 1 patch, Transdermal, Q24H  [Held by provider] methenamine, 1 g, Oral, Q12H  oxybutynin XL, 5 mg, Oral, Daily  senna-docusate sodium, 2 tablet, Oral, BID  sertraline, 25 mg, Oral, Daily  sodium chloride, 10 mL, Intravenous, Q12H  vitamin B-12, 1,000 mcg, Oral, Daily      Continuous Infusions:   PRN Meds:.  acetaminophen    albuterol sulfate HFA    senna-docusate sodium **AND** polyethylene glycol **AND** bisacodyl **AND** bisacodyl    Magnesium Standard Dose Replacement - Follow Nurse / BPA Driven Protocol    melatonin    Morphine **AND** naloxone    nitroglycerin    ondansetron **OR** ondansetron    Potassium Replacement - Follow Nurse / BPA Driven Protocol    sodium chloride    sodium chloride    traMADol    Assessment & Plan   Assessment & Plan     Active Hospital Problems    Diagnosis  POA    **Pelvic fracture [S32.9XXA]  Yes    Sacral fracture, closed [S32.10XA]  Yes    Leukocytosis [D72.829]  Yes    Macrocytic anemia [D53.9]  Yes    COPD (chronic obstructive pulmonary disease) [J44.9]  Yes    Gastroesophageal reflux disease [K21.9]  Yes    Rheumatoid arthritis [M06.9]  Yes    Hypertension [I10]  Yes      Resolved Hospital Problems   No resolved problems to  display.        Brief Hospital Course to date:  Pati Carter is a 89 y.o. female with PMH significant for rheumatoid arthritis, osteoporosis, frequent falls, compression fractures of lumbar vertebra, DDD, macular degeneration, stress and urinary incontinence who presents to [facility] ED for low back pain.  Patient had a fall at her assisted living facility, [facility], a week ago, presented to the ED 1/2/2024, and was discharged on tramadol and cefepime for UTI.  Patient presents today for uncontrolled pain and progressive weakness, greater in her left leg.      Pelvic/sacral fracture  Lumbar stenosis  Frequent falls  --Initial fall 1/2/2024, CT head negative for acute intracranial process  --CT lumbar spine  shows minimally displaced sacral fracture at S1-S2, potentially high-grade stenosis at L5-S1 and moderate to marked canal stenosis at L2-L3, L3-L4, L4-L5 which is likely chronic  --Neurosurgery consult for progressive leg weakness, L>R, given potentially high-grade stenosis L5-S1  -- MRI L spine multilevel degenerative changes most prominent at L3-4 with severe spinal canal stenosis, moderate spinal stenosis L5-S1  --Pain control -- add topicals; PRN tramadol and morphine   - Palliative consult -- may need palliative outpatient as well   --Fall precautions  --PT and OT  -- SNF recs      Leukocytosis - improved   ? PNA   - UA ok; CXR with peribronchial thickening with changes in the right lung base with ? Early changes of PNA   - Elevated WBC and procal  - Bcx NGTD  - Start rocephin/doxy -- planning 5 days of treatment   - IS.     Vaginal bleeding   Uterine mass   - CT pelvis pending  - GYN consulted -- appreciate recs   - Further recs following imaging; but favoring conservative management      Rheumatoid arthritis  --hold home methotrexate     Anemia  --macrocytic, .8  -- B12 low normal, folate ok  --continue home B12, folic acid     Dysphagia   - Speech eval -- patient with comfort diet in 2022. Reviewed  "with daughter. Will continue comfort diet.      HTN  PAD  --continue home diltiazem  --hold home ASA, colchicine  - BP with fair control      GERD  --continue home famotidine     Macular degeneration  --Fall precautions, up with assist, assist with feeding     Urinary incontinence  --hold home methenamine -- currently on antibiotics   --continue oxybutynin as formulary sub for Myrbetriq  --daily I's & O's     Expected Discharge Location and Transportation: SNF  Expected Discharge   Expected Discharge Date: 1/12/2024; Expected Discharge Time:      DVT prophylaxis:  No DVT prophylaxis order currently exists.     AM-PAC 6 Clicks Score (PT): 11 (01/11/24 0810)    CODE STATUS:   Code Status and Medical Interventions:   Ordered at: 01/08/24 1710     Medical Intervention Limits:    NO intubation (DNI)     Code Status (Patient has no pulse and is not breathing):    No CPR (Do Not Attempt to Resuscitate)     Medical Interventions (Patient has pulse or is breathing):    Limited Support       Traci Hebert DO  01/11/24        Electronically signed by Traci Hebert DO at 01/11/24 1212       Ekaterina Chavez PA-C at 01/10/24 1050       Attestation signed by Bob Davenport MD at 01/10/24 1210    I have reviewed this documentation and agree.                    [facility]    NEUROSURGERY PROGRESS NOTE    01/10/24    Chief Complaint: Fall    Subjective: Stable overnight. Patient reports pain in her right buttock    * No surgery found *    Objective:     /76   Pulse 67   Temp 98 °F (36.7 °C) (Oral)   Resp 17   Ht 157.5 cm (62\")   Wt 52.3 kg (115 lb 3.2 oz)   SpO2 95%   BMI 21.07 kg/m²        Physical Exam  Patient appears comfortable, resting  Awake, alert and oriented x 3  Speech f/c  Opens eyes spontaneously  EOM intact bilaterally  Pupils 3mm reactive bilaterally  Face symmetric  Tongue midline  Strength symmetric. 5/5 bilateral hip flexion, plantar and " dorsiflexion  Normal sensation to light touch in all 4 extremities          Intake/Output Summary (Last 24 hours) at 1/10/2024 1050  Last data filed at 1/10/2024 0900  Gross per 24 hour   Intake 240 ml   Output 100 ml   Net 140 ml         Current Facility-Administered Medications:     acetaminophen (TYLENOL) tablet 650 mg, 650 mg, Oral, Q4H PRN, Karen Goff APRN, 650 mg at 01/09/24 1511    albuterol sulfate HFA (PROVENTIL HFA;VENTOLIN HFA;PROAIR HFA) inhaler 2 puff, 2 puff, Inhalation, Q4H PRN, Karen Goff APRN    aspirin EC tablet 81 mg, 81 mg, Oral, Daily, Patsy Steel MD, 81 mg at 01/10/24 0906    sennosides-docusate (PERICOLACE) 8.6-50 MG per tablet 2 tablet, 2 tablet, Oral, BID, 2 tablet at 01/10/24 0906 **AND** polyethylene glycol (MIRALAX) packet 17 g, 17 g, Oral, Daily PRN, 17 g at 01/10/24 0907 **AND** bisacodyl (DULCOLAX) EC tablet 5 mg, 5 mg, Oral, Daily PRN, 5 mg at 01/10/24 0907 **AND** bisacodyl (DULCOLAX) suppository 10 mg, 10 mg, Rectal, Daily PRN, Patsy Steel MD    budesonide-formoterol (SYMBICORT) 160-4.5 MCG/ACT inhaler 1 puff, 1 puff, Inhalation, BID - RT, Karen Goff APRN, 1 puff at 01/10/24 1011    cefTRIAXone (ROCEPHIN) 2,000 mg in sodium chloride 0.9 % 100 mL IVPB, 2,000 mg, Intravenous, Q24H, Traci Hebert DO, Last Rate: 200 mL/hr at 01/09/24 1703, 2,000 mg at 01/09/24 1703    Diclofenac Sodium (VOLTAREN) 1 % gel 2 g, 2 g, Topical, 4x Daily, Anup, Traci, DO    dilTIAZem CD (CARDIZEM CD) 24 hr capsule 120 mg, 120 mg, Oral, Q24H, Karen Goff APRN, 120 mg at 01/10/24 0907    doxycycline (MONODOX) capsule 100 mg, 100 mg, Oral, Q12H, Traci Hebert DO, 100 mg at 01/10/24 0906    famotidine (PEPCID) tablet 40 mg, 40 mg, Oral, Daily, Karen Goff APRN, 40 mg at 01/10/24 0906    folic acid (FOLVITE) tablet 1,000 mcg, 1,000 mcg, Oral, Daily, Karen Goff APRN, 1,000 mcg at 01/10/24 0906    Lidocaine 4 % 1 patch, 1 patch,  Transdermal, Q24H, Traci Hebert DO, 1 patch at 01/10/24 1021    Magnesium Standard Dose Replacement - Follow Nurse / BPA Driven Protocol, , Does not apply, PRN, Karen Goff, APRN    melatonin tablet 5 mg, 5 mg, Oral, Nightly PRN, Karen Goff, APRN, 5 mg at 01/09/24 2031    [Held by provider] methenamine (HIPREX) tablet 1 g, 1 g, Oral, Q12H, Karen Goff, APRN    morphine injection 1 mg, 1 mg, Intravenous, Q4H PRN **AND** naloxone (NARCAN) injection 0.4 mg, 0.4 mg, Intravenous, Q5 Min PRN, Patsy Steel MD    nitroglycerin (NITROSTAT) SL tablet 0.4 mg, 0.4 mg, Sublingual, Q5 Min PRN, Karen Goff, APRN    ondansetron (ZOFRAN) tablet 4 mg, 4 mg, Oral, Q6H PRN **OR** ondansetron (ZOFRAN) injection 4 mg, 4 mg, Intravenous, Q6H PRN, Karen Goff, APRN    oxybutynin XL (DITROPAN-XL) 24 hr tablet 5 mg, 5 mg, Oral, Daily, Karen Goff, APRN, 5 mg at 01/10/24 0906    Potassium Replacement - Follow Nurse / BPA Driven Protocol, , Does not apply, PRN, Karen Goff, APRN    sertraline (ZOLOFT) tablet 25 mg, 25 mg, Oral, Daily, Patsy Steel MD, 25 mg at 01/10/24 0906    sodium chloride 0.9 % flush 10 mL, 10 mL, Intravenous, Q12H, Karen Goff, APRN, 10 mL at 01/10/24 0911    sodium chloride 0.9 % flush 10 mL, 10 mL, Intravenous, PRN, Karen Goff, APRN    sodium chloride 0.9 % infusion 40 mL, 40 mL, Intravenous, PRN, Karen Goff, APRN    traMADol (ULTRAM) tablet 50 mg, 50 mg, Oral, Q6H PRN, Patsy Steel MD    vitamin B-12 (CYANOCOBALAMIN) tablet 1,000 mcg, 1,000 mcg, Oral, Daily, Karen Goff APRN, 1,000 mcg at 01/10/24 0906    Laboratory Results:        Lab 01/10/24  0647 01/09/24  0428 01/08/24  2224 01/08/24  1759 01/08/24  1558   WBC 10.14 11.93*  --   --  13.58*   HEMOGLOBIN 10.3* 10.8*  --   --  11.0*   HEMATOCRIT 32.2* 33.7*  --   --  34.8   PLATELETS 251 218  --   --  230   NEUTROS ABS  --   --   --   --  11.48*   IMMATURE  GRANS (ABS)  --   --   --   --  0.09*   LYMPHS ABS  --   --   --   --  0.95   MONOS ABS  --   --   --   --  0.88   EOS ABS  --   --   --   --  0.12   .5* 105.6*  --   --  105.8*   PROCALCITONIN  --   --   --  0.31*  --    LACTATE  --   --  1.7  --   --          Lab 01/09/24  0428 01/08/24  1558   SODIUM 142 138   POTASSIUM 4.7 4.4   CHLORIDE 105 104   CO2 26.0 24.0   ANION GAP 11.0 10.0   BUN 22 24*   CREATININE 0.70 0.75   EGFR 80.8 74.3   GLUCOSE 95 102*   CALCIUM 8.9 8.6   TSH  --  2.720         Lab 01/08/24  1558   TOTAL PROTEIN 6.6   ALBUMIN 3.5   GLOBULIN 3.1   ALT (SGPT) 8   AST (SGOT) 15   BILIRUBIN 0.5   ALK PHOS 128*         Lab 01/08/24  1759 01/08/24  1558   HSTROP T 33* 34*             Lab 01/08/24  1724   FOLATE >20.00   VITAMIN B 12 482         Brief Urine Lab Results  (Last result in the past 365 days)        Color   Clarity   Blood   Leuk Est   Nitrite   Protein   CREAT   Urine HCG        01/08/24 1512 Yellow   Clear   Large (3+)   Small (1+)   Negative   30 mg/dL (1+)                 Microbiology Results (last 10 days)       Procedure Component Value - Date/Time    Blood Culture - Blood, Arm, Right [[id number]]  (Normal) Collected: 01/08/24 2220    Lab Status: Preliminary result Specimen: Blood from Arm, Right Updated: 01/09/24 2301     Blood Culture No growth at 24 hours    Narrative:      Less than seven (7) mL's of blood was collected.  Insufficient quantity may yield false negative results.    Blood Culture - Blood, Arm, Right [[id number]]  (Normal) Collected: 01/08/24 2215    Lab Status: Preliminary result Specimen: Blood from Arm, Right Updated: 01/09/24 2247     Blood Culture No growth at 24 hours    Narrative:      Less than seven (7) mL's of blood was collected.  Insufficient quantity may yield false negative results.    COVID PRE-OP / PRE-PROCEDURE SCREENING ORDER (NO ISOLATION) - Swab, Nasopharynx [[id number]]  (Normal) Collected: 01/08/24 5527    Lab Status: Final result Specimen:  Swab from Nasopharynx Updated: 01/08/24 1649    Narrative:      The following orders were created for panel order COVID PRE-OP / PRE-PROCEDURE SCREENING ORDER (NO ISOLATION) - Swab, Nasopharynx.  Procedure                               Abnormality         Status                     ---------                               -----------         ------                     COVID-19 and FLU A/B PCR...[[id number]]  Normal              Final result                 Please view results for these tests on the individual orders.    COVID-19 and FLU A/B PCR, 1 HR TAT - Swab, Nasopharynx [[id number]]  (Normal) Collected: 01/08/24 1601    Lab Status: Final result Specimen: Swab from Nasopharynx Updated: 01/08/24 1649     COVID19 Not Detected     Influenza A PCR Not Detected     Influenza B PCR Not Detected    Narrative:      Fact sheet for providers: https://www.fda.gov/media/742153/download    Fact sheet for patients: https://www.fda.gov/media/322947/download    Test performed by PCR.    Urine Culture - Urine, Urine, Clean Catch [[id number]] Collected: 01/08/24 1512    Lab Status: Final result Specimen: Urine, Clean Catch Updated: 01/09/24 1215     Urine Culture <10,000 CFU/mL Mixed Katherine Isolated    Narrative:      Specimen contains mixed organisms of questionable pathogenicity suggestive of contamination. If symptoms persist, suggest recollection.  Colonization of the urinary tract without infection is common. Treatment is discouraged unless the patient is symptomatic, pregnant, or undergoing an invasive urologic procedure.                     Diagnostic Imaging: I personally reviewed and interpreted the new imaging    Assessment and plan:  This is a 89-year-old female who presented to [facility] with back/tailbone pain after a fall 1 week ago at her assisted living facility.  Patient states most of her pain is in her right buttock which is likely associated with a nondisplaced fracture of the right sacral kori.  She  is neurologically stable on exam.  MRI lumbar spine shows multilevel degenerative changes with spinal canal stenosis worst at L3-4 and L5-S1.  Imaging was reviewed by Dr. Davenport.  Due to patient's age, she is not a candidate for any sort of neurosurgical intervention.  Would recommend outpatient follow-up with pain management for possible injections in her lumbar spine as well as physical therapy.  Please call with any questions or concerns.  Neurosurgery will sign off at this time.      Any copied data from previous notes included in the (1) HPI, (2) PE, (3) MDM and/or Assessment and Plan has been reviewed and accurate as of 01/10/24.    Ekaterina Chavez PA-C  01/10/24  10:50 EST        Electronically signed by Bob Davenport MD at 01/10/24 1210       Traci Hebert DO at 01/10/24 0923              [facility]  PROGRESS NOTE    Patient Name: Pati Carter  : 1930  MRN: [id number]    Date of Admission: 2024  Primary Care Physician: Pallavi Eduardo MD    Subjective   Subjective     CC:  fall    HPI:  No acute events. States she is sleepy. Asking for something topical for her buttock pain.       Objective   Objective     Vital Signs:   Temp:  [97.4 °F (36.3 °C)-98 °F (36.7 °C)] 98 °F (36.7 °C)  Heart Rate:  [81-98] 88  Resp:  [18-19] 18  BP: (142-170)/(76-95) 142/76  Flow (L/min):  [2] 2     Physical Exam:  Constitutional: No acute distress, awake, alert; frail  HENT: NCAT, mucous membranes moist  Respiratory: Clear to auscultation bilaterally, respiratory effort normal   Cardiovascular: RRR, no murmurs, rubs, or gallops  Gastrointestinal: Positive bowel sounds, soft, nontender, nondistended  Musculoskeletal: No bilateral ankle edema  Psychiatric: Appropriate affect, cooperative  Neurologic: strength symmetric in all extremities, Cranial Nerves grossly intact to confrontation, speech clear  Skin: No rashes      Results Reviewed:  LAB RESULTS:      Lab  01/10/24  0647 01/09/24  0428 01/08/24  2224 01/08/24  1759 01/08/24  1558   WBC 10.14 11.93*  --   --  13.58*   HEMOGLOBIN 10.3* 10.8*  --   --  11.0*   HEMATOCRIT 32.2* 33.7*  --   --  34.8   PLATELETS 251 218  --   --  230   NEUTROS ABS  --   --   --   --  11.48*   IMMATURE GRANS (ABS)  --   --   --   --  0.09*   LYMPHS ABS  --   --   --   --  0.95   MONOS ABS  --   --   --   --  0.88   EOS ABS  --   --   --   --  0.12   .5* 105.6*  --   --  105.8*   PROCALCITONIN  --   --   --  0.31*  --    LACTATE  --   --  1.7  --   --          Lab 01/09/24  0428 01/08/24  1558   SODIUM 142 138   POTASSIUM 4.7 4.4   CHLORIDE 105 104   CO2 26.0 24.0   ANION GAP 11.0 10.0   BUN 22 24*   CREATININE 0.70 0.75   EGFR 80.8 74.3   GLUCOSE 95 102*   CALCIUM 8.9 8.6   TSH  --  2.720         Lab 01/08/24  1558   TOTAL PROTEIN 6.6   ALBUMIN 3.5   GLOBULIN 3.1   ALT (SGPT) 8   AST (SGOT) 15   BILIRUBIN 0.5   ALK PHOS 128*         Lab 01/08/24  1759 01/08/24  1558   HSTROP T 33* 34*             Lab 01/08/24  1724   FOLATE >20.00   VITAMIN B 12 482         Brief Urine Lab Results  (Last result in the past 365 days)        Color   Clarity   Blood   Leuk Est   Nitrite   Protein   CREAT   Urine HCG        01/08/24 1512 Yellow   Clear   Large (3+)   Small (1+)   Negative   30 mg/dL (1+)                   Microbiology Results Abnormal       Procedure Component Value - Date/Time    Blood Culture - Blood, Arm, Right [[id number]]  (Normal) Collected: 01/08/24 2220    Lab Status: Preliminary result Specimen: Blood from Arm, Right Updated: 01/09/24 2301     Blood Culture No growth at 24 hours    Narrative:      Less than seven (7) mL's of blood was collected.  Insufficient quantity may yield false negative results.    Blood Culture - Blood, Arm, Right [[id number]]  (Normal) Collected: 01/08/24 2215    Lab Status: Preliminary result Specimen: Blood from Arm, Right Updated: 01/09/24 2247     Blood Culture No growth at 24 hours    Narrative:       Less than seven (7) mL's of blood was collected.  Insufficient quantity may yield false negative results.    Urine Culture - Urine, Urine, Clean Catch [[id number]] Collected: 01/08/24 1512    Lab Status: Final result Specimen: Urine, Clean Catch Updated: 01/09/24 1215     Urine Culture <10,000 CFU/mL Mixed Katherine Isolated    Narrative:      Specimen contains mixed organisms of questionable pathogenicity suggestive of contamination. If symptoms persist, suggest recollection.  Colonization of the urinary tract without infection is common. Treatment is discouraged unless the patient is symptomatic, pregnant, or undergoing an invasive urologic procedure.    COVID PRE-OP / PRE-PROCEDURE SCREENING ORDER (NO ISOLATION) - Swab, Nasopharynx [[id number]]  (Normal) Collected: 01/08/24 1601    Lab Status: Final result Specimen: Swab from Nasopharynx Updated: 01/08/24 1649    Narrative:      The following orders were created for panel order COVID PRE-OP / PRE-PROCEDURE SCREENING ORDER (NO ISOLATION) - Swab, Nasopharynx.  Procedure                               Abnormality         Status                     ---------                               -----------         ------                     COVID-19 and FLU A/B PCR...[[id number]]  Normal              Final result                 Please view results for these tests on the individual orders.    COVID-19 and FLU A/B PCR, 1 HR TAT - Swab, Nasopharynx [[id number]]  (Normal) Collected: 01/08/24 1601    Lab Status: Final result Specimen: Swab from Nasopharynx Updated: 01/08/24 1649     COVID19 Not Detected     Influenza A PCR Not Detected     Influenza B PCR Not Detected    Narrative:      Fact sheet for providers: https://www.fda.gov/media/200325/download    Fact sheet for patients: https://www.fda.gov/media/505147/download    Test performed by PCR.            MRI Lumbar Spine Without Contrast    Result Date: 1/9/2024  MRI LUMBAR SPINE WO CONTRAST Date of Exam: 1/9/2024 4:05 PM EST  Indication: L5-S1 stenosis.  Comparison: CT lumbar spine from January 2024 and MRI lumbar spine from June 11, 2015 Technique:  Routine multiplanar/multisequence sequence images of the lumbar spine were obtained without contrast administration.  Findings: The alignment is anatomic. There are compression deformity status post kyphoplasty at T11 and L1. There are mild to moderate chronic compression deformities at T10, T12, L2, L3, and L4. There is marrow edema along the right sacral kori with a fracture identified on recent prior CT, which is likely subacute. There are degenerative endplate changes at all levels. There is disc desiccation at all levels with moderate degenerative loss of disc height at L3-4 and L4-5. The conus terminates at the L1-2 level. The posterior paravertebral soft tissues are unremarkable. L1-2: Mild disc osteophyte complex. Mild bilateral facet arthropathy. Mild spinal canal stenosis. Moderate bilateral neural foraminal stenosis. L2-3: Moderate disc osteophyte complex. Mild right and moderate left facet arthropathy. Moderate spinal canal stenosis. Moderate right and moderate to severe left neural foraminal stenosis. L3-4: Moderate disc bulge. Moderate bilateral facet arthropathy with ligamentum flavum infolding. Severe spinal canal stenosis. Moderate bilateral neural foraminal stenosis. L4-5: Moderate disc bulge. Moderate bilateral facet arthropathy with ligamentum flavum infolding. Moderate spinal canal stenosis. Moderate to severe bilateral neural foraminal stenosis. L5-S1: Mild disc bulge with mild left paracentral disc protrusion narrowing the left lateral recess. Moderate bilateral facet arthropathy. Moderate spinal canal stenosis. Mild to moderate right and moderate left neural foraminal stenosis.     Impression: Impression: 1.Multilevel degenerative changes as described above, most prominent at L3-4 where there is severe spinal canal stenosis. 2.Moderate spinal canal stenosis at L5-S1.  3.Marrow edema along right sacral kori with fracture identified on recent prior CT, likely a subacute fracture. Electronically Signed: Jarrell Page MD  1/9/2024 5:33 PM EST  Workstation ID: [id number]    XR Chest 1 View    Result Date: 1/8/2024  XR CHEST 1 VW Date of Exam: 1/8/2024 7:18 PM EST Indication: leukocytosis, 91% on room air, diminished lung sounds bases Comparison: 1/2/2024 Findings: Previous thoracic kyphoplasties are noted. The heart shadow is upper limits of normal size. Mild diffuse pulmonary interstitial changes appear stable except in the right lung base, where there is increased peribronchial thickening and interstitial change. No dense lung consolidation effusion or pneumothorax is seen.     Impression: Impression: Increased peribronchial thickening and interstitial change in the right lung base, potentially changes of bronchitis or early changes of pneumonia. Electronically Signed: Isael Lewis MD  1/8/2024 10:11 PM EST  Workstation ID: [id number]    CT Lumbar Spine Without Contrast    Result Date: 1/8/2024  CT LUMBAR SPINE WO CONTRAST Date of Exam: 1/8/2024 3:33 PM EST Indication: sacral pain after a fall 1 week ago. Comparison: The most recent study for potential comparison is an abdomen pelvis CT scan from 7/23/2022. Technique: Axial CT images were obtained of the lumbar spine without contrast administration.  Reconstructed coronal and sagittal images were also obtained. Automated exposure control and iterative construction methods were used. Findings:  Comparing today's study with the sagittal reconstructions of the 7/23/2022 abdominal scan, kyphoplasties at T11 and L1 are again noted. There is mild further loss of height of T12, and the superior endplate of L2. L3 compression deformity appears stable. Inferior endplate deformity of L4 appears stable. In summary, no significant interval change is seen and no clearly acute bony trauma is appreciated here. There is, however, new step-off at  S1-S2, consistent with transverse fracture here that has occurred since the prior exam. No pars defects are seen and no posterior element fracture is seen. Coronal images show no clearly acute bony abnormality of the lumbar spine. Axial bone images show no acute appearing features in the lumbar spine. There is a nondisplaced fracture line extending to the anterior margin of the right sacral kori which appears acute or recent, images 98 through 103. Soft tissue axial images show no evidence of significant stenosis in the included T11-12 or T12-L1 levels. L1-2, canal appears lower limits of normal. L2-3, there is probably moderate or possibly greater canal stenosis due to vertebral osteophytes , facet hypertrophy and facet osteophytes. At L3-4, there is likely marked canal stenosis due to extensive posterior element hypertrophy, ligamentous hypertrophy and vertebral osteophytes. At L4-5, there is moderate canal stenosis due to facet and vertebral osteophytes. L5-S1, there appears to be fairly high-grade stenosis due to a large broad-based disc protrusion as seen on image 80 series 2. No hematoma is seen associated with the patient's nondisplaced right sacral fracture. No pathologic paraspinous mass or acute inflammatory focus is seen.       Impression: Impression: 1. Previous T11 and L1 kyphoplasties, and multilevel vertebral endplate compression deformities, some progression from 2022. No clearly acute lumbar spine trauma is seen. 2. Potentially high-grade stenosis at L5-S1 due to broad-based disc protrusion. Moderate to marked canal stenosis at L2-L3 L3-4 L4-5, which is likely chronic, due to facet and vertebral osteophytes. 3. Minimally displaced sacral fracture, visible at S1-2 on the sagittal images, and nondisplaced fracture visible in the right sacral ala on axial images. Given the patient's osteopenia, a more extensive nondisplaced fracture could be present, but difficult to detect. Electronically Signed: Isael  MD Joshua  1/8/2024 3:51 PM EST  Workstation ID: [id number]    XR Knee 1 or 2 View Left    Result Date: 1/8/2024  XR KNEE 1 OR 2 VW LEFT Date of Exam: 1/8/2024 1:28 PM EST Indication: Injury 1 month ago Comparison: None available. Findings: 2 views. There is osteoporosis. There is diffuse atherosclerotic vascular calcification. There is severe narrowing of the medial and lateral compartments. There is severe narrowing of the patellofemoral compartment. There are no acute or old fractures. There is no callus or periostitis. There is chondrocalcinosis. There is no definite effusion.     Impression: Impression: Degenerative osteoarthritis. Atherosclerosis. No acute process. Electronically Signed: Chika Khanna MD  1/8/2024 1:45 PM EST  Workstation ID: [id number]         Current medications:  Scheduled Meds:aspirin, 81 mg, Oral, Daily  budesonide-formoterol, 1 puff, Inhalation, BID - RT  cefTRIAXone, 2,000 mg, Intravenous, Q24H  dilTIAZem CD, 120 mg, Oral, Q24H  doxycycline, 100 mg, Oral, Q12H  famotidine, 40 mg, Oral, Daily  folic acid, 1,000 mcg, Oral, Daily  [Held by provider] methenamine, 1 g, Oral, Q12H  oxybutynin XL, 5 mg, Oral, Daily  senna-docusate sodium, 2 tablet, Oral, BID  sertraline, 25 mg, Oral, Daily  sodium chloride, 10 mL, Intravenous, Q12H  vitamin B-12, 1,000 mcg, Oral, Daily      Continuous Infusions:   PRN Meds:.  acetaminophen    albuterol sulfate HFA    senna-docusate sodium **AND** polyethylene glycol **AND** bisacodyl **AND** bisacodyl    Magnesium Standard Dose Replacement - Follow Nurse / BPA Driven Protocol    melatonin    Morphine **AND** naloxone    nitroglycerin    ondansetron **OR** ondansetron    Potassium Replacement - Follow Nurse / BPA Driven Protocol    sodium chloride    sodium chloride    traMADol    Assessment & Plan   Assessment & Plan     Active Hospital Problems    Diagnosis  POA    **Pelvic fracture [S32.9XXA]  Yes    Sacral fracture, closed [S32.10XA]  Yes    Leukocytosis  [D72.829]  Yes    Macrocytic anemia [D53.9]  Yes    COPD (chronic obstructive pulmonary disease) [J44.9]  Yes    Gastroesophageal reflux disease [K21.9]  Yes    Rheumatoid arthritis [M06.9]  Yes    Hypertension [I10]  Yes      Resolved Hospital Problems   No resolved problems to display.        Brief Hospital Course to date:  Pati Carter is a 89 y.o. female with PMH significant for rheumatoid arthritis, osteoporosis, frequent falls, compression fractures of lumbar vertebra, DDD, macular degeneration, stress and urinary incontinence who presents to [facility] ED for low back pain.  Patient had a fall at her assisted living facility, [facility], a week ago, presented to the ED 1/2/2024, and was discharged on tramadol and cefepime for UTI.  Patient presents today for uncontrolled pain and progressive weakness, greater in her left leg.      Pelvic/sacral fracture  Lumbar stenosis  Frequent falls  --Initial fall 1/2/2024, CT head negative for acute intracranial process  --CT lumbar spine  shows minimally displaced sacral fracture at S1-S2, potentially high-grade stenosis at L5-S1 and moderate to marked canal stenosis at L2-L3, L3-L4, L4-L5 which is likely chronic  --Neurosurgery consult for progressive leg weakness, L>R, given potentially high-grade stenosis L5-S1  -- MRI L spine multilevel degenerative changes most prominent at L3-4 with severe spinal canal stenosis, moderate spinal stenosis L5-S1  --Pain control -- add topicals; PRN tramadol and morphine   --Fall precautions  --PT and OT   --CM consult, will likely need SNF rehab     Leukocytosis  ? PNA   - UA ok; CXR with peribronchial thickening with changes in the right lung base with ? Early changes of PNA   - Elevated WBC and procal  - Bcx NGTD  - Start rocephin/doxy -- planning 5 days of treatment   - IS.      Rheumatoid arthritis  --hold home methotrexate     Anemia  --macrocytic, .8  -- B12 low normal, folate ok  --continue home cyanocobalamin, folic  acid     Dysphagia   - Speech eval -- patient with comfort diet in . Reviewed with daughter. Will continue comfort diet.      HTN  PAD  --continue home diltiazem  --hold home ASA, colchicine  - BP with fair control      GERD  --continue home famotidine     Macular degeneration  --Fall precautions, up with assist, assist with feeding     Urinary incontinence  --hold home methenamine for elevated CrCl  --continue oxybutynin as formulary sub for Myrbetriq  --daily I's & O's    Expected Discharge Location and Transportation: likely SNF  Expected Discharge   Expected Discharge Date: 1/10/2024; Expected Discharge Time:      DVT prophylaxis:  No DVT prophylaxis order currently exists.     AM-PAC 6 Clicks Score (PT): 11 (24 0900)    CODE STATUS:   Code Status and Medical Interventions:   Ordered at: 24 1710     Medical Intervention Limits:    NO intubation (DNI)     Code Status (Patient has no pulse and is not breathing):    No CPR (Do Not Attempt to Resuscitate)     Medical Interventions (Patient has pulse or is breathing):    Limited Support       Traci Hebert DO  01/10/24        Electronically signed by Traci Hebert DO at 01/10/24 0929       Traci Hebert DO at 24 1536              [facility] Medicine Services  PROGRESS NOTE    Patient Name: Pati Carter  : 1930  MRN: [id number]    Date of Admission: 2024  Primary Care Physician: Pallavi Eduardo MD    Subjective   Subjective     CC:  fall    HPI:  No acute events. Some confusion. States she is tired. Reviewed comfort diet with daughter and she is agreeable. Reviewed pending Neurosurgery eval.       Objective   Objective     Vital Signs:   Temp:  [97.5 °F (36.4 °C)-97.9 °F (36.6 °C)] 97.9 °F (36.6 °C)  Heart Rate:  [80-98] 98  Resp:  [16-19] 19  BP: (140-179)/() 154/86     Physical Exam:  Constitutional: No acute distress, awake, alert  HENT: NCAT, mucous membranes moist  Respiratory:  diminished but clear   Cardiovascular: RRR, no murmurs, rubs, or gallops  Gastrointestinal: Positive bowel sounds, soft, nontender, nondistended  Musculoskeletal: No bilateral ankle edema  Psychiatric: Appropriate affect, cooperative  Neurologic: lower ext weakness, Cranial Nerves grossly intact to confrontation, speech clear  Skin: No rashes      Results Reviewed:  LAB RESULTS:      Lab 01/09/24  0428 01/08/24  2224 01/08/24  1759 01/08/24  1558 01/02/24  1725   WBC 11.93*  --   --  13.58* 9.89   HEMOGLOBIN 10.8*  --   --  11.0* 11.4*   HEMATOCRIT 33.7*  --   --  34.8 36.1   PLATELETS 218  --   --  230 230   NEUTROS ABS  --   --   --  11.48* 8.47*   IMMATURE GRANS (ABS)  --   --   --  0.09* 0.10*   LYMPHS ABS  --   --   --  0.95 0.80   MONOS ABS  --   --   --  0.88 0.44   EOS ABS  --   --   --  0.12 0.04   .6*  --   --  105.8* 107.1*   PROCALCITONIN  --   --  0.31*  --   --    LACTATE  --  1.7  --   --   --          Lab 01/09/24  0428 01/08/24  1558 01/02/24  1725   SODIUM 142 138 142   POTASSIUM 4.7 4.4 4.4   CHLORIDE 105 104 103   CO2 26.0 24.0 27.0   ANION GAP 11.0 10.0 12.0   BUN 22 24* 21   CREATININE 0.70 0.75 0.90   EGFR 80.8 74.3 59.7*   GLUCOSE 95 102* 98   CALCIUM 8.9 8.6 9.4   MAGNESIUM  --   --  1.8   TSH  --  2.720  --          Lab 01/08/24  1558 01/02/24  1725   TOTAL PROTEIN 6.6 6.7   ALBUMIN 3.5 4.5   GLOBULIN 3.1 2.2   ALT (SGPT) 8 10   AST (SGOT) 15 17   BILIRUBIN 0.5 0.4   ALK PHOS 128* 109         Lab 01/08/24  1759 01/08/24  1558 01/02/24  1746   HSTROP T 33* 34* 35*             Lab 01/08/24  1724   FOLATE >20.00   VITAMIN B 12 482         Brief Urine Lab Results  (Last result in the past 365 days)        Color   Clarity   Blood   Leuk Est   Nitrite   Protein   CREAT   Urine HCG        01/08/24 1512 Yellow   Clear   Large (3+)   Small (1+)   Negative   30 mg/dL (1+)                   Microbiology Results Abnormal       Procedure Component Value - Date/Time    Urine Culture - Urine,  Urine, Clean Catch [[id number]] Collected: 01/08/24 1512    Lab Status: Final result Specimen: Urine, Clean Catch Updated: 01/09/24 1215     Urine Culture <10,000 CFU/mL Mixed Katherine Isolated    Narrative:      Specimen contains mixed organisms of questionable pathogenicity suggestive of contamination. If symptoms persist, suggest recollection.  Colonization of the urinary tract without infection is common. Treatment is discouraged unless the patient is symptomatic, pregnant, or undergoing an invasive urologic procedure.    COVID PRE-OP / PRE-PROCEDURE SCREENING ORDER (NO ISOLATION) - Swab, Nasopharynx [[id number]]  (Normal) Collected: 01/08/24 1601    Lab Status: Final result Specimen: Swab from Nasopharynx Updated: 01/08/24 1649    Narrative:      The following orders were created for panel order COVID PRE-OP / PRE-PROCEDURE SCREENING ORDER (NO ISOLATION) - Swab, Nasopharynx.  Procedure                               Abnormality         Status                     ---------                               -----------         ------                     COVID-19 and FLU A/B PCR...[[id number]]  Normal              Final result                 Please view results for these tests on the individual orders.    COVID-19 and FLU A/B PCR, 1 HR TAT - Swab, Nasopharynx [[id number]]  (Normal) Collected: 01/08/24 1601    Lab Status: Final result Specimen: Swab from Nasopharynx Updated: 01/08/24 1649     COVID19 Not Detected     Influenza A PCR Not Detected     Influenza B PCR Not Detected    Narrative:      Fact sheet for providers: https://www.fda.gov/media/853918/download    Fact sheet for patients: https://www.fda.gov/media/224912/download    Test performed by PCR.            XR Chest 1 View    Result Date: 1/8/2024  XR CHEST 1 VW Date of Exam: 1/8/2024 7:18 PM EST Indication: leukocytosis, 91% on room air, diminished lung sounds bases Comparison: 1/2/2024 Findings: Previous thoracic kyphoplasties are noted. The heart shadow is upper  limits of normal size. Mild diffuse pulmonary interstitial changes appear stable except in the right lung base, where there is increased peribronchial thickening and interstitial change. No dense lung consolidation effusion or pneumothorax is seen.     Impression: Impression: Increased peribronchial thickening and interstitial change in the right lung base, potentially changes of bronchitis or early changes of pneumonia. Electronically Signed: Isael Lewis MD  1/8/2024 10:11 PM EST  Workstation ID: [id number]    CT Lumbar Spine Without Contrast    Result Date: 1/8/2024  CT LUMBAR SPINE WO CONTRAST Date of Exam: 1/8/2024 3:33 PM EST Indication: sacral pain after a fall 1 week ago. Comparison: The most recent study for potential comparison is an abdomen pelvis CT scan from 7/23/2022. Technique: Axial CT images were obtained of the lumbar spine without contrast administration.  Reconstructed coronal and sagittal images were also obtained. Automated exposure control and iterative construction methods were used. Findings:  Comparing today's study with the sagittal reconstructions of the 7/23/2022 abdominal scan, kyphoplasties at T11 and L1 are again noted. There is mild further loss of height of T12, and the superior endplate of L2. L3 compression deformity appears stable. Inferior endplate deformity of L4 appears stable. In summary, no significant interval change is seen and no clearly acute bony trauma is appreciated here. There is, however, new step-off at S1-S2, consistent with transverse fracture here that has occurred since the prior exam. No pars defects are seen and no posterior element fracture is seen. Coronal images show no clearly acute bony abnormality of the lumbar spine. Axial bone images show no acute appearing features in the lumbar spine. There is a nondisplaced fracture line extending to the anterior margin of the right sacral kori which appears acute or recent, images 98 through 103. Soft tissue axial  images show no evidence of significant stenosis in the included T11-12 or T12-L1 levels. L1-2, canal appears lower limits of normal. L2-3, there is probably moderate or possibly greater canal stenosis due to vertebral osteophytes , facet hypertrophy and facet osteophytes. At L3-4, there is likely marked canal stenosis due to extensive posterior element hypertrophy, ligamentous hypertrophy and vertebral osteophytes. At L4-5, there is moderate canal stenosis due to facet and vertebral osteophytes. L5-S1, there appears to be fairly high-grade stenosis due to a large broad-based disc protrusion as seen on image 80 series 2. No hematoma is seen associated with the patient's nondisplaced right sacral fracture. No pathologic paraspinous mass or acute inflammatory focus is seen.       Impression: Impression: 1. Previous T11 and L1 kyphoplasties, and multilevel vertebral endplate compression deformities, some progression from 2022. No clearly acute lumbar spine trauma is seen. 2. Potentially high-grade stenosis at L5-S1 due to broad-based disc protrusion. Moderate to marked canal stenosis at L2-L3 L3-4 L4-5, which is likely chronic, due to facet and vertebral osteophytes. 3. Minimally displaced sacral fracture, visible at S1-2 on the sagittal images, and nondisplaced fracture visible in the right sacral ala on axial images. Given the patient's osteopenia, a more extensive nondisplaced fracture could be present, but difficult to detect. Electronically Signed: Isael Lewis MD  1/8/2024 3:51 PM EST  Workstation ID: [id number]    XR Knee 1 or 2 View Left    Result Date: 1/8/2024  XR KNEE 1 OR 2 VW LEFT Date of Exam: 1/8/2024 1:28 PM EST Indication: Injury 1 month ago Comparison: None available. Findings: 2 views. There is osteoporosis. There is diffuse atherosclerotic vascular calcification. There is severe narrowing of the medial and lateral compartments. There is severe narrowing of the patellofemoral compartment. There are no  acute or old fractures. There is no callus or periostitis. There is chondrocalcinosis. There is no definite effusion.     Impression: Impression: Degenerative osteoarthritis. Atherosclerosis. No acute process. Electronically Signed: Chika Khanna MD  1/8/2024 1:45 PM EST  Workstation ID: [id number]         Current medications:  Scheduled Meds:aspirin, 81 mg, Oral, Daily  budesonide-formoterol, 1 puff, Inhalation, BID - RT  dilTIAZem CD, 120 mg, Oral, Q24H  famotidine, 40 mg, Oral, Daily  folic acid, 1,000 mcg, Oral, Daily  [Held by provider] methenamine, 1 g, Oral, Q12H  oxybutynin XL, 5 mg, Oral, Daily  senna-docusate sodium, 2 tablet, Oral, BID  sertraline, 25 mg, Oral, Daily  sodium chloride, 10 mL, Intravenous, Q12H  vitamin B-12, 1,000 mcg, Oral, Daily      Continuous Infusions:   PRN Meds:.  acetaminophen    albuterol sulfate HFA    senna-docusate sodium **AND** polyethylene glycol **AND** bisacodyl **AND** bisacodyl    Magnesium Standard Dose Replacement - Follow Nurse / BPA Driven Protocol    melatonin    Morphine **AND** naloxone    nitroglycerin    ondansetron **OR** ondansetron    Potassium Replacement - Follow Nurse / BPA Driven Protocol    sodium chloride    sodium chloride    traMADol    Assessment & Plan   Assessment & Plan     Active Hospital Problems    Diagnosis  POA    **Pelvic fracture [S32.9XXA]  Yes    Sacral fracture, closed [S32.10XA]  Yes    Leukocytosis [D72.829]  Yes    Macrocytic anemia [D53.9]  Yes    COPD (chronic obstructive pulmonary disease) [J44.9]  Yes    Gastroesophageal reflux disease [K21.9]  Yes    Rheumatoid arthritis [M06.9]  Yes    Hypertension [I10]  Yes      Resolved Hospital Problems   No resolved problems to display.        Brief Hospital Course to date:  Pati Carter is a 89 y.o. female with PMH significant for rheumatoid arthritis, osteoporosis, frequent falls, compression fractures of lumbar vertebra, DDD, macular degeneration, stress and urinary incontinence who  presents to [facility] ED for low back pain.  Patient had a fall at her assisted living facility, [facility], a week ago, presented to the ED 1/2/2024, and was discharged on tramadol and cefepime for UTI.  Patient presents today for uncontrolled pain and progressive weakness, greater in her left leg.      Pelvic/sacral fracture  Lumbar stenosis  Frequent falls  --Initial fall 1/2/2024, CT head negative for acute intracranial process  --CT lumbar spine  shows minimally displaced sacral fracture at S1-S2, potentially high-grade stenosis at L5-S1 and moderate to marked canal stenosis at L2-L3, L3-L4, L4-L5 which is likely chronic  --Neurosurgery consult for progressive leg weakness, L>R, given potentially high-grade stenosis L5-S1  -- MRI L spine pending   --Pain control  --Fall precautions  --PT and OT   --CM consult, will likely need SNF rehab     Leukocytosis  ? PNA   - UA ok; CXR with peribronchial thickening with changes in the right lung base with ? Early changes of PNA   - Elevated WBC and procal  - Bcx pending  - Start rocephin/doxy   - IS.     Rheumatoid arthritis  --hold home methotrexate     Anemia  --macrocytic, .8  -- B12 low normal, folate ok  --continue home cyanocobalamin, folic acid    Dysphagia   - Speech eval -- patient with comfort diet in 2022. Reviewed with daughter. Will continue comfort diet.      HTN  PAD  --continue home diltiazem  --hold home ASA, colchicine     GERD  --continue home famotidine     Macular degeneration  --Fall precautions, up with assist, assist with feeding     Urinary incontinence  --hold home methenamine for elevated CrCl  --continue oxybutynin as formulary sub for Myrbetriq  --daily I's & O's    Expected Discharge Location and Transportation: PT/OT pending   Expected Discharge   Expected Discharge Date: 1/10/2024; Expected Discharge Time:      DVT prophylaxis:  No DVT prophylaxis order currently exists.     AM-PAC 6 Clicks Score (PT): 11 (01/09/24 0900)    CODE STATUS:    Code Status and Medical Interventions:   Ordered at: 01/08/24 1710     Medical Intervention Limits:    NO intubation (DNI)     Code Status (Patient has no pulse and is not breathing):    No CPR (Do Not Attempt to Resuscitate)     Medical Interventions (Patient has pulse or is breathing):    Limited Support       Traci Hebert DO  01/09/24        Electronically signed by Traci Hebert DO at 01/09/24 1550          Consult Notes (last 72 hours)        Velma Alfaro, JANIYA at 01/11/24 1259        Consult Orders    1. Inpatient Palliative Care MD Consult [[id number]] ordered by Traci Hebert DO at 01/11/24 1207                 Palliative Care Initial Consult   Attending Physician: Traci Hebert DO  Referring Provider: Dr. Traci Hebert    Reason for Referral:  assistance with clarification of goals of care and pain    Code Status:   Code Status and Medical Interventions:   Ordered at: 01/08/24 1710     Medical Intervention Limits:    NO intubation (DNI)     Code Status (Patient has no pulse and is not breathing):    No CPR (Do Not Attempt to Resuscitate)     Medical Interventions (Patient has pulse or is breathing):    Limited Support      Advanced Directives:     Family/Support: Lilian Carter (dtr), Danette Suresh (dtr)  Goals of Care: TBD.    HPI: Pati Carter is a 89 y.o. female with PMH significant for rheumatoid arthritis, osteoporosis, frequent falls, compression fracture of lumbar vertebra, DDD, macular degeneration, stress and urinary incontinence, uterine mass, recent ED visit on 1/2 post fall at [facility]. Patient presented to [facility] ED on 1/8 with left leg weakness. Work up revealed MRI of lumbar spine showing multilevel degenerative changes most prominent at L3-4 with severe spinal canal stenosis, moderate spinal stenosis L5-S1. Minimally displaced sacral fracture. Neurosurgery consulted and patient not a candidate for neurosurgical intervention due to age. SLP with chronic pharyngeal  "dysphagia on comfort diet since 2022. Vaginal bleeding noted with history of cystic lesion, CT today showing changes to cystic lesion, reviewed by GYN Oncology with continued recommendations for no further workup due to patient's frailty. Palliative Care consulted for GOC in the context of complex medical decision making and pain management.   Patient alert and oriented to self and location. She reports pain to her buttocks that is a constant ache, worse with increased movement. She is unable to use a number scale at this time. She does state that Tramadol \"helps\" with pain and Tylenol does not have any effect on her pain. She has declined food today but has swallowed applesauce with crushed meds. She is looking forward to discharge. No family at bedside.   Patient has received Tramadol 50mg PO x2 doses in the last 24 hours.     ROS: +pain, sacral pain, constant ache, worse with movement, unable to use number scale, improves with tramadol. +debility, uses walker at baseline. +decreased appetite. Denies shortness of breath, nausea.       Past Medical History:   Diagnosis Date    Anemia     Description: A.  Dx 2006- borderline intermittent.    Back pain     Benign colonic polyp 9/28/2016    Description: A.  Dx 1999.    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  Rule out chronic persistent asthma, COPD, or obliterative bronchiolitis.- Butch    COVID-19 virus infection 10/11/2020    CTS (carpal tunnel syndrome)     Degeneration of intervertebral disc of lumbar region     Description: A.  Diagnosed in April 2013 with advanced multilevel with severe spinal stenosis, followed by Dr. Pillai for pain management.    Gastroesophageal reflux disease     Hearing loss     History of calcium pyrophosphate deposition disease (CPPD)     History of colonoscopy 01/01/1999    NORMAL " PER PATIENT     History of mammogram 01/01/2011    NORMAL PER PT     History of Papanicolaou smear of cervix 01/01/2010    NORMAL PER PT     History of varicella     Hyperlipidemia     Description: A.  Dx 2006.    Hypertension     Description: A. Dx 2001.    Macular degeneration     Nocturia     Osteoporosis     Ovarian mass     Dx 8/15- benign left cystic adnexal mass    Overactive bladder     Pelvic floor dysfunction     Rheumatoid arthritis     Description: A.  Diagnosed in 2000 and and followed by Dr. Constantino (now Dr. Kaplan). B.  On methotrexate therapy since 2000. C.  Off low-dose prednisone therapy.    Vitamin D deficiency      Past Surgical History:   Procedure Laterality Date    APPENDECTOMY      CARPAL TUNNEL RELEASE Left 01/01/2003    HISTORY OF NEUROPLASTY DECOMPRESSION MEDIAN NERVE AT CARPAL TUNNEL LEFT    CATARACT EXTRACTION Bilateral 01/01/2009    CHOLECYSTECTOMY  01/01/1962    HIP CANNULATED SCREW PLACEMENT Right 1/25/2020    Procedure: HIP CANNULATED SCREW PLACEMENT RIGHT;  Surgeon: Karlos Blount MD;  Location: [facility];  Service: Orthopedics    KNEE ARTHROSCOPY Left 01/01/2001    MENISCAL REPAIR    KYPHOPLASTY  06/18/2015    T11 AND L1 (JOSE A)    PELVIC LAPAROSCOPY  01/01/1996    REMOVAL OF BENIGN UTERINE AND RIGHT OVARIAN TUMORS    SALPINGO OOPHORECTOMY Left 08/26/2015    REMOVAL OF LEFT OVARY AND TUBE (benign cystic mass)     Social History     Socioeconomic History    Marital status:    Tobacco Use    Smoking status: Former     Years: .5     Types: Cigarettes    Smokeless tobacco: Never   Vaping Use    Vaping Use: Never used   Substance and Sexual Activity    Alcohol use: No    Drug use: No    Sexual activity: Not Currently     Family History   Problem Relation Age of Onset    Hypertension Mother     Diabetes Mother        Allergies   Allergen Reactions    Ciprofloxacin Other (See Comments) and Unknown - High Severity     Other reaction(s): shaking  HCI TABS/ SHAKING  Other  "reaction(s): shaking  HCI TABS/ SHAKING    Levofloxacin Diarrhea and Unknown - High Severity    Sulfamethoxazole-Trimethoprim Other (See Comments), Rash and Unknown - High Severity     Stomach cramps   Stomach cramps     Sulfamethoxazole Rash    Trimethoprim GI Intolerance, Diarrhea and Rash       Current medication reviewed for route, type, dose and frequency and are current per MAR at time of dictation.    Palliative Performance Scale Score:  40%    /83 (BP Location: Right arm, Patient Position: Lying)   Pulse 75   Temp 97.7 °F (36.5 °C) (Axillary)   Resp 16   Ht 157.5 cm (62\")   Wt 52.3 kg (115 lb 3.2 oz)   SpO2 91%   BMI 21.07 kg/m²     Intake/Output Summary (Last 24 hours) at 1/11/2024 1259  Last data filed at 1/10/2024 1800  Gross per 24 hour   Intake 240 ml   Output 250 ml   Net -10 ml       Physical Exam:    General Appearance:    Patient laying in bed, awake, alert, chronically ill appearing, frail, cooperative, NAD   HEENT:    NC/AT, EOMI, anicteric, MMM, face relaxed   Neck:   supple, trachea midline, no JVD   Lungs:     CTA bilat, diminished in bases; respirations regular, even and unlabored; RR 16-18 on exam, on RA    Heart:    RRR, normal S1 and S2, no M/R/G, HR 82   Abdomen:     Normal bowel sounds, soft, nontender, nondistended   G/U:   Deferred   MSK/Extremities:   Wasting, no edema   Pulses:   Pulses palpable and equal bilaterally   Skin:   Warm, dry   Neurologic:   A/Ox2, cooperative, MENARD, weakness   Psych:   Calm, appropriate         Labs:   Results from last 7 days   Lab Units 01/10/24  0647   WBC 10*3/mm3 10.14   HEMOGLOBIN g/dL 10.3*   HEMATOCRIT % 32.2*   PLATELETS 10*3/mm3 251     Results from last 7 days   Lab Units 01/09/24  0428   SODIUM mmol/L 142   POTASSIUM mmol/L 4.7   CHLORIDE mmol/L 105   CO2 mmol/L 26.0   BUN mg/dL 22   CREATININE mg/dL 0.70   GLUCOSE mg/dL 95   CALCIUM mg/dL 8.9     Results from last 7 days   Lab Units 01/09/24  0428 01/08/24  1558   SODIUM mmol/L 142 " 138   POTASSIUM mmol/L 4.7 4.4   CHLORIDE mmol/L 105 104   CO2 mmol/L 26.0 24.0   BUN mg/dL 22 24*   CREATININE mg/dL 0.70 0.75   CALCIUM mg/dL 8.9 8.6   BILIRUBIN mg/dL  --  0.5   ALK PHOS U/L  --  128*   ALT (SGPT) U/L  --  8   AST (SGOT) U/L  --  15   GLUCOSE mg/dL 95 102*     Imaging Results (Last 72 Hours)       Procedure Component Value Units Date/Time    CT Abdomen Pelvis With Contrast [[id number]] Collected: 01/11/24 1117     Updated: 01/11/24 1150    Narrative:      CT ABDOMEN PELVIS W CONTRAST    Date of Exam: 1/11/2024 10:01 AM CST    Indication: uterine mass; vaginal bleeding.    Comparison: CT abdomen and pelvis 7/23/2022    Technique: Axial CT images were obtained of the abdomen and pelvis following the uneventful intravenous administration of 80 mL Isovue-300. Reconstructed coronal and sagittal images were also obtained. Automated exposure control and iterative   construction methods were used.      Findings:  LUNG BASES:  Unremarkable without mass or infiltrate.    LIVER:  Unremarkable parenchyma without focal lesion.  BILIARY/GALLBLADDER: The gallbladder is surgically absent.  SPLEEN:  Unremarkable  PANCREAS: There is prominence to the pancreatic duct in the pancreatic head and body which appears unchanged.  ADRENAL:  Unremarkable  KIDNEYS: The left kidney has a low attenuating lesion along the posterior cortex measuring 1.3 cm present on prior study which may represent a hemorrhagic or proteinaceous cyst. No calculus identified.  GASTROINTESTINAL/MESENTERY: Evaluation of the gastrointestinal tract demonstrates mild diverticulosis.  MESENTERIC VESSELS:  Patent.  AORTA/IVC:  Normal caliber.    RETROPERITONEUM/LYMPH NODES:  Unremarkable    REPRODUCTIVE:  There is a complex cystic lesion in the pelvis measuring 6.2x7.7 cm . While this is not significantly changed in size there is increasing mural nodularity and thickness now measuring  4.2x1.8 cm in maximum diameter. Dystrophic   calcification is also  noted.     BLADDER:  Unremarkable    OSSEUS STRUCTURES: There is kyphoplasty at T11 and L1. Multilevel degenerative disc and facet disease at noted. Chronic compression deformity is present at L3. There has been surgical fixation of a right hip fracture with partially threaded screws.        Impression:      Impression:    1. 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      2. Additional ancillary findings as described above.        Electronically Signed: Rebecca Teran MD    1/11/2024 10:38 AM [facility]    Workstation ID: [id number]    MRI Lumbar Spine Without Contrast [[id number]] Collected: 01/09/24 1714     Updated: 01/09/24 1736    Narrative:        MRI LUMBAR SPINE WO CONTRAST    Date of Exam: 1/9/2024 4:05 PM EST    Indication: L5-S1 stenosis.     Comparison: CT lumbar spine from January 2024 and MRI lumbar spine from June 11, 2015    Technique:  Routine multiplanar/multisequence sequence images of the lumbar spine were obtained without contrast administration.        Findings:  The alignment is anatomic. There are compression deformity status post kyphoplasty at T11 and L1. There are mild to moderate chronic compression deformities at T10, T12, L2, L3, and L4. There is marrow edema along the right sacral kori with a fracture   identified on recent prior CT, which is likely subacute. There are degenerative endplate changes at all levels. There is disc desiccation at all levels with moderate degenerative loss of disc height at L3-4 and L4-5. The conus terminates at the L1-2   level. The posterior paravertebral soft tissues are unremarkable.    L1-2: Mild disc osteophyte complex. Mild bilateral facet arthropathy. Mild spinal canal stenosis. Moderate bilateral neural foraminal stenosis.    L2-3: Moderate disc osteophyte complex. Mild right and moderate left facet arthropathy. Moderate spinal canal stenosis. Moderate  right and moderate to severe left neural foraminal stenosis.    L3-4: Moderate disc bulge. Moderate bilateral facet arthropathy with ligamentum flavum infolding. Severe spinal canal stenosis. Moderate bilateral neural foraminal stenosis.    L4-5: Moderate disc bulge. Moderate bilateral facet arthropathy with ligamentum flavum infolding. Moderate spinal canal stenosis. Moderate to severe bilateral neural foraminal stenosis.    L5-S1: Mild disc bulge with mild left paracentral disc protrusion narrowing the left lateral recess. Moderate bilateral facet arthropathy. Moderate spinal canal stenosis. Mild to moderate right and moderate left neural foraminal stenosis.      Impression:      Impression:  1.Multilevel degenerative changes as described above, most prominent at L3-4 where there is severe spinal canal stenosis.  2.Moderate spinal canal stenosis at L5-S1.  3.Marrow edema along right sacral kori with fracture identified on recent prior CT, likely a subacute fracture.        Electronically Signed: Jarrell Page MD    1/9/2024 5:33 PM EST    Workstation ID: [id number]    XR Chest 1 View [[id number]] Collected: 01/08/24 2207     Updated: 01/08/24 2214    Narrative:      XR CHEST 1 VW    Date of Exam: 1/8/2024 7:18 PM EST    Indication: leukocytosis, 91% on room air, diminished lung sounds bases    Comparison: 1/2/2024    Findings:  Previous thoracic kyphoplasties are noted. The heart shadow is upper limits of normal size. Mild diffuse pulmonary interstitial changes appear stable except in the right lung base, where there is increased peribronchial thickening and interstitial   change. No dense lung consolidation effusion or pneumothorax is seen.      Impression:      Impression:  Increased peribronchial thickening and interstitial change in the right lung base, potentially changes of bronchitis or early changes of pneumonia.      Electronically Signed: Isael Lewis MD    1/8/2024 10:11 PM EST    Workstation ID: [id number]     CT Lumbar Spine Without Contrast [[id number]] Collected: 01/08/24 1540     Updated: 01/08/24 1554    Narrative:      CT LUMBAR SPINE WO CONTRAST    Date of Exam: 1/8/2024 3:33 PM EST    Indication: sacral pain after a fall 1 week ago.    Comparison: The most recent study for potential comparison is an abdomen pelvis CT scan from 7/23/2022.   Technique: Axial CT images were obtained of the lumbar spine without contrast administration.  Reconstructed coronal and sagittal images were also obtained. Automated exposure control and iterative construction methods were used.      Findings:   Comparing today's study with the sagittal reconstructions of the 7/23/2022 abdominal scan, kyphoplasties at T11 and L1 are again noted. There is mild further loss of height of T12, and the superior endplate of L2. L3 compression deformity appears   stable. Inferior endplate deformity of L4 appears stable. In summary, no significant interval change is seen and no clearly acute bony trauma is appreciated here.    There is, however, new step-off at S1-S2, consistent with transverse fracture here that has occurred since the prior exam. No pars defects are seen and no posterior element fracture is seen. Coronal images show no clearly acute bony abnormality of the   lumbar spine.    Axial bone images show no acute appearing features in the lumbar spine. There is a nondisplaced fracture line extending to the anterior margin of the right sacral kori which appears acute or recent, images 98 through 103.    Soft tissue axial images show no evidence of significant stenosis in the included T11-12 or T12-L1 levels.    L1-2, canal appears lower limits of normal.    L2-3, there is probably moderate or possibly greater canal stenosis due to vertebral osteophytes , facet hypertrophy and facet osteophytes.    At L3-4, there is likely marked canal stenosis due to extensive posterior element hypertrophy, ligamentous hypertrophy and vertebral  osteophytes.    At L4-5, there is moderate canal stenosis due to facet and vertebral osteophytes.    L5-S1, there appears to be fairly high-grade stenosis due to a large broad-based disc protrusion as seen on image 80 series 2. No hematoma is seen associated with the patient's nondisplaced right sacral fracture. No pathologic paraspinous mass or acute   inflammatory focus is seen.          Impression:      Impression:    1. Previous T11 and L1 kyphoplasties, and multilevel vertebral endplate compression deformities, some progression from 2022. No clearly acute lumbar spine trauma is seen.    2. Potentially high-grade stenosis at L5-S1 due to broad-based disc protrusion. Moderate to marked canal stenosis at L2-L3 L3-4 L4-5, which is likely chronic, due to facet and vertebral osteophytes.    3. Minimally displaced sacral fracture, visible at S1-2 on the sagittal images, and nondisplaced fracture visible in the right sacral ala on axial images. Given the patient's osteopenia, a more extensive nondisplaced fracture could be present, but   difficult to detect.        Electronically Signed: Isael Lewis MD    1/8/2024 3:51 PM EST    Workstation ID: [id number]    XR Knee 1 or 2 View Left [[id number]] Collected: 01/08/24 1344     Updated: 01/08/24 1348    Narrative:      XR KNEE 1 OR 2 VW LEFT    Date of Exam: 1/8/2024 1:28 PM EST    Indication: Injury 1 month ago    Comparison: None available.    Findings:  2 views. There is osteoporosis. There is diffuse atherosclerotic vascular calcification. There is severe narrowing of the medial and lateral compartments. There is severe narrowing of the patellofemoral compartment. There are no acute or old fractures.   There is no callus or periostitis. There is chondrocalcinosis. There is no definite effusion.      Impression:      Impression:  Degenerative osteoarthritis. Atherosclerosis. No acute process.      Electronically Signed: Chika Khanna MD    1/8/2024 1:45 PM EST     Workstation ID: [id number]                  Diagnostics: Reviewed    A:   Pelvic fracture    Rheumatoid arthritis    Gastroesophageal reflux disease    Hypertension    COPD (chronic obstructive pulmonary disease)    Sacral fracture, closed    Leukocytosis    Macrocytic anemia     89 y.o. female with RA, sacral fracture.    S/S:   Pain -sacral fracture  -started Tylenol 1000mg PO TID  -scheduled Tramadol 50mg PO BID, in addition to q8 hours prn breakthrough pain   -continue Lidocaine patch    2. Dysphagia -family electing comfort diet per chart review    3. Debility -PT/OT following    4. GOC -DNR/DNI -per review of chart  -no family at bedside  -medications adjusted as above, patient is Palliative appropriate    P: Introduced Palliative Care and services to patient. Patient oriented to self and location. Discussed symptoms and medications adjusted as above. No family at bedside to discuss GOC. Will continue to follow and make medication adjustments as needed.     Thank you for this consult and allowing us to participate in patient's plan of care. Palliative Care Team will continue to follow patient. Please do not hesitate to contact us regarding further symptom management or goals of care needs.  Time: 60 minutes spent reviewing medical and medication records, assessing and examining patient, discussing with family, answering questions, providing some guidance about a plan and documentation of care, and coordinating care with other healthcare members, with > 50% time spent face to face.         JANIYA Healy  2024      Electronically signed by Velma Alfaro APRN at 24 1453       Boo Valiente MD at 24 0937        Consult Orders    1. Inpatient Obstetrics / Gynecology Consult [[id number]] ordered by Traci Hebert DO at 01/10/24 1314                       Ob/Gyn Consult Note     Patient Name: Pati Carter  : 1930   MRN: [id number]   CSN: [id number]     Reason for Consultation:  Postmenopausal Bleeding   Referring Provider: Traci Hebert DO     Subjective     Subjective    History of Present Illness: Pati Carter is a 89 y.o. with a past medical history of a pelvic, likely uterine, mass, rheumatoid arthritis, osteoporosis, frequent falls, compression fractures of lumbar vertebra, macular degeneration, stress and urinary incontinence who presents with weakness after a fall. GYN is consulted due to vaginal bleeding noted by nursing.     Due to patients mental status, all history is obtained from chart review as well as phone conversations with the patient's daughter, Lilian.     The patient's been known to GYN oncology since 2015.  She had a left salpingo-oophorectomy at that time with Dr. Mello for benign ovarian cyst.  In 2022, the patient was admitted after a fall.  A pelvic mass was seen on transvaginal ultrasound.  A CT scan was obtained which showed a dilated endometrial canal.  Gynecologic oncology evaluated her at that time and given the frail condition the patient was in, a plan was made for observation. It seems as though the patient has continued to have intermittent spotting since that time, but no heavy vaginal bleeding.       Past Medical History:   Past Medical History:   Diagnosis Date    Anemia     Description: A.  Dx 2006- borderline intermittent.    Back pain     Benign colonic polyp 9/28/2016    Description: A.  Dx 1999.    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  Rule out chronic persistent asthma, COPD, or obliterative bronchiolitis.- Butch    COVID-19 virus infection 10/11/2020    CTS (carpal tunnel syndrome)     Degeneration of intervertebral disc of lumbar region     Description: A.  Diagnosed in April 2013 with advanced multilevel with severe spinal stenosis, followed by Dr. Pillai for pain management.     Gastroesophageal reflux disease     Hearing loss     History of calcium pyrophosphate deposition disease (CPPD)     History of colonoscopy 01/01/1999    NORMAL PER PATIENT     History of mammogram 01/01/2011    NORMAL PER PT     History of Papanicolaou smear of cervix 01/01/2010    NORMAL PER PT     History of varicella     Hyperlipidemia     Description: A.  Dx 2006.    Hypertension     Description: A. Dx 2001.    Macular degeneration     Nocturia     Osteoporosis     Ovarian mass     Dx 8/15- benign left cystic adnexal mass    Overactive bladder     Pelvic floor dysfunction     Rheumatoid arthritis     Description: A.  Diagnosed in 2000 and and followed by Dr. Constantino (now Dr. Kaplan). B.  On methotrexate therapy since 2000. C.  Off low-dose prednisone therapy.    Vitamin D deficiency        Past Surgical History:   Past Surgical History:   Procedure Laterality Date    APPENDECTOMY      CARPAL TUNNEL RELEASE Left 01/01/2003    HISTORY OF NEUROPLASTY DECOMPRESSION MEDIAN NERVE AT CARPAL TUNNEL LEFT    CATARACT EXTRACTION Bilateral 01/01/2009    CHOLECYSTECTOMY  01/01/1962    HIP CANNULATED SCREW PLACEMENT Right 1/25/2020    Procedure: HIP CANNULATED SCREW PLACEMENT RIGHT;  Surgeon: Karlos Blount MD;  Location: [facility];  Service: Orthopedics    KNEE ARTHROSCOPY Left 01/01/2001    MENISCAL REPAIR    KYPHOPLASTY  06/18/2015    T11 AND L1 (JOSE A)    PELVIC LAPAROSCOPY  01/01/1996    REMOVAL OF BENIGN UTERINE AND RIGHT OVARIAN TUMORS    SALPINGO OOPHORECTOMY Left 08/26/2015    REMOVAL OF LEFT OVARY AND TUBE (benign cystic mass)       Family History:   Family History   Problem Relation Age of Onset    Hypertension Mother     Diabetes Mother        Social History:   Social History     Socioeconomic History    Marital status:    Tobacco Use    Smoking status: Former     Years: .5     Types: Cigarettes    Smokeless tobacco: Never   Vaping Use    Vaping Use: Never used   Substance and Sexual Activity     Alcohol use: No    Drug use: No    Sexual activity: Not Currently       OB History:   OB History   No obstetric history on file.        Medications:     Current Facility-Administered Medications:     acetaminophen (TYLENOL) tablet 650 mg, 650 mg, Oral, Q4H PRN, Karen Goff APRN, 650 mg at 01/09/24 1511    albuterol sulfate HFA (PROVENTIL HFA;VENTOLIN HFA;PROAIR HFA) inhaler 2 puff, 2 puff, Inhalation, Q4H PRN, Karen Goff APRN    aspirin EC tablet 81 mg, 81 mg, Oral, Daily, Patsy Steel MD, 81 mg at 01/11/24 0828    sennosides-docusate (PERICOLACE) 8.6-50 MG per tablet 2 tablet, 2 tablet, Oral, BID, 2 tablet at 01/11/24 0828 **AND** polyethylene glycol (MIRALAX) packet 17 g, 17 g, Oral, Daily PRN, 17 g at 01/10/24 0907 **AND** bisacodyl (DULCOLAX) EC tablet 5 mg, 5 mg, Oral, Daily PRN, 5 mg at 01/10/24 0907 **AND** bisacodyl (DULCOLAX) suppository 10 mg, 10 mg, Rectal, Daily PRN, Patsy Steel MD    budesonide-formoterol (SYMBICORT) 160-4.5 MCG/ACT inhaler 1 puff, 1 puff, Inhalation, BID - RT, Karen Goff APRN, 1 puff at 01/11/24 0847    cefTRIAXone (ROCEPHIN) 2,000 mg in sodium chloride 0.9 % 100 mL IVPB, 2,000 mg, Intravenous, Q24H, Traci Hebert DO, Last Rate: 200 mL/hr at 01/10/24 1747, 2,000 mg at 01/10/24 1747    Diclofenac Sodium (VOLTAREN) 1 % gel 2 g, 2 g, Topical, 4x Daily, Traci Hebert DO, 2 g at 01/11/24 1150    dilTIAZem CD (CARDIZEM CD) 24 hr capsule 120 mg, 120 mg, Oral, Q24H, Karen Goff APRN, 120 mg at 01/11/24 0825    doxycycline (MONODOX) capsule 100 mg, 100 mg, Oral, Q12H, Traci Hebert DO, 100 mg at 01/11/24 0828    famotidine (PEPCID) tablet 40 mg, 40 mg, Oral, Daily, Karen Goff APRN, 40 mg at 01/11/24 0824    folic acid (FOLVITE) tablet 1,000 mcg, 1,000 mcg, Oral, Daily, Karen Goff APRN, 1,000 mcg at 01/11/24 0826    Lidocaine 4 % 1 patch, 1 patch, Transdermal, Q24H, Traci Hebert DO, 1 patch at 01/11/24 0827     Magnesium Standard Dose Replacement - Follow Nurse / BPA Driven Protocol, , Does not apply, PRN, Mendoza Gofft E, APRN    melatonin tablet 5 mg, 5 mg, Oral, Nightly PRN, Karen Goff, APRN, 5 mg at 01/10/24 2127    [Held by provider] methenamine (HIPREX) tablet 1 g, 1 g, Oral, Q12H, Mendoza Gofft E, APRN    morphine injection 1 mg, 1 mg, Intravenous, Q4H PRN **AND** naloxone (NARCAN) injection 0.4 mg, 0.4 mg, Intravenous, Q5 Min PRN, Patsy Steel MD    nitroglycerin (NITROSTAT) SL tablet 0.4 mg, 0.4 mg, Sublingual, Q5 Min PRN, Karen Goff, APRN    ondansetron (ZOFRAN) tablet 4 mg, 4 mg, Oral, Q6H PRN **OR** ondansetron (ZOFRAN) injection 4 mg, 4 mg, Intravenous, Q6H PRN, Karen Goff, APRN    oxybutynin XL (DITROPAN-XL) 24 hr tablet 5 mg, 5 mg, Oral, Daily, Karen Goff, APRN, 5 mg at 01/11/24 0827    Potassium Replacement - Follow Nurse / BPA Driven Protocol, , Does not apply, PRN, Karen Goff E, APRN    sertraline (ZOLOFT) tablet 25 mg, 25 mg, Oral, Daily, Patsy Steel MD, 25 mg at 01/11/24 0826    sodium chloride 0.9 % flush 10 mL, 10 mL, Intravenous, Q12H, Karen Goff E, APRN, 10 mL at 01/11/24 0833    sodium chloride 0.9 % flush 10 mL, 10 mL, Intravenous, PRN, Karen Goff E, APRN    sodium chloride 0.9 % infusion 40 mL, 40 mL, Intravenous, PRN, Karen Goff E, APRN    traMADol (ULTRAM) tablet 50 mg, 50 mg, Oral, Q6H PRN, Patsy Steel MD, 50 mg at 01/11/24 0832    vitamin B-12 (CYANOCOBALAMIN) tablet 1,000 mcg, 1,000 mcg, Oral, Daily, Karen Goff APRN, 1,000 mcg at 01/11/24 0827    Allergies:   Allergies   Allergen Reactions    Ciprofloxacin Other (See Comments) and Unknown - High Severity     Other reaction(s): shaking  HCI TABS/ SHAKING  Other reaction(s): shaking  HCI TABS/ SHAKING    Levofloxacin Diarrhea and Unknown - High Severity    Sulfamethoxazole-Trimethoprim Other (See Comments), Rash and Unknown - High Severity      Stomach cramps   Stomach cramps     Sulfamethoxazole Rash    Trimethoprim GI Intolerance, Diarrhea and Rash       Review of Systems:   Limited due to mentation            Objective   Objective    Physical Exam:  Vital Signs:   Vitals:    01/11/24 0246 01/11/24 0804 01/11/24 0847 01/11/24 1136   BP: 144/81 150/83  157/80   BP Location: Right arm Right arm  Right arm   Patient Position: Lying Lying  Lying   Pulse: 87 64 75 82   Resp: 16 16  16   Temp: 97.6 °F (36.4 °C) 97.7 °F (36.5 °C)  97.9 °F (36.6 °C)   TempSrc: Axillary Axillary  Axillary   SpO2: 93% 94% 91% 90%   Weight:       Height:         BMI: Body mass index is 21.07 kg/m².   Weight:   Wt Readings from Last 3 Encounters:   01/08/24 52.3 kg (115 lb 3.2 oz)   01/02/24 59 kg (130 lb)   08/06/23 59 kg (130 lb)       General: appears stated age  cachectic  pale  slowed mentation   Heart: Not performed.   Lungs: breathing is unlabored   Abdomen: soft, non-tender; no masses  no umbilical or inguinal hernias are present  no hepato-splenomegaly   External Pelvic Exam: Clinical staff was present for exam  External genitalia:  normal external genitalia, no vaginal bleeding noted. No old blood on chuk pad. No blood on labia.        Results:   Results from last 7 days   Lab Units 01/10/24  0647 01/09/24  0428 01/08/24  1558   WBC 10*3/mm3 10.14 11.93* 13.58*   HEMOGLOBIN g/dL 10.3* 10.8* 11.0*   HEMATOCRIT % 32.2* 33.7* 34.8   PLATELETS 10*3/mm3 251 218 230       Results from last 7 days   Lab Units 01/09/24  0428 01/08/24  1558   SODIUM mmol/L 142 138   POTASSIUM mmol/L 4.7 4.4   CHLORIDE mmol/L 105 104   CO2 mmol/L 26.0 24.0   BUN mg/dL 22 24*   CREATININE mg/dL 0.70 0.75   CALCIUM mg/dL 8.9 8.6   BILIRUBIN mg/dL  --  0.5   ALK PHOS U/L  --  128*   ALT (SGPT) U/L  --  8   AST (SGOT) U/L  --  15   GLUCOSE mg/dL 95 102*         Imaging:   Results for orders placed during the hospital encounter of 01/08/24    CT Abdomen Pelvis With Contrast    Narrative  CT ABDOMEN  PELVIS W CONTRAST    Date of Exam: 1/11/2024 10:01 AM CST    Indication: uterine mass; vaginal bleeding.    Comparison: CT abdomen and pelvis 7/23/2022    Technique: Axial CT images were obtained of the abdomen and pelvis following the uneventful intravenous administration of 80 mL Isovue-300. Reconstructed coronal and sagittal images were also obtained. Automated exposure control and iterative  construction methods were used.      Findings:  LUNG BASES:  Unremarkable without mass or infiltrate.    LIVER:  Unremarkable parenchyma without focal lesion.  BILIARY/GALLBLADDER: The gallbladder is surgically absent.  SPLEEN:  Unremarkable  PANCREAS: There is prominence to the pancreatic duct in the pancreatic head and body which appears unchanged.  ADRENAL:  Unremarkable  KIDNEYS: The left kidney has a low attenuating lesion along the posterior cortex measuring 1.3 cm present on prior study which may represent a hemorrhagic or proteinaceous cyst. No calculus identified.  GASTROINTESTINAL/MESENTERY: Evaluation of the gastrointestinal tract demonstrates mild diverticulosis.  MESENTERIC VESSELS:  Patent.  AORTA/IVC:  Normal caliber.    RETROPERITONEUM/LYMPH NODES:  Unremarkable    REPRODUCTIVE:  There is a complex cystic lesion in the pelvis measuring 6.2x7.7 cm . While this is not significantly changed in size there is increasing mural nodularity and thickness now measuring  4.2x1.8 cm in maximum diameter. Dystrophic  calcification is also noted.    BLADDER:  Unremarkable    OSSEUS STRUCTURES: There is kyphoplasty at T11 and L1. Multilevel degenerative disc and facet disease at noted. Chronic compression deformity is present at L3. There has been surgical fixation of a right hip fracture with partially threaded screws.    Impression  Impression:  1. 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    2. Additional ancillary  findings as described above.        Electronically Signed: Rebecca Teran MD  1/11/2024 10:38 AM [facility]  Workstation ID: [id number]          Assessment / Plan          This is a 89 y.o. woman with scant postmenopausal bleeding in the setting of a known uterine mass    Uterine Mass: After discussion with family last night, we agreed on obtaining a follow up CT scan.  The CT scan was obtained this morning and shows minimal change in the overall size of the cystic lesion.  However, there is increasing mural nodularity and thickening now measuring 4.2 x 1.8 cm.  Imaging reviewed with Dr. Mello, GYN oncology, who evaluated the patient last year.  She agrees that the imaging findings likely consistent with malignancy, but her recommendations are unchanged from last year given the patient's frailty.  Given minimal bleeding and her medical co-morbidities, I think the risk of pursuing a tissue diagnosis would drastically outweigh the benefit. If the patient were to develop heavy vaginal bleeding, that calculus could change, but for now would favor conservative management.  Gynecology will sign off at this time.  Please call if we can be of any further assistance.    Plan of care discussed with the patient's daughter Lilian after her CT scan had resulted. Plan of care was discussed with Dr. Mello who offered to see the patient if family desires. Per my discussion with Lilain, that is unnecessary at this time.       Boo Valiente MD  Obstetrics and Gynecology  Date: 01/11/24  Time: 13:34 EST     Electronically signed by Boo Valiente MD at 01/11/24 3636

## 2024-01-13 LAB
BACTERIA SPEC AEROBE CULT: NORMAL
BACTERIA SPEC AEROBE CULT: NORMAL

## 2024-01-13 PROCEDURE — 94799 UNLISTED PULMONARY SVC/PX: CPT

## 2024-01-13 PROCEDURE — 97530 THERAPEUTIC ACTIVITIES: CPT

## 2024-01-13 PROCEDURE — 94761 N-INVAS EAR/PLS OXIMETRY MLT: CPT

## 2024-01-13 PROCEDURE — 25010000002 CEFTRIAXONE PER 250 MG: Performed by: INTERNAL MEDICINE

## 2024-01-13 PROCEDURE — 97110 THERAPEUTIC EXERCISES: CPT

## 2024-01-13 PROCEDURE — 94640 AIRWAY INHALATION TREATMENT: CPT

## 2024-01-13 PROCEDURE — 99232 SBSQ HOSP IP/OBS MODERATE 35: CPT | Performed by: NURSE PRACTITIONER

## 2024-01-13 RX ORDER — IPRATROPIUM BROMIDE AND ALBUTEROL SULFATE 2.5; .5 MG/3ML; MG/3ML
3 SOLUTION RESPIRATORY (INHALATION) EVERY 6 HOURS PRN
Status: DISCONTINUED | OUTPATIENT
Start: 2024-01-13 | End: 2024-01-13

## 2024-01-13 RX ORDER — AMOXICILLIN 250 MG
2 CAPSULE ORAL 2 TIMES DAILY
Status: DISCONTINUED | OUTPATIENT
Start: 2024-01-13 | End: 2024-01-17

## 2024-01-13 RX ORDER — POLYETHYLENE GLYCOL 3350 17 G/17G
17 POWDER, FOR SOLUTION ORAL DAILY
Status: DISCONTINUED | OUTPATIENT
Start: 2024-01-14 | End: 2024-01-17

## 2024-01-13 RX ORDER — BISACODYL 5 MG/1
5 TABLET, DELAYED RELEASE ORAL DAILY PRN
Status: DISCONTINUED | OUTPATIENT
Start: 2024-01-13 | End: 2024-01-17

## 2024-01-13 RX ORDER — BISACODYL 10 MG
10 SUPPOSITORY, RECTAL RECTAL DAILY PRN
Status: DISCONTINUED | OUTPATIENT
Start: 2024-01-13 | End: 2024-01-17

## 2024-01-13 RX ORDER — IPRATROPIUM BROMIDE AND ALBUTEROL SULFATE 2.5; .5 MG/3ML; MG/3ML
3 SOLUTION RESPIRATORY (INHALATION)
Status: DISCONTINUED | OUTPATIENT
Start: 2024-01-13 | End: 2024-01-17 | Stop reason: HOSPADM

## 2024-01-13 RX ADMIN — BUDESONIDE AND FORMOTEROL FUMARATE DIHYDRATE 1 PUFF: 160; 4.5 AEROSOL RESPIRATORY (INHALATION) at 09:32

## 2024-01-13 RX ADMIN — ACETAMINOPHEN 1000 MG: 500 TABLET ORAL at 09:52

## 2024-01-13 RX ADMIN — FOLIC ACID 1000 MCG: 1 TABLET ORAL at 09:52

## 2024-01-13 RX ADMIN — DICLOFENAC SODIUM 2 G: 9.3 GEL TOPICAL at 21:00

## 2024-01-13 RX ADMIN — IPRATROPIUM BROMIDE AND ALBUTEROL SULFATE 3 ML: 2.5; .5 SOLUTION RESPIRATORY (INHALATION) at 13:29

## 2024-01-13 RX ADMIN — LIDOCAINE 1 PATCH: 4 PATCH TOPICAL at 09:57

## 2024-01-13 RX ADMIN — DOXYCYCLINE 100 MG: 100 CAPSULE ORAL at 20:16

## 2024-01-13 RX ADMIN — TRAMADOL HYDROCHLORIDE 50 MG: 50 TABLET, COATED ORAL at 09:51

## 2024-01-13 RX ADMIN — DICLOFENAC SODIUM 2 G: 9.3 GEL TOPICAL at 17:06

## 2024-01-13 RX ADMIN — OXYBUTYNIN CHLORIDE 5 MG: 5 TABLET, EXTENDED RELEASE ORAL at 09:51

## 2024-01-13 RX ADMIN — SERTRALINE HYDROCHLORIDE 25 MG: 25 TABLET ORAL at 09:53

## 2024-01-13 RX ADMIN — FAMOTIDINE 40 MG: 20 TABLET, FILM COATED ORAL at 09:52

## 2024-01-13 RX ADMIN — MAGNESIUM HYDROXIDE 30 ML: 400 SUSPENSION ORAL at 12:09

## 2024-01-13 RX ADMIN — DILTIAZEM HYDROCHLORIDE 120 MG: 120 CAPSULE, COATED, EXTENDED RELEASE ORAL at 09:53

## 2024-01-13 RX ADMIN — SENNOSIDES AND DOCUSATE SODIUM 2 TABLET: 8.6; 5 TABLET ORAL at 20:16

## 2024-01-13 RX ADMIN — CYANOCOBALAMIN TAB 1000 MCG 1000 MCG: 1000 TAB at 09:52

## 2024-01-13 RX ADMIN — TRAMADOL HYDROCHLORIDE 50 MG: 50 TABLET, COATED ORAL at 20:16

## 2024-01-13 RX ADMIN — Medication 10 ML: at 21:00

## 2024-01-13 RX ADMIN — ACETAMINOPHEN 1000 MG: 500 TABLET ORAL at 17:06

## 2024-01-13 RX ADMIN — ASPIRIN 81 MG: 81 TABLET, COATED ORAL at 09:55

## 2024-01-13 RX ADMIN — DICLOFENAC SODIUM 2 G: 9.3 GEL TOPICAL at 12:10

## 2024-01-13 RX ADMIN — CEFTRIAXONE 2000 MG: 2 INJECTION, POWDER, FOR SOLUTION INTRAMUSCULAR; INTRAVENOUS at 17:06

## 2024-01-13 RX ADMIN — DOXYCYCLINE 100 MG: 100 CAPSULE ORAL at 09:52

## 2024-01-13 RX ADMIN — Medication 10 ML: at 12:09

## 2024-01-13 RX ADMIN — DICLOFENAC SODIUM 2 G: 9.3 GEL TOPICAL at 09:59

## 2024-01-13 NOTE — PLAN OF CARE
Goal Outcome Evaluation:  Plan of Care Reviewed With: patient, caregiver        Progress: improving  Outcome Evaluation: Pt continues to be limited by generalized weakness and increased pain with mobility, although demonstrated good effort with therapy. Reviewed BLE strengthening/ROM HEP with pt who demonstrated good understanding. Will continue to progress as able to promote return to PLOF. PT rec SNF at d/c.      Anticipated Discharge Disposition (PT): skilled nursing facility

## 2024-01-13 NOTE — THERAPY TREATMENT NOTE
Patient Name: Pati Carter  : 1930    MRN: [id number]                              Today's Date: 2024       Admit Date: 2024    Visit Dx:     ICD-10-CM ICD-9-CM   1. Closed fracture of sacrum, unspecified portion of sacrum, initial encounter  S32.10XA 805.6   2. Fall in home, initial encounter  W19.XXXA E888.9    Y92.009 E849.0   3. Generalized weakness  R53.1 780.79   4. 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  Dx 2006- borderline intermittent.    Back pain     Benign colonic polyp 2016    Description: A.  Dx .    Ferny Bonnet syndrome 2020    Chondrocalcinosis     knees    Compression fracture of lumbar vertebra     Constipation     COPD (chronic obstructive pulmonary disease)     Description: A.  Rule out chronic persistent asthma, COPD, or obliterative bronchiolitis.- Butch    COVID-19 virus infection 10/11/2020    CTS (carpal tunnel syndrome)     Degeneration of intervertebral  disc of lumbar region     Description: A.  Diagnosed in April 2013 with advanced multilevel with severe spinal stenosis, followed by Dr. Pillai for pain management.    Gastroesophageal reflux disease     Hearing loss     History of calcium pyrophosphate deposition disease (CPPD)     History of colonoscopy 01/01/1999    NORMAL PER PATIENT     History of mammogram 01/01/2011    NORMAL PER PT     History of Papanicolaou smear of cervix 01/01/2010    NORMAL PER PT     History of varicella     Hyperlipidemia     Description: A.  Dx 2006.    Hypertension     Description: A. Dx 2001.    Macular degeneration     Nocturia     Osteoporosis     Ovarian mass     Dx 8/15- benign left cystic adnexal mass    Overactive bladder     Pelvic floor dysfunction     Rheumatoid arthritis     Description: A.  Diagnosed in 2000 and and followed by Dr. Constantino (now Dr. Kaplan). B.  On methotrexate therapy since 2000. C.  Off low-dose prednisone therapy.    Vitamin D deficiency      Past Surgical History:   Procedure Laterality Date    APPENDECTOMY      CARPAL TUNNEL RELEASE Left 01/01/2003    HISTORY OF NEUROPLASTY DECOMPRESSION MEDIAN NERVE AT CARPAL TUNNEL LEFT    CATARACT EXTRACTION Bilateral 01/01/2009    CHOLECYSTECTOMY  01/01/1962    HIP CANNULATED SCREW PLACEMENT Right 1/25/2020    Procedure: HIP CANNULATED SCREW PLACEMENT RIGHT;  Surgeon: Karlos Blount MD;  Location: [facility];  Service: Orthopedics    KNEE ARTHROSCOPY Left 01/01/2001    MENISCAL REPAIR    KYPHOPLASTY  06/18/2015    T11 AND L1 (JOSE A)    PELVIC LAPAROSCOPY  01/01/1996    REMOVAL OF BENIGN UTERINE AND RIGHT OVARIAN TUMORS    SALPINGO OOPHORECTOMY Left 08/26/2015    REMOVAL OF LEFT OVARY AND TUBE (benign cystic mass)      General Information       Row Name 01/13/24 3938          Physical Therapy Time and Intention    Document Type therapy note (daily note)  -ES     Mode of Treatment physical therapy  -ES       Row Name 01/13/24 6607          General Information     Patient Profile Reviewed yes  -ES     Existing Precautions/Restrictions fall;other (see comments)  legally blind/macular degeneration, spinal precautions for comfort; s/p fall w/ sacral ala fx; BUE tremors  -ES     Barriers to Rehab medically complex;previous functional deficit;visual deficit  -ES       Row Name 01/13/24 1331          Cognition    Orientation Status (Cognition) oriented to;person;situation;verbal cues/prompts needed for orientation  -ES       Row Name 01/13/24 1331          Safety Issues, Functional Mobility    Safety Issues Affecting Function (Mobility) awareness of need for assistance;insight into deficits/self-awareness;safety precaution awareness;safety precautions follow-through/compliance;sequencing abilities  -ES     Impairments Affecting Function (Mobility) balance;endurance/activity tolerance;motor control;pain;strength;postural/trunk control;range of motion (ROM);visual/perceptual  -ES               User Key  (r) = Recorded By, (t) = Taken By, (c) = Cosigned By      Initials Name Provider Type    ES Monica Olguin PT Physical Therapist                   Mobility       Row Name 01/13/24 1337          Bed Mobility    Bed Mobility supine-sit  -ES     Supine-Sit Ralston (Bed Mobility) moderate assist (50% patient effort);verbal cues;nonverbal cues (demo/gesture)  -ES     Assistive Device (Bed Mobility) bed rails;draw sheet;head of bed elevated  -ES     Comment, (Bed Mobility) Increased time/effort to complete mobility. v/c and t/c for sequencing/hand placement. Pt with c/o dizziness with transition to upright posture, BP stable  -ES       Row Name 01/13/24 1332          Bed-Chair Transfer    Bed-Chair Ralston (Transfers) moderate assist (50% patient effort);2 person assist;verbal cues  -ES     Assistive Device (Bed-Chair Transfers) other (see comments)  BUE support  -ES     Comment, (Bed-Chair Transfer) SPT from EOB to chair. Demo'd forward flexed posture but no posterior lean.  Decreased weight acceptance to RLE noted.  -ES       Row Name 01/13/24 1334          Sit-Stand Transfer    Sit-Stand Gaston (Transfers) minimum assist (75% patient effort);2 person assist;verbal cues  -ES     Assistive Device (Sit-Stand Transfers) other (see comments)  BUE support  -ES     Comment, (Sit-Stand Transfer) STS x2: from EOB, from chair with Hugh x2 and BUE support. Demo'd decreased weight acceptance to RLE due to pain.  -ES       Row Name 01/13/24 1334          Gait/Stairs (Locomotion)    Gaston Level (Gait) unable to assess  -ES     Comment, (Gait/Stairs) Unable to assess further mobility due to instability with t/f and pain  -ES       Row Name 01/13/24 1334          Mobility    Extremity Weight-bearing Status right lower extremity  -ES     Right Lower Extremity (Weight-bearing Status) weight-bearing as tolerated (WBAT)  -ES               User Key  (r) = Recorded By, (t) = Taken By, (c) = Cosigned By      Initials Name Provider Type    ES Monica Olguin PT Physical Therapist                   Obj/Interventions       Row Name 01/13/24 1337          Motor Skills    Therapeutic Exercise hip;ankle;knee  -ES       Row Name 01/13/24 1337          Hip (Therapeutic Exercise)    Hip (Therapeutic Exercise) AAROM (active assistive range of motion)  -ES     Hip AAROM (Therapeutic Exercise) bilateral;flexion;5 repetitions  -ES       Row Name 01/13/24 1337          Knee (Therapeutic Exercise)    Knee (Therapeutic Exercise) AAROM (active assistive range of motion);strengthening exercise  -ES     Knee AAROM (Therapeutic Exercise) bilateral;flexion;extension;5 repetitions  -ES     Knee Strengthening (Therapeutic Exercise) bilateral;LAQ (long arc quad);10 repetitions  -ES       Row Name 01/13/24 1337          Ankle (Therapeutic Exercise)    Ankle (Therapeutic Exercise) AROM (active range of motion)  -ES     Ankle AROM (Therapeutic Exercise) bilateral;dorsiflexion;plantarflexion;10 repetitions  -ES        Row Name 01/13/24 1337          Balance    Balance Assessment sitting static balance;sitting dynamic balance;sit to stand dynamic balance;standing static balance;standing dynamic balance  -ES     Static Sitting Balance supervision  -ES     Dynamic Sitting Balance contact guard  -ES     Position, Sitting Balance unsupported;sitting in chair;sitting edge of bed  -ES     Sit to Stand Dynamic Balance minimal assist;2-person assist  -ES     Static Standing Balance minimal assist;2-person assist  -ES     Dynamic Standing Balance moderate assist;2-person assist;verbal cues  -ES     Position/Device Used, Standing Balance supported;other (see comments)  BUE support  -ES     Balance Interventions sitting;standing;sit to stand;supported;static;dynamic;occupation based/functional task  -ES     Comment, Balance no LOB  -ES               User Key  (r) = Recorded By, (t) = Taken By, (c) = Cosigned By      Initials Name Provider Type    ES Monica Olguin, PT Physical Therapist                   Goals/Plan    No documentation.                  Clinical Impression       Row Name 01/13/24 8379          Pain    Pain Intervention(s) Repositioned;Ambulation/increased activity  -ES     Additional Documentation Pain Scale: FACES Pre/Post-Treatment (Group)  -ES       Row Name 01/13/24 7385          Pain Scale: FACES Pre/Post-Treatment    Pain: FACES Scale, Pretreatment 2-->hurts little bit  -ES     Posttreatment Pain Rating 4-->hurts little more  -ES     Pain Location - Side/Orientation Right  -ES     Pain Location generalized  -ES     Pain Location - hip;knee  -ES     Pre/Posttreatment Pain Comment RN aware and managing  -ES       Row Name 01/13/24 4784          Plan of Care Review    Plan of Care Reviewed With patient;caregiver  -ES     Progress improving  -ES     Outcome Evaluation Pt continues to be limited by generalized weakness and increased pain with mobility, although demonstrated good effort with therapy. Reviewed BLE  strengthening/ROM HEP with pt who demonstrated good understanding. Will continue to progress as able to promote return to PLOF. PT rec SNF at d/c.  -ES       Row Name 01/13/24 1339          Therapy Assessment/Plan (PT)    Rehab Potential (PT) good, to achieve stated therapy goals  -ES     Criteria for Skilled Interventions Met (PT) yes;meets criteria;skilled treatment is necessary  -ES     Therapy Frequency (PT) daily  -ES       Row Name 01/13/24 1339          Vital Signs    Pre Systolic BP Rehab --  RN cleared for treatment  -ES     O2 Delivery Pre Treatment room air  -ES     O2 Delivery Intra Treatment room air  -ES     O2 Delivery Post Treatment room air  -ES     Pre Patient Position Supine  -ES     Intra Patient Position Standing  -ES     Post Patient Position Sitting  -ES       Row Name 01/13/24 1339          Positioning and Restraints    Pre-Treatment Position in bed  -ES     Post Treatment Position chair  -ES     In Chair notified nsg;reclined;sitting;call light within reach;encouraged to call for assist;exit alarm on;with family/caregiver;waffle cushion;on mechanical lift sling;legs elevated;heels elevated  -ES               User Key  (r) = Recorded By, (t) = Taken By, (c) = Cosigned By      Initials Name Provider Type    ES Monica Olguin, PT Physical Therapist                   Outcome Measures       Row Name 01/13/24 1341 01/13/24 0800       How much help from another person do you currently need...    Turning from your back to your side while in flat bed without using bedrails? 2  -ES 2  -PT    Moving from lying on back to sitting on the side of a flat bed without bedrails? 2  -ES 2  -PT    Moving to and from a bed to a chair (including a wheelchair)? 2  -ES 2  -PT    Standing up from a chair using your arms (e.g., wheelchair, bedside chair)? 2  -ES 2  -PT    Climbing 3-5 steps with a railing? 1  -ES 2  -PT    To walk in hospital room? 1  -ES 2  -PT    AM-PAC 6 Clicks Score (PT) 10  -ES 12  -PT     Highest Level of Mobility Goal 4 --> Transfer to chair/commode  -ES 4 --> Transfer to chair/commode  -PT      Row Name 01/13/24 1341          Functional Assessment    Outcome Measure Options AM-PAC 6 Clicks Basic Mobility (PT)  -ES               User Key  (r) = Recorded By, (t) = Taken By, (c) = Cosigned By      Initials Name Provider Type    Monica Franz, PT Physical Therapist    PT Bridget Samaniego, RN Registered Nurse                                 Physical Therapy Education       Title: PT OT SLP Therapies (In Progress)       Topic: Physical Therapy (Done)       Point: Mobility training (Done)       Learning Progress Summary             Patient Acceptance, E, VU,NR by  at 1/12/2024 1116    Acceptance, E, VU,NR by [facility] at 1/11/2024 1445    Eager, E, VU,DU,NR by  at 1/10/2024 1513    Comment: Educated pt. safety/technique w/bed mobility, transfers, PT POC, log roll technique, spinal precautions   Other Acceptance, E, VU,NR by  at 1/12/2024 1116                         Point: Home exercise program (Done)       Learning Progress Summary             Patient Acceptance, E, VU,NR by  at 1/12/2024 1116   Other Acceptance, E, VU,NR by  at 1/12/2024 1116                         Point: Body mechanics (Done)       Learning Progress Summary             Patient Acceptance, E, VU,NR by  at 1/12/2024 1116    Acceptance, E, VU,NR by [facility] at 1/11/2024 1445    Eager, E, VU,DU,NR by  at 1/10/2024 1513    Comment: Educated pt. safety/technique w/bed mobility, transfers, PT POC, log roll technique, spinal precautions   Other Acceptance, E, VU,NR by  at 1/12/2024 1116                         Point: Precautions (Done)       Learning Progress Summary             Patient Acceptance, E, VU,NR by  at 1/12/2024 1116    Acceptance, E, VU,NR by [facility] at 1/11/2024 1445    Eager, E, VU,DU,NR by  at 1/10/2024 1513    Comment: Educated pt. safety/technique w/bed mobility, transfers, PT POC, log roll technique, spinal  precautions   Other Acceptance, E, VU,NR by  at 1/12/2024 1116                                         User Key       Initials Effective Dates Name Provider Type Discipline     09/21/21 -  Clay Lundberg, PT Physical Therapist PT     06/01/21 -  Maryanne Benitez, PT Physical Therapist PT    [facility] 09/21/23 -  Mimi Sandoval, SANAZ Physical Therapist PT                  PT Recommendation and Plan     Plan of Care Reviewed With: patient, caregiver  Progress: improving  Outcome Evaluation: Pt continues to be limited by generalized weakness and increased pain with mobility, although demonstrated good effort with therapy. Reviewed BLE strengthening/ROM HEP with pt who demonstrated good understanding. Will continue to progress as able to promote return to PLOF. PT rec SNF at d/c.     Time Calculation:         PT Charges       Row Name 01/13/24 1341             Time Calculation    Start Time 1304  -ES      PT Received On 01/13/24  -ES      PT Goal Re-Cert Due Date 01/20/24  -ES         Time Calculation- PT    Total Timed Code Minutes- PT 26 minute(s)  -ES         Timed Charges    87498 - PT Therapeutic Exercise Minutes 10  -ES      90611 - PT Therapeutic Activity Minutes 16  -ES         Total Minutes    Timed Charges Total Minutes 26  -ES       Total Minutes 26  -ES                User Key  (r) = Recorded By, (t) = Taken By, (c) = Cosigned By      Initials Name Provider Type    ES Monica Olguin, SANAZ Physical Therapist                  Therapy Charges for Today       Code Description Service Date Service Provider Modifiers Qty    85220990083 HC PT THER PROC EA 15 MIN 1/13/2024 Monica Olguin, PT GP 1    20410999108 HC PT THERAPEUTIC ACT EA 15 MIN 1/13/2024 Monica Olguin, PT GP 1    44645381320 HC PT THER SUPP EA 15 MIN 1/13/2024 Monica Olguin, PT GP 2            PT G-Codes  Outcome Measure Options: AM-PAC 6 Clicks Basic Mobility (PT)  AM-PAC 6 Clicks Score (PT): 10  AM-PAC 6 Clicks Score (OT): 9  PT Discharge  Summary  Anticipated Discharge Disposition (PT): skilled nursing facility    Monica Olguin, PT  1/13/2024

## 2024-01-13 NOTE — PLAN OF CARE
Goal Outcome Evaluation:  Plan of Care Reviewed With: patient, caregiver        Progress: no change       Pt is alert to self. VSS on 1L NC. Pt is up with 2 assist to BSC. Pt refused to wear purewick. Pt is incontinent.

## 2024-01-13 NOTE — PROGRESS NOTES
"    [facility] Medicine Services  PROGRESS NOTE    Patient Name: Pati Carter  : 1930  MRN: [id number]    Date of Admission: 2024  Primary Care Physician: Pallavi Eduardo MD    Subjective   Subjective     CC:  Fall, pain    HPI:  Resting comfortably in bed. Caregiver in room. Tolerating diet and Boost well. No pain. Still no BM yet. Has had more wheezing and \"congestion\" in her chest.       Objective   Objective     Vital Signs:   Temp:  [97.3 °F (36.3 °C)-98.7 °F (37.1 °C)] 98.3 °F (36.8 °C)  Heart Rate:  [] 86  Resp:  [16-18] 18  BP: (119-168)/(68-97) 132/81  Flow (L/min):  [1] 1     Physical Exam:  Constitutional: No acute distress, awake, alert; frail/ elderly/ blind   HENT: NCAT, mucous membranes moist  Respiratory: upper airway wheezing with decreased bases, normal resp effort   Cardiovascular: RRR, no murmurs, rubs, or gallops  Gastrointestinal: Positive bowel sounds, soft, nontender, nondistended  Musculoskeletal: No bilateral ankle edema  Psychiatric: Appropriate affect, cooperative  Neurologic: Oriented to person, MENARD with weakness in lower extremities, speech clear  Skin: No rashes      Results Reviewed:  LAB RESULTS:      Lab 01/10/24  0647 24  0428 24  2224 24  1759 24  1558   WBC 10.14 11.93*  --   --  13.58*   HEMOGLOBIN 10.3* 10.8*  --   --  11.0*   HEMATOCRIT 32.2* 33.7*  --   --  34.8   PLATELETS 251 218  --   --  230   NEUTROS ABS  --   --   --   --  11.48*   IMMATURE GRANS (ABS)  --   --   --   --  0.09*   LYMPHS ABS  --   --   --   --  0.95   MONOS ABS  --   --   --   --  0.88   EOS ABS  --   --   --   --  0.12   .5* 105.6*  --   --  105.8*   PROCALCITONIN  --   --   --  0.31*  --    LACTATE  --   --  1.7  --   --          Lab 24  0428 24  1558   SODIUM 142 138   POTASSIUM 4.7 4.4   CHLORIDE 105 104   CO2 26.0 24.0   ANION GAP 11.0 10.0   BUN 22 24*   CREATININE 0.70 0.75   EGFR 80.8 74.3   GLUCOSE 95 " 102*   CALCIUM 8.9 8.6   TSH  --  2.720         Lab 01/08/24  1558   TOTAL PROTEIN 6.6   ALBUMIN 3.5   GLOBULIN 3.1   ALT (SGPT) 8   AST (SGOT) 15   BILIRUBIN 0.5   ALK PHOS 128*         Lab 01/08/24  1759 01/08/24  1558   HSTROP T 33* 34*             Lab 01/08/24  1724   FOLATE >20.00   VITAMIN B 12 482         Brief Urine Lab Results  (Last result in the past 365 days)        Color   Clarity   Blood   Leuk Est   Nitrite   Protein   CREAT   Urine HCG        01/08/24 1512 Yellow   Clear   Large (3+)   Small (1+)   Negative   30 mg/dL (1+)                   Microbiology Results Abnormal       Procedure Component Value - Date/Time    Blood Culture - Blood, Arm, Right [[id number]]  (Normal) Collected: 01/08/24 2220    Lab Status: Preliminary result Specimen: Blood from Arm, Right Updated: 01/12/24 2300     Blood Culture No growth at 4 days    Narrative:      Less than seven (7) mL's of blood was collected.  Insufficient quantity may yield false negative results.    Blood Culture - Blood, Arm, Right [[id number]]  (Normal) Collected: 01/08/24 2215    Lab Status: Preliminary result Specimen: Blood from Arm, Right Updated: 01/12/24 2246     Blood Culture No growth at 4 days    Narrative:      Less than seven (7) mL's of blood was collected.  Insufficient quantity may yield false negative results.    Urine Culture - Urine, Urine, Clean Catch [[id number]] Collected: 01/08/24 1512    Lab Status: Final result Specimen: Urine, Clean Catch Updated: 01/09/24 1215     Urine Culture <10,000 CFU/mL Mixed Katherine Isolated    Narrative:      Specimen contains mixed organisms of questionable pathogenicity suggestive of contamination. If symptoms persist, suggest recollection.  Colonization of the urinary tract without infection is common. Treatment is discouraged unless the patient is symptomatic, pregnant, or undergoing an invasive urologic procedure.    COVID PRE-OP / PRE-PROCEDURE SCREENING ORDER (NO ISOLATION) - Swab, Nasopharynx  [[id number]]  (Normal) Collected: 01/08/24 1601    Lab Status: Final result Specimen: Swab from Nasopharynx Updated: 01/08/24 1649    Narrative:      The following orders were created for panel order COVID PRE-OP / PRE-PROCEDURE SCREENING ORDER (NO ISOLATION) - Swab, Nasopharynx.  Procedure                               Abnormality         Status                     ---------                               -----------         ------                     COVID-19 and FLU A/B PCR...[[id number]]  Normal              Final result                 Please view results for these tests on the individual orders.    COVID-19 and FLU A/B PCR, 1 HR TAT - Swab, Nasopharynx [[id number]]  (Normal) Collected: 01/08/24 1601    Lab Status: Final result Specimen: Swab from Nasopharynx Updated: 01/08/24 1649     COVID19 Not Detected     Influenza A PCR Not Detected     Influenza B PCR Not Detected    Narrative:      Fact sheet for providers: https://www.fda.gov/media/979791/download    Fact sheet for patients: https://www.fda.gov/media/581429/download    Test performed by PCR.            No radiology results from the last 24 hrs        Current medications:  Scheduled Meds:acetaminophen, 1,000 mg, Oral, Q8H  aspirin, 81 mg, Oral, Daily  budesonide-formoterol, 1 puff, Inhalation, BID - RT  cefTRIAXone, 2,000 mg, Intravenous, Q24H  Diclofenac Sodium, 2 g, Topical, 4x Daily  dilTIAZem CD, 120 mg, Oral, Q24H  doxycycline, 100 mg, Oral, Q12H  famotidine, 40 mg, Oral, Daily  folic acid, 1,000 mcg, Oral, Daily  ipratropium-albuterol, 3 mL, Nebulization, 4x Daily - RT  Lidocaine, 1 patch, Transdermal, Q24H  [Held by provider] methenamine, 1 g, Oral, Q12H  oxybutynin XL, 5 mg, Oral, Daily  senna-docusate sodium, 2 tablet, Oral, BID   And  [START ON 1/14/2024] polyethylene glycol, 17 g, Oral, Daily  sertraline, 25 mg, Oral, Daily  sodium chloride, 10 mL, Intravenous, Q12H  traMADol, 50 mg, Oral, BID  vitamin B-12, 1,000 mcg, Oral, Daily      Continuous  Infusions:   PRN Meds:.  albuterol sulfate HFA    senna-docusate sodium **AND** [START ON 1/14/2024] polyethylene glycol **AND** bisacodyl **AND** bisacodyl    Magnesium Standard Dose Replacement - Follow Nurse / BPA Driven Protocol    melatonin    Morphine **AND** naloxone    nitroglycerin    ondansetron **OR** ondansetron    Potassium Replacement - Follow Nurse / BPA Driven Protocol    sodium chloride    sodium chloride    traMADol    Assessment & Plan   Assessment & Plan     Active Hospital Problems    Diagnosis  POA    **Pelvic fracture [S32.9XXA]  Yes    Sacral fracture, closed [S32.10XA]  Yes    Leukocytosis [D72.829]  Yes    Macrocytic anemia [D53.9]  Yes    COPD (chronic obstructive pulmonary disease) [J44.9]  Yes    Gastroesophageal reflux disease [K21.9]  Yes    Rheumatoid arthritis [M06.9]  Yes    Hypertension [I10]  Yes      Resolved Hospital Problems   No resolved problems to display.        Brief Hospital Course to date:  Pati Carter is a 89 y.o. female with PMH significant for rheumatoid arthritis, osteoporosis, frequent falls, compression fractures of lumbar vertebra, DDD, macular degeneration, stress and urinary incontinence who presents to [facility] ED for low back pain.  Patient had a fall at her assisted living facility, [facility], a week ago, presented to the ED 1/2/2024, and was discharged on tramadol and cefepime for UTI.  Patient presents today for uncontrolled pain and progressive weakness, greater in her left leg.      Pelvic/sacral fracture  Lumbar stenosis  Frequent falls  --Initial fall 1/2/2024, CT head negative for acute intracranial process  --CT lumbar spine  shows minimally displaced sacral fracture at S1-S2, potentially high-grade stenosis at L5-S1 and moderate to marked canal stenosis at L2-L3, L3-L4, L4-L5 which is likely chronic  --Neurosurgery consult for progressive leg weakness, L>R, given potentially high-grade stenosis L5-S1  -- MRI L spine multilevel degenerative changes  most prominent at L3-4 with severe spinal canal stenosis, moderate spinal stenosis L5-S1  --no role for NS, no surgical intervention needed at this time, and will continue with PT/ possible spinal injections outpatient   --Pain control -- add topicals; PRN tramadol and morphine   - Palliative consult -- may need palliative outpatient as well   --Fall precautions  --PT and OT  -- SNF recs      Leukocytosis - improved   ? PNA   - UA ok; CXR with peribronchial thickening with changes in the right lung base with ? Early changes of PNA   - Elevated WBC and procal  - Bcx NGTD  - Cont rocephin/doxy -- planning 5 days of treatment   - IS/ added OPEP and duonebs today     Vaginal bleeding   Uterine mass   - GYN consulted and has signed off   - CT pelvis with cystic mass approximately similar in size from previous imaging. Per GYN, case discussed with GYN/ONC as well, likely that this is a malignancy and will not pursue any intervention at this time due to patients frailty. Vag bleeding is minimal and will monitor. IF there were heavier bleeding, could consider tissue diagnosis and treatment, but for now will continue with conservative management.        Rheumatoid arthritis  --hold home methotrexate     Anemia  --macrocytic, .8  -- B12 low normal, folate ok  --continue home B12, folic acid     Dysphagia   - Speech eval -- patient with comfort diet in 2022. Reviewed with daughter. Will continue comfort diet     HTN  PAD  --continue home diltiazem  --hold home ASA, colchicine  - BP with fair control      GERD  --continue home famotidine     Macular degeneration  --Fall precautions, up with assist, assist with feeding     Urinary incontinence  --hold home methenamine -- currently on antibiotics   --continue oxybutynin as formulary sub for Myrbetriq  --daily I's & O's    Constipation   --bowel regimen  --added MOM today      Expected Discharge Location and Transportation: SNF soon   Expected Discharge   Expected Discharge  Date: 1/12/2024; Expected Discharge Time:      DVT prophylaxis:  No DVT prophylaxis order currently exists.     AM-PAC 6 Clicks Score (PT): 10 (01/13/24 1341)    CODE STATUS:   Code Status and Medical Interventions:   Ordered at: 01/08/24 1710     Medical Intervention Limits:    NO intubation (DNI)     Code Status (Patient has no pulse and is not breathing):    No CPR (Do Not Attempt to Resuscitate)     Medical Interventions (Patient has pulse or is breathing):    Limited Support       Elin Fabian, APRN  01/13/24

## 2024-01-14 PROCEDURE — 94799 UNLISTED PULMONARY SVC/PX: CPT

## 2024-01-14 PROCEDURE — 99232 SBSQ HOSP IP/OBS MODERATE 35: CPT | Performed by: NURSE PRACTITIONER

## 2024-01-14 PROCEDURE — 94761 N-INVAS EAR/PLS OXIMETRY MLT: CPT

## 2024-01-14 PROCEDURE — 94664 DEMO&/EVAL PT USE INHALER: CPT

## 2024-01-14 RX ORDER — LACTULOSE 10 G/15ML
30 SOLUTION ORAL ONCE
Status: DISCONTINUED | OUTPATIENT
Start: 2024-01-14 | End: 2024-01-17 | Stop reason: HOSPADM

## 2024-01-14 RX ORDER — GUAIFENESIN 600 MG/1
600 TABLET, EXTENDED RELEASE ORAL EVERY 12 HOURS SCHEDULED
Status: DISCONTINUED | OUTPATIENT
Start: 2024-01-14 | End: 2024-01-17 | Stop reason: HOSPADM

## 2024-01-14 RX ADMIN — POLYETHYLENE GLYCOL 3350 17 G: 17 POWDER, FOR SOLUTION ORAL at 09:25

## 2024-01-14 RX ADMIN — Medication 10 ML: at 09:25

## 2024-01-14 RX ADMIN — DICLOFENAC SODIUM 2 G: 9.3 GEL TOPICAL at 17:20

## 2024-01-14 RX ADMIN — TRAMADOL HYDROCHLORIDE 50 MG: 50 TABLET, COATED ORAL at 20:57

## 2024-01-14 RX ADMIN — DILTIAZEM HYDROCHLORIDE 120 MG: 120 CAPSULE, COATED, EXTENDED RELEASE ORAL at 09:24

## 2024-01-14 RX ADMIN — BUDESONIDE AND FORMOTEROL FUMARATE DIHYDRATE 1 PUFF: 160; 4.5 AEROSOL RESPIRATORY (INHALATION) at 09:47

## 2024-01-14 RX ADMIN — SENNOSIDES AND DOCUSATE SODIUM 2 TABLET: 8.6; 5 TABLET ORAL at 20:58

## 2024-01-14 RX ADMIN — Medication 5 MG: at 20:58

## 2024-01-14 RX ADMIN — FAMOTIDINE 40 MG: 20 TABLET, FILM COATED ORAL at 09:24

## 2024-01-14 RX ADMIN — DICLOFENAC SODIUM 2 G: 9.3 GEL TOPICAL at 09:29

## 2024-01-14 RX ADMIN — Medication 10 ML: at 20:59

## 2024-01-14 RX ADMIN — LIDOCAINE 1 PATCH: 4 PATCH TOPICAL at 09:24

## 2024-01-14 RX ADMIN — OXYBUTYNIN CHLORIDE 5 MG: 5 TABLET, EXTENDED RELEASE ORAL at 09:24

## 2024-01-14 RX ADMIN — IPRATROPIUM BROMIDE AND ALBUTEROL SULFATE 3 ML: 2.5; .5 SOLUTION RESPIRATORY (INHALATION) at 09:47

## 2024-01-14 RX ADMIN — FOLIC ACID 1000 MCG: 1 TABLET ORAL at 09:24

## 2024-01-14 RX ADMIN — ACETAMINOPHEN 1000 MG: 500 TABLET ORAL at 17:17

## 2024-01-14 RX ADMIN — IPRATROPIUM BROMIDE AND ALBUTEROL SULFATE 3 ML: 2.5; .5 SOLUTION RESPIRATORY (INHALATION) at 16:54

## 2024-01-14 RX ADMIN — ASPIRIN 81 MG: 81 TABLET, COATED ORAL at 09:24

## 2024-01-14 RX ADMIN — GUAIFENESIN 600 MG: 600 TABLET, EXTENDED RELEASE ORAL at 21:23

## 2024-01-14 RX ADMIN — IPRATROPIUM BROMIDE AND ALBUTEROL SULFATE 3 ML: 2.5; .5 SOLUTION RESPIRATORY (INHALATION) at 13:43

## 2024-01-14 RX ADMIN — TRAMADOL HYDROCHLORIDE 50 MG: 50 TABLET, COATED ORAL at 09:24

## 2024-01-14 RX ADMIN — ACETAMINOPHEN 1000 MG: 500 TABLET ORAL at 09:24

## 2024-01-14 RX ADMIN — DOXYCYCLINE 100 MG: 100 CAPSULE ORAL at 09:24

## 2024-01-14 RX ADMIN — GUAIFENESIN 600 MG: 600 TABLET, EXTENDED RELEASE ORAL at 14:08

## 2024-01-14 RX ADMIN — SENNOSIDES AND DOCUSATE SODIUM 2 TABLET: 8.6; 5 TABLET ORAL at 09:24

## 2024-01-14 RX ADMIN — CYANOCOBALAMIN TAB 1000 MCG 1000 MCG: 1000 TAB at 09:24

## 2024-01-14 RX ADMIN — SERTRALINE HYDROCHLORIDE 25 MG: 25 TABLET ORAL at 09:24

## 2024-01-14 NOTE — PROGRESS NOTES
[facility] Medicine Services  PROGRESS NOTE    Patient Name: Pati Carter  : 1930  MRN: [id number]    Date of Admission: 2024  Primary Care Physician: Pallavi Eduardo MD    Subjective   Subjective     CC:  Fall, pain    HPI:  Up in chair eating lunch with help from dtr. No pain. Nonprod loose cough.       Objective   Objective     Vital Signs:   Temp:  [97.4 °F (36.3 °C)-98.4 °F (36.9 °C)] 97.4 °F (36.3 °C)  Heart Rate:  [] 67  Resp:  [16-18] 16  BP: (109-147)/(57-81) 115/66  Flow (L/min):  [2] 2     Physical Exam:  Constitutional: No acute distress, awake, alert; frail/ elderly/ blind   HENT: NCAT, mucous membranes moist  Respiratory: wheezing and rhonchi with decreased bases, normal resp effort on 1LNC  Cardiovascular: RRR, no murmurs, rubs, or gallops  Gastrointestinal: Positive bowel sounds, soft, nontender, nondistended  Musculoskeletal: No bilateral ankle edema  Psychiatric: Appropriate affect, cooperative  Neurologic: Oriented to person, MENARD with weakness in lower extremities, speech clear  Skin: No rashes      Results Reviewed:  LAB RESULTS:      Lab 01/10/24  0647 24  2224 24  1759 24  1558   WBC 10.14 11.93*  --   --  13.58*   HEMOGLOBIN 10.3* 10.8*  --   --  11.0*   HEMATOCRIT 32.2* 33.7*  --   --  34.8   PLATELETS 251 218  --   --  230   NEUTROS ABS  --   --   --   --  11.48*   IMMATURE GRANS (ABS)  --   --   --   --  0.09*   LYMPHS ABS  --   --   --   --  0.95   MONOS ABS  --   --   --   --  0.88   EOS ABS  --   --   --   --  0.12   .5* 105.6*  --   --  105.8*   PROCALCITONIN  --   --   --  0.31*  --    LACTATE  --   --  1.7  --   --          Lab 24/08/24  1558   SODIUM 142 138   POTASSIUM 4.7 4.4   CHLORIDE 105 104   CO2 26.0 24.0   ANION GAP 11.0 10.0   BUN 22 24*   CREATININE 0.70 0.75   EGFR 80.8 74.3   GLUCOSE 95 102*   CALCIUM 8.9 8.6   TSH  --  2.720         Lab 24  1558   TOTAL  PROTEIN 6.6   ALBUMIN 3.5   GLOBULIN 3.1   ALT (SGPT) 8   AST (SGOT) 15   BILIRUBIN 0.5   ALK PHOS 128*         Lab 01/08/24  1759 01/08/24  1558   HSTROP T 33* 34*             Lab 01/08/24  1724   FOLATE >20.00   VITAMIN B 12 482         Brief Urine Lab Results  (Last result in the past 365 days)        Color   Clarity   Blood   Leuk Est   Nitrite   Protein   CREAT   Urine HCG        01/08/24 1512 Yellow   Clear   Large (3+)   Small (1+)   Negative   30 mg/dL (1+)                   Microbiology Results Abnormal       Procedure Component Value - Date/Time    Blood Culture - Blood, Arm, Right [[id number]]  (Normal) Collected: 01/08/24 2220    Lab Status: Final result Specimen: Blood from Arm, Right Updated: 01/13/24 2300     Blood Culture No growth at 5 days    Narrative:      Less than seven (7) mL's of blood was collected.  Insufficient quantity may yield false negative results.    Blood Culture - Blood, Arm, Right [[id number]]  (Normal) Collected: 01/08/24 2215    Lab Status: Final result Specimen: Blood from Arm, Right Updated: 01/13/24 2246     Blood Culture No growth at 5 days    Narrative:      Less than seven (7) mL's of blood was collected.  Insufficient quantity may yield false negative results.    Urine Culture - Urine, Urine, Clean Catch [[id number]] Collected: 01/08/24 1512    Lab Status: Final result Specimen: Urine, Clean Catch Updated: 01/09/24 1215     Urine Culture <10,000 CFU/mL Mixed Katherine Isolated    Narrative:      Specimen contains mixed organisms of questionable pathogenicity suggestive of contamination. If symptoms persist, suggest recollection.  Colonization of the urinary tract without infection is common. Treatment is discouraged unless the patient is symptomatic, pregnant, or undergoing an invasive urologic procedure.    COVID PRE-OP / PRE-PROCEDURE SCREENING ORDER (NO ISOLATION) - Swab, Nasopharynx [[id number]]  (Normal) Collected: 01/08/24 1601    Lab Status: Final result Specimen: Swab  from Nasopharynx Updated: 01/08/24 1649    Narrative:      The following orders were created for panel order COVID PRE-OP / PRE-PROCEDURE SCREENING ORDER (NO ISOLATION) - Swab, Nasopharynx.  Procedure                               Abnormality         Status                     ---------                               -----------         ------                     COVID-19 and FLU A/B PCR...[[id number]]  Normal              Final result                 Please view results for these tests on the individual orders.    COVID-19 and FLU A/B PCR, 1 HR TAT - Swab, Nasopharynx [[id number]]  (Normal) Collected: 01/08/24 1601    Lab Status: Final result Specimen: Swab from Nasopharynx Updated: 01/08/24 1649     COVID19 Not Detected     Influenza A PCR Not Detected     Influenza B PCR Not Detected    Narrative:      Fact sheet for providers: https://www.fda.gov/media/494883/download    Fact sheet for patients: https://www.fda.gov/media/459242/download    Test performed by PCR.            No radiology results from the last 24 hrs        Current medications:  Scheduled Meds:acetaminophen, 1,000 mg, Oral, Q8H  aspirin, 81 mg, Oral, Daily  budesonide-formoterol, 1 puff, Inhalation, BID - RT  Diclofenac Sodium, 2 g, Topical, 4x Daily  dilTIAZem CD, 120 mg, Oral, Q24H  famotidine, 40 mg, Oral, Daily  folic acid, 1,000 mcg, Oral, Daily  guaiFENesin, 600 mg, Oral, Q12H  ipratropium-albuterol, 3 mL, Nebulization, 4x Daily - RT  lactulose, 30 g, Oral, Once  Lidocaine, 1 patch, Transdermal, Q24H  [Held by provider] methenamine, 1 g, Oral, Q12H  oxybutynin XL, 5 mg, Oral, Daily  senna-docusate sodium, 2 tablet, Oral, BID   And  polyethylene glycol, 17 g, Oral, Daily  sertraline, 25 mg, Oral, Daily  sodium chloride, 10 mL, Intravenous, Q12H  traMADol, 50 mg, Oral, BID  vitamin B-12, 1,000 mcg, Oral, Daily      Continuous Infusions:   PRN Meds:.  albuterol sulfate HFA    senna-docusate sodium **AND** polyethylene glycol **AND** bisacodyl  **AND** bisacodyl    Magnesium Standard Dose Replacement - Follow Nurse / BPA Driven Protocol    melatonin    Morphine **AND** naloxone    nitroglycerin    ondansetron **OR** ondansetron    phenol    Potassium Replacement - Follow Nurse / BPA Driven Protocol    sodium chloride    sodium chloride    traMADol    Assessment & Plan   Assessment & Plan     Active Hospital Problems    Diagnosis  POA    **Pelvic fracture [S32.9XXA]  Yes    Sacral fracture, closed [S32.10XA]  Yes    Leukocytosis [D72.829]  Yes    Macrocytic anemia [D53.9]  Yes    COPD (chronic obstructive pulmonary disease) [J44.9]  Yes    Gastroesophageal reflux disease [K21.9]  Yes    Rheumatoid arthritis [M06.9]  Yes    Hypertension [I10]  Yes      Resolved Hospital Problems   No resolved problems to display.        Brief Hospital Course to date:  Pati Carter is a 89 y.o. female with PMH significant for rheumatoid arthritis, osteoporosis, frequent falls, compression fractures of lumbar vertebra, DDD, macular degeneration, stress and urinary incontinence who presents to [facility] ED for low back pain.  Patient had a fall at her assisted living facility, [facility], a week ago, presented to the ED 1/2/2024, and was discharged on tramadol and cefepime for UTI.  Patient presents today for uncontrolled pain and progressive weakness, greater in her left leg.      Pelvic/sacral fracture  Lumbar stenosis  Frequent falls  --Initial fall 1/2/2024, CT head negative for acute intracranial process  --CT lumbar spine  shows minimally displaced sacral fracture at S1-S2, potentially high-grade stenosis at L5-S1 and moderate to marked canal stenosis at L2-L3, L3-L4, L4-L5 which is likely chronic  --Neurosurgery consult for progressive leg weakness, L>R, given potentially high-grade stenosis L5-S1  -- MRI L spine multilevel degenerative changes most prominent at L3-4 with severe spinal canal stenosis, moderate spinal stenosis L5-S1  --no role for NS, no surgical  intervention needed at this time, and will continue with PT/ possible spinal injections outpatient   --Pain control -- add topicals; PRN tramadol and morphine   - Palliative consult -- may need palliative outpatient as well   --Fall precautions  --PT and OT  -- SNF recs      Leukocytosis - improved   ? PNA   - UA ok; CXR with peribronchial thickening with changes in the right lung base with ? Early changes of PNA   - Elevated WBC and procal  - Bcx NGTD  - Cont rocephin/doxy -- planning 5 days of treatment   - IS/ added OPEP and duonebs   --will add Mucinex   --labs in am to recheck WBC    Vaginal bleeding   Uterine mass   - GYN consulted and has signed off   - CT pelvis with cystic mass approximately similar in size from previous imaging. Per GYN, case discussed with GYN/ONC as well, likely that this is a malignancy and will not pursue any intervention at this time due to patients frailty. Vag bleeding is minimal and will monitor. IF there were heavier bleeding, could consider tissue diagnosis and treatment, but for now will continue with conservative management.        Rheumatoid arthritis  --hold home methotrexate     Anemia  --macrocytic, .8  -- B12 low normal, folate ok  --continue home B12, folic acid     Dysphagia   - Speech eval -- patient with comfort diet in 2022. Reviewed with daughter. Will continue comfort diet     HTN  PAD  --continue home diltiazem  --hold home ASA, colchicine  - BP with fair control      GERD  --continue home famotidine     Macular degeneration  --Fall precautions, up with assist, assist with feeding     Urinary incontinence  --hold home methenamine -- currently on antibiotics   --continue oxybutynin as formulary sub for Myrbetriq  --daily I's & O's    Constipation   --bowel regimen  --BM on 1/14/24      Expected Discharge Location and Transportation: [facility] 1/15 at 1400   Expected Discharge   Expected Discharge Date: 1/12/2024; Expected Discharge Time:      DVT  prophylaxis:  No DVT prophylaxis order currently exists.     AM-PAC 6 Clicks Score (PT): 10 (01/13/24 1341)    CODE STATUS:   Code Status and Medical Interventions:   Ordered at: 01/08/24 1710     Medical Intervention Limits:    NO intubation (DNI)     Code Status (Patient has no pulse and is not breathing):    No CPR (Do Not Attempt to Resuscitate)     Medical Interventions (Patient has pulse or is breathing):    Limited Support       Elin Fabian, JANIYA  01/14/24

## 2024-01-14 NOTE — CONSULTS
Visited patient as a part of palliative care team.  Daughter at bedside shared that her mother is [place] [employer], would appreciate prayer.  Patient agreed, thanked me for prayer.  No other needs at this time.

## 2024-01-14 NOTE — PLAN OF CARE
Goal Outcome Evaluation:  Plan of Care Reviewed With: patient, family        Progress: no change       Pt slept throughout the shift. VSS on 2 L nasal cannula. Pt is alert to self only and is forgetful.

## 2024-01-15 ENCOUNTER — APPOINTMENT (OUTPATIENT)
Dept: GENERAL RADIOLOGY | Facility: HOSPITAL | Age: 89
End: 2024-01-15
Payer: MEDICARE

## 2024-01-15 LAB
ANION GAP SERPL CALCULATED.3IONS-SCNC: 9 MMOL/L (ref 5–15)
BASOPHILS # BLD AUTO: 0.03 10*3/MM3 (ref 0–0.2)
BASOPHILS NFR BLD AUTO: 0.3 % (ref 0–1.5)
BUN SERPL-MCNC: 21 MG/DL (ref 8–23)
BUN/CREAT SERPL: 29.6 (ref 7–25)
CALCIUM SPEC-SCNC: 8.8 MG/DL (ref 8.2–9.6)
CHLORIDE SERPL-SCNC: 100 MMOL/L (ref 98–107)
CO2 SERPL-SCNC: 31 MMOL/L (ref 22–29)
CREAT SERPL-MCNC: 0.71 MG/DL (ref 0.57–1)
DEPRECATED RDW RBC AUTO: 57.6 FL (ref 37–54)
EGFRCR SERPLBLD CKD-EPI 2021: 79.4 ML/MIN/1.73
EOSINOPHIL # BLD AUTO: 0.23 10*3/MM3 (ref 0–0.4)
EOSINOPHIL NFR BLD AUTO: 2.2 % (ref 0.3–6.2)
ERYTHROCYTE [DISTWIDTH] IN BLOOD BY AUTOMATED COUNT: 14.7 % (ref 12.3–15.4)
GLUCOSE SERPL-MCNC: 85 MG/DL (ref 65–99)
HCT VFR BLD AUTO: 32.6 % (ref 34–46.6)
HGB BLD-MCNC: 10.3 G/DL (ref 12–15.9)
IMM GRANULOCYTES # BLD AUTO: 0.06 10*3/MM3 (ref 0–0.05)
IMM GRANULOCYTES NFR BLD AUTO: 0.6 % (ref 0–0.5)
LYMPHOCYTES # BLD AUTO: 0.66 10*3/MM3 (ref 0.7–3.1)
LYMPHOCYTES NFR BLD AUTO: 6.2 % (ref 19.6–45.3)
MCH RBC QN AUTO: 34 PG (ref 26.6–33)
MCHC RBC AUTO-ENTMCNC: 31.6 G/DL (ref 31.5–35.7)
MCV RBC AUTO: 107.6 FL (ref 79–97)
MONOCYTES # BLD AUTO: 0.93 10*3/MM3 (ref 0.1–0.9)
MONOCYTES NFR BLD AUTO: 8.8 % (ref 5–12)
NEUTROPHILS NFR BLD AUTO: 8.69 10*3/MM3 (ref 1.7–7)
NEUTROPHILS NFR BLD AUTO: 81.9 % (ref 42.7–76)
NRBC BLD AUTO-RTO: 0 /100 WBC (ref 0–0.2)
PLATELET # BLD AUTO: 287 10*3/MM3 (ref 140–450)
PMV BLD AUTO: 9.8 FL (ref 6–12)
POTASSIUM SERPL-SCNC: 4.4 MMOL/L (ref 3.5–5.2)
RBC # BLD AUTO: 3.03 10*6/MM3 (ref 3.77–5.28)
SODIUM SERPL-SCNC: 140 MMOL/L (ref 136–145)
WBC NRBC COR # BLD AUTO: 10.6 10*3/MM3 (ref 3.4–10.8)

## 2024-01-15 PROCEDURE — 80048 BASIC METABOLIC PNL TOTAL CA: CPT | Performed by: NURSE PRACTITIONER

## 2024-01-15 PROCEDURE — 74018 RADEX ABDOMEN 1 VIEW: CPT

## 2024-01-15 PROCEDURE — 99232 SBSQ HOSP IP/OBS MODERATE 35: CPT | Performed by: NURSE PRACTITIONER

## 2024-01-15 PROCEDURE — 94799 UNLISTED PULMONARY SVC/PX: CPT

## 2024-01-15 PROCEDURE — 71045 X-RAY EXAM CHEST 1 VIEW: CPT

## 2024-01-15 PROCEDURE — 85025 COMPLETE CBC W/AUTO DIFF WBC: CPT | Performed by: NURSE PRACTITIONER

## 2024-01-15 PROCEDURE — 94664 DEMO&/EVAL PT USE INHALER: CPT

## 2024-01-15 PROCEDURE — 94761 N-INVAS EAR/PLS OXIMETRY MLT: CPT

## 2024-01-15 RX ORDER — LORAZEPAM 0.5 MG/1
0.5 TABLET ORAL EVERY 6 HOURS PRN
Status: DISCONTINUED | OUTPATIENT
Start: 2024-01-15 | End: 2024-01-17 | Stop reason: HOSPADM

## 2024-01-15 RX ORDER — BISACODYL 10 MG
10 SUPPOSITORY, RECTAL RECTAL ONCE
Status: DISCONTINUED | OUTPATIENT
Start: 2024-01-15 | End: 2024-01-16 | Stop reason: SDUPTHER

## 2024-01-15 RX ADMIN — LIDOCAINE 1 PATCH: 4 PATCH TOPICAL at 08:28

## 2024-01-15 RX ADMIN — LORAZEPAM 0.5 MG: 0.5 TABLET ORAL at 17:31

## 2024-01-15 RX ADMIN — GUAIFENESIN 600 MG: 600 TABLET, EXTENDED RELEASE ORAL at 08:28

## 2024-01-15 RX ADMIN — CYANOCOBALAMIN TAB 1000 MCG 1000 MCG: 1000 TAB at 08:28

## 2024-01-15 RX ADMIN — ASPIRIN 81 MG: 81 TABLET, COATED ORAL at 08:27

## 2024-01-15 RX ADMIN — DICLOFENAC SODIUM 2 G: 9.3 GEL TOPICAL at 08:29

## 2024-01-15 RX ADMIN — FAMOTIDINE 40 MG: 20 TABLET, FILM COATED ORAL at 08:27

## 2024-01-15 RX ADMIN — IPRATROPIUM BROMIDE AND ALBUTEROL SULFATE 3 ML: 2.5; .5 SOLUTION RESPIRATORY (INHALATION) at 16:22

## 2024-01-15 RX ADMIN — GUAIFENESIN 600 MG: 600 TABLET, EXTENDED RELEASE ORAL at 22:25

## 2024-01-15 RX ADMIN — FOLIC ACID 1000 MCG: 1 TABLET ORAL at 08:28

## 2024-01-15 RX ADMIN — BUDESONIDE AND FORMOTEROL FUMARATE DIHYDRATE 1 PUFF: 160; 4.5 AEROSOL RESPIRATORY (INHALATION) at 13:28

## 2024-01-15 RX ADMIN — ACETAMINOPHEN 1000 MG: 500 TABLET ORAL at 23:01

## 2024-01-15 RX ADMIN — SENNOSIDES AND DOCUSATE SODIUM 2 TABLET: 8.6; 5 TABLET ORAL at 22:25

## 2024-01-15 RX ADMIN — OXYBUTYNIN CHLORIDE 5 MG: 5 TABLET, EXTENDED RELEASE ORAL at 08:28

## 2024-01-15 RX ADMIN — POLYETHYLENE GLYCOL 3350 17 G: 17 POWDER, FOR SOLUTION ORAL at 08:27

## 2024-01-15 RX ADMIN — ACETAMINOPHEN 1000 MG: 500 TABLET ORAL at 17:30

## 2024-01-15 RX ADMIN — TRAMADOL HYDROCHLORIDE 50 MG: 50 TABLET, COATED ORAL at 08:28

## 2024-01-15 RX ADMIN — DICLOFENAC SODIUM 2 G: 9.3 GEL TOPICAL at 21:59

## 2024-01-15 RX ADMIN — TRAMADOL HYDROCHLORIDE 50 MG: 50 TABLET, COATED ORAL at 22:25

## 2024-01-15 RX ADMIN — SENNOSIDES AND DOCUSATE SODIUM 2 TABLET: 8.6; 5 TABLET ORAL at 08:27

## 2024-01-15 RX ADMIN — IPRATROPIUM BROMIDE AND ALBUTEROL SULFATE 3 ML: 2.5; .5 SOLUTION RESPIRATORY (INHALATION) at 10:25

## 2024-01-15 RX ADMIN — IPRATROPIUM BROMIDE AND ALBUTEROL SULFATE 3 ML: 2.5; .5 SOLUTION RESPIRATORY (INHALATION) at 13:20

## 2024-01-15 RX ADMIN — SERTRALINE HYDROCHLORIDE 25 MG: 25 TABLET ORAL at 08:27

## 2024-01-15 RX ADMIN — Medication 10 ML: at 08:29

## 2024-01-15 RX ADMIN — Medication 10 ML: at 21:59

## 2024-01-15 RX ADMIN — DILTIAZEM HYDROCHLORIDE 120 MG: 120 CAPSULE, COATED, EXTENDED RELEASE ORAL at 08:28

## 2024-01-15 RX ADMIN — ACETAMINOPHEN 1000 MG: 500 TABLET ORAL at 08:28

## 2024-01-15 RX ADMIN — DICLOFENAC SODIUM 2 G: 9.3 GEL TOPICAL at 17:31

## 2024-01-15 NOTE — PROGRESS NOTES
Continued Stay Note  [facility]     Patient Name: Pati Carter  MRN: [id number]  Today's Date: 1/15/2024    Admit Date: 1/8/2024    Plan: SNF   Discharge Plan       Row Name 01/15/24 1418       Plan    Plan SNF    Patient/Family in Agreement with Plan yes    Plan Comments Hospice f/u visit made w/ pt/caregiver & staff RN, Mary, present. Pt appeared lethargic, but did wake & briefly engaged w/ caregiver. Pt also w/ involuntary UE jerking. Pt was able to take PO meds this morning, but did appear to have congestion. Pt.'s had 300ml output so far today.  Pt.'s LE were warm to touch. Pt does appear to have had a change in status per caregiver & staff RN. Pt was scheduled for d/c today, but it has been postponed. Hospice will f/u w/ pt later this afternoon. If we can be of further assistance please call [phone].                                                     Discharge Codes    No documentation.                 Expected Discharge Date and Time       Expected Discharge Date Expected Discharge Time    Jan 16, 2024               Linda Castaneda

## 2024-01-15 NOTE — PLAN OF CARE
Problem: Skin Injury Risk Increased  Goal: Skin Health and Integrity  Outcome: Ongoing, Progressing  Intervention: Optimize Skin Protection  Recent Flowsheet Documentation  Taken 1/14/2024 2058 by Romelia Casas RN  Pressure Reduction Techniques:   frequent weight shift encouraged   heels elevated off bed   weight shift assistance provided  Head of Bed (HOB) Positioning: HOB elevated  Pressure Reduction Devices: specialty bed utilized  Skin Protection:   adhesive use limited   incontinence pads utilized     Problem: Fall Injury Risk  Goal: Absence of Fall and Fall-Related Injury  Outcome: Ongoing, Progressing  Intervention: Promote Injury-Free Environment  Recent Flowsheet Documentation  Taken 1/14/2024 2058 by Romelia Casas RN  Safety Promotion/Fall Prevention:   nonskid shoes/slippers when out of bed   safety round/check completed     Problem: Adult Inpatient Plan of Care  Goal: Plan of Care Review  Outcome: Ongoing, Progressing  Goal: Patient-Specific Goal (Individualized)  Outcome: Ongoing, Progressing  Goal: Absence of Hospital-Acquired Illness or Injury  Outcome: Ongoing, Progressing  Intervention: Identify and Manage Fall Risk  Recent Flowsheet Documentation  Taken 1/14/2024 2058 by Romelia Casas RN  Safety Promotion/Fall Prevention:   nonskid shoes/slippers when out of bed   safety round/check completed  Intervention: Prevent Skin Injury  Recent Flowsheet Documentation  Taken 1/14/2024 2058 by Romelia Casas RN  Body Position: supine  Skin Protection:   adhesive use limited   incontinence pads utilized  Intervention: Prevent and Manage VTE (Venous Thromboembolism) Risk  Recent Flowsheet Documentation  Taken 1/14/2024 2058 by Romelia Casas RN  Activity Management: activity encouraged  VTE Prevention/Management:   sequential compression devices on   compression stockings on  Range of Motion: ROM (range of motion) performed  Intervention: Prevent Infection  Recent Flowsheet  Documentation  Taken 1/14/2024 2058 by Romelia Casas RN  Infection Prevention:   personal protective equipment utilized   single patient room provided   visitors restricted/screened   rest/sleep promoted  Goal: Optimal Comfort and Wellbeing  Outcome: Ongoing, Progressing  Intervention: Provide Person-Centered Care  Recent Flowsheet Documentation  Taken 1/14/2024 2058 by Romelia Casas RN  Trust Relationship/Rapport:   care explained   questions encouraged   reassurance provided  Goal: Readiness for Transition of Care  Outcome: Ongoing, Progressing     Problem: Palliative Care  Goal: Enhanced Quality of Life  Outcome: Ongoing, Progressing  Intervention: Optimize Function  Recent Flowsheet Documentation  Taken 1/14/2024 2058 by Romelia Casas RN  Sleep/Rest Enhancement:   awakenings minimized   noise level reduced   regular sleep/rest pattern promoted   relaxation techniques promoted   room darkened   therapeutic touch utilized     Problem: Pain Acute  Goal: Acceptable Pain Control and Functional Ability  Outcome: Ongoing, Progressing  Intervention: Prevent or Manage Pain  Recent Flowsheet Documentation  Taken 1/14/2024 2058 by Romelia Casas RN  Sleep/Rest Enhancement:   awakenings minimized   noise level reduced   regular sleep/rest pattern promoted   relaxation techniques promoted   room darkened   therapeutic touch utilized     Problem: Bleeding (Orthopaedic Fracture)  Goal: Absence of Bleeding  Outcome: Ongoing, Progressing     Problem: Embolism (Orthopaedic Fracture)  Goal: Absence of Embolism Signs and Symptoms  Outcome: Ongoing, Progressing  Intervention: Prevent or Manage Embolism Risk  Recent Flowsheet Documentation  Taken 1/14/2024 2058 by Romelia Casas RN  VTE Prevention/Management:   sequential compression devices on   compression stockings on     Problem: Fracture Stabilization and Management (Orthopaedic Fracture)  Goal: Fracture Stability  Outcome: Ongoing, Progressing      Problem: Functional Ability Impaired (Orthopaedic Fracture)  Goal: Optimal Functional Ability  Outcome: Ongoing, Progressing  Intervention: Optimize Functional Ability  Recent Flowsheet Documentation  Taken 1/14/2024 2058 by Romelia Casas RN  Activity Management: activity encouraged  Activity Assistance Provided: assistance, 2 people  Positioning/Transfer Devices:   in use   pillows  Range of Motion: ROM (range of motion) performed     Problem: Infection (Orthopaedic Fracture)  Goal: Absence of Infection Signs and Symptoms  Outcome: Ongoing, Progressing  Intervention: Prevent or Manage Infection  Recent Flowsheet Documentation  Taken 1/14/2024 2058 by Romelia Casas RN  Infection Prevention:   personal protective equipment utilized   single patient room provided   visitors restricted/screened   rest/sleep promoted     Problem: Neurovascular Compromise (Orthopaedic Fracture)  Goal: Effective Tissue Perfusion  Outcome: Ongoing, Progressing     Problem: Pain (Orthopaedic Fracture)  Goal: Acceptable Pain Control  Outcome: Ongoing, Progressing     Problem: Respiratory Compromise (Orthopaedic Fracture)  Goal: Effective Oxygenation and Ventilation  Outcome: Ongoing, Progressing  Intervention: Promote Airway Secretion Clearance  Recent Flowsheet Documentation  Taken 1/14/2024 2058 by Romelia Casas RN  Cough And Deep Breathing: done independently per patient   Goal Outcome Evaluation:                 Patient oriented to self throughout the shift. Able to communicate needs to staff adequately. Somewhat lethargic tonight, which appears to have been consistent with reyna's report.      Romelia Casas RN                               primary

## 2024-01-15 NOTE — CASE MANAGEMENT/SOCIAL WORK
Discharge Planning Assessment  [facility]     Patient Name: Pati Carter  MRN: [id number]  Today's Date: 1/15/2024    Admit Date: 1/8/2024    Plan: TBD                   Discharge Plan       Row Name 01/15/24 1209       Plan    Plan TBD    Patient/Family in Agreement with Plan yes    Plan Comments Discussed Ms. Carter with Karen DENSON, and the patient is not medically ready for transfer.  CM notified Maryanne, with [facility], and cancelled [facility] ambulance.    CM will follow up.    Final Discharge Disposition Code 30 - still a patient                  Continued Care and Services - Admitted Since 1/8/2024       Destination Coordination complete.      Service Provider Request Status Selected Services Address Phone Fax Patient Preferred    [facility]  [facility] [address] [address] [phone] --    [facility] Pending - [facility] N[facility]A [id number] [address] [phone] --                  Expected Discharge Date and Time       Expected Discharge Date Expected Discharge Time    Jan 16, 2024                   Vikki Schumacher, RN

## 2024-01-15 NOTE — PROGRESS NOTES
[facility]  PROGRESS NOTE    Patient Name: Pati Carter  : 1930  MRN: [id number]    Date of Admission: 2024  Primary Care Physician: Pallavi Eduardo MD    Subjective   Subjective     CC:  F/u fall, pain    HPI:  Patient resting in bed.  Caregivers at the bedside assisting with feeding.  Patient appears lethargic and caregivers says she seems more lethargic today than prior.  Patient on 3 L O2 but does not wear home O2.  Audible rhonchi. Points to her abdomen when asks if she has pain. Able to follow commands.       Objective   Objective     Vital Signs:   Temp:  [96.5 °F (35.8 °C)-97.6 °F (36.4 °C)] 97.5 °F (36.4 °C)  Heart Rate:  [] 101  Resp:  [16-18] 16  BP: (105-178)/(74-98) 105/74  Flow (L/min):  [2] 2     Physical Exam:  Constitutional: No acute distress, awake, alert  HENT: NCAT, mucous membranes dry  Respiratory: Scattered coarse rhonchi bilaterally, pursed lip breathing, 3L NC  Cardiovascular: RRR, no murmurs, rubs, or gallops  Gastrointestinal: Hypoactive bowel sounds, soft, nontender, nondistended  Musculoskeletal: Trace bilateral ankle edema  Psychiatric: Appropriate affect, cooperative  Neurologic: Follows commands, moves all extremities, speech clear  Skin: No rashes      Results Reviewed:  LAB RESULTS:      Lab 01/15/24  0539 01/10/24  0647 24  0428 24  2224 24  1759 24  1558   WBC 10.60 10.14 11.93*  --   --  13.58*   HEMOGLOBIN 10.3* 10.3* 10.8*  --   --  11.0*   HEMATOCRIT 32.6* 32.2* 33.7*  --   --  34.8   PLATELETS 287 251 218  --   --  230   NEUTROS ABS 8.69*  --   --   --   --  11.48*   IMMATURE GRANS (ABS) 0.06*  --   --   --   --  0.09*   LYMPHS ABS 0.66*  --   --   --   --  0.95   MONOS ABS 0.93*  --   --   --   --  0.88   EOS ABS 0.23  --   --   --   --  0.12   .6* 102.5* 105.6*  --   --  105.8*   PROCALCITONIN  --   --   --   --  0.31*  --    LACTATE  --   --   --  1.7  --   --           Lab 01/15/24  0539 01/09/24  0428 01/08/24  1558   SODIUM 140 142 138   POTASSIUM 4.4 4.7 4.4   CHLORIDE 100 105 104   CO2 31.0* 26.0 24.0   ANION GAP 9.0 11.0 10.0   BUN 21 22 24*   CREATININE 0.71 0.70 0.75   EGFR 79.4 80.8 74.3   GLUCOSE 85 95 102*   CALCIUM 8.8 8.9 8.6   TSH  --   --  2.720         Lab 01/08/24  1558   TOTAL PROTEIN 6.6   ALBUMIN 3.5   GLOBULIN 3.1   ALT (SGPT) 8   AST (SGOT) 15   BILIRUBIN 0.5   ALK PHOS 128*         Lab 01/08/24  1759 01/08/24  1558   HSTROP T 33* 34*             Lab 01/08/24  1724   FOLATE >20.00   VITAMIN B 12 482         Brief Urine Lab Results  (Last result in the past 365 days)        Color   Clarity   Blood   Leuk Est   Nitrite   Protein   CREAT   Urine HCG        01/08/24 1512 Yellow   Clear   Large (3+)   Small (1+)   Negative   30 mg/dL (1+)                   Microbiology Results Abnormal       Procedure Component Value - Date/Time    Blood Culture - Blood, Arm, Right [[id number]]  (Normal) Collected: 01/08/24 2220    Lab Status: Final result Specimen: Blood from Arm, Right Updated: 01/13/24 2300     Blood Culture No growth at 5 days    Narrative:      Less than seven (7) mL's of blood was collected.  Insufficient quantity may yield false negative results.    Blood Culture - Blood, Arm, Right [[id number]]  (Normal) Collected: 01/08/24 2215    Lab Status: Final result Specimen: Blood from Arm, Right Updated: 01/13/24 2246     Blood Culture No growth at 5 days    Narrative:      Less than seven (7) mL's of blood was collected.  Insufficient quantity may yield false negative results.    Urine Culture - Urine, Urine, Clean Catch [[id number]] Collected: 01/08/24 1512    Lab Status: Final result Specimen: Urine, Clean Catch Updated: 01/09/24 1215     Urine Culture <10,000 CFU/mL Mixed Katherine Isolated    Narrative:      Specimen contains mixed organisms of questionable pathogenicity suggestive of contamination. If symptoms persist, suggest recollection.  Colonization of the  urinary tract without infection is common. Treatment is discouraged unless the patient is symptomatic, pregnant, or undergoing an invasive urologic procedure.    COVID PRE-OP / PRE-PROCEDURE SCREENING ORDER (NO ISOLATION) - Swab, Nasopharynx [[id number]]  (Normal) Collected: 01/08/24 1601    Lab Status: Final result Specimen: Swab from Nasopharynx Updated: 01/08/24 1649    Narrative:      The following orders were created for panel order COVID PRE-OP / PRE-PROCEDURE SCREENING ORDER (NO ISOLATION) - Swab, Nasopharynx.  Procedure                               Abnormality         Status                     ---------                               -----------         ------                     COVID-19 and FLU A/B PCR...[[id number]]  Normal              Final result                 Please view results for these tests on the individual orders.    COVID-19 and FLU A/B PCR, 1 HR TAT - Swab, Nasopharynx [[id number]]  (Normal) Collected: 01/08/24 1601    Lab Status: Final result Specimen: Swab from Nasopharynx Updated: 01/08/24 1649     COVID19 Not Detected     Influenza A PCR Not Detected     Influenza B PCR Not Detected    Narrative:      Fact sheet for providers: https://www.fda.gov/media/732107/download    Fact sheet for patients: https://www.fda.gov/media/125409/download    Test performed by PCR.            No radiology results from the last 24 hrs        Current medications:  Scheduled Meds:acetaminophen, 1,000 mg, Oral, Q8H  aspirin, 81 mg, Oral, Daily  bisacodyl, 10 mg, Rectal, Once  budesonide-formoterol, 1 puff, Inhalation, BID - RT  Diclofenac Sodium, 2 g, Topical, 4x Daily  dilTIAZem CD, 120 mg, Oral, Q24H  famotidine, 40 mg, Oral, Daily  folic acid, 1,000 mcg, Oral, Daily  guaiFENesin, 600 mg, Oral, Q12H  ipratropium-albuterol, 3 mL, Nebulization, 4x Daily - RT  lactulose, 30 g, Oral, Once  Lidocaine, 1 patch, Transdermal, Q24H  [Held by provider] methenamine, 1 g, Oral, Q12H  oxybutynin XL, 5 mg, Oral,  Daily  senna-docusate sodium, 2 tablet, Oral, BID   And  polyethylene glycol, 17 g, Oral, Daily  sertraline, 25 mg, Oral, Daily  sodium chloride, 10 mL, Intravenous, Q12H  traMADol, 50 mg, Oral, BID  vitamin B-12, 1,000 mcg, Oral, Daily      Continuous Infusions:   PRN Meds:.  albuterol sulfate HFA    senna-docusate sodium **AND** polyethylene glycol **AND** bisacodyl **AND** bisacodyl    Magnesium Standard Dose Replacement - Follow Nurse / BPA Driven Protocol    melatonin    Morphine **AND** naloxone    nitroglycerin    ondansetron **OR** ondansetron    phenol    Potassium Replacement - Follow Nurse / BPA Driven Protocol    sodium chloride    sodium chloride    traMADol    Assessment & Plan   Assessment & Plan     Active Hospital Problems    Diagnosis  POA    **Pelvic fracture [S32.9XXA]  Yes    Sacral fracture, closed [S32.10XA]  Yes    Leukocytosis [D72.829]  Yes    Macrocytic anemia [D53.9]  Yes    COPD (chronic obstructive pulmonary disease) [J44.9]  Yes    Gastroesophageal reflux disease [K21.9]  Yes    Rheumatoid arthritis [M06.9]  Yes    Hypertension [I10]  Yes      Resolved Hospital Problems   No resolved problems to display.        Brief Hospital Course to date:  Pati Carter is a 89 y.o. female  with PMH significant for rheumatoid arthritis, osteoporosis, frequent falls, compression fractures of lumbar vertebra, DDD, macular degeneration, stress and urinary incontinence. Patient had a fall at her assisted living facility, [facility], approximately two weeks ago and presented to [facility] ED 1/2/2024, one week after the fall, for evaluation. CT head was negative for any acute intracranial process. She was discharged from the ED on Tramadol for pain and Cefepime for a UTI.  Patient then returned to [facility] ED on 1/8/2024 for uncontrolled pain and progressive weakness, greater in her left leg. CT lumbar spine showed minimally displaced sacral fracture at S1-S2, potentially high-grade stenosis at L5-S1 and moderate  to marked canal stenosis at L2-L3, L3-L4, L4-L5 which is likely chronic. Neurosurgery was consulted given progressive leg weakness.      Pelvic/sacral fracture  Lumbar stenosis  Frequent falls  --Neurosurgery consult for progressive leg weakness, L>R, given potentially high-grade stenosis L5-S1  -- MRI L spine multilevel degenerative changes most prominent at L3-4 with severe spinal canal stenosis, moderate spinal stenosis L5-S1  --no role for NS, no surgical intervention needed at this time, and will continue with PT/ possible spinal injections outpatient   --Pain control with scheduled Tylenol, Diclofenac gel, lidocaine patch, plus PRN Tramadol, morhpine  - Palliative consulted and following  --Fall precautions  --PT and OT recommend SNF rehab     Leukocytosis - improved   ? PNA   - UA benign  - CXR with peribronchial thickening with changes in the right lung base with ? Early changes of PNA   - Elevated WBC and procal  - Bcx NGTD  - Completed rocephin/doxy x 5 days  - IS, OPEP, and duonebs   --Mucinex added BID  --Repeat CXR, audible rhonchi, on 3L NC and does not wear home O2  --AM CBC, BMP     Vaginal bleeding   Uterine mass   - GYN consulted and has signed off   - CT pelvis with cystic mass approximately similar in size from previous imaging. Per GYN, case discussed with GYN/ONC as well, likely that this is a malignancy and will not pursue any intervention at this time due to patients frailty. Vag bleeding is minimal and will monitor. IF there were heavier bleeding, could consider tissue diagnosis and treatment, but for now will continue with conservative management.      Rheumatoid arthritis  --hold home methotrexate     Anemia  --macrocytic, .8  -- B12 low normal, folate ok  --continue home B12, folic acid     Dysphagia   - Speech evaluated, patient with comfort diet in 2022. Partner reviewed with daughter and wants to continue comfort diet   - Cannot be comfort diet at rehab per CM, will need  modified diet     HTN  PAD  --continue home diltiazem  --continue ASA  --hold home colchicine  -- BP with fair control      GERD  --continue home famotidine     Macular degeneration  --Fall precautions, up with assist, assist with feeding     Urinary incontinence  --resume home methenamine, now off abx  --continue oxybutynin as formulary sub for Myrbetriq   --daily I's & O's     Constipation   --bowel regimen  --BM on 1/14/24       Expected Discharge Location and Transportation: [facility]  Expected Discharge pending CXR, stable labs and VS  Expected Discharge Date: 1/16/2024; Expected Discharge Time:      DVT prophylaxis:  No DVT prophylaxis order currently exists.     AM-PAC 6 Clicks Score (PT): 11 (01/14/24 2058)    CODE STATUS:   Code Status and Medical Interventions:   Ordered at: 01/08/24 1710     Medical Intervention Limits:    NO intubation (DNI)     Code Status (Patient has no pulse and is not breathing):    No CPR (Do Not Attempt to Resuscitate)     Medical Interventions (Patient has pulse or is breathing):    Limited Support       Karen Goff, APRN  01/15/24

## 2024-01-15 NOTE — PROGRESS NOTES
"Continued Stay Note  [facility]     Patient Name: Pati Carter  MRN: [id number]  Today's Date: 1/15/2024    Admit Date: 1/8/2024    Plan: TBD   Discharge Plan       Row Name 01/15/24 1644       Plan    Plan TBD    Patient/Family in Agreement with Plan yes    Plan Comments Hospice f/u visit made w/ pt/daughter, Lilian. Pt was more alert w/ increased restlessness as evidenced the pt currently picking & pulling @ her hospital gown & tele leads. Pt was also fidgeting in the bed. Writer spoke w/ daughter about the pt.'s change in status & that explained that w// her change in status SNF may not be an option w/ the decline if the pt cannot participate in therapy & that LTC may be what the pt needs. Writer also educated on in pt hospice Medicare criteria as the pt may meet criteria w/ continued decline & a need for sx management. Daughter verbalized understanding. Writer call Velma, Palliative Care APRN, to ask her the additional of medication fos anxiety/restlessness, but she had left for the day. Message was sent to pt.'s attending to ask for additional medication for sx management. Hospice will f/u w/ pt./family tomorrow. If further assistance is needed, please call [phone].    *Inpatient hospice criteria is based on guidelines from Medicare/Medicaid. To meet eligibility requirements, one must not only be eligible for hospice, but have \"unmanaged symptoms\" which means symptoms (pain/nausea/dyspnea/agitation/anxiety) unable to be cared for at a lower level of care (home/nursing home). Usually requiring frequent medication titration and/or IV medication management/inability to take PO. Unfortunately non-symptomatic end of life (within days of passing) is not a sole qualification for inpatient hospice. Inpt hospice is also for short term acute symptom management and if symptoms are managed, disposition for discharge will be addressed.*                                                  Discharge Codes    No " documentation.                 Expected Discharge Date and Time       Expected Discharge Date Expected Discharge Time    Jan 16, 2024               Linda Castaneda

## 2024-01-15 NOTE — PROGRESS NOTES
Review of chart and visit to patient. Patient sleeping. RN reports pain well controlled with current medications. Plan for discharge today at 1400 to [facility].

## 2024-01-16 LAB
ANION GAP SERPL CALCULATED.3IONS-SCNC: 10 MMOL/L (ref 5–15)
BASOPHILS # BLD AUTO: 0.03 10*3/MM3 (ref 0–0.2)
BASOPHILS NFR BLD AUTO: 0.2 % (ref 0–1.5)
BUN SERPL-MCNC: 20 MG/DL (ref 8–23)
BUN/CREAT SERPL: 33.3 (ref 7–25)
CALCIUM SPEC-SCNC: 8.9 MG/DL (ref 8.2–9.6)
CHLORIDE SERPL-SCNC: 100 MMOL/L (ref 98–107)
CO2 SERPL-SCNC: 30 MMOL/L (ref 22–29)
CREAT SERPL-MCNC: 0.6 MG/DL (ref 0.57–1)
DEPRECATED RDW RBC AUTO: 56.4 FL (ref 37–54)
EGFRCR SERPLBLD CKD-EPI 2021: 83.8 ML/MIN/1.73
EOSINOPHIL # BLD AUTO: 0.14 10*3/MM3 (ref 0–0.4)
EOSINOPHIL NFR BLD AUTO: 1.1 % (ref 0.3–6.2)
ERYTHROCYTE [DISTWIDTH] IN BLOOD BY AUTOMATED COUNT: 14.4 % (ref 12.3–15.4)
GLUCOSE SERPL-MCNC: 83 MG/DL (ref 65–99)
HCT VFR BLD AUTO: 35 % (ref 34–46.6)
HGB BLD-MCNC: 10.7 G/DL (ref 12–15.9)
IMM GRANULOCYTES # BLD AUTO: 0.07 10*3/MM3 (ref 0–0.05)
IMM GRANULOCYTES NFR BLD AUTO: 0.5 % (ref 0–0.5)
LYMPHOCYTES # BLD AUTO: 0.99 10*3/MM3 (ref 0.7–3.1)
LYMPHOCYTES NFR BLD AUTO: 7.5 % (ref 19.6–45.3)
MCH RBC QN AUTO: 32.9 PG (ref 26.6–33)
MCHC RBC AUTO-ENTMCNC: 30.6 G/DL (ref 31.5–35.7)
MCV RBC AUTO: 107.7 FL (ref 79–97)
MONOCYTES # BLD AUTO: 1.11 10*3/MM3 (ref 0.1–0.9)
MONOCYTES NFR BLD AUTO: 8.4 % (ref 5–12)
NEUTROPHILS NFR BLD AUTO: 10.85 10*3/MM3 (ref 1.7–7)
NEUTROPHILS NFR BLD AUTO: 82.3 % (ref 42.7–76)
NRBC BLD AUTO-RTO: 0 /100 WBC (ref 0–0.2)
PLATELET # BLD AUTO: 350 10*3/MM3 (ref 140–450)
PMV BLD AUTO: 10.3 FL (ref 6–12)
POTASSIUM SERPL-SCNC: 4.7 MMOL/L (ref 3.5–5.2)
RBC # BLD AUTO: 3.25 10*6/MM3 (ref 3.77–5.28)
SODIUM SERPL-SCNC: 140 MMOL/L (ref 136–145)
WBC NRBC COR # BLD AUTO: 13.19 10*3/MM3 (ref 3.4–10.8)

## 2024-01-16 PROCEDURE — 99232 SBSQ HOSP IP/OBS MODERATE 35: CPT | Performed by: NURSE PRACTITIONER

## 2024-01-16 PROCEDURE — 25010000002 PIPERACILLIN SOD-TAZOBACTAM PER 1 G: Performed by: NURSE PRACTITIONER

## 2024-01-16 PROCEDURE — 25010000002 FUROSEMIDE PER 20 MG: Performed by: NURSE PRACTITIONER

## 2024-01-16 PROCEDURE — 94799 UNLISTED PULMONARY SVC/PX: CPT

## 2024-01-16 PROCEDURE — 80048 BASIC METABOLIC PNL TOTAL CA: CPT | Performed by: NURSE PRACTITIONER

## 2024-01-16 PROCEDURE — 25010000002 MORPHINE PER 10 MG

## 2024-01-16 PROCEDURE — 94664 DEMO&/EVAL PT USE INHALER: CPT

## 2024-01-16 PROCEDURE — 94761 N-INVAS EAR/PLS OXIMETRY MLT: CPT

## 2024-01-16 PROCEDURE — 85025 COMPLETE CBC W/AUTO DIFF WBC: CPT | Performed by: NURSE PRACTITIONER

## 2024-01-16 RX ORDER — BISACODYL 10 MG
10 SUPPOSITORY, RECTAL RECTAL ONCE
Status: COMPLETED | OUTPATIENT
Start: 2024-01-16 | End: 2024-01-16

## 2024-01-16 RX ORDER — MIDAZOLAM HYDROCHLORIDE 1 MG/ML
0.5 INJECTION INTRAMUSCULAR; INTRAVENOUS EVERY 4 HOURS PRN
Status: DISCONTINUED | OUTPATIENT
Start: 2024-01-16 | End: 2024-01-17 | Stop reason: HOSPADM

## 2024-01-16 RX ORDER — PANTOPRAZOLE SODIUM 40 MG/10ML
40 INJECTION, POWDER, LYOPHILIZED, FOR SOLUTION INTRAVENOUS
Status: DISCONTINUED | OUTPATIENT
Start: 2024-01-16 | End: 2024-01-17 | Stop reason: HOSPADM

## 2024-01-16 RX ORDER — MORPHINE SULFATE 2 MG/ML
1 INJECTION, SOLUTION INTRAMUSCULAR; INTRAVENOUS
Status: DISCONTINUED | OUTPATIENT
Start: 2024-01-16 | End: 2024-01-17 | Stop reason: HOSPADM

## 2024-01-16 RX ORDER — METOPROLOL TARTRATE 1 MG/ML
2.5 INJECTION, SOLUTION INTRAVENOUS EVERY 6 HOURS SCHEDULED
Status: DISCONTINUED | OUTPATIENT
Start: 2024-01-16 | End: 2024-01-17 | Stop reason: HOSPADM

## 2024-01-16 RX ORDER — NALOXONE HCL 0.4 MG/ML
0.4 VIAL (ML) INJECTION
Status: DISCONTINUED | OUTPATIENT
Start: 2024-01-16 | End: 2024-01-17 | Stop reason: HOSPADM

## 2024-01-16 RX ORDER — GLYCOPYRROLATE 0.2 MG/ML
0.4 INJECTION INTRAMUSCULAR; INTRAVENOUS EVERY 4 HOURS PRN
Status: DISCONTINUED | OUTPATIENT
Start: 2024-01-16 | End: 2024-01-17 | Stop reason: HOSPADM

## 2024-01-16 RX ORDER — FUROSEMIDE 10 MG/ML
40 INJECTION INTRAMUSCULAR; INTRAVENOUS ONCE
Status: COMPLETED | OUTPATIENT
Start: 2024-01-16 | End: 2024-01-16

## 2024-01-16 RX ADMIN — METOPROLOL TARTRATE 2.5 MG: 5 INJECTION INTRAVENOUS at 23:25

## 2024-01-16 RX ADMIN — Medication 10 ML: at 21:58

## 2024-01-16 RX ADMIN — NYSTATIN 500000 UNITS: 100000 SUSPENSION ORAL at 21:56

## 2024-01-16 RX ADMIN — DICLOFENAC SODIUM 2 G: 9.3 GEL TOPICAL at 17:56

## 2024-01-16 RX ADMIN — IPRATROPIUM BROMIDE AND ALBUTEROL SULFATE 3 ML: 2.5; .5 SOLUTION RESPIRATORY (INHALATION) at 08:53

## 2024-01-16 RX ADMIN — IPRATROPIUM BROMIDE AND ALBUTEROL SULFATE 3 ML: 2.5; .5 SOLUTION RESPIRATORY (INHALATION) at 15:26

## 2024-01-16 RX ADMIN — DICLOFENAC SODIUM 2 G: 9.3 GEL TOPICAL at 10:48

## 2024-01-16 RX ADMIN — NYSTATIN 500000 UNITS: 100000 SUSPENSION ORAL at 12:00

## 2024-01-16 RX ADMIN — PANTOPRAZOLE SODIUM 40 MG: 40 INJECTION, POWDER, LYOPHILIZED, FOR SOLUTION INTRAVENOUS at 12:00

## 2024-01-16 RX ADMIN — LIDOCAINE 1 PATCH: 4 PATCH TOPICAL at 08:00

## 2024-01-16 RX ADMIN — MORPHINE SULFATE 1 MG: 2 INJECTION, SOLUTION INTRAMUSCULAR; INTRAVENOUS at 15:01

## 2024-01-16 RX ADMIN — PIPERACILLIN SODIUM AND TAZOBACTAM SODIUM 3.38 G: 3; .375 INJECTION, SOLUTION INTRAVENOUS at 23:25

## 2024-01-16 RX ADMIN — PIPERACILLIN SODIUM AND TAZOBACTAM SODIUM 3.38 G: 3; .375 INJECTION, SOLUTION INTRAVENOUS at 11:24

## 2024-01-16 RX ADMIN — PIPERACILLIN SODIUM AND TAZOBACTAM SODIUM 3.38 G: 3; .375 INJECTION, SOLUTION INTRAVENOUS at 16:31

## 2024-01-16 RX ADMIN — LORAZEPAM 0.5 MG: 0.5 TABLET ORAL at 23:25

## 2024-01-16 RX ADMIN — IPRATROPIUM BROMIDE AND ALBUTEROL SULFATE 3 ML: 2.5; .5 SOLUTION RESPIRATORY (INHALATION) at 12:30

## 2024-01-16 RX ADMIN — NYSTATIN 500000 UNITS: 100000 SUSPENSION ORAL at 17:55

## 2024-01-16 RX ADMIN — BISACODYL 10 MG: 10 SUPPOSITORY RECTAL at 11:25

## 2024-01-16 RX ADMIN — MORPHINE SULFATE 1 MG: 2 INJECTION, SOLUTION INTRAMUSCULAR; INTRAVENOUS at 11:24

## 2024-01-16 RX ADMIN — METOPROLOL TARTRATE 2.5 MG: 5 INJECTION INTRAVENOUS at 17:55

## 2024-01-16 RX ADMIN — MORPHINE SULFATE 1 MG: 2 INJECTION, SOLUTION INTRAMUSCULAR; INTRAVENOUS at 19:28

## 2024-01-16 RX ADMIN — Medication 10 ML: at 11:39

## 2024-01-16 RX ADMIN — METOPROLOL TARTRATE 2.5 MG: 5 INJECTION INTRAVENOUS at 12:00

## 2024-01-16 RX ADMIN — FUROSEMIDE 40 MG: 10 INJECTION, SOLUTION INTRAMUSCULAR; INTRAVENOUS at 11:25

## 2024-01-16 RX ADMIN — DICLOFENAC SODIUM 2 G: 9.3 GEL TOPICAL at 21:57

## 2024-01-16 RX ADMIN — IPRATROPIUM BROMIDE AND ALBUTEROL SULFATE 3 ML: 2.5; .5 SOLUTION RESPIRATORY (INHALATION) at 19:47

## 2024-01-16 RX ADMIN — BUDESONIDE AND FORMOTEROL FUMARATE DIHYDRATE 1 PUFF: 160; 4.5 AEROSOL RESPIRATORY (INHALATION) at 08:54

## 2024-01-16 NOTE — PROGRESS NOTES
"Palliative Care Daily Progress Note     C/C: Patient lethargic, does not open eyes to verbal stimuli.     S: Medical record reviewed. Follow up visit for GOC in the context of complex medical care. Events noted. Patient with increased WOB, does not open eyes to verbal stimuli. RN reports patient restless and anxious. Met with daughter at bedside and confirmed plan for IV ABX today with no escalation of care.      ROS: ROS limited by AMS.     O: Code Status:   Code Status and Medical Interventions:   Ordered at: 01/08/24 1710     Medical Intervention Limits:    NO intubation (DNI)     Code Status (Patient has no pulse and is not breathing):    No CPR (Do Not Attempt to Resuscitate)     Medical Interventions (Patient has pulse or is breathing):    Limited Support      Advanced Directives: Advance Directive Status: Patient does not have advance directive   Goals of Care: Ongoing.   Palliative Performance Scale Score: 30%     /95 (BP Location: Left arm, Patient Position: Lying)   Pulse 113   Temp 98.1 °F (36.7 °C) (Axillary)   Resp 25   Ht 157.5 cm (62\")   Wt 52.3 kg (115 lb 3.2 oz)   SpO2 93%   BMI 21.07 kg/m²     Intake/Output Summary (Last 24 hours) at 1/16/2024 1115  Last data filed at 1/16/2024 0255  Gross per 24 hour   Intake 218 ml   Output 300 ml   Net -82 ml       PE:  General Appearance:    Patient laying in bed, eyes closed, chronically ill appearing, frail, cooperative, NAD   HEENT:    NC/AT, MM dry, face relaxed   Neck:   supple, trachea midline, no JVD   Lungs:     rhonchi to BUL's, diminished in bases; WOB increased, accessory muscles in use; RR 26-28 on exam, on 2.5LNC    Heart:    RRR, normal S1 and S2, no M/R/G,    Abdomen:     Normal bowel sounds, soft, nontender, nondistended   G/U:   Deferred   MSK/Extremities:   Wasting, no edema   Pulses:   Pulses palpable and equal bilaterally   Skin:   Warm, dry   Neurologic:   eyes closed, does not respond to verbal stimuli,    Psych:   eyes " closed, intermittent restlessness     Meds: Reviewed and changes noted    Labs:   Results from last 7 days   Lab Units 01/16/24  0704   WBC 10*3/mm3 13.19*   HEMOGLOBIN g/dL 10.7*   HEMATOCRIT % 35.0   PLATELETS 10*3/mm3 350     Results from last 7 days   Lab Units 01/16/24  0704   SODIUM mmol/L 140   POTASSIUM mmol/L 4.7   CHLORIDE mmol/L 100   CO2 mmol/L 30.0*   BUN mg/dL 20   CREATININE mg/dL 0.60   GLUCOSE mg/dL 83   CALCIUM mg/dL 8.9     Results from last 7 days   Lab Units 01/16/24  0704   SODIUM mmol/L 140   POTASSIUM mmol/L 4.7   CHLORIDE mmol/L 100   CO2 mmol/L 30.0*   BUN mg/dL 20   CREATININE mg/dL 0.60   CALCIUM mg/dL 8.9   GLUCOSE mg/dL 83     Imaging Results (Last 72 Hours)       Procedure Component Value Units Date/Time    XR Abdomen KUB [[id number]] Collected: 01/15/24 2053     Updated: 01/15/24 2058    Narrative:      XR ABDOMEN KUB    Date of Exam: 1/15/2024 8:22 PM EST    Indication: Progressive abd distention/discomfort    Comparison: CT abdomen pelvis with contrast dated 1/11/2024    Findings:  There is bone demineralization with senescent changes send visualized skeletal structures including cement from previous T11 and L1 vertebroplasties. There are 3 screws traversing the right femoral neck. The bowel gas pattern is nonspecific and   nonobstructive with an overall moderate stool burden. No suspicious gas collections mass effect or calcifications.      Impression:      Impression:  Nonspecific nonobstructive bowel gas pattern with an overall moderate stool burden.        Electronically Signed: Everett Finch DO    1/15/2024 8:55 PM EST    Workstation ID: [id number]    XR Chest 1 View [[id number]] Collected: 01/15/24 1343     Updated: 01/15/24 1349    Narrative:      XR CHEST 1 VW    Date of Exam: 1/15/2024 1:06 PM EST    Indication: rhonchi on exam, requiring 3L O2 and not on home O2    Comparison: 1/8/2024    Findings:  Heart shadow is borderline enlarged. Descending thoracic aorta appears  tortuous and ectatic, exaggerated today by rotation of the patient to the right.. Mild diffuse pulmonary interstitial changes are similar to the prior study, but slightly increased,   in particular, with increased peribronchial thickening in the right and left upper lung. No lung consolidation, effusion or pneumothorax is seen.          Impression:      Impression:    1. Increased peribronchial thickening and perihilar interstitial changes in the right and left midlung, compared to 1/8/2024 exam, perhaps a developing bronchitis.    2. Borderline cardiomegaly and mild pulmonary venous hypertension.      Electronically Signed: Isael Lewis MD    1/15/2024 1:46 PM EST    Workstation ID: [id number]                  Diagnostics: Reviewed    A:   Pelvic fracture    Rheumatoid arthritis    Gastroesophageal reflux disease    Hypertension    COPD (chronic obstructive pulmonary disease)    Sacral fracture, closed    Leukocytosis    Macrocytic anemia     89 y.o. female with RA, sacral fracture.    S/S:   Pain -sacral fracture  -started Tylenol 1000mg PO TID  -discontinued scheduled Tramadol 50mg PO BID, in addition to q8 hours prn breakthrough pain as not taking PO  -continue Lidocaine patch  -increased frequency of Morphine 1mg IV q 2 hours prn pain/dyspnea    2. Dyspnea -currently on 2.5LNC  -increased frequency of Morphine 1mg IV q 2 hours prn pain/dyspnea  -lasix, nebs  -started Robinul 0.4mg IV q 4 hours prn excess secretions    3. Agitation/restlessness -started Versed 0.5mg IV q 4 hours prn agitation/restlessness    4. Dysphagia -family electing comfort diet per chart review     5. Debility -PT/OT following     6. GOC -DNR/DNI -per review of chart  -spoke with daughter at bedside who reports no escalation of care from current measures, opting for trial of ABX and then if patient does not respond transition to comfort measures with inpatient hospice    P: Follow up visit for GOC in the context of complex medical decision  making. Daughter at bedside, discussed patient's declining condition and daughter reports no escalation of care from current measures, opting for trial of ABX and then if patient does not respond, transition to comfort measures with inpatient hospice. Discussed patient with potential to decline today and overnight. Daughter in agreement with morphine IV for pain and dyspnea management. Reviewed with RN.   Palliative Care Team will continue to follow patient. Please do not hesitate to contact us regarding further sx mgmt or GOC needs.  Velma Alfaro, APRN  1/16/2024  Time spent: 35 minutes

## 2024-01-16 NOTE — PROGRESS NOTES
Continued Stay Note  [facility]     Patient Name: Pati Carter  MRN: [id number]  Today's Date: 1/16/2024    Admit Date: 1/8/2024    Plan: in pt hospice assessment   Discharge Plan       Row Name 01/16/24 1107       Plan    Plan in pt hospice assessment    Patient/Family in Agreement with Plan   yes    Plan Comments Hospice f/u visit made. Pt was lying in bed restless w/ increased secretions & an agonal breathing pattern. Daughter, Danette, was present. Writer did educate on in pt hospice Medicare criteria & explained that the in pt team can come to assess her for an admission. Writer explained that IVF, IV ABX, labs, vitals, & cardiac monitoring would be discontinued & that the focus would be on sx management. Danette verbalized understanding & states that she would like to speak w/ her sister, Lilian, when she arrives. In pt hospice team member, Nika, update & will f/u w/ the family @ 1030 today.                    Discharge Codes    No documentation.                 Expected Discharge Date and Time       Expected Discharge Date Expected Discharge Time    Jan 16, 2024               Linda Castaneda

## 2024-01-16 NOTE — PLAN OF CARE
"  Problem: Palliative Care  Goal: Enhanced Quality of Life  Intervention: Optimize Psychosocial Wellbeing  Recent Flowsheet Documentation  Taken 1/15/2024 1300 by Joi Elise \"Bushra\" QUAN PACHECO  Supportive Measures: (Palliative IDT: SHARIFA Polk MD; RIRI DENSON; NAMRATA Elise LCSW; NAMRATA Rosas RN; NAMRATA Venegas MDiv, HAZEL Byrd RN) --  Taken 1/15/2024 1143 by Joi Elise \"Bushra\" QUAN PACHECO  Supportive Measures: (symptom assessment) other (see comments)  Family/Support System Care: support provided  Visit with patient and caregiver at bedside.  Patient sleeping - moments of startling awake, caregiver reports sometimes it is more pronounced.  Otherwise patient resting, appears to be comfortable.  Caregiver preparing to feed lunch. Transfer to [place] cancelled for today.  Hospice CM following.  Palliative Care continues to follow for support and assist with plan of care.     "

## 2024-01-16 NOTE — PROGRESS NOTES
[facility]  PROGRESS NOTE    Patient Name: Pati Carter  : 1930  MRN: [id number]    Date of Admission: 2024  Primary Care Physician: Pallavi Eduardo MD    Subjective   Subjective     CC:  F/u fall, pain    HPI:   Patient resting in bed.  Daughter is at the bedside.  Patient has audible bronchial breath sounds.  Heart rate 125.  O2 turned up to 4 L.  Per RN, patient likely not safe to swallow p.o.  Per daughter and RN, hospice is planning to meet with both daughters at 1030 today to discuss simple transition to inpatient hospice.    Objective   Objective     Vital Signs:   Temp:  [97.5 °F (36.4 °C)-99.1 °F (37.3 °C)] 98.1 °F (36.7 °C)  Heart Rate:  [] 113  Resp:  [16-28] 25  BP: (105-168)/() 163/95  Flow (L/min):  [2-2.5] 2.5     Physical Exam:   Constitutional: No acute distress, awake, alert, frail-appearing  HENT: NCAT, mucous membranes dry  Respiratory: Scattered coarse rhonchi bilaterally, tachypneic, 4 L nasal cannula   Cardiovascular: Tachycardic, no murmurs, rubs, or gallops  Gastrointestinal: Hypoactive bowel sounds, soft, nontender, nondistended, rounded  Musculoskeletal: Trace bilateral ankle edema  Psychiatric: Appropriate affect, cooperative  Neurologic: Follows commands, moves all extremities, speech clear  Skin: No rashes      Results Reviewed:  LAB RESULTS:      Lab 24  0704 01/15/24  0539 01/10/24  0647   WBC 13.19* 10.60 10.14   HEMOGLOBIN 10.7* 10.3* 10.3*   HEMATOCRIT 35.0 32.6* 32.2*   PLATELETS 350 287 251   NEUTROS ABS 10.85* 8.69*  --    IMMATURE GRANS (ABS) 0.07* 0.06*  --    LYMPHS ABS 0.99 0.66*  --    MONOS ABS 1.11* 0.93*  --    EOS ABS 0.14 0.23  --    .7* 107.6* 102.5*         Lab 24  0704 01/15/24  0539   SODIUM 140 140   POTASSIUM 4.7 4.4   CHLORIDE 100 100   CO2 30.0* 31.0*   ANION GAP 10.0 9.0   BUN 20 21   CREATININE 0.60 0.71   EGFR 83.8 79.4   GLUCOSE 83 85   CALCIUM 8.9 8.8                                Brief Urine Lab Results  (Last result in the past 365 days)        Color   Clarity   Blood   Leuk Est   Nitrite   Protein   CREAT   Urine HCG        01/08/24 1512 Yellow   Clear   Large (3+)   Small (1+)   Negative   30 mg/dL (1+)                   Microbiology Results Abnormal       Procedure Component Value - Date/Time    Blood Culture - Blood, Arm, Right [[id number]]  (Normal) Collected: 01/08/24 2220    Lab Status: Final result Specimen: Blood from Arm, Right Updated: 01/13/24 2300     Blood Culture No growth at 5 days    Narrative:      Less than seven (7) mL's of blood was collected.  Insufficient quantity may yield false negative results.    Blood Culture - Blood, Arm, Right [[id number]]  (Normal) Collected: 01/08/24 2215    Lab Status: Final result Specimen: Blood from Arm, Right Updated: 01/13/24 2246     Blood Culture No growth at 5 days    Narrative:      Less than seven (7) mL's of blood was collected.  Insufficient quantity may yield false negative results.    Urine Culture - Urine, Urine, Clean Catch [[id number]] Collected: 01/08/24 1512    Lab Status: Final result Specimen: Urine, Clean Catch Updated: 01/09/24 1215     Urine Culture <10,000 CFU/mL Mixed Katherine Isolated    Narrative:      Specimen contains mixed organisms of questionable pathogenicity suggestive of contamination. If symptoms persist, suggest recollection.  Colonization of the urinary tract without infection is common. Treatment is discouraged unless the patient is symptomatic, pregnant, or undergoing an invasive urologic procedure.    COVID PRE-OP / PRE-PROCEDURE SCREENING ORDER (NO ISOLATION) - Swab, Nasopharynx [[id number]]  (Normal) Collected: 01/08/24 1601    Lab Status: Final result Specimen: Swab from Nasopharynx Updated: 01/08/24 1649    Narrative:      The following orders were created for panel order COVID PRE-OP / PRE-PROCEDURE SCREENING ORDER (NO ISOLATION) - Swab, Nasopharynx.  Procedure                                Abnormality         Status                     ---------                               -----------         ------                     COVID-19 and FLU A/B PCR...[[id number]]  Normal              Final result                 Please view results for these tests on the individual orders.    COVID-19 and FLU A/B PCR, 1 HR TAT - Swab, Nasopharynx [[id number]]  (Normal) Collected: 01/08/24 1601    Lab Status: Final result Specimen: Swab from Nasopharynx Updated: 01/08/24 1649     COVID19 Not Detected     Influenza A PCR Not Detected     Influenza B PCR Not Detected    Narrative:      Fact sheet for providers: https://www.fda.gov/media/151574/download    Fact sheet for patients: https://www.fda.gov/media/529827/download    Test performed by PCR.            XR Abdomen KUB    Result Date: 1/15/2024  XR ABDOMEN KUB Date of Exam: 1/15/2024 8:22 PM EST Indication: Progressive abd distention/discomfort Comparison: CT abdomen pelvis with contrast dated 1/11/2024 Findings: There is bone demineralization with senescent changes send visualized skeletal structures including cement from previous T11 and L1 vertebroplasties. There are 3 screws traversing the right femoral neck. The bowel gas pattern is nonspecific and nonobstructive with an overall moderate stool burden. No suspicious gas collections mass effect or calcifications.     Impression: Impression: Nonspecific nonobstructive bowel gas pattern with an overall moderate stool burden. Electronically Signed: Everett Finch DO  1/15/2024 8:55 PM EST  Workstation ID: [id number]    XR Chest 1 View    Result Date: 1/15/2024  XR CHEST 1 VW Date of Exam: 1/15/2024 1:06 PM EST Indication: rhonchi on exam, requiring 3L O2 and not on home O2 Comparison: 1/8/2024 Findings: Heart shadow is borderline enlarged. Descending thoracic aorta appears tortuous and ectatic, exaggerated today by rotation of the patient to the right.. Mild diffuse pulmonary interstitial changes are similar to  the prior study, but slightly increased, in particular, with increased peribronchial thickening in the right and left upper lung. No lung consolidation, effusion or pneumothorax is seen.     Impression: Impression: 1. Increased peribronchial thickening and perihilar interstitial changes in the right and left midlung, compared to 1/8/2024 exam, perhaps a developing bronchitis. 2. Borderline cardiomegaly and mild pulmonary venous hypertension. Electronically Signed: Isael Lewis MD  1/15/2024 1:46 PM EST  Workstation ID: [id number]         Current medications:  Scheduled Meds:acetaminophen, 1,000 mg, Oral, Q8H  aspirin, 81 mg, Oral, Daily  bisacodyl, 10 mg, Rectal, Once  budesonide-formoterol, 1 puff, Inhalation, BID - RT  Diclofenac Sodium, 2 g, Topical, 4x Daily  dilTIAZem CD, 120 mg, Oral, Q24H  famotidine, 40 mg, Oral, Daily  folic acid, 1,000 mcg, Oral, Daily  furosemide, 40 mg, Intravenous, Once  guaiFENesin, 600 mg, Oral, Q12H  ipratropium-albuterol, 3 mL, Nebulization, 4x Daily - RT  lactulose, 30 g, Oral, Once  Lidocaine, 1 patch, Transdermal, Q24H  [Held by provider] methenamine, 1 g, Oral, Q12H  nystatin, 5 mL, Swish & Spit, 4x Daily  oxybutynin XL, 5 mg, Oral, Daily  piperacillin-tazobactam, 3.375 g, Intravenous, Once  piperacillin-tazobactam, 3.375 g, Intravenous, Q8H  senna-docusate sodium, 2 tablet, Oral, BID   And  polyethylene glycol, 17 g, Oral, Daily  sertraline, 25 mg, Oral, Daily  sodium chloride, 10 mL, Intravenous, Q12H  traMADol, 50 mg, Oral, BID  vitamin B-12, 1,000 mcg, Oral, Daily      Continuous Infusions:   PRN Meds:.  albuterol sulfate HFA    senna-docusate sodium **AND** polyethylene glycol **AND** bisacodyl **AND** bisacodyl    LORazepam    Magnesium Standard Dose Replacement - Follow Nurse / BPA Driven Protocol    melatonin    Morphine **AND** naloxone    nitroglycerin    ondansetron **OR** ondansetron    phenol    Potassium Replacement - Follow Nurse / BPA Driven Protocol    sodium  chloride    sodium chloride    traMADol    Assessment & Plan   Assessment & Plan     Active Hospital Problems    Diagnosis  POA    **Pelvic fracture [S32.9XXA]  Yes    Sacral fracture, closed [S32.10XA]  Yes    Leukocytosis [D72.829]  Yes    Macrocytic anemia [D53.9]  Yes    COPD (chronic obstructive pulmonary disease) [J44.9]  Yes    Gastroesophageal reflux disease [K21.9]  Yes    Rheumatoid arthritis [M06.9]  Yes    Hypertension [I10]  Yes      Resolved Hospital Problems   No resolved problems to display.        Brief Hospital Course to date:  Pati Carter is a 89 y.o. female  with PMH significant for rheumatoid arthritis, osteoporosis, frequent falls, compression fractures of lumbar vertebra, DDD, macular degeneration, stress and urinary incontinence. Patient had a fall at her assisted living facility, [place], approximately two weeks ago and presented to [facility] ED 1/2/2024, one week after the fall, for evaluation. CT head was negative for any acute intracranial process. She was discharged from the ED on Tramadol for pain and Cefepime for a UTI.  Patient then returned to [facility] ED on 1/8/2024 for uncontrolled pain and progressive weakness, greater in her left leg. CT lumbar spine showed minimally displaced sacral fracture at S1-S2, potentially high-grade stenosis at L5-S1 and moderate to marked canal stenosis at L2-L3, L3-L4, L4-L5 which is likely chronic. Neurosurgery was consulted given progressive leg weakness.      Pelvic/sacral fracture  Lumbar stenosis  Frequent falls  --Neurosurgery consult for progressive leg weakness, L>R, given potentially high-grade stenosis L5-S1  -- MRI L spine multilevel degenerative changes most prominent at L3-4 with severe spinal canal stenosis, moderate spinal stenosis L5-S1  --no role for NS, no surgical intervention needed at this time, and will continue with PT/ possible spinal injections outpatient   --Pain control with scheduled Tylenol, Diclofenac gel, lidocaine patch,  plus PRN Tramadol, morhpine  - Palliative consulted 1/11 and following  -- Hospice consulted 1/12 and following  --PT and OT recommend SNF rehab; however, pt with increased restlessness 1/15, discharge deferred  --Hospice spoke with daughter about change in status, continuing to follow for GOC     Leukocytosis  ? PNA   Dyspnea  - UA benign  - CXR with peribronchial thickening with changes in the right lung base with ? Early changes of PNA   - Elevated WBC and procal  - Bcx NGTD  - Completed rocephin/doxy x 5 days  - IS, OPEP, and duonebs   --Mucinex added BID  --Repeat CXR 1/15 with increased peribronchial thickening, WBCs 13.29 today  -- Add Zosyn x 5 days, on hold until family meeting with hospice  --Lasix 40 mg IV x 1  --Hold PO meds until meeting with hospice    Candida  -- Thrush on tongue, start nystatin suspension     Vaginal bleeding   Uterine mass   - GYN consulted and has signed off   - CT pelvis with cystic mass approximately similar in size from previous imaging. Per GYN, case discussed with GYN/ONC as well, likely that this is a malignancy and will not pursue any intervention at this time due to patients frailty. Vag bleeding is minimal and will monitor. IF there were heavier bleeding, could consider tissue diagnosis and treatment, but for now will continue with conservative management.      Rheumatoid arthritis  --hold home methotrexate     Anemia  --macrocytic, .8  -- B12 low normal, folate ok  --continue home B12, folic acid     Dysphagia   - Speech evaluated, patient with comfort diet in 2022. Partner reviewed with daughter and wants to continue comfort diet   - Cannot be comfort diet at rehab per CM, will need modified diet     HTN  PAD  --continue home diltiazem  --continue ASA  --hold home colchicine  -- BP with fair control      GERD  --continue home famotidine     Macular degeneration  --Fall precautions, up with assist, assist with feeding     Urinary incontinence  --continue to hold  home methenamine, off abx but contraindicated by on CrCl  --continue oxybutynin as formulary sub for Myrbetriq   --daily I's & O's     Constipation   --bowel regimen  --BM on 1/14/24       Expected Discharge Location and Transportation: Likely DC to inpatient hospice  Expected Discharge  Expected Discharge Date: 1/16/2024; Expected Discharge Time:      DVT prophylaxis:  No DVT prophylaxis order currently exists.     AM-PAC 6 Clicks Score (PT): 9 (01/15/24 2000)    CODE STATUS:   Code Status and Medical Interventions:   Ordered at: 01/08/24 1710     Medical Intervention Limits:    NO intubation (DNI)     Code Status (Patient has no pulse and is not breathing):    No CPR (Do Not Attempt to Resuscitate)     Medical Interventions (Patient has pulse or is breathing):    Limited Support       Karen Goff, APRN  01/16/24

## 2024-01-16 NOTE — PLAN OF CARE
Problem: Skin Injury Risk Increased  Goal: Skin Health and Integrity  Outcome: Ongoing, Progressing  Intervention: Optimize Skin Protection  Recent Flowsheet Documentation  Taken 1/15/2024 2300 by Romelia Casas RN  Pressure Reduction Techniques: weight shift assistance provided  Head of Bed (HOB) Positioning: HOB at 20 degrees  Pressure Reduction Devices: specialty bed utilized  Skin Protection: adhesive use limited  Taken 1/15/2024 2200 by Romelia Casas RN  Pressure Reduction Techniques: weight shift assistance provided  Pressure Reduction Devices: specialty bed utilized  Skin Protection: adhesive use limited  Taken 1/15/2024 2130 by Romelia Casas RN  Pressure Reduction Techniques: weight shift assistance provided  Head of Bed (HOB) Positioning: HOB at 20 degrees  Pressure Reduction Devices: specialty bed utilized  Skin Protection: adhesive use limited  Taken 1/15/2024 2000 by Romelia Casas RN  Pressure Reduction Techniques: weight shift assistance provided  Pressure Reduction Devices: specialty bed utilized  Skin Protection: adhesive use limited  Taken 1/15/2024 1900 by Romelia Casas RN  Pressure Reduction Techniques: weight shift assistance provided  Head of Bed (HOB) Positioning: HOB at 30 degrees  Pressure Reduction Devices: specialty bed utilized  Skin Protection: adhesive use limited     Problem: Fall Injury Risk  Goal: Absence of Fall and Fall-Related Injury  Outcome: Ongoing, Progressing  Intervention: Identify and Manage Contributors  Recent Flowsheet Documentation  Taken 1/15/2024 2000 by Romelia Casas RN  Medication Review/Management: medications reviewed  Intervention: Promote Injury-Free Environment  Recent Flowsheet Documentation  Taken 1/15/2024 2300 by Romelia Casas RN  Safety Promotion/Fall Prevention:   activity supervised   safety round/check completed  Taken 1/15/2024 2200 by Romelia Casas RN  Safety Promotion/Fall Prevention:   activity  supervised   safety round/check completed  Taken 1/15/2024 2130 by Romelia Casas RN  Safety Promotion/Fall Prevention:   activity supervised   safety round/check completed  Taken 1/15/2024 2000 by Romelia Casas RN  Safety Promotion/Fall Prevention:   activity supervised   safety round/check completed  Taken 1/15/2024 1900 by Romelia Casas RN  Safety Promotion/Fall Prevention:   activity supervised   safety round/check completed     Problem: Adult Inpatient Plan of Care  Goal: Plan of Care Review  Outcome: Ongoing, Progressing  Goal: Patient-Specific Goal (Individualized)  Outcome: Ongoing, Progressing  Goal: Absence of Hospital-Acquired Illness or Injury  Outcome: Ongoing, Progressing  Intervention: Identify and Manage Fall Risk  Recent Flowsheet Documentation  Taken 1/15/2024 2300 by Romelia Casas RN  Safety Promotion/Fall Prevention:   activity supervised   safety round/check completed  Taken 1/15/2024 2200 by Romelia Casas RN  Safety Promotion/Fall Prevention:   activity supervised   safety round/check completed  Taken 1/15/2024 2130 by Romelia Casas RN  Safety Promotion/Fall Prevention:   activity supervised   safety round/check completed  Taken 1/15/2024 2000 by Romelia Casas RN  Safety Promotion/Fall Prevention:   activity supervised   safety round/check completed  Taken 1/15/2024 1900 by Romelia Casas RN  Safety Promotion/Fall Prevention:   activity supervised   safety round/check completed  Intervention: Prevent Skin Injury  Recent Flowsheet Documentation  Taken 1/15/2024 2300 by Romelia Casas RN  Body Position:   turned   right  Skin Protection: adhesive use limited  Taken 1/15/2024 2200 by Romleia Casas RN  Skin Protection: adhesive use limited  Taken 1/15/2024 2130 by Romelia Casas RN  Body Position:   turned   left  Skin Protection: adhesive use limited  Taken 1/15/2024 2000 by Romelia Casas RN  Skin Protection: adhesive use  limited  Taken 1/15/2024 1900 by Romelia Casas RN  Body Position:   turned   right  Skin Protection: adhesive use limited  Intervention: Prevent and Manage VTE (Venous Thromboembolism) Risk  Recent Flowsheet Documentation  Taken 1/15/2024 2300 by Romelia Casas RN  VTE Prevention/Management:   bilateral   sequential compression devices on  Taken 1/15/2024 2200 by Romelia Casas RN  VTE Prevention/Management:   bilateral   sequential compression devices on  Taken 1/15/2024 2130 by Romelia Casas RN  VTE Prevention/Management:   bilateral   sequential compression devices on  Taken 1/15/2024 2000 by Romelia Casas RN  VTE Prevention/Management:   bilateral   compression stockings on  Range of Motion: ROM (range of motion) performed  Taken 1/15/2024 1900 by Romelia Casas RN  VTE Prevention/Management:   bilateral   sequential compression devices on  Intervention: Prevent Infection  Recent Flowsheet Documentation  Taken 1/15/2024 2300 by Romelia Casas RN  Infection Prevention:   personal protective equipment utilized   rest/sleep promoted   single patient room provided  Taken 1/15/2024 2200 by Romelia Casas RN  Infection Prevention:   personal protective equipment utilized   rest/sleep promoted   single patient room provided  Taken 1/15/2024 2130 by Romelia Casas RN  Infection Prevention:   personal protective equipment utilized   rest/sleep promoted   single patient room provided  Taken 1/15/2024 2000 by Romelia Casas RN  Infection Prevention:   rest/sleep promoted   equipment surfaces disinfected   hand hygiene promoted   single patient room provided   visitors restricted/screened   personal protective equipment utilized  Taken 1/15/2024 1900 by Romelia Casas RN  Infection Prevention:   personal protective equipment utilized   rest/sleep promoted   single patient room provided  Goal: Optimal Comfort and Wellbeing  Outcome: Ongoing, Progressing  Goal:  Readiness for Transition of Care  Outcome: Ongoing, Progressing     Problem: Palliative Care  Goal: Enhanced Quality of Life  Outcome: Ongoing, Progressing  Intervention: Maximize Comfort  Recent Flowsheet Documentation  Taken 1/15/2024 2300 by Romelia Casas RN  Oral Care:   lip/mouth moisturizer applied   mouth wash rinse   teeth brushed - regular toothbrush  Taken 1/15/2024 2000 by Romelia Casas RN  Oral Care: mouth wash rinse     Problem: Pain Acute  Goal: Acceptable Pain Control and Functional Ability  Outcome: Ongoing, Progressing  Intervention: Prevent or Manage Pain  Recent Flowsheet Documentation  Taken 1/15/2024 2000 by Romelia Casas RN  Bowel Elimination Promotion: (bowel meds given, apple juice given)   adequate fluid intake promoted   privacy promoted  Medication Review/Management: medications reviewed     Problem: Bleeding (Orthopaedic Fracture)  Goal: Absence of Bleeding  Outcome: Ongoing, Progressing     Problem: Embolism (Orthopaedic Fracture)  Goal: Absence of Embolism Signs and Symptoms  Outcome: Ongoing, Progressing  Intervention: Prevent or Manage Embolism Risk  Recent Flowsheet Documentation  Taken 1/15/2024 2300 by Romelia Casas RN  VTE Prevention/Management:   bilateral   sequential compression devices on  Taken 1/15/2024 2200 by Romelia Casas RN  VTE Prevention/Management:   bilateral   sequential compression devices on  Taken 1/15/2024 2130 by Romelia Casas RN  VTE Prevention/Management:   bilateral   sequential compression devices on  Taken 1/15/2024 2000 by Romelia Casas RN  VTE Prevention/Management:   bilateral   compression stockings on  Taken 1/15/2024 1900 by Romelia Casas RN  VTE Prevention/Management:   bilateral   sequential compression devices on     Problem: Fracture Stabilization and Management (Orthopaedic Fracture)  Goal: Fracture Stability  Outcome: Ongoing, Progressing     Problem: Functional Ability Impaired (Orthopaedic  Fracture)  Goal: Optimal Functional Ability  Outcome: Ongoing, Progressing  Intervention: Optimize Functional Ability  Recent Flowsheet Documentation  Taken 1/15/2024 2300 by Romelia Casas RN  Positioning/Transfer Devices:   pillows   in use  Taken 1/15/2024 2130 by Romelia Casas RN  Positioning/Transfer Devices:   pillows   in use  Taken 1/15/2024 2000 by Romelia Casas RN  Range of Motion: ROM (range of motion) performed  Taken 1/15/2024 1900 by Romelia Casas RN  Positioning/Transfer Devices:   pillows   in use     Problem: Infection (Orthopaedic Fracture)  Goal: Absence of Infection Signs and Symptoms  Outcome: Ongoing, Progressing  Intervention: Prevent or Manage Infection  Recent Flowsheet Documentation  Taken 1/15/2024 2300 by Romelia Casas RN  Infection Prevention:   personal protective equipment utilized   rest/sleep promoted   single patient room provided  Taken 1/15/2024 2200 by Romelia Casas RN  Infection Prevention:   personal protective equipment utilized   rest/sleep promoted   single patient room provided  Taken 1/15/2024 2130 by Romelia Casas RN  Infection Prevention:   personal protective equipment utilized   rest/sleep promoted   single patient room provided  Taken 1/15/2024 2000 by Romelia Casas RN  Infection Prevention:   rest/sleep promoted   equipment surfaces disinfected   hand hygiene promoted   single patient room provided   visitors restricted/screened   personal protective equipment utilized  Taken 1/15/2024 1900 by Romelia Casas RN  Infection Prevention:   personal protective equipment utilized   rest/sleep promoted   single patient room provided     Problem: Neurovascular Compromise (Orthopaedic Fracture)  Goal: Effective Tissue Perfusion  Outcome: Ongoing, Progressing     Problem: Pain (Orthopaedic Fracture)  Goal: Acceptable Pain Control  Outcome: Ongoing, Progressing     Problem: Respiratory Compromise (Orthopaedic Fracture)  Goal:  Effective Oxygenation and Ventilation  Outcome: Ongoing, Progressing   Goal Outcome Evaluation:      Patient lethargic throughout the shift, oriented to self only. Consistent with previous documentation and last shift with this RN. Patient had increased distention and abd discomfort, KUB ordered per Dr. Jett's rec. Moderate stool burden noted. Johnie updated and instructed RN to give scheduled medications and ensure patient is comfortable and pain is controlled. Romelia Casas RN

## 2024-01-16 NOTE — PROGRESS NOTES
Continued Stay Note  [facility]     Patient Name: Pati Carter  MRN: [id number]  Today's Date: 1/16/2024    Admit Date: 1/8/2024    Plan: TBD   Discharge Plan       Row Name 01/16/24 1221       Plan    Plan TBD    Plan Comments Inpatient hospice RN and  to room to meet with daughters. Moved meeting to conference room. Discussed inpatient hospice services with them which would include stopping IV antibiotics, cardiac monitoring, checking vitals. Both daughters would like to give the patient a little more time to see if she improves any with the IV antibiotics. They are agreeable to meet with hospice tomorrow at 1300 to continue discussing hospice. Updated Dr. Adair, Karen DENSON, Velma DENSON, Bushra Elise, and nurse.        Row Name 01/16/24 1108       Plan    Plan in pt hospice assessment    Patient/Family in Agreement with Plan yes    Plan Comments Hospice f/u visit made. Pt was lying in bed restless w/ increased secreations & an agonal breathing pattren. Daughter, Danette, was present. Writer did educate on in pt hospice Medicare criteria & explained that the in pt team can come to assess her for an admission. Writer explained that IVF, IV ABX, & cardiac monitioring would be discontinued & that the focus would be on sx management. Danette verbalized understanding & states that she would like to speak w/ her sister, Lilian, when she arrives. In pt hopsice team bonierNiak, update & will f/u w/ the family @ 1030.                   Discharge Codes    No documentation.                 Expected Discharge Date and Time       Expected Discharge Date Expected Discharge Time    Jan 16, 2024               Nika Ely RN

## 2024-01-17 VITALS
HEIGHT: 62 IN | DIASTOLIC BLOOD PRESSURE: 72 MMHG | RESPIRATION RATE: 21 BRPM | HEART RATE: 110 BPM | BODY MASS INDEX: 21.2 KG/M2 | OXYGEN SATURATION: 95 % | TEMPERATURE: 98.1 F | SYSTOLIC BLOOD PRESSURE: 95 MMHG | WEIGHT: 115.2 LBS

## 2024-01-17 PROBLEM — N85.8 UTERINE MASS: Status: ACTIVE | Noted: 2024-01-01

## 2024-01-17 LAB
BASOPHILS # BLD AUTO: 0.04 10*3/MM3 (ref 0–0.2)
BASOPHILS NFR BLD AUTO: 0.4 % (ref 0–1.5)
DEPRECATED RDW RBC AUTO: 56.7 FL (ref 37–54)
EOSINOPHIL # BLD AUTO: 0 10*3/MM3 (ref 0–0.4)
EOSINOPHIL NFR BLD AUTO: 0 % (ref 0.3–6.2)
ERYTHROCYTE [DISTWIDTH] IN BLOOD BY AUTOMATED COUNT: 14.6 % (ref 12.3–15.4)
HCT VFR BLD AUTO: 38.2 % (ref 34–46.6)
HGB BLD-MCNC: 11.7 G/DL (ref 12–15.9)
IMM GRANULOCYTES # BLD AUTO: 0.03 10*3/MM3 (ref 0–0.05)
IMM GRANULOCYTES NFR BLD AUTO: 0.3 % (ref 0–0.5)
LYMPHOCYTES # BLD AUTO: 0.58 10*3/MM3 (ref 0.7–3.1)
LYMPHOCYTES NFR BLD AUTO: 6.3 % (ref 19.6–45.3)
MCH RBC QN AUTO: 32.3 PG (ref 26.6–33)
MCHC RBC AUTO-ENTMCNC: 30.6 G/DL (ref 31.5–35.7)
MCV RBC AUTO: 105.5 FL (ref 79–97)
MONOCYTES # BLD AUTO: 0.73 10*3/MM3 (ref 0.1–0.9)
MONOCYTES NFR BLD AUTO: 8 % (ref 5–12)
NEUTROPHILS NFR BLD AUTO: 7.79 10*3/MM3 (ref 1.7–7)
NEUTROPHILS NFR BLD AUTO: 85 % (ref 42.7–76)
NRBC BLD AUTO-RTO: 0 /100 WBC (ref 0–0.2)
PLATELET # BLD AUTO: 316 10*3/MM3 (ref 140–450)
PMV BLD AUTO: 10 FL (ref 6–12)
RBC # BLD AUTO: 3.62 10*6/MM3 (ref 3.77–5.28)
WBC NRBC COR # BLD AUTO: 9.17 10*3/MM3 (ref 3.4–10.8)

## 2024-01-17 PROCEDURE — 94761 N-INVAS EAR/PLS OXIMETRY MLT: CPT

## 2024-01-17 PROCEDURE — 25010000002 PIPERACILLIN SOD-TAZOBACTAM PER 1 G: Performed by: NURSE PRACTITIONER

## 2024-01-17 PROCEDURE — 25010000002 GLYCOPYRROLATE 0.2 MG/ML SOLUTION

## 2024-01-17 PROCEDURE — 94664 DEMO&/EVAL PT USE INHALER: CPT

## 2024-01-17 PROCEDURE — 85025 COMPLETE CBC W/AUTO DIFF WBC: CPT | Performed by: NURSE PRACTITIONER

## 2024-01-17 PROCEDURE — 25010000002 MORPHINE PER 10 MG

## 2024-01-17 PROCEDURE — 94799 UNLISTED PULMONARY SVC/PX: CPT

## 2024-01-17 PROCEDURE — 99239 HOSP IP/OBS DSCHRG MGMT >30: CPT | Performed by: NURSE PRACTITIONER

## 2024-01-17 RX ORDER — FOLIC ACID 1 MG/1
1000 TABLET ORAL DAILY
Status: CANCELLED | OUTPATIENT
Start: 2024-01-18

## 2024-01-17 RX ORDER — OXYBUTYNIN CHLORIDE 5 MG/1
5 TABLET, EXTENDED RELEASE ORAL DAILY
Status: CANCELLED | OUTPATIENT
Start: 2024-01-18

## 2024-01-17 RX ORDER — POLYETHYLENE GLYCOL 3350 17 G/17G
17 POWDER, FOR SOLUTION ORAL DAILY
Status: CANCELLED | OUTPATIENT
Start: 2024-01-18

## 2024-01-17 RX ORDER — ACETAMINOPHEN 500 MG
1000 TABLET ORAL EVERY 8 HOURS
Status: CANCELLED | OUTPATIENT
Start: 2024-01-17

## 2024-01-17 RX ORDER — POLYETHYLENE GLYCOL 3350 17 G/17G
17 POWDER, FOR SOLUTION ORAL DAILY
Status: DISCONTINUED | OUTPATIENT
Start: 2024-01-18 | End: 2024-01-17 | Stop reason: HOSPADM

## 2024-01-17 RX ORDER — DILTIAZEM HYDROCHLORIDE 120 MG/1
120 CAPSULE, COATED, EXTENDED RELEASE ORAL
Status: CANCELLED | OUTPATIENT
Start: 2024-01-18

## 2024-01-17 RX ORDER — TRAMADOL HYDROCHLORIDE 50 MG/1
50 TABLET ORAL EVERY 8 HOURS PRN
Status: CANCELLED | OUTPATIENT
Start: 2024-01-17

## 2024-01-17 RX ORDER — GLYCOPYRROLATE 0.2 MG/ML
0.4 INJECTION INTRAMUSCULAR; INTRAVENOUS EVERY 4 HOURS PRN
Status: CANCELLED | OUTPATIENT
Start: 2024-01-17

## 2024-01-17 RX ORDER — NALOXONE HCL 0.4 MG/ML
0.4 VIAL (ML) INJECTION
Status: CANCELLED | OUTPATIENT
Start: 2024-01-17

## 2024-01-17 RX ORDER — ASPIRIN 81 MG/1
81 TABLET ORAL DAILY
Status: CANCELLED | OUTPATIENT
Start: 2024-01-18

## 2024-01-17 RX ORDER — CHOLECALCIFEROL (VITAMIN D3) 125 MCG
5 CAPSULE ORAL NIGHTLY PRN
Status: CANCELLED | OUTPATIENT
Start: 2024-01-17

## 2024-01-17 RX ORDER — AMOXICILLIN 250 MG
2 CAPSULE ORAL 2 TIMES DAILY PRN
Status: DISCONTINUED | OUTPATIENT
Start: 2024-01-17 | End: 2024-01-17 | Stop reason: HOSPADM

## 2024-01-17 RX ORDER — AMOXICILLIN 250 MG
2 CAPSULE ORAL 2 TIMES DAILY PRN
Status: CANCELLED | OUTPATIENT
Start: 2024-01-17

## 2024-01-17 RX ORDER — BISACODYL 5 MG/1
5 TABLET, DELAYED RELEASE ORAL DAILY PRN
Status: DISCONTINUED | OUTPATIENT
Start: 2024-01-17 | End: 2024-01-17 | Stop reason: HOSPADM

## 2024-01-17 RX ORDER — SODIUM CHLORIDE 9 MG/ML
40 INJECTION, SOLUTION INTRAVENOUS AS NEEDED
Status: CANCELLED | OUTPATIENT
Start: 2024-01-17

## 2024-01-17 RX ORDER — SERTRALINE HYDROCHLORIDE 25 MG/1
25 TABLET, FILM COATED ORAL DAILY
Status: CANCELLED | OUTPATIENT
Start: 2024-01-18

## 2024-01-17 RX ORDER — LANOLIN ALCOHOL/MO/W.PET/CERES
1000 CREAM (GRAM) TOPICAL DAILY
Status: CANCELLED | OUTPATIENT
Start: 2024-01-18

## 2024-01-17 RX ORDER — BISACODYL 10 MG
10 SUPPOSITORY, RECTAL RECTAL DAILY PRN
Status: DISCONTINUED | OUTPATIENT
Start: 2024-01-17 | End: 2024-01-17 | Stop reason: HOSPADM

## 2024-01-17 RX ORDER — SODIUM CHLORIDE 0.9 % (FLUSH) 0.9 %
10 SYRINGE (ML) INJECTION EVERY 12 HOURS SCHEDULED
Status: CANCELLED | OUTPATIENT
Start: 2024-01-17

## 2024-01-17 RX ORDER — SODIUM CHLORIDE 0.9 % (FLUSH) 0.9 %
10 SYRINGE (ML) INJECTION AS NEEDED
Status: CANCELLED | OUTPATIENT
Start: 2024-01-17

## 2024-01-17 RX ORDER — GUAIFENESIN 600 MG/1
600 TABLET, EXTENDED RELEASE ORAL EVERY 12 HOURS SCHEDULED
Status: CANCELLED | OUTPATIENT
Start: 2024-01-17 | End: 2024-01-19

## 2024-01-17 RX ORDER — ONDANSETRON 2 MG/ML
4 INJECTION INTRAMUSCULAR; INTRAVENOUS EVERY 6 HOURS PRN
Status: CANCELLED | OUTPATIENT
Start: 2024-01-17

## 2024-01-17 RX ORDER — MORPHINE SULFATE 2 MG/ML
1 INJECTION, SOLUTION INTRAMUSCULAR; INTRAVENOUS
Status: CANCELLED | OUTPATIENT
Start: 2024-01-17

## 2024-01-17 RX ORDER — FUROSEMIDE 10 MG/ML
20 INJECTION INTRAMUSCULAR; INTRAVENOUS ONCE
Status: DISCONTINUED | OUTPATIENT
Start: 2024-01-17 | End: 2024-01-17 | Stop reason: HOSPADM

## 2024-01-17 RX ORDER — BISACODYL 5 MG/1
5 TABLET, DELAYED RELEASE ORAL DAILY PRN
Status: CANCELLED | OUTPATIENT
Start: 2024-01-17

## 2024-01-17 RX ORDER — LORAZEPAM 0.5 MG/1
0.5 TABLET ORAL EVERY 6 HOURS PRN
Status: CANCELLED | OUTPATIENT
Start: 2024-01-17 | End: 2024-01-20

## 2024-01-17 RX ORDER — BISACODYL 10 MG
10 SUPPOSITORY, RECTAL RECTAL DAILY PRN
Status: CANCELLED | OUTPATIENT
Start: 2024-01-17

## 2024-01-17 RX ORDER — MIDAZOLAM HYDROCHLORIDE 1 MG/ML
0.5 INJECTION INTRAMUSCULAR; INTRAVENOUS EVERY 4 HOURS PRN
Status: CANCELLED | OUTPATIENT
Start: 2024-01-17

## 2024-01-17 RX ORDER — BUDESONIDE AND FORMOTEROL FUMARATE DIHYDRATE 160; 4.5 UG/1; UG/1
1 AEROSOL RESPIRATORY (INHALATION)
Status: CANCELLED | OUTPATIENT
Start: 2024-01-17

## 2024-01-17 RX ORDER — PANTOPRAZOLE SODIUM 40 MG/10ML
40 INJECTION, POWDER, LYOPHILIZED, FOR SOLUTION INTRAVENOUS
Status: CANCELLED | OUTPATIENT
Start: 2024-01-18

## 2024-01-17 RX ORDER — IPRATROPIUM BROMIDE AND ALBUTEROL SULFATE 2.5; .5 MG/3ML; MG/3ML
3 SOLUTION RESPIRATORY (INHALATION)
Status: CANCELLED | OUTPATIENT
Start: 2024-01-17

## 2024-01-17 RX ORDER — METOPROLOL TARTRATE 1 MG/ML
2.5 INJECTION, SOLUTION INTRAVENOUS EVERY 6 HOURS SCHEDULED
Status: CANCELLED | OUTPATIENT
Start: 2024-01-17

## 2024-01-17 RX ORDER — LIDOCAINE 4 G/G
1 PATCH TOPICAL
Status: CANCELLED | OUTPATIENT
Start: 2024-01-18

## 2024-01-17 RX ORDER — ONDANSETRON 4 MG/1
4 TABLET, FILM COATED ORAL EVERY 6 HOURS PRN
Status: CANCELLED | OUTPATIENT
Start: 2024-01-17

## 2024-01-17 RX ORDER — NITROGLYCERIN 0.4 MG/1
0.4 TABLET SUBLINGUAL
Status: CANCELLED | OUTPATIENT
Start: 2024-01-17

## 2024-01-17 RX ORDER — FAMOTIDINE 20 MG/1
40 TABLET, FILM COATED ORAL DAILY
Status: CANCELLED | OUTPATIENT
Start: 2024-01-18

## 2024-01-17 RX ORDER — METHENAMINE HIPPURATE 1000 MG/1
1 TABLET ORAL EVERY 12 HOURS
Status: CANCELLED | OUTPATIENT
Start: 2024-01-17

## 2024-01-17 RX ADMIN — Medication 10 ML: at 08:51

## 2024-01-17 RX ADMIN — LIDOCAINE 1 PATCH: 4 PATCH TOPICAL at 09:06

## 2024-01-17 RX ADMIN — METOPROLOL TARTRATE 2.5 MG: 5 INJECTION INTRAVENOUS at 05:49

## 2024-01-17 RX ADMIN — BUDESONIDE AND FORMOTEROL FUMARATE DIHYDRATE 1 PUFF: 160; 4.5 AEROSOL RESPIRATORY (INHALATION) at 09:02

## 2024-01-17 RX ADMIN — PANTOPRAZOLE SODIUM 40 MG: 40 INJECTION, POWDER, LYOPHILIZED, FOR SOLUTION INTRAVENOUS at 05:49

## 2024-01-17 RX ADMIN — NYSTATIN 500000 UNITS: 100000 SUSPENSION ORAL at 09:06

## 2024-01-17 RX ADMIN — IPRATROPIUM BROMIDE AND ALBUTEROL SULFATE 3 ML: 2.5; .5 SOLUTION RESPIRATORY (INHALATION) at 13:33

## 2024-01-17 RX ADMIN — DICLOFENAC SODIUM 2 G: 9.3 GEL TOPICAL at 09:06

## 2024-01-17 RX ADMIN — GLYCOPYRROLATE 0.4 MG: 0.2 INJECTION INTRAMUSCULAR; INTRAVENOUS at 09:40

## 2024-01-17 RX ADMIN — DICLOFENAC SODIUM 2 G: 9.3 GEL TOPICAL at 12:30

## 2024-01-17 RX ADMIN — MORPHINE SULFATE 1 MG: 2 INJECTION, SOLUTION INTRAMUSCULAR; INTRAVENOUS at 12:30

## 2024-01-17 RX ADMIN — NYSTATIN 500000 UNITS: 100000 SUSPENSION ORAL at 12:31

## 2024-01-17 RX ADMIN — MORPHINE SULFATE 1 MG: 2 INJECTION, SOLUTION INTRAMUSCULAR; INTRAVENOUS at 09:32

## 2024-01-17 RX ADMIN — IPRATROPIUM BROMIDE AND ALBUTEROL SULFATE 3 ML: 2.5; .5 SOLUTION RESPIRATORY (INHALATION) at 09:02

## 2024-01-17 RX ADMIN — PIPERACILLIN SODIUM AND TAZOBACTAM SODIUM 3.38 G: 3; .375 INJECTION, SOLUTION INTRAVENOUS at 07:00

## 2024-01-17 NOTE — PROGRESS NOTES
"Palliative Care Daily Progress Note     C/C: Patient lethargic, does not open eyes to verbal stimuli.     S: Medical record reviewed. Follow up visit for GOC in the context of complex medical care. Events noted. Patient continues with increased WOB, does not open eyes to verbal stimuli. Patient received Morphine 1mg x3 doses overnight for dyspnea. Per MAR took Ativan 0.5mg PO x1 dose. RN reports patient pulled at nebulizer mask during neb treatment, no meaningful communication. Caregiver only at bedside, family expected later today.     ROS: ROS limited by AMS.     O: Code Status:   Code Status and Medical Interventions:   Ordered at: 01/16/24 1129     Medical Intervention Limits:    NO intubation (DNI)     Level Of Support Discussed With:    Next of Kin (If No Surrogate)     Code Status (Patient has no pulse and is not breathing):    No CPR (Do Not Attempt to Resuscitate)     Medical Interventions (Patient has pulse or is breathing):    Limited Support     Comments:    NO escalation of care      Advanced Directives: Advance Directive Status: Patient does not have advance directive   Goals of Care: Ongoing.   Palliative Performance Scale Score: 30%     /84 (BP Location: Right arm, Patient Position: Lying)   Pulse 108   Temp 98 °F (36.7 °C) (Axillary)   Resp 20   Ht 157.5 cm (62\")   Wt 52.3 kg (115 lb 3.2 oz)   SpO2 96%   BMI 21.07 kg/m²     Intake/Output Summary (Last 24 hours) at 1/17/2024 1018  Last data filed at 1/17/2024 0700  Gross per 24 hour   Intake 390 ml   Output 1650 ml   Net -1260 ml       PE:  General Appearance:    Patient laying in bed, eyes closed, chronically ill appearing, frail, cooperative, NAD   HEENT:    NC/AT, MM dry, face relaxed   Neck:   supple, trachea midline, no JVD   Lungs:     rhonchi to BUL's, diminished in bases; WOB increased, accessory muscles in use; RR 26-28 on exam, on 2.5LNC    Heart:    RRR, normal S1 and S2, no M/R/G,    Abdomen:     Normal bowel sounds, " soft, nontender, nondistended   G/U:   Deferred   MSK/Extremities:   Wasting, no edema   Pulses:   Pulses palpable and equal bilaterally   Skin:   Warm, dry   Neurologic:   eyes closed, does not respond to verbal stimuli,    Psych:   eyes closed, intermittent restlessness     Meds: Reviewed and changes noted    Labs:   Results from last 7 days   Lab Units 01/17/24  0605   WBC 10*3/mm3 9.17   HEMOGLOBIN g/dL 11.7*   HEMATOCRIT % 38.2   PLATELETS 10*3/mm3 316     Results from last 7 days   Lab Units 01/16/24  0704   SODIUM mmol/L 140   POTASSIUM mmol/L 4.7   CHLORIDE mmol/L 100   CO2 mmol/L 30.0*   BUN mg/dL 20   CREATININE mg/dL 0.60   GLUCOSE mg/dL 83   CALCIUM mg/dL 8.9     Results from last 7 days   Lab Units 01/16/24  0704   SODIUM mmol/L 140   POTASSIUM mmol/L 4.7   CHLORIDE mmol/L 100   CO2 mmol/L 30.0*   BUN mg/dL 20   CREATININE mg/dL 0.60   CALCIUM mg/dL 8.9   GLUCOSE mg/dL 83     Imaging Results (Last 72 Hours)       Procedure Component Value Units Date/Time    XR Abdomen KUB [[id number]] Collected: 01/15/24 2053     Updated: 01/15/24 2058    Narrative:      XR ABDOMEN KUB    Date of Exam: 1/15/2024 8:22 PM EST    Indication: Progressive abd distention/discomfort    Comparison: CT abdomen pelvis with contrast dated 1/11/2024    Findings:  There is bone demineralization with senescent changes send visualized skeletal structures including cement from previous T11 and L1 vertebroplasties. There are 3 screws traversing the right femoral neck. The bowel gas pattern is nonspecific and   nonobstructive with an overall moderate stool burden. No suspicious gas collections mass effect or calcifications.      Impression:      Impression:  Nonspecific nonobstructive bowel gas pattern with an overall moderate stool burden.        Electronically Signed: Everett Finch DO    1/15/2024 8:55 PM EST    Workstation ID: [id number]    XR Chest 1 View [[id number]] Collected: 01/15/24 1343     Updated: 01/15/24 1349    Narrative:       XR CHEST 1 VW    Date of Exam: 1/15/2024 1:06 PM EST    Indication: rhonchi on exam, requiring 3L O2 and not on home O2    Comparison: 1/8/2024    Findings:  Heart shadow is borderline enlarged. Descending thoracic aorta appears tortuous and ectatic, exaggerated today by rotation of the patient to the right.. Mild diffuse pulmonary interstitial changes are similar to the prior study, but slightly increased,   in particular, with increased peribronchial thickening in the right and left upper lung. No lung consolidation, effusion or pneumothorax is seen.          Impression:      Impression:    1. Increased peribronchial thickening and perihilar interstitial changes in the right and left midlung, compared to 1/8/2024 exam, perhaps a developing bronchitis.    2. Borderline cardiomegaly and mild pulmonary venous hypertension.      Electronically Signed: Isael Lewis MD    1/15/2024 1:46 PM EST    Workstation ID: [id number]                  Diagnostics: Reviewed    A:   Pelvic fracture    Rheumatoid arthritis    Gastroesophageal reflux disease    Hypertension    COPD (chronic obstructive pulmonary disease)    Sacral fracture, closed    Leukocytosis    Macrocytic anemia     89 y.o. female with RA, sacral fracture.    S/S:   Pain -sacral fracture  -started Tylenol 1000mg PO TID  -discontinued scheduled Tramadol 50mg PO BID, in addition to q8 hours prn breakthrough pain as not taking PO  -continue Lidocaine patch  -increased frequency of Morphine 1mg IV q 2 hours prn pain/dyspnea    2. Dyspnea -currently on 4LNC  -increased frequency of Morphine 1mg IV q 2 hours prn pain/dyspnea  -lasix, nebs  -started Robinul 0.4mg IV q 4 hours prn excess secretions    3. Agitation/restlessness -started Versed 0.5mg IV q 4 hours prn agitation/restlessness    4. Dysphagia -family electing comfort diet per chart review     5. Debility -PT/OT following     6. GOC -DNR/DNI -per review of chart  1/16: spoke with daughter at bedside who reports  no escalation of care from current measures, opting for trial of ABX and then if patient does not respond transition to comfort measures with inpatient hospice  1/17: No family at bedside, expected later today.     P: Follow up visit for GOC in the context of complex medical decision making. Patient continues to appear that she is actively dying. Reviewed medications for symptom management with RN. Family not at bedside, expected later today.   Palliative Care Team will continue to follow patient. Please do not hesitate to contact us regarding further sx mgmt or GOC needs.  Velma Alfaro, APRN  1/17/2024  Time spent: 25 minutes

## 2024-01-17 NOTE — PROGRESS NOTES
Continued Stay Note  [facility]     Patient Name: Pati Carter  MRN: [id number]  Today's Date: 1/17/2024    Admit Date: 1/17/2024    Plan: Inpatient hospice   Discharge Plan       Row Name 01/17/24 1440       Plan    Plan Inpatient hospice    Plan Comments CV 10%pps is a 89yo female admitted to inpatient hospice services this day. Chart reviewed, visit to bedside, assess completed. Patient did not respond to Rn's assess. Audible upper airway secretions noted. Warm to the touch. Rn and SW met with patient's daughters at bedside. Actively listened as they relay goal of comfort care. Discussed assess, condition, eol signs/symptom, what to possibly expect from this point forward, food/water, medications, and self care. Patient admitted to inpatient hospice services this day. Patient requires injectable medications for symptom palliation necessitating skilled nursing care. Updated VJoshua Leon APRN.     Final Discharge Disposition Code 51 - hospice medical facility                   Discharge Codes    No documentation.                       Allison Laurent RN

## 2024-01-17 NOTE — DISCHARGE SUMMARY
[facility] Medicine Services  DISCHARGE TO INPATIENT HOSPICE    Patient Name: Pati Carter  : 1930  MRN: [id number]    Date of Admission: 2024  Date of Discharge:  2024  Primary Care Physician: Pallavi Eduardo MD    Consults       Date and Time Order Name Status Description    2024 12:07 PM Inpatient Palliative Care MD Consult Completed     1/10/2024  1:14 PM Inpatient Obstetrics / Gynecology Consult Completed     2024  5:24 PM Inpatient Neurosurgery Consult Completed           Hospital Course     Presenting Problem:   Pelvic fracture [S32.9XXA]    Active Hospital Problems    Diagnosis  POA    **Pelvic fracture [S32.9XXA]  Yes    Sacral fracture, closed [S32.10XA]  Yes    Leukocytosis [D72.829]  Yes    Macrocytic anemia [D53.9]  Yes    COPD (chronic obstructive pulmonary disease) [J44.9]  Yes    Gastroesophageal reflux disease [K21.9]  Yes    Rheumatoid arthritis [M06.9]  Yes    Hypertension [I10]  Yes      Resolved Hospital Problems   No resolved problems to display.          Hospital Course:  Pati Carter is a 89 y.o. female   with PMH significant for rheumatoid arthritis, osteoporosis, frequent falls, compression fractures of lumbar vertebra, DDD, macular degeneration, stress and urinary incontinence. Patient had a fall at her assisted living facility, [facility], approximately two weeks ago and presented to [facility] ED 2024, one week after the fall, for evaluation. CT head was negative for any acute intracranial process. She was discharged from the ED on Tramadol for pain and Cefepime for a UTI.  Patient then returned to [facility] ED on 2024 for uncontrolled pain and progressive weakness, greater in her left leg. CT lumbar spine showed minimally displaced sacral fracture at S1-S2, potentially high-grade stenosis at L5-S1 and moderate to marked canal stenosis at L2-L3, L3-L4, L4-L5 which is likely chronic. Neurosurgery was consulted given progressive  leg weakness.      Pelvic/sacral fracture  Lumbar stenosis  Frequent falls  --Neurosurgery consult for progressive leg weakness, L>R, given potentially high-grade stenosis L5-S1  -- MRI L spine multilevel degenerative changes most prominent at L3-4 with severe spinal canal stenosis, moderate spinal stenosis L5-S1  --no role for NS, no surgical intervention needed at this time,   --Pain control with morphine  - Palliative consulted 1/11 and following  -- Hospice consulted 1/12 and following  --Hospice spoke with daughter today will be made inpatient hospice      Leukocytosis  ? PNA   Dyspnea  - CXR with peribronchial thickening with changes in the right lung base with ? Early changes of PNA   - Completed rocephin/doxy x 5 days,   --Repeat CXR 1/15 with increased peribronchial thickening, WBCs 13.29   -- Added Zosyn on 1/16 to see if patient would respond     Candida  -- Thrush on tongue, start nystatin suspension     Vaginal bleeding   Uterine mass   - GYN consulted and has signed off   - CT pelvis with cystic mass approximately similar in size from previous imaging. Per GYN, case discussed with GYN/ONC as well, likely that this is a malignancy and will not pursue any intervention at this time due to patients frailty. Vag bleeding is minimal and will monitor. IF there were heavier bleeding, could consider tissue diagnosis and treatment, but for now will continue with conservative management.       Anemia  --macrocytic, .8  -- B12 low normal, folate ok  --continue home B12, folic acid     Dysphagia   - Speech evaluated, patient with comfort diet in 2022. Partner reviewed with daughter and wants to continue comfort diet   - Cannot be comfort diet at rehab per CM, will need modified diet     HTN  PAD  --continue home diltiazem  --continue ASA     Urinary incontinence  --continue to hold home methenamine, off abx but contraindicated by on CrCl  --daily I's & O's     Constipation   --bowel regimen  --BM on 1/14/24      Plan to admit to inpatient hospice today.     Day of Discharge     HPI:   Patient is resting in bed. Unable to clear secretions during night. Patient did not awaken during exam. Had to have morphine during night. Plan to be admitted to inpatient hospice today.     ROS:  Unable to assess     Vital Signs:   Temp:  [97.7 °F (36.5 °C)-99 °F (37.2 °C)] 98.1 °F (36.7 °C)  Heart Rate:  [] 110  Resp:  [18-28] 21  BP: ()/() 95/72     Physical Exam:  Constitutional: No acute distress,   HENT: NCAT, mucous membranes moist  Respiratory:decreased shahana,  respiratory effort normal 4L   Cardiovascular: RRR, no murmurs, rubs, or gallops  Gastrointestinal: Positive bowel sounds, soft, nontender, nondistended  Musculoskeletal: No bilateral ankle edema  Skin: No rashes      Discharge Details     Discharge Disposition:  Transfer care to inpatient Hospice at [facility]    Time Spent on Discharge:  30 minutes    Meredith Camara, APRN  01/17/24

## 2024-01-17 NOTE — PLAN OF CARE
Problem: Skin Injury Risk Increased  Goal: Skin Health and Integrity  Outcome: Ongoing, Progressing     Problem: Fall Injury Risk  Goal: Absence of Fall and Fall-Related Injury  Outcome: Ongoing, Progressing  Intervention: Identify and Manage Contributors  Recent Flowsheet Documentation  Taken 1/16/2024 1930 by Romelia Casas RN  Medication Review/Management: medications reviewed  Intervention: Promote Injury-Free Environment  Recent Flowsheet Documentation  Taken 1/16/2024 1930 by Romelia Casas RN  Safety Promotion/Fall Prevention:   safety round/check completed   mobility aid in reach   lighting adjusted   activity supervised     Problem: Adult Inpatient Plan of Care  Goal: Plan of Care Review  Outcome: Ongoing, Progressing  Goal: Patient-Specific Goal (Individualized)  Outcome: Ongoing, Progressing  Goal: Absence of Hospital-Acquired Illness or Injury  Outcome: Ongoing, Progressing  Intervention: Identify and Manage Fall Risk  Recent Flowsheet Documentation  Taken 1/16/2024 1930 by Romelia Casas RN  Safety Promotion/Fall Prevention:   safety round/check completed   mobility aid in reach   lighting adjusted   activity supervised  Intervention: Prevent Infection  Recent Flowsheet Documentation  Taken 1/16/2024 1930 by Romelia Casas RN  Infection Prevention:   visitors restricted/screened   single patient room provided   rest/sleep promoted   hand hygiene promoted  Goal: Optimal Comfort and Wellbeing  Outcome: Ongoing, Progressing  Goal: Readiness for Transition of Care  Outcome: Ongoing, Progressing     Problem: Palliative Care  Goal: Enhanced Quality of Life  Outcome: Ongoing, Progressing     Problem: Pain Acute  Goal: Acceptable Pain Control and Functional Ability  Outcome: Ongoing, Progressing  Intervention: Prevent or Manage Pain  Recent Flowsheet Documentation  Taken 1/16/2024 1930 by Romelia Casas RN  Medication Review/Management: medications reviewed     Problem: Bleeding  (Orthopaedic Fracture)  Goal: Absence of Bleeding  Outcome: Ongoing, Progressing     Problem: Embolism (Orthopaedic Fracture)  Goal: Absence of Embolism Signs and Symptoms  Outcome: Ongoing, Progressing     Problem: Fracture Stabilization and Management (Orthopaedic Fracture)  Goal: Fracture Stability  Outcome: Ongoing, Progressing     Problem: Functional Ability Impaired (Orthopaedic Fracture)  Goal: Optimal Functional Ability  Outcome: Ongoing, Progressing     Problem: Infection (Orthopaedic Fracture)  Goal: Absence of Infection Signs and Symptoms  Outcome: Ongoing, Progressing  Intervention: Prevent or Manage Infection  Recent Flowsheet Documentation  Taken 1/16/2024 1930 by Romelia Casas, RN  Infection Prevention:   visitors restricted/screened   single patient room provided   rest/sleep promoted   hand hygiene promoted     Problem: Neurovascular Compromise (Orthopaedic Fracture)  Goal: Effective Tissue Perfusion  Outcome: Ongoing, Progressing     Problem: Pain (Orthopaedic Fracture)  Goal: Acceptable Pain Control  Outcome: Ongoing, Progressing     Problem: Respiratory Compromise (Orthopaedic Fracture)  Goal: Effective Oxygenation and Ventilation  Outcome: Ongoing, Progressing   Goal Outcome Evaluation:            Patient oriented to self. Remains consistently lethargic throughout the shift. Medicated for pain/discomfort throughout the shift. RR and HR remain consistently elevated.

## 2024-01-18 NOTE — PLAN OF CARE
Goal Outcome Evaluation:                                              Problem: Skin Injury Risk Increased  Goal: Skin Health and Integrity  Intervention: Optimize Skin Protection  Recent Flowsheet Documentation  Taken 1/18/2024 0200 by Bal Carranza RN  Pressure Reduction Techniques: frequent weight shift encouraged  Head of Bed (HOB) Positioning: HOB at 20-30 degrees  Pressure Reduction Devices: specialty bed utilized  Skin Protection: adhesive use limited  Taken 1/18/2024 0000 by Bal Carranza RN  Pressure Reduction Techniques: frequent weight shift encouraged  Head of Bed (HOB) Positioning: HOB at 20-30 degrees  Pressure Reduction Devices: specialty bed utilized  Skin Protection: adhesive use limited  Taken 1/17/2024 2200 by Bal Carranza RN  Pressure Reduction Techniques: frequent weight shift encouraged  Head of Bed (HOB) Positioning: HOB at 20-30 degrees  Pressure Reduction Devices: specialty bed utilized  Skin Protection: adhesive use limited  Taken 1/17/2024 2000 by Bal Carranza RN  Pressure Reduction Techniques: frequent weight shift encouraged  Head of Bed (HOB) Positioning: HOB at 20-30 degrees  Pressure Reduction Devices: specialty bed utilized    Comfort care, 2L NC, quin[place] in place, will continue to monitor for changes.

## 2024-01-18 NOTE — PLAN OF CARE
Goal Outcome Evaluation:                                              Problem: Skin Injury Risk Increased  Goal: Skin Health and Integrity  Intervention: Optimize Skin Protection  Recent Flowsheet Documentation  Taken 1/18/2024 0200 by Bal Carranza RN  Pressure Reduction Techniques: frequent weight shift encouraged  Head of Bed (HOB) Positioning: HOB at 20-30 degrees  Pressure Reduction Devices: specialty bed utilized  Skin Protection: adhesive use limited  Taken 1/18/2024 0000 by Bal Carranza RN  Pressure Reduction Techniques: frequent weight shift encouraged  Head of Bed (HOB) Positioning: HOB at 20-30 degrees  Pressure Reduction Devices: specialty bed utilized  Skin Protection: adhesive use limited  Taken 1/17/2024 2200 by Bal Carranza RN  Pressure Reduction Techniques: frequent weight shift encouraged  Head of Bed (HOB) Positioning: HOB at 20-30 degrees  Pressure Reduction Devices: specialty bed utilized  Skin Protection: adhesive use limited  Taken 1/17/2024 2000 by Bal Carranza RN  Pressure Reduction Techniques: frequent weight shift encouraged  Head of Bed (HOB) Positioning: HOB at 20-30 degrees  Pressure Reduction Devices: specialty bed utilized  Skin Protection: adhesive use limited     VSS, voids well, pain managed with PRN medications, will continue to monitor for changes.

## 2024-01-18 NOTE — PROGRESS NOTES
Continued Stay Note   [facility]     Patient Name: Pati Carter  MRN: [id number]  Today's Date: 1/18/2024    Admit Date: 1/17/2024    Plan: Inpatient hospice   Discharge Plan       Row Name 01/18/24 1151       Plan    Plan Inpatient hospice    Plan Comments Patient is a 89 year old female admitted with Uterine mass. PPS 20%. Daughter present at bedside. Patient wakes to verbal stimuli. Able to take minimal sips of thickened liquids. Breathing even and unlabored. Face and musculature relaxed. Patient has received 1 PRN dose of Lasix and 2 PRN dose of Robinul in the last 24 hours for pulmonary congestion. Patient continues to require inpatient hospice services for skilled nursing assessment, medication titration, and injectable medication administration for palliation of symptoms. Discharge is dependent on survival of this hospitalization and becoming appropriate for a lower level of care.                   Discharge Codes    No documentation.                       Maryanne Cai

## 2024-01-18 NOTE — H&P
Hospice History and Physical     Patient Name:  Pati Carter   : 1930   Sex: female    Patient Care Team:  Pallavi Eduardo MD as PCP - General (Internal Medicine)  Marsha Mathis MD as Consulting Physician (Rheumatology)  Hiram Yao MD as Consulting Physician (Urology)    Code Status: Comfort measures     Subjective     89 yoF presented to ED on 24 with c/o uncontrolled pain in her abdomen and pelvis and left leg weakness.  On 24 pt suffered a fall at her [facility] ([place]), was brought to ED and sent back home with Ultram for pain and Cefepime for UTI.  At that time CT of her head was negative for any acute intracranial process.     PMH significant for:  chronic dysphagia (on comfort diet since ), RA, osteoporosis, compression Fx of lumbar vertebra, DDD, macular degeneration, and multiple recent falls at [facility].     In ED workup revealed: CT scan of the lumbar spine shows minimally displaced sacral fracture at S1-S2, potentially high-grade stenosis at L5-S1 and moderate to marked canal stenosis at L2-L3, L3-L4, L4-L5 which is likely chronic. Patient was admitted to hospital medicine for further evaluation and treatment. MRI of lumbar spine showing multilevel degenerative changes most prominent at L3-4 with severe spinal canal stenosis, moderate spinal stenosis L5-S1. Minimally displaced sacral fracture. Neurosurgery consulted and patient not a candidate for neurosurgical intervention due to age.  Pt also had some vaginal bleeding noted with history of cystic lesion, CT on 2024 showing changes to cystic lesion, reviewed by GYN Oncology with continued recommendations for no further workup due to patient's frailty.  Palliative care was consulted and has been following the patient since 2024.     Inpatient hospice team met with patient and family at bedside on 2024, reviewed inpatient hospice services, medication regimen and goals of care.  Due to frailty  of patient condition, advanced age and goals of care they would like to pursue comfort focused POC and admission to inpatient hospice service to manage her pain, dyspnea, anxiety, and terminal secretions which will require injectable medications to manage.  Prognosis hours to days.      On my assessment this am, daughter at bedside.  Reports pt much more comfortable overnight.  New audible secretions noted, has been given IV robinul and lasix which have helped.  Respirations improved, decreased work of breathing, no distress noted today.  Dulcolax suppository given yesterday with no results yet.     Review of Systems  Review of Systems   Unable to perform ROS: Acuity of condition       History  Past Medical History:   Diagnosis Date    Anemia     Description: A.  Dx 2006- borderline intermittent.    Back pain     Benign colonic polyp 9/28/2016    Description: A.  Dx 1999.    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  Rule out chronic persistent asthma, COPD, or obliterative bronchiolitis.- Butch    COVID-19 virus infection 10/11/2020    CTS (carpal tunnel syndrome)     Degeneration of intervertebral disc of lumbar region     Description: A.  Diagnosed in April 2013 with advanced multilevel with severe spinal stenosis, followed by Dr. Pillai for pain management.    Gastroesophageal reflux disease     Hearing loss     History of calcium pyrophosphate deposition disease (CPPD)     History of colonoscopy 01/01/1999    NORMAL PER PATIENT     History of mammogram 01/01/2011    NORMAL PER PT     History of Papanicolaou smear of cervix 01/01/2010    NORMAL PER PT     History of varicella     Hyperlipidemia     Description: A.  Dx 2006.    Hypertension     Description: A. Dx 2001.    Macular degeneration     Nocturia     Osteoporosis     Ovarian mass     Dx 8/15- benign  left cystic adnexal mass    Overactive bladder     Pelvic floor dysfunction     Rheumatoid arthritis     Description: A.  Diagnosed in 2000 and and followed by Dr. Constantino (now Dr. Kaplan). B.  On methotrexate therapy since 2000. C.  Off low-dose prednisone therapy.    Vitamin D deficiency      Past Surgical History:   Procedure Laterality Date    APPENDECTOMY      CARPAL TUNNEL RELEASE Left 01/01/2003    HISTORY OF NEUROPLASTY DECOMPRESSION MEDIAN NERVE AT CARPAL TUNNEL LEFT    CATARACT EXTRACTION Bilateral 01/01/2009    CHOLECYSTECTOMY  01/01/1962    HIP CANNULATED SCREW PLACEMENT Right 1/25/2020    Procedure: HIP CANNULATED SCREW PLACEMENT RIGHT;  Surgeon: Karlos Blount MD;  Location: [facility];  Service: Orthopedics    KNEE ARTHROSCOPY Left 01/01/2001    MENISCAL REPAIR    KYPHOPLASTY  06/18/2015    T11 AND L1 (JOSE A)    PELVIC LAPAROSCOPY  01/01/1996    REMOVAL OF BENIGN UTERINE AND RIGHT OVARIAN TUMORS    SALPINGO OOPHORECTOMY Left 08/26/2015    REMOVAL OF LEFT OVARY AND TUBE (benign cystic mass)     Current Facility-Administered Medications   Medication Dose Route Frequency Provider Last Rate Last Admin    acetaminophen (TYLENOL) tablet 650 mg  650 mg Oral Q4H PRN Kathryn Leon APRN        Or    acetaminophen (TYLENOL) 160 MG/5ML oral solution 650 mg  650 mg Oral Q4H PRN Kathryn Leon APRN        Or    acetaminophen (TYLENOL) suppository 650 mg  650 mg Rectal Q4H PRN Kathryn Leon APRN        bisacodyl (DULCOLAX) suppository 10 mg  10 mg Rectal Daily PRN Kathryn Leon APRN        Diclofenac Sodium (VOLTAREN) 1 % gel 2 g  2 g Topical 4x Daily Sierra Adair MD   2 g at 01/18/24 0816    furosemide (LASIX) injection 20 mg  20 mg Intravenous Q4H PRN Kathryn Leon APRN   20 mg at 01/18/24 0816    glycopyrrolate (ROBINUL) injection 0.4 mg  0.4 mg Intravenous Q4H PRN Sierra Adair MD   0.4 mg at 01/18/24 0816    ketorolac (TORADOL) injection 15 mg  15 mg Intravenous Q6H PRN Edward  Kathryn HERNANDEZ APRN   15 mg at 01/17/24 1506    midazolam (VERSED) injection 0.5 mg  0.5 mg Intravenous Q4H PRN Sierra Adair MD        morphine injection 1 mg  1 mg Intravenous Q6H Kathryn Leon APRN   1 mg at 01/18/24 0333    morphine injection 2 mg  2 mg Intravenous Q1H PRN Kathryn Leon APRN        nystatin (MYCOSTATIN) 100,000 unit/mL suspension 500,000 Units  5 mL Oral 4x Daily Kathryn Leon APRN   500,000 Units at 01/18/24 0815    palliative care oral rinse   Mouth/Throat PRN Kathryn Leon APRN        palliative care oral rinse   Mouth/Throat Q6H Kathryn Leon APRN   Given at 01/17/24 2150    polyvinyl alcohol (LIQUIFILM) 1.4 % ophthalmic solution 1 drop  1 drop Both Eyes Q30 Min PRN Kathryn Leon APRN        scopolamine patch 1 mg/72 hr  1 patch Transdermal Q72H PRN Kathryn Leon APRN        sodium chloride 0.9 % flush 10 mL  10 mL Intravenous PRN Sierra Adair MD              acetaminophen **OR** acetaminophen **OR** acetaminophen    bisacodyl    furosemide    glycopyrrolate    ketorolac    midazolam    Morphine    palliative care oral rinse    polyvinyl alcohol    Scopolamine    sodium chloride  Allergies   Allergen Reactions    Ciprofloxacin Other (See Comments) and Unknown - High Severity     Other reaction(s): shaking  HCI TABS/ SHAKING  Other reaction(s): shaking  HCI TABS/ SHAKING    Levofloxacin Diarrhea and Unknown - High Severity    Sulfamethoxazole-Trimethoprim Other (See Comments), Rash and Unknown - High Severity     Stomach cramps   Stomach cramps     Sulfamethoxazole Rash    Trimethoprim GI Intolerance, Diarrhea and Rash     Family History   Problem Relation Age of Onset    Hypertension Mother     Diabetes Mother      Social History     Socioeconomic History    Marital status:    Tobacco Use    Smoking status: Former     Years: .5     Types: Cigarettes    Smokeless tobacco: Never   Vaping Use    Vaping Use: Never used   Substance and Sexual Activity     Alcohol use: No    Drug use: No    Sexual activity: Not Currently       Objective     Vital Signs  Temp:  [97.5 °F (36.4 °C)-98.1 °F (36.7 °C)] 98 °F (36.7 °C)  Heart Rate:  [] 112  Resp:  [16-24] 16  BP: ()/(63-82) 129/82    PPS: Palliative Performance Scale score as of 1/18/2024, 09:53 EST is 10% based on the following measures:   Ambulation: Totally bed bound  Activity and Evidence of Disease: Unable to do any work, extensive evidence of disease  Self-Care: Total care  Intake:  Mouth care only  LOC: Drowsy or coma     Physical Exam:  Physical Exam  Vitals and nursing note reviewed. Exam conducted with a chaperone present.   Constitutional:       General: She is not in acute distress.     Appearance: She is ill-appearing.      Comments: Frail, cachetic    HENT:      Head: Normocephalic.   Cardiovascular:      Rate and Rhythm: Tachycardia present.   Pulmonary:      Effort: Pulmonary effort is normal. No respiratory distress.      Breath sounds: Rales present. No wheezing.   Abdominal:      General: Bowel sounds are normal. There is distension.      Tenderness: There is abdominal tenderness. There is no guarding.   Musculoskeletal:      Right lower leg: No edema.      Left lower leg: No edema.   Skin:     General: Skin is warm.      Findings: Bruising present.      Comments: Bruising noted on right knee and shin   Neurological:      Mental Status: She is unresponsive.         Results Reviewed:  LAB RESULTS:      Lab 01/17/24  0605 01/16/24  0704 01/15/24  0539   WBC 9.17 13.19* 10.60   HEMOGLOBIN 11.7* 10.7* 10.3*   HEMATOCRIT 38.2 35.0 32.6*   PLATELETS 316 350 287   NEUTROS ABS 7.79* 10.85* 8.69*   IMMATURE GRANS (ABS) 0.03 0.07* 0.06*   LYMPHS ABS 0.58* 0.99 0.66*   MONOS ABS 0.73 1.11* 0.93*   EOS ABS 0.00 0.14 0.23   .5* 107.7* 107.6*         Lab 01/16/24  0704 01/15/24  0539   SODIUM 140 140   POTASSIUM 4.7 4.4   CHLORIDE 100 100   CO2 30.0* 31.0*   ANION GAP 10.0 9.0   BUN 20 21    CREATININE 0.60 0.71   EGFR 83.8 79.4   GLUCOSE 83 85   CALCIUM 8.9 8.8                         Brief Urine Lab Results  (Last result in the past 365 days)        Color   Clarity   Blood   Leuk Est   Nitrite   Protein   CREAT   Urine HCG        01/08/24 1512 Yellow   Clear   Large (3+)   Small (1+)   Negative   30 mg/dL (1+)                   Microbiology Results Abnormal       None              Uterine mass      Assessment & Plan     Assessment/Plan:   -Admit to inpatient hospice service 01/16/2024 with Uterine mass [N85.8].     -Coordination of care with nursing staff and BCN IDT.     -Pain: Morphine 1 mg IV scheduled q 6 hrs and morphine 2 mg q 1 hr PRN for BT pain or dyspnea.      -Dyspnea:  Morphine as above, oxygen via NC PRN     -Anxiety/Agitation: versed 0.5 mg q 4 hrs PRN      -Bowel/bladder: bisacodyl supp q day PRN     -Nutrition: NPO, comfort     -ADLs: total care     -Terminal fever: tylenol supp 650 mg q 6 hr PRN. tordal 15 mg IV q 6 hr PRN     -Terminal secretions:  May start PRN robinul/scop patch/lasix if needed     -Patient comfort: palliative oral rinse PRN, liquifilm PRN        Goals of Care: achieve comfortable death, educate and support family through this process.         Total Visit Time: 50 min   Face to Face Time: 30 min   Total time spent includes time reviewing chart, face-to-face time, counseling patient/family/caregiver, ordering medications/tests/procedures, communicating with other health care professionals, documenting clinical information in the electronic health record, and coordination of care with facility staff and BCN IDT.      Justification for care:  Patient meets criteria for acute in-patient care with required nursing assessment and interventions for symptoms with IV medications.      MADELIN FUENTES, MSN, APRN  [facility]  Hospice and Palliative Care Nurse Practitioner  01/18/24  09:53 EST

## 2024-01-18 NOTE — PROGRESS NOTES
Enter Query Response Below      Query Response: Likely bacterial pna unspecified             If applicable, please update the problem list.

## 2024-01-18 NOTE — PROGRESS NOTES
Nutrition Services    Patient Name:  Pati Carter  YOB: 1930  MRN: [id number]  Admit Date:  1/17/2024    Pt screened for nsg screen report. Noted to be in IPH care. Will sign off, please consult RD if needed for specific dietary needs or in the event that GOC should change, thank you.     Electronically signed by:  Julissa Deras RD  01/18/24 07:58 EST

## 2024-01-18 NOTE — INTERVAL H&P NOTE
Patient not seen by Hospice MD or NP today.  Seen by [facility] partner for Palliative visit.  Initial hospice provider visit to follow tomorrow.     Kathryn Leon, MSN, APRN, ACHPN  [facility]  Hospice and Palliative Care Nurse Practitioner  01/17/24  20:14 EST

## 2024-01-19 NOTE — PROGRESS NOTES
Palliative Care Progress Note    Date of Admission: 1/17/2024    Subjective:  cough per patient , no pain, son and DIL present. PPS at 10-20 % . No appetite , thirsty but chokes on water   Current Code Status       Date Active Code Status Order ID Comments User Context       1/17/2024 1414 No CPR (Do Not Attempt to Resuscitate) [id number]  Kathryn Leon, JANIYA Inpatient        Question Answer    Code Status (Patient has no pulse and is not breathing) No CPR (Do Not Attempt to Resuscitate)    Medical Interventions (Patient has pulse or is breathing) Comfort Measures                  No current facility-administered medications on file prior to encounter.     Current Outpatient Medications on File Prior to Encounter   Medication Sig Dispense Refill    acetaminophen (TYLENOL) 325 MG tablet TAKE 2 TABLETS (650MG) BY MOUTH EVERY NIGHT WITH TRAMADOL (Patient taking differently: Take 2 tablets by mouth 2 (Two) Times a Day.) 60 tablet 10    albuterol sulfate  (90 Base) MCG/ACT inhaler Inhale 2 puffs Every 4 (Four) Hours As Needed for Wheezing or Shortness of Air. 3 inhaler 4    aspirin 81 MG EC tablet Take 1 tablet by mouth Daily. 30 tablet 10    colchicine 0.6 MG capsule capsule Take 1 capsule by mouth Daily.      Diclofenac Sodium (VOLTAREN) 1 % gel gel Apply 2 g topically to the appropriate area as directed 4 (Four) Times a Day.      dilTIAZem CD (CARDIZEM CD) 120 MG 24 hr capsule Take 1 capsule by mouth Every Morning.      famotidine (PEPCID) 40 MG tablet Take 1 tablet by mouth Daily. 90 tablet 1    folic acid (FOLVITE) 1 MG tablet TAKE ONE TABLET BY MOUTH DAILY 30 tablet 10    melatonin 5 MG tablet tablet Take 1 tablet by mouth At Night As Needed (sleep).      meloxicam (MOBIC) 7.5 MG tablet Take 1 tablet by mouth Daily.      methenamine (HIPREX) 1 g tablet Take 1 tablet by mouth Every 12 (Twelve) Hours.      methotrexate 2.5 MG tablet Take 4 tablets by mouth 1 (One) Time Per Week. Patient takes on Sundays.       miconazole (MICOTIN) 2 % cream Apply 1 application topically to the appropriate area as directed Every 12 (Twelve) Hours.      Mirabegron ER (MYRBETRIQ) 25 MG tablet sustained-release 24 hour 24 hr tablet Take 1 tablet by mouth Daily. 90 tablet 3    polyethylene glycol (MIRALAX) 17 g packet Take 17 g by mouth Daily. As needed for constipation; can decrease to half dose every other day if loose stools.      potassium chloride (K-DUR,KLOR-CON) 20 MEQ CR tablet Take 1 tablet by mouth Daily.      Sennosides-Docusate Sodium (SENNA S PO) Take 1 tablet by mouth At Night As Needed for Constipation.      sertraline (ZOLOFT) 25 MG tablet Take 1 tablet by mouth Daily.            acetaminophen **OR** acetaminophen **OR** acetaminophen    bisacodyl    furosemide    glycopyrrolate    ketorolac    midazolam    Morphine    palliative care oral rinse    polyvinyl alcohol    Scopolamine    sodium chloride    Objective: /80 (BP Location: Right arm, Patient Position: Lying)   Pulse 99   Temp 97.7 °F (36.5 °C) (Axillary)   Resp 16   SpO2 94%      Intake/Output Summary (Last 24 hours) at 1/19/2024 1111  Last data filed at 1/19/2024 0949  Gross per 24 hour   Intake 90 ml   Output 800 ml   Net -710 ml     Physical Exam:    Conversant, no confusion. Lungs diminished , heart rrr , abd hypoactive bs, ext no edema . No lower leg movemnet, no sz  Results from last 7 days   Lab Units 01/17/24  0605   WBC 10*3/mm3 9.17   HEMOGLOBIN g/dL 11.7*   HEMATOCRIT % 38.2   PLATELETS 10*3/mm3 316     Results from last 7 days   Lab Units 01/16/24  0704   SODIUM mmol/L 140   POTASSIUM mmol/L 4.7   CHLORIDE mmol/L 100   CO2 mmol/L 30.0*   BUN mg/dL 20   CREATININE mg/dL 0.60   CALCIUM mg/dL 8.9   GLUCOSE mg/dL 83       Impression: Plan: Dyspnea/ Dysphagia/ weakness: decline noted with increased secretions , Patient awake but no appetite. Son DIL present . Needs GIP for secretiona and dyspnea  Time: 25 minutes      Chapin Marie MD  01/19/24  11:11  EST

## 2024-01-19 NOTE — PLAN OF CARE
Problem: Adult Inpatient Plan of Care  Goal: Plan of Care Review  Outcome: Ongoing, Progressing  Flowsheets (Taken 1/19/2024 1437)  Progress: declining  Plan of Care Reviewed With: patient  Outcome Evaluation: Resting comfortably in bed. Fx at BS. Turning q4. Pt alert at times, difficult to rouse at other times. No PRNs needed yet. JAZLYN suffecient. Some Robinol when congested and begins to cough. No other complaints or needs at this time. Over all, rather comfy on shift. Continue comfort care.   Goal Outcome Evaluation:  Plan of Care Reviewed With: patient        Progress: declining  Outcome Evaluation: Resting comfortably in bed. Fx at BS. Turning q4. Pt alert at times, difficult to rouse at other times. No PRNs needed yet. JAZLYN suffecient. Some Robinol when congested and begins to cough. No other complaints or needs at this time. Over all, rather comfy on shift. Continue comfort care.

## 2024-01-19 NOTE — PLAN OF CARE
Goal Outcome Evaluation:         Pt arrived to floor 2230. Responds to pain. Does not follow commands. Arrived to floor on 2L NC. Pt removed NC. Currently on RA with no s/s of dyspnea. PRN morphine given x1 in addition to scheduled morphine for pain control. PRN robinul given x1. Cisse in place. No BM. Comfort measures continued.

## 2024-01-20 NOTE — PROGRESS NOTES
Continued Stay Note  [facility]     Patient Name: Pati Carter  MRN: [id number]  Today's Date: 1/20/2024    Admit Date: 1/17/2024    Plan: Inpatient hospice   Discharge Plan       Row Name 01/20/24 1101       Plan    Plan Inpatient hospice    Plan Comments Patient is a 89 year old female admitted with uterine mass. PPS 10%. Family present at bedside. Patient wakes easily with verbal stimulation. Expresses no pain or respiratory distress.  Family report that she is exhibiting dysphagia with small sips therefore they are utilizing mouthswabs for oral moisture. Patient receiving scheduled, injectable Morphine. Minimal audible congestion noted. Patient has received 2 PRN doses of Robinul in the last 24 hours. She has also received 1 PRN dose of Versed and 2 PRN doses of Morphine in the last 24 hours. Patient continues to require inpatient hospice services for skilled nursing assessment, medication titration, and injectable medication administration for palliation of symptoms. Discharge is dependent on survival of this hospitalization and becoming appropriate for a lower level of care.                   Discharge Codes    No documentation.                       Maryanne Cai

## 2024-01-20 NOTE — PLAN OF CARE
Goal Outcome Evaluation:  Plan of Care Reviewed With: patient           Outcome Evaluation: On RA. Speaking in short illogical sentences. Answering yes and no questions when alert. PRN valium and morphine required at start of shift for pain and agitation. PRN morphine given an additional time for dyspnea. PRN robinul given x1. Currently no s/s of pain or dyspnea. Rhonchi no longer audible w/o stethoscope. Turns conducted. No BM. Comfort care continues.

## 2024-01-20 NOTE — PROGRESS NOTES
"Hospice Progress Note    Pati Carter  [id number]  7/26/1930    Date of Admission: 1/17/2024    Subjective:  pt is a 89 year old female who is on hospice service for uterine mass.  Seen in follow up today.    Family at bedside.  Pt wakes to verbal and light touch stimuli.  Verbalizes \"I'm fine.\"  Denies pain or dyspnea.    Family verbalizes increased difficulty with swallowing and small amounts of fluids.  No food and minimal sips in the last 24 hours.    No s/s pain or dyspnea.    PPS 10-20%  Robinul/morphine/versed effective for symptoms.          Objective: /80 (BP Location: Right arm, Patient Position: Lying)   Pulse 99   Temp 97.7 °F (36.5 °C) (Axillary)   Resp 16   SpO2 94%      Intake/Output Summary (Last 24 hours) at 1/20/2024 1204  Last data filed at 1/20/2024 0852  Gross per 24 hour   Intake 0 ml   Output 500 ml   Net -500 ml     Physical Exam:     Pt alert, disoriented  lungs clear, diminished, hr rrr no bs abd soft, non distended faint pulses throughout, skin jaundice appearing, thin,  no edema  Results from last 7 days   Lab Units 01/17/24  0605   WBC 10*3/mm3 9.17   HEMOGLOBIN g/dL 11.7*   HEMATOCRIT % 38.2   PLATELETS 10*3/mm3 316     Results from last 7 days   Lab Units 01/16/24  0704   SODIUM mmol/L 140   POTASSIUM mmol/L 4.7   CHLORIDE mmol/L 100   CO2 mmol/L 30.0*   BUN mg/dL 20   CREATININE mg/dL 0.60   CALCIUM mg/dL 8.9   GLUCOSE mg/dL 83       Impression: Plan: pt appears very comfortable.  Family at bedside denies any needs, pt denies any needs.    Pain/Dyspnea: continue morphine 1 mg q 6 scheduled and prn   Dysphagia: MMC and sips with understanding of likely aspiration.  Robinul prn for secretions   Continue GIP at this time for symptom management and monitor decline.      Time:  25 mins       Malu Berkowitz, APRN  01/20/24  12:04 EST   "

## 2024-01-20 NOTE — PROGRESS NOTES
"Continued Stay Note  [facility]     Patient Name: Pati Carter  MRN: [id number]  Today's Date: 1/19/2024    Admit Date: 1/17/2024    Plan: Inpatient hospice   Discharge Plan       Row Name 01/19/24 1957       Plan    Plan Comments CV 20% pps is a 89yo female with a terminal diagnosis of Uterine Mass. Chart reviewed, visit to bedside, assess completed. Patient's son, daughter in law, and caregiver present at bedside. They are loving and supportive of patient. Patient noted with eye opening, awake, oriented to self. She relays \"I'm fine.\" and \"No\" to pain, dyspnea, anxiety, n/v, discomfort. She is conversing in brief sentences at times. Resting with eyes closed unless disturbed. Audible upper airway secretions noted. Warm to the touch. Rn actively listened as family relay concern over patient choking with intake of thickened liquids. Validated, provides support. Discusses quality of life, comfort intake, giving patient what she wants to drink, robinul, medication side effects, and comfort. Family verbalized understanding. Discussed assess, condition, bowel care, eol signs/symptoms, food/water, what to possibly expect from this point forward with son in [place]. He verbalizes understanding. Rn offers support, open communication. Patient continues to require injectable medications for symptom palliation necessitating skilled nursing care. Her current level of care is unable to be provided in an alternate setting. Care coordinated with Larry [facility]RE[facility]. Updated V. Edward DENSON.     Final Discharge Disposition Code 51 - hospice medical facility                   Discharge Codes    No documentation.                       Allison Laurent RN    "

## 2024-01-21 NOTE — PLAN OF CARE
Goal Outcome Evaluation:  Plan of Care Reviewed With: patient        Progress: declining  Outcome Evaluation: On Ra. Oriented to person only when awake. PRN valium given x1. PRN morphine given x1 in addition to scheduled doses. Family at bedside at beginning of shift. No s/s pain or dyspnea. Pt resting well this shift. Comfort measures continued.

## 2024-01-21 NOTE — PLAN OF CARE
Goal Outcome Evaluation:              Outcome Evaluation: pt comfortable, daughters at bedside at different times, 1x prn morphine given, catheter in place, see modified diet order, will continue to monitor

## 2024-01-21 NOTE — PROGRESS NOTES
Continued Stay Note  [facility]     Patient Name: Pati Carter  MRN: [id number]  Today's Date: 1/21/2024    Admit Date: 1/17/2024    Plan: Inpatient hospice   Discharge Plan       Row Name 01/21/24 1641       Plan    Plan Comments CV 10% pps 89yo female terminal diagnosis Uterine Mass. Chart reviewed (lbm 1/14/2024. PRN's dulcolax x1, versed x1, morphine x1), visit to bedside, assess completed. Daughter, Lilian present, loving, and supportive. Patient did not open eyes, respond to Rn's assessment. Rn notes patient without upper airway secretions on this visit. She is with relaxed face, jaw, body posture, and respirations this visit. Abdomen is larger than this RN's last visit; however, it is soft and there are faint bowel sounds present. Patient did not respond to abdominal palpation. Extremities are warm to the touch. Rn actively listened as Lilian relays concern over how long this can go on, patient's not having bm, her comfort level, intake with thickened liquids and coughing. Rn validates, provides for reflective communication and support. Discusses assess, condition, changes in assess, potential for patient to decline vs plateau and lasting weeks. Discussed that patient has just had visit with 2 people that she has been waiting to see and that can impact her time on this earth. Discussed thickened liquids, risk of aspiration vs quality of life, giving patient what she wants and treating dyspnea with medication, discussed eol changes, difficulty watching someone decline at the end of life, what end of life means (minute, hour day, week, month), and patient's perspective of this-assessing is she uncomfortable or is this hard for us to watch, discussed medication, food/water, giving patient permission to let go. self care. Offered continued availability and support. Lilian verbalized understanding. Updated LANCE. Sho APRN. Care coordinated with Benjy [facility]. Patient continues to require injectable medications  for symptom palliation necessitating skilled nursing care. Her current level of care is unable to be provided in an alternate setting.                   Discharge Codes    No documentation.                       Allison Laurent RN

## 2024-01-21 NOTE — PROGRESS NOTES
Hospice Progress Note    Patient Name: Pati Carter   : 1930  Gender: female    Code Status: comfort measures    Date of Admission: 2024    Subjective: Report from RN visit was that pt did not wake for her and was a true 10% today, however upon entering room pt being turned and by hospital RN.  She is awake, verbalizes she is doing well.  Offers no needs, denies pain or shortness of breath.  Had a few sips of thickened soda with coughing.  She then closes her eyes again after assessment and appears to go back to sleep.    Daughter at bedside states pt requested food yesterday (wanted cake.)        Scheduled Morphine q 6 hr   Prn given x 1 morphine and x 1 versed over night.        - Intake/Output  Intake & Output (last 3 days)          07 07 07 07 0700    P.O.  0 0 0    I.V. 90       Total Intake 90 0 0 0    Urine 800 500 500     Total Output 800 500 500     Net -710 -500 -500 0                       ROS:  Review of Systems   All other systems reviewed and are negative.      Reviewed current scheduled and prn medications for route, type, dose and frequency.       acetaminophen **OR** acetaminophen **OR** acetaminophen    bisacodyl    furosemide    glycopyrrolate    ketorolac    midazolam    Morphine    palliative care oral rinse    polyvinyl alcohol    Scopolamine    sodium chloride    Objective:   /80 (BP Location: Right arm, Patient Position: Lying)   Pulse 99   Temp 97.7 °F (36.5 °C) (Axillary)   Resp 16   SpO2 94%      PPS:     Physical Exam:  Physical Exam  HENT:      Head: Normocephalic and atraumatic.   Eyes:      Extraocular Movements: Extraocular movements intact.   Cardiovascular:      Rate and Rhythm: Tachycardia present.   Abdominal:      General: Bowel sounds are normal. There is distension.      Palpations: Abdomen is soft.   Musculoskeletal:      Cervical back: Normal range of motion.      Comments:  Generalized weakness   Skin:     General: Skin is warm and dry.      Capillary Refill: Capillary refill takes 2 to 3 seconds.   Neurological:      Mental Status: She is alert and oriented to person, place, and time.   Psychiatric:         Mood and Affect: Mood normal.             Uterine mass      Assessment/Plan:   Pati Carter is a 89 y.o. female admitted to inpatient hospice 01/16/2024 with diagnosis of Uterine mass [N85.8]  for symptom management.       Symptoms:  Pain/dyspnea: continue morphine atc and prn.  Currently seems very comfortable.   Anxiety/agitations:  continue versed prn, pt seems very comfortable.   Congestion/aspiration:  family requested to give pt comfort foods as desired.  Discussion with daughter on risk/benefit of allowing pt to have solid foods.  She verbalized understanding.      Discharge Disposition:    Total Visit Time:25 mins  Face to Face Time: 15 mins    Justification for care:  Patient meets criteria for acute in-patient care due to need for frequent skilled nursing assessments to determine patient comfort and medication effectiveness at end of life.  Frequent adjustments to medications and interventions for symptom management, including injectable medications.    Malu Berkowitz, MSN, APRN  [facility] Care Navigators  Hospice and Palliative Care Nurse Practitioner  01/21/24  10:24 EST

## 2024-01-22 NOTE — PROGRESS NOTES
"Continued Stay Note  [facility]     Patient Name: Pati Carter  MRN: [id number]  Today's Date: 1/22/2024    Admit Date: 1/17/2024    Plan: Inpatient hospice   Discharge Plan       Row Name 01/22/24 1321       Plan    Plan Inpatient hospice    Plan Comments CV 20% pps is a 89yo  female with a terminal diagnosis of uterine mass. Chart reviewed (Morphine x2 prn in last 24ours. LBM 1/14/24 multiple bowel interventions), visit to bedside, assess completed. Patient is observed lying in hospital bed with eyes open. She is restless, confused, disoriented this am stating to RN, \"You don't feed the children here or take care of them. They are starving. I am thirsty, I need water. I am hungry. Give me water.\" Rn attempts to reassure patient without success. RN gives patient thickened water. Patient coughs at times, other times does well with this. Patient relays \"no\" to pain. Patient asks RN \"Take me to the hospital. I feel very sick. I'm not doing good.\" RN relays to patient she is in hospital, she is safe and cared for. Visitor Latia arrives and is supportive. RN updates. This gives patient some comfort; however, patient remains restless and anxious. Latia giving patient thickened water. Patient continues to require injectable medications for symptom palliation necessitating skilled nursing care. Her current level of care is unable to be provided in an alternate setting. Rn updates [facility] and [facility]. Patient medicated for comfort. Updated AUBREE Leon APRN.     Final Discharge Disposition Code 51 - hospice medical facility                   Discharge Codes    No documentation.                       Allison Laurent RN    "

## 2024-01-22 NOTE — PROGRESS NOTES
Hospice Progress Note    Patient Name: Pati Carter   : 1930  Gender: female    Code Status: comfort measures    Date of Admission: 2024    Subjective:  Pati Carter is a 89 y.o. female admitted to inpatient hospice 2024 with diagnosis of Uterine mass [N85.8]  for symptom management.     Sitter at bedside today.  Pt has been restless this am, attempting to get OOB.  Last BM 24, multiple PRN's have been given.  Pt had SSE on Friday which caused some pain.  +Bowel sounds x 4, abdomen distended.  Pt denies pain with palpation.     - Scheduled:  Morphine 1 mg q 6 hrs   Diclofenac gell QID   Palliative oral rinse q 6 hrs     - PRNs:  Versed 0.5 mg x 1   Versed 1 mg x 1   Morphine 2 mg x 2       - Intake/Output  Intake & Output (last 3 days)          07 07 07 07 07 07 07 0700    P.O. 0 0 0     I.V.        Total Intake 0 0 0     Urine 500 500 600     Total Output 500 500 600     Net -500 -500 -600                        ROS:  Review of Systems   Unable to perform ROS: Acuity of condition       Reviewed current scheduled and prn medications for route, type, dose and frequency.       acetaminophen **OR** acetaminophen **OR** acetaminophen    bisacodyl    furosemide    glycopyrrolate    ketorolac    midazolam    Morphine    palliative care oral rinse    polyvinyl alcohol    Scopolamine    sodium chloride    Objective:   /80 (BP Location: Right arm, Patient Position: Lying)   Pulse 99   Temp 97.7 °F (36.5 °C) (Axillary)   Resp 16   SpO2 94%      PPS: Palliative Performance Scale score as of 2024, 13:25 EST is 20% based on the following measures:   Ambulation: Totally bed bound  Activity and Evidence of Disease: Unable to do any work, extensive evidence of disease  Self-Care: Total care  Intake:Minimal sips  LOC: Full, drowsy or confusion     Physical Exam:  Physical Exam  Vitals and nursing note reviewed. Exam  conducted with a chaperone present.   Constitutional:       General: She is not in acute distress.     Appearance: She is ill-appearing.   HENT:      Mouth/Throat:      Mouth: Mucous membranes are moist.      Pharynx: Oropharyngeal exudate present.   Cardiovascular:      Rate and Rhythm: Tachycardia present.   Pulmonary:      Effort: Pulmonary effort is normal. No respiratory distress.      Breath sounds: Rhonchi and rales present.   Abdominal:      General: Bowel sounds are normal. There is no distension.      Tenderness: There is no abdominal tenderness. There is no guarding.   Skin:     General: Skin is warm.      Coloration: Skin is pale.   Neurological:      Mental Status: She is disoriented.             Uterine mass      Assessment/Plan:   Pati Carter is a 89 y.o. female admitted to inpatient hospice 01/16/2024 with diagnosis of Uterine mass [N85.8]  for symptom management.     Symptoms:  Pain   Constipation   Anxiety/restlessness     Discharge Disposition: [facility]    -Reorder toradol 15 mg q 6 hrs PRN for fever or pain   -Increase versed to 1 mg q 4 hrs   -Increase morphine to 2 mg q 4 hrs scheduled.   -Increase morphine to 2 mg q 1 hr PRN for BT pain or dyspnea.     Total Visit Time: 20 min   Face to Face Time: 10 min     Justification for care:  Patient meets criteria for acute in-patient care due to need for frequent skilled nursing assessments to determine patient comfort and medication effectiveness at end of life.  Frequent adjustments to medications and interventions for symptom management, including injectable medications.      Kathryn Leon, MSN, APRN  [facility]  Hospice and Palliative Care Nurse Practitioner  01/22/24  13:20 EST

## 2024-01-22 NOTE — PLAN OF CARE
Goal Outcome Evaluation:           Progress: no change  Outcome Evaluation: pt confused at start of shift. scheduled morphine given, catheter in place. pt appears to be sleeping comfortably most of shift, VS stable on room air

## 2024-01-23 NOTE — PLAN OF CARE
Goal Outcome Evaluation:  Plan of Care Reviewed With: patient, family        Progress: no change  Outcome Evaluation: VSS on room air. Pt confused and not oriented. scheduled morphine given to maintain comfort. Catheter in place. Pt appears to be sleeping comfortably during shift with moments of calling out in her sleep. Will continue to monitor and provide comfort measures

## 2024-01-23 NOTE — PROGRESS NOTES
Continued Stay Note   [place]     Patient Name: Pati Carter  MRN: [id number]  Today's Date: 1/23/2024    Admit Date: 1/17/2024    Plan: Inpatient hospice   Discharge Plan       Row Name 01/23/24 1433       Plan    Plan Inpatient hospice    Plan Comments Patient is a 89 year old female admitted with uterine mass. PPS 20%. Daughter present at bedside. Patient resting with eyes closed throughout assessment. Breathing even and unlabored. Face and musculature relaxed. Patient continues to receive scheduled, injectable Morphine. She has received 2 PRN doses of injectable Morphine in the last 24 hours. Patient continues to require inpatient hospice services for skilled nursing assessment, medication titration, and injectable medication administration for palliation of symptoms. Discharge is dependent on survival of this hospitalization and becoming appropriate for a lower level of care.                   Discharge Codes    No documentation.                 Expected Discharge Date and Time       Expected Discharge Date Expected Discharge Time    Jan 17, 2024               Maryanne Cai

## 2024-01-23 NOTE — PROGRESS NOTES
Hospice Progress Note    Patient Name: Pati Carter   : 1930  Gender: female    Code Status: comfort measures    Date of Admission: 2024    Subjective:  89 y.o. female  with a hospice diagnosis of Uterine mass.  Admitted to [facility] hospice on 2024 for symptom management of pain, anxiety and dyspnea.  Follow up visit today completed for needs assessment and medication efficacy.  Appropriate PPE donned and doffed per infectious disease protocols.  Medical record again reviewed and unchanged from H&P.  Exam complete with daughter present.  Family at bedside for exam today.  Nursing reports comfort over night. PRN medication use in previous 24 hours: versed 0.5 mg IV x 1 and 1 mg IV x 2. POC reviewed with bedside RN.  Nurse and family express no additional needs or concerns at this time.       - Intake/Output  Intake & Output (last 3 days)          07 07 07 07 07 07 07 07    P.O. 0 0      Total Intake 0 0      Urine 500 600 350     Total Output 500 600 350     Net -500 -600 -350                        ROS:  Review of Systems   Unable to perform ROS: Mental status change       Reviewed current scheduled and prn medications for route, type, dose and frequency.       acetaminophen **OR** acetaminophen **OR** acetaminophen    bisacodyl    furosemide    glycopyrrolate    midazolam    Morphine    palliative care oral rinse    polyvinyl alcohol    Scopolamine    sodium chloride    Objective:   /80 (BP Location: Right arm, Patient Position: Lying)   Pulse 99   Temp 97.7 °F (36.5 °C) (Axillary)   Resp 16   SpO2 94%      PPS: Palliative Performance Scale score as of 2024, 12:20 EST is 20% based on the following measures:   Ambulation: Totally bed bound  Activity and Evidence of Disease: Unable to do any work, extensive evidence of disease  Self-Care: Total care  Intake:Minimal sips  LOC: Full, drowsy or confusion      Physical Exam:  Physical Exam  Constitutional:       Appearance: She is ill-appearing.      Comments: Minimally responsive to exam    HENT:      Head: Normocephalic and atraumatic.      Mouth/Throat:      Mouth: Mucous membranes are dry.   Eyes:      Comments: Eyes closed    Cardiovascular:      Rate and Rhythm: Normal rate and regular rhythm.      Pulses: Normal pulses.      Heart sounds: Normal heart sounds.   Pulmonary:      Comments: Coarse breath sounds   Abdominal:      General: Bowel sounds are normal.      Palpations: Abdomen is soft.   Musculoskeletal:         General: Normal range of motion.   Skin:     General: Skin is warm and dry.   Neurological:      Comments: Minimally responsive    Psychiatric:      Comments: calm             Uterine mass      Assessment/Plan:   Pati Carter is a 89 y.o. female admitted to inpatient hospice 01/16/2024 with diagnosis of Uterine mass [N85.8]  for symptom management.       Symptoms: Last BM 1/14/24, multiple PRN's have been given.  Pt had SSE on Friday which caused some pain.  +Bowel sounds x 4, abdomen distended.  Pt denies pain with palpation.     Discharge Disposition: To LTC with hospice if patient was to transfer from the hospital, however the patient prognosis remains hours to days.     Total Visit Time: 20 minutes   Face to Face Time: 10 minutes    Justification for care:  Patient meets criteria for acute in-patient care with required nursing assessment and interventions for symptoms with IV medications.    JANIYA Kaye   [facility] Nav[facility]ators  Hospice Nurse practitioner   01/23/24  12:15 EST

## 2024-01-24 NOTE — PLAN OF CARE
Goal Outcome Evaluation:  Plan of Care Reviewed With: patient        Progress: no change  Outcome Evaluation: Pt remains on RA. Cisse in place; draining clear yellow urine. No PRNs needed today. Per caregiver at bedside, pt able to take spoonfuls or thickened liquid. Pt still arousable and can remain alert for about 5 minutes and goes back to sleep. Infrequent coughing noted, weak but productive.

## 2024-01-24 NOTE — PROGRESS NOTES
Continued Stay Note  [facility]     Patient Name: Pati Carter  MRN: [id number]  Today's Date: 1/24/2024    Admit Date: 1/17/2024    Plan: Inpatient hospice   Discharge Plan       Row Name 01/24/24 1308       Plan    Plan Inpatient hospice    Plan Comments Patient lying in bed with eyes open. She is unable to answer yes/no questions. Cooperative with assessment. Nurse at bedside giving scheduled medicines. Sitter, Breanne at bedside requesting chocolate milk for patient. Teaching done to sit patient upright, to give small amounts at a time, and to make sure she is getting a meaningful response from patient before putting anything in her mouth. She verbalizes understanding and stated she had been adding thickener to her fluids. Patient required 1 prn of Robinul 0.4mg overnight, 1 prn of Morphine 4mg overnight, and 6 prns of Versed 1mg in the past 24 hours. She continues to require inpatient hospice for skilled nursing assessment, medication titration, and injectable medication administration. Called daughter, Lilian with an update.                   Discharge Codes    No documentation.                 Expected Discharge Date and Time       Expected Discharge Date Expected Discharge Time    Jan 17, 2024               Nika Ely RN

## 2024-01-24 NOTE — PLAN OF CARE
Goal Outcome Evaluation:  Plan of Care Reviewed With: patient, family        Progress: declining  Outcome Evaluation: VSS on room air. Pt confused and disoriented. Scheduled morphine as well as PRN morphine and versed given to maintain comfort. Catheter in place. Pt had bowel movement during shift. Pt appeared to be sleeping during most of shift with spontaneous moans and calling out. Will continue to monitor and provide comfort measures.

## 2024-01-25 NOTE — PROGRESS NOTES
Hospice Progress Note    Patient Name: Pati Carter   : 1930  Gender: female    Code Status: comfort measures    Date of Admission: 2024    Subjective:  Pati Carter is a 89 y.o. female admitted to inpatient hospice 2024 with diagnosis of Uterine mass [N85.8]  for symptom management.     Sitter at bedside.  Pt in no acute distress during assessment.  She has required multiple PRN's overnight.      - Scheduled:  Relistor 4 mg QOD  Morphine 2 mg q 4 hrs   Palliative oral rinse q 6 hrs     - PRNs:  Scopolamine Patch x 1   Robinul 0.4 mg x 1   Versed 1 mg x 6   Morphine 4 mg x 1       - Intake/Output  Intake & Output (last 3 days)          07 07 07    P.O.        Total Intake        Urine 350 300 250     Stool  0      Total Output 350 300 250     Net -350 -300 -250             Stool Unmeasured Occurrence  2 x                 ROS:  Review of Systems   Unable to perform ROS: Acuity of condition       Reviewed current scheduled and prn medications for route, type, dose and frequency.       acetaminophen **OR** acetaminophen **OR** acetaminophen    bisacodyl    furosemide    glycopyrrolate    ketorolac    midazolam    Morphine    palliative care oral rinse    polyvinyl alcohol    Scopolamine    sodium chloride    Objective:   /80 (BP Location: Right arm, Patient Position: Lying)   Pulse 99   Temp 97.7 °F (36.5 °C) (Axillary)   Resp 16   SpO2 94%      PPS: Palliative Performance Scale score as of 2024, 12:30 EST is 20% based on the following measures:   Ambulation: Totally bed bound  Activity and Evidence of Disease: Unable to do any work, extensive evidence of disease  Self-Care: Total care  Intake:Minimal sips  LOC: Full, drowsy or confusion     Physical Exam:  Physical Exam  Vitals and nursing note reviewed.   Constitutional:       General: She is not in acute distress.     Appearance: She is  ill-appearing.      Comments: Frail, cachetic    HENT:      Head: Normocephalic.      Mouth/Throat:      Mouth: Mucous membranes are dry.      Pharynx: Oropharyngeal exudate present.   Cardiovascular:      Rate and Rhythm: Normal rate.   Pulmonary:      Effort: Pulmonary effort is normal. No respiratory distress.      Breath sounds: Rales present. No wheezing.   Abdominal:      General: Bowel sounds are normal. There is distension.   Skin:     General: Skin is warm.             Uterine mass      Assessment/Plan:   Pati Carter is a 89 y.o. female admitted to inpatient hospice 01/16/2024 with diagnosis of Uterine mass [N85.8]  for symptom management.     Symptoms:  Terminal secretions   Pain   Dyspnea     Discharge Disposition: EOLC     Total Visit Time: 20 min  Face to Face Time: 10 min     Justification for care:  Patient meets criteria for acute in-patient care due to need for frequent skilled nursing assessments to determine patient comfort and medication effectiveness at end of life.  Frequent adjustments to medications and interventions for symptom management, including injectable medications.      Kathryn Leon, MSN, APRN  [facility] Nav[facility]ators  Hospice and Palliative Care Nurse Practitioner  01/25/24  13:30 EST

## 2024-01-25 NOTE — PLAN OF CARE
Goal Outcome Evaluation:  Plan of Care Reviewed With: patient        Progress: declining  Outcome Evaluation: resting well, no c/o, family at bedside, prn versed given, comfort measures

## 2024-01-25 NOTE — PROGRESS NOTES
Continued Stay Note   [place]     Patient Name: Pati Carter  MRN: [id number]  Today's Date: 1/25/2024    Admit Date: 1/17/2024    Plan: Inpatient hospice   Discharge Plan       Row Name 01/25/24 1551       Plan    Plan Inpatient hospice    Plan Comments Patient is a 89 year old female admitted with uterine mass. PPS 20%. Daughter and caregiver present at bedside. Patient awake and talking. She is able to take sips of thickened apple juice. Breathing even and unlabored. Face and musculature relaxed. Patient receiving scheduled, injectable Morphine. She has received 1 PRN dose of Robinul in the last 24 hours. Patient continues to require inpatient hospice services for skilled nursing assessment, medication titration, and injectable medication administration for palliation of symptoms. Discharge is dependent on survival of this hospitalization and becoming appropriate for a lower level of care.                   Discharge Codes    No documentation.                 Expected Discharge Date and Time       Expected Discharge Date Expected Discharge Time    Jan 17, 2024               Maryanne Cai

## 2024-01-25 NOTE — PLAN OF CARE
Comfort measures maintained.  Patient appears calm until assessment or attempting to give medications through IV.  No family at bedside.  Tolerating turns.   Disoriented x 4.   Speech illogical.  Skin integrity maintained.  Respirations shallow, unlabored.    Oral care continues.   Cisse cath with >150ml of urine output.  PRN x 1- robinul.  SQ access remains.

## 2024-01-25 NOTE — PROGRESS NOTES
Hospice Progress Note    Patient Name: Pati Carter   : 1930  Gender: female    Code Status: comfort measures    Date of Admission: 2024    Subjective:  Pati Carter is a 89 y.o. female admitted to inpatient hospice 2024 with diagnosis of Uterine mass [N85.8]  for symptom management.     Sitter at bedside today.  Pt has been restless this am, attempting to get OOB.  Last BM 24, multiple PRN's have been given.  Pt had SSE on Friday which caused some pain.  +Bowel sounds x 4, abdomen distended.  Pt denies pain with palpation.     - Scheduled:  Morphine 1 mg q 6 hrs   Diclofenac gell QID   Palliative oral rinse q 6 hrs     - PRNs:  Versed 0.5 mg x 1   Versed 1 mg x 1   Morphine 2 mg x 2       - Intake/Output  Intake & Output (last 3 days)          07 07 07 07 07 07 07 07    P.O. 0 0 0     I.V.        Total Intake 0 0 0     Urine 500 500 600     Total Output 500 500 600     Net -500 -500 -600                        ROS:  Review of Systems   Unable to perform ROS: Acuity of condition       Reviewed current scheduled and prn medications for route, type, dose and frequency.       acetaminophen **OR** acetaminophen **OR** acetaminophen    bisacodyl    furosemide    glycopyrrolate    ketorolac    midazolam    Morphine    palliative care oral rinse    polyvinyl alcohol    Scopolamine    sodium chloride    Objective:   /80 (BP Location: Right arm, Patient Position: Lying)   Pulse 99   Temp 97.7 °F (36.5 °C) (Axillary)   Resp 16   SpO2 94%      PPS: Palliative Performance Scale score as of 2024, 20:05 EST is 20% based on the following measures:   Ambulation: Totally bed bound  Activity and Evidence of Disease: Unable to do any work, extensive evidence of disease  Self-Care: Total care  Intake:Minimal sips  LOC: Full, drowsy or confusion     Physical Exam:  Physical Exam  Vitals and nursing note reviewed. Exam  conducted with a chaperone present.   Constitutional:       General: She is not in acute distress.     Appearance: She is ill-appearing.   HENT:      Mouth/Throat:      Mouth: Mucous membranes are moist.      Pharynx: Oropharyngeal exudate present.   Cardiovascular:      Rate and Rhythm: Tachycardia present.   Pulmonary:      Effort: Pulmonary effort is normal. No respiratory distress.      Breath sounds: Rhonchi and rales present.   Abdominal:      General: Bowel sounds are normal. There is no distension.      Tenderness: There is no abdominal tenderness. There is no guarding.   Skin:     General: Skin is warm.      Coloration: Skin is pale.   Neurological:      Mental Status: She is disoriented.             Uterine mass      Assessment/Plan:   Pati Carter is a 89 y.o. female admitted to inpatient hospice 01/16/2024 with diagnosis of Uterine mass [N85.8]  for symptom management.     Symptoms:  Pain   Constipation   Anxiety/restlessness     Discharge Disposition: EOLC    -Reorder toradol 15 mg q 6 hrs PRN for fever or pain   -Increase versed to 1 mg q 4 hrs   -Increase morphine to 2 mg q 4 hrs scheduled.   -Increase morphine to 2 mg q 1 hr PRN for BT pain or dyspnea.     Total Visit Time: 20 min   Face to Face Time: 10 min     Justification for care:  Patient meets criteria for acute in-patient care due to need for frequent skilled nursing assessments to determine patient comfort and medication effectiveness at end of life.  Frequent adjustments to medications and interventions for symptom management, including injectable medications.      Kathryn Leon, MSN, APRN  [facility] Navigators  Hospice and Palliative Care Nurse Practitioner  01/24/24  20:05 EST

## 2024-01-26 NOTE — PROGRESS NOTES
Continued Stay Note  [facility]     Patient Name: Pati Carter  MRN: [id number]  Today's Date: 1/26/2024    Admit Date: 1/17/2024    Plan: [facility] Inpatient Hospice   Discharge Plan       Row Name 01/26/24 1357       Plan    Plan [facility] Inpatient Hospice    Plan Comments Ms. Pati Carter is a 89 y/o female with a hospice diagnosis of uterine mass who was admitted to [facility] on 1/17/2024 for symptom management of pain, dyspnea and anxiety. PPS 20%. PRN’s in the last 24/hrs were versed 1 mg x 4 SQ and morphine 4 mg x 1 SQ in addition to her scheduled SQ morphine 2 mg q 4 and SQ toradol 15 mg q 8. Hospice provider updated. Education provided to private sitter on nonverbal signs of discomfort and s/s of end of life. She requires [facility]IP for skilled nursing assessments, medication titration for symptoms, 2 for ADL’s, and frequent interventions to promote comfort. Discharge is dependent on survival of this hospitalization and becoming appropriate for a lower level of care. If the hospice team can be of further assitance call ext [phone].    Final Discharge Disposition Code 30 - still a patient                   Discharge Codes    No documentation.                 Expected Discharge Date and Time       Expected Discharge Date Expected Discharge Time    Jan 17, 2024               Chayo Busby RN

## 2024-01-26 NOTE — PROGRESS NOTES
Hospice Progress Note    Patient Name: Pati Carter   : 1930  Gender: female    Code Status: comfort measures    Date of Admission: 2024    Subjective:  Pati Carter is a 89 y.o. female admitted to inpatient hospice 2024 with diagnosis of Uterine mass [N85.8]  for symptom management.     Paid caregiver at bedside.  Pt appears comfortable on my assessment.  Per nursing report, severe restlessness/anxiety overnight. Multiple PRN's required to manage restlessness. Pt attempting to get legs over the rails and out of bed.  Caregiver attempting to give pt sips of thickened liquids, tolerated this well.      - Scheduled:  Toradol 15 mg q 8 hrs  Relistor QOD   Morphine 2 mg q 4 hrs   Nystatin 5 ml QID   Palliative oral rinse BID     - PRNs:  Robinul 0.4 mg x 1   Versed 1 mg q x 6         - Intake/Output  Intake & Output (last 3 days)          07 07 07 07    Urine 300 250 250     Stool 0       Total Output 300 250 250     Net -300 -250 -250             Stool Unmeasured Occurrence 2 x                  ROS:  Review of Systems   Unable to perform ROS: Acuity of condition       Reviewed current scheduled and prn medications for route, type, dose and frequency.       acetaminophen **OR** acetaminophen **OR** acetaminophen    bisacodyl    furosemide    glycopyrrolate    haloperidol lactate    ketorolac    midazolam    Morphine    palliative care oral rinse    polyvinyl alcohol    Scopolamine    sodium chloride    Objective:   /80 (BP Location: Right arm, Patient Position: Lying)   Pulse 99   Temp 97.7 °F (36.5 °C) (Axillary)   Resp 16   SpO2 94%      PPS: Palliative Performance Scale score as of 2024, 16:39 EST is 30% based on the following measures:   Ambulation: Totally bed bound  Activity and Evidence of Disease: Unable to do any work, extensive evidence of disease  Self-Care: Total care  Intake: Reduced    LOC: Full, drowsy or confusion     Physical Exam:  Physical Exam  Constitutional:       Comments: Frail.  Temporal wasting present.    HENT:      Head: Normocephalic.      Mouth/Throat:      Mouth: Mucous membranes are moist.      Pharynx: Oropharyngeal exudate present.   Cardiovascular:      Rate and Rhythm: Normal rate.   Pulmonary:      Effort: Pulmonary effort is normal. No respiratory distress.      Breath sounds: Rhonchi and rales present. No wheezing.   Abdominal:      General: Bowel sounds are normal. There is no distension.      Tenderness: There is no abdominal tenderness. There is no guarding.             Uterine mass      Assessment/Plan:   Pati Carter is a 89 y.o. female admitted to inpatient hospice 01/16/2024 with diagnosis of Uterine mass [N85.8]  for symptom management.     Symptoms:  Terminal restlessness   Anxiety   Terminal secretions   Pain     Discharge Disposition: EOLC     -Discontinue toradol per family request.   -Add haldol 2 mg q 4 hrs PRN for agitation/restlessness.   -Add versed 1 mg scheduled q 4 hrs  -Continue versed 1 mg q 2 hrs PRN for anxiety/restlessness.     Total Visit Time: 20 min   Face to Face Time: 10 min     Justification for care:  Patient meets criteria for acute in-patient care due to need for frequent skilled nursing assessments to determine patient comfort and medication effectiveness at end of life.  Frequent adjustments to medications and interventions for symptom management, including injectable medications.      Kathryn Leon, MSN, APRN  [facility] Navigators  Hospice and Palliative Care Nurse Practitioner  01/26/24  16:33 EST

## 2024-01-26 NOTE — PLAN OF CARE
Goal Outcome Evaluation:  Plan of Care Reviewed With: patient, other (see comments)        Progress: declining  Outcome Evaluation: restless most of the day with periods of rest, pulling at lines and gown, family at sitters at bedside, PRN versed and morphine given for pain and agitation, PRN haldol given, comfort measures

## 2024-01-26 NOTE — PLAN OF CARE
Alert.  Disoriented.  At times able to follow simple commands or answer yes/no.  Speech illogical, garbled.   No apnea.  Loose nonproductive infrequent cough.  PRN med x 1 given for anxiousness.  Tolerated bath.  Cisse cath with adequate output.  Tolerated some spoonfuls of thickened liquid.   Skin integrity maintained with comfort as goal.

## 2024-01-27 NOTE — PROGRESS NOTES
Hospice Progress Note    Patient Name: Pati Carter   : 1930  Gender: female    Code Status: comfort measures    Date of Admission: 2024    Subjective:  Pati Carter is a 89 y.o. female admitted to inpatient hospice 2024 with diagnosis of Uterine mass [N85.8]  for symptom management.     Paid caregiver at bedside.  Continued severe anxiety and agitation this am.  Multiple PRN's required overnight for agitation.  Pt moving legs, attempting to get OOB, pulling at clothes. Moaning when touched.  Facial grimace present during my assessment.     - Scheduled:  Versed 1 mg q 4 hrs  Toradol 15 mg q 8 hrs  Relistor QOD   Morphine 2 mg q 4 hrs   Nystatin 5 ml QID   Palliative oral rinse BID     - PRNs:  Robinul 0.4 mg x 1   Versed 1 mg q x 4  Haldol 2 mg x 1   Morphine 4 mg x 2          - Intake/Output  Intake & Output (last 3 days)          07 07 07 07 07 07 07    Urine 300 250 250     Stool 0       Total Output 300 250 250     Net -300 -250 -250             Stool Unmeasured Occurrence 2 x                  ROS:  Review of Systems   Unable to perform ROS: Acuity of condition       Reviewed current scheduled and prn medications for route, type, dose and frequency.  No current facility-administered medications for this encounter.        Objective:   /80 (BP Location: Right arm, Patient Position: Lying)   Pulse 99   Temp 97.7 °F (36.5 °C) (Axillary)   Resp 16   SpO2 94%      PPS: Palliative Performance Scale score as of 2024, 12:32 EST is 30% based on the following measures:   Ambulation: Totally bed bound  Activity and Evidence of Disease: Unable to do any work, extensive evidence of disease  Self-Care: Total care  Intake: Reduced   LOC: Full, drowsy or confusion     Physical Exam:  Physical Exam  Constitutional:       Comments: Frail.  Temporal wasting present.    HENT:      Head: Normocephalic.      Mouth/Throat:       Mouth: Mucous membranes are moist.      Pharynx: Oropharyngeal exudate present.   Cardiovascular:      Rate and Rhythm: Normal rate.   Pulmonary:      Effort: Pulmonary effort is normal. No respiratory distress.      Breath sounds: Rhonchi and rales present. No wheezing.   Abdominal:      General: Bowel sounds are normal. There is no distension.      Tenderness: There is no abdominal tenderness. There is no guarding.   Psychiatric:         Mood and Affect: Mood is anxious.         Behavior: Behavior is agitated.         Cognition and Memory: Cognition is impaired.             Uterine mass      Assessment/Plan:   Pati Carter is a 89 y.o. female admitted to inpatient hospice 01/16/2024 with diagnosis of Uterine mass [N85.8]  for symptom management.     Symptoms:  Terminal restlessness   Anxiety   Terminal secretions   Pain     Discharge Disposition: EOLC     -Add versed infusion at 1 mg /hr cont   -Add phenobarbital PRN for terminal restlessness/agitation.   -Increase morphine to 4 mg q 4 hrs scheduled.   -Continue morphine 4 mg q 1 hr PRN for BT pain or dyspnea.   -Continue PRN haldol and versed for anxiety/terminal restlessness.     Total Visit Time: 20 min   Face to Face Time: 10 min     Justification for care:  Patient meets criteria for acute in-patient care due to need for frequent skilled nursing assessments to determine patient comfort and medication effectiveness at end of life.  Frequent adjustments to medications and interventions for symptom management, including injectable medications.      Kathryn Leon, MSN, APRN  [facility]  Hospice and Palliative Care Nurse Practitioner  02/05/24  12:32 EST

## 2024-01-27 NOTE — PLAN OF CARE
Goal Outcome Evaluation:  Patient rested well through the night, became alert and responsive at around 4 am, talking and asking for water.  Honey thick water given to patient and oral care performed. Will continue to monitor.

## 2024-01-27 NOTE — PROGRESS NOTES
Continued Stay Note  [facility]     Patient Name: Pati Carter  MRN: [id number]  Today's Date: 1/27/2024    Admit Date: 1/17/2024    Plan: IPU assessment   Discharge Plan       Row Name 01/27/24 1243       Plan    Plan IPU assessment    Plan Comments     1/27 PPs 20 % Patient was very restless this morning and trying to climb out of bed. Pt nonverbal and talks word salad. Moved pt across from the nurse's station. Dtr at bedside now  feeding pt.  BM1/24. PRNs used pheno, Haldol, versed and morp given for pain, restlessness. Versed infusion state PCA @ 2 mg/hr for restlessness. Currently patient remains GIP for complications of EOL care requiring skilled nursing and medical management of symptoms. Discharge plan dependent on a decreased level of care and survival of this admission.                       Discharge Codes    No documentation.                 Expected Discharge Date and Time       Expected Discharge Date Expected Discharge Time    Jan 17, 2024               Mary Saucedo RN

## 2024-01-27 NOTE — PLAN OF CARE
Problem: End-of-Life Care  Goal: Comfort, Peace and Preserved Dignity  Outcome: Ongoing, Not Progressing  Intervention: Support the Grieving Process  Recent Flowsheet Documentation  Taken 1/27/2024 1502 by Clive Garibay, RN  Family/Support System Care: support provided   Goal Outcome Evaluation:  Plan of Care Reviewed With: daughter        Progress: declining       Versed gtt infusing; pt appears comfort. Daughter at bedside. Cisse in place. Comfort care in place.

## 2024-01-28 NOTE — PLAN OF CARE
Problem: Adult Inpatient Plan of Care  Goal: Plan of Care Review  Outcome: Ongoing, Progressing  Flowsheets (Taken 1/28/2024 0524)  Progress: declining  Plan of Care Reviewed With: patient  Outcome Evaluation: Initially tense and fidgety when staff providing care.  Pt now mostly unresponsive with occasional moan. Audible rhonchi. Alternating Robinul and Lasix. Lasix prn x2 with minimal response. Robinul prn x2.   Goal Outcome Evaluation:  Plan of Care Reviewed With: patient        Progress: declining  Outcome Evaluation: Initially tense and fidgety when staff providing care.  Pt now mostly unresponsive with occasional moan. Audible rhonchi. Alternating Robinul and Lasix. Lasix prn x2 with minimal response. Robinul prn x2.

## 2024-01-28 NOTE — SIGNIFICANT NOTE
Exam confirms with auscultation zero audible heart tones and zero audible respirations. Ms.Cornelia Carter was pronounced dead at 0959.  MD notified by Patient's RN.    Karlos Laws RN  Clinical House Supervisor  1/28/2024 10:35 EST

## 2024-01-28 NOTE — DISCHARGE SUMMARY
Date of Admission: 01/16/2024   Date and Time of Death: 1/28/2024 at 9:59 AM    Hospital Course:  Pati Carter is a 89 y.o. 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 Medications were available throughout admission for symptom management and comfort. Patient continued to decline after admission as expected ultimately to their death on 1/28/2024 at 9:59 AM. Patient was pronounced dead by Clinical House Supervisor. Spiritual and psychosocial support were available to patient and family throughout admission. Patient's remains were released per [facility] protocol.       Kathryn Leon, MSN, APRN, ACHPN  [facility]  Hospice and Palliative Care Nurse Practitioner  01/28/24  14:59 EST

## 2024-05-10 NOTE — TELEPHONE ENCOUNTER
2. The status of comorbities. (See ED/admit documents) Spoke with Danette   She is going to call back once she has the mychart activated and reschedule the telephone visit on 5/6/2020 to a video visit within 2 weeks of her mom being discharged     Verb understanding given

## 2024-07-01 NOTE — TELEPHONE ENCOUNTER
Order is the computer for [facility]. Pt daughter normally brings sample over to our office for testing, and I had already sent daughter a msg.   Orders signed and faxed back to the clinic.

## (undated) DEVICE — GW THRD TROC/PT 2.8X300MM

## (undated) DEVICE — ANTIBACTERIAL UNDYED BRAIDED (POLYGLACTIN 910), SYNTHETIC ABSORBABLE SUTURE: Brand: COATED VICRYL

## (undated) DEVICE — SPNG GZ WOVN 4X4IN 12PLY 10/BX STRL

## (undated) DEVICE — LEGGINGS, PAIR, 29X43, STERILE: Brand: MEDLINE

## (undated) DEVICE — SUT ETHLN 3/0 PC5 18IN 1893G

## (undated) DEVICE — GLV SURG SIGNATURE TOUCH PF LTX 8 STRL BX/50

## (undated) DEVICE — PK MAJ FX HIP 10

## (undated) DEVICE — Device

## (undated) DEVICE — DRSNG GZ PETROLTM XEROFORM CURAD 1X8IN STRL

## (undated) DEVICE — SCRW CANN THRD 7.3X16X85MM
Type: IMPLANTABLE DEVICE | Status: NON-FUNCTIONAL
Removed: 2020-01-25

## (undated) DEVICE — Device: Brand: COVER, PERINEAL POST, 12 PK

## (undated) DEVICE — Device: Brand: PROTECTORS, LEG SPAR BALL JOINT, 12/PR

## (undated) DEVICE — CVR HNDL LT SURG ACCSSRY BLU STRL

## (undated) DEVICE — SELF-ADHERENT BANDAGE 3" X5YD; STERILE; FOR SINGLE USE ONLY; STORE IN A COOL, DRY PLACE.: Brand: CARDINAL HEALTH

## (undated) DEVICE — GLV SURG TRIUMPH ORTHO W/ALOE PF LTX 8 STRL

## (undated) DEVICE — C-ARM: Brand: UNBRANDED